# Patient Record
Sex: FEMALE | Race: WHITE | NOT HISPANIC OR LATINO | Employment: OTHER | URBAN - METROPOLITAN AREA
[De-identification: names, ages, dates, MRNs, and addresses within clinical notes are randomized per-mention and may not be internally consistent; named-entity substitution may affect disease eponyms.]

---

## 2023-11-02 ENCOUNTER — HOSPITAL ENCOUNTER (OUTPATIENT)
Dept: RADIOLOGY | Facility: EXTERNAL LOCATION | Age: 78
Discharge: HOME | End: 2023-11-02
Payer: MEDICARE

## 2024-05-14 ENCOUNTER — APPOINTMENT (OUTPATIENT)
Dept: RADIOLOGY | Facility: HOSPITAL | Age: 79
DRG: 177 | End: 2024-05-14
Payer: MEDICARE

## 2024-05-14 ENCOUNTER — HOSPITAL ENCOUNTER (INPATIENT)
Facility: HOSPITAL | Age: 79
LOS: 7 days | Discharge: NURSING, REHAB OR LONG-TERM CARE | DRG: 177 | End: 2024-05-22
Attending: EMERGENCY MEDICINE | Admitting: INTERNAL MEDICINE
Payer: MEDICARE

## 2024-05-14 ENCOUNTER — APPOINTMENT (OUTPATIENT)
Dept: CARDIOLOGY | Facility: HOSPITAL | Age: 79
DRG: 177 | End: 2024-05-14
Payer: MEDICARE

## 2024-05-14 DIAGNOSIS — E43 SEVERE PROTEIN-CALORIE MALNUTRITION (MULTI): ICD-10-CM

## 2024-05-14 DIAGNOSIS — F17.200 TOBACCO DEPENDENCE: ICD-10-CM

## 2024-05-14 DIAGNOSIS — K26.9 DUODENAL ULCER: ICD-10-CM

## 2024-05-14 DIAGNOSIS — J18.9 PNEUMONIA OF BOTH LOWER LOBES DUE TO INFECTIOUS ORGANISM: ICD-10-CM

## 2024-05-14 DIAGNOSIS — I50.31 ACUTE DIASTOLIC (CONGESTIVE) HEART FAILURE (MULTI): ICD-10-CM

## 2024-05-14 DIAGNOSIS — D50.0 IRON DEFICIENCY ANEMIA DUE TO CHRONIC BLOOD LOSS: Primary | ICD-10-CM

## 2024-05-14 DIAGNOSIS — I50.20 UNSPECIFIED SYSTOLIC (CONGESTIVE) HEART FAILURE (MULTI): ICD-10-CM

## 2024-05-14 DIAGNOSIS — H04.129 DRY EYE: ICD-10-CM

## 2024-05-14 DIAGNOSIS — D50.9 IRON DEFICIENCY ANEMIA, UNSPECIFIED IRON DEFICIENCY ANEMIA TYPE: ICD-10-CM

## 2024-05-14 DIAGNOSIS — Z87.11 HISTORY OF PEPTIC ULCER: ICD-10-CM

## 2024-05-14 DIAGNOSIS — D50.8 OTHER IRON DEFICIENCY ANEMIA: ICD-10-CM

## 2024-05-14 DIAGNOSIS — I50.9 ACUTE CONGESTIVE HEART FAILURE, UNSPECIFIED HEART FAILURE TYPE (MULTI): ICD-10-CM

## 2024-05-14 DIAGNOSIS — E87.6 HYPOKALEMIA: ICD-10-CM

## 2024-05-14 DIAGNOSIS — I50.9 CONGESTIVE HEART FAILURE, UNSPECIFIED HF CHRONICITY, UNSPECIFIED HEART FAILURE TYPE (MULTI): ICD-10-CM

## 2024-05-14 LAB
ABO GROUP (TYPE) IN BLOOD: NORMAL
ANION GAP SERPL CALC-SCNC: 13 MMOL/L
ANTIBODY SCREEN: NORMAL
BASOPHILS # BLD MANUAL: 0 X10*3/UL (ref 0–0.1)
BASOPHILS NFR BLD MANUAL: 0 %
BUN SERPL-MCNC: 31 MG/DL (ref 8–25)
BURR CELLS BLD QL SMEAR: ABNORMAL
CALCIUM SERPL-MCNC: 8.9 MG/DL (ref 8.5–10.4)
CHLORIDE SERPL-SCNC: 98 MMOL/L (ref 97–107)
CO2 SERPL-SCNC: 28 MMOL/L (ref 24–31)
CREAT SERPL-MCNC: 0.5 MG/DL (ref 0.4–1.6)
EGFRCR SERPLBLD CKD-EPI 2021: >90 ML/MIN/1.73M*2
EOSINOPHIL # BLD MANUAL: 0 X10*3/UL (ref 0–0.4)
EOSINOPHIL NFR BLD MANUAL: 0 %
ERYTHROCYTE [DISTWIDTH] IN BLOOD BY AUTOMATED COUNT: 23.9 % (ref 11.5–14.5)
GLUCOSE SERPL-MCNC: 177 MG/DL (ref 65–99)
HCT VFR BLD AUTO: 18.5 % (ref 36–46)
HGB BLD-MCNC: 4.8 G/DL (ref 12–16)
HYPOCHROMIA BLD QL SMEAR: ABNORMAL
IMM GRANULOCYTES # BLD AUTO: 0.08 X10*3/UL (ref 0–0.5)
IMM GRANULOCYTES NFR BLD AUTO: 0.8 % (ref 0–0.9)
LYMPHOCYTES # BLD MANUAL: 0.25 X10*3/UL (ref 0.8–3)
LYMPHOCYTES NFR BLD MANUAL: 3 %
MCH RBC QN AUTO: 15.5 PG (ref 26–34)
MCHC RBC AUTO-ENTMCNC: 25.9 G/DL (ref 32–36)
MCV RBC AUTO: 60 FL (ref 80–100)
MONOCYTES # BLD MANUAL: 0.67 X10*3/UL (ref 0.05–0.8)
MONOCYTES NFR BLD MANUAL: 8 %
NEUTROPHILS # BLD MANUAL: 7.48 X10*3/UL (ref 1.6–5.5)
NEUTS BAND # BLD MANUAL: 0.17 X10*3/UL (ref 0–0.5)
NEUTS BAND NFR BLD MANUAL: 2 %
NEUTS SEG # BLD MANUAL: 7.31 X10*3/UL (ref 1.6–5)
NEUTS SEG NFR BLD MANUAL: 87 %
NRBC BLD MANUAL-RTO: 24 % (ref 0–0)
NRBC BLD-RTO: 8.9 /100 WBCS (ref 0–0)
NT-PROBNP SERPL-MCNC: 7134 PG/ML (ref 0–624)
OVALOCYTES BLD QL SMEAR: ABNORMAL
PLATELET # BLD AUTO: 397 X10*3/UL (ref 150–450)
POLYCHROMASIA BLD QL SMEAR: ABNORMAL
POTASSIUM SERPL-SCNC: 3.6 MMOL/L (ref 3.4–5.1)
RBC # BLD AUTO: 3.1 X10*6/UL (ref 4–5.2)
RBC MORPH BLD: ABNORMAL
RH FACTOR (ANTIGEN D): NORMAL
SCHISTOCYTES BLD QL SMEAR: ABNORMAL
SODIUM SERPL-SCNC: 139 MMOL/L (ref 133–145)
TARGETS BLD QL SMEAR: ABNORMAL
TOTAL CELLS COUNTED BLD: 100
TROPONIN T SERPL-MCNC: 35 NG/L
WBC # BLD AUTO: 8.4 X10*3/UL (ref 4.4–11.3)

## 2024-05-14 PROCEDURE — 93005 ELECTROCARDIOGRAM TRACING: CPT

## 2024-05-14 PROCEDURE — 85027 COMPLETE CBC AUTOMATED: CPT | Performed by: EMERGENCY MEDICINE

## 2024-05-14 PROCEDURE — 82374 ASSAY BLOOD CARBON DIOXIDE: CPT | Performed by: EMERGENCY MEDICINE

## 2024-05-14 PROCEDURE — 86920 COMPATIBILITY TEST SPIN: CPT

## 2024-05-14 PROCEDURE — 84484 ASSAY OF TROPONIN QUANT: CPT | Performed by: EMERGENCY MEDICINE

## 2024-05-14 PROCEDURE — 74176 CT ABD & PELVIS W/O CONTRAST: CPT

## 2024-05-14 PROCEDURE — 2500000002 HC RX 250 W HCPCS SELF ADMINISTERED DRUGS (ALT 637 FOR MEDICARE OP, ALT 636 FOR OP/ED): Performed by: EMERGENCY MEDICINE

## 2024-05-14 PROCEDURE — 71045 X-RAY EXAM CHEST 1 VIEW: CPT

## 2024-05-14 PROCEDURE — 36415 COLL VENOUS BLD VENIPUNCTURE: CPT | Performed by: EMERGENCY MEDICINE

## 2024-05-14 PROCEDURE — 71045 X-RAY EXAM CHEST 1 VIEW: CPT | Performed by: RADIOLOGY

## 2024-05-14 PROCEDURE — 83880 ASSAY OF NATRIURETIC PEPTIDE: CPT | Performed by: EMERGENCY MEDICINE

## 2024-05-14 PROCEDURE — 94640 AIRWAY INHALATION TREATMENT: CPT

## 2024-05-14 PROCEDURE — 99291 CRITICAL CARE FIRST HOUR: CPT

## 2024-05-14 PROCEDURE — 86900 BLOOD TYPING SEROLOGIC ABO: CPT | Mod: 91 | Performed by: EMERGENCY MEDICINE

## 2024-05-14 PROCEDURE — 74176 CT ABD & PELVIS W/O CONTRAST: CPT | Performed by: RADIOLOGY

## 2024-05-14 PROCEDURE — 96374 THER/PROPH/DIAG INJ IV PUSH: CPT

## 2024-05-14 PROCEDURE — 2500000004 HC RX 250 GENERAL PHARMACY W/ HCPCS (ALT 636 FOR OP/ED): Performed by: EMERGENCY MEDICINE

## 2024-05-14 PROCEDURE — 85007 BL SMEAR W/DIFF WBC COUNT: CPT | Performed by: EMERGENCY MEDICINE

## 2024-05-14 RX ORDER — FUROSEMIDE 10 MG/ML
40 INJECTION INTRAMUSCULAR; INTRAVENOUS ONCE
Status: COMPLETED | OUTPATIENT
Start: 2024-05-14 | End: 2024-05-14

## 2024-05-14 RX ORDER — IPRATROPIUM BROMIDE AND ALBUTEROL SULFATE 2.5; .5 MG/3ML; MG/3ML
3 SOLUTION RESPIRATORY (INHALATION) ONCE
Status: COMPLETED | OUTPATIENT
Start: 2024-05-14 | End: 2024-05-14

## 2024-05-14 RX ADMIN — FUROSEMIDE 40 MG: 10 INJECTION, SOLUTION INTRAMUSCULAR; INTRAVENOUS at 22:47

## 2024-05-14 RX ADMIN — IPRATROPIUM BROMIDE AND ALBUTEROL SULFATE 3 ML: 2.5; .5 SOLUTION RESPIRATORY (INHALATION) at 22:49

## 2024-05-14 ASSESSMENT — LIFESTYLE VARIABLES
EVER FELT BAD OR GUILTY ABOUT YOUR DRINKING: NO
EVER HAD A DRINK FIRST THING IN THE MORNING TO STEADY YOUR NERVES TO GET RID OF A HANGOVER: NO
TOTAL SCORE: 0
HAVE PEOPLE ANNOYED YOU BY CRITICIZING YOUR DRINKING: NO
HAVE YOU EVER FELT YOU SHOULD CUT DOWN ON YOUR DRINKING: NO

## 2024-05-14 ASSESSMENT — PAIN - FUNCTIONAL ASSESSMENT: PAIN_FUNCTIONAL_ASSESSMENT: 0-10

## 2024-05-14 ASSESSMENT — PAIN SCALES - GENERAL: PAINLEVEL_OUTOF10: 0 - NO PAIN

## 2024-05-15 PROBLEM — D50.9 IRON DEFICIENCY ANEMIA: Status: ACTIVE | Noted: 2024-05-15

## 2024-05-15 PROBLEM — I50.9 CONGESTIVE HEART FAILURE (MULTI): Status: ACTIVE | Noted: 2024-05-15

## 2024-05-15 PROBLEM — J18.9 PNEUMONIA OF BOTH LOWER LOBES DUE TO INFECTIOUS ORGANISM: Status: ACTIVE | Noted: 2024-05-15

## 2024-05-15 LAB
ABO GROUP (TYPE) IN BLOOD: NORMAL
ALBUMIN SERPL-MCNC: 3.1 G/DL (ref 3.5–5)
ANION GAP SERPL CALC-SCNC: 9 MMOL/L
BLOOD EXPIRATION DATE: NORMAL
BLOOD EXPIRATION DATE: NORMAL
BUN SERPL-MCNC: 19 MG/DL (ref 8–25)
CALCIUM SERPL-MCNC: 8.4 MG/DL (ref 8.5–10.4)
CHLORIDE SERPL-SCNC: 95 MMOL/L (ref 97–107)
CO2 SERPL-SCNC: 31 MMOL/L (ref 24–31)
CREAT SERPL-MCNC: 0.4 MG/DL (ref 0.4–1.6)
DISPENSE STATUS: NORMAL
DISPENSE STATUS: NORMAL
EGFRCR SERPLBLD CKD-EPI 2021: >90 ML/MIN/1.73M*2
ERYTHROCYTE [DISTWIDTH] IN BLOOD BY AUTOMATED COUNT: 27.7 % (ref 11.5–14.5)
ERYTHROCYTE [DISTWIDTH] IN BLOOD BY AUTOMATED COUNT: 30.1 % (ref 11.5–14.5)
FERRITIN SERPL-MCNC: 14 NG/ML
FERRITIN SERPL-MCNC: 9 NG/ML (ref 13–150)
FOLATE SERPL-MCNC: >20 NG/ML (ref 4.2–19.9)
GLUCOSE SERPL-MCNC: 102 MG/DL (ref 65–99)
HCT VFR BLD AUTO: 23.4 % (ref 36–46)
HCT VFR BLD AUTO: 27.3 % (ref 36–46)
HCT VFR BLD AUTO: 27.3 % (ref 36–46)
HGB BLD-MCNC: 6.7 G/DL (ref 12–16)
HGB BLD-MCNC: 7.9 G/DL (ref 12–16)
HGB BLD-MCNC: 8 G/DL (ref 12–16)
HOLD SPECIMEN: NORMAL
IRON SATN MFR SERPL: 6 % (ref 12–50)
IRON SATN MFR SERPL: NORMAL %
IRON SERPL-MCNC: 25 UG/DL (ref 30–160)
IRON SERPL-MCNC: 28 UG/DL
MAGNESIUM SERPL-MCNC: 2 MG/DL (ref 1.6–3.1)
MCH RBC QN AUTO: 18.5 PG (ref 26–34)
MCH RBC QN AUTO: 19.3 PG (ref 26–34)
MCHC RBC AUTO-ENTMCNC: 28.6 G/DL (ref 32–36)
MCHC RBC AUTO-ENTMCNC: 28.9 G/DL (ref 32–36)
MCV RBC AUTO: 65 FL (ref 80–100)
MCV RBC AUTO: 67 FL (ref 80–100)
NRBC BLD-RTO: 5 /100 WBCS (ref 0–0)
NRBC BLD-RTO: 5.2 /100 WBCS (ref 0–0)
PLATELET # BLD AUTO: 240 X10*3/UL (ref 150–450)
PLATELET # BLD AUTO: 342 X10*3/UL (ref 150–450)
POTASSIUM SERPL-SCNC: 3.3 MMOL/L (ref 3.4–5.1)
PRODUCT BLOOD TYPE: 6200
PRODUCT BLOOD TYPE: 6200
PRODUCT CODE: NORMAL
PRODUCT CODE: NORMAL
RBC # BLD AUTO: 3.63 X10*6/UL (ref 4–5.2)
RBC # BLD AUTO: 4.1 X10*6/UL (ref 4–5.2)
REFLEX ADDED, ANEMIA PANEL: NORMAL
RH FACTOR (ANTIGEN D): NORMAL
SODIUM SERPL-SCNC: 135 MMOL/L (ref 133–145)
TIBC SERPL-MCNC: 435 UG/DL (ref 228–428)
TIBC SERPL-MCNC: NORMAL UG/DL
TROPONIN T SERPL-MCNC: 35 NG/L
TROPONIN T SERPL-MCNC: 39 NG/L
UIBC SERPL-MCNC: 410 UG/DL (ref 110–370)
UIBC SERPL-MCNC: >450 UG/DL
UNIT ABO: NORMAL
UNIT ABO: NORMAL
UNIT NUMBER: NORMAL
UNIT NUMBER: NORMAL
UNIT RH: NORMAL
UNIT RH: NORMAL
UNIT VOLUME: 350
UNIT VOLUME: 350
VIT B12 SERPL-MCNC: 1784 PG/ML (ref 211–946)
WBC # BLD AUTO: 12 X10*3/UL (ref 4.4–11.3)
WBC # BLD AUTO: 12.6 X10*3/UL (ref 4.4–11.3)
XM INTEP: NORMAL
XM INTEP: NORMAL

## 2024-05-15 PROCEDURE — P9016 RBC LEUKOCYTES REDUCED: HCPCS

## 2024-05-15 PROCEDURE — 36415 COLL VENOUS BLD VENIPUNCTURE: CPT | Performed by: EMERGENCY MEDICINE

## 2024-05-15 PROCEDURE — 36430 TRANSFUSION BLD/BLD COMPNT: CPT

## 2024-05-15 PROCEDURE — 82607 VITAMIN B-12: CPT | Performed by: NURSE PRACTITIONER

## 2024-05-15 PROCEDURE — 85014 HEMATOCRIT: CPT | Performed by: INTERNAL MEDICINE

## 2024-05-15 PROCEDURE — 82728 ASSAY OF FERRITIN: CPT | Mod: MUE | Performed by: NURSE PRACTITIONER

## 2024-05-15 PROCEDURE — 82746 ASSAY OF FOLIC ACID SERUM: CPT | Performed by: NURSE PRACTITIONER

## 2024-05-15 PROCEDURE — 83550 IRON BINDING TEST: CPT | Mod: 91,MUE | Performed by: NURSE PRACTITIONER

## 2024-05-15 PROCEDURE — 85027 COMPLETE CBC AUTOMATED: CPT | Mod: 91 | Performed by: NURSE PRACTITIONER

## 2024-05-15 PROCEDURE — 83550 IRON BINDING TEST: CPT | Mod: WESLAB | Performed by: EMERGENCY MEDICINE

## 2024-05-15 PROCEDURE — 85027 COMPLETE CBC AUTOMATED: CPT | Performed by: EMERGENCY MEDICINE

## 2024-05-15 PROCEDURE — 0DB78ZX EXCISION OF STOMACH, PYLORUS, VIA NATURAL OR ARTIFICIAL OPENING ENDOSCOPIC, DIAGNOSTIC: ICD-10-PCS | Performed by: INTERNAL MEDICINE

## 2024-05-15 PROCEDURE — 82728 ASSAY OF FERRITIN: CPT | Mod: WESLAB | Performed by: EMERGENCY MEDICINE

## 2024-05-15 PROCEDURE — 36415 COLL VENOUS BLD VENIPUNCTURE: CPT | Performed by: INTERNAL MEDICINE

## 2024-05-15 PROCEDURE — 0DJD8ZZ INSPECTION OF LOWER INTESTINAL TRACT, VIA NATURAL OR ARTIFICIAL OPENING ENDOSCOPIC: ICD-10-PCS | Performed by: INTERNAL MEDICINE

## 2024-05-15 PROCEDURE — 82374 ASSAY BLOOD CARBON DIOXIDE: CPT | Performed by: NURSE PRACTITIONER

## 2024-05-15 PROCEDURE — 2500000004 HC RX 250 GENERAL PHARMACY W/ HCPCS (ALT 636 FOR OP/ED): Performed by: EMERGENCY MEDICINE

## 2024-05-15 PROCEDURE — 83735 ASSAY OF MAGNESIUM: CPT | Performed by: NURSE PRACTITIONER

## 2024-05-15 PROCEDURE — 84484 ASSAY OF TROPONIN QUANT: CPT | Performed by: EMERGENCY MEDICINE

## 2024-05-15 PROCEDURE — 2500000001 HC RX 250 WO HCPCS SELF ADMINISTERED DRUGS (ALT 637 FOR MEDICARE OP): Performed by: NURSE PRACTITIONER

## 2024-05-15 PROCEDURE — 82040 ASSAY OF SERUM ALBUMIN: CPT | Performed by: NURSE PRACTITIONER

## 2024-05-15 PROCEDURE — 2060000001 HC INTERMEDIATE ICU ROOM DAILY

## 2024-05-15 RX ORDER — LIDOCAINE HYDROCHLORIDE 20 MG/ML
1 JELLY TOPICAL DAILY
COMMUNITY
Start: 2013-09-18

## 2024-05-15 RX ORDER — LEVOFLOXACIN 5 MG/ML
500 INJECTION, SOLUTION INTRAVENOUS EVERY 24 HOURS
Status: DISCONTINUED | OUTPATIENT
Start: 2024-05-16 | End: 2024-05-20

## 2024-05-15 RX ORDER — MUPIROCIN 20 MG/G
1 OINTMENT TOPICAL 2 TIMES DAILY
COMMUNITY
Start: 2024-05-11 | End: 2025-05-11

## 2024-05-15 RX ORDER — BUTALBITAL, ACETAMINOPHEN AND CAFFEINE 50; 325; 40 MG/1; MG/1; MG/1
1 TABLET ORAL EVERY 6 HOURS PRN
COMMUNITY
Start: 2023-11-10

## 2024-05-15 RX ORDER — BACLOFEN 10 MG/1
10 TABLET ORAL 3 TIMES DAILY
COMMUNITY

## 2024-05-15 RX ORDER — ACETAMINOPHEN AND CODEINE PHOSPHATE 300; 60 MG/1; MG/1
1 TABLET ORAL DAILY PRN
COMMUNITY

## 2024-05-15 RX ORDER — LEVOFLOXACIN 5 MG/ML
500 INJECTION, SOLUTION INTRAVENOUS ONCE
Status: COMPLETED | OUTPATIENT
Start: 2024-05-15 | End: 2024-05-15

## 2024-05-15 RX ORDER — LACTULOSE 10 G/15ML
20 SOLUTION ORAL; RECTAL DAILY
COMMUNITY
Start: 2023-09-15

## 2024-05-15 RX ORDER — MEPROBAMATE 400 MG/1
400 TABLET ORAL 4 TIMES DAILY PRN
COMMUNITY
Start: 2024-05-14 | End: 2024-11-10

## 2024-05-15 RX ORDER — POTASSIUM CHLORIDE 1.5 G/1.58G
40 POWDER, FOR SOLUTION ORAL ONCE
Status: COMPLETED | OUTPATIENT
Start: 2024-05-15 | End: 2024-05-15

## 2024-05-15 RX ADMIN — LEVOFLOXACIN 500 MG: 500 INJECTION, SOLUTION INTRAVENOUS at 06:33

## 2024-05-15 RX ADMIN — POTASSIUM CHLORIDE 40 MEQ: 1.5 FOR SOLUTION ORAL at 22:45

## 2024-05-15 SDOH — ECONOMIC STABILITY: TRANSPORTATION INSECURITY
IN THE PAST 12 MONTHS, HAS LACK OF TRANSPORTATION KEPT YOU FROM MEETINGS, WORK, OR FROM GETTING THINGS NEEDED FOR DAILY LIVING?: NO

## 2024-05-15 SDOH — ECONOMIC STABILITY: FOOD INSECURITY: WITHIN THE PAST 12 MONTHS, YOU WORRIED THAT YOUR FOOD WOULD RUN OUT BEFORE YOU GOT MONEY TO BUY MORE.: NEVER TRUE

## 2024-05-15 SDOH — SOCIAL STABILITY: SOCIAL INSECURITY: HAVE YOU HAD ANY THOUGHTS OF HARMING ANYONE ELSE?: NO

## 2024-05-15 SDOH — SOCIAL STABILITY: SOCIAL NETWORK
DO YOU BELONG TO ANY CLUBS OR ORGANIZATIONS SUCH AS CHURCH GROUPS UNIONS, FRATERNAL OR ATHLETIC GROUPS, OR SCHOOL GROUPS?: NO

## 2024-05-15 SDOH — SOCIAL STABILITY: SOCIAL INSECURITY: WITHIN THE LAST YEAR, HAVE YOU BEEN AFRAID OF YOUR PARTNER OR EX-PARTNER?: NO

## 2024-05-15 SDOH — HEALTH STABILITY: MENTAL HEALTH
STRESS IS WHEN SOMEONE FEELS TENSE, NERVOUS, ANXIOUS, OR CAN'T SLEEP AT NIGHT BECAUSE THEIR MIND IS TROUBLED. HOW STRESSED ARE YOU?: NOT AT ALL

## 2024-05-15 SDOH — SOCIAL STABILITY: SOCIAL INSECURITY: ABUSE: ADULT

## 2024-05-15 SDOH — HEALTH STABILITY: PHYSICAL HEALTH: ON AVERAGE, HOW MANY MINUTES DO YOU ENGAGE IN EXERCISE AT THIS LEVEL?: 0 MIN

## 2024-05-15 SDOH — ECONOMIC STABILITY: INCOME INSECURITY: HOW HARD IS IT FOR YOU TO PAY FOR THE VERY BASICS LIKE FOOD, HOUSING, MEDICAL CARE, AND HEATING?: NOT HARD AT ALL

## 2024-05-15 SDOH — ECONOMIC STABILITY: INCOME INSECURITY: IN THE LAST 12 MONTHS, WAS THERE A TIME WHEN YOU WERE NOT ABLE TO PAY THE MORTGAGE OR RENT ON TIME?: NO

## 2024-05-15 SDOH — ECONOMIC STABILITY: TRANSPORTATION INSECURITY
IN THE PAST 12 MONTHS, HAS THE LACK OF TRANSPORTATION KEPT YOU FROM MEDICAL APPOINTMENTS OR FROM GETTING MEDICATIONS?: NO

## 2024-05-15 SDOH — SOCIAL STABILITY: SOCIAL INSECURITY
WITHIN THE LAST YEAR, HAVE YOU BEEN KICKED, HIT, SLAPPED, OR OTHERWISE PHYSICALLY HURT BY YOUR PARTNER OR EX-PARTNER?: NO

## 2024-05-15 SDOH — SOCIAL STABILITY: SOCIAL INSECURITY
WITHIN THE LAST YEAR, HAVE TO BEEN RAPED OR FORCED TO HAVE ANY KIND OF SEXUAL ACTIVITY BY YOUR PARTNER OR EX-PARTNER?: NO

## 2024-05-15 SDOH — SOCIAL STABILITY: SOCIAL INSECURITY: DO YOU FEEL UNSAFE GOING BACK TO THE PLACE WHERE YOU ARE LIVING?: NO

## 2024-05-15 SDOH — HEALTH STABILITY: MENTAL HEALTH: HOW OFTEN DO YOU HAVE 6 OR MORE DRINKS ON ONE OCCASION?: NEVER

## 2024-05-15 SDOH — ECONOMIC STABILITY: INCOME INSECURITY: IN THE PAST 12 MONTHS, HAS THE ELECTRIC, GAS, OIL, OR WATER COMPANY THREATENED TO SHUT OFF SERVICE IN YOUR HOME?: NO

## 2024-05-15 SDOH — HEALTH STABILITY: MENTAL HEALTH: HOW OFTEN DO YOU HAVE A DRINK CONTAINING ALCOHOL?: NEVER

## 2024-05-15 SDOH — SOCIAL STABILITY: SOCIAL INSECURITY: HAS ANYONE EVER THREATENED TO HURT YOUR FAMILY OR YOUR PETS?: NO

## 2024-05-15 SDOH — SOCIAL STABILITY: SOCIAL NETWORK: HOW OFTEN DO YOU ATTEND CHURCH OR RELIGIOUS SERVICES?: NEVER

## 2024-05-15 SDOH — ECONOMIC STABILITY: HOUSING INSECURITY
IN THE LAST 12 MONTHS, WAS THERE A TIME WHEN YOU DID NOT HAVE A STEADY PLACE TO SLEEP OR SLEPT IN A SHELTER (INCLUDING NOW)?: NO

## 2024-05-15 SDOH — SOCIAL STABILITY: SOCIAL NETWORK: IN A TYPICAL WEEK, HOW MANY TIMES DO YOU TALK ON THE PHONE WITH FAMILY, FRIENDS, OR NEIGHBORS?: NEVER

## 2024-05-15 SDOH — SOCIAL STABILITY: SOCIAL INSECURITY: WITHIN THE LAST YEAR, HAVE YOU BEEN HUMILIATED OR EMOTIONALLY ABUSED IN OTHER WAYS BY YOUR PARTNER OR EX-PARTNER?: NO

## 2024-05-15 SDOH — HEALTH STABILITY: MENTAL HEALTH
HOW OFTEN DO YOU NEED TO HAVE SOMEONE HELP YOU WHEN YOU READ INSTRUCTIONS, PAMPHLETS, OR OTHER WRITTEN MATERIAL FROM YOUR DOCTOR OR PHARMACY?: NEVER

## 2024-05-15 SDOH — SOCIAL STABILITY: SOCIAL NETWORK: ARE YOU MARRIED, WIDOWED, DIVORCED, SEPARATED, NEVER MARRIED, OR LIVING WITH A PARTNER?: WIDOWED

## 2024-05-15 SDOH — SOCIAL STABILITY: SOCIAL INSECURITY: DO YOU FEEL ANYONE HAS EXPLOITED OR TAKEN ADVANTAGE OF YOU FINANCIALLY OR OF YOUR PERSONAL PROPERTY?: NO

## 2024-05-15 SDOH — SOCIAL STABILITY: SOCIAL INSECURITY: ARE YOU OR HAVE YOU BEEN THREATENED OR ABUSED PHYSICALLY, EMOTIONALLY, OR SEXUALLY BY ANYONE?: NO

## 2024-05-15 SDOH — ECONOMIC STABILITY: HOUSING INSECURITY: IN THE LAST 12 MONTHS, HOW MANY PLACES HAVE YOU LIVED?: 1

## 2024-05-15 SDOH — HEALTH STABILITY: MENTAL HEALTH: HOW MANY STANDARD DRINKS CONTAINING ALCOHOL DO YOU HAVE ON A TYPICAL DAY?: PATIENT DOES NOT DRINK

## 2024-05-15 SDOH — SOCIAL STABILITY: SOCIAL NETWORK: HOW OFTEN DO YOU GET TOGETHER WITH FRIENDS OR RELATIVES?: NEVER

## 2024-05-15 SDOH — SOCIAL STABILITY: SOCIAL INSECURITY: ARE THERE ANY APPARENT SIGNS OF INJURIES/BEHAVIORS THAT COULD BE RELATED TO ABUSE/NEGLECT?: NO

## 2024-05-15 SDOH — ECONOMIC STABILITY: FOOD INSECURITY: WITHIN THE PAST 12 MONTHS, THE FOOD YOU BOUGHT JUST DIDN'T LAST AND YOU DIDN'T HAVE MONEY TO GET MORE.: NEVER TRUE

## 2024-05-15 SDOH — SOCIAL STABILITY: SOCIAL NETWORK: HOW OFTEN DO YOU ATTENT MEETINGS OF THE CLUB OR ORGANIZATION YOU BELONG TO?: NEVER

## 2024-05-15 SDOH — HEALTH STABILITY: PHYSICAL HEALTH: ON AVERAGE, HOW MANY DAYS PER WEEK DO YOU ENGAGE IN MODERATE TO STRENUOUS EXERCISE (LIKE A BRISK WALK)?: 0 DAYS

## 2024-05-15 SDOH — SOCIAL STABILITY: SOCIAL INSECURITY: HAVE YOU HAD THOUGHTS OF HARMING ANYONE ELSE?: NO

## 2024-05-15 SDOH — SOCIAL STABILITY: SOCIAL INSECURITY: DOES ANYONE TRY TO KEEP YOU FROM HAVING/CONTACTING OTHER FRIENDS OR DOING THINGS OUTSIDE YOUR HOME?: NO

## 2024-05-15 SDOH — SOCIAL STABILITY: SOCIAL INSECURITY: WERE YOU ABLE TO COMPLETE ALL THE BEHAVIORAL HEALTH SCREENINGS?: YES

## 2024-05-15 ASSESSMENT — COGNITIVE AND FUNCTIONAL STATUS - GENERAL
TOILETING: TOTAL
HELP NEEDED FOR BATHING: TOTAL
EATING MEALS: TOTAL
STANDING UP FROM CHAIR USING ARMS: TOTAL
MOVING TO AND FROM BED TO CHAIR: TOTAL
WALKING IN HOSPITAL ROOM: TOTAL
PATIENT BASELINE BEDBOUND: NO
TURNING FROM BACK TO SIDE WHILE IN FLAT BAD: A LOT
DRESSING REGULAR UPPER BODY CLOTHING: TOTAL
CLIMB 3 TO 5 STEPS WITH RAILING: TOTAL
PERSONAL GROOMING: TOTAL
MOBILITY SCORE: 8
MOVING FROM LYING ON BACK TO SITTING ON SIDE OF FLAT BED WITH BEDRAILS: A LOT
DAILY ACTIVITIY SCORE: 6
DRESSING REGULAR LOWER BODY CLOTHING: TOTAL

## 2024-05-15 ASSESSMENT — PATIENT HEALTH QUESTIONNAIRE - PHQ9
SUM OF ALL RESPONSES TO PHQ9 QUESTIONS 1 & 2: 0
1. LITTLE INTEREST OR PLEASURE IN DOING THINGS: NOT AT ALL
2. FEELING DOWN, DEPRESSED OR HOPELESS: NOT AT ALL

## 2024-05-15 ASSESSMENT — LIFESTYLE VARIABLES
HOW OFTEN DO YOU HAVE A DRINK CONTAINING ALCOHOL: NEVER
SKIP TO QUESTIONS 9-10: 1
PRESCIPTION_ABUSE_PAST_12_MONTHS: NO
AUDIT-C TOTAL SCORE: 0
SKIP TO QUESTIONS 9-10: 1
AUDIT-C TOTAL SCORE: 0
HOW OFTEN DO YOU HAVE 6 OR MORE DRINKS ON ONE OCCASION: NEVER
AUDIT-C TOTAL SCORE: 0
HOW MANY STANDARD DRINKS CONTAINING ALCOHOL DO YOU HAVE ON A TYPICAL DAY: PATIENT DOES NOT DRINK
SUBSTANCE_ABUSE_PAST_12_MONTHS: NO

## 2024-05-15 ASSESSMENT — ACTIVITIES OF DAILY LIVING (ADL)
ADEQUATE_TO_COMPLETE_ADL: YES
DRESSING YOURSELF: NEEDS ASSISTANCE
JUDGMENT_ADEQUATE_SAFELY_COMPLETE_DAILY_ACTIVITIES: YES
BATHING: NEEDS ASSISTANCE
LACK_OF_TRANSPORTATION: NO
TOILETING: NEEDS ASSISTANCE
FEEDING YOURSELF: NEEDS ASSISTANCE
HEARING - LEFT EAR: FUNCTIONAL
WALKS IN HOME: INDEPENDENT
GROOMING: NEEDS ASSISTANCE
PATIENT'S MEMORY ADEQUATE TO SAFELY COMPLETE DAILY ACTIVITIES?: YES
HEARING - RIGHT EAR: FUNCTIONAL

## 2024-05-15 ASSESSMENT — PAIN SCALES - WONG BAKER: WONGBAKER_NUMERICALRESPONSE: NO HURT

## 2024-05-15 ASSESSMENT — PAIN SCALES - GENERAL: PAINLEVEL_OUTOF10: 0 - NO PAIN

## 2024-05-15 NOTE — PROGRESS NOTES
05/15/24 1530   Physical Activity   On average, how many days per week do you engage in moderate to strenuous exercise (like a brisk walk)? 0 days   On average, how many minutes do you engage in exercise at this level? 0 min   Financial Resource Strain   How hard is it for you to pay for the very basics like food, housing, medical care, and heating? Not hard   Housing Stability   In the last 12 months, was there a time when you were not able to pay the mortgage or rent on time? N   In the last 12 months, how many places have you lived? 1   In the last 12 months, was there a time when you did not have a steady place to sleep or slept in a shelter (including now)? N   Transportation Needs   In the past 12 months, has lack of transportation kept you from medical appointments or from getting medications? no   In the past 12 months, has lack of transportation kept you from meetings, work, or from getting things needed for daily living? No   Food Insecurity   Within the past 12 months, you worried that your food would run out before you got the money to buy more. Never true   Within the past 12 months, the food you bought just didn't last and you didn't have money to get more. Never true   Stress   Do you feel stress - tense, restless, nervous, or anxious, or unable to sleep at night because your mind is troubled all the time - these days? Not at all   Social Connections   In a typical week, how many times do you talk on the phone with family, friends, or neighbors? Never  (Pts dtr lives with her)   How often do you get together with friends or relatives? Never  (pts dtr lives with her)   How often do you attend Congregation or Taoism services? Never   Do you belong to any clubs or organizations such as Congregation groups, unions, fraternal or athletic groups, or school groups? No   How often do you attend meetings of the clubs or organizations you belong to? Never   Are you , , , , never  , or living with a partner?    Intimate Partner Violence   Within the last year, have you been afraid of your partner or ex-partner? No   Within the last year, have you been humiliated or emotionally abused in other ways by your partner or ex-partner? No   Within the last year, have you been kicked, hit, slapped, or otherwise physically hurt by your partner or ex-partner? No   Within the last year, have you been raped or forced to have any kind of sexual activity by your partner or ex-partner? No   Alcohol Use   Q1: How often do you have a drink containing alcohol? Never   Q2: How many drinks containing alcohol do you have on a typical day when you are drinking? None   Q3: How often do you have six or more drinks on one occasion? Never   Utilities   In the past 12 months has the electric, gas, oil, or water company threatened to shut off services in your home? No   Health Literacy   How often do you need to have someone help you when you read instructions, pamphlets, or other written material from your doctor or pharmacy? Never

## 2024-05-15 NOTE — ED NOTES
Pt repeat hgb 8, Dr Mcneil made aware. Awaiting response if more units are to be ordered.      Adriana Fine RN  05/15/24 5747

## 2024-05-15 NOTE — PROGRESS NOTES
05/15/24 1541   [facility] Disability Status   Are you deaf or do you have serious difficulty hearing? N   Are you blind or do you have serious difficulty seeing, even when wearing glasses? N   Because of a physical, mental, or emotional condition, do you have serious difficulty concentrating, remembering, or making decisions? (5 years old or older) Y   Do you have serious difficulty walking or climbing stairs? Y   Do you have serious difficulty dressing or bathing? Y   Because of a physical, mental, or emotional condition, do you have serious difficulty doing errands alone such as visiting the doctor? Y

## 2024-05-15 NOTE — PROGRESS NOTES
05/15/24 1542   Current Planned Discharge Disposition   Current Planned Discharge Disposition SNF  (Referral placed to [facility])

## 2024-05-15 NOTE — PROGRESS NOTES
05/15/24 1539   Discharge Planning   Living Arrangements Family members   Support Systems Family members   Type of Residence Private residence   Number of Stairs to Enter Residence 2   Number of Stairs Within Residence 0   Do you have animals or pets at home? No   Who is requesting discharge planning? Provider   Home or Post Acute Services Post acute facilities (Rehab/SNF/etc)   Patient expects to be discharged to: Pt would like [facility]--[facility] if SNF is needed for rehab   Does the patient need discharge transport arranged? Yes   RoundTrip coordination needed? Yes   Has discharge transport been arranged? No   Financial Resource Strain   How hard is it for you to pay for the very basics like food, housing, medical care, and heating? Not hard   Housing Stability   In the last 12 months, was there a time when you were not able to pay the mortgage or rent on time? N   In the last 12 months, how many places have you lived? 1   In the last 12 months, was there a time when you did not have a steady place to sleep or slept in a shelter (including now)? N   Transportation Needs   In the past 12 months, has lack of transportation kept you from medical appointments or from getting medications? no   In the past 12 months, has lack of transportation kept you from meetings, work, or from getting things needed for daily living? No   Patient Choice   Provider Choice list and CMS website (https://medicare.gov/care-compare#search) for post-acute Quality and Resource Measure Data were provided and reviewed with: Family;Patient   Patient / Family choosing to utilize agency / facility established prior to hospitalization Yes

## 2024-05-15 NOTE — ED PROVIDER NOTES
HPI   Chief Complaint   Patient presents with   • Shortness of Breath     Pt has been short of breath and increased swelling for the past few days       HPI  71-year-old female presents complaint of shortness of breath.  The patient states has been going on for a while couple months although she is not a great historian.  She states she has not seen her doctor for this.  She does not think she is taking medicine for breathing.  She is a smoker.  She  Of any chest pain, nausea or vomiting.  No other complaints.                  Keyla Coma Scale Score: 15                     Patient History   Past Medical History:   Diagnosis Date   • Other cervical disc degeneration, unspecified cervical region     Degeneration of cervical intervertebral disc   • Other conditions influencing health status     Bulging Disc (L3 - L4)   • Other conditions influencing health status     A Fall   • Other conditions influencing health status     Bulging Disc (C4 - C5)   • Other conditions influencing health status     Bulging Disc (C6 - C7)   • Other conditions influencing health status     Bulging Disc (C5 - C6)   • Other conditions influencing health status     Lumbar Spondylolisthesis   • Other conditions influencing health status     Rotator Cuff Tendon Tear   • Other intervertebral disc degeneration, lumbar region     L4-L5 disc bulge   • Other intervertebral disc displacement, lumbar region     Disc displacement, lumbar   • Other intervertebral disc displacement, lumbosacral region     Displacement of lumbosacral intervertebral disc   • Personal history of other diseases of the musculoskeletal system and connective tissue     History of bursitis   • Personal history of other diseases of the musculoskeletal system and connective tissue     History of fibromyositis   • Personal history of other specified conditions     History of headache   • Spinal stenosis, lumbar region without neurogenic claudication     Lumbar canal stenosis   •  Sprain of ligaments of lumbar spine, initial encounter     Low back sprain     No past surgical history on file.  No family history on file.  Social History     Tobacco Use   • Smoking status: Not on file   • Smokeless tobacco: Not on file   Substance Use Topics   • Alcohol use: Not on file   • Drug use: Not on file       Physical Exam   ED Triage Vitals [05/14/24 2126]   Temperature Heart Rate Respirations BP   37 °C (98.6 °F) (!) 109 18 131/54      Pulse Ox Temp Source Heart Rate Source Patient Position   94 % Temporal Monitor Lying      BP Location FiO2 (%)     Left arm --       Physical Exam  General:  Awake, alert, no acute distress.  Head: Normocephalic, Atraumatic  Neck: Supple, trachea midline, no stridor  Skin: Warm and dry, no rashes   Lungs: Clear to auscultation bilaterally no acute respiratory distress, speaking in full sentences without difficulty  CV: Tachycardic rate, regular rhythm with no obvious murmurs gallops rubs noted, no jugular venous distention, bilateral +2 pitting edema  Abdomen: Soft, nontender, slightly distended, positive bowel sounds, no peritoneal signs  Rectal: Good rectal tone, no external hemorrhoids or fissures, no stool and Hemoccult negative  Neuro:  No gross focal neurologic deficits, NIH is 0  Musculoskeletal:  Full range of motion in all 4 extremities  Psychiatric:  Alert oriented x 3, Good insight into condition.  ED Course & MDM   ED Course as of 05/22/24 2216   Tue May 14, 2024   2124 My EKG interpretation:  Sinus tachycardia 111 bpm normal axis NM interval 118 QTc 429 no ectopy or acute ischemic changes noted [KW]      ED Course User Index  [KW] Eliseo Best DO         Diagnoses as of 05/22/24 2216   Pneumonia of both lower lobes due to infectious organism   Other iron deficiency anemia   Congestive heart failure, unspecified HF chronicity, unspecified heart failure type (Multi)       Medical Decision Making  Patient is treated Mica Das.  Her proBNP T is 7000.   Her hemoglobin is 4.8.  She typed and crossed for 2 units packed red blood cells.  She was treated with 20 mg of Lasix, CT on pelvis did not show any acute intra-abdominal pathology however she does have questionable infiltrate consistent with pneumonia she was started on Levaquin.  She will require admission to the hospital.  Contacted Dr. Mcneil for admission.    35 minutes of total critical care time includes review of laboratory data, radiology results, discussion with consultants, and monitoring for potential decompensation.  Interventions performed as documented above.  Total care time is exclusive of any separately billable procedures    Procedure  Procedures     Eliseo Best, DO  05/14/24 2257       Eliseo Best, DO  05/15/24 0020       Eliseo Best, DO  05/22/24 2216

## 2024-05-15 NOTE — PROGRESS NOTES
Pharmacy Medication History Review    Karly Horton is a 79 y.o. female admitted for Pneumonia of both lower lobes due to infectious organism. Pharmacy reviewed the patient's [id number] medications and allergies for accuracy.    Medications ADDED:  none  Medications CHANGED:  none  Medications REMOVED:   none     The list below reflects the updated PTA list. Comments regarding how patient may be taking medications differently can be found in the Admit Orders Activity  Prior to Admission Medications   Prescriptions Last Dose Informant   acetaminophen-codeine (Tylenol w/ Codeine #4) 300-60 mg tablet Past Week self   Sig: Take 1 tablet by mouth once daily as needed for severe pain (7 - 10).   baclofen (Lioresal) 10 mg tablet Past Week self   Sig: Take 1 tablet (10 mg) by mouth 3 times a day.   butalbital-acetaminophen-caff -40 mg tablet Past Week self   Sig: Take 1 tablet by mouth every 6 hours if needed for headaches.   lactulose 10 gram/15 mL solution  self   Sig: Take 30 mL (20 g) by mouth once daily.   lidocaine (Xylocaine) 2 % jelly Past Week self   Sig: Apply 1 Application topically once daily. migraines   meprobamate (Equanil) 400 mg tablet Past Week self   Sig: Take 0.5 tablets (200 mg) by mouth every 6 hours if needed (pain).   mupirocin (Bactroban) 2 % ointment Past Week self   Sig: Apply 1 Application topically 2 times a day.      Facility-Administered Medications: None        The list below reflects the updated allergy list. Please review each documented allergy for additional clarification and justification.  Allergies  Reviewed by Vickie Mendiola on 5/15/2024        Severity Reactions Comments    Lactose Low GI Upset             Pharmacy has been updated to [facility].    Sources used to complete the med history include dispense history, historical AVS, PTA medication list, patient interview. Patient is a poor historian.    Below are additional concerns with the patient's PTA  list.  Unable to confirm last doses. Patient does not seem compliant aside from with the meprobamate 400mg - half tablet by mouth every 6 hours for pain.    Vickie Mendiola  Please reach out via [email or web address] Secure Chat for questions

## 2024-05-15 NOTE — CARE PLAN
Pt has a POA and Living Will not on file--dtr Karly to bring in documents  ADOD: 2-3 days    Pt lives at home in an efficiency apt with her dtr Karly Morteza [phone]; phoned Karly for information after speaking with pt in her room. Per dtr Karly; they live in a very small apartment, no room for a wheelchair or walker. Pt has a hx of migraines and wears dark colored glasses; per dtr Karly, her mother keeps the curtains closed and she does not allow lamps to be turned on in the apt; pt throws tissues on the floor, apt needs to be cleaned.   Karly has physical issues and is unable to clean the apt. Herself and she is unable to lift her mother to assist.  Pt c/o feeling very weak.  Pt does not wear home 02, no cpap or bipap. She is able to sit on the couch; unable to get out of the apt at this time; pt possibly saw her PCP last fall.  A family friend does the shopping; pt was able to independently shower and dress herself up until 1-1/2- 2 weeks ago.   Pts dtr Karly is able to drive and she cooks.  Pt wears glasses, no hearing aids.  Discussed discharge planning; pt said that the only SNF that she would go to if needed would be [facility] on [address]  PT OT has been ordered.  Will start the referral today for [facility].    DISCHARGE PLAN: ANTICIPATING PT NEEDING A SNF FOR REHAB--DO NOT DISCHARGE PATIENT BEFORE SPEAKING WITH CARE COORDINATION--NEED TO MAKE SURE THAT THE PT HAS A SAFE DISCHARGE PLAN IN PLACE

## 2024-05-15 NOTE — H&P
"Chief Complaint Generalized weakness, inability to ambulate, shortness of breath, leg swelling    History Of Present Illness  Karly Horton is a very pleasant 79 y.o.  female with a past medical history significant for COPD and tobacco dependence presenting with the emergency department with generalized weakness, inability to ambulate, shortness of breath, and lower extremity swelling.  She is awake alert oriented x 3, a marginal historian.  She reports a chronically distended abdomen with intermittent spasms her entire life.  She reports \"abdominal issues\" x 1 year with symptoms of abdominal pain, bloating, decreased oral intake over the past month.  She takes Lactulose and Omeprazole twice daily.  She takes NSAIDs daily.  She denies any black tarry or red bloody stools.  She reports a weight loss of 20 pounds since summer 2023.  She reports generalized weakness which she attributes to her poor intake.  Over the past several weeks, she developed symptoms of increased generalized weakness, shortness of breath, lower extremity swelling.  She was unable to stand herself up.  She presented to the emergency department for evaluation.  In the emergency department, initial work-up was done.  1 view chest x-ray per radiology showed mild vascular congestion, small left pleural effusion, airspace opacity at the left lung base, may be secondary to atelectasis or pneumonia.  CT abdomen, pelvis without IV contrast per radiology showed large right and transverse predominant colonic stool volume which appears more pronounced when compared to 7/22/2023 - correlate for constipation, small left greater than right pleural effusions, patchy ground glass/consolidative airspace opacities within the left lower lobe, concerning for pneumonia - limited evaluation.  BMP showed a glucose of 177.  Sodium 139.  Potassium 3.6.  BUN 31.  Creatinine 0.5.  Calcium 8.9.  CBC showed a WBC 8.4.  Hemoglobin 4.8.  Hematocrit 18.5.  MCV 60. "  MCH 15.5.  Pro BNP elevated 7,134.  Troponin 35.  She was treated in the emergency department with IV Levaquin for pneumonia, IV Lasix, and packed red blood cells and was admitted.  At the time of my evaluation, she is resting on the cart in the emergency department room 4 in no acute distress.  Her daughter is present.  She reports improvement in shortness of breath.  She reports leg swelling.     Past Medical History  Past Medical History:   Diagnosis Date    Other cervical disc degeneration, unspecified cervical region     Degeneration of cervical intervertebral disc    Other conditions influencing health status     Bulging Disc (L3 - L4)    Other conditions influencing health status     A Fall    Other conditions influencing health status     Bulging Disc (C4 - C5)    Other conditions influencing health status     Bulging Disc (C6 - C7)    Other conditions influencing health status     Bulging Disc (C5 - C6)    Other conditions influencing health status     Lumbar Spondylolisthesis    Other conditions influencing health status     Rotator Cuff Tendon Tear    Other intervertebral disc degeneration, lumbar region     L4-L5 disc bulge    Other intervertebral disc displacement, lumbar region     Disc displacement, lumbar    Other intervertebral disc displacement, lumbosacral region     Displacement of lumbosacral intervertebral disc    Personal history of other diseases of the musculoskeletal system and connective tissue     History of bursitis    Personal history of other diseases of the musculoskeletal system and connective tissue     History of fibromyositis    Personal history of other specified conditions     History of headache    Spinal stenosis, lumbar region without neurogenic claudication     Lumbar canal stenosis    Sprain of ligaments of lumbar spine, initial encounter     Low back sprain       Surgical History  Significant for appendectomy, cataract extraction, closure gastrocolic fistula,  tonsillectomy, total abdominal hysterectomy      Social History  Lives with daughter. Independent. Ambulatory. + Tobacco use. No alcohol use. + Marijuana use. PCP = Dr. Chaney (2023). GI = Dr. Ram.     Family History  Mother cancer  Father heart, CVA, diabetes     Allergies  Lactose    Review of Systems   Constitutional:  Positive for unexpected weight change.   HENT: Negative.     Eyes: Negative.    Respiratory:  Positive for shortness of breath.    Cardiovascular:  Positive for leg swelling.   Gastrointestinal:  Positive for abdominal distention and diarrhea.   Endocrine: Negative.    Genitourinary: Negative.    Musculoskeletal: Negative.    Skin: Negative.    Allergic/Immunologic: Negative.    Neurological: Negative.    Hematological: Negative.    Psychiatric/Behavioral: Negative.     All other systems reviewed and are negative.       Physical Exam  Vitals and nursing note reviewed.   Constitutional:       General: She is not in acute distress.     Appearance: Normal appearance. She is underweight. She is not ill-appearing, toxic-appearing or diaphoretic.   HENT:      Head: Normocephalic and atraumatic.      Right Ear: Tympanic membrane normal.      Left Ear: Tympanic membrane normal.      Nose: Nose normal.      Mouth/Throat:      Mouth: Mucous membranes are moist.      Pharynx: Oropharynx is clear.   Eyes:      Extraocular Movements: Extraocular movements intact.      Conjunctiva/sclera: Conjunctivae normal.      Pupils: Pupils are equal, round, and reactive to light.   Cardiovascular:      Rate and Rhythm: Normal rate and regular rhythm.      Pulses: Normal pulses.      Heart sounds: Normal heart sounds. No murmur heard.  Pulmonary:      Effort: Pulmonary effort is normal. No respiratory distress.      Breath sounds: Decreased breath sounds present. No wheezing, rhonchi or rales.      Comments: 2 liters nasal cannula - pulse oximetry personally checked on room air - 95%  Abdominal:      General: Bowel  "sounds are normal. There is distension.      Palpations: Abdomen is soft.      Tenderness: There is no abdominal tenderness.   Genitourinary:     Comments: Deferred.  Musculoskeletal:         General: Swelling present. No tenderness. Normal range of motion.      Cervical back: Normal range of motion and neck supple.      Right lower leg: Edema present.      Left lower leg: Edema present.   Skin:     General: Skin is warm and dry.      Capillary Refill: Capillary refill takes less than 2 seconds.   Neurological:      General: No focal deficit present.      Mental Status: She is alert and oriented to person, place, and time.   Psychiatric:         Mood and Affect: Mood normal.         Behavior: Behavior normal.       Last Recorded Vitals  Blood pressure 147/76, pulse 99, temperature 37.3 °C (99.1 °F), resp. rate 17, height 1.473 m (4' 10\"), weight (!) 38.6 kg (85 lb 1.6 oz), SpO2 99%.    Relevant Results  Results for orders placed or performed during the hospital encounter of 05/14/24 (from the past 24 hour(s))   CBC and Auto Differential   Result Value Ref Range    WBC 8.4 4.4 - 11.3 x10*3/uL    nRBC 8.9 (H) 0.0 - 0.0 /100 WBCs    RBC 3.10 (L) 4.00 - 5.20 x10*6/uL    Hemoglobin 4.8 (LL) 12.0 - 16.0 g/dL    Hematocrit 18.5 (L) 36.0 - 46.0 %    MCV 60 (L) 80 - 100 fL    MCH 15.5 (L) 26.0 - 34.0 pg    MCHC 25.9 (L) 32.0 - 36.0 g/dL    RDW 23.9 (H) 11.5 - 14.5 %    Platelets 397 150 - 450 x10*3/uL    Immature Granulocytes %, Automated 0.8 0.0 - 0.9 %    Immature Granulocytes Absolute, Automated 0.08 0.00 - 0.50 x10*3/uL   Basic metabolic panel   Result Value Ref Range    Glucose 177 (H) 65 - 99 mg/dL    Sodium 139 133 - 145 mmol/L    Potassium 3.6 3.4 - 5.1 mmol/L    Chloride 98 97 - 107 mmol/L    Bicarbonate 28 24 - 31 mmol/L    Urea Nitrogen 31 (H) 8 - 25 mg/dL    Creatinine 0.50 0.40 - 1.60 mg/dL    eGFR >90 >60 mL/min/1.73m*2    Calcium 8.9 8.5 - 10.4 mg/dL    Anion Gap 13 <=19 mmol/L   NT-PROBNP   Result Value Ref " Range    PROBNP 7,134 (H) 0 - 624 pg/mL   Serial Troponin, Initial ([facility])   Result Value Ref Range    Troponin T, High Sensitivity 35 (HH) <=14 ng/L   Manual Differential   Result Value Ref Range    Neutrophils %, Manual 87.0 40.0 - 80.0 %    Bands %, Manual 2.0 0.0 - 5.0 %    Lymphocytes %, Manual 3.0 13.0 - 44.0 %    Monocytes %, Manual 8.0 2.0 - 10.0 %    Eosinophils %, Manual 0.0 0.0 - 6.0 %    Basophils %, Manual 0.0 0.0 - 2.0 %    Seg Neutrophils Absolute, Manual 7.31 (H) 1.60 - 5.00 x10*3/uL    Bands Absolute, Manual 0.17 0.00 - 0.50 x10*3/uL    Lymphocytes Absolute, Manual 0.25 (L) 0.80 - 3.00 x10*3/uL    Monocytes Absolute, Manual 0.67 0.05 - 0.80 x10*3/uL    Eosinophils Absolute, Manual 0.00 0.00 - 0.40 x10*3/uL    Basophils Absolute, Manual 0.00 0.00 - 0.10 x10*3/uL    Total Cells Counted 100     Neutrophils Absolute, Manual 7.48 (H) 1.60 - 5.50 x10*3/uL    Manual nRBC per 100 Cells 24.0 (H) 0.0 - 0.0 %    RBC Morphology See Below     Polychromasia Mild     Hypochromia Marked     RBC Fragments Few     Target Cells Many     Ovalocytes Few     Soto Cells Few    ECG 12 lead   Result Value Ref Range    Ventricular Rate 111 BPM    Atrial Rate 111 BPM    ME Interval 118 ms    QRS Duration 80 ms    QT Interval 316 ms    QTC Calculation(Bazett) 429 ms    P Axis 85 degrees    R Axis 74 degrees    T Axis -9 degrees    QRS Count 18 beats    Q Onset 214 ms    P Onset 155 ms    P Offset 197 ms    T Offset 372 ms    QTC Fredericia 388 ms   Prepare RBC: 1 Units   Result Value Ref Range    PRODUCT CODE U9823Q21     Unit Number [id number]     Unit ABO A     Unit RH POS     XM INTEP COMP     Dispense Status TR     Blood Expiration Date May 29, 2024 23:59 EDT     PRODUCT BLOOD TYPE 6200     UNIT VOLUME 350    Type and Screen   Result Value Ref Range    ABO TYPE A     Rh TYPE POS     ANTIBODY SCREEN NEG    Lavender Top   Result Value Ref Range    Extra Tube Hold for add-ons.    Serial Troponin, 2 Hour ([facility])   Result  Value Ref Range    Troponin T, High Sensitivity 35 (HH) <=14 ng/L   VERIFY ABO/Rh Group Test   Result Value Ref Range    ABO TYPE A     Rh TYPE POS    CBC   Result Value Ref Range    WBC 12.0 (H) 4.4 - 11.3 x10*3/uL    nRBC 5.0 (H) 0.0 - 0.0 /100 WBCs    RBC 3.63 (L) 4.00 - 5.20 x10*6/uL    Hemoglobin 6.7 (L) 12.0 - 16.0 g/dL    Hematocrit 23.4 (L) 36.0 - 46.0 %    MCV 65 (L) 80 - 100 fL    MCH 18.5 (L) 26.0 - 34.0 pg    MCHC 28.6 (L) 32.0 - 36.0 g/dL    RDW 30.1 (H) 11.5 - 14.5 %    Platelets 342 150 - 450 x10*3/uL   CBC Anemia Panel with Reflex, Pregnancy   Result Value Ref Range    Reflex added, Anemia panel Ferritin and TIBC    Ferritin, Pregnancy   Result Value Ref Range    Ferritin, Pregnancy 14 (L) >15 ng/mL   Iron + TIBC Pregnancy   Result Value Ref Range    Iron, Pregnancy 28 See below ug/dL    UIBC, Pregnancy >450 ug/dL    TIBC, Pregnancy      % Saturation, Pregnancy     Serial Troponin, 6 Hour ([facility])   Result Value Ref Range    Troponin T, High Sensitivity 39 (HH) <=14 ng/L   Prepare RBC: 1 Units   Result Value Ref Range    PRODUCT CODE D3722X42     Unit Number [id number]     Unit ABO A     Unit RH POS     XM INTEP COMP     Dispense Status TR     Blood Expiration Date May 29, 2024 23:59 EDT     PRODUCT BLOOD TYPE 6200     UNIT VOLUME 350    Hemoglobin and hematocrit, blood   Result Value Ref Range    Hemoglobin 8.0 (L) 12.0 - 16.0 g/dL    Hematocrit 27.3 (L) 36.0 - 46.0 %     No results found for the last 90 days.    ECG 12 lead    Result Date: 5/15/2024  Sinus tachycardia Nonspecific ST and T wave abnormality Abnormal ECG No previous ECGs available    CT abdomen pelvis wo IV contrast    Result Date: 5/15/2024  Interpreted By:  Ronak Foote, STUDY: CT ABDOMEN PELVIS WO IV CONTRAST;  5/14/2024 11:16 pm   INDICATION: Signs/Symptoms:ABD pain.   COMPARISON: 07/20/2023   ACCESSION NUMBER(S): [id number]   ORDERING CLINICIAN: XAVIER HOLBROOK   TECHNIQUE: Axial CT images of the abdomen and pelvis with  coronal and sagittal reconstructed images obtained without intravenous contrast.   FINDINGS:   Limited evaluation due to paucity of intra-abdominal adipose tissue and lack of intravenous contrast.   LOWER CHEST: Small left-greater-than-right pleural effusions. Patchy ground-glass/consolidative airspace opacities within the left lower lobe, likely infectious/inflammatory.   LIVER: Scattered left hepatic granulomas. Prominent left hepatic lobe.   BILE DUCTS: Normal caliber.   GALLBLADDER: Cholecystectomy.   PANCREAS: Within normal limits.   SPLEEN: Calcified splenic granulomas.   ADRENALS: Mild thickening of the left adrenal gland.   KIDNEYS, URETERS, and BLADDER: No hydronephrosis or renal calculi. Ureters are non-dilated.  Urinary bladder within normal limits.   REPRODUCTIVE: Status post hysterectomy. No adnexal mass.   VESSELS: Moderate atherosclerosis. No abdominal aortic aneurysm.   RETROPERITONEUM and LYMPH NODES: No lymphadenopathy.   BOWEL: Postsurgical changes of the stomach, possibly gastric bypass. Small bowel is non-dilated. Postsurgical change related to partial colonic resection. Large colonic stool volume is again noted within the right and transverse colon, worsened from 07/22/2023. There may be stool within the distal ileum near the ileocolic anastomosis; precise evaluation of the bowel morphology is limited.   PERITONEUM: No ascites or free air, no fluid collection.   BODY WALL: Mild body wall edema.   MUSCULOSKELETAL: There is thoracolumbar scoliosis. Moderate multilevel spinal degenerative change. There is similar grade 1 anterolisthesis of L3 on L4 and L4 on L5. There is mild hip osteoarthritis. There is diffuse osteopenia. No acute osseous abnormality detected.       1. Large right and transverse predominant colonic stool volume which appears more pronounced when compared to 07/22/2023. Please correlate for constipation. 2. Small left-greater-than-right pleural effusions. Patchy  ground-glass/consolidative airspace opacities within the left lower lobe, concerning for pneumonia. 3. Limited evaluation due to paucity of intra-abdominal adipose tissue and lack of intravenous contrast.   Signed by: Ronak Foote 5/15/2024 12:05 AM Dictation workstation:   [id number]    XR chest 1 view    Result Date: 5/14/2024  Interpreted By:  Reanna Briceño, STUDY: XR CHEST 1 VIEW;  5/14/2024 9:56 pm   INDICATION: Signs/Symptoms:Shortness of breath   COMPARISON: Chest x-ray 07/16/2017   ACCESSION NUMBER(S): [id number]   ORDERING CLINICIAN: XAVIER HOLBROOK   TECHNIQUE: Upright frontal view of the chest was obtained .   FINDINGS: Monitoring wires are overlying the patient.   The patient is rotated.   The cardiomediastinal silhouette is within normal limits. There is mild vascular congestion.   There is a small left pleural effusion. There is airspace opacity at left lung base. No pneumothorax.   There is thoracolumbar scoliosis. There is a remote right rib fracture. There is superior location of the right humeral head, suggestive of chronic rotator cuff tear.       1.  Mild vascular congestion. Small left pleural effusion. Airspace opacity at the left lung base, may be secondary to atelectasis or pneumonia.       MACRO: None.   Signed by: Reanna Briceño 5/14/2024 10:40 PM Dictation workstation:   [id number]     Scheduled medications     Continuous medications     PRN medications  PRN medications: lubricating eye drops      ASSESSMENT:  Dyspnea  Leg swelling  CHF  Pneumonia  COPD  Tobacco dependence  Leukocytosis  Constipation  Microcytic anemia  Iron deficiency anemia  Weight loss  Generalized weakness  Inability to ambulate    PLAN:  Respiratory status, pulse oximetry stable 95% on room air.  Continue IV Levaquin.  DuoNeb treatments.  Sputum culture.  Blood cultures.  Urinalysis, culture.  Monitor temperature and white blood cell count.  Tobacco cessation education.  Monitor respiratory status and  pulse oximetry.  Pulmonology consultation.  Status post IV Lasix. Repeat BMP.  Follow-up.  Echocardiogram.  Cardiology evaluation.  Check albumin level.  CT without contrast radiology report reviewed.  Concern for constipation, + bowel movements.  Continue Lactulose.  H&H improved status post packed red blood cell transfusion.  No evidence of GI blood loss black tarry or red bloody stools.  Guaiac stools.  Monitor H&H and transfuse as needed.  Last EGD, Colonoscopy 2017 with Dr. Ram?.  Findings reviewed.  Gastroenterology consultation for constipation, anemia and weight loss.  Generalized weakness.  No focal weakness.  Gait deferred.  PT/OT evaluation.  Fall precautions.  Up with assistance only.  Bed and chair alarm at all times.  Continue home medications as prescribed as appropriate.  We will take DVT, fall, aspiration and decubitus precautions during her stay in the hospital.  The plan has been discussed with the patient along with her daughter present at bedside.  Discussed CODE STATUS.  She wishes to be a full code.  Case management following for discharge planning.  Discussed with Bianka Zapata in the emergency department.  Patient is in agreement to skilled nursing rehabilitation upon discharge.  Orders written per Dr. Mcneil.  Any additions or modifications at his discretion.  Discussed with Dr. Mcneil.       Shea Root, APRN-CNP

## 2024-05-16 ENCOUNTER — APPOINTMENT (OUTPATIENT)
Dept: CARDIOLOGY | Facility: HOSPITAL | Age: 79
DRG: 177 | End: 2024-05-16
Payer: MEDICARE

## 2024-05-16 LAB
ANION GAP SERPL CALC-SCNC: 10 MMOL/L
AORTIC VALVE MEAN GRADIENT: 3 MMHG
AORTIC VALVE PEAK VELOCITY: 1.42 M/S
APPEARANCE UR: ABNORMAL
AV PEAK GRADIENT: 8.1 MMHG
AVA (PEAK VEL): 2.06 CM2
AVA (VTI): 2.35 CM2
BILIRUB UR STRIP.AUTO-MCNC: NEGATIVE MG/DL
BUN SERPL-MCNC: 15 MG/DL (ref 8–25)
CALCIUM SERPL-MCNC: 8.4 MG/DL (ref 8.5–10.4)
CHLORIDE SERPL-SCNC: 98 MMOL/L (ref 97–107)
CO2 SERPL-SCNC: 28 MMOL/L (ref 24–31)
COLOR UR: ABNORMAL
CREAT SERPL-MCNC: 0.4 MG/DL (ref 0.4–1.6)
EGFRCR SERPLBLD CKD-EPI 2021: >90 ML/MIN/1.73M*2
EJECTION FRACTION APICAL 4 CHAMBER: 57.4
ERYTHROCYTE [DISTWIDTH] IN BLOOD BY AUTOMATED COUNT: 27.9 % (ref 11.5–14.5)
GLUCOSE SERPL-MCNC: 109 MG/DL (ref 65–99)
GLUCOSE UR STRIP.AUTO-MCNC: NORMAL MG/DL
HCT VFR BLD AUTO: 29.7 % (ref 36–46)
HCT VFR BLD AUTO: 29.7 % (ref 36–46)
HGB BLD-MCNC: 8.4 G/DL (ref 12–16)
HGB BLD-MCNC: 8.4 G/DL (ref 12–16)
HOLD SPECIMEN: NORMAL
KETONES UR STRIP.AUTO-MCNC: NEGATIVE MG/DL
LEFT ATRIUM VOLUME AREA LENGTH INDEX BSA: 36.5 ML/M2
LEFT VENTRICLE INTERNAL DIMENSION DIASTOLE: 3.59 CM (ref 3.5–6)
LEFT VENTRICULAR OUTFLOW TRACT DIAMETER: 1.8 CM
LEUKOCYTE ESTERASE UR QL STRIP.AUTO: NEGATIVE
LV EJECTION FRACTION BIPLANE: 55 %
MCH RBC QN AUTO: 19.3 PG (ref 26–34)
MCHC RBC AUTO-ENTMCNC: 28.3 G/DL (ref 32–36)
MCV RBC AUTO: 68 FL (ref 80–100)
MITRAL VALVE E/A RATIO: 0.7
MITRAL VALVE E/E' RATIO: 15.82
NITRITE UR QL STRIP.AUTO: NEGATIVE
NRBC BLD-RTO: 3.1 /100 WBCS (ref 0–0)
PH UR STRIP.AUTO: 8 [PH]
PLATELET # BLD AUTO: 275 X10*3/UL (ref 150–450)
POTASSIUM SERPL-SCNC: 3.9 MMOL/L (ref 3.4–5.1)
PROT UR STRIP.AUTO-MCNC: NEGATIVE MG/DL
RBC # BLD AUTO: 4.36 X10*6/UL (ref 4–5.2)
RBC # UR STRIP.AUTO: NEGATIVE /UL
RIGHT VENTRICLE FREE WALL PEAK S': 11 CM/S
RIGHT VENTRICLE PEAK SYSTOLIC PRESSURE: 64.3 MMHG
SODIUM SERPL-SCNC: 136 MMOL/L (ref 133–145)
SP GR UR STRIP.AUTO: 1.01
TRICUSPID ANNULAR PLANE SYSTOLIC EXCURSION: 1.8 CM
UROBILINOGEN UR STRIP.AUTO-MCNC: NORMAL MG/DL
WBC # BLD AUTO: 13.1 X10*3/UL (ref 4.4–11.3)

## 2024-05-16 PROCEDURE — 97530 THERAPEUTIC ACTIVITIES: CPT | Mod: GP

## 2024-05-16 PROCEDURE — 82374 ASSAY BLOOD CARBON DIOXIDE: CPT | Performed by: NURSE PRACTITIONER

## 2024-05-16 PROCEDURE — 2500000001 HC RX 250 WO HCPCS SELF ADMINISTERED DRUGS (ALT 637 FOR MEDICARE OP): Performed by: NURSE PRACTITIONER

## 2024-05-16 PROCEDURE — 36415 COLL VENOUS BLD VENIPUNCTURE: CPT | Performed by: NURSE PRACTITIONER

## 2024-05-16 PROCEDURE — 2060000001 HC INTERMEDIATE ICU ROOM DAILY

## 2024-05-16 PROCEDURE — 97162 PT EVAL MOD COMPLEX 30 MIN: CPT | Mod: GP

## 2024-05-16 PROCEDURE — 85027 COMPLETE CBC AUTOMATED: CPT | Performed by: NURSE PRACTITIONER

## 2024-05-16 PROCEDURE — 2500000004 HC RX 250 GENERAL PHARMACY W/ HCPCS (ALT 636 FOR OP/ED): Performed by: NURSE PRACTITIONER

## 2024-05-16 PROCEDURE — 81003 URINALYSIS AUTO W/O SCOPE: CPT | Performed by: NURSE PRACTITIONER

## 2024-05-16 PROCEDURE — 93306 TTE W/DOPPLER COMPLETE: CPT

## 2024-05-16 PROCEDURE — 87040 BLOOD CULTURE FOR BACTERIA: CPT | Mod: 91,WESLAB | Performed by: NURSE PRACTITIONER

## 2024-05-16 RX ORDER — LACTULOSE 10 G/15ML
20 SOLUTION ORAL DAILY
Status: DISCONTINUED | OUTPATIENT
Start: 2024-05-16 | End: 2024-05-22 | Stop reason: HOSPADM

## 2024-05-16 RX ORDER — ACETAMINOPHEN 325 MG/1
650 TABLET ORAL EVERY 6 HOURS PRN
Status: DISCONTINUED | OUTPATIENT
Start: 2024-05-16 | End: 2024-05-22 | Stop reason: HOSPADM

## 2024-05-16 RX ORDER — PANTOPRAZOLE SODIUM 40 MG/1
40 TABLET, DELAYED RELEASE ORAL DAILY
Status: DISCONTINUED | OUTPATIENT
Start: 2024-05-16 | End: 2024-05-17

## 2024-05-16 RX ORDER — POLYETHYLENE GLYCOL 3350, SODIUM CHLORIDE, SODIUM BICARBONATE, POTASSIUM CHLORIDE 420; 11.2; 5.72; 1.48 G/4L; G/4L; G/4L; G/4L
4000 POWDER, FOR SOLUTION ORAL ONCE
Status: COMPLETED | OUTPATIENT
Start: 2024-05-16 | End: 2024-05-16

## 2024-05-16 RX ORDER — BISACODYL 5 MG
20 TABLET, DELAYED RELEASE (ENTERIC COATED) ORAL ONCE
Status: COMPLETED | OUTPATIENT
Start: 2024-05-16 | End: 2024-05-16

## 2024-05-16 RX ORDER — ONDANSETRON HYDROCHLORIDE 2 MG/ML
4 INJECTION, SOLUTION INTRAVENOUS EVERY 6 HOURS PRN
Status: DISCONTINUED | OUTPATIENT
Start: 2024-05-16 | End: 2024-05-22 | Stop reason: HOSPADM

## 2024-05-16 RX ADMIN — PANTOPRAZOLE SODIUM 40 MG: 40 TABLET, DELAYED RELEASE ORAL at 09:42

## 2024-05-16 RX ADMIN — BISACODYL 20 MG: 5 TABLET, COATED ORAL at 09:42

## 2024-05-16 RX ADMIN — POLYETHYLENE GLYCOL 3350, SODIUM SULFATE ANHYDROUS, SODIUM BICARBONATE, SODIUM CHLORIDE, POTASSIUM CHLORIDE 4000 ML: 236; 22.74; 6.74; 5.86; 2.97 POWDER, FOR SOLUTION ORAL at 09:41

## 2024-05-16 RX ADMIN — LEVOFLOXACIN 500 MG: 500 INJECTION, SOLUTION INTRAVENOUS at 09:42

## 2024-05-16 RX ADMIN — LACTULOSE 20 G: 10 SOLUTION ORAL at 09:41

## 2024-05-16 ASSESSMENT — COGNITIVE AND FUNCTIONAL STATUS - GENERAL
STANDING UP FROM CHAIR USING ARMS: TOTAL
DAILY ACTIVITIY SCORE: 6
EATING MEALS: TOTAL
TOILETING: TOTAL
STANDING UP FROM CHAIR USING ARMS: TOTAL
MOVING FROM LYING ON BACK TO SITTING ON SIDE OF FLAT BED WITH BEDRAILS: A LITTLE
MOBILITY SCORE: 8
PERSONAL GROOMING: TOTAL
CLIMB 3 TO 5 STEPS WITH RAILING: TOTAL
TOILETING: TOTAL
MOVING TO AND FROM BED TO CHAIR: TOTAL
WALKING IN HOSPITAL ROOM: TOTAL
DRESSING REGULAR UPPER BODY CLOTHING: TOTAL
DAILY ACTIVITIY SCORE: 6
HELP NEEDED FOR BATHING: TOTAL
MOVING TO AND FROM BED TO CHAIR: TOTAL
CLIMB 3 TO 5 STEPS WITH RAILING: TOTAL
WALKING IN HOSPITAL ROOM: TOTAL
MOVING FROM LYING ON BACK TO SITTING ON SIDE OF FLAT BED WITH BEDRAILS: A LOT
HELP NEEDED FOR BATHING: TOTAL
EATING MEALS: TOTAL
WALKING IN HOSPITAL ROOM: TOTAL
DRESSING REGULAR LOWER BODY CLOTHING: TOTAL
PERSONAL GROOMING: TOTAL
STANDING UP FROM CHAIR USING ARMS: TOTAL
CLIMB 3 TO 5 STEPS WITH RAILING: TOTAL
DRESSING REGULAR UPPER BODY CLOTHING: TOTAL
MOBILITY SCORE: 10
MOVING TO AND FROM BED TO CHAIR: TOTAL
TURNING FROM BACK TO SIDE WHILE IN FLAT BAD: A LOT
DRESSING REGULAR LOWER BODY CLOTHING: TOTAL
TURNING FROM BACK TO SIDE WHILE IN FLAT BAD: A LITTLE

## 2024-05-16 ASSESSMENT — ENCOUNTER SYMPTOMS
WEAKNESS: 1
NAUSEA: 0
HEMATOLOGIC/LYMPHATIC NEGATIVE: 1
PSYCHIATRIC NEGATIVE: 1
NEUROLOGICAL NEGATIVE: 1
ENDOCRINE NEGATIVE: 1
FATIGUE: 1
VOMITING: 0
DIARRHEA: 1
ABDOMINAL PAIN: 0
SHORTNESS OF BREATH: 1
DIARRHEA: 0
ALLERGIC/IMMUNOLOGIC NEGATIVE: 1
ABDOMINAL DISTENTION: 1
EYES NEGATIVE: 1
CONSTIPATION: 1
MUSCULOSKELETAL NEGATIVE: 1
UNEXPECTED WEIGHT CHANGE: 1

## 2024-05-16 ASSESSMENT — PAIN SCALES - GENERAL
PAINLEVEL_OUTOF10: 0 - NO PAIN

## 2024-05-16 ASSESSMENT — PAIN - FUNCTIONAL ASSESSMENT
PAIN_FUNCTIONAL_ASSESSMENT: 0-10
PAIN_FUNCTIONAL_ASSESSMENT: 0-10

## 2024-05-16 ASSESSMENT — ACTIVITIES OF DAILY LIVING (ADL): ADL_ASSISTANCE: INDEPENDENT

## 2024-05-16 ASSESSMENT — PAIN SCALES - WONG BAKER: WONGBAKER_NUMERICALRESPONSE: NO HURT

## 2024-05-16 NOTE — NURSING NOTE
Assumed care of patient.  Patient in bed resting.   Bedside shift report received.  Call light in reach.

## 2024-05-16 NOTE — PROGRESS NOTES
Karly Horton is a 79 y.o. female on day 1 of admission presenting with Pneumonia of both lower lobes due to infectious organism.    Subjective   Patient seen and examined.  Resting in bed in no acute distress.  Awake alert oriented x 3.  Forgetful.  No complaints.  No shortness of breath, cough, wheezing, chest pain.    Plan for EGD, colonoscopy.     Objective     Physical Exam  Vitals and nursing note reviewed.   Constitutional:       General: She is not in acute distress.     Appearance: Normal appearance. She is normal weight. She is not ill-appearing, toxic-appearing or diaphoretic.   HENT:      Head: Normocephalic and atraumatic.      Right Ear: Tympanic membrane normal.      Left Ear: Tympanic membrane normal.      Nose: Nose normal.      Mouth/Throat:      Mouth: Mucous membranes are moist.      Pharynx: Oropharynx is clear.   Eyes:      Extraocular Movements: Extraocular movements intact.      Conjunctiva/sclera: Conjunctivae normal.      Pupils: Pupils are equal, round, and reactive to light.   Cardiovascular:      Rate and Rhythm: Normal rate and regular rhythm.      Pulses: Normal pulses.      Heart sounds: Normal heart sounds. No murmur heard.  Pulmonary:      Effort: Pulmonary effort is normal. No respiratory distress.      Breath sounds: Normal breath sounds. No wheezing, rhonchi or rales.      Comments: Room air.  Abdominal:      General: Bowel sounds are normal. There is distension.      Palpations: Abdomen is soft.      Tenderness: There is no abdominal tenderness.   Genitourinary:     Comments: Deferred.  Musculoskeletal:         General: Swelling present. No tenderness. Normal range of motion.      Cervical back: Normal range of motion and neck supple.      Right lower leg: Edema present.      Left lower leg: Edema present.   Skin:     General: Skin is warm and dry.      Capillary Refill: Capillary refill takes less than 2 seconds.   Neurological:      General: No focal deficit present.       "Mental Status: She is alert and oriented to person, place, and time.   Psychiatric:         Mood and Affect: Mood normal.         Behavior: Behavior normal.       Last Recorded Vitals  Blood pressure 133/81, pulse 52, temperature 36.6 °C (97.9 °F), temperature source Temporal, resp. rate 18, height 1.473 m (4' 10\"), weight (!) 38.6 kg (85 lb 1.6 oz), SpO2 91%.    Intake/Output last 3 Shifts:  I/O last 3 completed shifts:  In: 960.5 (24.9 mL/kg) [Blood:960.5]  Out: - (0 mL/kg)   Weight: 38.6 kg     Relevant Results    Malnutrition Diagnosis Status: New  Malnutrition Diagnosis: Severe malnutrition related to chronic disease or condition  As Evidenced by: <75% po intake >1 month, 20# weight loss in past year (insignificant), severe subcutaeous fat and muscle wasting  I agree with the dietitian's malnutrition diagnosis.    Results for orders placed or performed during the hospital encounter of 05/14/24 (from the past 24 hour(s))   Basic Metabolic Panel   Result Value Ref Range    Glucose 102 (H) 65 - 99 mg/dL    Sodium 135 133 - 145 mmol/L    Potassium 3.3 (L) 3.4 - 5.1 mmol/L    Chloride 95 (L) 97 - 107 mmol/L    Bicarbonate 31 24 - 31 mmol/L    Urea Nitrogen 19 8 - 25 mg/dL    Creatinine 0.40 0.40 - 1.60 mg/dL    eGFR >90 >60 mL/min/1.73m*2    Calcium 8.4 (L) 8.5 - 10.4 mg/dL    Anion Gap 9 <=19 mmol/L   CBC   Result Value Ref Range    WBC 12.6 (H) 4.4 - 11.3 x10*3/uL    nRBC 5.2 (H) 0.0 - 0.0 /100 WBCs    RBC 4.10 4.00 - 5.20 x10*6/uL    Hemoglobin 7.9 (L) 12.0 - 16.0 g/dL    Hematocrit 27.3 (L) 36.0 - 46.0 %    MCV 67 (L) 80 - 100 fL    MCH 19.3 (L) 26.0 - 34.0 pg    MCHC 28.9 (L) 32.0 - 36.0 g/dL    RDW 27.7 (H) 11.5 - 14.5 %    Platelets 240 150 - 450 x10*3/uL   Magnesium   Result Value Ref Range    Magnesium 2.00 1.60 - 3.10 mg/dL   Albumin   Result Value Ref Range    Albumin 3.1 (L) 3.5 - 5.0 g/dL   Hemoglobin and hematocrit, blood   Result Value Ref Range    Hemoglobin 8.4 (L) 12.0 - 16.0 g/dL    Hematocrit " 29.7 (L) 36.0 - 46.0 %   Basic Metabolic Panel   Result Value Ref Range    Glucose 109 (H) 65 - 99 mg/dL    Sodium 136 133 - 145 mmol/L    Potassium 3.9 3.4 - 5.1 mmol/L    Chloride 98 97 - 107 mmol/L    Bicarbonate 28 24 - 31 mmol/L    Urea Nitrogen 15 8 - 25 mg/dL    Creatinine 0.40 0.40 - 1.60 mg/dL    eGFR >90 >60 mL/min/1.73m*2    Calcium 8.4 (L) 8.5 - 10.4 mg/dL    Anion Gap 10 <=19 mmol/L   CBC   Result Value Ref Range    WBC 13.1 (H) 4.4 - 11.3 x10*3/uL    nRBC 3.1 (H) 0.0 - 0.0 /100 WBCs    RBC 4.36 4.00 - 5.20 x10*6/uL    Hemoglobin 8.4 (L) 12.0 - 16.0 g/dL    Hematocrit 29.7 (L) 36.0 - 46.0 %    MCV 68 (L) 80 - 100 fL    MCH 19.3 (L) 26.0 - 34.0 pg    MCHC 28.3 (L) 32.0 - 36.0 g/dL    RDW 27.9 (H) 11.5 - 14.5 %    Platelets 275 150 - 450 x10*3/uL   Blood Culture    Specimen: Peripheral Venipuncture; Blood culture   Result Value Ref Range    Blood Culture Loaded on Instrument - Culture in progress    Blood Culture    Specimen: Peripheral Venipuncture; Blood culture   Result Value Ref Range    Blood Culture Loaded on Instrument - Culture in progress    Urinalysis with Reflex Culture and Microscopic   Result Value Ref Range    Color, Urine Light-Yellow Light-Yellow, Yellow, Dark-Yellow    Appearance, Urine Turbid (N) Clear    Specific Gravity, Urine 1.014 1.005 - 1.035    pH, Urine 8.0 5.0, 5.5, 6.0, 6.5, 7.0, 7.5, 8.0    Protein, Urine NEGATIVE NEGATIVE, 10 (TRACE), 20 (TRACE) mg/dL    Glucose, Urine Normal Normal mg/dL    Blood, Urine NEGATIVE NEGATIVE    Ketones, Urine NEGATIVE NEGATIVE mg/dL    Bilirubin, Urine NEGATIVE NEGATIVE    Urobilinogen, Urine Normal Normal mg/dL    Nitrite, Urine NEGATIVE NEGATIVE    Leukocyte Esterase, Urine NEGATIVE NEGATIVE   Transthoracic Echo (TTE) Complete   Result Value Ref Range    BSA 1.26 m2     No results found for the last 90 days.    ECG 12 lead    Result Date: 5/15/2024  Sinus tachycardia Nonspecific ST and T wave abnormality Abnormal ECG No previous ECGs  available    CT abdomen pelvis wo IV contrast    Result Date: 5/15/2024  Interpreted By:  Ronak Foote, STUDY: CT ABDOMEN PELVIS WO IV CONTRAST;  5/14/2024 11:16 pm   INDICATION: Signs/Symptoms:ABD pain.   COMPARISON: 07/20/2023   ACCESSION NUMBER(S): [id number]   ORDERING CLINICIAN: XAVIER HOLBROOK   TECHNIQUE: Axial CT images of the abdomen and pelvis with coronal and sagittal reconstructed images obtained without intravenous contrast.   FINDINGS:   Limited evaluation due to paucity of intra-abdominal adipose tissue and lack of intravenous contrast.   LOWER CHEST: Small left-greater-than-right pleural effusions. Patchy ground-glass/consolidative airspace opacities within the left lower lobe, likely infectious/inflammatory.   LIVER: Scattered left hepatic granulomas. Prominent left hepatic lobe.   BILE DUCTS: Normal caliber.   GALLBLADDER: Cholecystectomy.   PANCREAS: Within normal limits.   SPLEEN: Calcified splenic granulomas.   ADRENALS: Mild thickening of the left adrenal gland.   KIDNEYS, URETERS, and BLADDER: No hydronephrosis or renal calculi. Ureters are non-dilated.  Urinary bladder within normal limits.   REPRODUCTIVE: Status post hysterectomy. No adnexal mass.   VESSELS: Moderate atherosclerosis. No abdominal aortic aneurysm.   RETROPERITONEUM and LYMPH NODES: No lymphadenopathy.   BOWEL: Postsurgical changes of the stomach, possibly gastric bypass. Small bowel is non-dilated. Postsurgical change related to partial colonic resection. Large colonic stool volume is again noted within the right and transverse colon, worsened from 07/22/2023. There may be stool within the distal ileum near the ileocolic anastomosis; precise evaluation of the bowel morphology is limited.   PERITONEUM: No ascites or free air, no fluid collection.   BODY WALL: Mild body wall edema.   MUSCULOSKELETAL: There is thoracolumbar scoliosis. Moderate multilevel spinal degenerative change. There is similar grade 1 anterolisthesis  of L3 on L4 and L4 on L5. There is mild hip osteoarthritis. There is diffuse osteopenia. No acute osseous abnormality detected.       1. Large right and transverse predominant colonic stool volume which appears more pronounced when compared to 07/22/2023. Please correlate for constipation. 2. Small left-greater-than-right pleural effusions. Patchy ground-glass/consolidative airspace opacities within the left lower lobe, concerning for pneumonia. 3. Limited evaluation due to paucity of intra-abdominal adipose tissue and lack of intravenous contrast.   Signed by: Ronak Foote 5/15/2024 12:05 AM Dictation workstation:   [id number]    XR chest 1 view    Result Date: 5/14/2024  Interpreted By:  Reanna Briceño, STUDY: XR CHEST 1 VIEW;  5/14/2024 9:56 pm   INDICATION: Signs/Symptoms:Shortness of breath   COMPARISON: Chest x-ray 07/16/2017   ACCESSION NUMBER(S): [id number]   ORDERING CLINICIAN: XAVIER HOLBROOK   TECHNIQUE: Upright frontal view of the chest was obtained .   FINDINGS: Monitoring wires are overlying the patient.   The patient is rotated.   The cardiomediastinal silhouette is within normal limits. There is mild vascular congestion.   There is a small left pleural effusion. There is airspace opacity at left lung base. No pneumothorax.   There is thoracolumbar scoliosis. There is a remote right rib fracture. There is superior location of the right humeral head, suggestive of chronic rotator cuff tear.       1.  Mild vascular congestion. Small left pleural effusion. Airspace opacity at the left lung base, may be secondary to atelectasis or pneumonia.       MACRO: None.   Signed by: Reanna Briceño 5/14/2024 10:40 PM Dictation workstation:   [id number]     Scheduled medications  lactulose, 20 g, oral, Daily  levoFLOXacin, 500 mg, intravenous, q24h  pantoprazole, 40 mg, oral, Daily  perflutren lipid microspheres, 0.5-10 mL of dilution, intravenous, Once in imaging  perflutren protein A microsphere, 0.5  mL, intravenous, Once in imaging  sulfur hexafluoride microsphr, 2 mL, intravenous, Once in imaging      Continuous medications     PRN medications  PRN medications: lubricating eye drops      ASSESSMENT:  Dyspnea  Leg swelling  CHF  Pneumonia  COPD  Tobacco dependence  Leukocytosis  Constipation  Microcytic anemia  Iron deficiency anemia  Weight loss  Generalized weakness  Inability to ambulate  Hypokalemia resolved  Severe protein calorie malnutrition    PLAN:  Shortness of breath improved.  Respiratory status, pulse is stable on room air.  Lung sounds diminished, clear.  Continue Duo-Neb treatments.  IV Levaquin.  Sputum culture if able.  Pulmonology consultation.  Echocardiogram complete.  Follow-up.  Cardiology evaluation.  Severe protein calorie malnutrition.  Protein supplementation.  Constipation.  No bowel movements.  Gastroenterology input appreciated.  Iron deficiency anemia.  IV Venofer.  N.p.o. at midnight for EGD colonoscopy.  Clear liquid diet.  PPI BID.  Generalized weakness.  No focal weakness.  PT/OT.  Fall precautions.  Up with assistance only.  Bed and chair alarm at all times.  DVT prophylaxis SCDs.  Supportive care.  Patient reassured.  Case management following for discharge planning.  Discussed with patient and Dr. Mcneil.        Shea Root, APRN-CNP

## 2024-05-16 NOTE — CARE PLAN
Karly came from ED. No issues at this time. Able to call and make needs known. Karly did have some home meds. Did take them down to pharmacy.     The patient's goals for the shift include Sleep    The clinical goals for the shift include Get a good nights rest      Problem: Pain  Goal: My pain/discomfort is manageable  Outcome: Progressing     Problem: Safety  Goal: Patient will be injury free during hospitalization  Outcome: Progressing  Goal: I will remain free of falls  Outcome: Progressing     Problem: Psychosocial Needs  Goal: Demonstrates ability to cope with hospitalization/illness  Outcome: Progressing  Goal: Collaborate with me, my family, and caregiver to identify my specific goals  Outcome: Progressing  Flowsheets (Taken 5/15/2024 2110)  Cultural Requests During Hospitalization: none  Spiritual Requests During Hospitalization: none     Problem: Daily Care  Goal: Daily care needs are met  Outcome: Progressing

## 2024-05-16 NOTE — PROGRESS NOTES
Occupational Therapy                 Therapy Communication Note    Patient Name: Karly Horton  MRN: [id number]  Today's Date: 5/16/2024     Discipline: Occupational Therapy    Missed Visit Reason: Patient refused, Other (Comment) (Attempted skilled OT eval x2 today: first time pt was off the floor in AM at testing and second time she adamantly refused in PM despite education.)    Missed Time: Attempt

## 2024-05-16 NOTE — PROGRESS NOTES
Physical Therapy                 Therapy Communication Note    Patient Name: Karly Horton  MRN: [id number]  Today's Date: 5/16/2024     Discipline: Physical Therapy    Missed Visit Reason: Missed Visit Reason: Patient in a medical procedure (Pt off the floor for echo)    Missed Time: Attempt    Comment:

## 2024-05-16 NOTE — PROGRESS NOTES
05/16/24 1225   Discharge Planning   Patient expects to be discharged to: [facility] accepted no precert needed

## 2024-05-16 NOTE — PROGRESS NOTES
Physical Therapy    Physical Therapy Evaluation & Treatment    Patient Name: Karly Horton  MRN: [id number]  Today's Date: 5/16/2024   Time Calculation  Start Time: 1045  Stop Time: 1105  Time Calculation (min): 20 min    Assessment/Plan   PT Assessment  PT Assessment Results: Decreased strength, Decreased range of motion, Decreased endurance, Impaired balance, Decreased mobility, Decreased coordination, Impaired judgement, Decreased safety awareness, Impaired tone  Rehab Prognosis: Good  Barriers to Discharge: cooperation  Evaluation/Treatment Tolerance: Treatment limited secondary to agitation  Medical Staff Made Aware: Yes  Strengths: Ability to acquire knowledge, Support of extended family/friends  Barriers to Participation: Attitude of self, Comorbidities, Coping skills  End of Session Communication: Bedside nurse  Assessment Comment: Pt demonstrates impaired functional mobility from baseline level; pt with BLE weakness, impaired balance, decreased overall activity tolerance, and impaired safety awareness; pt is a high falls risk at this time and would benefit from mod intensity therapy needs upon d/c.  End of Session Patient Position: Bed, 3 rail up, Alarm on   IP OR SWING BED PT PLAN  Inpatient or Swing Bed: Inpatient  PT Plan  Treatment/Interventions: Bed mobility, Transfer training, Gait training, Balance training, Neuromuscular re-education, Strengthening, Endurance training, Range of motion, Therapeutic exercise, Therapeutic activity  PT Plan: Skilled PT  PT Frequency: 4 times per week  PT Discharge Recommendations: Moderate intensity level of continued care  Equipment Recommended upon Discharge: Wheeled walker  PT Recommended Transfer Status: Assist x1  PT - OK to Discharge: Yes    Subjective     General Visit Information:  General  Reason for Referral: impaired functional mobility (Pt is a 79 year-old F admitted from home with c/o generalized weakness, inability to ambulate, SOB, and LE  swelling.)  Referred By: Dr. Tarun MD  Past Medical History Relevant to Rehab:  Other cervical disc degeneration, unspecified cervical region      Degeneration of cervical intervertebral disc   Other conditions influencing health status      Bulging Disc (L3 - L4)   Other conditions influencing health status      A Fall   Other conditions influencing health status      Bulging Disc (C4 - C5)   Other conditions influencing health status      Bulging Disc (C6 - C7)   Other conditions influencing health status      Bulging Disc (C5 - C6)   Other conditions influencing health status      Lumbar Spondylolisthesis   Other conditions influencing health status      Rotator Cuff Tendon Tear   Other intervertebral disc degeneration, lumbar region      L4-L5 disc bulge   Other intervertebral disc displacement, lumbar region      Disc displacement, lumbar   Other intervertebral disc displacement, lumbosacral region      Displacement of lumbosacral intervertebral disc   Personal history of other diseases of the musculoskeletal system and connective tissue      History of bursitis   Personal history of other diseases of the musculoskeletal system and connective tissue      History of fibromyositis   Personal history of other specified conditions      History of headache   Spinal stenosis, lumbar region without neurogenic claudication      Lumbar canal stenosis   Sprain of ligaments of lumbar spine, initial encounter      Low back sprain  Missed Visit: No  Missed Visit Reason: Other (Comment)  Family/Caregiver Present: No  Prior to Session Communication: Bedside nurse  Patient Position Received: Bed, 3 rail up, Alarm off, not on at start of session  Preferred Learning Style: verbal  General Comment: Pt cleared for therapy via RN, received in supine, NAD, initially agreeable to participate in therapy. (+) telemetry, IV  Home Living:  Home Living  Type of Home: Apartment  Lives With: Adult children (daughter)  Home Adaptive  "Equipment: None  Home Layout: One level  Home Access: Level entry  Bathroom Shower/Tub: Tub/shower unit, Walk-in shower  Bathroom Toilet: Standard  Bathroom Equipment: Grab bars in shower, Shower chair with back  Prior Level of Function:  Prior Function Per Pt/Caregiver Report  Level of [place]: Independent with ADLs and functional transfers, Needs assistance with homemaking  Receives Help From: Family  ADL Assistance: Independent  Homemaking Assistance: Needs assistance  Driving/Transportation: Family/Friend  Homemaking Assistance Comments: assist from daughter for iADLs  Ambulatory Assistance: Independent  Hand Dominance: Right  Prior Function Comments: denies h/o recent falls  Precautions:  Precautions  Hearing/Visual Limitations: glasses, mild [place]  Medical Precautions: Fall precautions    Objective   Pain:  Pain Assessment  Pain Assessment: 0-10  Pain Score: 0 - No pain  Cognition:  Cognition  Overall Cognitive Status: Within Functional Limits  Orientation Level: Oriented X4  Cognition Comments: pt became suddenly irritable with therapist after answering intake questions, yelling at therapist to \"get out.\" After max encouragement from both nurse and therapist, pt able to calm down and participate minimally in evaluation.  Insight: Mild    General Assessments:  General Observation  General Observation: uncooperative requiring max encouragement from therapist/nurse; intermittently yelling at therapist, agitated; generalized muscle atrophy throughout with visibly distended abdomen.     Activity Tolerance  Endurance: Decreased tolerance for upright activites  Activity Tolerance Comments: adamantly refused further mobility    Sensation  Light Touch: No apparent deficits  Sensation Comment: pt denies paresthesias    Strength  Strength Comments: BLE > 2+/5; difficult to formally assess d/t uncooperative pt  Strength  Strength Comments: BLE > 2+/5; difficult to formally assess d/t uncooperative " pt    Coordination  Movements are Fluid and Coordinated: No  Coordination Comment: decreased rate of movements    Postural Control  Postural Control: Impaired  Posture Comment: forward head, increased thoracic kyhosis    Static Sitting Balance  Static Sitting-Balance Support: Bilateral upper extremity supported  Static Sitting-Level of Assistance: Close supervision     Functional Assessments:     Bed Mobility  Bed Mobility: Yes  Bed Mobility 1  Bed Mobility 1: Supine to sitting  Level of Assistance 1: Minimum assistance  Bed Mobility Comments 1: use of bedrails; assist for advancement of RLE off EOB  Bed Mobility 2  Bed Mobility  2: Sitting to supine  Level of Assistance 2: Minimum assistance  Bed Mobility Comments 2: assist to lift BLE onto bed; dep x 1 boost towards HOB    Transfers  Transfer: No (pt adamantly declined any further mobility; sit <> stand trial deferred)    Ambulation/Gait Training  Ambulation/Gait Training Performed: No    Stairs  Stairs: No    Extremity/Trunk Assessments:  RLE   RLE :  (difficult to formally assess d/t limited cooperation from therapist; > 2+/5)  LLE   LLE :  (difficult to formally assess d/t limited cooperation from therapist; > 2+/5)  Treatments:  Bed Mobility  Bed Mobility: Yes  Bed Mobility 1  Bed Mobility 1: Supine to sitting  Level of Assistance 1: Minimum assistance  Bed Mobility Comments 1: use of bedrails; assist for advancement of RLE off EOB  Bed Mobility 2  Bed Mobility  2: Sitting to supine  Level of Assistance 2: Minimum assistance  Bed Mobility Comments 2: assist to lift BLE onto bed; dep x 1 boost towards HOB  Outcome Measures:  [facility] Basic Mobility  Turning from your back to your side while in a flat bed without using bedrails: A little  Moving from lying on your back to sitting on the side of a flat bed without using bedrails: A little  Moving to and from bed to chair (including a wheelchair): Total  Standing up from a chair using your arms (e.g. wheelchair or  bedside chair): Total  To walk in hospital room: Total  Climbing 3-5 steps with railing: Total  Basic Mobility - Total Score: 10    Encounter Problems       Encounter Problems (Active)       Mobility       STG - Patient will ambulate 50' with LRAD and modified independence. (Not Progressing)       Start:  05/16/24    Expected End:  06/13/24               PT Transfers       STG - Patient to transfer to and from sit to supine with independence. (Progressing)       Start:  05/16/24    Expected End:  06/13/24            STG - Patient will transfer sit to and from stand with use of LRAD and modified independence. (Not Progressing)       Start:  05/16/24    Expected End:  06/13/24               Safety       STG - Patient uses call light consistently to request assistance with transfers (Progressing)       Start:  05/16/24    Expected End:  06/13/24                   Education Documentation  Mobility Training, taught by Armida Mendenhall PT at 5/16/2024 11:45 AM.  Learner: Patient  Readiness: Nonacceptance  Method: Explanation, Demonstration  Response: Needs Reinforcement, Refused Teaching    Education Comments  No comments found.

## 2024-05-16 NOTE — CONSULTS
Nutrition Assessement Note    Nutrition Assessment    Reason for Assessment: Admission nursing screening (MST 2)  Admitted w/SOB, weakness, inability to ambulate. Colonoscopy scheduled for 5/17, clear liquid diet at this time. Patient reports ongoing poor appetite. States the less she eats, the weaker she gets which decreases her intake further. Reports 20# weight loss in past year. Agreeable to Ensure Clear TID.    Reason for Hospital Admission:  Karly Horton is a 79 y.o. female who is admitted for PNA    Past Medical History:   Diagnosis Date    Other cervical disc degeneration, unspecified cervical region     Degeneration of cervical intervertebral disc    Other conditions influencing health status     Bulging Disc (L3 - L4)    Other conditions influencing health status     A Fall    Other conditions influencing health status     Bulging Disc (C4 - C5)    Other conditions influencing health status     Bulging Disc (C6 - C7)    Other conditions influencing health status     Bulging Disc (C5 - C6)    Other conditions influencing health status     Lumbar Spondylolisthesis    Other conditions influencing health status     Rotator Cuff Tendon Tear    Other intervertebral disc degeneration, lumbar region     L4-L5 disc bulge    Other intervertebral disc displacement, lumbar region     Disc displacement, lumbar    Other intervertebral disc displacement, lumbosacral region     Displacement of lumbosacral intervertebral disc    Personal history of other diseases of the musculoskeletal system and connective tissue     History of bursitis    Personal history of other diseases of the musculoskeletal system and connective tissue     History of fibromyositis    Personal history of other specified conditions     History of headache    Spinal stenosis, lumbar region without neurogenic claudication     Lumbar canal stenosis    Sprain of ligaments of lumbar spine, initial encounter     Low back sprain      No past surgical  "history on file.    Nutrition History:     Energy Intake: Poor < 50 %  Food Allergies/Intolerances:   lactose  GI Symptoms: None  Oral Problems: None    Anthropometrics:  Ht: 147.3 cm (4' 10\"), Wt: (!) 38.6 kg (85 lb 1.6 oz), BMI: 17.79  IBW/kg (Dietitian Calculated): 43.18 kg  Percent of IBW: 89 %     Weight Change:  Daily Weight  05/16/24 : (!) 38.6 kg (85 lb 1.6 oz)     Weight History / % Weight Change: Patient reports 20# (17.4%) weight loss in past year  Significant Weight Loss: No     Nutrition Focused Physical Exam Findings:   Subcutaneous Fat Loss  Orbital Fat Pads: Severe (dark circles, hollowing and loose skin)  Buccal Fat Pads: Severe (hollow, sunken and narrow face)  Triceps: Severe (negligible fat tissue)  Ribs: Defer    Muscle Wasting  Temporalis: Severe (hollowed scooping depression)  Pectoralis (Clavicular Region): Severe (protruding prominent clavicle)  Deltoid/Trapezius: Severe (squared shoulders, acromion process prominent)  Interosseous: Severe (depressed area between thumb and forefinger)  Trapezius/Infraspinatus/Supraspinatus (Scapular Region): Defer  Quadriceps: Defer  Gastrocnemius: Severe (minimal muscle definition)    Nutrition Significant Labs:  Lab Results   Component Value Date    WBC 13.1 (H) 05/16/2024    HGB 8.4 (L) 05/16/2024    HGB 8.4 (L) 05/16/2024    HCT 29.7 (L) 05/16/2024    HCT 29.7 (L) 05/16/2024     05/16/2024    CHOL 205 (H) 10/28/2019    TRIG 69 10/28/2019     10/28/2019    ALT 13 07/22/2023    AST 23 07/22/2023     05/16/2024    K 3.9 05/16/2024    CL 98 05/16/2024    CREATININE 0.40 05/16/2024    BUN 15 05/16/2024    CO2 28 05/16/2024    TSH 3.37 11/14/2022    HGBA1C 5.8 12/31/2018     Nutrition Specific Medications:  lactulose, 20 g, oral, Daily  levoFLOXacin, 500 mg, intravenous, q24h  pantoprazole, 40 mg, oral, Daily  perflutren lipid microspheres, 0.5-10 mL of dilution, intravenous, Once in imaging  perflutren protein A microsphere, 0.5 mL, " intravenous, Once in imaging  sulfur hexafluoride microsphr, 2 mL, intravenous, Once in imaging         Dietary Orders (From admission, onward)       Start     Ordered    05/17/24 0001  NPO Diet; Effective midnight  Diet effective midnight         05/16/24 0931    05/16/24 1336  Oral nutritional supplements  Until discontinued        Comments: Apple   Question Answer Comment   Deliver with All meals    Select supplement: Ensure Clear        05/16/24 1335    05/15/24 0039  Adult diet Clear Liquid  Diet effective now        Question:  Diet type  Answer:  Clear Liquid    05/15/24 0039                   Estimated Needs:   Estimated Energy Needs  Total Energy Estimated Needs (kCal):  (9634-5308)  Total Estimated Energy Need per Day (kCal/kg):  (35-40)  Method for Estimating Needs: Actual Wt    Estimated Protein Needs  Total Protein Estimated Needs (g):  (59-78)  Total Protein Estimated Needs (g/kg):  (1.5-2.0)  Method for Estimating Needs: Actual Wt    Estimated Fluid Needs  Total Fluid Estimated Needs (mL):  (4927-0405)  Method for Estimating Needs: 1 mL/kcal      Nutrition Diagnosis   Nutrition Diagnosis:  Malnutrition Diagnosis  Patient has Malnutrition Diagnosis: Yes  Diagnosis Status: New  Malnutrition Diagnosis: Severe malnutrition related to chronic disease or condition  As Evidenced by: <75% po intake >1 month, 20# weight loss in past year (insignificant), severe subcutaeous fat and muscle wasting    Nutrition Diagnosis  Patient has Nutrition Diagnosis: Yes  Diagnosis Status (1): New  Nutrition Diagnosis 1: Inadequate energy intake  Related to (1): decreased ability to consume sufficient energy  As Evidenced by (1): clear liquid diet       Nutrition Interventions/Recommendations   Nutrition Interventions and Recommendations:    RD Recommendations for Malnutrition: Recommend patient consumes a high calorie/high protein nutrition supplement 2-3 times daily to aid in weight gain/prevent weight loss after discharge.       Nutrition Prescription:  Individualized Nutrition Prescription Provided for : 7766-6075 calories, 59-78 gm protein to be provided via diet and supplements    Nutrition Interventions:   Food and/or Nutrient Delivery Interventions  Interventions: Meals and snacks, Medical food supplement  Meals and Snacks: General healthful diet  Goal: advance as able  Medical Food Supplement: Commercial beverage  Goal: ensure clear TID to provide 240 kcals and 8g protein each    Education Documentation  No documentation found.         Nutrition Monitoring and Evaluation   Monitoring/Evaluation:   Food/Nutrient Related History Monitoring  Monitoring and Evaluation Plan: Energy intake  Energy Intake: Estimated energy intake  Criteria: monitoring for diet advancement    Body Composition/Growth/Weight History  Monitoring and Evaluation Plan: Weight  Weight: Measured weight  Criteria: pt will maintain wt at this time       Time Spent/Follow-up:   Follow Up  Time Spent (min): 30 minutes  Last Date of Nutrition Visit: 05/16/24  Nutrition Follow-Up Needed?: 3-5 days  Follow up Comment: 5/20/24

## 2024-05-16 NOTE — CONSULTS
Consults    Reason For Consult  Anemia    History Of Present Illness  Karly Horton is a 79 y.o. female presenting with shortness of breath, PNA. Found evidence microcytic anemia with hgb 4.8. She denies obvious dark, tarry, bloody stools. This is true HARRIET. CT a/p showing large stool burden without obvious wall thickening or obstruction. 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 Colonoscopy with diffuse friability. No record of IBD. She denies current abdominal pain, nausea, vomiting      Past Medical History  She has a past medical history of Other cervical disc degeneration, unspecified cervical region, Other conditions influencing health status, Other conditions influencing health status, Other conditions influencing health status, Other conditions influencing health status, Other conditions influencing health status, Other conditions influencing health status, Other conditions influencing health status, Other intervertebral disc degeneration, lumbar region, Other intervertebral disc displacement, lumbar region, Other intervertebral disc displacement, lumbosacral region, Personal history of other diseases of the musculoskeletal system and connective tissue, Personal history of other diseases of the musculoskeletal system and connective tissue, Personal history of other specified conditions, Spinal stenosis, lumbar region without neurogenic claudication, and Sprain of ligaments of lumbar spine, initial encounter.    Surgical History  She has no past surgical history on file.     Social History  She reports that she has been smoking cigarettes. She started smoking about 64 years ago. She has a 64.4 pack-year smoking history. She does not have any smokeless tobacco history on file. No history on file for alcohol use and drug use.    Family History  No family history on file.     Allergies  Lactose    Review of Systems   Constitutional:  Positive for  "fatigue.   Gastrointestinal:  Positive for constipation. Negative for abdominal pain, diarrhea, nausea and vomiting.   Neurological:  Positive for weakness.        Physical Exam  Constitutional:       Appearance: Normal appearance.   HENT:      Head: Normocephalic and atraumatic.      Mouth/Throat:      Mouth: Mucous membranes are moist.   Pulmonary:      Effort: Pulmonary effort is normal.   Abdominal:      General: There is no distension.      Palpations: Abdomen is soft.      Tenderness: There is no abdominal tenderness. There is no guarding.   Musculoskeletal:         General: Normal range of motion.      Cervical back: Normal range of motion.   Skin:     General: Skin is warm and dry.      Coloration: Skin is pale.   Neurological:      General: No focal deficit present.      Mental Status: She is alert. Mental status is at baseline.   Psychiatric:         Mood and Affect: Mood normal.          Last Recorded Vitals  Blood pressure 168/80, pulse 100, temperature 36.8 °C (98.2 °F), temperature source Temporal, resp. rate 18, height 1.473 m (4' 10\"), weight (!) 38.6 kg (85 lb 1.6 oz), SpO2 (!) 88%.    Relevant Results  Results for orders placed or performed during the hospital encounter of 05/14/24 (from the past 24 hour(s))   Hemoglobin and hematocrit, blood   Result Value Ref Range    Hemoglobin 8.0 (L) 12.0 - 16.0 g/dL    Hematocrit 27.3 (L) 36.0 - 46.0 %   Basic Metabolic Panel   Result Value Ref Range    Glucose 102 (H) 65 - 99 mg/dL    Sodium 135 133 - 145 mmol/L    Potassium 3.3 (L) 3.4 - 5.1 mmol/L    Chloride 95 (L) 97 - 107 mmol/L    Bicarbonate 31 24 - 31 mmol/L    Urea Nitrogen 19 8 - 25 mg/dL    Creatinine 0.40 0.40 - 1.60 mg/dL    eGFR >90 >60 mL/min/1.73m*2    Calcium 8.4 (L) 8.5 - 10.4 mg/dL    Anion Gap 9 <=19 mmol/L   CBC   Result Value Ref Range    WBC 12.6 (H) 4.4 - 11.3 x10*3/uL    nRBC 5.2 (H) 0.0 - 0.0 /100 WBCs    RBC 4.10 4.00 - 5.20 x10*6/uL    Hemoglobin 7.9 (L) 12.0 - 16.0 g/dL    " Hematocrit 27.3 (L) 36.0 - 46.0 %    MCV 67 (L) 80 - 100 fL    MCH 19.3 (L) 26.0 - 34.0 pg    MCHC 28.9 (L) 32.0 - 36.0 g/dL    RDW 27.7 (H) 11.5 - 14.5 %    Platelets 240 150 - 450 x10*3/uL   Magnesium   Result Value Ref Range    Magnesium 2.00 1.60 - 3.10 mg/dL   Albumin   Result Value Ref Range    Albumin 3.1 (L) 3.5 - 5.0 g/dL   Hemoglobin and hematocrit, blood   Result Value Ref Range    Hemoglobin 8.4 (L) 12.0 - 16.0 g/dL    Hematocrit 29.7 (L) 36.0 - 46.0 %   Basic Metabolic Panel   Result Value Ref Range    Glucose 109 (H) 65 - 99 mg/dL    Sodium 136 133 - 145 mmol/L    Potassium 3.9 3.4 - 5.1 mmol/L    Chloride 98 97 - 107 mmol/L    Bicarbonate 28 24 - 31 mmol/L    Urea Nitrogen 15 8 - 25 mg/dL    Creatinine 0.40 0.40 - 1.60 mg/dL    eGFR >90 >60 mL/min/1.73m*2    Calcium 8.4 (L) 8.5 - 10.4 mg/dL    Anion Gap 10 <=19 mmol/L   CBC   Result Value Ref Range    WBC 13.1 (H) 4.4 - 11.3 x10*3/uL    nRBC 3.1 (H) 0.0 - 0.0 /100 WBCs    RBC 4.36 4.00 - 5.20 x10*6/uL    Hemoglobin 8.4 (L) 12.0 - 16.0 g/dL    Hematocrit 29.7 (L) 36.0 - 46.0 %    MCV 68 (L) 80 - 100 fL    MCH 19.3 (L) 26.0 - 34.0 pg    MCHC 28.3 (L) 32.0 - 36.0 g/dL    RDW 27.9 (H) 11.5 - 14.5 %    Platelets 275 150 - 450 x10*3/uL   Urinalysis with Reflex Culture and Microscopic   Result Value Ref Range    Color, Urine Light-Yellow Light-Yellow, Yellow, Dark-Yellow    Appearance, Urine Turbid (N) Clear    Specific Gravity, Urine 1.014 1.005 - 1.035    pH, Urine 8.0 5.0, 5.5, 6.0, 6.5, 7.0, 7.5, 8.0    Protein, Urine NEGATIVE NEGATIVE, 10 (TRACE), 20 (TRACE) mg/dL    Glucose, Urine Normal Normal mg/dL    Blood, Urine NEGATIVE NEGATIVE    Ketones, Urine NEGATIVE NEGATIVE mg/dL    Bilirubin, Urine NEGATIVE NEGATIVE    Urobilinogen, Urine Normal Normal mg/dL    Nitrite, Urine NEGATIVE NEGATIVE    Leukocyte Esterase, Urine NEGATIVE NEGATIVE   Transthoracic Echo (TTE) Complete   Result Value Ref Range    BSA 1.26 m2     ECG 12 lead    Result Date:  5/15/2024  Sinus tachycardia Nonspecific ST and T wave abnormality Abnormal ECG No previous ECGs available    CT abdomen pelvis wo IV contrast    Result Date: 5/15/2024  Interpreted By:  Ronak Foote, STUDY: CT ABDOMEN PELVIS WO IV CONTRAST;  5/14/2024 11:16 pm   INDICATION: Signs/Symptoms:ABD pain.   COMPARISON: 07/20/2023   ACCESSION NUMBER(S): [id number]   ORDERING CLINICIAN: XAIVER HOLBROOK   TECHNIQUE: Axial CT images of the abdomen and pelvis with coronal and sagittal reconstructed images obtained without intravenous contrast.   FINDINGS:   Limited evaluation due to paucity of intra-abdominal adipose tissue and lack of intravenous contrast.   LOWER CHEST: Small left-greater-than-right pleural effusions. Patchy ground-glass/consolidative airspace opacities within the left lower lobe, likely infectious/inflammatory.   LIVER: Scattered left hepatic granulomas. Prominent left hepatic lobe.   BILE DUCTS: Normal caliber.   GALLBLADDER: Cholecystectomy.   PANCREAS: Within normal limits.   SPLEEN: Calcified splenic granulomas.   ADRENALS: Mild thickening of the left adrenal gland.   KIDNEYS, URETERS, and BLADDER: No hydronephrosis or renal calculi. Ureters are non-dilated.  Urinary bladder within normal limits.   REPRODUCTIVE: Status post hysterectomy. No adnexal mass.   VESSELS: Moderate atherosclerosis. No abdominal aortic aneurysm.   RETROPERITONEUM and LYMPH NODES: No lymphadenopathy.   BOWEL: Postsurgical changes of the stomach, possibly gastric bypass. Small bowel is non-dilated. Postsurgical change related to partial colonic resection. Large colonic stool volume is again noted within the right and transverse colon, worsened from 07/22/2023. There may be stool within the distal ileum near the ileocolic anastomosis; precise evaluation of the bowel morphology is limited.   PERITONEUM: No ascites or free air, no fluid collection.   BODY WALL: Mild body wall edema.   MUSCULOSKELETAL: There is thoracolumbar  scoliosis. Moderate multilevel spinal degenerative change. There is similar grade 1 anterolisthesis of L3 on L4 and L4 on L5. There is mild hip osteoarthritis. There is diffuse osteopenia. No acute osseous abnormality detected.       1. Large right and transverse predominant colonic stool volume which appears more pronounced when compared to 07/22/2023. Please correlate for constipation. 2. Small left-greater-than-right pleural effusions. Patchy ground-glass/consolidative airspace opacities within the left lower lobe, concerning for pneumonia. 3. Limited evaluation due to paucity of intra-abdominal adipose tissue and lack of intravenous contrast.   Signed by: Ronak Foote 5/15/2024 12:05 AM Dictation workstation:   [id number]    XR chest 1 view    Result Date: 5/14/2024  Interpreted By:  Reanna Briceño, STUDY: XR CHEST 1 VIEW;  5/14/2024 9:56 pm   INDICATION: Signs/Symptoms:Shortness of breath   COMPARISON: Chest x-ray 07/16/2017   ACCESSION NUMBER(S): [id number]   ORDERING CLINICIAN: XAVIER HOLBROOK   TECHNIQUE: Upright frontal view of the chest was obtained .   FINDINGS: Monitoring wires are overlying the patient.   The patient is rotated.   The cardiomediastinal silhouette is within normal limits. There is mild vascular congestion.   There is a small left pleural effusion. There is airspace opacity at left lung base. No pneumothorax.   There is thoracolumbar scoliosis. There is a remote right rib fracture. There is superior location of the right humeral head, suggestive of chronic rotator cuff tear.       1.  Mild vascular congestion. Small left pleural effusion. Airspace opacity at the left lung base, may be secondary to atelectasis or pneumonia.       MACRO: None.   Signed by: Reanna Briceño 5/14/2024 10:40 PM Dictation workstation:   [id number]        Assessment/Plan     Anemia, Iron Def, Constipation (Mirco 4.9 +HARRIET)    -Given true HARRIET with hx abnormal colonic mucosa and gastric ulcer in 2017,  recommend repeat EGD and colonoscopy tomorrow    -PPI BID    -Clear liquid plus prep    -NPO after midnight    -Further recs to follow EGD/colon on Friday     I spent 30 minutes in the professional and overall care of this patient.

## 2024-05-17 ENCOUNTER — ANESTHESIA EVENT (OUTPATIENT)
Dept: GASTROENTEROLOGY | Facility: HOSPITAL | Age: 79
DRG: 177 | End: 2024-05-17
Payer: MEDICARE

## 2024-05-17 ENCOUNTER — APPOINTMENT (OUTPATIENT)
Dept: GASTROENTEROLOGY | Facility: HOSPITAL | Age: 79
DRG: 177 | End: 2024-05-17
Payer: MEDICARE

## 2024-05-17 ENCOUNTER — ANESTHESIA (OUTPATIENT)
Dept: GASTROENTEROLOGY | Facility: HOSPITAL | Age: 79
DRG: 177 | End: 2024-05-17
Payer: MEDICARE

## 2024-05-17 LAB
ANION GAP SERPL CALC-SCNC: 11 MMOL/L
ANION GAP SERPL CALC-SCNC: 11 MMOL/L
ARTERIAL PATENCY WRIST A: POSITIVE
BASE EXCESS BLDA CALC-SCNC: 10.5 MMOL/L (ref -2–3)
BODY TEMPERATURE: 37 DEGREES CELSIUS
BUN SERPL-MCNC: 15 MG/DL (ref 8–25)
BUN SERPL-MCNC: 9 MG/DL (ref 8–25)
CALCIUM SERPL-MCNC: 8.3 MG/DL (ref 8.5–10.4)
CALCIUM SERPL-MCNC: 8.4 MG/DL (ref 8.5–10.4)
CHLORIDE SERPL-SCNC: 92 MMOL/L (ref 97–107)
CHLORIDE SERPL-SCNC: 95 MMOL/L (ref 97–107)
CO2 SERPL-SCNC: 30 MMOL/L (ref 24–31)
CO2 SERPL-SCNC: 34 MMOL/L (ref 24–31)
CREAT SERPL-MCNC: 0.3 MG/DL (ref 0.4–1.6)
CREAT SERPL-MCNC: 0.4 MG/DL (ref 0.4–1.6)
EGFRCR SERPLBLD CKD-EPI 2021: >90 ML/MIN/1.73M*2
EGFRCR SERPLBLD CKD-EPI 2021: >90 ML/MIN/1.73M*2
ERYTHROCYTE [DISTWIDTH] IN BLOOD BY AUTOMATED COUNT: 29.6 % (ref 11.5–14.5)
GLUCOSE BLD MANUAL STRIP-MCNC: 220 MG/DL (ref 74–99)
GLUCOSE BLD MANUAL STRIP-MCNC: 99 MG/DL (ref 74–99)
GLUCOSE SERPL-MCNC: 112 MG/DL (ref 65–99)
GLUCOSE SERPL-MCNC: 93 MG/DL (ref 65–99)
HCO3 BLDA-SCNC: 34.3 MMOL/L (ref 22–26)
HCT VFR BLD AUTO: 30.7 % (ref 36–46)
HGB BLD-MCNC: 8.8 G/DL (ref 12–16)
INHALED O2 CONCENTRATION: 21 %
MAGNESIUM SERPL-MCNC: 1.8 MG/DL (ref 1.6–3.1)
MCH RBC QN AUTO: 19.5 PG (ref 26–34)
MCHC RBC AUTO-ENTMCNC: 28.7 G/DL (ref 32–36)
MCV RBC AUTO: 68 FL (ref 80–100)
NRBC BLD-RTO: 1.6 /100 WBCS (ref 0–0)
OXYHGB MFR BLDA: 86.1 % (ref 94–98)
PCO2 BLDA: 41 MM HG (ref 38–42)
PH BLDA: 7.53 PH (ref 7.38–7.42)
PLATELET # BLD AUTO: 319 X10*3/UL (ref 150–450)
PO2 BLDA: 51 MM HG (ref 85–95)
POTASSIUM SERPL-SCNC: 2.6 MMOL/L (ref 3.4–5.1)
POTASSIUM SERPL-SCNC: 2.8 MMOL/L (ref 3.4–5.1)
RBC # BLD AUTO: 4.51 X10*6/UL (ref 4–5.2)
SAO2 % BLDA: 89 % (ref 94–100)
SODIUM SERPL-SCNC: 136 MMOL/L (ref 133–145)
SODIUM SERPL-SCNC: 137 MMOL/L (ref 133–145)
SPECIMEN DRAWN FROM PATIENT: ABNORMAL
WBC # BLD AUTO: 12.7 X10*3/UL (ref 4.4–11.3)

## 2024-05-17 PROCEDURE — 2500000005 HC RX 250 GENERAL PHARMACY W/O HCPCS: Performed by: NURSE ANESTHETIST, CERTIFIED REGISTERED

## 2024-05-17 PROCEDURE — S4991 NICOTINE PATCH NONLEGEND: HCPCS | Performed by: NURSE PRACTITIONER

## 2024-05-17 PROCEDURE — 36415 COLL VENOUS BLD VENIPUNCTURE: CPT | Performed by: NURSE PRACTITIONER

## 2024-05-17 PROCEDURE — 2500000004 HC RX 250 GENERAL PHARMACY W/ HCPCS (ALT 636 FOR OP/ED): Performed by: NURSE PRACTITIONER

## 2024-05-17 PROCEDURE — 83735 ASSAY OF MAGNESIUM: CPT | Performed by: NURSE PRACTITIONER

## 2024-05-17 PROCEDURE — 36600 WITHDRAWAL OF ARTERIAL BLOOD: CPT

## 2024-05-17 PROCEDURE — 2500000005 HC RX 250 GENERAL PHARMACY W/O HCPCS: Performed by: NURSE PRACTITIONER

## 2024-05-17 PROCEDURE — A43239 PR EDG TRANSORAL BIOPSY SINGLE/MULTIPLE: Performed by: ANESTHESIOLOGY

## 2024-05-17 PROCEDURE — 80048 BASIC METABOLIC PNL TOTAL CA: CPT | Mod: 91 | Performed by: NURSE PRACTITIONER

## 2024-05-17 PROCEDURE — 82810 BLOOD GASES O2 SAT ONLY: CPT | Mod: 91 | Performed by: INTERNAL MEDICINE

## 2024-05-17 PROCEDURE — C9113 INJ PANTOPRAZOLE SODIUM, VIA: HCPCS | Performed by: INTERNAL MEDICINE

## 2024-05-17 PROCEDURE — 2500000004 HC RX 250 GENERAL PHARMACY W/ HCPCS (ALT 636 FOR OP/ED): Performed by: NURSE ANESTHETIST, CERTIFIED REGISTERED

## 2024-05-17 PROCEDURE — 2500000005 HC RX 250 GENERAL PHARMACY W/O HCPCS

## 2024-05-17 PROCEDURE — 2500000001 HC RX 250 WO HCPCS SELF ADMINISTERED DRUGS (ALT 637 FOR MEDICARE OP): Performed by: NURSE PRACTITIONER

## 2024-05-17 PROCEDURE — 80048 BASIC METABOLIC PNL TOTAL CA: CPT | Performed by: NURSE PRACTITIONER

## 2024-05-17 PROCEDURE — 2500000004 HC RX 250 GENERAL PHARMACY W/ HCPCS (ALT 636 FOR OP/ED): Performed by: INTERNAL MEDICINE

## 2024-05-17 PROCEDURE — 99100 ANES PT EXTEME AGE<1 YR&>70: CPT | Performed by: ANESTHESIOLOGY

## 2024-05-17 PROCEDURE — 2500000002 HC RX 250 W HCPCS SELF ADMINISTERED DRUGS (ALT 637 FOR MEDICARE OP, ALT 636 FOR OP/ED): Performed by: NURSE PRACTITIONER

## 2024-05-17 PROCEDURE — 3700000002 HC GENERAL ANESTHESIA TIME - EACH INCREMENTAL 1 MINUTE

## 2024-05-17 PROCEDURE — 3700000001 HC GENERAL ANESTHESIA TIME - INITIAL BASE CHARGE

## 2024-05-17 PROCEDURE — 88305 TISSUE EXAM BY PATHOLOGIST: CPT | Performed by: PATHOLOGY

## 2024-05-17 PROCEDURE — 2500000006 HC RX 250 W HCPCS SELF ADMINISTERED DRUGS (ALT 637 FOR ALL PAYERS): Mod: MUE | Performed by: INTERNAL MEDICINE

## 2024-05-17 PROCEDURE — A43239 PR EDG TRANSORAL BIOPSY SINGLE/MULTIPLE: Performed by: NURSE ANESTHETIST, CERTIFIED REGISTERED

## 2024-05-17 PROCEDURE — 2720000007 HC OR 272 NO HCPCS

## 2024-05-17 PROCEDURE — 45378 DIAGNOSTIC COLONOSCOPY: CPT | Mod: 52 | Performed by: INTERNAL MEDICINE

## 2024-05-17 PROCEDURE — 7100000001 HC RECOVERY ROOM TIME - INITIAL BASE CHARGE

## 2024-05-17 PROCEDURE — 82947 ASSAY GLUCOSE BLOOD QUANT: CPT | Mod: 91

## 2024-05-17 PROCEDURE — 2060000001 HC INTERMEDIATE ICU ROOM DAILY

## 2024-05-17 PROCEDURE — 85027 COMPLETE CBC AUTOMATED: CPT | Performed by: NURSE PRACTITIONER

## 2024-05-17 PROCEDURE — 43239 EGD BIOPSY SINGLE/MULTIPLE: CPT | Performed by: INTERNAL MEDICINE

## 2024-05-17 PROCEDURE — 88305 TISSUE EXAM BY PATHOLOGIST: CPT | Mod: TC | Performed by: INTERNAL MEDICINE

## 2024-05-17 PROCEDURE — 7100000002 HC RECOVERY ROOM TIME - EACH INCREMENTAL 1 MINUTE

## 2024-05-17 RX ORDER — PANTOPRAZOLE SODIUM 40 MG/10ML
40 INJECTION, POWDER, LYOPHILIZED, FOR SOLUTION INTRAVENOUS 2 TIMES DAILY
Status: DISCONTINUED | OUTPATIENT
Start: 2024-05-17 | End: 2024-05-18

## 2024-05-17 RX ORDER — LIDOCAINE HYDROCHLORIDE 10 MG/ML
INJECTION INFILTRATION; PERINEURAL AS NEEDED
Status: DISCONTINUED | OUTPATIENT
Start: 2024-05-17 | End: 2024-05-17

## 2024-05-17 RX ORDER — SUCRALFATE 1 G/10ML
1 SUSPENSION ORAL EVERY 6 HOURS SCHEDULED
Status: DISCONTINUED | OUTPATIENT
Start: 2024-05-17 | End: 2024-05-22 | Stop reason: HOSPADM

## 2024-05-17 RX ORDER — SODIUM CHLORIDE, SODIUM LACTATE, POTASSIUM CHLORIDE, CALCIUM CHLORIDE 600; 310; 30; 20 MG/100ML; MG/100ML; MG/100ML; MG/100ML
100 INJECTION, SOLUTION INTRAVENOUS CONTINUOUS
Status: DISCONTINUED | OUTPATIENT
Start: 2024-05-17 | End: 2024-05-20

## 2024-05-17 RX ORDER — POTASSIUM CHLORIDE 14.9 MG/ML
20 INJECTION INTRAVENOUS
Status: ACTIVE | OUTPATIENT
Start: 2024-05-17 | End: 2024-05-17

## 2024-05-17 RX ORDER — LIDOCAINE HYDROCHLORIDE 10 MG/ML
0.1 INJECTION INFILTRATION; PERINEURAL ONCE
Status: DISCONTINUED | OUTPATIENT
Start: 2024-05-17 | End: 2024-05-22 | Stop reason: HOSPADM

## 2024-05-17 RX ORDER — POTASSIUM CHLORIDE 1.5 G/1.58G
40 POWDER, FOR SOLUTION ORAL ONCE
Status: COMPLETED | OUTPATIENT
Start: 2024-05-17 | End: 2024-05-17

## 2024-05-17 RX ORDER — ONDANSETRON HYDROCHLORIDE 2 MG/ML
4 INJECTION, SOLUTION INTRAVENOUS ONCE AS NEEDED
Status: DISCONTINUED | OUTPATIENT
Start: 2024-05-17 | End: 2024-05-22 | Stop reason: HOSPADM

## 2024-05-17 RX ORDER — PHENYLEPHRINE HYDROCHLORIDE 10 MG/ML
INJECTION INTRAVENOUS AS NEEDED
Status: DISCONTINUED | OUTPATIENT
Start: 2024-05-17 | End: 2024-05-17

## 2024-05-17 RX ORDER — HYDRALAZINE HYDROCHLORIDE 20 MG/ML
5 INJECTION INTRAMUSCULAR; INTRAVENOUS EVERY 30 MIN PRN
Status: DISCONTINUED | OUTPATIENT
Start: 2024-05-17 | End: 2024-05-22 | Stop reason: HOSPADM

## 2024-05-17 RX ORDER — IBUPROFEN 200 MG
1 TABLET ORAL DAILY
Status: DISCONTINUED | OUTPATIENT
Start: 2024-05-17 | End: 2024-05-22 | Stop reason: HOSPADM

## 2024-05-17 RX ORDER — PROPOFOL 10 MG/ML
INJECTION, EMULSION INTRAVENOUS AS NEEDED
Status: DISCONTINUED | OUTPATIENT
Start: 2024-05-17 | End: 2024-05-17

## 2024-05-17 RX ORDER — FUROSEMIDE 10 MG/ML
40 INJECTION INTRAMUSCULAR; INTRAVENOUS ONCE
Status: COMPLETED | OUTPATIENT
Start: 2024-05-17 | End: 2024-05-17

## 2024-05-17 RX ORDER — POTASSIUM CHLORIDE 14.9 MG/ML
20 INJECTION INTRAVENOUS
Status: DISPENSED | OUTPATIENT
Start: 2024-05-17 | End: 2024-05-18

## 2024-05-17 RX ORDER — POTASSIUM CHLORIDE 14.9 MG/ML
20 INJECTION INTRAVENOUS
Qty: 200 ML | Refills: 0 | Status: COMPLETED | OUTPATIENT
Start: 2024-05-17 | End: 2024-05-17

## 2024-05-17 RX ORDER — SODIUM CHLORIDE, SODIUM LACTATE, POTASSIUM CHLORIDE, CALCIUM CHLORIDE 600; 310; 30; 20 MG/100ML; MG/100ML; MG/100ML; MG/100ML
INJECTION, SOLUTION INTRAVENOUS CONTINUOUS PRN
Status: DISCONTINUED | OUTPATIENT
Start: 2024-05-17 | End: 2024-05-17

## 2024-05-17 RX ORDER — ALBUTEROL SULFATE 0.83 MG/ML
2.5 SOLUTION RESPIRATORY (INHALATION) ONCE AS NEEDED
Status: DISCONTINUED | OUTPATIENT
Start: 2024-05-17 | End: 2024-05-22 | Stop reason: HOSPADM

## 2024-05-17 RX ADMIN — SODIUM PHOSPHATE 1 ENEMA: 7; 19 ENEMA RECTAL at 12:22

## 2024-05-17 RX ADMIN — POTASSIUM CHLORIDE 20 MEQ: 200 INJECTION, SOLUTION INTRAVENOUS at 09:53

## 2024-05-17 RX ADMIN — PHENYLEPHRINE HYDROCHLORIDE 150 MCG: 10 INJECTION INTRAVENOUS at 15:51

## 2024-05-17 RX ADMIN — LEVOFLOXACIN 500 MG: 500 INJECTION, SOLUTION INTRAVENOUS at 14:05

## 2024-05-17 RX ADMIN — POTASSIUM CHLORIDE 20 MEQ: 14.9 INJECTION, SOLUTION INTRAVENOUS at 23:01

## 2024-05-17 RX ADMIN — PHENYLEPHRINE HYDROCHLORIDE 150 MCG: 10 INJECTION INTRAVENOUS at 15:53

## 2024-05-17 RX ADMIN — POTASSIUM CHLORIDE 20 MEQ: 200 INJECTION, SOLUTION INTRAVENOUS at 12:16

## 2024-05-17 RX ADMIN — PANTOPRAZOLE SODIUM 40 MG: 40 INJECTION, POWDER, FOR SOLUTION INTRAVENOUS at 23:01

## 2024-05-17 RX ADMIN — FUROSEMIDE 40 MG: 10 INJECTION, SOLUTION INTRAMUSCULAR; INTRAVENOUS at 12:54

## 2024-05-17 RX ADMIN — Medication 21 PERCENT: at 08:00

## 2024-05-17 RX ADMIN — POTASSIUM CHLORIDE 40 MEQ: 1.5 FOR SOLUTION ORAL at 23:10

## 2024-05-17 RX ADMIN — SUCRALFATE 1 G: 1 SUSPENSION ORAL at 18:43

## 2024-05-17 RX ADMIN — PHENYLEPHRINE HYDROCHLORIDE 150 MCG: 10 INJECTION INTRAVENOUS at 15:49

## 2024-05-17 RX ADMIN — SODIUM CHLORIDE, POTASSIUM CHLORIDE, SODIUM LACTATE AND CALCIUM CHLORIDE: 600; 310; 30; 20 INJECTION, SOLUTION INTRAVENOUS at 15:18

## 2024-05-17 RX ADMIN — LIDOCAINE HYDROCHLORIDE 35 ML: 10 INJECTION, SOLUTION INFILTRATION; PERINEURAL at 15:18

## 2024-05-17 RX ADMIN — PROPOFOL 50 MCG/KG/MIN: 10 INJECTION, EMULSION INTRAVENOUS at 15:28

## 2024-05-17 RX ADMIN — PROPOFOL 30 MG: 10 INJECTION, EMULSION INTRAVENOUS at 15:26

## 2024-05-17 RX ADMIN — NICOTINE 1 PATCH: 21 PATCH, EXTENDED RELEASE TRANSDERMAL at 12:52

## 2024-05-17 RX ADMIN — PROPOFOL 30 MG: 10 INJECTION, EMULSION INTRAVENOUS at 15:27

## 2024-05-17 RX ADMIN — SUCRALFATE 1 G: 1 SUSPENSION ORAL at 23:01

## 2024-05-17 RX ADMIN — Medication 21 PERCENT: at 07:30

## 2024-05-17 RX ADMIN — Medication 2 L/MIN: at 20:00

## 2024-05-17 SDOH — HEALTH STABILITY: MENTAL HEALTH: CURRENT SMOKER: 1

## 2024-05-17 ASSESSMENT — PAIN - FUNCTIONAL ASSESSMENT
PAIN_FUNCTIONAL_ASSESSMENT: CPOT (CRITICAL CARE PAIN OBSERVATION TOOL)

## 2024-05-17 NOTE — PROGRESS NOTES
Karly Horton is a 79 y.o. female on day 2 of admission presenting with Pneumonia of both lower lobes due to infectious organism.    Subjective   Patient seen and examined.  Resting in bed in no acute distress.  Awake alert oriented x 3.  No complaints.  No abdominal pain, nausea, vomiting, + Bowel movements with prep.  No shortness of breath, cough.    Spoke with night shift nursing, bowel preparation continued overnight, not completed, last stool dark brown liquid.    Objective     Physical Exam  Vitals and nursing note reviewed.   Constitutional:       General: She is not in acute distress.     Appearance: Normal appearance. She is normal weight. She is not ill-appearing, toxic-appearing or diaphoretic.   HENT:      Head: Normocephalic and atraumatic.      Right Ear: Tympanic membrane normal.      Left Ear: Tympanic membrane normal.      Nose: Nose normal.      Mouth/Throat:      Mouth: Mucous membranes are moist.      Pharynx: Oropharynx is clear.   Eyes:      Extraocular Movements: Extraocular movements intact.      Conjunctiva/sclera: Conjunctivae normal.      Pupils: Pupils are equal, round, and reactive to light.   Cardiovascular:      Rate and Rhythm: Normal rate and regular rhythm.      Pulses: Normal pulses.      Heart sounds: Normal heart sounds. No murmur heard.  Pulmonary:      Effort: Pulmonary effort is normal. No respiratory distress.      Breath sounds: Normal breath sounds. No wheezing, rhonchi or rales.      Comments: Pulse oximetry 86% room air.  Abdominal:      General: Bowel sounds are normal. There is distension.      Palpations: Abdomen is soft.      Tenderness: There is no abdominal tenderness.   Genitourinary:     Comments: Deferred.  Musculoskeletal:         General: No swelling or tenderness. Normal range of motion.      Cervical back: Normal range of motion and neck supple.      Right lower leg: No edema.      Left lower leg: No edema.   Skin:     General: Skin is warm and dry.       "Capillary Refill: Capillary refill takes less than 2 seconds.   Neurological:      General: No focal deficit present.      Mental Status: She is alert and oriented to person, place, and time.   Psychiatric:         Mood and Affect: Mood normal.         Behavior: Behavior normal.       Last Recorded Vitals  Blood pressure 142/76, pulse 101, temperature 37.1 °C (98.8 °F), temperature source Temporal, resp. rate 17, height 1.473 m (4' 10\"), weight (!) 32.1 kg (70 lb 12.3 oz), SpO2 90%.    Intake/Output last 3 Shifts:  I/O last 3 completed shifts:  In: 240 (7.5 mL/kg) [P.O.:240]  Out: 250 (7.8 mL/kg) [Urine:250 (0.2 mL/kg/hr)]  Weight: 32.1 kg     Telemetry    Relevant Results        Malnutrition Diagnosis Status: New  Malnutrition Diagnosis: Severe malnutrition related to chronic disease or condition  As Evidenced by: <75% po intake >1 month, 20# weight loss in past year (insignificant), severe subcutaeous fat and muscle wasting  I agree with the dietitian's malnutrition diagnosis.    Results for orders placed or performed during the hospital encounter of 05/14/24 (from the past 24 hour(s))   Urinalysis with Reflex Culture and Microscopic   Result Value Ref Range    Color, Urine Light-Yellow Light-Yellow, Yellow, Dark-Yellow    Appearance, Urine Turbid (N) Clear    Specific Gravity, Urine 1.014 1.005 - 1.035    pH, Urine 8.0 5.0, 5.5, 6.0, 6.5, 7.0, 7.5, 8.0    Protein, Urine NEGATIVE NEGATIVE, 10 (TRACE), 20 (TRACE) mg/dL    Glucose, Urine Normal Normal mg/dL    Blood, Urine NEGATIVE NEGATIVE    Ketones, Urine NEGATIVE NEGATIVE mg/dL    Bilirubin, Urine NEGATIVE NEGATIVE    Urobilinogen, Urine Normal Normal mg/dL    Nitrite, Urine NEGATIVE NEGATIVE    Leukocyte Esterase, Urine NEGATIVE NEGATIVE   Extra Urine Gray Tube   Result Value Ref Range    Extra Tube Hold for add-ons.    Transthoracic Echo (TTE) Complete   Result Value Ref Range    AV pk benjamin 1.42 m/s    LVOT diam 1.80 cm    AV mn grad 3.0 mmHg    MV E/A ratio " 0.70     Tricuspid annular plane systolic excursion 1.8 cm    LV Biplane EF 55 %    LA vol index A/L 36.5 ml/m2    MV avg E/e' ratio 15.82     RV free wall pk S' 11.00 cm/s    RVSP 64.3 mmHg    LVIDd 3.59 cm    AV pk grad 8.1 mmHg    Aortic Valve Area by Continuity of VTI 2.35 cm2    Aortic Valve Area by Continuity of Peak Velocity 2.06 cm2    LV A4C EF 57.4    Basic Metabolic Panel   Result Value Ref Range    Glucose 112 (H) 65 - 99 mg/dL    Sodium 136 133 - 145 mmol/L    Potassium 2.6 (LL) 3.4 - 5.1 mmol/L    Chloride 95 (L) 97 - 107 mmol/L    Bicarbonate 30 24 - 31 mmol/L    Urea Nitrogen 9 8 - 25 mg/dL    Creatinine 0.30 (L) 0.40 - 1.60 mg/dL    eGFR >90 >60 mL/min/1.73m*2    Calcium 8.3 (L) 8.5 - 10.4 mg/dL    Anion Gap 11 <=19 mmol/L   CBC   Result Value Ref Range    WBC 12.7 (H) 4.4 - 11.3 x10*3/uL    nRBC 1.6 (H) 0.0 - 0.0 /100 WBCs    RBC 4.51 4.00 - 5.20 x10*6/uL    Hemoglobin 8.8 (L) 12.0 - 16.0 g/dL    Hematocrit 30.7 (L) 36.0 - 46.0 %    MCV 68 (L) 80 - 100 fL    MCH 19.5 (L) 26.0 - 34.0 pg    MCHC 28.7 (L) 32.0 - 36.0 g/dL    RDW 29.6 (H) 11.5 - 14.5 %    Platelets 319 150 - 450 x10*3/uL     No results found for the last 90 days.    Transthoracic Echo (TTE) Complete    Result Date: 5/16/2024           [place] [id number]            Phone [phone] TRANSTHORACIC ECHOCARDIOGRAM REPORT  Patient Name:      JAMISON CORONADO     Reading Physician:    [id number] Carlos Eduardo Hair MD Study Date:        5/16/2024             Ordering Provider:    [id number] WARD JACKSON MRN/PID:           [id number]              Fellow: Accession#:        [id number]          Nurse: Date of Birth/Age: 1945 / 79 years  Sonographer:          Toni Davidson RDCS Gender:            F                      Additional Staff: Height:            146.00 cm             Admit Date: Weight:            39.00 kg              Admission Status:     Inpatient -                                                                Routine BSA / BMI:         1.26 m2 / 18.30 kg/m2 Department Location:  [facility] Blood Pressure: 168 /80 mmHg Study Type:    TRANSTHORACIC ECHO (TTE) COMPLETE Diagnosis/ICD: Unspecified systolic (congestive) heart failure (CHF)-I50.20 Indication:    Congestive Heart Failure CPT Codes:     Echo Complete w Full [id number] Patient History: Smoker:            Current. Pertinent History: CHF, COPD, HTN, Hyperlipidemia, Dyspnea, Syncope, LE Edema                    and Murmur. Study Detail: The following Echo studies were performed: 2D, M-Mode, Doppler and               color flow. Unable to obtain suprasternal notch view.  PHYSICIAN INTERPRETATION: Left Ventricle: Left ventricular systolic function is normal, with an estimated ejection fraction of 55-60%. There are no regional wall motion abnormalities. The left ventricular cavity size is normal. There is mild to moderate concentric left ventricular hypertrophy. Spectral Doppler shows an impaired relaxation pattern of left ventricular diastolic filling. Left Atrium: The left atrium is normal in size. Right Ventricle: The right ventricle is moderately enlarged. There is moderately reduced right ventricular systolic function. Right Atrium: The right atrium is mildly dilated. Aortic Valve: The aortic valve is trileaflet. There is no evidence of aortic valve regurgitation. The peak instantaneous gradient of the aortic valve is 8.1 mmHg. The mean gradient of the aortic valve is 3.0 mmHg. Mitral Valve: The mitral valve is normal in structure. There is mild mitral valve regurgitation. Tricuspid Valve: The tricuspid valve is structurally normal. There is moderate tricuspid regurgitation. The Doppler estimated RVSP is moderate to severely elevated at 64.3 mmHg.  Pulmonic Valve: The pulmonic valve is abnormal. The pulmonic valve has annular dilitation. There is mild pulmonic valve regurgitation. Pericardium: There is no pericardial effusion noted. Aorta: The aortic root is normal.  CONCLUSIONS:  1. Left ventricular systolic function is normal with a 55-60% estimated ejection fraction.  2. Spectral Doppler shows an impaired relaxation pattern of left ventricular diastolic filling.  3. Moderately enlarged right ventricle.  4. There is moderately reduced right ventricular systolic function.  5. Mild mitral valve regurgitation.  6. Moderate to severely elevated right ventricular systolic pressure.  7. Moderate tricuspid regurgitation. QUANTITATIVE DATA SUMMARY: 2D MEASUREMENTS:                          Normal Ranges: LAs:           3.50 cm   (2.7-4.0cm) IVSd:          0.84 cm   (0.6-1.1cm) LVPWd:         0.96 cm   (0.6-1.1cm) LVIDd:         3.59 cm   (3.9-5.9cm) LVIDs:         2.41 cm LV Mass Index: 73.2 g/m2 LV % FS        32.9 % LA VOLUME:                               Normal Ranges: LA Vol A4C:        41.1 ml    (22+/-6mL/m2) LA Vol A2C:        49.0 ml LA Vol BP:         46.1 ml LA Vol Index A4C:  32.5ml/m2 LA Vol Index A2C:  38.8 ml/m2 LA Vol Index BP:   36.5 ml/m2 LA Area A4C:       16.0 cm2 LA Area A2C:       17.0 cm2 LA Major Axis A4C: 5.3 cm LA Major Axis A2C: 5.0 cm LA Volume Index:   33.0 ml/m2 LA Vol A4C:        37.0 ml LA Vol A2C:        44.0 ml RA VOLUME BY A/L METHOD:                       Normal Ranges: RA Area A4C: 15.0 cm2 LV SYSTOLIC FUNCTION BY 2D PLANIMETRY (MOD):                     Normal Ranges: EF-A4C View: 57.4 % (>=55%) EF-A2C View: 51.4 % EF-Biplane:  54.9 % LV DIASTOLIC FUNCTION:                        Normal Ranges: MV Peak E:    0.87 m/s (0.7-1.2 m/s) MV Peak A:    1.24 m/s (0.42-0.7 m/s) E/A Ratio:    0.70     (1.0-2.2) MV e'         0.06 m/s (>8.0) MV lateral e' 0.07 m/s MV medial e'  0.04 m/s E/e' Ratio:   15.82    (<8.0) MITRAL VALVE:                  Normal Ranges: MV DT: 251 msec (150-240msec) AORTIC VALVE:                                   Normal Ranges: AoV Vmax:                1.42 m/s (<=1.7m/s) AoV Peak P.1 mmHg (<20mmHg) AoV Mean PG:             3.0 mmHg (1.7-11.5mmHg) LVOT Max Grayson:            1.15 m/s (<=1.1m/s) AoV VTI:                 20.50 cm (18-25cm) LVOT VTI:                18.90 cm LVOT Diameter:           1.80 cm  (1.8-2.4cm) AoV Area, VTI:           2.35 cm2 (2.5-5.5cm2) AoV Area,Vmax:           2.06 cm2 (2.5-4.5cm2) AoV Dimensionless Index: 0.92  RIGHT VENTRICLE: RV Basal 3.28 cm RV Mid   2.77 cm RV Major 5.0 cm TAPSE:   18.3 mm RV s'    0.11 m/s TRICUSPID VALVE/RVSP:                             Normal Ranges: Peak TR Velocity: 3.51 m/s RV Syst Pressure: 64.3 mmHg (< 30mmHg) IVC Diam:         2.60 cm PULMONIC VALVE:                         Normal Ranges: PV Accel Time: 98 msec  (>120ms) PV Max Grayson:    0.9 m/s  (0.6-0.9m/s) PV Max PG:     3.0 mmHg  [id number] Carlos Eduardo Hair MD Electronically signed on 2024 at 8:21:44 PM  ** Final **      Scheduled medications  furosemide, 40 mg, intravenous, Once  lactulose, 20 g, oral, Daily  levoFLOXacin, 500 mg, intravenous, q24h  nicotine, 1 patch, transdermal, Daily  oxygen, , inhalation, Continuous - Inhalation  pantoprazole, 40 mg, oral, Daily  perflutren lipid microspheres, 0.5-10 mL of dilution, intravenous, Once in imaging  perflutren protein A microsphere, 0.5 mL, intravenous, Once in imaging  potassium chloride, 20 mEq, intravenous, q2h  potassium chloride, 20 mEq, intravenous, q2h  sulfur hexafluoride microsphr, 2 mL, intravenous, Once in imaging      Continuous medications     PRN medications  PRN medications: acetaminophen, lubricating eye drops, ondansetron      ASSESSMENT:  Dyspnea  Leg swelling resolved  CHF  Cor Pulmonale   Pulmonary hypertension  Pneumonia  COPD  Acute hypoxic respiratory failure  Tobacco dependence  Leukocytosis improved  Constipation  resolved  Microcytic anemia  Iron deficiency anemia  Weight loss  Generalized weakness  Inability to ambulate  Hypokalemia   Severe protein calorie malnutrition    PLAN:  No shortness of breath or cough, pulse oximetry 86% on room air, placed on oxygen by nursing.  Pulmonology following, input appreciated.  IV Levaquin.  Duo-Neb treatments.  Oxygen.  Follow-up.  Tobacco cessation.  Echocardiogram showed a preserved EF 55-60%, moderately enlarged right ventricle, moderately reduced right ventricular systolic function, mild mitral valve regurgitation, moderate to severely elevated right ventricular systolic pressure and moderate tricuspid regurgitation.  Cardiology following, input appreciated.  Cor Pulmonale, secondary to underlying COPD and pulmonary hypertension - proBNP elevated secondary to right ventricular dilation and pulmonary hypertension.  Management per Cardiology.  H&H noted.  Stable.  NPO for EGD, colonoscopy today.  Continue bowel preparation as per Gastroenterology.  Monitor H&H.  Labs reviewed.  Potassium 2.6.  Patient is NPO.  Replace potassium with 40 meq potassium chloride x 2 doses.  Check magnesium.  Monitor electrolytes and renal function.  Labs in am.  Severe protein calorie malnutrition.  Protein supplementation when no longer NPO.  Generalized weakness. No focal weakness.  PT/OT.  Fall precautions.  Up with assistance only.  Bed and chair alarm at all times.  DVT prophylaxis SCD's.  Supportive care.  Patient reassured.  PT/OT recommend moderate intensity level of continued care.  Case management following for discharge planning.  Discharge plan to [facility].  Discussed with patient, nursing and Dr. Mcneil.        Shea Root, APRN-CNP

## 2024-05-17 NOTE — DOCUMENTATION CLARIFICATION NOTE
"    PATIENT:               JAMISON CORONADO  ACCT #:                  [id number]  MRN:                       [id number]  :                       1945  ADMIT DATE:       2024 9:18 PM  DISCH DATE:  RESPONDING PROVIDER #:        [id number]          PROVIDER RESPONSE TEXT:    Gram Negative PNA    CDI QUERY TEXT:    Clarification        Instruction:    Based on your assessment of the patient and the clinical information, please provide the requested documentation by clicking on the appropriate radio button and enter any additional information if prompted.    Question: Please further specify the type of pneumonia being treated    When answering this query, please exercise your independent professional judgment. The fact that a question is being asked, does not imply that any particular answer is desired or expected.    The patient's clinical indicators include:  Clinical Information: 71-year-old female presents complaint of shortness of breath.  The patient states has been going on for a while couple months although she is not a great historian.    Clinical Indicators:    : WBC 13.1    : Chest x ray: \"Mild vascular congestion. Small left pleural effusion. Airspace opacity at the left lung base, may be secondary to atelectasis or pneumonia.\"    : ED documentation: \"Pneumonia of both lower lobes due to infectious organism\"    05/15: HP: \"She was treated in the emergency department with IV Levaquin for pneumonia, IV Lasix, and packed red blood cells and was admitted.\"    : GI consult: \"79 y.o. female presenting with shortness of breath, PNA.\"    Treatment: ED: Levaquin 500 mg, In pt. Levaquin 500 mg q 12 hrs    Risk Factors: COPD, CHF, Tobacco dependence  Options provided:  -- Aspiration PNA  -- Gram Negative PNA  -- Pseudomonas PNA  -- Viral PNA  -- MRSA PNA  -- Other - I will add my own diagnosis  -- Refer to Clinical Documentation Reviewer    Query created by: Nakul Segovia on 2024 10:06 " AM      Electronically signed by:  WARD SCHUSTER 5/17/2024 1:05 PM

## 2024-05-17 NOTE — CARE PLAN
The patient's goals for the shift include Sleep    The clinical goals for the shift include Finish bowel prep    Problem: Pain  Goal: My pain/discomfort is manageable  Outcome: Not Progressing     Problem: Safety  Goal: Patient will be injury free during hospitalization  Outcome: Not Progressing  Goal: I will remain free of falls  Outcome: Not Progressing     Problem: Daily Care  Goal: Daily care needs are met  Outcome: Not Progressing     Problem: Psychosocial Needs  Goal: Demonstrates ability to cope with hospitalization/illness  Outcome: Not Progressing  Goal: Collaborate with me, my family, and caregiver to identify my specific goals  Outcome: Not Progressing     Problem: Discharge Barriers  Goal: My discharge needs are met  Outcome: Not Progressing

## 2024-05-17 NOTE — ANESTHESIA PREPROCEDURE EVALUATION
Patient: Karly Horton    Procedure Information       Date/Time: 05/17/24 1500    Scheduled providers: Eren Ram MD; Chidi Valdes MD    Procedures:       COLONOSCOPY      EGD    Location: [facility]          Past Medical History:   Diagnosis Date    Other cervical disc degeneration, unspecified cervical region     Degeneration of cervical intervertebral disc    Other conditions influencing health status     Bulging Disc (L3 - L4)    Other conditions influencing health status     A Fall    Other conditions influencing health status     Bulging Disc (C4 - C5)    Other conditions influencing health status     Bulging Disc (C6 - C7)    Other conditions influencing health status     Bulging Disc (C5 - C6)    Other conditions influencing health status     Lumbar Spondylolisthesis    Other conditions influencing health status     Rotator Cuff Tendon Tear    Other intervertebral disc degeneration, lumbar region     L4-L5 disc bulge    Other intervertebral disc displacement, lumbar region     Disc displacement, lumbar    Other intervertebral disc displacement, lumbosacral region     Displacement of lumbosacral intervertebral disc    Personal history of other diseases of the musculoskeletal system and connective tissue     History of bursitis    Personal history of other diseases of the musculoskeletal system and connective tissue     History of fibromyositis    Personal history of other specified conditions     History of headache    Spinal stenosis, lumbar region without neurogenic claudication     Lumbar canal stenosis    Sprain of ligaments of lumbar spine, initial encounter     Low back sprain        Relevant Problems   Cardiac   (+) Congestive heart failure (Multi)      Pulmonary   (+) Pneumonia of both lower lobes due to infectious organism      Hematology   (+) Iron deficiency anemia      ID   (+) Pneumonia of both lower lobes due to infectious organism     History reviewed. No  pertinent surgical history.   Clinical information reviewed:   Tobacco  Allergies  Meds  Problems  Med Hx  Surg Hx   Fam Hx  Soc   Hx        NPO Detail:  No data recorded     Physical Exam    Airway  Mallampati: I  TM distance: <3 FB  Neck ROM: limited     Cardiovascular    Dental    Pulmonary    Abdominal            Anesthesia Plan    History of general anesthesia?: no  History of complications of general anesthesia?: no    ASA 3     MAC     The patient is a current smoker.  Patient was previously instructed to abstain from smoking on day of procedure.  Patient did not smoke on day of procedure.    intravenous induction   Postoperative administration of opioids is intended.  Anesthetic plan and risks discussed with patient.    Plan discussed with attending.

## 2024-05-17 NOTE — PROGRESS NOTES
05/17/24 0805   Discharge Planning   Patient expects to be discharged to: patient accepted by [facility]     No precert needed     UPDATE 1035: Patient updated and agreeable at this time

## 2024-05-17 NOTE — PROGRESS NOTES
"Karly Horton is a 79 y.o. female on day 2 of admission presenting with Pneumonia of both lower lobes due to infectious organism.    Subjective   Unable to finish all prep.        Objective     Physical Exam  HENT:      Head: Normocephalic.      Right Ear: Tympanic membrane normal.      Nose: Nose normal.      Mouth/Throat:      Mouth: Mucous membranes are moist.   Eyes:      Pupils: Pupils are equal, round, and reactive to light.   Cardiovascular:      Rate and Rhythm: Normal rate.   Pulmonary:      Effort: Pulmonary effort is normal.   Abdominal:      Palpations: Abdomen is soft.   Musculoskeletal:         General: Normal range of motion.      Cervical back: Normal range of motion.   Skin:     General: Skin is warm.   Neurological:      General: No focal deficit present.      Mental Status: She is alert.   Psychiatric:         Mood and Affect: Mood normal.         Last Recorded Vitals  Blood pressure 124/54, pulse 102, temperature 37.2 °C (99 °F), temperature source Temporal, resp. rate 15, height 1.473 m (4' 10\"), weight (!) 32.1 kg (70 lb 12.3 oz), SpO2 97%.  Intake/Output last 3 Shifts:  I/O last 3 completed shifts:  In: 240 (7.5 mL/kg) [P.O.:240]  Out: 250 (7.8 mL/kg) [Urine:250 (0.2 mL/kg/hr)]  Weight: 32.1 kg     Relevant Results  Transthoracic Echo (TTE) Complete    Result Date: 5/16/2024           [place] [address]            Phone [phone] TRANSTHORACIC ECHOCARDIOGRAM REPORT  Patient Name:      KARLY HORTON     Reading Physician:    [id number] Carlos Eduardo Hair MD Study Date:        5/16/2024             Ordering Provider:    [id number] WARD JACKSON MRN/PID:           [id number]              Fellow: Accession#:        [id number]          Nurse: Date of Birth/Age: 1945 / 79 years  Sonographer:          Toni Davidson         "                                                        [facility] Gender:            F                     Additional Staff: Height:            146.00 cm             Admit Date: Weight:            39.00 kg              Admission Status:     Inpatient -                                                                Routine BSA / BMI:         1.26 m2 / 18.30 kg/m2 Department Location:  [facility] Blood Pressure: 168 /80 mmHg Study Type:    TRANSTHORACIC ECHO (TTE) COMPLETE Diagnosis/ICD: Unspecified systolic (congestive) heart failure (CHF)-I50.20 Indication:    Congestive Heart Failure CPT Codes:     Echo Complete w Full [id number] Patient History: Smoker:            Current. Pertinent History: CHF, COPD, HTN, Hyperlipidemia, Dyspnea, Syncope, LE Edema                    and Murmur. Study Detail: The following Echo studies were performed: 2D, M-Mode, Doppler and               color flow. Unable to obtain suprasternal notch view.  PHYSICIAN INTERPRETATION: Left Ventricle: Left ventricular systolic function is normal, with an estimated ejection fraction of 55-60%. There are no regional wall motion abnormalities. The left ventricular cavity size is normal. There is mild to moderate concentric left ventricular hypertrophy. Spectral Doppler shows an impaired relaxation pattern of left ventricular diastolic filling. Left Atrium: The left atrium is normal in size. Right Ventricle: The right ventricle is moderately enlarged. There is moderately reduced right ventricular systolic function. Right Atrium: The right atrium is mildly dilated. Aortic Valve: The aortic valve is trileaflet. There is no evidence of aortic valve regurgitation. The peak instantaneous gradient of the aortic valve is 8.1 mmHg. The mean gradient of the aortic valve is 3.0 mmHg. Mitral Valve: The mitral valve is normal in structure. There is mild mitral valve regurgitation. Tricuspid Valve: The tricuspid valve is structurally normal. There is moderate  tricuspid regurgitation. The Doppler estimated RVSP is moderate to severely elevated at 64.3 mmHg. Pulmonic Valve: The pulmonic valve is abnormal. The pulmonic valve has annular dilitation. There is mild pulmonic valve regurgitation. Pericardium: There is no pericardial effusion noted. Aorta: The aortic root is normal.  CONCLUSIONS:  1. Left ventricular systolic function is normal with a 55-60% estimated ejection fraction.  2. Spectral Doppler shows an impaired relaxation pattern of left ventricular diastolic filling.  3. Moderately enlarged right ventricle.  4. There is moderately reduced right ventricular systolic function.  5. Mild mitral valve regurgitation.  6. Moderate to severely elevated right ventricular systolic pressure.  7. Moderate tricuspid regurgitation. QUANTITATIVE DATA SUMMARY: 2D MEASUREMENTS:                          Normal Ranges: LAs:           3.50 cm   (2.7-4.0cm) IVSd:          0.84 cm   (0.6-1.1cm) LVPWd:         0.96 cm   (0.6-1.1cm) LVIDd:         3.59 cm   (3.9-5.9cm) LVIDs:         2.41 cm LV Mass Index: 73.2 g/m2 LV % FS        32.9 % LA VOLUME:                               Normal Ranges: LA Vol A4C:        41.1 ml    (22+/-6mL/m2) LA Vol A2C:        49.0 ml LA Vol BP:         46.1 ml LA Vol Index A4C:  32.5ml/m2 LA Vol Index A2C:  38.8 ml/m2 LA Vol Index BP:   36.5 ml/m2 LA Area A4C:       16.0 cm2 LA Area A2C:       17.0 cm2 LA Major Axis A4C: 5.3 cm LA Major Axis A2C: 5.0 cm LA Volume Index:   33.0 ml/m2 LA Vol A4C:        37.0 ml LA Vol A2C:        44.0 ml RA VOLUME BY A/L METHOD:                       Normal Ranges: RA Area A4C: 15.0 cm2 LV SYSTOLIC FUNCTION BY 2D PLANIMETRY (MOD):                     Normal Ranges: EF-A4C View: 57.4 % (>=55%) EF-A2C View: 51.4 % EF-Biplane:  54.9 % LV DIASTOLIC FUNCTION:                        Normal Ranges: MV Peak E:    0.87 m/s (0.7-1.2 m/s) MV Peak A:    1.24 m/s (0.42-0.7 m/s) E/A Ratio:    0.70     (1.0-2.2) MV e'         0.06 m/s (>8.0)  MV lateral e' 0.07 m/s MV medial e'  0.04 m/s E/e' Ratio:   15.82    (<8.0) MITRAL VALVE:                 Normal Ranges: MV DT: 251 msec (150-240msec) AORTIC VALVE:                                   Normal Ranges: AoV Vmax:                1.42 m/s (<=1.7m/s) AoV Peak P.1 mmHg (<20mmHg) AoV Mean PG:             3.0 mmHg (1.7-11.5mmHg) LVOT Max Grayson:            1.15 m/s (<=1.1m/s) AoV VTI:                 20.50 cm (18-25cm) LVOT VTI:                18.90 cm LVOT Diameter:           1.80 cm  (1.8-2.4cm) AoV Area, VTI:           2.35 cm2 (2.5-5.5cm2) AoV Area,Vmax:           2.06 cm2 (2.5-4.5cm2) AoV Dimensionless Index: 0.92  RIGHT VENTRICLE: RV Basal 3.28 cm RV Mid   2.77 cm RV Major 5.0 cm TAPSE:   18.3 mm RV s'    0.11 m/s TRICUSPID VALVE/RVSP:                             Normal Ranges: Peak TR Velocity: 3.51 m/s RV Syst Pressure: 64.3 mmHg (< 30mmHg) IVC Diam:         2.60 cm PULMONIC VALVE:                         Normal Ranges: PV Accel Time: 98 msec  (>120ms) PV Max Grayson:    0.9 m/s  (0.6-0.9m/s) PV Max PG:     3.0 mmHg  [id number] Carlos Eduardo Hair MD Electronically signed on 2024 at 8:21:44 PM  ** Final **     Scheduled medications  lactulose, 20 g, oral, Daily  levoFLOXacin, 500 mg, intravenous, q24h  nicotine, 1 patch, transdermal, Daily  oxygen, , inhalation, Continuous - Inhalation  pantoprazole, 40 mg, oral, Daily  perflutren lipid microspheres, 0.5-10 mL of dilution, intravenous, Once in imaging  perflutren protein A microsphere, 0.5 mL, intravenous, Once in imaging  potassium chloride, 20 mEq, intravenous, q2h  sulfur hexafluoride microsphr, 2 mL, intravenous, Once in imaging      Continuous medications     PRN medications  PRN medications: acetaminophen, lubricating eye drops, ondansetron  Results for orders placed or performed during the hospital encounter of 24 (from the past 24 hour(s))   Basic Metabolic Panel   Result Value Ref Range    Glucose 112 (H) 65 - 99 mg/dL    Sodium 136  133 - 145 mmol/L    Potassium 2.6 (LL) 3.4 - 5.1 mmol/L    Chloride 95 (L) 97 - 107 mmol/L    Bicarbonate 30 24 - 31 mmol/L    Urea Nitrogen 9 8 - 25 mg/dL    Creatinine 0.30 (L) 0.40 - 1.60 mg/dL    eGFR >90 >60 mL/min/1.73m*2    Calcium 8.3 (L) 8.5 - 10.4 mg/dL    Anion Gap 11 <=19 mmol/L   CBC   Result Value Ref Range    WBC 12.7 (H) 4.4 - 11.3 x10*3/uL    nRBC 1.6 (H) 0.0 - 0.0 /100 WBCs    RBC 4.51 4.00 - 5.20 x10*6/uL    Hemoglobin 8.8 (L) 12.0 - 16.0 g/dL    Hematocrit 30.7 (L) 36.0 - 46.0 %    MCV 68 (L) 80 - 100 fL    MCH 19.5 (L) 26.0 - 34.0 pg    MCHC 28.7 (L) 32.0 - 36.0 g/dL    RDW 29.6 (H) 11.5 - 14.5 %    Platelets 319 150 - 450 x10*3/uL   Magnesium   Result Value Ref Range    Magnesium 1.80 1.60 - 3.10 mg/dL   Blood Gas Arterial   Result Value Ref Range    POCT pH, Arterial 7.53 (H) 7.38 - 7.42 pH    POCT pCO2, Arterial 41 38 - 42 mm Hg    POCT pO2, Arterial 51 (L) 85 - 95 mm Hg    POCT SO2, Arterial 89 (L) 94 - 100 %    POCT Oxy Hemoglobin, Arterial 86.1 (L) 94.0 - 98.0 %    POCT Base Excess, Arterial 10.5 (H) -2.0 - 3.0 mmol/L    POCT HCO3 Calculated, Arterial 34.3 (H) 22.0 - 26.0 mmol/L    Patient Temperature 37.0 degrees Celsius    FiO2 21 %    Site of Arterial Puncture Radial Right     Christopher's Test Positive    POCT GLUCOSE   Result Value Ref Range    POCT Glucose 99 74 - 99 mg/dL                     Assessment/Plan   Principal Problem:    Pneumonia of both lower lobes due to infectious organism  Active Problems:    Iron deficiency anemia    Congestive heart failure (Multi)    Anemia, Iron Def, Constipation (Mirco 4.9 +HARRIET)               -Given true HARRIET with hx abnormal colonic mucosa and gastric ulcer in 2017, recommend repeat EGD and colonoscopy tomorrow               -PPI BID               -Clear liquid plus prep               -NPO after midnight               -Further recs to follow EGD/colon on Friday 5/17/24   Did not finish prep, still has some brown stool. Ordered additional  enemas.      Nery Romero, APRN-CNP

## 2024-05-17 NOTE — PROGRESS NOTES
Physical Therapy                 Therapy Communication Note    Patient Name: Karly Horton  MRN: [id number]  Today's Date: 5/17/2024     Discipline: Physical Therapy    Missed Visit Reason: Other (Comment)   (Patient with low potassium this date of 2.6. Pt also off of the floor for EGD at time of attempt.)    Missed Time: Attempt    Comment:

## 2024-05-17 NOTE — PROGRESS NOTES
Pt pulls at her Ivs and oxygen tubing, and puts her feet over the bed rails.  She is getting more restless, confused, and less directable as the day goes on. She is going off the floor to EDG at this time. If she continues those behaviors upon return to the floor she may need a bed side sitter.

## 2024-05-17 NOTE — ANESTHESIA POSTPROCEDURE EVALUATION
Patient: Karly Horton    Procedure Summary       Date: 05/17/24 Room / Location: [facility]    Anesthesia Start: 1507 Anesthesia Stop: 1612    Procedures:       COLONOSCOPY      EGD Diagnosis: Iron deficiency anemia due to chronic blood loss    Scheduled Providers: Eren Ram MD; Chidi Valdes MD Responsible Provider: Chidi Valdes MD    Anesthesia Type: MAC ASA Status: 3            Anesthesia Type: MAC    Vitals Value Taken Time   /72 05/17/24 1616   Temp 36.7 °C (98.1 °F) 05/17/24 1611   Pulse 100 05/17/24 1617   Resp 18 05/17/24 1617   SpO2 100 % 05/17/24 1615   Vitals shown include unfiled device data.    Anesthesia Post Evaluation    Patient location during evaluation: PACU  Patient participation: complete - patient participated  Level of consciousness: awake and confused  Pain management: adequate  Airway patency: patent  Cardiovascular status: acceptable  Respiratory status: acceptable  Hydration status: acceptable  Postoperative Nausea and Vomiting: none        There were no known notable events for this encounter.

## 2024-05-17 NOTE — CONSULTS
"Consults  History Of Present Illness:    Karly Horton is a 79 y.o. female presenting with past medical history significant for COPD and tobacco dependence presenting with the emergency department with generalized weakness, inability to ambulate, shortness of breath, and lower extremity swelling.  She is awake alert oriented x 3, a marginal historian.  She reports a chronically distended abdomen with intermittent spasms her entire life.  She reports \"abdominal issues\" x 1 year with symptoms of abdominal pain, bloating, decreased oral intake over the past month.  She takes Lactulose and Omeprazole twice daily.  She takes NSAIDs daily.  She denies any black tarry or red bloody stools.  She reports a weight loss of 20 pounds since summer 2023.  She reports generalized weakness which she attributes to her poor intake.  Over the past several weeks, she developed symptoms of increased generalized weakness, shortness of breath, lower extremity swelling.  She was unable to stand herself up.  She presented to the emergency department for evaluation.  In the emergency department, initial work-up was done.  1 view chest x-ray per radiology showed mild vascular congestion, small left pleural effusion, airspace opacity at the left lung base, may be secondary to atelectasis or pneumonia.  CT abdomen, pelvis without IV contrast per radiology showed large right and transverse predominant colonic stool volume which appears more pronounced when compared to 7/22/2023 - correlate for constipation, small left greater than right pleural effusions, patchy ground glass/consolidative airspace opacities within the left lower lobe, concerning for pneumonia - limited evaluation.  BMP showed a glucose of 177.  Sodium 139.  Potassium 3.6.  BUN 31.  Creatinine 0.5.  Calcium 8.9.  CBC showed a WBC 8.4.  Hemoglobin 4.8.  Hematocrit 18.5.  MCV 60.  MCH 15.5.  Pro BNP elevated 7,134.  Troponin 35.  She was treated in the emergency department with " IV Levaquin for pneumonia, IV Lasix, and packed red blood cells and was admitted.  At the time of my evaluation, she is resting on the cart in the emergency department room 4 in no acute distress.  Her daughter is present.  She reports improvement in shortness of breath.  She reports leg swelling.  Will consult for evaluation of congestive heart failure and echocardiograms performed showed preserved systolic function with evidence of pulm hypertension with dilated right ventricle and dilated right atrium.  Last Recorded Vitals:  Vitals:    05/16/24 0700 05/16/24 1100 05/16/24 1500 05/16/24 1947   BP: 168/80 133/64 133/81 146/64   BP Location: Left arm Right arm Right arm Right arm   Patient Position: Lying Lying Lying Lying   Pulse: 100 52 52 103   Resp: 18 17 18 17   Temp: 36.8 °C (98.2 °F) 36.6 °C (97.9 °F) 36.6 °C (97.9 °F) 37 °C (98.6 °F)   TempSrc: Temporal Temporal Temporal Temporal   SpO2: (!) 88% 93% 91% 93%   Weight: (!) 38.6 kg (85 lb 1.6 oz)      Height:           Last Labs:  CBC - 5/16/2024:  4:43 AM;  4:43 AM  13.1 8.4; 8.4 275    29.7; 29.7      CMP - 5/16/2024:  4:43 AM  8.4 6.6 23 --- 0.2   _ 3.1 13 94      PTT - No results in last year.  _   _ _     Hemoglobin A1C   Date/Time Value Ref Range Status   12/31/2018 07:01 AM 5.8 4.0 - 6.0 % Final     Comment:     Hemoglobin A1C levels are related to mean blood glucose during the   preceding 2-3 months. The relationship table below may be used as a   general guide. Each 1% increase in HGB A1C is a reflection of an   increase in mean glucose of approximately 30 mg/dl.   Reference: Diabetes Care, volume 29, supplement 1 Jan. 2006                        HGB A1C ................. Approx. Mean Glucose   _______________________________________________   6%   ...............................  120 mg/dl   7%   ...............................  150 mg/dl   8%   ...............................  180 mg/dl   9%   ...............................  210 mg/dl   10%   "...............................  240 mg/dl  Performed at [address] [facility] [address]       LDL Calculated   Date/Time Value Ref Range Status   10/28/2019 10:40 AM 66 65 - 130 MG/DL Final      Last I/O:  I/O last 3 completed shifts:  In: 538 (13.9 mL/kg) [P.O.:240; Blood:298]  Out: - (0 mL/kg)   Weight: 38.6 kg     Past Cardiology Tests (Last 3 Years):  EKG:  ECG 12 lead 05/14/2024 (Preliminary)    Echo:  Transthoracic Echo (TTE) Complete 05/16/2024    Ejection Fractions:  No results found for: \"EF\"  Cath:  No results found for this or any previous visit from the past 1095 days.    Stress Test:  No results found for this or any previous visit from the past 1095 days.    Cardiac Imaging:  No results found for this or any previous visit from the past 1095 days.      Past Medical History:  She has a past medical history of Other cervical disc degeneration, unspecified cervical region, Other conditions influencing health status, Other conditions influencing health status, Other conditions influencing health status, Other conditions influencing health status, Other conditions influencing health status, Other conditions influencing health status, Other conditions influencing health status, Other intervertebral disc degeneration, lumbar region, Other intervertebral disc displacement, lumbar region, Other intervertebral disc displacement, lumbosacral region, Personal history of other diseases of the musculoskeletal system and connective tissue, Personal history of other diseases of the musculoskeletal system and connective tissue, Personal history of other specified conditions, Spinal stenosis, lumbar region without neurogenic claudication, and Sprain of ligaments of lumbar spine, initial encounter.    Past Surgical History:  She has no past surgical history on file.      Social History:  She reports that she has been smoking cigarettes. She started smoking about 64 years ago. She has a 64.4 pack-year " smoking history. She does not have any smokeless tobacco history on file. No history on file for alcohol use and drug use.    Family History:  No family history on file.     Allergies:  Lactose    Inpatient Medications:  Scheduled medications   Medication Dose Route Frequency    lactulose  20 g oral Daily    levoFLOXacin  500 mg intravenous q24h    pantoprazole  40 mg oral Daily    perflutren lipid microspheres  0.5-10 mL of dilution intravenous Once in imaging    perflutren protein A microsphere  0.5 mL intravenous Once in imaging    sulfur hexafluoride microsphr  2 mL intravenous Once in imaging     PRN medications   Medication    lubricating eye drops     Continuous Medications   Medication Dose Last Rate     Outpatient Medications:  Current Outpatient Medications   Medication Instructions    acetaminophen-codeine (Tylenol w/ Codeine #4) 300-60 mg tablet 1 tablet, oral, Daily PRN    baclofen (LIORESAL) 10 mg, oral, 3 times daily    butalbital-acetaminophen-caff -40 mg tablet 1 tablet, oral, Every 6 hours PRN    lactulose 20 g, oral, Daily    lidocaine (Xylocaine) 2 % jelly 1 Application, Topical, Daily, migraines    meprobamate (EQUANIL) 200 mg, oral, Every 6 hours PRN    mupirocin (Bactroban) 2 % ointment 1 Application, Topical, 2 times daily       Physical Exam:  HEENT PERRLA neck is supple lungs she does emphysematous chest with no active wheezing.  Heart S1-S2 present with no significant murmurs abdomen is minimally distended soft nontender extremities shows trace edema in both lower extremities     Assessment/Plan   #1 cor pulmonale secondary to underlying COPD and pulm hypertension  2..  proBNP secondary to right ventricular dilation and pulm hypertension  3.  Anemia for which patient was scheduled to have EGD and colonoscopy  4.  Severe constipation-been receiving lactulose and GoLytely and has not had a good bowel movements in the last 12 hours.  5.  COPD from chronic active smoking.  Peripheral IV  05/14/24 20 G Right Antecubital (Active)   Site Assessment Clean;Dry;Intact 05/16/24 2100   Dressing Status Clean;Dry;Occlusive 05/16/24 2100   Number of days: 2       Peripheral IV 05/15/24 22 G Right Hand (Active)   Site Assessment Clean;Dry;Intact 05/16/24 2100   Dressing Status Clean;Dry;Occlusive 05/16/24 2100   Number of days: 1       Code Status:  Full Code    I spent 25 minutes in the professional and overall care of this patient.        Carlos Eduardo Hair MD

## 2024-05-17 NOTE — PROGRESS NOTES
Pt is restless and difficult to re-direct. She was supposed to get bowel prep yesterday/last night but just under half was drank. I don't know why but may have been due to her lack of orientation, but her stools are still loose, liquid, and brown. Message sent to GI NP to let them know and to see if they want to pursue the EGD potion today or what they want done.          She is anxious, poor attention span, delirious, and hyper focused on wanting a cigarette. I have repeatedly explained that can't happen and offered to ask for an order for a nicotine patch.

## 2024-05-17 NOTE — PROGRESS NOTES
Occupational Therapy                 Therapy Communication Note    Patient Name: Karly Horton  MRN: [id number]  Today's Date: 5/17/2024     Discipline: Occupational Therapy    Missed Visit Reason: Missed Visit Reason: Other (Comment) (pt's potassium is currently 2.6 which is outside of safe parameters for therapy.  OT to hold off until labs come back normal.)    Missed Time: Attempt    Comment:

## 2024-05-18 LAB
ANION GAP SERPL CALC-SCNC: 11 MMOL/L
BUN SERPL-MCNC: 12 MG/DL (ref 8–25)
CALCIUM SERPL-MCNC: 8.6 MG/DL (ref 8.5–10.4)
CHLORIDE SERPL-SCNC: 97 MMOL/L (ref 97–107)
CO2 SERPL-SCNC: 31 MMOL/L (ref 24–31)
CREAT SERPL-MCNC: 0.4 MG/DL (ref 0.4–1.6)
EGFRCR SERPLBLD CKD-EPI 2021: >90 ML/MIN/1.73M*2
ERYTHROCYTE [DISTWIDTH] IN BLOOD BY AUTOMATED COUNT: 30 % (ref 11.5–14.5)
GLUCOSE BLD MANUAL STRIP-MCNC: 108 MG/DL (ref 74–99)
GLUCOSE BLD MANUAL STRIP-MCNC: 108 MG/DL (ref 74–99)
GLUCOSE BLD MANUAL STRIP-MCNC: 115 MG/DL (ref 74–99)
GLUCOSE BLD MANUAL STRIP-MCNC: 137 MG/DL (ref 74–99)
GLUCOSE SERPL-MCNC: 90 MG/DL (ref 65–99)
HCT VFR BLD AUTO: 30.5 % (ref 36–46)
HGB BLD-MCNC: 8.8 G/DL (ref 12–16)
MCH RBC QN AUTO: 19 PG (ref 26–34)
MCHC RBC AUTO-ENTMCNC: 28.9 G/DL (ref 32–36)
MCV RBC AUTO: 66 FL (ref 80–100)
NRBC BLD-RTO: 0.9 /100 WBCS (ref 0–0)
PLATELET # BLD AUTO: 278 X10*3/UL (ref 150–450)
POTASSIUM SERPL-SCNC: 3.4 MMOL/L (ref 3.4–5.1)
RBC # BLD AUTO: 4.62 X10*6/UL (ref 4–5.2)
SODIUM SERPL-SCNC: 139 MMOL/L (ref 133–145)
WBC # BLD AUTO: 11.5 X10*3/UL (ref 4.4–11.3)

## 2024-05-18 PROCEDURE — 82947 ASSAY GLUCOSE BLOOD QUANT: CPT

## 2024-05-18 PROCEDURE — 2500000004 HC RX 250 GENERAL PHARMACY W/ HCPCS (ALT 636 FOR OP/ED): Performed by: INTERNAL MEDICINE

## 2024-05-18 PROCEDURE — 2500000004 HC RX 250 GENERAL PHARMACY W/ HCPCS (ALT 636 FOR OP/ED)

## 2024-05-18 PROCEDURE — 2500000005 HC RX 250 GENERAL PHARMACY W/O HCPCS: Performed by: NURSE PRACTITIONER

## 2024-05-18 PROCEDURE — C9113 INJ PANTOPRAZOLE SODIUM, VIA: HCPCS | Performed by: INTERNAL MEDICINE

## 2024-05-18 PROCEDURE — 97166 OT EVAL MOD COMPLEX 45 MIN: CPT | Mod: GO

## 2024-05-18 PROCEDURE — 2500000004 HC RX 250 GENERAL PHARMACY W/ HCPCS (ALT 636 FOR OP/ED): Performed by: NURSE PRACTITIONER

## 2024-05-18 PROCEDURE — 2500000001 HC RX 250 WO HCPCS SELF ADMINISTERED DRUGS (ALT 637 FOR MEDICARE OP): Performed by: NURSE PRACTITIONER

## 2024-05-18 PROCEDURE — 36415 COLL VENOUS BLD VENIPUNCTURE: CPT | Performed by: NURSE PRACTITIONER

## 2024-05-18 PROCEDURE — 2060000001 HC INTERMEDIATE ICU ROOM DAILY

## 2024-05-18 PROCEDURE — 80048 BASIC METABOLIC PNL TOTAL CA: CPT | Performed by: NURSE PRACTITIONER

## 2024-05-18 PROCEDURE — 85027 COMPLETE CBC AUTOMATED: CPT | Performed by: NURSE PRACTITIONER

## 2024-05-18 PROCEDURE — 2500000006 HC RX 250 W HCPCS SELF ADMINISTERED DRUGS (ALT 637 FOR ALL PAYERS): Performed by: INTERNAL MEDICINE

## 2024-05-18 RX ORDER — PANTOPRAZOLE SODIUM 40 MG/10ML
40 INJECTION, POWDER, LYOPHILIZED, FOR SOLUTION INTRAVENOUS DAILY
Status: DISCONTINUED | OUTPATIENT
Start: 2024-05-19 | End: 2024-05-22 | Stop reason: HOSPADM

## 2024-05-18 RX ORDER — BACLOFEN 10 MG/1
10 TABLET ORAL 3 TIMES DAILY PRN
Status: DISCONTINUED | OUTPATIENT
Start: 2024-05-18 | End: 2024-05-22 | Stop reason: HOSPADM

## 2024-05-18 RX ADMIN — SUCRALFATE 1 G: 1 SUSPENSION ORAL at 12:21

## 2024-05-18 RX ADMIN — SUCRALFATE 1 G: 1 SUSPENSION ORAL at 17:47

## 2024-05-18 RX ADMIN — LACTULOSE 20 G: 10 SOLUTION ORAL at 08:57

## 2024-05-18 RX ADMIN — Medication 2 L/MIN: at 20:00

## 2024-05-18 RX ADMIN — Medication 2 L/MIN: at 08:00

## 2024-05-18 RX ADMIN — IRON SUCROSE 200 MG: 20 INJECTION, SOLUTION INTRAVENOUS at 17:19

## 2024-05-18 RX ADMIN — SUCRALFATE 1 G: 1 SUSPENSION ORAL at 06:10

## 2024-05-18 RX ADMIN — LEVOFLOXACIN 500 MG: 500 INJECTION, SOLUTION INTRAVENOUS at 08:57

## 2024-05-18 RX ADMIN — PANTOPRAZOLE SODIUM 40 MG: 40 INJECTION, POWDER, FOR SOLUTION INTRAVENOUS at 08:41

## 2024-05-18 ASSESSMENT — COGNITIVE AND FUNCTIONAL STATUS - GENERAL
DAILY ACTIVITIY SCORE: 11
PERSONAL GROOMING: A LOT
DRESSING REGULAR LOWER BODY CLOTHING: TOTAL
TOILETING: TOTAL
EATING MEALS: A LITTLE
HELP NEEDED FOR BATHING: A LOT
DRESSING REGULAR UPPER BODY CLOTHING: A LOT

## 2024-05-18 ASSESSMENT — ACTIVITIES OF DAILY LIVING (ADL)
ADL_ASSISTANCE: INDEPENDENT
BATHING_ASSISTANCE: MAXIMAL

## 2024-05-18 ASSESSMENT — PAIN - FUNCTIONAL ASSESSMENT: PAIN_FUNCTIONAL_ASSESSMENT: 0-10

## 2024-05-18 ASSESSMENT — PAIN SCALES - GENERAL: PAINLEVEL_OUTOF10: 0 - NO PAIN

## 2024-05-18 NOTE — PROGRESS NOTES
Occupational Therapy    Evaluation    Patient Name: Karly Horton  MRN: [id number]  Today's Date: 5/18/2024  Time Calculation  Start Time: 0825  Stop Time: 0840  Time Calculation (min): 15 min        Assessment:  OT Assessment: pt presents with generalized weakness, reduced trunk control and decreased balance/standing tolerance which impedes ADL performance. pt would benefit from skilled OT services to address these deficits and to facilitate highest level of independence.  Prognosis: Fair  Barriers to Discharge: Decreased caregiver support  Evaluation/Treatment Tolerance: Patient limited by fatigue  Medical Staff Made Aware: Yes  End of Session Communication: Bedside nurse  End of Session Patient Position: Up in chair, Alarm off, caregiver present  OT Assessment Results: Decreased ADL status, Decreased upper extremity range of motion, Decreased upper extremity strength, Decreased endurance, Decreased cognition, Decreased functional mobility, Decreased trunk control for functional activities  Prognosis: Fair  Barriers to Discharge: Decreased caregiver support  Evaluation/Treatment Tolerance: Patient limited by fatigue  Medical Staff Made Aware: Yes  Strengths: Ability to acquire knowledge, Attitude of self  Barriers to Participation: Comorbidities  Plan:  Treatment Interventions: ADL retraining, Functional transfer training, UE strengthening/ROM, Equipment evaluation/education, Compensatory technique education  OT Frequency: 3 times per week  OT Discharge Recommendations: Moderate intensity level of continued care  Equipment Recommended upon Discharge: Wheeled walker  OT Recommended Transfer Status: Assist of 1, Maximum assist  OT - OK to Discharge: Yes  Treatment Interventions: ADL retraining, Functional transfer training, UE strengthening/ROM, Equipment evaluation/education, Compensatory technique education      General:  General  Reason for Referral: Pt is a 79 year-old F admitted from home with PNA and iron  deficiency anemia.  Past Medical History Relevant to Rehab:  Other cervical disc degeneration, unspecified cervical region      Degeneration of cervical intervertebral disc   Other conditions influencing health status      Bulging Disc (L3 - L4)   Other conditions influencing health status      A Fall   Other conditions influencing health status      Bulging Disc (C4 - C5)   Other conditions influencing health status      Bulging Disc (C6 - C7)   Other conditions influencing health status      Bulging Disc (C5 - C6)   Other conditions influencing health status      Lumbar Spondylolisthesis   Other conditions influencing health status      Rotator Cuff Tendon Tear   Other intervertebral disc degeneration, lumbar region      L4-L5 disc bulge   Other intervertebral disc displacement, lumbar region      Disc displacement, lumbar   Other intervertebral disc displacement, lumbosacral region      Displacement of lumbosacral intervertebral disc   Personal history of other diseases of the musculoskeletal system and connective tissue      History of bursitis   Personal history of other diseases of the musculoskeletal system and connective tissue      History of fibromyositis   Personal history of other specified conditions      History of headache   Spinal stenosis, lumbar region without neurogenic claudication      Lumbar canal stenosis   Sprain of ligaments of lumbar spine, initial encounter      Low back sprain  Missed Visit: No  Missed Visit Reason: Other (Comment) (pt's potassium is currently 2.6 which is outside of safe parameters for therapy.  OT to hold off until labs come back normal.)  Family/Caregiver Present: No  Prior to Session Communication: Bedside nurse  Patient Position Received: Bed, 3 rail up, Alarm off, not on at start of session  Preferred Learning Style: auditory, kinesthetic  General Comment: pt agreeable with therapy however self-directing  Precautions:  Medical Precautions: Fall precautions, Oxygen  therapy device and L/min  Precautions Comment: 2L  Vital Signs:  Heart Rate: 103       Objective   Cognition:  Overall Cognitive Status: Impaired  Orientation Level: Disoriented to place  Processing Speed: Delayed           Home Living:  Type of Home: Apartment  Lives With: Adult children (Daughter)  Home Adaptive Equipment: Cane  Home Layout: One level  Home Access: Level entry  Bathroom Shower/Tub: Tub/shower unit, Walk-in shower  Bathroom Toilet: Standard  Bathroom Equipment: Grab bars in shower, Shower chair with back  Prior Function:  Level of [place]: Independent with ADLs and functional transfers, Needs assistance with homemaking  Receives Help From: Family  ADL Assistance: Independent  Homemaking Assistance: Needs assistance  Ambulatory Assistance: Independent  Vocational: Retired  Hand Dominance: Right     ADL:  Eating Deficit: Setup  Grooming Assistance:  (assist for opening containers and for placing items within reach due to decreased shoulder ROM)  Bathing Assistance: Maximal  Bathing Deficit:  (anticipated)  UE Dressing Assistance: Maximal  UE Dressing Deficit:  (MAX A for adjusting/donning hospital gown due to limited shoulder ROM)  LE Dressing Assistance: Total  LE Dressing Deficit:  (MAX A for donning buffy socks due to decreased sititng balance)  Toileting Assistance with Device: Total  Toileting Deficit:  (purewick)  Activity Tolerance:  Endurance: Decreased tolerance for upright activites  Bed Mobility/Transfers: Bed Mobility 1  Bed Mobility 1: Supine to sitting  Level of Assistance 1: Moderate verbal cues  Bed Mobility Comments 1: MOD A for managing RLE and for lifting trunk, cues for positioning and technique throughout    Transfers  Transfer: Yes  Transfer 1  Transfer From 1: Bed to  Transfer to 1: Chair with arms  Technique 1: Stand pivot  Transfer Device 1: Gait belt  Transfer Level of Assistance 1: Maximum assistance  Trials/Comments 1: MAX A for lifting/lowering and guiding trunk, cues  for positioning and sequencing throughout  Transfers 2  Technique 2: Sit to stand, Stand to sit  Transfer Device 2: Gait belt  Transfer Level of Assistance 2: Moderate assistance  Trials/Comments 2: from edge of bed, cues for positioning and hand placement; pt requiring MOD A to lift into standing position and for control once standing    Sitting Balance:  Static Sitting Balance  Static Sitting-Balance Support: Feet supported, Bilateral upper extremity supported  Static Sitting-Level of Assistance: Minimum assistance  Static Sitting-Comment/Number of Minutes: MIN A for sitting EOB, pt provided with cues for postural corrections and hand placement  Standing Balance:  Static Standing Balance  Static Standing-Balance Support: Bilateral upper extremity supported  Static Standing-Level of Assistance: Moderate assistance  Static Standing-Comment/Number of Minutes: <1 minute      Vision:Vision - Basic Assessment  Current Vision: Wears glasses all the time  Sensation:  Light Touch: No apparent deficits  Strength:  Strength Comments: BUE Grossly 3-/5  Perception:  Inattention/Neglect: Appears intact  Coordination:  Movements are Fluid and Coordinated: No  Upper Body Coordination: delayed initiation/sequencing   Hand Function:  Gross Grasp: Functional  Coordination: Impaired (due to arthritic deformities/weakness)  Extremities: RUE   RUE :  (Sh Flexion limited to <45 degress, WFL distally) and LUE   LUE:  (Sh Flexion limited to <45 degress, WFL distally)      Outcome Measures:[facility] Daily Activity  Putting on and taking off regular lower body clothing: Total  Bathing (including washing, rinsing, drying): A lot  Putting on and taking off regular upper body clothing: A lot  Toileting, which includes using toilet, bedpan or urinal: Total  Taking care of personal grooming such as brushing teeth: A lot  Eating Meals: A little  Daily Activity - Total Score: 11        Education Documentation  Body Mechanics, taught by Dread Gillette,  OT at 5/18/2024  9:09 AM.  Learner: Patient  Readiness: Acceptance  Method: Explanation, Demonstration  Response: Needs Reinforcement    ADL Training, taught by Dread Gillette OT at 5/18/2024  9:09 AM.  Learner: Patient  Readiness: Acceptance  Method: Explanation, Demonstration  Response: Needs Reinforcement    Education Comments  No comments found.        OP EDUCATION:       Goals:  Encounter Problems       Encounter Problems (Active)       ADLs       Patient with complete upper body dressing with minimal assist  level of assistance donning and doffing all UE clothes with PRN adaptive equipment while edge of bed  (Progressing)       Start:  05/18/24    Expected End:  06/01/24            Patient with complete lower body dressing with minimal assist  level of assistance donning and doffing all LE clothes  with PRN adaptive equipment while edge of bed  (Progressing)       Start:  05/18/24    Expected End:  06/01/24            Patient will complete daily grooming tasks with minimal assist  level of assistance and PRN adaptive equipment while edge of bed . (Progressing)       Start:  05/18/24    Expected End:  06/01/24               TRANSFERS       Patient will perform bed mobility stand by assist level of assistance and bed rails in order to improve safety and independence with mobility (Progressing)       Start:  05/18/24    Expected End:  06/01/24            Patient will complete functional transfer to toilet/commode with least restrictive device with minimal assist  level of assistance. (Progressing)       Start:  05/18/24    Expected End:  06/01/24

## 2024-05-18 NOTE — NURSING NOTE
Assumed care of patient.   Patient in bed awake.  Bedside shift report received.   Call light in reach.

## 2024-05-18 NOTE — PROGRESS NOTES
Pulmonary Progress Note    Karly Horton is a 79 y.o. female on day 2 of admission presenting with Pneumonia of both lower lobes due to infectious organism.    Subjective   Awake, s/p BM  Waiting for colonoscopy  Objective   Vital Signs      5/17/2024     4:11 PM 5/17/2024     4:15 PM 5/17/2024     4:20 PM 5/17/2024     4:25 PM 5/17/2024     4:30 PM 5/17/2024     4:35 PM 5/17/2024     7:26 PM   Vitals   Systolic 124 108 106 124 131 128 138   Diastolic 89 72 94 60 108 84 51   Heart Rate 105 110 104 97 104 99 91   Temp 36.7 °C (98.1 °F)     36.4 °C (97.5 °F) 36.8 °C (98.2 °F)   Resp 22 20 20 19 23 19 16       Oxygen Therapy  SpO2: 99 %  Medical Gas Therapy: Supplemental oxygen  O2 Delivery Method: Nasal cannula         Intake/Output previous 24 hours:    Intake/Output Summary (Last 24 hours) at 5/17/2024 2023  Last data filed at 5/17/2024 1605  Gross per 24 hour   Intake 250 ml   Output 250 ml   Net 0 ml       Physical Exam  ...  Lines and Tubes:  Peripheral IV 05/17/24 20 G Left;Ventral Forearm (Active)   Placement Date/Time: 05/17/24 1214   Size (Gauge): 20 G  Orientation: Left;Ventral  Location: Forearm  Placed by: DP   Number of days: 0       Peripheral IV 05/17/24 20 G Right;Posterior Hand (Active)   Placement Date/Time: 05/17/24 1516   Hand Hygiene Completed: Yes  Size (Gauge): 20 G  Orientation: Right;Posterior  Location: Hand  Site Prep: Alcohol  Local Anesthetic: None  Insertion attempts: 1  Patient Tolerance: Tolerated well   Number of days: 0         Scheduled medications  lactulose, 20 g, oral, Daily  levoFLOXacin, 500 mg, intravenous, q24h  lidocaine, 0.1 mL, subcutaneous, Once  nicotine, 1 patch, transdermal, Daily  oxygen, , inhalation, Continuous - Inhalation  pantoprazole, 40 mg, intravenous, BID  perflutren lipid microspheres, 0.5-10 mL of dilution, intravenous, Once in imaging  perflutren protein A microsphere, 0.5 mL, intravenous, Once in imaging  sucralfate, 1 g, oral, q6h [facility]  sulfur  hexafluoride microsphr, 2 mL, intravenous, Once in imaging      Continuous medications  lactated Ringer's, 100 mL/hr, Last Rate: 100 mL/hr (05/17/24 1630)      PRN medications  PRN medications: acetaminophen, albuterol, hydrALAZINE, lubricating eye drops, ondansetron, ondansetron, oxygen    Relevant Results  Results from last 7 days   Lab Units 05/17/24  0459 05/16/24  0443 05/15/24  1805   WBC AUTO x10*3/uL 12.7* 13.1* 12.6*   HEMOGLOBIN g/dL 8.8* 8.4*  8.4* 7.9*   HEMATOCRIT % 30.7* 29.7*  29.7* 27.3*   PLATELETS AUTO x10*3/uL 319 275 240      Results from last 7 days   Lab Units 05/17/24  1809 05/17/24 0459 05/16/24  0443   SODIUM mmol/L 137 136 136   POTASSIUM mmol/L 2.8* 2.6* 3.9   CHLORIDE mmol/L 92* 95* 98   CO2 mmol/L 34* 30 28   BUN mg/dL 15 9 15   CREATININE mg/dL 0.40 0.30* 0.40   GLUCOSE mg/dL 93 112* 109*   CALCIUM mg/dL 8.4* 8.3* 8.4*      Results from last 7 days   Lab Units 05/17/24  1121   POCT PH, ARTERIAL pH 7.53*   POCT PCO2, ARTERIAL mm Hg 41   POCT PO2, ARTERIAL mm Hg 51*   POCT HCO3 CALCULATED, ARTERIAL mmol/L 34.3*   POCT BASE EXCESS, ARTERIAL mmol/L 10.5*     XR chest 1 view 05/14/2024    Narrative  Interpreted By:  Reanna Briceño,  STUDY:  XR CHEST 1 VIEW;  5/14/2024 9:56 pm    INDICATION:  Signs/Symptoms:Shortness of breath    COMPARISON:  Chest x-ray 07/16/2017    ACCESSION NUMBER(S):  [id number]    ORDERING CLINICIAN:  XAVIER HOLBROOK    TECHNIQUE:  Upright frontal view of the chest was obtained .    FINDINGS:  Monitoring wires are overlying the patient.    The patient is rotated.    The cardiomediastinal silhouette is within normal limits. There is  mild vascular congestion.    There is a small left pleural effusion. There is airspace opacity at  left lung base. No pneumothorax.    There is thoracolumbar scoliosis. There is a remote right rib  fracture. There is superior location of the right humeral head,  suggestive of chronic rotator cuff tear.    Impression  1.  Mild vascular  congestion. Small left pleural effusion. Airspace  opacity at the left lung base, may be secondary to atelectasis or  pneumonia.        MACRO:  None.    Signed by: Reanna Briceño 5/14/2024 10:40 PM  Dictation workstation:   [id number]      Patient Active Problem List   Diagnosis    Pneumonia of both lower lobes due to infectious organism    Iron deficiency anemia    Congestive heart failure (Multi)     Assessment/Plan   Principal Problem:    Pneumonia of both lower lobes due to infectious organism  Active Problems:    Iron deficiency anemia    Congestive heart failure (Multi)  Pneumonia  COPD  Tobacco dependence  Leukocytosis  Constipation  Microcytic anemia  Iron deficiency anemia  Weight loss     Currently stable respiratory standpoint.  Continue to monitor hemoglobin closely.  Status post prbcs  Lasix as needed for volume overload.  Wean oxygen as tolerated  Steroids - hold for now in light of GI bleed  Antibiotics    Elier Pulido MD  [facility]

## 2024-05-18 NOTE — PROGRESS NOTES
"Karly Horton is a 79 y.o. female on day 3 of admission presenting with Pneumonia of both lower lobes due to infectious organism.    Subjective   No overnight events reported. Denies any abdominal pain, nausea, vomiting.        Objective     Physical Exam  HENT:      Head: Normocephalic.      Nose: Nose normal.      Mouth/Throat:      Mouth: Mucous membranes are moist.   Eyes:      Pupils: Pupils are equal, round, and reactive to light.   Cardiovascular:      Rate and Rhythm: Normal rate.   Pulmonary:      Effort: Pulmonary effort is normal.   Abdominal:      Palpations: Abdomen is soft.   Genitourinary:     General: Normal vulva.   Musculoskeletal:         General: Normal range of motion.      Cervical back: Normal range of motion.   Skin:     General: Skin is warm.   Neurological:      General: No focal deficit present.      Mental Status: She is alert.   Psychiatric:         Mood and Affect: Mood normal.         Last Recorded Vitals  Blood pressure (!) 137/97, pulse 103, temperature 37.1 °C (98.8 °F), temperature source Temporal, resp. rate 15, height 1.473 m (4' 10\"), weight (!) 35.2 kg (77 lb 9.6 oz), SpO2 92%.  Intake/Output last 3 Shifts:  I/O last 3 completed shifts:  In: 250 (7.1 mL/kg) [I.V.:250 (7.1 mL/kg)]  Out: 900 (25.6 mL/kg) [Urine:900 (0.7 mL/kg/hr)]  Weight: 35.2 kg     Relevant Results  Colonoscopy Diagnostic    Result Date: 5/17/2024  Table formatting from the original result was not included. Impression Scattered diverticulosis of mild severity containing blood clots and causing mild luminal narrowing in the descending colon, sigmoid colon and rectosigmoid Findings Multiple medium, scattered diverticula of mild severity with no inflammation containing blood clots and causing mild luminal narrowing (traversable) in the descending colon, sigmoid colon and rectosigmoid; no bleeding was identified. Moderate amount of stool noted in left side of colon and distal transverse.  Patient had " significant redundant colon .  Not able to advance scope beyond the distal transverse colon despite multiple attempts including lavage and abdominal pressure.  Significant rectal prolapse noted in rectal exam.  Recommendation  Monitor labs. No need to repeat colonoscopy at this time due to significant comorbidities and EGD finding which can explain her iron deficiency anemia.  Indication Iron deficiency anemia due to chronic blood loss Staff Staff Role No Staff Documented Medications See Anesthesia Record. Preprocedure A history and physical has been performed, and patient medication allergies have been reviewed. The patient's tolerance of previous anesthesia has been reviewed. The risks and benefits of the procedure and the sedation options and risks were discussed with  patient. All questions were answered and informed consent obtained. Details of the Procedure The patient underwent monitored anesthesia care, which was administered by an anesthesia professional. The patient's oxygen, level of consciousness, heart rate, ETCO2, ECG, respirations and blood pressure were monitored throughout the procedure. A digital rectal exam was performed. A perianal exam was performed. The scope was introduced through the anus and advanced to the transverse colon. Retroflexion was not performed due to narrow vault. The quality of bowel preparation was evaluated using the [place] Bowel Preparation Scale with scores of: right colon = 2, transverse colon = 2, left colon = 0. The total BBPS score was 4. Bowel prep was not adequate. The patient experienced no blood loss. The procedure was moderately difficult due to adhesions, angulation, loops in the digestive tract and poor preparation. In response to procedure difficulty, counter pressure was applied, the observation site was lavaged, the instrument was changed to a pediatric endoscope and loop reduction was employed. The patient tolerated the procedure well. There were no apparent  adverse events. Events Procedure Events Event Event Time ENDO SCOPE IN TIME 5/17/2024  3:28 PM ENDO SCOPE OUT TIME 5/17/2024  3:39 PM ENDO SCOPE IN TIME 5/17/2024  3:48 PM ENDO CECUM REACHED 5/17/2024  3:58 PM ENDO SCOPE OUT TIME 5/17/2024  3:59 PM Specimens ID Type Source Tests Collected by Time 1 : r/o H Pylori Tissue STOMACH ANTRUM BIOPSY SURGICAL PATHOLOGY EXAM Nathan Avila RN 5/17/2024 1534 Procedure Location [place] [address] [facility] [id number] [phone] Referring Provider No referring provider defined for this encounter. Procedure Provider No name on file     Esophagogastroduodenoscopy (EGD)    Result Date: 5/17/2024  Table formatting from the original result was not included. 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 Findings Small sliding hiatal hernia (type I hiatal hernia) without Bhupendra lesions present Non-obstructing Schatzki ring in the GE junction Healthy previous Partial gastric resection in the stomach; no bleeding was identified Mild erythematous mucosa in the antrum and prepyloric region; performed cold forceps biopsy; Single 15 mm large, cratered, irregular, benign-appearing ulcer in the duodenal bulb with oozing hemorrhage (Dean IB); induced coagulation with with argon plasma coagulation Benign-appearing, inflamed and peptic intrinsic stricture (traversable) in the duodenal bulb The 1st part of the duodenum and 2nd part of the duodenum appeared normal. Recommendation  Await pathology results  Avoid  NSAID and smoking. Clear liquid diet today.  Advance tomorrow if H&H stable Change Protonix to 40 g IV twice daily. Carafate suspension 1 g 4 times daily.  Indication Iron deficiency anemia due to chronic blood loss, History of peptic ulcer Staff Staff Role No Staff Documented Medications See Anesthesia Record. Preprocedure A history and physical has been performed, and patient medication allergies have been reviewed. The patient's tolerance of previous anesthesia has been reviewed. The risks and benefits of the procedure and the sedation options and risks were discussed with patient. All questions were answered and informed consent obtained. Details of the Procedure The patient underwent monitored anesthesia care, which was administered by an anesthesia professional. The patient's level of consciousness, heart rate, ETCO2, ECG, respirations, oxygen and blood pressure were monitored throughout the procedure. The scope was introduced through the mouth and advanced to the second part of the duodenum. Retroflexion was performed in the cardia, fundus and incisura. Prior to the procedure, the patient's H. Pylori status was negative. The patient's estimated blood loss was moderate (5+ mL). The procedure was not difficult. The patient tolerated the procedure well. There were no apparent adverse events. Events No data recorded Specimens Procedure Location [address] [address] [phone] Referring Provider No referring provider defined for this encounter. Procedure Provider No name on file    Scheduled medications  lactulose, 20 g, oral, Daily  levoFLOXacin, 500 mg, intravenous, q24h  lidocaine, 0.1 mL, subcutaneous, Once  nicotine, 1 patch, transdermal, Daily  oxygen, , inhalation, Continuous - Inhalation  pantoprazole, 40 mg, intravenous, BID  perflutren lipid microspheres, 0.5-10 mL of dilution, intravenous, Once in imaging  perflutren protein A  microsphere, 0.5 mL, intravenous, Once in imaging  sucralfate, 1 g, oral, q6h [place]  sulfur hexafluoride microsphr, 2 mL, intravenous, Once in imaging      Continuous medications  lactated Ringer's, 100 mL/hr, Last Rate: 100 mL/hr (05/17/24 1630)      PRN medications  PRN medications: acetaminophen, albuterol, baclofen, hydrALAZINE, lubricating eye drops, ondansetron, ondansetron, oxygen  Results for orders placed or performed during the hospital encounter of 05/14/24 (from the past 24 hour(s))   Basic Metabolic Panel   Result Value Ref Range    Glucose 93 65 - 99 mg/dL    Sodium 137 133 - 145 mmol/L    Potassium 2.8 (LL) 3.4 - 5.1 mmol/L    Chloride 92 (L) 97 - 107 mmol/L    Bicarbonate 34 (H) 24 - 31 mmol/L    Urea Nitrogen 15 8 - 25 mg/dL    Creatinine 0.40 0.40 - 1.60 mg/dL    eGFR >90 >60 mL/min/1.73m*2    Calcium 8.4 (L) 8.5 - 10.4 mg/dL    Anion Gap 11 <=19 mmol/L   POCT GLUCOSE   Result Value Ref Range    POCT Glucose 220 (H) 74 - 99 mg/dL   Basic Metabolic Panel   Result Value Ref Range    Glucose 90 65 - 99 mg/dL    Sodium 139 133 - 145 mmol/L    Potassium 3.4 3.4 - 5.1 mmol/L    Chloride 97 97 - 107 mmol/L    Bicarbonate 31 24 - 31 mmol/L    Urea Nitrogen 12 8 - 25 mg/dL    Creatinine 0.40 0.40 - 1.60 mg/dL    eGFR >90 >60 mL/min/1.73m*2    Calcium 8.6 8.5 - 10.4 mg/dL    Anion Gap 11 <=19 mmol/L   CBC   Result Value Ref Range    WBC 11.5 (H) 4.4 - 11.3 x10*3/uL    nRBC 0.9 (H) 0.0 - 0.0 /100 WBCs    RBC 4.62 4.00 - 5.20 x10*6/uL    Hemoglobin 8.8 (L) 12.0 - 16.0 g/dL    Hematocrit 30.5 (L) 36.0 - 46.0 %    MCV 66 (L) 80 - 100 fL    MCH 19.0 (L) 26.0 - 34.0 pg    MCHC 28.9 (L) 32.0 - 36.0 g/dL    RDW 30.0 (H) 11.5 - 14.5 %    Platelets 278 150 - 450 x10*3/uL   POCT GLUCOSE   Result Value Ref Range    POCT Glucose 108 (H) 74 - 99 mg/dL   POCT GLUCOSE   Result Value Ref Range    POCT Glucose 108 (H) 74 - 99 mg/dL             Assessment/Plan   Principal Problem:    Pneumonia of both lower lobes due to  infectious organism  Active Problems:    Iron deficiency anemia    Congestive heart failure (Multi)    Anemia, Iron Def, Constipation   S/P EGD yesterday showed non-obstructing Schatzki ring in the GE junction. Single 15 mm large, cratered, irregular, benign-appearing ulcer in the duodenal bulb with oozing hemorrhage, induced coagulation with with argon plasma coagulation.  S/P Colon showed Scattered diverticulosis of mild severity containing blood clots and causing mild luminal narrowing in the descending colon, sigmoid colon and rectosigmoid. No need to repeat colonoscopy at this time due to significant comorbidities and EGD finding which can explain her iron deficiency anemia.  Stable hgb 8.8   Avoid NSAID and smoking.  Advance diet   Change Protonix to 40 g IV twice daily.  Carafate suspension 1 g 4 times daily.  No barriers to DC from GI standpoint in tolerates diet and Hgb remains stable      Nery Romero, APRN-CNP

## 2024-05-19 PROCEDURE — 2500000002 HC RX 250 W HCPCS SELF ADMINISTERED DRUGS (ALT 637 FOR MEDICARE OP, ALT 636 FOR OP/ED): Performed by: NURSE PRACTITIONER

## 2024-05-19 PROCEDURE — 2500000006 HC RX 250 W HCPCS SELF ADMINISTERED DRUGS (ALT 637 FOR ALL PAYERS): Performed by: INTERNAL MEDICINE

## 2024-05-19 PROCEDURE — S4991 NICOTINE PATCH NONLEGEND: HCPCS | Performed by: NURSE PRACTITIONER

## 2024-05-19 PROCEDURE — 2500000005 HC RX 250 GENERAL PHARMACY W/O HCPCS: Performed by: NURSE PRACTITIONER

## 2024-05-19 PROCEDURE — 2060000001 HC INTERMEDIATE ICU ROOM DAILY

## 2024-05-19 PROCEDURE — 2500000001 HC RX 250 WO HCPCS SELF ADMINISTERED DRUGS (ALT 637 FOR MEDICARE OP): Performed by: NURSE PRACTITIONER

## 2024-05-19 RX ADMIN — SUCRALFATE 1 G: 1 SUSPENSION ORAL at 13:58

## 2024-05-19 RX ADMIN — ACETAMINOPHEN 650 MG: 325 TABLET ORAL at 20:23

## 2024-05-19 RX ADMIN — LACTULOSE 20 G: 10 SOLUTION ORAL at 14:18

## 2024-05-19 RX ADMIN — Medication 21 PERCENT: at 08:00

## 2024-05-19 RX ADMIN — BACLOFEN 10 MG: 10 TABLET ORAL at 20:23

## 2024-05-19 RX ADMIN — NICOTINE 1 PATCH: 21 PATCH, EXTENDED RELEASE TRANSDERMAL at 13:57

## 2024-05-19 RX ADMIN — Medication 21 PERCENT: at 20:00

## 2024-05-19 RX ADMIN — SUCRALFATE 1 G: 1 SUSPENSION ORAL at 06:23

## 2024-05-19 RX ADMIN — SUCRALFATE 1 G: 1 SUSPENSION ORAL at 00:09

## 2024-05-19 ASSESSMENT — PAIN DESCRIPTION - DESCRIPTORS
DESCRIPTORS: ACHING
DESCRIPTORS: ACHING

## 2024-05-19 ASSESSMENT — COGNITIVE AND FUNCTIONAL STATUS - GENERAL
CLIMB 3 TO 5 STEPS WITH RAILING: TOTAL
TOILETING: TOTAL
MOBILITY SCORE: 10
HELP NEEDED FOR BATHING: A LOT
WALKING IN HOSPITAL ROOM: TOTAL
MOVING TO AND FROM BED TO CHAIR: TOTAL
PERSONAL GROOMING: A LOT
MOVING FROM LYING ON BACK TO SITTING ON SIDE OF FLAT BED WITH BEDRAILS: A LITTLE
DRESSING REGULAR UPPER BODY CLOTHING: A LOT
DAILY ACTIVITIY SCORE: 11
STANDING UP FROM CHAIR USING ARMS: TOTAL
EATING MEALS: A LITTLE
DRESSING REGULAR LOWER BODY CLOTHING: TOTAL
TURNING FROM BACK TO SIDE WHILE IN FLAT BAD: A LITTLE

## 2024-05-19 ASSESSMENT — PAIN DESCRIPTION - LOCATION: LOCATION: HEAD

## 2024-05-19 ASSESSMENT — PAIN SCALES - PAIN ASSESSMENT IN ADVANCED DEMENTIA (PAINAD): TOTALSCORE: MEDICATION (SEE MAR)

## 2024-05-19 ASSESSMENT — PAIN SCALES - GENERAL
PAINLEVEL_OUTOF10: 1
PAINLEVEL_OUTOF10: 0 - NO PAIN
PAINLEVEL_OUTOF10: 3

## 2024-05-19 ASSESSMENT — PAIN - FUNCTIONAL ASSESSMENT: PAIN_FUNCTIONAL_ASSESSMENT: 0-10

## 2024-05-19 NOTE — PROGRESS NOTES
Pt has been accepted at rehab no precert needed. GI has signed off for discharge. Message sent to attending requesting dc order. Also if he is not ok w/ dc requested order for ativan or anything similar. She is still completely restless. She pulled out another IV this am, has tried to throw herself out of bed and won't listen to direction.  She's not had any rest lately.

## 2024-05-19 NOTE — PROGRESS NOTES
Karly Horton is a 79 y.o. female on day 3 of admission presenting with Pneumonia of both lower lobes due to infectious organism.    Subjective   Patient was seen and examined.  Lying in the bed.  Comfortable.  Did not look in acute distress.  Patient denied any headache, dizziness, nausea or vomiting.  Patient is confused.       Objective     Physical Exam  HEENT:  Head externally atraumatic,  extraocular movements intact, oral mucosa moist  Neck:  Supple, no JVP, no palpable adenopathy or thyromegaly.  No carotid bruit.  Chest:  Clear to auscultation and resonant.  Heart:  Regular rate and rhythm, no murmur or gallop could be appreciated.  Abdomen:  Soft, nontender, bowel sounds present, normoactive, no palpable hepatosplenomegaly.  Extremities:  No edema, pulses present, no cyanosis or clubbing.  CNS:  Patient alert, oriented to time, place and person.    No new deficit.  Cranial nerves 2-12 grossly intact  Skin:  No active rash.  Musculoskeletal:  No  apparent joint swelling or erythema, range of movement normal.  Last Recorded Vitals  Heart Rate:  [102-110]   Temp:  [36.3 °C (97.3 °F)-37.4 °C (99.3 °F)]   Resp:  [15-17]   BP: (134-162)/(56-97)   Weight:  [35.2 kg (77 lb 9.6 oz)]   SpO2:  [82 %-98 %]     Intake/Output last 3 Shifts:  I/O last 3 completed shifts:  In: 370 (10.5 mL/kg) [P.O.:120; I.V.:250 (7.1 mL/kg)]  Out: 650 (18.5 mL/kg) [Urine:650 (0.5 mL/kg/hr)]  Weight: 35.2 kg     Relevant Results  No results found for the last 90 days.    Results for orders placed or performed during the hospital encounter of 05/14/24 (from the past 24 hour(s))   Basic Metabolic Panel   Result Value Ref Range    Glucose 90 65 - 99 mg/dL    Sodium 139 133 - 145 mmol/L    Potassium 3.4 3.4 - 5.1 mmol/L    Chloride 97 97 - 107 mmol/L    Bicarbonate 31 24 - 31 mmol/L    Urea Nitrogen 12 8 - 25 mg/dL    Creatinine 0.40 0.40 - 1.60 mg/dL    eGFR >90 >60 mL/min/1.73m*2    Calcium 8.6 8.5 - 10.4 mg/dL    Anion Gap 11 <=19  mmol/L   CBC   Result Value Ref Range    WBC 11.5 (H) 4.4 - 11.3 x10*3/uL    nRBC 0.9 (H) 0.0 - 0.0 /100 WBCs    RBC 4.62 4.00 - 5.20 x10*6/uL    Hemoglobin 8.8 (L) 12.0 - 16.0 g/dL    Hematocrit 30.5 (L) 36.0 - 46.0 %    MCV 66 (L) 80 - 100 fL    MCH 19.0 (L) 26.0 - 34.0 pg    MCHC 28.9 (L) 32.0 - 36.0 g/dL    RDW 30.0 (H) 11.5 - 14.5 %    Platelets 278 150 - 450 x10*3/uL   POCT GLUCOSE   Result Value Ref Range    POCT Glucose 108 (H) 74 - 99 mg/dL   POCT GLUCOSE   Result Value Ref Range    POCT Glucose 108 (H) 74 - 99 mg/dL   POCT GLUCOSE   Result Value Ref Range    POCT Glucose 115 (H) 74 - 99 mg/dL   POCT GLUCOSE   Result Value Ref Range    POCT Glucose 137 (H) 74 - 99 mg/dL        Current Facility-Administered Medications:     acetaminophen (Tylenol) tablet 650 mg, 650 mg, oral, q6h PRN, LANDEN Jang    albuterol 2.5 mg /3 mL (0.083 %) nebulizer solution 2.5 mg, 2.5 mg, nebulization, Once PRN, Chidi Valdes MD    baclofen (Lioresal) tablet 10 mg, 10 mg, oral, TID PRN, LANDEN Jang    hydrALAZINE (Apresoline) injection 5 mg, 5 mg, intravenous, q30 min PRN, Chidi Valdes MD    lactated Ringer's infusion, 100 mL/hr, intravenous, Continuous, Chidi Valdes MD, Last Rate: 100 mL/hr at 05/17/24 1630, 100 mL/hr at 05/17/24 1630    lactulose 20 gram/30 mL oral solution 20 g, 20 g, oral, Daily, LANDEN Jang, 20 g at 05/18/24 0857    levoFLOXacin (Levaquin)  mg, 500 mg, intravenous, q24h, LANDEN Jang, Stopped at 05/18/24 0957    lidocaine (Xylocaine) 10 mg/mL (1 %) injection 1 mg, 0.1 mL, subcutaneous, Once, Chidi Valdes MD    lubricating eye drops ophthalmic solution 1 drop, 1 drop, Both Eyes, PRN, Eliseo Best DO    nicotine (Nicoderm CQ) 21 mg/24 hr patch 1 patch, 1 patch, transdermal, Daily, LANDEN Jang, 1 patch at 05/17/24 1252    ondansetron (Zofran) injection 4 mg, 4 mg, intravenous, q6h PRN, PO Jang-CNP     ondansetron (Zofran) injection 4 mg, 4 mg, intravenous, Once PRN, Chidi Valdes MD    oxygen (O2) therapy, , inhalation, Continuous - Inhalation, LANDEN Jang, 2 L/min at 05/18/24 2000    oxygen (O2) therapy, , inhalation, Continuous PRN - O2/gases, Chidi Valdes MD    [START ON 5/19/2024] pantoprazole (ProtoNix) injection 40 mg, 40 mg, intravenous, Daily, LANDEN Jones    perflutren lipid microspheres (Definity) injection 0.5-10 mL of dilution, 0.5-10 mL of dilution, intravenous, Once in imaging, LANDEN Jang    perflutren protein A microsphere (Optison) injection 0.5 mL, 0.5 mL, intravenous, Once in imaging, LANDEN Jang    sucralfate (Carafate) suspension 1 g, 1 g, oral, q6h [place], Eren Ram MD, 1 g at 05/18/24 1747    sulfur hexafluoride microsphr (Lumason) injection 24.28 mg, 2 mL, intravenous, Once in imaging, LANDEN Jang   Assessment/Plan   Principal Problem:    Pneumonia of both lower lobes due to infectious organism  Active Problems:    Iron deficiency anemia    Congestive heart failure (Multi)  Acute on chronic respiratory failure  Ingestive heart failure  Cor pulmonale  Pulmonary hypertension  Pneumonia  COPD  Tobacco dependence  Leukocytosis  Constipation resolved  Plan: Continue current medication get supportive care.  Physical therapy and Occupational Therapy.  Monitor CBC and BMP.  Increase activity.  Will treat DVT, fall, aspiration and acute respiratory distress troponin negative.  Monitor pulse ox.  Give supportive care.  Give potassium.  We will take DVT, fall, aspiration and decubitus and DVT precautions.         Lupillo Mcneil MD

## 2024-05-19 NOTE — NURSING NOTE
Daughter brought the patient in a VAP pen that was found in the patients bed, VAP pen was returned to daughter to take home.  After daughter left for the evening a cigarette lighter was found in the patients bed, lighter was removed from the room and is secure at the nurses station.   Patient is alert to name and year at this time, continues to ask staff for cigarettes, refused Nicoderm patch earlier in the day.

## 2024-05-19 NOTE — NURSING NOTE
Awake  for the second night in a row, confused, hallucinating, pulling off telemetry leads and climbing out of bed. Confusion and hallucinations have increased over the last 24 hour.

## 2024-05-19 NOTE — NURSING NOTE
Awake, confused, climbing out of bed, bed alarm activated.  Placed on the bed pan to void, 200 ml of clear urine

## 2024-05-19 NOTE — PROGRESS NOTES
Pt was agreeable to take her am PO medicines this afternoon but refused an new IV. Message sent to attending requesting the route of AB and protonix be changed to PO.

## 2024-05-19 NOTE — CARE PLAN
The patient's goals for the shift include Sleep    The clinical goals for the shift include keep safe in bed    Over the shift, the patient did not make progress toward the following goals. Barriers to progression include increased confusion and hallucination. Recommendations to address these barriers include bed alarm for safety.

## 2024-05-20 ENCOUNTER — APPOINTMENT (OUTPATIENT)
Dept: RADIOLOGY | Facility: HOSPITAL | Age: 79
DRG: 177 | End: 2024-05-20
Payer: MEDICARE

## 2024-05-20 LAB
ANION GAP SERPL CALC-SCNC: 4 MMOL/L
ANION GAP SERPL CALC-SCNC: 9 MMOL/L
ATRIAL RATE: 111 BPM
BACTERIA BLD CULT: NORMAL
BACTERIA BLD CULT: NORMAL
BASOPHILS # BLD AUTO: 0.02 X10*3/UL (ref 0–0.1)
BASOPHILS NFR BLD AUTO: 0.2 %
BUN SERPL-MCNC: 15 MG/DL (ref 8–25)
BUN SERPL-MCNC: 9 MG/DL (ref 8–25)
BURR CELLS BLD QL SMEAR: NORMAL
CALCIUM SERPL-MCNC: 8.4 MG/DL (ref 8.5–10.4)
CALCIUM SERPL-MCNC: 8.8 MG/DL (ref 8.5–10.4)
CHLORIDE SERPL-SCNC: 96 MMOL/L (ref 97–107)
CHLORIDE SERPL-SCNC: 97 MMOL/L (ref 97–107)
CO2 SERPL-SCNC: 33 MMOL/L (ref 24–31)
CO2 SERPL-SCNC: 36 MMOL/L (ref 24–31)
CREAT SERPL-MCNC: 0.5 MG/DL (ref 0.4–1.6)
CREAT SERPL-MCNC: 0.6 MG/DL (ref 0.4–1.6)
EGFRCR SERPLBLD CKD-EPI 2021: >90 ML/MIN/1.73M*2
EGFRCR SERPLBLD CKD-EPI 2021: >90 ML/MIN/1.73M*2
EOSINOPHIL # BLD AUTO: 0.04 X10*3/UL (ref 0–0.4)
EOSINOPHIL NFR BLD AUTO: 0.4 %
ERYTHROCYTE [DISTWIDTH] IN BLOOD BY AUTOMATED COUNT: 31.5 % (ref 11.5–14.5)
GLUCOSE BLD MANUAL STRIP-MCNC: 104 MG/DL (ref 74–99)
GLUCOSE BLD MANUAL STRIP-MCNC: 113 MG/DL (ref 74–99)
GLUCOSE BLD MANUAL STRIP-MCNC: 129 MG/DL (ref 74–99)
GLUCOSE SERPL-MCNC: 103 MG/DL (ref 65–99)
GLUCOSE SERPL-MCNC: 98 MG/DL (ref 65–99)
HCT VFR BLD AUTO: 36.3 % (ref 36–46)
HGB BLD-MCNC: 10.1 G/DL (ref 12–16)
HYPOCHROMIA BLD QL SMEAR: NORMAL
IMM GRANULOCYTES # BLD AUTO: 0.07 X10*3/UL (ref 0–0.5)
IMM GRANULOCYTES NFR BLD AUTO: 0.7 % (ref 0–0.9)
LYMPHOCYTES # BLD AUTO: 0.53 X10*3/UL (ref 0.8–3)
LYMPHOCYTES NFR BLD AUTO: 5 %
MAGNESIUM SERPL-MCNC: 2 MG/DL (ref 1.6–3.1)
MCH RBC QN AUTO: 19.6 PG (ref 26–34)
MCHC RBC AUTO-ENTMCNC: 27.8 G/DL (ref 32–36)
MCV RBC AUTO: 70 FL (ref 80–100)
MONOCYTES # BLD AUTO: 1.06 X10*3/UL (ref 0.05–0.8)
MONOCYTES NFR BLD AUTO: 10 %
NEUTROPHILS # BLD AUTO: 8.91 X10*3/UL (ref 1.6–5.5)
NEUTROPHILS NFR BLD AUTO: 83.7 %
NRBC BLD-RTO: 0.5 /100 WBCS (ref 0–0)
OVALOCYTES BLD QL SMEAR: NORMAL
P AXIS: 85 DEGREES
P OFFSET: 197 MS
P ONSET: 155 MS
PLATELET # BLD AUTO: 270 X10*3/UL (ref 150–450)
POLYCHROMASIA BLD QL SMEAR: NORMAL
POTASSIUM SERPL-SCNC: 2.5 MMOL/L (ref 3.4–5.1)
POTASSIUM SERPL-SCNC: 4.8 MMOL/L (ref 3.4–5.1)
PR INTERVAL: 118 MS
Q ONSET: 214 MS
QRS COUNT: 18 BEATS
QRS DURATION: 80 MS
QT INTERVAL: 316 MS
QTC CALCULATION(BAZETT): 429 MS
QTC FREDERICIA: 388 MS
R AXIS: 74 DEGREES
RBC # BLD AUTO: 5.16 X10*6/UL (ref 4–5.2)
RBC MORPH BLD: NORMAL
SCHISTOCYTES BLD QL SMEAR: NORMAL
SODIUM SERPL-SCNC: 134 MMOL/L (ref 133–145)
SODIUM SERPL-SCNC: 141 MMOL/L (ref 133–145)
SPHEROCYTES BLD QL SMEAR: NORMAL
T AXIS: -9 DEGREES
T OFFSET: 372 MS
TARGETS BLD QL SMEAR: NORMAL
VENTRICULAR RATE: 111 BPM
WBC # BLD AUTO: 10.6 X10*3/UL (ref 4.4–11.3)

## 2024-05-20 PROCEDURE — 2500000005 HC RX 250 GENERAL PHARMACY W/O HCPCS: Performed by: NURSE PRACTITIONER

## 2024-05-20 PROCEDURE — 80048 BASIC METABOLIC PNL TOTAL CA: CPT | Mod: 91 | Performed by: NURSE PRACTITIONER

## 2024-05-20 PROCEDURE — 2500000004 HC RX 250 GENERAL PHARMACY W/ HCPCS (ALT 636 FOR OP/ED)

## 2024-05-20 PROCEDURE — S4991 NICOTINE PATCH NONLEGEND: HCPCS | Performed by: NURSE PRACTITIONER

## 2024-05-20 PROCEDURE — 71046 X-RAY EXAM CHEST 2 VIEWS: CPT | Performed by: RADIOLOGY

## 2024-05-20 PROCEDURE — 2500000001 HC RX 250 WO HCPCS SELF ADMINISTERED DRUGS (ALT 637 FOR MEDICARE OP): Performed by: INTERNAL MEDICINE

## 2024-05-20 PROCEDURE — 85060 BLOOD SMEAR INTERPRETATION: CPT | Performed by: PATHOLOGY

## 2024-05-20 PROCEDURE — 2500000004 HC RX 250 GENERAL PHARMACY W/ HCPCS (ALT 636 FOR OP/ED): Performed by: NURSE PRACTITIONER

## 2024-05-20 PROCEDURE — 82947 ASSAY GLUCOSE BLOOD QUANT: CPT

## 2024-05-20 PROCEDURE — 2060000001 HC INTERMEDIATE ICU ROOM DAILY

## 2024-05-20 PROCEDURE — C9113 INJ PANTOPRAZOLE SODIUM, VIA: HCPCS

## 2024-05-20 PROCEDURE — 2500000005 HC RX 250 GENERAL PHARMACY W/O HCPCS: Performed by: ANESTHESIOLOGY

## 2024-05-20 PROCEDURE — 83735 ASSAY OF MAGNESIUM: CPT | Performed by: NURSE PRACTITIONER

## 2024-05-20 PROCEDURE — 36415 COLL VENOUS BLD VENIPUNCTURE: CPT | Performed by: INTERNAL MEDICINE

## 2024-05-20 PROCEDURE — 2500000004 HC RX 250 GENERAL PHARMACY W/ HCPCS (ALT 636 FOR OP/ED): Performed by: INTERNAL MEDICINE

## 2024-05-20 PROCEDURE — 85025 COMPLETE CBC W/AUTO DIFF WBC: CPT | Performed by: INTERNAL MEDICINE

## 2024-05-20 PROCEDURE — 2500000002 HC RX 250 W HCPCS SELF ADMINISTERED DRUGS (ALT 637 FOR MEDICARE OP, ALT 636 FOR OP/ED): Performed by: NURSE PRACTITIONER

## 2024-05-20 PROCEDURE — 36415 COLL VENOUS BLD VENIPUNCTURE: CPT | Performed by: NURSE PRACTITIONER

## 2024-05-20 PROCEDURE — 80048 BASIC METABOLIC PNL TOTAL CA: CPT | Performed by: INTERNAL MEDICINE

## 2024-05-20 PROCEDURE — 71046 X-RAY EXAM CHEST 2 VIEWS: CPT

## 2024-05-20 PROCEDURE — 2500000006 HC RX 250 W HCPCS SELF ADMINISTERED DRUGS (ALT 637 FOR ALL PAYERS): Performed by: INTERNAL MEDICINE

## 2024-05-20 PROCEDURE — 2500000001 HC RX 250 WO HCPCS SELF ADMINISTERED DRUGS (ALT 637 FOR MEDICARE OP): Performed by: NURSE PRACTITIONER

## 2024-05-20 RX ORDER — POTASSIUM CHLORIDE 1.5 G/1.58G
40 POWDER, FOR SOLUTION ORAL ONCE
Status: COMPLETED | OUTPATIENT
Start: 2024-05-20 | End: 2024-05-20

## 2024-05-20 RX ORDER — LEVOFLOXACIN 500 MG/1
500 TABLET, FILM COATED ORAL
Status: COMPLETED | OUTPATIENT
Start: 2024-05-20 | End: 2024-05-22

## 2024-05-20 RX ORDER — LEVOFLOXACIN 500 MG/1
500 TABLET, FILM COATED ORAL
Status: DISCONTINUED | OUTPATIENT
Start: 2024-05-21 | End: 2024-05-20

## 2024-05-20 RX ORDER — HALOPERIDOL 5 MG/ML
0.5 INJECTION INTRAMUSCULAR ONCE
Status: COMPLETED | OUTPATIENT
Start: 2024-05-20 | End: 2024-05-20

## 2024-05-20 RX ORDER — POTASSIUM CHLORIDE 14.9 MG/ML
20 INJECTION INTRAVENOUS
Status: COMPLETED | OUTPATIENT
Start: 2024-05-20 | End: 2024-05-20

## 2024-05-20 RX ORDER — POTASSIUM CHLORIDE 20 MEQ/1
40 TABLET, EXTENDED RELEASE ORAL ONCE
Status: DISCONTINUED | OUTPATIENT
Start: 2024-05-20 | End: 2024-05-20

## 2024-05-20 RX ADMIN — NICOTINE 1 PATCH: 21 PATCH, EXTENDED RELEASE TRANSDERMAL at 11:46

## 2024-05-20 RX ADMIN — HALOPERIDOL LACTATE 0.5 MG: 5 INJECTION, SOLUTION INTRAMUSCULAR at 02:22

## 2024-05-20 RX ADMIN — Medication 21 PERCENT: at 08:00

## 2024-05-20 RX ADMIN — POTASSIUM CHLORIDE 20 MEQ: 14.9 INJECTION, SOLUTION INTRAVENOUS at 18:57

## 2024-05-20 RX ADMIN — Medication 2 L/MIN: at 20:00

## 2024-05-20 RX ADMIN — LEVOFLOXACIN 500 MG: 500 TABLET, FILM COATED ORAL at 11:37

## 2024-05-20 RX ADMIN — POTASSIUM CHLORIDE 20 MEQ: 14.9 INJECTION, SOLUTION INTRAVENOUS at 16:42

## 2024-05-20 RX ADMIN — SUCRALFATE 1 G: 1 SUSPENSION ORAL at 18:57

## 2024-05-20 RX ADMIN — BACLOFEN 10 MG: 10 TABLET ORAL at 18:57

## 2024-05-20 RX ADMIN — Medication 2 L/MIN: at 11:57

## 2024-05-20 RX ADMIN — LACTULOSE 20 G: 10 SOLUTION ORAL at 11:37

## 2024-05-20 RX ADMIN — ACETAMINOPHEN 650 MG: 325 TABLET ORAL at 18:57

## 2024-05-20 RX ADMIN — SUCRALFATE 1 G: 1 SUSPENSION ORAL at 23:08

## 2024-05-20 RX ADMIN — POTASSIUM CHLORIDE 40 MEQ: 1.5 FOR SOLUTION ORAL at 16:42

## 2024-05-20 RX ADMIN — SUCRALFATE 1 G: 1 SUSPENSION ORAL at 11:37

## 2024-05-20 ASSESSMENT — COGNITIVE AND FUNCTIONAL STATUS - GENERAL
STANDING UP FROM CHAIR USING ARMS: TOTAL
MOVING TO AND FROM BED TO CHAIR: A LOT
EATING MEALS: A LOT
PERSONAL GROOMING: A LOT
EATING MEALS: A LOT
DRESSING REGULAR LOWER BODY CLOTHING: TOTAL
STANDING UP FROM CHAIR USING ARMS: TOTAL
DAILY ACTIVITIY SCORE: 8
MOVING FROM LYING ON BACK TO SITTING ON SIDE OF FLAT BED WITH BEDRAILS: A LOT
TOILETING: TOTAL
DRESSING REGULAR UPPER BODY CLOTHING: TOTAL
DAILY ACTIVITIY SCORE: 8
DRESSING REGULAR LOWER BODY CLOTHING: TOTAL
MOVING TO AND FROM BED TO CHAIR: A LOT
MOBILITY SCORE: 9
TURNING FROM BACK TO SIDE WHILE IN FLAT BAD: A LOT
PERSONAL GROOMING: A LOT
TURNING FROM BACK TO SIDE WHILE IN FLAT BAD: A LOT
MOVING FROM LYING ON BACK TO SITTING ON SIDE OF FLAT BED WITH BEDRAILS: A LOT
WALKING IN HOSPITAL ROOM: TOTAL
DRESSING REGULAR UPPER BODY CLOTHING: TOTAL
CLIMB 3 TO 5 STEPS WITH RAILING: TOTAL
HELP NEEDED FOR BATHING: TOTAL
WALKING IN HOSPITAL ROOM: TOTAL
CLIMB 3 TO 5 STEPS WITH RAILING: TOTAL
MOBILITY SCORE: 9
HELP NEEDED FOR BATHING: TOTAL
TOILETING: TOTAL

## 2024-05-20 ASSESSMENT — PAIN - FUNCTIONAL ASSESSMENT
PAIN_FUNCTIONAL_ASSESSMENT: 0-10

## 2024-05-20 ASSESSMENT — PAIN SCALES - GENERAL
PAINLEVEL_OUTOF10: 0 - NO PAIN
PAINLEVEL_OUTOF10: 3
PAINLEVEL_OUTOF10: 0 - NO PAIN

## 2024-05-20 NOTE — PROGRESS NOTES
Karly Horton is a 79 y.o. female on day 5 of admission presenting with Pneumonia of both lower lobes due to infectious organism.    Subjective     Patient was seen and examined.  Lying in the bed.  More alert today.  Patient denied any headache or dizziness nausea or vomiting.       Objective     Physical Exam  HEENT:  Head externally atraumatic,  extraocular movements intact, oral mucosa moist  Neck:  Supple, no JVP, no palpable adenopathy or thyromegaly.  No carotid bruit.  Chest:  Clear to auscultation and resonant.  Heart:  Regular rate and rhythm, no murmur or gallop could be appreciated.  Abdomen:  Soft, nontender, bowel sounds present, normoactive, no palpable hepatosplenomegaly.  Extremities:  No edema, pulses present, no cyanosis or clubbing.  CNS:  Patient alert, oriented to time, place and person.    No new deficit.  Cranial nerves 2-12 grossly intact  Skin:  No active rash.  Musculoskeletal:  No  apparent joint swelling or erythema, range of movement normal.  Last Recorded Vitals  Heart Rate:  [60]   Temp:  [35.8 °C (96.4 °F)-36.5 °C (97.7 °F)]   Resp:  [17-18]   BP: (147-172)/(69-99)   Weight:  [34.6 kg (76 lb 4.5 oz)]   SpO2:  [90 %-97 %]     Intake/Output last 3 Shifts:  I/O last 3 completed shifts:  In: 4148.9 (119.9 mL/kg) [P.O.:560; I.V.:3288.9 (95.1 mL/kg); IV Piggyback:300]  Out: 600 (17.3 mL/kg) [Urine:600 (0.5 mL/kg/hr)]  Weight: 34.6 kg     Relevant Results  No results found for the last 90 days.    No results found for this or any previous visit (from the past 24 hour(s)).     Current Facility-Administered Medications:     acetaminophen (Tylenol) tablet 650 mg, 650 mg, oral, q6h PRN, PO Jang-CNP, 650 mg at 05/19/24 2023    albuterol 2.5 mg /3 mL (0.083 %) nebulizer solution 2.5 mg, 2.5 mg, nebulization, Once PRN, Chidi Valdes MD    baclofen (Lioresal) tablet 10 mg, 10 mg, oral, TID PRN, PO Jang-CNP, 10 mg at 05/19/24 2023    hydrALAZINE (Apresoline)  injection 5 mg, 5 mg, intravenous, q30 min PRN, Chidi Valdes MD    lactated Ringer's infusion, 100 mL/hr, intravenous, Continuous, Chidi Valdes MD, Last Rate: 100 mL/hr at 05/19/24 0116, 100 mL/hr at 05/19/24 0116    lactulose 20 gram/30 mL oral solution 20 g, 20 g, oral, Daily, LANDEN Jang, 20 g at 05/19/24 1418    levoFLOXacin (Levaquin)  mg, 500 mg, intravenous, q24h, LANDEN Jang, Stopped at 05/18/24 0957    lidocaine (Xylocaine) 10 mg/mL (1 %) injection 1 mg, 0.1 mL, subcutaneous, Once, Chidi Valdes MD    lubricating eye drops ophthalmic solution 1 drop, 1 drop, Both Eyes, PRN, Eliseo Best DO    nicotine (Nicoderm CQ) 21 mg/24 hr patch 1 patch, 1 patch, transdermal, Daily, LANDEN Jang, 1 patch at 05/19/24 1357    ondansetron (Zofran) injection 4 mg, 4 mg, intravenous, q6h PRN, LANDEN Jang    ondansetron (Zofran) injection 4 mg, 4 mg, intravenous, Once PRN, Chidi Valdes MD    oxygen (O2) therapy, , inhalation, Continuous - Inhalation, LANDEN Jang, 21 percent at 05/19/24 2000    oxygen (O2) therapy, , inhalation, Continuous PRN - O2/gases, Chidi Valdes MD    pantoprazole (ProtoNix) injection 40 mg, 40 mg, intravenous, Daily, Nery LANDEN Mullen    perflutren lipid microspheres (Definity) injection 0.5-10 mL of dilution, 0.5-10 mL of dilution, intravenous, Once in imaging, LANDEN Jang    perflutren protein A microsphere (Optison) injection 0.5 mL, 0.5 mL, intravenous, Once in imaging, LANDEN Jang    sucralfate (Carafate) suspension 1 g, 1 g, oral, q6h [facility], Eren Ram MD, 1 g at 05/19/24 1358    sulfur hexafluoride microsphr (Lumason) injection 24.28 mg, 2 mL, intravenous, Once in imaging, Shea Root, APRN-CNP   Assessment/Plan   Principal Problem:    Pneumonia of both lower lobes due to infectious organism  Active Problems:    Iron deficiency anemia     Congestive heart failure (Multi)    Nail  Iron deficiency anemia   Congestive heart failure   Hypertension    Tobacco dependence   Leukocytosis    Plan Continue current medication.  Give supportive care.  Give physical  and occupational therapy monitor CBC and basic metabolic panel.  Blood pressure is controlled.  Give supportive care.    Patient was advised to quit smoking.  Patient got anemia but hemoglobin  And hematocrit stable.  Continue to  monitor.    Lupillo Mcneil MD

## 2024-05-20 NOTE — PROGRESS NOTES
"Nutrition Follow up Note    Nutrition Assessment      S/p EGD and colonoscopy. Sitter at bedside.     Nutrition History:  Food and Nutrient History: ate half of soup and a couple bites of fruit for lunch today. she is drinking juice with meals. when asked about ensure, pt states she likes to sip on them during the day. sitter reports pt drank ~50% AM ensure. reports liking chocolate ensure when would prefer to continue recieving ensure clear.  Energy Intake: Poor < 50 %    Anthropometrics:  Ht: 147.3 cm (4' 10\"), Wt: (!) 34.6 kg (76 lb 4.5 oz), BMI: 15.95  IBW/kg (Dietitian Calculated): 43.18 kg  Percent of IBW: 89 %    Weight Change:  Daily Weight  05/20/24 : (!) 34.6 kg (76 lb 4.5 oz)     Weight History / % Weight Change: Patient reports 20# (17.4%) weight loss in past year  Significant Weight Loss:  (not significant yet undesirable)    Nutrition Focused Physical Exam Findings:   Subcutaneous Fat Loss  Orbital Fat Pads: Severe (dark circles, hollowing and loose skin)  Buccal Fat Pads: Severe (hollow, sunken and narrow face)  Triceps: Severe (negligible fat tissue)  Ribs: Defer    Muscle Wasting  Temporalis: Severe (hollowed scooping depression)  Pectoralis (Clavicular Region): Severe (protruding prominent clavicle)  Deltoid/Trapezius: Severe (squared shoulders, acromion process prominent)  Interosseous: Severe (depressed area between thumb and forefinger)  Trapezius/Infraspinatus/Supraspinatus (Scapular Region): Defer  Quadriceps: Defer  Gastrocnemius: Severe (minimal muscle definition)    Nutrition Significant Labs:  Lab Results   Component Value Date    WBC 10.6 05/20/2024    HGB 10.1 (L) 05/20/2024    HCT 36.3 05/20/2024     05/20/2024    CHOL 205 (H) 10/28/2019    TRIG 69 10/28/2019     10/28/2019    ALT 13 07/22/2023    AST 23 07/22/2023     05/20/2024    K 2.5 (LL) 05/20/2024    CL 96 (L) 05/20/2024    CREATININE 0.50 05/20/2024    BUN 9 05/20/2024    CO2 36 (H) 05/20/2024    TSH 3.37 " 11/14/2022    HGBA1C 5.8 12/31/2018     Nutrition Specific Medications:  lactulose, 20 g, oral, Daily  levoFLOXacin, 500 mg, oral, q24h [place]  lidocaine, 0.1 mL, subcutaneous, Once  nicotine, 1 patch, transdermal, Daily  oxygen, , inhalation, Continuous - Inhalation  pantoprazole, 40 mg, intravenous, Daily  perflutren lipid microspheres, 0.5-10 mL of dilution, intravenous, Once in imaging  perflutren protein A microsphere, 0.5 mL, intravenous, Once in imaging  potassium chloride, 20 mEq, intravenous, q2h  potassium chloride CR, 40 mEq, oral, Once  sucralfate, 1 g, oral, q6h [place]  sulfur hexafluoride microsphr, 2 mL, intravenous, Once in imaging      Dietary Orders (From admission, onward)       Start     Ordered    05/18/24 1532  Adult diet Regular, Lactose restricted  Diet effective now        Question Answer Comment   Diet type Regular    Diet type Lactose restricted        05/18/24 1531    05/16/24 1336  Oral nutritional supplements  Until discontinued        Comments: Apple   Question Answer Comment   Deliver with All meals    Select supplement: Ensure Clear        05/16/24 1335                  Estimated Needs:   Estimated Energy Needs  Total Energy Estimated Needs (kCal):  (6466-0044)  Total Estimated Energy Need per Day (kCal/kg):  (35-40)  Method for Estimating Needs: Actual Wt    Estimated Protein Needs  Total Protein Estimated Needs (g):  (59-78)  Total Protein Estimated Needs (g/kg):  (1.5-2.0)  Method for Estimating Needs: Actual Wt    Estimated Fluid Needs  Total Fluid Estimated Needs (mL):  (5474-6659)  Method for Estimating Needs: 1 mL/kcal        Nutrition Diagnosis   Nutrition Diagnosis:  Malnutrition Diagnosis  Patient has Malnutrition Diagnosis: Yes  Diagnosis Status: Ongoing  Malnutrition Diagnosis: Severe malnutrition related to chronic disease or condition  As Evidenced by: <75% po intake >1 month, 20# weight loss in past year (insignificant yet undesirable), severe subcutaeous fat and muscle  wasting    Nutrition Diagnosis  Patient has Nutrition Diagnosis: Yes  Diagnosis Status (1):  (no longer appropriate)  Nutrition Diagnosis 1: Inadequate energy intake  Related to (1): decreased ability to consume sufficient energy  As Evidenced by (1): clear liquid diet       Nutrition Interventions/Recommendations   Nutrition Interventions and Recommendations:    Nutrition Prescription:  Individualized Nutrition Prescription Provided for : 9637-8114 calories, 59-78 gm protein to be provided via diet and supplements    Nutrition Interventions:   Food and/or Nutrient Delivery Interventions  Interventions: Meals and snacks  Meals and Snacks: General healthful diet  Goal: provide as ordered  Medical Food Supplement: Commercial beverage  Goal: ensure clear TID to provide 240 kcals and 8g protein each  Additional Interventions: monitor K+, PO4 and Mg BID for signs of refeeding syndrome. recommend 100 mg thiamin once daily for 7-10 days to prevent neurological complications during nutritional rehabilitation.    Education Documentation  No documentation found.           Nutrition Monitoring and Evaluation   Monitoring/Evaluation:   Food/Nutrient Related History Monitoring  Monitoring and Evaluation Plan: Energy intake  Energy Intake: Estimated energy intake  Criteria: pt to consume >/= 75% estimated needs    Body Composition/Growth/Weight History  Monitoring and Evaluation Plan: Weight  Weight: Measured weight  Criteria: pt will maintain/gain wt    Biochemical Data, Medical Tests and Procedures  Monitoring and Evaluation Plan: Electrolyte/renal panel  Electrolyte and Renal Panel: Magnesium, Phosphorus, Potassium  Criteria: labs will trend towards desirable range       Time Spent/Follow-up:   Follow Up  Time Spent (min): 25 minutes  Last Date of Nutrition Visit: 05/20/24  Nutrition Follow-Up Needed?: 3-5 days  Follow up Comment: 5/23/24

## 2024-05-20 NOTE — PROGRESS NOTES
FOLLOWUP FOR: Pneumonia, COPD    SUBJECTIVE  Was given Haldol and now is resting comfortably.  Is on room air    PHYSICAL EXAM        5/19/2024     7:59 AM 5/19/2024    12:18 PM 5/19/2024     7:40 PM 5/19/2024     8:23 PM 5/20/2024    12:42 AM 5/20/2024     3:19 AM 5/20/2024     7:00 AM   Vitals   Systolic 151 172 156  151 149    Diastolic 69 76 99  74 73    Heart Rate    60      Temp 36.5 °C (97.7 °F) 36.4 °C (97.5 °F) 35.8 °C (96.4 °F)  36.1 °C (97 °F) 36 °C (96.8 °F)    Resp 18 18 17  17 17    Weight (lb)       76.28   BMI       15.94 kg/m2   BSA (m2)       1.19 m2       Vital signs reviewed   NAD, very thin, frail female looking older than her stated age   Lungs Symmetric excursions.  Auscultation - diminished but clear, no wheezing/rales/rhonchi.     Cor RSR No murmur, rub, gallop   Abd soft and nontender, no rebound or distention, no HS megaly   Ext no CCE   Neuro arouses to name    ASSESSMENT:    .  Left lower lobe pneumonia  .  COPD  .  Acute hypoxia, resolved  .  History of congestive heart failure  .  Iron deficiency anemia  .  Frailty, malnourished    PLAN    .  Follow-up chest x-ray  .  Complete 7 days of antibiotics, to oral levofloxacin  .  Discharge plans    Teddy Beltran MD, [facility]

## 2024-05-20 NOTE — PROGRESS NOTES
Occupational Therapy                 Therapy Communication Note    Patient Name: Karly Horton  MRN: [id number]  Today's Date: 5/20/2024     Discipline: Occupational Therapy    Missed Visit Reason: Missed Visit Reason:  (Nsg requesting to let pt sleep. Will hold follow-up.)    Missed Time: Cancel    Comment:

## 2024-05-20 NOTE — CARE PLAN
The patient's goals for the shift include Sleep    The clinical goals for the shift include maintain safety    Over the shift, the patient did not make progress toward the following goals. Barriers to progression include increased confusion and agitation. Recommendations to address these barriers include bed alarm and sitter at the bedside.

## 2024-05-20 NOTE — PROGRESS NOTES
"Karly Horton is a 79 y.o. female on day 5 of admission presenting with Pneumonia of both lower lobes due to infectious organism.    Subjective   Patient seen and examined.  Resting in bed in no acute distress.  Sitter bedside.  Eyes closed.  Drowsy.  Arousable.  Awake alert.  States she is on \"EARTH\".  Oriented x 3.  No complaints.  Review of systems is limited.    Objective   Sitter bedside    Physical Exam  Vitals and nursing note reviewed.   Constitutional:       General: She is not in acute distress.     Appearance: Normal appearance. She is normal weight. She is not ill-appearing, toxic-appearing or diaphoretic.   HENT:      Head: Normocephalic and atraumatic.      Right Ear: Tympanic membrane normal.      Left Ear: Tympanic membrane normal.      Nose: Nose normal.      Mouth/Throat:      Mouth: Mucous membranes are moist.      Pharynx: Oropharynx is clear.   Eyes:      Extraocular Movements: Extraocular movements intact.      Conjunctiva/sclera: Conjunctivae normal.      Pupils: Pupils are equal, round, and reactive to light.   Cardiovascular:      Rate and Rhythm: Normal rate and regular rhythm.      Pulses: Normal pulses.      Heart sounds: Normal heart sounds. No murmur heard.  Pulmonary:      Effort: Pulmonary effort is normal. No respiratory distress.      Breath sounds: Normal breath sounds. No wheezing, rhonchi or rales.      Comments: Room air.  Abdominal:      General: Bowel sounds are normal. There is no distension.      Palpations: Abdomen is soft.      Tenderness: There is no abdominal tenderness.   Genitourinary:     Comments: Deferred.  Musculoskeletal:         General: No swelling or tenderness. Normal range of motion.      Cervical back: Normal range of motion and neck supple.      Right lower leg: No edema.      Left lower leg: No edema.   Skin:     General: Skin is warm and dry.      Capillary Refill: Capillary refill takes less than 2 seconds.   Neurological:      General: No focal " "deficit present.      Mental Status: She is alert and oriented to person, place, and time.      Comments: Drowsy. Arousable. Oriented x 3. States name, birth-date, month, year. Speech clear, coherent. Follows all commands. Generalized weakness. No focal weakness. Gait deferred.    Psychiatric:         Mood and Affect: Mood normal.         Behavior: Behavior normal.       Last Recorded Vitals  Blood pressure 149/73, pulse 60, temperature 36 °C (96.8 °F), temperature source Temporal, resp. rate 17, height 1.473 m (4' 10\"), weight (!) 34.6 kg (76 lb 4.5 oz), SpO2 90%.    Intake/Output last 3 Shifts:  I/O last 3 completed shifts:  In: 4028.9 (116.4 mL/kg) [P.O.:440; I.V.:3288.9 (95.1 mL/kg); IV Piggyback:300]  Out: 600 (17.3 mL/kg) [Urine:600 (0.5 mL/kg/hr)]  Weight: 34.6 kg     Relevant Results      Malnutrition Diagnosis Status: New  Malnutrition Diagnosis: Severe malnutrition related to chronic disease or condition  As Evidenced by: <75% po intake >1 month, 20# weight loss in past year (insignificant), severe subcutaeous fat and muscle wasting  I agree with the dietitian's malnutrition diagnosis.    Results for orders placed or performed during the hospital encounter of 05/14/24 (from the past 24 hour(s))   POCT GLUCOSE   Result Value Ref Range    POCT Glucose 113 (H) 74 - 99 mg/dL     No results found for the last 90 days.    No results found.    Scheduled medications  lactulose, 20 g, oral, Daily  levoFLOXacin, 500 mg, intravenous, q24h  lidocaine, 0.1 mL, subcutaneous, Once  nicotine, 1 patch, transdermal, Daily  oxygen, , inhalation, Continuous - Inhalation  pantoprazole, 40 mg, intravenous, Daily  perflutren lipid microspheres, 0.5-10 mL of dilution, intravenous, Once in imaging  perflutren protein A microsphere, 0.5 mL, intravenous, Once in imaging  sucralfate, 1 g, oral, q6h [place]  sulfur hexafluoride microsphr, 2 mL, intravenous, Once in imaging      Continuous medications  lactated Ringer's, 100 mL/hr, Last " Rate: 100 mL/hr (05/19/24 0116)      PRN medications  PRN medications: acetaminophen, albuterol, baclofen, hydrALAZINE, lubricating eye drops, ondansetron, ondansetron, oxygen      ASSESSMENT:  Dyspnea  Leg swelling resolved  CHF  Cor Pulmonale   Pulmonary hypertension  Pneumonia  COPD  Acute hypoxic respiratory failure  Tobacco dependence  Leukocytosis improved  Constipation resolved  Microcytic anemia  Iron deficiency anemia  Weight loss  Non-obstructing Schatzki ring  Duodenal ulcer with hemorrhage  Diverticulosis  Generalized weakness  Inability to ambulate  Hypokalemia   Severe protein calorie malnutrition  Insomnia  Confusion    PLAN:  Patient is doing okay this morning.  No new issues.  Chart reviewed, she received Haldol and per the nursing notes she fell asleep at 345 am.  On examination, she is oriented x 3, no focal deficits.  Generalized weakness.  No focal weakness.  May add Melatonin at HS for sleep / wake cycle.  Review and update home medications.  Respiratory status, pulse oximetry stable.  Check room air pulse oximetry.  Follow-up.  Pulmonology following, input appreciated.  IV Levaquin.  DuoNeb treatments for oxygen as needed.  Tobacco cessation education.  Cardiology following, input appreciated.  Cor pulmonale secondary to underlying COPD and pulmonary hypertension.  Pro BNP elevation secondary to right ventricular dilation and pulmonary hypertension.  Continue management per Cardiology.  H&H noted.  Stable.  Status post EGD, colonoscopy.  Procedure note reviewed.  EGD showed a small type 1 hiatal hernia, non-obstructing Schatzki ring in the GE junction, mild erythematous mucosa in the antrum and prepyloric region, single ulcer in duodenal bulb with oozing hemorrhage induced coagulation with APC.  Colonoscopy showed scattered diverticulosis of mild severity containing blood clots and causing mild luminal narrowing in the descending colon, sigmoid colon, and rectosigmoid.  Please refer to the  reports for details.  Gastroenterology input appreciated.  Avoid NSAID use and smoking.  Diet as tolerated.  Protonix.  Carafate.  Stable per Gastroenterology for discharge.  Repeat BMP, CBC today.  Follow-up.  Monitor electrolytes and renal function.  Monitor H&H.  Severe protein calorie malnutrition.  Protein supplementation.  Encourage intake.  PT/OT.  Fall precautions.  Up with assistance only.  Bed and chair alarm at all times.  DVT prophylaxis SCDs.  Supportive care.  Patient reassured.  PT/OT recommend moderate intensity level continued care.  Case management following for discharge planning.  Discharge plan to [facility] nursing rehabilitation facility.  Anticipate discharge when medically cleared.  Discussed with patient, Dr. Mcneil.      hSea Root, APRN-CNP

## 2024-05-20 NOTE — PROGRESS NOTES
Physical Therapy                 Therapy Communication Note    Patient Name: Karly Horton  MRN: [id number]  Today's Date: 5/20/2024     Discipline: Physical Therapy    Missed Visit Reason: Other (Comment)   (Nsg requesting to let pt sleep. Will hold follow-up.)    Missed Time: Attempt    Comment:

## 2024-05-21 LAB
ANION GAP SERPL CALC-SCNC: 9 MMOL/L
BUN SERPL-MCNC: 15 MG/DL (ref 8–25)
CALCIUM SERPL-MCNC: 8.6 MG/DL (ref 8.5–10.4)
CHLORIDE SERPL-SCNC: 97 MMOL/L (ref 97–107)
CO2 SERPL-SCNC: 30 MMOL/L (ref 24–31)
CREAT SERPL-MCNC: 0.5 MG/DL (ref 0.4–1.6)
EGFRCR SERPLBLD CKD-EPI 2021: >90 ML/MIN/1.73M*2
ERYTHROCYTE [DISTWIDTH] IN BLOOD BY AUTOMATED COUNT: 31.4 % (ref 11.5–14.5)
GLUCOSE BLD MANUAL STRIP-MCNC: 124 MG/DL (ref 74–99)
GLUCOSE BLD MANUAL STRIP-MCNC: 159 MG/DL (ref 74–99)
GLUCOSE BLD MANUAL STRIP-MCNC: 167 MG/DL (ref 74–99)
GLUCOSE SERPL-MCNC: 95 MG/DL (ref 65–99)
HCT VFR BLD AUTO: 35.7 % (ref 36–46)
HGB BLD-MCNC: 9.6 G/DL (ref 12–16)
LABORATORY COMMENT REPORT: NORMAL
MCH RBC QN AUTO: 19.7 PG (ref 26–34)
MCHC RBC AUTO-ENTMCNC: 26.9 G/DL (ref 32–36)
MCV RBC AUTO: 73 FL (ref 80–100)
NRBC BLD-RTO: 0.6 /100 WBCS (ref 0–0)
PATH REPORT.FINAL DX SPEC: NORMAL
PATH REPORT.GROSS SPEC: NORMAL
PATH REPORT.TOTAL CANCER: NORMAL
PATH REVIEW-CBC DIFFERENTIAL: NORMAL
PLATELET # BLD AUTO: 252 X10*3/UL (ref 150–450)
POTASSIUM SERPL-SCNC: 4.2 MMOL/L (ref 3.4–5.1)
RBC # BLD AUTO: 4.88 X10*6/UL (ref 4–5.2)
SODIUM SERPL-SCNC: 136 MMOL/L (ref 133–145)
WBC # BLD AUTO: 8.1 X10*3/UL (ref 4.4–11.3)

## 2024-05-21 PROCEDURE — 36415 COLL VENOUS BLD VENIPUNCTURE: CPT | Performed by: NURSE PRACTITIONER

## 2024-05-21 PROCEDURE — 97110 THERAPEUTIC EXERCISES: CPT | Mod: GP

## 2024-05-21 PROCEDURE — 2500000001 HC RX 250 WO HCPCS SELF ADMINISTERED DRUGS (ALT 637 FOR MEDICARE OP): Performed by: INTERNAL MEDICINE

## 2024-05-21 PROCEDURE — 2500000006 HC RX 250 W HCPCS SELF ADMINISTERED DRUGS (ALT 637 FOR ALL PAYERS): Performed by: INTERNAL MEDICINE

## 2024-05-21 PROCEDURE — 82947 ASSAY GLUCOSE BLOOD QUANT: CPT

## 2024-05-21 PROCEDURE — 2500000002 HC RX 250 W HCPCS SELF ADMINISTERED DRUGS (ALT 637 FOR MEDICARE OP, ALT 636 FOR OP/ED): Performed by: NURSE PRACTITIONER

## 2024-05-21 PROCEDURE — 97530 THERAPEUTIC ACTIVITIES: CPT | Mod: GP

## 2024-05-21 PROCEDURE — S4991 NICOTINE PATCH NONLEGEND: HCPCS | Performed by: NURSE PRACTITIONER

## 2024-05-21 PROCEDURE — 97535 SELF CARE MNGMENT TRAINING: CPT | Mod: GO,CO

## 2024-05-21 PROCEDURE — C9113 INJ PANTOPRAZOLE SODIUM, VIA: HCPCS

## 2024-05-21 PROCEDURE — 82374 ASSAY BLOOD CARBON DIOXIDE: CPT | Performed by: NURSE PRACTITIONER

## 2024-05-21 PROCEDURE — 2060000001 HC INTERMEDIATE ICU ROOM DAILY

## 2024-05-21 PROCEDURE — 2500000005 HC RX 250 GENERAL PHARMACY W/O HCPCS: Performed by: NURSE PRACTITIONER

## 2024-05-21 PROCEDURE — 2500000004 HC RX 250 GENERAL PHARMACY W/ HCPCS (ALT 636 FOR OP/ED)

## 2024-05-21 PROCEDURE — 97530 THERAPEUTIC ACTIVITIES: CPT | Mod: GO,CO

## 2024-05-21 PROCEDURE — 2500000001 HC RX 250 WO HCPCS SELF ADMINISTERED DRUGS (ALT 637 FOR MEDICARE OP): Performed by: NURSE PRACTITIONER

## 2024-05-21 PROCEDURE — 85027 COMPLETE CBC AUTOMATED: CPT | Performed by: NURSE PRACTITIONER

## 2024-05-21 RX ORDER — BUTALBITAL, ACETAMINOPHEN AND CAFFEINE 50; 325; 40 MG/1; MG/1; MG/1
1 TABLET ORAL EVERY 6 HOURS PRN
Status: DISCONTINUED | OUTPATIENT
Start: 2024-05-21 | End: 2024-05-22 | Stop reason: HOSPADM

## 2024-05-21 RX ORDER — ACETAMINOPHEN AND CODEINE PHOSPHATE 300; 30 MG/1; MG/1
1 TABLET ORAL EVERY 6 HOURS PRN
Status: DISCONTINUED | OUTPATIENT
Start: 2024-05-21 | End: 2024-05-22 | Stop reason: HOSPADM

## 2024-05-21 RX ORDER — LIDOCAINE 560 MG/1
1 PATCH PERCUTANEOUS; TOPICAL; TRANSDERMAL DAILY
Status: DISCONTINUED | OUTPATIENT
Start: 2024-05-21 | End: 2024-05-22 | Stop reason: HOSPADM

## 2024-05-21 RX ADMIN — SUCRALFATE 1 G: 1 SUSPENSION ORAL at 05:37

## 2024-05-21 RX ADMIN — LIDOCAINE 1 PATCH: 4 PATCH TOPICAL at 12:10

## 2024-05-21 RX ADMIN — Medication 2 L/MIN: at 08:00

## 2024-05-21 RX ADMIN — LEVOFLOXACIN 500 MG: 500 TABLET, FILM COATED ORAL at 10:06

## 2024-05-21 RX ADMIN — SUCRALFATE 1 G: 1 SUSPENSION ORAL at 12:11

## 2024-05-21 RX ADMIN — Medication 2 L/MIN: at 20:00

## 2024-05-21 RX ADMIN — PANTOPRAZOLE SODIUM 40 MG: 40 INJECTION, POWDER, FOR SOLUTION INTRAVENOUS at 10:06

## 2024-05-21 RX ADMIN — BACLOFEN 10 MG: 10 TABLET ORAL at 10:04

## 2024-05-21 RX ADMIN — NICOTINE 1 PATCH: 21 PATCH, EXTENDED RELEASE TRANSDERMAL at 10:06

## 2024-05-21 RX ADMIN — LACTULOSE 20 G: 10 SOLUTION ORAL at 10:06

## 2024-05-21 RX ADMIN — BUTALBITAL, ACETAMINOPHEN AND CAFFEINE 1 TABLET: 325; 50; 40 TABLET ORAL at 17:35

## 2024-05-21 RX ADMIN — BACLOFEN 10 MG: 10 TABLET ORAL at 17:49

## 2024-05-21 RX ADMIN — SUCRALFATE 1 G: 1 SUSPENSION ORAL at 17:35

## 2024-05-21 ASSESSMENT — PAIN - FUNCTIONAL ASSESSMENT
PAIN_FUNCTIONAL_ASSESSMENT: 0-10

## 2024-05-21 ASSESSMENT — COGNITIVE AND FUNCTIONAL STATUS - GENERAL
EATING MEALS: A LITTLE
HELP NEEDED FOR BATHING: A LITTLE
STANDING UP FROM CHAIR USING ARMS: A LOT
WALKING IN HOSPITAL ROOM: A LOT
MOVING FROM LYING ON BACK TO SITTING ON SIDE OF FLAT BED WITH BEDRAILS: A LITTLE
MOBILITY SCORE: 12
PERSONAL GROOMING: A LITTLE
STANDING UP FROM CHAIR USING ARMS: A LOT
DAILY ACTIVITIY SCORE: 18
TURNING FROM BACK TO SIDE WHILE IN FLAT BAD: A LOT
MOBILITY SCORE: 15
DRESSING REGULAR UPPER BODY CLOTHING: A LITTLE
CLIMB 3 TO 5 STEPS WITH RAILING: A LOT
TURNING FROM BACK TO SIDE WHILE IN FLAT BAD: A LITTLE
DRESSING REGULAR LOWER BODY CLOTHING: A LITTLE
MOVING TO AND FROM BED TO CHAIR: A LOT
PERSONAL GROOMING: A LITTLE
DAILY ACTIVITIY SCORE: 12
TOILETING: TOTAL
CLIMB 3 TO 5 STEPS WITH RAILING: TOTAL
TOILETING: A LITTLE
EATING MEALS: A LITTLE
MOVING TO AND FROM BED TO CHAIR: A LOT
DRESSING REGULAR UPPER BODY CLOTHING: A LOT
WALKING IN HOSPITAL ROOM: A LOT
HELP NEEDED FOR BATHING: A LOT
DRESSING REGULAR LOWER BODY CLOTHING: TOTAL

## 2024-05-21 ASSESSMENT — PAIN SCALES - GENERAL
PAINLEVEL_OUTOF10: 0 - NO PAIN
PAINLEVEL_OUTOF10: 0 - NO PAIN
PAINLEVEL_OUTOF10: 5 - MODERATE PAIN
PAINLEVEL_OUTOF10: 0 - NO PAIN
PAINLEVEL_OUTOF10: 1

## 2024-05-21 ASSESSMENT — ACTIVITIES OF DAILY LIVING (ADL): HOME_MANAGEMENT_TIME_ENTRY: 12

## 2024-05-21 NOTE — PROGRESS NOTES
Occupational Therapy    OT Treatment    Patient Name: Karly Horton  MRN: [id number]  Today's Date: 5/21/2024  Time Calculation  Start Time: 0826  Stop Time: 0850  Time Calculation (min): 24 min         Assessment:  OT Assessment: Tolerated session well, demonstrating improved functional tolerance and overall arousal throughout tx. Pt progressing towards POC and would benefit from continued skilled OT services to improve strength, balance, and functional tolerance to increase independence with ADL tasks  End of Session Communication: Bedside nurse  End of Session Patient Position: Up in chair, Alarm on (assist to order breakfast, sitter present)  OT Assessment Results: Decreased ADL status, Decreased upper extremity range of motion, Decreased upper extremity strength, Decreased endurance, Decreased cognition, Decreased functional mobility, Decreased trunk control for functional activities  Plan:  Treatment Interventions: ADL retraining, Functional transfer training, UE strengthening/ROM, Equipment evaluation/education, Compensatory technique education  OT Frequency: 3 times per week  OT Discharge Recommendations: Moderate intensity level of continued care  Equipment Recommended upon Discharge: Wheeled walker  OT Recommended Transfer Status: Assist of 1, Maximum assist  OT - OK to Discharge: Yes  Treatment Interventions: ADL retraining, Functional transfer training, UE strengthening/ROM, Equipment evaluation/education, Compensatory technique education    Subjective   Previous Visit Info:  OT Last Visit  OT Received On: 05/21/24  General:  General  Family/Caregiver Present: Yes  Caregiver Feedback: sitter present  Co-Treatment: PT  Co-Treatment Reason: Two-person skilled assist required for safe patient handling during transfer tasks  Prior to Session Communication: Bedside nurse  Patient Position Received: Bed, 3 rail up, Alarm off, caregiver present  General Comment: Cleared for therapy per RN. Pt supine in bed  upon arrival, sleeping but easily arousable and agreeable to tx  Precautions:  Hearing/Visual Limitations: glasses, mild [place]  Medical Precautions: Fall precautions, Oxygen therapy device and L/min (3L O2 nc)    Pain:  Pain Assessment  Pain Assessment: 0-10  Pain Score: 0 - No pain    Objective    Cognition:  Cognition  Overall Cognitive Status: Within Functional Limits  Insight: Mild  Processing Speed: Delayed    Activities of Daily Living:    Grooming  Grooming Level of Assistance: Setup, Close supervision  Grooming Where Assessed: Chair  Grooming Comments: face washing and oral hygiene tasks with cues for initiation    LE Dressing  LE Dressing: Yes  Sock Level of Assistance: Dependent  LE Dressing Where Assessed: Edge of bed  LE Dressing Comments: don socks    Bed Mobility/Transfers: Bed Mobility  Bed Mobility: Yes  Bed Mobility 1  Bed Mobility 1: Supine to sitting  Level of Assistance 1: Moderate assistance, Minimal verbal cues  Bed Mobility Comments 1: assist with trunk elevation with cues for BLE advancement off EOB with cues for proper hand placement with increased time and effort required for task completion. Pt demonstrating intermittent retropulsion once seated requiring min A for trunk support    Transfers  Transfer: Yes  Transfer 1  Technique 1: Sit to stand, Stand to sit  Transfer Level of Assistance 1: Moderate assistance, +2, Minimal verbal cues  Trials/Comments 1: assist for trunk elevation off EOB with cues for proper hand placement and eccentric lowering to chair  Transfers 2  Technique 2: Sit to stand, Stand to sit  Transfer Level of Assistance 2: Minimum assistance, +2, Minimal verbal cues  Trials/Comments 2: assist for balance with cues for proper hand placement. Pt demonstrating L lateral lean seated in chair with pillow propped under LUE for midline alignment    Functional Mobility:  Functional Mobility  Functional Mobility Performed: Yes  Functional Mobility 1  Assistance 1: Minimum  assistance, Minimal verbal cues (x2)  Comments 1: tolerated taking steps to chair with min A x2 for safety with cues for step sequencing and postural alignment  Sitting Balance:  Static Sitting Balance  Static Sitting-Level of Assistance: Minimum assistance, Close supervision  Static Sitting-Comment/Number of Minutes: demonstrating slight retropulsion with intermittent min A for postural alignment    Outcome Measures:[facility] Daily Activity  Putting on and taking off regular lower body clothing: Total  Bathing (including washing, rinsing, drying): A lot  Putting on and taking off regular upper body clothing: A lot  Toileting, which includes using toilet, bedpan or urinal: Total  Taking care of personal grooming such as brushing teeth: A little  Eating Meals: A little  Daily Activity - Total Score: 12      Education Documentation  Body Mechanics, taught by UGO Dave at 5/21/2024  9:43 AM.  Learner: Patient  Readiness: Acceptance  Method: Explanation  Response: Verbalizes Understanding    ADL Training, taught by UGO Dave at 5/21/2024  9:43 AM.  Learner: Patient  Readiness: Acceptance  Method: Explanation  Response: Verbalizes Understanding    Education Comments  No comments found.        Problem: ADLs  Goal: Patient with complete upper body dressing with minimal assist  level of assistance donning and doffing all UE clothes with PRN adaptive equipment while edge of bed   Outcome: Progressing  Goal: Patient will complete daily grooming tasks with minimal assist  level of assistance and PRN adaptive equipment while edge of bed .  Outcome: Progressing     Problem: TRANSFERS  Goal: Patient will perform bed mobility stand by assist level of assistance and bed rails in order to improve safety and independence with mobility  Outcome: Progressing  Goal: Patient will complete functional transfer to toilet/commode with least restrictive device with minimal assist  level of assistance.  Outcome:  Progressing     Problem: ADLs  Goal: Patient with complete lower body dressing with minimal assist  level of assistance donning and doffing all LE clothes  with PRN adaptive equipment while edge of bed   Outcome: Not Progressing

## 2024-05-21 NOTE — PROGRESS NOTES
Physical Therapy    Physical Therapy Treatment    Patient Name: Karly Horton  MRN: [id number]  Today's Date: 5/21/2024  Time Calculation  Start Time: 0827  Stop Time: 0855  Time Calculation (min): 28 min    Assessment/Plan   PT Assessment  PT Assessment Results: Decreased strength, Decreased range of motion, Decreased endurance, Impaired balance, Decreased mobility, Decreased coordination, Impaired judgement, Decreased safety awareness, Impaired tone  Rehab Prognosis: Good  Evaluation/Treatment Tolerance: Patient tolerated treatment well, Patient limited by fatigue  Medical Staff Made Aware: Yes  Strengths: Attitude of self  Barriers to Participation: Comorbidities  End of Session Communication: Bedside nurse  Assessment Comment: The patient requires modA for transfers and OOB activity with HHA. The patient would continue to benefit from skilled therapy services to address functional deficits.  End of Session Patient Position: Up in chair, Alarm on (sitter present)  PT Plan  Inpatient/Swing Bed or Outpatient: Inpatient  PT Plan  Treatment/Interventions: Bed mobility, Transfer training, Gait training, Balance training, Neuromuscular re-education, Strengthening, Endurance training, Range of motion, Therapeutic exercise, Therapeutic activity  PT Plan: Skilled PT  PT Frequency: 4 times per week  PT Discharge Recommendations: Moderate intensity level of continued care  Equipment Recommended upon Discharge: Wheeled walker  PT Recommended Transfer Status: Assist x1  PT - OK to Discharge: Yes      General Visit Information:   PT  Visit  PT Received On: 05/21/24  Response to Previous Treatment: Patient with no complaints from previous session.  General  Family/Caregiver Present: Yes  Caregiver Feedback: sitter present  Co-Treatment: OT  Co-Treatment Reason: Two-person skilled assist required for safe patient handling during transfer tasks  Prior to Session Communication: Bedside nurse  Patient Position Received: Bed, 3  rail up, Alarm off, caregiver present  Preferred Learning Style: auditory, kinesthetic  General Comment: Cleared for therapy per RN. Pt supine in bed upon arrival, sleeping but easily arousable and agreeable to tx    Subjective   Precautions:  Precautions  Hearing/Visual Limitations: glasses, mild [place]  Medical Precautions: Fall precautions, Oxygen therapy device and L/min (3L O2)    Objective   Pain:  Pain Assessment  Pain Assessment: 0-10  Pain Score: 0 - No pain  Cognition:  Cognition  Overall Cognitive Status: Impaired  Orientation Level: Disoriented to time, Disoriented to place  Following Commands: Follows one step commands with increased time  Safety Judgment: Decreased awareness of need for assistance  Cognition Comments: +sitter during treatment  Insight: Moderate  Impulsive: Mildly  Processing Speed: Delayed  Coordination:  Coordination Comment: decreased rate of movements  Postural Control:  Postural Control  Postural Control: Impaired  Posture Comment: forward head, increased thoracic kyhosis  Static Sitting Balance  Static Sitting-Balance Support: Bilateral upper extremity supported  Static Sitting-Level of Assistance: Close supervision  Static Sitting-Comment/Number of Minutes: EOB for >10 min  Static Standing Balance  Static Standing-Balance Support: Bilateral upper extremity supported (HHA)  Static Standing-Level of Assistance: Minimum assistance, Moderate assistance  Static Standing-Comment/Number of Minutes: Narrow [place]  Dynamic Standing Balance  Dynamic Standing-Balance Support: Bilateral upper extremity supported (HHA)  Dynamic Standing-Comments: HHA with modA for ambulation to bedside chair  Extremity/Trunk Assessments:  RLE   RLE :  (grossly 3-3+/5)  LLE   LLE :  (grossly 3-3+/5)  Activity Tolerance:  Activity Tolerance  Endurance: Decreased tolerance for upright activites  Rate of Perceived Exertion (RPE): 5/10  Treatments:  Therapeutic Exercise  Therapeutic Exercise Performed: Yes (completed in  sitting)  Therapeutic Exercise Activity 1: AP x 15 reps buffy  Therapeutic Exercise Activity 2: LAQ x 10 reps buffy  Therapeutic Exercise Activity 3: Hip flex x 10 reps buffy  Therapeutic Exercise Activity 4: Hip add/abd x 10 reps buffy    Bed Mobility  Bed Mobility: Yes  Bed Mobility 1  Bed Mobility 1: Supine to sitting  Level of Assistance 1: Moderate assistance, Minimal verbal cues  Bed Mobility Comments 1: Assist with trunk elevation; VCs for safe sequencing and hand placement    Ambulation/Gait Training  Ambulation/Gait Training Performed: Yes  Ambulation/Gait Training 1  Surface 1: Level tile  Assistance 1: Hand held assistance, Moderate assistance  Quality of Gait 1: Narrow base of support, Decreased step length, Shuffling gait  Comments/Distance (ft) 1: Ambulation of 5ftx1 with HHA; very forward flexed posture with shuffled-like gait pattern. VCs for safe sequencing  Transfers  Transfer: Yes  Transfer 1  Transfer From 1: Sit to, Stand to  Transfer to 1: Stand, Sit  Technique 1: Sit to stand, Stand to sit  Transfer Level of Assistance 1: Hand held assistance, Moderate assistance  Trials/Comments 1: Completed x 3 with modA and HHA; VCs for safe sequencing    Outcome Measures:  [facility] Basic Mobility  Turning from your back to your side while in a flat bed without using bedrails: A little  Moving from lying on your back to sitting on the side of a flat bed without using bedrails: A lot  Moving to and from bed to chair (including a wheelchair): A lot  Standing up from a chair using your arms (e.g. wheelchair or bedside chair): A lot  To walk in hospital room: A lot  Climbing 3-5 steps with railing: Total  Basic Mobility - Total Score: 12    Education Documentation  Mobility Training, taught by Hamida Hardy PT at 5/21/2024 11:40 AM.  Learner: Patient  Readiness: Acceptance  Method: Explanation, Demonstration  Response: Needs Reinforcement  Comment: Education provided on safe sequencing of mobility and for general safety  awareness.    Education Comments  No comments found.        Encounter Problems       Encounter Problems (Active)       Mobility       STG - Patient will ambulate 50' with LRAD and modified independence. (Progressing)       Start:  05/16/24    Expected End:  06/13/24               PT Transfers       STG - Patient to transfer to and from sit to supine with independence. (Progressing)       Start:  05/16/24    Expected End:  06/13/24            STG - Patient will transfer sit to and from stand with use of LRAD and modified independence. (Progressing)       Start:  05/16/24    Expected End:  06/13/24               Safety       STG - Patient uses call light consistently to request assistance with transfers (Progressing)       Start:  05/16/24    Expected End:  06/13/24

## 2024-05-21 NOTE — PROGRESS NOTES
Karly Horton is a 79 y.o. female on day 6 of admission presenting with Pneumonia of both lower lobes due to infectious organism.    Subjective   Patient seen and examined.  Resting sitting up in the chair in no acute distress.  Sitter bedside.  Eating breakfast.  Awake alert oriented x 3.  No complaints.  Medications reviewed.    Spoke with nursing, no new issues.    Objective   Sitter bedside    Physical Exam  Vitals and nursing note reviewed.   Constitutional:       General: She is not in acute distress.     Appearance: Normal appearance. She is normal weight. She is not ill-appearing, toxic-appearing or diaphoretic.   HENT:      Head: Normocephalic and atraumatic.      Right Ear: Tympanic membrane normal.      Left Ear: Tympanic membrane normal.      Nose: Nose normal.      Mouth/Throat:      Mouth: Mucous membranes are moist.      Pharynx: Oropharynx is clear.   Eyes:      Extraocular Movements: Extraocular movements intact.      Conjunctiva/sclera: Conjunctivae normal.      Pupils: Pupils are equal, round, and reactive to light.   Cardiovascular:      Rate and Rhythm: Normal rate and regular rhythm.      Pulses: Normal pulses.      Heart sounds: Normal heart sounds. No murmur heard.  Pulmonary:      Effort: Pulmonary effort is normal. No respiratory distress.      Breath sounds: Normal breath sounds. No wheezing, rhonchi or rales.      Comments: Room air.  Abdominal:      General: Bowel sounds are normal. There is no distension.      Palpations: Abdomen is soft.      Tenderness: There is no abdominal tenderness.      Comments: Chronic abdominal distention, rounded baseline - spasm to palpation.   Genitourinary:     Comments: Deferred.  Musculoskeletal:         General: No swelling or tenderness. Normal range of motion.      Cervical back: Normal range of motion and neck supple.      Right lower leg: No edema.      Left lower leg: No edema.   Skin:     General: Skin is warm and dry.      Capillary Refill:  "Capillary refill takes less than 2 seconds.   Neurological:      General: No focal deficit present.      Mental Status: She is alert and oriented to person, place, and time.      Comments: Awake alert oriented x 3. States name, birth-date, month, year. Speech clear, coherent. Follows all commands. Generalized weakness. No focal weakness. Gait deferred.    Psychiatric:         Mood and Affect: Mood normal.         Behavior: Behavior normal.       Last Recorded Vitals  Blood pressure 122/58, pulse 89, temperature 36.6 °C (97.9 °F), temperature source Temporal, resp. rate 17, height 1.473 m (4' 10\"), weight (!) 33.8 kg (74 lb 8.3 oz), SpO2 100%.    Intake/Output last 3 Shifts:  I/O last 3 completed shifts:  In: 850 (25.1 mL/kg) [P.O.:650; IV Piggyback:200]  Out: - (0 mL/kg)   Weight: 33.8 kg     Telemetry sinus tachycardia rate 100's    Relevant Results  Results for orders placed or performed during the hospital encounter of 05/14/24 (from the past 24 hour(s))   POCT GLUCOSE   Result Value Ref Range    POCT Glucose 104 (H) 74 - 99 mg/dL   Basic Metabolic Panel   Result Value Ref Range    Glucose 103 (H) 65 - 99 mg/dL    Sodium 141 133 - 145 mmol/L    Potassium 2.5 (LL) 3.4 - 5.1 mmol/L    Chloride 96 (L) 97 - 107 mmol/L    Bicarbonate 36 (H) 24 - 31 mmol/L    Urea Nitrogen 9 8 - 25 mg/dL    Creatinine 0.50 0.40 - 1.60 mg/dL    eGFR >90 >60 mL/min/1.73m*2    Calcium 8.8 8.5 - 10.4 mg/dL    Anion Gap 9 <=19 mmol/L   Magnesium   Result Value Ref Range    Magnesium 2.00 1.60 - 3.10 mg/dL   CBC and Auto Differential   Result Value Ref Range    WBC 10.6 4.4 - 11.3 x10*3/uL    nRBC 0.5 (H) 0.0 - 0.0 /100 WBCs    RBC 5.16 4.00 - 5.20 x10*6/uL    Hemoglobin 10.1 (L) 12.0 - 16.0 g/dL    Hematocrit 36.3 36.0 - 46.0 %    MCV 70 (L) 80 - 100 fL    MCH 19.6 (L) 26.0 - 34.0 pg    MCHC 27.8 (L) 32.0 - 36.0 g/dL    RDW 31.5 (H) 11.5 - 14.5 %    Platelets 270 150 - 450 x10*3/uL    Neutrophils % 83.7 40.0 - 80.0 %    Immature " Granulocytes %, Automated 0.7 0.0 - 0.9 %    Lymphocytes % 5.0 13.0 - 44.0 %    Monocytes % 10.0 2.0 - 10.0 %    Eosinophils % 0.4 0.0 - 6.0 %    Basophils % 0.2 0.0 - 2.0 %    Neutrophils Absolute 8.91 (H) 1.60 - 5.50 x10*3/uL    Immature Granulocytes Absolute, Automated 0.07 0.00 - 0.50 x10*3/uL    Lymphocytes Absolute 0.53 (L) 0.80 - 3.00 x10*3/uL    Monocytes Absolute 1.06 (H) 0.05 - 0.80 x10*3/uL    Eosinophils Absolute 0.04 0.00 - 0.40 x10*3/uL    Basophils Absolute 0.02 0.00 - 0.10 x10*3/uL   Pathologist Review-CBC Differential   Result Value Ref Range    Pathologist Review-CBC Differential       Review of peripheral blood smear reveals microcytic, hypochromic anemia with anisocytosis, ovalocytes, target cells, spherocytes, slightly increased polychromasia, and nucleated red blood cells present.  Absolute neutrophilia and absolute monocytosis noted.  Please correlate clinically.     Morphology   Result Value Ref Range    RBC Morphology See Below     Polychromasia Mild     Hypochromia Marked     RBC Fragments Few     Spherocytes Many     Target Cells Many     Ovalocytes Few     Casper Cells Few    POCT GLUCOSE   Result Value Ref Range    POCT Glucose 129 (H) 74 - 99 mg/dL   Basic Metabolic Panel   Result Value Ref Range    Glucose 98 65 - 99 mg/dL    Sodium 134 133 - 145 mmol/L    Potassium 4.8 3.4 - 5.1 mmol/L    Chloride 97 97 - 107 mmol/L    Bicarbonate 33 (H) 24 - 31 mmol/L    Urea Nitrogen 15 8 - 25 mg/dL    Creatinine 0.60 0.40 - 1.60 mg/dL    eGFR >90 >60 mL/min/1.73m*2    Calcium 8.4 (L) 8.5 - 10.4 mg/dL    Anion Gap 4 <=19 mmol/L   Basic Metabolic Panel   Result Value Ref Range    Glucose 95 65 - 99 mg/dL    Sodium 136 133 - 145 mmol/L    Potassium 4.2 3.4 - 5.1 mmol/L    Chloride 97 97 - 107 mmol/L    Bicarbonate 30 24 - 31 mmol/L    Urea Nitrogen 15 8 - 25 mg/dL    Creatinine 0.50 0.40 - 1.60 mg/dL    eGFR >90 >60 mL/min/1.73m*2    Calcium 8.6 8.5 - 10.4 mg/dL    Anion Gap 9 <=19 mmol/L   CBC   Result  Value Ref Range    WBC 8.1 4.4 - 11.3 x10*3/uL    nRBC 0.6 (H) 0.0 - 0.0 /100 WBCs    RBC 4.88 4.00 - 5.20 x10*6/uL    Hemoglobin 9.6 (L) 12.0 - 16.0 g/dL    Hematocrit 35.7 (L) 36.0 - 46.0 %    MCV 73 (L) 80 - 100 fL    MCH 19.7 (L) 26.0 - 34.0 pg    MCHC 26.9 (L) 32.0 - 36.0 g/dL    RDW 31.4 (H) 11.5 - 14.5 %    Platelets 252 150 - 450 x10*3/uL   POCT GLUCOSE   Result Value Ref Range    POCT Glucose 167 (H) 74 - 99 mg/dL     No results found for the last 90 days.    XR chest 2 views    Result Date: 5/20/2024  Interpreted By:  Leny Burgos, STUDY: XR CHEST 2 VIEWS;  5/20/2024 12:35 pm   INDICATION: Signs/Symptoms:pneumonia LLL.   COMPARISON: 05/14/2024   ACCESSION NUMBER(S): [id number]   ORDERING CLINICIAN: CORINE VIRK   FINDINGS:         CARDIOMEDIASTINAL SILHOUETTE: Cardiomediastinal silhouette is stable in size and configuration.   LUNGS: There are increased interstitial markings in a bibasilar distribution. Slightly worsened when compared with the prior exam.   ABDOMEN: No remarkable upper abdominal findings.   BONES: Chronic rotator cuff tear, right shoulder. S scoliosis of the thoracolumbar spine.       1.  Minimal pulmonary venous congestion. Airspace opacities in bilateral lower lobe distribution may be secondary to segmental atelectasis however an infectious or inflammatory process can not be excluded.       MACRO: None   Signed by: Leny Burgos 5/20/2024 2:11 PM Dictation workstation:   [id number]     Scheduled medications  lactulose, 20 g, oral, Daily  levoFLOXacin, 500 mg, oral, q24h [place]  lidocaine, 0.1 mL, subcutaneous, Once  nicotine, 1 patch, transdermal, Daily  oxygen, , inhalation, Continuous - Inhalation  pantoprazole, 40 mg, intravenous, Daily  perflutren lipid microspheres, 0.5-10 mL of dilution, intravenous, Once in imaging  perflutren protein A microsphere, 0.5 mL, intravenous, Once in imaging  sucralfate, 1 g, oral, q6h [place]  sulfur hexafluoride microsphr, 2 mL, intravenous, Once in  imaging      Continuous medications     PRN medications  PRN medications: acetaminophen, albuterol, baclofen, hydrALAZINE, lubricating eye drops, ondansetron, ondansetron, oxygen        ASSESSMENT:  Dyspnea  Leg swelling resolved  CHF  Cor Pulmonale   Pulmonary hypertension  Pneumonia  COPD  Acute hypoxic respiratory failure  Tobacco dependence  Leukocytosis improved  Constipation resolved  Microcytic anemia  Iron deficiency anemia  Weight loss  Non-obstructing Schatzki ring  Duodenal ulcer with hemorrhage  Diverticulosis  Generalized weakness  Inability to ambulate  Hypokalemia   Severe protein calorie malnutrition  Insomnia  Confusion altered mental status - resolved    PLAN:  Patient is doing well this morning.  No new issues.   Mentation and mood stable.  Discontinue sitter.  Continue current treatment.  Spoke with outpatient pharmacy.  Medications reviewed and updated.  Continue as prescribed as appropriate.  May add Melatonin at bedtime for sleep-wake cycle if needed.  Respiratory status, pulse oximetry stable on oxygen as needed.  Pulmonology following.  Input appreciated.  IV Levaquin.  DuoNeb treatments.  Oxygen as needed.  Tobacco cessation education.  Discussed with patient.  Stable cardiac.  Continue medical management.  Labs reviewed.  Electrolytes and renal function stable.  H&H stable.  Continue PPI and Carafate.  Diet as tolerated.  Avoid NSAID use and smoking.  Outpatient follow-up.  Encourage protein intake.  PT/OT.  Fall precautions.  Up with assistance only.  Bed and chair alarm at all times.  DVT prophylaxis SCDs.  Supportive care.  Patient reassured.  PT/OT recommend moderate intensity level continued care.  Discharge plan to [facility] nursing rehabilitation facility.  She must be sitter free for 24 hours.  Anticipate discharge tomorrow.  Discussed with Dr. Mcneil.       Shea Root, APRN-CNP

## 2024-05-21 NOTE — DISCHARGE INSTRUCTIONS
If you have any questions, please contact Dr. Mcneil's office at [phone].     Stop smoking. No NSAID use.

## 2024-05-21 NOTE — PROGRESS NOTES
FOLLOWUP FOR: Pneumonia, COPD    SUBJECTIVE  Awake, no distress, on 2 L nasal cannula.  Fluid balance not recorded    PHYSICAL EXAM        5/20/2024     7:00 AM 5/20/2024    11:00 AM 5/20/2024     3:25 PM 5/20/2024     7:13 PM 5/20/2024    11:00 PM 5/21/2024     3:24 AM 5/21/2024     7:00 AM   Vitals   Systolic  148 127 121 114 142 122   Diastolic  54 53 52 54 62 58   Heart Rate  87 88  88 95 89   Temp  36.8 °C (98.2 °F) 36.6 °C (97.9 °F) 36.9 °C (98.4 °F) 36.3 °C (97.3 °F) 36.5 °C (97.7 °F) 36.6 °C (97.9 °F)   Resp  15 16 18 17 16 17   Weight (lb) 76.28     74.52    BMI 15.94 kg/m2     15.57 kg/m2    BSA (m2) 1.19 m2     1.18 m2        Vital signs reviewed   NAD, very thin, frail female looking older than her stated age, oxygen   Lungs Symmetric excursions.  Auscultation - diminished but clear, no wheezing/rales/rhonchi.     Cor RSR No murmur, rub, gallop   Abd soft and nontender, no rebound or distention, no HS megaly   Ext no CCE   Neuro awake alert, no distress    LABS     Serum chemistries are normal.  CBC with modest anemia normal white count    Yesterday's chest x-ray was personally reviewed.  Some retrocardiac increased density suspicious for left lower lobe atelectasis versus infiltrate    ASSESSMENT:    .  Left lower lobe pneumonia  .  COPD  .  Acute hypoxia, resolved  .  History of congestive heart failure  .  Iron deficiency anemia  .  Frailty, malnourished    PLAN    .  Check room air oxygenation again  .  Follow-up chest x-ray  .  Complete 7 days of antibiotics, to oral levofloxacin  .  Discharge plans    Teddy Beltran MD, [facility]

## 2024-05-21 NOTE — CARE PLAN
The patient's goals for the shift include Sleep    The clinical goals for the shift include maintain a regular sleep pattern    Over the shift, the patient did not make progress toward the following goals. Barriers to progression include patients sleep schedule has been disrupted. Recommendations to address these barriers include induce a regular sleep schedule through pharmacuticals.

## 2024-05-22 VITALS
TEMPERATURE: 99 F | WEIGHT: 79.81 LBS | BODY MASS INDEX: 16.75 KG/M2 | HEART RATE: 100 BPM | DIASTOLIC BLOOD PRESSURE: 85 MMHG | OXYGEN SATURATION: 92 % | SYSTOLIC BLOOD PRESSURE: 129 MMHG | HEIGHT: 58 IN | RESPIRATION RATE: 17 BRPM

## 2024-05-22 LAB
ANION GAP SERPL CALC-SCNC: 7 MMOL/L
BUN SERPL-MCNC: 14 MG/DL (ref 8–25)
CALCIUM SERPL-MCNC: 8.4 MG/DL (ref 8.5–10.4)
CHLORIDE SERPL-SCNC: 93 MMOL/L (ref 97–107)
CO2 SERPL-SCNC: 33 MMOL/L (ref 24–31)
CREAT SERPL-MCNC: 0.4 MG/DL (ref 0.4–1.6)
EGFRCR SERPLBLD CKD-EPI 2021: >90 ML/MIN/1.73M*2
ERYTHROCYTE [DISTWIDTH] IN BLOOD BY AUTOMATED COUNT: 31.7 % (ref 11.5–14.5)
EST. AVERAGE GLUCOSE BLD GHB EST-MCNC: 100 MG/DL
GLUCOSE SERPL-MCNC: 107 MG/DL (ref 65–99)
HBA1C MFR BLD: 5.1 %
HCT VFR BLD AUTO: 31.5 % (ref 36–46)
HGB BLD-MCNC: 8.8 G/DL (ref 12–16)
MCH RBC QN AUTO: 20.7 PG (ref 26–34)
MCHC RBC AUTO-ENTMCNC: 27.9 G/DL (ref 32–36)
MCV RBC AUTO: 74 FL (ref 80–100)
NRBC BLD-RTO: 0 /100 WBCS (ref 0–0)
PLATELET # BLD AUTO: 220 X10*3/UL (ref 150–450)
POTASSIUM SERPL-SCNC: 4.4 MMOL/L (ref 3.4–5.1)
RBC # BLD AUTO: 4.26 X10*6/UL (ref 4–5.2)
SODIUM SERPL-SCNC: 133 MMOL/L (ref 133–145)
WBC # BLD AUTO: 10.5 X10*3/UL (ref 4.4–11.3)

## 2024-05-22 PROCEDURE — 97116 GAIT TRAINING THERAPY: CPT | Mod: GP

## 2024-05-22 PROCEDURE — 83036 HEMOGLOBIN GLYCOSYLATED A1C: CPT | Performed by: NURSE PRACTITIONER

## 2024-05-22 PROCEDURE — 97110 THERAPEUTIC EXERCISES: CPT | Mod: GP

## 2024-05-22 PROCEDURE — S4991 NICOTINE PATCH NONLEGEND: HCPCS | Performed by: NURSE PRACTITIONER

## 2024-05-22 PROCEDURE — 2500000001 HC RX 250 WO HCPCS SELF ADMINISTERED DRUGS (ALT 637 FOR MEDICARE OP): Performed by: INTERNAL MEDICINE

## 2024-05-22 PROCEDURE — 2500000002 HC RX 250 W HCPCS SELF ADMINISTERED DRUGS (ALT 637 FOR MEDICARE OP, ALT 636 FOR OP/ED): Performed by: NURSE PRACTITIONER

## 2024-05-22 PROCEDURE — 80048 BASIC METABOLIC PNL TOTAL CA: CPT | Performed by: NURSE PRACTITIONER

## 2024-05-22 PROCEDURE — 2500000004 HC RX 250 GENERAL PHARMACY W/ HCPCS (ALT 636 FOR OP/ED)

## 2024-05-22 PROCEDURE — 2500000005 HC RX 250 GENERAL PHARMACY W/O HCPCS: Performed by: NURSE PRACTITIONER

## 2024-05-22 PROCEDURE — C9113 INJ PANTOPRAZOLE SODIUM, VIA: HCPCS

## 2024-05-22 PROCEDURE — 2500000006 HC RX 250 W HCPCS SELF ADMINISTERED DRUGS (ALT 637 FOR ALL PAYERS): Performed by: INTERNAL MEDICINE

## 2024-05-22 PROCEDURE — 85027 COMPLETE CBC AUTOMATED: CPT | Performed by: NURSE PRACTITIONER

## 2024-05-22 PROCEDURE — 36415 COLL VENOUS BLD VENIPUNCTURE: CPT | Performed by: NURSE PRACTITIONER

## 2024-05-22 PROCEDURE — 2500000001 HC RX 250 WO HCPCS SELF ADMINISTERED DRUGS (ALT 637 FOR MEDICARE OP): Performed by: NURSE PRACTITIONER

## 2024-05-22 RX ORDER — SUCRALFATE 1 G/10ML
1 SUSPENSION ORAL EVERY 6 HOURS SCHEDULED
Start: 2024-05-22

## 2024-05-22 RX ORDER — IBUPROFEN 200 MG
1 TABLET ORAL DAILY
Start: 2024-05-23

## 2024-05-22 RX ORDER — FERROUS SULFATE 325(65) MG
325 TABLET ORAL
Start: 2024-05-22

## 2024-05-22 RX ORDER — PANTOPRAZOLE SODIUM 40 MG/1
40 TABLET, DELAYED RELEASE ORAL 2 TIMES DAILY
Start: 2024-05-22

## 2024-05-22 RX ORDER — ASCORBIC ACID 500 MG
500 TABLET ORAL DAILY
Start: 2024-05-22

## 2024-05-22 RX ADMIN — BACLOFEN 10 MG: 10 TABLET ORAL at 15:46

## 2024-05-22 RX ADMIN — ACETAMINOPHEN 650 MG: 325 TABLET ORAL at 15:46

## 2024-05-22 RX ADMIN — SUCRALFATE 1 G: 1 SUSPENSION ORAL at 06:12

## 2024-05-22 RX ADMIN — SUCRALFATE 1 G: 1 SUSPENSION ORAL at 00:07

## 2024-05-22 RX ADMIN — BACLOFEN 10 MG: 10 TABLET ORAL at 08:45

## 2024-05-22 RX ADMIN — SUCRALFATE 1 G: 1 SUSPENSION ORAL at 15:43

## 2024-05-22 RX ADMIN — NICOTINE 1 PATCH: 21 PATCH, EXTENDED RELEASE TRANSDERMAL at 08:45

## 2024-05-22 RX ADMIN — LIDOCAINE 1 PATCH: 4 PATCH TOPICAL at 08:45

## 2024-05-22 RX ADMIN — LEVOFLOXACIN 500 MG: 500 TABLET, FILM COATED ORAL at 08:45

## 2024-05-22 RX ADMIN — LACTULOSE 20 G: 10 SOLUTION ORAL at 08:45

## 2024-05-22 RX ADMIN — PANTOPRAZOLE SODIUM 40 MG: 40 INJECTION, POWDER, FOR SOLUTION INTRAVENOUS at 08:45

## 2024-05-22 RX ADMIN — BUTALBITAL, ACETAMINOPHEN AND CAFFEINE 1 TABLET: 325; 50; 40 TABLET ORAL at 08:45

## 2024-05-22 RX ADMIN — BUTALBITAL, ACETAMINOPHEN AND CAFFEINE 1 TABLET: 325; 50; 40 TABLET ORAL at 15:46

## 2024-05-22 ASSESSMENT — COGNITIVE AND FUNCTIONAL STATUS - GENERAL
WALKING IN HOSPITAL ROOM: A LOT
DAILY ACTIVITIY SCORE: 18
TURNING FROM BACK TO SIDE WHILE IN FLAT BAD: A LITTLE
WALKING IN HOSPITAL ROOM: A LOT
TOILETING: A LITTLE
PERSONAL GROOMING: A LITTLE
CLIMB 3 TO 5 STEPS WITH RAILING: A LOT
MOVING TO AND FROM BED TO CHAIR: A LITTLE
HELP NEEDED FOR BATHING: A LITTLE
MOBILITY SCORE: 16
DRESSING REGULAR LOWER BODY CLOTHING: A LITTLE
MOBILITY SCORE: 16
STANDING UP FROM CHAIR USING ARMS: A LOT
EATING MEALS: A LITTLE
DRESSING REGULAR UPPER BODY CLOTHING: A LITTLE
STANDING UP FROM CHAIR USING ARMS: A LOT
CLIMB 3 TO 5 STEPS WITH RAILING: A LOT
TURNING FROM BACK TO SIDE WHILE IN FLAT BAD: A LITTLE
MOVING TO AND FROM BED TO CHAIR: A LITTLE

## 2024-05-22 ASSESSMENT — PAIN SCALES - GENERAL
PAINLEVEL_OUTOF10: 0 - NO PAIN
PAINLEVEL_OUTOF10: 0 - NO PAIN
PAINLEVEL_OUTOF10: 1

## 2024-05-22 ASSESSMENT — PAIN - FUNCTIONAL ASSESSMENT
PAIN_FUNCTIONAL_ASSESSMENT: 0-10

## 2024-05-22 NOTE — PROGRESS NOTES
Karly Horton is a 79 y.o. female on day 7 of admission presenting with Pneumonia of both lower lobes due to infectious organism.    Subjective   Patient seen and examined.  Resting in bed in no acute distress.  Awake alert oriented x 3.  No complaints.    Objective     Physical Exam  Vitals and nursing note reviewed.   Constitutional:       General: She is not in acute distress.     Appearance: Normal appearance. She is normal weight. She is not ill-appearing, toxic-appearing or diaphoretic.   HENT:      Head: Normocephalic and atraumatic.      Right Ear: Tympanic membrane normal.      Left Ear: Tympanic membrane normal.      Nose: Nose normal.      Mouth/Throat:      Mouth: Mucous membranes are moist.      Pharynx: Oropharynx is clear.   Eyes:      Extraocular Movements: Extraocular movements intact.      Conjunctiva/sclera: Conjunctivae normal.      Pupils: Pupils are equal, round, and reactive to light.   Cardiovascular:      Rate and Rhythm: Normal rate and regular rhythm.      Pulses: Normal pulses.      Heart sounds: Normal heart sounds. No murmur heard.  Pulmonary:      Effort: Pulmonary effort is normal. No respiratory distress.      Breath sounds: Normal breath sounds. No wheezing, rhonchi or rales.      Comments: Room air.  Abdominal:      General: Bowel sounds are normal. There is no distension.      Palpations: Abdomen is soft.      Tenderness: There is no abdominal tenderness.      Comments: Chronic abdominal distention, rounded baseline - spasm to palpation.   Genitourinary:     Comments: Deferred.  Musculoskeletal:         General: No swelling or tenderness. Normal range of motion.      Cervical back: Normal range of motion and neck supple.      Right lower leg: No edema.      Left lower leg: No edema.   Skin:     General: Skin is warm and dry.      Capillary Refill: Capillary refill takes less than 2 seconds.   Neurological:      General: No focal deficit present.      Mental Status: She is  "alert and oriented to person, place, and time.      Comments: Awake alert oriented x 3. States name, birth-date, month, year. Speech clear, coherent. Follows all commands. Generalized weakness. No focal weakness. Gait deferred.    Psychiatric:         Mood and Affect: Mood normal.         Behavior: Behavior normal.       Last Recorded Vitals  Blood pressure 126/55, pulse 94, temperature 37.5 °C (99.5 °F), temperature source Temporal, resp. rate 16, height 1.473 m (4' 10\"), weight (!) 36.2 kg (79 lb 12.9 oz), SpO2 97%.    Intake/Output last 3 Shifts:  I/O last 3 completed shifts:  In: 800 (22.1 mL/kg) [P.O.:600; IV Piggyback:200]  Out: 0 (0 mL/kg)   Weight: 36.2 kg     Telemetry normal sinus rhythm rate 90's    Relevant Results  Results for orders placed or performed during the hospital encounter of 05/14/24 (from the past 24 hour(s))   POCT GLUCOSE   Result Value Ref Range    POCT Glucose 124 (H) 74 - 99 mg/dL   POCT GLUCOSE   Result Value Ref Range    POCT Glucose 159 (H) 74 - 99 mg/dL   Basic Metabolic Panel   Result Value Ref Range    Glucose 107 (H) 65 - 99 mg/dL    Sodium 133 133 - 145 mmol/L    Potassium 4.4 3.4 - 5.1 mmol/L    Chloride 93 (L) 97 - 107 mmol/L    Bicarbonate 33 (H) 24 - 31 mmol/L    Urea Nitrogen 14 8 - 25 mg/dL    Creatinine 0.40 0.40 - 1.60 mg/dL    eGFR >90 >60 mL/min/1.73m*2    Calcium 8.4 (L) 8.5 - 10.4 mg/dL    Anion Gap 7 <=19 mmol/L   CBC   Result Value Ref Range    WBC 10.5 4.4 - 11.3 x10*3/uL    nRBC 0.0 0.0 - 0.0 /100 WBCs    RBC 4.26 4.00 - 5.20 x10*6/uL    Hemoglobin 8.8 (L) 12.0 - 16.0 g/dL    Hematocrit 31.5 (L) 36.0 - 46.0 %    MCV 74 (L) 80 - 100 fL    MCH 20.7 (L) 26.0 - 34.0 pg    MCHC 27.9 (L) 32.0 - 36.0 g/dL    RDW 31.7 (H) 11.5 - 14.5 %    Platelets 220 150 - 450 x10*3/uL   Hemoglobin A1c   Result Value Ref Range    Hemoglobin A1C 5.1 See below %    Estimated Average Glucose 100 Not Established mg/dL     No results found for the last 90 days.    XR chest 2 views    Result " Date: 5/20/2024  Interpreted By:  Leny Burgos, STUDY: XR CHEST 2 VIEWS;  5/20/2024 12:35 pm   INDICATION: Signs/Symptoms:pneumonia LLL.   COMPARISON: 05/14/2024   ACCESSION NUMBER(S): [id number]   ORDERING CLINICIAN: CORINE VIRK   FINDINGS:         CARDIOMEDIASTINAL SILHOUETTE: Cardiomediastinal silhouette is stable in size and configuration.   LUNGS: There are increased interstitial markings in a bibasilar distribution. Slightly worsened when compared with the prior exam.   ABDOMEN: No remarkable upper abdominal findings.   BONES: Chronic rotator cuff tear, right shoulder. S scoliosis of the thoracolumbar spine.       1.  Minimal pulmonary venous congestion. Airspace opacities in bilateral lower lobe distribution may be secondary to segmental atelectasis however an infectious or inflammatory process can not be excluded.       MACRO: None   Signed by: Leny Burgos 5/20/2024 2:11 PM Dictation workstation:   [id number]     Scheduled medications  lactulose, 20 g, oral, Daily  lidocaine, 0.1 mL, subcutaneous, Once  lidocaine, 1 patch, transdermal, Daily  nicotine, 1 patch, transdermal, Daily  pantoprazole, 40 mg, intravenous, Daily  perflutren lipid microspheres, 0.5-10 mL of dilution, intravenous, Once in imaging  perflutren protein A microsphere, 0.5 mL, intravenous, Once in imaging  sucralfate, 1 g, oral, q6h [place]  sulfur hexafluoride microsphr, 2 mL, intravenous, Once in imaging      Continuous medications     PRN medications  PRN medications: acetaminophen, acetaminophen-codeine, albuterol, baclofen, butalbital-acetaminophen-caff, hydrALAZINE, lubricating eye drops, ondansetron, ondansetron, oxygen      ASSESSMENT:  Dyspnea resolved  Leg swelling resolved  CHF chronic diastolic stable  Cor Pulmonale   Pulmonary hypertension  Pneumonia  COPD  Acute hypoxic respiratory failure resolved  Tobacco dependence  Leukocytosis resolved  Constipation resolved  Microcytic anemia stable  Iron deficiency anemia   Weight  loss  Non-obstructing Schatzki ring  Duodenal ulcer with hemorrhage  Diverticulosis  Generalized weakness  Inability to ambulate  Hypokalemia   Severe protein calorie malnutrition  Insomnia  Confusion altered mental status - resolved    PLAN:  Patient is doing well this morning.  No new issues.  Mentation and mood stable.  Continue current treatment.  Continue current medications.  Respiratory status, pulse oximetry stable on room air.  IV Levaquin to p.o. - course complete.  Oxygen as needed.  Nicotine patch.  Tobacco cessation education.  Pulmonology following.  Input appreciated.  Stable for discharge.  Outpatient follow-up.  2 view chest x-ray in 4 weeks.  Stable cardiac.  Continue medical management.  Labs reviewed.  Electrolytes and renal function stable.  H&H stable.  Continue PPI twice daily x 8 weeks then daily per Gastroenterology.  Continue Carafate.  Diet as tolerated.  Avoid NSAID use and smoking.  Continue Lactulose.  Avoid constipation.  Outpatient follow-up with Gastroenterology.  Encourage protein intake.  PT/OT.  Fall precautions.  Up with assistance only.  Bed and chair alarm at all times.  DVT prophylaxis SCDs.  Supportive care.  Patient reassured.  PT/OT recommend moderate intensity level continued care.  Discharge plan to [facility] nursing rehabilitation facility.  Discussed with Dr. Mcneil.  Ziggy to discharge when arrangements are made by case management.      Shea Root, APRN-CNP

## 2024-05-22 NOTE — DISCHARGE SUMMARY
Discharge Diagnosis  Dyspnea resolved  Leg swelling resolved  CHF diastolic, chronic   Cor Pulmonale   Pulmonary hypertension  Pneumonia  COPD  Acute hypoxic respiratory failure resolved  Tobacco dependence  Leukocytosis improved  Constipation resolved  Microcytic anemia  Iron deficiency anemia  Weight loss  Non-obstructing Schatzki ring  Duodenal ulcer with hemorrhage  Diverticulosis  Generalized weakness  Inability to ambulate  Hypokalemia resolved  Severe protein calorie malnutrition  Insomnia  Confusion altered mental status - resolved    Issues Requiring Follow-Up  Above    Discharge Meds     Your medication list        START taking these medications        Instructions Last Dose Given Next Dose Due   ascorbic acid 500 mg tablet  Commonly known as: Vitamin C      Take 1 tablet (500 mg) by mouth once daily.       ferrous sulfate (325 mg ferrous sulfate) tablet      Take 1 tablet by mouth once daily with breakfast.       lubricating eye drops ophthalmic solution      Administer 1 drop into both eyes if needed for dry eyes.       nicotine 21 mg/24 hr patch  Commonly known as: Nicoderm CQ  Start taking on: May 23, 2024      Place 1 patch over 24 hours on the skin once daily.       pantoprazole 40 mg EC tablet  Commonly known as: ProtoNix      Take 1 tablet (40 mg) by mouth 2 times a day. Do not crush, chew, or split. Continue twice daily x 8 weeks then continue once daily.       sucralfate 100 mg/mL suspension  Commonly known as: Carafate      Take 10 mL (1 g) by mouth every 6 hours.              CONTINUE taking these medications        Instructions Last Dose Given Next Dose Due   acetaminophen-codeine 300-60 mg tablet  Commonly known as: Tylenol w/ Codeine #4           baclofen 10 mg tablet  Commonly known as: Lioresal           butalbital-acetaminophen-caff -40 mg tablet           lactulose 20 gram/30 mL oral solution           lidocaine 2 % jelly  Commonly known as: Xylocaine           meprobamate 400 mg  "tablet  Commonly known as: Equanil           mupirocin 2 % ointment  Commonly known as: Bactroban                     Where to Get Your Medications        Information about where to get these medications is not yet available    Ask your nurse or doctor about these medications  ascorbic acid 500 mg tablet  ferrous sulfate (325 mg ferrous sulfate) tablet  lubricating eye drops ophthalmic solution  nicotine 21 mg/24 hr patch  pantoprazole 40 mg EC tablet  sucralfate 100 mg/mL suspension         Test Results Pending At Discharge  Pending Labs       No current pending labs.            Hospital Course  This is a very pleasant 79-year-old  female presenting to the emergency department with generalized weakness, inability ambulate, shortness of breath and lower extremity swelling.  She reports a chronically distended abdomen with intermittent spasms.  She reports \"abdominal issues\" x 1 year with symptoms of abdominal pain, bloating, decreased oral intake over the past month.  She takes lactulose and omeprazole twice daily.  She takes NSAIDs daily.  She denies any black tarry or red bloody stools.  She reports weight loss of 20 pounds since summer 2023.  She reports generalized weakness which she attributes to her poor oral intake.  Over the past several weeks, she developed symptoms of increased generalized weakness, shortness of breath, lower extremity swelling.  She was unable to stand herself up.  She presented to the emergency department for evaluation.  In the emergency department, initial workup was done.  1 view chest x-ray per radiology showed mild vascular congestion, small left pleural effusion, airspace opacity of the left lung base may be secondary to atelectasis or pneumonia per radiology.  CT abdomen pelvis without IV contrast per radiology showed a large right and transverse predominant colonic stool volume which appears more pronounced when compared to study from 7/22/2023 correlate for " constipation, small left greater than right pleural effusions, patchy groundglass consolidative airspace opacities within the left lower lobe concerning for pneumonia, limited evaluation.  Laboratory data revealed a hemoglobin of 4.8 and hematocrit of 18.5.  MCV 60.  MCH 15.5.  proBNP elevated 7134.  Troponin 35.  She was treated in the emergency department IV Levaquin for pneumonia, IV Lasix and packed red blood cells and was admitted.  She was treated with IV Protonix.  She was evaluated and treated by Gastroenterology.  She underwent EGD which showed small type I hiatal hernia, nonobstructing Schatzki ring in the GE junction, mild erythematous mucosa in the antrum and prepyloric region, single ulcer in the duodenal bulb with oozing hemorrhage induced coagulation with APC, colonoscopy showed scattered diverticulosis of mild severity containing blood clots and causing mild luminal narrowing in the descending colon, sigmoid colon and rectosigmoid.  She was treated with PPI Protonix, Carafate, advised to abstain from NSAID use and tobacco.  Her blood count stabilized.  She was evaluated and treated by Pulmonology and Cardiology.  She was treated with IV Levaquin, oxygen, diuretics and electrolyte replacement.  Echocardiogram showed a preserved ejection.  Per Cardiology - cor pulmonale secondary to underlying COPD and pulmonary hypertension, elevated pro BNP secondary to right ventricular dilation and pulmonary hypertension.  Her overall condition improved.  She was evaluated by physical, occupational therapy.  Therapy recommended moderate intensity level of continued care.  Case management was consulted for discharge planning.  She is stable for discharge to skilled nursing rehabilitation.       Pertinent Physical Exam At Time of Discharge  See physical examination.     Outpatient Follow-Up  No future appointments.    Follow-up with primary care provider in 1 week.    Follow-up with Pulmonology, Dr. Beltran.  2 view  chest-xray in 4 weeks.     Follow-up with Gastroenterology, Dr. Ram in 2 weeks.         PO Jang-CNP

## 2024-05-22 NOTE — PROGRESS NOTES
FOLLOWUP FOR: Pneumonia, COPD    SUBJECTIVE  Awake, no distress, on 2 L nasal cannula, sat 97%.  Fluid balance not recorded    PHYSICAL EXAM        5/21/2024     3:00 PM 5/21/2024     8:16 PM 5/21/2024     8:36 PM 5/22/2024    12:27 AM 5/22/2024     4:15 AM 5/22/2024     6:09 AM 5/22/2024     7:20 AM   Vitals   Systolic 116  125 125 113  126   Diastolic 54  64 63 54  55   Heart Rate 91 99 99 93 94     Temp 36.8 °C (98.2 °F)  36.6 °C (97.9 °F) 37 °C (98.6 °F) 36.6 °C (97.9 °F)  37.5 °C (99.5 °F)   Resp 17  19 22 18  16   Weight (lb)      79.81    BMI      16.68 kg/m2    BSA (m2)      1.22 m2        Vital signs reviewed   NAD, very thin, frail female looking older than her stated age, oxygen   Lungs Symmetric excursions.  Auscultation - diminished but clear, no wheezing/rales/rhonchi.     Cor RSR No murmur, rub, gallop   Abd soft and nontender, no rebound or distention, no HS megaly   Ext no CCE   Neuro awake alert, no distress    LABS     Serum chemistries are normal.  CBC with modest anemia normal white count    Yesterday's chest x-ray was personally reviewed.  Some retrocardiac increased density suspicious for left lower lobe atelectasis versus infiltrate    ASSESSMENT:    .  Left lower lobe pneumonia  .  COPD  .  Acute hypoxia, resolved  .  History of congestive heart failure  .  Iron deficiency anemia  .  Frailty, malnourished    PLAN    .  Check room air oxygenation again  .  Complete 7 days of antibiotics, to oral levofloxacin  .  Discharge plans  .  Recommend outpatient follow-up chest x-ray in 4 weeks  .  Will sign off at this time.  Please call as needed.  Thank you    Teddy Beltran MD, [facility]

## 2024-05-22 NOTE — NURSING NOTE
Pt discharged to [facility] this shift. Discharged via EMS. Report called to Gibson ALFRED at facility. IV's removed. Discharge paperwork sent with EMS.

## 2024-05-22 NOTE — CARE PLAN
The patient's goals for the shift include Sleep    The clinical goals for the shift include improve mobility    Over the shift, the patient did not make progress toward the following goals. Barriers to progression include generalized weakness. Recommendations to address these barriers include increase activity.

## 2024-05-22 NOTE — PROGRESS NOTES
05/22/24 1554   Discharge Planning   Has discharge transport been arranged? Yes   What day is the transport expected? 05/22/24   What time is the transport expected? 1700     7000 Completed   AVS and goldenrod sent to facility   patient updated   RN made aware and given report number

## 2024-05-22 NOTE — PROGRESS NOTES
Physical Therapy    Physical Therapy Treatment    Patient Name: Karly Horton  MRN: [id number]  Today's Date: 5/22/2024  Time Calculation  Start Time: 1435  Stop Time: 1459  Time Calculation (min): 24 min    Assessment/Plan   PT Assessment  PT Assessment Results: Decreased strength, Decreased endurance, Impaired balance, Decreased mobility, Impaired judgement, Decreased safety awareness, Impaired tone  Rehab Prognosis: Good  Evaluation/Treatment Tolerance: Patient tolerated treatment well  Medical Staff Made Aware: Yes  Strengths: Ability to acquire knowledge  Barriers to Participation: Comorbidities  End of Session Communication: Bedside nurse  Assessment Comment: Improved gait quality/tolerance, cooperative throughout; remains a high falls risk.  End of Session Patient Position: Up in chair, Alarm on  PT Plan  Inpatient/Swing Bed or Outpatient: Inpatient  PT Plan  Treatment/Interventions: Bed mobility, Transfer training, Gait training, Balance training, Neuromuscular re-education, Strengthening, Endurance training, Therapeutic exercise, Therapeutic activity  PT Plan: Skilled PT  PT Frequency: 4 times per week  PT Discharge Recommendations: Moderate intensity level of continued care  Equipment Recommended upon Discharge: Wheeled walker  PT Recommended Transfer Status: Assist x1  PT - OK to Discharge: Yes    General Visit Information:   PT  Visit  PT Received On: 05/22/24  General  Reason for Referral: impaired functional mobility  Referred By: Dr. Tarun MD  Past Medical History Relevant to Rehab:  Other cervical disc degeneration, unspecified cervical region      Degeneration of cervical intervertebral disc   Other conditions influencing health status      Bulging Disc (L3 - L4)   Other conditions influencing health status      A Fall   Other conditions influencing health status      Bulging Disc (C4 - C5)   Other conditions influencing health status      Bulging Disc (C6 - C7)   Other conditions influencing  health status      Bulging Disc (C5 - C6)   Other conditions influencing health status      Lumbar Spondylolisthesis   Other conditions influencing health status      Rotator Cuff Tendon Tear   Other intervertebral disc degeneration, lumbar region      L4-L5 disc bulge   Other intervertebral disc displacement, lumbar region      Disc displacement, lumbar   Other intervertebral disc displacement, lumbosacral region      Displacement of lumbosacral intervertebral disc   Personal history of other diseases of the musculoskeletal system and connective tissue      History of bursitis   Personal history of other diseases of the musculoskeletal system and connective tissue      History of fibromyositis   Personal history of other specified conditions      History of headache   Spinal stenosis, lumbar region without neurogenic claudication      Lumbar canal stenosis   Sprain of ligaments of lumbar spine, initial encounter      Low back sprain  Missed Visit: No  Missed Visit Reason: Other (Comment)  Family/Caregiver Present: No  Co-Treatment:  (no)  Co-Treatment Reason: n/a  Prior to Session Communication: Bedside nurse  Patient Position Received: Bed, 3 rail up, Alarm off, not on at start of session  Preferred Learning Style: verbal  General Comment: Pt cleared for therapy via RN, received in supine on bedpan, NAD, agreeable to participate in therapy. (+) telemetry    Subjective   Precautions:  Precautions  Hearing/Visual Limitations: glasses  Medical Precautions: Fall precautions  Vital Signs:  Vital Signs  Heart Rate: 98  Heart Rate Source: Monitor    Objective   Pain:  Pain Assessment  Pain Assessment: 0-10  Pain Score: 0 - No pain  Cognition:  Cognition  Overall Cognitive Status: Within Functional Limits  Orientation Level: Oriented X4  Following Commands: Follows one step commands with increased time  Safety Judgment: Decreased awareness of need for assistance  Insight: Mild  Coordination:  Movements are Fluid and  Coordinated: Yes  Postural Control:  Postural Control  Postural Control: Impaired  Posture Comment: forward head, increased thoracic kyhosis  Static Sitting Balance  Static Sitting-Balance Support: Feet supported  Static Sitting-Level of Assistance: Close supervision  Static Standing Balance  Static Standing-Balance Support: Bilateral upper extremity supported  Static Standing-Level of Assistance: Moderate assistance  Extremity/Trunk Assessments:  RLE   RLE : Within Functional Limits  LLE   LLE : Within Functional Limits  Activity Tolerance:  Activity Tolerance  Endurance: Tolerates 10 - 20 min exercise with multiple rests  Activity Tolerance Comments: c/o mild SOB post-ambulation; SpO2 91% on room air  Treatments:  Therapeutic Exercise  Therapeutic Exercise Performed: Yes  Therapeutic Exercise Activity 1: B ankle pumps x 10  Therapeutic Exercise Activity 2: B seated knee ext x 10  Therapeutic Exercise Activity 3: B seated hip flex x 10  Therapeutic Exercise Activity 4: B seated isometric hip abduction x 10  Therapeutic Exercise Activity 5: B seated isometric hip adduction x 10    Therapeutic Activity  Therapeutic Activity Performed: Yes  Therapeutic Activity 1: pt able to roll to L with supervision and maintain L sidelying for bedpan removal and posterior mee-area cleanse via PT    Bed Mobility  Bed Mobility: Yes  Bed Mobility 1  Bed Mobility 1: Supine to sitting  Level of Assistance 1: Minimum assistance  Bed Mobility Comments 1: assist for trunk elevation and to scoot hips towards EOB    Ambulation/Gait Training  Ambulation/Gait Training Performed: Yes  Ambulation/Gait Training 1  Surface 1: Level tile  Device 1: Rolling walker  Assistance 1: Moderate assistance  Quality of Gait 1: Narrow base of support, Decreased step length, Shuffling gait, Forward flexed posture  Comments/Distance (ft) 1: 10' x 2  Transfers  Transfer: Yes  Transfer 1  Transfer From 1: Sit to  Transfer to 1: Stand  Technique 1: Sit to  stand  Transfer Device 1: Walker  Transfer Level of Assistance 1: Moderate assistance  Trials/Comments 1: assist for forward weight shift and balance; cueing for proper hand placement  Transfers 2  Transfer From 2: Stand to  Transfer to 2: Sit  Technique 2: Stand to sit  Transfer Device 2: Walker  Transfer Level of Assistance 2: Moderate assistance  Trials/Comments 2: assist for controlled eccentric lowering into chair; cueing for sequencing/safety    Stairs  Stairs: No    Outcome Measures:  [facility] Basic Mobility  Turning from your back to your side while in a flat bed without using bedrails: None  Moving from lying on your back to sitting on the side of a flat bed without using bedrails: A little  Moving to and from bed to chair (including a wheelchair): A little  Standing up from a chair using your arms (e.g. wheelchair or bedside chair): A lot  To walk in hospital room: A lot  Climbing 3-5 steps with railing: A lot  Basic Mobility - Total Score: 16    Education Documentation  Mobility Training, taught by Armida Mendenhall, PT at 5/22/2024  3:21 PM.  Learner: Patient  Readiness: Acceptance  Method: Explanation, Demonstration  Response: Needs Reinforcement    Education Comments  No comments found.      OP EDUCATION:  Outpatient Education  Individual(s) Educated: Patient  Education Provided: Fall Risk, POC  Risk and Benefits Discussed with Patient/Caregiver/Other: yes  Patient/Caregiver Demonstrated Understanding: yes  Plan of Care Discussed and Agreed Upon: yes  Patient Response to Education: Patient/Caregiver Verbalized Understanding of Information    Encounter Problems       Encounter Problems (Active)       Mobility       STG - Patient will ambulate 50' with LRAD and modified independence. (Progressing)       Start:  05/16/24    Expected End:  06/13/24               PT Transfers       STG - Patient to transfer to and from sit to supine with independence. (Progressing)       Start:  05/16/24    Expected End:   06/13/24            STG - Patient will transfer sit to and from stand with use of LRAD and modified independence. (Progressing)       Start:  05/16/24    Expected End:  06/13/24               Safety       STG - Patient uses call light consistently to request assistance with transfers (Progressing)       Start:  05/16/24    Expected End:  06/13/24

## 2024-09-01 PROCEDURE — 93010 ELECTROCARDIOGRAM REPORT: CPT | Performed by: INTERNAL MEDICINE

## 2024-09-02 ENCOUNTER — HOSPITAL ENCOUNTER (INPATIENT)
Facility: HOSPITAL | Age: 79
LOS: 4 days | Discharge: HOME | End: 2024-09-06
Attending: EMERGENCY MEDICINE | Admitting: INTERNAL MEDICINE
Payer: MEDICARE

## 2024-09-02 ENCOUNTER — APPOINTMENT (OUTPATIENT)
Dept: RADIOLOGY | Facility: HOSPITAL | Age: 79
End: 2024-09-02
Payer: MEDICARE

## 2024-09-02 DIAGNOSIS — R06.02 INCREASING SHORTNESS OF BREATH: ICD-10-CM

## 2024-09-02 DIAGNOSIS — I82.90 DEEP VEIN THROMBOSIS (DVT) OF NON-EXTREMITY VEIN, UNSPECIFIED CHRONICITY: ICD-10-CM

## 2024-09-02 DIAGNOSIS — R53.1 GENERALIZED WEAKNESS: ICD-10-CM

## 2024-09-02 DIAGNOSIS — R06.89 DYSPNEA AND RESPIRATORY ABNORMALITIES: ICD-10-CM

## 2024-09-02 DIAGNOSIS — N39.0 URINARY TRACT INFECTION WITHOUT HEMATURIA, SITE UNSPECIFIED: Primary | ICD-10-CM

## 2024-09-02 DIAGNOSIS — R06.00 DYSPNEA AND RESPIRATORY ABNORMALITIES: ICD-10-CM

## 2024-09-02 DIAGNOSIS — R52 GENERALIZED PAIN: ICD-10-CM

## 2024-09-02 DIAGNOSIS — Z74.09 MOBILITY IMPAIRED: ICD-10-CM

## 2024-09-02 DIAGNOSIS — G93.40 ACUTE ENCEPHALOPATHY: ICD-10-CM

## 2024-09-02 DIAGNOSIS — I10 HYPERTENSION, UNSPECIFIED TYPE: ICD-10-CM

## 2024-09-02 LAB
ALBUMIN SERPL-MCNC: 3.6 G/DL (ref 3.5–5)
ALP BLD-CCNC: 115 U/L (ref 35–125)
ALT SERPL-CCNC: 18 U/L (ref 5–40)
AMPHETAMINES UR QL SCN>1000 NG/ML: NEGATIVE
ANION GAP SERPL CALC-SCNC: 9 MMOL/L
APPEARANCE UR: ABNORMAL
AST SERPL-CCNC: 27 U/L (ref 5–40)
BACTERIA #/AREA URNS AUTO: ABNORMAL /HPF
BARBITURATES UR QL SCN>300 NG/ML: POSITIVE
BASOPHILS # BLD AUTO: 0.06 X10*3/UL (ref 0–0.1)
BASOPHILS NFR BLD AUTO: 0.7 %
BENZODIAZ UR QL SCN>300 NG/ML: NEGATIVE
BILIRUB SERPL-MCNC: <0.2 MG/DL (ref 0.1–1.2)
BILIRUB UR STRIP.AUTO-MCNC: NEGATIVE MG/DL
BUN SERPL-MCNC: 9 MG/DL (ref 8–25)
BZE UR QL SCN>300 NG/ML: NEGATIVE
CALCIUM SERPL-MCNC: 9.6 MG/DL (ref 8.5–10.4)
CANNABINOIDS UR QL SCN>50 NG/ML: NEGATIVE
CHLORIDE SERPL-SCNC: 100 MMOL/L (ref 97–107)
CO2 SERPL-SCNC: 32 MMOL/L (ref 24–31)
COLOR UR: COLORLESS
CREAT SERPL-MCNC: 0.4 MG/DL (ref 0.4–1.6)
EGFRCR SERPLBLD CKD-EPI 2021: >90 ML/MIN/1.73M*2
EOSINOPHIL # BLD AUTO: 0.1 X10*3/UL (ref 0–0.4)
EOSINOPHIL NFR BLD AUTO: 1.1 %
ERYTHROCYTE [DISTWIDTH] IN BLOOD BY AUTOMATED COUNT: 17.4 % (ref 11.5–14.5)
FENTANYL+NORFENTANYL UR QL SCN: NEGATIVE
GLUCOSE BLD MANUAL STRIP-MCNC: 115 MG/DL (ref 74–99)
GLUCOSE SERPL-MCNC: 96 MG/DL (ref 65–99)
GLUCOSE UR STRIP.AUTO-MCNC: NORMAL MG/DL
HCT VFR BLD AUTO: 40.4 % (ref 36–46)
HGB BLD-MCNC: 12.7 G/DL (ref 12–16)
IMM GRANULOCYTES # BLD AUTO: 0.08 X10*3/UL (ref 0–0.5)
IMM GRANULOCYTES NFR BLD AUTO: 0.9 % (ref 0–0.9)
KETONES UR STRIP.AUTO-MCNC: NEGATIVE MG/DL
LEUKOCYTE ESTERASE UR QL STRIP.AUTO: ABNORMAL
LYMPHOCYTES # BLD AUTO: 1.36 X10*3/UL (ref 0.8–3)
LYMPHOCYTES NFR BLD AUTO: 15.5 %
MAGNESIUM SERPL-MCNC: 2.2 MG/DL (ref 1.6–3.1)
MCH RBC QN AUTO: 27.6 PG (ref 26–34)
MCHC RBC AUTO-ENTMCNC: 31.4 G/DL (ref 32–36)
MCV RBC AUTO: 88 FL (ref 80–100)
METHADONE UR QL SCN>300 NG/ML: NEGATIVE
MONOCYTES # BLD AUTO: 1.12 X10*3/UL (ref 0.05–0.8)
MONOCYTES NFR BLD AUTO: 12.8 %
NEUTROPHILS # BLD AUTO: 6.05 X10*3/UL (ref 1.6–5.5)
NEUTROPHILS NFR BLD AUTO: 69 %
NITRITE UR QL STRIP.AUTO: NEGATIVE
NRBC BLD-RTO: 0 /100 WBCS (ref 0–0)
OPIATES UR QL SCN>300 NG/ML: NEGATIVE
OXYCODONE UR QL: NEGATIVE
PCP UR QL SCN>25 NG/ML: NEGATIVE
PH UR STRIP.AUTO: 7 [PH]
PLATELET # BLD AUTO: 326 X10*3/UL (ref 150–450)
POTASSIUM SERPL-SCNC: 4.1 MMOL/L (ref 3.4–5.1)
PROT SERPL-MCNC: 6.8 G/DL (ref 5.9–7.9)
PROT UR STRIP.AUTO-MCNC: NEGATIVE MG/DL
RBC # BLD AUTO: 4.6 X10*6/UL (ref 4–5.2)
RBC # UR STRIP.AUTO: NEGATIVE /UL
RBC #/AREA URNS AUTO: ABNORMAL /HPF
SODIUM SERPL-SCNC: 141 MMOL/L (ref 133–145)
SP GR UR STRIP.AUTO: 1
SQUAMOUS #/AREA URNS AUTO: ABNORMAL /HPF
TROPONIN T SERPL-MCNC: 14 NG/L
TROPONIN T SERPL-MCNC: 16 NG/L
UROBILINOGEN UR STRIP.AUTO-MCNC: NORMAL MG/DL
WBC # BLD AUTO: 8.8 X10*3/UL (ref 4.4–11.3)
WBC #/AREA URNS AUTO: ABNORMAL /HPF

## 2024-09-02 PROCEDURE — 81001 URINALYSIS AUTO W/SCOPE: CPT

## 2024-09-02 PROCEDURE — 80053 COMPREHEN METABOLIC PANEL: CPT

## 2024-09-02 PROCEDURE — 70450 CT HEAD/BRAIN W/O DYE: CPT

## 2024-09-02 PROCEDURE — 2500000004 HC RX 250 GENERAL PHARMACY W/ HCPCS (ALT 636 FOR OP/ED): Performed by: EMERGENCY MEDICINE

## 2024-09-02 PROCEDURE — 99285 EMERGENCY DEPT VISIT HI MDM: CPT

## 2024-09-02 PROCEDURE — 83735 ASSAY OF MAGNESIUM: CPT

## 2024-09-02 PROCEDURE — 83880 ASSAY OF NATRIURETIC PEPTIDE: CPT | Performed by: NURSE PRACTITIONER

## 2024-09-02 PROCEDURE — 71045 X-RAY EXAM CHEST 1 VIEW: CPT

## 2024-09-02 PROCEDURE — 85025 COMPLETE CBC W/AUTO DIFF WBC: CPT

## 2024-09-02 PROCEDURE — 82550 ASSAY OF CK (CPK): CPT | Performed by: NURSE PRACTITIONER

## 2024-09-02 PROCEDURE — 2500000004 HC RX 250 GENERAL PHARMACY W/ HCPCS (ALT 636 FOR OP/ED): Performed by: INTERNAL MEDICINE

## 2024-09-02 PROCEDURE — 93005 ELECTROCARDIOGRAM TRACING: CPT

## 2024-09-02 PROCEDURE — 84484 ASSAY OF TROPONIN QUANT: CPT

## 2024-09-02 PROCEDURE — 71045 X-RAY EXAM CHEST 1 VIEW: CPT | Performed by: STUDENT IN AN ORGANIZED HEALTH CARE EDUCATION/TRAINING PROGRAM

## 2024-09-02 PROCEDURE — 2500000004 HC RX 250 GENERAL PHARMACY W/ HCPCS (ALT 636 FOR OP/ED)

## 2024-09-02 PROCEDURE — 36415 COLL VENOUS BLD VENIPUNCTURE: CPT

## 2024-09-02 PROCEDURE — 87086 URINE CULTURE/COLONY COUNT: CPT | Mod: WESLAB

## 2024-09-02 PROCEDURE — 82947 ASSAY GLUCOSE BLOOD QUANT: CPT

## 2024-09-02 PROCEDURE — 80307 DRUG TEST PRSMV CHEM ANLYZR: CPT

## 2024-09-02 PROCEDURE — 70450 CT HEAD/BRAIN W/O DYE: CPT | Performed by: RADIOLOGY

## 2024-09-02 PROCEDURE — 96365 THER/PROPH/DIAG IV INF INIT: CPT

## 2024-09-02 PROCEDURE — 84443 ASSAY THYROID STIM HORMONE: CPT | Performed by: NURSE PRACTITIONER

## 2024-09-02 PROCEDURE — 1200000002 HC GENERAL ROOM WITH TELEMETRY DAILY

## 2024-09-02 PROCEDURE — 71045 X-RAY EXAM CHEST 1 VIEW: CPT | Performed by: RADIOLOGY

## 2024-09-02 RX ORDER — PANTOPRAZOLE SODIUM 40 MG/1
40 TABLET, DELAYED RELEASE ORAL 2 TIMES DAILY
Status: DISCONTINUED | OUTPATIENT
Start: 2024-09-02 | End: 2024-09-06 | Stop reason: HOSPADM

## 2024-09-02 RX ORDER — FERROUS SULFATE 325(65) MG
65 TABLET ORAL
Status: DISCONTINUED | OUTPATIENT
Start: 2024-09-03 | End: 2024-09-06 | Stop reason: HOSPADM

## 2024-09-02 RX ORDER — ACETAMINOPHEN 160 MG/5ML
650 SOLUTION ORAL EVERY 4 HOURS PRN
Status: DISCONTINUED | OUTPATIENT
Start: 2024-09-02 | End: 2024-09-04 | Stop reason: SDUPTHER

## 2024-09-02 RX ORDER — TALC
6 POWDER (GRAM) TOPICAL NIGHTLY PRN
Status: DISCONTINUED | OUTPATIENT
Start: 2024-09-02 | End: 2024-09-06 | Stop reason: HOSPADM

## 2024-09-02 RX ORDER — HEPARIN SODIUM 5000 [USP'U]/ML
5000 INJECTION, SOLUTION INTRAVENOUS; SUBCUTANEOUS EVERY 8 HOURS
Status: DISCONTINUED | OUTPATIENT
Start: 2024-09-02 | End: 2024-09-06 | Stop reason: HOSPADM

## 2024-09-02 RX ORDER — ONDANSETRON 4 MG/1
4 TABLET, FILM COATED ORAL EVERY 8 HOURS PRN
Status: DISCONTINUED | OUTPATIENT
Start: 2024-09-02 | End: 2024-09-06 | Stop reason: HOSPADM

## 2024-09-02 RX ORDER — AMLODIPINE BESYLATE 10 MG/1
10 TABLET ORAL NIGHTLY
Status: DISCONTINUED | OUTPATIENT
Start: 2024-09-02 | End: 2024-09-06 | Stop reason: HOSPADM

## 2024-09-02 RX ORDER — BACLOFEN 10 MG/1
10 TABLET ORAL 3 TIMES DAILY
Status: DISCONTINUED | OUTPATIENT
Start: 2024-09-02 | End: 2024-09-06 | Stop reason: HOSPADM

## 2024-09-02 RX ORDER — ONDANSETRON HYDROCHLORIDE 2 MG/ML
4 INJECTION, SOLUTION INTRAVENOUS EVERY 8 HOURS PRN
Status: DISCONTINUED | OUTPATIENT
Start: 2024-09-02 | End: 2024-09-06 | Stop reason: HOSPADM

## 2024-09-02 RX ORDER — ACETAMINOPHEN 650 MG/1
650 SUPPOSITORY RECTAL EVERY 4 HOURS PRN
Status: DISCONTINUED | OUTPATIENT
Start: 2024-09-02 | End: 2024-09-04 | Stop reason: SDUPTHER

## 2024-09-02 RX ORDER — CEFTRIAXONE 1 G/50ML
1 INJECTION, SOLUTION INTRAVENOUS ONCE
Status: COMPLETED | OUTPATIENT
Start: 2024-09-02 | End: 2024-09-02

## 2024-09-02 RX ORDER — IBUPROFEN 200 MG
1 TABLET ORAL DAILY
Status: DISCONTINUED | OUTPATIENT
Start: 2024-09-02 | End: 2024-09-06 | Stop reason: HOSPADM

## 2024-09-02 RX ORDER — POLYETHYLENE GLYCOL 3350 17 G/17G
17 POWDER, FOR SOLUTION ORAL DAILY PRN
Status: DISCONTINUED | OUTPATIENT
Start: 2024-09-02 | End: 2024-09-06 | Stop reason: HOSPADM

## 2024-09-02 RX ORDER — ALBUTEROL SULFATE 0.83 MG/ML
2.5 SOLUTION RESPIRATORY (INHALATION) EVERY 6 HOURS PRN
Status: DISCONTINUED | OUTPATIENT
Start: 2024-09-02 | End: 2024-09-06 | Stop reason: HOSPADM

## 2024-09-02 RX ORDER — SUCRALFATE 1 G/10ML
1 SUSPENSION ORAL EVERY 6 HOURS SCHEDULED
Status: DISCONTINUED | OUTPATIENT
Start: 2024-09-02 | End: 2024-09-06 | Stop reason: HOSPADM

## 2024-09-02 RX ORDER — BUTALBITAL, ACETAMINOPHEN AND CAFFEINE 50; 325; 40 MG/1; MG/1; MG/1
1 TABLET ORAL EVERY 6 HOURS PRN
Status: DISCONTINUED | OUTPATIENT
Start: 2024-09-02 | End: 2024-09-06 | Stop reason: HOSPADM

## 2024-09-02 RX ORDER — GUAIFENESIN 600 MG/1
600 TABLET, EXTENDED RELEASE ORAL EVERY 12 HOURS PRN
Status: DISCONTINUED | OUTPATIENT
Start: 2024-09-02 | End: 2024-09-06 | Stop reason: HOSPADM

## 2024-09-02 RX ORDER — LOSARTAN POTASSIUM 50 MG/1
50 TABLET ORAL DAILY
Status: DISCONTINUED | OUTPATIENT
Start: 2024-09-02 | End: 2024-09-06 | Stop reason: HOSPADM

## 2024-09-02 RX ORDER — ACETAMINOPHEN 325 MG/1
650 TABLET ORAL EVERY 4 HOURS PRN
Status: DISCONTINUED | OUTPATIENT
Start: 2024-09-02 | End: 2024-09-04

## 2024-09-02 RX ORDER — ACETAMINOPHEN AND CODEINE PHOSPHATE 300; 60 MG/1; MG/1
1 TABLET ORAL DAILY PRN
Status: DISCONTINUED | OUTPATIENT
Start: 2024-09-02 | End: 2024-09-06

## 2024-09-02 RX ORDER — ASCORBIC ACID 500 MG
500 TABLET ORAL DAILY
Status: DISCONTINUED | OUTPATIENT
Start: 2024-09-03 | End: 2024-09-06 | Stop reason: HOSPADM

## 2024-09-02 RX ORDER — GUAIFENESIN/DEXTROMETHORPHAN 100-10MG/5
5 SYRUP ORAL EVERY 4 HOURS PRN
Status: DISCONTINUED | OUTPATIENT
Start: 2024-09-02 | End: 2024-09-06 | Stop reason: HOSPADM

## 2024-09-02 RX ORDER — LACTULOSE 10 G/15ML
20 SOLUTION ORAL DAILY
Status: DISCONTINUED | OUTPATIENT
Start: 2024-09-02 | End: 2024-09-06 | Stop reason: HOSPADM

## 2024-09-02 ASSESSMENT — PAIN - FUNCTIONAL ASSESSMENT
PAIN_FUNCTIONAL_ASSESSMENT: 0-10
PAIN_FUNCTIONAL_ASSESSMENT: 0-10

## 2024-09-02 ASSESSMENT — PAIN SCALES - GENERAL
PAINLEVEL_OUTOF10: 0 - NO PAIN
PAINLEVEL_OUTOF10: 0 - NO PAIN

## 2024-09-02 NOTE — NURSING NOTE
Patient arrived from ED and has purwic and had a emesis and call light and possessions within reach. Bed alarm on and patient has border dressing on buttock and heels. Heels are spongy and buttock is red.

## 2024-09-02 NOTE — ED PROVIDER NOTES
"HPI   Chief Complaint   Patient presents with    Altered Mental Status     Found on ground by daughter. Pt difficult to arouse \"more out of it than normal\" LKW 0900        Patient is a 79-year-old female presenting to the emergency department for evaluation of altered mental status.  Patient's daughter reportedly found the patient laying next to the couch unresponsive.  Patient reportedly recently signed out AMA from a rehab facility.  Patient unable to contribute much to the HPI at this time.              Patient History   Past Medical History:   Diagnosis Date    Other cervical disc degeneration, unspecified cervical region     Degeneration of cervical intervertebral disc    Other conditions influencing health status     Bulging Disc (L3 - L4)    Other conditions influencing health status     A Fall    Other conditions influencing health status     Bulging Disc (C4 - C5)    Other conditions influencing health status     Bulging Disc (C6 - C7)    Other conditions influencing health status     Bulging Disc (C5 - C6)    Other conditions influencing health status     Lumbar Spondylolisthesis    Other conditions influencing health status     Rotator Cuff Tendon Tear    Other intervertebral disc degeneration, lumbar region     L4-L5 disc bulge    Other intervertebral disc displacement, lumbar region     Disc displacement, lumbar    Other intervertebral disc displacement, lumbosacral region     Displacement of lumbosacral intervertebral disc    Personal history of other diseases of the musculoskeletal system and connective tissue     History of bursitis    Personal history of other diseases of the musculoskeletal system and connective tissue     History of fibromyositis    Personal history of other specified conditions     History of headache    Spinal stenosis, lumbar region without neurogenic claudication     Lumbar canal stenosis    Sprain of ligaments of lumbar spine, initial encounter     Low back sprain     No past " surgical history on file.  No family history on file.  Social History     Tobacco Use    Smoking status: Every Day     Current packs/day: 1.00     Average packs/day: 1 pack/day for 64.7 years (64.7 ttl pk-yrs)     Types: Cigarettes     Start date: 1/1/1960    Smokeless tobacco: Not on file   Substance Use Topics    Alcohol use: Not on file    Drug use: Not on file       Physical Exam   ED Triage Vitals [09/02/24 1456]   Temperature Heart Rate Respirations BP   36.4 °C (97.5 °F) 64 14 175/76      Pulse Ox Temp src Heart Rate Source Patient Position   96 % -- -- --      BP Location FiO2 (%)     -- --       Physical Exam  Vitals and nursing note reviewed.   Constitutional:       General: She is not in acute distress.     Appearance: She is well-developed. She is not ill-appearing or toxic-appearing.   HENT:      Head: Normocephalic and atraumatic.   Cardiovascular:      Rate and Rhythm: Normal rate and regular rhythm.      Heart sounds: Normal heart sounds.   Pulmonary:      Effort: Pulmonary effort is normal.      Breath sounds: Normal breath sounds.   Abdominal:      Palpations: Abdomen is soft.      Tenderness: There is no abdominal tenderness.   Musculoskeletal:         General: Normal range of motion.   Skin:     General: Skin is warm and dry.   Neurological:      Mental Status: She is alert. She is disoriented.   Psychiatric:         Mood and Affect: Mood normal.         Behavior: Behavior normal.           ED Course & MDM   ED Course as of 09/02/24 1723   Mon Sep 02, 2024   1530 EKG personally interpreted by me performed at 1513  Normal sinus rhythm with occasional PVC ventricular rate 68 left axis deviation no acute ischemic changes [EF]      ED Course User Index  [EF] Helene Rivera,          Diagnoses as of 09/02/24 1723   Urinary tract infection without hematuria, site unspecified   Acute encephalopathy                 No data recorded     [place] Coma Scale Score: 8 (09/02/24 1620 : Selam Huitron RN)                            Medical Decision Making  **Disclaimer parts of this chart have been completed using voice recognition software. Please excuse any errors of transcription.     Patient seen in conjunction with attending physician     HPI: Detailed above.    Exam: A medically appropriate exam performed, outlined above, given the known history and presentation.    History obtained from: Patient    EKG: Reviewed and interpreted by my attending physician    Labs/Diagnostics:  XR chest 1 view   Final Result   1. Mild nonspecific diffuse interstitial prominence which could   reflect chronic parenchymal changes, mild edema, or possibly atypical   infectious process in the appropriate clinical setting.             Signed by: Odin Butt 9/2/2024 4:44 PM   Dictation workstation:   [id number]      CT head wo IV contrast   Final Result   1. No acute intracranial abnormality identified.   2. Nonspecific white matter changes likely reflecting sequela of   small-vessel ischemic disease.                  MACRO:   None        Signed by: Odin Butt 9/2/2024 4:43 PM   Dictation workstation:   [id number]        Labs Reviewed   CBC WITH AUTO DIFFERENTIAL - Abnormal       Result Value    WBC 8.8      nRBC 0.0      RBC 4.60      Hemoglobin 12.7      Hematocrit 40.4      MCV 88      MCH 27.6      MCHC 31.4 (*)     RDW 17.4 (*)     Platelets 326      Neutrophils % 69.0      Immature Granulocytes %, Automated 0.9      Lymphocytes % 15.5      Monocytes % 12.8      Eosinophils % 1.1      Basophils % 0.7      Neutrophils Absolute 6.05 (*)     Immature Granulocytes Absolute, Automated 0.08      Lymphocytes Absolute 1.36      Monocytes Absolute 1.12 (*)     Eosinophils Absolute 0.10      Basophils Absolute 0.06     COMPREHENSIVE METABOLIC PANEL - Abnormal    Glucose 96      Sodium 141      Potassium 4.1      Chloride 100      Bicarbonate 32 (*)     Urea Nitrogen 9      Creatinine 0.40      eGFR >90      Calcium 9.6       Albumin 3.6      Alkaline Phosphatase 115      Total Protein 6.8      AST 27      Bilirubin, Total <0.2      ALT 18      Anion Gap 9     DRUG SCREEN,URINE - Abnormal    Amphetamine Screen, Urine Negative      Barbiturate Screen, Urine Positive (*)     Benzodiazepines Screen, Urine Negative      Cannabinoid Screen, Urine Negative      Cocaine Metabolite Screen, Urine Negative      Fentanyl Screen, Urine Negative      Methadone Screen, Urine Negative      Opiate Screen, Urine Negative      Oxycodone Screen, Urine Negative      PCP Screen, Urine Negative      Narrative:     These toxicological screening tests provide unconfirmed qualitative measurements to aid in treatment and diagnosis in cases of drug use or overdose. This test is used only for medical purposes. A positive result does not indicate or measure intoxication. For specific test performance or pathologist consultation, please contact the Laboratory.    The following threshold concentrations are used for these analyses.Values at or above the threshold concentration are reported as positive. Values below the threshold are reported as negative.    Drug /Screening Threshold                                                                                                 THC/CANNABINOIDS................50 ng/ml  METHADONE......................300 ng/ml  COCAINE METABOLITES............300 ng/ml  BENZODIAZEPINE.................300 ng/ml  PCP.............................25 ng/ml  OPIATE.........................300 ng/ml  AMPHETAMINE/ECSTASY...........1000 ng/ml  BARBITURATE....................200 ng/ml  OXYCODONE......................100 ng/ml  FENTANYL.........................5 ng/ml       URINALYSIS WITH REFLEX CULTURE AND MICROSCOPIC - Abnormal    Color, Urine Colorless (*)     Appearance, Urine Turbid (*)     Specific Gravity, Urine 1.005      pH, Urine 7.0      Protein, Urine NEGATIVE      Glucose, Urine Normal      Blood, Urine NEGATIVE      Ketones, Urine  NEGATIVE      Bilirubin, Urine NEGATIVE      Urobilinogen, Urine Normal      Nitrite, Urine NEGATIVE      Leukocyte Esterase, Urine 250 Ekyonna/µL (*)    SERIAL TROPONIN, INITIAL ([facility]) - Abnormal    Troponin T, High Sensitivity 16 (*)    MICROSCOPIC ONLY, URINE - Abnormal    WBC, Urine 6-10 (*)     RBC, Urine 1-2      Squamous Epithelial Cells, Urine 10-25 ([facility])      Bacteria, Urine 1+ (*)    MAGNESIUM - Normal    Magnesium 2.20     URINE CULTURE   URINALYSIS WITH REFLEX CULTURE AND MICROSCOPIC    Narrative:     The following orders were created for panel order Urinalysis with Reflex Culture and Microscopic.  Procedure                               Abnormality         Status                     ---------                               -----------         ------                     Urinalysis with Reflex C...[[id number]]  Abnormal            Final result               Extra Urine Gray Tube[[id number]]                            In process                   Please view results for these tests on the individual orders.   TROPONIN T SERIES, HIGH SENSITIVITY (0, 2 HR, 6 HR)    Narrative:     The following orders were created for panel order Troponin T Series, High Sensitivity (0, 2HR, 6HR).  Procedure                               Abnormality         Status                     ---------                               -----------         ------                     Serial Troponin, Initial...[[id number]]  Abnormal            Final result               Serial Troponin, 2 Hour ...[[id number]]                                                   Please view results for these tests on the individual orders.   EXTRA URINE GRAY TUBE   SERIAL TROPONIN,  2 HOUR ([facility])     EMERGENCY DEPARTMENT COURSE and DIFFERENTIAL DIAGNOSIS/MDM:  Patient is a 79-year-old female presenting to the emergency department for evaluation of altered mental status.  On physical exam vital signs remarkable for systolic hypertension but otherwise stable and patient is  "in no acute distress.  Patient smelling of urine but otherwise no sign of injury.  Patient is arousable but otherwise falls back into sleep after she answers questions.  Diagnostic labs and imaging ordered.  Urine positive for barbiturate likely due to Fioricet.  Urine showed evidence of infection with 250 leukocyte esterase and 1+ bacteria therefore ceftriaxone ordered.  CMP showed no electrolyte abnormalities.  Initial troponin 16.  Magnesium normal.  CBC showed no leukocytosis or anemia.  Chest x-ray showed mild nonspecific diffuse interstitial prominence which could reflect chronic parenchymal changes, mild edema, or possibly atypical infectious process.  CT of the head showed no acute intracranial abnormality with nonspecific white matter changes.  Due to patient's altered mental status we feel patient warrants admission for further management of altered mental status.  Spoke with attending physician Dr. Mcneil who agreed to admission for further management.     Vitals:    Vitals:    09/02/24 1456   BP: 175/76   Pulse: 64   Resp: 14   Temp: 36.4 °C (97.5 °F)   SpO2: 96%   Weight: (!) 43.1 kg (95 lb)   Height: 1.524 m (5')     History Limited by:    Altered Mental Status/Confusion    Independent history obtained from:    EMS    External records reviewed:    Inpatient Notes/Discharge Summary from previous discharge summary from hospitalization 7/10/2024    SUMMARY OF WHAT HAPPENED WHILE I WAS IN THE HOSPITAL:      \"This is a 79 year old female who presents from a nursing facility with altered level of consciousness, reported emesis, cough and congestion with hypoxia.  Patient is agitated, pulling on telemetry and clothes, history is limited at this point.  She was treated as a pneumonia, has shown a right lower lobe infiltrate, is now stable with a respiratory status on 1 LNC but she has epistaxis as she has been pulling on the nasal tubing.  She wants lactulose and she is insistent that she wants it now.     She " "reports that she is still continuing to smoke.  She denies any cough, does not have any chest pain.  Does report yesterday with symptoms of abdominal pain.  She was given IV Zosyn in the ED and a bolus of 500 cc normal saline.\"    Diagnostics interpreted by me:    CT Scan(s) see MDM and Xrays - see my independent interpretation in MDM    Discussions with other clinicians:    Hospitalist/Admitting Team Dr. Mcneil    Chronic conditions impacting care:    None    Social determinants of health affecting care:    None    Diagnostic tests considered but not performed: None    ED Medications managed:    Medications   cefTRIAXone (Rocephin) 1 g in dextrose (iso) IV 50 mL (1 g intravenous New Bag 9/2/24 1708)   sodium chloride 0.9 % bolus 500 mL (500 mL intravenous New Bag 9/2/24 1611)       Prescription drugs considered:    None    Screenings:              Procedure  Procedures     Reshma Thomas PA-C  09/02/24 1728    "

## 2024-09-03 ENCOUNTER — DOCUMENTATION (OUTPATIENT)
Dept: RESEARCH | Age: 79
End: 2024-09-03
Payer: MEDICARE

## 2024-09-03 ENCOUNTER — APPOINTMENT (OUTPATIENT)
Dept: CARDIOLOGY | Facility: HOSPITAL | Age: 79
End: 2024-09-03
Payer: MEDICARE

## 2024-09-03 ENCOUNTER — APPOINTMENT (OUTPATIENT)
Dept: RADIOLOGY | Facility: HOSPITAL | Age: 79
End: 2024-09-03
Payer: MEDICARE

## 2024-09-03 LAB
ALBUMIN SERPL-MCNC: 3.7 G/DL (ref 3.5–5)
ALP BLD-CCNC: 131 U/L (ref 35–125)
ALT SERPL-CCNC: 16 U/L (ref 5–40)
AMMONIA PLAS-SCNC: 31 UMOL/L (ref 12–45)
ANION GAP SERPL CALC-SCNC: 12 MMOL/L
AST SERPL-CCNC: 21 U/L (ref 5–40)
ATRIAL RATE: 76 BPM
BILIRUB SERPL-MCNC: 0.3 MG/DL (ref 0.1–1.2)
BUN SERPL-MCNC: 6 MG/DL (ref 8–25)
CALCIUM SERPL-MCNC: 9.4 MG/DL (ref 8.5–10.4)
CHLORIDE SERPL-SCNC: 103 MMOL/L (ref 97–107)
CK SERPL-CCNC: 69 U/L (ref 24–195)
CO2 SERPL-SCNC: 30 MMOL/L (ref 24–31)
CREAT SERPL-MCNC: 0.4 MG/DL (ref 0.4–1.6)
EGFRCR SERPLBLD CKD-EPI 2021: >90 ML/MIN/1.73M*2
ERYTHROCYTE [DISTWIDTH] IN BLOOD BY AUTOMATED COUNT: 17.7 % (ref 11.5–14.5)
GLUCOSE SERPL-MCNC: 110 MG/DL (ref 65–99)
HCT VFR BLD AUTO: 39.6 % (ref 36–46)
HGB BLD-MCNC: 12.8 G/DL (ref 12–16)
LACTATE SERPL-SCNC: 1.2 MMOL/L (ref 0.4–2)
MCH RBC QN AUTO: 28.1 PG (ref 26–34)
MCHC RBC AUTO-ENTMCNC: 32.3 G/DL (ref 32–36)
MCV RBC AUTO: 87 FL (ref 80–100)
NRBC BLD-RTO: 0 /100 WBCS (ref 0–0)
NT-PROBNP SERPL-MCNC: 302 PG/ML (ref 0–624)
P AXIS: 57 DEGREES
P OFFSET: 191 MS
P ONSET: 147 MS
PLATELET # BLD AUTO: 329 X10*3/UL (ref 150–450)
POTASSIUM SERPL-SCNC: 4 MMOL/L (ref 3.4–5.1)
PR INTERVAL: 136 MS
PROT SERPL-MCNC: 7.1 G/DL (ref 5.9–7.9)
Q ONSET: 215 MS
QRS COUNT: 13 BEATS
QRS DURATION: 80 MS
QT INTERVAL: 402 MS
QTC CALCULATION(BAZETT): 452 MS
QTC FREDERICIA: 434 MS
R AXIS: -69 DEGREES
RBC # BLD AUTO: 4.55 X10*6/UL (ref 4–5.2)
SODIUM SERPL-SCNC: 145 MMOL/L (ref 133–145)
T AXIS: 69 DEGREES
T OFFSET: 416 MS
TSH SERPL DL<=0.05 MIU/L-ACNC: 0.9 MIU/L (ref 0.27–4.2)
VENTRICULAR RATE: 76 BPM
WBC # BLD AUTO: 8.5 X10*3/UL (ref 4.4–11.3)

## 2024-09-03 PROCEDURE — 93970 EXTREMITY STUDY: CPT

## 2024-09-03 PROCEDURE — 97162 PT EVAL MOD COMPLEX 30 MIN: CPT | Mod: GP

## 2024-09-03 PROCEDURE — 94640 AIRWAY INHALATION TREATMENT: CPT

## 2024-09-03 PROCEDURE — 84075 ASSAY ALKALINE PHOSPHATASE: CPT | Performed by: INTERNAL MEDICINE

## 2024-09-03 PROCEDURE — 87075 CULTR BACTERIA EXCEPT BLOOD: CPT | Mod: WESLAB | Performed by: INTERNAL MEDICINE

## 2024-09-03 PROCEDURE — 36415 COLL VENOUS BLD VENIPUNCTURE: CPT | Performed by: INTERNAL MEDICINE

## 2024-09-03 PROCEDURE — 2500000002 HC RX 250 W HCPCS SELF ADMINISTERED DRUGS (ALT 637 FOR MEDICARE OP, ALT 636 FOR OP/ED): Performed by: NURSE PRACTITIONER

## 2024-09-03 PROCEDURE — 87077 CULTURE AEROBIC IDENTIFY: CPT | Mod: WESLAB | Performed by: INTERNAL MEDICINE

## 2024-09-03 PROCEDURE — 93005 ELECTROCARDIOGRAM TRACING: CPT

## 2024-09-03 PROCEDURE — 2500000001 HC RX 250 WO HCPCS SELF ADMINISTERED DRUGS (ALT 637 FOR MEDICARE OP): Performed by: INTERNAL MEDICINE

## 2024-09-03 PROCEDURE — 97530 THERAPEUTIC ACTIVITIES: CPT | Mod: GP

## 2024-09-03 PROCEDURE — 97166 OT EVAL MOD COMPLEX 45 MIN: CPT | Mod: GO

## 2024-09-03 PROCEDURE — 1200000002 HC GENERAL ROOM WITH TELEMETRY DAILY

## 2024-09-03 PROCEDURE — 85027 COMPLETE CBC AUTOMATED: CPT | Performed by: INTERNAL MEDICINE

## 2024-09-03 PROCEDURE — S4991 NICOTINE PATCH NONLEGEND: HCPCS | Performed by: INTERNAL MEDICINE

## 2024-09-03 PROCEDURE — 83605 ASSAY OF LACTIC ACID: CPT | Mod: WESLAB | Performed by: NURSE PRACTITIONER

## 2024-09-03 PROCEDURE — 9420000001 HC RT PATIENT EDUCATION 5 MIN

## 2024-09-03 PROCEDURE — 82140 ASSAY OF AMMONIA: CPT | Performed by: NURSE PRACTITIONER

## 2024-09-03 PROCEDURE — 2500000004 HC RX 250 GENERAL PHARMACY W/ HCPCS (ALT 636 FOR OP/ED): Performed by: INTERNAL MEDICINE

## 2024-09-03 PROCEDURE — 2500000002 HC RX 250 W HCPCS SELF ADMINISTERED DRUGS (ALT 637 FOR MEDICARE OP, ALT 636 FOR OP/ED): Performed by: INTERNAL MEDICINE

## 2024-09-03 PROCEDURE — 93970 EXTREMITY STUDY: CPT | Performed by: RADIOLOGY

## 2024-09-03 PROCEDURE — 97535 SELF CARE MNGMENT TRAINING: CPT | Mod: GO

## 2024-09-03 SDOH — HEALTH STABILITY: PHYSICAL HEALTH: ON AVERAGE, HOW MANY DAYS PER WEEK DO YOU ENGAGE IN MODERATE TO STRENUOUS EXERCISE (LIKE A BRISK WALK)?: 0 DAYS

## 2024-09-03 SDOH — ECONOMIC STABILITY: HOUSING INSECURITY: AT ANY TIME IN THE PAST 12 MONTHS, WERE YOU HOMELESS OR LIVING IN A SHELTER (INCLUDING NOW)?: NO

## 2024-09-03 SDOH — ECONOMIC STABILITY: INCOME INSECURITY: IN THE PAST 12 MONTHS, HAS THE ELECTRIC, GAS, OIL, OR WATER COMPANY THREATENED TO SHUT OFF SERVICE IN YOUR HOME?: NO

## 2024-09-03 SDOH — HEALTH STABILITY: MENTAL HEALTH: HOW OFTEN DO YOU HAVE A DRINK CONTAINING ALCOHOL?: NEVER

## 2024-09-03 SDOH — SOCIAL STABILITY: SOCIAL NETWORK: ARE YOU MARRIED, WIDOWED, DIVORCED, SEPARATED, NEVER MARRIED, OR LIVING WITH A PARTNER?: WIDOWED

## 2024-09-03 SDOH — SOCIAL STABILITY: SOCIAL INSECURITY: WITHIN THE LAST YEAR, HAVE YOU BEEN AFRAID OF YOUR PARTNER OR EX-PARTNER?: NO

## 2024-09-03 SDOH — ECONOMIC STABILITY: FOOD INSECURITY: WITHIN THE PAST 12 MONTHS, YOU WORRIED THAT YOUR FOOD WOULD RUN OUT BEFORE YOU GOT MONEY TO BUY MORE.: NEVER TRUE

## 2024-09-03 SDOH — HEALTH STABILITY: MENTAL HEALTH: HOW MANY STANDARD DRINKS CONTAINING ALCOHOL DO YOU HAVE ON A TYPICAL DAY?: PATIENT DOES NOT DRINK

## 2024-09-03 SDOH — SOCIAL STABILITY: SOCIAL INSECURITY: WITHIN THE LAST YEAR, HAVE YOU BEEN HUMILIATED OR EMOTIONALLY ABUSED IN OTHER WAYS BY YOUR PARTNER OR EX-PARTNER?: NO

## 2024-09-03 SDOH — SOCIAL STABILITY: SOCIAL NETWORK: IN A TYPICAL WEEK, HOW MANY TIMES DO YOU TALK ON THE PHONE WITH FAMILY, FRIENDS, OR NEIGHBORS?: ONCE A WEEK

## 2024-09-03 SDOH — ECONOMIC STABILITY: HOUSING INSECURITY: IN THE PAST 12 MONTHS, HOW MANY TIMES HAVE YOU MOVED WHERE YOU WERE LIVING?: 1

## 2024-09-03 SDOH — SOCIAL STABILITY: SOCIAL NETWORK: HOW OFTEN DO YOU ATTEND CHURCH OR RELIGIOUS SERVICES?: NEVER

## 2024-09-03 SDOH — SOCIAL STABILITY: SOCIAL NETWORK: HOW OFTEN DO YOU GET TOGETHER WITH FRIENDS OR RELATIVES?: ONCE A WEEK

## 2024-09-03 SDOH — ECONOMIC STABILITY: INCOME INSECURITY: HOW HARD IS IT FOR YOU TO PAY FOR THE VERY BASICS LIKE FOOD, HOUSING, MEDICAL CARE, AND HEATING?: NOT HARD AT ALL

## 2024-09-03 SDOH — ECONOMIC STABILITY: INCOME INSECURITY: IN THE LAST 12 MONTHS, WAS THERE A TIME WHEN YOU WERE NOT ABLE TO PAY THE MORTGAGE OR RENT ON TIME?: NO

## 2024-09-03 SDOH — HEALTH STABILITY: MENTAL HEALTH
HOW OFTEN DO YOU NEED TO HAVE SOMEONE HELP YOU WHEN YOU READ INSTRUCTIONS, PAMPHLETS, OR OTHER WRITTEN MATERIAL FROM YOUR DOCTOR OR PHARMACY?: SOMETIMES

## 2024-09-03 SDOH — HEALTH STABILITY: MENTAL HEALTH: HOW OFTEN DO YOU HAVE 6 OR MORE DRINKS ON ONE OCCASION?: NEVER

## 2024-09-03 SDOH — ECONOMIC STABILITY: FOOD INSECURITY: WITHIN THE PAST 12 MONTHS, THE FOOD YOU BOUGHT JUST DIDN'T LAST AND YOU DIDN'T HAVE MONEY TO GET MORE.: NEVER TRUE

## 2024-09-03 SDOH — SOCIAL STABILITY: SOCIAL NETWORK: HOW OFTEN DO YOU ATTENT MEETINGS OF THE CLUB OR ORGANIZATION YOU BELONG TO?: NEVER

## 2024-09-03 SDOH — HEALTH STABILITY: PHYSICAL HEALTH: ON AVERAGE, HOW MANY MINUTES DO YOU ENGAGE IN EXERCISE AT THIS LEVEL?: 0 MIN

## 2024-09-03 ASSESSMENT — LIFESTYLE VARIABLES
SKIP TO QUESTIONS 9-10: 1
AUDIT-C TOTAL SCORE: 0

## 2024-09-03 ASSESSMENT — PAIN SCALES - GENERAL
PAINLEVEL_OUTOF10: 0 - NO PAIN
PAINLEVEL_OUTOF10: 3
PAINLEVEL_OUTOF10: 0 - NO PAIN
PAINLEVEL_OUTOF10: 3
PAINLEVEL_OUTOF10: 3
PAINLEVEL_OUTOF10: 0 - NO PAIN
PAINLEVEL_OUTOF10: 0 - NO PAIN

## 2024-09-03 ASSESSMENT — ACTIVITIES OF DAILY LIVING (ADL)
BATHING_ASSISTANCE: MODERATE
ADL_ASSISTANCE: NEEDS ASSISTANCE
HOME_MANAGEMENT_TIME_ENTRY: 101
ADL_ASSISTANCE: NEEDS ASSISTANCE

## 2024-09-03 ASSESSMENT — COGNITIVE AND FUNCTIONAL STATUS - GENERAL
MOBILITY SCORE: 18
HELP NEEDED FOR BATHING: A LOT
MOVING TO AND FROM BED TO CHAIR: A LITTLE
HELP NEEDED FOR BATHING: A LITTLE
CLIMB 3 TO 5 STEPS WITH RAILING: A LOT
TOILETING: A LOT
PERSONAL GROOMING: A LITTLE
DAILY ACTIVITIY SCORE: 20
DRESSING REGULAR UPPER BODY CLOTHING: A LITTLE
MOBILITY SCORE: 15
CLIMB 3 TO 5 STEPS WITH RAILING: A LOT
DRESSING REGULAR UPPER BODY CLOTHING: A LOT
WALKING IN HOSPITAL ROOM: A LITTLE
DAILY ACTIVITIY SCORE: 15
DRESSING REGULAR LOWER BODY CLOTHING: A LITTLE
TURNING FROM BACK TO SIDE WHILE IN FLAT BAD: A LOT
EATING MEALS: A LITTLE
WALKING IN HOSPITAL ROOM: A LOT
MOVING FROM LYING ON BACK TO SITTING ON SIDE OF FLAT BED WITH BEDRAILS: A LOT
STANDING UP FROM CHAIR USING ARMS: A LITTLE
STANDING UP FROM CHAIR USING ARMS: A LITTLE
MOVING TO AND FROM BED TO CHAIR: A LITTLE
DRESSING REGULAR LOWER BODY CLOTHING: A LOT

## 2024-09-03 ASSESSMENT — PAIN DESCRIPTION - DESCRIPTORS
DESCRIPTORS: ACHING

## 2024-09-03 ASSESSMENT — ENCOUNTER SYMPTOMS
ABDOMINAL PAIN: 1
PSYCHIATRIC NEGATIVE: 1
ENDOCRINE NEGATIVE: 1
EYES NEGATIVE: 1
ALLERGIC/IMMUNOLOGIC NEGATIVE: 1
NEUROLOGICAL NEGATIVE: 1
SHORTNESS OF BREATH: 1
MUSCULOSKELETAL NEGATIVE: 1
HEMATOLOGIC/LYMPHATIC NEGATIVE: 1
CONSTITUTIONAL NEGATIVE: 1

## 2024-09-03 ASSESSMENT — PAIN - FUNCTIONAL ASSESSMENT
PAIN_FUNCTIONAL_ASSESSMENT: 0-10
PAIN_FUNCTIONAL_ASSESSMENT: 0-10
PAIN_FUNCTIONAL_ASSESSMENT: FLACC (FACE, LEGS, ACTIVITY, CRY, CONSOLABILITY)
PAIN_FUNCTIONAL_ASSESSMENT: 0-10
PAIN_FUNCTIONAL_ASSESSMENT: WONG-BAKER FACES
PAIN_FUNCTIONAL_ASSESSMENT: 0-10
PAIN_FUNCTIONAL_ASSESSMENT: FLACC (FACE, LEGS, ACTIVITY, CRY, CONSOLABILITY)

## 2024-09-03 ASSESSMENT — PAIN SCALES - WONG BAKER: WONGBAKER_NUMERICALRESPONSE: NO HURT

## 2024-09-03 NOTE — SIGNIFICANT EVENT
A rapid response was called at about 9:5 pm. The patient was reported to have been belly breathing and hypertensive with a  BP of 201/85  and 192/84. On arrival,, BP had been rechecked and was 156 systolic. She was awake and alert and had just vomited.  Vomitus was nonbloody.  Oxygen saturation had been 86% on room air, she had been placed on 2 L/min and oxygen saturation was 99 to 100%  She is admitted to Dr. Mcneil's service and had been brought to the hospital after being found unresponsive at home.  On examination, she was awake, responding to questions, oriented to self and place.  She had good air entry on auscultation of the lungs.  She had a large abdominal hernia which was soft, nontender to palpation, reducible, with bowel sounds present.   She was moving all extremities normally.  She initially appeared to have increased respiratory effort, however this improved rapidly and she subsequently had no significant respiratory distress  A 12--lead EKG showed normal sinus rhythm.  The quality of the EKG was affected by patient's motion.  A stat multiple chest x-ray was done and showed opacities along the base of the right lung, not seen on a previous chest x-ray done earlier today    Assessment  Acute hypoxia  Altered mental status/encephalopathy  Pyuria versus urinary tract infection  Abdominal hernia    Plan  Recommend supportive coverage because of density on chest x-ray rule out pneumonia.  Start IV Zosyn  Continuous pulse oximetry  Telemetry monitoring

## 2024-09-03 NOTE — PROGRESS NOTES
09/03/24 1528   [facility] Disability Status   Are you deaf or do you have serious difficulty hearing? N   Are you blind or do you have serious difficulty seeing, even when wearing glasses? N   Because of a physical, mental, or emotional condition, do you have serious difficulty concentrating, remembering, or making decisions? (5 years old or older) N   Do you have serious difficulty walking or climbing stairs? Y   Do you have serious difficulty dressing or bathing? Y   Because of a physical, mental, or emotional condition, do you have serious difficulty doing errands alone such as visiting the doctor? Y

## 2024-09-03 NOTE — PROGRESS NOTES
TCC spoke to patient at the bedside. Pt is a resident at [facility]. Pt was at daughter's house for SHANITA when she fell. Pt is not on oxygen or dialysis. Pt uses a rollator. Pt smokes 1ppd cigarettes and does not drink alcohol. Pt PCP is Dr. Chaney. Pt is not a . Pt has a LW and POA is her daughter. Sent referral to [facility] and she can return when medically ready for discharge.     DISCHARGE PLAN TO RETURN TO [facility]. BED HOLD LT.        09/03/24 1522   Discharge Planning   Living Arrangements Family members;Other (Comment)   Support Systems Family members   Assistance Needed yes   Type of Residence Nursing home/residential care   Do you have animals or pets at home? No   Who is requesting discharge planning? Provider   Home or Post Acute Services Post acute facilities (Rehab/SNF/etc)   Type of Post Acute Facility Services Long term care   Expected Discharge Disposition Other   Does the patient need discharge transport arranged? Yes   RoundTrip coordination needed? Yes   Has discharge transport been arranged? No   Financial Resource Strain   How hard is it for you to pay for the very basics like food, housing, medical care, and heating? Not hard   Housing Stability   In the last 12 months, was there a time when you were not able to pay the mortgage or rent on time? N   In the past 12 months, how many times have you moved where you were living? 1   At any time in the past 12 months, were you homeless or living in a shelter (including now)? N   Transportation Needs   In the past 12 months, has lack of transportation kept you from medical appointments or from getting medications? no   In the past 12 months, has lack of transportation kept you from meetings, work, or from getting things needed for daily living? No

## 2024-09-03 NOTE — PROGRESS NOTES
09/03/24 1528   Current Planned Discharge Disposition   Current Planned Discharge Disposition Inter  ([facility] LTC bed hold)

## 2024-09-03 NOTE — PROGRESS NOTES
Occupational Therapy    Evaluation/Treatment    Patient Name: Karly Horton  MRN: [id number]  : 1945  Today's Date: 24  Time Calculation  Start Time: 845  Stop Time: 910  Time Calculation (min): 25 min       Assessment:  OT Assessment: Referral received, chart reviewed, and evaluation complete.  Presents with impaired aerobic capacitym decreased balance, coordination, and weakness.  Would benefit from acute OT services.  Recommend moderate intensity upon discharge  Prognosis: Good  Barriers to Discharge: None  Evaluation/Treatment Tolerance: Patient tolerated treatment well  Medical Staff Made Aware: Yes  End of Session Communication: Bedside nurse  End of Session Patient Position: Up in chair, Alarm on    Plan:  Treatment Interventions: ADL retraining, Functional transfer training, Endurance training, Patient/family training, Neuromuscular reeducation, Compensatory technique education  OT Frequency: 4 times per week  OT Discharge Recommendations: Moderate intensity level of continued care  Equipment Recommended upon Discharge: Wheeled walker  OT Recommended Transfer Status: Assist of 1, Minimal assist  OT - OK to Discharge: Yes  Treatment Interventions: ADL retraining, Functional transfer training, Endurance training, Patient/family training, Neuromuscular reeducation, Compensatory technique education    Subjective   Current Problem:  1. Urinary tract infection without hematuria, site unspecified        2. Acute encephalopathy        3. Deep vein thrombosis (DVT) of non-extremity vein, unspecified chronicity  Lower extremity venous duplex bilateral    Lower extremity venous duplex bilateral      4. Dyspnea and respiratory abnormalities  Lower extremity venous duplex bilateral    Lower extremity venous duplex bilateral      5. Increasing shortness of breath  Lower extremity venous duplex bilateral    Lower extremity venous duplex bilateral        General:   OT Received On: 24  General  Reason  for Referral: decreased functional status  Referred By: Lupillo Mcneil MD  Past Medical History Relevant to Rehab: CHF; anemia; pneumonia; lumbar stenosis; COPD; GI Bleed; failure to thrive; bowel resection; smoker; hernia; hypoxia;  Family/Caregiver Present: No  Prior to Session Communication: Bedside nurse  Patient Position Received: Up in chair, Alarm on  General Comment: 79 year old WF admitted from home. She signed herself out of rehab AMA and returned home.  At home she was found down on the floor unresponsive by her daughter  Precautions:  Hearing/Visual Limitations: Hearing WFL, wears glasses  Medical Precautions: Fall precautions, Oxygen therapy device and L/min (Has 3L 02 per nasal cannula)    Vital Sign (Past 2hrs)                 Pain:  Pain Assessment  Pain Assessment: 0-10  0-10 (Numeric) Pain Score: 0 - No pain    Objective   Cognition:  Overall Cognitive Status: Impaired  Orientation Level: Disoriented to situation  Problem Solving: Exceptions to WFL  Safety/Judgement: Exceptions to WFL  Insight: Moderate  Impulsive: Moderately      Home Living:  Type of Home: Apartment  Lives With: Adult children (lives with her daughter)  Home Adaptive Equipment: Walker rolling or standard  Home Layout: One level  Home Access: Level entry  Bathroom Shower/Tub: Tub/shower unit  Bathroom Toilet: Standard  Bathroom Equipment: Shower chair with back    Prior Function:  Level of [place]: Independent with ADLs and functional transfers  Receives Help From: Family  ADL Assistance: Needs assistance  Homemaking Assistance: Needs assistance  Ambulatory Assistance: Independent  Vocational: Retired  Hand Dominance: Right  Prior Function Comments: Patient reports she has help with sponge bathing, and dressing from her daughter.  She performs no IADLs    IADL History:  Homemaking Responsibilities: No    ADL:  Eating Assistance: Independent  Grooming Assistance: Minimal  Bathing Assistance: Moderate  UE Dressing  Assistance: Moderate  LE Dressing Assistance: Moderate  Toileting Assistance with Device: Moderate  Functional Assistance: Moderate    Activities of Daily Living: UE Dressing  UE Dressing Level of Assistance: Minimum assistance  UE Dressing Where Assessed: Chair  UE Dressing Comments: donned gown    LE Dressing  LE Dressing: Yes  Sock Level of Assistance: Moderate assistance  LE Dressing Where Assessed: Chair  Activity Tolerance:  Endurance: Tolerates less than 10 min exercise with changes in vital signs  Functional Standing Tolerance:     Bed Mobility/Transfers: Transfers  Transfer: Yes  Transfer 1  Transfer From 1: Chair with arms to  Transfer to 1: Stand  Technique 1: Sit to stand, Stand to sit  Transfer Level of Assistance 1: Minimum assistance      Functional Mobility:  Functional Mobility  Functional Mobility Performed: Yes  Functional Mobility 1  Surface 1: Level tile  Device 1: No device  Assistance 1: Minimum assistance  Comments 1: Performed functional mobility a short household distance.  Sitting Balance:  Static Sitting Balance  Static Sitting-Balance Support: Feet supported  Static Sitting-Level of Assistance: Independent  Standing Balance:  Static Standing Balance  Static Standing-Balance Support: No upper extremity supported  Static Standing-Level of Assistance: Minimum assistance     Sensory: No apparent deficits    Sensation:  Light Touch: No apparent deficits    Strength:  Strength Comments: BUE shoulders 2+/5, elbows 3/5, hands 3/5 (Audible crepitus noted in bilateral shoulders with PROM)     Coordination:  Movements are Fluid and Coordinated: No   Hand Function:  Hand Function  Gross Grasp: Functional  Coordination: Impaired    Outcome Measures: [facility] Daily Activity  Putting on and taking off regular lower body clothing: A lot  Bathing (including washing, rinsing, drying): A lot  Putting on and taking off regular upper body clothing: A lot  Toileting, which includes using toilet, bedpan or  urinal: A lot  Taking care of personal grooming such as brushing teeth: A little  Eating Meals: None  Daily Activity - Total Score: 15    Goals:     Signed            Problem: Bathing  Goal: LTG - Patient will utilize adaptive techniques to bathe upper body with close supervision and set up  Outcome: Progressing     Problem: Dressings Lower Extremities  Goal: LTG - Patient will dress lower body with AE and close supervision  Outcome: Progressing     Problem: Dressing Upper Extremities  Goal: LTG - Patient will complete upper body dressing with set up  Outcome: Progressing     Problem: Grooming  Goal: STG - Patient completes grooming with set up  Outcome: Progressing     Problem: Functional Mobility  Goal: Perform functional mobility to and from the bathroom with 2ww and CGA  Outcome: Progressing     Problem: Toileting  Goal: STG - Patient will complete toileting tasks with 2ww and CGA  Outcome: Progressing     Problem: OT Transfers  Goal: LTG - Patient will perform all functional transfers with close supervision and 2ww  Outcome: Progressing

## 2024-09-03 NOTE — PROGRESS NOTES
Physical Therapy    Physical Therapy Evaluation & Treatment    Patient Name: Karly Horton  MRN: [id number]  Today's Date: 9/3/2024   Time Calculation  Start Time: 0812  Stop Time: 0838  Time Calculation (min): 26 min    Assessment/Plan   PT Assessment  PT Assessment Results: Decreased strength, Decreased endurance, Decreased mobility, Decreased coordination, Decreased cognition, Impaired sensation, Pain  Rehab Prognosis: Good  Evaluation/Treatment Tolerance: Patient tolerated treatment well  End of Session Communication: Bedside nurse  Assessment Comment: pt would benefit from skilled therapy services and use of rolling walker  End of Session Patient Position: Up in chair, Alarm on (call button in reach)  IP OR SWING BED PT PLAN  Inpatient or Swing Bed: Inpatient  PT Plan  Treatment/Interventions: Bed mobility, Transfer training, Gait training, Stair training, Balance training, Strengthening, Endurance training, Therapeutic exercise  PT Plan: Ongoing PT  PT Frequency: 4 times per week  PT Discharge Recommendations: Moderate intensity level of continued care  Equipment Recommended upon Discharge: Wheeled walker  PT Recommended Transfer Status:  (MIN/MOD A)  PT - OK to Discharge: Yes      Subjective   General Visit Information:  General  Reason for Referral: pt is a 78 y/o female admitted with UTI and change in MS; pt was found unresponsive on the floor by daughter; impaired mobility  Referred By: Dr. Mcneil  Past Medical History Relevant to Rehab: CHF; anemia; pneumonia; lumbar stenosis; COPD; GI Bleed; failure to thrive; bowel resection; smoker; hernia; hypoxia;  Prior to Session Communication: Bedside nurse  Patient Position Received: Bed, 3 rail up, Alarm on  General Comment: pt alert and talkative; frequent VC to stay on task. pt appeared anxious    Home Living:  Home Living  Type of Home: Apartment  Lives With:  (daughter)  Home Adaptive Equipment:  (rollator and rolling walker)  Home Layout: One level  Home  Access:  (1 entry stair with no railing)  Bathroom Shower/Tub: Tub/shower unit  Bathroom Equipment: Shower chair with back  Home Living Comments: pt reported sleeping on couch    Prior Level of Function:  Prior Function Per Pt/Caregiver Report  Level of [place]: Independent with ADLs and functional transfers, Needs assistance with homemaking  Receives Help From:  (daughter can provide 24/7 assistance per patient)  ADL Assistance: Needs assistance (+ sponge bathes with help)  Homemaking Assistance: Needs assistance (pt handles laundry and cleaning)  Ambulatory Assistance: Independent (uses rollator)  Vocational: Retired  Prior Function Comments: pt recently left AMA from [facility]    Precautions:  Precautions  Hearing/Visual Limitations: wears glasses  Medical Precautions: Fall precautions  Precautions Comment: educated and instructed pt in energy conservation techniques and safety techniques    Vital Signs (Past 2hrs)        Date/Time Vitals Session Patient Position Pulse Resp SpO2 BP MAP (mmHg)    09/03/24 0812 --  --  88  --  98% wearing 3L of oxygen --  --                                    Objective   Pain:  Pain Assessment  Pain Assessment:  (2/10 abdominal pain;)    Cognition:  Cognition  Overall Cognitive Status: Impaired  Orientation Level: Oriented X4  Safety/Judgement:  (impaired safety awareness.)  Insight:  (poor insight to safety;)    General Assessments:         Activity Tolerance  Endurance:  (GOOD- activity tolerance)    Sensation  Sensation Comment: tingling B feet      Coordination  Coordination Comment: decreased rate of movements    Postural Control  Posture Comment: moderate kyphosis with + Dowagers hump    Static Sitting Balance  Static Sitting-Comment/Number of Minutes: GOOD  Dynamic Sitting Balance  Dynamic Sitting-Comments: GOOD-    Static Standing Balance  Static Standing-Comment/Number of Minutes: GOOD-  Dynamic Standing Balance  Dynamic Standing-Comments: FAIR+      Functional  Assessments:    Bed Mobility:  (supine to sit with MOD A for trunk up and B LE. MIN A for scooting.)      Transfer:  (sit <-> stand with MIN A. pt pulling up on braced RW)      Ambulation/Gait Training Performed:  pt took 5-6 short rapid steps using rolling walker with close MIN A: pt presented with narrow CARY, and mild head down posture. mild fatigue noted.      Extremity/Trunk Assessments:  RUE   RUE :  (limited shoulder elevation; 3+/5 strength)  LUE   LUE:  (limited shoulder elevation; 3+/5 strength)  RLE   RLE :  (WFL with 3+/5 strength)  LLE   LLE :  (WFL with 3+/5 strength)      Outcome Measures:  [facility] Basic Mobility  Turning from your back to your side while in a flat bed without using bedrails: A lot  Moving from lying on your back to sitting on the side of a flat bed without using bedrails: A lot  Moving to and from bed to chair (including a wheelchair): A little  Standing up from a chair using your arms (e.g. wheelchair or bedside chair): A little  To walk in hospital room: A little  Climbing 3-5 steps with railing: A lot  Basic Mobility - Total Score: 15    Encounter Problems       Encounter Problems (Active)       Mobility       STG - Patient will ambulate 75' x 1 using rolling walker with SBA (Progressing)       Start:  09/03/24    Expected End:  09/17/24            STG - Patient will negotiate 1 platform step using rolling walker with CGA (Progressing)       Start:  09/03/24    Expected End:  09/17/24               PT Transfers       STG - Patient to transfer to and from sit to supine MOD INDEPENDENT (Progressing)       Start:  09/03/24    Expected End:  09/17/24            STG - Patient will transfer sit to and from stand with SUPERVISION (Progressing)       Start:  09/03/24    Expected End:  09/17/24               Pain - Adult              Education Documentation  No documentation found.  Education Comments  No comments found.

## 2024-09-03 NOTE — NURSING NOTE
Dr. Yne came and seen patient, will wait for patient's lab result to come back. No further orders received.

## 2024-09-03 NOTE — NURSING NOTE
RN notified rapid nurse of pt. Complaining about shortness of breath, Respiration rate in 40s, 99% on 2L NC, and increased use of accessory muscles. RT called for PRN breathing treatment, Dr. Yen, RT, and Rapid nurse at bedside

## 2024-09-03 NOTE — NURSING NOTE
Spoke to Moira Barry, Manager at [facility] where Patient is a resident at, She was signed out SHANITA and daughter called facility and told them that she had a fall at home and was admitted to hospital.  Patient has room holding at [facility] in [facility], phone number is [phone].

## 2024-09-03 NOTE — NURSING NOTE
RN notified NP Michael about labored breathing with accessory muscles, elevated blood pressure 201/85, and new O2 requirements, contacted rapid nurse for evaluation, I team called MD Yen at bedside. Pt had emesis occurrence. See new orders

## 2024-09-03 NOTE — NURSING NOTE
Received call from GIANLUCA Wright for this patient, complaining for shortness of breath, labored breathing. Spo2 at % on 2L NC, HR-70's, tachypneic at 40's. Called RT for breathing treatment. Called Dr. Yen and informed him on pt status, he will see patient.

## 2024-09-03 NOTE — RESEARCH NOTES
Artificial Intelligence Monitoring in Nursing (AIMS Nursing) Study    Principle Investigator - Dr. Sharif Merchant  Research Coordinator - RUBEN Martines     Patient Name - Karly Horton  Date - 9/3/2024 3:18 PM  Location - [facility]    Karly Horton was approached by RUBEN Martines to talk about participating in the AIMS Nursing Study. The patient was not able to be approached, a research coordinator will come back at a later time. Study protocol was followed and patient was given study contact information.     RUBEN Martines

## 2024-09-03 NOTE — H&P
Chief Complaint Patient states she is in the hospital because her daughter found her unresponsive.    History Of Present Illness  Karly Horton is a very pleasant 79 y.o.  female presenting with altered mental status.  She is awake alert oriented x 3 though a marginal historian and she is unable to provide me with the details of the events leading to the hospitalization.  I called her daughter, Karly, though I was unable to reach her and I was unable to leave a voicemail.  Per the record, she was recently at skilled nursing rehabilitation, signed out AMA.  Her daughter found her laying next to the couch unresponsive.  The patient recalls that she was at skilled nursing rehabilitation though she does not recall any details.  She recalls taking her medications at home including Baclofen and Fiorcet stating she took the medications each time she woke though she is uncertain of the timing between the dosing.  On review of the record, she was admitted to [facility] 7/7/2024-7/10/2024 with altered mental status, emesis, cough, acute hypoxic respiratory failure.  She was treated for pneumonia.  She was discharged.  She presented to the emergency department.  Initial work-up was done.  Temperature 36.4 degrees celsius.  Pulse 64.  Respiratory rate 14.  Blood pressure 175/76.  Pulse oximetry 96% on room air.  12 lead EKG showed normal sinus rhythm with PVC's, no acute ischemia.  CT head without contrast per radiology showed no acute intracranial abnormality, nonspecific white matter likely reflecting sequela of small-vessel ischemic disease.  1 view chest x-ray per radiology showed mild nonspecific diffuse interstitial prominence which could reflect chronic parenchymal changes, mild edema or possible atypical infectious process.  Urinalysis showed 240 leukocytes.  Urine drug screen + barbiturates.  She was treated in the emergency department and was admitted.  A rapid response was called overnight due  to abdominal breathing, hypertension and emesis.  Pulse oximetry 86% on room air.  12 lead EKG showed normal sinus rhythm, no acute ischemia.  1 view chest x-ray showed opacities along the base of the right lung.  She was started on IV Zosyn.  At the time of my evaluation, she is sitting up in the chair in no acute distress.  She is awake alert oriented x 3.  She reports shortness of breath, improved.  She denies any cough.  She denies any abdominal pain, nausea, vomiting.  She is asking to eat.     Past Medical History  Past Medical History:   Diagnosis Date    Other cervical disc degeneration, unspecified cervical region     Degeneration of cervical intervertebral disc    Other conditions influencing health status     Bulging Disc (L3 - L4)    Other conditions influencing health status     A Fall    Other conditions influencing health status     Bulging Disc (C4 - C5)    Other conditions influencing health status     Bulging Disc (C6 - C7)    Other conditions influencing health status     Bulging Disc (C5 - C6)    Other conditions influencing health status     Lumbar Spondylolisthesis    Other conditions influencing health status     Rotator Cuff Tendon Tear    Other intervertebral disc degeneration, lumbar region     L4-L5 disc bulge    Other intervertebral disc displacement, lumbar region     Disc displacement, lumbar    Other intervertebral disc displacement, lumbosacral region     Displacement of lumbosacral intervertebral disc    Personal history of other diseases of the musculoskeletal system and connective tissue     History of bursitis    Personal history of other diseases of the musculoskeletal system and connective tissue     History of fibromyositis    Personal history of other specified conditions     History of headache    Spinal stenosis, lumbar region without neurogenic claudication     Lumbar canal stenosis    Sprain of ligaments of lumbar spine, initial encounter     Low back sprain       Surgical  History  History reviewed. No pertinent surgical history.     Social History  She reports that she has been smoking cigarettes. She started smoking about 64 years ago. She has a 64.7 pack-year smoking history. She does not have any smokeless tobacco history on file. No history on file for alcohol use and drug use.    Family History  No family history on file.     Allergies  Lactose    Review of Systems   Constitutional: Negative.    HENT: Negative.     Eyes: Negative.    Respiratory:  Positive for shortness of breath.    Gastrointestinal:  Positive for abdominal pain.        Chronic abdominal pain, baseline.   Endocrine: Negative.    Genitourinary: Negative.    Musculoskeletal: Negative.    Skin: Negative.    Allergic/Immunologic: Negative.    Neurological: Negative.    Hematological: Negative.    Psychiatric/Behavioral: Negative.     All other systems reviewed and are negative.         Physical Exam  Vitals and nursing note reviewed.   Constitutional:       General: She is not in acute distress.     Appearance: Normal appearance. She is normal weight. She is not ill-appearing, toxic-appearing or diaphoretic.   HENT:      Head: Normocephalic and atraumatic.      Right Ear: External ear normal.      Left Ear: External ear normal.      Nose: Nose normal.      Mouth/Throat:      Mouth: Mucous membranes are moist.      Pharynx: Oropharynx is clear.   Eyes:      Extraocular Movements: Extraocular movements intact.      Conjunctiva/sclera: Conjunctivae normal.      Pupils: Pupils are equal, round, and reactive to light.   Cardiovascular:      Rate and Rhythm: Normal rate and regular rhythm.      Pulses: Normal pulses.      Heart sounds: Normal heart sounds.   Pulmonary:      Effort: Pulmonary effort is normal. Tachypnea present. No respiratory distress.      Breath sounds: Normal breath sounds. No wheezing, rhonchi or rales.      Comments: Room air.  Abdominal:      General: Bowel sounds are normal. There is distension.       Palpations: Abdomen is soft.      Tenderness: There is no abdominal tenderness.      Hernia: A hernia is present.   Genitourinary:     Comments: Deferred.  Musculoskeletal:         General: No swelling or tenderness. Normal range of motion.      Cervical back: Normal range of motion and neck supple.      Thoracic back: Scoliosis present.   Skin:     General: Skin is warm and dry.      Capillary Refill: Capillary refill takes less than 2 seconds.   Neurological:      General: No focal deficit present.      Mental Status: She is alert and oriented to person, place, and time.      Comments: Awake alert oriented x 3.  Forgetful.  Speech clear, coherent.  Follows all commands.  Generalized weakness.  No focal weakness.  Gait deferred.  Cranial nerves II through XII grossly intact.   Psychiatric:         Mood and Affect: Mood normal.         Behavior: Behavior normal.        Last Recorded Vitals  Blood pressure 150/82, pulse 96, temperature 36.6 °C (97.9 °F), temperature source Oral, resp. rate 18, height 1.524 m (5'), weight (!) 43.1 kg (94 lb 15.9 oz), SpO2 100%.    Relevant Results  Results for orders placed or performed during the hospital encounter of 09/02/24 (from the past 24 hour(s))   ECG 12 lead   Result Value Ref Range    Ventricular Rate 76 BPM    Atrial Rate 76 BPM    NH Interval 136 ms    QRS Duration 80 ms    QT Interval 402 ms    QTC Calculation(Bazett) 452 ms    P Axis 57 degrees    R Axis -69 degrees    T Axis 69 degrees    QRS Count 13 beats    Q Onset 215 ms    P Onset 147 ms    P Offset 191 ms    T Offset 416 ms    QTC Fredericia 434 ms   CBC and Auto Differential   Result Value Ref Range    WBC 8.8 4.4 - 11.3 x10*3/uL    nRBC 0.0 0.0 - 0.0 /100 WBCs    RBC 4.60 4.00 - 5.20 x10*6/uL    Hemoglobin 12.7 12.0 - 16.0 g/dL    Hematocrit 40.4 36.0 - 46.0 %    MCV 88 80 - 100 fL    MCH 27.6 26.0 - 34.0 pg    MCHC 31.4 (L) 32.0 - 36.0 g/dL    RDW 17.4 (H) 11.5 - 14.5 %    Platelets 326 150 - 450 x10*3/uL     Neutrophils % 69.0 40.0 - 80.0 %    Immature Granulocytes %, Automated 0.9 0.0 - 0.9 %    Lymphocytes % 15.5 13.0 - 44.0 %    Monocytes % 12.8 2.0 - 10.0 %    Eosinophils % 1.1 0.0 - 6.0 %    Basophils % 0.7 0.0 - 2.0 %    Neutrophils Absolute 6.05 (H) 1.60 - 5.50 x10*3/uL    Immature Granulocytes Absolute, Automated 0.08 0.00 - 0.50 x10*3/uL    Lymphocytes Absolute 1.36 0.80 - 3.00 x10*3/uL    Monocytes Absolute 1.12 (H) 0.05 - 0.80 x10*3/uL    Eosinophils Absolute 0.10 0.00 - 0.40 x10*3/uL    Basophils Absolute 0.06 0.00 - 0.10 x10*3/uL   Comprehensive metabolic panel   Result Value Ref Range    Glucose 96 65 - 99 mg/dL    Sodium 141 133 - 145 mmol/L    Potassium 4.1 3.4 - 5.1 mmol/L    Chloride 100 97 - 107 mmol/L    Bicarbonate 32 (H) 24 - 31 mmol/L    Urea Nitrogen 9 8 - 25 mg/dL    Creatinine 0.40 0.40 - 1.60 mg/dL    eGFR >90 >60 mL/min/1.73m*2    Calcium 9.6 8.5 - 10.4 mg/dL    Albumin 3.6 3.5 - 5.0 g/dL    Alkaline Phosphatase 115 35 - 125 U/L    Total Protein 6.8 5.9 - 7.9 g/dL    AST 27 5 - 40 U/L    Bilirubin, Total <0.2 0.1 - 1.2 mg/dL    ALT 18 5 - 40 U/L    Anion Gap 9 <=19 mmol/L   Magnesium   Result Value Ref Range    Magnesium 2.20 1.60 - 3.10 mg/dL   Serial Troponin, Initial ([facility])   Result Value Ref Range    Troponin T, High Sensitivity 16 (HH) <=14 ng/L   Drug Screen, Urine   Result Value Ref Range    Amphetamine Screen, Urine Negative      Barbiturate Screen, Urine Positive (A)      Benzodiazepines Screen, Urine Negative      Cannabinoid Screen, Urine Negative      Cocaine Metabolite Screen, Urine Negative      Fentanyl Screen, Urine Negative       Methadone Screen, Urine Negative      Opiate Screen, Urine Negative      Oxycodone Screen, Urine Negative      PCP Screen, Urine Negative     Urinalysis with Reflex Culture and Microscopic   Result Value Ref Range    Color, Urine Colorless (N) Light-Yellow, Yellow, Dark-Yellow    Appearance, Urine Turbid (N) Clear    Specific Gravity, Urine 1.005  1.005 - 1.035    pH, Urine 7.0 5.0, 5.5, 6.0, 6.5, 7.0, 7.5, 8.0    Protein, Urine NEGATIVE NEGATIVE, 10 (TRACE), 20 (TRACE) mg/dL    Glucose, Urine Normal Normal mg/dL    Blood, Urine NEGATIVE NEGATIVE    Ketones, Urine NEGATIVE NEGATIVE mg/dL    Bilirubin, Urine NEGATIVE NEGATIVE    Urobilinogen, Urine Normal Normal mg/dL    Nitrite, Urine NEGATIVE NEGATIVE    Leukocyte Esterase, Urine 250 [place]/µL (A) NEGATIVE   Microscopic Only, Urine   Result Value Ref Range    WBC, Urine 6-10 (A) 1-5, NONE /HPF    RBC, Urine 1-2 NONE, 1-2, 3-5 /HPF    Squamous Epithelial Cells, Urine 10-25 (FEW) Reference range not established. /HPF    Bacteria, Urine 1+ (A) NONE SEEN /HPF   Serial Troponin, 2 Hour ([facility])   Result Value Ref Range    Troponin T, High Sensitivity 14 <=14 ng/L   TSH   Result Value Ref Range    Thyroid Stimulating Hormone 0.90 0.27 - 4.20 mIU/L   Creatine Kinase   Result Value Ref Range    Creatine Kinase 69 24 - 195 U/L   NT Pro-BNP   Result Value Ref Range    PROBNP 302 0 - 624 pg/mL   POCT GLUCOSE   Result Value Ref Range    POCT Glucose 115 (H) 74 - 99 mg/dL   Blood Culture    Specimen: Peripheral Venipuncture; Blood culture   Result Value Ref Range    Blood Culture Loaded on Instrument - Culture in progress    Lactate   Result Value Ref Range    Lactate 1.2 0.4 - 2.0 mmol/L   CBC   Result Value Ref Range    WBC 8.5 4.4 - 11.3 x10*3/uL    nRBC 0.0 0.0 - 0.0 /100 WBCs    RBC 4.55 4.00 - 5.20 x10*6/uL    Hemoglobin 12.8 12.0 - 16.0 g/dL    Hematocrit 39.6 36.0 - 46.0 %    MCV 87 80 - 100 fL    MCH 28.1 26.0 - 34.0 pg    MCHC 32.3 32.0 - 36.0 g/dL    RDW 17.7 (H) 11.5 - 14.5 %    Platelets 329 150 - 450 x10*3/uL   Comprehensive metabolic panel   Result Value Ref Range    Glucose 110 (H) 65 - 99 mg/dL    Sodium 145 133 - 145 mmol/L    Potassium 4.0 3.4 - 5.1 mmol/L    Chloride 103 97 - 107 mmol/L    Bicarbonate 30 24 - 31 mmol/L    Urea Nitrogen 6 (L) 8 - 25 mg/dL    Creatinine 0.40 0.40 - 1.60 mg/dL    eGFR >90 >60  mL/min/1.73m*2    Calcium 9.4 8.5 - 10.4 mg/dL    Albumin 3.7 3.5 - 5.0 g/dL    Alkaline Phosphatase 131 (H) 35 - 125 U/L    Total Protein 7.1 5.9 - 7.9 g/dL    AST 21 5 - 40 U/L    Bilirubin, Total 0.3 0.1 - 1.2 mg/dL    ALT 16 5 - 40 U/L    Anion Gap 12 <=19 mmol/L   Ammonia   Result Value Ref Range    Ammonia 31 12 - 45 umol/L   Blood Culture    Specimen: Peripheral Venipuncture; Blood culture   Result Value Ref Range    Blood Culture Loaded on Instrument - Culture in progress      No results found for the last 90 days.    Lower extremity venous duplex bilateral    Result Date: 9/3/2024  Interpreted By:  Margot Gil, STUDY: [facility] LOWER EXTREMITY VENOUS DUPLEX BILATERAL  9/3/2024 12:22 pm   INDICATION: 80 y/o   F with  Signs/Symptoms:Dyspnea tachypnea rule out DVT. LMP: Unknown.   COMPARISON: None.   ACCESSION NUMBER(S): [id number]   ORDERING CLINICIAN: WARD JACKSON   TECHNIQUE: Routine ultrasound of the  bilateral lower extremity was performed with duplex Doppler (color and spectral) evaluation.   Static images were obtained for remote interpretation.   FINDINGS: THIGH VEINS:  The common femoral, femoral, popliteal, proximal medial saphenous, and deep femoral veins are patent and free of thrombus. The veins are normally compressible.  They demonstrate normal phasic flow and augmentation response.   CALF VEINS: Limited assessment without obvious thrombosis.       No deep venous thrombosis of the bilateral lower extremity.   Signed by: Margot Gil 9/3/2024 12:32 PM Dictation workstation:   [id number]    ECG 12 lead    Result Date: 9/3/2024  Sinus rhythm with frequent Premature ventricular complexes Left axis deviation Inferior infarct (cited on or before 01-SEP-2024) Cannot rule out Anteroseptal infarct (cited on or before 01-SEP-2024) Abnormal ECG When compared with ECG of 01-SEP-2024 21:21, (unconfirmed) No significant change was found    XR chest 1 view    Result Date: 9/2/2024  Interpreted  By:  Aldair Buck, STUDY: XR CHEST 1 VIEW;  9/2/2024 9:44 pm   INDICATION: Signs/Symptoms:shortness of breath.   COMPARISON: 09/02/2024   ACCESSION NUMBER(S): [id number]   ORDERING CLINICIAN: KATHIE AGUIAR   FINDINGS:     The cardiomediastinal silhouette and pulmonary vasculature are within normal limits. Diffuse bronchial thickening is noted. There is mild right basilar atelectasis. There is bibasilar scarring.         Generalized bronchial thickening without evidence of pneumonia or pulmonary edema.     MACRO: None.   Signed by: Aldair Buck 9/2/2024 10:12 PM Dictation workstation:   [id number]    XR chest 1 view    Result Date: 9/2/2024  Interpreted By:  Odin Butt, STUDY: XR CHEST 1 VIEW;  9/2/2024 4:12 pm   INDICATION: Signs/Symptoms:AMS.   COMPARISON: Chest radiograph 05/20/2024   ACCESSION NUMBER(S): [id number]   ORDERING CLINICIAN: TOMI العراقي   FINDINGS: The heart is normal in size. There is mild diffuse interstitial prominence without focal consolidation, large pleural fluid, or discernible pneumothorax. No acute osseous abnormality identified. Severe degenerative changes of the right shoulder.       1. Mild nonspecific diffuse interstitial prominence which could reflect chronic parenchymal changes, mild edema, or possibly atypical infectious process in the appropriate clinical setting.     Signed by: Odin Butt 9/2/2024 4:44 PM Dictation workstation:   [id number]    CT head wo IV contrast    Result Date: 9/2/2024  Interpreted By:  Odin Butt, STUDY: CT HEAD WO IV CONTRAST;  9/2/2024 4:00 pm   INDICATION: Signs/Symptoms:AMS.   COMPARISON: None.   ACCESSION NUMBER(S): [id number]   ORDERING CLINICIAN: TOMI العراقي   TECHNIQUE: Noncontrast axial CT images of head were obtained with coronal and sagittal reconstructed images.   FINDINGS: BRAIN PARENCHYMA: Mild generalized cerebral volume loss. Gray-white differentiation is preserved. No mass-effect, midline shift or effacement  of cerebral sulci. Patchy periventricular and subcortical white matter hypodensities, nonspecific but often seen in the setting of chronic microangiopathic change.   HEMORRHAGE: No acute intracranial hemorrhage.   VENTRICLES and EXTRA-AXIAL SPACES: The ventricles and sulci are within normal limits for brain volume. No abnormal extra-axial fluid collection.   ORBITS: The visualized orbits and globes are within normal limits.   EXTRACRANIAL SOFT TISSUES: Within normal limits.   PARANASAL SINUSES/MASTOIDS: The visualized paranasal sinuses and mastoid air cells are well aerated.   CALVARIUM: No depressed skull fracture.         1. No acute intracranial abnormality identified. 2. Nonspecific white matter changes likely reflecting sequela of small-vessel ischemic disease.       MACRO: None   Signed by: Odin Butt 9/2/2024 4:43 PM Dictation workstation:   [id number]     Scheduled medications  amLODIPine, 10 mg, oral, Nightly  ascorbic acid, 500 mg, oral, Daily  baclofen, 10 mg, oral, TID  ferrous sulfate (325 mg ferrous sulfate), 65 mg of iron, oral, Daily with breakfast  heparin (porcine), 5,000 Units, subcutaneous, q8h  lactulose, 20 g, oral, Daily  losartan, 50 mg, oral, Daily  nicotine, 1 patch, transdermal, Daily  pantoprazole, 40 mg, oral, BID  piperacillin-tazobactam, 3.375 g, intravenous, q6h  sucralfate, 1 g, oral, q6h [place]      Continuous medications     PRN medications  PRN medications: acetaminophen **OR** acetaminophen **OR** acetaminophen, acetaminophen-codeine, albuterol, benzocaine-menthol, butalbital-acetaminophen-caff, dextromethorphan-guaifenesin, guaiFENesin, lubricating eye drops, melatonin, ondansetron **OR** ondansetron, polyethylene glycol      ASSESSMENT:  Unresponsiveness  Altered mental status  Urinary tract infection  Acute hypoxic respiratory failure  Pneumonia  Nausea, vomiting resolved  Chronic abdominal pain  Abdominal hernia  Toxic encephalopathy secondary to UTI,  pneumonia  Hypertensive urgency  Hypertension  Migraine headaches    PLAN:  CT head without contrast showed no acute findings.  Neurological examination baseline, stable.  No focal deficits.  Work-up reveals UTI + acute hypoxic respiratory failure + pneumonia.  Urine culture.  Blood cultures.  Follow-up.  Broad-spectrum antibiotics.  Monitor temperature and white blood cell count.  Bilateral lower extremity ultrasound.  Monitor respiratory status and pulse oximetry.  Abdominal examination baseline, benign.  Diet as tolerated.  Symptom control.  As needed analgesics, anti-emetics.  PT/OT.  Fall precautions.  Up with assistance only.  Bed and chair alarm at all times.  Pressure ulcer prevention measures.  Turn and reposition every 2 hours and as needed.  Heels off bed.  Supportive care.  Patient reassured.  Case management consultation for discharge planning.  We will take DVT, fall, aspiration and decubitus precautions during her stay in the hospital.  The plan has been discussed with the patient.  She is agreeable.  Orders written per Dr. Mcneil.  Any additions or modifications at his discretion.      Discussed with Dr. Mcneil.    ADDENDUM:  Call to daughterKarly again, unable to reach her and unable to leave voicemail.      Shea Root, PO-CNP

## 2024-09-03 NOTE — CARE PLAN
Problem: Bathing  Goal: LTG - Patient will utilize adaptive techniques to bathe upper body with close supervision and set up  Outcome: Progressing     Problem: Dressings Lower Extremities  Goal: LTG - Patient will dress lower body with AE and close supervision  Outcome: Progressing     Problem: Dressing Upper Extremities  Goal: LTG - Patient will complete upper body dressing with set up  Outcome: Progressing     Problem: Grooming  Goal: STG - Patient completes grooming with set up  Outcome: Progressing     Problem: Functional Mobility  Goal: Perform functional mobility to and from the bathroom with 2ww and CGA  Outcome: Progressing     Problem: Toileting  Goal: STG - Patient will complete toileting tasks with 2ww and CGA  Outcome: Progressing     Problem: OT Transfers  Goal: LTG - Patient will perform all functional transfers with close supervision and 2ww  Outcome: Progressing

## 2024-09-03 NOTE — PROGRESS NOTES
Plan of Care Update    Contacted by RN due to elevated blood pressure and new requirements for O2.    Ms. Horton is a 79 year old female PMHX COPD, CHF, GI bleed 5/24, chronic pain, adult failure to thrive, bowel resection and recurrent pneumonia. She is brought to hospital by her daughter who found her unresponsive on the floor. Treated for UTI in ED. CT brain no acute findings. Noted admission to [place] form 7/6-7/10 where she presented confused and was found to have pneumonia.     On exam patient is using accessory muscles and newly requiring 2L O2 NC. She answers a few questions appropriately and is then too somnolent to answer any further. No pain illicited on my exam. Lungs are clear but diminished. There is mucus on her clothes likely from cough and patient's clothing smells of smoke--presumed current cigarette smoker. Initially /84, on repeat in the 130s systolic. Bulging to lower right abdomen, hernia like appearance--noted no hernia on CT Abd from 5/24.    Rapid response team at bedside. CXR completed, broad spectrum abx started, and placed on continuous pulse ox.    Based on previous presentations to the hospital with confusion I suspect infectious component.    Plan:  -labs: TSH, CK, pro BNP, sepsis lactate  -prn breathing treatments  -noted CHF and pulmonary hypertension history; may need to consider pulmonary or cardiology consult pending response to antibiotics  -for increasing O2 demands or worsening accessory muscle use please re-engage rapid response team  -Dr. Mcneil to see the patient tomorrow      Ela Rodriguez, PO-CNP  [facility] Physician Coverage  Phone: [phone]/Epic Messenger

## 2024-09-04 ENCOUNTER — DOCUMENTATION (OUTPATIENT)
Dept: RESEARCH | Age: 79
End: 2024-09-04
Payer: MEDICARE

## 2024-09-04 LAB
ANION GAP SERPL CALC-SCNC: 12 MMOL/L
BUN SERPL-MCNC: 5 MG/DL (ref 8–25)
CALCIUM SERPL-MCNC: 9.4 MG/DL (ref 8.5–10.4)
CHLORIDE SERPL-SCNC: 97 MMOL/L (ref 97–107)
CO2 SERPL-SCNC: 30 MMOL/L (ref 24–31)
CREAT SERPL-MCNC: 0.3 MG/DL (ref 0.4–1.6)
EGFRCR SERPLBLD CKD-EPI 2021: >90 ML/MIN/1.73M*2
ERYTHROCYTE [DISTWIDTH] IN BLOOD BY AUTOMATED COUNT: 17.2 % (ref 11.5–14.5)
GLUCOSE SERPL-MCNC: 117 MG/DL (ref 65–99)
HCT VFR BLD AUTO: 38.6 % (ref 36–46)
HGB BLD-MCNC: 12.8 G/DL (ref 12–16)
MCH RBC QN AUTO: 28.3 PG (ref 26–34)
MCHC RBC AUTO-ENTMCNC: 33.2 G/DL (ref 32–36)
MCV RBC AUTO: 85 FL (ref 80–100)
NRBC BLD-RTO: 0 /100 WBCS (ref 0–0)
PLATELET # BLD AUTO: 329 X10*3/UL (ref 150–450)
POTASSIUM SERPL-SCNC: 3.6 MMOL/L (ref 3.4–5.1)
RBC # BLD AUTO: 4.53 X10*6/UL (ref 4–5.2)
SODIUM SERPL-SCNC: 139 MMOL/L (ref 133–145)
WBC # BLD AUTO: 6 X10*3/UL (ref 4.4–11.3)

## 2024-09-04 PROCEDURE — 36415 COLL VENOUS BLD VENIPUNCTURE: CPT | Performed by: NURSE PRACTITIONER

## 2024-09-04 PROCEDURE — 2500000004 HC RX 250 GENERAL PHARMACY W/ HCPCS (ALT 636 FOR OP/ED): Mod: JZ | Performed by: NURSE PRACTITIONER

## 2024-09-04 PROCEDURE — 85027 COMPLETE CBC AUTOMATED: CPT | Performed by: NURSE PRACTITIONER

## 2024-09-04 PROCEDURE — 1200000002 HC GENERAL ROOM WITH TELEMETRY DAILY

## 2024-09-04 PROCEDURE — 2500000004 HC RX 250 GENERAL PHARMACY W/ HCPCS (ALT 636 FOR OP/ED): Performed by: INTERNAL MEDICINE

## 2024-09-04 PROCEDURE — 80048 BASIC METABOLIC PNL TOTAL CA: CPT | Performed by: NURSE PRACTITIONER

## 2024-09-04 PROCEDURE — 2500000002 HC RX 250 W HCPCS SELF ADMINISTERED DRUGS (ALT 637 FOR MEDICARE OP, ALT 636 FOR OP/ED): Performed by: INTERNAL MEDICINE

## 2024-09-04 PROCEDURE — 87040 BLOOD CULTURE FOR BACTERIA: CPT | Mod: WESLAB | Performed by: NURSE PRACTITIONER

## 2024-09-04 PROCEDURE — 2500000005 HC RX 250 GENERAL PHARMACY W/O HCPCS: Performed by: NURSE PRACTITIONER

## 2024-09-04 PROCEDURE — 97535 SELF CARE MNGMENT TRAINING: CPT | Mod: GO

## 2024-09-04 PROCEDURE — S4991 NICOTINE PATCH NONLEGEND: HCPCS | Performed by: INTERNAL MEDICINE

## 2024-09-04 PROCEDURE — 2500000001 HC RX 250 WO HCPCS SELF ADMINISTERED DRUGS (ALT 637 FOR MEDICARE OP): Performed by: INTERNAL MEDICINE

## 2024-09-04 PROCEDURE — 97530 THERAPEUTIC ACTIVITIES: CPT | Mod: GO

## 2024-09-04 RX ORDER — LIDOCAINE 560 MG/1
1 PATCH PERCUTANEOUS; TOPICAL; TRANSDERMAL DAILY
Status: DISCONTINUED | OUTPATIENT
Start: 2024-09-04 | End: 2024-09-06 | Stop reason: HOSPADM

## 2024-09-04 RX ORDER — ACETAMINOPHEN 325 MG/1
325 TABLET ORAL EVERY 6 HOURS PRN
Status: DISCONTINUED | OUTPATIENT
Start: 2024-09-04 | End: 2024-09-06 | Stop reason: HOSPADM

## 2024-09-04 RX ORDER — VANCOMYCIN 1 G/200ML
1000 INJECTION, SOLUTION INTRAVENOUS ONCE
Status: COMPLETED | OUTPATIENT
Start: 2024-09-04 | End: 2024-09-04

## 2024-09-04 ASSESSMENT — PAIN - FUNCTIONAL ASSESSMENT
PAIN_FUNCTIONAL_ASSESSMENT: 0-10

## 2024-09-04 ASSESSMENT — COGNITIVE AND FUNCTIONAL STATUS - GENERAL
MOVING TO AND FROM BED TO CHAIR: A LITTLE
PERSONAL GROOMING: A LITTLE
EATING MEALS: A LITTLE
MOBILITY SCORE: 18
DRESSING REGULAR LOWER BODY CLOTHING: A LITTLE
TOILETING: TOTAL
DAILY ACTIVITIY SCORE: 15
HELP NEEDED FOR BATHING: A LOT
DRESSING REGULAR LOWER BODY CLOTHING: A LOT
TOILETING: A LITTLE
MOBILITY SCORE: 18
TOILETING: A LITTLE
STANDING UP FROM CHAIR USING ARMS: A LITTLE
MOVING TO AND FROM BED TO CHAIR: A LITTLE
TURNING FROM BACK TO SIDE WHILE IN FLAT BAD: A LITTLE
WALKING IN HOSPITAL ROOM: A LITTLE
DRESSING REGULAR UPPER BODY CLOTHING: A LITTLE
DAILY ACTIVITIY SCORE: 19
PERSONAL GROOMING: A LITTLE
DRESSING REGULAR LOWER BODY CLOTHING: A LITTLE
STANDING UP FROM CHAIR USING ARMS: A LITTLE
CLIMB 3 TO 5 STEPS WITH RAILING: A LOT
HELP NEEDED FOR BATHING: A LITTLE
WALKING IN HOSPITAL ROOM: A LOT
DRESSING REGULAR UPPER BODY CLOTHING: A LITTLE
DAILY ACTIVITIY SCORE: 18
HELP NEEDED FOR BATHING: A LITTLE
EATING MEALS: A LITTLE
CLIMB 3 TO 5 STEPS WITH RAILING: A LITTLE
MOVING FROM LYING ON BACK TO SITTING ON SIDE OF FLAT BED WITH BEDRAILS: A LITTLE
DRESSING REGULAR UPPER BODY CLOTHING: A LITTLE

## 2024-09-04 ASSESSMENT — ACTIVITIES OF DAILY LIVING (ADL)
BATHING_LEVEL_OF_ASSISTANCE: MODERATE ASSISTANCE
BATHING_WHERE_ASSESSED: EDGE OF BED
HOME_MANAGEMENT_TIME_ENTRY: 15

## 2024-09-04 ASSESSMENT — PAIN SCALES - GENERAL
PAINLEVEL_OUTOF10: 2
PAINLEVEL_OUTOF10: 0 - NO PAIN
PAINLEVEL_OUTOF10: 2
PAINLEVEL_OUTOF10: 3
PAINLEVEL_OUTOF10: 1
PAINLEVEL_OUTOF10: 7
PAINLEVEL_OUTOF10: 5 - MODERATE PAIN

## 2024-09-04 ASSESSMENT — PAIN DESCRIPTION - ORIENTATION: ORIENTATION: POSTERIOR

## 2024-09-04 ASSESSMENT — PAIN DESCRIPTION - DESCRIPTORS
DESCRIPTORS: DISCOMFORT;DULL
DESCRIPTORS: HEADACHE
DESCRIPTORS: DULL;DISCOMFORT

## 2024-09-04 ASSESSMENT — PAIN DESCRIPTION - LOCATION: LOCATION: NECK

## 2024-09-04 NOTE — PROGRESS NOTES
"   09/04/24 1505   Discharge Planning   Expected Discharge Disposition Other  ([facility])     Patient is a LTC resident of [facility] and can return when medically ready for discharge.  Daughter Karly updated  via phone.     UPDATE:  Met with patient at bedside.  Patient stated she is not returning to [facility]. Patient states that this TCC was \"threatening her daughter\" that patient has to return to the facility.  Explained to patient that this TCC and her daughter only discussed the discharge plan. Also, explained that the patient can discharge to her daughter's home if her daughter is agreeable.     "

## 2024-09-04 NOTE — CARE PLAN
The patient's goals for the shift include  rest    The clinical goals for the shift include control pain

## 2024-09-04 NOTE — CARE PLAN
The patient's goals for the shift include      The clinical goals for the shift include DECREASE ANIXETY    Problem: Pain - Adult  Goal: Verbalizes/displays adequate comfort level or baseline comfort level  Outcome: Progressing     Problem: Safety - Adult  Goal: Free from fall injury  Outcome: Progressing     Problem: Discharge Planning  Goal: Discharge to home or other facility with appropriate resources  Outcome: Progressing     Problem: Chronic Conditions and Co-morbidities  Goal: Patient's chronic conditions and co-morbidity symptoms are monitored and maintained or improved  Outcome: Progressing     Problem: Skin  Goal: Decreased wound size/increased tissue granulation at next dressing change  Outcome: Progressing  Flowsheets (Taken 9/4/2024 0845)  Decreased wound size/increased tissue granulation at next dressing change:   Protective dressings over bony prominences   Utilize specialty bed per algorithm  Goal: Participates in plan/prevention/treatment measures  Outcome: Progressing  Flowsheets (Taken 9/4/2024 0845)  Participates in plan/prevention/treatment measures:   Discuss with provider PT/OT consult   Increase activity/out of bed for meals  Goal: Prevent/manage excess moisture  Outcome: Progressing  Flowsheets (Taken 9/4/2024 0845)  Prevent/manage excess moisture:   Cleanse incontinence/protect with barrier cream   Monitor for/manage infection if present  Goal: Prevent/minimize sheer/friction injuries  Outcome: Progressing  Flowsheets (Taken 9/4/2024 0845)  Prevent/minimize sheer/friction injuries:   Use pull sheet   Increase activity/out of bed for meals   HOB 30 degrees or less  Goal: Promote/optimize nutrition  Outcome: Progressing  Flowsheets (Taken 9/4/2024 0845)  Promote/optimize nutrition:   Monitor/record intake including meals   Offer water/supplements/favorite foods  Goal: Promote skin healing  Outcome: Progressing  Flowsheets (Taken 9/4/2024 0845)  Promote skin healing:   Protective dressings  over bony prominences   Assess skin/pad under line(s)/device(s)     Problem: Bathing  Goal: LTG - Patient will utilize adaptive techniques to bathe upper body with close supervision and set up  Outcome: Progressing     Problem: Dressings Lower Extremities  Goal: LTG - Patient will dress lower body with AE and close supervision  Outcome: Progressing     Problem: Dressing Upper Extremities  Goal: LTG - Patient will complete upper body dressing with set up  Outcome: Progressing     Problem: Grooming  Goal: STG - Patient completes grooming with set up  Outcome: Progressing     Problem: Toileting  Goal: STG - Patient will complete toileting tasks with 2ww and CGA  Outcome: Progressing

## 2024-09-04 NOTE — PROGRESS NOTES
Occupational Therapy    OT Treatment    Patient Name: Karly Horton  MRN: [id number]  Today's Date: 9/4/2024  Time Calculation  Start Time: 0852  Stop Time: 0922  Time Calculation (min): 30 min        Assessment:  OT Assessment: Patient demonstrates increased standing blance and tolerance for ADL performance, and increased participation in ADLs.  She is making progress towards established plan of care.  Would benefit from continued acute OT services.  Will continue tx as indicated on plan of care.  Evaluation/Treatment Tolerance: Patient tolerated treatment well  Medical Staff Made Aware: Yes  End of Session Communication: Bedside nurse  End of Session Patient Position: Up in chair, Alarm on  Evaluation/Treatment Tolerance: Patient tolerated treatment well  Medical Staff Made Aware: Yes  Plan:  Treatment Interventions: ADL retraining, Functional transfer training, Endurance training, Patient/family training, Neuromuscular reeducation, Compensatory technique education  OT Frequency: 4 times per week  OT Discharge Recommendations: Moderate intensity level of continued care  Equipment Recommended upon Discharge: Wheeled walker  OT Recommended Transfer Status: Minimal assist, Assist of 1  OT - OK to Discharge: Yes  Treatment Interventions: ADL retraining, Functional transfer training, Endurance training, Patient/family training, Neuromuscular reeducation, Compensatory technique education    Subjective   Previous Visit Info:  OT Last Visit  OT Received On: 09/04/24  General:  General  Family/Caregiver Present: No  Prior to Session Communication: Bedside nurse  Patient Position Received: Bed, 3 rail up, Alarm on  Precautions:  Medical Precautions: Fall precautions, Oxygen therapy device and L/min    Vital Signs (Past 2hrs)                 Pain:  Pain Assessment  Pain Assessment: 0-10  0-10 (Numeric) Pain Score: 0 - No pain    Objective    Cognition:     Coordination:     Activities of Daily Living: Grooming  Grooming  Level of Assistance: Setup  Grooming Where Assessed: Edge of bed  Grooming Comments: wahed face    UE Bathing  UE Bathing Level of Assistance: Minimum assistance  UE Bathing Where Assessed: Edge of bed    LE Bathing  LE Bathing Level of Assistance: Moderate assistance  LE Bathing Where Assessed: Edge of bed  LE Bathing Comments: mee area bathing.  S/p incontinence of bowel and urine    UE Dressing  UE Dressing Level of Assistance: Minimum assistance  UE Dressing Where Assessed: Edge of bed  UE Dressing Comments: don/doff gown       Functional Standing Tolerance:     Bed Mobility/Transfers: Bed Mobility  Bed Mobility: Yes  Bed Mobility 1  Bed Mobility 1: Supine to sitting  Level of Assistance 1: Minimum assistance    Transfers  Transfer: Yes  Transfer 1  Transfer From 1: Bed to  Transfer to 1: Stand  Technique 1: Sit to stand  Transfer Device 1: Walker  Transfer Level of Assistance 1: Minimum assistance  Trials/Comments 1: performed sit to stand ~3x for hygiene and bathing  Transfers 2  Transfer From 2: Stand to  Transfer to 2: Chair with arms  Technique 2: Stand to sit  Transfer Device 2: Walker  Transfer Level of Assistance 2: Minimum assistance    Sitting Balance:  Static Sitting Balance  Static Sitting-Balance Support: Feet supported  Static Sitting-Level of Assistance: Close supervision  Standing Balance:  Static Standing Balance  Static Standing-Balance Support: Bilateral upper extremity supported  Static Standing-Level of Assistance: Minimum assistance     Outcome Measures:[facility] Daily Activity  Putting on and taking off regular lower body clothing: A lot  Bathing (including washing, rinsing, drying): A lot  Putting on and taking off regular upper body clothing: A little  Toileting, which includes using toilet, bedpan or urinal: Total  Taking care of personal grooming such as brushing teeth: A little  Eating Meals: None  Daily Activity - Total Score: 15    Goals:         Problem: Bathing  Goal: LTG - Patient  will utilize adaptive techniques to bathe upper body with close supervision and set up  Outcome: Progressing     Problem: Dressing Upper Extremities  Goal: LTG - Patient will complete upper body dressing with set up  Outcome: Progressing     Problem: Grooming  Goal: STG - Patient completes grooming with set up  Outcome: Progressing     Problem: OT Transfers  Goal: LTG - Patient will perform all functional transfers with close supervision and 2ww  Outcome: Progressing

## 2024-09-04 NOTE — NURSING NOTE
This RN rounding on pt..    Pt observed sitting up in chair at bedside...    Pt is alert and oriented x 4, on room air with unlabored respirations, symmetrical chest, exposes skin intact, appropriate for ethnic background...    Pt reporting need for Lidocaine patch and Tylenol...pt stating she sha pain brom back of neck all the way down to lower back...    Bedside nurse MIGNON ANDREA observed entering suite, making pt aware of recently ordered pain management/interventions to be brought to bedside shortly...

## 2024-09-04 NOTE — CARE PLAN
Problem: Bathing  Goal: LTG - Patient will utilize adaptive techniques to bathe upper body with close supervision and set up  Outcome: Progressing     Problem: Dressing Upper Extremities  Goal: LTG - Patient will complete upper body dressing with set up  Outcome: Progressing     Problem: Grooming  Goal: STG - Patient completes grooming with set up  Outcome: Progressing     Problem: OT Transfers  Goal: LTG - Patient will perform all functional transfers with close supervision and 2ww  Outcome: Progressing

## 2024-09-04 NOTE — PROGRESS NOTES
Karly Horton is a 79 y.o. female on day 2 of admission presenting with Urinary tract infection without hematuria, site unspecified.    Per nursing, blood culture + gram positive cocci clusters.  Repeat blood culture, IV Vancomycin 1 gram x 1.    Subjective   Patient seen and examined.  Resting sitting up in the chair in no acute distress.  Awake alert oriented x 3.  No specific complaints.  Asking to go back to bed.    Spoke with nursing, no other issues.    Discussed discharge plan of care.  Patient requests to discharge home.    Objective     Physical Exam  Vitals and nursing note reviewed.   Constitutional:       General: She is not in acute distress.     Appearance: Normal appearance. She is underweight. She is not ill-appearing, toxic-appearing or diaphoretic.   HENT:      Head: Normocephalic and atraumatic.      Right Ear: External ear normal.      Left Ear: External ear normal.      Nose: Nose normal.      Mouth/Throat:      Mouth: Mucous membranes are moist.      Pharynx: Oropharynx is clear.   Eyes:      Extraocular Movements: Extraocular movements intact.      Conjunctiva/sclera: Conjunctivae normal.      Pupils: Pupils are equal, round, and reactive to light.   Cardiovascular:      Rate and Rhythm: Normal rate and regular rhythm.      Pulses: Normal pulses.      Heart sounds: Normal heart sounds. No murmur heard.  Pulmonary:      Effort: Pulmonary effort is normal. No respiratory distress.      Breath sounds: Normal breath sounds. No wheezing, rhonchi or rales.      Comments: 2 liters nasal cannula.   Abdominal:      General: Bowel sounds are normal. There is distension.      Palpations: Abdomen is soft.      Tenderness: There is no abdominal tenderness.      Hernia: A hernia is present.      Comments: Intermittent abdominal spasms, chronic.   Genitourinary:     Comments: Deferred.  Musculoskeletal:         General: No swelling or tenderness. Normal range of motion.      Cervical back: Normal range  of motion and neck supple.      Thoracic back: Scoliosis present.      Right lower leg: No edema.      Left lower leg: No edema.   Skin:     General: Skin is warm and dry.      Capillary Refill: Capillary refill takes less than 2 seconds.   Neurological:      General: No focal deficit present.      Mental Status: She is alert and oriented to person, place, and time.      Comments: Awake alert oriented x 3.  Speech clear, coherent.  Follows all commands.  Generalized weakness.  No focal weakness.  Gait deferred.  Cranial nerves II through XII grossly intact.    Psychiatric:         Mood and Affect: Mood normal.         Behavior: Behavior normal.       Last Recorded Vitals  Blood pressure 135/74, pulse 89, temperature 36.8 °C (98.2 °F), temperature source Oral, resp. rate 16, height 1.524 m (5'), weight (!) 43.1 kg (94 lb 15.9 oz), SpO2 100%.    Intake/Output last 3 Shifts:  I/O last 3 completed shifts:  In: 1160 (26.9 mL/kg) [P.O.:960; IV Piggyback:200]  Out: 1000 (23.2 mL/kg) [Urine:1000 (0.6 mL/kg/hr)]  Weight: 43.1 kg     Relevant Results  Results for orders placed or performed during the hospital encounter of 09/02/24 (from the past 24 hour(s))   Basic Metabolic Panel   Result Value Ref Range    Glucose 117 (H) 65 - 99 mg/dL    Sodium 139 133 - 145 mmol/L    Potassium 3.6 3.4 - 5.1 mmol/L    Chloride 97 97 - 107 mmol/L    Bicarbonate 30 24 - 31 mmol/L    Urea Nitrogen 5 (L) 8 - 25 mg/dL    Creatinine 0.30 (L) 0.40 - 1.60 mg/dL    eGFR >90 >60 mL/min/1.73m*2    Calcium 9.4 8.5 - 10.4 mg/dL    Anion Gap 12 <=19 mmol/L   CBC   Result Value Ref Range    WBC 6.0 4.4 - 11.3 x10*3/uL    nRBC 0.0 0.0 - 0.0 /100 WBCs    RBC 4.53 4.00 - 5.20 x10*6/uL    Hemoglobin 12.8 12.0 - 16.0 g/dL    Hematocrit 38.6 36.0 - 46.0 %    MCV 85 80 - 100 fL    MCH 28.3 26.0 - 34.0 pg    MCHC 33.2 32.0 - 36.0 g/dL    RDW 17.2 (H) 11.5 - 14.5 %    Platelets 329 150 - 450 x10*3/uL   Blood Culture    Specimen: Peripheral Venipuncture; Blood  culture   Result Value Ref Range    Blood Culture Loaded on Instrument - Culture in progress    Blood Culture    Specimen: Peripheral Venipuncture; Blood culture   Result Value Ref Range    Blood Culture Loaded on Instrument - Culture in progress      Susceptibility data from last 90 days.  Collected Specimen Info Organism   09/02/24 Urine from Clean Catch/Voided Gram Negative Bacilli     Lower extremity venous duplex bilateral    Result Date: 9/3/2024  Interpreted By:  Margot Gil, STUDY: VAS US LOWER EXTREMITY VENOUS DUPLEX BILATERAL  9/3/2024 12:22 pm   INDICATION: 78 y/o   F with  Signs/Symptoms:Dyspnea tachypnea rule out DVT. LMP: Unknown.   COMPARISON: None.   ACCESSION NUMBER(S): [id number]   ORDERING CLINICIAN: WARD JACKSON   TECHNIQUE: Routine ultrasound of the  bilateral lower extremity was performed with duplex Doppler (color and spectral) evaluation.   Static images were obtained for remote interpretation.   FINDINGS: THIGH VEINS:  The common femoral, femoral, popliteal, proximal medial saphenous, and deep femoral veins are patent and free of thrombus. The veins are normally compressible.  They demonstrate normal phasic flow and augmentation response.   CALF VEINS: Limited assessment without obvious thrombosis.       No deep venous thrombosis of the bilateral lower extremity.   Signed by: Margot Gil 9/3/2024 12:32 PM Dictation workstation:   [id number]    ECG 12 lead    Result Date: 9/3/2024  Sinus rhythm with frequent Premature ventricular complexes Left axis deviation Inferior infarct (cited on or before 01-SEP-2024) Cannot rule out Anteroseptal infarct (cited on or before 01-SEP-2024) Abnormal ECG When compared with ECG of 01-SEP-2024 21:21, (unconfirmed) No significant change was found    XR chest 1 view    Result Date: 9/2/2024  Interpreted By:  Aldair Buck, STUDY: XR CHEST 1 VIEW;  9/2/2024 9:44 pm   INDICATION: Signs/Symptoms:shortness of breath.   COMPARISON: 09/02/2024    ACCESSION NUMBER(S): [id number]   ORDERING CLINICIAN: KATHIE AGUIAR   FINDINGS:     The cardiomediastinal silhouette and pulmonary vasculature are within normal limits. Diffuse bronchial thickening is noted. There is mild right basilar atelectasis. There is bibasilar scarring.         Generalized bronchial thickening without evidence of pneumonia or pulmonary edema.     MACRO: None.   Signed by: Aldair Buck 9/2/2024 10:12 PM Dictation workstation:   [id number]    XR chest 1 view    Result Date: 9/2/2024  Interpreted By:  Odin Butt, STUDY: XR CHEST 1 VIEW;  9/2/2024 4:12 pm   INDICATION: Signs/Symptoms:AMS.   COMPARISON: Chest radiograph 05/20/2024   ACCESSION NUMBER(S): [id number]   ORDERING CLINICIAN: TOMI العراقي   FINDINGS: The heart is normal in size. There is mild diffuse interstitial prominence without focal consolidation, large pleural fluid, or discernible pneumothorax. No acute osseous abnormality identified. Severe degenerative changes of the right shoulder.       1. Mild nonspecific diffuse interstitial prominence which could reflect chronic parenchymal changes, mild edema, or possibly atypical infectious process in the appropriate clinical setting.     Signed by: Odin Butt 9/2/2024 4:44 PM Dictation workstation:   [id number]    CT head wo IV contrast    Result Date: 9/2/2024  Interpreted By:  Odin Butt, STUDY: CT HEAD WO IV CONTRAST;  9/2/2024 4:00 pm   INDICATION: Signs/Symptoms:AMS.   COMPARISON: None.   ACCESSION NUMBER(S): [id number]   ORDERING CLINICIAN: TOMI العراقي   TECHNIQUE: Noncontrast axial CT images of head were obtained with coronal and sagittal reconstructed images.   FINDINGS: BRAIN PARENCHYMA: Mild generalized cerebral volume loss. Gray-white differentiation is preserved. No mass-effect, midline shift or effacement of cerebral sulci. Patchy periventricular and subcortical white matter hypodensities, nonspecific but often seen in the setting of chronic  microangiopathic change.   HEMORRHAGE: No acute intracranial hemorrhage.   VENTRICLES and EXTRA-AXIAL SPACES: The ventricles and sulci are within normal limits for brain volume. No abnormal extra-axial fluid collection.   ORBITS: The visualized orbits and globes are within normal limits.   EXTRACRANIAL SOFT TISSUES: Within normal limits.   PARANASAL SINUSES/MASTOIDS: The visualized paranasal sinuses and mastoid air cells are well aerated.   CALVARIUM: No depressed skull fracture.         1. No acute intracranial abnormality identified. 2. Nonspecific white matter changes likely reflecting sequela of small-vessel ischemic disease.       MACRO: None   Signed by: Odin Butt 9/2/2024 4:43 PM Dictation workstation:   [id number]     Scheduled medications  amLODIPine, 10 mg, oral, Nightly  ascorbic acid, 500 mg, oral, Daily  baclofen, 10 mg, oral, TID  ferrous sulfate (325 mg ferrous sulfate), 65 mg of iron, oral, Daily with breakfast  heparin (porcine), 5,000 Units, subcutaneous, q8h  lactulose, 20 g, oral, Daily  lidocaine, 1 patch, transdermal, Daily  losartan, 50 mg, oral, Daily  nicotine, 1 patch, transdermal, Daily  pantoprazole, 40 mg, oral, BID  piperacillin-tazobactam, 3.375 g, intravenous, q6h  sucralfate, 1 g, oral, q6h [place]      Continuous medications     PRN medications  PRN medications: acetaminophen, acetaminophen-codeine, albuterol, benzocaine-menthol, butalbital-acetaminophen-caff, dextromethorphan-guaifenesin, guaiFENesin, lubricating eye drops, melatonin, ondansetron **OR** ondansetron, polyethylene glycol      ASSESSMENT:  Unresponsiveness resolved  Altered mental status improved  Urinary tract infection gram negative bacilli  Acute hypoxic respiratory failure  Pneumonia  Tobacco use  Nausea, vomiting resolved  Chronic abdominal pain  Abdominal hernia  Toxic encephalopathy secondary to UTI, pneumonia improved  Hypertensive urgency  Hypertension  Migraine headaches  Positive blood culture, gram  positive cocci    PLAN:  Urine culture pending gram negative bacilli.  Continue IV Zosyn.  Follow-up urine culture results.  Chest x-ray infiltrate ? Pneumonia.  1/2 blood cultures gram positive cocci.  2/2 repeat blood cultures.  Status post IV Vancomycin 1 gram x 1.  Continue IV Zosyn for UTI.  Infectious disease consultation.  Bilateral lower extremity ultrasound report reviewed, no DVT.  Respiratory status, pulse oximetry stable 100% on 2 liters nasal cannula.  Check room air pulse oximetry.  Pulmonary hygiene.  Add and encourage incentive spirometer use 10 x / hour while awake.  Tobacco cessation education.  Abdominal examination baseline.  Diet as tolerated.  Symptom control.  As needed analgesics, anti-emetics.  Outpatient follow-up.  PT/OT.  Fall precautions.  Up with assistance only.  Bed and chair alarm at all times.  Pressure ulcer prevention measures.  Turn and reposition every 2 hours and as needed.  Heels off bed.  Supportive care.  Patient reassured.  PT/OT recommend moderate intensity level of continued care.  Case management following for discharge planning.  Referral to [facility].  Follow-up.  Anticipate discharge after cleared by Infectious disease when arrangements are made by case management.  Discussed with Dr. Mcneil.    1615: Spoke with daughter, Karly, over the phone, updated, questions answered.  She requests her mother discharge to facility, case management follow-up.      Seha Root, PO-CNP

## 2024-09-04 NOTE — PROGRESS NOTES
"Physical Therapy                 Therapy Communication Note    Patient Name: Karly Horton  MRN: [id number]  Today's Date: 9/4/2024     Discipline: Physical Therapy    Missed Visit Reason: Missed Visit Reason: Patient refused (pt stated, \" My neck is jamming so bad that I can't do anything. I need to wait for my pain meds. I'm sorry. I can't move right now.\")    Missed Time: Attempt    Comment:  "

## 2024-09-04 NOTE — NURSING NOTE
Assumed care of patient. Notified of critical lab findings of positive blood cultures, gram positive cocci in clusters. Tarun and Dk notified via secured chat.

## 2024-09-05 LAB
ATRIAL RATE: 80 BPM
BACTERIA UR CULT: ABNORMAL
HOLD SPECIMEN: NORMAL
P AXIS: 65 DEGREES
P OFFSET: 192 MS
P ONSET: 147 MS
PR INTERVAL: 138 MS
Q ONSET: 216 MS
QRS COUNT: 13 BEATS
QRS DURATION: 78 MS
QT INTERVAL: 382 MS
QTC CALCULATION(BAZETT): 440 MS
QTC FREDERICIA: 420 MS
R AXIS: -71 DEGREES
T AXIS: 51 DEGREES
T OFFSET: 407 MS
VANCOMYCIN SERPL-MCNC: <4 UG/ML (ref 10–20)
VENTRICULAR RATE: 80 BPM

## 2024-09-05 PROCEDURE — 97116 GAIT TRAINING THERAPY: CPT | Mod: GP

## 2024-09-05 PROCEDURE — S4991 NICOTINE PATCH NONLEGEND: HCPCS | Performed by: INTERNAL MEDICINE

## 2024-09-05 PROCEDURE — 2500000001 HC RX 250 WO HCPCS SELF ADMINISTERED DRUGS (ALT 637 FOR MEDICARE OP): Performed by: INTERNAL MEDICINE

## 2024-09-05 PROCEDURE — 1200000002 HC GENERAL ROOM WITH TELEMETRY DAILY

## 2024-09-05 PROCEDURE — 2500000004 HC RX 250 GENERAL PHARMACY W/ HCPCS (ALT 636 FOR OP/ED): Performed by: INTERNAL MEDICINE

## 2024-09-05 PROCEDURE — 97110 THERAPEUTIC EXERCISES: CPT | Mod: GP

## 2024-09-05 PROCEDURE — 97530 THERAPEUTIC ACTIVITIES: CPT | Mod: GP

## 2024-09-05 PROCEDURE — 2500000005 HC RX 250 GENERAL PHARMACY W/O HCPCS: Performed by: NURSE PRACTITIONER

## 2024-09-05 PROCEDURE — 80202 ASSAY OF VANCOMYCIN: CPT | Performed by: INTERNAL MEDICINE

## 2024-09-05 PROCEDURE — 9420000001 HC RT PATIENT EDUCATION 5 MIN

## 2024-09-05 PROCEDURE — 36415 COLL VENOUS BLD VENIPUNCTURE: CPT | Performed by: INTERNAL MEDICINE

## 2024-09-05 PROCEDURE — 97530 THERAPEUTIC ACTIVITIES: CPT | Mod: GO

## 2024-09-05 PROCEDURE — 97535 SELF CARE MNGMENT TRAINING: CPT | Mod: GO

## 2024-09-05 PROCEDURE — 2500000002 HC RX 250 W HCPCS SELF ADMINISTERED DRUGS (ALT 637 FOR MEDICARE OP, ALT 636 FOR OP/ED): Performed by: INTERNAL MEDICINE

## 2024-09-05 RX ORDER — VANCOMYCIN 1 G/200ML
1000 INJECTION, SOLUTION INTRAVENOUS EVERY 12 HOURS
Status: DISCONTINUED | OUTPATIENT
Start: 2024-09-05 | End: 2024-09-05

## 2024-09-05 RX ORDER — VANCOMYCIN HYDROCHLORIDE 1 G/20ML
INJECTION, POWDER, LYOPHILIZED, FOR SOLUTION INTRAVENOUS DAILY PRN
Status: DISCONTINUED | OUTPATIENT
Start: 2024-09-05 | End: 2024-09-05

## 2024-09-05 ASSESSMENT — COGNITIVE AND FUNCTIONAL STATUS - GENERAL
WALKING IN HOSPITAL ROOM: A LITTLE
STANDING UP FROM CHAIR USING ARMS: A LITTLE
MOBILITY SCORE: 15
MOBILITY SCORE: 17
TOILETING: A LOT
DRESSING REGULAR LOWER BODY CLOTHING: A LOT
EATING MEALS: A LITTLE
STANDING UP FROM CHAIR USING ARMS: A LITTLE
DRESSING REGULAR UPPER BODY CLOTHING: A LITTLE
CLIMB 3 TO 5 STEPS WITH RAILING: A LOT
TURNING FROM BACK TO SIDE WHILE IN FLAT BAD: A LITTLE
DRESSING REGULAR LOWER BODY CLOTHING: A LOT
HELP NEEDED FOR BATHING: A LITTLE
HELP NEEDED FOR BATHING: A LOT
TURNING FROM BACK TO SIDE WHILE IN FLAT BAD: A LITTLE
MOBILITY SCORE: 18
CLIMB 3 TO 5 STEPS WITH RAILING: A LOT
WALKING IN HOSPITAL ROOM: A LITTLE
DAILY ACTIVITIY SCORE: 15
PERSONAL GROOMING: A LITTLE
PERSONAL GROOMING: A LITTLE
DAILY ACTIVITIY SCORE: 18
DRESSING REGULAR UPPER BODY CLOTHING: A LITTLE
DAILY ACTIVITIY SCORE: 12
MOVING FROM LYING ON BACK TO SITTING ON SIDE OF FLAT BED WITH BEDRAILS: A LITTLE
HELP NEEDED FOR BATHING: A LOT
MOVING FROM LYING ON BACK TO SITTING ON SIDE OF FLAT BED WITH BEDRAILS: A LITTLE
MOVING TO AND FROM BED TO CHAIR: A LOT
EATING MEALS: A LITTLE
DRESSING REGULAR UPPER BODY CLOTHING: A LOT
TOILETING: A LITTLE
MOVING FROM LYING ON BACK TO SITTING ON SIDE OF FLAT BED WITH BEDRAILS: A LITTLE
STANDING UP FROM CHAIR USING ARMS: A LITTLE
MOVING TO AND FROM BED TO CHAIR: A LITTLE
DRESSING REGULAR LOWER BODY CLOTHING: A LITTLE
PERSONAL GROOMING: A LOT
EATING MEALS: A LOT
TOILETING: A LOT
WALKING IN HOSPITAL ROOM: A LOT
CLIMB 3 TO 5 STEPS WITH RAILING: A LITTLE
TURNING FROM BACK TO SIDE WHILE IN FLAT BAD: A LITTLE
MOVING TO AND FROM BED TO CHAIR: A LITTLE

## 2024-09-05 ASSESSMENT — PAIN DESCRIPTION - DESCRIPTORS
DESCRIPTORS: HEADACHE
DESCRIPTORS: HEADACHE

## 2024-09-05 ASSESSMENT — PAIN DESCRIPTION - ORIENTATION: ORIENTATION: MID

## 2024-09-05 ASSESSMENT — ACTIVITIES OF DAILY LIVING (ADL)
BATHING_LEVEL_OF_ASSISTANCE: MODERATE ASSISTANCE
BATHING_WHERE_ASSESSED: EDGE OF BED
HOME_MANAGEMENT_TIME_ENTRY: 25

## 2024-09-05 ASSESSMENT — PAIN SCALES - GENERAL
PAINLEVEL_OUTOF10: 3
PAINLEVEL_OUTOF10: 5 - MODERATE PAIN
PAINLEVEL_OUTOF10: 3
PAINLEVEL_OUTOF10: 4
PAINLEVEL_OUTOF10: 2
PAINLEVEL_OUTOF10: 4
PAINLEVEL_OUTOF10: 4
PAINLEVEL_OUTOF10: 3
PAINLEVEL_OUTOF10: 5 - MODERATE PAIN
PAINLEVEL_OUTOF10: 3
PAINLEVEL_OUTOF10: 5 - MODERATE PAIN
PAINLEVEL_OUTOF10: 3

## 2024-09-05 ASSESSMENT — PAIN - FUNCTIONAL ASSESSMENT
PAIN_FUNCTIONAL_ASSESSMENT: 0-10

## 2024-09-05 ASSESSMENT — PAIN DESCRIPTION - LOCATION
LOCATION: NECK
LOCATION: NECK

## 2024-09-05 NOTE — CARE PLAN
The patient's goals for the shift include  decreased pain    The clinical goals for the shift include decreased pain

## 2024-09-05 NOTE — PROGRESS NOTES
Occupational Therapy    OT Treatment    Patient Name: Karly Hroton  MRN: [id number]  Today's Date: 9/5/2024  Time Calculation  Start Time: 0921  Stop Time: 1001  Time Calculation (min): 40 min        Assessment:  OT Assessment: Pt is making progress towards established goals. Pt would benefit from acute OT services to address deficits in ADLs, functional mobility, and transfers  End of Session Communication: Bedside nurse  End of Session Patient Position: Bed, 3 rail up, Alarm off, not on at start of session (all needs in reach)     Plan:  Treatment Interventions: ADL retraining, Functional transfer training, Endurance training, Patient/family training, Neuromuscular reeducation, Compensatory technique education  OT Frequency: 4 times per week  OT Discharge Recommendations: Moderate intensity level of continued care  Equipment Recommended upon Discharge: Wheeled walker  OT Recommended Transfer Status: Minimal assist, Assist of 1  OT - OK to Discharge: Yes  Treatment Interventions: ADL retraining, Functional transfer training, Endurance training, Patient/family training, Neuromuscular reeducation, Compensatory technique education    Subjective   Previous Visit Info:  OT Last Visit  OT Received On: 09/05/24  General:  General  Past Medical History Relevant to Rehab: CHF; anemia; pneumonia; lumbar stenosis; COPD; GI Bleed; failure to thrive; bowel resection; smoker; hernia; hypoxia;  Missed Visit: No  Missed Visit Reason: Other (Comment)  Family/Caregiver Present: No  Prior to Session Communication: Bedside nurse  Patient Position Received: Bed, 3 rail up, Alarm off, not on at start of session  General Comment: Cleared by nursing. Pt pleasant and agreeable to therapy  Precautions:  Medical Precautions: Fall precautions    Vital Signs (Past 2hrs)        Date/Time Vitals Session Patient Position Pulse Resp SpO2 BP MAP (mmHg)    09/05/24 0921 --  --  92  --  94 %  --  --                         Pain:  Pain  Assessment  Pain Assessment: 0-10  0-10 (Numeric) Pain Score: 3  Pain Type: Chronic pain  Pain Location: Neck    Objective    Cognition:  Cognition  Overall Cognitive Status: Within Functional Limits     Activities of Daily Living:      Grooming  Grooming Level of Assistance: Setup, Minimum assistance  Grooming Where Assessed: Edge of bed  Grooming Comments: set up to wash face, brush teeth, and comb hair; mod A to wash/dry hair    UE Bathing  UE Bathing Level of Assistance: Minimum assistance, Setup  UE Bathing Where Assessed: Edge of bed  UE Bathing Comments: pt able to initate UB bathing while seated EOB, min A for thoroughness    LE Bathing  LE Bathing Level of Assistance: Moderate assistance  LE Bathing Where Assessed: Edge of bed (standing bedside)  LE Bathing Comments: mod A for anterior LB bathing performed in seated; mod A for posterior bathing performed in standing with use of RW    UE Dressing  UE Dressing Level of Assistance: Minimum assistance  UE Dressing Where Assessed: Edge of bed  UE Dressing Comments: donning/doffing hospital gown    LE Dressing  LE Dressing: Yes  Sock Level of Assistance: Moderate assistance  LE Dressing Where Assessed: Edge of bed  LE Dressing Comments: donning/doffing socks; doffing/donning brief       Functional Standing Tolerance:  Time: ~3-4 minutes  Activity: ADL tasks  Functional Standing Tolerance Comments: CGA for balance with use of RW  Bed Mobility/Transfers: Bed Mobility  Bed Mobility:  (min A for trunk upright supine>seated EOB; mod A for controlled descent of trunk and assist lifting BLEs onto bed seated EOB>supine)  Outcome Measures:[facility] Daily Activity  Putting on and taking off regular lower body clothing: A lot  Bathing (including washing, rinsing, drying): A lot  Putting on and taking off regular upper body clothing: A little  Toileting, which includes using toilet, bedpan or urinal: A lot  Taking care of personal grooming such as brushing teeth: A  little  Eating Meals: A little  Daily Activity - Total Score: 15    Education Documentation  ADL Training, taught by Enedina Diaz OT at 9/5/2024 10:40 AM.  Learner: Patient  Readiness: Acceptance  Method: Explanation  Response: Verbalizes Understanding, Needs Reinforcement    Education Comments  No comments found.      Goals:  Encounter Problems       Encounter Problems (Active)       Bathing       LTG - Patient will utilize adaptive techniques to bathe upper body with close supervision and set up (Progressing)       Start:  09/03/24    Expected End:  10/03/24               Dressing Upper Extremities       LTG - Patient will complete upper body dressing with set up (Progressing)       Start:  09/03/24    Expected End:  10/03/24               Dressings Lower Extremities       LTG - Patient will dress lower body with AE and close supervision (Progressing)       Start:  09/03/24    Expected End:  10/03/24               Functional Mobility       Perform functional mobility to and from the bathroom with 2ww and CGA (Progressing)       Start:  09/03/24    Expected End:  10/03/24               Grooming       STG - Patient completes grooming with set up (Progressing)       Start:  09/03/24    Expected End:  10/03/24               OT Transfers       LTG - Patient will perform all functional transfers with close supervision and 2ww (Progressing)       Start:  09/03/24    Expected End:  10/03/24               Toileting       STG - Patient will complete toileting tasks with 2ww and CGA (Not Progressing)       Start:  09/03/24    Expected End:  10/03/24

## 2024-09-05 NOTE — CONSULTS
Vancomycin Dosing by Pharmacy- INITIAL    Karly Horton is a 79 y.o. year old female who Pharmacy has been consulted for vancomycin dosing for other blood stream infection . Based on the patient's indication and renal status this patient will be dosed based on a goal AUC of 400-600.     Renal function is currently stable.    Visit Vitals  /64 (BP Location: Right arm, Patient Position: Lying)   Pulse 92   Temp 36.4 °C (97.5 °F) (Oral)   Resp 18        Lab Results   Component Value Date    CREATININE 0.30 (L) 2024    CREATININE 0.40 2024    CREATININE 0.40 2024    CREATININE 0.39 (L) 2024        Patient weight is as follows:   Vitals:    24 0525   Weight: (!) 43.1 kg (94 lb 15.9 oz)       Cultures:  Susceptibility data for the encounter in last 14 days.  Collected Specimen Info Organism Amoxicillin/Clavulanate Ampicillin Ampicillin/Sulbactam Cefazolin Cefazolin (uncomplicated UTIs only) Ciprofloxacin Gentamicin Nitrofurantoin Piperacillin/Tazobactam Trimethoprim/Sulfamethoxazole   24 Blood culture from Peripheral Venipuncture Staphylococcus epidermidis             24 Urine from Clean Catch/Voided Escherichia coli  I  R  R  S  S  R  S  S  S  S         I/O last 3 completed shifts:  In: 1150 (26.7 mL/kg) [P.O.:750; IV Piggyback:400]  Out: - (0 mL/kg)   Weight: 43.1 kg   I/O during current shift:  I/O this shift:  In: 450 [P.O.:450]  Out: -     Temp (24hrs), Av.5 °C (97.7 °F), Min:36.3 °C (97.3 °F), Max:36.8 °C (98.2 °F)         Assessment/Plan     Patient will not be given a loading dose. Received a 1000 mg dose on   Will initiate vancomycin maintenance,  1000 mg every 12 hours.    This dosing regimen is predicted by [employer] to result in the following pharmacokinetic parameters:    Regimen: 1000 mg IV every 12 hours.  Start time: 12:11 on 2024  Exposure target: AUC24 (range)400-600 mg/L.hr   AUC24,ss: 443 mg/L.hr  Probability of AUC24 > 400: 67  %  Ctrough,ss: 10.9 mg/L  Probability of Ctrough,ss > 20: 3 %  Probability of nephrotoxicity (Lodise CATRINA 2009): 6 %      Follow-up level will be ordered on 9/6 at 0500 unless clinically indicated sooner.  Will continue to monitor renal function daily while on vancomycin and order serum creatinine at least every 48 hours if not already ordered.  Follow for continued vancomycin needs, clinical response, and signs/symptoms of toxicity.       Jeremías Conte [facility]

## 2024-09-05 NOTE — CONSULTS
INFECTIOUS DISEASES CONSULT NOTE    Referring Physician: Lupillo Mcneil  Reason For Consult: GPC bloodstream infection/GNR UTI  Date of Consult: 9/5/24 at 0734    History Of Present Illness  Patient is a 79-year-old female who was found by her daughter unresponsive lying next to the couch on 9/2/2024.  EMS was called and she was brought to [facility] emergency room.  On arrival she was afebrile with a normal white blood cell count.  Urinalysis had minimal pyuria however urine culture is growing 20-80,000 of unidentified gram-negative [place].  Yesterday admission blood cultures are growing 1 of 2 sets of gram-positive cocci in clusters.  Repeat blood cultures were obtained and patient was given a single dose of vancomycin and ID has been consulted.  The patient is resting comfortably this morning without specific complaints.     Past Medical History  She has a past medical history of Other cervical disc degeneration, unspecified cervical region, Other conditions influencing health status, Other conditions influencing health status, Other conditions influencing health status, Other conditions influencing health status, Other conditions influencing health status, Other conditions influencing health status, Other conditions influencing health status, Other intervertebral disc degeneration, lumbar region, Other intervertebral disc displacement, lumbar region, Other intervertebral disc displacement, lumbosacral region, Personal history of other diseases of the musculoskeletal system and connective tissue, Personal history of other diseases of the musculoskeletal system and connective tissue, Personal history of other specified conditions, Spinal stenosis, lumbar region without neurogenic claudication, and Sprain of ligaments of lumbar spine, initial encounter.    Surgical History  She has no past surgical history on file.     Social History  Patient is an active smoker.  She denies alcohol or drug use.    Family  History  No family exposure to known communicable illnesses  No family history of tuberculosis    Allergies  Lactose     Medications  Zosyn D3  Vancomycin D1  See Epic for remaining medications.    Review of Systems  Detailed review of systems completed.  No significant additional positives beyond what is mentioned above    Physical Exam  Vital signs:  Visit Vitals  /69 (BP Location: Right arm, Patient Position: Lying)   Pulse 77   Temp 36.3 °C (97.3 °F) (Oral)   Resp 18      General: Elderly female resting comfortably in no acute distress  HEENT:  No scleral icterus or conjunctival suffusion, throat clear, neck supple, no cervical LAD  Lungs:  Clear to auscultation bilaterally  Heart:  RRR, S1, S2 normal, no extra sounds or murmurs.  Abdomen:  Normoactive BS, soft, NT/ND. No palpable organs or masses.  No peritoneal signs.  Extremities:  No erythema/rash    Relevant Lab Results  Results from last 72 hours   Lab Units 09/04/24  0621   WBC AUTO x10*3/uL 6.0   HEMOGLOBIN g/dL 12.8   HEMATOCRIT % 38.6   PLATELETS AUTO x10*3/uL 329     Results from last 72 hours   Lab Units 09/04/24  0621   CREATININE mg/dL 0.30*   EGFR mL/min/1.73m*2 >90     Results from last 72 hours   Lab Units 09/03/24  0556   AST U/L 21   ALT U/L 16   ALK PHOS U/L 131*   BILIRUBIN TOTAL mg/dL 0.3       Cultures  Urinalysis (9/2): Nitrite negative/leukoesterase small/WBC: 6-10  Urine culture (9/2): 20-80 gram-negative [place]ouston  Blood culture (9/3): 1 of 2 sets: GPC  Blood culture (9/4): X 2 sets-pending    Imaging  Chest x-ray (9/2): No infiltrates    Impression:  1.  1 of 2 admission blood cultures with gram-positive cocci  -- Highly suspicious for contaminated blood culture however cannot completely rule out bloodstream infection.  Await final ID and susceptibility of blood isolate.  2.  Gram-negative [place]ouston UTI    Plan:  1.  Continue Zosyn for now while awaiting final ID and susceptibility of the urine isolate.  Monitor for adverse antibiotic  events.  2.  Will continue vancomycin with pharmacy dosing pending final ID of initial blood culture isolate.  3.  Await repeat blood cultures drawn 9/4/2024.  4.  Will follow.      Nathan Bush MD  [facility]  [phone]

## 2024-09-05 NOTE — PROGRESS NOTES
Vancomycin Dosing by Pharmacy- Cessation of Therapy    Consult to pharmacy for vancomycin dosing has been discontinued by the prescriber, pharmacy will sign off at this time.    Please call pharmacy if there are further questions or re-enter a consult if vancomycin is resumed.     Yesenia Spencer, PharmD

## 2024-09-05 NOTE — CARE PLAN
The patient's goals for the shift include      The clinical goals for the shift include DECREASE ANIXETY    Over the shift, the patient did not make progress toward the following goals. Barriers to progression include . Recommendations to address these barriers include .

## 2024-09-05 NOTE — PROGRESS NOTES
09/05/24 1434   Discharge Planning   Expected Discharge Disposition Home     Met with patient at bedside. Patient adamantly refusing to return to [facility] where she is a LTC resident.  Patient states she is returning home with her daughter.  Spoke to daughter last evening on the phone.  Daughter stated she would like to take her mother home if she is able. Patient requesting to be discharged tomorrow as she has her grandson arranged to pick her up.

## 2024-09-05 NOTE — PROGRESS NOTES
Physical Therapy    Physical Therapy Treatment    Patient Name: Karly Horton  MRN: [id number]  Today's Date: 9/5/2024  Time Calculation  Start Time: 1311  Stop Time: 1400  Time Calculation (min): 49 min         Assessment/Plan   PT Assessment  Rehab Prognosis: Good  Evaluation/Treatment Tolerance: Patient limited by fatigue  End of Session Communication: Bedside nurse  Assessment Comment: Improving functional mobility;  tolerance to activity progressing slowly;  fall risk  End of Session Patient Position: Bed, 2 rail up, Alarm on  PT Plan  Inpatient/Swing Bed or Outpatient: Inpatient  PT Plan  Treatment/Interventions: Bed mobility, Transfer training, Gait training, Stair training, Balance training, Strengthening, Endurance training, Therapeutic exercise  PT Plan: Ongoing PT  PT Frequency: 4 times per week  PT Discharge Recommendations: Moderate intensity level of continued care (Patient wants to discharge home from hospital)  Equipment Recommended upon Discharge: Wheeled walker  PT Recommended Transfer Status: Assist x1  PT - OK to Discharge: Yes (with skilled physical therapy services at next level of care)      General Visit Information:   PT  Visit  PT Received On: 09/05/24  General  Prior to Session Communication: Bedside nurse  Patient Position Received: Bed, 2 rail up, Alarm off, not on at start of session  General Comment: RN cleared patient for treatment.  Patient agreeable to treatment.    Subjective   Precautions:  Precautions  Medical Precautions: Fall precautions              Objective   Pain:  Pain Assessment  Pain Assessment: 0-10  0-10 (Numeric) Pain Score: 3  Pain Type: Chronic pain  Pain Location: Neck  Cognition:  Cognition  Overall Cognitive Status: Within Functional Limits  Coordination:  Movements are Fluid and Coordinated: No  Lower Body Coordination: slower rate of movement buffy LE  Postural Control:  Static Sitting Balance  Static Sitting-Balance Support: No upper extremity  supported  Static Sitting-Level of Assistance: Close supervision  Static Sitting-Comment/Number of Minutes: Supervision with balance while sitting on side of bed  Static Standing Balance  Static Standing-Balance Support: Bilateral upper extremity supported  Static Standing-Level of Assistance: Close supervision  Static Standing-Comment/Number of Minutes: Supervision with balance during static standing with rolling walker          Activity Tolerance:  Activity Tolerance  Endurance: Decreased tolerance for upright activites  Activity Tolerance Comments: Fatigue  Treatments:  Therapeutic Exercise  Therapeutic Exercise Performed: Yes  Therapeutic Exercise Activity 1: ankle pumps, quad sets, glut sets, heel slides, hip abduction, TKE 10 reps x 1 set;  written HEP issued    Therapeutic Activity  Therapeutic Activity Performed: Yes  Therapeutic Activity 1: Static standing with rolling walker x 4 minutes with supervision with balance         Bed Mobility  Bed Mobility: Yes  Bed Mobility 1  Bed Mobility 1: Supine to sitting  Level of Assistance 1: Close supervision  Bed Mobility Comments 1: Increased time and effort required to achieve supine to sit.  Bed Mobility 2  Bed Mobility  2: Sitting to supine  Level of Assistance 2: Close supervision  Bed Mobility Comments 2: Increased time and effort required to achieve sit to supine    Ambulation/Gait Training  Ambulation/Gait Training Performed: Yes  Ambulation/Gait Training 1  Surface 1: Level tile  Device 1: Rolling walker  Assistance 1: Minimum assistance  Comments/Distance (ft) 1: 10 feet x 2, 5 feet x 1 with rolling walker and assist with balance;  patient ambulates with slow juan pablo, non-reciprocating gait pattern, decreased step length buffy LE, and forward flexed posture.  Transfers  Transfer: Yes  Transfer 1  Transfer From 1: Sit to  Transfer to 1: Stand  Technique 1: Sit to stand  Transfer Device 1: Walker  Transfer Level of Assistance 1: Minimum  assistance  Trials/Comments 1: x 2 reps;  assist balance  Transfers 2  Transfer From 2: Stand to  Transfer to 2: Sit  Technique 2: Stand to sit  Transfer Device 2: Walker  Transfer Level of Assistance 2: Minimum assistance  Trials/Comments 2: Assist with balance    Stairs  Stairs: No         Outcome Measures:  [facility] Basic Mobility  Turning from your back to your side while in a flat bed without using bedrails: A little  Moving from lying on your back to sitting on the side of a flat bed without using bedrails: A little  Moving to and from bed to chair (including a wheelchair): A little  Standing up from a chair using your arms (e.g. wheelchair or bedside chair): A little  To walk in hospital room: A little  Climbing 3-5 steps with railing: A lot  Basic Mobility - Total Score: 17    Education Documentation  No documentation found.  Education Comments  No comments found.        OP EDUCATION:       Encounter Problems       Encounter Problems (Active)       Mobility       STG - Patient will ambulate 75' x 1 using rolling walker with SBA (Progressing)       Start:  09/03/24    Expected End:  09/17/24            STG - Patient will negotiate 1 platform step using rolling walker with CGA (Progressing)       Start:  09/03/24    Expected End:  09/17/24               PT Transfers       STG - Patient to transfer to and from sit to supine MOD INDEPENDENT (Progressing)       Start:  09/03/24    Expected End:  09/17/24            STG - Patient will transfer sit to and from stand with SUPERVISION (Progressing)       Start:  09/03/24    Expected End:  09/17/24               Pain - Adult

## 2024-09-05 NOTE — PROGRESS NOTES
Karly Horton is a 79 y.o. female on day 3 of admission presenting with Urinary tract infection without hematuria, site unspecified.    Spoke with nursing, no new issues.    Subjective   Patient seen and examined.  Resting in bed in no acute distress.  Awake alert oriented x 3.  No new complaints.  Discussed discharge plan of care, she is adamant that she is going home states her *grandson will pick her up tomorrow.    Objective     Physical Exam  Vitals and nursing note reviewed.   Constitutional:       General: She is not in acute distress.     Appearance: Normal appearance. She is underweight. She is not ill-appearing, toxic-appearing or diaphoretic.   HENT:      Head: Normocephalic and atraumatic.      Right Ear: External ear normal.      Left Ear: External ear normal.      Nose: Nose normal.      Mouth/Throat:      Mouth: Mucous membranes are moist.      Pharynx: Oropharynx is clear.   Eyes:      Extraocular Movements: Extraocular movements intact.      Conjunctiva/sclera: Conjunctivae normal.      Pupils: Pupils are equal, round, and reactive to light.   Cardiovascular:      Rate and Rhythm: Normal rate and regular rhythm.      Pulses: Normal pulses.      Heart sounds: Normal heart sounds. No murmur heard.  Pulmonary:      Effort: Pulmonary effort is normal. No respiratory distress.      Breath sounds: Normal breath sounds. No wheezing, rhonchi or rales.      Comments: 2 liters nasal cannula.   Abdominal:      General: Bowel sounds are normal. There is distension.      Palpations: Abdomen is soft.      Tenderness: There is no abdominal tenderness.      Hernia: A hernia is present.      Comments: Intermittent abdominal spasms, chronic.   Genitourinary:     Comments: Deferred.  Musculoskeletal:         General: No swelling or tenderness. Normal range of motion.      Cervical back: Normal range of motion and neck supple.      Thoracic back: Scoliosis present.      Right lower leg: No edema.      Left lower  leg: No edema.   Skin:     General: Skin is warm and dry.      Capillary Refill: Capillary refill takes less than 2 seconds.   Neurological:      General: No focal deficit present.      Mental Status: She is alert and oriented to person, place, and time.      Comments: Awake alert oriented x 3.  Speech clear, coherent.  Follows all commands.  Generalized weakness.  No focal weakness.  Gait deferred.  Cranial nerves II through XII grossly intact.    Psychiatric:         Mood and Affect: Mood normal.         Behavior: Behavior normal.       Last Recorded Vitals  Blood pressure 129/69, pulse 77, temperature 36.3 °C (97.3 °F), temperature source Oral, resp. rate 18, height 1.524 m (5'), weight (!) 43.1 kg (94 lb 15.9 oz), SpO2 97%.    Intake/Output last 3 Shifts:  I/O last 3 completed shifts:  In: 1150 (26.7 mL/kg) [P.O.:750; IV Piggyback:400]  Out: - (0 mL/kg)   Weight: 43.1 kg     Relevant Results  Results for orders placed or performed during the hospital encounter of 09/02/24 (from the past 24 hour(s))   Blood Culture    Specimen: Peripheral Venipuncture; Blood culture   Result Value Ref Range    Blood Culture Loaded on Instrument - Culture in progress    Blood Culture    Specimen: Peripheral Venipuncture; Blood culture   Result Value Ref Range    Blood Culture Loaded on Instrument - Culture in progress      Susceptibility data from last 90 days.  Collected Specimen Info Organism   09/02/24 Urine from Clean Catch/Voided Gram Negative Bacilli     Lower extremity venous duplex bilateral    Result Date: 9/3/2024  Interpreted By:  Margot Gil, STUDY: [facility] LOWER EXTREMITY VENOUS DUPLEX BILATERAL  9/3/2024 12:22 pm   INDICATION: 78 y/o   F with  Signs/Symptoms:Dyspnea tachypnea rule out DVT. LMP: Unknown.   COMPARISON: None.   ACCESSION NUMBER(S): [id number]   ORDERING CLINICIAN: WARD JACKSON   TECHNIQUE: Routine ultrasound of the  bilateral lower extremity was performed with duplex Doppler (color and  spectral) evaluation.   Static images were obtained for remote interpretation.   FINDINGS: THIGH VEINS:  The common femoral, femoral, popliteal, proximal medial saphenous, and deep femoral veins are patent and free of thrombus. The veins are normally compressible.  They demonstrate normal phasic flow and augmentation response.   CALF VEINS: Limited assessment without obvious thrombosis.       No deep venous thrombosis of the bilateral lower extremity.   Signed by: Margot Gil 9/3/2024 12:32 PM Dictation workstation:   [id number]     Scheduled medications  amLODIPine, 10 mg, oral, Nightly  ascorbic acid, 500 mg, oral, Daily  baclofen, 10 mg, oral, TID  ferrous sulfate (325 mg ferrous sulfate), 65 mg of iron, oral, Daily with breakfast  heparin (porcine), 5,000 Units, subcutaneous, q8h  lactulose, 20 g, oral, Daily  lidocaine, 1 patch, transdermal, Daily  losartan, 50 mg, oral, Daily  nicotine, 1 patch, transdermal, Daily  pantoprazole, 40 mg, oral, BID  piperacillin-tazobactam, 3.375 g, intravenous, q6h  sucralfate, 1 g, oral, q6h [place]      Continuous medications     PRN medications  PRN medications: acetaminophen, acetaminophen-codeine, albuterol, benzocaine-menthol, butalbital-acetaminophen-caff, dextromethorphan-guaifenesin, guaiFENesin, lubricating eye drops, melatonin, ondansetron **OR** ondansetron, polyethylene glycol, vancomycin      ASSESSMENT:  Unresponsiveness resolved  Altered mental status improved  Urinary tract infection gram negative bacilli  Acute hypoxic respiratory failure  Pneumonia  Tobacco use  Nausea, vomiting resolved  Chronic abdominal pain  Abdominal hernia  Toxic encephalopathy secondary to UTI, pneumonia improved  Hypertensive urgency  Hypertension  Migraine headaches  Positive blood culture, gram positive cocci    PLAN:  Patient is doing well this morning.  No new issues.  9/2/2024 Urine culture pending gram-negative bacilli.  9/3/2024 1/2 blood cultures gram positive cocci  clusters.  9/4/2024 Repeat 2/2 blood cultures pending no growth.  Chest x-ray showed questionable infiltrate, pneumonia - possible aspiration after nausea, vomiting, hypoxia episode.  Follow-up urine culture result.  GERALD hygiene.  Frequent toileting.  Increase fluid intake.  Respiratory status, pulse oximetry stable at on liters nasal cannula.  Check room ar pulse oximetry.  No dyspnea, cough.  Sputum culture if able.  Pulmonary hygiene.  Encourage incentive spirometry use 10 times per hour while awake.  Tobacco cessation education.  Nicotine patch.  Infectious disease input appreciated.  IV Zosyn.  Follow-up urine and blood cultures.  Abdominal examination baseline.  Continue diet as tolerated.  Symptom control.  As needed analgesics, antiemetics.  Outpatient follow-up.  Discussed with patient in detail.  PT/OT.  Fall precaution.  Up with assistance only.  Bed and chair alarm at all times.  Pressure ulcer prevention measures.  Turn reposition every 2 hours and as needed.  Heels off bed.  Offloading.  DVT prophylaxis.  Heparin subcutaneous.  GI prophylaxis.  PPI Protonix.  Supportive care.  Patient reassured.  PT/OT recommend moderate intensity level of continued care.  Case management following for discharge planning.  Patient [facility] from [facility] and nursing long-term care.  Patient is adamant that she is going home.  Anticipate discharge after cleared by Infectious disease, when arrangements made by case management.  Discussed with patient and nursing.    ADDENDUM:  Urine culture E. Coli (final).  9/3/2024 blood culture staphylococcus epidermidis.  Possible contamination.  Follow-up Infectious disease recommendations.  Follow-up case management discharge arrangements.  Anticipate discharge on antibiotics per Infectious disease, when arrangements are made by case management.  Discussed with Dr. Simpson.        PO Jang-CNP    Refusing snf placement    I have seen the patient and  verified the exam. I have personally reviewed all available data.  I agree with the note as documented with additional comments if needed. The assessment and plan as outlined are reflection of our discussion.    Avila Simpson MD

## 2024-09-05 NOTE — RESEARCH NOTES
Artificial Intelligence Monitoring in Nursing (AIMS Nursing) Study    Principle Investigator - Dr. Sharif Merchant  Research Coordinator - Simran Aponte     Patient Name - Karly Horton  Date - 9/5/2024 4:23 PM  Location - [facility]    Karly Horton was approached by Simran Aponte to talk about participating in the AIMS Nursing Study. The patient declined to participate in the study. Study protocol was followed and patient was given study contact information.     Simran Aponte

## 2024-09-05 NOTE — CARE PLAN
Problem: Mobility  Goal: STG - Patient will ambulate 75' x 1 using rolling walker with SBA  Outcome: Progressing     Problem: PT Transfers  Goal: STG - Patient to transfer to and from sit to supine MOD INDEPENDENT  Outcome: Progressing  Goal: STG - Patient will transfer sit to and from stand with SUPERVISION  Outcome: Progressing

## 2024-09-06 VITALS
TEMPERATURE: 97.9 F | BODY MASS INDEX: 18.65 KG/M2 | HEART RATE: 87 BPM | RESPIRATION RATE: 19 BRPM | HEIGHT: 60 IN | WEIGHT: 95 LBS | DIASTOLIC BLOOD PRESSURE: 66 MMHG | SYSTOLIC BLOOD PRESSURE: 138 MMHG | OXYGEN SATURATION: 97 %

## 2024-09-06 LAB
ANION GAP SERPL CALC-SCNC: 11 MMOL/L
BUN SERPL-MCNC: 9 MG/DL (ref 8–25)
CALCIUM SERPL-MCNC: 9.1 MG/DL (ref 8.5–10.4)
CHLORIDE SERPL-SCNC: 99 MMOL/L (ref 97–107)
CO2 SERPL-SCNC: 28 MMOL/L (ref 24–31)
CREAT SERPL-MCNC: 0.4 MG/DL (ref 0.4–1.6)
EGFRCR SERPLBLD CKD-EPI 2021: >90 ML/MIN/1.73M*2
ERYTHROCYTE [DISTWIDTH] IN BLOOD BY AUTOMATED COUNT: 17.3 % (ref 11.5–14.5)
GLUCOSE SERPL-MCNC: 100 MG/DL (ref 65–99)
HCT VFR BLD AUTO: 35.9 % (ref 36–46)
HGB BLD-MCNC: 11.8 G/DL (ref 12–16)
MCH RBC QN AUTO: 27.6 PG (ref 26–34)
MCHC RBC AUTO-ENTMCNC: 32.9 G/DL (ref 32–36)
MCV RBC AUTO: 84 FL (ref 80–100)
NRBC BLD-RTO: 0 /100 WBCS (ref 0–0)
PLATELET # BLD AUTO: 306 X10*3/UL (ref 150–450)
POTASSIUM SERPL-SCNC: 3.6 MMOL/L (ref 3.4–5.1)
RBC # BLD AUTO: 4.28 X10*6/UL (ref 4–5.2)
SODIUM SERPL-SCNC: 138 MMOL/L (ref 133–145)
WBC # BLD AUTO: 5.5 X10*3/UL (ref 4.4–11.3)

## 2024-09-06 PROCEDURE — 97535 SELF CARE MNGMENT TRAINING: CPT | Mod: GO,CO

## 2024-09-06 PROCEDURE — 2500000005 HC RX 250 GENERAL PHARMACY W/O HCPCS: Performed by: NURSE PRACTITIONER

## 2024-09-06 PROCEDURE — 85027 COMPLETE CBC AUTOMATED: CPT | Performed by: NURSE PRACTITIONER

## 2024-09-06 PROCEDURE — 2500000002 HC RX 250 W HCPCS SELF ADMINISTERED DRUGS (ALT 637 FOR MEDICARE OP, ALT 636 FOR OP/ED): Performed by: INTERNAL MEDICINE

## 2024-09-06 PROCEDURE — 80048 BASIC METABOLIC PNL TOTAL CA: CPT | Performed by: NURSE PRACTITIONER

## 2024-09-06 PROCEDURE — 2500000001 HC RX 250 WO HCPCS SELF ADMINISTERED DRUGS (ALT 637 FOR MEDICARE OP): Performed by: INTERNAL MEDICINE

## 2024-09-06 PROCEDURE — S4991 NICOTINE PATCH NONLEGEND: HCPCS | Performed by: INTERNAL MEDICINE

## 2024-09-06 PROCEDURE — 97530 THERAPEUTIC ACTIVITIES: CPT | Mod: GO,CO

## 2024-09-06 PROCEDURE — 2500000004 HC RX 250 GENERAL PHARMACY W/ HCPCS (ALT 636 FOR OP/ED): Performed by: INTERNAL MEDICINE

## 2024-09-06 PROCEDURE — 36415 COLL VENOUS BLD VENIPUNCTURE: CPT | Performed by: NURSE PRACTITIONER

## 2024-09-06 RX ORDER — LIDOCAINE 560 MG/1
1 PATCH PERCUTANEOUS; TOPICAL; TRANSDERMAL DAILY
Qty: 30 PATCH | Refills: 0 | Status: SHIPPED | OUTPATIENT
Start: 2024-09-07

## 2024-09-06 RX ORDER — LOSARTAN POTASSIUM 50 MG/1
50 TABLET ORAL DAILY
Qty: 30 TABLET | Refills: 0 | Status: SHIPPED | OUTPATIENT
Start: 2024-09-07

## 2024-09-06 RX ORDER — AMLODIPINE BESYLATE 10 MG/1
10 TABLET ORAL NIGHTLY
Qty: 30 TABLET | Refills: 0 | Status: SHIPPED | OUTPATIENT
Start: 2024-09-06

## 2024-09-06 RX ORDER — CEPHALEXIN 500 MG/1
500 CAPSULE ORAL 3 TIMES DAILY
Qty: 12 CAPSULE | Refills: 0 | Status: SHIPPED | OUTPATIENT
Start: 2024-09-06 | End: 2024-09-10

## 2024-09-06 ASSESSMENT — COGNITIVE AND FUNCTIONAL STATUS - GENERAL
WALKING IN HOSPITAL ROOM: A LITTLE
MOVING FROM LYING ON BACK TO SITTING ON SIDE OF FLAT BED WITH BEDRAILS: A LITTLE
EATING MEALS: A LITTLE
MOBILITY SCORE: 16
EATING MEALS: A LITTLE
CLIMB 3 TO 5 STEPS WITH RAILING: A LOT
TURNING FROM BACK TO SIDE WHILE IN FLAT BAD: A LITTLE
DAILY ACTIVITIY SCORE: 14
TOILETING: A LOT
MOVING FROM LYING ON BACK TO SITTING ON SIDE OF FLAT BED WITH BEDRAILS: A LITTLE
DRESSING REGULAR UPPER BODY CLOTHING: A LITTLE
MOVING TO AND FROM BED TO CHAIR: A LOT
TURNING FROM BACK TO SIDE WHILE IN FLAT BAD: A LITTLE
PERSONAL GROOMING: A LOT
HELP NEEDED FOR BATHING: A LOT
TOILETING: A LOT
HELP NEEDED FOR BATHING: A LOT
DRESSING REGULAR LOWER BODY CLOTHING: A LOT
TOILETING: A LITTLE
WALKING IN HOSPITAL ROOM: A LOT
DRESSING REGULAR UPPER BODY CLOTHING: A LITTLE
STANDING UP FROM CHAIR USING ARMS: A LITTLE
DRESSING REGULAR LOWER BODY CLOTHING: A LOT
HELP NEEDED FOR BATHING: A LOT
DRESSING REGULAR LOWER BODY CLOTHING: A LOT
DRESSING REGULAR UPPER BODY CLOTHING: A LITTLE
PERSONAL GROOMING: A LITTLE
DAILY ACTIVITIY SCORE: 15
PERSONAL GROOMING: A LOT
MOVING TO AND FROM BED TO CHAIR: A LOT
STANDING UP FROM CHAIR USING ARMS: A LITTLE
DAILY ACTIVITIY SCORE: 14
MOBILITY SCORE: 15
CLIMB 3 TO 5 STEPS WITH RAILING: A LOT
EATING MEALS: A LOT

## 2024-09-06 ASSESSMENT — PAIN SCALES - GENERAL
PAINLEVEL_OUTOF10: 5 - MODERATE PAIN
PAINLEVEL_OUTOF10: 0 - NO PAIN
PAINLEVEL_OUTOF10: 3
PAINLEVEL_OUTOF10: 4

## 2024-09-06 ASSESSMENT — ACTIVITIES OF DAILY LIVING (ADL)
BATHING_LEVEL_OF_ASSISTANCE: MODERATE ASSISTANCE
HOME_MANAGEMENT_TIME_ENTRY: 25
BATHING_WHERE_ASSESSED: SITTING SINKSIDE

## 2024-09-06 ASSESSMENT — PAIN - FUNCTIONAL ASSESSMENT
PAIN_FUNCTIONAL_ASSESSMENT: 0-10

## 2024-09-06 ASSESSMENT — PAIN DESCRIPTION - LOCATION: LOCATION: BACK

## 2024-09-06 NOTE — PROGRESS NOTES
Occupational Therapy    OT Treatment    Patient Name: Karly Horton  MRN: [id number]  Today's Date: 9/6/2024  Time Calculation  Start Time: 1030  Stop Time: 1125  Time Calculation (min): 55 min        Assessment:  OT Assessment: Pt highly motivated progressing with established POC. Will continute to address remaining deficits with skilled OT intervention.  Prognosis: Good  Barriers to Discharge: None  Evaluation/Treatment Tolerance: Patient tolerated treatment well  Medical Staff Made Aware: Yes  End of Session Communication: Bedside nurse  End of Session Patient Position: Up in chair, Alarm on (call light in reach Pt demonstrate use all needs met.)  OT Assessment Results: Decreased ADL status, Decreased upper extremity range of motion, Decreased upper extremity strength, Decreased endurance, Decreased functional mobility  Prognosis: Good  Barriers to Discharge: None  Evaluation/Treatment Tolerance: Patient tolerated treatment well  Medical Staff Made Aware: Yes  Strengths: Ability to acquire knowledge, Attitude of self, Coping skills, Premorbid level of function, Support of Caregivers  Plan:  Treatment Interventions: ADL retraining, Functional transfer training, Endurance training, Patient/family training, Equipment evaluation/education, Compensatory technique education  OT Frequency: 4 times per week  OT Discharge Recommendations: Moderate intensity level of continued care  Equipment Recommended upon Discharge: Wheeled walker  OT Recommended Transfer Status: Assist of 1, Minimal assist  OT - OK to Discharge: Yes  Treatment Interventions: ADL retraining, Functional transfer training, Endurance training, Patient/family training, Equipment evaluation/education, Compensatory technique education    Subjective   Previous Visit Info:  OT Last Visit  OT Received On: 09/06/24  General:  General  Reason for Referral: decreased functional status  Referred By: Lupillo Mcneil MD  Past Medical History Relevant to Rehab:  CHF; anemia; pneumonia; lumbar stenosis; COPD; GI Bleed; failure to thrive; bowel resection; smoker; hernia; hypoxia;  Prior to Session Communication: Bedside nurse  Patient Position Received: Bed, 2 rail up, Alarm on  Preferred Learning Style: auditory, verbal  General Comment: Cleared by NSG,  Pt agreeable to skilled OT intervention.  Precautions:  Hearing/Visual Limitations: Hearing WFL, wears glasses  Medical Precautions: Fall precautions    Vital Signs (Past 2hrs)                 Pain:  Pain Assessment  Pain Assessment: 0-10  0-10 (Numeric) Pain Score: 4  Pain Type: Chronic pain  Pain Location: Rectum  Pain Interventions: Repositioned, Environmental changes, Relaxation technique, Therapeutic presence (Discussion with NSG.)  Response to Interventions: Pt able to actively participate in treatment session.    Objective    Cognition:  Cognition  Overall Cognitive Status: Within Functional Limits  Orientation Level: Oriented X4  Coordination:  Movements are Fluid and Coordinated: No  Lower Body Coordination: slower rate of movement buffy LE  Coordination Comment: decreased rate of movements  Activities of Daily Living:      Grooming  Grooming Level of Assistance: Moderate assistance  Grooming Where Assessed: Sitting sinkside  Grooming Comments: asssit wash hair Pt combing, wash face, brush teeth at set up    UE Bathing  UE Bathing Level of Assistance: Setup  UE Bathing Where Assessed: Sitting sinkside  UE Bathing Comments: Pt sponge bathing    LE Bathing  LE Bathing Level of Assistance: Moderate assistance  LE Bathing Where Assessed: Sitting sinkside  LE Bathing Comments: Pt sponge bathing seated asssit  B feet  and in  stance buttocks for thoroughness,    UE Dressing  UE Dressing Level of Assistance: Minimum assistance  UE Dressing Where Assessed: Other (Comment) (toilet)  UE Dressing Comments: Pt doff/don [facility] gown assist fasten    LE Dressing  LE Dressing: Yes  Sock Level of Assistance: Minimum assistance (Pt  doff/don LH reacher sock aid after set up to device partial assist adjust sock)  Adult Briefs Level of Assistance: Maximum assistance  LE Dressing Where Assessed: Toilet  LE Dressing Comments: instructed Pt with use AE    Toileting  Toileting Level of Assistance: Moderate assistance  Where Assessed: Toilet  Toileting Comments: Pt adjusting brief prior partial after and with hygiene for thoroughness  Functional Standing Tolerance:  Time: 3 minutes  Activity: ADL skills/transfer  Functional Standing Tolerance Comments: CGA balance s/t posterior lean  Bed Mobility/Transfers: Bed Mobility  Bed Mobility: Yes  Bed Mobility 1  Bed Mobility 1: Supine to sitting  Level of Assistance 1: Close supervision  Bed Mobility Comments 1: bed to neutral no use side transfer bar effortful trunk up  Bed Mobility 2  Bed Mobility  2: Scooting  Level of Assistance 2: Close supervision  Bed Mobility Comments 2: seated to edge of bed    Transfers  Transfer: Yes  Transfer 1  Transfer From 1: Bed to  Transfer to 1: Stand  Technique 1: Sit to stand  Transfer Device 1: Walker  Transfer Level of Assistance 1: Minimum assistance  Trials/Comments 1: vc hand placement  Transfers 2  Transfer From 2: Stand to  Transfer to 2: Chair with arms  Technique 2: Stand to sit  Transfer Device 2: Walker  Transfer Level of Assistance 2: Minimum assistance  Trials/Comments 2: vc hand placement    Toilet Transfers  Toilet Transfer From: Rolling walker  Toilet Transfer Type: To and from  Toilet Transfer to: Standard toilet  Toilet Transfer Technique: Ambulating  Toilet Transfers: Minimal assistance  Toilet Transfers Comments: grab bar use vc hand placement  Functional Mobility:  Functional Mobility  Functional Mobility Performed: Yes  Functional Mobility 1  Surface 1: Level tile  Device 1: Rolling walker  Assistance 1: Minimum assistance  Quality of Functional Mobility 1: Inconsistent stride length  Comments 1: 10' x 2 to include doorway, turns and backing  Outcome  Measures:[facility] Daily Activity  Putting on and taking off regular lower body clothing: A lot  Bathing (including washing, rinsing, drying): A lot  Putting on and taking off regular upper body clothing: A little  Toileting, which includes using toilet, bedpan or urinal: A lot  Taking care of personal grooming such as brushing teeth: A little  Eating Meals: A little  Daily Activity - Total Score: 15    Education Documentation  ADL Training, taught by UGO Mistry at 9/6/2024 11:36 AM.  Learner: Patient  Readiness: Acceptance  Method: Explanation  Response: Verbalizes Understanding  Comment: Instructed Pt with adaptive ADL skills, balance strategies, body mechanics, transfer safety    Education Comments  No comments found.        OP EDUCATION:       Goals:  Encounter Problems       Encounter Problems (Active)       Bathing       LTG - Patient will utilize adaptive techniques to bathe upper body with close supervision and set up (Progressing)       Start:  09/03/24    Expected End:  10/03/24               Dressing Upper Extremities       LTG - Patient will complete upper body dressing with set up (Progressing)       Start:  09/03/24    Expected End:  10/03/24               Dressings Lower Extremities       LTG - Patient will dress lower body with AE and close supervision (Progressing)       Start:  09/03/24    Expected End:  10/03/24               Functional Mobility       Perform functional mobility to and from the bathroom with 2ww and CGA (Progressing)       Start:  09/03/24    Expected End:  10/03/24               Grooming       STG - Patient completes grooming with set up (Progressing)       Start:  09/03/24    Expected End:  10/03/24               OT Transfers       LTG - Patient will perform all functional transfers with close supervision and 2ww (Progressing)       Start:  09/03/24    Expected End:  10/03/24               Toileting       STG - Patient will complete toileting tasks with 2ww and CGA  (Progressing)       Start:  09/03/24    Expected End:  10/03/24

## 2024-09-06 NOTE — PROGRESS NOTES
Seeing patient for contaminated blood culture and E. coli UTI.  She is resting comfortably today without complaints.    Zosyn D4    37.4    36.7  Lung: Clear to auscultation bilaterally  Cardio: RRR, S1-S2 normal  Abdomen: NABS, soft, nontender nondistended    WBC: 8.5  Creatinine: 0.4    Urine culture (9/2): 20-80 K E. Coli  Blood culture (9/3): 1/2 sets Staph epidermidis  Blood culture (9/4): X 2 sets-NGTD    Impression:  1.  Contaminated blood culture  2.  E. coli UTI    Plan:  1.  Would be okay for discharge home at any time from an ID standpoint on cephalexin 500 mg p.o. every 8 hours 3 doses on 9/9/2024.  Discussed with Elif Root.  ID will sign off at this time.    Nathan Bush MD  [facility]  [phone]

## 2024-09-06 NOTE — PROGRESS NOTES
Physical Therapy                 Therapy Communication Note    Patient Name: Karly Horton  MRN: [id number]  Today's Date: 9/6/2024     Discipline: Physical Therapy    Missed Visit Reason: Other (Comment)   (Patient currently up in the restroom working with OT.)    Missed Time: Attempt    Comment:

## 2024-09-06 NOTE — PROGRESS NOTES
Karly Horton is a 79 y.o. female on day 4 of admission presenting with Urinary tract infection without hematuria, site unspecified.    Subjective   Patient seen and examined.  Resting in bed in no acute distress.  Awake alert oriented x 3.  No complaints.  Asking to go home today.    Objective     Physical Exam  Vitals and nursing note reviewed.   Constitutional:       General: She is not in acute distress.     Appearance: Normal appearance. She is underweight. She is not ill-appearing, toxic-appearing or diaphoretic.   HENT:      Head: Normocephalic and atraumatic.      Right Ear: External ear normal.      Left Ear: External ear normal.      Nose: Nose normal.      Mouth/Throat:      Mouth: Mucous membranes are moist.      Pharynx: Oropharynx is clear.   Eyes:      Extraocular Movements: Extraocular movements intact.      Conjunctiva/sclera: Conjunctivae normal.      Pupils: Pupils are equal, round, and reactive to light.   Cardiovascular:      Rate and Rhythm: Normal rate and regular rhythm.      Pulses: Normal pulses.      Heart sounds: Normal heart sounds. No murmur heard.  Pulmonary:      Effort: Pulmonary effort is normal. No respiratory distress.      Breath sounds: Normal breath sounds. No wheezing, rhonchi or rales.      Comments: Room air.  Abdominal:      General: Bowel sounds are normal. There is distension.      Palpations: Abdomen is soft.      Tenderness: There is no abdominal tenderness.      Hernia: A hernia is present.      Comments: Intermittent abdominal spasms, chronic.   Genitourinary:     Comments: Deferred.  Musculoskeletal:         General: No swelling or tenderness. Normal range of motion.      Cervical back: Normal range of motion and neck supple.      Thoracic back: Scoliosis present.      Right lower leg: No edema.      Left lower leg: No edema.   Skin:     General: Skin is warm and dry.      Capillary Refill: Capillary refill takes less than 2 seconds.   Neurological:      General:  No focal deficit present.      Mental Status: She is alert and oriented to person, place, and time.      Comments: Awake alert oriented x 3.  Speech clear, coherent.  Follows all commands.  Generalized weakness.  No focal weakness.  Gait deferred.  Cranial nerves II through XII grossly intact.    Psychiatric:         Mood and Affect: Mood normal.         Behavior: Behavior normal.       Last Recorded Vitals  Blood pressure 138/66, pulse 87, temperature 36.6 °C (97.9 °F), temperature source Oral, resp. rate 19, height 1.524 m (5'), weight (!) 43.1 kg (94 lb 15.9 oz), SpO2 97%.    Intake/Output last 3 Shifts:  I/O last 3 completed shifts:  In: 1440 (33.4 mL/kg) [P.O.:1440]  Out: - (0 mL/kg)   Weight: 43.1 kg     Relevant Results  Results for orders placed or performed during the hospital encounter of 09/02/24 (from the past 24 hour(s))   Basic Metabolic Panel   Result Value Ref Range    Glucose 100 (H) 65 - 99 mg/dL    Sodium 138 133 - 145 mmol/L    Potassium 3.6 3.4 - 5.1 mmol/L    Chloride 99 97 - 107 mmol/L    Bicarbonate 28 24 - 31 mmol/L    Urea Nitrogen 9 8 - 25 mg/dL    Creatinine 0.40 0.40 - 1.60 mg/dL    eGFR >90 >60 mL/min/1.73m*2    Calcium 9.1 8.5 - 10.4 mg/dL    Anion Gap 11 <=19 mmol/L   CBC   Result Value Ref Range    WBC 5.5 4.4 - 11.3 x10*3/uL    nRBC 0.0 0.0 - 0.0 /100 WBCs    RBC 4.28 4.00 - 5.20 x10*6/uL    Hemoglobin 11.8 (L) 12.0 - 16.0 g/dL    Hematocrit 35.9 (L) 36.0 - 46.0 %    MCV 84 80 - 100 fL    MCH 27.6 26.0 - 34.0 pg    MCHC 32.9 32.0 - 36.0 g/dL    RDW 17.3 (H) 11.5 - 14.5 %    Platelets 306 150 - 450 x10*3/uL     Susceptibility data from last 90 days.  Collected Specimen Info Organism Amoxicillin/Clavulanate Ampicillin Ampicillin/Sulbactam Cefazolin Cefazolin (uncomplicated UTIs only) Ciprofloxacin Gentamicin Nitrofurantoin Piperacillin/Tazobactam Trimethoprim/Sulfamethoxazole   09/03/24 Blood culture from Peripheral Venipuncture Staphylococcus epidermidis             09/02/24 Urine  from Clean Catch/Voided Escherichia coli  I  R  R  S  S  R  S  S  S  S     No results found.    Scheduled medications  amLODIPine, 10 mg, oral, Nightly  ascorbic acid, 500 mg, oral, Daily  baclofen, 10 mg, oral, TID  ferrous sulfate (325 mg ferrous sulfate), 65 mg of iron, oral, Daily with breakfast  heparin (porcine), 5,000 Units, subcutaneous, q8h  lactulose, 20 g, oral, Daily  lidocaine, 1 patch, transdermal, Daily  losartan, 50 mg, oral, Daily  nicotine, 1 patch, transdermal, Daily  pantoprazole, 40 mg, oral, BID  piperacillin-tazobactam, 3.375 g, intravenous, q6h  sucralfate, 1 g, oral, q6h [place]      Continuous medications     PRN medications  PRN medications: acetaminophen, albuterol, benzocaine-menthol, butalbital-acetaminophen-caff, dextromethorphan-guaifenesin, guaiFENesin, lubricating eye drops, melatonin, ondansetron **OR** ondansetron, polyethylene glycol      ASSESSMENT:  Unresponsiveness resolved  Altered mental status improved  Urinary tract infection E. Coli  Acute hypoxic respiratory failure resolved  Pneumonia  Tobacco use  Nausea, vomiting resolved  Chronic abdominal pain  Abdominal hernia  Toxic encephalopathy secondary to UTI, pneumonia improved  Hypertensive urgency resolved  Hypertension  Migraine headaches  Positive blood culture, gram positive cocci clusters, staphylococcus epidermidis, suspect contaminant    PLAN:  Patient is doing well this morning.  No new issues.  9/2/2024 urine culture E. coli final.  9/3/2024 1/2 blood cultures gram-positive cocci clusters, Staphylococcus epidermidis, suspect contamination.  9/4/2024 repeat 2 out of 2 blood cultures pending no growth.  Chest x-ray showed questionable infiltrate, pneumonia possible aspiration after nausea, vomiting, hypoxia episode.  Continue GERALD hygiene.  Frequent toileting.  Increase fluid intake.  Respiratory status, pulse oximetry stable on room air.  Pulmonary hygiene.  Encourage incentive spirometry use 10 times per hour awake.   Tobacco cessation discussed.  Patient plans to continue smoking upon discharge.  Avoid nicotine patch if smoking.  Pressures reviewed.  Stable.  Continue antihypertensives.  Outpatient follow-up with PCP for blood pressure monitoring and management.  Infectious disease following, input appreciated.  Per Infectious disease Dr. Bush.  Ziggy to discharge on p.o. Cephalexin through 9/9/2024.  PT/OT.  Fall precautions.  Up with assistance only.  Color all times.  Pressure ulcer prevention measures.  Turn and reposition every 2 hours and as needed.  Heels off bed.  Offloading.  DVT prophylaxis.  Heparin subcutaneous.  GI prophylaxis.  PPI Protonix.  Supportive care.  Patient reassured.  PT/OT recommend moderate intensity level of continued care.  Case management following for discharge planning.  Patient presents from home, originally from [facility].  Patient is refusing skilled nursing rehabilitation facility placement.  She is agreeable to home health care.  Per case management - discharge plan home with home health care.  Discussed with Dr. Simpson.  Ziggy to discharge home with home health care per request.      Shea Root, PO-CNP      I have seen the patient and verified the exam. I have personally reviewed all available data.  I agree with the note as documented with additional comments if needed. The assessment and plan as outlined are reflection of our discussion.    Avila Simpson MD

## 2024-09-06 NOTE — CARE PLAN
The patient's goals for the shift include      The clinical goals for the shift include Patient will have decreasedpain for my shift.    Over the shift, the patient did not make progress toward the following goals. Barriers to progression include . Recommendations to address these barriers include .

## 2024-09-06 NOTE — CARE PLAN
Problem: Pain - Adult  Goal: Verbalizes/displays adequate comfort level or baseline comfort level  9/6/2024 1136 by Linda Duque, RN  Outcome: Progressing  9/6/2024 1133 by Linda Duque, RN  Outcome: Progressing   The patient's goals for the shift include      The clinical goals for the shift include safety, monitor vs and labs

## 2024-09-06 NOTE — PROGRESS NOTES
09/06/24 1236   Discharge Planning   Expected Discharge Disposition HH Services     Patient and daughter both declining patient return to [facility]. .  Daughter would like to bring patient home to live with her in her apartment.  [facility] notified. Both patient and daughter agreeable to [facility]. List provided.  [facility] given. Patient accepted by [facility]. Referral for [facility] also sent with approval from Elif Root NP. Patient is agreeable to [facility].  Patient's grandson will provide transportation home.  Patient states he will be arriving at 1600. Will need AVS and HHC order completed to obtain [facility] SOC.     UPDATE: AVS and HHC referral sent to [facility] via [facility]. Patient visited at bedside by [facility] liaison. SOC confirmed for tomorrow, 9/7.

## 2024-09-07 LAB
BACTERIA BLD AEROBE CULT: ABNORMAL
BACTERIA BLD CULT: ABNORMAL
BACTERIA BLD CULT: NORMAL
GRAM STN SPEC: ABNORMAL

## 2024-09-08 LAB
BACTERIA BLD CULT: NORMAL
BACTERIA BLD CULT: NORMAL

## 2024-09-08 NOTE — DOCUMENTATION CLARIFICATION NOTE
"    PATIENT:               JAMISON CORONADO  ACCT #:                  [id number]  MRN:                       [id number]  :                       1945  ADMIT DATE:       2024 2:47 PM  DISCH DATE:        2024 3:45 PM  RESPONDING PROVIDER #:        [id number]          PROVIDER RESPONSE TEXT:    Suspect aspiration pneumonitis, pneumonia ruled out    CDI QUERY TEXT:    Clarification      Instruction:    Based on your assessment of the patient and the clinical information, please provide the requested documentation by clicking on the appropriate radio button and enter any additional information if prompted.    Question: Please further specify the type of pneumonia being treated or indicate if pneumonia is ruled out    When answering this query, please exercise your independent professional judgment. The fact that a question is being asked, does not imply that any particular answer is desired or expected.    The patient's clinical indicators include:  Clinical Information:79 year old female PMHX COPD, CHF, GI bleed , chronic pain, adult failure to thrive, bowel resection and recurrent pneumonia admitted with UTI and pneumonia reso failure. Patient has recent stay at nursing home as well as recent hospitalization.    Clinical Indicators:     ED Provider notes: \"Patient is agitated, pulling on telemetry and clothes, history is limited at this point. She was treated as a pneumonia, has shown a right lower lobe infiltrate...\"     IM PN: \"Chest x-ray infiltrate ? Pneumonia\"     IM PN: Assessment: \"Pneumonia...Toxic encephalopathy secondary to UTI, pneumonia improved\"  \"Plan...Chest x-ray showed questionable infiltrate, pneumonia - possible aspiration after nausea, vomiting, hypoxia episode.\"    CXR : \"IMPRESSION:  1. Mild nonspecific diffuse interstitial prominence which could  reflect chronic parenchymal changes, mild edema, or possibly atypical  infectious process in the appropriate clinical " "setting.\"    CXR (repeat) 9/2: \"IMPRESSION:  Generalized bronchial thickening without evidence of pneumonia or  pulmonary edema.\"    Treatment:  -IV antibx: Rocephin, Zosyn and Vancomycin    Risk Factors: 79 year old, recent nursing home patient, recent hospitalization  Options provided:  -- Gram Negative PNA  -- Aspiration PNA  -- Pneumonia is ruled out  -- Other - I will add my own diagnosis  -- Refer to Clinical Documentation Reviewer    Query created by: Shannan Meek on 9/6/2024 11:14 AM      Electronically signed by:  WARD SCHUSTER 9/8/2024 5:40 PM          "

## 2024-09-08 NOTE — DISCHARGE SUMMARY
Discharge Diagnosis  Unresponsiveness resolved  Altered mental status improved  Urinary tract infection E. Coli  Acute hypoxic respiratory failure resolved  Suspect aspiration pneumonitis   Tobacco use  Nausea, vomiting resolved  Chronic abdominal pain  Abdominal hernia  Toxic encephalopathy secondary to UTI, pneumonia improved  Hypertensive urgency resolved  Hypertension  Migraine headaches  Positive blood culture, gram positive cocci clusters, staphylococcus epidermidis, suspect contaminant    Issues Requiring Follow-Up  Above    Discharge Meds     Medication List      START taking these medications     amLODIPine 10 mg tablet; Commonly known as: Norvasc; Take 1 tablet (10   mg) by mouth once daily at bedtime. Hold for systolic blood pressure < 110   mmhg.   cephalexin 500 mg capsule; Commonly known as: Keflex; Take 1 capsule   (500 mg) by mouth 3 times a day for 4 days.   lidocaine 4 % patch; Place 1 patch over 12 hours on the skin once daily.   Remove & discard patch within 12 hours or as directed by MD.   losartan 50 mg tablet; Commonly known as: Cozaar; Take 1 tablet (50 mg)   by mouth once daily. Hold for systolic blood pressure < 110 mmhg.     CONTINUE taking these medications     acetaminophen-codeine 300-60 mg tablet; Commonly known as: Tylenol w/   Codeine #4   ascorbic acid 500 mg tablet; Commonly known as: Vitamin C; Take 1 tablet   (500 mg) by mouth once daily.   baclofen 10 mg tablet; Commonly known as: Lioresal   butalbital-acetaminophen-caff -40 mg tablet   ferrous sulfate (325 mg ferrous sulfate) tablet; Take 1 tablet by mouth   once daily with breakfast.   lactulose 20 gram/30 mL oral solution   lidocaine 2 % jelly; Commonly known as: Xylocaine   lubricating eye drops ophthalmic solution; Administer 1 drop into both   eyes if needed for dry eyes.   meprobamate 400 mg tablet; Commonly known as: Equanil   mupirocin 2 % ointment; Commonly known as: Bactroban   nicotine 21 mg/24 hr patch;  "Commonly known as: Nicoderm CQ; Place 1   patch over 24 hours on the skin once daily.   pantoprazole 40 mg EC tablet; Commonly known as: ProtoNix; Take 1 tablet   (40 mg) by mouth 2 times a day. Do not crush, chew, or split. Continue   twice daily x 8 weeks then continue once daily.   sucralfate 100 mg/mL suspension; Commonly known as: Carafate; Take 10 mL   (1 g) by mouth every 6 hours.       Test Results Pending At Discharge  Pending Labs       Order Current Status    Troponin T Series, High Sensitivity (0, 2HR, 6HR) In process            Hospital Course  This is a very pleasant 79 year old  female presenting with unresponsiveness.  In the emergency department, initial work-up was done.  12 lead EKG showed normal sinus rhythm PVC's no acute ischemia.  CT head without contrast per radiology showed no acute intracranial abnormality, non specific white matter likely reflecting sequela of small-vessel ischemic disease.  1 view chest x-ray per radiology showed mild nonspecific diffuse interstitial prominence which could reflect chronic parenchymal changes, mild edema or possible atypical infectious process.  Urinalysis showed leukocytes.  Urine drug screen + barbiturates.  She was treated in the emergency department.  A \"rapid response\" was called due to abdominal breathing, hypertension and emesis.  Pulse oximetry was 86% on room air.  12 lead EKG showed no ischemia.  1 view chest x-ray showed opacities along the base of the right lung.  She was treated with IV Zosyn and oxygen. 1/2 blood cultures grew gram positive cocci clusters.  Repeat blood cultures were obtained.  She was treated with 1 gram IV Vancomycin.  The 1/2 blood culture grew staphylococcus epidermidis.  The repeat 2/2 blood cultures without growth.  She was evaluated and treated by Infectious disease, suspect contaminant.  The urine culture grew E. Coli.  Her respiratory status improved, pulse oximetry stable on room air.  Her mentation " improved to baseline.  Her overall condition improved.  She was cleared by Infectious disease for discharge on oral Cephalexin.  She was evaluated by physical and occupational therapy - recommended moderate intensity level of continued care.  Case management was consulted for discharge planning.  She requested discharge home with home health care.  She was discharged home with home health care per request in stable condition.     Pertinent Physical Exam At Time of Discharge  See physical examination.    Outpatient Follow-Up  No future appointments.    Follow-up with primary care provider in 1 week.      Shea Root, PO-CNP

## 2024-09-10 ENCOUNTER — PATIENT OUTREACH (OUTPATIENT)
Dept: HOME HEALTH SERVICES | Age: 79
End: 2024-09-10
Payer: MEDICARE

## 2024-09-11 LAB
ATRIAL RATE: 76 BPM
P AXIS: 57 DEGREES
P OFFSET: 191 MS
P ONSET: 147 MS
PR INTERVAL: 136 MS
Q ONSET: 215 MS
QRS COUNT: 13 BEATS
QRS DURATION: 80 MS
QT INTERVAL: 402 MS
QTC CALCULATION(BAZETT): 452 MS
QTC FREDERICIA: 434 MS
R AXIS: -69 DEGREES
T AXIS: 69 DEGREES
T OFFSET: 416 MS
VENTRICULAR RATE: 76 BPM

## 2024-10-04 ENCOUNTER — HOSPITAL ENCOUNTER (INPATIENT)
Facility: HOSPITAL | Age: 79
LOS: 5 days | Discharge: ANOTHER HOSPITAL | DRG: 091 | End: 2024-10-09
Attending: EMERGENCY MEDICINE | Admitting: INTERNAL MEDICINE
Payer: MEDICARE

## 2024-10-04 ENCOUNTER — APPOINTMENT (OUTPATIENT)
Dept: CARDIOLOGY | Facility: HOSPITAL | Age: 79
DRG: 091 | End: 2024-10-04
Payer: MEDICARE

## 2024-10-04 ENCOUNTER — APPOINTMENT (OUTPATIENT)
Dept: RADIOLOGY | Facility: HOSPITAL | Age: 79
DRG: 091 | End: 2024-10-04
Payer: MEDICARE

## 2024-10-04 DIAGNOSIS — R41.0 DISORIENTATION: Primary | ICD-10-CM

## 2024-10-04 LAB
ALBUMIN SERPL BCP-MCNC: 3.3 G/DL (ref 3.4–5)
ALP SERPL-CCNC: 89 U/L (ref 33–136)
ALT SERPL W P-5'-P-CCNC: 13 U/L (ref 7–45)
AMPHETAMINES UR QL SCN: ABNORMAL
ANION GAP SERPL CALCULATED.3IONS-SCNC: 13 MMOL/L (ref 10–20)
APPEARANCE UR: ABNORMAL
APTT PPP: 20.7 SECONDS (ref 22–32.5)
AST SERPL W P-5'-P-CCNC: 22 U/L (ref 9–39)
BACTERIA #/AREA URNS AUTO: ABNORMAL /HPF
BARBITURATES UR QL SCN: ABNORMAL
BASOPHILS # BLD AUTO: 0.03 X10*3/UL (ref 0–0.1)
BASOPHILS NFR BLD AUTO: 0.5 %
BENZODIAZ UR QL SCN: ABNORMAL
BILIRUB SERPL-MCNC: 0.3 MG/DL (ref 0–1.2)
BILIRUB UR STRIP.AUTO-MCNC: NEGATIVE MG/DL
BUN SERPL-MCNC: 10 MG/DL (ref 6–23)
BZE UR QL SCN: ABNORMAL
CALCIUM SERPL-MCNC: 8.3 MG/DL (ref 8.6–10.3)
CANNABINOIDS UR QL SCN: ABNORMAL
CARDIAC TROPONIN I PNL SERPL HS: 9 NG/L (ref 0–13)
CHLORIDE SERPL-SCNC: 99 MMOL/L (ref 98–107)
CO2 SERPL-SCNC: 27 MMOL/L (ref 21–32)
COLOR UR: YELLOW
CREAT SERPL-MCNC: 0.58 MG/DL (ref 0.5–1.05)
EGFRCR SERPLBLD CKD-EPI 2021: >90 ML/MIN/1.73M*2
EOSINOPHIL # BLD AUTO: 0.01 X10*3/UL (ref 0–0.4)
EOSINOPHIL NFR BLD AUTO: 0.2 %
ERYTHROCYTE [DISTWIDTH] IN BLOOD BY AUTOMATED COUNT: 15.9 % (ref 11.5–14.5)
FENTANYL+NORFENTANYL UR QL SCN: ABNORMAL
FLUAV RNA RESP QL NAA+PROBE: NOT DETECTED
FLUBV RNA RESP QL NAA+PROBE: NOT DETECTED
GLUCOSE BLD MANUAL STRIP-MCNC: 125 MG/DL (ref 74–99)
GLUCOSE BLD MANUAL STRIP-MCNC: 156 MG/DL (ref 74–99)
GLUCOSE BLD MANUAL STRIP-MCNC: 159 MG/DL (ref 74–99)
GLUCOSE SERPL-MCNC: 150 MG/DL (ref 74–99)
GLUCOSE UR STRIP.AUTO-MCNC: NORMAL MG/DL
HCT VFR BLD AUTO: 32.6 % (ref 36–46)
HGB BLD-MCNC: 10.3 G/DL (ref 12–16)
HYALINE CASTS #/AREA URNS AUTO: ABNORMAL /LPF
IMM GRANULOCYTES # BLD AUTO: 0.03 X10*3/UL (ref 0–0.5)
IMM GRANULOCYTES NFR BLD AUTO: 0.5 % (ref 0–0.9)
INR PPP: 1.1 (ref 0.9–1.2)
KETONES UR STRIP.AUTO-MCNC: NEGATIVE MG/DL
LEUKOCYTE ESTERASE UR QL STRIP.AUTO: NEGATIVE
LYMPHOCYTES # BLD AUTO: 0.48 X10*3/UL (ref 0.8–3)
LYMPHOCYTES NFR BLD AUTO: 7.3 %
MCH RBC QN AUTO: 29.3 PG (ref 26–34)
MCHC RBC AUTO-ENTMCNC: 31.6 G/DL (ref 32–36)
MCV RBC AUTO: 93 FL (ref 80–100)
METHADONE UR QL SCN: ABNORMAL
MONOCYTES # BLD AUTO: 0.39 X10*3/UL (ref 0.05–0.8)
MONOCYTES NFR BLD AUTO: 6 %
MUCOUS THREADS #/AREA URNS AUTO: ABNORMAL /LPF
NEUTROPHILS # BLD AUTO: 5.61 X10*3/UL (ref 1.6–5.5)
NEUTROPHILS NFR BLD AUTO: 85.5 %
NITRITE UR QL STRIP.AUTO: NEGATIVE
NRBC BLD-RTO: 0 /100 WBCS (ref 0–0)
OPIATES UR QL SCN: ABNORMAL
OXYCODONE+OXYMORPHONE UR QL SCN: ABNORMAL
PCP UR QL SCN: ABNORMAL
PH UR STRIP.AUTO: 6 [PH]
PLATELET # BLD AUTO: 215 X10*3/UL (ref 150–450)
POTASSIUM SERPL-SCNC: 3.5 MMOL/L (ref 3.5–5.3)
PROT SERPL-MCNC: 6.1 G/DL (ref 6.4–8.2)
PROT UR STRIP.AUTO-MCNC: ABNORMAL MG/DL
PROTHROMBIN TIME: 11.1 SECONDS (ref 9.3–12.7)
RBC # BLD AUTO: 3.51 X10*6/UL (ref 4–5.2)
RBC # UR STRIP.AUTO: NEGATIVE /UL
RBC #/AREA URNS AUTO: ABNORMAL /HPF
SARS-COV-2 RNA RESP QL NAA+PROBE: NOT DETECTED
SODIUM SERPL-SCNC: 135 MMOL/L (ref 136–145)
SP GR UR STRIP.AUTO: 1.03
UROBILINOGEN UR STRIP.AUTO-MCNC: NORMAL MG/DL
WBC # BLD AUTO: 6.6 X10*3/UL (ref 4.4–11.3)
WBC #/AREA URNS AUTO: ABNORMAL /HPF

## 2024-10-04 PROCEDURE — 70450 CT HEAD/BRAIN W/O DYE: CPT | Performed by: RADIOLOGY

## 2024-10-04 PROCEDURE — 36415 COLL VENOUS BLD VENIPUNCTURE: CPT

## 2024-10-04 PROCEDURE — 85025 COMPLETE CBC W/AUTO DIFF WBC: CPT

## 2024-10-04 PROCEDURE — 81001 URINALYSIS AUTO W/SCOPE: CPT

## 2024-10-04 PROCEDURE — 85730 THROMBOPLASTIN TIME PARTIAL: CPT

## 2024-10-04 PROCEDURE — 80307 DRUG TEST PRSMV CHEM ANLYZR: CPT

## 2024-10-04 PROCEDURE — 99291 CRITICAL CARE FIRST HOUR: CPT

## 2024-10-04 PROCEDURE — 70450 CT HEAD/BRAIN W/O DYE: CPT

## 2024-10-04 PROCEDURE — 70496 CT ANGIOGRAPHY HEAD: CPT | Performed by: RADIOLOGY

## 2024-10-04 PROCEDURE — 82947 ASSAY GLUCOSE BLOOD QUANT: CPT

## 2024-10-04 PROCEDURE — 1200000002 HC GENERAL ROOM WITH TELEMETRY DAILY

## 2024-10-04 PROCEDURE — 80053 COMPREHEN METABOLIC PANEL: CPT

## 2024-10-04 PROCEDURE — 2500000004 HC RX 250 GENERAL PHARMACY W/ HCPCS (ALT 636 FOR OP/ED): Performed by: INTERNAL MEDICINE

## 2024-10-04 PROCEDURE — 85610 PROTHROMBIN TIME: CPT

## 2024-10-04 PROCEDURE — 70498 CT ANGIOGRAPHY NECK: CPT

## 2024-10-04 PROCEDURE — 84484 ASSAY OF TROPONIN QUANT: CPT

## 2024-10-04 PROCEDURE — 70498 CT ANGIOGRAPHY NECK: CPT | Performed by: RADIOLOGY

## 2024-10-04 PROCEDURE — 2550000001 HC RX 255 CONTRASTS

## 2024-10-04 PROCEDURE — 93005 ELECTROCARDIOGRAM TRACING: CPT

## 2024-10-04 PROCEDURE — 87086 URINE CULTURE/COLONY COUNT: CPT | Mod: WESLAB | Performed by: INTERNAL MEDICINE

## 2024-10-04 PROCEDURE — S4991 NICOTINE PATCH NONLEGEND: HCPCS | Performed by: INTERNAL MEDICINE

## 2024-10-04 PROCEDURE — 2500000002 HC RX 250 W HCPCS SELF ADMINISTERED DRUGS (ALT 637 FOR MEDICARE OP, ALT 636 FOR OP/ED): Performed by: INTERNAL MEDICINE

## 2024-10-04 PROCEDURE — 93010 ELECTROCARDIOGRAM REPORT: CPT | Performed by: INTERNAL MEDICINE

## 2024-10-04 PROCEDURE — 87636 SARSCOV2 & INF A&B AMP PRB: CPT

## 2024-10-04 RX ORDER — MEPROBAMATE 400 MG/1
400 TABLET ORAL 4 TIMES DAILY PRN
Status: DISCONTINUED | OUTPATIENT
Start: 2024-10-04 | End: 2024-10-10 | Stop reason: HOSPADM

## 2024-10-04 RX ORDER — ACETAMINOPHEN 160 MG/5ML
650 SOLUTION ORAL EVERY 4 HOURS PRN
Status: DISCONTINUED | OUTPATIENT
Start: 2024-10-04 | End: 2024-10-10 | Stop reason: HOSPADM

## 2024-10-04 RX ORDER — POLYETHYLENE GLYCOL 3350 17 G/17G
17 POWDER, FOR SOLUTION ORAL DAILY PRN
Status: DISCONTINUED | OUTPATIENT
Start: 2024-10-04 | End: 2024-10-10 | Stop reason: HOSPADM

## 2024-10-04 RX ORDER — BUTALBITAL, ACETAMINOPHEN AND CAFFEINE 50; 325; 40 MG/1; MG/1; MG/1
1 TABLET ORAL EVERY 6 HOURS PRN
Status: DISCONTINUED | OUTPATIENT
Start: 2024-10-04 | End: 2024-10-10 | Stop reason: HOSPADM

## 2024-10-04 RX ORDER — ACETAMINOPHEN 650 MG/1
650 SUPPOSITORY RECTAL EVERY 4 HOURS PRN
Status: DISCONTINUED | OUTPATIENT
Start: 2024-10-04 | End: 2024-10-10 | Stop reason: HOSPADM

## 2024-10-04 RX ORDER — HYDROXYZINE HYDROCHLORIDE 10 MG/5ML
10 SYRUP ORAL EVERY 8 HOURS PRN
Status: DISCONTINUED | OUTPATIENT
Start: 2024-10-04 | End: 2024-10-10 | Stop reason: HOSPADM

## 2024-10-04 RX ORDER — ACETAMINOPHEN AND CODEINE PHOSPHATE 300; 60 MG/1; MG/1
1 TABLET ORAL DAILY PRN
Status: DISCONTINUED | OUTPATIENT
Start: 2024-10-04 | End: 2024-10-10 | Stop reason: HOSPADM

## 2024-10-04 RX ORDER — ACETAMINOPHEN 325 MG/1
650 TABLET ORAL EVERY 4 HOURS PRN
Status: DISCONTINUED | OUTPATIENT
Start: 2024-10-04 | End: 2024-10-10 | Stop reason: HOSPADM

## 2024-10-04 RX ORDER — FERROUS SULFATE 325(65) MG
65 TABLET ORAL DAILY
Status: DISCONTINUED | OUTPATIENT
Start: 2024-10-04 | End: 2024-10-10 | Stop reason: HOSPADM

## 2024-10-04 RX ORDER — LACTULOSE 10 G/15ML
20 SOLUTION ORAL DAILY
Status: DISCONTINUED | OUTPATIENT
Start: 2024-10-04 | End: 2024-10-09

## 2024-10-04 RX ORDER — GUAIFENESIN/DEXTROMETHORPHAN 100-10MG/5
5 SYRUP ORAL EVERY 4 HOURS PRN
Status: DISCONTINUED | OUTPATIENT
Start: 2024-10-04 | End: 2024-10-10 | Stop reason: HOSPADM

## 2024-10-04 RX ORDER — AMLODIPINE BESYLATE 10 MG/1
10 TABLET ORAL NIGHTLY
Status: DISCONTINUED | OUTPATIENT
Start: 2024-10-04 | End: 2024-10-10 | Stop reason: HOSPADM

## 2024-10-04 RX ORDER — HEPARIN SODIUM 5000 [USP'U]/ML
5000 INJECTION, SOLUTION INTRAVENOUS; SUBCUTANEOUS EVERY 8 HOURS SCHEDULED
Status: DISCONTINUED | OUTPATIENT
Start: 2024-10-04 | End: 2024-10-05

## 2024-10-04 RX ORDER — LIDOCAINE HYDROCHLORIDE 20 MG/ML
1 SOLUTION OROPHARYNGEAL 4 TIMES DAILY PRN
Status: DISCONTINUED | OUTPATIENT
Start: 2024-10-04 | End: 2024-10-10 | Stop reason: HOSPADM

## 2024-10-04 RX ORDER — BACLOFEN 10 MG/1
10 TABLET ORAL 3 TIMES DAILY
Status: DISCONTINUED | OUTPATIENT
Start: 2024-10-04 | End: 2024-10-10 | Stop reason: HOSPADM

## 2024-10-04 RX ORDER — GUAIFENESIN 600 MG/1
600 TABLET, EXTENDED RELEASE ORAL EVERY 12 HOURS PRN
Status: DISCONTINUED | OUTPATIENT
Start: 2024-10-04 | End: 2024-10-10 | Stop reason: HOSPADM

## 2024-10-04 RX ORDER — LOSARTAN POTASSIUM 50 MG/1
50 TABLET ORAL DAILY
Status: DISCONTINUED | OUTPATIENT
Start: 2024-10-04 | End: 2024-10-10 | Stop reason: HOSPADM

## 2024-10-04 RX ORDER — IBUPROFEN 200 MG
1 TABLET ORAL DAILY
Status: DISCONTINUED | OUTPATIENT
Start: 2024-10-04 | End: 2024-10-10 | Stop reason: HOSPADM

## 2024-10-04 RX ORDER — SUCRALFATE 1 G/10ML
1 SUSPENSION ORAL EVERY 6 HOURS SCHEDULED
Status: DISCONTINUED | OUTPATIENT
Start: 2024-10-04 | End: 2024-10-10 | Stop reason: HOSPADM

## 2024-10-04 RX ORDER — HYDROXYZINE HYDROCHLORIDE 10 MG/1
10 TABLET, FILM COATED ORAL EVERY 6 HOURS PRN
Status: DISCONTINUED | OUTPATIENT
Start: 2024-10-04 | End: 2024-10-10 | Stop reason: HOSPADM

## 2024-10-04 RX ORDER — ASCORBIC ACID 500 MG
500 TABLET ORAL DAILY
Status: DISCONTINUED | OUTPATIENT
Start: 2024-10-04 | End: 2024-10-10 | Stop reason: HOSPADM

## 2024-10-04 RX ORDER — PANTOPRAZOLE SODIUM 40 MG/1
40 TABLET, DELAYED RELEASE ORAL 2 TIMES DAILY
Status: DISCONTINUED | OUTPATIENT
Start: 2024-10-04 | End: 2024-10-06

## 2024-10-04 SDOH — HEALTH STABILITY: MENTAL HEALTH
HOW OFTEN DO YOU NEED TO HAVE SOMEONE HELP YOU WHEN YOU READ INSTRUCTIONS, PAMPHLETS, OR OTHER WRITTEN MATERIAL FROM YOUR DOCTOR OR PHARMACY?: PATIENT UNABLE TO RESPOND

## 2024-10-04 SDOH — ECONOMIC STABILITY: TRANSPORTATION INSECURITY
IN THE PAST 12 MONTHS, HAS THE LACK OF TRANSPORTATION KEPT YOU FROM MEDICAL APPOINTMENTS OR FROM GETTING MEDICATIONS?: PATIENT UNABLE TO ANSWER

## 2024-10-04 SDOH — HEALTH STABILITY: MENTAL HEALTH
DO YOU FEEL STRESS - TENSE, RESTLESS, NERVOUS, OR ANXIOUS, OR UNABLE TO SLEEP AT NIGHT BECAUSE YOUR MIND IS TROUBLED ALL THE TIME - THESE DAYS?: PATIENT UNABLE TO ANSWER

## 2024-10-04 SDOH — SOCIAL STABILITY: SOCIAL INSECURITY
WITHIN THE LAST YEAR, HAVE YOU BEEN HUMILIATED OR EMOTIONALLY ABUSED IN OTHER WAYS BY YOUR PARTNER OR EX-PARTNER?: PATIENT UNABLE TO ANSWER

## 2024-10-04 SDOH — SOCIAL STABILITY: SOCIAL NETWORK: ARE YOU MARRIED, WIDOWED, DIVORCED, SEPARATED, NEVER MARRIED, OR LIVING WITH A PARTNER?: PATIENT UNABLE TO ANSWER

## 2024-10-04 SDOH — ECONOMIC STABILITY: HOUSING INSECURITY: AT ANY TIME IN THE PAST 12 MONTHS, WERE YOU HOMELESS OR LIVING IN A SHELTER (INCLUDING NOW)?: PATIENT UNABLE TO ANSWER

## 2024-10-04 SDOH — HEALTH STABILITY: PHYSICAL HEALTH: ON AVERAGE, HOW MANY MINUTES DO YOU ENGAGE IN EXERCISE AT THIS LEVEL?: 0 MIN

## 2024-10-04 SDOH — SOCIAL STABILITY: SOCIAL NETWORK: HOW OFTEN DO YOU ATTEND CHURCH OR RELIGIOUS SERVICES?: PATIENT UNABLE TO ANSWER

## 2024-10-04 SDOH — ECONOMIC STABILITY: INCOME INSECURITY
IN THE PAST 12 MONTHS, HAS THE ELECTRIC, GAS, OIL, OR WATER COMPANY THREATENED TO SHUT OFF SERVICE IN YOUR HOME?: PATIENT UNABLE TO ANSWER

## 2024-10-04 SDOH — ECONOMIC STABILITY: FOOD INSECURITY
WITHIN THE PAST 12 MONTHS, YOU WORRIED THAT YOUR FOOD WOULD RUN OUT BEFORE YOU GOT MONEY TO BUY MORE.: PATIENT UNABLE TO ANSWER

## 2024-10-04 SDOH — SOCIAL STABILITY: SOCIAL NETWORK
DO YOU BELONG TO ANY CLUBS OR ORGANIZATIONS SUCH AS CHURCH GROUPS, UNIONS, FRATERNAL OR ATHLETIC GROUPS, OR SCHOOL GROUPS?: PATIENT UNABLE TO ANSWER

## 2024-10-04 SDOH — HEALTH STABILITY: PHYSICAL HEALTH: ON AVERAGE, HOW MANY DAYS PER WEEK DO YOU ENGAGE IN MODERATE TO STRENUOUS EXERCISE (LIKE A BRISK WALK)?: 0 DAYS

## 2024-10-04 SDOH — ECONOMIC STABILITY: FOOD INSECURITY
WITHIN THE PAST 12 MONTHS, THE FOOD YOU BOUGHT JUST DIDN'T LAST AND YOU DIDN'T HAVE MONEY TO GET MORE.: PATIENT UNABLE TO ANSWER

## 2024-10-04 SDOH — SOCIAL STABILITY: SOCIAL INSECURITY: WITHIN THE LAST YEAR, HAVE YOU BEEN AFRAID OF YOUR PARTNER OR EX-PARTNER?: PATIENT UNABLE TO ANSWER

## 2024-10-04 SDOH — HEALTH STABILITY: MENTAL HEALTH
STRESS IS WHEN SOMEONE FEELS TENSE, NERVOUS, ANXIOUS, OR CAN'T SLEEP AT NIGHT BECAUSE THEIR MIND IS TROUBLED. HOW STRESSED ARE YOU?: PATIENT UNABLE TO ANSWER

## 2024-10-04 SDOH — SOCIAL STABILITY: SOCIAL NETWORK: IN A TYPICAL WEEK, HOW MANY TIMES DO YOU TALK ON THE PHONE WITH FAMILY, FRIENDS, OR NEIGHBORS?: PATIENT UNABLE TO ANSWER

## 2024-10-04 SDOH — HEALTH STABILITY: MENTAL HEALTH: HOW OFTEN DO YOU HAVE 6 OR MORE DRINKS ON ONE OCCASION?: PATIENT UNABLE TO ANSWER

## 2024-10-04 SDOH — SOCIAL STABILITY: SOCIAL NETWORK: HOW OFTEN DO YOU ATTENT MEETINGS OF THE CLUB OR ORGANIZATION YOU BELONG TO?: PATIENT UNABLE TO ANSWER

## 2024-10-04 SDOH — ECONOMIC STABILITY: HOUSING INSECURITY
IN THE LAST 12 MONTHS, WAS THERE A TIME WHEN YOU WERE NOT ABLE TO PAY THE MORTGAGE OR RENT ON TIME?: PATIENT UNABLE TO ANSWER

## 2024-10-04 SDOH — HEALTH STABILITY: MENTAL HEALTH: HOW MANY STANDARD DRINKS CONTAINING ALCOHOL DO YOU HAVE ON A TYPICAL DAY?: PATIENT UNABLE TO ANSWER

## 2024-10-04 SDOH — SOCIAL STABILITY: SOCIAL NETWORK: HOW OFTEN DO YOU ATTEND MEETINGS OF THE CLUBS OR ORGANIZATIONS YOU BELONG TO?: PATIENT UNABLE TO ANSWER

## 2024-10-04 SDOH — ECONOMIC STABILITY: FOOD INSECURITY
HOW HARD IS IT FOR YOU TO PAY FOR THE VERY BASICS LIKE FOOD, HOUSING, MEDICAL CARE, AND HEATING?: PATIENT UNABLE TO ANSWER

## 2024-10-04 SDOH — SOCIAL STABILITY: SOCIAL NETWORK: HOW OFTEN DO YOU GET TOGETHER WITH FRIENDS OR RELATIVES?: PATIENT UNABLE TO ANSWER

## 2024-10-04 SDOH — SOCIAL STABILITY: SOCIAL INSECURITY
WITHIN THE LAST YEAR, HAVE YOU BEEN RAPED OR FORCED TO HAVE ANY KIND OF SEXUAL ACTIVITY BY YOUR PARTNER OR EX-PARTNER?: PATIENT UNABLE TO ANSWER

## 2024-10-04 SDOH — SOCIAL STABILITY: SOCIAL INSECURITY
WITHIN THE LAST YEAR, HAVE TO BEEN RAPED OR FORCED TO HAVE ANY KIND OF SEXUAL ACTIVITY BY YOUR PARTNER OR EX-PARTNER?: PATIENT UNABLE TO ANSWER

## 2024-10-04 SDOH — ECONOMIC STABILITY: INCOME INSECURITY
IN THE PAST 12 MONTHS HAS THE ELECTRIC, GAS, OIL, OR WATER COMPANY THREATENED TO SHUT OFF SERVICES IN YOUR HOME?: PATIENT UNABLE TO ANSWER

## 2024-10-04 SDOH — HEALTH STABILITY: MENTAL HEALTH: HOW MANY DRINKS CONTAINING ALCOHOL DO YOU HAVE ON A TYPICAL DAY WHEN YOU ARE DRINKING?: PATIENT UNABLE TO ANSWER

## 2024-10-04 SDOH — SOCIAL STABILITY: SOCIAL INSECURITY: ARE YOU MARRIED, WIDOWED, DIVORCED, SEPARATED, NEVER MARRIED, OR LIVING WITH A PARTNER?: PATIENT UNABLE TO ANSWER

## 2024-10-04 SDOH — HEALTH STABILITY: MENTAL HEALTH: HOW OFTEN DO YOU HAVE A DRINK CONTAINING ALCOHOL?: PATIENT UNABLE TO ANSWER

## 2024-10-04 SDOH — ECONOMIC STABILITY: FOOD INSECURITY
WITHIN THE PAST 12 MONTHS, YOU WORRIED THAT YOUR FOOD WOULD RUN OUT BEFORE YOU GOT THE MONEY TO BUY MORE.: PATIENT UNABLE TO ANSWER

## 2024-10-04 SDOH — SOCIAL STABILITY: SOCIAL NETWORK
DO YOU BELONG TO ANY CLUBS OR ORGANIZATIONS SUCH AS CHURCH GROUPS UNIONS, FRATERNAL OR ATHLETIC GROUPS, OR SCHOOL GROUPS?: PATIENT UNABLE TO ANSWER

## 2024-10-04 SDOH — HEALTH STABILITY: MENTAL HEALTH: HOW OFTEN DO YOU HAVE SIX OR MORE DRINKS ON ONE OCCASION?: PATIENT UNABLE TO ANSWER

## 2024-10-04 SDOH — ECONOMIC STABILITY: TRANSPORTATION INSECURITY
IN THE PAST 12 MONTHS, HAS LACK OF TRANSPORTATION KEPT YOU FROM MEDICAL APPOINTMENTS OR FROM GETTING MEDICATIONS?: PATIENT UNABLE TO ANSWER

## 2024-10-04 SDOH — SOCIAL STABILITY: SOCIAL INSECURITY
WITHIN THE LAST YEAR, HAVE YOU BEEN KICKED, HIT, SLAPPED, OR OTHERWISE PHYSICALLY HURT BY YOUR PARTNER OR EX-PARTNER?: PATIENT UNABLE TO ANSWER

## 2024-10-04 SDOH — ECONOMIC STABILITY: TRANSPORTATION INSECURITY
IN THE PAST 12 MONTHS, HAS LACK OF TRANSPORTATION KEPT YOU FROM MEETINGS, WORK, OR FROM GETTING THINGS NEEDED FOR DAILY LIVING?: PATIENT UNABLE TO ANSWER

## 2024-10-04 ASSESSMENT — ACTIVITIES OF DAILY LIVING (ADL)
ADEQUATE_TO_COMPLETE_ADL: NO
LACK_OF_TRANSPORTATION: PATIENT UNABLE TO ANSWER
PATIENT'S MEMORY ADEQUATE TO SAFELY COMPLETE DAILY ACTIVITIES?: NO
WALKS IN HOME: UNABLE TO ASSESS
JUDGMENT_ADEQUATE_SAFELY_COMPLETE_DAILY_ACTIVITIES: NO
TOILETING: UNABLE TO ASSESS
BATHING: UNABLE TO ASSESS
HEARING - LEFT EAR: UNABLE TO ASSESS
FEEDING YOURSELF: UNABLE TO ASSESS
GROOMING: UNABLE TO ASSESS
DRESSING YOURSELF: UNABLE TO ASSESS
HEARING - RIGHT EAR: UNABLE TO ASSESS

## 2024-10-04 ASSESSMENT — COGNITIVE AND FUNCTIONAL STATUS - GENERAL
PERSONAL GROOMING: TOTAL
DRESSING REGULAR LOWER BODY CLOTHING: TOTAL
DAILY ACTIVITIY SCORE: 7
DRESSING REGULAR UPPER BODY CLOTHING: TOTAL
MOBILITY SCORE: 8
MOVING FROM LYING ON BACK TO SITTING ON SIDE OF FLAT BED WITH BEDRAILS: A LITTLE
WALKING IN HOSPITAL ROOM: TOTAL
HELP NEEDED FOR BATHING: TOTAL
MOVING TO AND FROM BED TO CHAIR: TOTAL
STANDING UP FROM CHAIR USING ARMS: TOTAL
EATING MEALS: A LOT
TOILETING: TOTAL
TURNING FROM BACK TO SIDE WHILE IN FLAT BAD: TOTAL
CLIMB 3 TO 5 STEPS WITH RAILING: TOTAL

## 2024-10-04 ASSESSMENT — COLUMBIA-SUICIDE SEVERITY RATING SCALE - C-SSRS
2. HAVE YOU ACTUALLY HAD ANY THOUGHTS OF KILLING YOURSELF?: NO
1. IN THE PAST MONTH, HAVE YOU WISHED YOU WERE DEAD OR WISHED YOU COULD GO TO SLEEP AND NOT WAKE UP?: NO
6. HAVE YOU EVER DONE ANYTHING, STARTED TO DO ANYTHING, OR PREPARED TO DO ANYTHING TO END YOUR LIFE?: NO

## 2024-10-04 ASSESSMENT — PAIN - FUNCTIONAL ASSESSMENT
PAIN_FUNCTIONAL_ASSESSMENT: UNABLE TO SELF-REPORT
PAIN_FUNCTIONAL_ASSESSMENT: 0-10

## 2024-10-04 ASSESSMENT — LIFESTYLE VARIABLES
AUDIT-C TOTAL SCORE: -1
SKIP TO QUESTIONS 9-10: 0

## 2024-10-04 ASSESSMENT — PAIN SCALES - GENERAL: PAINLEVEL_OUTOF10: 0 - NO PAIN

## 2024-10-04 NOTE — ED PROVIDER NOTES
HPI   Chief Complaint   Patient presents with    Altered Mental Status     Pt to ED via EMS for change in mental status. Pt's LKW was yesterday as she lives with her daughter. This morning her daughter went to feed her baby food. Pt stated she wanted to go to floor bc couch was too hot. She started vomiting and daughter called EMS. . Normally A&Ox4 and walks. Presents in ED contracted, groaning, not answering questions. History of ulcers.       HPI  Patient is a 79-year-old female who presents to ED for altered mental status.  Patient's daughter called EMS.  Patient's baseline is ANO x 4.  Patient is minimally responsive to painful stimuli on arrival.  She is moving all 4 extremities.  She is not following commands or answering questions.  Vital signs are stable.  Code brain attack initiated.  Last known well is yesterday.  Patient lives with daughter.  Patient was discharged on 09/06 for similar symptoms of unresponsiveness and urinary tract infection.  Patient was found by daughter laying next to couch unresponsive.  Patient signed out AMA from a rehab prior to that.  Patient returned to her baseline and was discharged at that time.      Patient History   Past Medical History:   Diagnosis Date    Other cervical disc degeneration, unspecified cervical region     Degeneration of cervical intervertebral disc    Other conditions influencing health status     Bulging Disc (L3 - L4)    Other conditions influencing health status     A Fall    Other conditions influencing health status     Bulging Disc (C4 - C5)    Other conditions influencing health status     Bulging Disc (C6 - C7)    Other conditions influencing health status     Bulging Disc (C5 - C6)    Other conditions influencing health status     Lumbar Spondylolisthesis    Other conditions influencing health status     Rotator Cuff Tendon Tear    Other intervertebral disc degeneration, lumbar region     L4-L5 disc bulge    Other intervertebral disc  displacement, lumbar region     Disc displacement, lumbar    Other intervertebral disc displacement, lumbosacral region     Displacement of lumbosacral intervertebral disc    Personal history of other diseases of the musculoskeletal system and connective tissue     History of bursitis    Personal history of other diseases of the musculoskeletal system and connective tissue     History of fibromyositis    Personal history of other specified conditions     History of headache    Spinal stenosis, lumbar region without neurogenic claudication     Lumbar canal stenosis    Sprain of ligaments of lumbar spine, initial encounter     Low back sprain     History reviewed. No pertinent surgical history.  No family history on file.  Social History     Tobacco Use    Smoking status: Every Day     Current packs/day: 1.00     Average packs/day: 1 pack/day for 64.8 years (64.8 ttl pk-yrs)     Types: Cigarettes     Start date: 1/1/1960    Smokeless tobacco: Not on file   Substance Use Topics    Alcohol use: Not on file    Drug use: Not on file       Physical Exam   ED Triage Vitals   Temp Heart Rate Resp BP   10/04/24 1245 10/04/24 1245 10/04/24 1245 10/04/24 1245   35.9 °C (96.6 °F) 84 14 (!) 181/72      SpO2 Temp Source Heart Rate Source Patient Position   10/04/24 1245 10/04/24 1245 10/04/24 1325 10/04/24 1245   95 % Rectal Monitor Lying      BP Location FiO2 (%)     10/04/24 1245 --     Left arm        Physical Exam  Vitals reviewed.   Constitutional:       General: She is not in acute distress.     Appearance: She is underweight. She is ill-appearing.   HENT:      Head: Normocephalic and atraumatic.   Eyes:      Extraocular Movements: Extraocular movements intact.      Pupils:      Right eye: Pupil is sluggish. Pupil is round and reactive.      Left eye: Pupil is sluggish. Pupil is round and reactive.   Cardiovascular:      Rate and Rhythm: Normal rate and regular rhythm.      Heart sounds: Normal heart sounds. No murmur  heard.  Pulmonary:      Effort: Pulmonary effort is normal. No respiratory distress.      Breath sounds: Normal breath sounds.   Abdominal:      General: There is no distension.      Palpations: Abdomen is soft.   Musculoskeletal:         General: Normal range of motion.      Cervical back: Normal range of motion and neck supple.   Skin:     General: Skin is warm and dry.   Neurological:      Mental Status: She is lethargic.      Cranial Nerves: No facial asymmetry.      Motor: Weakness present.      Coordination: Coordination abnormal.   Psychiatric:         Speech: She is noncommunicative.           ED Course & MDM   ED Course as of 10/04/24 1802   Fri Oct 04, 2024   1623 EKG personally interpreted by me performed at 1312  Normal sinus rhythm with left axis deviation ventricular rate 74 normal axis and intervals no acute ischemic changes [EF]      ED Course User Index  [EF] Helene Rivera,          Diagnoses as of 10/04/24 1802   Disorientation                 No data recorded     Keyla Coma Scale Score: 4 (10/04/24 1316 : Charlene Lucio RN)       NIH Stroke Scale: 6 (10/04/24 1536 : Odin Villa PA-C)                   Medical Decision Making  Parts of this chart have been completed using voice recognition software. Please excuse any errors of transcription.  My thought process and reason for plan has been formulated from the time that I saw the patient until the time of disposition and is not specific to one specific moment during their visit and furthermore my MDM encompasses this entire chart and not only this text box.    HPI:   Detailed above.    Exam:   A medically appropriate exam performed, outlined above, given the known history and presentation.    History obtained from:   EMS    EKG/Cardiac monitor:   Interpreted by attending physician, see their note for ED course for more detail.    Social Determinants of Health considered during this visit:   Housing, Family or social support    Medications  given during visit:  Medications   acetaminophen-codeine (Tylenol w/ Codeine #4) 300-60 mg per tablet 1 tablet (has no administration in time range)   amLODIPine (Norvasc) tablet 10 mg (has no administration in time range)   ascorbic acid (Vitamin C) tablet 500 mg (has no administration in time range)   baclofen (Lioresal) tablet 10 mg (has no administration in time range)   butalbital-acetaminophen-caff -40 mg per tablet 1 tablet (has no administration in time range)   ferrous sulfate (325 mg ferrous sulfate) tablet 325 mg (has no administration in time range)   lactulose 20 gram/30 mL oral solution 20 g (has no administration in time range)   lidocaine (Xylocaine) 2 % mouth solution 1 mL (has no administration in time range)   losartan (Cozaar) tablet 50 mg (has no administration in time range)   lubricating eye drops ophthalmic solution 1 drop (has no administration in time range)   meprobamate (Equanil) tablet 400 mg (has no administration in time range)   nicotine (Nicoderm CQ) 21 mg/24 hr patch 1 patch (has no administration in time range)   pantoprazole (ProtoNix) EC tablet 40 mg (has no administration in time range)   sucralfate (Carafate) suspension 1 g (has no administration in time range)   acetaminophen (Tylenol) tablet 650 mg (has no administration in time range)     Or   acetaminophen (Tylenol) oral liquid 650 mg (has no administration in time range)     Or   acetaminophen (Tylenol) suppository 650 mg (has no administration in time range)   polyethylene glycol (Glycolax, Miralax) packet 17 g (has no administration in time range)   benzocaine-menthol (Cepastat Sore Throat) lozenge 1 lozenge (has no administration in time range)   dextromethorphan-guaifenesin (Robitussin DM)  mg/5 mL oral liquid 5 mL (has no administration in time range)   guaiFENesin (Mucinex) 12 hr tablet 600 mg (has no administration in time range)   heparin (porcine) injection 5,000 Units (has no administration in time  range)   iohexol (OMNIPaque) 350 mg iodine/mL solution 75 mL (75 mL intravenous Given 10/4/24 1304)        Diagnostic/tests:  Labs Reviewed   CBC WITH AUTO DIFFERENTIAL - Abnormal       Result Value    WBC 6.6      nRBC 0.0      RBC 3.51 (*)     Hemoglobin 10.3 (*)     Hematocrit 32.6 (*)     MCV 93      MCH 29.3      MCHC 31.6 (*)     RDW 15.9 (*)     Platelets 215      Neutrophils % 85.5      Immature Granulocytes %, Automated 0.5      Lymphocytes % 7.3      Monocytes % 6.0      Eosinophils % 0.2      Basophils % 0.5      Neutrophils Absolute 5.61 (*)     Immature Granulocytes Absolute, Automated 0.03      Lymphocytes Absolute 0.48 (*)     Monocytes Absolute 0.39      Eosinophils Absolute 0.01      Basophils Absolute 0.03     COMPREHENSIVE METABOLIC PANEL - Abnormal    Glucose 150 (*)     Sodium 135 (*)     Potassium 3.5      Chloride 99      Bicarbonate 27      Anion Gap 13      Urea Nitrogen 10      Creatinine 0.58      eGFR >90      Calcium 8.3 (*)     Albumin 3.3 (*)     Alkaline Phosphatase 89      Total Protein 6.1 (*)     AST 22      Bilirubin, Total 0.3      ALT 13     APTT - Abnormal    aPTT 20.7 (*)    DRUG SCREEN,URINE - Abnormal    Amphetamine Screen, Urine Presumptive Negative      Barbiturate Screen, Urine Presumptive Positive (*)     Benzodiazepines Screen, Urine Presumptive Negative      Cannabinoid Screen, Urine Presumptive Positive (*)     Cocaine Metabolite Screen, Urine Presumptive Negative      Fentanyl Screen, Urine Presumptive Negative      Opiate Screen, Urine Presumptive Negative      Oxycodone Screen, Urine Presumptive Negative      PCP Screen, Urine Presumptive Negative      Methadone Screen, Urine Presumptive Negative      Narrative:     Drug screen results are presumptive and should not be used to assess   compliance with prescribed medication. Contact the performing [facility] laboratory   to add-on definitive confirmatory testing if clinically indicated.    Toxicology screening results are  reported qualitatively. The concentration must   be greater than or equal to the cutoff to be reported as positive. The concentration   at which the screening test can detect an individual drug or metabolite varies.   The absence of expected drug(s) and/or drug metabolite(s) may indicate non-compliance,   inappropriate timing of specimen collection relative to drug administration, poor drug   absorption, diluted/adulterated urine, or limitations of testing. For medical purposes   only; not valid for forensic use.    Interpretive questions should be directed to the laboratory medical directors.   URINALYSIS WITH REFLEX MICROSCOPIC - Abnormal    Color, Urine Yellow      Appearance, Urine Turbid (*)     Specific Gravity, Urine 1.030      pH, Urine 6.0      Protein, Urine 70 (1+) (*)     Glucose, Urine Normal      Blood, Urine NEGATIVE      Ketones, Urine NEGATIVE      Bilirubin, Urine NEGATIVE      Urobilinogen, Urine Normal      Nitrite, Urine NEGATIVE      Leukocyte Esterase, Urine NEGATIVE     MICROSCOPIC ONLY, URINE - Abnormal    WBC, Urine 1-5      RBC, Urine 1-2      Bacteria, Urine 1+ (*)     Mucus, Urine 4+      Hyaline Casts, Urine 3+ (*)    POCT GLUCOSE - Abnormal    POCT Glucose 159 (*)    TROPONIN I, HIGH SENSITIVITY - Normal    Troponin I, High Sensitivity 9      Narrative:     Less than 99th percentile of normal range cutoff-  Female and children under 18 years old <14 ng/L; Male <21 ng/L: Negative  Repeat testing should be performed if clinically indicated.     Female and children under 18 years old 14-50 ng/L; Male 21-50 ng/L:  Consistent with possible cardiac damage and possible increased clinical   risk. Serial measurements may help to assess extent of myocardial damage.     >50 ng/L: Consistent with cardiac damage, increased clinical risk and  myocardial infarction. Serial measurements may help assess extent of   myocardial damage.      NOTE: Children less than 1 year old may have higher baseline  troponin   levels and results should be interpreted in conjunction with the overall   clinical context.     NOTE: Troponin I testing is performed using a different   testing methodology at [facility] than at [facility]. Direct result comparisons should only   be made within the same method.   PROTIME-INR - Normal    Protime 11.1      INR 1.1      Narrative:     INR Therapeutic Range: 2.0-3.5   SARS-COV-2 PCR - Normal    Coronavirus 2019, PCR Not Detected      Narrative:     This assay has received FDA Emergency Use Authorization (EUA) and is only authorized for the duration of time that circumstances exist to justify the authorization of the emergency use of in vitro diagnostic tests for the detection of SARS-CoV-2 virus and/or diagnosis of COVID-19 infection under section 564(b)(1) of the Act, 21 U.S.C. 360bbb-3(b)(1). This assay is an in vitro diagnostic nucleic acid amplification test for the qualitative detection of SARS-CoV-2 from nasopharyngeal specimens and has been validated for use at [facility]. Negative results do not preclude COVID-19 infections and should not be used as the sole basis for diagnosis, treatment, or other management decisions.     INFLUENZA A AND B PCR - Normal    Flu A Result Not Detected      Flu B Result Not Detected      Narrative:     This assay is an in vitro diagnostic multiplex nucleic acid amplification test for the detection and discrimination of Influenza A & B from nasopharyngeal specimens, and has been validated for use at [facility]. Negative results do not preclude Influenza A/B infections, and should not be used as the sole basis for diagnosis, treatment, or other management decisions. If Influenza A/B and RSV PCR results are negative, testing for Parainfluenza virus, Adenovirus and Metapneumovirus is routinely performed for [facility] pediatric oncology and intensive care inpatients, and is available on  other patients by placing an add-on request.   URINALYSIS WITH REFLEX MICROSCOPIC   COMPREHENSIVE METABOLIC PANEL   CBC   POCT GLUCOSE METER      CT angio head and neck w and wo IV contrast   Final Result   1. There is no measurable stenosis of the cervical vessels and no   measurable stenosis or large branch vessel cutoffs of the   intracranial vessels.   2. Tortuous course of the visualized arteries within the head and   neck without pseudoaneurysm or dissection   3. Dominant left vertebral artery. No evidence of narrowing,   dissection or thrombus in the vertebral or basilar circulation..   4. Spiculated nodule in the posterior right upper lobe is concerning   for malignancy. Follow-up PET-CT or direct tissue sampling is   recommended as clinically indicated.   5. There is 5 mm of C3 on C4 anterolisthesis.        I personally reviewed the images/study and I agree with the findings   as stated by Gisela Huerta MD (PGY-2). This study was interpreted at   [place], [place].        MACRO:   Critical Finding:  New/enlarging lung nodule suspicious for cancer.   Notification was initiated on 10/4/2024 at 1:41 pm by  Gisela Huerta.   (**-OCF-**) Instructions:        Signed by: Chad Jay 10/4/2024 1:49 PM   Dictation workstation:   [id number]      CT brain attack head wo IV contrast   Final Result   Stable age related changes without acute intracranial process.        The emergency department physician was notified about the findings by   phone 1310 hours.        Signed by: Drew Ames 10/4/2024 1:13 PM   Dictation workstation:   [id number]         As I have deemed necessary from their history, physical and studies, I have considered the following diagnoses:    STROKE  SUBARACHNOID HEMORRHAGE  SUBDURAL HEMATOMA  EPIDURAL HEMATOMA  INTRACRANIAL HEMORRHAGE  SEPSIS   DKA  ANEMIA  MENINGITIS  CNS TUMOR OR ABSCESS  HYPOGLYCEMIA    Critical Care:  Upon my evaluation,  this patient had a high probability of imminent or life-threatening deterioration due to altered mental status, which required my direct attention, intervention, and personal management.    I have personally provided 30 minutes of non-concurrent critical care time exclusive of time spent on separately billable procedures. Time includes review of laboratory data, radiology results, discussion with consultants, and monitoring for potential decompensation. Interventions were performed as documented above.    MDM Summary:  Patient is hemodynamically stable and protecting her own airway.  She is now sitting up in bed and has become more alert but still disoriented.  Her lab workup is mostly unremarkable today.  Her drug screen is positive for barbiturates and cannabis.  CT head is negative.  CTA head and neck is also negative.  I spoke with the admitting physician Dr. Jon. We thoroughly discussed the patient's medical history, physical exam, laboratory and imaging studies, as well as, emergency department course. This also includes the patient's comorbidities, surgical history, medications, and living situation. Based upon that discussion, we've decided to admit for further management and evaluation of their AMS. The patient will be admitted to stepdown unit as determined by myself and the admitting physician. The admitting physician has been fully informed regarding this case and agreed to place the admission order for the patient. Please see the admission H&P authored by Dr. Jon for further detail.      Procedure  Procedures     Odin Villa PA-C  10/04/24 1915

## 2024-10-05 LAB
ALBUMIN SERPL BCP-MCNC: 4.1 G/DL (ref 3.4–5)
ALP SERPL-CCNC: 96 U/L (ref 33–136)
ALT SERPL W P-5'-P-CCNC: 16 U/L (ref 7–45)
ANION GAP SERPL CALCULATED.3IONS-SCNC: 17 MMOL/L (ref 10–20)
AST SERPL W P-5'-P-CCNC: 24 U/L (ref 9–39)
BILIRUB SERPL-MCNC: 0.4 MG/DL (ref 0–1.2)
BUN SERPL-MCNC: 18 MG/DL (ref 6–23)
CALCIUM SERPL-MCNC: 9.5 MG/DL (ref 8.6–10.3)
CHLORIDE SERPL-SCNC: 97 MMOL/L (ref 98–107)
CO2 SERPL-SCNC: 29 MMOL/L (ref 21–32)
CREAT SERPL-MCNC: 0.55 MG/DL (ref 0.5–1.05)
EGFRCR SERPLBLD CKD-EPI 2021: >90 ML/MIN/1.73M*2
ERYTHROCYTE [DISTWIDTH] IN BLOOD BY AUTOMATED COUNT: 15.9 % (ref 11.5–14.5)
GLUCOSE BLD MANUAL STRIP-MCNC: 104 MG/DL (ref 74–99)
GLUCOSE BLD MANUAL STRIP-MCNC: 112 MG/DL (ref 74–99)
GLUCOSE BLD MANUAL STRIP-MCNC: 115 MG/DL (ref 74–99)
GLUCOSE BLD MANUAL STRIP-MCNC: 119 MG/DL (ref 74–99)
GLUCOSE SERPL-MCNC: 107 MG/DL (ref 74–99)
HCT VFR BLD AUTO: 38.5 % (ref 36–46)
HGB BLD-MCNC: 12.6 G/DL (ref 12–16)
MCH RBC QN AUTO: 29.2 PG (ref 26–34)
MCHC RBC AUTO-ENTMCNC: 32.7 G/DL (ref 32–36)
MCV RBC AUTO: 89 FL (ref 80–100)
NRBC BLD-RTO: 0 /100 WBCS (ref 0–0)
PLATELET # BLD AUTO: 348 X10*3/UL (ref 150–450)
POTASSIUM SERPL-SCNC: 3.7 MMOL/L (ref 3.5–5.3)
PROT SERPL-MCNC: 7.4 G/DL (ref 6.4–8.2)
RBC # BLD AUTO: 4.32 X10*6/UL (ref 4–5.2)
SODIUM SERPL-SCNC: 139 MMOL/L (ref 136–145)
WBC # BLD AUTO: 9.9 X10*3/UL (ref 4.4–11.3)

## 2024-10-05 PROCEDURE — 36415 COLL VENOUS BLD VENIPUNCTURE: CPT | Performed by: INTERNAL MEDICINE

## 2024-10-05 PROCEDURE — 97162 PT EVAL MOD COMPLEX 30 MIN: CPT | Mod: GP

## 2024-10-05 PROCEDURE — 97166 OT EVAL MOD COMPLEX 45 MIN: CPT | Mod: GO

## 2024-10-05 PROCEDURE — 2500000004 HC RX 250 GENERAL PHARMACY W/ HCPCS (ALT 636 FOR OP/ED): Performed by: INTERNAL MEDICINE

## 2024-10-05 PROCEDURE — 2500000002 HC RX 250 W HCPCS SELF ADMINISTERED DRUGS (ALT 637 FOR MEDICARE OP, ALT 636 FOR OP/ED): Performed by: INTERNAL MEDICINE

## 2024-10-05 PROCEDURE — 92610 EVALUATE SWALLOWING FUNCTION: CPT | Mod: GN | Performed by: SPEECH-LANGUAGE PATHOLOGIST

## 2024-10-05 PROCEDURE — S4991 NICOTINE PATCH NONLEGEND: HCPCS | Performed by: INTERNAL MEDICINE

## 2024-10-05 PROCEDURE — 1200000002 HC GENERAL ROOM WITH TELEMETRY DAILY

## 2024-10-05 PROCEDURE — 2500000001 HC RX 250 WO HCPCS SELF ADMINISTERED DRUGS (ALT 637 FOR MEDICARE OP): Performed by: INTERNAL MEDICINE

## 2024-10-05 PROCEDURE — 85027 COMPLETE CBC AUTOMATED: CPT | Performed by: INTERNAL MEDICINE

## 2024-10-05 PROCEDURE — 82947 ASSAY GLUCOSE BLOOD QUANT: CPT

## 2024-10-05 PROCEDURE — 80053 COMPREHEN METABOLIC PANEL: CPT | Performed by: INTERNAL MEDICINE

## 2024-10-05 RX ORDER — OLANZAPINE 10 MG/2ML
2.5 INJECTION, POWDER, FOR SOLUTION INTRAMUSCULAR EVERY 6 HOURS PRN
Status: DISCONTINUED | OUTPATIENT
Start: 2024-10-05 | End: 2024-10-10 | Stop reason: HOSPADM

## 2024-10-05 RX ORDER — PANTOPRAZOLE SODIUM 40 MG/10ML
40 INJECTION, POWDER, LYOPHILIZED, FOR SOLUTION INTRAVENOUS 2 TIMES DAILY
Status: DISCONTINUED | OUTPATIENT
Start: 2024-10-05 | End: 2024-10-06

## 2024-10-05 SDOH — SOCIAL STABILITY: SOCIAL INSECURITY: ABUSE: ADULT

## 2024-10-05 SDOH — SOCIAL STABILITY: SOCIAL INSECURITY: ARE THERE ANY APPARENT SIGNS OF INJURIES/BEHAVIORS THAT COULD BE RELATED TO ABUSE/NEGLECT?: UNABLE TO ASSESS

## 2024-10-05 SDOH — HEALTH STABILITY: PHYSICAL HEALTH
ON AVERAGE, HOW MANY DAYS PER WEEK DO YOU ENGAGE IN MODERATE TO STRENUOUS EXERCISE (LIKE A BRISK WALK)?: PATIENT UNABLE TO ANSWER

## 2024-10-05 SDOH — HEALTH STABILITY: PHYSICAL HEALTH: ON AVERAGE, HOW MANY MINUTES DO YOU ENGAGE IN EXERCISE AT THIS LEVEL?: PATIENT UNABLE TO ANSWER

## 2024-10-05 SDOH — SOCIAL STABILITY: SOCIAL INSECURITY: HAVE YOU HAD ANY THOUGHTS OF HARMING ANYONE ELSE?: UNABLE TO ASSESS

## 2024-10-05 SDOH — SOCIAL STABILITY: SOCIAL INSECURITY: HAVE YOU HAD THOUGHTS OF HARMING ANYONE ELSE?: UNABLE TO ASSESS

## 2024-10-05 SDOH — SOCIAL STABILITY: SOCIAL INSECURITY: DO YOU FEEL UNSAFE GOING BACK TO THE PLACE WHERE YOU ARE LIVING?: UNABLE TO ASSESS

## 2024-10-05 SDOH — SOCIAL STABILITY: SOCIAL INSECURITY: DO YOU FEEL ANYONE HAS EXPLOITED OR TAKEN ADVANTAGE OF YOU FINANCIALLY OR OF YOUR PERSONAL PROPERTY?: UNABLE TO ASSESS

## 2024-10-05 SDOH — SOCIAL STABILITY: SOCIAL INSECURITY: DOES ANYONE TRY TO KEEP YOU FROM HAVING/CONTACTING OTHER FRIENDS OR DOING THINGS OUTSIDE YOUR HOME?: UNABLE TO ASSESS

## 2024-10-05 SDOH — SOCIAL STABILITY: SOCIAL INSECURITY: ARE YOU OR HAVE YOU BEEN THREATENED OR ABUSED PHYSICALLY, EMOTIONALLY, OR SEXUALLY BY ANYONE?: UNABLE TO ASSESS

## 2024-10-05 SDOH — SOCIAL STABILITY: SOCIAL INSECURITY: HAS ANYONE EVER THREATENED TO HURT YOUR FAMILY OR YOUR PETS?: UNABLE TO ASSESS

## 2024-10-05 SDOH — SOCIAL STABILITY: SOCIAL INSECURITY: WERE YOU ABLE TO COMPLETE ALL THE BEHAVIORAL HEALTH SCREENINGS?: NO

## 2024-10-05 ASSESSMENT — COGNITIVE AND FUNCTIONAL STATUS - GENERAL
EATING MEALS: A LITTLE
STANDING UP FROM CHAIR USING ARMS: A LITTLE
CLIMB 3 TO 5 STEPS WITH RAILING: A LITTLE
WALKING IN HOSPITAL ROOM: TOTAL
DAILY ACTIVITIY SCORE: 18
EATING MEALS: A LITTLE
DRESSING REGULAR LOWER BODY CLOTHING: TOTAL
MOVING TO AND FROM BED TO CHAIR: A LITTLE
DRESSING REGULAR UPPER BODY CLOTHING: A LITTLE
MOVING TO AND FROM BED TO CHAIR: A LITTLE
MOVING TO AND FROM BED TO CHAIR: A LOT
STANDING UP FROM CHAIR USING ARMS: TOTAL
PATIENT BASELINE BEDBOUND: NO
MOBILITY SCORE: 17
MOVING FROM LYING ON BACK TO SITTING ON SIDE OF FLAT BED WITH BEDRAILS: A LITTLE
MOVING TO AND FROM BED TO CHAIR: TOTAL
DAILY ACTIVITIY SCORE: 18
HELP NEEDED FOR BATHING: A LOT
PERSONAL GROOMING: A LITTLE
STANDING UP FROM CHAIR USING ARMS: A LITTLE
PERSONAL GROOMING: A LOT
CLIMB 3 TO 5 STEPS WITH RAILING: TOTAL
TURNING FROM BACK TO SIDE WHILE IN FLAT BAD: A LOT
MOBILITY SCORE: 20
WALKING IN HOSPITAL ROOM: A LITTLE
DRESSING REGULAR UPPER BODY CLOTHING: A LOT
MOVING FROM LYING ON BACK TO SITTING ON SIDE OF FLAT BED WITH BEDRAILS: A LITTLE
DRESSING REGULAR LOWER BODY CLOTHING: A LITTLE
TOILETING: TOTAL
HELP NEEDED FOR BATHING: A LOT
DAILY ACTIVITIY SCORE: 11
STANDING UP FROM CHAIR USING ARMS: A LOT
PERSONAL GROOMING: A LITTLE
CLIMB 3 TO 5 STEPS WITH RAILING: A LOT
EATING MEALS: A LITTLE
DRESSING REGULAR LOWER BODY CLOTHING: A LITTLE
WALKING IN HOSPITAL ROOM: A LITTLE
HELP NEEDED FOR BATHING: A LITTLE
HELP NEEDED FOR BATHING: A LITTLE
DRESSING REGULAR LOWER BODY CLOTHING: A LOT
TOILETING: A LITTLE
PERSONAL GROOMING: A LOT
MOBILITY SCORE: 12
TOILETING: A LOT
TURNING FROM BACK TO SIDE WHILE IN FLAT BAD: A LITTLE
EATING MEALS: A LITTLE
DRESSING REGULAR UPPER BODY CLOTHING: A LOT
MOBILITY SCORE: 8
CLIMB 3 TO 5 STEPS WITH RAILING: TOTAL
TOILETING: A LITTLE
WALKING IN HOSPITAL ROOM: A LOT
TURNING FROM BACK TO SIDE WHILE IN FLAT BAD: A LOT
DAILY ACTIVITIY SCORE: 13
MOVING FROM LYING ON BACK TO SITTING ON SIDE OF FLAT BED WITH BEDRAILS: A LOT
DRESSING REGULAR UPPER BODY CLOTHING: A LITTLE

## 2024-10-05 ASSESSMENT — PAIN SCALES - GENERAL
PAINLEVEL_OUTOF10: 0 - NO PAIN

## 2024-10-05 ASSESSMENT — LIFESTYLE VARIABLES
HOW OFTEN DO YOU HAVE A DRINK CONTAINING ALCOHOL: PATIENT UNABLE TO ANSWER
SUBSTANCE_ABUSE_PAST_12_MONTHS: YES
SKIP TO QUESTIONS 9-10: 0
HOW OFTEN DO YOU HAVE 6 OR MORE DRINKS ON ONE OCCASION: PATIENT UNABLE TO ANSWER
AUDIT-C TOTAL SCORE: -1
AUDIT-C TOTAL SCORE: -1
HOW MANY STANDARD DRINKS CONTAINING ALCOHOL DO YOU HAVE ON A TYPICAL DAY: PATIENT UNABLE TO ANSWER

## 2024-10-05 ASSESSMENT — ACTIVITIES OF DAILY LIVING (ADL)
ADL_ASSISTANCE: NEEDS ASSISTANCE
BATHING_ASSISTANCE: MAXIMAL

## 2024-10-05 ASSESSMENT — PAIN - FUNCTIONAL ASSESSMENT
PAIN_FUNCTIONAL_ASSESSMENT: 0-10
PAIN_FUNCTIONAL_ASSESSMENT: 0-10

## 2024-10-05 NOTE — NURSING NOTE
Karly sitting up in bed meds given she told me her birthdate  accurate then is saying 2023 over and over  support given fresh ice water provided call light in reach bed alarm on for safety

## 2024-10-05 NOTE — PROGRESS NOTES
Occupational Therapy    Evaluation    Patient Name: Karly Horton  MRN: [id number]  Department: [facility] E  Room: 07/07-A  Today's Date: 10/5/2024  Time Calculation  Start Time: 0848  Stop Time: 0904  Time Calculation (min): 16 min        Assessment:  OT Assessment: Pt demonstrated deficits in BUE ROM, endurance, balance, and sequencing of tasks. Pt required max Ax2 assist for functional mobility and LB dressing for task initiation, sequencing and balance. Pt would benefit from skilled acute OT to address these deficits and increase functional engagement in BAD.  Prognosis: Fair  Barriers to Discharge: Other (Comment) (cognition)  Evaluation/Treatment Tolerance: Patient limited by fatigue  Medical Staff Made Aware: Yes  End of Session Communication: Bedside nurse  End of Session Patient Position: Up in chair, Alarm on  OT Assessment Results: Decreased ADL status, Decreased upper extremity range of motion, Decreased upper extremity strength, Decreased cognition, Decreased endurance, Decreased functional mobility, Decreased fine motor control, Decreased gross motor control, Decreased IADLs  Prognosis: Fair  Barriers to Discharge: Other (Comment) (cognition)  Evaluation/Treatment Tolerance: Patient limited by fatigue  Medical Staff Made Aware: Yes  Strengths: Housing layout, Support of Caregivers  Barriers to Participation: Comorbidities, Ability to acquire knowledge  Plan:  Treatment Interventions: ADL retraining, UE strengthening/ROM, Cognitive reorientation, Patient/family training, Functional transfer training, Equipment evaluation/education, Compensatory technique education  OT Frequency: 3 times per week  OT Discharge Recommendations: Moderate intensity level of continued care  Equipment Recommended upon Discharge: Bedside commode, Wheeled walker  OT Recommended Transfer Status: Maximum assist, Assist of 2  OT - OK to Discharge: Yes  Treatment Interventions: ADL retraining, UE strengthening/ROM, Cognitive  reorientation, Patient/family training, Functional transfer training, Equipment evaluation/education, Compensatory technique education    Subjective     General:  General  Reason for Referral: ADL impairment; 80 y/o female admit from home for AMS and found on ground by daughter  Referred By: Lupillo Mcneil MD Acknowledge New  Past Medical History Relevant to Rehab: pna, iron deficiency anemia, CHF, UTI  Family/Caregiver Present: No  Co-Treatment: PT  Co-Treatment Reason: maximize patient safety, participation, and optimize patient outcomes  Prior to Session Communication: Bedside nurse  Patient Position Received: Up in chair, Alarm on  Preferred Learning Style: verbal, visual  General Comment: chart reviewed, pt cleared for therapy by nurse, seated in chair upon arrival and agreeable for therapy. Pt confused and poor historian  Precautions:  Medical Precautions: Fall precautions       Objective   Cognition:  Overall Cognitive Status: Impaired at baseline  Orientation Level: Disoriented to time, Disoriented to situation  Following Commands: Follows one step commands with increased time  Cognition Comments: Poor historian; frequent verbal and tactile cues for following commands and sustaining attention; short answer responses.  Insight: Mild  Impulsive: Mildly  Processing Speed: Delayed           Home Living:  Type of Home: Apartment  Lives With: Adult children (Daughter)  Home Adaptive Equipment: Walker rolling or standard  Home Layout: One level  Home Access: Level entry  Bathroom Shower/Tub: Tub/shower unit  Bathroom Toilet: Standard  Bathroom Equipment: Shower chair with back  Home Living Comments:  (per pt report sleeps on couch)  Prior Function:  Level of [place]: Needs assistance with ADLs, Needs assistance with homemaking, Needs assistance with functional transfers  Receives Help From: Family (Daughter)  ADL Assistance: Needs assistance  Homemaking Assistance: Needs assistance  Ambulatory Assistance:  Needs assistance  Prior Function Comments:  (per previous notes pt needs help with bathing and dressing; based on clinical observations pt anticipating pt requires assist at baseline; pt questionable historian.)     ADL:  Eating Assistance: Minimal  Eating Deficit: Increased time to complete (pt tremulous with over and under shooting noted; modamount of spilling.)  Grooming Assistance: Moderate (anticipated)  Bathing Assistance: Maximal (Anticipated)  UE Dressing Assistance: Maximal (Anticipated)  LE Dressing Assistance: Total  LE Dressing Deficit: Verbal cueing, Steadying, Thread RLE into underwear, Thread LLE into underwear, Pull up over hips, Fasteners (required Ax2 to stand)  Toileting Assistance with Device: Total (incontinent)  Toileting Deficit: Incontinent  ADL Comments: verbal and tactile cues provided for sequencing and problem solving  Activity Tolerance:  Endurance: Decreased tolerance for upright activites  Bed Mobility/Transfers: Bed Mobility  Bed Mobility: No (pt in chair upon arrival)    Transfers  Transfer: Yes  Transfer 1  Technique 1: Sit to stand, Stand to sit  Transfer Device 1: Walker  Transfer Level of Assistance 1: Maximum assistance, +2  Trials/Comments 1: max ax2 to stand from standard chair with wheeled walker; verbale and tactile cues for BLE placement, hand placement on walker, and sequencing task. Tactile cues given at hips for weight shift. Retropulsion noted when completing stand to sit transfer; tactile and verbal cues provided for knee flexing; performed sit,.stand 3x. mod a for static standing.    Sitting Balance:  Static Sitting Balance  Static Sitting-Balance Support:  (pt in chair upon arrival)  Standing Balance:  Static Standing Balance  Static Standing-Balance Support: Bilateral upper extremity supported (wheeled walker)  Static Standing-Level of Assistance: Moderate assistance (Ax2 for balance)  Static Standing-Comment/Number of Minutes: less that 2 mins      Vision:Vision -  Basic Assessment  Current Vision: Wears glasses all the time  Sensation:  Sensation Comment: per pt report numbness and tingling everywhere  Strength:  Strength Comments: BUE grossly 3+/5 distally; BL hand 4/5  Perception:  Initiation: Cues to initiate tasks (verbal and tactile)  Coordination:  Movements are Fluid and Coordinated: No  Upper Body Coordination: Delayed rate and innaccuracy of movements   Hand Function:  Gross Grasp: Functional  Coordination:  (Decreased release needing additional time and cueing)  Extremities: RUE   RUE :  (shoulder ROM flex. aprox 90; WFL distally) and LUE   LUE:  (Shoulder ROM flex. approx 90; WFL distally)    Outcome Measures:[facility] Daily Activity  Putting on and taking off regular lower body clothing: Total  Bathing (including washing, rinsing, drying): A lot  Putting on and taking off regular upper body clothing: A lot  Toileting, which includes using toilet, bedpan or urinal: Total  Taking care of personal grooming such as brushing teeth: A lot  Eating Meals: A little  Daily Activity - Total Score: 11        Education Documentation  ADL Training, taught by Gisselle Lundy OT at 10/5/2024 10:13 AM.  Learner: Patient  Readiness: Acceptance  Method: Explanation  Response: Needs Reinforcement  Comment: Pt educated on OT POC    Education Comments  No comments found.        OP EDUCATION:       Goals:  Encounter Problems       Encounter Problems (Active)       ADLs       Patient with complete upper body dressing with minimal assist  level of assistance donning and doffing all UE clothes with PRN adaptive equipment while edge of bed  (Progressing)       Start:  10/05/24    Expected End:  11/05/24            Patient with complete lower body dressing with moderate assist level of assistance donning and doffing all LE clothes  with PRN adaptive equipment while edge of bed  (Progressing)       Start:  10/05/24    Expected End:  11/05/24            Patient will complete daily grooming tasks with  minimal assist  level of assistance and PRN adaptive equipment while edge of bed . (Progressing)       Start:  10/05/24    Expected End:  11/05/24               TRANSFERS       Patient will complete functional transfer to toilet/commode with least restrictive device with minimal assist  level of assistance. (Progressing)       Start:  10/05/24    Expected End:  11/05/24            Patient will complete sit to stand transfer with minimal assist  level of assistance and least restrictive device in order to improve safety and prepare for out of bed mobility. (Progressing)       Start:  10/05/24    Expected End:  11/05/24

## 2024-10-05 NOTE — NURSING NOTE
I did talk with dtr about brief and we do not use them so that we can prevent further breakdown  She insisted that she will bring some from home  per her moms request   Did educate them both about absorb pads vs brief

## 2024-10-05 NOTE — CARE PLAN
The patient's goals for the shift include      The clinical goals for the shift include  being able to pass swallow screening for taking PO medications safely, reduced anxiety and mentation returning to baseline.     Problem: Skin  Goal: Decreased wound size/increased tissue granulation at next dressing change  Outcome: Progressing  Flowsheets (Taken 10/4/2024 2050)  Decreased wound size/increased tissue granulation at next dressing change: Promote sleep for wound healing  Goal: Participates in plan/prevention/treatment measures  Outcome: Progressing  Flowsheets (Taken 10/4/2024 2050)  Participates in plan/prevention/treatment measures:   Elevate heels   Increase activity/out of bed for meals  Goal: Prevent/manage excess moisture  Outcome: Progressing  Flowsheets (Taken 10/4/2024 2050)  Prevent/manage excess moisture: Cleanse incontinence/protect with barrier cream  Goal: Prevent/minimize sheer/friction injuries  Outcome: Progressing  Flowsheets (Taken 10/4/2024 2050)  Prevent/minimize sheer/friction injuries:   HOB 30 degrees or less   Increase activity/out of bed for meals  Goal: Promote/optimize nutrition  Outcome: Progressing  Flowsheets (Taken 10/4/2024 2050)  Promote/optimize nutrition: Consume > 50% meals/supplements  Goal: Promote skin healing  Outcome: Progressing  Flowsheets (Taken 10/4/2024 2050)  Promote skin healing: Assess skin/pad under line(s)/device(s)     Problem: Pain  Goal: Takes deep breaths with improved pain control throughout the shift  Outcome: Progressing  Goal: Turns in bed with improved pain control throughout the shift  Outcome: Progressing  Goal: Walks with improved pain control throughout the shift  Outcome: Progressing  Goal: Performs ADL's with improved pain control throughout shift  Outcome: Progressing  Goal: Participates in PT with improved pain control throughout the shift  Outcome: Progressing  Goal: Free from opioid side effects throughout the shift  Outcome: Progressing  Goal:  Free from acute confusion related to pain meds throughout the shift  Outcome: Progressing

## 2024-10-05 NOTE — NURSING NOTE
"UPDATE 2000: Patient being cleaned up, patient incontinent. Patient able to state name and say she is at a \"hospitals\". Patient keeps repeating \"Karly\".    UPDATE 2109: Patient anxious, spinning around in bed, legs off side of bed, patient confused, shouting, reoriented patient and repositioned and reassured her safety. Dr. Mcneil updated concerning failed swallow examination and the inability to take PO scheduled evening medications.     UPDATE 2132: Spoke with Dr. Jackman via telephone, explained patient anxious, agitated, trying to climb out of bed. Orders to be placed for PRN medications.     UPDATE 2140: Admission history completed based on patient ability to answer, Patient unable to answer questions at this time. Will follow up at later time.     UPDATE 2205: Patient vomited bile, patient bedding changed, repositioned, IV dislodged, new #22 placed in right F/A.    UPDATE 2325 Patient vomited small amount of bile, gown changed    UPDATE 2340: Called Dr. Jackman. Patient failed swallow evaluation, spitting out, PO atarax unable to be administered. New orders for IV medications to be placed.     UPDATE 2357: Called Dr. Mcneil, PO Atarax Syrup ordered, patient unable to take any medication my mouth failing swallow evaluation.     UPDATE 0016: IM Zyprexa ordered by Dr. Mcneil. Patient continue to try and climb out of bed with legs hanging on side, repositioned.     UPDATE 0616 Patient administered Zyprexa at 0016 am, patient slept for 1 hour, awakened shortly after calling out for \"karly\", patient repositioned and a second attempt for swallow eval was completed, patient failed, coughing up yellow phlegm. Educated patient on oral swabs being allowed. Patient continued to call out for \"karly\" her daughter, and \"Water\". Called Lab for urine culture order to be added on from ED sample due to inability to obtain urine. Unable to obtain urine sample over night, patient moving around, pulling at medical equipment, " straight cath attempted with no urine drainage.

## 2024-10-05 NOTE — NURSING NOTE
Karly repostioned for comfort saying she needs to up in the bed   pillow to rt shoulder so she is much more upright   will monitor bed alarm on for safety call light in hand

## 2024-10-05 NOTE — NURSING NOTE
Karly assisted to chair  after fall risk yellow stocking applied set up for breakfast am meds given   Karly with repetitive speech   fresh ice water given  call light in reach chair alarm on for safety

## 2024-10-05 NOTE — NURSING NOTE
BSSR complete Karly Ox4 this am  voices no concerns at this time call light in reach bed alarm on for safety

## 2024-10-05 NOTE — SIGNIFICANT EVENT
Patient at baseline able to walk according to family. Weak, bruises throughout unable to walk at this time safely.

## 2024-10-05 NOTE — H&P
History Of Present Illness  Karly Horton is a 79 y.o. female presenting with with complaint of change in mental status.  Patient was found unresponsive next to the couch at home by her daughter.  The baseline patient is alert, oriented x 4, in the emergency room patient unresponsive to painful stimuli.  Patient is moving all extremities.  An initial workup was done in the emergency room and the patient's toxin was positive for marijuana.  Patient has been admitted to the floor for further management.  Recent patient to the hospital with a similar problem and positive for UTI.  Patient continued antibiotics and was sent to rehab.  But patient left AGAINST MEDICAL ADVICE.  At the time of examination is lying in the bed, comfortable, did not look in acute distress.  Patient was alert, oriented x 3-4.  She was moving all extremities.  There was no focal deficit.  Patient denied any nausea, vomiting, diarrhea, dysuria, hematuria, frequency, odynophagia, dysphagia or recent change in weight or appetite.      Past Medical History  Past Medical History:   Diagnosis Date    Other cervical disc degeneration, unspecified cervical region     Degeneration of cervical intervertebral disc    Other conditions influencing health status     Bulging Disc (L3 - L4)    Other conditions influencing health status     A Fall    Other conditions influencing health status     Bulging Disc (C4 - C5)    Other conditions influencing health status     Bulging Disc (C6 - C7)    Other conditions influencing health status     Bulging Disc (C5 - C6)    Other conditions influencing health status     Lumbar Spondylolisthesis    Other conditions influencing health status     Rotator Cuff Tendon Tear    Other intervertebral disc degeneration, lumbar region     L4-L5 disc bulge    Other intervertebral disc displacement, lumbar region     Disc displacement, lumbar    Other intervertebral disc displacement, lumbosacral region     Displacement of  lumbosacral intervertebral disc    Personal history of other diseases of the musculoskeletal system and connective tissue     History of bursitis    Personal history of other diseases of the musculoskeletal system and connective tissue     History of fibromyositis    Personal history of other specified conditions     History of headache    Spinal stenosis, lumbar region without neurogenic claudication     Lumbar canal stenosis    Sprain of ligaments of lumbar spine, initial encounter     Low back sprain       Surgical History  Appendectomy, cataract surgery, closure of gastro retrocolic fistula, tonsillectomy, total abdominal hysterectomy with/without removal of tube and ovary     Social History  She reports that she has been smoking cigarettes. She started smoking about 64 years ago. She has a 64.8 pack-year smoking history. She does not have any smokeless tobacco history on file. No history on file for alcohol use and drug use.    Family History  Mother had cancer, father had hypertension, stroke, coronary artery disease and diabetes.  Allergies  Lactose, [place], Compazine, Cymbalta, Demerol, Dilaudid, fentanyl, Flexeril, Imitrex, morphine, gabapentin, NSAID, Percocet, paroxetine, Savella, Skelaxin, Soma, Topamax, Vicodin and Vioxx    Review of Systems  I reviewed all systems reviewed as above otherwise is negative.  Physical Exam  HEENT:  Head externally atraumatic, no pallor, no icterus, extraocular movements intact, pupils reactive to light, oral mucosa moist and throat clear.  Neck:  Supple, no JVP, no palpable adenopathy or thyromegaly.  No carotid bruit.  Chest:  Clear to auscultation and resonant.  Heart:  Regular rate and rhythm, no murmur or gallop could be appreciated.  Abdomen:  Soft, nontender, bowel sounds present, normoactive, no palpable hepatosplenomegaly.  Extremities:  No edema, pulses present, no cyanosis or clubbing.  CNS:  Patient alert, oriented to time, place and person.  Power 5/5 all  over and deep tendon reflexes symmetrical, cranial nerves 2-12 grossly intact.  Skin:  No active rash.  Musculoskeletal:  No joint swelling or erythema, range of movement normal.  Last Recorded Vitals  Heart Rate:  []   Temp:  [35.9 °C (96.6 °F)-36.7 °C (98.1 °F)]   Resp:  [8-48]   BP: (123-188)/()   Height:  [152.4 cm (5')]   Weight:  [30.9 kg (68 lb 2 oz)-31.7 kg (69 lb 14.2 oz)]   SpO2:  [78 %-100 %]       Relevant Results        Results for orders placed or performed during the hospital encounter of 10/04/24 (from the past 24 hour(s))   Sars-CoV-2 PCR   Result Value Ref Range    Coronavirus 2019, PCR Not Detected Not Detected   Influenza A, and B PCR   Result Value Ref Range    Flu A Result Not Detected Not Detected    Flu B Result Not Detected Not Detected   POCT GLUCOSE   Result Value Ref Range    POCT Glucose 156 (H) 74 - 99 mg/dL   POCT GLUCOSE   Result Value Ref Range    POCT Glucose 125 (H) 74 - 99 mg/dL   POCT GLUCOSE   Result Value Ref Range    POCT Glucose 115 (H) 74 - 99 mg/dL   Comprehensive metabolic panel   Result Value Ref Range    Glucose 107 (H) 74 - 99 mg/dL    Sodium 139 136 - 145 mmol/L    Potassium 3.7 3.5 - 5.3 mmol/L    Chloride 97 (L) 98 - 107 mmol/L    Bicarbonate 29 21 - 32 mmol/L    Anion Gap 17 10 - 20 mmol/L    Urea Nitrogen 18 6 - 23 mg/dL    Creatinine 0.55 0.50 - 1.05 mg/dL    eGFR >90 >60 mL/min/1.73m*2    Calcium 9.5 8.6 - 10.3 mg/dL    Albumin 4.1 3.4 - 5.0 g/dL    Alkaline Phosphatase 96 33 - 136 U/L    Total Protein 7.4 6.4 - 8.2 g/dL    AST 24 9 - 39 U/L    Bilirubin, Total 0.4 0.0 - 1.2 mg/dL    ALT 16 7 - 45 U/L   CBC   Result Value Ref Range    WBC 9.9 4.4 - 11.3 x10*3/uL    nRBC 0.0 0.0 - 0.0 /100 WBCs    RBC 4.32 4.00 - 5.20 x10*6/uL    Hemoglobin 12.6 12.0 - 16.0 g/dL    Hematocrit 38.5 36.0 - 46.0 %    MCV 89 80 - 100 fL    MCH 29.2 26.0 - 34.0 pg    MCHC 32.7 32.0 - 36.0 g/dL    RDW 15.9 (H) 11.5 - 14.5 %    Platelets 348 150 - 450 x10*3/uL   POCT GLUCOSE    Result Value Ref Range    POCT Glucose 112 (H) 74 - 99 mg/dL   POCT GLUCOSE   Result Value Ref Range    POCT Glucose 104 (H) 74 - 99 mg/dL     Prior to Admission medications    Medication Sig Start Date End Date Taking? Authorizing Provider   acetaminophen-codeine (Tylenol w/ Codeine #4) 300-60 mg tablet Take 1 tablet by mouth once daily as needed for severe pain (7 - 10).    Historical Provider, MD   amLODIPine (Norvasc) 10 mg tablet Take 1 tablet (10 mg) by mouth once daily at bedtime. Hold for systolic blood pressure < 110 mmhg. 9/6/24   LANDEN Jang   ascorbic acid (Vitamin C) 500 mg tablet Take 1 tablet (500 mg) by mouth once daily. 5/22/24   LANDEN Jang   baclofen (Lioresal) 10 mg tablet Take 1 tablet (10 mg) by mouth 3 times a day.    Historical Provider, MD   butalbital-acetaminophen-caff -40 mg tablet Take 1 tablet by mouth every 6 hours if needed for headaches. 11/10/23   Historical Provider, MD   ferrous sulfate, 325 mg ferrous sulfate, tablet Take 1 tablet by mouth once daily with breakfast. 5/22/24   LANDEN Jang   lactulose 10 gram/15 mL solution Take 30 mL (20 g) by mouth once daily. Take once every 18 hours as directed. 9/15/23   Historical Provider, MD   lidocaine (Xylocaine) 2 % jelly Apply 1 Application topically once daily. migraines 9/18/13   Historical Provider, MD   lidocaine 4 % patch Place 1 patch over 12 hours on the skin once daily. Remove & discard patch within 12 hours or as directed by MD. 9/7/24   LANDEN Jang   losartan (Cozaar) 50 mg tablet Take 1 tablet (50 mg) by mouth once daily. Hold for systolic blood pressure < 110 mmhg. 9/7/24   LANDEN Jang   lubricating eye drops ophthalmic solution Administer 1 drop into both eyes if needed for dry eyes. 5/22/24   LANDEN Jang   meprobamate (Equanil) 400 mg tablet Take 1 tablet (400 mg) by mouth 4 times a day as needed (pain). 5/14/24 11/10/24   Historical Provider, MD   mupirocin (Bactroban) 2 % ointment Apply 1 Application topically 2 times a day. 5/11/24 5/11/25  Historical Provider, MD   nicotine (Nicoderm CQ) 21 mg/24 hr patch Place 1 patch over 24 hours on the skin once daily. 5/23/24   LANDEN Jang   pantoprazole (ProtoNix) 40 mg EC tablet Take 1 tablet (40 mg) by mouth 2 times a day. Do not crush, chew, or split. Continue twice daily x 8 weeks then continue once daily. 5/22/24   LANDEN Jang   sucralfate (Carafate) 100 mg/mL suspension Take 10 mL (1 g) by mouth every 6 hours. 5/22/24   LANDEN Jang       Current Facility-Administered Medications:     acetaminophen (Tylenol) tablet 650 mg, 650 mg, oral, q4h PRN **OR** acetaminophen (Tylenol) oral liquid 650 mg, 650 mg, oral, q4h PRN **OR** acetaminophen (Tylenol) suppository 650 mg, 650 mg, rectal, q4h PRN, Lupillo Mcneil MD    acetaminophen-codeine (Tylenol w/ Codeine #4) 300-60 mg per tablet 1 tablet, 1 tablet, oral, Daily PRN, Lupillo Mcneil MD    amLODIPine (Norvasc) tablet 10 mg, 10 mg, oral, Nightly, Lupillo Mcneil MD    ascorbic acid (Vitamin C) tablet 500 mg, 500 mg, oral, Daily, Lupillo Mcneil MD, 500 mg at 10/05/24 0818    baclofen (Lioresal) tablet 10 mg, 10 mg, oral, TID, Lupillo Mcneil MD, 10 mg at 10/05/24 1436    benzocaine-menthol (Cepastat Sore Throat) lozenge 1 lozenge, 1 lozenge, Mouth/Throat, q2h PRN, Lupillo Mcneil MD    butalbital-acetaminophen-caff -40 mg per tablet 1 tablet, 1 tablet, oral, q6h PRN, Lupillo Mcneil MD    dextromethorphan-guaifenesin (Robitussin DM)  mg/5 mL oral liquid 5 mL, 5 mL, oral, q4h PRN, Lupillo Mcneil MD    ferrous sulfate (325 mg ferrous sulfate) tablet 325 mg, 65 mg of iron, oral, Daily, Lupillo Mcneil MD, 325 mg at 10/05/24 0818    guaiFENesin (Mucinex) 12 hr tablet 600 mg, 600 mg, oral, q12h PRN, Lupillo Mcneil MD    heparin (porcine) injection  5,000 Units, 5,000 Units, subcutaneous, q8h [place], Lupillo Mcneil MD, 5,000 Units at 10/05/24 1436    hydrOXYzine (Atarax) syrup 10 mg, 10 mg, oral, q8h PRN, Lupillo Mcneil MD    hydrOXYzine HCL (Atarax) tablet 10 mg, 10 mg, oral, q6h PRN, Lupillo Mcneil MD    lactulose 20 gram/30 mL oral solution 20 g, 20 g, oral, Daily, Lupillo Mcneil MD, 20 g at 10/05/24 0818    lidocaine (Xylocaine) 2 % mouth solution 1 mL, 1 mL, Topical, 4x daily PRN, Lupillo Mcneil MD    losartan (Cozaar) tablet 50 mg, 50 mg, oral, Daily, Lupillo Mcneil MD, 50 mg at 10/05/24 0818    lubricating eye drops ophthalmic solution 1 drop, 1 drop, Both Eyes, PRN, Lupillo Mcneil MD    meprobamate (Equanil) tablet 400 mg, 400 mg, oral, 4x daily PRN, Lupillo Mcneil MD    nicotine (Nicoderm CQ) 21 mg/24 hr patch 1 patch, 1 patch, transdermal, Daily, Lupillo Mcneil MD, 1 patch at 10/05/24 0818    OLANZapine (ZyPREXA) injection 2.5 mg, 2.5 mg, intramuscular, q6h PRN, Lupillo Mcneil MD, 2.5 mg at 10/05/24 0024    pantoprazole (ProtoNix) EC tablet 40 mg, 40 mg, oral, BID, Lupillo Mcneil MD, 40 mg at 10/05/24 0818    polyethylene glycol (Glycolax, Miralax) packet 17 g, 17 g, oral, Daily PRN, Lupillo Mcneil MD    sucralfate (Carafate) suspension 1 g, 1 g, oral, q6h [place], Lupillo Mcneil MD, 1 g at 10/05/24 1254  CT angio head and neck w and wo IV contrast    Result Date: 10/4/2024  Interpreted By:  Chad Jay,  and Bradley Higgins STUDY: CT ANGIO HEAD AND NECK W AND WO IV CONTRAST;  10/4/2024 1:16 pm   INDICATION: Signs/Symptoms:AMS.     COMPARISON: None.   ACCESSION NUMBER(S): [id number]   ORDERING CLINICIAN: LATOYA ALFARO   TECHNIQUE: Unenhanced CT images of the head were obtained. Subsequently, 75 ML of Omnipaque 350 was administered intravenously and axial images of the head and neck were acquired.  Coronal, sagittal, and 3-D reconstructions were provided for review.   FINDINGS:   CTA HEAD  FINDINGS:   Anterior circulation: There is mild calcific atherosclerosis of the bilateral cavernous internal carotid artery with less than 50% stenosis. There is a tortuous course of the bilateral cavernous internal artery.   Posterior circulation: Bilateral intracranial vertebral arteries, vertebrobasilar junction, basilar artery and proximal posterior cerebral arteries are tortuous. There is no significant stenosis or large branch vessel cutoffs.   CTA NECK FINDINGS:   Right carotid vessels: The common carotid artery is normal. The carotid bifurcation is normal. There is a tortuous course of the right cervical internal carotid artery without pseudoaneurysm or dissection.   Left carotid vessels: The common carotid artery is normal. There is calcific atherosclerosis of the left carotid bulb. There is tortuosity of the left internal carotid artery without evidence of pseudoaneurysm or dissection..   Vertebral vessels: Vertebral artery origin is are normal. The left vertebral artery is dominant. The visualized segments of the cervical vertebral arteries are normal in caliber. There is bilateral tortuosity of the vertebral arteries. The basilar artery is on primarily from a left vertebral artery with a minor branch arising from the right vertebral artery.   Within the visualized portions of the lung, there is a 2.1 x 1.9 cm spiculated nodule within the posterior right upper lobe with a branch of the pulmonary artery visualized passing through it.   There is 5 mm of C3 on C4 anterolisthesis.       1. There is no measurable stenosis of the cervical vessels and no measurable stenosis or large branch vessel cutoffs of the intracranial vessels. 2. Tortuous course of the visualized arteries within the head and neck without pseudoaneurysm or dissection 3. Dominant left vertebral artery. No evidence of narrowing, dissection or thrombus in the vertebral or basilar circulation.. 4. Spiculated nodule in the posterior right upper  lobe is concerning for malignancy. Follow-up PET-CT or direct tissue sampling is recommended as clinically indicated. 5. There is 5 mm of C3 on C4 anterolisthesis.   I personally reviewed the images/study and I agree with the findings as stated by Gisela Huerta MD (PGY-2). This study was interpreted at [place], [place].   MACRO: Critical Finding:  New/enlarging lung nodule suspicious for cancer. Notification was initiated on 10/4/2024 at 1:41 pm by  Gisela Huerta. (**-[facility]-**) Instructions:   Signed by: Chad Jay 10/4/2024 1:49 PM Dictation workstation:   [id number]    CT brain attack head wo IV contrast    Result Date: 10/4/2024  Interpreted By:  Drew Ames, STUDY: CT BRAIN ATTACK HEAD WO IV CONTRAST; 10/4/2024 1:03 pm   INDICATION: Signs/Symptoms:Stroke Evaluation   COMPARISON: 09/02/2024   ACCESSION NUMBER(S): [id number]   ORDERING CLINICIAN: LATOYA ALFARO   TECHNIQUE: Axial images were obtained through the brain. No IV contrast was administered.   All CT examinations are performed with one or more of the following dose reduction techniques: Automated Exposure Control, adjustment of mA and/or kV according to patient size, or use of iterative reconstruction techniques.   FINDINGS: The ventricles are enlarged but midline in position, with proportional prominence of the sulci, due to atrophy. Patchy periventricular hypodensities are present suggestive of small vessel ischemic white matter disease. There is no intracranial hemorrhage. No mass or mass effect is seen. The osseus structures are unremarkable. Minute air-fluid level is noted in the right maxillary sinus.       Stable age related changes without acute intracranial process.   The emergency department physician was notified about the findings by phone 1310 hours.   Signed by: Drew Ames 10/4/2024 1:13 PM Dictation workstation:   [id number]    Susceptibility data from last 90 days.  Collected  Specimen Info Organism Amoxicillin/Clavulanate Ampicillin Ampicillin/Sulbactam Cefazolin Cefazolin (uncomplicated UTIs only) Ciprofloxacin Gentamicin Nitrofurantoin Piperacillin/Tazobactam Trimethoprim/Sulfamethoxazole   09/03/24 Blood culture from Peripheral Venipuncture Staphylococcus epidermidis             09/02/24 Urine from Clean Catch/Voided Escherichia coli  I  R  R  S  S  R  S  S  S  S        Assessment/Plan   Assessment & Plan  Disorientation  Altered mental status  Toxic Metabolic encephalopathy  Generalized weakness  Chronic constipation  Anxiety  Neuropathy  Peptic ulcer disease  Vitamin D deficiency marijuana abuse    Plan: Continue current medication.  Supportive care begin physical therapy and Occupational Therapy.  Metathesis and BMP.  Patient has most likely toxic and metabolic encephalopathy due to marijuana.  Anyhow neurology consulted and obtained.  Give physical therapy and Occupational Therapy.  We will treat DVT, fall, aspiration, decubitus, DVT precaution.  Possible discharge home per PT/OT recommendations after cleared by neurology.      Lupillo Mcneil MD

## 2024-10-05 NOTE — CONSULTS
Inpatient consult to Neurology  Consult performed by: Adriana Hung MD  Consult ordered by: Lupillo Mcneil MD          History Of Present Illness  Karly Horton is a 79 y.o. female presenting with shortness of breath.  Neurology is consulted for complaints of weakness.  The patient is unable to provide a detailed history.  She is a concerned about her tachypnea and shortness of breath.  She is stiff on her left side but states that this is her baseline.  According to admission notes the patient came in with altered mental status and was found unresponsive on the couch by her daughter.     Medical History  She has a past medical history of Other cervical disc degeneration, unspecified cervical region, Other conditions influencing health status, Other conditions influencing health status, Other conditions influencing health status, Other conditions influencing health status, Other conditions influencing health status, Other conditions influencing health status, Other conditions influencing health status, Other intervertebral disc degeneration, lumbar region, Other intervertebral disc displacement, lumbar region, Other intervertebral disc displacement, lumbosacral region, Personal history of other diseases of the musculoskeletal system and connective tissue, Personal history of other diseases of the musculoskeletal system and connective tissue, Personal history of other specified conditions, Spinal stenosis, lumbar region without neurogenic claudication, and Sprain of ligaments of lumbar spine, initial encounter.    Surgical History  She has a past surgical history that includes Appendectomy; Eye surgery; Tonsillectomy; and Hysterectomy.     Social History  She reports that she has been smoking cigarettes. She started smoking about 64 years ago. She has a 64.8 pack-year smoking history. She does not have any smokeless tobacco history on file. No history on file for alcohol use and drug use.      Allergies  Lactose    Medications  Medications Prior to Admission   Medication Sig Dispense Refill Last Dose    acetaminophen-codeine (Tylenol w/ Codeine #4) 300-60 mg tablet Take 1 tablet by mouth once daily as needed for severe pain (7 - 10).       amLODIPine (Norvasc) 10 mg tablet Take 1 tablet (10 mg) by mouth once daily at bedtime. Hold for systolic blood pressure < 110 mmhg. 30 tablet 0     ascorbic acid (Vitamin C) 500 mg tablet Take 1 tablet (500 mg) by mouth once daily.       baclofen (Lioresal) 10 mg tablet Take 1 tablet (10 mg) by mouth 3 times a day.       butalbital-acetaminophen-caff -40 mg tablet Take 1 tablet by mouth every 6 hours if needed for headaches.       ferrous sulfate, 325 mg ferrous sulfate, tablet Take 1 tablet by mouth once daily with breakfast.       lactulose 10 gram/15 mL solution Take 30 mL (20 g) by mouth once daily. Take once every 18 hours as directed.       lidocaine (Xylocaine) 2 % jelly Apply 1 Application topically once daily. migraines       lidocaine 4 % patch Place 1 patch over 12 hours on the skin once daily. Remove & discard patch within 12 hours or as directed by MD. 30 patch 0     losartan (Cozaar) 50 mg tablet Take 1 tablet (50 mg) by mouth once daily. Hold for systolic blood pressure < 110 mmhg. 30 tablet 0     lubricating eye drops ophthalmic solution Administer 1 drop into both eyes if needed for dry eyes.       meprobamate (Equanil) 400 mg tablet Take 1 tablet (400 mg) by mouth 4 times a day as needed (pain).       mupirocin (Bactroban) 2 % ointment Apply 1 Application topically 2 times a day.       nicotine (Nicoderm CQ) 21 mg/24 hr patch Place 1 patch over 24 hours on the skin once daily.       pantoprazole (ProtoNix) 40 mg EC tablet Take 1 tablet (40 mg) by mouth 2 times a day. Do not crush, chew, or split. Continue twice daily x 8 weeks then continue once daily.       sucralfate (Carafate) 100 mg/mL suspension Take 10 mL (1 g) by mouth every 6 hours.         Review of Systems    Scheduled medications  amLODIPine, 10 mg, oral, Nightly  ascorbic acid, 500 mg, oral, Daily  baclofen, 10 mg, oral, TID  ferrous sulfate (325 mg ferrous sulfate), 65 mg of iron, oral, Daily  heparin (porcine), 5,000 Units, subcutaneous, q8h [place]  lactulose, 20 g, oral, Daily  losartan, 50 mg, oral, Daily  nicotine, 1 patch, transdermal, Daily  pantoprazole, 40 mg, oral, BID  sucralfate, 1 g, oral, q6h [place]      Continuous medications     PRN medications  PRN medications: acetaminophen **OR** acetaminophen **OR** acetaminophen, acetaminophen-codeine, benzocaine-menthol, butalbital-acetaminophen-caff, dextromethorphan-guaifenesin, guaiFENesin, hydrOXYzine, hydrOXYzine HCL, lidocaine, lubricating eye drops, meprobamate, OLANZapine, polyethylene glycol    Last Recorded Vitals   Blood pressure 146/73, pulse (!) 113, temperature 36.7 °C (98.1 °F), temperature source Temporal, resp. rate 20, height 1.524 m (5'), weight (!) 30.9 kg (68 lb 2 oz), SpO2 97%.    Heart is RRR, Lungs CTAB  Neurologically, pt is awake and oriented x4.   However her neurologic exam is somewhat limited.  She is diffusely cachectic.  She appears frail.  She has increased tone on her left and reduced function but overall reasonable tone on the right upper and lower extremities.  She has intact sensation bilaterally throughout.  Her face appears grossly symmetric.  2 some degree of baseline confusion she was unable to perform more complex tasks evaluating for coordination and motor processing.    Relevant Results    Results for orders placed or performed during the hospital encounter of 10/04/24 (from the past 96 hour(s))   POCT GLUCOSE   Result Value Ref Range    POCT Glucose 159 (H) 74 - 99 mg/dL   CBC and Auto Differential   Result Value Ref Range    WBC 6.6 4.4 - 11.3 x10*3/uL    nRBC 0.0 0.0 - 0.0 /100 WBCs    RBC 3.51 (L) 4.00 - 5.20 x10*6/uL    Hemoglobin 10.3 (L) 12.0 - 16.0 g/dL    Hematocrit 32.6 (L) 36.0 - 46.0 %     MCV 93 80 - 100 fL    MCH 29.3 26.0 - 34.0 pg    MCHC 31.6 (L) 32.0 - 36.0 g/dL    RDW 15.9 (H) 11.5 - 14.5 %    Platelets 215 150 - 450 x10*3/uL    Neutrophils % 85.5 40.0 - 80.0 %    Immature Granulocytes %, Automated 0.5 0.0 - 0.9 %    Lymphocytes % 7.3 13.0 - 44.0 %    Monocytes % 6.0 2.0 - 10.0 %    Eosinophils % 0.2 0.0 - 6.0 %    Basophils % 0.5 0.0 - 2.0 %    Neutrophils Absolute 5.61 (H) 1.60 - 5.50 x10*3/uL    Immature Granulocytes Absolute, Automated 0.03 0.00 - 0.50 x10*3/uL    Lymphocytes Absolute 0.48 (L) 0.80 - 3.00 x10*3/uL    Monocytes Absolute 0.39 0.05 - 0.80 x10*3/uL    Eosinophils Absolute 0.01 0.00 - 0.40 x10*3/uL    Basophils Absolute 0.03 0.00 - 0.10 x10*3/uL   Comprehensive metabolic panel   Result Value Ref Range    Glucose 150 (H) 74 - 99 mg/dL    Sodium 135 (L) 136 - 145 mmol/L    Potassium 3.5 3.5 - 5.3 mmol/L    Chloride 99 98 - 107 mmol/L    Bicarbonate 27 21 - 32 mmol/L    Anion Gap 13 10 - 20 mmol/L    Urea Nitrogen 10 6 - 23 mg/dL    Creatinine 0.58 0.50 - 1.05 mg/dL    eGFR >90 >60 mL/min/1.73m*2    Calcium 8.3 (L) 8.6 - 10.3 mg/dL    Albumin 3.3 (L) 3.4 - 5.0 g/dL    Alkaline Phosphatase 89 33 - 136 U/L    Total Protein 6.1 (L) 6.4 - 8.2 g/dL    AST 22 9 - 39 U/L    Bilirubin, Total 0.3 0.0 - 1.2 mg/dL    ALT 13 7 - 45 U/L   Troponin I, High Sensitivity   Result Value Ref Range    Troponin I, High Sensitivity 9 0 - 13 ng/L   Protime-INR   Result Value Ref Range    Protime 11.1 9.3 - 12.7 seconds    INR 1.1 0.9 - 1.2   APTT   Result Value Ref Range    aPTT 20.7 (L) 22.0 - 32.5 seconds   Drug Screen, Urine   Result Value Ref Range    Amphetamine Screen, Urine Presumptive Negative Presumptive Negative    Barbiturate Screen, Urine Presumptive Positive (A) Presumptive Negative    Benzodiazepines Screen, Urine Presumptive Negative Presumptive Negative    Cannabinoid Screen, Urine Presumptive Positive (A) Presumptive Negative    Cocaine Metabolite Screen, Urine Presumptive Negative  Presumptive Negative    Fentanyl Screen, Urine Presumptive Negative Presumptive Negative    Opiate Screen, Urine Presumptive Negative Presumptive Negative    Oxycodone Screen, Urine Presumptive Negative Presumptive Negative    PCP Screen, Urine Presumptive Negative Presumptive Negative    Methadone Screen, Urine Presumptive Negative Presumptive Negative   Urinalysis with Reflex Microscopic   Result Value Ref Range    Color, Urine Yellow Light-Yellow, Yellow, Dark-Yellow    Appearance, Urine Turbid (N) Clear    Specific Gravity, Urine 1.030 1.005 - 1.035    pH, Urine 6.0 5.0, 5.5, 6.0, 6.5, 7.0, 7.5, 8.0    Protein, Urine 70 (1+) (A) NEGATIVE, 10 (TRACE), 20 (TRACE) mg/dL    Glucose, Urine Normal Normal mg/dL    Blood, Urine NEGATIVE NEGATIVE    Ketones, Urine NEGATIVE NEGATIVE mg/dL    Bilirubin, Urine NEGATIVE NEGATIVE    Urobilinogen, Urine Normal Normal mg/dL    Nitrite, Urine NEGATIVE NEGATIVE    Leukocyte Esterase, Urine NEGATIVE NEGATIVE   Microscopic Only, Urine   Result Value Ref Range    WBC, Urine 1-5 1-5, NONE /HPF    RBC, Urine 1-2 NONE, 1-2, 3-5 /HPF    Bacteria, Urine 1+ (A) NONE SEEN /HPF    Mucus, Urine 4+ Reference range not established. /LPF    Hyaline Casts, Urine 3+ (A) NONE /LPF   Sars-CoV-2 PCR   Result Value Ref Range    Coronavirus 2019, PCR Not Detected Not Detected   Influenza A, and B PCR   Result Value Ref Range    Flu A Result Not Detected Not Detected    Flu B Result Not Detected Not Detected   POCT GLUCOSE   Result Value Ref Range    POCT Glucose 156 (H) 74 - 99 mg/dL   POCT GLUCOSE   Result Value Ref Range    POCT Glucose 125 (H) 74 - 99 mg/dL   POCT GLUCOSE   Result Value Ref Range    POCT Glucose 115 (H) 74 - 99 mg/dL   Comprehensive metabolic panel   Result Value Ref Range    Glucose 107 (H) 74 - 99 mg/dL    Sodium 139 136 - 145 mmol/L    Potassium 3.7 3.5 - 5.3 mmol/L    Chloride 97 (L) 98 - 107 mmol/L    Bicarbonate 29 21 - 32 mmol/L    Anion Gap 17 10 - 20 mmol/L    Urea  Nitrogen 18 6 - 23 mg/dL    Creatinine 0.55 0.50 - 1.05 mg/dL    eGFR >90 >60 mL/min/1.73m*2    Calcium 9.5 8.6 - 10.3 mg/dL    Albumin 4.1 3.4 - 5.0 g/dL    Alkaline Phosphatase 96 33 - 136 U/L    Total Protein 7.4 6.4 - 8.2 g/dL    AST 24 9 - 39 U/L    Bilirubin, Total 0.4 0.0 - 1.2 mg/dL    ALT 16 7 - 45 U/L   CBC   Result Value Ref Range    WBC 9.9 4.4 - 11.3 x10*3/uL    nRBC 0.0 0.0 - 0.0 /100 WBCs    RBC 4.32 4.00 - 5.20 x10*6/uL    Hemoglobin 12.6 12.0 - 16.0 g/dL    Hematocrit 38.5 36.0 - 46.0 %    MCV 89 80 - 100 fL    MCH 29.2 26.0 - 34.0 pg    MCHC 32.7 32.0 - 36.0 g/dL    RDW 15.9 (H) 11.5 - 14.5 %    Platelets 348 150 - 450 x10*3/uL   POCT GLUCOSE   Result Value Ref Range    POCT Glucose 112 (H) 74 - 99 mg/dL   POCT GLUCOSE   Result Value Ref Range    POCT Glucose 104 (H) 74 - 99 mg/dL   POCT GLUCOSE   Result Value Ref Range    POCT Glucose 119 (H) 74 - 99 mg/dL        CT angio head and neck w and wo IV contrast    Result Date: 10/4/2024  Interpreted By:  Chad Jay and Omar Mahmoud STUDY: CT ANGIO HEAD AND NECK W AND WO IV CONTRAST;  10/4/2024 1:16 pm   INDICATION: Signs/Symptoms:AMS.     COMPARISON: None.   ACCESSION NUMBER(S): [id number]   ORDERING CLINICIAN: LATOYA ALFARO   TECHNIQUE: Unenhanced CT images of the head were obtained. Subsequently, 75 ML of Omnipaque 350 was administered intravenously and axial images of the head and neck were acquired.  Coronal, sagittal, and 3-D reconstructions were provided for review.   FINDINGS:   CTA HEAD FINDINGS:   Anterior circulation: There is mild calcific atherosclerosis of the bilateral cavernous internal carotid artery with less than 50% stenosis. There is a tortuous course of the bilateral cavernous internal artery.   Posterior circulation: Bilateral intracranial vertebral arteries, vertebrobasilar junction, basilar artery and proximal posterior cerebral arteries are tortuous. There is no significant stenosis or large branch vessel cutoffs.    CTA NECK FINDINGS:   Right carotid vessels: The common carotid artery is normal. The carotid bifurcation is normal. There is a tortuous course of the right cervical internal carotid artery without pseudoaneurysm or dissection.   Left carotid vessels: The common carotid artery is normal. There is calcific atherosclerosis of the left carotid bulb. There is tortuosity of the left internal carotid artery without evidence of pseudoaneurysm or dissection..   Vertebral vessels: Vertebral artery origin is are normal. The left vertebral artery is dominant. The visualized segments of the cervical vertebral arteries are normal in caliber. There is bilateral tortuosity of the vertebral arteries. The basilar artery is on primarily from a left vertebral artery with a minor branch arising from the right vertebral artery.   Within the visualized portions of the lung, there is a 2.1 x 1.9 cm spiculated nodule within the posterior right upper lobe with a branch of the pulmonary artery visualized passing through it.   There is 5 mm of C3 on C4 anterolisthesis.       1. There is no measurable stenosis of the cervical vessels and no measurable stenosis or large branch vessel cutoffs of the intracranial vessels. 2. Tortuous course of the visualized arteries within the head and neck without pseudoaneurysm or dissection 3. Dominant left vertebral artery. No evidence of narrowing, dissection or thrombus in the vertebral or basilar circulation.. 4. Spiculated nodule in the posterior right upper lobe is concerning for malignancy. Follow-up PET-CT or direct tissue sampling is recommended as clinically indicated. 5. There is 5 mm of C3 on C4 anterolisthesis.   I personally reviewed the images/study and I agree with the findings as stated by Gisela Huerta MD (PGY-[id number]). This study was interpreted at [facility], [place], [place].   MACRO: Critical Finding:  New/enlarging lung nodule suspicious for cancer.  Notification was initiated on 10/4/2024 at 1:41 pm by  Gisela Huerta. (**-[facility]-**) Instructions:   Signed by: Chad Jay 10/4/2024 1:49 PM Dictation workstation:   [id number]    CT brain attack head wo IV contrast    Result Date: 10/4/2024  Interpreted By:  Drew Ames, STUDY: CT BRAIN ATTACK HEAD WO IV CONTRAST; 10/4/2024 1:03 pm   INDICATION: Signs/Symptoms:Stroke Evaluation   COMPARISON: 09/02/2024   ACCESSION NUMBER(S): [id number]   ORDERING CLINICIAN: LATOYA ALFARO   TECHNIQUE: Axial images were obtained through the brain. No IV contrast was administered.   All CT examinations are performed with one or more of the following dose reduction techniques: Automated Exposure Control, adjustment of mA and/or kV according to patient size, or use of iterative reconstruction techniques.   FINDINGS: The ventricles are enlarged but midline in position, with proportional prominence of the sulci, due to atrophy. Patchy periventricular hypodensities are present suggestive of small vessel ischemic white matter disease. There is no intracranial hemorrhage. No mass or mass effect is seen. The osseus structures are unremarkable. Minute air-fluid level is noted in the right maxillary sinus.       Stable age related changes without acute intracranial process.   The emergency department physician was notified about the findings by phone 1310 hours.   Signed by: Drew Ames 10/4/2024 1:13 PM Dictation workstation:   [id number]        Assessment/Plan   Principal Problem:    Disorientation    79-year-old woman with a significantly impaired baseline presented with altered mental status.  The incidental finding on exam of the spiculated mass in the upper lung appears of greatest concern but I have no explanation for her altered mental status and therefore we will attempt to get an MRI MRA and EEG.    I personally spent 60 minutes today, exclusive of procedures, providing care for this patient, including preparation, face  to face time, documentation and other services such as review of medical records, diagnostic result, patient education, counseling, coordination of care as specified in the encounter.

## 2024-10-05 NOTE — ASSESSMENT & PLAN NOTE
Altered mental status  Toxic Metabolic encephalopathy  Generalized weakness  Chronic constipation  Anxiety  Neuropathy  Peptic ulcer disease  Vitamin D deficiency marijuana abuse

## 2024-10-05 NOTE — NURSING NOTE
Sebastian Brandt here would like update  She could only stay a bit  I will let Dr Mcneil know and ask him to call her  to update

## 2024-10-05 NOTE — NURSING NOTE
Karly assisted back to bed  Insists on depends states she needs to be changed  thought she had a bm   Depends CDI call light in reach bed alarm on for safety

## 2024-10-05 NOTE — NURSING NOTE
Karly is forgetful  requesting her pills   I have several times reminded her that she took them showed her the pill  and applesauce   She continues to ask for them  call light in reach chair alarm on for safety

## 2024-10-05 NOTE — PROGRESS NOTES
Physical Therapy    Physical Therapy Evaluation    Patient Name: Karly Horton  MRN: [id number]  Department: [address]  Room: 07/07-A  Today's Date: 10/5/2024   Time Calculation  Start Time: 0847  Stop Time: 0904  Time Calculation (min): 17 min    Assessment/Plan   PT Assessment  PT Assessment Results: Decreased strength, Decreased endurance, Impaired balance, Decreased mobility, Impaired judgement  Rehab Prognosis: Fair  Evaluation/Treatment Tolerance: Patient tolerated treatment well  Medical Staff Made Aware: Yes  End of Session Communication: Bedside nurse  Assessment Comment: patient presents with impaired functional mobility, including decreased BLE strength, impaired balance, decreased tolerance to activity, and poor safety awareness. pt is a very high fall risk with impaired cognition. max A x 1-2 for safe mobility.  End of Session Patient Position: Up in chair, Alarm on  IP OR SWING BED PT PLAN  Inpatient or Swing Bed: Inpatient  PT Plan  Treatment/Interventions: Bed mobility, Transfer training, Gait training, Balance training, Strengthening, Endurance training, Therapeutic exercise, Therapeutic activity  PT Plan: Ongoing PT  PT Frequency: 4 times per week  PT Discharge Recommendations: Moderate intensity level of continued care  Equipment Recommended upon Discharge: Wheeled walker  PT Recommended Transfer Status: Assist x1, Assist x2  PT - OK to Discharge: Yes      Subjective   General Visit Information:  General  Reason for Referral: PT eval and treat; 80 y/o female admit from home with AMS.  Referred By: Lupillo Mcneil MD Acknowledge New  Past Medical History Relevant to Rehab: pna, iron deficiency anemia, CHF, UTI  Family/Caregiver Present: No  Co-Treatment: OT  Co-Treatment Reason: maximize patient safety, participation, and optimize patient outcomes  Prior to Session Communication: Bedside nurse  Patient Position Received: Up in chair, Alarm on  General Comment: patient cleared for therapy by  nursing, seated in chair upon arrival and agreeable to therapy. pleasantly confused.  Home Living:  Home Living  Type of Home: Apartment  Lives With: Adult children (daughter)  Home Adaptive Equipment: Walker rolling or standard  Home Layout: One level  Bathroom Shower/Tub: Tub/shower unit  Bathroom Equipment: Built-in shower seat  Home Living Comments: sleeps on couch  Prior Level of Function:  Prior Function Per Pt/Caregiver Report  Level of [place]: Needs assistance with ADLs, Needs assistance with homemaking, Needs assistance with functional transfers  Receives Help From: Family (daughter)  ADL Assistance:  (assist for sponge bathing, dressing, toileting.)  Homemaking Assistance: Needs assistance  Ambulatory Assistance:  (pt reports she is nonambulatory; previous admission reports mod ind with RW)  Vocational: Retired  Prior Function Comments: pt is a questionable historian. intake taken from previous admission 9/2/24.  Precautions:  Precautions  Hearing/Visual Limitations: +glasses  Medical Precautions: Fall precautions    Objective   Pain:  Pain Assessment  Pain Assessment: 0-10  0-10 (Numeric) Pain Score: 0 - No pain  Cognition:  Cognition  Overall Cognitive Status: Impaired  Orientation Level: Disoriented to time  Safety/Judgement: Exceptions to WFL    General Assessments:  General Observation  General Observation: alert, NAD. visible skin intact     Activity Tolerance  Endurance: Decreased tolerance for upright activites    Sensation  Light Touch: No apparent deficits  Sensation Comment: reports paresthesias everywhere    Strength  Strength Comments: BLEs grossly >3/5  Strength  Strength Comments: BLEs grossly >3/5    Coordination  Coordination Comment: decreased rate of movements    Postural Control  Posture Comment: retropulsive throughout mobility, rounded shoulders, forward head posture.    Functional Assessments:  Bed Mobility  Bed Mobility: No    Transfers  Transfer: Yes  Transfer  1  Trials/Comments 1: max A sit to stand at walker with cues for BLE placement, safe hand placement, upright posture, forward weight shift. tactile cues at hips for weight shift. BLEs blocked. +retropulsive. max A x 2 to sit. difficulty flexing at knees, hips requiring max tactile and verbal cueing.  performed sit <> stand 3 times. mod A for static standing balance for donning brief.    Ambulation/Gait Training  Ambulation/Gait Training Performed: No  Extremity/Trunk Assessments:  RLE   RLE : Within Functional Limits  LLE   LLE : Within Functional Limits  Outcome Measures:  [facility] Basic Mobility  Turning from your back to your side while in a flat bed without using bedrails: A lot  Moving from lying on your back to sitting on the side of a flat bed without using bedrails: A lot  Moving to and from bed to chair (including a wheelchair): Total  Standing up from a chair using your arms (e.g. wheelchair or bedside chair): Total  To walk in hospital room: Total  Climbing 3-5 steps with railing: Total  Basic Mobility - Total Score: 8    Encounter Problems       Encounter Problems (Active)       PT Problem       Patient will transfer supine <> sit with minimal assist        Start:  10/05/24    Expected End:  10/26/24            Patient will transfer sit <> stand with minimal assist and use of rolling walker        Start:  10/05/24    Expected End:  10/26/24            Patient will ambulate 50 ft with minimal assist and use of rolling walker        Start:  10/05/24    Expected End:  10/26/24            Patient will demonstrate improvements in strength        Start:  10/05/24    Expected End:  10/26/24                   Education Documentation  Precautions, taught by Ashleigh Cleveland PT at 10/5/2024  9:26 AM.  Learner: Patient  Readiness: Acceptance  Method: Explanation  Response: No Evidence of Learning    Body Mechanics, taught by Ashleigh Cleveland, PT at 10/5/2024  9:26 AM.  Learner: Patient  Readiness: Acceptance  Method:  Explanation  Response: No Evidence of Learning    Mobility Training, taught by Ashleigh Cleveland PT at 10/5/2024  9:26 AM.  Learner: Patient  Readiness: Acceptance  Method: Explanation  Response: No Evidence of Learning    Education Comments  No comments found.

## 2024-10-05 NOTE — PROGRESS NOTES
Speech-Language Pathology    Speech-Language Pathology Clinical Swallow Evaluation    Patient Name: Karly Horton  MRN: [id number]  : 1945  Today's Date: 10/05/24  Start Time: 907  Stop Time: 923  Time Calculation (min): 16 min      ASSESSMENT  Impressions:  Moderately impaired oral phase, d/t impaired cognition and edentulous status, no suspected pharyngeal phase dysphagia based on clinical swallow evaluation. Pt would benefit from skilled ST to minimize aspiration risk and ensure ongoing safety with the least restrictive diet.  Prognosis: Good    PLAN  Recommendations:  Is MBSS recommended? No; no pharyngeal dysphagia suspected.  Solid consistency: Minced/moist (IDDSI level 5)  Liquid consistency: Thin (IDDSI 0)  Medication administration: Whole, Crushed, in puree  Compensatory swallow strategies:  - Upright positioning for all PO intake  - Small bites  - Small sips  - Supervision recommended during meals    Recommended frequency/duration:  Skilled SLP services recommended: Yes  Frequency: 2x/week  Duration: 2 weeks  Discharge recommendation: Recommend MODERATE intensity ST upon DC in order to ensure safety with least restrictive diet.  Treatment/Interventions: Assess diet tolerance, Patient/family education  Strengths: Family/caregiver support  Barriers to participation in tx: Cognition    Goals:  In 2 weeks...  Pt will consume prescribed diet (current diet is minced moist solids, thin liquids) without overt s/sx aspiration/penetration in 95% of observed trials.  Pt will demonstrate follow-through of trained compensatory strategies during a meal/snack with 90% acc independently.   GOAL START: 10/5/2024   GOAL END: 10/19/2024   Status: Goal initiated this date   Progress this date: n/a      SUBJECTIVE    PMHx relevant to rehab: Principal Problem:    Disorientation      Chief complaint: Pt was admitted on 10/4/24 due to AMS  Chief Complaint   Patient presents with    Altered Mental Status     Pt to ED  via EMS for change in mental status. Pt's LKW was yesterday as she lives with her daughter. This morning her daughter went to feed her baby food. Pt stated she wanted to go to floor bc couch was too hot. She started vomiting and daughter called EMS. . Normally A&Ox4 and walks. Presents in ED contracted, groaning, not answering questions. History of ulcers.   . She was found to have Disorientation.    Relevant imaging results:  CT scan 10/4/24  IMPRESSION:  Stable age related changes without acute intracranial process.    General Visit Information:     Patient Class: Inpatient  Living Environment: Nursing home (skilled/long-term)     Reason for Referral: assess swallow per CVA protocol  Prior to Session Communication: Bedside nurse    RN cleared pt to participate in session and reported eating breakfast    Pt poor historian, very confused    Date of Onset: 10/04/24  Date of Order: 10/04/24  Base[employer] Diet: regular and thin  Current Diet : regular and thin    Status at time of evaluation:  Pain Assessment  Pain Assessment: 0-10  0-10 (Numeric) Pain Score: 0 - No pain    Pt was alert, inattentive, distracted, and confused for session.  Orientation: Oriented to self, Oriented to hospital but not name of facility, and Unable to answer orientation questions refused to answer all of orientation quesitons  Ability to follow functional commands: Follows simple commands  Nutritional status: Signs of possible malnutrition    Respiratory status: Supplemental oxygen via NC  Baseline Vocal Quality: Normal  Volitional Cough: Strong  Volitional Swallow: Within Functional Limits  Patient positioning: Upright in chair      OBJECTIVE  Clinical swallow evaluation completed and consisted of ks_cse consisted of: oral motor assessment and PO trials (3oz thin liquids, 2 oz puree and 2 bites soft fruit).  ORAL PHASE: Pt edentulous, reports she has dentures in the bathroom, but does not want to put them in, SLP encouraged pt she stated  they dont fit.  Oral mucosa were pink, moist, and free of obvious lesions. Lingual strength and ROM were WFL. Labial strength/ROM were WFL. Labial seal was adequate. Mastication of regular solids was slow/prolonged, after chewing for period of time pt spitting out pieces of soft fruit, stating she couldn't chew it. Pt had consumed scrambled eggs on her breakfast tray.                                                                                                                                   PHARYNGEAL PHASE: Laryngeal elevation was visualized or palpated with all trials, however adequacy of hyolaryngeal elevation/excursion cannot be determined at bedside. No immediate or delayed s/sx aspiration/penetration were observed with any consistencies.    Was 3oz challenge administered: No, due to pt unable to follow instructions for task.    Treatment/Education:  Results and recommendations were relayed to: Patient and Bedside nurse  Education provided: Yes   Learner: Patient   Barriers to learning: Cognitive limitations barrier   Method of teaching: Verbal   Topic: role of ST, results of assessment, recommended diet modifications, and recommended safe swallow strategies   Outcome of teaching: Unable to demonstrate understanding at this time  Treatment provided: No  Next Treatment Priority: diet tolerance, solid trials with dentures, strat

## 2024-10-05 NOTE — NURSING NOTE
Karly drinking water  given carafate lunch tray is on its way   requesting that I stay in her room   I assured her that I would be in to help her eat when lunch arrives call light in reach bed alarm on for saffety  Declining anything for anxiety

## 2024-10-06 ENCOUNTER — APPOINTMENT (OUTPATIENT)
Dept: RADIOLOGY | Facility: HOSPITAL | Age: 79
DRG: 091 | End: 2024-10-06
Payer: MEDICARE

## 2024-10-06 LAB
HCT VFR BLD AUTO: 37.3 % (ref 36–46)
HCT VFR BLD AUTO: 38.8 % (ref 36–46)
HCT VFR BLD AUTO: 39 % (ref 36–46)
HCT VFR BLD AUTO: 40.3 % (ref 36–46)
HGB BLD-MCNC: 12.5 G/DL (ref 12–16)
HGB BLD-MCNC: 12.7 G/DL (ref 12–16)
HGB BLD-MCNC: 13.3 G/DL (ref 12–16)
HGB BLD-MCNC: 13.3 G/DL (ref 12–16)
HOLD SPECIMEN: NORMAL

## 2024-10-06 PROCEDURE — 2500000002 HC RX 250 W HCPCS SELF ADMINISTERED DRUGS (ALT 637 FOR MEDICARE OP, ALT 636 FOR OP/ED): Performed by: INTERNAL MEDICINE

## 2024-10-06 PROCEDURE — 85018 HEMOGLOBIN: CPT | Performed by: INTERNAL MEDICINE

## 2024-10-06 PROCEDURE — 1200000002 HC GENERAL ROOM WITH TELEMETRY DAILY

## 2024-10-06 PROCEDURE — 36415 COLL VENOUS BLD VENIPUNCTURE: CPT | Performed by: INTERNAL MEDICINE

## 2024-10-06 PROCEDURE — 2500000001 HC RX 250 WO HCPCS SELF ADMINISTERED DRUGS (ALT 637 FOR MEDICARE OP): Performed by: INTERNAL MEDICINE

## 2024-10-06 PROCEDURE — 74176 CT ABD & PELVIS W/O CONTRAST: CPT

## 2024-10-06 PROCEDURE — 85014 HEMATOCRIT: CPT | Performed by: INTERNAL MEDICINE

## 2024-10-06 PROCEDURE — S4991 NICOTINE PATCH NONLEGEND: HCPCS | Performed by: INTERNAL MEDICINE

## 2024-10-06 RX ORDER — ESOMEPRAZOLE MAGNESIUM 40 MG/1
40 GRANULE, DELAYED RELEASE ORAL 2 TIMES DAILY
Status: DISCONTINUED | OUTPATIENT
Start: 2024-10-06 | End: 2024-10-10 | Stop reason: HOSPADM

## 2024-10-06 ASSESSMENT — COGNITIVE AND FUNCTIONAL STATUS - GENERAL
MOVING TO AND FROM BED TO CHAIR: A LOT
MOVING FROM LYING ON BACK TO SITTING ON SIDE OF FLAT BED WITH BEDRAILS: A LITTLE
EATING MEALS: A LOT
MOVING TO AND FROM BED TO CHAIR: A LOT
HELP NEEDED FOR BATHING: A LOT
EATING MEALS: A LOT
PERSONAL GROOMING: A LOT
WALKING IN HOSPITAL ROOM: A LOT
DRESSING REGULAR UPPER BODY CLOTHING: A LOT
WALKING IN HOSPITAL ROOM: A LOT
TOILETING: A LOT
DRESSING REGULAR LOWER BODY CLOTHING: A LOT
DAILY ACTIVITIY SCORE: 12
DRESSING REGULAR LOWER BODY CLOTHING: A LOT
STANDING UP FROM CHAIR USING ARMS: A LOT
CLIMB 3 TO 5 STEPS WITH RAILING: TOTAL
MOBILITY SCORE: 12
HELP NEEDED FOR BATHING: A LOT
DRESSING REGULAR UPPER BODY CLOTHING: A LOT
PERSONAL GROOMING: A LOT
TURNING FROM BACK TO SIDE WHILE IN FLAT BAD: A LOT
MOVING FROM LYING ON BACK TO SITTING ON SIDE OF FLAT BED WITH BEDRAILS: A LITTLE
MOBILITY SCORE: 12
DAILY ACTIVITIY SCORE: 12
TURNING FROM BACK TO SIDE WHILE IN FLAT BAD: A LOT
CLIMB 3 TO 5 STEPS WITH RAILING: TOTAL
TOILETING: A LOT
STANDING UP FROM CHAIR USING ARMS: A LOT

## 2024-10-06 ASSESSMENT — PAIN SCALES - GENERAL
PAINLEVEL_OUTOF10: 0 - NO PAIN
PAINLEVEL_OUTOF10: 0 - NO PAIN

## 2024-10-06 ASSESSMENT — PAIN - FUNCTIONAL ASSESSMENT: PAIN_FUNCTIONAL_ASSESSMENT: WONG-BAKER FACES

## 2024-10-06 NOTE — NURSING NOTE
Assumed care of patient.   Patient in bed watching tv.  Bedside shift report received.  Call light in reach.

## 2024-10-06 NOTE — PROGRESS NOTES
Subjective   Patient was getting cleaned up when I first tried to see her.  Next visit she had no complaints and remains mildly confused.     Objective   Neurological Exam  Physical Exam    Last Recorded Vitals  Blood pressure 152/74, pulse 96, temperature 36.2 °C (97.2 °F), temperature source Temporal, resp. rate 18, height 1.524 m (5'), weight (!) 33 kg (72 lb 12 oz), SpO2 96%.    Neurologically, pt is awake and oriented x3.   However her neurologic exam is somewhat limited.  She is diffusely cachectic.  She appears frail and lies in bed asymmetrically.  She has increased tone on her left and reduced function but overall reasonable tone on the right upper and lower extremities.  She has intact sensation bilaterally throughout.  Her face appears grossly symmetric.  2 some degree of baseline confusion she was unable to perform more complex tasks evaluating for coordination and motor processing     Scheduled medications  amLODIPine, 10 mg, oral, Nightly  ascorbic acid, 500 mg, oral, Daily  baclofen, 10 mg, oral, TID  esomeprazole, 40 mg, oral, BID  ferrous sulfate (325 mg ferrous sulfate), 65 mg of iron, oral, Daily  lactulose, 20 g, oral, Daily  losartan, 50 mg, oral, Daily  nicotine, 1 patch, transdermal, Daily  sucralfate, 1 g, oral, q6h [place]      Continuous medications     PRN medications  PRN medications: acetaminophen **OR** acetaminophen **OR** acetaminophen, acetaminophen-codeine, benzocaine-menthol, butalbital-acetaminophen-caff, dextromethorphan-guaifenesin, guaiFENesin, hydrOXYzine, hydrOXYzine HCL, lidocaine, lubricating eye drops, meprobamate, OLANZapine, polyethylene glycol     Results for orders placed or performed during the hospital encounter of 10/04/24 (from the past 96 hour(s))   POCT GLUCOSE   Result Value Ref Range    POCT Glucose 159 (H) 74 - 99 mg/dL   CBC and Auto Differential   Result Value Ref Range    WBC 6.6 4.4 - 11.3 x10*3/uL    nRBC 0.0 0.0 - 0.0 /100 WBCs    RBC 3.51 (L) 4.00 - 5.20  x10*6/uL    Hemoglobin 10.3 (L) 12.0 - 16.0 g/dL    Hematocrit 32.6 (L) 36.0 - 46.0 %    MCV 93 80 - 100 fL    MCH 29.3 26.0 - 34.0 pg    MCHC 31.6 (L) 32.0 - 36.0 g/dL    RDW 15.9 (H) 11.5 - 14.5 %    Platelets 215 150 - 450 x10*3/uL    Neutrophils % 85.5 40.0 - 80.0 %    Immature Granulocytes %, Automated 0.5 0.0 - 0.9 %    Lymphocytes % 7.3 13.0 - 44.0 %    Monocytes % 6.0 2.0 - 10.0 %    Eosinophils % 0.2 0.0 - 6.0 %    Basophils % 0.5 0.0 - 2.0 %    Neutrophils Absolute 5.61 (H) 1.60 - 5.50 x10*3/uL    Immature Granulocytes Absolute, Automated 0.03 0.00 - 0.50 x10*3/uL    Lymphocytes Absolute 0.48 (L) 0.80 - 3.00 x10*3/uL    Monocytes Absolute 0.39 0.05 - 0.80 x10*3/uL    Eosinophils Absolute 0.01 0.00 - 0.40 x10*3/uL    Basophils Absolute 0.03 0.00 - 0.10 x10*3/uL   Comprehensive metabolic panel   Result Value Ref Range    Glucose 150 (H) 74 - 99 mg/dL    Sodium 135 (L) 136 - 145 mmol/L    Potassium 3.5 3.5 - 5.3 mmol/L    Chloride 99 98 - 107 mmol/L    Bicarbonate 27 21 - 32 mmol/L    Anion Gap 13 10 - 20 mmol/L    Urea Nitrogen 10 6 - 23 mg/dL    Creatinine 0.58 0.50 - 1.05 mg/dL    eGFR >90 >60 mL/min/1.73m*2    Calcium 8.3 (L) 8.6 - 10.3 mg/dL    Albumin 3.3 (L) 3.4 - 5.0 g/dL    Alkaline Phosphatase 89 33 - 136 U/L    Total Protein 6.1 (L) 6.4 - 8.2 g/dL    AST 22 9 - 39 U/L    Bilirubin, Total 0.3 0.0 - 1.2 mg/dL    ALT 13 7 - 45 U/L   Troponin I, High Sensitivity   Result Value Ref Range    Troponin I, High Sensitivity 9 0 - 13 ng/L   Protime-INR   Result Value Ref Range    Protime 11.1 9.3 - 12.7 seconds    INR 1.1 0.9 - 1.2   APTT   Result Value Ref Range    aPTT 20.7 (L) 22.0 - 32.5 seconds   Drug Screen, Urine   Result Value Ref Range    Amphetamine Screen, Urine Presumptive Negative Presumptive Negative    Barbiturate Screen, Urine Presumptive Positive (A) Presumptive Negative    Benzodiazepines Screen, Urine Presumptive Negative Presumptive Negative    Cannabinoid Screen, Urine Presumptive  Positive (A) Presumptive Negative    Cocaine Metabolite Screen, Urine Presumptive Negative Presumptive Negative    Fentanyl Screen, Urine Presumptive Negative Presumptive Negative    Opiate Screen, Urine Presumptive Negative Presumptive Negative    Oxycodone Screen, Urine Presumptive Negative Presumptive Negative    PCP Screen, Urine Presumptive Negative Presumptive Negative    Methadone Screen, Urine Presumptive Negative Presumptive Negative   Urinalysis with Reflex Microscopic   Result Value Ref Range    Color, Urine Yellow Light-Yellow, Yellow, Dark-Yellow    Appearance, Urine Turbid (N) Clear    Specific Gravity, Urine 1.030 1.005 - 1.035    pH, Urine 6.0 5.0, 5.5, 6.0, 6.5, 7.0, 7.5, 8.0    Protein, Urine 70 (1+) (A) NEGATIVE, 10 (TRACE), 20 (TRACE) mg/dL    Glucose, Urine Normal Normal mg/dL    Blood, Urine NEGATIVE NEGATIVE    Ketones, Urine NEGATIVE NEGATIVE mg/dL    Bilirubin, Urine NEGATIVE NEGATIVE    Urobilinogen, Urine Normal Normal mg/dL    Nitrite, Urine NEGATIVE NEGATIVE    Leukocyte Esterase, Urine NEGATIVE NEGATIVE   Microscopic Only, Urine   Result Value Ref Range    WBC, Urine 1-5 1-5, NONE /HPF    RBC, Urine 1-2 NONE, 1-2, 3-5 /HPF    Bacteria, Urine 1+ (A) NONE SEEN /HPF    Mucus, Urine 4+ Reference range not established. /LPF    Hyaline Casts, Urine 3+ (A) NONE /LPF   Sars-CoV-2 PCR   Result Value Ref Range    Coronavirus 2019, PCR Not Detected Not Detected   Influenza A, and B PCR   Result Value Ref Range    Flu A Result Not Detected Not Detected    Flu B Result Not Detected Not Detected   POCT GLUCOSE   Result Value Ref Range    POCT Glucose 156 (H) 74 - 99 mg/dL   POCT GLUCOSE   Result Value Ref Range    POCT Glucose 125 (H) 74 - 99 mg/dL   POCT GLUCOSE   Result Value Ref Range    POCT Glucose 115 (H) 74 - 99 mg/dL   Comprehensive metabolic panel   Result Value Ref Range    Glucose 107 (H) 74 - 99 mg/dL    Sodium 139 136 - 145 mmol/L    Potassium 3.7 3.5 - 5.3 mmol/L    Chloride 97 (L) 98 -  107 mmol/L    Bicarbonate 29 21 - 32 mmol/L    Anion Gap 17 10 - 20 mmol/L    Urea Nitrogen 18 6 - 23 mg/dL    Creatinine 0.55 0.50 - 1.05 mg/dL    eGFR >90 >60 mL/min/1.73m*2    Calcium 9.5 8.6 - 10.3 mg/dL    Albumin 4.1 3.4 - 5.0 g/dL    Alkaline Phosphatase 96 33 - 136 U/L    Total Protein 7.4 6.4 - 8.2 g/dL    AST 24 9 - 39 U/L    Bilirubin, Total 0.4 0.0 - 1.2 mg/dL    ALT 16 7 - 45 U/L   CBC   Result Value Ref Range    WBC 9.9 4.4 - 11.3 x10*3/uL    nRBC 0.0 0.0 - 0.0 /100 WBCs    RBC 4.32 4.00 - 5.20 x10*6/uL    Hemoglobin 12.6 12.0 - 16.0 g/dL    Hematocrit 38.5 36.0 - 46.0 %    MCV 89 80 - 100 fL    MCH 29.2 26.0 - 34.0 pg    MCHC 32.7 32.0 - 36.0 g/dL    RDW 15.9 (H) 11.5 - 14.5 %    Platelets 348 150 - 450 x10*3/uL   POCT GLUCOSE   Result Value Ref Range    POCT Glucose 112 (H) 74 - 99 mg/dL   POCT GLUCOSE   Result Value Ref Range    POCT Glucose 104 (H) 74 - 99 mg/dL   POCT GLUCOSE   Result Value Ref Range    POCT Glucose 119 (H) 74 - 99 mg/dL   Hemoglobin and hematocrit, blood   Result Value Ref Range    Hemoglobin 12.5 12.0 - 16.0 g/dL    Hematocrit 37.3 36.0 - 46.0 %   PST Top   Result Value Ref Range    Extra Tube Hold for add-ons.    Hemoglobin and hematocrit, blood   Result Value Ref Range    Hemoglobin 12.7 12.0 - 16.0 g/dL    Hematocrit 38.8 36.0 - 46.0 %          CT chest abdomen pelvis wo IV contrast    Result Date: 10/6/2024  Interpreted By:  Clemente Vergara, STUDY: CT CHEST ABDOMEN PELVIS WO CONTRAST; 10/6/2024 11:06 am   INDICATION: Signs/Symptoms:spiculated lung mass. Spiculated lung mass noted at the right apex on CT angiography of the head and neck.   COMPARISON: None   ACCESSION NUMBER(S): [id number]   ORDERING CLINICIAN: DUGLAS ESPARZA   TECHNIQUE: COMMENTS: This exam is performed without oral or intravenous contrast as was requested. Please note that the absence of oral and intravenous contrast material does limit the sensitivity and specificity of this examination. In particular  solid organ assessment for neoplasm is significantly limited. Assessment of the GI tract is also limited without the aid of oral contrast administration. Vascular abnormalities such as dissection, occlusions, infarcts and thrombus may also go undetected.     One or more of the following dose reduction techniques were used: Automated exposure control Adjustment of the mA and/or kV according to patient size, and/or use of iterative reconstruction technique.   FINDINGS: CHEST:   Within limits of this unenhanced study no hilar or mediastinal lymphadenopathy is appreciated. Emphysematous change of the pulmonary parenchyma is present bilaterally. No effusions are identified. No alveolar consolidation is noted. *There is a 7 mm apicoposterior segment right upper lobe nodule (Series 7, Image 85). *There is a 2.2 cm spiculated mass apicoposterior segment right upper lobe (Series 7, Image 114). *There is a 9 mm spiculated mass superior segment right lower lobe (Series 7, Image 118). *There is a 9 mm nodule right lower lobe laterally (Series 7, Image 220). *There is a 2 mm and 3 mm nodule left lower lobe (Series 7, Image 178). *There is a 6 mm left lower lobe nodule medially (Series 7, Image 122). *There is a 6 mm anterior segment right upper lobe nodule (Series 6, Image 85). *There is a 5 mm right lower lobe nodule (Series 7, Image 175). *There is a 3.1 cm spiculated mass lower pole right hilum (Series 7, Image 187). This appears to occlude several of the branches of the right lower lobe anteriorly. *Mucous plugging is present within the right lower lobe.     Chest Wall: No chest wall masses are identified.   Skeletal System: No fractures or destructive lesions are identified. There is a severe thoracic levoscoliosis with spondylosis.   _________________________________________________________   CT SCAN OF THE ABDOMEN AND PELVIS FINDINGS: ABDOMEN CT: SOLID ORGAN ASSESSMENT: Within limits of this unenhanced study no focal  masses are identified within the liver, spleen, pancreas, kidneys or adrenal glands. Hepatic and splenic calcifications are present consistent with remote granulomatous disease. There is vague increased density along the dependent portion of the gallbladder possibly indicating milk of calcium or sludge.   RETROPERITONEUM: AORTA:  There is no evidence for abdominal aortic aneurysm.   LYMPH NODES: There is no evidence for retroperitoneal lymphadenopathy.   BOWEL ASSESSMENT: No intraperitoneal free air is identified. There is a nonspecific appearance of the stomach.  The small bowel is unremarkable in appearance.  The colon is nonspecific in appearance.   ABDOMINAL AND PELVIC WALLS: There is no evidence for a hernia. No abdominal wall masses are identified.   OSSEOUS STRUCTURES: No lytic or blastic lesions are identified. There is a severe lumbar dextroscoliosis with spondylosis.   PELVIC CT: No pathologic masses or fluid collections are identified.       1. There are multiple masses identified within the right and left lobes of the liver more numerous and larger on the right with particular note made of a large 3.1 cm lower pole mass right hilum in a 2.2 cm spiculated mass apicoposterior segment right upper lobe along with additional masses as detailed above. Malignancy is likely with metastatic lesions consideration along with primary lung cancer metastatic to the lungs.. 2. Emphysematous change pulmonary parenchyma. 3. Nonspecific appearance of the abdomen and pelvis. Please note that sensitivity of the abdomen and pelvis assessment is limited due to the cachectic state of the patient and absence of oral and intravenous contrast.     MACRO: none   Signed by: Clemente Vergara 10/6/2024 1:10 PM Dictation workstation:   [id number]    CT angio head and neck w and wo IV contrast    Result Date: 10/4/2024  Interpreted By:  Chad Jay and Omar Mahmoud STUDY: CT ANGIO HEAD AND NECK W AND WO IV CONTRAST;  10/4/2024  1:16 pm   INDICATION: Signs/Symptoms:AMS.     COMPARISON: None.   ACCESSION NUMBER(S): [id number]   ORDERING CLINICIAN: LATOYA ALFARO   TECHNIQUE: Unenhanced CT images of the head were obtained. Subsequently, 75 ML of Omnipaque 350 was administered intravenously and axial images of the head and neck were acquired.  Coronal, sagittal, and 3-D reconstructions were provided for review.   FINDINGS:   CTA HEAD FINDINGS:   Anterior circulation: There is mild calcific atherosclerosis of the bilateral cavernous internal carotid artery with less than 50% stenosis. There is a tortuous course of the bilateral cavernous internal artery.   Posterior circulation: Bilateral intracranial vertebral arteries, vertebrobasilar junction, basilar artery and proximal posterior cerebral arteries are tortuous. There is no significant stenosis or large branch vessel cutoffs.   CTA NECK FINDINGS:   Right carotid vessels: The common carotid artery is normal. The carotid bifurcation is normal. There is a tortuous course of the right cervical internal carotid artery without pseudoaneurysm or dissection.   Left carotid vessels: The common carotid artery is normal. There is calcific atherosclerosis of the left carotid bulb. There is tortuosity of the left internal carotid artery without evidence of pseudoaneurysm or dissection..   Vertebral vessels: Vertebral artery origin is are normal. The left vertebral artery is dominant. The visualized segments of the cervical vertebral arteries are normal in caliber. There is bilateral tortuosity of the vertebral arteries. The basilar artery is on primarily from a left vertebral artery with a minor branch arising from the right vertebral artery.   Within the visualized portions of the lung, there is a 2.1 x 1.9 cm spiculated nodule within the posterior right upper lobe with a branch of the pulmonary artery visualized passing through it.   There is 5 mm of C3 on C4 anterolisthesis.       1. There is no  measurable stenosis of the cervical vessels and no measurable stenosis or large branch vessel cutoffs of the intracranial vessels. 2. Tortuous course of the visualized arteries within the head and neck without pseudoaneurysm or dissection 3. Dominant left vertebral artery. No evidence of narrowing, dissection or thrombus in the vertebral or basilar circulation.. 4. Spiculated nodule in the posterior right upper lobe is concerning for malignancy. Follow-up PET-CT or direct tissue sampling is recommended as clinically indicated. 5. There is 5 mm of C3 on C4 anterolisthesis.   I personally reviewed the images/study and I agree with the findings as stated by Gisela Huerta MD (PGY-2). This study was interpreted at [place], [place].   MACRO: Critical Finding:  New/enlarging lung nodule suspicious for cancer. Notification was initiated on 10/4/2024 at 1:41 pm by  Gisela Huerta. (**-OCF-**) Instructions:   Signed by: Chad Jay 10/4/2024 1:49 PM Dictation workstation:   [id number]    CT brain attack head wo IV contrast    Result Date: 10/4/2024  Interpreted By:  Drew Ames, STUDY: CT BRAIN ATTACK HEAD WO IV CONTRAST; 10/4/2024 1:03 pm   INDICATION: Signs/Symptoms:Stroke Evaluation   COMPARISON: 09/02/2024   ACCESSION NUMBER(S): [id number]   ORDERING CLINICIAN: LATOYA ALFARO   TECHNIQUE: Axial images were obtained through the brain. No IV contrast was administered.   All CT examinations are performed with one or more of the following dose reduction techniques: Automated Exposure Control, adjustment of mA and/or kV according to patient size, or use of iterative reconstruction techniques.   FINDINGS: The ventricles are enlarged but midline in position, with proportional prominence of the sulci, due to atrophy. Patchy periventricular hypodensities are present suggestive of small vessel ischemic white matter disease. There is no intracranial hemorrhage. No mass or mass  effect is seen. The osseus structures are unremarkable. Minute air-fluid level is noted in the right maxillary sinus.       Stable age related changes without acute intracranial process.   The emergency department physician was notified about the findings by phone 1310 hours.   Signed by: Drew Ames 10/4/2024 1:13 PM Dictation workstation:   [id number]       Assessment/Plan   Principal Problem:    Disorientation    79-year-old woman with a significantly impaired baseline presented with altered mental status. The incidental finding on exam of the spiculated mass in the upper lung appears of greatest concern but I have no explanation for her altered mental status and await MRI MRA and EEG.        I personally spent 22 minutes today, exclusive of procedures, providing care for this patient, including preparation, face to face time, documentation and other services such as review of medical records, diagnostic result, patient education, counseling, coordination of care as specified in the encounter.

## 2024-10-06 NOTE — PROGRESS NOTES
Karly Horton is a 79 y.o. female on day 2 of admission presenting with Disorientation.    Subjective   The patient was seen and examined.  Lying in the bed.  Comfortable.  Not in acute distress.  Patient denies any headache or dizziness.  No nausea or vomiting.  No fever chills or rigor.       Objective     Physical Exam  HEENT:  Head externally atraumatic,  extraocular movements intact, oral mucosa moist  Neck:  Supple, no JVP, no palpable adenopathy or thyromegaly.  No carotid bruit.  Chest:  Clear to auscultation and resonant.  Heart:  Regular rate and rhythm, no murmur or gallop could be appreciated.  Abdomen:  Soft, nontender, bowel sounds present, normoactive, no palpable hepatosplenomegaly.  Extremities:  No edema, pulses present, no cyanosis or clubbing.  CNS:  Patient alert, oriented to time, place and person.    No new deficit.  Cranial nerves 2-12 grossly intact  Skin:  No active rash.  Musculoskeletal:  No  apparent joint swelling or erythema, range of movement normal.  Last Recorded Vitals  Heart Rate:  []   Temp:  [36.2 °C (97.2 °F)-36.9 °C (98.4 °F)]   Resp:  [17-20]   BP: (142-152)/(69-92)   Weight:  [33 kg (72 lb 12 oz)]   SpO2:  [93 %-97 %]     Intake/Output last 3 Shifts:  I/O last 3 completed shifts:  In: 1770 (53.6 mL/kg) [P.O.:1770]  Out: 0 (0 mL/kg)   Weight: 33 kg     Relevant Results  Susceptibility data from last 90 days.  Collected Specimen Info Organism Amoxicillin/Clavulanate Ampicillin Ampicillin/Sulbactam Cefazolin Cefazolin (uncomplicated UTIs only) Ciprofloxacin Gentamicin Nitrofurantoin Piperacillin/Tazobactam Trimethoprim/Sulfamethoxazole   09/03/24 Blood culture from Peripheral Venipuncture Staphylococcus epidermidis             09/02/24 Urine from Clean Catch/Voided Escherichia coli  I  R  R  S  S  R  S  S  S  S     Results for orders placed or performed during the hospital encounter of 10/04/24 (from the past 24 hour(s))   POCT GLUCOSE   Result Value Ref Range    POCT  Glucose 119 (H) 74 - 99 mg/dL   Hemoglobin and hematocrit, blood   Result Value Ref Range    Hemoglobin 12.5 12.0 - 16.0 g/dL    Hematocrit 37.3 36.0 - 46.0 %   PST Top   Result Value Ref Range    Extra Tube Hold for add-ons.    Hemoglobin and hematocrit, blood   Result Value Ref Range    Hemoglobin 12.7 12.0 - 16.0 g/dL    Hematocrit 38.8 36.0 - 46.0 %        Current Facility-Administered Medications:     acetaminophen (Tylenol) tablet 650 mg, 650 mg, oral, q4h PRN **OR** acetaminophen (Tylenol) oral liquid 650 mg, 650 mg, oral, q4h PRN **OR** acetaminophen (Tylenol) suppository 650 mg, 650 mg, rectal, q4h PRN, Lupillo Mcneil MD    acetaminophen-codeine (Tylenol w/ Codeine #4) 300-60 mg per tablet 1 tablet, 1 tablet, oral, Daily PRN, Lupillo Mcneil MD    amLODIPine (Norvasc) tablet 10 mg, 10 mg, oral, Nightly, Lupillo Mcneil MD, 10 mg at 10/05/24 2044    ascorbic acid (Vitamin C) tablet 500 mg, 500 mg, oral, Daily, Lupillo Mcneil MD, 500 mg at 10/06/24 0822    baclofen (Lioresal) tablet 10 mg, 10 mg, oral, TID, Lupillo Mcneil MD, 10 mg at 10/06/24 0822    benzocaine-menthol (Cepastat Sore Throat) lozenge 1 lozenge, 1 lozenge, Mouth/Throat, q2h PRN, Lupillo Mcneil MD    butalbital-acetaminophen-caff -40 mg per tablet 1 tablet, 1 tablet, oral, q6h PRN, Lupillo Mcneil MD    dextromethorphan-guaifenesin (Robitussin DM)  mg/5 mL oral liquid 5 mL, 5 mL, oral, q4h PRN, Lupillo Mcneil MD    esomeprazole (NexIUM) suspension 40 mg, 40 mg, oral, BID, Lupillo Mcneil MD, 40 mg at 10/06/24 0822    ferrous sulfate (325 mg ferrous sulfate) tablet 325 mg, 65 mg of iron, oral, Daily, Lupillo Mcneil MD, 325 mg at 10/06/24 0822    guaiFENesin (Mucinex) 12 hr tablet 600 mg, 600 mg, oral, q12h PRN, Lupillo Mcneil MD    hydrOXYzine (Atarax) syrup 10 mg, 10 mg, oral, q8h PRN, Lupillo Mcneil MD    hydrOXYzine HCL (Atarax) tablet 10 mg, 10 mg, oral, q6h PRN, Lupillo OSULLIVAN  MD Tarun, 10 mg at 10/05/24 2044    lactulose 20 gram/30 mL oral solution 20 g, 20 g, oral, Daily, Lupillo Mcneil MD, 20 g at 10/06/24 0822    lidocaine (Xylocaine) 2 % mouth solution 1 mL, 1 mL, Topical, 4x daily PRN, Lupillo Mcneil MD    losartan (Cozaar) tablet 50 mg, 50 mg, oral, Daily, Lupillo Mcneil MD, 50 mg at 10/06/24 0822    lubricating eye drops ophthalmic solution 1 drop, 1 drop, Both Eyes, PRN, Lupillo Mcneil MD    meprobamate (Equanil) tablet 400 mg, 400 mg, oral, 4x daily PRN, Lupillo Mcneil MD    nicotine (Nicoderm CQ) 21 mg/24 hr patch 1 patch, 1 patch, transdermal, Daily, Lupillo Mcneil MD, 1 patch at 10/06/24 0822    OLANZapine (ZyPREXA) injection 2.5 mg, 2.5 mg, intramuscular, q6h PRN, Lupillo Mcneil MD, 2.5 mg at 10/05/24 0024    polyethylene glycol (Glycolax, Miralax) packet 17 g, 17 g, oral, Daily PRN, Lupillo Mcneil MD    sucralfate (Carafate) suspension 1 g, 1 g, oral, q6h [place], Lupillo Mcneil MD, 1 g at 10/06/24 1222   Assessment/Plan   Principal Problem:    Disorientation    Altered mental status  Toxic Metabolic encephalopathy  marijuana abuse  Generalized weakness  Chronic constipation  Anxiety  Neuropathy  Peptic ulcer disease  Vitamin D deficiency  Lung mass       Continue current medication.  Supportive care.  Neurology input appreciated.  MRI has been ordered.  Result is pending.  Patient is on the ground as compared to yesterday.  No focal deficit.  Patient has a lung mass.  But patient is not interested in having further workup for that.  Patient was found to be unresponsive at home.  That could be due to marijuana.  Underlying seizure activity cannot be ruled out.  Stroke is less likely given the fact that patient does not have any focal deficit.  Anyhow patient had a lung mass which is suspicious for cancer.  Check MRI to rule out any metastasis.  Check EEG to rule out seizure disorder.    Lupillo Mcneil MD

## 2024-10-07 ENCOUNTER — APPOINTMENT (OUTPATIENT)
Dept: NEUROLOGY | Facility: HOSPITAL | Age: 79
DRG: 091 | End: 2024-10-07
Payer: MEDICARE

## 2024-10-07 ENCOUNTER — APPOINTMENT (OUTPATIENT)
Dept: RADIOLOGY | Facility: HOSPITAL | Age: 79
DRG: 091 | End: 2024-10-07
Payer: MEDICARE

## 2024-10-07 LAB
ANION GAP SERPL CALCULATED.3IONS-SCNC: 13 MMOL/L (ref 10–20)
BACTERIA UR CULT: NO GROWTH
BASOPHILS # BLD AUTO: 0.04 X10*3/UL (ref 0–0.1)
BASOPHILS NFR BLD AUTO: 0.4 %
BUN SERPL-MCNC: 10 MG/DL (ref 6–23)
CALCIUM SERPL-MCNC: 9.2 MG/DL (ref 8.6–10.3)
CHLORIDE SERPL-SCNC: 98 MMOL/L (ref 98–107)
CO2 SERPL-SCNC: 29 MMOL/L (ref 21–32)
CREAT SERPL-MCNC: 0.4 MG/DL (ref 0.5–1.05)
EGFRCR SERPLBLD CKD-EPI 2021: >90 ML/MIN/1.73M*2
EOSINOPHIL # BLD AUTO: 0.04 X10*3/UL (ref 0–0.4)
EOSINOPHIL NFR BLD AUTO: 0.4 %
ERYTHROCYTE [DISTWIDTH] IN BLOOD BY AUTOMATED COUNT: 15.8 % (ref 11.5–14.5)
GLUCOSE SERPL-MCNC: 122 MG/DL (ref 74–99)
HCT VFR BLD AUTO: 39.4 % (ref 36–46)
HGB BLD-MCNC: 13.3 G/DL (ref 12–16)
IMM GRANULOCYTES # BLD AUTO: 0.05 X10*3/UL (ref 0–0.5)
IMM GRANULOCYTES NFR BLD AUTO: 0.5 % (ref 0–0.9)
LYMPHOCYTES # BLD AUTO: 1.21 X10*3/UL (ref 0.8–3)
LYMPHOCYTES NFR BLD AUTO: 11.1 %
MCH RBC QN AUTO: 29.2 PG (ref 26–34)
MCHC RBC AUTO-ENTMCNC: 33.8 G/DL (ref 32–36)
MCV RBC AUTO: 87 FL (ref 80–100)
MONOCYTES # BLD AUTO: 1.4 X10*3/UL (ref 0.05–0.8)
MONOCYTES NFR BLD AUTO: 12.8 %
NEUTROPHILS # BLD AUTO: 8.17 X10*3/UL (ref 1.6–5.5)
NEUTROPHILS NFR BLD AUTO: 74.8 %
NRBC BLD-RTO: 0 /100 WBCS (ref 0–0)
PLATELET # BLD AUTO: 338 X10*3/UL (ref 150–450)
POTASSIUM SERPL-SCNC: 3.5 MMOL/L (ref 3.5–5.3)
RBC # BLD AUTO: 4.55 X10*6/UL (ref 4–5.2)
SODIUM SERPL-SCNC: 136 MMOL/L (ref 136–145)
WBC # BLD AUTO: 10.9 X10*3/UL (ref 4.4–11.3)

## 2024-10-07 PROCEDURE — 80048 BASIC METABOLIC PNL TOTAL CA: CPT | Performed by: INTERNAL MEDICINE

## 2024-10-07 PROCEDURE — 2500000001 HC RX 250 WO HCPCS SELF ADMINISTERED DRUGS (ALT 637 FOR MEDICARE OP): Performed by: INTERNAL MEDICINE

## 2024-10-07 PROCEDURE — 97530 THERAPEUTIC ACTIVITIES: CPT | Mod: GP

## 2024-10-07 PROCEDURE — S4991 NICOTINE PATCH NONLEGEND: HCPCS | Performed by: INTERNAL MEDICINE

## 2024-10-07 PROCEDURE — 1200000002 HC GENERAL ROOM WITH TELEMETRY DAILY

## 2024-10-07 PROCEDURE — 85025 COMPLETE CBC W/AUTO DIFF WBC: CPT | Performed by: INTERNAL MEDICINE

## 2024-10-07 PROCEDURE — 2500000002 HC RX 250 W HCPCS SELF ADMINISTERED DRUGS (ALT 637 FOR MEDICARE OP, ALT 636 FOR OP/ED): Performed by: INTERNAL MEDICINE

## 2024-10-07 PROCEDURE — 36415 COLL VENOUS BLD VENIPUNCTURE: CPT | Performed by: INTERNAL MEDICINE

## 2024-10-07 PROCEDURE — 97530 THERAPEUTIC ACTIVITIES: CPT | Mod: GO,CO

## 2024-10-07 ASSESSMENT — COGNITIVE AND FUNCTIONAL STATUS - GENERAL
DAILY ACTIVITIY SCORE: 14
MOBILITY SCORE: 10
TOILETING: A LOT
STANDING UP FROM CHAIR USING ARMS: TOTAL
DRESSING REGULAR LOWER BODY CLOTHING: A LOT
MOVING FROM LYING ON BACK TO SITTING ON SIDE OF FLAT BED WITH BEDRAILS: A LOT
MOVING TO AND FROM BED TO CHAIR: A LOT
DAILY ACTIVITIY SCORE: 14
MOVING TO AND FROM BED TO CHAIR: A LOT
DRESSING REGULAR UPPER BODY CLOTHING: A LOT
WALKING IN HOSPITAL ROOM: TOTAL
CLIMB 3 TO 5 STEPS WITH RAILING: A LOT
TURNING FROM BACK TO SIDE WHILE IN FLAT BAD: A LOT
DRESSING REGULAR UPPER BODY CLOTHING: A LOT
HELP NEEDED FOR BATHING: A LOT
STANDING UP FROM CHAIR USING ARMS: A LOT
CLIMB 3 TO 5 STEPS WITH RAILING: TOTAL
DRESSING REGULAR LOWER BODY CLOTHING: A LOT
HELP NEEDED FOR BATHING: A LOT
MOVING FROM LYING ON BACK TO SITTING ON SIDE OF FLAT BED WITH BEDRAILS: A LITTLE
PERSONAL GROOMING: A LITTLE
DRESSING REGULAR LOWER BODY CLOTHING: TOTAL
CLIMB 3 TO 5 STEPS WITH RAILING: TOTAL
DAILY ACTIVITIY SCORE: 12
EATING MEALS: A LITTLE
EATING MEALS: A LITTLE
WALKING IN HOSPITAL ROOM: TOTAL
DRESSING REGULAR UPPER BODY CLOTHING: A LOT
TURNING FROM BACK TO SIDE WHILE IN FLAT BAD: A LOT
PERSONAL GROOMING: A LITTLE
TOILETING: TOTAL
WALKING IN HOSPITAL ROOM: A LOT
MOBILITY SCORE: 14
MOBILITY SCORE: 10
HELP NEEDED FOR BATHING: A LOT
MOVING TO AND FROM BED TO CHAIR: A LOT
TOILETING: A LOT
EATING MEALS: A LITTLE
PERSONAL GROOMING: A LITTLE
STANDING UP FROM CHAIR USING ARMS: A LOT
TURNING FROM BACK TO SIDE WHILE IN FLAT BAD: A LOT

## 2024-10-07 ASSESSMENT — PAIN - FUNCTIONAL ASSESSMENT
PAIN_FUNCTIONAL_ASSESSMENT: 0-10

## 2024-10-07 ASSESSMENT — PAIN SCALES - GENERAL
PAINLEVEL_OUTOF10: 0 - NO PAIN

## 2024-10-07 ASSESSMENT — VISUAL ACUITY: VA_NORMAL: 1

## 2024-10-07 NOTE — CONSULTS
"Nutrition Assessement Note    Nutrition Assessment    Reason for Assessment: Admission nursing screening (MST 3, wounds)    Spoke with pt at bedside. Pt reported that she drinks 1-2 Ensures/d but when she was at rehab, she stopped drinking Ensures. Pt reported that her \"stomach is small\" and that it is hard to eat. Pt ate < 50% of breakfast. Will provide Ensure plant based (due to lactose allergy) TID.     Reason for Hospital Admission:  Karly Horton is a 79 y.o. female who is admitted for disorientation.     Past Medical History:   Diagnosis Date    Other cervical disc degeneration, unspecified cervical region     Degeneration of cervical intervertebral disc    Other conditions influencing health status     Bulging Disc (L3 - L4)    Other conditions influencing health status     A Fall    Other conditions influencing health status     Bulging Disc (C4 - C5)    Other conditions influencing health status     Bulging Disc (C6 - C7)    Other conditions influencing health status     Bulging Disc (C5 - C6)    Other conditions influencing health status     Lumbar Spondylolisthesis    Other conditions influencing health status     Rotator Cuff Tendon Tear    Other intervertebral disc degeneration, lumbar region     L4-L5 disc bulge    Other intervertebral disc displacement, lumbar region     Disc displacement, lumbar    Other intervertebral disc displacement, lumbosacral region     Displacement of lumbosacral intervertebral disc    Personal history of other diseases of the musculoskeletal system and connective tissue     History of bursitis    Personal history of other diseases of the musculoskeletal system and connective tissue     History of fibromyositis    Personal history of other specified conditions     History of headache    Spinal stenosis, lumbar region without neurogenic claudication     Lumbar canal stenosis    Sprain of ligaments of lumbar spine, initial encounter     Low back sprain      Past Surgical " History:   Procedure Laterality Date    APPENDECTOMY      EYE SURGERY      HYSTERECTOMY      TONSILLECTOMY         Nutrition History:  Food and Nutrient History: Pt reported that she has good appetite. Pt ate < 50% of breakfast this morning. Pt stated that her daughter cooks her meals for her at home.  Energy Intake: Poor < 50 %     Food Allergies/Intolerances:  None  GI Symptoms: None  Oral Problems: None    Anthropometrics:  Ht: 152.4 cm (5'), Wt: (!) 33 kg (72 lb 12 oz), BMI: 14.21             Weight Change:  Daily Weight  10/06/24 : (!) 33 kg (72 lb 12 oz)  09/03/24 : (!) 43.1 kg (94 lb 15.9 oz)-- question accuracy  05/22/24 : (!) 36.2 kg (79 lb 12.9 oz)     Weight History / % Weight Change: Chart shows a 7# (8.8%) weight loss over 5 months             Nutrition Focused Physical Exam Findings:   Subcutaneous Fat Loss  Orbital Fat Pads: Severe (dark circles, hollowing and loose skin)  Buccal Fat Pads: Severe (hollow, sunken and narrow face)    Muscle Wasting  Temporalis: Severe (hollowed scooping depression)  Pectoralis (Clavicular Region): Severe (protruding prominent clavicle)  Deltoid/Trapezius: Severe (squared shoulders, acromion process prominent)  Interosseous: Severe (depressed area between thumb and forefinger)              Nutrition Significant Labs:  Lab Results   Component Value Date    WBC 10.9 10/07/2024    HGB 13.3 10/07/2024    HCT 39.4 10/07/2024     10/07/2024    CHOL 205 (H) 10/28/2019    TRIG 69 10/28/2019     10/28/2019    ALT 16 10/05/2024    AST 24 10/05/2024     10/07/2024    K 3.5 10/07/2024    CL 98 10/07/2024    CREATININE 0.40 (L) 10/07/2024    BUN 10 10/07/2024    CO2 29 10/07/2024    TSH 0.90 09/02/2024    INR 1.1 10/04/2024    HGBA1C 5.1 05/22/2024     Nutrition Specific Medications:  amLODIPine, 10 mg, oral, Nightly  ascorbic acid, 500 mg, oral, Daily  baclofen, 10 mg, oral, TID  esomeprazole, 40 mg, oral, BID  ferrous sulfate (325 mg ferrous sulfate), 65 mg of  iron, oral, Daily  lactulose, 20 g, oral, Daily  losartan, 50 mg, oral, Daily  nicotine, 1 patch, transdermal, Daily  sucralfate, 1 g, oral, q6h [place]        Dietary Orders (From admission, onward)       Start     Ordered    10/07/24 1359  Oral nutritional supplements  Until discontinued        Comments: chocolate   Question Answer Comment   Deliver with All meals    Select supplement: Ensure Compact        10/07/24 1358    10/05/24 0942  Adult diet Regular; Minced and moist 5  Diet effective now        Comments: Compensatory Swallowing Strategies:                                                                                                                                                                                                                                                           Upright 90 degrees as possible for all oral intake, single sips/bites, slow rate eating/feeding, alternate liquids and solids, upright after meals   Question Answer Comment   Diet type Regular    Texture Minced and moist 5        10/05/24 0941                  Estimated Needs:   Estimated Energy Needs  Total Energy Estimated Needs (kCal):  (0358-2431)  Total Estimated Energy Need per Day (kCal/kg):  (35-40)  Method for Estimating Needs: actual wt    Estimated Protein Needs  Total Protein Estimated Needs (g):  (40-50)  Total Protein Estimated Needs (g/kg):  (1.2-1.5)  Method for Estimating Needs: actual wt    Estimated Fluid Needs  Total Fluid Estimated Needs (mL):  (7179-2150)  Method for Estimating Needs: 1 mL/kcal        Nutrition Diagnosis   Nutrition Diagnosis:  Malnutrition Diagnosis  Patient has Malnutrition Diagnosis: Yes  Diagnosis Status: New  Malnutrition Diagnosis: Severe malnutrition related to chronic disease or condition  As Evidenced by: severe subcutaneous fat and muscle wasting            Nutrition Interventions/Recommendations   Nutrition Interventions and Recommendations:    Nutrition  Prescription:  Individualized Nutrition Prescription Provided for : 8622-9665 kcals, 40-50 g protein via diet    Nutrition Interventions:   Food and/or Nutrient Delivery Interventions  Interventions: Meals and snacks, Medical food supplement  Meals and Snacks: General healthful diet  Goal: provide diet as ordered  Medical Food Supplement: Commercial beverage  Goal: ensure plant based once daily to provide 180 kcals and 20g protein    Education Documentation  No documentation found.           Nutrition Monitoring and Evaluation   Monitoring/Evaluation:   Food/Nutrient Related History Monitoring  Monitoring and Evaluation Plan: Energy intake  Energy Intake: Estimated energy intake  Criteria: pt to consume >/= 75% estimated needs  Additional Plans: monitor K+, PO4 and Mg BID for signs of refeeding syndrome recommend 100 mg thiamin once daily for 7-10 days to prevent neurological complications during nutritional rehabilitation    Body Composition/Growth/Weight History  Monitoring and Evaluation Plan: Weight  Weight: Measured weight  Criteria: pt will maintain/gain wt         Time Spent/Follow-up:   Follow Up  Time Spent (min): 30 minutes  Last Date of Nutrition Visit: 10/07/24  Nutrition Follow-Up Needed?: 3-5 days  Follow up Comment: 10/10/24

## 2024-10-07 NOTE — CARE PLAN
Over the shift, the patient did not make progress toward the following goals. Barriers to progression include . Recommendations to address these barriers include .      Problem: Skin  Goal: Decreased wound size/increased tissue granulation at next dressing change  10/7/2024 0628 by Jasiel Sutton RN  Outcome: Progressing  10/7/2024 0129 by Jasiel Sutton RN  Flowsheets (Taken 10/7/2024 0129)  Decreased wound size/increased tissue granulation at next dressing change:   Promote sleep for wound healing   Protective dressings over bony prominences  10/7/2024 0128 by Jasiel Sutton RN  Flowsheets (Taken 10/4/2024 2050 by Idris Caceres RN)  Decreased wound size/increased tissue granulation at next dressing change: Promote sleep for wound healing  Goal: Participates in plan/prevention/treatment measures  10/7/2024 0628 by Jasiel Sutton RN  Outcome: Progressing  10/7/2024 0129 by Jasiel Sutton RN  Flowsheets (Taken 10/7/2024 0129)  Participates in plan/prevention/treatment measures:   Discuss with provider PT/OT consult   Increase activity/out of bed for meals  10/7/2024 0128 by Jasiel Sutton RN  Flowsheets (Taken 10/5/2024 2143 by Asuncion Morin RN)  Participates in plan/prevention/treatment measures: Increase activity/out of bed for meals  Goal: Prevent/manage excess moisture  10/7/2024 0628 by Jasiel Sutton RN  Outcome: Progressing  10/7/2024 0129 by Jasiel Sutton RN  Flowsheets (Taken 10/7/2024 0129)  Prevent/manage excess moisture:   Use wicking fabric (obtain order)   Monitor for/manage infection if present   Moisturize dry skin  10/7/2024 0128 by Jasiel Sutton RN  Flowsheets (Taken 10/5/2024 2143 by Asuncion Morin RN)  Prevent/manage excess moisture: Cleanse incontinence/protect with barrier cream  Goal: Prevent/minimize sheer/friction injuries  10/7/2024 0628 by Jasiel Sutton RN  Outcome: Progressing  10/7/2024 0129 by Jasiel Sutton RN  Flowsheets (Taken  10/7/2024 0129)  Prevent/minimize sheer/friction injuries: Utilize specialty bed per algorithm  10/7/2024 0128 by Jasiel Sutton RN  Flowsheets (Taken 10/5/2024 2143 by Asuncion Morin RN)  Prevent/minimize sheer/friction injuries:   Complete micro-shifts as needed if patient unable. Adjust patient position to relieve pressure points, not a full turn   HOB 30 degrees or less  Goal: Promote/optimize nutrition  10/7/2024 0628 by Jasiel Sutton RN  Outcome: Progressing  10/7/2024 0129 by Jasiel Sutton RN  Flowsheets (Taken 10/7/2024 0129)  Promote/optimize nutrition:   Offer water/supplements/favorite foods   Monitor/record intake including meals  10/7/2024 0128 by Jasiel Sutton RN  Flowsheets (Taken 10/5/2024 2143 by Asuncion Morin RN)  Promote/optimize nutrition:   Assist with feeding   Consume > 50% meals/supplements   Monitor/record intake including meals  Goal: Promote skin healing  10/7/2024 0628 by Jasiel Sutton RN  Outcome: Progressing  10/7/2024 0129 by Jasiel Sutton RN  Flowsheets (Taken 10/7/2024 0129)  Promote skin healing:   Protective dressings over bony prominences   Ensure correct size (line/device) and apply per  instructions  10/7/2024 0128 by Jasiel Sutton RN  Flowsheets (Taken 10/4/2024 2050 by Idris Caceres RN)  Promote skin healing: Assess skin/pad under line(s)/device(s)     Problem: Pain  Goal: Walks with improved pain control throughout the shift  10/7/2024 0628 by Jasiel Sutton RN  Outcome: Progressing  10/7/2024 0628 by Jasiel Sutton RN  Outcome: Progressing  10/7/2024 0627 by Jasiel Sutton RN  Outcome: Progressing  Goal: Performs ADL's with improved pain control throughout shift  10/7/2024 0628 by Jasiel Sutton RN  Outcome: Progressing  10/7/2024 0628 by Jasiel Sutton RN  Outcome: Progressing  10/7/2024 0627 by Jasiel Sutton, GIANLUCA  Outcome: Progressing  Goal: Participates in PT with improved pain control  throughout the shift  10/7/2024 0628 by Jasiel Sutton RN  Outcome: Progressing  10/7/2024 0628 by Jasiel Sutton RN  Outcome: Progressing  10/7/2024 0627 by Jasiel Sutton RN  Outcome: Progressing     Problem: Nutrition  Goal: Less than 5 days NPO/clear liquids  Outcome: Progressing  Goal: Oral intake greater than 50%  Outcome: Progressing  Goal: Oral intake greater 75%  Outcome: Progressing  Goal: Consume prescribed supplement  Outcome: Progressing  Goal: Adequate PO fluid intake  Outcome: Progressing  Goal: Nutrition support goals are met within 48 hrs  Outcome: Progressing  Goal: Nutrition support is meeting 75% of nutrient needs  Outcome: Progressing  Goal: Tube feed tolerance  Outcome: Progressing  Goal: BG  mg/dL  Outcome: Progressing  Goal: Lab values WNL  Outcome: Progressing  Goal: Electrolytes WNL  Outcome: Progressing  Goal: Promote healing  Outcome: Progressing  Goal: Maintain stable weight  Outcome: Progressing  Goal: Reduce weight from edema/fluid  Outcome: Progressing  Goal: Gradual weight gain  Outcome: Progressing  Goal: Improve ostomy output  Outcome: Progressing

## 2024-10-07 NOTE — NURSING NOTE
Received report from day shift Nurse.  Seen patient on bed brought in from SDU.  Patient comfortable on bed, no distress or discomfort noted.  Safety measures ensured and call light in reach.

## 2024-10-07 NOTE — PROGRESS NOTES
Karly Horton is a 79 y.o. female on day 3 of admission presenting with Disorientation.      Subjective   Pt resting in bed at this time appears to be back to her baseline orientation. Pt states that she is back to her baseline just generalized weakness Denies any complaints at this time time.        Objective     Last Recorded Vitals  Blood pressure 142/71, pulse 102, temperature 36.3 °C (97.3 °F), temperature source Oral, resp. rate 17, height 1.524 m (5'), weight (!) 33 kg (72 lb 12 oz), SpO2 96%.    Physical Exam  Eyes:      General: Lids are normal.      Extraocular Movements: Extraocular movements intact.      Pupils: Pupils are equal, round, and reactive to light.   Psychiatric:         Speech: Speech normal.       Neurological Exam  Mental Status  Awake, alert and oriented to person, place and time. Oriented to person, place and time. Recent and remote memory are intact. Speech is normal. Follows complex commands. Attention and concentration are normal. Fund of knowledge is appropriate for level of education.    Cranial Nerves  CN I: Sense of smell is normal.  CN II: Visual acuity is normal. Visual fields full to confrontation.  CN III, IV, VI: Extraocular movements intact bilaterally. Normal lids and orbits bilaterally. Pupils equal round and reactive to light bilaterally.  CN V: Facial sensation is normal.  CN VII: Full and symmetric facial movement.  CN VIII: Hearing is normal.  CN IX, X: Palate elevates symmetrically. Normal gag reflex.  CN XI: Shoulder shrug strength is normal.  CN XII: Tongue midline without atrophy or fasciculations.    Motor    Muscle atrophy upper and lower ext.   Generalized weakness symmetrical throughout.    Reflexes  Unable to assess due to pain.    Coordination    Unable to assess .    Gait    Unable to assess due to pt safety .      Scheduled medications  amLODIPine, 10 mg, oral, Nightly  ascorbic acid, 500 mg, oral, Daily  baclofen, 10 mg, oral, TID  esomeprazole, 40 mg,  oral, BID  ferrous sulfate (325 mg ferrous sulfate), 65 mg of iron, oral, Daily  lactulose, 20 g, oral, Daily  losartan, 50 mg, oral, Daily  nicotine, 1 patch, transdermal, Daily  sucralfate, 1 g, oral, q6h [place]      Continuous medications     PRN medications  PRN medications: acetaminophen **OR** acetaminophen **OR** acetaminophen, acetaminophen-codeine, benzocaine-menthol, butalbital-acetaminophen-caff, dextromethorphan-guaifenesin, guaiFENesin, hydrOXYzine, hydrOXYzine HCL, lidocaine, lubricating eye drops, meprobamate, OLANZapine, polyethylene glycol    Results for orders placed or performed during the hospital encounter of 10/04/24 (from the past 24 hour(s))   Hemoglobin and hematocrit, blood   Result Value Ref Range    Hemoglobin 13.3 12.0 - 16.0 g/dL    Hematocrit 39.0 36.0 - 46.0 %   Hemoglobin and hematocrit, blood   Result Value Ref Range    Hemoglobin 13.3 12.0 - 16.0 g/dL    Hematocrit 40.3 36.0 - 46.0 %   CBC and Auto Differential   Result Value Ref Range    WBC 10.9 4.4 - 11.3 x10*3/uL    nRBC 0.0 0.0 - 0.0 /100 WBCs    RBC 4.55 4.00 - 5.20 x10*6/uL    Hemoglobin 13.3 12.0 - 16.0 g/dL    Hematocrit 39.4 36.0 - 46.0 %    MCV 87 80 - 100 fL    MCH 29.2 26.0 - 34.0 pg    MCHC 33.8 32.0 - 36.0 g/dL    RDW 15.8 (H) 11.5 - 14.5 %    Platelets 338 150 - 450 x10*3/uL    Neutrophils % 74.8 40.0 - 80.0 %    Immature Granulocytes %, Automated 0.5 0.0 - 0.9 %    Lymphocytes % 11.1 13.0 - 44.0 %    Monocytes % 12.8 2.0 - 10.0 %    Eosinophils % 0.4 0.0 - 6.0 %    Basophils % 0.4 0.0 - 2.0 %    Neutrophils Absolute 8.17 (H) 1.60 - 5.50 x10*3/uL    Immature Granulocytes Absolute, Automated 0.05 0.00 - 0.50 x10*3/uL    Lymphocytes Absolute 1.21 0.80 - 3.00 x10*3/uL    Monocytes Absolute 1.40 (H) 0.05 - 0.80 x10*3/uL    Eosinophils Absolute 0.04 0.00 - 0.40 x10*3/uL    Basophils Absolute 0.04 0.00 - 0.10 x10*3/uL   Basic Metabolic Panel   Result Value Ref Range    Glucose 122 (H) 74 - 99 mg/dL    Sodium 136 136 - 145  mmol/L    Potassium 3.5 3.5 - 5.3 mmol/L    Chloride 98 98 - 107 mmol/L    Bicarbonate 29 21 - 32 mmol/L    Anion Gap 13 10 - 20 mmol/L    Urea Nitrogen 10 6 - 23 mg/dL    Creatinine 0.40 (L) 0.50 - 1.05 mg/dL    eGFR >90 >60 mL/min/1.73m*2    Calcium 9.2 8.6 - 10.3 mg/dL        NIH Stroke Scale  1A. Level of Consciousness: Requires Repeated Stimulation to Arouse or Responds to Pain  1B. Ask Month and Age: No Questions Right  1C. Blink Eyes & Squeeze Hands: Performs 0 Tasks  2. Best Gaze: Normal  3. Visual: No Visual Loss  4. Facial Palsy: Normal Symmetrical Movements  5A. Motor - Left Arm: No Drift  5B. Motor - Right Arm: No Drift  6A. Motor - Left Leg: No Drift  6B. Motor - Right Leg: No Drift  7. Limb Ataxia: Absent  8. Sensory Loss: Normal  9. Best Language: No Aphasia  10. Dysarthria: Normal  11. Extinction and Inattention: No Abnormality  NIH Stroke Scale: 6           Mar[place] Coma Scale  Best Eye Response: Spontaneous  Best Verbal Response: Oriented  Best Motor Response: None  Mar[place] Coma Scale Score: 10               CT chest abdomen pelvis wo IV contrast    Result Date: 10/6/2024  Interpreted By:  Clemente Vergara, STUDY: CT CHEST ABDOMEN PELVIS WO CONTRAST; 10/6/2024 11:06 am   INDICATION: Signs/Symptoms:spiculated lung mass. Spiculated lung mass noted at the right apex on CT angiography of the head and neck.   COMPARISON: None   ACCESSION NUMBER(S): [id number]   ORDERING CLINICIAN: DUGLAS ESPARZA   TECHNIQUE: COMMENTS: This exam is performed without oral or intravenous contrast as was requested. Please note that the absence of oral and intravenous contrast material does limit the sensitivity and specificity of this examination. In particular solid organ assessment for neoplasm is significantly limited. Assessment of the GI tract is also limited without the aid of oral contrast administration. Vascular abnormalities such as dissection, occlusions, infarcts and thrombus may also go undetected.     One or  more of the following dose reduction techniques were used: Automated exposure control Adjustment of the mA and/or kV according to patient size, and/or use of iterative reconstruction technique.   FINDINGS: CHEST:   Within limits of this unenhanced study no hilar or mediastinal lymphadenopathy is appreciated. Emphysematous change of the pulmonary parenchyma is present bilaterally. No effusions are identified. No alveolar consolidation is noted. *There is a 7 mm apicoposterior segment right upper lobe nodule (Series 7, Image 85). *There is a 2.2 cm spiculated mass apicoposterior segment right upper lobe (Series 7, Image 114). *There is a 9 mm spiculated mass superior segment right lower lobe (Series 7, Image 118). *There is a 9 mm nodule right lower lobe laterally (Series 7, Image 220). *There is a 2 mm and 3 mm nodule left lower lobe (Series 7, Image 178). *There is a 6 mm left lower lobe nodule medially (Series 7, Image 122). *There is a 6 mm anterior segment right upper lobe nodule (Series 6, Image 85). *There is a 5 mm right lower lobe nodule (Series 7, Image 175). *There is a 3.1 cm spiculated mass lower pole right hilum (Series 7, Image 187). This appears to occlude several of the branches of the right lower lobe anteriorly. *Mucous plugging is present within the right lower lobe.     Chest Wall: No chest wall masses are identified.   Skeletal System: No fractures or destructive lesions are identified. There is a severe thoracic levoscoliosis with spondylosis.   _________________________________________________________   CT SCAN OF THE ABDOMEN AND PELVIS FINDINGS: ABDOMEN CT: SOLID ORGAN ASSESSMENT: Within limits of this unenhanced study no focal masses are identified within the liver, spleen, pancreas, kidneys or adrenal glands. Hepatic and splenic calcifications are present consistent with remote granulomatous disease. There is vague increased density along the dependent portion of the gallbladder possibly  indicating milk of calcium or sludge.   RETROPERITONEUM: AORTA:  There is no evidence for abdominal aortic aneurysm.   LYMPH NODES: There is no evidence for retroperitoneal lymphadenopathy.   BOWEL ASSESSMENT: No intraperitoneal free air is identified. There is a nonspecific appearance of the stomach.  The small bowel is unremarkable in appearance.  The colon is nonspecific in appearance.   ABDOMINAL AND PELVIC WALLS: There is no evidence for a hernia. No abdominal wall masses are identified.   OSSEOUS STRUCTURES: No lytic or blastic lesions are identified. There is a severe lumbar dextroscoliosis with spondylosis.   PELVIC CT: No pathologic masses or fluid collections are identified.       1. There are multiple masses identified within the right and left lobes of the liver more numerous and larger on the right with particular note made of a large 3.1 cm lower pole mass right hilum in a 2.2 cm spiculated mass apicoposterior segment right upper lobe along with additional masses as detailed above. Malignancy is likely with metastatic lesions consideration along with primary lung cancer metastatic to the lungs.. 2. Emphysematous change pulmonary parenchyma. 3. Nonspecific appearance of the abdomen and pelvis. Please note that sensitivity of the abdomen and pelvis assessment is limited due to the cachectic state of the patient and absence of oral and intravenous contrast.     MACRO: none   Signed by: Clemente Vergara 10/6/2024 1:10 PM Dictation workstation:   [id number]           Assessment/Plan      Assessment & Plan  Disorientation    Pt is a 79 year old female presenting with alerted mental status. Pt was found by her daughter unresponsive on the couch at home.  Pt appears to be back at her baseline mentation. Stroke is unlikely due to no focal deficit. Pt found to have multiple lung masses. Unclearly what the etiology of confusion is caused by but will rule out stroke, seizure, metastatic disease.      Recommendations  MRI w and w/o  MRA   EEG            I spent 35 minutes in the professional and overall care of this patient.      Mis Gustafson, PO-CNP

## 2024-10-07 NOTE — CARE PLAN
The patient's goals for the shift include  GO HOME    The clinical goals for the shift include PARTICIPATES IN PLAN OF CARE    Over the shift, the patient did make progress toward the PLAN OF CARE goals.

## 2024-10-07 NOTE — PROGRESS NOTES
Karly Horton is a 79 y.o. female on day 3 of admission presenting with Disorientation.    Subjective   Patient seen and examined.  Resting in bed in no acute distress.  Awake alert oriented x 2-3.  Forgetful.  No complaints.  Review of systems limited.     Objective     Physical Exam  Vitals and nursing note reviewed.   Constitutional:       General: She is not in acute distress.     Appearance: Normal appearance. She is normal weight. She is not ill-appearing, toxic-appearing or diaphoretic.      Comments: Chronically ill appearing.   HENT:      Head: Normocephalic and atraumatic.      Right Ear: External ear normal.      Left Ear: External ear normal.      Nose: Nose normal.      Mouth/Throat:      Mouth: Mucous membranes are moist.      Pharynx: Oropharynx is clear.   Eyes:      Extraocular Movements: Extraocular movements intact.      Conjunctiva/sclera: Conjunctivae normal.      Pupils: Pupils are equal, round, and reactive to light.   Cardiovascular:      Rate and Rhythm: Normal rate and regular rhythm.      Pulses: Normal pulses.      Heart sounds: Normal heart sounds.   Pulmonary:      Effort: Pulmonary effort is normal. No respiratory distress.      Breath sounds: Decreased breath sounds present. No wheezing, rhonchi or rales.      Comments: Room air.  Abdominal:      General: Bowel sounds are normal. There is no distension.      Palpations: Abdomen is soft.      Tenderness: There is no abdominal tenderness.   Genitourinary:     Comments: Deferred.  Musculoskeletal:         General: No swelling or tenderness. Normal range of motion.      Cervical back: Normal range of motion and neck supple.   Skin:     General: Skin is warm and dry.      Capillary Refill: Capillary refill takes less than 2 seconds.   Neurological:      General: No focal deficit present.      Mental Status: She is alert. She is disoriented.   Psychiatric:         Mood and Affect: Mood normal.         Behavior: Behavior normal.        Last Recorded Vitals  Blood pressure 142/71, pulse 102, temperature 36.3 °C (97.3 °F), temperature source Oral, resp. rate 17, height 1.524 m (5'), weight (!) 33 kg (72 lb 12 oz), SpO2 96%.    Intake/Output last 3 Shifts:  I/O last 3 completed shifts:  In: 950 (28.8 mL/kg) [P.O.:950]  Out: - (0 mL/kg)   Weight: 33 kg     Relevant Results  Results for orders placed or performed during the hospital encounter of 10/04/24 (from the past 24 hour(s))   Hemoglobin and hematocrit, blood   Result Value Ref Range    Hemoglobin 13.3 12.0 - 16.0 g/dL    Hematocrit 39.0 36.0 - 46.0 %   Hemoglobin and hematocrit, blood   Result Value Ref Range    Hemoglobin 13.3 12.0 - 16.0 g/dL    Hematocrit 40.3 36.0 - 46.0 %   CBC and Auto Differential   Result Value Ref Range    WBC 10.9 4.4 - 11.3 x10*3/uL    nRBC 0.0 0.0 - 0.0 /100 WBCs    RBC 4.55 4.00 - 5.20 x10*6/uL    Hemoglobin 13.3 12.0 - 16.0 g/dL    Hematocrit 39.4 36.0 - 46.0 %    MCV 87 80 - 100 fL    MCH 29.2 26.0 - 34.0 pg    MCHC 33.8 32.0 - 36.0 g/dL    RDW 15.8 (H) 11.5 - 14.5 %    Platelets 338 150 - 450 x10*3/uL    Neutrophils % 74.8 40.0 - 80.0 %    Immature Granulocytes %, Automated 0.5 0.0 - 0.9 %    Lymphocytes % 11.1 13.0 - 44.0 %    Monocytes % 12.8 2.0 - 10.0 %    Eosinophils % 0.4 0.0 - 6.0 %    Basophils % 0.4 0.0 - 2.0 %    Neutrophils Absolute 8.17 (H) 1.60 - 5.50 x10*3/uL    Immature Granulocytes Absolute, Automated 0.05 0.00 - 0.50 x10*3/uL    Lymphocytes Absolute 1.21 0.80 - 3.00 x10*3/uL    Monocytes Absolute 1.40 (H) 0.05 - 0.80 x10*3/uL    Eosinophils Absolute 0.04 0.00 - 0.40 x10*3/uL    Basophils Absolute 0.04 0.00 - 0.10 x10*3/uL   Basic Metabolic Panel   Result Value Ref Range    Glucose 122 (H) 74 - 99 mg/dL    Sodium 136 136 - 145 mmol/L    Potassium 3.5 3.5 - 5.3 mmol/L    Chloride 98 98 - 107 mmol/L    Bicarbonate 29 21 - 32 mmol/L    Anion Gap 13 10 - 20 mmol/L    Urea Nitrogen 10 6 - 23 mg/dL    Creatinine 0.40 (L) 0.50 - 1.05 mg/dL    eGFR  >90 >60 mL/min/1.73m*2    Calcium 9.2 8.6 - 10.3 mg/dL     Susceptibility data from last 90 days.  Collected Specimen Info Organism Amoxicillin/Clavulanate Ampicillin Ampicillin/Sulbactam Cefazolin Cefazolin (uncomplicated UTIs only) Ciprofloxacin Gentamicin Nitrofurantoin Piperacillin/Tazobactam Trimethoprim/Sulfamethoxazole   09/03/24 Blood culture from Peripheral Venipuncture Staphylococcus epidermidis             09/02/24 Urine from Clean Catch/Voided Escherichia coli  I  R  R  S  S  R  S  S  S  S     CT chest abdomen pelvis wo IV contrast    Result Date: 10/6/2024  Interpreted By:  Clemente Vergara, STUDY: CT CHEST ABDOMEN PELVIS WO CONTRAST; 10/6/2024 11:06 am   INDICATION: Signs/Symptoms:spiculated lung mass. Spiculated lung mass noted at the right apex on CT angiography of the head and neck.   COMPARISON: None   ACCESSION NUMBER(S): [id number]   ORDERING CLINICIAN: DUGLAS ESPARZA   TECHNIQUE: COMMENTS: This exam is performed without oral or intravenous contrast as was requested. Please note that the absence of oral and intravenous contrast material does limit the sensitivity and specificity of this examination. In particular solid organ assessment for neoplasm is significantly limited. Assessment of the GI tract is also limited without the aid of oral contrast administration. Vascular abnormalities such as dissection, occlusions, infarcts and thrombus may also go undetected.     One or more of the following dose reduction techniques were used: Automated exposure control Adjustment of the mA and/or kV according to patient size, and/or use of iterative reconstruction technique.   FINDINGS: CHEST:   Within limits of this unenhanced study no hilar or mediastinal lymphadenopathy is appreciated. Emphysematous change of the pulmonary parenchyma is present bilaterally. No effusions are identified. No alveolar consolidation is noted. *There is a 7 mm apicoposterior segment right upper lobe nodule (Series 7, Image 85).  *There is a 2.2 cm spiculated mass apicoposterior segment right upper lobe (Series 7, Image 114). *There is a 9 mm spiculated mass superior segment right lower lobe (Series 7, Image 118). *There is a 9 mm nodule right lower lobe laterally (Series 7, Image 220). *There is a 2 mm and 3 mm nodule left lower lobe (Series 7, Image 178). *There is a 6 mm left lower lobe nodule medially (Series 7, Image 122). *There is a 6 mm anterior segment right upper lobe nodule (Series 6, Image 85). *There is a 5 mm right lower lobe nodule (Series 7, Image 175). *There is a 3.1 cm spiculated mass lower pole right hilum (Series 7, Image 187). This appears to occlude several of the branches of the right lower lobe anteriorly. *Mucous plugging is present within the right lower lobe.     Chest Wall: No chest wall masses are identified.   Skeletal System: No fractures or destructive lesions are identified. There is a severe thoracic levoscoliosis with spondylosis.   _________________________________________________________   CT SCAN OF THE ABDOMEN AND PELVIS FINDINGS: ABDOMEN CT: SOLID ORGAN ASSESSMENT: Within limits of this unenhanced study no focal masses are identified within the liver, spleen, pancreas, kidneys or adrenal glands. Hepatic and splenic calcifications are present consistent with remote granulomatous disease. There is vague increased density along the dependent portion of the gallbladder possibly indicating milk of calcium or sludge.   RETROPERITONEUM: AORTA:  There is no evidence for abdominal aortic aneurysm.   LYMPH NODES: There is no evidence for retroperitoneal lymphadenopathy.   BOWEL ASSESSMENT: No intraperitoneal free air is identified. There is a nonspecific appearance of the stomach.  The small bowel is unremarkable in appearance.  The colon is nonspecific in appearance.   ABDOMINAL AND PELVIC WALLS: There is no evidence for a hernia. No abdominal wall masses are identified.   OSSEOUS STRUCTURES: No lytic or  blastic lesions are identified. There is a severe lumbar dextroscoliosis with spondylosis.   PELVIC CT: No pathologic masses or fluid collections are identified.       1. There are multiple masses identified within the right and left lobes of the liver more numerous and larger on the right with particular note made of a large 3.1 cm lower pole mass right hilum in a 2.2 cm spiculated mass apicoposterior segment right upper lobe along with additional masses as detailed above. Malignancy is likely with metastatic lesions consideration along with primary lung cancer metastatic to the lungs.. 2. Emphysematous change pulmonary parenchyma. 3. Nonspecific appearance of the abdomen and pelvis. Please note that sensitivity of the abdomen and pelvis assessment is limited due to the cachectic state of the patient and absence of oral and intravenous contrast.     MACRO: none   Signed by: Clemente Vergara 10/6/2024 1:10 PM Dictation workstation:   [id number]     Scheduled medications  amLODIPine, 10 mg, oral, Nightly  ascorbic acid, 500 mg, oral, Daily  baclofen, 10 mg, oral, TID  esomeprazole, 40 mg, oral, BID  ferrous sulfate (325 mg ferrous sulfate), 65 mg of iron, oral, Daily  lactulose, 20 g, oral, Daily  losartan, 50 mg, oral, Daily  nicotine, 1 patch, transdermal, Daily  sucralfate, 1 g, oral, q6h [place]      Continuous medications     PRN medications  PRN medications: acetaminophen **OR** acetaminophen **OR** acetaminophen, acetaminophen-codeine, benzocaine-menthol, butalbital-acetaminophen-caff, dextromethorphan-guaifenesin, guaiFENesin, hydrOXYzine, hydrOXYzine HCL, lidocaine, lubricating eye drops, meprobamate, OLANZapine, polyethylene glycol      PLAN:  Disorientation  Altered mental status  Toxic, metabolic encephalopathy  Marijuana use  Generalized weakness  Lung masses  Liver masses  Tobacco use  Peptic ulcer disease  Chronic constipation  Anxiety  Migraine headaches    PLAN:  Neurology input appreciated.  MRI brain to  rule out metastasis.  EEG to rule out seizure disorder.  Monitor mentation.  Follow-up evaluation for lung and liver masses.  Abstain from tobacco and marijuana use.  PT/OT.  Fall precautions.  Up with assistance only.  Bed and chair alarm at all times.  Pressure ulcer prevention measures.  Turn and reposition every 2 hours as needed.  He is appropriate offloading.  DVT prophylaxis.  GI prophylaxis.  PPI Nexium.  Supportive care.  Patient reassured  Case management following for discharge planning.  Awaiting discharge arrangements.  Discussed with patient, Dr. Mcneil.      PO Jang-CNP

## 2024-10-07 NOTE — NURSING NOTE
Patient adamantly refusing morning lab draw, stating she's already been stuck enough. Attempted to instruct patient on necessity of monitoring labs and she began screaming at me and refusing to listen to anything I had to say. Notified Dr. Mcneil.

## 2024-10-07 NOTE — NURSING NOTE
Patient just informed me that she is cancelling her MRI, stating she does not understand why it was ordered because there is nothing wrong with her head. Reminded patient why she came into the hospital: found unresponsive by her daughter. Patient states she thought she was in here for bleeding stomach ulcers. Patient states she does not believe her head could tolerate an MRI because it is too noisy and causes her to have bad headaches. Notified ordering practitioner.

## 2024-10-07 NOTE — PROGRESS NOTES
Physical Therapy    Physical Therapy Treatment    Patient Name: Karly Horton  MRN: [id number]  Department: [address]  Room: [facility]  Today's Date: 10/7/2024  Time Calculation  Start Time: 1402  Stop Time: 1426  Time Calculation (min): 24 min         Assessment/Plan   PT Assessment  Rehab Prognosis: Good  Evaluation/Treatment Tolerance: Patient limited by fatigue  End of Session Communication: Bedside nurse  Assessment Comment: Progressing slowly with functional mobility;  tolerance to activity progressing slowly;  fall risk  End of Session Patient Position: Bed, 3 rail up, Alarm off, not on at start of session  PT Plan  Inpatient/Swing Bed or Outpatient: Inpatient  PT Plan  Treatment/Interventions: Bed mobility, Transfer training, Gait training, Balance training, Strengthening, Endurance training, Therapeutic exercise, Therapeutic activity  PT Plan: Ongoing PT  PT Frequency: 4 times per week  PT Discharge Recommendations: Moderate intensity level of continued care  Equipment Recommended upon Discharge: Wheeled walker  PT Recommended Transfer Status: Assist x1  PT - OK to Discharge: Yes (with skilled physical therapy services at next level of care)      General Visit Information:   PT  Visit  PT Received On: 10/07/24  General  Co-Treatment: OT  Co-Treatment Reason: Limited tolerance to activity  Prior to Session Communication: Bedside nurse  Patient Position Received: Bed, 3 rail up, Alarm off, not on at start of session  General Comment: RN cleared patient for treatment.  Patient reports agreeable to treatment.    Subjective   Precautions:  Precautions  Medical Precautions: Fall precautions              Objective   Pain:  Pain Assessment  Pain Assessment: 0-10  0-10 (Numeric) Pain Score: 0 - No pain  Cognition:  Cognition  Overall Cognitive Status: Within Functional Limits  Coordination:  Movements are Fluid and Coordinated: No  Lower Body Coordination: Slower rate of movement buffy LE  Postural Control:  Static  Sitting Balance  Static Sitting-Balance Support: Bilateral upper extremity supported  Static Sitting-Level of Assistance: Minimum assistance  Static Sitting-Comment/Number of Minutes: Intermittent assist required to maintain midline while sitting on side of bed due to decreased balance posterior  Static Standing Balance  Static Standing-Balance Support: Bilateral upper extremity supported  Static Standing-Level of Assistance: Moderate assistance  Static Standing-Comment/Number of Minutes: Assist with trunk support and balance during static standing with rolling walker          Activity Tolerance:  Activity Tolerance  Endurance: Decreased tolerance for upright activites  Activity Tolerance Comments: Fatigue  Treatments:  Therapeutic Exercise  Therapeutic Exercise Performed: Yes  Therapeutic Exercise Activity 1: ankle pumps, heel slides, hip abduction 5-10 reps x 1 set    Therapeutic Activity  Therapeutic Activity Performed: Yes  Therapeutic Activity 1: Static standing x 45 seconds with rolling walker and mod assist with trunk support and balance;  patient appeared to support body weight with buffy LE in standing;  patient stands with forward flexed posture         Bed Mobility  Bed Mobility: Yes  Bed Mobility 1  Bed Mobility 1: Supine to sitting  Level of Assistance 1: Moderate assistance  Bed Mobility Comments 1: Assist with trunk-up during supine to sit;  mod assist to scoot hips to edge of bed during supine to sit  Bed Mobility 2  Bed Mobility  2: Sitting to supine  Level of Assistance 2: Moderate assistance  Bed Mobility Comments 2: Assist with trunk and buffy LE    Ambulation/Gait Training  Ambulation/Gait Training Performed: No  Transfers  Transfer: Yes  Transfer 1  Transfer From 1: Sit to  Transfer to 1: Stand  Technique 1: Sit to stand  Transfer Device 1: Walker  Transfer Level of Assistance 1: Moderate assistance  Trials/Comments 1: Assist with trunk support and balance;  verbal cues for hand  placement  Transfers 2  Transfer From 2: Stand to  Transfer to 2: Sit  Technique 2: Stand to sit  Transfer Device 2: Walker  Transfer Level of Assistance 2: Moderate assistance  Trials/Comments 2: Assist with trunk support and balance;  verbal cues for hand placement    Stairs  Stairs: No         Outcome Measures:  [facility] Basic Mobility  Turning from your back to your side while in a flat bed without using bedrails: A lot  Moving from lying on your back to sitting on the side of a flat bed without using bedrails: A lot  Moving to and from bed to chair (including a wheelchair): A lot  Standing up from a chair using your arms (e.g. wheelchair or bedside chair): A lot  To walk in hospital room: Total  Climbing 3-5 steps with railing: Total  Basic Mobility - Total Score: 10    Education Documentation  No documentation found.  Education Comments  No comments found.        OP EDUCATION:       Encounter Problems       Encounter Problems (Active)       PT Problem       Patient will transfer supine <> sit with minimal assist  (Progressing)       Start:  10/05/24    Expected End:  10/26/24            Patient will transfer sit <> stand with minimal assist and use of rolling walker  (Progressing)       Start:  10/05/24    Expected End:  10/26/24            Patient will ambulate 50 ft with minimal assist and use of rolling walker        Start:  10/05/24    Expected End:  10/26/24            Patient will demonstrate improvements in strength        Start:  10/05/24    Expected End:  10/26/24               Pain - Adult

## 2024-10-07 NOTE — NURSING NOTE
Reviewed morning labs and orders. Noted patient had a CBC and CMP drawn and resulted at 0559 this morning. The phlebotomist informed me that an H&H due to be drawn at this time. Her CBC showed that her hemoglobin was 13.3 at 0559. Notified Dr. Mcneil of this.

## 2024-10-07 NOTE — PROGRESS NOTES
Occupational Therapy    OT Treatment    Patient Name: Karly Horton  MRN: [id number]  Department: [address]  Room: [address] [place] [address]  Today's Date: 10/7/2024  Time Calculation  Start Time: 1403  Stop Time: 1426  Time Calculation (min): 23 min        Assessment:  OT Assessment: Gradual progress made towards OT goals. Continue with current OT POC to increase strength, balance and functional tolerance to maximize safety and independence during ADLs.  Barriers to Discharge: Decreased caregiver support  Evaluation/Treatment Tolerance: Patient limited by fatigue  End of Session Communication: Bedside nurse  End of Session Patient Position: Bed, 3 rail up, Alarm off, not on at start of session (Pt does not meet the weight requirement for bed alarm activation, all needs in reach)  Barriers to Discharge: Decreased caregiver support  Evaluation/Treatment Tolerance: Patient limited by fatigue  Plan:  Treatment Interventions: ADL retraining, UE strengthening/ROM, Cognitive reorientation, Patient/family training, Functional transfer training, Equipment evaluation/education, Compensatory technique education  OT Frequency: 3 times per week  OT Discharge Recommendations: Moderate intensity level of continued care  Equipment Recommended upon Discharge: Bedside commode, Wheeled walker  OT Recommended Transfer Status: Maximum assist, Assist of 2  OT - OK to Discharge: Yes  Treatment Interventions: ADL retraining, UE strengthening/ROM, Cognitive reorientation, Patient/family training, Functional transfer training, Equipment evaluation/education, Compensatory technique education    Subjective   Previous Visit Info:  OT Last Visit  OT Received On: 10/07/24  General:  General  Reason for Referral: ADL impairment; 80 y/o female admit from home for AMS and found on ground by daughter  Past Medical History Relevant to Rehab: pna, iron deficiency anemia, CHF, UTI  Co-Treatment: PT  Co-Treatment Reason: maximize patient safety, participation, and  optimize patient outcomes  Prior to Session Communication: Bedside nurse  Patient Position Received: Bed, 3 rail up, Alarm off, not on at start of session  General Comment: Pt cleared for therapy session per nursing, cooperative this date. Increased time and rest breaks required for task completion secondary to fatigue.  Precautions:  Hearing/Visual Limitations: +glasses  Medical Precautions: Fall precautions  Precautions Comment: telemetry    Vital Signs (Past 2hrs)        Date/Time Vitals Session Patient Position Pulse Resp SpO2 BP MAP (mmHg)    10/07/24 1537 --  --  101  17  98 %  146/66  85                         Pain:  Pain Assessment  Pain Assessment: 0-10  0-10 (Numeric) Pain Score: 0 - No pain  Clinical Progression: Not changed    Objective    Cognition:  Cognition  Overall Cognitive Status: Within Functional Limits  Orientation Level: Oriented X4  Following Commands: Follows one step commands with repetition  Safety/Judgement: Exceptions to WFL  Insight: Mild  Impulsive: Mildly  Task Initiation: Initiates with cues  Processing Speed: Delayed  Coordination:  Movements are Fluid and Coordinated: No  Upper Body Coordination: slower rate and accuracy of movements  Lower Body Coordination: Slower rate of movement buffy LE  Activities of Daily Living: LE Dressing  LE Dressing: Yes  LE Dressing Comments: maxA required to don socks at bed level  Functional Standing Tolerance:  Time: ~1min  Activity: static standing  Functional Standing Tolerance Comments: Standing tasks completed this date with FWW and Lucille to increase functional tolerance, strength and challenge balance to maximize safety and independence during ADLs.  Bed Mobility/Transfers: Bed Mobility  Bed Mobility: Yes  Bed Mobility 1  Bed Mobility 1: Supine to sitting, Sitting to supine  Level of Assistance 1: Moderate assistance  Bed Mobility Comments 1: verbal/ tactile cues required for proper hand placement to maximize potential. HOB moderately elevated to  assist with task completion.    Transfers  Transfer: Yes  Transfer 1  Trials/Comments 1: sit<>stand completed from standard EOB height  with FWW and modA- verbal/ tactile cues required for proper hand placement to increase safety and decrease risk of falls. Decreased carry-over observed.    Sitting Balance:  Dynamic Sitting Balance  Dynamic Sitting-Balance Support: Left upper extremity supported  Dynamic Sitting-Level of Assistance: Minimum assistance  Dynamic Sitting-Balance: Lateral lean, Forward lean  Dynamic Sitting-Comments: with min displacement OBS    Therapy/Activity: Therapeutic Activity  Therapeutic Activity Performed: Yes  Therapeutic Activity 1: EOB sitting trials completed x 10min with Lucille to complete functional tasks. Fair- sitting balance observed this date with R lateral lean with fatigue. EOB sitting trials completed to increase functional tolerance, strength and challenge sitting balance to maximize potential during ADLs.      Outcome Measures:[facility] Daily Activity  Putting on and taking off regular lower body clothing: Total  Bathing (including washing, rinsing, drying): A lot  Putting on and taking off regular upper body clothing: A lot  Toileting, which includes using toilet, bedpan or urinal: Total  Taking care of personal grooming such as brushing teeth: A little  Eating Meals: A little  Daily Activity - Total Score: 12        Education Documentation  ADL Training, taught by UGO Guzmán at 10/7/2024  5:21 PM.  Learner: Patient  Readiness: Acceptance  Method: Explanation, Demonstration  Response: Needs Reinforcement    Education Comments  Education provided on role of OT/POC, safety awareness throughout functional tasks/transfers, importance of activity/ rest routine, EC/WS techniques, and use of call light for assistance. Questions, comments and concerns addressed regarding OT.      Goals:  Encounter Problems       Encounter Problems (Active)       ADLs       Patient with complete  upper body dressing with minimal assist  level of assistance donning and doffing all UE clothes with PRN adaptive equipment while edge of bed  (Progressing)       Start:  10/05/24    Expected End:  11/05/24            Patient with complete lower body dressing with moderate assist level of assistance donning and doffing all LE clothes  with PRN adaptive equipment while edge of bed  (Progressing)       Start:  10/05/24    Expected End:  11/05/24            Patient will complete daily grooming tasks with minimal assist  level of assistance and PRN adaptive equipment while edge of bed . (Progressing)       Start:  10/05/24    Expected End:  11/05/24               TRANSFERS       Patient will complete functional transfer to toilet/commode with least restrictive device with minimal assist  level of assistance. (Progressing)       Start:  10/05/24    Expected End:  11/05/24            Patient will complete sit to stand transfer with minimal assist  level of assistance and least restrictive device in order to improve safety and prepare for out of bed mobility. (Progressing)       Start:  10/05/24    Expected End:  11/05/24

## 2024-10-07 NOTE — CARE PLAN
Problem: PT Problem  Goal: Patient will transfer supine <> sit with minimal assist   Outcome: Progressing  Goal: Patient will transfer sit <> stand with minimal assist and use of rolling walker   Outcome: Progressing

## 2024-10-08 ENCOUNTER — APPOINTMENT (OUTPATIENT)
Dept: NEUROLOGY | Facility: HOSPITAL | Age: 79
DRG: 091 | End: 2024-10-08
Payer: MEDICARE

## 2024-10-08 ENCOUNTER — DOCUMENTATION (OUTPATIENT)
Dept: RESEARCH | Age: 79
End: 2024-10-08
Payer: MEDICARE

## 2024-10-08 LAB
ATRIAL RATE: 74 BPM
P AXIS: 81 DEGREES
P OFFSET: 206 MS
P ONSET: 149 MS
PR INTERVAL: 154 MS
Q ONSET: 226 MS
QRS COUNT: 12 BEATS
QRS DURATION: 78 MS
QT INTERVAL: 444 MS
QTC CALCULATION(BAZETT): 492 MS
QTC FREDERICIA: 476 MS
R AXIS: -76 DEGREES
T AXIS: 50 DEGREES
T OFFSET: 448 MS
VENTRICULAR RATE: 74 BPM

## 2024-10-08 PROCEDURE — 95816 EEG AWAKE AND DROWSY: CPT | Performed by: PSYCHIATRY & NEUROLOGY

## 2024-10-08 PROCEDURE — 2500000001 HC RX 250 WO HCPCS SELF ADMINISTERED DRUGS (ALT 637 FOR MEDICARE OP): Performed by: INTERNAL MEDICINE

## 2024-10-08 PROCEDURE — 1200000002 HC GENERAL ROOM WITH TELEMETRY DAILY

## 2024-10-08 PROCEDURE — 2500000005 HC RX 250 GENERAL PHARMACY W/O HCPCS: Performed by: NURSE PRACTITIONER

## 2024-10-08 PROCEDURE — 2500000002 HC RX 250 W HCPCS SELF ADMINISTERED DRUGS (ALT 637 FOR MEDICARE OP, ALT 636 FOR OP/ED): Performed by: INTERNAL MEDICINE

## 2024-10-08 PROCEDURE — 99222 1ST HOSP IP/OBS MODERATE 55: CPT | Performed by: STUDENT IN AN ORGANIZED HEALTH CARE EDUCATION/TRAINING PROGRAM

## 2024-10-08 PROCEDURE — 95816 EEG AWAKE AND DROWSY: CPT

## 2024-10-08 PROCEDURE — 97535 SELF CARE MNGMENT TRAINING: CPT | Mod: GO,CO

## 2024-10-08 PROCEDURE — S4991 NICOTINE PATCH NONLEGEND: HCPCS | Performed by: INTERNAL MEDICINE

## 2024-10-08 PROCEDURE — 92526 ORAL FUNCTION THERAPY: CPT | Mod: GN | Performed by: SPEECH-LANGUAGE PATHOLOGIST

## 2024-10-08 PROCEDURE — 2500000001 HC RX 250 WO HCPCS SELF ADMINISTERED DRUGS (ALT 637 FOR MEDICARE OP): Performed by: NURSE PRACTITIONER

## 2024-10-08 RX ORDER — LIDOCAINE 560 MG/1
1 PATCH PERCUTANEOUS; TOPICAL; TRANSDERMAL DAILY
Status: DISCONTINUED | OUTPATIENT
Start: 2024-10-08 | End: 2024-10-10 | Stop reason: HOSPADM

## 2024-10-08 RX ORDER — BACITRACIN 500 [USP'U]/G
OINTMENT TOPICAL 3 TIMES DAILY
Status: DISCONTINUED | OUTPATIENT
Start: 2024-10-08 | End: 2024-10-10 | Stop reason: HOSPADM

## 2024-10-08 SDOH — ECONOMIC STABILITY: HOUSING INSECURITY: IN THE PAST 12 MONTHS, HOW MANY TIMES HAVE YOU MOVED WHERE YOU WERE LIVING?: 1

## 2024-10-08 SDOH — ECONOMIC STABILITY: INCOME INSECURITY: IN THE PAST 12 MONTHS, HAS THE ELECTRIC, GAS, OIL, OR WATER COMPANY THREATENED TO SHUT OFF SERVICE IN YOUR HOME?: NO

## 2024-10-08 SDOH — ECONOMIC STABILITY: FOOD INSECURITY: WITHIN THE PAST 12 MONTHS, YOU WORRIED THAT YOUR FOOD WOULD RUN OUT BEFORE YOU GOT THE MONEY TO BUY MORE.: NEVER TRUE

## 2024-10-08 SDOH — ECONOMIC STABILITY: HOUSING INSECURITY: AT ANY TIME IN THE PAST 12 MONTHS, WERE YOU HOMELESS OR LIVING IN A SHELTER (INCLUDING NOW)?: NO

## 2024-10-08 SDOH — SOCIAL STABILITY: SOCIAL INSECURITY: ARE YOU MARRIED, WIDOWED, DIVORCED, SEPARATED, NEVER MARRIED, OR LIVING WITH A PARTNER?: WIDOWED

## 2024-10-08 SDOH — HEALTH STABILITY: MENTAL HEALTH
DO YOU FEEL STRESS - TENSE, RESTLESS, NERVOUS, OR ANXIOUS, OR UNABLE TO SLEEP AT NIGHT BECAUSE YOUR MIND IS TROUBLED ALL THE TIME - THESE DAYS?: NOT AT ALL

## 2024-10-08 SDOH — HEALTH STABILITY: MENTAL HEALTH
HOW OFTEN DO YOU NEED TO HAVE SOMEONE HELP YOU WHEN YOU READ INSTRUCTIONS, PAMPHLETS, OR OTHER WRITTEN MATERIAL FROM YOUR DOCTOR OR PHARMACY?: OFTEN

## 2024-10-08 SDOH — ECONOMIC STABILITY: INCOME INSECURITY: IN THE PAST 12 MONTHS HAS THE ELECTRIC, GAS, OIL, OR WATER COMPANY THREATENED TO SHUT OFF SERVICES IN YOUR HOME?: NO

## 2024-10-08 SDOH — HEALTH STABILITY: MENTAL HEALTH: HOW MANY DRINKS CONTAINING ALCOHOL DO YOU HAVE ON A TYPICAL DAY WHEN YOU ARE DRINKING?: PATIENT DOES NOT DRINK

## 2024-10-08 SDOH — SOCIAL STABILITY: SOCIAL NETWORK: HOW OFTEN DO YOU GET TOGETHER WITH FRIENDS OR RELATIVES?: MORE THAN THREE TIMES A WEEK

## 2024-10-08 SDOH — ECONOMIC STABILITY: INCOME INSECURITY: HOW HARD IS IT FOR YOU TO PAY FOR THE VERY BASICS LIKE FOOD, HOUSING, MEDICAL CARE, AND HEATING?: NOT HARD AT ALL

## 2024-10-08 SDOH — HEALTH STABILITY: MENTAL HEALTH: HOW OFTEN DO YOU HAVE A DRINK CONTAINING ALCOHOL?: NEVER

## 2024-10-08 SDOH — ECONOMIC STABILITY: FOOD INSECURITY: WITHIN THE PAST 12 MONTHS, YOU WORRIED THAT YOUR FOOD WOULD RUN OUT BEFORE YOU GOT MONEY TO BUY MORE.: NEVER TRUE

## 2024-10-08 SDOH — SOCIAL STABILITY: SOCIAL INSECURITY: WITHIN THE LAST YEAR, HAVE YOU BEEN HUMILIATED OR EMOTIONALLY ABUSED IN OTHER WAYS BY YOUR PARTNER OR EX-PARTNER?: NO

## 2024-10-08 SDOH — SOCIAL STABILITY: SOCIAL INSECURITY: WITHIN THE LAST YEAR, HAVE YOU BEEN AFRAID OF YOUR PARTNER OR EX-PARTNER?: NO

## 2024-10-08 SDOH — HEALTH STABILITY: MENTAL HEALTH: HOW OFTEN DO YOU HAVE SIX OR MORE DRINKS ON ONE OCCASION?: NEVER

## 2024-10-08 SDOH — ECONOMIC STABILITY: FOOD INSECURITY: WITHIN THE PAST 12 MONTHS, THE FOOD YOU BOUGHT JUST DIDN'T LAST AND YOU DIDN'T HAVE MONEY TO GET MORE.: NEVER TRUE

## 2024-10-08 SDOH — SOCIAL STABILITY: SOCIAL NETWORK: HOW OFTEN DO YOU ATTEND CHURCH OR RELIGIOUS SERVICES?: NEVER

## 2024-10-08 SDOH — SOCIAL STABILITY: SOCIAL NETWORK: HOW OFTEN DO YOU ATTEND MEETINGS OF THE CLUBS OR ORGANIZATIONS YOU BELONG TO?: NEVER

## 2024-10-08 SDOH — SOCIAL STABILITY: SOCIAL NETWORK: ARE YOU MARRIED, WIDOWED, DIVORCED, SEPARATED, NEVER MARRIED, OR LIVING WITH A PARTNER?: WIDOWED

## 2024-10-08 SDOH — SOCIAL STABILITY: SOCIAL NETWORK
IN A TYPICAL WEEK, HOW MANY TIMES DO YOU TALK ON THE PHONE WITH FAMILY, FRIENDS, OR NEIGHBORS?: MORE THAN THREE TIMES A WEEK

## 2024-10-08 SDOH — SOCIAL STABILITY: SOCIAL NETWORK
DO YOU BELONG TO ANY CLUBS OR ORGANIZATIONS SUCH AS CHURCH GROUPS, UNIONS, FRATERNAL OR ATHLETIC GROUPS, OR SCHOOL GROUPS?: NO

## 2024-10-08 SDOH — HEALTH STABILITY: PHYSICAL HEALTH: ON AVERAGE, HOW MANY DAYS PER WEEK DO YOU ENGAGE IN MODERATE TO STRENUOUS EXERCISE (LIKE A BRISK WALK)?: 0 DAYS

## 2024-10-08 SDOH — ECONOMIC STABILITY: FOOD INSECURITY: HOW HARD IS IT FOR YOU TO PAY FOR THE VERY BASICS LIKE FOOD, HOUSING, MEDICAL CARE, AND HEATING?: NOT HARD AT ALL

## 2024-10-08 SDOH — ECONOMIC STABILITY: HOUSING INSECURITY: IN THE LAST 12 MONTHS, WAS THERE A TIME WHEN YOU WERE NOT ABLE TO PAY THE MORTGAGE OR RENT ON TIME?: NO

## 2024-10-08 SDOH — ECONOMIC STABILITY: INCOME INSECURITY: IN THE LAST 12 MONTHS, WAS THERE A TIME WHEN YOU WERE NOT ABLE TO PAY THE MORTGAGE OR RENT ON TIME?: NO

## 2024-10-08 SDOH — SOCIAL STABILITY: SOCIAL INSECURITY
WITHIN THE LAST YEAR, HAVE YOU BEEN RAPED OR FORCED TO HAVE ANY KIND OF SEXUAL ACTIVITY BY YOUR PARTNER OR EX-PARTNER?: NO

## 2024-10-08 SDOH — HEALTH STABILITY: MENTAL HEALTH: HOW MANY STANDARD DRINKS CONTAINING ALCOHOL DO YOU HAVE ON A TYPICAL DAY?: PATIENT DOES NOT DRINK

## 2024-10-08 SDOH — HEALTH STABILITY: PHYSICAL HEALTH: ON AVERAGE, HOW MANY MINUTES DO YOU ENGAGE IN EXERCISE AT THIS LEVEL?: 0 MIN

## 2024-10-08 SDOH — HEALTH STABILITY: MENTAL HEALTH: HOW OFTEN DO YOU HAVE 6 OR MORE DRINKS ON ONE OCCASION?: NEVER

## 2024-10-08 SDOH — ECONOMIC STABILITY: TRANSPORTATION INSECURITY: IN THE PAST 12 MONTHS, HAS LACK OF TRANSPORTATION KEPT YOU FROM MEDICAL APPOINTMENTS OR FROM GETTING MEDICATIONS?: NO

## 2024-10-08 SDOH — SOCIAL STABILITY: SOCIAL NETWORK: HOW OFTEN DO YOU ATTENT MEETINGS OF THE CLUB OR ORGANIZATION YOU BELONG TO?: NEVER

## 2024-10-08 ASSESSMENT — COGNITIVE AND FUNCTIONAL STATUS - GENERAL
STANDING UP FROM CHAIR USING ARMS: TOTAL
DAILY ACTIVITIY SCORE: 12
DRESSING REGULAR UPPER BODY CLOTHING: A LOT
HELP NEEDED FOR BATHING: A LOT
DRESSING REGULAR UPPER BODY CLOTHING: A LOT
MOVING FROM LYING ON BACK TO SITTING ON SIDE OF FLAT BED WITH BEDRAILS: A LITTLE
TOILETING: A LOT
MOBILITY SCORE: 10
CLIMB 3 TO 5 STEPS WITH RAILING: TOTAL
WALKING IN HOSPITAL ROOM: TOTAL
DRESSING REGULAR LOWER BODY CLOTHING: TOTAL
DAILY ACTIVITIY SCORE: 14
PERSONAL GROOMING: A LITTLE
EATING MEALS: A LITTLE
PERSONAL GROOMING: A LITTLE
EATING MEALS: A LITTLE
TURNING FROM BACK TO SIDE WHILE IN FLAT BAD: A LOT
DRESSING REGULAR LOWER BODY CLOTHING: A LOT
HELP NEEDED FOR BATHING: A LOT
TOILETING: TOTAL
MOVING TO AND FROM BED TO CHAIR: A LOT

## 2024-10-08 ASSESSMENT — ACTIVITIES OF DAILY LIVING (ADL)
HOME_MANAGEMENT_TIME_ENTRY: 28
LACK_OF_TRANSPORTATION: NO
EFFECT OF PAIN ON DAILY ACTIVITIES: YES

## 2024-10-08 ASSESSMENT — PAIN DESCRIPTION - ORIENTATION: ORIENTATION: MID

## 2024-10-08 ASSESSMENT — PAIN SCALES - GENERAL
PAINLEVEL_OUTOF10: 0 - NO PAIN
PAINLEVEL_OUTOF10: 5 - MODERATE PAIN
PAINLEVEL_OUTOF10: 5 - MODERATE PAIN
PAINLEVEL_OUTOF10: 3
PAINLEVEL_OUTOF10: 5 - MODERATE PAIN
PAINLEVEL_OUTOF10: 0 - NO PAIN
PAINLEVEL_OUTOF10: 5 - MODERATE PAIN

## 2024-10-08 ASSESSMENT — PAIN - FUNCTIONAL ASSESSMENT
PAIN_FUNCTIONAL_ASSESSMENT: WONG-BAKER FACES
PAIN_FUNCTIONAL_ASSESSMENT: 0-10

## 2024-10-08 ASSESSMENT — PAIN SCALES - WONG BAKER
WONGBAKER_NUMERICALRESPONSE: HURTS LITTLE BIT
WONGBAKER_NUMERICALRESPONSE: NO HURT

## 2024-10-08 ASSESSMENT — PAIN DESCRIPTION - DESCRIPTORS
DESCRIPTORS: ACHING
DESCRIPTORS: OTHER (COMMENT)

## 2024-10-08 ASSESSMENT — PAIN DESCRIPTION - LOCATION: LOCATION: NECK

## 2024-10-08 ASSESSMENT — LIFESTYLE VARIABLES
SKIP TO QUESTIONS 9-10: 1
AUDIT-C TOTAL SCORE: 0

## 2024-10-08 NOTE — PROGRESS NOTES
TCC spoke to patient's daughter via telephone. Pt lives with daughter in an apartment. Pt is not on oxygen or dialysis. Pt does not ambulate. Pt was recently discharged and active with [facility]. Daughter wants her to continue services. [facility] will send [facility] referral via carport. PCP is Dr. Chaney. Pharmacy of choice is [facility] [address]. A family friend will transport patient home when discharged. Will need an EXTERNAL referral for SN/HHA/PT/OT/MSW.    DISCHARGE PLAN TO RETURN HOME WITH [facility]. NEED SOC.        10/08/24 1151   Discharge Planning   Type of Residence Private residence   Number of Stairs to Enter Residence 2   Number of Stairs Within Residence 0   Do you have animals or pets at home? No   Who is requesting discharge planning? Provider   Home or Post Acute Services In home services   Type of Home Care Services Home nursing visits;Home OT;Home PT;Home health aide   Expected Discharge Disposition Home H   Does the patient need discharge transport arranged? No   Financial Resource Strain   How hard is it for you to pay for the very basics like food, housing, medical care, and heating? Not hard   Housing Stability   In the last 12 months, was there a time when you were not able to pay the mortgage or rent on time? N   In the past 12 months, how many times have you moved where you were living? 1   At any time in the past 12 months, were you homeless or living in a shelter (including now)? N   Transportation Needs   In the past 12 months, has lack of transportation kept you from medical appointments or from getting medications? no   In the past 12 months, has lack of transportation kept you from meetings, work, or from getting things needed for daily living? No

## 2024-10-08 NOTE — PROGRESS NOTES
Karly Horton is a 79 y.o. female on day 4 of admission presenting with Disorientation.    Called daughter Karly and no answer X2. Unable to leave voice mail. Called emergency contact Jocelyn. She will get in touch with the daughter and give her this TCC's phone number.     DISCHARGE PLAN NOT SECURE.

## 2024-10-08 NOTE — RESEARCH NOTES
Artificial Intelligence Monitoring in Nursing (AIMS Nursing) Study    Principle Investigator - Dr. Sharif Merchant  Research Coordinator - RUBEN Martines     Patient Name - Karly Horton  Date - 10/8/2024 11:56 AM  [place]    Karly A Clifford was approached by RUBEN Martines to talk about participating in the AIMS Nursing Study. The patient was not able to be approached, a research coordinator will come back at a later time. Study protocol was followed and patient was given study contact information.     RUBEN Martines

## 2024-10-08 NOTE — PROGRESS NOTES
Karly Horton is a 79 y.o. female on day 4 of admission presenting with Disorientation.    Subjective   Patient seen and examined.  Resting in bed in no acute distress.  Awake alert oriented x 3.  Plan for EEG today.  Declines MRI states she will have a migraine headache.  Complains of neck pain asking for Lidocaine patch.  Requesting antibiotic ointment for facial abrasion.    Discussed Pulmonology consultation for lung masses, she is in agreement.    Discussed discharge plan of care, she anticipates discharge home, notes her daughter is having surgery.    Objective     Physical Exam  Vitals and nursing note reviewed.   Constitutional:       General: She is not in acute distress.     Appearance: Normal appearance. She is normal weight. She is not ill-appearing, toxic-appearing or diaphoretic.      Comments: Chronically ill appearing.   HENT:      Head: Normocephalic and atraumatic.      Right Ear: External ear normal.      Left Ear: External ear normal.      Nose: Nose normal.      Mouth/Throat:      Mouth: Mucous membranes are moist.      Pharynx: Oropharynx is clear.   Eyes:      Extraocular Movements: Extraocular movements intact.      Conjunctiva/sclera: Conjunctivae normal.      Pupils: Pupils are equal, round, and reactive to light.   Cardiovascular:      Rate and Rhythm: Normal rate and regular rhythm.      Pulses: Normal pulses.      Heart sounds: Normal heart sounds. No murmur heard.  Pulmonary:      Effort: Pulmonary effort is normal. No respiratory distress.      Breath sounds: Decreased breath sounds present. No wheezing, rhonchi or rales.      Comments: Room air.  Abdominal:      General: Bowel sounds are normal. There is no distension.      Palpations: Abdomen is soft.      Tenderness: There is no abdominal tenderness.   Genitourinary:     Comments: Deferred.  Musculoskeletal:         General: No swelling or tenderness. Normal range of motion.      Cervical back: Normal range of motion and neck  supple.   Skin:     General: Skin is warm and dry.      Capillary Refill: Capillary refill takes less than 2 seconds.      Comments: Right facial abrasion.   Neurological:      General: No focal deficit present.      Mental Status: She is alert and oriented to person, place, and time.      Comments: Awake alert oriented x 3. Forgetful.   Psychiatric:         Mood and Affect: Mood normal.         Behavior: Behavior normal.       Last Recorded Vitals  Blood pressure 107/62, pulse 89, temperature 36.5 °C (97.7 °F), temperature source Oral, resp. rate 18, height 1.524 m (5'), weight (!) 33 kg (72 lb 12 oz), SpO2 96%.    Intake/Output last 3 Shifts:  I/O last 3 completed shifts:  In: 1000 (30.3 mL/kg) [P.O.:1000]  Out: - (0 mL/kg)   Weight: 33 kg     Relevant Results    Malnutrition Diagnosis Status: New  Malnutrition Diagnosis: Severe malnutrition related to chronic disease or condition  As Evidenced by: severe subcutaneous fat and muscle wasting  I agree with the dietitian's malnutrition diagnosis.    No results found for this or any previous visit (from the past 24 hour(s)).    Susceptibility data from last 90 days.  Collected Specimen Info Organism Amoxicillin/Clavulanate Ampicillin Ampicillin/Sulbactam Cefazolin Cefazolin (uncomplicated UTIs only) Ciprofloxacin Gentamicin Nitrofurantoin Piperacillin/Tazobactam Trimethoprim/Sulfamethoxazole   09/03/24 Blood culture from Peripheral Venipuncture Staphylococcus epidermidis             09/02/24 Urine from Clean Catch/Voided Escherichia coli  I  R  R  S  S  R  S  S  S  S     No results found.    Scheduled medications  amLODIPine, 10 mg, oral, Nightly  ascorbic acid, 500 mg, oral, Daily  bacitracin, , Topical, TID  baclofen, 10 mg, oral, TID  esomeprazole, 40 mg, oral, BID  ferrous sulfate (325 mg ferrous sulfate), 65 mg of iron, oral, Daily  lactulose, 20 g, oral, Daily  lidocaine, 1 patch, transdermal, Daily  losartan, 50 mg, oral, Daily  nicotine, 1 patch, transdermal,  Daily  sucralfate, 1 g, oral, q6h [place]      Continuous medications     PRN medications  PRN medications: acetaminophen **OR** acetaminophen **OR** acetaminophen, acetaminophen-codeine, benzocaine-menthol, butalbital-acetaminophen-caff, dextromethorphan-guaifenesin, guaiFENesin, hydrOXYzine, hydrOXYzine HCL, lidocaine, lubricating eye drops, meprobamate, OLANZapine, polyethylene glycol      PLAN:  Disorientation improved  Altered mental status improved  Toxic, metabolic encephalopathy  Marijuana use  Generalized weakness  Lung masses  Liver masses  Tobacco use  Peptic ulcer disease  Chronic constipation  Anxiety  Migraine headaches  Neck pain, chronic    PLAN:  Mentation stable, baseline.  No focal deficits.  EEG per Neurology.  Declines MRI brain.  Follow-up.  Pulmonology consultation for lung masses.  Follow-up recommendations.  Add Lidocaine patch to neck.  As needed analgesics.  Monitor.  Add bacitracin ointment to right facial abrasion.  Monitor.  Continue current medications.  Discussed tobacco cessation.  She states that she does not plan to quit smoking.  Nicotine patch.  Abstain from tobacco and marijuana use.  Education.  Continue home medications.  PT/OT.  Fall precautions.  Up with assistance only.  Bed and chair alarm at all times.  Pressure ulcer prevention measures.  Turn and reposition every 2 hours and as needed.  Heels off of bed.  Offloading.  DVT prophylaxis.  GI prophylaxis.  PPI Nexium.  Supportive care.  Patient reassured.  PT/OT recommend moderate intensity level of continued care.  Case management following for discharge planning.  Discussed with patient, Dr. Mcneil.     ADDENDUM:  Discussed case with pulmonology.  Notes lung masses, no liver masses.  Follow-up with radiologist.  Recommends transfer to tertiary care center for advanced bronchoscopy.  States discussed with patient and she is agreeable.  Discussed with Dr. Parmer.  Transfer initiated.  Await call back to   Tarun.      Shea Root, PO-CNP

## 2024-10-08 NOTE — PROGRESS NOTES
"Speech-Language Pathology    Speech-Language Pathology Clinical Swallow Treatment    Patient Name: Karly Horton  MRN: [id number]  : 1945  Today's Date: 10/08/24  Start Time: 1435  Stop Time: 1455  Time Calculation (min): 20 min    ASSESSMENT  SLP TX Intervention Outcome: Making Progress Towards Goals  Treatment Tolerance: Patient limited by pain    Impressions: Pt tolerating minced moist and tin liquids safely without s/s aspiration. Pt perfers this diet consistency d/t edentulous status, she does not like dry or large bites of solids. No further tx indicated as pt does  not want diet consistency upgraded.  PLAN  Recommended solid consistency: Minced/moist (IDDSI level 5)  Recommended liquid consistency: Thin (IDDSI 0)  Recommended medication administration: Whole, Crushed, in puree  Safe swallow strategies:  - Upright positioning for all PO intake  - Small bites  - Small sips    Discharge recommendation: Home with no further SLP  Inpatient/Swing Bed or Outpatient: Inpatient  SLP TX Plan: Discharge from Speech Therapy     SLP Frequency: 2x per week     Next Treatment Priority: diet tolerance, solid trials with dentures, strat          SUBJECTIVE  Prior to Session Communication: Bedside nurse  RN cleared pt to participate in session and reported no difficulty with current diet consistency  Respiratory status: Supplemental oxygen via NC  Positioning: Upright as close as possible to 90 degrees, but partially reclined  Pt was alert, confused, required encouragement to participate, and cooperative with encouragement for session.    Pain Assessment  Pain Assessment: 0-10  0-10 (Numeric) Pain Score: 5 - Moderate pain  Encarnacion-Urena FACES Pain Rating: Hurts little bit  Pain Type: Chronic pain  Pain Location: Neck  Pain Orientation: Posterior  Pain Radiating Towards: N/A  Pain Descriptors: Other (Comment) (\"CRUNCHING\")  Pain Frequency: Intermittent  Pain Onset: Ongoing  Clinical Progression: Not changed  Effect of " Pain on Daily Activities: YES  Patient's Stated Pain Goal: No pain  Pain Interventions: Medication (See MAR)  Response to Interventions: RESPIRATIONS EVEN & UNLABORED       Orientation: Oriented to self, Oriented to hospital but not name of facility, and Unable to answer orientation questions refused to answer all of orientation quesitons   Ability to follow functional commands: Follows simple commands    OBJECTIVE  Therapeutic Swallow  Therapeutic Swallow Intervention : PO Trials, Compensatory Strategies  Goals:  In 2 weeks...  Pt will consume prescribed diet (current diet is minced moist solids, thin liquids) without overt s/sx aspiration/penetration in 95% of observed trials.   GOAL START: 10/5/2024    GOAL END:10/19/24   Status: goal met   Progress this date: Pt consumed the following trials: 4 oz thin liquids via straw, 2 oz pudding and 2 oz pudding with crushed up colt cracker. Pt tolerated safely without s/s aspiration in 100% observed trials, pt spitting out larger pieces of cracker , pt refused solid trials, stated she wants to continue with current diet consistency, does not want to be upgraded, pt very adamant about it.   Pt will demonstrate follow-through of trained compensatory strategies during a meal/snack with 90% acc independently.   GOAL START: 10/5/2024    GOAL END:10/19/24   Status: goal met with 2/3 strategies, other strategy refuses   Progress this date:   Pt refuses to sit upright d/t pain, tolerated approc 40 degrees in bed without s/s aspiration, pt educated  on risk of aspiration, pt states she is usually up jn chair (she was for initial evaluation), pt demonstrated small bites and sip independently with 100% acc during snack  Treatment/Education:  Results and recommendations were relayed to: Patient and Bedside nurse  Education provided: Yes   Learner: Patient   Barriers to learning: Cognitive limitations barrier   Method of teaching: Verbal   Topic: role of ST, recommended diet  modifications, and recommended safe swallow strategies   Outcome of teaching: Pt demonstrated partial understanding and Needs reinforcement

## 2024-10-08 NOTE — CONSULTS
Pulmonary Medicine Consult  --------------------------------------    Admitted on:     10/4/2024 Length of Stay:   4 day(s)     History of Present Illness     79 y.o. female active smoker > 60 pack years, h/o HTN, admitted to the hospital with hemoptysis, and confusion. Patient is unable to provide further history on the quantity or frequency of hemptysis.  She has preliminary work up done for AMS including a CT head and neck, which showed a RUL lung mass.    A dedicated CT chest was done which showed multiple lung nodules within the right and left lung, of  particular note  a large 3.1 cm lower lobe mass right hilum and a 2.2 cm spiculated mass apicoposterior segment right upper lobe.    Patient is currently awake and alert, laying comfortably in bed, no respiratory distress. Denies cough, fever or any pain      ROS:  - for fever, cough, chest pain, hemoptysis    Objective   Previous History     Medical History  As above.    Surgical History  Past Surgical History:   Procedure Laterality Date    APPENDECTOMY      EYE SURGERY      HYSTERECTOMY      TONSILLECTOMY       Allergies  No Known Allergies    Home Medications  Current Outpatient Medications   Medication Instructions    acetaminophen-codeine (Tylenol w/ Codeine #4) 300-60 mg tablet 1 tablet, oral, Daily PRN    amLODIPine (NORVASC) 10 mg, oral, Nightly, Hold for systolic blood pressure < 110 mmhg.    ascorbic acid (VITAMIN C) 500 mg, oral, Daily    baclofen (LIORESAL) 10 mg, oral, 3 times daily    butalbital-acetaminophen-caff -40 mg tablet 1 tablet, oral, Every 6 hours PRN    ferrous sulfate, 325 mg ferrous sulfate, tablet 1 tablet, oral, Daily with breakfast    lactulose 20 g, oral, Daily, Take once every 18 hours as directed.     lidocaine (Xylocaine) 2 % jelly 1 Application, Topical, Daily, migraines    lidocaine 4 % patch 1 patch, transdermal, Daily, Remove & discard patch within 12 hours or as directed by MD.    losartan (COZAAR) 50 mg, oral,  Daily, Hold for systolic blood pressure < 110 mmhg.    lubricating eye drops ophthalmic solution 1 drop, Both Eyes, As needed    meprobamate (EQUANIL) 400 mg, oral, 4 times daily PRN    mupirocin (Bactroban) 2 % ointment 1 Application, Topical, 2 times daily    nicotine (Nicoderm CQ) 21 mg/24 hr patch 1 patch, transdermal, Daily    pantoprazole (PROTONIX) 40 mg, oral, 2 times daily, Do not crush, chew, or split. Continue twice daily x 8 weeks then continue once daily.    sucralfate (CARAFATE) 1 g, oral, Every 6 hours scheduled     Social History  Social History     Tobacco Use   Smoking Status Every Day    Current packs/day: 1.00    Average packs/day: 1 pack/day for 64.8 years (64.8 ttl pk-yrs)    Types: Cigarettes    Start date: 1/1/1960   Smokeless Tobacco Not on file       has no history on file for alcohol use.    has no history on file for drug use.     Family History  family history includes Cancer in her mother; Father had hypertension, stroke, coronary artery disease and diabetes in her father.    Objective     Vitals:    10/06/24 0447   Weight: (!) 33 kg (72 lb 12 oz)   Body mass index is 14.21 kg/m².        10/7/2024     7:04 AM 10/7/2024    12:05 PM 10/7/2024     3:37 PM 10/7/2024     8:37 PM 10/8/2024    12:18 AM 10/8/2024     8:09 AM 10/8/2024     9:00 AM   Vitals   Systolic 142 134 146 151 117 119 107   Diastolic 71 72 66 80 62 69 62   Heart Rate 102 101 101 95 88 87 89   Temp 36.3 °C (97.3 °F) 36.9 °C (98.4 °F) 37.1 °C (98.8 °F) 37.1 °C (98.8 °F) 36.6 °C (97.9 °F) 36.8 °C (98.2 °F) 36.5 °C (97.7 °F)   Resp 17 17 17 18 18 18 18        Vent settings:       Intake/Output Summary (Last 24 hours) at 10/8/2024 1249  Last data filed at 10/8/2024 0950  Gross per 24 hour   Intake 1035 ml   Output --   Net 1035 ml       Awake and Alert x 4  Moist mucus membranes  Supple neck  AEB decreased, distant heart sounds  S1S2 +  Abdomen - soft nt  Extremities - no edema, no focal deficits      Medications     Scheduled  Medications:   amLODIPine, 10 mg, oral, Nightly  ascorbic acid, 500 mg, oral, Daily  bacitracin, , Topical, TID  baclofen, 10 mg, oral, TID  esomeprazole, 40 mg, oral, BID  ferrous sulfate (325 mg ferrous sulfate), 65 mg of iron, oral, Daily  lactulose, 20 g, oral, Daily  lidocaine, 1 patch, transdermal, Daily  losartan, 50 mg, oral, Daily  nicotine, 1 patch, transdermal, Daily  sucralfate, 1 g, oral, q6h [place]       Continuous Medications:       PRN Medications:     Labs     Results from last 72 hours   Lab Units 10/07/24  0559   GLUCOSE mg/dL 122*   SODIUM mmol/L 136   POTASSIUM mmol/L 3.5   CHLORIDE mmol/L 98   CO2 mmol/L 29   BUN mg/dL 10   CREATININE mg/dL 0.40*   EGFR mL/min/1.73m*2 >90   CALCIUM mg/dL 9.2                 Results from last 72 hours   Lab Units 10/07/24  0559 10/06/24  2342 10/06/24  1610   WBC AUTO x10*3/uL 10.9  --   --    NRBC AUTO /100 WBCs 0.0  --   --    RBC AUTO x10*6/uL 4.55  --   --    HEMOGLOBIN g/dL 13.3 13.3 13.3   HEMATOCRIT % 39.4 40.3 39.0   MCV fL 87  --   --    MCH pg 29.2  --   --    MCHC g/dL 33.8  --   --    RDW % 15.8*  --   --    PLATELETS AUTO x10*3/uL 338  --   --          Lab Results   Component Value Date    BLOODCULT No growth at 4 days -  FINAL REPORT 09/04/2024    BLOODCULT No growth at 4 days -  FINAL REPORT 09/04/2024    GRAMSTAIN Gram positive cocci, clusters (AA) 09/03/2024     Lab Results   Component Value Date    URINECULTURE No growth 10/04/2024       Imaging and Diagnostic Studies   CT chest abd pelvis  -----------------------    IMPRESSION:  1. There are multiple masses identified within the right and left  lobes of the lung more numerous and larger on the right with  particular note made of a large 3.1 cm lower pole mass right hilum in  a 2.2 cm spiculated mass apicoposterior segment right upper lobe  along with additional masses as detailed above. Malignancy is likely  with metastatic lesions consideration along with primary lung cancer  metastatic to the  lungs..  2. Emphysematous change pulmonary parenchyma.  3. Nonspecific appearance of the abdomen and pelvis. Please note that  sensitivity of the abdomen and pelvis assessment is limited due to  the cachectic state of the patient and absence of oral and  intravenous contrast.       Assessment / Plan     79 y.o. female active smoker > 60 pack years, h/o HTN,admiteed to the hospital and now found to have multiple lung masses concerning for primary lung cancer and regional metastasis.    1) 2.2 cm RUL spiculated lung mass and 3.1 cm rt hilar mass - concerns for primary lung cancer with mediastinal mets.    Plan:  - Patient needs tissue diagnosis to determine if it is malignancy, the type and stage.   - I discussed with patient 2 options. 1) following up with pulmonary and scheduling a biopsy as an outpatient and 2) transferring her to a high level of care with interventional pulmonology for bronchoscopy ([facility] bronch/EBUS) and mediastinal staging. Patient opted for the latter, since she had limited means and social support to get to her outpatient appointments.  - Discussed with Dr Mcneil and he agreed with the recommendation to transfer patient to [facility] for further management.        Landen Natarajan MD

## 2024-10-08 NOTE — CARE PLAN
The patient's goals for the shift include  TALK TO THE  DOCTOR    The clinical goals for the shift include NO ADVERSE EVENTS    Over the shift, the patient did make progress toward the PLAN OF CARE goals.

## 2024-10-08 NOTE — CARE PLAN
The patient's goals for the shift include      The clinical goals for the shift include Adequate sleep      Problem: Skin  Goal: Decreased wound size/increased tissue granulation at next dressing change  Outcome: Progressing  Goal: Participates in plan/prevention/treatment measures  Outcome: Progressing  Goal: Prevent/manage excess moisture  Outcome: Progressing  Goal: Prevent/minimize sheer/friction injuries  Outcome: Progressing  Goal: Promote/optimize nutrition  Outcome: Progressing  Goal: Promote skin healing  Outcome: Progressing     Problem: Pain  Goal: Walks with improved pain control throughout the shift  Outcome: Progressing  Goal: Performs ADL's with improved pain control throughout shift  Outcome: Progressing  Goal: Participates in PT with improved pain control throughout the shift  Outcome: Progressing     Problem: Nutrition  Goal: Oral intake greater than 50%  Outcome: Progressing  Goal: Oral intake greater 75%  Outcome: Progressing  Goal: Consume prescribed supplement  Outcome: Progressing  Goal: Adequate PO fluid intake  Outcome: Progressing  Goal: Nutrition support goals are met within 48 hrs  Outcome: Progressing  Goal: Nutrition support is meeting 75% of nutrient needs  Outcome: Progressing  Goal: Lab values WNL  Outcome: Progressing  Goal: Electrolytes WNL  Outcome: Progressing  Goal: Promote healing  Outcome: Progressing  Goal: Maintain stable weight  Outcome: Progressing  Goal: Gradual weight gain  Outcome: Progressing     Problem: Pain - Adult  Goal: Verbalizes/displays adequate comfort level or baseline comfort level  Outcome: Progressing     Problem: Safety - Adult  Goal: Free from fall injury  Outcome: Progressing     Problem: Discharge Planning  Goal: Discharge to home or other facility with appropriate resources  Outcome: Progressing     Problem: Chronic Conditions and Co-morbidities  Goal: Patient's chronic conditions and co-morbidity symptoms are monitored and maintained or  improved  Outcome: Progressing

## 2024-10-08 NOTE — NURSING NOTE
The patient has been telling me all morning the only thing she wants is to talk to the doctor. She has built up a mental list of treatments and diagnostic tests that she thinks she needs to have, to include an MRI and ultrasound of her abdomen. She states that her daughter sent her a text that she was vomiting blood. The patient has not been vomiting or coughing up any blood in the past two days. Her stool is yellowish brown. Her abdomen is soft and nondistended. Bowel sounds are positive x4. She did not flinch or guard when I assessed her abdomen. Notified the attending physician.

## 2024-10-08 NOTE — PROGRESS NOTES
Karly Horton is a 79 y.o. female on day 4 of admission presenting with Disorientation.      Subjective   Pt seen and examined. Resting in bed at this time. Awake, alert and oriented x 4. States she is in pain in her neck which was recently given Tylenol for. Additionally complains of generalized weakness. No acute distress. States that she can't do the MRI of her head because she can't lay flat and the noise will give her a migraine.        Objective     Last Recorded Vitals  Blood pressure 107/62, pulse 89, temperature 36.5 °C (97.7 °F), temperature source Oral, resp. rate 18, height 1.524 m (5'), weight (!) 33 kg (72 lb 12 oz), SpO2 96%.    Physical Exam  Neurological Exam  Eyes:      General: Lids are normal.      Extraocular Movements: Extraocular movements intact.      Pupils: Pupils are equal, round, and reactive to light.   Psychiatric:         Speech: Speech normal.         Neurological Exam  Mental Status  Awake, alert and oriented to person, place and time. Oriented to person, place and time. (Person, place, time and president)  Speech is normal. Follows complex commands. Attention and concentration are normal.      Cranial Nerves  CN II: Visual acuity is normal. Visual fields full to confrontation.  CN III, IV, VI: Extraocular movements intact bilaterally. Normal lids and orbits bilaterally. Pupils equal round and reactive to light bilaterally.  CN V: Facial sensation is normal.  CN VII: Full and symmetric facial movement.  CN VIII: Hearing is normal.  CN IX, X: Palate elevates symmetrically. Normal gag reflex.  CN XI: Shoulder shrug strength is normal.  CN XII: Tongue midline without atrophy or fasciculations.     Motor     Muscle atrophy upper and lower ext.   Moving all extremities well  Generalized weakness symmetrical throughout.     Reflexes  Unable to assess due to pain.     Coordination     Unable to assess .     Gait     Unable to assess due to pt safety   Relevant Results      NIH Stroke  Scale  1A. Level of Consciousness: Requires Repeated Stimulation to Arouse or Responds to Pain  1B. Ask Month and Age: No Questions Right  1C. Blink Eyes & Squeeze Hands: Performs 0 Tasks  2. Best Gaze: Normal  3. Visual: No Visual Loss  4. Facial Palsy: Normal Symmetrical Movements  5A. Motor - Left Arm: No Drift  5B. Motor - Right Arm: No Drift  6A. Motor - Left Leg: No Drift  6B. Motor - Right Leg: No Drift  7. Limb Ataxia: Absent  8. Sensory Loss: Normal  9. Best Language: No Aphasia  10. Dysarthria: Normal  11. Extinction and Inattention: No Abnormality  NIH Stroke Scale: 6           [place] Coma Scale  Best Eye Response: Spontaneous  Best Verbal Response: Oriented  Best Motor Response: None  [place] Coma Scale Score: 10      Scheduled medications  amLODIPine, 10 mg, oral, Nightly  ascorbic acid, 500 mg, oral, Daily  baclofen, 10 mg, oral, TID  esomeprazole, 40 mg, oral, BID  ferrous sulfate (325 mg ferrous sulfate), 65 mg of iron, oral, Daily  lactulose, 20 g, oral, Daily  losartan, 50 mg, oral, Daily  nicotine, 1 patch, transdermal, Daily  sucralfate, 1 g, oral, q6h [place]      Continuous medications     PRN medications  PRN medications: acetaminophen **OR** acetaminophen **OR** acetaminophen, acetaminophen-codeine, benzocaine-menthol, butalbital-acetaminophen-caff, dextromethorphan-guaifenesin, guaiFENesin, hydrOXYzine, hydrOXYzine HCL, lidocaine, lubricating eye drops, meprobamate, OLANZapine, polyethylene glycol        Results for orders placed or performed during the hospital encounter of 10/04/24 (from the past 96 hour(s))   POCT GLUCOSE   Result Value Ref Range    POCT Glucose 159 (H) 74 - 99 mg/dL   CBC and Auto Differential   Result Value Ref Range    WBC 6.6 4.4 - 11.3 x10*3/uL    nRBC 0.0 0.0 - 0.0 /100 WBCs    RBC 3.51 (L) 4.00 - 5.20 x10*6/uL    Hemoglobin 10.3 (L) 12.0 - 16.0 g/dL    Hematocrit 32.6 (L) 36.0 - 46.0 %    MCV 93 80 - 100 fL    MCH 29.3 26.0 - 34.0 pg    MCHC 31.6 (L) 32.0 - 36.0 g/dL     RDW 15.9 (H) 11.5 - 14.5 %    Platelets 215 150 - 450 x10*3/uL    Neutrophils % 85.5 40.0 - 80.0 %    Immature Granulocytes %, Automated 0.5 0.0 - 0.9 %    Lymphocytes % 7.3 13.0 - 44.0 %    Monocytes % 6.0 2.0 - 10.0 %    Eosinophils % 0.2 0.0 - 6.0 %    Basophils % 0.5 0.0 - 2.0 %    Neutrophils Absolute 5.61 (H) 1.60 - 5.50 x10*3/uL    Immature Granulocytes Absolute, Automated 0.03 0.00 - 0.50 x10*3/uL    Lymphocytes Absolute 0.48 (L) 0.80 - 3.00 x10*3/uL    Monocytes Absolute 0.39 0.05 - 0.80 x10*3/uL    Eosinophils Absolute 0.01 0.00 - 0.40 x10*3/uL    Basophils Absolute 0.03 0.00 - 0.10 x10*3/uL   Comprehensive metabolic panel   Result Value Ref Range    Glucose 150 (H) 74 - 99 mg/dL    Sodium 135 (L) 136 - 145 mmol/L    Potassium 3.5 3.5 - 5.3 mmol/L    Chloride 99 98 - 107 mmol/L    Bicarbonate 27 21 - 32 mmol/L    Anion Gap 13 10 - 20 mmol/L    Urea Nitrogen 10 6 - 23 mg/dL    Creatinine 0.58 0.50 - 1.05 mg/dL    eGFR >90 >60 mL/min/1.73m*2    Calcium 8.3 (L) 8.6 - 10.3 mg/dL    Albumin 3.3 (L) 3.4 - 5.0 g/dL    Alkaline Phosphatase 89 33 - 136 U/L    Total Protein 6.1 (L) 6.4 - 8.2 g/dL    AST 22 9 - 39 U/L    Bilirubin, Total 0.3 0.0 - 1.2 mg/dL    ALT 13 7 - 45 U/L   Troponin I, High Sensitivity   Result Value Ref Range    Troponin I, High Sensitivity 9 0 - 13 ng/L   Protime-INR   Result Value Ref Range    Protime 11.1 9.3 - 12.7 seconds    INR 1.1 0.9 - 1.2   APTT   Result Value Ref Range    aPTT 20.7 (L) 22.0 - 32.5 seconds   Drug Screen, Urine   Result Value Ref Range    Amphetamine Screen, Urine Presumptive Negative Presumptive Negative    Barbiturate Screen, Urine Presumptive Positive (A) Presumptive Negative    Benzodiazepines Screen, Urine Presumptive Negative Presumptive Negative    Cannabinoid Screen, Urine Presumptive Positive (A) Presumptive Negative    Cocaine Metabolite Screen, Urine Presumptive Negative Presumptive Negative    Fentanyl Screen, Urine Presumptive Negative Presumptive  Negative    Opiate Screen, Urine Presumptive Negative Presumptive Negative    Oxycodone Screen, Urine Presumptive Negative Presumptive Negative    PCP Screen, Urine Presumptive Negative Presumptive Negative    Methadone Screen, Urine Presumptive Negative Presumptive Negative   Urinalysis with Reflex Microscopic   Result Value Ref Range    Color, Urine Yellow Light-Yellow, Yellow, Dark-Yellow    Appearance, Urine Turbid (N) Clear    Specific Gravity, Urine 1.030 1.005 - 1.035    pH, Urine 6.0 5.0, 5.5, 6.0, 6.5, 7.0, 7.5, 8.0    Protein, Urine 70 (1+) (A) NEGATIVE, 10 (TRACE), 20 (TRACE) mg/dL    Glucose, Urine Normal Normal mg/dL    Blood, Urine NEGATIVE NEGATIVE    Ketones, Urine NEGATIVE NEGATIVE mg/dL    Bilirubin, Urine NEGATIVE NEGATIVE    Urobilinogen, Urine Normal Normal mg/dL    Nitrite, Urine NEGATIVE NEGATIVE    Leukocyte Esterase, Urine NEGATIVE NEGATIVE   Microscopic Only, Urine   Result Value Ref Range    WBC, Urine 1-5 1-5, NONE /HPF    RBC, Urine 1-2 NONE, 1-2, 3-5 /HPF    Bacteria, Urine 1+ (A) NONE SEEN /HPF    Mucus, Urine 4+ Reference range not established. /LPF    Hyaline Casts, Urine 3+ (A) NONE /LPF   Urine Culture    Specimen: Clean Catch/Voided; Urine   Result Value Ref Range    Urine Culture No growth    Sars-CoV-2 PCR   Result Value Ref Range    Coronavirus 2019, PCR Not Detected Not Detected   Influenza A, and B PCR   Result Value Ref Range    Flu A Result Not Detected Not Detected    Flu B Result Not Detected Not Detected   ECG 12 lead   Result Value Ref Range    Ventricular Rate 74 BPM    Atrial Rate 74 BPM    NH Interval 154 ms    QRS Duration 78 ms    QT Interval 444 ms    QTC Calculation(Bazett) 492 ms    P Axis 81 degrees    R Axis -76 degrees    T Axis 50 degrees    QRS Count 12 beats    Q Onset 226 ms    P Onset 149 ms    P Offset 206 ms    T Offset 448 ms    QTC Fredericia 476 ms   POCT GLUCOSE   Result Value Ref Range    POCT Glucose 156 (H) 74 - 99 mg/dL   POCT GLUCOSE   Result  Value Ref Range    POCT Glucose 125 (H) 74 - 99 mg/dL   POCT GLUCOSE   Result Value Ref Range    POCT Glucose 115 (H) 74 - 99 mg/dL   Comprehensive metabolic panel   Result Value Ref Range    Glucose 107 (H) 74 - 99 mg/dL    Sodium 139 136 - 145 mmol/L    Potassium 3.7 3.5 - 5.3 mmol/L    Chloride 97 (L) 98 - 107 mmol/L    Bicarbonate 29 21 - 32 mmol/L    Anion Gap 17 10 - 20 mmol/L    Urea Nitrogen 18 6 - 23 mg/dL    Creatinine 0.55 0.50 - 1.05 mg/dL    eGFR >90 >60 mL/min/1.73m*2    Calcium 9.5 8.6 - 10.3 mg/dL    Albumin 4.1 3.4 - 5.0 g/dL    Alkaline Phosphatase 96 33 - 136 U/L    Total Protein 7.4 6.4 - 8.2 g/dL    AST 24 9 - 39 U/L    Bilirubin, Total 0.4 0.0 - 1.2 mg/dL    ALT 16 7 - 45 U/L   CBC   Result Value Ref Range    WBC 9.9 4.4 - 11.3 x10*3/uL    nRBC 0.0 0.0 - 0.0 /100 WBCs    RBC 4.32 4.00 - 5.20 x10*6/uL    Hemoglobin 12.6 12.0 - 16.0 g/dL    Hematocrit 38.5 36.0 - 46.0 %    MCV 89 80 - 100 fL    MCH 29.2 26.0 - 34.0 pg    MCHC 32.7 32.0 - 36.0 g/dL    RDW 15.9 (H) 11.5 - 14.5 %    Platelets 348 150 - 450 x10*3/uL   POCT GLUCOSE   Result Value Ref Range    POCT Glucose 112 (H) 74 - 99 mg/dL   POCT GLUCOSE   Result Value Ref Range    POCT Glucose 104 (H) 74 - 99 mg/dL   POCT GLUCOSE   Result Value Ref Range    POCT Glucose 119 (H) 74 - 99 mg/dL   Hemoglobin and hematocrit, blood   Result Value Ref Range    Hemoglobin 12.5 12.0 - 16.0 g/dL    Hematocrit 37.3 36.0 - 46.0 %   PST Top   Result Value Ref Range    Extra Tube Hold for add-ons.    Hemoglobin and hematocrit, blood   Result Value Ref Range    Hemoglobin 12.7 12.0 - 16.0 g/dL    Hematocrit 38.8 36.0 - 46.0 %   Hemoglobin and hematocrit, blood   Result Value Ref Range    Hemoglobin 13.3 12.0 - 16.0 g/dL    Hematocrit 39.0 36.0 - 46.0 %   Hemoglobin and hematocrit, blood   Result Value Ref Range    Hemoglobin 13.3 12.0 - 16.0 g/dL    Hematocrit 40.3 36.0 - 46.0 %   CBC and Auto Differential   Result Value Ref Range    WBC 10.9 4.4 - 11.3  x10*3/uL    nRBC 0.0 0.0 - 0.0 /100 WBCs    RBC 4.55 4.00 - 5.20 x10*6/uL    Hemoglobin 13.3 12.0 - 16.0 g/dL    Hematocrit 39.4 36.0 - 46.0 %    MCV 87 80 - 100 fL    MCH 29.2 26.0 - 34.0 pg    MCHC 33.8 32.0 - 36.0 g/dL    RDW 15.8 (H) 11.5 - 14.5 %    Platelets 338 150 - 450 x10*3/uL    Neutrophils % 74.8 40.0 - 80.0 %    Immature Granulocytes %, Automated 0.5 0.0 - 0.9 %    Lymphocytes % 11.1 13.0 - 44.0 %    Monocytes % 12.8 2.0 - 10.0 %    Eosinophils % 0.4 0.0 - 6.0 %    Basophils % 0.4 0.0 - 2.0 %    Neutrophils Absolute 8.17 (H) 1.60 - 5.50 x10*3/uL    Immature Granulocytes Absolute, Automated 0.05 0.00 - 0.50 x10*3/uL    Lymphocytes Absolute 1.21 0.80 - 3.00 x10*3/uL    Monocytes Absolute 1.40 (H) 0.05 - 0.80 x10*3/uL    Eosinophils Absolute 0.04 0.00 - 0.40 x10*3/uL    Basophils Absolute 0.04 0.00 - 0.10 x10*3/uL   Basic Metabolic Panel   Result Value Ref Range    Glucose 122 (H) 74 - 99 mg/dL    Sodium 136 136 - 145 mmol/L    Potassium 3.5 3.5 - 5.3 mmol/L    Chloride 98 98 - 107 mmol/L    Bicarbonate 29 21 - 32 mmol/L    Anion Gap 13 10 - 20 mmol/L    Urea Nitrogen 10 6 - 23 mg/dL    Creatinine 0.40 (L) 0.50 - 1.05 mg/dL    eGFR >90 >60 mL/min/1.73m*2    Calcium 9.2 8.6 - 10.3 mg/dL      CT chest abdomen pelvis wo IV contrast     Result Date: 10/6/2024  Interpreted By:  Clemente Vergara, STUDY: CT CHEST ABDOMEN PELVIS WO CONTRAST; 10/6/2024 11:06 am   INDICATION: Signs/Symptoms:spiculated lung mass. Spiculated lung mass noted at the right apex on CT angiography of the head and neck.   COMPARISON: None   ACCESSION NUMBER(S): [id number]   ORDERING CLINICIAN: DUGLAS ESPARZA   TECHNIQUE: COMMENTS: This exam is performed without oral or intravenous contrast as was requested. Please note that the absence of oral and intravenous contrast material does limit the sensitivity and specificity of this examination. In particular solid organ assessment for neoplasm is significantly limited. Assessment of the GI tract  is also limited without the aid of oral contrast administration. Vascular abnormalities such as dissection, occlusions, infarcts and thrombus may also go undetected.     One or more of the following dose reduction techniques were used: Automated exposure control Adjustment of the mA and/or kV according to patient size, and/or use of iterative reconstruction technique.   FINDINGS: CHEST:   Within limits of this unenhanced study no hilar or mediastinal lymphadenopathy is appreciated. Emphysematous change of the pulmonary parenchyma is present bilaterally. No effusions are identified. No alveolar consolidation is noted. *There is a 7 mm apicoposterior segment right upper lobe nodule (Series 7, Image 85). *There is a 2.2 cm spiculated mass apicoposterior segment right upper lobe (Series 7, Image 114). *There is a 9 mm spiculated mass superior segment right lower lobe (Series 7, Image 118). *There is a 9 mm nodule right lower lobe laterally (Series 7, Image 220). *There is a 2 mm and 3 mm nodule left lower lobe (Series 7, Image 178). *There is a 6 mm left lower lobe nodule medially (Series 7, Image 122). *There is a 6 mm anterior segment right upper lobe nodule (Series 6, Image 85). *There is a 5 mm right lower lobe nodule (Series 7, Image 175). *There is a 3.1 cm spiculated mass lower pole right hilum (Series 7, Image 187). This appears to occlude several of the branches of the right lower lobe anteriorly. *Mucous plugging is present within the right lower lobe.     Chest Wall: No chest wall masses are identified.   Skeletal System: No fractures or destructive lesions are identified. There is a severe thoracic levoscoliosis with spondylosis.   _________________________________________________________   CT SCAN OF THE ABDOMEN AND PELVIS FINDINGS: ABDOMEN CT: SOLID ORGAN ASSESSMENT: Within limits of this unenhanced study no focal masses are identified within the liver, spleen, pancreas, kidneys or adrenal glands. Hepatic  and splenic calcifications are present consistent with remote granulomatous disease. There is vague increased density along the dependent portion of the gallbladder possibly indicating milk of calcium or sludge.   RETROPERITONEUM: AORTA:  There is no evidence for abdominal aortic aneurysm.   LYMPH NODES: There is no evidence for retroperitoneal lymphadenopathy.   BOWEL ASSESSMENT: No intraperitoneal free air is identified. There is a nonspecific appearance of the stomach.  The small bowel is unremarkable in appearance.  The colon is nonspecific in appearance.   ABDOMINAL AND PELVIC WALLS: There is no evidence for a hernia. No abdominal wall masses are identified.   OSSEOUS STRUCTURES: No lytic or blastic lesions are identified. There is a severe lumbar dextroscoliosis with spondylosis.   PELVIC CT: No pathologic masses or fluid collections are identified.        1. There are multiple masses identified within the right and left lobes of the liver more numerous and larger on the right with particular note made of a large 3.1 cm lower pole mass right hilum in a 2.2 cm spiculated mass apicoposterior segment right upper lobe along with additional masses as detailed above. Malignancy is likely with metastatic lesions consideration along with primary lung cancer metastatic to the lungs.. 2. Emphysematous change pulmonary parenchyma. 3. Nonspecific appearance of the abdomen and pelvis. Please note that sensitivity of the abdomen and pelvis assessment is limited due to the cachectic state of the patient and absence of oral and intravenous contrast.     MACRO: none   Signed by: Clemente Vergara 10/6/2024 1:10 PM Dictation workstation:   [id number]                  Assessment/Plan      Assessment & Plan  Disorientation    Pt is a 79 year old female presenting with alerted mental status. Pt was found by her daughter unresponsive on the couch at home.  Pt appears to be back at her baseline mentation, which is A&Ox4, identifies  the current president. Stroke is unlikely due to no focal deficit. Pt found to have multiple lung mass, were going to do a MRI brain w/wo to look for underlying CMS mass, but patient is refusing MRI.     Given that the event was only questionable for seizures, ok to hold off on MRI. Unclear what the etiology of confusion is caused by but could be related to the marijuana use. Will await EEG if pt is agreeable.      Recommendations  EEG   PT/OT per therapy recommendations.     If EEG is normal neurology will sign off.           I spent 35 minutes in the professional and overall care of this patient.      Mis Gustafson, APRN-CNP

## 2024-10-08 NOTE — PROGRESS NOTES
Occupational Therapy    OT Treatment    Patient Name: Karly Horton  MRN: [id number]  Department: [address]  Room: [facility]  Today's Date: 10/8/2024  Time Calculation  Start Time: 1654  Stop Time: 1722  Time Calculation (min): 28 min        Assessment:  OT Assessment: Gradual progress made towards OT goals. Continue with current OT POC to increase strength, balance and functional tolerance to maximize safety and independence during ADLs.  Barriers to Discharge: Decreased caregiver support  Evaluation/Treatment Tolerance: Patient limited by fatigue  End of Session Communication: Bedside nurse  End of Session Patient Position: Bed, 3 rail up, Alarm off, not on at start of session (all needs in reach. Pt does not meet weight requirement for bed alarm activation)  OT Assessment Results: Decreased ADL status, Decreased upper extremity range of motion, Decreased upper extremity strength, Decreased cognition, Decreased endurance, Decreased functional mobility, Decreased fine motor control, Decreased gross motor control, Decreased IADLs  Barriers to Discharge: Decreased caregiver support  Evaluation/Treatment Tolerance: Patient limited by fatigue  Plan:  Treatment Interventions: ADL retraining, UE strengthening/ROM, Cognitive reorientation, Patient/family training, Functional transfer training, Equipment evaluation/education, Compensatory technique education  OT Frequency: 3 times per week  OT Discharge Recommendations: Moderate intensity level of continued care  Equipment Recommended upon Discharge: Bedside commode, Wheeled walker  OT Recommended Transfer Status: Maximum assist, Assist of 2  OT - OK to Discharge: Yes  Treatment Interventions: ADL retraining, UE strengthening/ROM, Cognitive reorientation, Patient/family training, Functional transfer training, Equipment evaluation/education, Compensatory technique education    Subjective   Previous Visit Info:  OT Last Visit  OT Received On:  10/08/24  General:  General  Reason for Referral: ADL impairment; 78 y/o female admit from home for AMS and found on ground by daughter  Past Medical History Relevant to Rehab: pna, iron deficiency anemia, CHF, UTI  Prior to Session Communication: Bedside nurse  Patient Position Received: Bed, 3 rail up, Alarm off, not on at start of session  General Comment: Pt cleared for therapy session per nursing, cooperative this date. Increased time and rest breaks required for task completion secondary to fatigue.  Precautions:  Hearing/Visual Limitations: +glasses  Medical Precautions: Fall precautions  Precautions Comment: telemetry    Vital Signs (Past 2hrs)        Date/Time Vitals Session Patient Position Pulse Resp SpO2 BP MAP (mmHg)    10/08/24 1633 --  --  95  18  95 %  105/45  60                         Pain:  Pain Assessment  Pain Assessment: 0-10  0-10 (Numeric) Pain Score: 5 - Moderate pain  Pain Type: Chronic pain  Pain Location: Neck  Clinical Progression: Not changed  Pain Interventions: Repositioned, Ambulation/increased activity    Objective    Cognition:  Cognition  Overall Cognitive Status: Impaired  Orientation Level: Oriented X4  Following Commands: Follows one step commands with increased time  Cognition Comments: intermittent confusion  Safety/Judgement: Exceptions to WFL  Insight: Mild  Impulsive: Mildly  Task Initiation: Initiates with cues  Processing Speed: Delayed  Coordination:  Movements are Fluid and Coordinated: No  Upper Body Coordination: slower rate and accuracy of movements  Lower Body Coordination: Slower rate of movement buffy LE  Activities of Daily Living:      UE Bathing  UE Bathing Comments: bathing tasks completed prior to OT session, however pt requesting assistance for washing of hair. Task completed while seated EOB with maxA, towel drying and hair management tasks completed with Lucille and set-up. Increased time and rest breaks required for task completion.    Toileting  Toileting  Comments: pt with bowel incontinence upon arrival requiring maxA for posterior hygiene at bede level. Pt able to minimally assist by rolling R<>L in bed using bed rails. Verbal cues required for sequencing.  Functional Standing Tolerance:     Bed Mobility/Transfers: Bed Mobility  Bed Mobility: Yes  Bed Mobility 1  Bed Mobility 1: Supine to sitting, Sitting to supine  Level of Assistance 1: Moderate assistance  Bed Mobility Comments 1: Assist with trunk-up during supine to sit;  mod assist to scoot hips to edge of bed during supine to sit  Bed Mobility 2  Bed Mobility  2: Rolling right, Rolling left  Level of Assistance 2: Minimum assistance  Bed Mobility Comments 2: with use of bed rails, scooting tasks completed with maxA and use of draw sheet.    Transfers  Transfer: No (transfers deferred this date secondary to pt fatigue.)    Therapy/Activity: Therapeutic Activity  Therapeutic Activity Performed: Yes  Therapeutic Activity 1: EOB sitting trials completed >15min with Lucille to complete functional tasks. Fair balance observed this date, R lateral lean with fatigue. EOB sitting trials completed to increase functional tolerance, strength and challenge sitting balance to maximize potential during ADLs.      Outcome Measures:[facility] Daily Activity  Putting on and taking off regular lower body clothing: Total  Bathing (including washing, rinsing, drying): A lot  Putting on and taking off regular upper body clothing: A lot  Toileting, which includes using toilet, bedpan or urinal: Total  Taking care of personal grooming such as brushing teeth: A little  Eating Meals: A little  Daily Activity - Total Score: 12        Education Documentation  ADL Training, taught by UGO Guzmán at 10/8/2024  5:51 PM.  Learner: Patient  Readiness: Acceptance  Method: Explanation, Demonstration  Response: Needs Reinforcement    Education Comments  Education provided on role of OT/POC, safety awareness throughout functional  tasks/transfers, importance of activity/ rest routine, EC/WS techniques, and use of call light for assistance. Questions, comments and concerns addressed regarding OT.      Goals:  Encounter Problems       Encounter Problems (Active)       ADLs       Patient with complete upper body dressing with minimal assist  level of assistance donning and doffing all UE clothes with PRN adaptive equipment while edge of bed  (Progressing)       Start:  10/05/24    Expected End:  11/05/24            Patient with complete lower body dressing with moderate assist level of assistance donning and doffing all LE clothes  with PRN adaptive equipment while edge of bed  (Progressing)       Start:  10/05/24    Expected End:  11/05/24            Patient will complete daily grooming tasks with minimal assist  level of assistance and PRN adaptive equipment while edge of bed . (Progressing)       Start:  10/05/24    Expected End:  11/05/24               TRANSFERS       Patient will complete functional transfer to toilet/commode with least restrictive device with minimal assist  level of assistance. (Progressing)       Start:  10/05/24    Expected End:  11/05/24            Patient will complete sit to stand transfer with minimal assist  level of assistance and least restrictive device in order to improve safety and prepare for out of bed mobility. (Progressing)       Start:  10/05/24    Expected End:  11/05/24

## 2024-10-09 LAB
ANION GAP SERPL CALCULATED.3IONS-SCNC: 10 MMOL/L (ref 10–20)
APPEARANCE UR: ABNORMAL
BACTERIA #/AREA URNS AUTO: ABNORMAL /HPF
BILIRUB UR STRIP.AUTO-MCNC: NEGATIVE MG/DL
BUN SERPL-MCNC: 9 MG/DL (ref 6–23)
CALCIUM SERPL-MCNC: 9.1 MG/DL (ref 8.6–10.3)
CHLORIDE SERPL-SCNC: 99 MMOL/L (ref 98–107)
CO2 SERPL-SCNC: 28 MMOL/L (ref 21–32)
COLOR UR: YELLOW
CREAT SERPL-MCNC: 0.43 MG/DL (ref 0.5–1.05)
EGFRCR SERPLBLD CKD-EPI 2021: >90 ML/MIN/1.73M*2
ERYTHROCYTE [DISTWIDTH] IN BLOOD BY AUTOMATED COUNT: 15.2 % (ref 11.5–14.5)
GLUCOSE SERPL-MCNC: 97 MG/DL (ref 74–99)
GLUCOSE UR STRIP.AUTO-MCNC: NORMAL MG/DL
HCT VFR BLD AUTO: 38.9 % (ref 36–46)
HGB BLD-MCNC: 12.7 G/DL (ref 12–16)
KETONES UR STRIP.AUTO-MCNC: NEGATIVE MG/DL
LEUKOCYTE ESTERASE UR QL STRIP.AUTO: ABNORMAL
MAGNESIUM SERPL-MCNC: 1.88 MG/DL (ref 1.6–2.4)
MCH RBC QN AUTO: 29.2 PG (ref 26–34)
MCHC RBC AUTO-ENTMCNC: 32.6 G/DL (ref 32–36)
MCV RBC AUTO: 89 FL (ref 80–100)
MUCOUS THREADS #/AREA URNS AUTO: ABNORMAL /LPF
NITRITE UR QL STRIP.AUTO: ABNORMAL
NRBC BLD-RTO: 0 /100 WBCS (ref 0–0)
PH UR STRIP.AUTO: 7 [PH]
PLATELET # BLD AUTO: 307 X10*3/UL (ref 150–450)
POTASSIUM SERPL-SCNC: 3.4 MMOL/L (ref 3.5–5.3)
PROT UR STRIP.AUTO-MCNC: ABNORMAL MG/DL
RBC # BLD AUTO: 4.35 X10*6/UL (ref 4–5.2)
RBC # UR STRIP.AUTO: ABNORMAL /UL
RBC #/AREA URNS AUTO: >20 /HPF
SODIUM SERPL-SCNC: 134 MMOL/L (ref 136–145)
SP GR UR STRIP.AUTO: 1.02
SQUAMOUS #/AREA URNS AUTO: ABNORMAL /HPF
UROBILINOGEN UR STRIP.AUTO-MCNC: NORMAL MG/DL
WBC # BLD AUTO: 14 X10*3/UL (ref 4.4–11.3)
WBC #/AREA URNS AUTO: >50 /HPF

## 2024-10-09 PROCEDURE — 2500000005 HC RX 250 GENERAL PHARMACY W/O HCPCS: Performed by: NURSE PRACTITIONER

## 2024-10-09 PROCEDURE — 97530 THERAPEUTIC ACTIVITIES: CPT | Mod: GO

## 2024-10-09 PROCEDURE — 87186 SC STD MICRODIL/AGAR DIL: CPT | Mod: WESLAB | Performed by: NURSE PRACTITIONER

## 2024-10-09 PROCEDURE — 83735 ASSAY OF MAGNESIUM: CPT | Performed by: NURSE PRACTITIONER

## 2024-10-09 PROCEDURE — 2500000001 HC RX 250 WO HCPCS SELF ADMINISTERED DRUGS (ALT 637 FOR MEDICARE OP): Performed by: INTERNAL MEDICINE

## 2024-10-09 PROCEDURE — 1200000002 HC GENERAL ROOM WITH TELEMETRY DAILY

## 2024-10-09 PROCEDURE — 2500000004 HC RX 250 GENERAL PHARMACY W/ HCPCS (ALT 636 FOR OP/ED): Performed by: NURSE PRACTITIONER

## 2024-10-09 PROCEDURE — 97535 SELF CARE MNGMENT TRAINING: CPT | Mod: GO

## 2024-10-09 PROCEDURE — 81001 URINALYSIS AUTO W/SCOPE: CPT | Performed by: NURSE PRACTITIONER

## 2024-10-09 PROCEDURE — 36415 COLL VENOUS BLD VENIPUNCTURE: CPT | Performed by: NURSE PRACTITIONER

## 2024-10-09 PROCEDURE — 2500000002 HC RX 250 W HCPCS SELF ADMINISTERED DRUGS (ALT 637 FOR MEDICARE OP, ALT 636 FOR OP/ED): Performed by: INTERNAL MEDICINE

## 2024-10-09 PROCEDURE — 85027 COMPLETE CBC AUTOMATED: CPT | Performed by: NURSE PRACTITIONER

## 2024-10-09 PROCEDURE — 80048 BASIC METABOLIC PNL TOTAL CA: CPT | Performed by: NURSE PRACTITIONER

## 2024-10-09 PROCEDURE — 2500000001 HC RX 250 WO HCPCS SELF ADMINISTERED DRUGS (ALT 637 FOR MEDICARE OP): Performed by: NURSE PRACTITIONER

## 2024-10-09 PROCEDURE — S4991 NICOTINE PATCH NONLEGEND: HCPCS | Performed by: INTERNAL MEDICINE

## 2024-10-09 PROCEDURE — 87086 URINE CULTURE/COLONY COUNT: CPT | Mod: WESLAB | Performed by: NURSE PRACTITIONER

## 2024-10-09 RX ORDER — LACTULOSE 10 G/15ML
30 SOLUTION ORAL 2 TIMES DAILY
Status: DISCONTINUED | OUTPATIENT
Start: 2024-10-09 | End: 2024-10-10 | Stop reason: HOSPADM

## 2024-10-09 RX ORDER — LACTULOSE 10 G/15ML
30 SOLUTION ORAL 2 TIMES DAILY
Status: DISCONTINUED | OUTPATIENT
Start: 2024-10-09 | End: 2024-10-09

## 2024-10-09 RX ORDER — BISACODYL 10 MG/1
10 SUPPOSITORY RECTAL ONCE
Status: DISCONTINUED | OUTPATIENT
Start: 2024-10-09 | End: 2024-10-10 | Stop reason: HOSPADM

## 2024-10-09 RX ORDER — POTASSIUM CHLORIDE 1.5 G/1.58G
40 POWDER, FOR SOLUTION ORAL ONCE
Status: COMPLETED | OUTPATIENT
Start: 2024-10-09 | End: 2024-10-09

## 2024-10-09 RX ORDER — DOCUSATE SODIUM 100 MG/1
100 CAPSULE, LIQUID FILLED ORAL 2 TIMES DAILY
Status: DISCONTINUED | OUTPATIENT
Start: 2024-10-09 | End: 2024-10-10 | Stop reason: HOSPADM

## 2024-10-09 RX ORDER — HEPARIN SODIUM 5000 [USP'U]/ML
5000 INJECTION, SOLUTION INTRAVENOUS; SUBCUTANEOUS EVERY 8 HOURS
Status: DISCONTINUED | OUTPATIENT
Start: 2024-10-09 | End: 2024-10-10 | Stop reason: HOSPADM

## 2024-10-09 RX ORDER — LACTULOSE 10 G/15ML
10 SOLUTION ORAL ONCE
Status: COMPLETED | OUTPATIENT
Start: 2024-10-09 | End: 2024-10-09

## 2024-10-09 ASSESSMENT — PAIN SCALES - GENERAL
PAINLEVEL_OUTOF10: 1
PAINLEVEL_OUTOF10: 3
PAINLEVEL_OUTOF10: 5 - MODERATE PAIN
PAINLEVEL_OUTOF10: 0 - NO PAIN
PAINLEVEL_OUTOF10: 1
PAINLEVEL_OUTOF10: 1
PAINLEVEL_OUTOF10: 0 - NO PAIN
PAINLEVEL_OUTOF10: 5 - MODERATE PAIN
PAINLEVEL_OUTOF10: 1
PAINLEVEL_OUTOF10: 5 - MODERATE PAIN
PAINLEVEL_OUTOF10: 3
PAINLEVEL_OUTOF10: 3

## 2024-10-09 ASSESSMENT — PAIN - FUNCTIONAL ASSESSMENT
PAIN_FUNCTIONAL_ASSESSMENT: 0-10

## 2024-10-09 ASSESSMENT — COGNITIVE AND FUNCTIONAL STATUS - GENERAL
DRESSING REGULAR LOWER BODY CLOTHING: A LOT
HELP NEEDED FOR BATHING: A LOT
DAILY ACTIVITIY SCORE: 15
DRESSING REGULAR UPPER BODY CLOTHING: A LOT
DRESSING REGULAR LOWER BODY CLOTHING: TOTAL
STANDING UP FROM CHAIR USING ARMS: TOTAL
PERSONAL GROOMING: A LITTLE
WALKING IN HOSPITAL ROOM: TOTAL
TOILETING: A LOT
CLIMB 3 TO 5 STEPS WITH RAILING: TOTAL
HELP NEEDED FOR BATHING: A LOT
DAILY ACTIVITIY SCORE: 15
TOILETING: A LOT
DRESSING REGULAR UPPER BODY CLOTHING: A LOT
TURNING FROM BACK TO SIDE WHILE IN FLAT BAD: A LOT
MOVING TO AND FROM BED TO CHAIR: TOTAL
MOBILITY SCORE: 10

## 2024-10-09 ASSESSMENT — PAIN DESCRIPTION - DESCRIPTORS
DESCRIPTORS: ACHING

## 2024-10-09 ASSESSMENT — ACTIVITIES OF DAILY LIVING (ADL): HOME_MANAGEMENT_TIME_ENTRY: 18

## 2024-10-09 NOTE — PROGRESS NOTES
10/09/24 1519   Discharge Planning   Expected Discharge Disposition [place]  ([facility])     Patient accepted at [facility] for advanced bronchoscopy. Waiting for available bed.

## 2024-10-09 NOTE — PROGRESS NOTES
Occupational Therapy Treatment    Name: Karly Horton  MRN: [id number]  Department: [address]  Room: [facility]  Date: 10/09/24  Time Calculation  Start Time: 1356  Stop Time: 1425  Time Calculation (min): 29 min    Assessment:  OT Assessment: Pt requested to remain bed level this date d/t having the purewick and not wanting to dislodge. Despite education, pt adamant on location of OT participation. Continue OT to optimize and promote independence with functional tasks.  Prognosis: Fair  Evaluation/Treatment Tolerance: Patient limited by fatigue  Medical Staff Made Aware: No  End of Session Patient Position: Bed, 3 rail up, Alarm off, not on at start of session (Call bell in reach and reviewed use of call bell)  Plan:  Treatment Interventions: ADL retraining, UE strengthening/ROM, Cognitive reorientation, Patient/family training, Functional transfer training, Equipment evaluation/education, Compensatory technique education  OT Frequency: 3 times per week  OT Discharge Recommendations: Moderate intensity level of continued care  Equipment Recommended upon Discharge: Bedside commode, Wheeled walker  OT Recommended Transfer Status: Maximum assist, Assist of 2  OT - OK to Discharge: Yes    Subjective   Previous Visit Info:  OT Last Visit  OT Received On: 10/09/24    General:  General  Reason for Referral: ADL impairment; 78 y/o female admit from home for AMS and found on ground by daughter  Past Medical History Relevant to Rehab: pna, iron deficiency anemia, CHF, UTI  Prior to Session Communication: Bedside nurse  Patient Position Received: Bed, 3 rail up, Alarm off, not on at start of session  General Comment: Cleared and agreeable to bed level activity this date. (+)tele, external cath    Precautions:  Medical Precautions: Fall precautions    Vital Signs (Past 2hrs)                Pain Assessment:  Pain Assessment  Pain Assessment: 0-10  0-10 (Numeric) Pain Score: 0 - No pain     Objective     Activities of Daily  Living: Grooming  Grooming Level of Assistance: Setup  Grooming Where Assessed: Bed level  Grooming Comments: Completed bed level: oral hygiene, combing hair, washing face    UE Bathing  UE Bathing Level of Assistance: Minimum assistance  UE Bathing Where Assessed: Bed level  UE Bathing Comments: Completed bed level: washing and drying under arms. limitations d/t poor AROM of B shoulders    Therapy/Activity: Therapeutic Activity  Therapeutic Activity Performed: Yes  Therapeutic Activity 1: Pt endorsing that vision is poor and it is difficult to see the buttons to change the channels on TV. Therapist placed tape piece on channel/volume buttons and power button of contrasting color to assist with independence in leisure participation.      Outcome Measures:  [facility] Daily Activity  Putting on and taking off regular lower body clothing: Total  Bathing (including washing, rinsing, drying): A lot  Putting on and taking off regular upper body clothing: A lot  Toileting, which includes using toilet, bedpan or urinal: A lot  Taking care of personal grooming such as brushing teeth: None  Eating Meals: None  Daily Activity - Total Score: 15        Education Documentation  ADL Training, taught by Alejandra Gonzales OT at 10/9/2024  2:40 PM.  Learner: Patient  Readiness: Acceptance  Method: Explanation, Demonstration  Response: Verbalizes Understanding, Demonstrated Understanding, Needs Reinforcement    Education Comments  No comments found.      Goals:  Encounter Problems       Encounter Problems (Active)       ADLs       Patient with complete upper body dressing with minimal assist  level of assistance donning and doffing all UE clothes with PRN adaptive equipment while edge of bed  (Progressing)       Start:  10/05/24    Expected End:  11/05/24            Patient with complete lower body dressing with moderate assist level of assistance donning and doffing all LE clothes  with PRN adaptive equipment while edge of bed   (Progressing)       Start:  10/05/24    Expected End:  11/05/24            Patient will complete daily grooming tasks with minimal assist  level of assistance and PRN adaptive equipment while edge of bed . (Progressing)       Start:  10/05/24    Expected End:  11/05/24               TRANSFERS       Patient will complete functional transfer to toilet/commode with least restrictive device with minimal assist  level of assistance. (Progressing)       Start:  10/05/24    Expected End:  11/05/24            Patient will complete sit to stand transfer with minimal assist  level of assistance and least restrictive device in order to improve safety and prepare for out of bed mobility. (Progressing)       Start:  10/05/24    Expected End:  11/05/24

## 2024-10-09 NOTE — RESEARCH NOTES
Artificial Intelligence Monitoring in Nursing (AIMS Nursing) Study    Principle Investigator - Dr. Sharif Merchant  Research Coordinator - Maikol Nicholson RN     Patient Name - Karly Horton  Date - 10/9/2024 12:27 PM  Location - [address]    Karly Horton was approached by Maikol Nicholson RN to talk about participating in the AIMS Nursing Study. The patient declined to participate in the study. Study protocol was followed and patient was given study contact information.     Maikol Nicholson RN

## 2024-10-09 NOTE — NURSING NOTE
Bed intake sent a private message stating there isn't any available beds at [facility]. They said if the patient conditions become unstable or change to call [phone].

## 2024-10-09 NOTE — CARE PLAN
The patient's goals for the shift include      The clinical goals for the shift include mobility, rest      Problem: Skin  Goal: Decreased wound size/increased tissue granulation at next dressing change  10/9/2024 1744 by Pravin Grier RN  Outcome: Progressing  Flowsheets (Taken 10/8/2024 2247 by Joslyn Alicea RN)  Decreased wound size/increased tissue granulation at next dressing change: Promote sleep for wound healing  10/9/2024 1622 by Pravin Grier RN  Flowsheets (Taken 10/8/2024 2247 by Joslyn Alicea RN)  Decreased wound size/increased tissue granulation at next dressing change: Promote sleep for wound healing  Goal: Participates in plan/prevention/treatment measures  10/9/2024 1744 by Pravin Grier RN  Outcome: Progressing  10/9/2024 1622 by Pravin Grier RN  Flowsheets (Taken 10/9/2024 1622)  Participates in plan/prevention/treatment measures:   Discuss with provider PT/OT consult   Elevate heels   Increase activity/out of bed for meals  Goal: Prevent/manage excess moisture  10/9/2024 1744 by Pravin Grier RN  Outcome: Progressing  10/9/2024 1622 by Pravin Grier RN  Flowsheets (Taken 10/8/2024 2247 by Joslyn Alicea RN)  Prevent/manage excess moisture: Moisturize dry skin  Goal: Prevent/minimize sheer/friction injuries  10/9/2024 1744 by Pravin Grier RN  Outcome: Progressing  10/9/2024 1622 by Pravin Grier RN  Flowsheets (Taken 10/9/2024 1622)  Prevent/minimize sheer/friction injuries: Use pull sheet  Goal: Promote/optimize nutrition  10/9/2024 1744 by Pravin Grier RN  Outcome: Progressing  10/9/2024 1622 by Pravin Grier RN  Flowsheets (Taken 10/9/2024 1622)  Promote/optimize nutrition:   Consume > 50% meals/supplements   Discuss with provider if NPO > 2 days   Monitor/record intake including meals  Goal: Promote skin healing  10/9/2024 1744 by Pravin Grier RN  Outcome: Progressing  10/9/2024 1622 by Pravin Grier RN  Flowsheets (Taken 10/8/2024 2247 by Joslyn Alicea RN)  Promote skin  healing: Protective dressings over bony prominences     Problem: Pain  Goal: Walks with improved pain control throughout the shift  Outcome: Progressing  Goal: Performs ADL's with improved pain control throughout shift  Outcome: Progressing  Goal: Participates in PT with improved pain control throughout the shift  Outcome: Progressing     Problem: Nutrition  Goal: Oral intake greater than 50%  Outcome: Progressing  Goal: Oral intake greater 75%  Outcome: Progressing  Goal: Consume prescribed supplement  Outcome: Progressing  Goal: Adequate PO fluid intake  Outcome: Progressing  Goal: Nutrition support goals are met within 48 hrs  Outcome: Progressing  Goal: Nutrition support is meeting 75% of nutrient needs  Outcome: Progressing  Goal: Lab values WNL  Outcome: Progressing  Goal: Electrolytes WNL  Outcome: Progressing  Goal: Promote healing  Outcome: Progressing  Goal: Maintain stable weight  Outcome: Progressing  Goal: Gradual weight gain  Outcome: Progressing     Problem: Pain - Adult  Goal: Verbalizes/displays adequate comfort level or baseline comfort level  10/9/2024 1744 by Pravin Grier RN  Outcome: Progressing  10/9/2024 1622 by Pravin Grier RN  Flowsheets (Taken 10/9/2024 1622)  Verbalizes/displays adequate comfort level or baseline comfort level:   Assess pain using appropriate pain scale   Encourage patient to monitor pain and request assistance   Administer analgesics based on type and severity of pain and evaluate response   Implement non-pharmacological measures as appropriate and evaluate response   Consider cultural and social influences on pain and pain management   Notify Licensed Independent Practitioner if interventions unsuccessful or patient reports new pain     Problem: Safety - Adult  Goal: Free from fall injury  10/9/2024 1744 by Pravin Grier RN  Outcome: Progressing  10/9/2024 1622 by Pravin Grier RN  Flowsheets (Taken 10/9/2024 1622)  Free from fall injury:   Instruct  family/caregiver on patient safety   Based on caregiver fall risk screen, instruct family/caregiver to ask for assistance with transferring infant if caregiver noted to have fall risk factors     Problem: Discharge Planning  Goal: Discharge to home or other facility with appropriate resources  10/9/2024 1744 by Pravin Grier RN  Outcome: Progressing  10/9/2024 1622 by Pravin Grier RN  Flowsheets (Taken 10/9/2024 1622)  Discharge to home or other facility with appropriate resources:   Identify barriers to discharge with patient and caregiver   Arrange for needed discharge resources and transportation as appropriate   Identify discharge learning needs (meds, wound care, etc)   Refer to discharge planning if patient needs post-hospital services based on physician order or complex needs related to functional status, cognitive ability or social support system     Problem: Chronic Conditions and Co-morbidities  Goal: Patient's chronic conditions and co-morbidity symptoms are monitored and maintained or improved  10/9/2024 1744 by Pravin Grier RN  Outcome: Progressing  10/9/2024 1622 by Pravin Grier RN  Flowsheets (Taken 10/9/2024 1622)  Care Plan - Patient's Chronic Conditions and Co-Morbidity Symptoms are Monitored and Maintained or Improved:   Collaborate with multidisciplinary team to address chronic and comorbid conditions and prevent exacerbation or deterioration   Update acute care plan with appropriate goals if chronic or comorbid symptoms are exacerbated and prevent overall improvement and discharge   Monitor and assess patient's chronic conditions and comorbid symptoms for stability, deterioration, or improvement

## 2024-10-09 NOTE — CARE PLAN
The patient's goals for the shift include  rest    The clinical goals for the shift include rest

## 2024-10-09 NOTE — PROGRESS NOTES
Karly Horton is a 79 y.o. female on day 5 of admission presenting with Disorientation.    [facility]    Per nursing, patient constipated, requests lactulose 30 g.    Subjective   Patient seen and examined.  Resting in bed in no acute distress.  Awake alert oriented x 3.  Forgetful.  Complains of constipation.  No abdominal pain, nausea or vomiting.  Denies bowel movement.  Chronic pain, controlled.  [facility]  She is in agreement.  States she will discuss with her daughter later as she is in surgery.    Per nursing assistant bedside, patient had small bowel movement.    Objective     Physical Exam  Vitals and nursing note reviewed.   Constitutional:       General: She is not in acute distress.     Appearance: Normal appearance. She is normal weight. She is not ill-appearing, toxic-appearing or diaphoretic.      Comments: Chronically ill appearing.   HENT:      Head: Normocephalic and atraumatic.      Right Ear: External ear normal.      Left Ear: External ear normal.      Nose: Nose normal.      Mouth/Throat:      Mouth: Mucous membranes are moist.      Pharynx: Oropharynx is clear.   Eyes:      Extraocular Movements: Extraocular movements intact.      Conjunctiva/sclera: Conjunctivae normal.      Pupils: Pupils are equal, round, and reactive to light.   Cardiovascular:      Rate and Rhythm: Normal rate and regular rhythm.      Pulses: Normal pulses.      Heart sounds: Normal heart sounds. No murmur heard.  Pulmonary:      Effort: Pulmonary effort is normal. No respiratory distress.      Breath sounds: Decreased breath sounds present. No wheezing, rhonchi or rales.      Comments: Room air.  Abdominal:      General: Bowel sounds are normal. There is no distension.      Palpations: Abdomen is soft.      Tenderness: There is no abdominal tenderness.   Genitourinary:     Comments: Deferred.  Musculoskeletal:         General: No  swelling or tenderness. Normal range of motion.      Cervical back: Normal range of motion and neck supple.   Skin:     General: Skin is warm and dry.      Capillary Refill: Capillary refill takes less than 2 seconds.      Comments: Right facial abrasion.   Neurological:      General: No focal deficit present.      Mental Status: She is alert and oriented to person, place, and time.      Comments: Awake alert oriented x 3. Forgetful.   Psychiatric:         Mood and Affect: Mood normal.         Behavior: Behavior normal.       Last Recorded Vitals  Blood pressure 116/64, pulse 98, temperature 36.3 °C (97.3 °F), temperature source Oral, resp. rate 18, height 1.524 m (5'), weight (!) 33 kg (72 lb 12 oz), SpO2 96%.    Intake/Output last 3 Shifts:  I/O last 3 completed shifts:  In: 650 (19.7 mL/kg) [P.O.:650]  Out: - (0 mL/kg)   Weight: 33 kg     Relevant Results  Results for orders placed or performed during the hospital encounter of 10/04/24 (from the past 24 hour(s))   Basic Metabolic Panel   Result Value Ref Range    Glucose 97 74 - 99 mg/dL    Sodium 134 (L) 136 - 145 mmol/L    Potassium 3.4 (L) 3.5 - 5.3 mmol/L    Chloride 99 98 - 107 mmol/L    Bicarbonate 28 21 - 32 mmol/L    Anion Gap 10 10 - 20 mmol/L    Urea Nitrogen 9 6 - 23 mg/dL    Creatinine 0.43 (L) 0.50 - 1.05 mg/dL    eGFR >90 >60 mL/min/1.73m*2    Calcium 9.1 8.6 - 10.3 mg/dL   CBC   Result Value Ref Range    WBC 14.0 (H) 4.4 - 11.3 x10*3/uL    nRBC 0.0 0.0 - 0.0 /100 WBCs    RBC 4.35 4.00 - 5.20 x10*6/uL    Hemoglobin 12.7 12.0 - 16.0 g/dL    Hematocrit 38.9 36.0 - 46.0 %    MCV 89 80 - 100 fL    MCH 29.2 26.0 - 34.0 pg    MCHC 32.6 32.0 - 36.0 g/dL    RDW 15.2 (H) 11.5 - 14.5 %    Platelets 307 150 - 450 x10*3/uL   Magnesium   Result Value Ref Range    Magnesium 1.88 1.60 - 2.40 mg/dL     Susceptibility data from last 90 days.  Collected Specimen Info Organism Amoxicillin/Clavulanate Ampicillin Ampicillin/Sulbactam Cefazolin Cefazolin (uncomplicated  UTIs only) Ciprofloxacin Gentamicin Nitrofurantoin Piperacillin/Tazobactam Trimethoprim/Sulfamethoxazole   09/03/24 Blood culture from Peripheral Venipuncture Staphylococcus epidermidis             09/02/24 Urine from Clean Catch/Voided Escherichia coli  I  R  R  S  S  R  S  S  S  S     EEG    Result Date: 10/8/2024  IMPRESSION Impression This routine EEG in awake and asleep state is normal. no epileptiform discharges or lateralizing signs are recorded. A full report will be scanned into the patient's chart at a later time. This report has been interpreted and electronically signed by     Scheduled medications  amLODIPine, 10 mg, oral, Nightly  ascorbic acid, 500 mg, oral, Daily  bacitracin, , Topical, TID  baclofen, 10 mg, oral, TID  bisacodyl, 10 mg, rectal, Once  docusate sodium, 100 mg, oral, BID  esomeprazole, 40 mg, oral, BID  ferrous sulfate (325 mg ferrous sulfate), 65 mg of iron, oral, Daily  heparin, 5,000 Units, subcutaneous, q8h  lactulose, 30 g, oral, BID  lidocaine, 1 patch, transdermal, Daily  losartan, 50 mg, oral, Daily  nicotine, 1 patch, transdermal, Daily  sucralfate, 1 g, oral, q6h [place]      Continuous medications     PRN medications  PRN medications: acetaminophen **OR** acetaminophen **OR** acetaminophen, acetaminophen-codeine, benzocaine-menthol, butalbital-acetaminophen-caff, dextromethorphan-guaifenesin, guaiFENesin, hydrOXYzine, hydrOXYzine HCL, lidocaine, lubricating eye drops, meprobamate, OLANZapine, polyethylene glycol      PLAN:  Disorientation improved  Altered mental status improved  Toxic, metabolic encephalopathy improved  Marijuana use  Generalized weakness  Lung masses -  Tobacco use  Peptic ulcer disease  Chronic constipation  Anxiety  Migraine headaches  Neck pain, chronic  Constipation -     PLAN:  + Constipation - Abdominal examination benign.  Positive bowel movement.  Continue Lactulose at increased dosing, frequency.  Continue Colace, MiraLAX.  Avoid constipation.   Continue to adjust bowel regimen to avoid constipation.  Labs reviewed.  + Leukocytosis WBC 10.0 --> 14.0..  Afebrile.  Non-toxic appearing.  Room air.  Check urinalysis, culture.  Follow-up.  Sodium 134.  Monitor.  Potassium 3.4.  Replace.  Monitor electrolytes and renal function.  Mentation stable, baseline.  Neurology input appreciated.  Status post EEG.  Follow-up.  Respiratory status pulse oximetry stable on room air.  Pulmonology input appreciated.  Plan to transfer to [facility] for advanced bronchoscopy.  Nicotine patch.  Tobacco cessation education.  She plans to continue smoking cigarettes.  Pain controlled.  Continue current medications.  PT/OT.  Fall precautions.  Up with assistance only.  Bed and chair alarm at all times.  Pressure ulcer prevention measures.  Turn and reposition every 2 hours and as needed.  Heels off bed.  Offloading.  DVT prophylaxis.  Heparin subcutaneous.  GI prophylaxis.  PPI Nexium.  Supportive care.  Patient reassured.  PT/OT recommend moderate intensity level of continued care.  Case management following for discharge planning.  Discussed with patient, Pulmonology, Dr. Mcneil.      Shea Root, PO-CNP

## 2024-10-09 NOTE — SIGNIFICANT EVENT
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  Yesterday when I spoke to Ms Horton, she was worried about her condition and was more anxious about her transfer to [facility] and how her family would know and pick her up from the hospital. I reassured her that she would be transported safely by ambulance and her family would be made aware about her move to the [facility] hospital. Patient informed me that her daughter was undergoing a medical procedure and she did not want me to tell her, so that her daughter wouldn't be worried. Upon pressing her on who her family contact would be in the interim, she directed me to her grandson Dejon. I reached out to Dejon this morning but was unable to get though on the listed phone number and unable to leave a VM. I will try again later today.

## 2024-10-09 NOTE — TELECONSULT
I was asked to review this patients' imaging for appropriateness of a bronchoscopy. She is a 79-year-old woman with a significant smoking history who has been admitted for the work of an encephalopathy and also had mild hemoptysis. Based on my review of the latest physical exam documentation, she's now alert and orientated x 3. She appears comfortable and not in any distress. Flowsheet reveals her SpO2 is in the high 90s, there's no documentation to the state that she has hypoxic respiratory failure. CT chest shows multiple lung nodules, and a 3 cm RML/RUL mass. The major airways are normal. 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     Based on this review, I fully agree that a bronchoscopy for diagnosis and staging is warranted, as there is a high pretest probability that there is malignancy. With the concerns for endobronchial disease in the RLL and limitations with scheduling at [facility] (bronchoscopy regularly performed only on Mondays and Wednesday), this procedure is best served at [facility]. However, if her encephalopathy and hemoptysis have resolved and she has not in hypoxic resp failure, I fully agree with Dr. Natarajan that this procedure can be performed as an outpatient. However, the patient stated there are some social support limitations to scheduling a procedure as an outpatient. With that in mind, it is reasonable to pursue an inpatient bronchoscopy at [facility]. I discussed this with Dr. Su on the phone, he will initiate transfer. I underscored the notion that unless her clinical status changes, she will be an add-on case and triaged as such. I encouraged him to discuss this with her. This is an effort to set realistic expectations regarding when the procedure can be performed as an inpatient. In summary, we'll work expeditiously to get it completed as quickly as possible as inpatient, but this depends on several factors, such as bed available at [facility]. A Pulmonary Consult at [facility] will also be  needed.     Ethan Alexis MD  Staff Physician - Interventional Pulmonology  6:00 PM  10/08/24

## 2024-10-10 ENCOUNTER — APPOINTMENT (OUTPATIENT)
Dept: CARDIOLOGY | Facility: HOSPITAL | Age: 79
DRG: 180 | End: 2024-10-10
Payer: MEDICARE

## 2024-10-10 ENCOUNTER — ANESTHESIA (OUTPATIENT)
Dept: GASTROENTEROLOGY | Facility: HOSPITAL | Age: 79
End: 2024-10-10
Payer: MEDICARE

## 2024-10-10 ENCOUNTER — HOSPITAL ENCOUNTER (INPATIENT)
Facility: HOSPITAL | Age: 79
DRG: 180 | End: 2024-10-10
Attending: INTERNAL MEDICINE | Admitting: STUDENT IN AN ORGANIZED HEALTH CARE EDUCATION/TRAINING PROGRAM
Payer: MEDICARE

## 2024-10-10 ENCOUNTER — ANESTHESIA EVENT (OUTPATIENT)
Dept: GASTROENTEROLOGY | Facility: HOSPITAL | Age: 79
End: 2024-10-10
Payer: MEDICARE

## 2024-10-10 VITALS
WEIGHT: 72.75 LBS | DIASTOLIC BLOOD PRESSURE: 61 MMHG | HEART RATE: 85 BPM | OXYGEN SATURATION: 98 % | TEMPERATURE: 97.5 F | RESPIRATION RATE: 16 BRPM | SYSTOLIC BLOOD PRESSURE: 120 MMHG | HEIGHT: 60 IN | BODY MASS INDEX: 14.28 KG/M2

## 2024-10-10 DIAGNOSIS — C34.90 NON-SMALL CELL LUNG CANCER, UNSPECIFIED LATERALITY (MULTI): ICD-10-CM

## 2024-10-10 DIAGNOSIS — R91.8 LUNG MASS: ICD-10-CM

## 2024-10-10 DIAGNOSIS — C44.92 SQUAMOUS CELL CARCINOMA, SMALL CELL, NONKERATINIZING: Primary | ICD-10-CM

## 2024-10-10 DIAGNOSIS — I50.9 CONGESTIVE HEART FAILURE, UNSPECIFIED HF CHRONICITY, UNSPECIFIED HEART FAILURE TYPE: ICD-10-CM

## 2024-10-10 DIAGNOSIS — I50.32 HEART FAILURE, DIASTOLIC, CHRONIC: ICD-10-CM

## 2024-10-10 DIAGNOSIS — D50.9 IRON DEFICIENCY ANEMIA, UNSPECIFIED IRON DEFICIENCY ANEMIA TYPE: ICD-10-CM

## 2024-10-10 DIAGNOSIS — I50.22 HEART FAILURE, CHRONIC SYSTOLIC: ICD-10-CM

## 2024-10-10 DIAGNOSIS — F17.200 TOBACCO USE DISORDER: ICD-10-CM

## 2024-10-10 DIAGNOSIS — I50.9 CHRONIC CONGESTIVE HEART FAILURE, UNSPECIFIED HEART FAILURE TYPE: ICD-10-CM

## 2024-10-10 DIAGNOSIS — E46 MALNUTRITION, UNSPECIFIED TYPE (MULTI): ICD-10-CM

## 2024-10-10 LAB
25(OH)D3 SERPL-MCNC: 27 NG/ML (ref 30–100)
ALBUMIN SERPL BCP-MCNC: 3.4 G/DL (ref 3.4–5)
ALP SERPL-CCNC: 74 U/L (ref 33–136)
ALT SERPL W P-5'-P-CCNC: 13 U/L (ref 7–45)
ANION GAP SERPL CALC-SCNC: 11 MMOL/L (ref 10–20)
AORTIC VALVE PEAK VELOCITY: 1.2 M/S
AST SERPL W P-5'-P-CCNC: 20 U/L (ref 9–39)
AV PEAK GRADIENT: 5.8 MMHG
AVA (PEAK VEL): 2.83 CM2
BILIRUB SERPL-MCNC: 0.4 MG/DL (ref 0–1.2)
BUN SERPL-MCNC: 10 MG/DL (ref 6–23)
CALCIUM SERPL-MCNC: 9 MG/DL (ref 8.6–10.6)
CHLORIDE SERPL-SCNC: 99 MMOL/L (ref 98–107)
CO2 SERPL-SCNC: 27 MMOL/L (ref 21–32)
CREAT SERPL-MCNC: 0.4 MG/DL (ref 0.5–1.05)
EGFRCR SERPLBLD CKD-EPI 2021: >90 ML/MIN/1.73M*2
EJECTION FRACTION APICAL 4 CHAMBER: 57.4
EJECTION FRACTION: 63 %
ERYTHROCYTE [DISTWIDTH] IN BLOOD BY AUTOMATED COUNT: 15 % (ref 11.5–14.5)
GLUCOSE SERPL-MCNC: 92 MG/DL (ref 74–99)
HCT VFR BLD AUTO: 37.3 % (ref 36–46)
HGB BLD-MCNC: 12.1 G/DL (ref 12–16)
HOLD SPECIMEN: NORMAL
LEFT VENTRICLE INTERNAL DIMENSION DIASTOLE: 2.5 CM (ref 3.5–6)
LEFT VENTRICULAR OUTFLOW TRACT DIAMETER: 2 CM
MCH RBC QN AUTO: 29.6 PG (ref 26–34)
MCHC RBC AUTO-ENTMCNC: 32.4 G/DL (ref 32–36)
MCV RBC AUTO: 91 FL (ref 80–100)
MITRAL VALVE E/A RATIO: 0.5
NRBC BLD-RTO: 0 /100 WBCS (ref 0–0)
PLATELET # BLD AUTO: 301 X10*3/UL (ref 150–450)
POTASSIUM SERPL-SCNC: 4.3 MMOL/L (ref 3.5–5.3)
PROT SERPL-MCNC: 6.3 G/DL (ref 6.4–8.2)
RBC # BLD AUTO: 4.09 X10*6/UL (ref 4–5.2)
RIGHT VENTRICLE FREE WALL PEAK S': 9.29 CM/S
RIGHT VENTRICLE PEAK SYSTOLIC PRESSURE: 31.1 MMHG
SODIUM SERPL-SCNC: 133 MMOL/L (ref 136–145)
TRICUSPID ANNULAR PLANE SYSTOLIC EXCURSION: 1.7 CM
WBC # BLD AUTO: 11.7 X10*3/UL (ref 4.4–11.3)

## 2024-10-10 PROCEDURE — 2500000005 HC RX 250 GENERAL PHARMACY W/O HCPCS

## 2024-10-10 PROCEDURE — 93325 DOPPLER ECHO COLOR FLOW MAPG: CPT | Performed by: INTERNAL MEDICINE

## 2024-10-10 PROCEDURE — 2500000001 HC RX 250 WO HCPCS SELF ADMINISTERED DRUGS (ALT 637 FOR MEDICARE OP)

## 2024-10-10 PROCEDURE — 82306 VITAMIN D 25 HYDROXY: CPT

## 2024-10-10 PROCEDURE — 2500000002 HC RX 250 W HCPCS SELF ADMINISTERED DRUGS (ALT 637 FOR MEDICARE OP, ALT 636 FOR OP/ED)

## 2024-10-10 PROCEDURE — 99223 1ST HOSP IP/OBS HIGH 75: CPT

## 2024-10-10 PROCEDURE — S4991 NICOTINE PATCH NONLEGEND: HCPCS

## 2024-10-10 PROCEDURE — 1210000001 HC SEMI-PRIVATE ROOM DAILY

## 2024-10-10 PROCEDURE — 84075 ASSAY ALKALINE PHOSPHATASE: CPT

## 2024-10-10 PROCEDURE — 36415 COLL VENOUS BLD VENIPUNCTURE: CPT

## 2024-10-10 PROCEDURE — 2500000004 HC RX 250 GENERAL PHARMACY W/ HCPCS (ALT 636 FOR OP/ED)

## 2024-10-10 PROCEDURE — 93325 DOPPLER ECHO COLOR FLOW MAPG: CPT

## 2024-10-10 PROCEDURE — 2500000002 HC RX 250 W HCPCS SELF ADMINISTERED DRUGS (ALT 637 FOR MEDICARE OP, ALT 636 FOR OP/ED): Performed by: INTERNAL MEDICINE

## 2024-10-10 PROCEDURE — 93308 TTE F-UP OR LMTD: CPT | Performed by: INTERNAL MEDICINE

## 2024-10-10 PROCEDURE — 2500000001 HC RX 250 WO HCPCS SELF ADMINISTERED DRUGS (ALT 637 FOR MEDICARE OP): Performed by: INTERNAL MEDICINE

## 2024-10-10 PROCEDURE — 85027 COMPLETE CBC AUTOMATED: CPT

## 2024-10-10 PROCEDURE — 93321 DOPPLER ECHO F-UP/LMTD STD: CPT | Performed by: INTERNAL MEDICINE

## 2024-10-10 RX ORDER — BUTALBITAL, ACETAMINOPHEN AND CAFFEINE 50; 325; 40 MG/1; MG/1; MG/1
1 TABLET ORAL EVERY 6 HOURS PRN
Status: DISCONTINUED | OUTPATIENT
Start: 2024-10-10 | End: 2024-10-15

## 2024-10-10 RX ORDER — SUCRALFATE 1 G/10ML
1 SUSPENSION ORAL EVERY 6 HOURS SCHEDULED
Status: DISCONTINUED | OUTPATIENT
Start: 2024-10-10 | End: 2024-10-20 | Stop reason: HOSPADM

## 2024-10-10 RX ORDER — ACETAMINOPHEN 325 MG/1
650 TABLET ORAL EVERY 4 HOURS PRN
Status: DISCONTINUED | OUTPATIENT
Start: 2024-10-10 | End: 2024-10-10

## 2024-10-10 RX ORDER — MULTIVIT-MIN/IRON FUM/FOLIC AC 7.5 MG-4
1 TABLET ORAL DAILY
Status: DISCONTINUED | OUTPATIENT
Start: 2024-10-10 | End: 2024-10-20 | Stop reason: HOSPADM

## 2024-10-10 RX ORDER — HEPARIN SODIUM 5000 [USP'U]/ML
5000 INJECTION, SOLUTION INTRAVENOUS; SUBCUTANEOUS EVERY 8 HOURS
Status: DISCONTINUED | OUTPATIENT
Start: 2024-10-10 | End: 2024-10-20 | Stop reason: HOSPADM

## 2024-10-10 RX ORDER — ACETAMINOPHEN 650 MG/1
650 SUPPOSITORY RECTAL EVERY 4 HOURS PRN
Status: DISCONTINUED | OUTPATIENT
Start: 2024-10-10 | End: 2024-10-10

## 2024-10-10 RX ORDER — POLYETHYLENE GLYCOL 3350 17 G/17G
17 POWDER, FOR SOLUTION ORAL DAILY
Status: DISCONTINUED | OUTPATIENT
Start: 2024-10-10 | End: 2024-10-20 | Stop reason: HOSPADM

## 2024-10-10 RX ORDER — MEPROBAMATE 400 MG/1
400 TABLET ORAL 4 TIMES DAILY
COMMUNITY

## 2024-10-10 RX ORDER — LOSARTAN POTASSIUM 50 MG/1
50 TABLET ORAL DAILY
Status: DISCONTINUED | OUTPATIENT
Start: 2024-10-10 | End: 2024-10-20 | Stop reason: HOSPADM

## 2024-10-10 RX ORDER — ASCORBIC ACID 500 MG
500 TABLET ORAL DAILY
Status: DISCONTINUED | OUTPATIENT
Start: 2024-10-10 | End: 2024-10-20 | Stop reason: HOSPADM

## 2024-10-10 RX ORDER — IBUPROFEN 200 MG
1 TABLET ORAL DAILY
Status: DISCONTINUED | OUTPATIENT
Start: 2024-10-10 | End: 2024-10-20 | Stop reason: HOSPADM

## 2024-10-10 RX ORDER — BISMUTH SUBSALICYLATE 262 MG
1 TABLET,CHEWABLE ORAL DAILY
Status: DISCONTINUED | OUTPATIENT
Start: 2024-10-10 | End: 2024-10-10

## 2024-10-10 RX ORDER — FERROUS SULFATE 325(65) MG
65 TABLET ORAL
Status: DISCONTINUED | OUTPATIENT
Start: 2024-10-10 | End: 2024-10-20 | Stop reason: HOSPADM

## 2024-10-10 RX ORDER — AMLODIPINE BESYLATE 10 MG/1
10 TABLET ORAL NIGHTLY
Status: DISCONTINUED | OUTPATIENT
Start: 2024-10-10 | End: 2024-10-20 | Stop reason: HOSPADM

## 2024-10-10 RX ORDER — LIDOCAINE 560 MG/1
1 PATCH PERCUTANEOUS; TOPICAL; TRANSDERMAL DAILY
Status: DISCONTINUED | OUTPATIENT
Start: 2024-10-10 | End: 2024-10-13

## 2024-10-10 RX ORDER — ACETAMINOPHEN 325 MG/1
1-2 TABLET ORAL EVERY 6 HOURS PRN
COMMUNITY

## 2024-10-10 RX ORDER — PANTOPRAZOLE SODIUM 40 MG/1
40 TABLET, DELAYED RELEASE ORAL 2 TIMES DAILY
Status: DISCONTINUED | OUTPATIENT
Start: 2024-10-10 | End: 2024-10-20 | Stop reason: HOSPADM

## 2024-10-10 RX ORDER — BACLOFEN 10 MG/1
10 TABLET ORAL 3 TIMES DAILY
Status: DISCONTINUED | OUTPATIENT
Start: 2024-10-10 | End: 2024-10-13

## 2024-10-10 RX ORDER — ACETAMINOPHEN 325 MG/1
650 TABLET ORAL EVERY 6 HOURS PRN
Status: DISCONTINUED | OUTPATIENT
Start: 2024-10-10 | End: 2024-10-15

## 2024-10-10 RX ORDER — LACTULOSE 10 G/15ML
20 SOLUTION ORAL DAILY
Status: DISCONTINUED | OUTPATIENT
Start: 2024-10-10 | End: 2024-10-12

## 2024-10-10 RX ORDER — ACETAMINOPHEN 160 MG/5ML
650 SOLUTION ORAL EVERY 4 HOURS PRN
Status: DISCONTINUED | OUTPATIENT
Start: 2024-10-10 | End: 2024-10-10

## 2024-10-10 RX ORDER — ELASTIC BANDAGE 1"X2.2YD
1 BANDAGE TOPICAL DAILY PRN
COMMUNITY

## 2024-10-10 SDOH — SOCIAL STABILITY: SOCIAL INSECURITY: ARE THERE ANY APPARENT SIGNS OF INJURIES/BEHAVIORS THAT COULD BE RELATED TO ABUSE/NEGLECT?: NO

## 2024-10-10 SDOH — SOCIAL STABILITY: SOCIAL INSECURITY: HAS ANYONE EVER THREATENED TO HURT YOUR FAMILY OR YOUR PETS?: NO

## 2024-10-10 SDOH — SOCIAL STABILITY: SOCIAL INSECURITY: DO YOU FEEL UNSAFE GOING BACK TO THE PLACE WHERE YOU ARE LIVING?: NO

## 2024-10-10 SDOH — SOCIAL STABILITY: SOCIAL INSECURITY: HAVE YOU HAD THOUGHTS OF HARMING ANYONE ELSE?: NO

## 2024-10-10 SDOH — SOCIAL STABILITY: SOCIAL INSECURITY: ARE YOU OR HAVE YOU BEEN THREATENED OR ABUSED PHYSICALLY, EMOTIONALLY, OR SEXUALLY BY ANYONE?: NO

## 2024-10-10 SDOH — SOCIAL STABILITY: SOCIAL INSECURITY: DOES ANYONE TRY TO KEEP YOU FROM HAVING/CONTACTING OTHER FRIENDS OR DOING THINGS OUTSIDE YOUR HOME?: NO

## 2024-10-10 SDOH — SOCIAL STABILITY: SOCIAL INSECURITY: WERE YOU ABLE TO COMPLETE ALL THE BEHAVIORAL HEALTH SCREENINGS?: YES

## 2024-10-10 SDOH — SOCIAL STABILITY: SOCIAL INSECURITY: DO YOU FEEL ANYONE HAS EXPLOITED OR TAKEN ADVANTAGE OF YOU FINANCIALLY OR OF YOUR PERSONAL PROPERTY?: NO

## 2024-10-10 SDOH — SOCIAL STABILITY: SOCIAL INSECURITY: ABUSE: ADULT

## 2024-10-10 SDOH — SOCIAL STABILITY: SOCIAL INSECURITY: HAVE YOU HAD ANY THOUGHTS OF HARMING ANYONE ELSE?: NO

## 2024-10-10 ASSESSMENT — LIFESTYLE VARIABLES
SKIP TO QUESTIONS 9-10: 1
SUBSTANCE_ABUSE_PAST_12_MONTHS: YES
HOW OFTEN DO YOU HAVE 6 OR MORE DRINKS ON ONE OCCASION: NEVER
AUDIT-C TOTAL SCORE: 0
PRESCIPTION_ABUSE_PAST_12_MONTHS: NO
AUDIT-C TOTAL SCORE: 0
HOW MANY STANDARD DRINKS CONTAINING ALCOHOL DO YOU HAVE ON A TYPICAL DAY: PATIENT DOES NOT DRINK
HOW OFTEN DO YOU HAVE A DRINK CONTAINING ALCOHOL: NEVER

## 2024-10-10 ASSESSMENT — COGNITIVE AND FUNCTIONAL STATUS - GENERAL
DAILY ACTIVITIY SCORE: 13
WALKING IN HOSPITAL ROOM: TOTAL
TOILETING: A LOT
CLIMB 3 TO 5 STEPS WITH RAILING: TOTAL
CLIMB 3 TO 5 STEPS WITH RAILING: A LOT
DRESSING REGULAR UPPER BODY CLOTHING: A LOT
DAILY ACTIVITIY SCORE: 13
TOILETING: A LOT
MOVING TO AND FROM BED TO CHAIR: TOTAL
MOVING FROM LYING ON BACK TO SITTING ON SIDE OF FLAT BED WITH BEDRAILS: A LITTLE
MOBILITY SCORE: 10
HELP NEEDED FOR BATHING: A LOT
PERSONAL GROOMING: A LOT
PERSONAL GROOMING: A LOT
STANDING UP FROM CHAIR USING ARMS: A LOT
STANDING UP FROM CHAIR USING ARMS: TOTAL
DRESSING REGULAR LOWER BODY CLOTHING: A LOT
DRESSING REGULAR UPPER BODY CLOTHING: A LOT
MOVING FROM LYING ON BACK TO SITTING ON SIDE OF FLAT BED WITH BEDRAILS: A LITTLE
WALKING IN HOSPITAL ROOM: A LOT
EATING MEALS: A LITTLE
HELP NEEDED FOR BATHING: A LOT
TURNING FROM BACK TO SIDE WHILE IN FLAT BAD: A LITTLE
PATIENT BASELINE BEDBOUND: YES
DRESSING REGULAR LOWER BODY CLOTHING: A LOT
MOBILITY SCORE: 14
EATING MEALS: A LITTLE
MOVING TO AND FROM BED TO CHAIR: A LOT
TURNING FROM BACK TO SIDE WHILE IN FLAT BAD: A LITTLE

## 2024-10-10 ASSESSMENT — ACTIVITIES OF DAILY LIVING (ADL)
ADEQUATE_TO_COMPLETE_ADL: YES
HEARING - LEFT EAR: FUNCTIONAL
BATHING: DEPENDENT
LACK_OF_TRANSPORTATION: NO
JUDGMENT_ADEQUATE_SAFELY_COMPLETE_DAILY_ACTIVITIES: YES
PATIENT'S MEMORY ADEQUATE TO SAFELY COMPLETE DAILY ACTIVITIES?: YES
GROOMING: NEEDS ASSISTANCE
HEARING - RIGHT EAR: FUNCTIONAL
WALKS IN HOME: DEPENDENT
TOILETING: DEPENDENT
LACK_OF_TRANSPORTATION: NO
FEEDING YOURSELF: INDEPENDENT
EFFECT OF PAIN ON DAILY ACTIVITIES: YES
DRESSING YOURSELF: DEPENDENT

## 2024-10-10 ASSESSMENT — PAIN - FUNCTIONAL ASSESSMENT
PAIN_FUNCTIONAL_ASSESSMENT: 0-10

## 2024-10-10 ASSESSMENT — PAIN SCALES - GENERAL
PAINLEVEL_OUTOF10: 3
PAINLEVEL_OUTOF10: 3
PAINLEVEL_OUTOF10: 5 - MODERATE PAIN
PAINLEVEL_OUTOF10: 5 - MODERATE PAIN
PAINLEVEL_OUTOF10: 6
PAINLEVEL_OUTOF10: 5 - MODERATE PAIN

## 2024-10-10 ASSESSMENT — PAIN DESCRIPTION - DESCRIPTORS
DESCRIPTORS: DULL
DESCRIPTORS: CRAMPING;DULL;PRESSURE
DESCRIPTORS: DULL
DESCRIPTORS: DULL

## 2024-10-10 ASSESSMENT — PAIN DESCRIPTION - LOCATION
LOCATION: NECK
LOCATION: NECK

## 2024-10-10 ASSESSMENT — COLUMBIA-SUICIDE SEVERITY RATING SCALE - C-SSRS
2. HAVE YOU ACTUALLY HAD ANY THOUGHTS OF KILLING YOURSELF?: NO
6. HAVE YOU EVER DONE ANYTHING, STARTED TO DO ANYTHING, OR PREPARED TO DO ANYTHING TO END YOUR LIFE?: NO
1. IN THE PAST MONTH, HAVE YOU WISHED YOU WERE DEAD OR WISHED YOU COULD GO TO SLEEP AND NOT WAKE UP?: NO

## 2024-10-10 ASSESSMENT — PAIN DESCRIPTION - ORIENTATION: ORIENTATION: POSTERIOR

## 2024-10-10 ASSESSMENT — PATIENT HEALTH QUESTIONNAIRE - PHQ9
1. LITTLE INTEREST OR PLEASURE IN DOING THINGS: NOT AT ALL
SUM OF ALL RESPONSES TO PHQ9 QUESTIONS 1 & 2: 1
2. FEELING DOWN, DEPRESSED OR HOPELESS: SEVERAL DAYS

## 2024-10-10 NOTE — H&P
HPI:  Karly Horton is a 79 y.o. female presenting as a transfer from [facility] with new findings of R lung mass. Pt has a PMHx of COPD, >60 pack year smoking history, Peptic ulcer disease, anemia, and chronic headaches 2/2 TMJ.  Pt was initially admitted ot [facility] on 10/5 with AMS after her daughter found her unresponsive on the floor. At baseline patient is alert and oriented x 4. Per her daughter pt was coughing up blood and mucus and was disoriented. Pt denies any episodes of hemoptysis. Does endorse a cough productive of clear sputum throughout the day for years.    An initial workup was done in the [facility] emergency room and the patient's toxin was positive for marijuana.  There was concern for possible stroke and CT Head and CT angio Head were obtained. Both were negative for any acute intracranial process however Ct angio revealed a Spiculated nodule in the posterior right upper lobe is concerning for malignancy prompting CTAP which revealed multiple masses identified within the right and left lobes of the liver more numerous and larger on the right with particular note made of a large 3.1 cm lower pole mass right hilum in a 2.2 cm spiculated mass apicoposterior segment right upper lobe.     Pt was transferred to [facility] for interventional pulmonology bronchoscopy and mediastinal staging of lung mass.    On exam pt is A&Ox4, denies any discomfort, and is able to provide an accurate medical history. She reports she lives with her daughter and requires some assistance bathing but can ambulate independently with her Rolator.    Pt states she was in [facility] for rehab following a hospitalization in July for PNA. While she was there she states she lost a lot of weight due to the type of food they made and the fact that they would not help her cut it into small pieces. She endorses a hearty appetite and eats a lot now that she is back home with her daughter. Pt denies any fever, chills, abdominal  pain, SOB, or palpitations. Endorses constipation but states she has this at baseline and requires lactulose daily. She reports smoking 1 PPD since she was 15 and does not consider quitting as she 'is not a quitter'.     Medications prior to admission:  Prior to Admission medications    Medication Sig Start Date End Date Taking? Authorizing Provider   butalbital-acetaminophen-caff -40 mg tablet Take 1 tablet by mouth every 6 hours if needed for headaches. 11/10/23  Yes Historical Provider, MD   lubricating eye drops ophthalmic solution Administer 1 drop into both eyes if needed for dry eyes. 5/22/24  Yes LANDEN Jang   pantoprazole (ProtoNix) 40 mg EC tablet Take 1 tablet (40 mg) by mouth 2 times a day. Do not crush, chew, or split. Continue twice daily x 8 weeks then continue once daily. 5/22/24  Yes LANDEN Jang   sucralfate (Carafate) 100 mg/mL suspension Take 10 mL (1 g) by mouth every 6 hours. 5/22/24  Yes LANDEN Jang   acetaminophen-codeine (Tylenol w/ Codeine #4) 300-60 mg tablet Take 1 tablet by mouth once daily as needed for severe pain (7 - 10).    Historical Provider, MD   amLODIPine (Norvasc) 10 mg tablet Take 1 tablet (10 mg) by mouth once daily at bedtime. Hold for systolic blood pressure < 110 mmhg. 9/6/24   LANDEN Jang   ascorbic acid (Vitamin C) 500 mg tablet Take 1 tablet (500 mg) by mouth once daily. 5/22/24   LANDEN Jang   baclofen (Lioresal) 10 mg tablet Take 1 tablet (10 mg) by mouth 3 times a day.    Historical Provider, MD   ferrous sulfate, 325 mg ferrous sulfate, tablet Take 1 tablet by mouth once daily with breakfast. 5/22/24   LANDEN Jang   lactulose 10 gram/15 mL solution Take 30 mL (20 g) by mouth once daily. Take once every 18 hours as directed. 9/15/23   Historical Provider, MD   lidocaine (Xylocaine) 2 % jelly Apply 1 Application topically once daily. migraines 9/18/13   Historical Provider,  MD   lidocaine 4 % patch Place 1 patch over 12 hours on the skin once daily. Remove & discard patch within 12 hours or as directed by MD. 9/7/24   LANDEN Jang   losartan (Cozaar) 50 mg tablet Take 1 tablet (50 mg) by mouth once daily. Hold for systolic blood pressure < 110 mmhg. 9/7/24   LANDEN Jang   meprobamate (Equanil) 400 mg tablet Take 1 tablet (400 mg) by mouth 4 times a day as needed (pain). 5/14/24 11/10/24  Historical Provider, MD   mupirocin (Bactroban) 2 % ointment Apply 1 Application topically 2 times a day. 5/11/24 5/11/25  Historical Provider, MD   nicotine (Nicoderm CQ) 21 mg/24 hr patch Place 1 patch over 24 hours on the skin once daily. 5/23/24   LANDEN Jang     Medication Documentation Review Audit       Reviewed by Idris Caceres RN (Registered Nurse) on 10/05/24 at 0057      Medication Order Taking? Sig Documenting Provider Last Dose Status   acetaminophen-codeine (Tylenol w/ Codeine #4) 300-60 mg tablet [id number]  Take 1 tablet by mouth once daily as needed for severe pain (7 - 10). Historical Provider, MD  Active   amLODIPine (Norvasc) 10 mg tablet [id number]  Take 1 tablet (10 mg) by mouth once daily at bedtime. Hold for systolic blood pressure < 110 mmhg. LANDEN Jang  Active   ascorbic acid (Vitamin C) 500 mg tablet [id number]  Take 1 tablet (500 mg) by mouth once daily. LANDEN Jang  Active   baclofen (Lioresal) 10 mg tablet [id number]  Take 1 tablet (10 mg) by mouth 3 times a day. Historical Provider, MD  Active   butalbital-acetaminophen-caff -40 mg tablet [id number]  Take 1 tablet by mouth every 6 hours if needed for headaches. Historical Provider, MD  Active   ferrous sulfate, 325 mg ferrous sulfate, tablet [id number]  Take 1 tablet by mouth once daily with breakfast. LANDEN Jang  Active   lactulose 10 gram/15 mL solution [id number]  Take 30 mL (20 g) by mouth once daily. Take once every 18 hours as  directed. Historical Provider, MD  Active   lidocaine (Xylocaine) 2 % jelly [id number]  Apply 1 Application topically once daily. migraines Historical Provider, MD  Active   lidocaine 4 % patch [id number]  Place 1 patch over 12 hours on the skin once daily. Remove & discard patch within 12 hours or as directed by MD. LANDEN Jang  Active   losartan (Cozaar) 50 mg tablet [id number]  Take 1 tablet (50 mg) by mouth once daily. Hold for systolic blood pressure < 110 mmhg. LANDEN Jang  Active   lubricating eye drops ophthalmic solution [id number]  Administer 1 drop into both eyes if needed for dry eyes. LANDEN Jang  Active   meprobamate (Equanil) 400 mg tablet [id number]  Take 1 tablet (400 mg) by mouth 4 times a day as needed (pain). Historical Provider, MD  Active   mupirocin (Bactroban) 2 % ointment [id number]  Apply 1 Application topically 2 times a day. Historical Provider, MD  Active   nicotine (Nicoderm CQ) 21 mg/24 hr patch [id number]  Place 1 patch over 24 hours on the skin once daily. LANDEN Jang  Active   pantoprazole (ProtoNix) 40 mg EC tablet [id number]  Take 1 tablet (40 mg) by mouth 2 times a day. Do not crush, chew, or split. Continue twice daily x 8 weeks then continue once daily. LANDEN Jang  Active   sucralfate (Carafate) 100 mg/mL suspension [id number]  Take 10 mL (1 g) by mouth every 6 hours. LANDEN Jang  Active                    Allergies:  No Known Allergies    Past medical history:  Past Medical History:   Diagnosis Date    Other cervical disc degeneration, unspecified cervical region     Degeneration of cervical intervertebral disc    Other conditions influencing health status     Bulging Disc (L3 - L4)    Other conditions influencing health status     A Fall    Other conditions influencing health status     Bulging Disc (C4 - C5)    Other conditions influencing health status     Bulging Disc (C6 - C7)    Other  conditions influencing health status     Bulging Disc (C5 - C6)    Other conditions influencing health status     Lumbar Spondylolisthesis    Other conditions influencing health status     Rotator Cuff Tendon Tear    Other intervertebral disc degeneration, lumbar region     L4-L5 disc bulge    Other intervertebral disc displacement, lumbar region     Disc displacement, lumbar    Other intervertebral disc displacement, lumbosacral region     Displacement of lumbosacral intervertebral disc    Personal history of other diseases of the musculoskeletal system and connective tissue     History of bursitis    Personal history of other diseases of the musculoskeletal system and connective tissue     History of fibromyositis    Personal history of other specified conditions     History of headache    Spinal stenosis, lumbar region without neurogenic claudication     Lumbar canal stenosis    Sprain of ligaments of lumbar spine, initial encounter     Low back sprain       Surgical history:  Past Surgical History:   Procedure Laterality Date    APPENDECTOMY      EYE SURGERY      HYSTERECTOMY      TONSILLECTOMY         Family history:  Family History   Problem Relation Name Age of Onset    Cancer Mother      Other (Father had hypertension, stroke, coronary artery disease and diabetes) Father         Social history:   reports that she has been smoking cigarettes. She started smoking about 64 years ago. She has a 64.8 pack-year smoking history. She does not have any smokeless tobacco history on file.      Vitals:  Vitals:    10/10/24 0200   BP: 95/60   Pulse: 89   Temp: 36.6 °C (97.9 °F)   SpO2: 96%       Physical exam:  Constitutional: Cachectic female in no acute distress, A&Ox3  HEENT: Normocephalic, atraumatic. PERRL. EOMI. No cervical lymphadenopathy.  Respiratory: CTA bilaterally. No wheezes, rales, or rhonchi. Normal respiratory effort.  Cardiovascular: RRR. No murmurs, gallops, or rubs. No JVD. Radial pulses  2+.  Abdominal: Soft, non distended, Bowel sounds present.  Neuro: CN II-XII intact. UE and LE strength 5/5 bilaterally and sensation intact.   MSK: No LE edema bilaterally.  Skin: Warm, dry. No rashes or wounds.  Psych: Appropriate mood and affect.    Labs:  Results for orders placed or performed during the hospital encounter of 10/04/24 (from the past 24 hour(s))   Basic Metabolic Panel   Result Value Ref Range    Glucose 97 74 - 99 mg/dL    Sodium 134 (L) 136 - 145 mmol/L    Potassium 3.4 (L) 3.5 - 5.3 mmol/L    Chloride 99 98 - 107 mmol/L    Bicarbonate 28 21 - 32 mmol/L    Anion Gap 10 10 - 20 mmol/L    Urea Nitrogen 9 6 - 23 mg/dL    Creatinine 0.43 (L) 0.50 - 1.05 mg/dL    eGFR >90 >60 mL/min/1.73m*2    Calcium 9.1 8.6 - 10.3 mg/dL   CBC   Result Value Ref Range    WBC 14.0 (H) 4.4 - 11.3 x10*3/uL    nRBC 0.0 0.0 - 0.0 /100 WBCs    RBC 4.35 4.00 - 5.20 x10*6/uL    Hemoglobin 12.7 12.0 - 16.0 g/dL    Hematocrit 38.9 36.0 - 46.0 %    MCV 89 80 - 100 fL    MCH 29.2 26.0 - 34.0 pg    MCHC 32.6 32.0 - 36.0 g/dL    RDW 15.2 (H) 11.5 - 14.5 %    Platelets 307 150 - 450 x10*3/uL   Magnesium   Result Value Ref Range    Magnesium 1.88 1.60 - 2.40 mg/dL   Urinalysis with Reflex Culture and Microscopic   Result Value Ref Range    Color, Urine Yellow Light-Yellow, Yellow, Dark-Yellow    Appearance, Urine Turbid (N) Clear    Specific Gravity, Urine 1.023 1.005 - 1.035    pH, Urine 7.0 5.0, 5.5, 6.0, 6.5, 7.0, 7.5, 8.0    Protein, Urine 20 (TRACE) NEGATIVE, 10 (TRACE), 20 (TRACE) mg/dL    Glucose, Urine Normal Normal mg/dL    Blood, Urine 1.0 (3+) (A) NEGATIVE    Ketones, Urine NEGATIVE NEGATIVE mg/dL    Bilirubin, Urine NEGATIVE NEGATIVE    Urobilinogen, Urine Normal Normal mg/dL    Nitrite, Urine 2+ (A) NEGATIVE    Leukocyte Esterase, Urine 500 [place]onna/µL (A) NEGATIVE   Extra Urine Gray Tube   Result Value Ref Range    Extra Tube Hold for add-ons.    Microscopic Only, Urine   Result Value Ref Range    WBC, Urine >50 (A) 1-5,  NONE /HPF    RBC, Urine >20 (A) NONE, 1-2, 3-5 /HPF    Squamous Epithelial Cells, Urine 1-9 (SPARSE) Reference range not established. /HPF    Bacteria, Urine 1+ (A) NONE SEEN /HPF    Mucus, Urine FEW Reference range not established. /LPF       Imaging:  ECG 12 lead    Result Date: 10/8/2024  Normal sinus rhythm Left axis deviation Inferior infarct (cited on or before 01-SEP-2024) Anterolateral infarct , age undetermined Abnormal ECG When compared with ECG of 02-SEP-2024 15:13, Significant changes have occurred Confirmed by Everardo Webster ([id number]) on 10/8/2024 3:38:55 PM    EEG    IMPRESSION Impression This routine EEG in awake and asleep state is normal. no epileptiform discharges or lateralizing signs are recorded. A full report will be scanned into the patient's chart at a later time. This report has been interpreted and electronically signed by    CT chest abdomen pelvis wo IV contrast    Addendum Date: 10/8/2024    Interpreted By:  Clemente Vergara, ADDENDUM: There are typographical mistakes in the original report, associated with voice recognition software.     In the 1st impression the 1st sentence should have indicated that the multiple masses are within the right and left lungs rather than right and left lobes of the liver. There are no liver lesions.   Signed by: Clemente Vergara 10/8/2024 1:07 PM   -------- ORIGINAL REPORT -------- Dictation workstation:   [id number]    Result Date: 10/8/2024  Interpreted By:  Clemente Vergara, STUDY: CT CHEST ABDOMEN PELVIS WO CONTRAST; 10/6/2024 11:06 am   INDICATION: Signs/Symptoms:spiculated lung mass. Spiculated lung mass noted at the right apex on CT angiography of the head and neck.   COMPARISON: None   ACCESSION NUMBER(S): [id number]   ORDERING CLINICIAN: DUGLAS ESPARZA   TECHNIQUE: COMMENTS: This exam is performed without oral or intravenous contrast as was requested. Please note that the absence of oral and intravenous contrast material does limit the sensitivity and  specificity of this examination. In particular solid organ assessment for neoplasm is significantly limited. Assessment of the GI tract is also limited without the aid of oral contrast administration. Vascular abnormalities such as dissection, occlusions, infarcts and thrombus may also go undetected.     One or more of the following dose reduction techniques were used: Automated exposure control Adjustment of the mA and/or kV according to patient size, and/or use of iterative reconstruction technique.   FINDINGS: CHEST:   Within limits of this unenhanced study no hilar or mediastinal lymphadenopathy is appreciated. Emphysematous change of the pulmonary parenchyma is present bilaterally. No effusions are identified. No alveolar consolidation is noted. *There is a 7 mm apicoposterior segment right upper lobe nodule (Series 7, Image 85). *There is a 2.2 cm spiculated mass apicoposterior segment right upper lobe (Series 7, Image 114). *There is a 9 mm spiculated mass superior segment right lower lobe (Series 7, Image 118). *There is a 9 mm nodule right lower lobe laterally (Series 7, Image 220). *There is a 2 mm and 3 mm nodule left lower lobe (Series 7, Image 178). *There is a 6 mm left lower lobe nodule medially (Series 7, Image 122). *There is a 6 mm anterior segment right upper lobe nodule (Series 6, Image 85). *There is a 5 mm right lower lobe nodule (Series 7, Image 175). *There is a 3.1 cm spiculated mass lower pole right hilum (Series 7, Image 187). This appears to occlude several of the branches of the right lower lobe anteriorly. *Mucous plugging is present within the right lower lobe.     Chest Wall: No chest wall masses are identified.   Skeletal System: No fractures or destructive lesions are identified. There is a severe thoracic levoscoliosis with spondylosis.   _________________________________________________________   CT SCAN OF THE ABDOMEN AND PELVIS FINDINGS: ABDOMEN CT: SOLID ORGAN ASSESSMENT:  Within limits of this unenhanced study no focal masses are identified within the liver, spleen, pancreas, kidneys or adrenal glands. Hepatic and splenic calcifications are present consistent with remote granulomatous disease. There is vague increased density along the dependent portion of the gallbladder possibly indicating milk of calcium or sludge.   RETROPERITONEUM: AORTA:  There is no evidence for abdominal aortic aneurysm.   LYMPH NODES: There is no evidence for retroperitoneal lymphadenopathy.   BOWEL ASSESSMENT: No intraperitoneal free air is identified. There is a nonspecific appearance of the stomach.  The small bowel is unremarkable in appearance.  The colon is nonspecific in appearance.   ABDOMINAL AND PELVIC WALLS: There is no evidence for a hernia. No abdominal wall masses are identified.   OSSEOUS STRUCTURES: No lytic or blastic lesions are identified. There is a severe lumbar dextroscoliosis with spondylosis.   PELVIC CT: No pathologic masses or fluid collections are identified.       1. There are multiple masses identified within the right and left lobes of the liver more numerous and larger on the right with particular note made of a large 3.1 cm lower pole mass right hilum in a 2.2 cm spiculated mass apicoposterior segment right upper lobe along with additional masses as detailed above. Malignancy is likely with metastatic lesions consideration along with primary lung cancer metastatic to the lungs.. 2. Emphysematous change pulmonary parenchyma. 3. Nonspecific appearance of the abdomen and pelvis. Please note that sensitivity of the abdomen and pelvis assessment is limited due to the cachectic state of the patient and absence of oral and intravenous contrast.     MACRO: none   Signed by: Clemente Vergara 10/6/2024 1:10 PM Dictation workstation:   [id number]    CT angio head and neck w and wo IV contrast    Result Date: 10/4/2024  Interpreted By:  Chad Jay and Omar Mahmoud STUDY: CT ANGIO  HEAD AND NECK W AND WO IV CONTRAST;  10/4/2024 1:16 pm   INDICATION: Signs/Symptoms:AMS.     COMPARISON: None.   ACCESSION NUMBER(S): [id number]   ORDERING CLINICIAN: LATOYA ALFARO   TECHNIQUE: Unenhanced CT images of the head were obtained. Subsequently, 75 ML of Omnipaque 350 was administered intravenously and axial images of the head and neck were acquired.  Coronal, sagittal, and 3-D reconstructions were provided for review.   FINDINGS:   CTA HEAD FINDINGS:   Anterior circulation: There is mild calcific atherosclerosis of the bilateral cavernous internal carotid artery with less than 50% stenosis. There is a tortuous course of the bilateral cavernous internal artery.   Posterior circulation: Bilateral intracranial vertebral arteries, vertebrobasilar junction, basilar artery and proximal posterior cerebral arteries are tortuous. There is no significant stenosis or large branch vessel cutoffs.   CTA NECK FINDINGS:   Right carotid vessels: The common carotid artery is normal. The carotid bifurcation is normal. There is a tortuous course of the right cervical internal carotid artery without pseudoaneurysm or dissection.   Left carotid vessels: The common carotid artery is normal. There is calcific atherosclerosis of the left carotid bulb. There is tortuosity of the left internal carotid artery without evidence of pseudoaneurysm or dissection..   Vertebral vessels: Vertebral artery origin is are normal. The left vertebral artery is dominant. The visualized segments of the cervical vertebral arteries are normal in caliber. There is bilateral tortuosity of the vertebral arteries. The basilar artery is on primarily from a left vertebral artery with a minor branch arising from the right vertebral artery.   Within the visualized portions of the lung, there is a 2.1 x 1.9 cm spiculated nodule within the posterior right upper lobe with a branch of the pulmonary artery visualized passing through it.   There is 5 mm of C3  on C4 anterolisthesis.       1. There is no measurable stenosis of the cervical vessels and no measurable stenosis or large branch vessel cutoffs of the intracranial vessels. 2. Tortuous course of the visualized arteries within the head and neck without pseudoaneurysm or dissection 3. Dominant left vertebral artery. No evidence of narrowing, dissection or thrombus in the vertebral or basilar circulation.. 4. Spiculated nodule in the posterior right upper lobe is concerning for malignancy. Follow-up PET-CT or direct tissue sampling is recommended as clinically indicated. 5. There is 5 mm of C3 on C4 anterolisthesis.   I personally reviewed the images/study and I agree with the findings as stated by Gisela Huerta MD (PGY-2). This study was interpreted at [place], [place].   MACRO: Critical Finding:  New/enlarging lung nodule suspicious for cancer. Notification was initiated on 10/4/2024 at 1:41 pm by  Gisela Huerta. (**-OCF-**) Instructions:   Signed by: Chad Jay 10/4/2024 1:49 PM Dictation workstation:   [id number]    CT brain attack head wo IV contrast    Result Date: 10/4/2024  Interpreted By:  Drew Ames, STUDY: CT BRAIN ATTACK HEAD WO IV CONTRAST; 10/4/2024 1:03 pm   INDICATION: Signs/Symptoms:Stroke Evaluation   COMPARISON: 09/02/2024   ACCESSION NUMBER(S): [id number]   ORDERING CLINICIAN: LATOYA ALFARO   TECHNIQUE: Axial images were obtained through the brain. No IV contrast was administered.   All CT examinations are performed with one or more of the following dose reduction techniques: Automated Exposure Control, adjustment of mA and/or kV according to patient size, or use of iterative reconstruction techniques.   FINDINGS: The ventricles are enlarged but midline in position, with proportional prominence of the sulci, due to atrophy. Patchy periventricular hypodensities are present suggestive of small vessel ischemic white matter disease. There is  no intracranial hemorrhage. No mass or mass effect is seen. The osseus structures are unremarkable. Minute air-fluid level is noted in the right maxillary sinus.       Stable age related changes without acute intracranial process.   The emergency department physician was notified about the findings by phone 1310 hours.   Signed by: Drew Ames 10/4/2024 1:13 PM Dictation workstation:   [id number]      Assessment and plan:  Karly Horton is a 79 y.o. female presenting as a transfer from [facility] with new findings of R lung mass. Pt has a PMHx of COPD, >60 pack year smoking history, Peptic ulcer disease, anemia, and chornic headaches.  Pt was initially admitted to [facility] on 10/5 with AMS after her daughter found her unresponsive on the floor.  Pt is now A&Ox4. Imaging at [facility] revealed RUL mass and pt transferred to  for for interventional pulmonology bronchoscopy and mediastinal staging of lung mass. Pt understands that diagnosis is likely cancer and she would like to treat it if it is.      #RUL mass concerning for malignancy  -  CTAP revealed a large 3.1 cm lower pole mass right hilum in a 2.2 cm spiculated mass apicoposterior segment right upper lobe  -Consult pulmonology for bronchoscopy in AM for tissue diagnosis and staging    #Hepatic lesions  -Likely metastatic i/s/o RUL mass  -CTAP revealed:  multiple masses identified within the right and left  lobes of the liver more numerous and larger on the right  -AST-24, ALT-16, AlkPhos-96 WNL  -Await results of tissue diagnosis and proceed with staging if malignancy is present      #HTN  -Hold home Losartan 50mg and Amodipine 10mg  -Hold inpatient due to low pressures 90's/60's)  -CTM and restart as tolerated    #Constipation  -Pt endorses baseline constipation but endorses having BM within the last few days  -Continue home Lactulose and Miralax     #Tobacco Abuse  -1PPD since age 15  -Nicotine replacement patch while inpatient  -Continue to   patient on smoking cessation    #PUD  -EGD 5/24: Mild erythematous mucosa in the antrum and prepyloric region and Single ulcer in the duodenal bulb with oozing hemorrhage   -Asymptomatic at this time  -Continue home Pantoprazole 40mg BID    #Muscle Aches  #TMJ  -Pt reports this is chronic for her and she takes Baclofen, Tylenol, and Butalbital to keep the symptoms at bay  -When medications don't help pt self-medicates with MJ  -Continue home medications inpatient     #Anemia  -Hgb 12.7 on admission - at baseline  -Continue ferrous sulfate 325mg daily  -CTM    #Malnutrition  -Pt reports while in rehab this summer she lost weight due to 'bad' food   -Denies any changes to her appetite and reports it is 'very good'  -BMI on admission 13.53  -Nutrition consult    Dispo: PT/OT    Diet: NPO pending possible bronch  DVT prophylaxis:Heparin subQ  Code status: Full- confirmed on admission  NOK: Karly (daughter)- [phone]      Carmen Henry DO  PGY-2 Internal Medicine

## 2024-10-10 NOTE — CONSULTS
Department of Medicine  Division of Pulmonary, Critical Care, and Sleep Medicine  Consultation  [address] [facility]  Consults     I was asked by Jackie Pedraza MD to evaluate Karly Horton for lung mass(es). I have independently interviewed and examined the patient and reviewed available records.    Physician HPI (10/10/2024):  Karly Horton is a 79 y.o. 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  She initially presented to [facility] on 10/5 with AMS after her daughter found her unresponsive on the floor. At baseline patient is alert and oriented x 4. Per her daughter pt was coughing up blood and mucus and was disoriented. Pt denies any episodes of hemoptysis. Does endorse a cough productive of clear sputum throughout the day for years. An initial workup was done in the [facility] [place] emergency room and the patient's toxin was positive for marijuana. There was concern for possible stroke and CT Head and CT angio Head were obtained. Both were negative for any acute intracranial process however Ct angio revealed a Spiculated nodule in the posterior right upper lobe is concerning for malignancy prompting CTAP which revealed multiple masses identified within the right and left lobes of the liver more numerous and larger on the right with particular note made of a large 3.1 cm lower pole mass right hilum in a 2.2 cm spiculated mass apicoposterior segment right upper lobe. Pulmonology was consulted for RUL mass that requires bronch with biopsy    All other review of systems are negative and/or non-contributory.     Immunization History:  Immunization History   Administered Date(s) Administered    SARS-CoV-2, Unspecified 10/14/2021       Family History:  Family History   Problem Relation Name Age of Onset    Cancer Mother      Other (Father had hypertension, stroke, coronary artery disease and diabetes) Father         Social  History:  Social History     Socioeconomic History    Marital status:    Tobacco Use    Smoking status: Every Day     Current packs/day: 1.00     Average packs/day: 1 pack/day for 64.8 years (64.8 ttl pk-yrs)     Types: Cigarettes     Start date: 1/1/1960     Social Determinants of Health     Financial Resource Strain: Low Risk  (10/10/2024)    Overall Financial Resource Strain (CARDIA)     Difficulty of Paying Living Expenses: Not hard at all   Food Insecurity: No Food Insecurity (10/8/2024)    Hunger Vital Sign     Worried About Running Out of Food in the Last Year: Never true     Ran Out of Food in the Last Year: Never true   Transportation Needs: No Transportation Needs (10/10/2024)    PRAPARE - Transportation     Lack of Transportation (Medical): No     Lack of Transportation (Non-Medical): No   Physical Activity: Inactive (10/8/2024)    Exercise Vital Sign     Days of Exercise per Week: 0 days     Minutes of Exercise per Session: 0 min   Stress: No Stress Concern Present (10/8/2024)    [employer] - Occupational Stress Questionnaire     Feeling of Stress : Not at all   Social Connections: Socially Isolated (10/8/2024)    Social Connection and Isolation Panel [NHANES]     Frequency of Communication with Friends and Family: More than three times a week     Frequency of Social Gatherings with Friends and Family: More than three times a week     Attends Zoroastrian Services: Never     Active Member of Clubs or Organizations: No     Attends Club or Organization Meetings: Never     Marital Status:    Intimate Partner Violence: Not At Risk (10/8/2024)    Humiliation, Afraid, Rape, and Kick questionnaire     Fear of Current or Ex-Partner: No     Emotionally Abused: No     Physically Abused: No     Sexually Abused: No   Housing Stability: Low Risk  (10/10/2024)    Housing Stability Vital Sign     Unable to Pay for Housing in the Last Year: No     Number of Times Moved in the  Last Year: 1     Homeless in the Last Year: No       Current Medications:  Current Outpatient Medications   Medication Instructions    acetaminophen (Tylenol) 325 mg tablet 1-2 tablets, oral, Every 6 hours PRN    amLODIPine (NORVASC) 10 mg, oral, Nightly, Hold for systolic blood pressure < 110 mmhg.    ascorbic acid (VITAMIN C) 500 mg, oral, Daily    baclofen (LIORESAL) 10 mg, oral, 3 times daily    butalbital-acetaminophen-caff -40 mg tablet 1 tablet, oral, Every 6 hours PRN    ferrous sulfate, 325 mg ferrous sulfate, tablet 1 tablet, oral, Daily with breakfast    lactulose 30 g, oral, Daily    lidocaine HCL 4 % liquid roll-on 1 Application, topical (top), Daily PRN    losartan (COZAAR) 50 mg, oral, Daily, Hold for systolic blood pressure < 110 mmhg.    lubricating eye drops ophthalmic solution 1 drop, Both Eyes, As needed    meprobamate (EQUANIL) 400 mg, oral, 4 times daily    nicotine (Nicoderm CQ) 21 mg/24 hr patch 1 patch, transdermal, Daily    pantoprazole (PROTONIX) 40 mg, oral, 2 times daily, Do not crush, chew, or split. Continue twice daily x 8 weeks then continue once daily.    sucralfate (CARAFATE) 1 g, oral, Every 6 hours scheduled        Drug Allergies/Intolerances:  No Known Allergies     Physical Examination:  /67   Pulse 82   Temp 36.3 °C (97.3 °F)   Resp 17   Ht 1.524 m (5')   Wt (!) 31.4 kg (69 lb 4.8 oz)   SpO2 96%   BMI 13.53 kg/m²      General: ambulated independently; no acute distress; well-nourished; work of breathing was not increased; normal vocal character  HEENT: normocephalic; anicteric sclerae; conjunctivae not injected; nasal mucosa was unremarkable; oropharynx was clear without evidence of thrush; dentition was good.  Neck: supple; no lymphadenopathy or thyromegaly.  Chest: clear to auscultation bilaterally; no chest wall deformity.  Cardiac: regular rhythm; no gallop or murmur.  Abdomen: soft; non-tender; non-distended; no hepatosplenomegaly.  Extremities: no leg  edema; no digital clubbing; 2+ pulses  Psychiatric: did not appear depressed or anxious.    Pulmonary Function Test Results     Failed to redirect to the Timeline version of the [employer] SmartLink.      Chest Radiograph     XR chest 1 view 09/02/2024    Narrative  Interpreted By:  Aldair Buck,  STUDY:  XR CHEST 1 VIEW;  9/2/2024 9:44 pm    INDICATION:  Signs/Symptoms:shortness of breath.    COMPARISON:  09/02/2024    ACCESSION NUMBER(S):  [id number]    ORDERING CLINICIAN:  KATHIE AGUIAR    FINDINGS:      The cardiomediastinal silhouette and pulmonary vasculature are within  normal limits. Diffuse bronchial thickening is noted. There is mild  right basilar atelectasis. There is bibasilar scarring.    Impression  Generalized bronchial thickening without evidence of pneumonia or  pulmonary edema.      MACRO:  None.    Signed by: Aldair Buck 9/2/2024 10:12 PM  Dictation workstation:   [id number]      Echocardiogram     No results found for this or any previous visit from the past 365 days.       Chest CT Scan     No results found for this or any previous visit from the past 365 days.       Assessment and Plan / Recommendations:  Problem List Items Addressed This Visit       * (Principal) Lung mass - Primary    Relevant Orders    Bronchoscopy Diagnostic      79 year old F with PMH of COPD, >60 pack year smoking history, presenting with newly found hilar and RUL lung masses concerning for malignancy.     # Right upper lung Mass  In a symptomatic patient, coughing up blood and mucus, imaging was done that showed a 2.2 cm spiculated mass apicoposterior segment right upper lobe. Given the history of extensive smoking, this is highly suspicious for lung carcinoma. Plan will be for patient to get diagnostic bronchoscopy.  - Add- on bronchoscopy today 10/10/2024  - Patient is already NPO, heparin is held, not on any anti-platelet medications    Patient staffed with attending Dr. Parra. Thank you for the  interesting consult. Please contact via Epic chat/Haiku with any questions/concerns.     Juaquin Frazier MD  Internal Medicine, PGY- 2  10/10/24 at 9:50 AM

## 2024-10-10 NOTE — PROGRESS NOTES
Pharmacy Medication History Review    Karly Horton is a 79 y.o. female admitted for Lung mass. Pharmacy reviewed the patient's [id number] medications and allergies for accuracy.    The list below reflects the updated PTA list.   Prior to Admission Medications   Prescriptions Last Dose Informant   amLODIPine (Norvasc) 10 mg tablet 10/8/2024 Self   Sig: Take 1 tablet (10 mg) by mouth once daily at bedtime. Hold for systolic blood pressure < 110 mmhg.   ascorbic acid (Vitamin C) 500 mg tablet Not Taking Self   Sig: Take 1 tablet (500 mg) by mouth once daily.   Patient not taking: Reported on 10/10/2024   baclofen (Lioresal) 10 mg tablet 10/9/2024 Self   Sig: Take 1 tablet (10 mg) by mouth 3 times a day.   butalbital-acetaminophen-caff -40 mg tablet 10/10/2024 at 0030 Self   Sig: Take 1 tablet by mouth every 6 hours if needed for headaches.   ferrous sulfate, 325 mg ferrous sulfate, tablet Not Taking Self   Sig: Take 1 tablet by mouth once daily with breakfast.   Patient not taking: Reported on 10/10/2024   lactulose 10 gram/15 mL solution 10/9/2024 at morning Self   Sig: Take 45 mL (30 g) by mouth once daily.   lidocaine HCL 4 % liquid roll-on Unknown Self   Sig: Apply 1 Application topically once daily as needed.   losartan (Cozaar) 50 mg tablet Unknown Self   Sig: Take 1 tablet (50 mg) by mouth once daily. Hold for systolic blood pressure < 110 mmhg.   lubricating eye drops ophthalmic solution Past Week Self   Sig: Administer 1 drop into both eyes if needed for dry eyes.   meprobamate (Equanil) 400 mg tablet Past Month at prior to hospitalization Self   Sig: Take 1 tablet (400 mg) by mouth 4 times a day.   nicotine (Nicoderm CQ) 21 mg/24 hr patch 10/9/2024 at 9am Self   Sig: Place 1 patch over 24 hours on the skin once daily.   pantoprazole (ProtoNix) 40 mg EC tablet Not Taking Self   Sig: Take 1 tablet (40 mg) by mouth 2 times a day. Do not crush, chew, or split. Continue twice daily x 8 weeks  "then continue once daily.   Patient not taking: Reported on 10/10/2024   sucralfate (Carafate) 100 mg/mL suspension Not Taking Self   Sig: Take 10 mL (1 g) by mouth every 6 hours.   Patient not taking: Reported on 10/10/2024      Facility-Administered Medications: None        The list below reflects the updated allergy list. Please review each documented allergy for additional clarification and justification.  Allergies  Reviewed by Lissette Live RN on 10/10/2024   No Known Allergies         Patient declines M2B at discharge.     Sources:   Patient Interview - good historian, displays good knowledge of medications and medication history  Admission MedRec Grid  OARRS - butalbital-acetaminophen-caffeine LF: 10/1/24, #90, 30DS; meprobamate 400mg LF: 10/1/24, #120,30DS   EPIC medication dispense report    Medications ADDED:  Meprobamate 400mg  Acetaminophen 325mg  Medications CHANGED:  None  Medications REMOVED/MARKED NOT TAKING:   Vitamin C 500mg  Ferrous Sulfate 325mg  Pantoprazole 4mg  Sucralfate 100mg/ml     Additional Comments:  The patient states meprobamate is highly effective and would like for this to be restarted inpatient  The patient requests for Butalbital-acetaminophen-caff to be administered at 6:30AM, then 2h later baclofen+meprobamate be administered, then repeat alternating medication administrations    The patient states that alternating Butalbital-acetaminophen-caff every 6 hours with baclofen 10mg + meprobamate 200mg Q4h will be an effective regimen to manage their condition  Uses Tylenol with baclofen if no meprobamate  Prefers unscented roll-on Lidocaine, but will tolerate lidocaine patches if that is all that is available inpatient  Patient states never has high BP   Uses Proctozone and mupirocin topicals as needed      Ivette Steve, PharmD  Transitions of Care Pharmacist  10/10/24     Secure Chat preferred   If no response call [phone] or Vocera \"Med Rec\"    "

## 2024-10-10 NOTE — HOSPITAL COURSE
Karly Horton is a 79 y.o. 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 Patient initially presented to [facility] after her daughter found her unresponsive on the floor. Daughter also noted that the patient has confused and also coughing up blood and mucus, patient denies any episodes of hemoptysis but does endorse productive cough of clear sputum for several years.    An initial workup was done in the [facility] emergency room and the patient's toxin was positive for marijuana. There was concern for possible stroke and CT Head and CT angio Head were obtained. Both were negative for any acute intracranial process however CT  angio revealed a spiculated nodule in the posterior right upper lobe is concerning for malignancy prompting CTAP which revealed multiple masses identified within the right and left lobes of the liver more numerous and larger on the right with particular note made of a large 3.1 cm lower pole mass right hilum in a 2.2 cm spiculated mass apicoposterior segment right upper lobe. Neurology was concerned for a possible seizure as to the explanation of the unresponsiveness and an EEG was done. EEG was unremarkable for any epileptiform discharges.     [place] [facility] [place] At [facility], pulmonology was consulted for EBUS. However this was deferred due to anesthesia concern for pulmonary hypertension from a prior echo showing elevated RVSP. Echocardiogram from 10/10 RVSP 31.1 mmHg, with improvement in pulmonary hypertension on repeat Echo patient was cleared for bronch. Underwent EBUS on 10/11 with biopsy.     Leukocytosis and significant pulm secretions after bronch, started on Ceftriaxone (10/11-10/12). Ucx 10/11 pansensitive E. Coli, Ucx 10/9 pansensitive E. Coli. Patient asymptomatic, leukocytosis resolved.  Transitioned to PO nitrofurantoin and completed 5 day course of antibiotics.     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 Oncology team consulted, underwent MRI brain w and wo contrast, PET/CT scan, CT chest with contrast. 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 However, recommend short-term follow-up MRI in 3 months to assess for interval stability to exclude a more aggressive lesion. No additional abnormal intracranial enhancement or mass. CT chest 10/18 no significant interval change in findings of a pulmonary parenchymal mass measuring 3.1 cm centered in the right lower lobe with extension into the right middle lobe across the major fissure as well as additional multifocal bilateral pulmonary nodules as  described which are concerning for metastatic disease. No new nodules have developed since the scan from 12 days prior. PET/CT 10/18 showed peripherally hypermetabolic and centrally photopenic right hilar mass/lymphadenopathy is consistent with biopsy-proven non-small cell lung carcinoma. Additional right-sided hypermetabolic nodules are also consistent with neoplasm. Additional subcentimeter predominantly right-sided lung nodules without metabolic activity are likely benign. However, recommend continued attention on follow-up. No other evidence of hypermetabolic lymphadenopathy or metastatic disease throughout the body.      Radiation Oncology evaluated patient no current indications for emergent inpatient radiotherapy, will continue to follow and assist with care coordination.     To Dos:   [ ] Follow up MRI in 3 months to assess lesion  [ ] Rad Onc - no plans for inpatient RT. Will follow up outpatient  [ ] Follow up planned for  [facility]  [ ] Transportation resources given for travel to and from outpt  appointments, extensively discussed with [facility], prefers stretcher transportation but not financially an option, rx for wheelchair provided, given resources to connect with outpatient [facility].

## 2024-10-10 NOTE — PROGRESS NOTES
Internal Medicine Progress Note    Karly Horton is a 79 y.o. 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 Patient initially presented to [facility] after her daughter found her unresponsive on the floor. Daughter also noted that the patient has confused and also coughing up blood and mucus, patient denies any episodes of hemoptysis but does endorse productive cough of clear sputum for several years.    An initial workup was done in the [facility] emergency room and the patient's toxin was positive for marijuana. There was concern for possible stroke and CT Head and CT angio Head were obtained. Both were negative for any acute intracranial process however Ct angio revealed a Spiculated nodule in the posterior right upper lobe is concerning for malignancy prompting CTAP which revealed multiple masses identified within the right and left lobes of the liver more numerous and larger on the right with particular note made of a large 3.1 cm lower pole mass right hilum in a 2.2 cm spiculated mass apicoposterior segment right upper lobe. Neurology was concerned for a possible seizure as to the explanation of the unresponsiveness and an EEG was done. EEG was unremarkable for any epileptiform discharges. Patient was transferred to [facility] to undergo a bronchoscopy with biopsy.    Pt states she was in [facility] for rehab following a hospitalization in July for PNA. While she was there she states she lost a lot of weight due to the type of food they made and the fact that they would not help her cut it into small pieces. She endorses a hearty appetite and eats a lot now that she is back home with her daughter. Pt denies any fever, chills, abdominal pain, SOB, or palpitations. Endorses constipation but states she has this at baseline and requires lactulose daily. She  reports smoking 1 PPD since she was 15 and does not consider quitting as she 'is not a quitter'.     Subjective    Overnight:  ANANTH Duff   Pt was seen at the bedside. She is currently denying any symptoms and states that she is doing well. Currently holding HTN meds due to low BP at presentation. Denied any chills, headache/dizziness, NV, chest pain/tightness, dyspnea, abdominal pain, dysuria.     Medications   Medications:  Scheduled Medications:  PRN Medications:  Continuous Medications:   [Held by provider] amLODIPine, 10 mg, oral, Nightly  ascorbic acid, 500 mg, oral, Daily  baclofen, 10 mg, oral, TID  ferrous sulfate (325 mg ferrous sulfate), 65 mg of iron, oral, Daily with breakfast  [Held by provider] heparin (porcine), 5,000 Units, subcutaneous, q8h  lactulose, 20 g, oral, Daily  lidocaine, 1 patch, transdermal, Daily  [Held by provider] losartan, 50 mg, oral, Daily  nicotine, 1 patch, transdermal, Daily  pantoprazole, 40 mg, oral, BID  polyethylene glycol, 17 g, oral, Daily  sucralfate, 1 g, oral, q6h [place]     PRN medications: acetaminophen **OR** acetaminophen **OR** acetaminophen, butalbital-acetaminophen-caff, lubricating eye drops       Objective      Vitals: I/O:   Vitals:    10/10/24 0527   BP: 117/67   Pulse: 82   Resp: 17   Temp: 36.3 °C (97.3 °F)   SpO2: 96%      24hr Min/Max:  Temp  Min: 36.3 °C (97.3 °F)  Max: 36.6 °C (97.9 °F)  Pulse  Min: 82  Max: 98  BP  Min: 95/60  Max: 123/65  Resp  Min: 16  Max: 18  SpO2  Min: 96 %  Max: 98 % No intake or output data in the 24 hours ending 10/10/24 1058      Physical Exam:  General: Awake, alert, conversant, appears stated age. Cachectic  HEENT: EOMI grossly intact, no scleral icterus or conjunctivitis  Skin: No suspect lesions or rashes noted on visible skin  Chest: Ctab, normal respiratory effort, not on supplemental oxygen  Cardiac: Regular rate and rhythm, normal s1, s2, no M/R/G  Abdomen: Soft, non-distended, nontender, no involuntary  guarding  : No flank pain or indwelling urinary catheter  EXT: No peripheral edema, no asymmetry noted  MSK: No focal joint swelling noted  Neuro: AOx4, moving all limbs spontaneously, follows commands, 5/5 strength to BUE and BLE  Psych: Coherent thought process, appropriate mood and affect    General Chemistry Labs  Results from last 72 hours   Lab Units 10/10/24  0545 10/09/24  0503   GLUCOSE mg/dL 92 97   SODIUM mmol/L 133* 134*   POTASSIUM mmol/L 4.3 3.4*   CHLORIDE mmol/L 99 99   CO2 mmol/L 27 28   BUN mg/dL 10 9   CREATININE mg/dL 0.40* 0.43*   ANION GAP mmol/L 11 10   EGFR mL/min/1.73m*2 >90 >90   CALCIUM mg/dL 9.0 9.1   MAGNESIUM mg/dL  --  1.88   ALBUMIN g/dL 3.4  --    ALK PHOS U/L 74  --    ALT U/L 13  --    AST U/L 20  --    BILIRUBIN TOTAL mg/dL 0.4  --    PROTEIN TOTAL g/dL 6.3*  --       CBC  Results from last 72 hours   Lab Units 10/10/24  0545 10/09/24  0503   WBC AUTO x10*3/uL 11.7* 14.0*   HEMOGLOBIN g/dL 12.1 12.7   HEMATOCRIT % 37.3 38.9   MCV fL 91 89   MCH pg 29.6 29.2   MCHC g/dL 32.4 32.6   RDW % 15.0* 15.2*   PLATELETS AUTO x10*3/uL 301 307       Cardiac Labs      MELD 3.0: 8 at 10/5/2024  5:23 AM  MELD-Na: 7 at 10/5/2024  5:23 AM  Calculated from:  Serum Creatinine: 0.55 mg/dL (Using min of 1 mg/dL) at 10/5/2024  5:23 AM  Serum Sodium: 139 mmol/L (Using max of 137 mmol/L) at 10/5/2024  5:23 AM  Total Bilirubin: 0.4 mg/dL (Using min of 1 mg/dL) at 10/5/2024  5:23 AM  Serum Albumin: 4.1 g/dL (Using max of 3.5 g/dL) at 10/5/2024  5:23 AM  INR(ratio): 1.1 at 10/4/2024  1:17 PM  Age at listing (hypothetical): 79 years  Sex: Female at 10/5/2024  5:23 AM    Micro/ID:   Lab Results   Component Value Date    URINECULTURE No growth 10/04/2024    BLOODCULT No growth at 4 days -  FINAL REPORT 09/04/2024    BLOODCULT No growth at 4 days -  FINAL REPORT 09/04/2024     Results from last 72 hours   Lab Units 10/09/24  1540   COLOR U  Yellow   APPEARANCE U  Turbid*   SPEC GRAV UR  1.023   PH U  7.0    PROTEIN U mg/dL 20 (TRACE)   GLUCOSE U mg/dL Normal   BLOOD UR  1.0 (3+)*   KETONES UR mg/dL NEGATIVE   BILIRUBIN U  NEGATIVE   UROBILINOGEN UR mg/dL Normal   NITRITE U  2+*   LEUKOCYTES U  500 Keyonna/µL*   WBC UR /HPF >50*   RBC UR HPF /HPF >20*     Summary of key imaging results:  CT brain attack head wo IV contrast  Stable age related changes without acute intracranial process     CT angio head and neck w and wo IV contrast  1. There is no measurable stenosis of the cervical vessels and no   measurable stenosis or large branch vessel cutoffs of the   intracranial vessels.   2. Tortuous course of the visualized arteries within the head and   neck without pseudoaneurysm or dissection   3. Dominant left vertebral artery. No evidence of narrowing,   dissection or thrombus in the vertebral or basilar circulation..   4. Spiculated nodule in the posterior right upper lobe is concerning   for malignancy. Follow-up PET-CT or direct tissue sampling is   recommended as clinically indicated.     CT chest abdomen pelvis w/o IV contrast  1. There are multiple masses identified within the right and left   lobes of the liver more numerous and larger on the right with   particular note made of a large 3.1 cm lower pole mass right hilum in   a 2.2 cm spiculated mass apicoposterior segment right upper lobe   along with additional masses as detailed above. Malignancy is likely   with metastatic lesions consideration along with primary lung cancer   metastatic to the lungs..   2. Emphysematous change pulmonary parenchyma.   3. Nonspecific appearance of the abdomen and pelvis. Please note that   sensitivity of the abdomen and pelvis assessment is limited due to   the cachectic state of the patient and absence of oral and   intravenous contrast     Assessment and Plan    Karly Horton is a 79 y.o. female presenting as a transfer from [facility] with new findings of R lung mass. Pt has a PMHx of COPD, >60 pack year smoking history,  Peptic ulcer disease, anemia, and chornic headaches.  Pt was initially admitted to [facility] on 10/5 with AMS after her daughter found her unresponsive on the floor.  Pt is now A&Ox4. Imaging at [facility] revealed RUL mass and pt transferred to  for for interventional pulmonology bronchoscopy and mediastinal staging of lung mass. Pt understands that diagnosis is likely cancer and she would like to treat it if it is.      Updates 10/10/24  - Pulmonology consulted for bronch with biopsy  - Holding HTN meds due to soft BP  - UA 10/9 - 3+ blood, 2+ nitrite, 500 leuk est, 1+ bacteria, Ucx pending. Ucx 10/4 at [facility] no growth. Patient currently asymptomatic so abx not indicated at this time but will continue to monitor.   - Nutrition consulted    #RUL mass concerning for malignancy  - CTAP revealed a large 3.1 cm lower pole mass right hilum in a 2.2 cm spiculated mass apicoposterior segment right upper lobe  - Consulted pulmonology for bronchoscopy for tissue diagnosis and staging     #Hepatic lesions  -Likely metastatic i/s/o RUL mass  -CTAP: multiple masses identified within the right and left  lobes of the liver more numerous and larger on the right  -AST-24, ALT-16, AlkPhos-96 WNL  -Await results of tissue diagnosis and proceed with staging if malignancy is present     #HTN  -Hold home Losartan 50mg and Amodipine 10mg  -Hold inpatient due to low pressures 90's/60's  -CTM and restart as tolerated     #Constipation  -Pt endorses baseline constipation but endorses having BM within the last few days  -Continue home Lactulose and Miralax      #Tobacco Abuse  -1PPD since age 15  -Nicotine replacement patch while inpatient  -Patient not interested in cessation     #PUD  -EGD 5/24: Mild erythematous mucosa in the antrum and prepyloric region and Single ulcer in the duodenal bulb with oozing hemorrhage   -Continue home Pantoprazole 40mg BID     #Muscle aches  #TMJ  -Pt reports this is chronic for her and she takes  Baclofen, Tylenol, and Butalbital to keep the symptoms at bay  -When medications don't help pt self-medicates with MJ  -Continue home medications inpatient      #Anemia  -Hgb 12.7 on admission - at baseline  -Continue ferrous sulfate 325mg daily  -CTM     #Malnutrition  -Pt reports while in rehab this summer she lost weight due to 'bad' food   -Denies any changes to her appetite and reports it is 'very good'  -BMI on admission 13.53  -Nutrition consult    Medical Check List   FEN  -Fluids: Will replete PRN   -Electrolytes: Will replete PRN, with goals of Mg >2, K>4  -Nutrition: NPO due to pending bronchoscopy  Prophylaxis:  -DVT ppx: Held Heparin pending bronchcoscopy  -GI ppx/Bowel care: Pantoprazole, Miralax, lactulose  -Abx: None  -Pain regimen: Lidocaine patch, PRN tylenol  Hardware:  -Drains: None  -Lines: PIV  Social:  -Code: Full Code - confirmed on admission  -NOK/Surrogate Decision Maker: Karly (daughter)- [phone]     Patient assessment and plan staffed with the attending physician on service, Dr. Lau.    GEORGIA BROWN MS3  10/10/24 at 10:58 AM     Jackie Pedraza MD  Internal Medicine PGY-1

## 2024-10-10 NOTE — CONSULTS
"Nutrition Assessment      Reason for Assessment: Provider consult order    Karly Horton is a 79 y.o. female presenting as a transfer from [facility] with new findings of R lung mass     NPO for bronchoscopy.      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 \"I don't want to be woken up until my procedure\". Interview deferred at this time per patient's request. Per chart review, patient reported losing a significant amount of weight while in a rehab facility this past July.   Vitamin/Herbal Supplement Use: None per home med list  Food Allergies/Intolerances:  Per nutrition note from [facility], patient has a lactose allergy.   GI Symptoms: Patient denies        Anthropometrics:  Height: 152.4 cm (5')   Weight: (!) 31.4 kg (69 lb 4.8 oz)   BMI (Calculated): 13.53  IBW/kg (Dietitian Calculated): 45.5 kg  Percent of IBW: 69 %       Weight History:   Wt Readings from Last 15 Encounters:   10/10/24 (!) 31.4 kg (69 lb 4.8 oz)   10/06/24 (!) 33 kg (72 lb 12 oz)   09/03/24 (!) 43.1 kg (94 lb 15.9 oz) - ? question accuracy.    05/22/24 (!) 36.2 kg (79 lb 12.9 oz)       Weight Change %:  Significant Weight Loss: Yes (13.3% wt loss x 4.5 months.)    Nutrition Focused Physical Exam Findings:    Subcutaneous Fat Loss:   Orbital Fat Pads: Severe (dark circles, hollowing and loose skin)  Buccal Fat Pads: Severe (hollow, sunken and narrow face)  Triceps: Severe (negligible fat tissue)  Ribs: Defer  Muscle Wasting:  Temporalis: Severe (hollowed scooping depression)  Pectoralis (Clavicular Region): Severe (protruding prominent clavicle)  Deltoid/Trapezius: Severe (squared shoulders, acromion process prominent)  Interosseous: Severe (depressed area between thumb and forefinger)  Trapezius/Infraspinatus/Supraspinatus (Scapular Region): Severe (prominent " visual scapula, depression between ribs, scapula or shoulder)  Quadriceps: Severe (depressions on inner and outer thigh)  Gastrocnemius: Severe (minimal muscle definition)  Edema:  Edema: none  Physical Findings:  Hair: Negative  Eyes: Negative  Mouth: Negative  Nails: Negative  Skin: Positive (Per flow sheets, Medial back wound and L buttock wound.)    Nutrition Significant Labs:  CBC Trend:   Results from last 7 days   Lab Units 10/10/24  0545 10/09/24  0503 10/07/24  0559 10/06/24  0245 10/05/24  0523   WBC AUTO x10*3/uL 11.7* 14.0* 10.9  --  9.9   RBC AUTO x10*6/uL 4.09 4.35 4.55  --  4.32   HEMOGLOBIN g/dL 12.1 12.7 13.3   < > 12.6   HEMATOCRIT % 37.3 38.9 39.4   < > 38.5   MCV fL 91 89 87  --  89   PLATELETS AUTO x10*3/uL 301 307 338  --  348    < > = values in this interval not displayed.    , BMP Trend:   Results from last 7 days   Lab Units 10/10/24  0545 10/09/24  0503 10/07/24  0559 10/05/24  0523   GLUCOSE mg/dL 92 97 122* 107*   CALCIUM mg/dL 9.0 9.1 9.2 9.5   SODIUM mmol/L 133* 134* 136 139   POTASSIUM mmol/L 4.3 3.4* 3.5 3.7   CO2 mmol/L 27 28 29 29   CHLORIDE mmol/L 99 99 98 97*   BUN mg/dL 10 9 10 18   CREATININE mg/dL 0.40* 0.43* 0.40* 0.55    , A1C:  Lab Results   Component Value Date    HGBA1C 5.1 05/22/2024   , BG POCT trend:   Results from last 7 days   Lab Units 10/05/24  1631 10/05/24  1119 10/05/24  0818 10/05/24  0438 10/04/24  2304   POCT GLUCOSE mg/dL 119* 104* 112* 115* 125*    , Liver Function Trend:   Results from last 7 days   Lab Units 10/10/24  0545 10/05/24  0523 10/04/24  1317   ALK PHOS U/L 74 96 89   AST U/L 20 24 22   ALT U/L 13 16 13   BILIRUBIN TOTAL mg/dL 0.4 0.4 0.3    , Renal Lab Trend:   Results from last 7 days   Lab Units 10/10/24  0545 10/09/24  0503 10/07/24  0559 10/05/24  0523   POTASSIUM mmol/L 4.3 3.4* 3.5 3.7   SODIUM mmol/L 133* 134* 136 139   MAGNESIUM mg/dL  --  1.88  --   --    EGFR mL/min/1.73m*2 >90 >90 >90 >90   BUN mg/dL 10 9 10 18   CREATININE mg/dL  "0.40* 0.43* 0.40* 0.55    , Vit D: No results found for: \"VITD25\" , Vit B12:   Lab Results   Component Value Date    [id number] 1,784 (H) 05/15/2024        Nutrition Specific Medications:    Scheduled medications  [Held by provider] amLODIPine, 10 mg, oral, Nightly  ascorbic acid, 500 mg, oral, Daily  baclofen, 10 mg, oral, TID  ferrous sulfate (325 mg ferrous sulfate), 65 mg of iron, oral, Daily with breakfast  heparin (porcine), 5,000 Units, subcutaneous, q8h  lactulose, 20 g, oral, Daily  lidocaine, 1 patch, transdermal, Daily  [Held by provider] losartan, 50 mg, oral, Daily  nicotine, 1 patch, transdermal, Daily  pantoprazole, 40 mg, oral, BID  polyethylene glycol, 17 g, oral, Daily  sucralfate, 1 g, oral, q6h [place]      Continuous medications     PRN medications  PRN medications: acetaminophen **OR** [DISCONTINUED] acetaminophen **OR** [DISCONTINUED] acetaminophen, butalbital-acetaminophen-caff, lubricating eye drops    I/O:   Last BM Date: 10/09/24;      Dietary Orders (From admission, onward)       Start     Ordered    10/11/24 0001  NPO Diet; Effective midnight  Diet effective midnight         10/10/24 1542    10/10/24 1536  Adult diet Regular; Pureed 4  Diet effective now        Question Answer Comment   Diet type Regular    Texture Pureed 4        10/10/24 1535                     Estimated Needs:   Total Energy Estimated Needs (kCal): 1250 kCal  Method for Estimating Needs: 31.4 kg / 40 kcal  Total Protein Estimated Needs (g): 55 g  Method for Estimating Needs: 31.4 kg / 1.8 g           Nutrition Diagnosis   Malnutrition Diagnosis  Patient has Malnutrition Diagnosis: Yes  Diagnosis Status: New  Malnutrition Diagnosis: Severe malnutrition related to chronic disease or condition  As Evidenced by: severe fat loss, severe muscle wasting, 13.3% wt loss x 4.5 months and BMI 13.5            Nutrition Interventions/Recommendations         Nutrition Prescription:  Individualized Nutrition Prescription Provided for : " Advance diet as able.  Order Ensure High Protein with meals. Obtain Vitamin D level. Order daily MVI.          Nutrition Education:   Not applicable. Patient asked to be left alone.        Nutrition Monitoring and Evaluation   Food/Nutrient Related History Monitoring  Monitoring and Evaluation Plan: Energy intake  Criteria: >/= 5o% of meals/supplements    Body Composition/Growth/Weight History  Monitoring and Evaluation Plan: Weight  Criteria: Stable weight    Biochemical Data, Medical Tests and Procedures  Monitoring and Evaluation Plan: Electrolyte/renal panel, Glucose/endocrine profile  Criteria: Labs wnl              Time Spent (min): 45 minutes

## 2024-10-10 NOTE — ANESTHESIA PREPROCEDURE EVALUATION
Patient: Karly ANDREA Clifford    Procedure Information       Date/Time: 10/10/24 0905    Procedure: BRONCHOSCOPY    Location: [facility]            Relevant Problems   Anesthesia (within normal limits)  No family hx MH      Cardiac   (+) Congestive heart failure      Pulmonary   (+) Pneumonia of both lower lobes due to infectious organism      Neuro (within normal limits)      GI (within normal limits)  cachexia      /Renal   (+) Urinary tract infection without hematuria, site unspecified      Liver (within normal limits)  Probable mets      Endocrine (within normal limits)      Hematology   (+) Iron deficiency anemia      Musculoskeletal (within normal limits)      HEENT (within normal limits)      ID   (+) Pneumonia of both lower lobes due to infectious organism   (+) Urinary tract infection without hematuria, site unspecified      Skin (within normal limits)      GYN (within normal limits)      Respiratory   (+) Lung mass      79 y.o. 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   Clinical information reviewed:                   NPO Detail:  No data recorded   5/2024 ECHO  ONCLUSIONS:   1. Left ventricular systolic function is normal with a 55-60% estimated ejection fraction.   2. Spectral Doppler shows an impaired relaxation pattern of left ventricular diastolic filling.   3. Moderately enlarged right ventricle.   4. There is moderately reduced right ventricular systolic function.   5. Mild mitral valve regurgitation.   6. Moderate to severely elevated right ventricular systolic pressure.   7. Moderate tricuspid regurgitation.    Repeat ECHO 10/10/24    In comparison to the previous echocardiogram(s): Compared with study dated 5/16/2024, there is a decrease in the severity of tricuspid regurgitation, now mild. Estimated RVSP 31 mmHg,  decreased from 64 mmHg. Additionally, there is improvement in RV function on this study compared to prior.        CONCLUSIONS:   1. The left ventricular systolic function is normal, with a visually estimated ejection fraction of 60-65%.   2. Spectral Doppler shows an impaired relaxation pattern of left ventricular diastolic filling.   3. There is normal right ventricular global systolic function.       Physical Exam    Airway  Mallampati: II  TM distance: >3 FB     Cardiovascular - normal exam     Dental   Comments: edentulous   Pulmonary - normal exam     Abdominal          Vitals:    10/11/24 0941   BP: 117/66   Pulse: 70   Resp: 18   Temp: 36.7 °C (98.1 °F)   SpO2: 95%       Past Surgical History:   Procedure Laterality Date    APPENDECTOMY      EYE SURGERY      HYSTERECTOMY      TONSILLECTOMY       Past Medical History:   Diagnosis Date    Other cervical disc degeneration, unspecified cervical region     Degeneration of cervical intervertebral disc    Other conditions influencing health status     Bulging Disc (L3 - L4)    Other conditions influencing health status     A Fall    Other conditions influencing health status     Bulging Disc (C4 - C5)    Other conditions influencing health status     Bulging Disc (C6 - C7)    Other conditions influencing health status     Bulging Disc (C5 - C6)    Other conditions influencing health status     Lumbar Spondylolisthesis    Other conditions influencing health status     Rotator Cuff Tendon Tear    Other intervertebral disc degeneration, lumbar region     L4-L5 disc bulge    Other intervertebral disc displacement, lumbar region     Disc displacement, lumbar    Other intervertebral disc displacement, lumbosacral region     Displacement of lumbosacral intervertebral disc    Personal history of other diseases of the musculoskeletal system and connective tissue     History of bursitis    Personal history of other diseases of the musculoskeletal system and connective tissue      History of fibromyositis    Personal history of other specified conditions     History of headache    Spinal stenosis, lumbar region without neurogenic claudication     Lumbar canal stenosis    Sprain of ligaments of lumbar spine, initial encounter     Low back sprain       Current Facility-Administered Medications:     acetaminophen (Tylenol) tablet 650 mg, 650 mg, oral, q6h PRN, 650 mg at 10/11/24 0559 **OR** [DISCONTINUED] acetaminophen (Tylenol) oral liquid 650 mg, 650 mg, nasogastric tube, q4h PRN **OR** [DISCONTINUED] acetaminophen (Tylenol) suppository 650 mg, 650 mg, rectal, q4h PRN, Carmen Henry DO    [Held by provider] amLODIPine (Norvasc) tablet 10 mg, 10 mg, oral, Nightly, Carmen Henry DO    ascorbic acid (Vitamin C) tablet 500 mg, 500 mg, oral, Daily, Carmen Henry DO, 500 mg at 10/11/24 0834    baclofen (Lioresal) tablet 10 mg, 10 mg, oral, TID, Carmen Henry DO, 10 mg at 10/11/24 0834    butalbital-acetaminophen-caff -40 mg per tablet 1 tablet, 1 tablet, oral, q6h PRN, Carmen Henry DO, 1 tablet at 10/11/24 0736    ferrous sulfate (325 mg ferrous sulfate) tablet 325 mg, 65 mg of iron, oral, Daily with breakfast, Carmen Henry DO, 325 mg at 10/10/24 1026    [Held by provider] heparin (porcine) injection 5,000 Units, 5,000 Units, subcutaneous, q8h, Jackie Pedraza MD, 5,000 Units at 10/11/24 0318    lactulose 20 gram/30 mL oral solution 20 g, 20 g, oral, Daily, Carmen Henry DO, 20 g at 10/11/24 0836    lidocaine 4 % patch 1 patch, 1 patch, transdermal, Daily, Jackie Pedraza MD, 1 patch at 10/11/24 0829    [Held by provider] losartan (Cozaar) tablet 50 mg, 50 mg, oral, Daily, Carmen Henry DO    lubricating eye drops ophthalmic solution 1 drop, 1 drop, Both Eyes, PRN, Carmen Henry, DO    multivitamin with minerals 1 tablet, 1 tablet, oral, Daily, Jackie Pedraza MD, 1 tablet at 10/11/24 0834    nicotine (Nicoderm CQ) 21 mg/24 hr patch 1 patch, 1  patch, transdermal, Daily, Carmen Henry DO, 1 patch at 10/11/24 0835    pantoprazole (ProtoNix) EC tablet 40 mg, 40 mg, oral, BID, Carmen Henry DO, 40 mg at 10/11/24 0834    polyethylene glycol (Glycolax, Miralax) packet 17 g, 17 g, oral, Daily, Carmen Henry DO    sucralfate (Carafate) suspension 1 g, 1 g, oral, q6h [place], Carmen Henry DO, 1 g at 10/11/24 0559  Prior to Admission medications    Medication Sig Start Date End Date Taking? Authorizing Provider   baclofen (Lioresal) 10 mg tablet Take 1 tablet (10 mg) by mouth 3 times a day.   Yes Historical Provider, MD   butalbital-acetaminophen-caff -40 mg tablet Take 1 tablet by mouth every 6 hours if needed for headaches. 11/10/23  Yes Historical Provider, MD   lactulose 10 gram/15 mL solution Take 45 mL (30 g) by mouth once daily. 9/15/23  Yes Historical Provider, MD   lubricating eye drops ophthalmic solution Administer 1 drop into both eyes if needed for dry eyes. 5/22/24  Yes LANDEN Jang   meprobamate (Equanil) 400 mg tablet Take 1 tablet (400 mg) by mouth 4 times a day.   Yes Historical Provider, MD   nicotine (Nicoderm CQ) 21 mg/24 hr patch Place 1 patch over 24 hours on the skin once daily. 5/23/24  Yes LANDEN Jang   acetaminophen (Tylenol) 325 mg tablet Take 1-2 tablets (325-650 mg) by mouth every 6 hours if needed for mild pain (1 - 3).    Historical Provider, MD   amLODIPine (Norvasc) 10 mg tablet Take 1 tablet (10 mg) by mouth once daily at bedtime. Hold for systolic blood pressure < 110 mmhg. 9/6/24   LANDEN aJng   ascorbic acid (Vitamin C) 500 mg tablet Take 1 tablet (500 mg) by mouth once daily.  Patient not taking: Reported on 10/10/2024 5/22/24   LANDEN Jang   ferrous sulfate, 325 mg ferrous sulfate, tablet Take 1 tablet by mouth once daily with breakfast.  Patient not taking: Reported on 10/10/2024 5/22/24   PO Jang-CNP   lidocaine HCL 4 % liquid  roll-on Apply 1 Application topically once daily as needed.    Historical Provider, MD   losartan (Cozaar) 50 mg tablet Take 1 tablet (50 mg) by mouth once daily. Hold for systolic blood pressure < 110 mmhg. 9/7/24   LANDEN Jang   pantoprazole (ProtoNix) 40 mg EC tablet Take 1 tablet (40 mg) by mouth 2 times a day. Do not crush, chew, or split. Continue twice daily x 8 weeks then continue once daily.  Patient not taking: Reported on 10/10/2024 5/22/24   LANDEN Jang   sucralfate (Carafate) 100 mg/mL suspension Take 10 mL (1 g) by mouth every 6 hours.  Patient not taking: Reported on 10/10/2024 5/22/24   LANDEN Jang   acetaminophen-codeine (Tylenol w/ Codeine #4) 300-60 mg tablet Take 1 tablet by mouth once daily as needed for severe pain (7 - 10).  10/10/24  Historical Provider, MD   lidocaine (Xylocaine) 2 % jelly Apply 1 Application topically once daily. migraines 9/18/13 10/10/24  Historical Provider, MD   lidocaine 4 % patch Place 1 patch over 12 hours on the skin once daily. Remove & discard patch within 12 hours or as directed by MD. 9/7/24 10/10/24  LANDEN Jang   meprobamate (Equanil) 400 mg tablet Take 1 tablet (400 mg) by mouth 4 times a day as needed (pain). 5/14/24 10/10/24  Historical Provider, MD   mupirocin (Bactroban) 2 % ointment Apply 1 Application topically 2 times a day. 5/11/24 10/10/24  Historical Provider, MD     Allergies   Allergen Reactions    Demerol [Meperidine] Headache and Nausea/vomiting     Per pt.     Morphine Headache and Nausea/vomiting     Per patient     Social History     Tobacco Use    Smoking status: Every Day     Current packs/day: 1.00     Average packs/day: 1 pack/day for 64.8 years (64.8 ttl pk-yrs)     Types: Cigarettes     Start date: 1/1/1960    Smokeless tobacco: Not on file   Substance Use Topics    Alcohol use: Not on file         Chemistry    Lab Results   Component Value Date/Time     (L) 10/11/2024  0551    K 3.8 10/11/2024 0551    CL 98 10/11/2024 0551    CO2 27 10/11/2024 0551    BUN 9 10/11/2024 0551    CREATININE 0.42 (L) 10/11/2024 0551    Lab Results   Component Value Date/Time    CALCIUM 9.3 10/11/2024 0551    ALKPHOS 80 10/11/2024 0551    AST 13 10/11/2024 0551    ALT 11 10/11/2024 0551    BILITOT 0.4 10/11/2024 0551          Lab Results   Component Value Date/Time    WBC 17.5 (H) 10/11/2024 0551    HGB 12.9 10/11/2024 0551    HCT 39.5 10/11/2024 0551     10/11/2024 0551     Lab Results   Component Value Date/Time    PROTIME 11.1 10/04/2024 1317    INR 1.1 10/04/2024 1317     Encounter Date: 10/04/24   ECG 12 lead   Result Value    Ventricular Rate 74    Atrial Rate 74    AL Interval 154    QRS Duration 78    QT Interval 444    QTC Calculation(Bazett) 492    P Axis 81    R Axis -76    T Axis 50    QRS Count 12    Q Onset 226    P Onset 149    P Offset 206    T Offset 448    QTC Fredericia 476    Narrative    Normal sinus rhythm  Left axis deviation  Inferior infarct (cited on or before 01-SEP-2024)  Anterolateral infarct , age undetermined  Abnormal ECG  When compared with ECG of 02-SEP-2024 15:13,  Significant changes have occurred  Confirmed by Everardo Webster ([id number]) on 10/8/2024 3:38:55 PM     No results found for this or any previous visit from the past 1095 days.     Anesthesia Plan    History of general anesthesia?: yes  History of complications of general anesthesia?: no    ASA 3     general     Anesthetic plan and risks discussed with patient.  Use of blood products discussed with patient who consented to blood products.    Plan discussed with [facility]AA.

## 2024-10-10 NOTE — PROGRESS NOTES
10/10/24 1510   Discharge Planning   Living Arrangements Children   Support Systems Children   Assistance Needed assist for adls   Type of Residence Private residence   Number of Stairs to Enter Residence 1   Number of Stairs Within Residence 0   Home or Post Acute Services In home services   Type of Home Care Services Home OT;Home PT;Home nursing visits   Expected Discharge Disposition Home Health   Does the patient need discharge transport arranged? Yes   RoundTrip coordination needed? Yes   Has discharge transport been arranged? No   Financial Resource Strain   How hard is it for you to pay for the very basics like food, housing, medical care, and heating? Not hard   Housing Stability   In the last 12 months, was there a time when you were not able to pay the mortgage or rent on time? N   At any time in the past 12 months, were you homeless or living in a shelter (including now)? N   Transportation Needs   In the past 12 months, has lack of transportation kept you from medical appointments or from getting medications? no   In the past 12 months, has lack of transportation kept you from meetings, work, or from getting things needed for daily living? No   Patient Choice   Provider Choice list and CMS website (https://medicare.gov/care-compare#search) for post-acute Quality and Resource Measure Data were provided and reviewed with: Patient   Patient / Family choosing to utilize agency / facility established prior to hospitalization Yes     Transitional Care Coordination Progress Note:  Patient discussed during interdisciplinary rounds.   Team members present: RANDAL OLMEDO  Plan per Medical/Surgical team: Lung Mass  Payor: Medicare  Discharge disposition: Home with resumption of [facility]  Potential Barriers: none  ADOD: 3-4 days    Previous Home Care: [facility] for SN/PT/OT  DME: [facility]  Pharmacy: [facility]  Falls: Denies  PCP: Julian Chaney; last visit within the last month  Met with  patient at bedside, provided introduction of self and role. Patient states she lives at home with daughter. Patient states she requires assist for all adls, daughter assists. Patient states she feels safe at home. Patient states she has been doing telehealth visits recently with providers as she is no longer able to ambulate. Patient states no concerns obtaining/affording medications; states no social/financial concerns. Patient states she does not want to go to a facility at discharge and wants to return home with [facility]. Referral sent via [facility]. Patient states she will need stretcher transport home at time of discharge. Will continue to monitor for discharge planning needs.     Tabatha BUTLER, RN  Transitional Care Coordinator (TCC)  [phone]

## 2024-10-10 NOTE — CARE PLAN
The clinical goals for the shift include Patient will remain safe and free from falls    Problem: Pain - Adult  Goal: Verbalizes/displays adequate comfort level or baseline comfort level  Outcome: Progressing     Problem: Safety - Adult  Goal: Free from fall injury  Outcome: Progressing     Problem: Discharge Planning  Goal: Discharge to home or other facility with appropriate resources  Outcome: Progressing     Problem: Chronic Conditions and Co-morbidities  Goal: Patient's chronic conditions and co-morbidity symptoms are monitored and maintained or improved  Outcome: Progressing     Problem: Skin  Goal: Decreased wound size/increased tissue granulation at next dressing change  Outcome: Progressing  Flowsheets (Taken 10/10/2024 0716)  Decreased wound size/increased tissue granulation at next dressing change: Protective dressings over bony prominences  Goal: Participates in plan/prevention/treatment measures  Outcome: Progressing  Flowsheets (Taken 10/10/2024 0716)  Participates in plan/prevention/treatment measures:   Elevate heels   Discuss with provider PT/OT consult  Goal: Prevent/manage excess moisture  Outcome: Progressing  Flowsheets (Taken 10/10/2024 0716)  Prevent/manage excess moisture:   Cleanse incontinence/protect with barrier cream   Moisturize dry skin  Goal: Prevent/minimize sheer/friction injuries  Outcome: Progressing  Flowsheets (Taken 10/10/2024 0716)  Prevent/minimize sheer/friction injuries: HOB 30 degrees or less  Goal: Promote/optimize nutrition  Outcome: Progressing  Flowsheets (Taken 10/10/2024 0716)  Promote/optimize nutrition:   Monitor/record intake including meals   Consume > 50% meals/supplements  Goal: Promote skin healing  Outcome: Progressing  Flowsheets (Taken 10/10/2024 0716)  Promote skin healing: Turn/reposition every 2 hours/use positioning/transfer devices     Problem: Fall/Injury  Goal: Not fall by end of shift  Outcome: Progressing  Goal: Be free from injury by end of the  shift  Outcome: Progressing  Goal: Verbalize understanding of personal risk factors for fall in the hospital  Outcome: Progressing  Goal: Verbalize understanding of risk factor reduction measures to prevent injury from fall in the home  Outcome: Progressing  Goal: Use assistive devices by end of the shift  Outcome: Progressing  Goal: Pace activities to prevent fatigue by end of the shift  Outcome: Progressing

## 2024-10-10 NOTE — CARE PLAN
The patient's goals for the shift include pt. wilol have decreased pain to posterior neck    The clinical goals for the shift include pt. will be able to have Bronch procedure done today    Over the shift, the patient did not make progress toward the following goals. Barriers to progression include pt. Continues to have pain to neck. Recommendations to address these barriers include bronch test postponed until Oct. 11, 2024.    Problem: Pain - Adult  Goal: Verbalizes/displays adequate comfort level or baseline comfort level  Outcome: Not Progressing     Problem: Safety - Adult  Goal: Free from fall injury  Outcome: Not Progressing     Problem: Discharge Planning  Goal: Discharge to home or other facility with appropriate resources  Outcome: Not Progressing     Problem: Chronic Conditions and Co-morbidities  Goal: Patient's chronic conditions and co-morbidity symptoms are monitored and maintained or improved  Outcome: Not Progressing     Problem: Skin  Goal: Decreased wound size/increased tissue granulation at next dressing change  Outcome: Not Progressing  Goal: Participates in plan/prevention/treatment measures  Outcome: Not Progressing  Goal: Prevent/manage excess moisture  Outcome: Not Progressing  Goal: Prevent/minimize sheer/friction injuries  Outcome: Not Progressing  Goal: Promote/optimize nutrition  Outcome: Not Progressing  Goal: Promote skin healing  Outcome: Not Progressing     Problem: Fall/Injury  Goal: Not fall by end of shift  Outcome: Not Progressing  Goal: Be free from injury by end of the shift  Outcome: Not Progressing  Goal: Verbalize understanding of personal risk factors for fall in the hospital  Outcome: Not Progressing  Goal: Verbalize understanding of risk factor reduction measures to prevent injury from fall in the home  Outcome: Not Progressing  Goal: Use assistive devices by end of the shift  Outcome: Not Progressing  Goal: Pace activities to prevent fatigue by end of the shift  Outcome:  Not Progressing

## 2024-10-10 NOTE — CARE PLAN
The patient's goals for the shift include  rest    The clinical goals for the shift include rest

## 2024-10-10 NOTE — PROGRESS NOTES
"Occupational Therapy                 Therapy Communication Note    Patient Name: Karly Horton  MRN: [id number]  Department: [facility]  Room: [address] [place], [place] [address]  Today's Date: 10/10/2024     Discipline: Occupational Therapy    Missed Visit Reason: Missed Visit Reason: Patient refused (OT att pt x 2 for evaluation.1045-Pt stated \"I want the procedure.\"Nrsg- pt waiting for med team to make decision. 2nd attempt 1201-pt with [facility] IV team and was then informed she was on call list for transport to take her to procedure. Eval not complete.)    Missed Time: Attempt    Comment: x 2 this date  "

## 2024-10-10 NOTE — PROGRESS NOTES
Karly Horton is a 79 y.o. y.o. female on day 0 of admission presenting with Lung mass [R91.8].     Subjective   Received update from [facility] that patient is interested in completing HCPOA. Attempted to meet with patient at the bedside but patient was being transported off the unit for a procedure.    SW will follow up as able.    - Larissa JONAS, MA, LSW  Care Transitions   [facility] Secure Chat or [phone]

## 2024-10-11 ENCOUNTER — APPOINTMENT (OUTPATIENT)
Dept: GASTROENTEROLOGY | Facility: HOSPITAL | Age: 79
DRG: 180 | End: 2024-10-11
Payer: MEDICARE

## 2024-10-11 ENCOUNTER — APPOINTMENT (OUTPATIENT)
Dept: RADIOLOGY | Facility: HOSPITAL | Age: 79
DRG: 180 | End: 2024-10-11
Payer: MEDICARE

## 2024-10-11 LAB
ALBUMIN SERPL BCP-MCNC: 3.5 G/DL (ref 3.4–5)
ALP SERPL-CCNC: 80 U/L (ref 33–136)
ALT SERPL W P-5'-P-CCNC: 11 U/L (ref 7–45)
ANION GAP SERPL CALC-SCNC: 13 MMOL/L (ref 10–20)
APPEARANCE UR: ABNORMAL
AST SERPL W P-5'-P-CCNC: 13 U/L (ref 9–39)
BACTERIA UR CULT: ABNORMAL
BILIRUB DIRECT SERPL-MCNC: 0.1 MG/DL (ref 0–0.3)
BILIRUB SERPL-MCNC: 0.4 MG/DL (ref 0–1.2)
BILIRUB UR STRIP.AUTO-MCNC: NEGATIVE MG/DL
BUN SERPL-MCNC: 9 MG/DL (ref 6–23)
CALCIUM SERPL-MCNC: 9.3 MG/DL (ref 8.6–10.6)
CHLORIDE SERPL-SCNC: 98 MMOL/L (ref 98–107)
CO2 SERPL-SCNC: 27 MMOL/L (ref 21–32)
COLOR UR: YELLOW
CREAT SERPL-MCNC: 0.42 MG/DL (ref 0.5–1.05)
EGFRCR SERPLBLD CKD-EPI 2021: >90 ML/MIN/1.73M*2
ERYTHROCYTE [DISTWIDTH] IN BLOOD BY AUTOMATED COUNT: 14.9 % (ref 11.5–14.5)
GLUCOSE SERPL-MCNC: 95 MG/DL (ref 74–99)
GLUCOSE UR STRIP.AUTO-MCNC: NORMAL MG/DL
HCT VFR BLD AUTO: 39.5 % (ref 36–46)
HGB BLD-MCNC: 12.9 G/DL (ref 12–16)
KETONES UR STRIP.AUTO-MCNC: NEGATIVE MG/DL
LEUKOCYTE ESTERASE UR QL STRIP.AUTO: ABNORMAL
MAGNESIUM SERPL-MCNC: 1.87 MG/DL (ref 1.6–2.4)
MCH RBC QN AUTO: 29.7 PG (ref 26–34)
MCHC RBC AUTO-ENTMCNC: 32.7 G/DL (ref 32–36)
MCV RBC AUTO: 91 FL (ref 80–100)
MUCOUS THREADS #/AREA URNS AUTO: ABNORMAL /LPF
NITRITE UR QL STRIP.AUTO: ABNORMAL
NRBC BLD-RTO: 0 /100 WBCS (ref 0–0)
PH UR STRIP.AUTO: 6.5 [PH]
PHOSPHATE SERPL-MCNC: 3.1 MG/DL (ref 2.5–4.9)
PLATELET # BLD AUTO: 346 X10*3/UL (ref 150–450)
POTASSIUM SERPL-SCNC: 3.8 MMOL/L (ref 3.5–5.3)
PROT SERPL-MCNC: 6.6 G/DL (ref 6.4–8.2)
PROT UR STRIP.AUTO-MCNC: ABNORMAL MG/DL
RBC # BLD AUTO: 4.34 X10*6/UL (ref 4–5.2)
RBC # UR STRIP.AUTO: ABNORMAL /UL
RBC #/AREA URNS AUTO: >20 /HPF
SODIUM SERPL-SCNC: 134 MMOL/L (ref 136–145)
SP GR UR STRIP.AUTO: 1.01
UROBILINOGEN UR STRIP.AUTO-MCNC: NORMAL MG/DL
WBC # BLD AUTO: 17.5 X10*3/UL (ref 4.4–11.3)
WBC #/AREA URNS AUTO: >50 /HPF
WBC CLUMPS #/AREA URNS AUTO: ABNORMAL /HPF

## 2024-10-11 PROCEDURE — 2720000007 HC OR 272 NO HCPCS

## 2024-10-11 PROCEDURE — 88172 CYTP DX EVAL FNA 1ST EA SITE: CPT | Performed by: PATHOLOGY

## 2024-10-11 PROCEDURE — 31645 BRNCHSC W/THER ASPIR 1ST: CPT | Performed by: INTERNAL MEDICINE

## 2024-10-11 PROCEDURE — 88363 XM ARCHIVE TISSUE MOLEC ANAL: CPT | Performed by: PATHOLOGY

## 2024-10-11 PROCEDURE — 80053 COMPREHEN METABOLIC PANEL: CPT

## 2024-10-11 PROCEDURE — 0BC68ZZ EXTIRPATION OF MATTER FROM RIGHT LOWER LOBE BRONCHUS, VIA NATURAL OR ARTIFICIAL OPENING ENDOSCOPIC: ICD-10-PCS | Performed by: STUDENT IN AN ORGANIZED HEALTH CARE EDUCATION/TRAINING PROGRAM

## 2024-10-11 PROCEDURE — 88305 TISSUE EXAM BY PATHOLOGIST: CPT | Mod: TC,SUR | Performed by: INTERNAL MEDICINE

## 2024-10-11 PROCEDURE — 3700000001 HC GENERAL ANESTHESIA TIME - INITIAL BASE CHARGE

## 2024-10-11 PROCEDURE — 31625 BRONCHOSCOPY W/BIOPSY(S): CPT | Performed by: INTERNAL MEDICINE

## 2024-10-11 PROCEDURE — 2500000002 HC RX 250 W HCPCS SELF ADMINISTERED DRUGS (ALT 637 FOR MEDICARE OP, ALT 636 FOR OP/ED)

## 2024-10-11 PROCEDURE — 88305 TISSUE EXAM BY PATHOLOGIST: CPT | Performed by: PATHOLOGY

## 2024-10-11 PROCEDURE — 2500000004 HC RX 250 GENERAL PHARMACY W/ HCPCS (ALT 636 FOR OP/ED)

## 2024-10-11 PROCEDURE — 81003 URINALYSIS AUTO W/O SCOPE: CPT

## 2024-10-11 PROCEDURE — 2500000001 HC RX 250 WO HCPCS SELF ADMINISTERED DRUGS (ALT 637 FOR MEDICARE OP)

## 2024-10-11 PROCEDURE — G0452 MOLECULAR PATHOLOGY INTERPR: HCPCS | Performed by: INTERNAL MEDICINE

## 2024-10-11 PROCEDURE — 83735 ASSAY OF MAGNESIUM: CPT

## 2024-10-11 PROCEDURE — 36415 COLL VENOUS BLD VENIPUNCTURE: CPT

## 2024-10-11 PROCEDURE — 1210000001 HC SEMI-PRIVATE ROOM DAILY

## 2024-10-11 PROCEDURE — 87040 BLOOD CULTURE FOR BACTERIA: CPT

## 2024-10-11 PROCEDURE — 99232 SBSQ HOSP IP/OBS MODERATE 35: CPT

## 2024-10-11 PROCEDURE — 88342 IMHCHEM/IMCYTCHM 1ST ANTB: CPT | Mod: TC,MCY | Performed by: INTERNAL MEDICINE

## 2024-10-11 PROCEDURE — 71045 X-RAY EXAM CHEST 1 VIEW: CPT

## 2024-10-11 PROCEDURE — 88342 IMHCHEM/IMCYTCHM 1ST ANTB: CPT | Performed by: PATHOLOGY

## 2024-10-11 PROCEDURE — 81458 SO GSAP DNA CPY NMBR&MCRSTL: CPT | Performed by: INTERNAL MEDICINE

## 2024-10-11 PROCEDURE — 85027 COMPLETE CBC AUTOMATED: CPT

## 2024-10-11 PROCEDURE — 87075 CULTR BACTERIA EXCEPT BLOOD: CPT

## 2024-10-11 PROCEDURE — 3700000002 HC GENERAL ANESTHESIA TIME - EACH INCREMENTAL 1 MINUTE

## 2024-10-11 PROCEDURE — 31653 BRONCH EBUS SAMPLNG 3/> NODE: CPT | Performed by: INTERNAL MEDICINE

## 2024-10-11 PROCEDURE — 84100 ASSAY OF PHOSPHORUS: CPT

## 2024-10-11 PROCEDURE — 0BC58ZZ EXTIRPATION OF MATTER FROM RIGHT MIDDLE LOBE BRONCHUS, VIA NATURAL OR ARTIFICIAL OPENING ENDOSCOPIC: ICD-10-PCS | Performed by: STUDENT IN AN ORGANIZED HEALTH CARE EDUCATION/TRAINING PROGRAM

## 2024-10-11 PROCEDURE — 7100000009 HC PHASE TWO TIME - INITIAL BASE CHARGE

## 2024-10-11 PROCEDURE — 87186 SC STD MICRODIL/AGAR DIL: CPT

## 2024-10-11 PROCEDURE — S4991 NICOTINE PATCH NONLEGEND: HCPCS

## 2024-10-11 PROCEDURE — 88341 IMHCHEM/IMCYTCHM EA ADD ANTB: CPT | Performed by: PATHOLOGY

## 2024-10-11 PROCEDURE — 7100000001 HC RECOVERY ROOM TIME - INITIAL BASE CHARGE

## 2024-10-11 PROCEDURE — 82248 BILIRUBIN DIRECT: CPT

## 2024-10-11 PROCEDURE — 7100000002 HC RECOVERY ROOM TIME - EACH INCREMENTAL 1 MINUTE

## 2024-10-11 PROCEDURE — 88360 TUMOR IMMUNOHISTOCHEM/MANUAL: CPT | Performed by: PATHOLOGY

## 2024-10-11 PROCEDURE — 88173 CYTOPATH EVAL FNA REPORT: CPT | Performed by: PATHOLOGY

## 2024-10-11 PROCEDURE — 0BC38ZZ EXTIRPATION OF MATTER FROM RIGHT MAIN BRONCHUS, VIA NATURAL OR ARTIFICIAL OPENING ENDOSCOPIC: ICD-10-PCS | Performed by: STUDENT IN AN ORGANIZED HEALTH CARE EDUCATION/TRAINING PROGRAM

## 2024-10-11 PROCEDURE — 80069 RENAL FUNCTION PANEL: CPT | Mod: CCI

## 2024-10-11 PROCEDURE — 71045 X-RAY EXAM CHEST 1 VIEW: CPT | Performed by: RADIOLOGY

## 2024-10-11 PROCEDURE — 2500000005 HC RX 250 GENERAL PHARMACY W/O HCPCS

## 2024-10-11 PROCEDURE — 0BB68ZX EXCISION OF RIGHT LOWER LOBE BRONCHUS, VIA NATURAL OR ARTIFICIAL OPENING ENDOSCOPIC, DIAGNOSTIC: ICD-10-PCS | Performed by: STUDENT IN AN ORGANIZED HEALTH CARE EDUCATION/TRAINING PROGRAM

## 2024-10-11 RX ORDER — LACTULOSE 10 G/15ML
10 SOLUTION ORAL ONCE
Status: DISCONTINUED | OUTPATIENT
Start: 2024-10-11 | End: 2024-10-11

## 2024-10-11 RX ORDER — LACTULOSE 10 G/15ML
20 SOLUTION ORAL DAILY
Status: DISCONTINUED | OUTPATIENT
Start: 2024-10-11 | End: 2024-10-11

## 2024-10-11 RX ORDER — SODIUM CHLORIDE, SODIUM LACTATE, POTASSIUM CHLORIDE, CALCIUM CHLORIDE 600; 310; 30; 20 MG/100ML; MG/100ML; MG/100ML; MG/100ML
INJECTION, SOLUTION INTRAVENOUS CONTINUOUS PRN
Status: DISCONTINUED | OUTPATIENT
Start: 2024-10-11 | End: 2024-10-11

## 2024-10-11 RX ORDER — CEFTRIAXONE 1 G/50ML
1 INJECTION, SOLUTION INTRAVENOUS EVERY 24 HOURS
Status: DISCONTINUED | OUTPATIENT
Start: 2024-10-12 | End: 2024-10-13

## 2024-10-11 RX ORDER — LACTULOSE 10 G/15ML
20 SOLUTION ORAL ONCE
Status: COMPLETED | OUTPATIENT
Start: 2024-10-11 | End: 2024-10-11

## 2024-10-11 RX ORDER — ONDANSETRON HYDROCHLORIDE 2 MG/ML
INJECTION, SOLUTION INTRAVENOUS AS NEEDED
Status: DISCONTINUED | OUTPATIENT
Start: 2024-10-11 | End: 2024-10-11

## 2024-10-11 RX ORDER — LIDOCAINE HYDROCHLORIDE 20 MG/ML
INJECTION, SOLUTION INFILTRATION; PERINEURAL AS NEEDED
Status: DISCONTINUED | OUTPATIENT
Start: 2024-10-11 | End: 2024-10-11

## 2024-10-11 RX ORDER — CEFTRIAXONE 1 G/50ML
1 INJECTION, SOLUTION INTRAVENOUS EVERY 24 HOURS
Status: DISCONTINUED | OUTPATIENT
Start: 2024-10-11 | End: 2024-10-11

## 2024-10-11 RX ORDER — ROCURONIUM BROMIDE 10 MG/ML
INJECTION, SOLUTION INTRAVENOUS AS NEEDED
Status: DISCONTINUED | OUTPATIENT
Start: 2024-10-11 | End: 2024-10-11

## 2024-10-11 RX ORDER — PROPOFOL 10 MG/ML
INJECTION, EMULSION INTRAVENOUS AS NEEDED
Status: DISCONTINUED | OUTPATIENT
Start: 2024-10-11 | End: 2024-10-11

## 2024-10-11 RX ORDER — FENTANYL CITRATE 50 UG/ML
INJECTION, SOLUTION INTRAMUSCULAR; INTRAVENOUS CONTINUOUS PRN
Status: DISCONTINUED | OUTPATIENT
Start: 2024-10-11 | End: 2024-10-11

## 2024-10-11 RX ORDER — PHENYLEPHRINE HCL IN 0.9% NACL 0.4MG/10ML
SYRINGE (ML) INTRAVENOUS AS NEEDED
Status: DISCONTINUED | OUTPATIENT
Start: 2024-10-11 | End: 2024-10-11

## 2024-10-11 RX ORDER — LACTULOSE 10 G/15ML
10 SOLUTION ORAL ONCE
Status: DISCONTINUED | OUTPATIENT
Start: 2024-10-11 | End: 2024-10-12

## 2024-10-11 ASSESSMENT — COGNITIVE AND FUNCTIONAL STATUS - GENERAL
MOVING FROM LYING ON BACK TO SITTING ON SIDE OF FLAT BED WITH BEDRAILS: A LITTLE
DRESSING REGULAR LOWER BODY CLOTHING: A LOT
TURNING FROM BACK TO SIDE WHILE IN FLAT BAD: A LITTLE
WALKING IN HOSPITAL ROOM: A LOT
MOVING TO AND FROM BED TO CHAIR: A LITTLE
TOILETING: A LITTLE
HELP NEEDED FOR BATHING: A LOT
DAILY ACTIVITIY SCORE: 16
EATING MEALS: A LITTLE
STANDING UP FROM CHAIR USING ARMS: A LOT
MOBILITY SCORE: 16
DRESSING REGULAR UPPER BODY CLOTHING: A LITTLE
CLIMB 3 TO 5 STEPS WITH RAILING: A LITTLE
PERSONAL GROOMING: A LITTLE

## 2024-10-11 ASSESSMENT — PAIN - FUNCTIONAL ASSESSMENT
PAIN_FUNCTIONAL_ASSESSMENT: 0-10

## 2024-10-11 ASSESSMENT — PAIN SCALES - GENERAL
PAINLEVEL_OUTOF10: 0 - NO PAIN
PAINLEVEL_OUTOF10: 0 - NO PAIN
PAINLEVEL_OUTOF10: 3
PAINLEVEL_OUTOF10: 5 - MODERATE PAIN
PAINLEVEL_OUTOF10: 0 - NO PAIN
PAINLEVEL_OUTOF10: 4
PAINLEVEL_OUTOF10: 0 - NO PAIN
PAINLEVEL_OUTOF10: 4
PAINLEVEL_OUTOF10: 3
PAINLEVEL_OUTOF10: 6

## 2024-10-11 ASSESSMENT — PAIN DESCRIPTION - LOCATION: LOCATION: NECK

## 2024-10-11 ASSESSMENT — PAIN DESCRIPTION - DESCRIPTORS
DESCRIPTORS: DULL

## 2024-10-11 ASSESSMENT — COLUMBIA-SUICIDE SEVERITY RATING SCALE - C-SSRS
1. IN THE PAST MONTH, HAVE YOU WISHED YOU WERE DEAD OR WISHED YOU COULD GO TO SLEEP AND NOT WAKE UP?: NO
2. HAVE YOU ACTUALLY HAD ANY THOUGHTS OF KILLING YOURSELF?: NO
6. HAVE YOU EVER DONE ANYTHING, STARTED TO DO ANYTHING, OR PREPARED TO DO ANYTHING TO END YOUR LIFE?: NO

## 2024-10-11 ASSESSMENT — PAIN SCALES - PAIN ASSESSMENT IN ADVANCED DEMENTIA (PAINAD)
TOTALSCORE: MEDICATION (SEE MAR)
TOTALSCORE: REPOSITIONED

## 2024-10-11 NOTE — INTERVAL H&P NOTE
H&P reviewed. The patient was examined and there are no changes to the H&P.  Discussed risks, benefits and alternatives to bronchoscopy- patient asked appropriate questions, consented, proceed with bronch with EBUS, +/- EBBx, other therapeutics.

## 2024-10-11 NOTE — PROGRESS NOTES
"MEDICINE INPATIENT PROGRESS NOTE  Karly Horton is a 79 y.o. female on day 1 of admission presenting with Lung mass    Subjective   No acute events overnight. Patient seen this morning resting comfortably in bed. She denies any active pain aside from \"normal aches and pains\". Saturating well on room air. Patient concerned about EEG from [facility], reassured that results were normal. Denies fever, chills, chest pain, dyspnea, abdominal pain, nausea, vomiting, leg swelling, and dysuria.          Objective   Current Vitals  /66   Pulse 70   Temp 36.7 °C (98.1 °F) (Temporal)   Resp 18   Ht 1.524 m (5')   Wt (!) 31.4 kg (69 lb 4.8 oz)   SpO2 95%   BMI 13.53 kg/m²      No intake/output data recorded.      Physical exam:  Constitutional: Patient does not appear to be in any acute distress, AOx4  HEENT: NCAT, normal external inspection of ears and nose. Oropharynx normal.  Cardio: RRR, S1/S2, no murmurs, rubs, or gallops, radial pulses +2, no edema of extremities  Pulm: CTAB, no respiratory distress.  GI: +BS, soft, non-tender, nondistended, no guarding or rebound, no masses noted  MSK: No joint swelling, normal movements of all extremities. Normal ROM, 5/5 strength  Skin: No lesions, contusions, or erythema.  Extremities: no BLE swelling   Neuro: No focal deficits  Psych: Appropriate mood and behavior    Relevant Results  Labs:  CBC: WBC 17.5 , HGB 12.9,   BMP: , K 3.8, Cl 98, HCO3 27, BUN 9, CR 0.42, Glu 95  LFTS: AST 13 , ALT 11, ALKPHOS 80 , TBILI 0.4 , DBILI 0.1  TROP: 9  BNP: No results found for requested labs within last 365 days.  COAGS: PT 11.1 , PTT 20.7  , INR 1.1  UA:   Results from last 7 days   Lab Units 10/09/24  1540   COLOR U  Yellow   PH U  7.0   SPEC GRAV UR  1.023   PROTEIN U mg/dL 20 (TRACE)   BLOOD UR  1.0 (3+)*   NITRITE U  2+*   WBC UR /HPF >50*   BACTERIA UR /HPF 1+*     ABG:    CALCIUM 9.3 MAG No results found for requested labs within last 365 days. ALB 3.5 LACTATE " 1.2 PHOS No results found for requested labs within last 365 days. COVIDNo results found for requested labs within last 365 days.    Micro/culture data:  Susceptibility data from last 120 days.  Collected Organism Amoxicillin/Clavulanate Ampicillin Ampicillin/Sulbactam Cefazolin Cefazolin (uncomplicated UTIs only) Ciprofloxacin Gentamicin Nitrofurantoin Piperacillin/Tazobactam Trimethoprim/Sulfamethoxazole   10/09/24 1540 Enteric bacilli             09/03/24 0557 Staphylococcus epidermidis             09/02/24 1607 Escherichia coli  I  R  R  S  S  R  S  S  S  S       Imaging:  Transthoracic Echo (TTE) Limited     [facility], [address] [place] [address]                 Tel [phone] and Fax [phone]    TRANSTHORACIC ECHOCARDIOGRAM REPORT       Patient Name:      JAMISON CORONADO    Reading Physician:    [id number] Ovidio Montoya MD  Study Date:        10/10/2024           Ordering Provider:    [id number] WADE  MRN/PID:           [id number]             Fellow:               Ham Jorge MD  Accession#:        [id number]         Nurse:  Date of Birth/Age: 1945 / 79 years Sonographer:          Lydia Valadez RDCS  Gender:            F                    Additional Staff:  Height:            152.40 cm            Admit Date:           10/8/2024  Weight:            31.30 kg             Admission Status:     Inpatient - STAT  BSA / BMI:         1.19 m2 / 13.48      Encounter#:           [id number]                     kg/m2  Blood Pressure:    122/71 mmHg          Department Location:  [facility] Non                                                                Invasive    Study Type:    TRANSTHORACIC ECHO (TTE) LIMITED  Diagnosis/ICD: Heart failure, unspecified-I50.9; Chronic diastolic  (congestive)                 heart failure (CHF)-I50.32  Indication:    Congestive Heart Failure  CPT Code:      Echo Limited-54761; Doppler Limited-20864; Color Doppler-31298    Patient History:  Smoker:            Current.  Pertinent History: CHF and COPD. Right Lung Mass, Anemia, h/o PHTN.    Study Detail: The following Echo studies were performed: color flow, Doppler, 2D                and M-Mode. Technically challenging study due to body habitus and                prominent lung artifact.       PHYSICIAN INTERPRETATION:  Left Ventricle: The left ventricular systolic function is normal, with a visually estimated ejection fraction of 60-65%. There are no regional left ventricular wall motion abnormalities. The left ventricular cavity size is normal. There is left ventricular concentric remodeling. Spectral Doppler shows an impaired relaxation pattern of left ventricular diastolic filling.  Left Atrium: The left atrium is normal in size.  Right Ventricle: The right ventricle is normal in size. There is normal right ventricular global systolic function.  Right Atrium: The right atrium is normal in size.  Aortic Valve: The aortic valve is trileaflet. There is no evidence of aortic valve regurgitation. The peak instantaneous gradient of the aortic valve is 5.8 mmHg.  Mitral Valve: The mitral valve is normal in structure. There is trace mitral valve regurgitation.  Tricuspid Valve: The tricuspid valve is structurally normal. There is mild tricuspid regurgitation.  Pulmonic Valve: The pulmonic valve is not well visualized. There is physiologic pulmonic valve regurgitation.  Pericardium: There is no pericardial effusion noted.  Aorta: The aortic root is normal.  Pulmonary Artery: The tricuspid regurgitant velocity is 2.65 m/s, and with an estimated right atrial pressure of 3 mmHg, the estimated pulmonary artery pressure is borderline elevated with the RVSP at 31.1 mmHg.  Systemic Veins: The inferior vena cava appears  small in size, with IVC inspiratory collapse greater than 50%.  In comparison to the previous echocardiogram(s): Compared with study dated 5/16/2024, there is a decrease in the severity of tricuspid regurgitation, now mild. Estimated RVSP 31 mmHg, decreased from 64 mmHg. Additionally, there is improvement in RV function on this study compared to prior.       CONCLUSIONS:   1. The left ventricular systolic function is normal, with a visually estimated ejection fraction of 60-65%.   2. Spectral Doppler shows an impaired relaxation pattern of left ventricular diastolic filling.   3. There is normal right ventricular global systolic function.    QUANTITATIVE DATA SUMMARY:     2D MEASUREMENTS:          Normal Ranges:  Ao Root d:       3.00 cm  (2.0-3.7cm)  LAs:             3.10 cm  (2.7-4.0cm)  IVSd:            1.20 cm  (0.6-1.1cm)  LVPWd:           1.00 cm  (0.6-1.1cm)  LVIDd:           2.50 cm  (3.9-5.9cm)  LVIDs:           1.50 cm  LV Mass Index:   62 g/m2  LVEDV Index:     37 ml/m2  LV % FS          40.0 %       RA VOLUME BY A/L METHOD:         Normal Ranges:  RA Area A4C:             9.0 cm2       AORTA MEASUREMENTS:         Normal Ranges:  Asc Ao, d:          2.50 cm (2.1-3.4cm)       LV SYSTOLIC FUNCTION BY 2D PLANIMETRY (MOD):                       Normal Ranges:  EF-A4C View:    57 % (>=55%)  EF-A2C View:    48 %  EF-Biplane:     53 %  EF-Visual:      63 %  LV EF Reported: 63 %       LV DIASTOLIC FUNCTION:             Normal Ranges:  MV Peak E:             0.55 m/s    (0.7-1.2 m/s)  MV Peak A:             1.10 m/s    (0.42-0.7 m/s)  E/A Ratio:             0.50        (1.0-2.2)  MV e'                  0.065 m/s   (>8.0)  MV lateral e'          0.08 m/s  MV medial e'           0.05 m/s  MV A Dur:              121.00 msec  E/e' Ratio:            8.43        (<8.0)       MITRAL VALVE:          Normal Ranges:  MV DT:        359 msec (150-240msec)       AORTIC VALVE:           Normal Ranges:  AoV Vmax:      1.20 m/s  (<=1.7m/s)  AoV Peak P.8 mmHg (<20mmHg)  LVOT Max Grayson:  1.08 m/s (<=1.1m/s)  LVOT VTI:      17.90 cm  LVOT Diameter: 2.00 cm  (1.8-2.4cm)  AoV Area,Vmax: 2.83 cm2 (2.5-4.5cm2)       RIGHT VENTRICLE:  RV Basal 3.10 cm  RV Mid   2.00 cm  RV Major 5.4 cm  TAPSE:   16.6 mm  RV s'    0.09 m/s       TRICUSPID VALVE/RVSP:          Normal Ranges:  Peak TR Velocity:     2.65 m/s  Est. RA Pressure:     3 mmHg  RV Syst Pressure:     31 mmHg  (< 30mmHg)  IVC Diam:             0.70 cm       PULMONIC VALVE:          Normal Ranges:  PV Accel Time:  76 msec  (>120ms)  PV Max Grayson:     0.9 m/s  (0.6-0.9m/s)  PV Max PG:      3.4 mmHg       [id number] Ovidio Montoya MD  Electronically signed on 10/10/2024 at 5:43:25 PM       ** Final **           MEDS:  Scheduled medications  [Held by provider] amLODIPine, 10 mg, oral, Nightly  ascorbic acid, 500 mg, oral, Daily  baclofen, 10 mg, oral, TID  ferrous sulfate (325 mg ferrous sulfate), 65 mg of iron, oral, Daily with breakfast  [Held by provider] heparin (porcine), 5,000 Units, subcutaneous, q8h  lactulose, 20 g, oral, Daily  lidocaine, 1 patch, transdermal, Daily  [Held by provider] losartan, 50 mg, oral, Daily  multivitamin with minerals, 1 tablet, oral, Daily  nicotine, 1 patch, transdermal, Daily  pantoprazole, 40 mg, oral, BID  polyethylene glycol, 17 g, oral, Daily  sucralfate, 1 g, oral, q6h [place]      Continuous medications     PRN medications  PRN medications: acetaminophen **OR** [DISCONTINUED] acetaminophen **OR** [DISCONTINUED] acetaminophen, butalbital-acetaminophen-caff, lubricating eye drops       Assessment/Plan   Assessment/Plan:   Karly Horton is a 79 y.o. female presenting as a transfer from [facility] with new findings of R lung mass. Pt has a PMHx of COPD, >60 pack year smoking history, Peptic ulcer disease, anemia, and chornic headaches.  Pt was initially admitted to [facility] on 10/5 with AMS after her daughter found her unresponsive on the floor.  Pt is now  A&Ox4. Imaging at [facility] revealed RUL mass and pt transferred to  for for interventional pulmonology bronchoscopy and mediastinal staging of lung mass.     Updates 10/11/24  -Pulmonology consultation for bronchoscopy, deferred 10/10 by anesthesia for concerns of pulmonary HTN on Echo 5/16/24  - RVSP 64.3 mmHg. Repeat Echo 10/10/24 - RVSP 31.1 mmHg   -Plan for bronch w biopsy today   - UA 10/9 - 3+ blood, 2+ nitrite, 500 leuk est, 1+ bacteria, Ucx prelim Enteric bacilli. Ucx 10/4 at [facility] no growth. Patient currently asymptomatic so abx not indicated at this time but will continue to monitor.   -WBC 17.5 > 11.7     #RUL and hilar mass concerning for malignancy  - CTAP revealed a large 3.1 cm lower pole mass right hilum in a 2.2 cm spiculated mass apicoposterior segment right upper lobe  - Pulmonology consultation for bronchoscopy, deferred 10/10 by anesthesia for concerns of pulmonary HTN on Echo 5/16/24  - RVSP 64.3 mmHg. Repeat Echo 10/10/24 - RVSP 31.1 mmHg   - Plan for bronch w biopsy today     #Asymptomatic bacteriuria   ::Ucx 10/4 at [facility] no growth  ::UA 10/9 - 3+ blood, 2+ nitrite, 500 leuk est, 1+ bacteria, Ucx prelim Enteric bacilli.  - Patient currently asymptomatic so abx not indicated at this time but will continue to monitor    #Hepatic lesions  -Likely metastatic i/s/o RUL mass  -CTAP: multiple masses identified within the right and left  lobes of the liver more numerous and larger on the right  -AST-24, ALT-16, AlkPhos-96 WNL  -Await results of tissue diagnosis and proceed with staging if malignancy is present     #HTN  -Hold home Losartan 50mg and Amodipine 10mg  -Hold inpatient due to low pressures 90's/60's  -CTM and restart as tolerated     #Constipation  -Pt endorses baseline constipation but endorses having BM within the last few days  -Continue home Lactulose and Miralax      #Tobacco Abuse  -1PPD since age 15  -Nicotine replacement patch while inpatient  -Patient not interested in  cessation     #PUD  -EGD 5/24: Mild erythematous mucosa in the antrum and prepyloric region and Single ulcer in the duodenal bulb with oozing hemorrhage   -Continue home Pantoprazole 40mg BID     #Muscle aches  #TMJ  -Pt reports this is chronic for her and she takes Baclofen, Tylenol, and Butalbital to keep the symptoms at bay  -When medications don't help pt self-medicates with MJ  -Continue home medications inpatient      #Anemia  -Hgb 12.7 on admission - at baseline  -Continue ferrous sulfate 325mg daily  -CTM     #Malnutrition  -Pt reports while in rehab this summer she lost weight due to 'bad' food   -Denies any changes to her appetite and reports it is 'very good'  -BMI on admission 13.53  -Vit D 27  -Nutrition consulted - started multivitamin, Ensures with protein      Fluids: Replete PRN  Electrolytes: Keep mg >2, phos >3  and K >4  Nutrition:  NPO Diet; Effective midnight   Antimicrobials: None  DVT PPX: Heparin, held for bronch  GI ppx: Pantoprazole   Lines:PIV  Oxygen:Room Air    Pain regimen: Lidocaine patch, PRN tylenol   Bowel care:Miralax and lactulose    Code Status: Full Code (confirmed on admission)   NOK:  Primary Emergency Contact: Karly Pro, Home Phone: [phone]       Jackie Pedraza MD  Internal Medicine PGY-1

## 2024-10-11 NOTE — SIGNIFICANT EVENT
Dr. Henderson said patient okay for discharge to [facility]. Will continue to monitor.

## 2024-10-11 NOTE — DISCHARGE INSTRUCTIONS
Dear Karly Horton,    You were admitted to [facility] from 10/10/24 to 10/16/24 as a transfer from [facility]. At [facility] you went to the hospital because you were confused and your daughter found you down at home. You were found to mass in your right lung.  [place] [facility] [place] biopsy of this mass on 10/11. 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 The Oncology team evaluated you after the pathology results. You were also found to have bacteria growing in your urine, you completed a course of antibiotics to treat this. You were discharged with follow-up with medical oncology and radiation oncology. We also placed a referral to podiatry per your request. A wheelchair was also ordered for you.     A request was placed for you to follow up with a thoracic oncologist and radiation oncology at  [place], please call [phone] if you need help with scheduling.     You were also given resources to help arrange transport to and from your appointments.       It was a pleasure taking care of you,  Your  Care Team

## 2024-10-11 NOTE — SIGNIFICANT EVENT
Patient had bronchoscopy with EBUS- note in EPIC under procedures- please refer to results.  Patient will be observed in post procedure suite until meets criteria to return to her inpatient floor for further care, follow up.    Please page with questions- Meera Henderson MD  Pager [phone]

## 2024-10-11 NOTE — ANESTHESIA PROCEDURE NOTES
Airway  Date/Time: 10/11/2024 10:47 AM  Urgency: elective    Airway not difficult    Staffing  Performed: LUCILLE   Authorized by: Michelle Davis MD    Performed by: LUCILLE Blackwell  Patient location during procedure: OR    Indications and Patient Condition  Indications for airway management: anesthesia  Spontaneous Ventilation: absent  Sedation level: deep  Preoxygenated: yes  Patient position: sniffing  MILS maintained throughout  Mask difficulty assessment: 1 - vent by mask    Final Airway Details  Final airway type: endotracheal airway      Successful airway: ETT  Cuffed: yes   Successful intubation technique: direct laryngoscopy  Facilitating devices/methods: intubating stylet  Endotracheal tube insertion site: oral  Blade: Torres  Blade size: #2  ETT size (mm): 8.5  Cormack-Lehane Classification: grade I - full view of glottis  Placement verified by: chest auscultation and capnometry   Measured from: lips  ETT to lips (cm): 20  Number of attempts at approach: 1

## 2024-10-11 NOTE — CARE PLAN
The patient's goals for the shift include pt. will have decreased pain to posterior neck.    The clinical goals for the shift include Pt.  will be able to have Bronch procedure today.    Over the shift, the patient did not make progress toward the following goals. Barriers to progression include pt. Pain to posterior region of neck remains constant pain per pt.. Recommendations to address these barriers include pt. Able to have Bronch done.

## 2024-10-11 NOTE — ANESTHESIA POSTPROCEDURE EVALUATION
Patient: Karly Horton    Procedure Summary       Date: 10/11/24 Room / Location: [facility]    Anesthesia Start: 1038 Anesthesia Stop: 1215    Procedure: BRONCHOSCOPY Diagnosis: Lung mass    Scheduled Providers: Meera CAZARES MD Responsible Provider: Michelle Davis MD    Anesthesia Type: general ASA Status: 3            Anesthesia Type: general    Vitals Value Taken Time   /54 10/11/24 1248   Temp 36.3 °C (97.3 °F) 10/11/24 1208   Pulse 80 10/11/24 1248   Resp 23 10/11/24 1248   SpO2 97 % 10/11/24 1248       Anesthesia Post Evaluation    Patient location during evaluation: PACU  Patient participation: complete - patient participated  Level of consciousness: awake  Pain management: adequate  Airway patency: patent  Cardiovascular status: acceptable  Respiratory status: acceptable  Hydration status: acceptable  Postoperative Nausea and Vomiting: none        There were no known notable events for this encounter.

## 2024-10-11 NOTE — PROGRESS NOTES
Physical Therapy                 Therapy Communication Note    Patient Name: Karly Horton  MRN: [id number]  Department: [place] [address]  Room: [address] [place] [address]  Today's Date: 10/11/2024     Discipline: Physical Therapy    Missed Visit Reason: Missed Visit Reason:  (pt off floor in procedure.)    Missed Time: Attempt    Meagan Perez, PT

## 2024-10-11 NOTE — PROGRESS NOTES
Occupational Therapy                 Therapy Communication Note    Patient Name: Karly Horton  MRN: [id number]  Department:   Room: [address] [place] [address]  Today's Date: 10/11/2024     Discipline: Occupational Therapy    Missed Visit Reason: Missed Visit Reason: Patient in a medical procedure (OT EVAL  ATTEMPTED/NO OT SERVICES PROVIDED 2/2 CONFLICT OF SERVICES; PATIENT OFF UNIT AT GI LAB)    Missed Time: Attempt    Comment:

## 2024-10-12 LAB
ALBUMIN SERPL BCP-MCNC: 3.3 G/DL (ref 3.4–5)
ALBUMIN SERPL BCP-MCNC: 3.4 G/DL (ref 3.4–5)
ALP SERPL-CCNC: 75 U/L (ref 33–136)
ALT SERPL W P-5'-P-CCNC: 9 U/L (ref 7–45)
ANION GAP SERPL CALC-SCNC: 13 MMOL/L (ref 10–20)
ANION GAP SERPL CALC-SCNC: 13 MMOL/L (ref 10–20)
AST SERPL W P-5'-P-CCNC: 13 U/L (ref 9–39)
BILIRUB DIRECT SERPL-MCNC: 0.1 MG/DL (ref 0–0.3)
BILIRUB SERPL-MCNC: 0.3 MG/DL (ref 0–1.2)
BUN SERPL-MCNC: 9 MG/DL (ref 6–23)
BUN SERPL-MCNC: 9 MG/DL (ref 6–23)
CALCIUM SERPL-MCNC: 8.9 MG/DL (ref 8.6–10.6)
CALCIUM SERPL-MCNC: 9 MG/DL (ref 8.6–10.6)
CHLORIDE SERPL-SCNC: 97 MMOL/L (ref 98–107)
CHLORIDE SERPL-SCNC: 98 MMOL/L (ref 98–107)
CO2 SERPL-SCNC: 27 MMOL/L (ref 21–32)
CO2 SERPL-SCNC: 29 MMOL/L (ref 21–32)
CREAT SERPL-MCNC: 0.47 MG/DL (ref 0.5–1.05)
CREAT SERPL-MCNC: 0.5 MG/DL (ref 0.5–1.05)
EGFRCR SERPLBLD CKD-EPI 2021: >90 ML/MIN/1.73M*2
EGFRCR SERPLBLD CKD-EPI 2021: >90 ML/MIN/1.73M*2
ERYTHROCYTE [DISTWIDTH] IN BLOOD BY AUTOMATED COUNT: 15.2 % (ref 11.5–14.5)
GLUCOSE SERPL-MCNC: 85 MG/DL (ref 74–99)
GLUCOSE SERPL-MCNC: 94 MG/DL (ref 74–99)
HCT VFR BLD AUTO: 38.2 % (ref 36–46)
HGB BLD-MCNC: 12.1 G/DL (ref 12–16)
HOLD SPECIMEN: NORMAL
MAGNESIUM SERPL-MCNC: 1.8 MG/DL (ref 1.6–2.4)
MAGNESIUM SERPL-MCNC: 1.96 MG/DL (ref 1.6–2.4)
MCH RBC QN AUTO: 29.6 PG (ref 26–34)
MCHC RBC AUTO-ENTMCNC: 31.7 G/DL (ref 32–36)
MCV RBC AUTO: 93 FL (ref 80–100)
NRBC BLD-RTO: 0 /100 WBCS (ref 0–0)
PHOSPHATE SERPL-MCNC: 3.1 MG/DL (ref 2.5–4.9)
PHOSPHATE SERPL-MCNC: 3.1 MG/DL (ref 2.5–4.9)
PLATELET # BLD AUTO: 304 X10*3/UL (ref 150–450)
POTASSIUM SERPL-SCNC: 4.3 MMOL/L (ref 3.5–5.3)
POTASSIUM SERPL-SCNC: 4.3 MMOL/L (ref 3.5–5.3)
PROT SERPL-MCNC: 6 G/DL (ref 6.4–8.2)
RBC # BLD AUTO: 4.09 X10*6/UL (ref 4–5.2)
SODIUM SERPL-SCNC: 134 MMOL/L (ref 136–145)
SODIUM SERPL-SCNC: 135 MMOL/L (ref 136–145)
WBC # BLD AUTO: 8.2 X10*3/UL (ref 4.4–11.3)

## 2024-10-12 PROCEDURE — 2500000004 HC RX 250 GENERAL PHARMACY W/ HCPCS (ALT 636 FOR OP/ED)

## 2024-10-12 PROCEDURE — 82248 BILIRUBIN DIRECT: CPT

## 2024-10-12 PROCEDURE — 85027 COMPLETE CBC AUTOMATED: CPT

## 2024-10-12 PROCEDURE — 99232 SBSQ HOSP IP/OBS MODERATE 35: CPT

## 2024-10-12 PROCEDURE — 2500000001 HC RX 250 WO HCPCS SELF ADMINISTERED DRUGS (ALT 637 FOR MEDICARE OP)

## 2024-10-12 PROCEDURE — S4991 NICOTINE PATCH NONLEGEND: HCPCS

## 2024-10-12 PROCEDURE — 36415 COLL VENOUS BLD VENIPUNCTURE: CPT

## 2024-10-12 PROCEDURE — 2500000002 HC RX 250 W HCPCS SELF ADMINISTERED DRUGS (ALT 637 FOR MEDICARE OP, ALT 636 FOR OP/ED)

## 2024-10-12 PROCEDURE — 2500000005 HC RX 250 GENERAL PHARMACY W/O HCPCS

## 2024-10-12 PROCEDURE — 1210000001 HC SEMI-PRIVATE ROOM DAILY

## 2024-10-12 PROCEDURE — 83735 ASSAY OF MAGNESIUM: CPT

## 2024-10-12 PROCEDURE — 84100 ASSAY OF PHOSPHORUS: CPT

## 2024-10-12 RX ORDER — LACTULOSE 10 G/15ML
30 SOLUTION ORAL DAILY
Status: DISCONTINUED | OUTPATIENT
Start: 2024-10-13 | End: 2024-10-20 | Stop reason: HOSPADM

## 2024-10-12 RX ORDER — LANOLIN ALCOHOL/MO/W.PET/CERES
400 CREAM (GRAM) TOPICAL ONCE
Status: COMPLETED | OUTPATIENT
Start: 2024-10-12 | End: 2024-10-12

## 2024-10-12 RX ORDER — LACTULOSE 10 G/15ML
10 SOLUTION ORAL ONCE
Status: COMPLETED | OUTPATIENT
Start: 2024-10-12 | End: 2024-10-12

## 2024-10-12 ASSESSMENT — COGNITIVE AND FUNCTIONAL STATUS - GENERAL
MOVING FROM LYING ON BACK TO SITTING ON SIDE OF FLAT BED WITH BEDRAILS: A LITTLE
WALKING IN HOSPITAL ROOM: A LOT
TURNING FROM BACK TO SIDE WHILE IN FLAT BAD: A LOT
WALKING IN HOSPITAL ROOM: A LITTLE
MOBILITY SCORE: 14
MOBILITY SCORE: 18
MOVING TO AND FROM BED TO CHAIR: A LITTLE
STANDING UP FROM CHAIR USING ARMS: A LITTLE
MOVING TO AND FROM BED TO CHAIR: A LITTLE
MOVING FROM LYING ON BACK TO SITTING ON SIDE OF FLAT BED WITH BEDRAILS: A LITTLE
MOVING TO AND FROM BED TO CHAIR: A LITTLE
CLIMB 3 TO 5 STEPS WITH RAILING: A LITTLE
CLIMB 3 TO 5 STEPS WITH RAILING: A LITTLE
WALKING IN HOSPITAL ROOM: A LITTLE
MOBILITY SCORE: 18
WALKING IN HOSPITAL ROOM: A LITTLE
TURNING FROM BACK TO SIDE WHILE IN FLAT BAD: A LITTLE
CLIMB 3 TO 5 STEPS WITH RAILING: TOTAL
HELP NEEDED FOR BATHING: A LITTLE
WALKING IN HOSPITAL ROOM: A LITTLE
TURNING FROM BACK TO SIDE WHILE IN FLAT BAD: A LITTLE
CLIMB 3 TO 5 STEPS WITH RAILING: A LITTLE
STANDING UP FROM CHAIR USING ARMS: A LITTLE
DRESSING REGULAR UPPER BODY CLOTHING: A LITTLE
STANDING UP FROM CHAIR USING ARMS: A LITTLE
DRESSING REGULAR LOWER BODY CLOTHING: A LITTLE
CLIMB 3 TO 5 STEPS WITH RAILING: A LOT
MOBILITY SCORE: 18
MOVING TO AND FROM BED TO CHAIR: A LITTLE
CLIMB 3 TO 5 STEPS WITH RAILING: A LITTLE
STANDING UP FROM CHAIR USING ARMS: A LITTLE
TURNING FROM BACK TO SIDE WHILE IN FLAT BAD: A LITTLE
MOVING FROM LYING ON BACK TO SITTING ON SIDE OF FLAT BED WITH BEDRAILS: A LITTLE
MOVING TO AND FROM BED TO CHAIR: A LITTLE
STANDING UP FROM CHAIR USING ARMS: A LITTLE
MOBILITY SCORE: 16
TURNING FROM BACK TO SIDE WHILE IN FLAT BAD: A LITTLE
TOILETING: A LITTLE
WALKING IN HOSPITAL ROOM: A LITTLE
MOVING TO AND FROM BED TO CHAIR: A LITTLE
MOVING FROM LYING ON BACK TO SITTING ON SIDE OF FLAT BED WITH BEDRAILS: A LITTLE
TURNING FROM BACK TO SIDE WHILE IN FLAT BAD: A LITTLE
STANDING UP FROM CHAIR USING ARMS: A LOT
MOVING FROM LYING ON BACK TO SITTING ON SIDE OF FLAT BED WITH BEDRAILS: A LITTLE
EATING MEALS: A LITTLE
MOBILITY SCORE: 18
PERSONAL GROOMING: A LITTLE
DAILY ACTIVITIY SCORE: 18
MOVING FROM LYING ON BACK TO SITTING ON SIDE OF FLAT BED WITH BEDRAILS: A LITTLE

## 2024-10-12 ASSESSMENT — PAIN SCALES - WONG BAKER
WONGBAKER_NUMERICALRESPONSE: HURTS LITTLE MORE
WONGBAKER_NUMERICALRESPONSE: HURTS LITTLE BIT

## 2024-10-12 ASSESSMENT — PAIN SCALES - GENERAL
PAINLEVEL_OUTOF10: 5 - MODERATE PAIN
PAINLEVEL_OUTOF10: 6
PAINLEVEL_OUTOF10: 0 - NO PAIN
PAINLEVEL_OUTOF10: 5 - MODERATE PAIN
PAINLEVEL_OUTOF10: 6
PAINLEVEL_OUTOF10: 3
PAINLEVEL_OUTOF10: 5 - MODERATE PAIN
PAINLEVEL_OUTOF10: 3
PAINLEVEL_OUTOF10: 6
PAINLEVEL_OUTOF10: 3
PAINLEVEL_OUTOF10: 5 - MODERATE PAIN

## 2024-10-12 ASSESSMENT — PAIN - FUNCTIONAL ASSESSMENT
PAIN_FUNCTIONAL_ASSESSMENT: 0-10

## 2024-10-12 ASSESSMENT — PAIN DESCRIPTION - LOCATION
LOCATION: NECK

## 2024-10-12 ASSESSMENT — ACTIVITIES OF DAILY LIVING (ADL): LACK_OF_TRANSPORTATION: NO

## 2024-10-12 NOTE — CARE PLAN
The patient's goals for the shift include pt. will have decreased pain to posterior neck.    The clinical goals for the shift include Patient will be pain free and maintained safety    Over the shift, the patient did not make progress toward the following goals. Barriers to progression include  Problem: Skin  Goal: Promote skin healing  10/12/2024 1313 by Pinky Boone RN  Outcome: Progressing  10/12/2024 1229 by Pinky Boone RN  Outcome: Progressing  10/12/2024 1149 by Pinky Boone RN  Outcome: Progressing     Problem: Fall/Injury  Goal: Not fall by end of shift  10/12/2024 1229 by Pinky Boone RN  Outcome: Progressing  10/12/2024 1149 by Pinky Boone RN  Outcome: Progressing     Problem: Fall/Injury  Goal: Be free from injury by end of the shift  10/12/2024 1229 by Pinky Boone RN  Outcome: Progressing  10/12/2024 1149 by Pinky Boone RN  Outcome: Progressing     Problem: Fall/Injury  Goal: Verbalize understanding of personal risk factors for fall in the hospital  10/12/2024 1229 by Pinky Boone RN  Outcome: Progressing  10/12/2024 1149 by Pinky Boone RN  Outcome: Progressing     Problem: Fall/Injury  Goal: Verbalize understanding of personal risk factors for fall in the hospital  10/12/2024 1229 by Pinky Boone RN  Outcome: Progressing  10/12/2024 1149 by Pinky Bonoe RN  Outcome: Progressing

## 2024-10-12 NOTE — CARE PLAN
Patient seen this morning for post-bronch visit. She reports feeling well. Denies any current respiratory concerns. Says her questions about the procedure were answered yesterday by Dr. Henderson and has no further questions. Plan for final pathology to be followed by primary team, or by outpatient pulmonary if patient is discharged. Please place outpatient pulmonary order on discharge for follow up.    Pulmonary will sign off at this time. Please page [phone] during day hours for further questions or concerns, or call the MICU fellow at [phone] for any emergent issues overnight.

## 2024-10-12 NOTE — CARE PLAN
The patient's goals for the shift include pt. will have decreased pain to posterior neck.    The clinical goals for the shift include Patient will be pain free and maintained safety    Over the shift, the patient did not make progress toward the following goals. Barriers to progression include   Problem: Pain - Adult  Goal: Verbalizes/displays adequate comfort level or baseline comfort level  10/12/2024 1319 by Pinky Boone RN  Outcome: Progressing  10/12/2024 1229 by Pinky Boone RN  Outcome: Progressing  10/12/2024 1149 by Pinky Boone RN  Outcome: Progressing     Problem: Safety - Adult  Goal: Free from fall injury  10/12/2024 1319 by Pinky Boone RN  Outcome: Progressing  10/12/2024 1229 by Pinky Boone RN  Outcome: Progressing  10/12/2024 1149 by Pinky Boone RN  Outcome: Progressing     Problem: Discharge Planning  Goal: Discharge to home or other facility with appropriate resources  10/12/2024 1319 by Pinky Boone RN  Outcome: Progressing  10/12/2024 1229 by Pinky Boone RN  Outcome: Progressing  10/12/2024 1149 by Pinky Boone RN  Outcome: Progressing     Problem: Chronic Conditions and Co-morbidities  Goal: Patient's chronic conditions and co-morbidity symptoms are monitored and maintained or improved  10/12/2024 1319 by Pinky Boone RN  Outcome: Progressing  10/12/2024 1229 by Pinky Boone RN  Outcome: Progressing  10/12/2024 1149 by Pinky Boone RN  Outcome: Progressing     Problem: Skin  Goal: Decreased wound size/increased tissue granulation at next dressing change  10/12/2024 1319 by Pinky Boone RN  Outcome: Progressing  10/12/2024 1313 by Pinky Boone RN  Outcome: Progressing  10/12/2024 1229 by Pinky Boone RN  Outcome: Progressing  10/12/2024 1149 by Pinky Boone RN  Outcome: Progressing  Goal: Participates in plan/prevention/treatment measures  10/12/2024 1319 by Pinky Boone RN  Outcome: Progressing  10/12/2024 1313 by Pinky Boone  RN  Outcome: Progressing  10/12/2024 1229 by Pinky Boone RN  Outcome: Progressing  10/12/2024 1149 by Pinky Boone RN  Outcome: Progressing  Goal: Prevent/manage excess moisture  10/12/2024 1319 by Pinky Boone RN  Outcome: Progressing  10/12/2024 1313 by Pinky Boone RN  Outcome: Progressing  10/12/2024 1229 by Pinky Boone, RN  Outcome: Progressing  10/12/2024 1149 by Pinky Boone RN  Outcome: Progressing  Goal: Prevent/minimize sheer/friction injuries  10/12/2024 1319 by Pinky Boone RN  Outcome: Progressing  10/12/2024 1313 by Pinky Boone RN  Outcome: Progressing  10/12/2024 1229 by Pinky Boone RN  Outcome: Progressing  10/12/2024 1149 by Pinky Boone RN  Outcome: Progressing  Goal: Promote/optimize nutrition  10/12/2024 1319 by Pinky Boone, RN  Outcome: Progressing  10/12/2024 1313 by Pinky Boone, RN  Outcome: Progressing  10/12/2024 1229 by Pinky Boone, RN  Outcome: Progressing  10/12/2024 1149 by Pinky Boone RN  Outcome: Progressing  Goal: Promote skin healing  10/12/2024 1319 by Pinky Boone, RN  Outcome: Progressing  10/12/2024 1315 by Pinky Boone RN  Outcome: Progressing  Flowsheets (Taken 10/10/2024 0716 by Lissette Live RN)  Promote skin healing: Turn/reposition every 2 hours/use positioning/transfer devices  10/12/2024 1313 by Pinky Boone RN  Outcome: Progressing  10/12/2024 1229 by Pinky Boone, RN  Outcome: Progressing  10/12/2024 1149 by Pinky Boone RN  Outcome: Progressing     Problem: Fall/Injury  Goal: Not fall by end of shift  10/12/2024 1319 by Pinky Boone RN  Outcome: Progressing  10/12/2024 1229 by Pinky Boone, RN  Outcome: Progressing  10/12/2024 1149 by Pinky Boone RN  Outcome: Progressing  Goal: Be free from injury by end of the shift  10/12/2024 1319 by Pinky Boone RN  Outcome: Progressing  10/12/2024 1229 by Pinky Boone RN  Outcome: Progressing  10/12/2024 1149 by Pinky Boone RN  Outcome:  Progressing  Goal: Verbalize understanding of personal risk factors for fall in the hospital  10/12/2024 1319 by Pinky Boone RN  Outcome: Progressing  10/12/2024 1229 by Pinky Boone RN  Outcome: Progressing  10/12/2024 1149 by Pinky Boone RN  Outcome: Progressing  Goal: Verbalize understanding of risk factor reduction measures to prevent injury from fall in the home  10/12/2024 1319 by Pinky Boone RN  Outcome: Progressing  10/12/2024 1229 by Pinky Boone RN  Outcome: Progressing  10/12/2024 1149 by Pinky Boone RN  Outcome: Progressing  Goal: Use assistive devices by end of the shift  10/12/2024 1319 by Pinky Boone RN  Outcome: Progressing  10/12/2024 1229 by Pinky Boone RN  Outcome: Progressing  10/12/2024 1149 by Pinky Boone RN  Outcome: Progressing  Goal: Pace activities to prevent fatigue by end of the shift  10/12/2024 1319 by Pinky Boone RN  Outcome: Progressing  10/12/2024 1229 by Pinky Boone RN  Outcome: Progressing  10/12/2024 1149 by Pinky Boone RN  Outcome: Progressing

## 2024-10-12 NOTE — PROGRESS NOTES
"Karly ANDREA Clifford is a 79 y.o. female on day 2 of admission presenting with Lung mass.      Subjective   NAEO. This AM, denies fevers, cough, dyspnea, dysuria, urinary frequency. Requesting increase in dose of lactulose and says miralax doesn't work for her. Also requesting \"no spices or citrate\" in her diet.        Objective     Last Recorded Vitals  /70   Pulse 80   Temp 36.3 °C (97.3 °F)   Resp 18   Wt (!) 31.4 kg (69 lb 4.8 oz)   SpO2 99%   Intake/Output last 3 Shifts:    Intake/Output Summary (Last 24 hours) at 10/12/2024 0650  Last data filed at 10/12/2024 0604  Gross per 24 hour   Intake 1065.92 ml   Output 900 ml   Net 165.92 ml       Admission Weight  Weight: (!) 31.4 kg (69 lb 4.8 oz) (10/10/24 0200)    Daily Weight  10/10/24 : (!) 31.4 kg (69 lb 4.8 oz)    Image Results  Bronchoscopy Diagnostic  Table formatting from the original result was not included.  Impression  The trachea, main angelina and left lung appeared normal.  Secretions present in the right main stem, bronchus intermedius, RML and   RLL; performed therapeutic aspiration  Friable mucosa in the right medial basal segment (RB7)  Staging EBUS performed for RLL mass nodule.  Nodes observed under convex   ultrasound guidance, sampling with Viz[place]ot 22L measured 6.5 mm, 3 samples obtained  4L measured 1.6 mm, not sampled due to < 5 mm  7 measured 6.8 mm, 3 samples obtained  11Rs measured 4.6 mm, not sampled due to < 5 mm  RLL Mass measured 18.6 mm, 5 samples obtained for cytology, 2 samples   obtained for core biopsy    Other stations observed and < 5 mm or poorly formed. Not sampled- 10L, 2L,   4R, 2R, 10R.    DAVID for 11L and 7- lymphoid, await final cytology.  DAVID for RLL mass was   atypical cells, await final results.  Performed endobronchial biopsies in the right lower lobar bronchus and   right medial basal segment (RB7)    Findings  The trachea, main angelina and left lung appeared normal.  Secretions present in the right " main stem, bronchus intermedius, RML and   RLL; performed therapeutic aspiration. After suctioning, the airways were   patent with exception of the RLL basal segment.  Localized friable mucosa in the right medial basal segment (RB7). Mass   obstructs the medial-basal segment of the RLL.  Staging EBUS performed for RLL mass nodule.  Nodes observed under convex   ultrasound guidance, sampling with Viz[place]ot 22L measured 6.5 mm, 3 samples obtained  4L measured 1.6 mm, not sampled due to < 5 mm  7 measured 6.8 mm, 3 samples obtained  11Rs measured 4.6 mm, not sampled due to < 5 mm  RLL Mass measured 18.6 mm, 5 samples obtained for cytology, 2 samples   obtained for core biopsy    Other stations observed and < 5 mm or poorly formed. Not sampled- 10L, 2L,   4R, 2R, 10R.    DAVID for 11L and 7- lymphoid, await final cytology.  DAVID for RLL mass was   atypical cells, await final results.  Performed 4 endobronchial biopsies in the right lower lobar bronchus and   right medial basal segment (RB7).     Hemostasis maintained at end of case, and care turned over to anesthesia.    Recommendation  Follow up bronchoscopy   Follow up with referring physician   Pulmonary consult team to follow.  Please call Oncology, Radiation   Oncology when results finalized.         Indication  Lung mass    Staff  Staff Role   Meera CAZARES MD Proceduralist     Medications  heparin (porcine) injection 5,000 Units Cannot be calculated*    *Administration dose not documented   (Totals for administrations occurring from 1021 to 1154 on 10/11/24)     Preprocedure  A history and physical has been performed, and patient medication   allergies have been reviewed. The patient's tolerance of previous   anesthesia has been reviewed. The risks and benefits of the procedure and   the sedation options and risks were discussed with the patient. All   questions were answered and informed consent obtained.    Details of the Procedure  The patient  underwent moderate sedation, which was administered by the   procedural nurse. The patient's blood pressure, heart rate, level of   consciousness, oxygen, respirations, ECG and ETCO2 were monitored   throughout the procedure. The patient's estimated blood loss was minimal   (<5 mL). The scope was introduced through the endotracheal tube. There   were unsuccessful entry attempts at the endotracheal tube. The procedure   was not difficult. The patient tolerated the procedure well. There were no   apparent adverse events.     Events  Procedure Events   Event Event Time   ENDO SCOPE IN TIME 10/11/2024 10:52 AM   ENDO SCOPE OUT TIME 10/11/2024 11:54 AM     Specimens  ID Type Source Tests Collected by Time   1 : RLL Mass/ Core BX Tissue LUNG BIOPSY RIGHT (SPECIFY SITE) SURGICAL   PATHOLOGY EXAM Meera CAZARES MD 10/11/2024 1144   2 : EBBX/Right Lower Lobe Tissue TRANSBRONCHIAL BIOPSY SURGICAL PATHOLOGY   EXAM Meera CAZARES MD 10/11/2024 1148   A :  Non-Gynecologic Cytology LYMPH NODE 11 L PULMONARY FINE NEEDLE   ASPIRATION CYTOLOGY CONSULTATION (NON-GYNECOLOGIC) Meera CAZARES MD   10/11/2024 1109   B :  Non-Gynecologic Cytology LYMPH NODE 7 PULMONARY FINE NEEDLE   ASPIRATION CYTOLOGY CONSULTATION (NON-GYNECOLOGIC) Meera CAZARES MD   10/11/2024 1120   C :  Non-Gynecologic Cytology LUNG FINE NEEDLE ASPIRATION RIGHT LOWER LOBE   CYTOLOGY CONSULTATION (NON-GYNECOLOGIC) Meera CAZARES MD 10/11/2024   1129     Procedure Location  [facility]  [id number] [place] [id number]  [phone]    Referring Provider  Nara Colvin MD    Procedure Provider  Meera CAZARES MD      PE:   Constitutional: cachectic, NAD  HEENT: NCAT, EOMI  Cardio: RRR  Pulm: CTAB, on RA, no coughing  GI: soft, non-tender, nondistended, no guarding or rebound  Skin: No lesions, contusions, or erythema.  Extremities: no BLE swelling   Neuro: No focal deficits  Psych: Appropriate mood and  behavior    Relevant Results  Scheduled medications  [Held by provider] amLODIPine, 10 mg, oral, Nightly  ascorbic acid, 500 mg, oral, Daily  baclofen, 10 mg, oral, TID  cefTRIAXone, 1 g, intravenous, q24h  ferrous sulfate (325 mg ferrous sulfate), 65 mg of iron, oral, Daily with breakfast  heparin (porcine), 5,000 Units, subcutaneous, q8h  lactulose, 10 g, oral, Once  [START ON 10/13/2024] lactulose, 30 g, oral, Daily  lidocaine, 1 patch, transdermal, Daily  [Held by provider] losartan, 50 mg, oral, Daily  multivitamin with minerals, 1 tablet, oral, Daily  nicotine, 1 patch, transdermal, Daily  pantoprazole, 40 mg, oral, BID  polyethylene glycol, 17 g, oral, Daily  sucralfate, 1 g, oral, q6h [place]      Continuous medications     PRN medications  PRN medications: acetaminophen **OR** [DISCONTINUED] acetaminophen **OR** [DISCONTINUED] acetaminophen, butalbital-acetaminophen-caff, lubricating eye drops     Results for orders placed or performed during the hospital encounter of 10/10/24 (from the past 24 hour(s))   Urinalysis with Reflex Culture and Microscopic   Result Value Ref Range    Color, Urine Yellow Light-Yellow, Yellow, Dark-Yellow    Appearance, Urine Turbid (N) Clear    Specific Gravity, Urine 1.014 1.005 - 1.035    pH, Urine 6.5 5.0, 5.5, 6.0, 6.5, 7.0, 7.5, 8.0    Protein, Urine 50 (1+) (A) NEGATIVE, 10 (TRACE), 20 (TRACE) mg/dL    Glucose, Urine Normal Normal mg/dL    Blood, Urine 0.5 (2+) (A) NEGATIVE    Ketones, Urine NEGATIVE NEGATIVE mg/dL    Bilirubin, Urine NEGATIVE NEGATIVE    Urobilinogen, Urine Normal Normal mg/dL    Nitrite, Urine 2+ (A) NEGATIVE    Leukocyte Esterase, Urine 500 [place]/µL (A) NEGATIVE   Extra Urine Gray Tube   Result Value Ref Range    Extra Tube Hold for add-ons.    Microscopic Only, Urine   Result Value Ref Range    WBC, Urine >50 (A) 1-5, NONE /HPF    WBC Clumps, Urine RARE Reference range not established. /HPF    RBC, Urine >20 (A) NONE, 1-2, 3-5 /HPF    Mucus, Urine FEW  Reference range not established. /LPF   Urine Culture    Specimen: Clean Catch/Voided; Urine   Result Value Ref Range    Urine Culture >100,000 Enteric bacilli (A)    Renal function panel   Result Value Ref Range    Glucose 85 74 - 99 mg/dL    Sodium 135 (L) 136 - 145 mmol/L    Potassium 4.3 3.5 - 5.3 mmol/L    Chloride 97 (L) 98 - 107 mmol/L    Bicarbonate 29 21 - 32 mmol/L    Anion Gap 13 10 - 20 mmol/L    Urea Nitrogen 9 6 - 23 mg/dL    Creatinine 0.50 0.50 - 1.05 mg/dL    eGFR >90 >60 mL/min/1.73m*2    Calcium 8.9 8.6 - 10.6 mg/dL    Phosphorus 3.1 2.5 - 4.9 mg/dL    Albumin 3.4 3.4 - 5.0 g/dL   Magnesium   Result Value Ref Range    Magnesium 1.80 1.60 - 2.40 mg/dL   CBC   Result Value Ref Range    WBC 8.2 4.4 - 11.3 x10*3/uL    nRBC 0.0 0.0 - 0.0 /100 WBCs    RBC 4.09 4.00 - 5.20 x10*6/uL    Hemoglobin 12.1 12.0 - 16.0 g/dL    Hematocrit 38.2 36.0 - 46.0 %    MCV 93 80 - 100 fL    MCH 29.6 26.0 - 34.0 pg    MCHC 31.7 (L) 32.0 - 36.0 g/dL    RDW 15.2 (H) 11.5 - 14.5 %    Platelets 304 150 - 450 x10*3/uL   Magnesium   Result Value Ref Range    Magnesium 1.96 1.60 - 2.40 mg/dL   Hepatic function panel   Result Value Ref Range    Albumin 3.3 (L) 3.4 - 5.0 g/dL    Bilirubin, Total 0.3 0.0 - 1.2 mg/dL    Bilirubin, Direct 0.1 0.0 - 0.3 mg/dL    Alkaline Phosphatase 75 33 - 136 U/L    ALT 9 7 - 45 U/L    AST 13 9 - 39 U/L    Total Protein 6.0 (L) 6.4 - 8.2 g/dL   Phosphorus   Result Value Ref Range    Phosphorus 3.1 2.5 - 4.9 mg/dL   Basic Metabolic Panel   Result Value Ref Range    Glucose 94 74 - 99 mg/dL    Sodium 134 (L) 136 - 145 mmol/L    Potassium 4.3 3.5 - 5.3 mmol/L    Chloride 98 98 - 107 mmol/L    Bicarbonate 27 21 - 32 mmol/L    Anion Gap 13 10 - 20 mmol/L    Urea Nitrogen 9 6 - 23 mg/dL    Creatinine 0.47 (L) 0.50 - 1.05 mg/dL    eGFR >90 >60 mL/min/1.73m*2    Calcium 9.0 8.6 - 10.6 mg/dL      Assessment/Plan        Assessment & Plan  Lung mass        Malnutrition Diagnosis Status: New  Malnutrition  Diagnosis: Severe malnutrition related to chronic disease or condition  As Evidenced by: severe fat loss, severe muscle wasting, 13.3% wt loss x 4.5 months and BMI 13.5  I agree with the dietitian's malnutrition diagnosis.    Karly Horton is a 79 y.o. female presenting as a transfer from [facility] with new findings of R lung mass. Pt has a PMHx of COPD, >60 pack year smoking history, Peptic ulcer disease, anemia, and chornic headaches.  Pt was initially admitted to [facility] [place], operated by [facility] on 10/5 with AMS after her daughter found her unresponsive on the floor.  Pt is now A&Ox4. Imaging at [facility] [place], operated by [facility] revealed RUL mass and pt transferred to  for for interventional pulmonology bronchoscopy and mediastinal staging of lung mass.      Updates 10/12/24  -S/p bronch with EBUS on 10/11, path/cytology pending, also noted to have secretions  -On Ceftriaxone given pulm secretions, urine culture growing E. Coli (no symptoms), leukocytosis 17     #RUL and hilar mass concerning for malignancy  - CTAP revealed a large 3.1 cm lower pole mass right hilum in a 2.2 cm spiculated mass apicoposterior segment right upper lobe  - Pulmonology consultation for bronchoscopy, deferred 10/10 by anesthesia for concerns of pulmonary HTN on Echo 5/16/24, RVSP 64.3 mmHg. Repeat Echo 10/10/24 - RVSP 31.1 mmHg   - S/p bronch with EBUS on 10/11  - F/up pathology and cytology     #Asymptomatic bacteriuria   #Leukocytosis  ::Ucx 10/4 at [facility] no growth  ::UA 10/9 - 3+ blood, 2+ nitrite, 500 leuk est, 1+ bacteria, Ucx E coli pan susceptible   ::UA 10/11 (sent per pt request), culture pending  -Initially deferred treatment given asymptomatic but given significant leukocytosis, started Ceftriaxone (10/11-)      #Hepatic lesions  -Likely metastatic i/s/o RUL mass  -CTAP: multiple masses identified within the right and left  lobes of the liver more numerous and larger on the right  -AST-24, ALT-16, AlkPhos-96 WNL  -Await results of tissue diagnosis and proceed  with staging if malignancy is present     #HTN  -Hold home Losartan 50mg and Amodipine 10mg  -Hold inpatient due to low pressures 90's/60's  -CTM and restart as tolerated     #Constipation  -Pt endorses baseline constipation but endorses having BM within the last few days  -Continue home Lactulose and Miralax      #Tobacco Abuse  -1PPD since age 15  -Nicotine replacement patch while inpatient  -Patient not interested in cessation     #PUD  -EGD 5/24: Mild erythematous mucosa in the antrum and prepyloric region and Single ulcer in the duodenal bulb with oozing hemorrhage   -Continue home Pantoprazole 40mg BID     #Muscle aches  #TMJ  -Pt reports this is chronic for her and she takes Baclofen, Tylenol, and Butalbital to keep the symptoms at bay  -When medications don't help pt self-medicates with MJ  -Continue home medications inpatient      #Anemia  -Hgb 12.7 on admission - at baseline  -Continue ferrous sulfate 325mg daily  -CTM     #Malnutrition  -Pt reports while in rehab this summer she lost weight due to 'bad' food   -Denies any changes to her appetite and reports it is 'very good'  -BMI on admission 13.53  -Vit D 27  -Nutrition consulted - started multivitamin, Ensures with protein        Fluids: Replete PRN  Electrolytes: Keep mg >2, phos >3  and K >4  Nutrition:  minced (edentulous, per pt request)   Antimicrobials: CTX (10/11-)   DVT PPX: Heparin subq  GI ppx: Pantoprazole   Lines:PIV  Oxygen:Room Air     Pain regimen: Lidocaine patch, PRN tylenol   Bowel care: Miralax and lactulose     Code Status: Full Code (confirmed on admission)   NOK:  Primary Emergency Contact: Karly Pro, Home Phone: [phone]       Zohra Ibarra MD

## 2024-10-12 NOTE — CARE PLAN
The patient's goals for the shift include pt. will have decreased pain to posterior neck.    The clinical goals for the shift include Patient will be pain free and maintained safety    Over the shift, the patient did not make progress toward the following goals. Barriers to progression include:  Problem: Pain - Adult  Goal: Verbalizes/displays adequate comfort level or baseline comfort level  Outcome: Progressing     Problem: Chronic Conditions and Co-morbidities  Goal: Patient's chronic conditions and co-morbidity symptoms are monitored and maintained or improved  Outcome: Progressing     Problem: Skin  Goal: Participates in plan/prevention/treatment measures  Outcome: Progressing     Problem: Skin  Goal: Prevent/manage excess moisture  Outcome: Progressing     Problem: Skin  Goal: Prevent/minimize sheer/friction injuries  Outcome: Progressing

## 2024-10-12 NOTE — CARE PLAN
The patient's goals for the shift include pt. will have decreased pain to posterior neck.    The clinical goals for the shift include Patient will be pain free and maintained safety    Over the shift, the patient did not make progress toward the following goals. Barriers to progression include  Problem: Pain - Adult  Goal: Verbalizes/displays adequate comfort level or baseline comfort level  10/12/2024 1229 by Pinky Boone RN  Outcome: Progressing  10/12/2024 1149 by Pinky Boone RN  Outcome: Progressing     Problem: Safety - Adult  Goal: Free from fall injury  10/12/2024 1229 by Pinky Boone RN  Outcome: Progressing  10/12/2024 1149 by Pinky Boone RN  Outcome: Progressing     Problem: Chronic Conditions and Co-morbidities  Goal: Patient's chronic conditions and co-morbidity symptoms are monitored and maintained or improved  10/12/2024 1229 by Pinky Boone RN  Outcome: Progressing  10/12/2024 1149 by Pinky Boone RN  Outcome: Progressing     Problem: Skin  Goal: Decreased wound size/increased tissue granulation at next dressing change  10/12/2024 1229 by Pinky Boone RN  Outcome: Progressing  10/12/2024 1149 by Pinky Boone RN  Outcome: Progressing     Problem: Skin  Goal: Participates in plan/prevention/treatment measures  10/12/2024 1229 by Pinky Boone RN  Outcome: Progressing  10/12/2024 1149 by Pinky Boone RN  Outcome: Progressing     Problem: Skin  Goal: Prevent/manage excess moisture  10/12/2024 1229 by Pinky Boone RN  Outcome: Progressing  10/12/2024 1149 by Pinky Boone RN  Outcome: Progressing     Problem: Skin  Goal: Prevent/minimize sheer/friction injuries  10/12/2024 1229 by Pinky Boone RN  Outcome: Progressing  10/12/2024 1149 by Pinky Boone RN  Outcome: Progressing     Problem: Skin  Goal: Promote/optimize nutrition  10/12/2024 1229 by Pinky Boone RN  Outcome: Progressing  10/12/2024 1149 by Pinky Boone RN  Outcome: Progressing     Problem:  Skin  Goal: Promote skin healing  10/12/2024 1229 by Pinky Boone RN  Outcome: Progressing  10/12/2024 1149 by Pinky Boone RN  Outcome: Progressing     Problem: Fall/Injury  Goal: Not fall by end of shift  10/12/2024 1229 by Pinky Boone RN  Outcome: Progressing  10/12/2024 1149 by Pinky Boone RN  Outcome: Progressing     Problem: Fall/Injury  Goal: Be free from injury by end of the shift  10/12/2024 1229 by Pinky Boone RN  Outcome: Progressing  10/12/2024 1149 by Pinky Boone RN  Outcome: Progressing     Problem: Fall/Injury  Goal: Verbalize understanding of personal risk factors for fall in the hospital  10/12/2024 1229 by Pinky Boone RN  Outcome: Progressing  10/12/2024 1149 by Pinky Boone RN  Outcome: Progressing     Problem: Fall/Injury  Goal: Verbalize understanding of personal risk factors for fall in the hospital  10/12/2024 1149 by Pinky Boone RN  Outcome: Progressing

## 2024-10-13 VITALS
BODY MASS INDEX: 13.6 KG/M2 | HEIGHT: 60 IN | DIASTOLIC BLOOD PRESSURE: 59 MMHG | WEIGHT: 69.3 LBS | OXYGEN SATURATION: 96 % | RESPIRATION RATE: 16 BRPM | HEART RATE: 93 BPM | TEMPERATURE: 99.1 F | SYSTOLIC BLOOD PRESSURE: 93 MMHG

## 2024-10-13 LAB
ALBUMIN SERPL BCP-MCNC: 3.3 G/DL (ref 3.4–5)
ALP SERPL-CCNC: 77 U/L (ref 33–136)
ALT SERPL W P-5'-P-CCNC: 8 U/L (ref 7–45)
ANION GAP SERPL CALC-SCNC: 13 MMOL/L (ref 10–20)
AST SERPL W P-5'-P-CCNC: 15 U/L (ref 9–39)
BACTERIA BLD CULT: NORMAL
BACTERIA BLD CULT: NORMAL
BACTERIA UR CULT: ABNORMAL
BASOPHILS # BLD AUTO: 0.06 X10*3/UL (ref 0–0.1)
BASOPHILS NFR BLD AUTO: 1 %
BILIRUB DIRECT SERPL-MCNC: 0 MG/DL (ref 0–0.3)
BILIRUB SERPL-MCNC: 0.2 MG/DL (ref 0–1.2)
BUN SERPL-MCNC: 7 MG/DL (ref 6–23)
CALCIUM SERPL-MCNC: 8.9 MG/DL (ref 8.6–10.6)
CHLORIDE SERPL-SCNC: 96 MMOL/L (ref 98–107)
CO2 SERPL-SCNC: 30 MMOL/L (ref 21–32)
CREAT SERPL-MCNC: 0.42 MG/DL (ref 0.5–1.05)
EGFRCR SERPLBLD CKD-EPI 2021: >90 ML/MIN/1.73M*2
EOSINOPHIL # BLD AUTO: 0.15 X10*3/UL (ref 0–0.4)
EOSINOPHIL NFR BLD AUTO: 2.6 %
ERYTHROCYTE [DISTWIDTH] IN BLOOD BY AUTOMATED COUNT: 15 % (ref 11.5–14.5)
GLUCOSE SERPL-MCNC: 106 MG/DL (ref 74–99)
HCT VFR BLD AUTO: 38 % (ref 36–46)
HGB BLD-MCNC: 11.9 G/DL (ref 12–16)
IMM GRANULOCYTES # BLD AUTO: 0.13 X10*3/UL (ref 0–0.5)
IMM GRANULOCYTES NFR BLD AUTO: 2.2 % (ref 0–0.9)
LYMPHOCYTES # BLD AUTO: 1.52 X10*3/UL (ref 0.8–3)
LYMPHOCYTES NFR BLD AUTO: 26.2 %
MAGNESIUM SERPL-MCNC: 1.89 MG/DL (ref 1.6–2.4)
MCH RBC QN AUTO: 28.9 PG (ref 26–34)
MCHC RBC AUTO-ENTMCNC: 31.3 G/DL (ref 32–36)
MCV RBC AUTO: 92 FL (ref 80–100)
MONOCYTES # BLD AUTO: 0.82 X10*3/UL (ref 0.05–0.8)
MONOCYTES NFR BLD AUTO: 14.1 %
NEUTROPHILS # BLD AUTO: 3.12 X10*3/UL (ref 1.6–5.5)
NEUTROPHILS NFR BLD AUTO: 53.9 %
NRBC BLD-RTO: 0 /100 WBCS (ref 0–0)
PHOSPHATE SERPL-MCNC: 2.7 MG/DL (ref 2.5–4.9)
PLATELET # BLD AUTO: 309 X10*3/UL (ref 150–450)
POTASSIUM SERPL-SCNC: 4.4 MMOL/L (ref 3.5–5.3)
PROT SERPL-MCNC: 6.1 G/DL (ref 6.4–8.2)
RBC # BLD AUTO: 4.12 X10*6/UL (ref 4–5.2)
SODIUM SERPL-SCNC: 135 MMOL/L (ref 136–145)
WBC # BLD AUTO: 5.8 X10*3/UL (ref 4.4–11.3)

## 2024-10-13 PROCEDURE — 99232 SBSQ HOSP IP/OBS MODERATE 35: CPT

## 2024-10-13 PROCEDURE — 2500000002 HC RX 250 W HCPCS SELF ADMINISTERED DRUGS (ALT 637 FOR MEDICARE OP, ALT 636 FOR OP/ED)

## 2024-10-13 PROCEDURE — 84100 ASSAY OF PHOSPHORUS: CPT

## 2024-10-13 PROCEDURE — 80053 COMPREHEN METABOLIC PANEL: CPT

## 2024-10-13 PROCEDURE — 83735 ASSAY OF MAGNESIUM: CPT

## 2024-10-13 PROCEDURE — 2500000001 HC RX 250 WO HCPCS SELF ADMINISTERED DRUGS (ALT 637 FOR MEDICARE OP)

## 2024-10-13 PROCEDURE — S4991 NICOTINE PATCH NONLEGEND: HCPCS

## 2024-10-13 PROCEDURE — 36415 COLL VENOUS BLD VENIPUNCTURE: CPT

## 2024-10-13 PROCEDURE — 1210000001 HC SEMI-PRIVATE ROOM DAILY

## 2024-10-13 PROCEDURE — 2500000004 HC RX 250 GENERAL PHARMACY W/ HCPCS (ALT 636 FOR OP/ED)

## 2024-10-13 PROCEDURE — 85025 COMPLETE CBC W/AUTO DIFF WBC: CPT

## 2024-10-13 RX ORDER — LIDOCAINE 40 MG/G
CREAM TOPICAL DAILY PRN
Status: DISCONTINUED | OUTPATIENT
Start: 2024-10-13 | End: 2024-10-20 | Stop reason: HOSPADM

## 2024-10-13 RX ORDER — BACLOFEN 10 MG/1
10 TABLET ORAL 4 TIMES DAILY
Status: DISCONTINUED | OUTPATIENT
Start: 2024-10-13 | End: 2024-10-14

## 2024-10-13 RX ORDER — BACLOFEN 10 MG/1
10 TABLET ORAL 4 TIMES DAILY
Status: DISCONTINUED | OUTPATIENT
Start: 2024-10-13 | End: 2024-10-13

## 2024-10-13 RX ORDER — NITROFURANTOIN 25; 75 MG/1; MG/1
100 CAPSULE ORAL 2 TIMES DAILY
Status: COMPLETED | OUTPATIENT
Start: 2024-10-13 | End: 2024-10-16

## 2024-10-13 RX ORDER — DICLOFENAC SODIUM 10 MG/G
4 GEL TOPICAL 4 TIMES DAILY PRN
Status: DISCONTINUED | OUTPATIENT
Start: 2024-10-13 | End: 2024-10-13

## 2024-10-13 ASSESSMENT — COGNITIVE AND FUNCTIONAL STATUS - GENERAL
MOBILITY SCORE: 18
PERSONAL GROOMING: A LITTLE
EATING MEALS: A LITTLE
PERSONAL GROOMING: A LITTLE
DAILY ACTIVITIY SCORE: 18
EATING MEALS: A LITTLE
DRESSING REGULAR UPPER BODY CLOTHING: A LITTLE
DRESSING REGULAR LOWER BODY CLOTHING: A LITTLE
WALKING IN HOSPITAL ROOM: A LITTLE
DRESSING REGULAR LOWER BODY CLOTHING: A LITTLE
HELP NEEDED FOR BATHING: A LITTLE
DAILY ACTIVITIY SCORE: 18
STANDING UP FROM CHAIR USING ARMS: A LITTLE
STANDING UP FROM CHAIR USING ARMS: A LITTLE
TURNING FROM BACK TO SIDE WHILE IN FLAT BAD: A LITTLE
TOILETING: A LITTLE
MOVING TO AND FROM BED TO CHAIR: A LITTLE
CLIMB 3 TO 5 STEPS WITH RAILING: A LITTLE
MOVING FROM LYING ON BACK TO SITTING ON SIDE OF FLAT BED WITH BEDRAILS: A LITTLE
CLIMB 3 TO 5 STEPS WITH RAILING: A LITTLE
TOILETING: A LITTLE
TURNING FROM BACK TO SIDE WHILE IN FLAT BAD: A LITTLE
MOBILITY SCORE: 18
HELP NEEDED FOR BATHING: A LITTLE
MOVING TO AND FROM BED TO CHAIR: A LITTLE
MOVING FROM LYING ON BACK TO SITTING ON SIDE OF FLAT BED WITH BEDRAILS: A LITTLE
WALKING IN HOSPITAL ROOM: A LITTLE
DRESSING REGULAR UPPER BODY CLOTHING: A LITTLE

## 2024-10-13 ASSESSMENT — PAIN DESCRIPTION - DESCRIPTORS
DESCRIPTORS: ACHING
DESCRIPTORS: ACHING

## 2024-10-13 ASSESSMENT — PAIN SCALES - GENERAL
PAINLEVEL_OUTOF10: 6
PAINLEVEL_OUTOF10: 0 - NO PAIN
PAINLEVEL_OUTOF10: 1
PAINLEVEL_OUTOF10: 1
PAINLEVEL_OUTOF10: 0 - NO PAIN
PAINLEVEL_OUTOF10: 5 - MODERATE PAIN
PAINLEVEL_OUTOF10: 6
PAINLEVEL_OUTOF10: 6

## 2024-10-13 ASSESSMENT — PAIN - FUNCTIONAL ASSESSMENT
PAIN_FUNCTIONAL_ASSESSMENT: 0-10

## 2024-10-13 NOTE — PROGRESS NOTES
Karly Horton is a 79 y.o. female on day 3 of admission presenting with Lung mass.      Subjective   No acute events overnight. Patient endorsing neck pain, gave lidocaine cream. Denies fevers, cough, dyspnea, dysuria, urinary frequency.        Objective     Last Recorded Vitals  /73   Pulse 78   Temp 36.6 °C (97.9 °F)   Resp 18   Wt (!) 31.4 kg (69 lb 4.8 oz)   SpO2 95%   Intake/Output last 3 Shifts:    Intake/Output Summary (Last 24 hours) at 10/13/2024 1204  Last data filed at 10/13/2024 0537  Gross per 24 hour   Intake 270 ml   Output 550 ml   Net -280 ml       Admission Weight  Weight: (!) 31.4 kg (69 lb 4.8 oz) (10/10/24 0200)    Daily Weight  10/10/24 : (!) 31.4 kg (69 lb 4.8 oz)    Results for orders placed or performed during the hospital encounter of 10/10/24 (from the past 24 hour(s))   Magnesium   Result Value Ref Range    Magnesium 1.89 1.60 - 2.40 mg/dL   Hepatic function panel   Result Value Ref Range    Albumin 3.3 (L) 3.4 - 5.0 g/dL    Bilirubin, Total 0.2 0.0 - 1.2 mg/dL    Bilirubin, Direct 0.0 0.0 - 0.3 mg/dL    Alkaline Phosphatase 77 33 - 136 U/L    ALT 8 7 - 45 U/L    AST 15 9 - 39 U/L    Total Protein 6.1 (L) 6.4 - 8.2 g/dL   CBC and Auto Differential   Result Value Ref Range    WBC 5.8 4.4 - 11.3 x10*3/uL    nRBC 0.0 0.0 - 0.0 /100 WBCs    RBC 4.12 4.00 - 5.20 x10*6/uL    Hemoglobin 11.9 (L) 12.0 - 16.0 g/dL    Hematocrit 38.0 36.0 - 46.0 %    MCV 92 80 - 100 fL    MCH 28.9 26.0 - 34.0 pg    MCHC 31.3 (L) 32.0 - 36.0 g/dL    RDW 15.0 (H) 11.5 - 14.5 %    Platelets 309 150 - 450 x10*3/uL    Neutrophils % 53.9 40.0 - 80.0 %    Immature Granulocytes %, Automated 2.2 (H) 0.0 - 0.9 %    Lymphocytes % 26.2 13.0 - 44.0 %    Monocytes % 14.1 2.0 - 10.0 %    Eosinophils % 2.6 0.0 - 6.0 %    Basophils % 1.0 0.0 - 2.0 %    Neutrophils Absolute 3.12 1.60 - 5.50 x10*3/uL    Immature Granulocytes Absolute, Automated 0.13 0.00 - 0.50 x10*3/uL    Lymphocytes Absolute 1.52 0.80 - 3.00 x10*3/uL     Monocytes Absolute 0.82 (H) 0.05 - 0.80 x10*3/uL    Eosinophils Absolute 0.15 0.00 - 0.40 x10*3/uL    Basophils Absolute 0.06 0.00 - 0.10 x10*3/uL   Phosphorus   Result Value Ref Range    Phosphorus 2.7 2.5 - 4.9 mg/dL   Basic Metabolic Panel   Result Value Ref Range    Glucose 106 (H) 74 - 99 mg/dL    Sodium 135 (L) 136 - 145 mmol/L    Potassium 4.4 3.5 - 5.3 mmol/L    Chloride 96 (L) 98 - 107 mmol/L    Bicarbonate 30 21 - 32 mmol/L    Anion Gap 13 10 - 20 mmol/L    Urea Nitrogen 7 6 - 23 mg/dL    Creatinine 0.42 (L) 0.50 - 1.05 mg/dL    eGFR >90 >60 mL/min/1.73m*2    Calcium 8.9 8.6 - 10.6 mg/dL       Susceptibility data from last 90 days.  Collected Specimen Info Organism Amoxicillin/Clavulanate Ampicillin Ampicillin/Sulbactam Cefazolin Cefazolin (uncomplicated UTIs only) Ciprofloxacin Gentamicin Nitrofurantoin Piperacillin/Tazobactam Trimethoprim/Sulfamethoxazole   10/11/24 Urine from Clean Catch/Voided Escherichia coli   S   S  S  S  S  S  S  S   10/09/24 Urine from Clean Catch/Voided Escherichia coli   S   S  S  S  S  S  S  S   09/03/24 Blood culture from Peripheral Venipuncture Staphylococcus epidermidis             09/02/24 Urine from Clean Catch/Voided Escherichia coli  I  R  R  S  S  R  S  S  S  S       Image Results  XR chest 1 view  Narrative: Interpreted By:  Jaime Farah,   STUDY:  XR CHEST 1 VIEW      INDICATION:  Signs/Symptoms:c/f infxn.      COMPARISON:  September 2, 2024      ACCESSION NUMBER(S):  [id number]      ORDERING CLINICIAN:  LYNNE WADE      FINDINGS:  No consolidation, effusion, edema, or pneumothorax. Markedly  atherosclerotic aorta unchanged.      Impression: No evidence of acute intrathoracic abnormality.      Signed by: Jaime Farah 10/12/2024 8:23 AM  Dictation workstation:   [id number]      Physical exam:  Constitutional: cachectic, NAD  HEENT: NCAT, EOMI  Cardio: RRR  Pulm: CTAB, on RA, no coughing  GI: soft, non-tender, nondistended, no guarding or rebound  Skin: No  lesions, contusions, or erythema.  Extremities: no BLE swelling   Neuro: No focal deficits  Psych: Appropriate mood and behavior    Relevant Results  Scheduled medications  [Held by provider] amLODIPine, 10 mg, oral, Nightly  ascorbic acid, 500 mg, oral, Daily  baclofen, 10 mg, oral, 4x daily  cefTRIAXone, 1 g, intravenous, q24h  ferrous sulfate (325 mg ferrous sulfate), 65 mg of iron, oral, Daily with breakfast  heparin (porcine), 5,000 Units, subcutaneous, q8h  lactulose, 30 g, oral, Daily  [Held by provider] losartan, 50 mg, oral, Daily  multivitamin with minerals, 1 tablet, oral, Daily  nicotine, 1 patch, transdermal, Daily  nitrofurantoin (macrocrystal-monohydrate), 100 mg, oral, BID  pantoprazole, 40 mg, oral, BID  polyethylene glycol, 17 g, oral, Daily  sucralfate, 1 g, oral, q6h [place]      Continuous medications     PRN medications  PRN medications: acetaminophen **OR** [DISCONTINUED] acetaminophen **OR** [DISCONTINUED] acetaminophen, butalbital-acetaminophen-caff, lidocaine, lubricating eye drops      Assessment/Plan      Karly Horton is a 79 y.o. female presenting as a transfer from [facility] with new findings of R lung mass. Pt has a PMHx of COPD, >60 pack year smoking history, Peptic ulcer disease, anemia, and chornic headaches.  Pt was initially admitted to [facility] on 10/5 with AMS after her daughter found her unresponsive on the floor.  Pt is now A&Ox4. Imaging at [facility] revealed RUL mass and pt transferred to  for for interventional pulmonology bronchoscopy and mediastinal staging of lung mass. EBUS w biopsy completed 10/11.     Updates 10/13/24  -S/p bronch with EBUS on 10/11, path/cytology pending  -Pulmonology signed off, plan for outpatient follow up   -Ucx 10/11 - pansensitive E. Coli, Ucx 10/9 pansensitive E. Coli. Patient still asymptomatic, leukocytosis resolved.   -On CTX (10/11 - 10/12) switch to PO nitrofurantoin (10/13 - )   -PT consulted     #RUL and hilar mass concerning for  malignancy  - CTAP revealed a large 3.1 cm lower pole mass right hilum in a 2.2 cm spiculated mass apicoposterior segment right upper lobe  - Pulmonology consultation for bronchoscopy, deferred 10/10 by anesthesia for concerns of pulmonary HTN on Echo 5/16/24, RVSP 64.3 mmHg. Repeat Echo 10/10/24 - RVSP 31.1 mmHg   - S/p bronch with EBUS on 10/11, follow up pathology and cytology     #Asymptomatic bacteriuria   #Leukocytosis, resolved  ::Ucx 10/4 at [facility] no growth  ::UA 10/9 - 3+ blood, 2+ nitrite, 500 leuk est, 1+ bacteria, Ucx E coli pan susceptible   -Initially deferred treatment given asymptomatic but given significant leukocytosis, started Ceftriaxone (10/11-)   -Ucx 10/11 - preliminary Enteric bacilli     #Hepatic lesions  -Likely metastatic i/s/o RUL mass  -CTAP: multiple masses identified within the right and left  lobes of the liver more numerous and larger on the right  -AST-24, ALT-16, AlkPhos-96 WNL  -Await results of tissue diagnosis and proceed with staging if malignancy is present     #HTN  -Hold home Losartan 50mg and Amodipine 10mg  -Hold inpatient due to low pressures 90's/60's  -CTM and restart as tolerated     #Constipation  -Pt endorses baseline constipation but endorses having BM within the last few days  -Continue home Lactulose and Miralax      #Tobacco Abuse  -1PPD since age 15  -Nicotine replacement patch while inpatient  -Patient not interested in cessation     #PUD  -EGD 5/24: Mild erythematous mucosa in the antrum and prepyloric region and Single ulcer in the duodenal bulb with oozing hemorrhage   -Continue home Pantoprazole 40mg BID     #Muscle aches  #TMJ  -Pt reports this is chronic for her and she takes Baclofen, Tylenol, and Butalbital to keep the symptoms at bay  -When medications don't help pt self-medicates with MJ  -Continue home medications inpatient      #Anemia  -Hgb 12.7 on admission - at baseline  -Continue ferrous sulfate 325mg daily  -CTM     #Malnutrition  -Pt  reports while in rehab this summer she lost weight due to 'bad' food   -Denies any changes to her appetite and reports it is 'very good'  -BMI on admission 13.53  -Vit D 27  -Nutrition consulted - started multivitamin, Ensures with protein        Fluids: Replete PRN  Electrolytes: Keep mg >2, phos >3  and K >4  Nutrition:  minced (edentulous, per pt request)   Antimicrobials: CTX (10/11-)   DVT PPX: Heparin subq  GI ppx: Pantoprazole   Lines: PIV  Oxygen:Room Air     Pain regimen: Lidocaine patch and gel, PRN tylenol   Bowel care: Miralax and lactulose     Code Status: Full Code (confirmed on admission)   NOK:  Karly Pro, [phone]       Jackie Pedraza MD  Internal Medicine PGY-1

## 2024-10-13 NOTE — CARE PLAN
The patient's goals for the shift include pt. will have decreased pain to posterior neck.    The clinical goals for the shift include Pt pain to be decreased this shift      Problem: Pain - Adult  Goal: Verbalizes/displays adequate comfort level or baseline comfort level  Outcome: Progressing     Problem: Safety - Adult  Goal: Free from fall injury  Outcome: Progressing     Problem: Skin  Goal: Decreased wound size/increased tissue granulation at next dressing change  Outcome: Progressing  Flowsheets (Taken 10/10/2024 0716 by Lissette Live RN)  Decreased wound size/increased tissue granulation at next dressing change: Protective dressings over bony prominences  Goal: Participates in plan/prevention/treatment measures  Outcome: Progressing  Flowsheets (Taken 10/12/2024 1324 by Pinky Boone RN)  Participates in plan/prevention/treatment measures: Elevate heels  Goal: Prevent/manage excess moisture  Outcome: Progressing  Flowsheets (Taken 10/13/2024 1448)  Prevent/manage excess moisture: Cleanse incontinence/protect with barrier cream  Goal: Prevent/minimize sheer/friction injuries  Outcome: Progressing  Flowsheets (Taken 10/13/2024 1448)  Prevent/minimize sheer/friction injuries: HOB 30 degrees or less  Goal: Promote/optimize nutrition  Outcome: Progressing  Flowsheets (Taken 10/13/2024 1448)  Promote/optimize nutrition: Monitor/record intake including meals  Goal: Promote skin healing  Outcome: Progressing  Flowsheets (Taken 10/13/2024 1448)  Promote skin healing: Assess skin/pad under line(s)/device(s)

## 2024-10-13 NOTE — PROGRESS NOTES
Received update from MD that patient stating she is having difficulty with transport to appointments. CHW/SW notified via email to provide resources.   Tabatha BUTLER, RN  Transitional Care Coordinator (TCC)  [phone]

## 2024-10-14 ENCOUNTER — DOCUMENTATION (OUTPATIENT)
Dept: CASE MANAGEMENT | Facility: HOSPITAL | Age: 79
End: 2024-10-14

## 2024-10-14 ENCOUNTER — DOCUMENTATION (OUTPATIENT)
Dept: HOME HEALTH SERVICES | Facility: HOME HEALTH | Age: 79
End: 2024-10-14

## 2024-10-14 LAB — GLUCOSE BLD MANUAL STRIP-MCNC: 131 MG/DL (ref 74–99)

## 2024-10-14 PROCEDURE — 97161 PT EVAL LOW COMPLEX 20 MIN: CPT | Mod: GP

## 2024-10-14 PROCEDURE — S4991 NICOTINE PATCH NONLEGEND: HCPCS

## 2024-10-14 PROCEDURE — 1210000001 HC SEMI-PRIVATE ROOM DAILY

## 2024-10-14 PROCEDURE — 2500000001 HC RX 250 WO HCPCS SELF ADMINISTERED DRUGS (ALT 637 FOR MEDICARE OP)

## 2024-10-14 PROCEDURE — 2500000004 HC RX 250 GENERAL PHARMACY W/ HCPCS (ALT 636 FOR OP/ED)

## 2024-10-14 PROCEDURE — 82947 ASSAY GLUCOSE BLOOD QUANT: CPT

## 2024-10-14 PROCEDURE — 97165 OT EVAL LOW COMPLEX 30 MIN: CPT | Mod: GO

## 2024-10-14 PROCEDURE — 99232 SBSQ HOSP IP/OBS MODERATE 35: CPT

## 2024-10-14 PROCEDURE — 2500000002 HC RX 250 W HCPCS SELF ADMINISTERED DRUGS (ALT 637 FOR MEDICARE OP, ALT 636 FOR OP/ED)

## 2024-10-14 RX ORDER — BACLOFEN 10 MG/1
10 TABLET ORAL EVERY 6 HOURS
Status: DISCONTINUED | OUTPATIENT
Start: 2024-10-14 | End: 2024-10-20 | Stop reason: HOSPADM

## 2024-10-14 ASSESSMENT — PAIN SCALES - GENERAL
PAINLEVEL_OUTOF10: 0 - NO PAIN
PAINLEVEL_OUTOF10: 2
PAINLEVEL_OUTOF10: 4
PAINLEVEL_OUTOF10: 5 - MODERATE PAIN
PAINLEVEL_OUTOF10: 6
PAINLEVEL_OUTOF10: 6
PAINLEVEL_OUTOF10: 2
PAINLEVEL_OUTOF10: 6
PAINLEVEL_OUTOF10: 6
PAINLEVEL_OUTOF10: 5 - MODERATE PAIN
PAINLEVEL_OUTOF10: 0 - NO PAIN
PAINLEVEL_OUTOF10: 3
PAINLEVEL_OUTOF10: 6

## 2024-10-14 ASSESSMENT — COGNITIVE AND FUNCTIONAL STATUS - GENERAL
MOVING TO AND FROM BED TO CHAIR: A LITTLE
DRESSING REGULAR LOWER BODY CLOTHING: A LOT
DRESSING REGULAR UPPER BODY CLOTHING: A LITTLE
MOBILITY SCORE: 18
MOVING FROM LYING ON BACK TO SITTING ON SIDE OF FLAT BED WITH BEDRAILS: A LITTLE
TOILETING: A LOT
HELP NEEDED FOR BATHING: A LOT
WALKING IN HOSPITAL ROOM: A LITTLE
STANDING UP FROM CHAIR USING ARMS: A LITTLE
PERSONAL GROOMING: A LITTLE
DAILY ACTIVITIY SCORE: 16
CLIMB 3 TO 5 STEPS WITH RAILING: A LITTLE
TURNING FROM BACK TO SIDE WHILE IN FLAT BAD: A LITTLE

## 2024-10-14 ASSESSMENT — ACTIVITIES OF DAILY LIVING (ADL)
ADL_ASSISTANCE: NEEDS ASSISTANCE
ADL_ASSISTANCE: NEEDS ASSISTANCE
BATHING_ASSISTANCE: MODERATE

## 2024-10-14 ASSESSMENT — PAIN DESCRIPTION - DESCRIPTORS
DESCRIPTORS: ACHING
DESCRIPTORS: PRESSURE

## 2024-10-14 ASSESSMENT — PAIN DESCRIPTION - LOCATION: LOCATION: NECK

## 2024-10-14 ASSESSMENT — PAIN DESCRIPTION - ORIENTATION: ORIENTATION: POSTERIOR

## 2024-10-14 NOTE — HH CARE COORDINATION
Home Care received a Referral for Nursing, Physical Therapy, Occupational Therapy, and Home Health Aide. We have processed the referral for a Start of Care on 10/16-10/17.     If you have any questions or concerns, please feel free to contact us at [phone]. Follow the prompts, enter your five digit zip code, and you will be directed to your care team on [facility].

## 2024-10-14 NOTE — CARE PLAN
The patient's goals for the shift include pt. will have decreased pain to posterior neck.    The clinical goals for the shift include Pt pain to be ontrolld this shift    Problem: Pain - Adult  Goal: Verbalizes/displays adequate comfort level or baseline comfort level  Outcome: Progressing     Problem: Safety - Adult  Goal: Free from fall injury  Outcome: Progressing     Problem: Chronic Conditions and Co-morbidities  Goal: Patient's chronic conditions and co-morbidity symptoms are monitored and maintained or improved  Outcome: Progressing     Problem: Skin  Goal: Decreased wound size/increased tissue granulation at next dressing change  Outcome: Progressing  Goal: Participates in plan/prevention/treatment measures  Outcome: Progressing  Goal: Prevent/manage excess moisture  Outcome: Progressing  Goal: Prevent/minimize sheer/friction injuries  Outcome: Progressing  Goal: Promote/optimize nutrition  Outcome: Progressing  Goal: Promote skin healing  Outcome: Progressing

## 2024-10-14 NOTE — PROGRESS NOTES
Karly Horton is a 79 y.o. female on day 4 of admission presenting with Lung mass.      Subjective   No acute events overnight. Patient is not having any pain. Denies fevers, cough, dyspnea, dysuria, urinary frequency.        Objective     Last Recorded Vitals  /78   Pulse 74   Temp 36.9 °C (98.4 °F)   Resp 16   Wt (!) 31.4 kg (69 lb 4.8 oz)   SpO2 96%   Intake/Output last 3 Shifts:  No intake or output data in the 24 hours ending 10/14/24 1120      Admission Weight  Weight: (!) 31.4 kg (69 lb 4.8 oz) (10/10/24 0200)    Daily Weight  10/10/24 : (!) 31.4 kg (69 lb 4.8 oz)    No results found for this or any previous visit (from the past 24 hour(s)).      Susceptibility data from last 90 days.  Collected Specimen Info Organism Amoxicillin/Clavulanate Ampicillin Ampicillin/Sulbactam Cefazolin Cefazolin (uncomplicated UTIs only) Ciprofloxacin Gentamicin Nitrofurantoin Piperacillin/Tazobactam Trimethoprim/Sulfamethoxazole   10/11/24 Urine from Clean Catch/Voided Escherichia coli   S   S  S  S  S  S  S  S   10/09/24 Urine from Clean Catch/Voided Escherichia coli   S   S  S  S  S  S  S  S   09/03/24 Blood culture from Peripheral Venipuncture Staphylococcus epidermidis             09/02/24 Urine from Clean Catch/Voided Escherichia coli  I  R  R  S  S  R  S  S  S  S       Image Results  XR chest 1 view  Narrative: Interpreted By:  Jaime Farah,   STUDY:  XR CHEST 1 VIEW      INDICATION:  Signs/Symptoms:c/f infxn.      COMPARISON:  September 2, 2024      ACCESSION NUMBER(S):  [id number]      ORDERING CLINICIAN:  LYNNE WADE      FINDINGS:  No consolidation, effusion, edema, or pneumothorax. Markedly  atherosclerotic aorta unchanged.      Impression: No evidence of acute intrathoracic abnormality.      Signed by: Jaime Farah 10/12/2024 8:23 AM  Dictation workstation:   [id number]      Physical exam:  Constitutional: cachectic, NAD  HEENT: NCAT, EOMI  Cardio: RRR  Pulm: CTAB, on RA, no coughing  GI:  soft, non-tender, nondistended, no guarding or rebound  Skin: No lesions, contusions, or erythema.  Extremities: no BLE swelling   Neuro: No focal deficits  Psych: Appropriate mood and behavior    Relevant Results  Scheduled medications  [Held by provider] amLODIPine, 10 mg, oral, Nightly  ascorbic acid, 500 mg, oral, Daily  baclofen, 10 mg, oral, q6h  ferrous sulfate (325 mg ferrous sulfate), 65 mg of iron, oral, Daily with breakfast  heparin (porcine), 5,000 Units, subcutaneous, q8h  lactulose, 30 g, oral, Daily  [Held by provider] losartan, 50 mg, oral, Daily  multivitamin with minerals, 1 tablet, oral, Daily  nicotine, 1 patch, transdermal, Daily  nitrofurantoin (macrocrystal-monohydrate), 100 mg, oral, BID  pantoprazole, 40 mg, oral, BID  polyethylene glycol, 17 g, oral, Daily  sucralfate, 1 g, oral, q6h [place]      Continuous medications     PRN medications  PRN medications: acetaminophen **OR** [DISCONTINUED] acetaminophen **OR** [DISCONTINUED] acetaminophen, butalbital-acetaminophen-caff, lidocaine, lubricating eye drops      Assessment/Plan      Karly Horton is a 79 y.o. female presenting as a transfer from [facility] with new findings of R lung mass. Pt has a PMHx of COPD, >60 pack year smoking history, Peptic ulcer disease, anemia, and chornic headaches.  Pt was initially admitted to [facility] on 10/5 with AMS after her daughter found her unresponsive on the floor.  Pt is now A&Ox4. Imaging at [facility] revealed RUL mass and pt transferred to  for for interventional pulmonology bronchoscopy and mediastinal staging of lung mass. EBUS w biopsy completed 10/11.     Updates 10/14/24  -Called surgical pathology and they state the biopsy results will likely result by this afternoon  -Social work met with the patient and was provided with information regarding senior transportation connections with the goal of being able to assist with transportation to outpatient appointments  -PT consulted awaiting  evaluation for home PT recommendations     #RUL and hilar mass concerning for malignancy  - CTAP revealed a large 3.1 cm lower pole mass right hilum in a 2.2 cm spiculated mass apicoposterior segment right upper lobe  - Pulmonology consultation for bronchoscopy, deferred 10/10 by anesthesia for concerns of pulmonary HTN on Echo 5/16/24, RVSP 64.3 mmHg. Repeat Echo 10/10/24 - RVSP 31.1 mmHg   - S/p bronch with EBUS on 10/11, follow up pathology and cytology     #Asymptomatic bacteriuria   #Leukocytosis, resolved  ::Ucx 10/4 at [facility] no growth  ::UA 10/9 - 3+ blood, 2+ nitrite, 500 leuk est, 1+ bacteria, Ucx E coli pan susceptible   -Initially deferred treatment given asymptomatic but given significant leukocytosis, started Ceftriaxone (10/11 - 10/12) and Nitrofurantoin (10/13 - ) to be completed on 10/15.   -Ucx 10/11 - pansensitive E coli     #Hepatic lesions  -Likely metastatic i/s/o RUL mass  -CTAP: multiple masses identified within the right and left  lobes of the liver more numerous and larger on the right  -AST-24, ALT-16, AlkPhos-96 WNL  -Await results of tissue diagnosis and proceed with staging if malignancy is present     #HTN  -Hold home Losartan 50mg and Amodipine 10mg  -Hold inpatient due to low pressures 90's/60's  -CTM and restart as tolerated     #Constipation  -Pt endorses baseline constipation but endorses having BM within the last few days  -Continue home Lactulose and Miralax      #Tobacco Abuse  -1PPD since age 15  -Nicotine replacement patch while inpatient  -Patient not interested in cessation     #PUD  -EGD 5/24: Mild erythematous mucosa in the antrum and prepyloric region and Single ulcer in the duodenal bulb with oozing hemorrhage   -Continue home Pantoprazole 40mg BID     #Muscle aches  #TMJ  -Pt reports this is chronic for her and she takes Baclofen, Tylenol, and Butalbital to keep the symptoms at bay  -When medications don't help pt self-medicates with MJ  -Continue home medications  inpatient      #Anemia  -Hgb 12.7 on admission - at baseline  -Continue ferrous sulfate 325mg daily  -CTM     #Malnutrition  -Pt reports while in rehab this summer she lost weight due to 'bad' food   -Denies any changes to her appetite and reports it is 'very good'  -BMI on admission 13.53  -Vit D 27  -Nutrition consulted - started multivitamin, Ensures with protein        Fluids: Replete PRN  Electrolytes: Keep mg >2, phos >3  and K >4  Nutrition:  minced (edentulous, per pt request)   Antimicrobials: CTX (10/11-10/12) nitrofurantoin (10/13-10/14)   DVT PPX: Heparin subq  GI ppx: Pantoprazole   Lines: PIV  Oxygen:Room Air     Pain regimen: Lidocaine patch and gel, PRN tylenol   Bowel care: Miralax and lactulose     Code Status: Full Code (confirmed on admission)   NOK:  Karly Pro, [phone]       GEOR[place]IA BROWN MS3    Jackie Pedraza MD  Internal Medicine PGY-1

## 2024-10-14 NOTE — PROGRESS NOTES
Physical Therapy    Physical Therapy Evaluation    Patient Name: Karly Horton  MRN: [id number]  Today's Date: 10/14/2024   Room: [address] [place], [place] [address]  Time Calculation  Start Time: 1111  Stop Time: 1128  Time Calculation (min): 17 min    Assessment/Plan   PT Assessment  PT Assessment Results: Decreased strength, Decreased endurance, Impaired balance, Decreased mobility, Decreased safety awareness, Pain  Rehab Prognosis: Good  Barriers to Discharge:  (medical dx)  Evaluation/Treatment Tolerance: Patient tolerated treatment well  Medical Staff Made Aware: Yes  Strengths: Attitude of self  Barriers to Participation: Comorbidities  Assessment Comment: Pt admitted as transfer from [facility] for new lung mass. Pt demonstrating deficits in strength endurance and balance that is impacting functional mobility. Pt will benefit from continued PT to address deficits and optimize current level of function.  End of Session Patient Position: Bed, 3 rail up, Alarm on  IP OR SWING BED PT PLAN  Inpatient or Swing Bed: Inpatient  PT Plan  Treatment/Interventions: Bed mobility, Transfer training, Gait training, Stair training, Balance training, Neuromuscular re-education, Strengthening, Endurance training, Therapeutic exercise, Therapeutic activity, Home exercise program, Positioning, Postural re-education  PT Plan: Ongoing PT  PT Frequency: 3 times per week  PT Discharge Recommendations: Low intensity level of continued care, 24 hr supervision due to cognition  PT Recommended Transfer Status: Assist x1, Assistive device  PT - OK to Discharge: Yes (PT eval complete and DC rec made)    Subjective   General Visit Information:  Reason for Referral: 79 year old female admitted with transfer from [facility] with new findings of R lung mass. Initially presented to [facility] on 10/5 with AMS after her daughter found her unresponsive on the floor  Past Medical History Relevant to Rehab: COPD, >60 pack year smoking history, Peptic ulcer disease, anemia, and  "chornic headaches  Co-Treatment: OT  Co-Treatment Reason: to safely progress functional observation and expedite DC planning  Prior to Session Communication: Bedside nurse  Patient Position Received: Bed, 3 rail up, Alarm off, not on at start of session  General Comment: Pt supine in bed upon entry to room. Pt willing to work with PT.   Home Living:  Home Living  Type of Home: Apartment  Lives With: Adult children (daughter)  Home Adaptive Equipment: Walker rolling or standard (shower chair)  Home Layout: One level  Home Access:  (1 JAIME)  Entrance Stairs-Number of Steps: 1  Bathroom Shower/Tub: Tub/shower unit  Bathroom Equipment: Shower chair with back  Prior Level of Function:  Prior Function Per Pt/Caregiver Report  Level of [place]: Needs assistance with ADLs, Needs assistance with homemaking  Receives Help From: Home health, Family (HHA 2x/week)  ADL Assistance: Needs assistance  Homemaking Assistance: Needs assistance  Ambulatory Assistance: Needs assistance (Izzy with rollator for mobility)  Prior Function Comments: pt denies any falls over the last 6 months  Precautions:  Precautions  Medical Precautions: Fall precautions    Objective   Lines/Tubes/Drains:  External Urinary Catheter Female (Active)   Number of days: 3     Pain:  Pain Assessment  Pain Assessment: 0-10  0-10 (Numeric) Pain Score:  (pt stating that \"she hurts all over\")  Cognition:  Cognition  Overall Cognitive Status: Impaired  Orientation Level: Disoriented to time  Cognition Comments: pt with moments of confusion    Extremity/Trunk Assessments:  Strength:    RLE   RLE :  (grossly at least 4+/5 based on functional observation)  LLE   LLE :  (grossly at least 4+/5 based on functional observation)    General Assessments:  Strength  Strength Comments: BUE WFL      Activity Tolerance  Endurance: Decreased tolerance for upright activites  Sensation  Sensation Comment: pt stating she has numbness and tingling in bilateral feet     Static " Sitting Balance  Static Sitting-Level of Assistance: Close supervision  Dynamic Sitting Balance  Dynamic Sitting-Level of Assistance: Close supervision  Static Standing Balance  Static Standing-Level of Assistance: Contact guard  Dynamic Standing Balance  Dynamic Standing-Level of Assistance: Minimum assistance    Functional Assessments:  Bed Mobility  Bed Mobility: Yes  Bed Mobility 1  Bed Mobility 1: Supine to sitting, Sitting to supine  Level of Assistance 1: Close supervision  Bed Mobility Comments 1: HOB elevated, verbal cuing for sequencing  Transfers  Transfer: Yes  Transfer 1  Technique 1: Sit to stand, Stand to sit  Transfer Device 1: Walker  Transfer Level of Assistance 1: Contact guard  Trials/Comments 1: verbal cuing for hand placement  Ambulation/Gait Training  Ambulation/Gait Training Performed: Yes  Ambulation/Gait Training 1  Surface 1: Level tile  Device 1: Rolling walker  Assistance 1: Contact guard  Quality of Gait 1: Decreased step length  Comments/Distance (ft) 1: 2 feet lateral side steps, 2 feet forwards and backwards    Outcome Measures:  [facility] Basic Mobility  Turning from your back to your side while in a flat bed without using bedrails: A little  Moving from lying on your back to sitting on the side of a flat bed without using bedrails: A little  Moving to and from bed to chair (including a wheelchair): A little  Standing up from a chair using your arms (e.g. wheelchair or bedside chair): A little  To walk in hospital room: A little  Climbing 3-5 steps with railing: A little  Basic Mobility - Total Score: 18    Encounter Problems       Encounter Problems (Active)       Mobility       STG - Patient will ambulate up and down >1 step with LRAD and sba.        Start:  10/14/24    Expected End:  10/28/24            Pt will ambulate >50ft with LRAD and sba Assist with no LOB and VSS.         Start:  10/14/24    Expected End:  10/28/24            Pt will demonstrate WFL BLE strength to complete  therapeutic exercise and participate in functional mobility.         Start:  10/14/24    Expected End:  10/28/24               PT Transfers       Pt will perform all functional transfers with LRAD and sba assist.         Start:  10/14/24    Expected End:  10/28/24               Pain - Adult            Education Documentation  Mobility Training, taught by Meagan Perez, PT at 10/14/2024  2:39 PM.  Learner: Patient  Readiness: Acceptance  Method: Explanation  Response: Verbalizes Understanding, Needs Reinforcement  Comment: importance of functional mobility    Education Comments  No comments found.      10/14/24 at 2:39 PM   Meagan Perez PT   Rehab Office: [phone]

## 2024-10-14 NOTE — PROGRESS NOTES
Transitional Care Coordination Progress Note:  Patient discussed during interdisciplinary rounds.   Team members present: RANDAL OLMEDO  Plan per Medical/Surgical team: pending cultures  Payor: Medicare  Discharge disposition: Home with [facility]  Potential Barriers: none  ADOD: 1-2 days  Updated notes sent to [facility]. Will continue to monitor for discharge planning needs.     Tabatha BUTLER, RN  Transitional Care Coordinator (TCC)  [phone]

## 2024-10-14 NOTE — PROGRESS NOTES
Occupational Therapy    Evaluation    Patient Name: Karly Horton  MRN: [id number]  Department: [facility]  Room: [address] [place] [address]  Today's Date: 10/14/2024  Time Calculation  Start Time: 1110  Stop Time: 1127  Time Calculation (min): 17 min        Assessment:  OT Assessment: pt was motivated to engage in therapy. Pt demo good tolerance to engage in ADLs w Min A for UB dressing and Max A for LB dressing. Pt required SBA-CGA for functional transfers/mobility  w FWW. Pt limited by decreased endurance, strength, activity tolerance, and balance. Pt would benefit from continued OT to address these deficits.  Prognosis: Good  Barriers to Discharge: None  Evaluation/Treatment Tolerance: Patient tolerated treatment well  Medical Staff Made Aware: Yes  End of Session Communication: Bedside nurse  End of Session Patient Position: Bed, 3 rail up, Alarm on  OT Assessment Results: Decreased ADL status, Decreased endurance, Decreased functional mobility, Decreased cognition  Prognosis: Good  Barriers to Discharge: None  Evaluation/Treatment Tolerance: Patient tolerated treatment well  Medical Staff Made Aware: Yes  Strengths: Attitude of self  Barriers to Participation: Comorbidities  Plan:  Treatment Interventions: ADL retraining, Functional transfer training, UE strengthening/ROM, Endurance training, Cognitive reorientation, Equipment evaluation/education, Compensatory technique education  OT Frequency: 2 times per week  OT Discharge Recommendations: Low intensity level of continued care, 24 hr supervision due to cognition  Equipment Recommended upon Discharge: Bedside commode  OT Recommended Transfer Status: Assist of 1  OT - OK to Discharge: Yes  Treatment Interventions: ADL retraining, Functional transfer training, UE strengthening/ROM, Endurance training, Cognitive reorientation, Equipment evaluation/education, Compensatory technique education    Subjective   Current Problem:  1. Lung mass  Bronchoscopy Diagnostic    Bronchoscopy  Diagnostic    Cytology Consultation (Non-Gynecologic)    Cytology Consultation (Non-Gynecologic)    Surgical Pathology Exam    Surgical Pathology Exam    Referral to Pulmonology      2. Chronic congestive heart failure, unspecified heart failure type  Cytology Consultation (Non-Gynecologic)    Cytology Consultation (Non-Gynecologic)    Surgical Pathology Exam    Surgical Pathology Exam      3. Congestive heart failure, unspecified HF chronicity, unspecified heart failure type  Transthoracic Echo (TTE) Limited    Transthoracic Echo (TTE) Limited    Cytology Consultation (Non-Gynecologic)    Cytology Consultation (Non-Gynecologic)    Surgical Pathology Exam    Surgical Pathology Exam      4. Heart failure, chronic systolic  Cytology Consultation (Non-Gynecologic)    Cytology Consultation (Non-Gynecologic)    Surgical Pathology Exam    Surgical Pathology Exam      5. Heart failure, diastolic, chronic  Transthoracic Echo (TTE) Limited    Transthoracic Echo (TTE) Limited    Cytology Consultation (Non-Gynecologic)    Cytology Consultation (Non-Gynecologic)    Surgical Pathology Exam    Surgical Pathology Exam        General:  General  Reason for Referral: transfer from [facility] [place] with new findings of R lung mass; initially presented to [facility] [place], operated by [facility] on 10/5 with AMS after her daughter found her unresponsive on the floor  Past Medical History Relevant to Rehab: COPD, >60 pack year smoking history, Peptic ulcer disease, anemia, and chornic headaches  Missed Visit: No  Family/Caregiver Present: No  Co-Treatment: PT  Co-Treatment Reason: secondary to maximize pt safety  Prior to Session Communication: Bedside nurse  Patient Position Received: Bed, 3 rail up, Alarm off, not on at start of session  Preferred Learning Style: auditory, verbal  General Comment: Pt was alert and awake, supine in bed upon OT arrival. Pt was agreeable to participate in therapy session.  Precautions:  Medical Precautions: Fall precautions    Vital Sign  (Past 2hrs)                 Pain:  Pain Assessment  Pain Assessment: 0-10 (pt did not give numeric pain score)  Response to Interventions: best at rest    Objective   Cognition:  Overall Cognitive Status: Impaired  Orientation Level: Disoriented to time (needed cues for correct date)  Cognition Comments: pt presented w moments of confusion  Impulsive: Mildly           Home Living:  Type of Home: Apartment  Lives With: Adult children (daughter)  Home Adaptive Equipment: Walker rolling or standard  Home Layout: One level  Home Access:  (1 JAIME)  Bathroom Shower/Tub: Tub/shower unit  Bathroom Toilet: Standard  Bathroom Equipment: Shower chair with back  Prior Function:  Level of [place]: Needs assistance with ADLs, Needs assistance with homemaking  Receives Help From: Home health, Family (pt reported having a HHA 1-2x a week to help w ADLs/IADLs. Pt also reported he daughter is home all the time but may be unable to help her w transfers if needed)  ADL Assistance: Needs assistance (HHA 1-2x a week; daughter available to help w ADLs if needed)  Homemaking Assistance: Needs assistance (.)  Ambulatory Assistance:  (uses rollator for mobility)  Vocational: Retired  Leisure: watching tv  Prior Function Comments: pt denies any falls over the past 6 months  IADL History:  Homemaking Responsibilities: No (receives help from daughter and HHA)  Occupation: Retired  Leisure and Hobbies: watching tv  ADL:  Eating Assistance: Independent (anticipated)  Grooming Assistance: Independent (anticipated)  Bathing Assistance: Moderate (anticipated)  Bathing Deficit: Supervision/safety, Increased time to complete , Steadying  UE Dressing Assistance: Minimal  UE Dressing Deficit: Verbal cueing, Supervision/safety  LE Dressing Assistance: Maximal  LE Dressing Deficit: Don/doff R sock, Don/doff L sock, Supervision/safety  Toileting Assistance with Device: Moderate (anticipated)  Toileting Deficit: Steadying, Supervison/safety  Activity  Tolerance:  Endurance: Decreased tolerance for upright activites  Bed Mobility/Transfers: Bed Mobility  Bed Mobility: Yes  Bed Mobility 1  Bed Mobility 1: Supine to sitting, Sitting to supine  Level of Assistance 1: Close supervision  Bed Mobility Comments 1: pt completed supine<>EOB w SBA w HOB elevated    Transfers  Transfer: Yes  Transfer 1  Transfer From 1: Sit to, Stand to  Transfer to 1: Stand, Sit  Technique 1: Sit to stand, Stand to sit  Transfer Device 1: Walker  Transfer Level of Assistance 1: Contact guard  Trials/Comments 1: pt completed sit<>stand w FWW w CGA for safety and technique      Functional Mobility:  Functional Mobility  Functional Mobility Performed: Yes  Functional Mobility 1  Device 1: Rolling walker  Assistance 1: Contact guard  Comments 1: pt completed 3-4 forward/back steps and side stepping at bed side w CGA for safety and technique w FWW  Sitting Balance:  Static Sitting Balance  Static Sitting-Balance Support: No upper extremity supported  Static Sitting-Level of Assistance: Close supervision  Static Sitting-Comment/Number of Minutes: pt tolerated sitting at EOB to engage in ADLs for approx 2-3 mins.    Therapeutic Activity:   pt completed supine to EOB w SBA. Pt tolerated sitting at EOB to engage in ADLs for approx 2-3 mins. Pt completed sit<>stand w FWW w CGA for safety and technique. Pt completed 3-4 forward/back steps and side stepping at bed side w CGA for safety and technique w FWW.      Vision:Vision - Basic Assessment  Current Vision: Other (Comment) (per past notes in EMR, pt wears glasses all the time.Pt did not have glasses on at time of OT eval. Pt reported being sensitive to light)  Sensation:  Light Touch: No apparent deficits  Sensation Comment: pt stating she has numbness and tingling in bilateral feet  Strength:  Strength Comments: BUE WFL  Perception:  Inattention/Neglect: Appears intact  Coordination:  Movements are Fluid and Coordinated: Yes   Hand Function:  Gross  Grasp: Functional  Coordination: Functional  Extremities: RUE   RUE : Within Functional Limits and LUE   LUE: Within Functional Limits        Outcome Measures:[facility] Daily Activity  Putting on and taking off regular lower body clothing: A lot  Bathing (including washing, rinsing, drying): A lot  Putting on and taking off regular upper body clothing: A little  Toileting, which includes using toilet, bedpan or urinal: A lot  Taking care of personal grooming such as brushing teeth: A little  Eating Meals: None  Daily Activity - Total Score: 16        Education Documentation  Precautions, taught by Aida Apple OT at 10/14/2024  1:54 PM.  Learner: Patient  Readiness: Acceptance  Method: Explanation  Response: Verbalizes Understanding    Body Mechanics, taught by Aida Apple OT at 10/14/2024  1:54 PM.  Learner: Patient  Readiness: Acceptance  Method: Explanation  Response: Verbalizes Understanding    ADL Training, taught by Aida Apple OT at 10/14/2024  1:54 PM.  Learner: Patient  Readiness: Acceptance  Method: Explanation  Response: Verbalizes Understanding    Education Comments  No comments found.        OP EDUCATION:       Goals:  Encounter Problems       Encounter Problems (Active)       ADLs       Patient with complete upper body dressing with independent level of assistance donning and doffing all UE clothing with no adaptive equipment while edge of bed. (Progressing)       Start:  10/14/24    Expected End:  11/04/24            Patient with complete lower body dressing with stand by assist level of assistance donning and doffing all LE clothes  with PRN adaptive equipment while edge of bed. (Progressing)       Start:  10/14/24    Expected End:  11/04/24            Patient will complete daily grooming tasks brushing teeth and washing face/hair with independent level of assistance and PRN adaptive equipment while edge of bed. (Progressing)       Start:  10/14/24    Expected End:  11/04/24               MOBILITY        Patient will perform Functional mobility max Household distances/Community Distances with modified independent level of assistance and least restrictive device in order to improve safety and functional mobility. (Progressing)       Start:  10/14/24    Expected End:  11/04/24               TRANSFERS       Patient will complete sit to stand transfer with modified independent level of assistance and least restrictive device in order to improve safety and prepare for out of bed mobility. (Progressing)       Start:  10/14/24    Expected End:  11/04/24                      10/14/24 AT 1:56 PM SHAMA BARDALES OT REHAB OFFICE: [phone]

## 2024-10-14 NOTE — PROGRESS NOTES
Patient referred by SW- patient request resources for transportation to medical appointments.  Patient provided Transportation w/ [employer].          Discussed the following topics on behalf of the patient:  []  Behavioral Health Assistance     []  Case Management  []   Assistance  []  Digital Equity Assistance  []  Dental Health Assistance  []  Education Assistance  []  Employment Assistance  [x]  Financial Strain Relief Assistance  []  Food Insecurity Assistance  []  Healthcare Coverage Assistance  []  Housing Stability Assistance  []  IP Violence Relief Assistance  []  Legal Assistance  []  Physical Activity Assistance  [x]  Social Connection Assistance  [x]  Stress Relief Assistance   []  Substance Abuse Assistance  [x]  Transportation Assistance  []  Utility Assistance  []  Other: [insert comment here]    Next Steps:         Jose Guadalupe Morataya MA

## 2024-10-15 LAB
BACTERIA BLD CULT: NORMAL
BACTERIA BLD CULT: NORMAL
LAB AP ASR DISCLAIMER: NORMAL
LAB AP ASR DISCLAIMER: NORMAL
LABORATORY COMMENT REPORT: NORMAL
PATH REPORT.ADDENDUM SPEC: NORMAL
PATH REPORT.FINAL DX SPEC: NORMAL
PATH REPORT.FINAL DX SPEC: NORMAL
PATH REPORT.GROSS SPEC: NORMAL
PATH REPORT.GROSS SPEC: NORMAL
PATH REPORT.INTRAOP OBS SPEC DOC: NORMAL
PATH REPORT.RELEVANT HX SPEC: NORMAL
PATH REPORT.TOTAL CANCER: NORMAL
PATH REPORT.TOTAL CANCER: NORMAL

## 2024-10-15 PROCEDURE — 2500000001 HC RX 250 WO HCPCS SELF ADMINISTERED DRUGS (ALT 637 FOR MEDICARE OP)

## 2024-10-15 PROCEDURE — 2500000002 HC RX 250 W HCPCS SELF ADMINISTERED DRUGS (ALT 637 FOR MEDICARE OP, ALT 636 FOR OP/ED)

## 2024-10-15 PROCEDURE — S4991 NICOTINE PATCH NONLEGEND: HCPCS

## 2024-10-15 PROCEDURE — 2500000004 HC RX 250 GENERAL PHARMACY W/ HCPCS (ALT 636 FOR OP/ED)

## 2024-10-15 PROCEDURE — 1210000001 HC SEMI-PRIVATE ROOM DAILY

## 2024-10-15 PROCEDURE — 99232 SBSQ HOSP IP/OBS MODERATE 35: CPT

## 2024-10-15 RX ORDER — ACETAMINOPHEN 325 MG/1
650 TABLET ORAL EVERY 6 HOURS
Status: DISCONTINUED | OUTPATIENT
Start: 2024-10-15 | End: 2024-10-15

## 2024-10-15 RX ORDER — BUTALBITAL, ACETAMINOPHEN AND CAFFEINE 50; 325; 40 MG/1; MG/1; MG/1
1 TABLET ORAL EVERY 6 HOURS
Status: DISCONTINUED | OUTPATIENT
Start: 2024-10-15 | End: 2024-10-20 | Stop reason: HOSPADM

## 2024-10-15 RX ORDER — ACETAMINOPHEN 325 MG/1
650 TABLET ORAL EVERY 6 HOURS
Status: DISCONTINUED | OUTPATIENT
Start: 2024-10-15 | End: 2024-10-20 | Stop reason: HOSPADM

## 2024-10-15 RX ORDER — NITROFURANTOIN 25; 75 MG/1; MG/1
100 CAPSULE ORAL 2 TIMES DAILY
Qty: 4 CAPSULE | Refills: 0 | Status: CANCELLED | OUTPATIENT
Start: 2024-10-15 | End: 2024-10-17

## 2024-10-15 ASSESSMENT — COGNITIVE AND FUNCTIONAL STATUS - GENERAL
MOVING FROM LYING ON BACK TO SITTING ON SIDE OF FLAT BED WITH BEDRAILS: A LITTLE
MOBILITY SCORE: 16
STANDING UP FROM CHAIR USING ARMS: A LITTLE
DAILY ACTIVITIY SCORE: 19
PERSONAL GROOMING: A LITTLE
TOILETING: A LITTLE
HELP NEEDED FOR BATHING: A LITTLE
WALKING IN HOSPITAL ROOM: A LOT
DRESSING REGULAR LOWER BODY CLOTHING: A LITTLE
DRESSING REGULAR UPPER BODY CLOTHING: A LITTLE
MOVING TO AND FROM BED TO CHAIR: A LITTLE
TURNING FROM BACK TO SIDE WHILE IN FLAT BAD: A LITTLE
CLIMB 3 TO 5 STEPS WITH RAILING: A LOT

## 2024-10-15 ASSESSMENT — PAIN DESCRIPTION - ORIENTATION: ORIENTATION: POSTERIOR

## 2024-10-15 ASSESSMENT — PAIN SCALES - GENERAL
PAINLEVEL_OUTOF10: 7
PAINLEVEL_OUTOF10: 7

## 2024-10-15 ASSESSMENT — PAIN - FUNCTIONAL ASSESSMENT: PAIN_FUNCTIONAL_ASSESSMENT: 0-10

## 2024-10-15 ASSESSMENT — PAIN DESCRIPTION - LOCATION: LOCATION: NECK

## 2024-10-15 ASSESSMENT — PAIN SCALES - WONG BAKER: WONGBAKER_NUMERICALRESPONSE: HURTS EVEN MORE

## 2024-10-15 NOTE — PROGRESS NOTES
Karly Horton is a 79 y.o. female on day 5 of admission presenting with Lung mass.      Subjective   No acute event overnight. Patient denies pain. Expressed distress over receiving baclofen dose late. Denies fevers, cough, dyspnea, dysuria, urinary frequency.        Objective     Last Recorded Vitals  /74   Pulse 74   Temp 36.6 °C (97.9 °F)   Resp 16   Wt (!) 31.4 kg (69 lb 4.8 oz)   SpO2 94%   Intake/Output last 3 Shifts:  No intake or output data in the 24 hours ending 10/15/24 1056      Admission Weight  Weight: (!) 31.4 kg (69 lb 4.8 oz) (10/10/24 0200)    Daily Weight  10/10/24 : (!) 31.4 kg (69 lb 4.8 oz)    Results for orders placed or performed during the hospital encounter of 10/10/24 (from the past 24 hour(s))   POCT GLUCOSE   Result Value Ref Range    POCT Glucose 131 (H) 74 - 99 mg/dL         Susceptibility data from last 90 days.  Collected Specimen Info Organism Amoxicillin/Clavulanate Ampicillin Ampicillin/Sulbactam Cefazolin Cefazolin (uncomplicated UTIs only) Ciprofloxacin Gentamicin Nitrofurantoin Piperacillin/Tazobactam Trimethoprim/Sulfamethoxazole   10/11/24 Urine from Clean Catch/Voided Escherichia coli   S   S  S  S  S  S  S  S   10/09/24 Urine from Clean Catch/Voided Escherichia coli   S   S  S  S  S  S  S  S   09/03/24 Blood culture from Peripheral Venipuncture Staphylococcus epidermidis             09/02/24 Urine from Clean Catch/Voided Escherichia coli  I  R  R  S  S  R  S  S  S  S       Image Results  XR chest 1 view  Narrative: Interpreted By:  Jaime Farah,   STUDY:  XR CHEST 1 VIEW      INDICATION:  Signs/Symptoms:c/f infxn.      COMPARISON:  September 2, 2024      ACCESSION NUMBER(S):  [id number]      ORDERING CLINICIAN:  LYNNE WADE      FINDINGS:  No consolidation, effusion, edema, or pneumothorax. Markedly  atherosclerotic aorta unchanged.      Impression: No evidence of acute intrathoracic abnormality.      Signed by: Jaime Farah 10/12/2024 8:23  AM  Dictation workstation:   [id number]      Physical exam:  Constitutional: cachectic, NAD  HEENT: NCAT, EOMI  Cardio: RRR  Pulm: CTAB, on RA, no coughing  GI: soft, non-tender, nondistended, no guarding or rebound  Skin: No lesions, contusions, or erythema.  Extremities: no BLE swelling   Neuro: No focal deficits  Psych: Appropriate mood and behavior    Relevant Results  Scheduled medications  [Held by provider] amLODIPine, 10 mg, oral, Nightly  ascorbic acid, 500 mg, oral, Daily  baclofen, 10 mg, oral, q6h  ferrous sulfate (325 mg ferrous sulfate), 65 mg of iron, oral, Daily with breakfast  heparin (porcine), 5,000 Units, subcutaneous, q8h  lactulose, 30 g, oral, Daily  [Held by provider] losartan, 50 mg, oral, Daily  multivitamin with minerals, 1 tablet, oral, Daily  nicotine, 1 patch, transdermal, Daily  nitrofurantoin (macrocrystal-monohydrate), 100 mg, oral, BID  pantoprazole, 40 mg, oral, BID  polyethylene glycol, 17 g, oral, Daily  sucralfate, 1 g, oral, q6h [place]      Continuous medications     PRN medications  PRN medications: acetaminophen **OR** [DISCONTINUED] acetaminophen **OR** [DISCONTINUED] acetaminophen, butalbital-acetaminophen-caff, lidocaine, lubricating eye drops      Assessment/Plan      Karly Horton is a 79 y.o. female presenting as a transfer from [facility] with new findings of R lung mass. Pt has a PMHx of COPD, >60 pack year smoking history, Peptic ulcer disease, anemia, and chornic headaches.  Pt was initially admitted to [facility] on 10/5 with AMS after her daughter found her unresponsive on the floor.  Pt is now A&Ox4. Imaging at [facility] revealed RUL mass and pt transferred to  for for interventional pulmonology bronchoscopy and mediastinal staging of lung mass. EBUS w biopsy completed 10/11.     Updates 10/15/24  -EBUS 10/11 pathology pending   -PT recommends home with home care and PT   -Team made aware that senior transportation connections does not cross [facility] lines so  will likely need other option for transportation to appointments.      #RUL and hilar mass concerning for malignancy  - CTAP revealed a large 3.1 cm lower pole mass right hilum in a 2.2 cm spiculated mass apicoposterior segment right upper lobe  - Pulmonology consultation for bronchoscopy, deferred 10/10 by anesthesia for concerns of pulmonary HTN on Echo 5/16/24, RVSP 64.3 mmHg. Repeat Echo 10/10/24 - RVSP 31.1 mmHg   -EBUS 10/11 pathology pending      #Asymptomatic bacteriuria   #Leukocytosis, resolved  ::Ucx 10/4 at [facility] no growth  ::UA 10/9 - 3+ blood, 2+ nitrite, 500 leuk est, 1+ bacteria, Ucx E coli pan susceptible   -Initially deferred treatment given asymptomatic but given significant leukocytosis, started Ceftriaxone (10/11 - 10/12) and Nitrofurantoin (10/13 - ) to be completed on 10/15.   -Ucx 10/11 - pansensitive E coli     #Hepatic lesions  -Likely metastatic i/s/o RUL mass  -CTAP: multiple masses identified within the right and left  lobes of the liver more numerous and larger on the right  -AST-24, ALT-16, AlkPhos-96 WNL  -Await results of tissue diagnosis and proceed with staging if malignancy is present     #HTN  -Hold home Losartan 50mg and Amodipine 10mg  -Hold inpatient due to low pressures 90's/60's  -CTM and restart as tolerated     #Constipation  -Pt endorses baseline constipation but endorses having BM within the last few days  -Continue home Lactulose and Miralax      #Tobacco Abuse  -1PPD since age 15  -Nicotine replacement patch while inpatient  -Patient not interested in cessation     #PUD  -EGD 5/24: Mild erythematous mucosa in the antrum and prepyloric region and Single ulcer in the duodenal bulb with oozing hemorrhage   -Continue home Pantoprazole 40mg BID     #Muscle aches  #TMJ  -Pt reports this is chronic for her and she takes Baclofen, Tylenol, and Butalbital to keep the symptoms at bay  -When medications don't help pt self-medicates with MJ  -Continue home medications  inpatient      #Anemia  -Hgb 12.7 on admission - at baseline  -Continue ferrous sulfate 325mg daily  -CTM     #Malnutrition  -Pt reports while in rehab this summer she lost weight due to 'bad' food   -Denies any changes to her appetite and reports it is 'very good'  -BMI on admission 13.53  -Vit D 27  -Nutrition consulted - started multivitamin, Ensures with protein        Fluids: Replete PRN  Electrolytes: Keep mg >2, phos >3  and K >4  Nutrition:  minced (edentulous, per pt request)   Antimicrobials: CTX (10/11-10/12) nitrofurantoin (10/13-10/14)   DVT PPX: Heparin subq  GI ppx: Pantoprazole   Lines: PIV  Oxygen:Room Air     Pain regimen: Lidocaine patch and gel, PRN tylenol   Bowel care: Miralax and lactulose     Code Status: Full Code (confirmed on admission)   NOK:  Karly Pro, [phone]         Jackie Pedraza MD  Internal Medicine PGY-1

## 2024-10-15 NOTE — DISCHARGE SUMMARY
Discharge Diagnosis  Disorientation improved  Altered mental status improved  Toxic, metabolic encephalopathy improved  Marijuana use  Generalized weakness  Lung masses -  Tobacco use  Peptic ulcer disease  Chronic constipation  Anxiety  Migraine headaches  Neck pain, chronic    Issues Requiring Follow-Up  Above    Discharge Meds     Medication List      ASK your doctor about these medications     amLODIPine 10 mg tablet; Commonly known as: Norvasc; Take 1 tablet (10   mg) by mouth once daily at bedtime. Hold for systolic blood pressure < 110   mmhg.   ascorbic acid 500 mg tablet; Commonly known as: Vitamin C; Take 1 tablet   (500 mg) by mouth once daily.   baclofen 10 mg tablet; Commonly known as: Lioresal   butalbital-acetaminophen-caff -40 mg tablet   ferrous sulfate (325 mg ferrous sulfate) tablet; Take 1 tablet by mouth   once daily with breakfast.   lactulose 20 gram/30 mL oral solution   losartan 50 mg tablet; Commonly known as: Cozaar; Take 1 tablet (50 mg)   by mouth once daily. Hold for systolic blood pressure < 110 mmhg.   lubricating eye drops ophthalmic solution; Administer 1 drop into both   eyes if needed for dry eyes.   nicotine 21 mg/24 hr patch; Commonly known as: Nicoderm CQ; Place 1   patch over 24 hours on the skin once daily.   pantoprazole 40 mg EC tablet; Commonly known as: ProtoNix; Take 1 tablet   (40 mg) by mouth 2 times a day. Do not crush, chew, or split. Continue   twice daily x 8 weeks then continue once daily.   sucralfate 100 mg/mL suspension; Commonly known as: Carafate; Take 10 mL   (1 g) by mouth every 6 hours.       Test Results Pending At Discharge  Pending Labs       No current pending labs.            Hospital Course  This is a very pleasant 79-year-old  female presenting to the emergency department with altered mental status.  Per the record, she was found unresponsive next to the couch at home by her daughter.  In the emergency department, initial workup was  done.  CT brain attack per radiology showed stable age related changes without acute process.  CT angio head and neck with and without IV contrast per radiology showed no measurable stenosis of the cervical vessels, no measurable stenosis or large branch vessel cutoffs of the intracranial vessels, tortuous course of the visualized arteries within the head and neck without pseudoaneurysm or dissection, dominant left vertebral artery, no evidence of narrowing, dissection or thrombus in the vertebral or basilar circulation, spiculated nodule in the posterior right upper lobe concerning for malignancy, follow-up PET/CT or direct tissue sampling recommended, 5 mm of C3 on C4 anterolisthesis.  CT chest abdomen and pelvis per radiology showed multiple masses identified within the right and left lobes of the *lung numerous and larger on the right with note made of a large 3.1 cm lower pole mass right hilum, 2.2 cm spiculated mass apical posterior segment right upper lobe along with additional masses, malignancy likely with metastatic lesions considered along with primary lung cancer metastatic to the lungs, emphysematous changes pulmonary parenchyma, nonspecific appearance of the abdomen and pelvis.  (Note per radiology addendum there are no liver lesions).  Urinalysis was unremarkable.  She was admitted.  She was evaluated and treated by Neurology.  MRI brain was ordered, she declined.  EEG was completed, which was normal.  Her mentation improved to baseline.  She was evaluated and treated by Pulmonology - recommend transfer to tertiary care center [facility] for advanced bronchoscopy.  She agreed.  She was advised to quit smoking.  She was transferred to [facility] in stable condition.    Pertinent Physical Exam At Time of Discharge  See physical examination.     Outpatient Follow-Up  No future appointments.    Follow-up with [facility] facility providers as advised.    Follow-up with primary care provider.    Shea Root,  APRN-CNP

## 2024-10-15 NOTE — PROGRESS NOTES
"Nutrition Follow Up Assessment:      Karly Horton is a 79 y.o. female on day 5 of admission presenting with Lung mass with concern for metastatic spread.    Pathology results pending.      Nutrition History:  Food and Nutrient History: Met with patient who just consumed 90% of lunch meal and was working on a Greek yogurt. States she doesn't have any teeth, so the minced/moist texture is good. Per flow sheets, patient is consuming % of meals. States she is too full from her meals to drink Ensure. Suggested Ensure Clear as patient likes apple juice. Patient amenable to trying. Reports at home she eats every 3 hours. \"My stomach is small.\" Agreeable to Greek Yogurt for a snack.  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 I take Lactulose.\"   Oral Problems: edentulous.        Anthropometrics:  Height: 152.4 cm (5')   Weight: (!) 31.4 kg (69 lb 4.8 oz)   BMI (Calculated): 13.53  IBW/kg (Dietitian Calculated): 45.5 kg  Percent of IBW: 69 %     10/10/24  31.4 kg (69 lb 4.8 oz) Abnormal      Patient reports \"months ago I had pneumonia and I lost weight\". Patient unable to provide prior weight.     Nutrition Focused Physical Exam Findings:    Subcutaneous Fat Loss:   Orbital Fat Pads: Severe (dark circles, hollowing and loose skin)  Buccal Fat Pads: Severe (hollow, sunken and narrow face)  Triceps: Severe (negligible fat tissue)  Ribs: Defer  Muscle Wasting:  Temporalis: Severe (hollowed scooping depression)  Pectoralis (Clavicular Region): Severe (protruding prominent clavicle)  Deltoid/Trapezius: Severe (squared shoulders, acromion process prominent)  Interosseous: Severe (depressed area between thumb and forefinger)  Trapezius/Infraspinatus/Supraspinatus (Scapular Region): Severe (prominent visual scapula, depression between ribs, scapula or shoulder)  Quadriceps: Severe (depressions on inner and " outer thigh)  Gastrocnemius: Severe (minimal muscle definition)  Edema:  Edema: none  Physical Findings:  Hair: Negative  Eyes: Negative  Mouth: Negative  Nails: Negative  Skin: Positive (Per flow sheets, Medial back wound and L buttock wound.)    Nutrition Significant Labs:  BMP Trend:   Results from last 7 days   Lab Units 10/13/24  0616 10/12/24  0506 10/11/24  2351 10/11/24  0551   GLUCOSE mg/dL 106* 94 85 95   CALCIUM mg/dL 8.9 9.0 8.9 9.3   SODIUM mmol/L 135* 134* 135* 134*   POTASSIUM mmol/L 4.4 4.3 4.3 3.8   CO2 mmol/L 30 27 29 27   CHLORIDE mmol/L 96* 98 97* 98   BUN mg/dL 7 9 9 9   CREATININE mg/dL 0.42* 0.47* 0.50 0.42*    , BG POCT trend:   Results from last 7 days   Lab Units 10/14/24  1200   POCT GLUCOSE mg/dL 131*    , Renal Lab Trend:   Results from last 7 days   Lab Units 10/13/24  0616 10/12/24  0506 10/11/24  2351 10/11/24  0551   POTASSIUM mmol/L 4.4 4.3 4.3 3.8   PHOSPHORUS mg/dL 2.7 3.1 3.1 3.1   SODIUM mmol/L 135* 134* 135* 134*   MAGNESIUM mg/dL 1.89 1.96 1.80 1.87   EGFR mL/min/1.73m*2 >90 >90 >90 >90   BUN mg/dL 7 9 9 9   CREATININE mg/dL 0.42* 0.47* 0.50 0.42*    , Vit D:   Lab Results   Component Value Date    VITD25 27 (L) 10/10/2024    , Vit B12:   Lab Results   Component Value Date    [id number] 1,784 (H) 05/15/2024        Nutrition Specific Medications:  Scheduled medications  acetaminophen, 650 mg, oral, q6h  [Held by provider] amLODIPine, 10 mg, oral, Nightly  ascorbic acid, 500 mg, oral, Daily  baclofen, 10 mg, oral, q6h  butalbital-acetaminophen-caff, 1 tablet, oral, q6h  ferrous sulfate (325 mg ferrous sulfate), 65 mg of iron, oral, Daily with breakfast  heparin (porcine), 5,000 Units, subcutaneous, q8h  lactulose, 30 g, oral, Daily  [Held by provider] losartan, 50 mg, oral, Daily  multivitamin with minerals, 1 tablet, oral, Daily  nicotine, 1 patch, transdermal, Daily  nitrofurantoin (macrocrystal-monohydrate), 100 mg, oral, BID  pantoprazole, 40 mg, oral, BID  polyethylene  glycol, 17 g, oral, Daily  sucralfate, 1 g, oral, q6h [place]      Continuous medications     PRN medications  PRN medications: lidocaine, lubricating eye drops      I/O:   Last BM Date: 10/14/24; Stool Appearance: Soft (10/15/24 1445)        Dietary Orders (From admission, onward)       Start     Ordered    10/14/24 1030  Adult diet Regular; Minced and moist 5  Diet effective now        Comments: No spices   Question Answer Comment   Diet type Regular    Texture Minced and moist 5        10/14/24 1029    10/10/24 1549  Oral nutritional supplements  Until discontinued        Question Answer Comment   Deliver with All meals    Select supplement: Ensure High Protein        10/10/24 1548                     Estimated Needs:   Total Energy Estimated Needs (kCal): 1250 kCal  Method for Estimating Needs: 31.4 kg / 40 kcal  Total Protein Estimated Needs (g): 55 g  Method for Estimating Needs: 31.4 kg / 1.8 g              Nutrition Diagnosis   Malnutrition Diagnosis  Patient has Malnutrition Diagnosis: Yes  Diagnosis Status: Ongoing  Malnutrition Diagnosis: Severe malnutrition related to chronic disease or condition  As Evidenced by: severe fat loss, severe muscle wasting, 13.3% wt loss x 4.5 months and BMI 13.5         Nutrition Interventions/Recommendations         Nutrition Prescription:  Individualized Nutrition Prescription Provided for :   Regular diet (consistency per SLP)  Order 1000 to 2000 I.U. Vitamin D3 daily         Nutrition Interventions:   Food and/or Nutrient Delivery Interventions  Interventions: Medical food supplement   Ensure Plus discontinued per patient's preference.    Ensure Clear ordered TID.        Nutrition Education:   Encourage Ensure Clear       Nutrition Monitoring and Evaluation   Food/Nutrient Related History Monitoring  Monitoring and Evaluation Plan: Energy intake  Criteria: >/= 75% of meals/supplements    Body Composition/Growth/Weight History  Monitoring and Evaluation Plan:  Weight  Criteria: Stable weight    Biochemical Data, Medical Tests and Procedures  Monitoring and Evaluation Plan: Electrolyte/renal panel, Glucose/endocrine profile  Criteria: Labs wnl            Time Spent/Follow-up Reminder:   Time Spent (min): 45 minutes  Last Date of Nutrition Visit: 10/15/24  Nutrition Follow-Up Needed?: Dietitian to reassess per policy  Follow up Comment: Severe PCM

## 2024-10-15 NOTE — CONSULTS
Name: Karly Horton  MRN: [id number]  Admit Date: 10/10/2024  Encounter Date: 10/16/2024  PCP: Julian Chaney MD    Reason for consult: new diagnosis of NSCLC Squamous cell carcinoma  Attending provider: Cathy Lau MD  Consult attending provider: Dr. Hu    Oncology Consult Note      History of Present Illness   Karly Horton is a 79 y.o. female with a notable history of COPD and >60 pack year smoking history presented on 10/5 to [facility] for altered mental status after daughter found her unresponsive on the floor at home, and CT angiography incidentally picked up a spiculated lung mass. She was transferred to [facility] on 10/6 for expedited workup. Further CT CAP without contrast showed multiple lymph nodes and EBUS performed on 10/11/24 sampled lymph nodes 11L suggesting malignancy and 7 confirming malignancy and RLL biopsy was positive for squamous cell carcinoma of the lung (PD-L1 TPS 10%). Oncology consulted for further workup and management.    For the altered mental status, it has entirely resolved. The patient declined a brain MRI for the workup. Routine EEG was normal without epileptiform discharges. She was treated for a presumed UTI. Currently she has no complaints besides being frustrated with her admission waiting for answers.    The patient has declined significantly having been ill with pneumonia, weight loss, and was at rehab facility.    Onc History   10/4/24 presentation for altered mental status; urine tox positive for marijuana, CT angio of head and neck due to a concern for stroke showed a spiculated nodule in the right lung  10/6/24 CT CAP without contrast showed bilateral lung masses    Oncology History    No history exists.       Past Medical History     Past Medical History:   Diagnosis Date    Other cervical disc degeneration, unspecified cervical region     Degeneration of cervical intervertebral disc    Other conditions influencing health status     Bulging Disc (L3  - L4)    Other conditions influencing health status     A Fall    Other conditions influencing health status     Bulging Disc (C4 - C5)    Other conditions influencing health status     Bulging Disc (C6 - C7)    Other conditions influencing health status     Bulging Disc (C5 - C6)    Other conditions influencing health status     Lumbar Spondylolisthesis    Other conditions influencing health status     Rotator Cuff Tendon Tear    Other intervertebral disc degeneration, lumbar region     L4-L5 disc bulge    Other intervertebral disc displacement, lumbar region     Disc displacement, lumbar    Other intervertebral disc displacement, lumbosacral region     Displacement of lumbosacral intervertebral disc    Personal history of other diseases of the musculoskeletal system and connective tissue     History of bursitis    Personal history of other diseases of the musculoskeletal system and connective tissue     History of fibromyositis    Personal history of other specified conditions     History of headache    Spinal stenosis, lumbar region without neurogenic claudication     Lumbar canal stenosis    Sprain of ligaments of lumbar spine, initial encounter     Low back sprain         Past Surgical History     Past Surgical History:   Procedure Laterality Date    APPENDECTOMY      EYE SURGERY      HYSTERECTOMY      TONSILLECTOMY           Family History    Mother  of ovarian cancer  Multiple family members on mothers' side with other malignant solid tumors  Family History   Problem Relation Name Age of Onset    Cancer Mother      Other (Father had hypertension, stroke, coronary artery disease and diabetes) Father           Social History   Was a  for 30 years, retired about 15 years ago    Now lives with daughter in [place]  Social History     Socioeconomic History    Marital status:    Tobacco Use    Smoking status: Every Day     Current packs/day: 1.00     Average packs/day: 1 pack/day for  64.8 years (64.8 ttl pk-yrs)     Types: Cigarettes     Start date: 1/1/1960     Social Drivers of Health     Financial Resource Strain: Low Risk  (10/12/2024)    Overall Financial Resource Strain (CARDIA)     Difficulty of Paying Living Expenses: Not very hard   Food Insecurity: No Food Insecurity (10/8/2024)    Hunger Vital Sign     Worried About Running Out of Food in the Last Year: Never true     Ran Out of Food in the Last Year: Never true   Transportation Needs: No Transportation Needs (10/12/2024)    PRAPARE - Transportation     Lack of Transportation (Medical): No     Lack of Transportation (Non-Medical): No   Physical Activity: Inactive (10/8/2024)    Exercise Vital Sign     Days of Exercise per Week: 0 days     Minutes of Exercise per Session: 0 min   Stress: No Stress Concern Present (10/8/2024)    [employer] [place] [employer] - Occupational Stress Questionnaire     Feeling of Stress : Not at all   Social Connections: Socially Isolated (10/8/2024)    Social Connection and Isolation Panel [NHANES]     Frequency of Communication with Friends and Family: More than three times a week     Frequency of Social Gatherings with Friends and Family: More than three times a week     Attends Religion Services: Never     Active Member of Clubs or Organizations: No     Attends Club or Organization Meetings: Never     Marital Status:    Intimate Partner Violence: Not At Risk (10/8/2024)    Humiliation, Afraid, Rape, and Kick questionnaire     Fear of Current or Ex-Partner: No     Emotionally Abused: No     Physically Abused: No     Sexually Abused: No   Housing Stability: Low Risk  (10/12/2024)    Housing Stability Vital Sign     Unable to Pay for Housing in the Last Year: No     Number of Times Moved in the Last Year: 1     Homeless in the Last Year: No         Allergies     Allergies   Allergen Reactions    Demerol [Meperidine] Headache and Nausea/vomiting     Per pt.     Morphine Headache and  Nausea/vomiting     Per patient    Spice Flavor Other       Medications   acetaminophen, 650 mg, q6h  [Held by provider] amLODIPine, 10 mg, Nightly  ascorbic acid, 500 mg, Daily  baclofen, 10 mg, q6h  butalbital-acetaminophen-caff, 1 tablet, q6h  cholecalciferol, 2,000 Units, Daily  ferrous sulfate (325 mg ferrous sulfate), 65 mg of iron, Daily with breakfast  heparin (porcine), 5,000 Units, q8h  lactulose, 30 g, Daily  [Held by provider] losartan, 50 mg, Daily  multivitamin with minerals, 1 tablet, Daily  nicotine, 1 patch, Daily  pantoprazole, 40 mg, BID  polyethylene glycol, 17 g, Daily  sucralfate, 1 g, q6h [place]         lidocaine, , Daily PRN  lubricating eye drops, 1 drop, PRN        Review of Systems   12-point ROS negative, except as specified in the HPI.    Physical Exam   /70   Pulse 78   Temp 36.9 °C (98.4 °F)   Resp 18   Ht 1.524 m (5')   Wt (!) 31.4 kg (69 lb 4.8 oz)   SpO2 96%   BMI 13.53 kg/m²     ECOG PS 4    General: cachectic, awake, alert, no acute distress  HEENT: normocephalic, atraumatic  Neck: no palpable lymphadenopathy  CV: normal rate, regular rhythm, no MRG  Resp: coarse breath sounds  Abdominal: mildly distended abdomen  Extremities: full ROM, no lower extremity swelling  Neuro: no focal neuro deficits  Skin: no rashes, erythema, or ecchymoses  Psych: normal affect and mood, appropriate judgment    Labs   Reviewed    Imaging   Reviewed    10/6/24 CT CAP without contrast  IMPRESSION:  1. There are multiple masses identified within the right and left Lungs, more numerous and larger on the right with particular note made of a large 3.1 cm lower pole mass right hilum in a 2.2 cm spiculated mass apicoposterior segment right upper lobe along with additional masses as detailed above. Malignancy is likely with metastatic lesions consideration along with primary lung cancer metastatic to the lungs.  2. Emphysematous change pulmonary parenchyma.  3. Nonspecific appearance of the abdomen  and pelvis. Please note that sensitivity of the abdomen and pelvis assessment is limited due to the cachectic state of the patient and absence of oral and  intravenous contrast.      Assessment/Plan     Karly Horton is a 79 y.o. female with a notable history of COPD and >60 pack year smoking history presented on 10/5 to [facility] for altered mental status after daughter found her unresponsive on the floor at home, and CT angiography incidentally picked up a spiculated lung mass. She was transferred to [facility] on 10/6 for expedited workup. Further CT CAP without contrast showed multiple lymph nodes and EBUS performed on 10/11/24 sampled lymph nodes 11L suggesting malignancy and 7 confirming malignancy and RLL biopsy was positive for squamous cell carcinoma of the lung (PD-L1 TPS 10%). She has at least T2aN2 disease. Oncology consulted for further workup and management.    The CT scans were performed without contrast which limits the ability to fully stage the patient.    Recommendations:  Please obtain MRI brain with and without contrast, PET/CT scan (please try to get this done in house given transportation issues), and CT chest with contrast.  Radiation can happen outpatient at  [facility] (per patient preference) -  appreciated to arrange for transportation if needed.  Patient will follow up at  [facility] thoracic oncologist.    Thank you for this consult. Patient was seen, examined, and discussed with Dr. Hu.    Hussein Kline MD  Medical Oncology Fellow  10/16/2024

## 2024-10-15 NOTE — CARE PLAN
The patient's goals for the shift include pt. will have decreased pain to posterior neck.    The clinical goals for the shift include pt will remain safe entire shift    Over the shift, the patient did make progress toward the following goals.   Problem: Pain - Adult  Goal: Verbalizes/displays adequate comfort level or baseline comfort level  Outcome: Not Progressing

## 2024-10-16 ENCOUNTER — DOCUMENTATION (OUTPATIENT)
Dept: HOME HEALTH SERVICES | Facility: HOME HEALTH | Age: 79
End: 2024-10-16

## 2024-10-16 ENCOUNTER — PHARMACY VISIT (OUTPATIENT)
Dept: PHARMACY | Facility: CLINIC | Age: 79
End: 2024-10-16
Payer: COMMERCIAL

## 2024-10-16 PROCEDURE — 2500000001 HC RX 250 WO HCPCS SELF ADMINISTERED DRUGS (ALT 637 FOR MEDICARE OP)

## 2024-10-16 PROCEDURE — RXMED WILLOW AMBULATORY MEDICATION CHARGE

## 2024-10-16 PROCEDURE — 99223 1ST HOSP IP/OBS HIGH 75: CPT | Performed by: STUDENT IN AN ORGANIZED HEALTH CARE EDUCATION/TRAINING PROGRAM

## 2024-10-16 PROCEDURE — 1210000001 HC SEMI-PRIVATE ROOM DAILY

## 2024-10-16 PROCEDURE — 2500000002 HC RX 250 W HCPCS SELF ADMINISTERED DRUGS (ALT 637 FOR MEDICARE OP, ALT 636 FOR OP/ED)

## 2024-10-16 PROCEDURE — 99232 SBSQ HOSP IP/OBS MODERATE 35: CPT

## 2024-10-16 PROCEDURE — 2500000004 HC RX 250 GENERAL PHARMACY W/ HCPCS (ALT 636 FOR OP/ED)

## 2024-10-16 PROCEDURE — S4991 NICOTINE PATCH NONLEGEND: HCPCS

## 2024-10-16 RX ORDER — CHOLECALCIFEROL (VITAMIN D3) 25 MCG
2000 TABLET ORAL DAILY
Status: DISCONTINUED | OUTPATIENT
Start: 2024-10-16 | End: 2024-10-20 | Stop reason: HOSPADM

## 2024-10-16 RX ORDER — MULTIVIT-MIN/IRON FUM/FOLIC AC 7.5 MG-4
1 TABLET ORAL DAILY
Qty: 30 TABLET | Refills: 2 | Status: SHIPPED | OUTPATIENT
Start: 2024-10-16 | End: 2025-01-14

## 2024-10-16 RX ORDER — CHOLECALCIFEROL (VITAMIN D3) 50 MCG
2000 TABLET ORAL DAILY
Qty: 30 TABLET | Refills: 2 | Status: SHIPPED | OUTPATIENT
Start: 2024-10-17 | End: 2025-01-15

## 2024-10-16 ASSESSMENT — COGNITIVE AND FUNCTIONAL STATUS - GENERAL
WALKING IN HOSPITAL ROOM: A LOT
STANDING UP FROM CHAIR USING ARMS: A LITTLE
PERSONAL GROOMING: A LITTLE
HELP NEEDED FOR BATHING: A LITTLE
MOBILITY SCORE: 16
DRESSING REGULAR UPPER BODY CLOTHING: A LITTLE
TURNING FROM BACK TO SIDE WHILE IN FLAT BAD: A LITTLE
MOVING TO AND FROM BED TO CHAIR: A LITTLE
DAILY ACTIVITIY SCORE: 19
MOVING FROM LYING ON BACK TO SITTING ON SIDE OF FLAT BED WITH BEDRAILS: A LITTLE
DRESSING REGULAR LOWER BODY CLOTHING: A LITTLE
TOILETING: A LITTLE
CLIMB 3 TO 5 STEPS WITH RAILING: A LOT

## 2024-10-16 ASSESSMENT — PAIN SCALES - GENERAL
PAINLEVEL_OUTOF10: 0 - NO PAIN

## 2024-10-16 ASSESSMENT — PAIN - FUNCTIONAL ASSESSMENT: PAIN_FUNCTIONAL_ASSESSMENT: 0-10

## 2024-10-16 NOTE — PROGRESS NOTES
Karly Horton is a 79 y.o. female on day 6 of admission presenting with Lung mass.      Subjective   No acute events overnight. Denies any active pain. She reports her baclofen/Tylenol were administered on time. Denies fevers, cough, dyspnea, dysuria, urinary frequency.        Objective     Last Recorded Vitals  /70   Pulse 78   Temp 36.9 °C (98.4 °F)   Resp 18   Wt (!) 31.4 kg (69 lb 4.8 oz)   SpO2 96%   Intake/Output last 3 Shifts:  No intake or output data in the 24 hours ending 10/16/24 0606      Admission Weight  Weight: (!) 31.4 kg (69 lb 4.8 oz) (10/10/24 0200)    Daily Weight  10/10/24 : (!) 31.4 kg (69 lb 4.8 oz)    No results found for this or any previous visit (from the past 24 hours).        Susceptibility data from last 90 days.  Collected Specimen Info Organism Amoxicillin/Clavulanate Ampicillin Ampicillin/Sulbactam Cefazolin Cefazolin (uncomplicated UTIs only) Ciprofloxacin Gentamicin Nitrofurantoin Piperacillin/Tazobactam Trimethoprim/Sulfamethoxazole   10/11/24 Urine from Clean Catch/Voided Escherichia coli   S   S  S  S  S  S  S  S   10/09/24 Urine from Clean Catch/Voided Escherichia coli   S   S  S  S  S  S  S  S   09/03/24 Blood culture from Peripheral Venipuncture Staphylococcus epidermidis             09/02/24 Urine from Clean Catch/Voided Escherichia coli  I  R  R  S  S  R  S  S  S  S       Image Results  XR chest 1 view  Narrative: Interpreted By:  Jaime Farah,   STUDY:  XR CHEST 1 VIEW      INDICATION:  Signs/Symptoms:c/f infxn.      COMPARISON:  September 2, 2024      ACCESSION NUMBER(S):  [id number]      ORDERING CLINICIAN:  LYNNE WADE      FINDINGS:  No consolidation, effusion, edema, or pneumothorax. Markedly  atherosclerotic aorta unchanged.      Impression: No evidence of acute intrathoracic abnormality.      Signed by: Jaime Farah 10/12/2024 8:23 AM  Dictation workstation:   [id number]      Physical exam:  Constitutional: cachectic, NAD  HEENT: NCAT,  EOMI  Cardio: RRR, no murmurs/rubs/gallops  Pulm: CTAB, on RA, normal respiratory effort  GI: soft, non-tender, nondistended, no guarding or rebound  Skin: No lesions, contusions, or erythema.  Extremities: no BLE swelling   Neuro: No focal deficits  Psych: Appropriate mood and behavior    Relevant Results  Scheduled medications  acetaminophen, 650 mg, oral, q6h  [Held by provider] amLODIPine, 10 mg, oral, Nightly  ascorbic acid, 500 mg, oral, Daily  baclofen, 10 mg, oral, q6h  butalbital-acetaminophen-caff, 1 tablet, oral, q6h  ferrous sulfate (325 mg ferrous sulfate), 65 mg of iron, oral, Daily with breakfast  heparin (porcine), 5,000 Units, subcutaneous, q8h  lactulose, 30 g, oral, Daily  [Held by provider] losartan, 50 mg, oral, Daily  multivitamin with minerals, 1 tablet, oral, Daily  nicotine, 1 patch, transdermal, Daily  nitrofurantoin (macrocrystal-monohydrate), 100 mg, oral, BID  pantoprazole, 40 mg, oral, BID  polyethylene glycol, 17 g, oral, Daily  sucralfate, 1 g, oral, q6h [place]      Continuous medications     PRN medications  PRN medications: lidocaine, lubricating eye drops      Assessment/Plan      Karly Horton is a 79 y.o. female presenting as a transfer from [facility] with new findings of R lung mass. Pt has a PMHx of COPD, >60 pack year smoking history, Peptic ulcer disease, anemia, and chornic headaches.  Pt was initially admitted to [facility] on 10/5 with AMS after her daughter found her unresponsive on the floor.  Pt is now A&Ox4. Imaging at [facility] revealed RUL mass and pt transferred to  for for interventional pulmonology bronchoscopy and mediastinal staging of lung mass. EBUS w biopsy completed 10/11 results consistent with squamous cell carcinoma.      Updates 10/16/24:  -EBUS 10/11 pathology consistent with squamous cell carcinoma  -Oncology consulted, appreciate recommendations  -Vitamin D3 2000U started per nutrition   -Addended CT 10/6 from [facility] - In the 1st impression the  1st sentence should have indicated that the multiple masses are within the right and left lungs rather than right and left lobes of the liver. There are no liver lesions.       #RUL and hilar mass concerning for malignancy  - CTAP revealed a large 3.1 cm lower pole mass right hilum in a 2.2 cm spiculated mass apicoposterior segment right upper lobe  - Pulmonology consultation for bronchoscopy, deferred 10/10 by anesthesia for concerns of pulmonary HTN on Echo 5/16/24, RVSP 64.3 mmHg. Repeat Echo 10/10/24 - RVSP 31.1 mmHg   -EBUS 10/11 pathology consistent with squamous cell carcinoma  -Oncology consulted, appreciate recommendations  -Addended CT 10/6 from [facility] - In the 1st impression the 1st sentence should have indicated that the multiple masses are within the right and left lungs rather than right and left lobes of the liver. There are no liver lesions.     #Asymptomatic bacteriuria   #Leukocytosis, resolved  ::Ucx 10/4 at [facility] no growth  ::UA 10/9 - 3+ blood, 2+ nitrite, 500 leuk est, 1+ bacteria, Ucx E coli pan susceptible   -Initially deferred treatment given asymptomatic but given significant leukocytosis, started Ceftriaxone (10/11 - 10/12) and Nitrofurantoin (10/13 - ) to be completed on 10/15.   -Ucx 10/11 - pansensitive E coli     #HTN  -Hold home Losartan 50mg and Amodipine 10mg  -Hold inpatient due to low pressures 90's/60's  -CTM and restart as tolerated     #Constipation  -Pt endorses baseline constipation but endorses having BM within the last few days  -Continue home Lactulose and Miralax      #Tobacco Abuse  -1PPD since age 15  -Nicotine replacement patch while inpatient  -Patient not interested in cessation     #PUD  -EGD 5/24: Mild erythematous mucosa in the antrum and prepyloric region and Single ulcer in the duodenal bulb with oozing hemorrhage   -Continue home Pantoprazole 40mg BID     #Muscle aches  #TMJ  -Pt reports this is chronic for her and she takes Baclofen, Tylenol, and  Butalbital to keep the symptoms at bay  -When medications don't help pt self-medicates with MJ  -Continue home medications inpatient      #Anemia  -Hgb 12.7 on admission - at baseline  -Continue ferrous sulfate 325mg daily  -CTM     #Malnutrition  #Vitamin D deficiency  -Pt reports while in rehab this summer she lost weight due to 'bad' food   -Denies any changes to her appetite and reports it is 'very good'  -BMI on admission 13.53  -Vit D 27  -Nutrition consulted - started multivitamin, Ensures with protein  -Vitamin D3 2000U started per nutrition         Fluids: Replete PRN  Electrolytes: Keep mg >2, phos >3  and K >4  Nutrition:  minced (edentulous, per pt request)   Antimicrobials: CTX (10/11-10/12) nitrofurantoin (10/13-10/14)   DVT PPX: Heparin subq  GI ppx: Pantoprazole   Lines: PIV  Oxygen: Room Air     Pain regimen: Lidocaine patch and gel, 650mg Q6H Tylenol scheduled  Bowel care: Miralax and lactulose     Code Status: Full Code (confirmed on admission)   NOK:  Karly Pro, [phone]         Jackie Pedraza MD  Internal Medicine PGY-1

## 2024-10-16 NOTE — CARE PLAN
The patient's goals for the shift include pt. will have decreased pain to posterior neck.    The clinical goals for the shift include pt will remain safe

## 2024-10-17 LAB
ALBUMIN SERPL BCP-MCNC: 3.3 G/DL (ref 3.4–5)
ANION GAP SERPL CALC-SCNC: 14 MMOL/L (ref 10–20)
BUN SERPL-MCNC: 11 MG/DL (ref 6–23)
CALCIUM SERPL-MCNC: 8.9 MG/DL (ref 8.6–10.6)
CHLORIDE SERPL-SCNC: 96 MMOL/L (ref 98–107)
CO2 SERPL-SCNC: 27 MMOL/L (ref 21–32)
CREAT SERPL-MCNC: 0.48 MG/DL (ref 0.5–1.05)
EGFRCR SERPLBLD CKD-EPI 2021: >90 ML/MIN/1.73M*2
GLUCOSE SERPL-MCNC: 125 MG/DL (ref 74–99)
PHOSPHATE SERPL-MCNC: 4 MG/DL (ref 2.5–4.9)
POTASSIUM SERPL-SCNC: 3.7 MMOL/L (ref 3.5–5.3)
SODIUM SERPL-SCNC: 133 MMOL/L (ref 136–145)

## 2024-10-17 PROCEDURE — 97110 THERAPEUTIC EXERCISES: CPT | Mod: GP,CQ

## 2024-10-17 PROCEDURE — 2500000001 HC RX 250 WO HCPCS SELF ADMINISTERED DRUGS (ALT 637 FOR MEDICARE OP)

## 2024-10-17 PROCEDURE — 36415 COLL VENOUS BLD VENIPUNCTURE: CPT

## 2024-10-17 PROCEDURE — 2500000004 HC RX 250 GENERAL PHARMACY W/ HCPCS (ALT 636 FOR OP/ED)

## 2024-10-17 PROCEDURE — 99232 SBSQ HOSP IP/OBS MODERATE 35: CPT

## 2024-10-17 PROCEDURE — 1210000001 HC SEMI-PRIVATE ROOM DAILY

## 2024-10-17 PROCEDURE — 99221 1ST HOSP IP/OBS SF/LOW 40: CPT | Performed by: PHYSICIAN ASSISTANT

## 2024-10-17 PROCEDURE — 80069 RENAL FUNCTION PANEL: CPT

## 2024-10-17 PROCEDURE — 2500000002 HC RX 250 W HCPCS SELF ADMINISTERED DRUGS (ALT 637 FOR MEDICARE OP, ALT 636 FOR OP/ED)

## 2024-10-17 PROCEDURE — S4991 NICOTINE PATCH NONLEGEND: HCPCS

## 2024-10-17 PROCEDURE — 97116 GAIT TRAINING THERAPY: CPT | Mod: GP,CQ

## 2024-10-17 ASSESSMENT — COGNITIVE AND FUNCTIONAL STATUS - GENERAL
CLIMB 3 TO 5 STEPS WITH RAILING: A LOT
STANDING UP FROM CHAIR USING ARMS: A LITTLE
CLIMB 3 TO 5 STEPS WITH RAILING: A LITTLE
WALKING IN HOSPITAL ROOM: A LITTLE
HELP NEEDED FOR BATHING: A LITTLE
TURNING FROM BACK TO SIDE WHILE IN FLAT BAD: A LITTLE
HELP NEEDED FOR BATHING: A LITTLE
DAILY ACTIVITIY SCORE: 19
MOBILITY SCORE: 18
DRESSING REGULAR UPPER BODY CLOTHING: A LITTLE
TURNING FROM BACK TO SIDE WHILE IN FLAT BAD: A LITTLE
MOVING TO AND FROM BED TO CHAIR: A LITTLE
MOVING FROM LYING ON BACK TO SITTING ON SIDE OF FLAT BED WITH BEDRAILS: A LITTLE
DRESSING REGULAR LOWER BODY CLOTHING: A LITTLE
MOVING FROM LYING ON BACK TO SITTING ON SIDE OF FLAT BED WITH BEDRAILS: A LITTLE
WALKING IN HOSPITAL ROOM: A LOT
TOILETING: A LITTLE
MOVING TO AND FROM BED TO CHAIR: A LITTLE
STANDING UP FROM CHAIR USING ARMS: A LITTLE
DRESSING REGULAR LOWER BODY CLOTHING: A LITTLE
CLIMB 3 TO 5 STEPS WITH RAILING: A LOT
WALKING IN HOSPITAL ROOM: A LOT
PERSONAL GROOMING: A LITTLE
MOBILITY SCORE: 16
MOVING TO AND FROM BED TO CHAIR: A LITTLE
TURNING FROM BACK TO SIDE WHILE IN FLAT BAD: A LITTLE
TOILETING: A LITTLE
MOBILITY SCORE: 16
DRESSING REGULAR UPPER BODY CLOTHING: A LITTLE
MOVING FROM LYING ON BACK TO SITTING ON SIDE OF FLAT BED WITH BEDRAILS: A LITTLE
STANDING UP FROM CHAIR USING ARMS: A LITTLE
PERSONAL GROOMING: A LITTLE
DAILY ACTIVITIY SCORE: 19

## 2024-10-17 ASSESSMENT — PAIN SCALES - GENERAL
PAINLEVEL_OUTOF10: 5 - MODERATE PAIN
PAINLEVEL_OUTOF10: 0 - NO PAIN
PAINLEVEL_OUTOF10: 5 - MODERATE PAIN

## 2024-10-17 ASSESSMENT — PAIN DESCRIPTION - ORIENTATION: ORIENTATION: POSTERIOR

## 2024-10-17 ASSESSMENT — PAIN - FUNCTIONAL ASSESSMENT: PAIN_FUNCTIONAL_ASSESSMENT: 0-10

## 2024-10-17 ASSESSMENT — PAIN DESCRIPTION - LOCATION: LOCATION: NECK

## 2024-10-17 NOTE — PROGRESS NOTES
Karly Horton is a 79 y.o. female on day 7 of admission presenting with Lung mass.      Subjective   No acute events overnight. Patient denies any active pain. She denies fevers, cough, dyspnea, dysuria, urinary frequency.        Objective     Last Recorded Vitals  /63   Pulse 75   Temp 36.9 °C (98.4 °F)   Resp 17   Wt (!) 31.4 kg (69 lb 4.8 oz)   SpO2 97%   Intake/Output last 3 Shifts:  No intake or output data in the 24 hours ending 10/17/24 0612      Admission Weight  Weight: (!) 31.4 kg (69 lb 4.8 oz) (10/10/24 0200)    Daily Weight  10/10/24 : (!) 31.4 kg (69 lb 4.8 oz)    No results found for this or any previous visit (from the past 24 hours).        Susceptibility data from last 90 days.  Collected Specimen Info Organism Amoxicillin/Clavulanate Ampicillin Ampicillin/Sulbactam Cefazolin Cefazolin (uncomplicated UTIs only) Ciprofloxacin Gentamicin Nitrofurantoin Piperacillin/Tazobactam Trimethoprim/Sulfamethoxazole   10/11/24 Urine from Clean Catch/Voided Escherichia coli   S   S  S  S  S  S  S  S   10/09/24 Urine from Clean Catch/Voided Escherichia coli   S   S  S  S  S  S  S  S   09/03/24 Blood culture from Peripheral Venipuncture Staphylococcus epidermidis             09/02/24 Urine from Clean Catch/Voided Escherichia coli  I  R  R  S  S  R  S  S  S  S       Image Results  XR chest 1 view  Narrative: Interpreted By:  Jaime Farah,   STUDY:  XR CHEST 1 VIEW      INDICATION:  Signs/Symptoms:c/f infxn.      COMPARISON:  September 2, 2024      ACCESSION NUMBER(S):  [id number]      ORDERING CLINICIAN:  LYNNE WADE      FINDINGS:  No consolidation, effusion, edema, or pneumothorax. Markedly  atherosclerotic aorta unchanged.      Impression: No evidence of acute intrathoracic abnormality.      Signed by: Jaime Farah 10/12/2024 8:23 AM  Dictation workstation:   [id number]      Physical exam:  Constitutional: cachectic, NAD  HEENT: NCAT, EOMI  Cardio: RRR, no murmurs/rubs/gallops  Pulm:  CTAB, on RA, normal respiratory effort  GI: soft, non-tender, nondistended, no guarding or rebound  Skin: No lesions, contusions, or erythema.  Extremities: no BLE swelling   Neuro: No focal deficits  Psych: Appropriate mood and behavior    Relevant Results  Scheduled medications  acetaminophen, 650 mg, oral, q6h  [Held by provider] amLODIPine, 10 mg, oral, Nightly  ascorbic acid, 500 mg, oral, Daily  baclofen, 10 mg, oral, q6h  butalbital-acetaminophen-caff, 1 tablet, oral, q6h  cholecalciferol, 2,000 Units, oral, Daily  ferrous sulfate (325 mg ferrous sulfate), 65 mg of iron, oral, Daily with breakfast  heparin (porcine), 5,000 Units, subcutaneous, q8h  lactulose, 30 g, oral, Daily  [Held by provider] losartan, 50 mg, oral, Daily  multivitamin with minerals, 1 tablet, oral, Daily  nicotine, 1 patch, transdermal, Daily  pantoprazole, 40 mg, oral, BID  polyethylene glycol, 17 g, oral, Daily  sucralfate, 1 g, oral, q6h [place]      Continuous medications     PRN medications  PRN medications: lidocaine, lubricating eye drops      Assessment/Plan      Karly Horton is a 79 y.o. female presenting as a transfer from [facility] with new findings of R lung mass. Pt has a PMHx of COPD, >60 pack year smoking history, Peptic ulcer disease, anemia, and chornic headaches.  Pt was initially admitted to [facility] on 10/5 with AMS after her daughter found her unresponsive on the floor.  Pt is now A&Ox4. Imaging at [facility] revealed RUL mass and pt transferred to  for for interventional pulmonology bronchoscopy and mediastinal staging of lung mass. EBUS w biopsy completed 10/11 results consistent with squamous cell carcinoma.      Updates 10/17/24:  -EBUS 10/11 pathology consistent with squamous cell carcinoma  -Oncology consulted - recommend MRI brain with and without contrast, PET/CT scan, CT chest with contrast. Radiation as outpatient at  [facility].  -Barrier to discharge - arranging transportation for  outpatient appointments      #RUL and hilar mass concerning for malignancy  ::CTAP revealed a large 3.1 cm lower pole mass right hilum in a 2.2 cm spiculated mass apicoposterior segment right upper lobe  ::Addended CT 10/6 from [facility] - In the 1st impression the 1st sentence should have indicated that the multiple masses are within the right and left lungs rather than right and left lobes of the liver. There are no liver lesions.  ::Pulmonology consultation for bronchoscopy, deferred 10/10 by anesthesia for concerns of pulmonary HTN on Echo 5/16/24, RVSP 64.3 mmHg. Repeat Echo 10/10/24 - RVSP 31.1 mmHg   -EBUS 10/11 pathology consistent with squamous cell carcinoma  -Oncology consulted - recommend MRI brain with and without contrast, PET/CT scan, CT chest with contrast. Radiation as outpatient at  [facility] thoracic oncology.    #Asymptomatic bacteriuria   #Leukocytosis, resolved  ::Ucx 10/4 at [facility] no growth  ::UA 10/9 - 3+ blood, 2+ nitrite, 500 leuk est, 1+ bacteria, Ucx E coli pan susceptible   -Initially deferred treatment given asymptomatic but given significant leukocytosis, started Ceftriaxone (10/11 - 10/12) and Nitrofurantoin (10/13 - 10/15)   -Ucx 10/11 - pansensitive E coli     #HTN  -Hold home Losartan 50mg and Amodipine 10mg  -Hold inpatient due to low pressures 90's/60's  -CTM and restart as tolerated     #Constipation  -Pt endorses baseline constipation but endorses having BM within the last few days  -Continue home Lactulose and Miralax      #Tobacco Abuse  -1PPD since age 15  -Nicotine replacement patch while inpatient  -Patient not interested in cessation     #PUD  -EGD 5/24: Mild erythematous mucosa in the antrum and prepyloric region and Single ulcer in the duodenal bulb with oozing hemorrhage   -Continue home Pantoprazole 40mg BID     #Muscle aches  #TMJ  #Scoliosis  -Pt reports this is chronic for her and she takes Baclofen, Tylenol, and Butalbital to keep the symptoms at bay  -When  medications don't help pt self-medicates with MJ  -Continue home medications inpatient      #Anemia  -Hgb 12.7 on admission - at baseline  -Continue ferrous sulfate 325mg daily  -CTM     #Malnutrition  #Vitamin D deficiency  -Pt reports while in rehab this summer she lost weight due to 'bad' food   -Denies any changes to her appetite and reports it is 'very good'  -BMI on admission 13.53  -Vit D 27  -Nutrition consulted - started multivitamin, Ensures with protein  -Vitamin D3 2000U started per nutrition         Fluids: Replete PRN  Electrolytes: Replete PRN  Nutrition:  minced (edentulous, per pt request)   Antimicrobials: CTX (10/11-10/12) nitrofurantoin (10/13-10/15)   DVT PPX: Heparin subq  GI ppx: Pantoprazole   Lines: PIV  Oxygen: Room Air     Pain regimen: Lidocaine cream, 650mg Q6H Tylenol scheduled  Bowel care: Miralax and lactulose     Code Status: Full Code (confirmed on admission)   NOK:  Karly Pro, [phone]         Jackie Pedraza MD  Internal Medicine PGY-1

## 2024-10-17 NOTE — CARE PLAN
The patient's goals for the shift include pt. will have decreased pain to posterior neck.    The clinical goals for the shift include Patient will remain safe and free from falls during shift    Over the shift, the patient did not make progress toward the following goals. Barriers to progression include acute disease process. Recommendations to address these barriers include adherence to treatment plan.

## 2024-10-17 NOTE — CONSULTS
Radiation Oncology Inpatient Consult    Patient Name:  Karly Horton  MRN:  [id number]  :  1945    Referring Provider: Shea Root APRN*  Primary Care Provider: Julian Chaney MD  Care Team: Patient Care Team:  Julian Chaney MD as PCP - General (Internal Medicine)  Jose Guadalupe Morataya MA ()    Date of Service: 10/17/2024     SUBJECTIVE  History of Present Illness:  This is a 79 year old female with a history of tobacco dependence, COPD, HTN, PUD that presents to [facility]   ED on 10/5 after being found unresponsive in her home by her daughter.  She was noted to be A&Ox4 at time of presentation.  She has required previous admissions over recent months for AMS, SOB, pneumonia.  CT Chest wo contrast showed  a 3.1 cm lower pole mass right hilum in  a 2.2 cm spiculated mass apicoposterior segment right upper lobe, concerning for metastatic bronchogenic carcinoma.  Bronchoscopy with biopsy on 10/11 returned findings of SCC.  The patient has been transferred to [facility] for further management.  Radiation Oncology has been asked to assist with care coordination.     The patient is currently found to breathing comfortably on room air.  She endorses a declining PS with a 20 pound weight loss since this past spring, with multiple hospitalizations for UTIs and pneumonia.  She currently lives with her daughter, and requires assistance with ADLs.  She denies hemoptysis, no focal deficits.        Prior Radiotherapy:  no    Current Systemic Treatment:  no     Presence of Pacemaker or ICD:  no    Past Medical History:    Past Medical History:   Diagnosis Date    Other cervical disc degeneration, unspecified cervical region     Degeneration of cervical intervertebral disc    Other conditions influencing health status     Bulging Disc (L3 - L4)    Other conditions influencing health status     A Fall    Other conditions influencing health status     Bulging Disc (C4 - C5)    Other conditions influencing health  status     Bulging Disc (C6 - C7)    Other conditions influencing health status     Bulging Disc (C5 - C6)    Other conditions influencing health status     Lumbar Spondylolisthesis    Other conditions influencing health status     Rotator Cuff Tendon Tear    Other intervertebral disc degeneration, lumbar region     L4-L5 disc bulge    Other intervertebral disc displacement, lumbar region     Disc displacement, lumbar    Other intervertebral disc displacement, lumbosacral region     Displacement of lumbosacral intervertebral disc    Personal history of other diseases of the musculoskeletal system and connective tissue     History of bursitis    Personal history of other diseases of the musculoskeletal system and connective tissue     History of fibromyositis    Personal history of other specified conditions     History of headache    Spinal stenosis, lumbar region without neurogenic claudication     Lumbar canal stenosis    Sprain of ligaments of lumbar spine, initial encounter     Low back sprain      COPD, HTN, tobacco abuse, aspiration pneumonitis, UTIs, PUD, CHF diastolic,     Past Surgical History:    Past Surgical History:   Procedure Laterality Date    APPENDECTOMY      EYE SURGERY      HYSTERECTOMY      TONSILLECTOMY        Repair of duodenal ulcer with bowel resection    Family History:  Cancer-related family history includes Cancer in her mother.    Social History:    Social History     Tobacco Use    Smoking status: Every Day     Current packs/day: 1.00     Average packs/day: 1 pack/day for 64.8 years (64.8 ttl pk-yrs)     Types: Cigarettes     Start date: 1/1/1960     Lives with daughter    Allergies:    Allergies   Allergen Reactions    Demerol [Meperidine] Headache and Nausea/vomiting     Per pt.     Morphine Headache and Nausea/vomiting     Per patient    Spice Flavor Other        Medications:    Current Facility-Administered Medications:     acetaminophen (Tylenol) tablet 650 mg, 650 mg, oral, q6h,  650 mg at 10/17/24 1453 **OR** [DISCONTINUED] acetaminophen (Tylenol) oral liquid 650 mg, 650 mg, nasogastric tube, q4h PRN **OR** [DISCONTINUED] acetaminophen (Tylenol) suppository 650 mg, 650 mg, rectal, q4h PRN, Carmen Henry DO    [Held by provider] amLODIPine (Norvasc) tablet 10 mg, 10 mg, oral, Nightly, Carmen Henry DO    ascorbic acid (Vitamin C) tablet 500 mg, 500 mg, oral, Daily, Carmen Henry DO, 500 mg at 10/17/24 1017    baclofen (Lioresal) tablet 10 mg, 10 mg, oral, q6h, Zohra Ibarra MD, 10 mg at 10/17/24 1453    butalbital-acetaminophen-caff -40 mg per tablet 1 tablet, 1 tablet, oral, q6h, Jackie Pedraza MD, 1 tablet at 10/17/24 1732    cholecalciferol (Vitamin D-3) tablet 2,000 Units, 2,000 Units, oral, Daily, Jackie Pedraza MD, 2,000 Units at 10/17/24 1017    ferrous sulfate (325 mg ferrous sulfate) tablet 325 mg, 65 mg of iron, oral, Daily with breakfast, Carmen Henry DO, 325 mg at 10/17/24 1017    heparin (porcine) injection 5,000 Units, 5,000 Units, subcutaneous, q8h, Zohra Ibarra MD, 5,000 Units at 10/17/24 1732    lactulose 20 gram/30 mL oral solution 30 g, 30 g, oral, Daily, Zohra Ibarra MD, 30 g at 10/17/24 1017    lidocaine (LMX) 4 % cream, , Topical, Daily PRN, Jackie Pedraza MD, Given at 10/13/24 1154    [Held by provider] losartan (Cozaar) tablet 50 mg, 50 mg, oral, Daily, Carmen Henry DO    lubricating eye drops ophthalmic solution 1 drop, 1 drop, Both Eyes, PRN, Carmen Henry DO    multivitamin with minerals 1 tablet, 1 tablet, oral, Daily, Jackie Pedraza MD, 1 tablet at 10/17/24 1017    nicotine (Nicoderm CQ) 21 mg/24 hr patch 1 patch, 1 patch, transdermal, Daily, Carmen Henry DO, 1 patch at 10/17/24 1016    pantoprazole (ProtoNix) EC tablet 40 mg, 40 mg, oral, BID, Carmen Henry DO, 40 mg at 10/16/24 2003    polyethylene glycol (Glycolax, Miralax) packet 17 g, 17 g, oral, Daily, Carmen Henry DO, 17 g at 10/16/24  0937    sucralfate (Carafate) suspension 1 g, 1 g, oral, q6h [facility], Carmen Sepulvedabenson, DO, 1 g at 10/17/24 4672      Review of Systems:    Positive for declining performance status and weight loss over the past 6-7 months, currently denies headaches, neurologic deficits, chest pain, abdominal pain or abnormal bleeding    Performance Status:  The Karnofsky performance scale today is 50, Requires considerable assistance and frequent medical care (ECOG equivalent 2).        OBJECTIVE  Physical Exam:  /55   Pulse 87   Temp 37.1 °C (98.8 °F)   Resp 20   Ht 1.524 m (5')   Wt (!) 31.4 kg (69 lb 4.8 oz)   SpO2 95%   BMI 13.53 kg/m²    General: awake, alert, resting comfortably, petite, frail, nontoxic  HEENT: pupils equal and round, no scleral icterus  Pulmonary: Breathing comfortably at rest   Cardiac: regular rate  Abdomen: soft, nondistended, non tender to palpation   EXT: no peripheral edema, no asymmetry noted  MSK: no focal joint swelling noted  Neuro: A&O x 3, cranial nerves II through XII grossly intact, sensation and strength intact  Psych: Normal affect       Pathology Review:    Surgical Pathology Exam: [id number]  Order: [id number]   Collected 10/11/2024 11:44       Status: Edited Result - FINAL       Visible to patient: No (inaccessible in [facility])       Dx: Lung mass; Heart failure, diastolic, ...    0 Result Notes      Component    FINAL DIAGNOSIS   A.  B.  Lung, right lower lobe mass, biopsies:  - Detached fragments of non-small cell carcinoma, consistent with squamous cell carcinoma.  See note     Note:  Detached fragments of non-small cell carcinoma in a background of abundant necrosis are seen.  Neoplastic cells are immunoreactive with p40.  PD-L1 immunohistochemistry and NGS analysis have been ordered.  Addenda will follow   Electronically signed by Rosalia Conley MD PhD on 10/15/2024 at 1328        By the signature on this report, the individual or group listed as making the Final  Interpretation/Diagnosis certifies that they have reviewed this case.    Addendum   PD-L1 22C3  by Immunohistochemistry with Interpretation, pembrolizumab  (KEYTRUDA)     Block used:  A1     Interpretation: Low expression     Tumor Proportion Score (TPS): 10%             Imaging:     CT CAP w/o contrast  IMPRESSION:  1. There are multiple masses identified within the right and left  lobes of the liver more numerous and larger on the right with  particular note made of a large 3.1 cm lower pole mass right hilum in  a 2.2 cm spiculated mass apicoposterior segment right upper lobe  along with additional masses as detailed above. Malignancy is likely  with metastatic lesions consideration along with primary lung cancer  metastatic to the lungs..  2. Emphysematous change pulmonary parenchyma.  3. Nonspecific appearance of the abdomen and pelvis. Please note that  sensitivity of the abdomen and pelvis assessment is limited due to  the cachectic state of the patient and absence of oral and  intravenous contrast.    Bronchoscopy 10/17  Findings  The trachea, main angelina and left lung appeared normal.  Secretions present in the right main stem, bronchus intermedius, RML and RLL; performed therapeutic aspiration. After suctioning, the airways were patent with exception of the RLL basal segment.  Localized friable mucosa in the right medial basal segment (RB7). Mass obstructs the medial-basal segment of the RLL.       ASSESSMENT:  This is a 79 year old female with a history of tobacco dependence, COPD, HTN, PUD that presents to OSH   ED on 10/5 after being found unresponsive in her home by her daughter.  She was noted to be A&Ox4 at time of presentation.  She has required previous admissions over recent months for AMS, SOB, pneumonia.  CT Chest wo contrast showed  a 3.1 cm lower pole mass right hilum in  a 2.2 cm spiculated mass apicoposterior segment right upper lobe, concerning for metastatic bronchogenic carcinoma.   Bronchoscopy with biopsy on 10/11 returned findings of SCC.  The patient has been transferred to [facility] for further management.  Radiation Oncology has been asked to assist with care coordination.     PLAN:    The patient has been assessed by medical oncology and reviewed with my attending physician, Dr. Dueñas.    Imaging results, and the indications for palliative versus definitive radiotherapy were discussed with the patient.  CT imaging without contrast appears to show metastatic changes.  PET/CT is pending.  MRI brain with and without contrast is pending.  Currently, there are no indications for emergent inpatient radiotherapy.  We will continue to follow and assist with care coordination.    I spent 45  minutes in the professional and overall care of this patient.

## 2024-10-17 NOTE — PROGRESS NOTES
Transitional Care Coordination Progress Note:  Patient discussed during interdisciplinary rounds.   Team members present: MD, RANDAL  Plan per Medical/Surgical team: Lung Mass; pending MRI and PET scan  Payor: Medicare  Discharge disposition: Home with Bia Duque   Potential Barriers: medical  ADOD: 1-2 days  Will continue to monitor for discharge planning needs.     Tabatha WEISSN, RN  Transitional Care Coordinator (TCC)  [phone]

## 2024-10-18 ENCOUNTER — APPOINTMENT (OUTPATIENT)
Dept: RADIOLOGY | Facility: HOSPITAL | Age: 79
DRG: 180 | End: 2024-10-18
Payer: MEDICARE

## 2024-10-18 LAB — GLUCOSE BLD MANUAL STRIP-MCNC: 105 MG/DL (ref 74–99)

## 2024-10-18 PROCEDURE — 78815 PET IMAGE W/CT SKULL-THIGH: CPT | Mod: PET TUMOR INIT TX STRAT | Performed by: NUCLEAR MEDICINE

## 2024-10-18 PROCEDURE — 70553 MRI BRAIN STEM W/O & W/DYE: CPT | Performed by: RADIOLOGY

## 2024-10-18 PROCEDURE — 2500000004 HC RX 250 GENERAL PHARMACY W/ HCPCS (ALT 636 FOR OP/ED)

## 2024-10-18 PROCEDURE — S4991 NICOTINE PATCH NONLEGEND: HCPCS

## 2024-10-18 PROCEDURE — 3430000001 HC RX 343 DIAGNOSTIC RADIOPHARMACEUTICALS: Performed by: STUDENT IN AN ORGANIZED HEALTH CARE EDUCATION/TRAINING PROGRAM

## 2024-10-18 PROCEDURE — A9552 F18 FDG: HCPCS | Performed by: STUDENT IN AN ORGANIZED HEALTH CARE EDUCATION/TRAINING PROGRAM

## 2024-10-18 PROCEDURE — 78815 PET IMAGE W/CT SKULL-THIGH: CPT | Mod: PI

## 2024-10-18 PROCEDURE — 71260 CT THORAX DX C+: CPT | Performed by: RADIOLOGY

## 2024-10-18 PROCEDURE — 2500000001 HC RX 250 WO HCPCS SELF ADMINISTERED DRUGS (ALT 637 FOR MEDICARE OP)

## 2024-10-18 PROCEDURE — 99232 SBSQ HOSP IP/OBS MODERATE 35: CPT

## 2024-10-18 PROCEDURE — 2550000001 HC RX 255 CONTRASTS: Performed by: STUDENT IN AN ORGANIZED HEALTH CARE EDUCATION/TRAINING PROGRAM

## 2024-10-18 PROCEDURE — 70553 MRI BRAIN STEM W/O & W/DYE: CPT

## 2024-10-18 PROCEDURE — 2500000002 HC RX 250 W HCPCS SELF ADMINISTERED DRUGS (ALT 637 FOR MEDICARE OP, ALT 636 FOR OP/ED)

## 2024-10-18 PROCEDURE — 82947 ASSAY GLUCOSE BLOOD QUANT: CPT

## 2024-10-18 PROCEDURE — 1210000001 HC SEMI-PRIVATE ROOM DAILY

## 2024-10-18 PROCEDURE — 71260 CT THORAX DX C+: CPT

## 2024-10-18 PROCEDURE — A9575 INJ GADOTERATE MEGLUMI 0.1ML: HCPCS | Performed by: STUDENT IN AN ORGANIZED HEALTH CARE EDUCATION/TRAINING PROGRAM

## 2024-10-18 RX ORDER — IOPAMIDOL 612 MG/ML
100 INJECTION, SOLUTION INTRAVASCULAR
Status: CANCELLED | OUTPATIENT
Start: 2024-10-18

## 2024-10-18 RX ORDER — FLUDEOXYGLUCOSE F 18 200 MCI/ML
12.7 INJECTION, SOLUTION INTRAVENOUS
Status: COMPLETED | OUTPATIENT
Start: 2024-10-18 | End: 2024-10-18

## 2024-10-18 RX ORDER — GADOTERATE MEGLUMINE 376.9 MG/ML
6 INJECTION INTRAVENOUS
Status: COMPLETED | OUTPATIENT
Start: 2024-10-18 | End: 2024-10-18

## 2024-10-18 ASSESSMENT — COGNITIVE AND FUNCTIONAL STATUS - GENERAL
HELP NEEDED FOR BATHING: A LITTLE
MOBILITY SCORE: 16
MOVING FROM LYING ON BACK TO SITTING ON SIDE OF FLAT BED WITH BEDRAILS: A LITTLE
WALKING IN HOSPITAL ROOM: A LOT
DRESSING REGULAR UPPER BODY CLOTHING: A LITTLE
PERSONAL GROOMING: A LITTLE
DAILY ACTIVITIY SCORE: 19
CLIMB 3 TO 5 STEPS WITH RAILING: A LOT
TURNING FROM BACK TO SIDE WHILE IN FLAT BAD: A LITTLE
MOVING TO AND FROM BED TO CHAIR: A LITTLE
STANDING UP FROM CHAIR USING ARMS: A LITTLE
DRESSING REGULAR LOWER BODY CLOTHING: A LITTLE
TOILETING: A LITTLE

## 2024-10-18 ASSESSMENT — PAIN SCALES - GENERAL
PAINLEVEL_OUTOF10: 0 - NO PAIN
PAINLEVEL_OUTOF10: 0 - NO PAIN

## 2024-10-18 ASSESSMENT — PAIN SCALES - WONG BAKER: WONGBAKER_NUMERICALRESPONSE: NO HURT

## 2024-10-18 NOTE — CARE PLAN
The patient's goals for the shift include pt. will have decreased pain to posterior neck.    The clinical goals for the shift include Patient will remain safe during shift    Over the shift, the patient did not make progress toward the following goals. Barriers to progression include acute disease process. Recommendations to address these barriers include adherence to treatment plan.

## 2024-10-18 NOTE — PROGRESS NOTES
Karly Horton is a 79 y.o. female on day 8 of admission presenting with Lung mass.      Subjective   No acute events overnight. Patient denies any active pain. She denies fevers, chills, SOB, cough, dyspnea, dysuria, urinary frequency.          Objective     Last Recorded Vitals  /61 (BP Location: Left arm, Patient Position: Lying)   Pulse 75   Temp 36.3 °C (97.3 °F) (Temporal)   Resp 16   Wt (!) 31.4 kg (69 lb 4.8 oz)   SpO2 99%   Intake/Output last 3 Shifts:  No intake or output data in the 24 hours ending 10/18/24 1105      Admission Weight  Weight: (!) 31.4 kg (69 lb 4.8 oz) (10/10/24 0200)    Daily Weight  10/10/24 : (!) 31.4 kg (69 lb 4.8 oz)    Results for orders placed or performed during the hospital encounter of 10/10/24 (from the past 24 hours)   Renal function panel   Result Value Ref Range    Glucose 125 (H) 74 - 99 mg/dL    Sodium 133 (L) 136 - 145 mmol/L    Potassium 3.7 3.5 - 5.3 mmol/L    Chloride 96 (L) 98 - 107 mmol/L    Bicarbonate 27 21 - 32 mmol/L    Anion Gap 14 10 - 20 mmol/L    Urea Nitrogen 11 6 - 23 mg/dL    Creatinine 0.48 (L) 0.50 - 1.05 mg/dL    eGFR >90 >60 mL/min/1.73m*2    Calcium 8.9 8.6 - 10.6 mg/dL    Phosphorus 4.0 2.5 - 4.9 mg/dL    Albumin 3.3 (L) 3.4 - 5.0 g/dL   POCT GLUCOSE   Result Value Ref Range    POCT Glucose 105 (H) 74 - 99 mg/dL           Susceptibility data from last 90 days.  Collected Specimen Info Organism Amoxicillin/Clavulanate Ampicillin Ampicillin/Sulbactam Cefazolin Cefazolin (uncomplicated UTIs only) Ciprofloxacin Gentamicin Nitrofurantoin Piperacillin/Tazobactam Trimethoprim/Sulfamethoxazole   10/11/24 Urine from Clean Catch/Voided Escherichia coli   S   S  S  S  S  S  S  S   10/09/24 Urine from Clean Catch/Voided Escherichia coli   S   S  S  S  S  S  S  S   09/03/24 Blood culture from Peripheral Venipuncture Staphylococcus epidermidis             09/02/24 Urine from Clean Catch/Voided Escherichia coli  I  R  R  S  S  R  S  S  S  S        Image Results  CT chest w IV contrast  Narrative: Interpreted By:  Dejon Thomas,   STUDY:  CT CHEST W IV CONTRAST;  10/18/2024 1:41 am      INDICATION:  Signs/Symptoms:New cancer diagnosis evalauting for mets.          COMPARISON:  CT dated 10/06/2024      ACCESSION NUMBER(S):  [id number]      ORDERING CLINICIAN:  LYNNE WADE      TECHNIQUE:  Helical data acquisition of the chest was obtained following  intravenous administration of 40 ML Omnipaque 350.  Images were  reformatted in axial, coronal, and sagittal planes.      FINDINGS:  Please note limited evaluation secondary to artifact patient arm  positioning.      LUNGS AND AIRWAYS:  The trachea and central airways are patent. No endobronchial lesion  is seen.Scattered secretions within the trachea and right mainstem  bronchus. There is abrupt cutoff of multiple right lower and right  middle lobar bronchi secondary to below described parenchymal mass.      Centered within the right lower lobe, there is a mass with spiculated  margins measuring up to 3.1 cm. The mass traverses and crosses the  major fissure with extension into the right middle lobe. In addition,  there is a spiculated right upper lobe nodule measuring up to 2.3 cm  with extension to the surface of the superior right major fissure and  associated fissural retraction. Multiple additional bilateral  pulmonary nodules are not significantly changed to include a left  lower lobe nodule measuring 0.7 cm and a right lower lobe nodule  measuring 0.5 cm. There are no new nodules that have developed in the  past 12 days. Have developed since the scan from 12 days prior.      MEDIASTINUM AND CAROL, LOWER NECK AND AXILLA:  The visualized thyroid gland is within normal limits.  No evidence of thoracic lymphadenopathy by CT criteria.  Esophagus appears within normal limits as seen.      HEART AND VESSELS:  The thoracic aorta normal in course and caliber.There is moderate to  severe atherosclerosis  present, including calcified and noncalcified  plaques. Main pulmonary artery and its branches are normal in caliber.  Severe coronary artery calcifications are seen. Please note,the study  is not optimized for evaluation of coronary arteries. The cardiac  chambers are not enlarged. There is no pericardial effusion seen.      UPPER ABDOMEN:  Stable nonspecific calcification along the anterior left hepatic lobe.              CHEST WALL AND OSSEOUS STRUCTURES:  Chest wall is within normal limits.  No acute osseous pathology.There are no suspicious osseous lesions.      Impression: 1.  No significant interval change in findings of a pulmonary  parenchymal mass measuring 3.1 cm centered in the right lower lobe  with extension into the right middle lobe across the major fissure as  well as additional multifocal bilateral pulmonary nodules as  described which are concerning for metastatic disease. No new nodules  have developed since the scan from 12 days prior.  2. Severe coronary artery calcifications.      Signed by: Dejon Thomas 10/18/2024 10:16 AM  Dictation workstation:   [id number]      Physical exam:  Constitutional: cachectic, NAD  HEENT: NCAT, EOMI  Cardio: RRR, no murmurs/rubs/gallops  Pulm: CTAB, on RA, normal respiratory effort  GI: soft, non-tender, nondistended, no guarding or rebound  Skin: No lesions, contusions, or erythema.  Extremities: no BLE swelling   Neuro: No focal deficits  Psych: Appropriate mood and behavior    Relevant Results  Scheduled medications  acetaminophen, 650 mg, oral, q6h  [Held by provider] amLODIPine, 10 mg, oral, Nightly  ascorbic acid, 500 mg, oral, Daily  baclofen, 10 mg, oral, q6h  butalbital-acetaminophen-caff, 1 tablet, oral, q6h  cholecalciferol, 2,000 Units, oral, Daily  ferrous sulfate (325 mg ferrous sulfate), 65 mg of iron, oral, Daily with breakfast  heparin (porcine), 5,000 Units, subcutaneous, q8h  lactulose, 30 g, oral, Daily  [Held by provider] losartan, 50 mg,  oral, Daily  multivitamin with minerals, 1 tablet, oral, Daily  nicotine, 1 patch, transdermal, Daily  pantoprazole, 40 mg, oral, BID  polyethylene glycol, 17 g, oral, Daily  sucralfate, 1 g, oral, q6h [place]      Continuous medications     PRN medications  PRN medications: lidocaine, lubricating eye drops      Assessment/Plan      Karly Horton is a 79 y.o. female presenting as a transfer from [facility] with new findings of R lung mass. Pt has a PMHx of COPD, >60 pack year smoking history, Peptic ulcer disease, anemia, and chornic headaches.  Pt was initially admitted to [facility] on 10/5 with AMS after her daughter found her unresponsive on the floor.  Pt is now A&Ox4. Imaging at [facility] revealed RUL mass and pt transferred to  for for interventional pulmonology bronchoscopy and mediastinal staging of lung mass. EBUS w biopsy completed 10/11 results consistent with squamous cell carcinoma.      Updates 10/18/24:  -CT chest w IV contrast 10/18 - No significant interval change in findings of a pulmonary parenchymal mass measuring 3.1 cm centered in the right lower lobe with extension into the right middle lobe across the major fissure as  well as additional multifocal bilateral pulmonary nodules as described which are concerning for metastatic disease. No new nodules have developed since the scan from 12 days prior.  -PET scan completed 10/18, read pending  -MRI brain ordered   -Radiation Oncology consulted - no current indications for emergent inpatient radiotherapy, will continue to follow and assist with care coordination      #RUL and hilar mass  #Squamous cell carcinoma of lung  ::CTAP revealed a large 3.1 cm lower pole mass right hilum in a 2.2 cm spiculated mass apicoposterior segment right upper lobe  ::Addended CT 10/6 from [facility] - In the 1st impression the 1st sentence should have indicated that the multiple masses are within the right and left lungs rather than right and left lobes of the liver.  There are no liver lesions.  ::Pulmonology consultation for bronchoscopy, deferred 10/10 by anesthesia for concerns of pulmonary HTN on Echo 5/16/24, RVSP 64.3 mmHg. Repeat Echo 10/10/24 - RVSP 31.1 mmHg   -EBUS 10/11 pathology consistent with squamous cell carcinoma  -Oncology consulted - recommend MRI brain with and without contrast, PET/CT scan, CT chest with contrast. Radiation as outpatient at [facility] thoracic oncology.  -CT chest w IV contrast 10/18 - No significant interval change in findings of a pulmonary parenchymal mass measuring 3.1 cm centered in the right lower lobe with extension into the right middle lobe across the major fissure as  well as additional multifocal bilateral pulmonary nodules as described which are concerning for metastatic disease. No new nodules have developed since the scan from 12 days prior.  -PET scan completed 10/18, read pending  -MRI brain ordered   -Radiation Oncology consulted - no current indications for emergent inpatient radiotherapy, will continue to follow and assist with care coordination    #Asymptomatic bacteriuria   #Leukocytosis, resolved  ::Ucx 10/4 at [facility] no growth  ::UA 10/9 - 3+ blood, 2+ nitrite, 500 leuk est, 1+ bacteria, Ucx E coli pan susceptible   -Initially deferred treatment given asymptomatic but given significant leukocytosis, started Ceftriaxone (10/11 - 10/12) and Nitrofurantoin (10/13 - 10/15)   -Ucx 10/11 - pansensitive E coli     #HTN  ::On Losartan 50mg and Amodipine 10mg at home  -Hold inpatient due to low pressures 90's/60's  -CTM and restart as tolerated     #Constipation  ::Pt endorses baseline constipation but endorses having BM within the last few days  -Continue home Lactulose and Miralax      #Tobacco Abuse  ::1PPD since age 15  -Nicotine replacement patch while inpatient  -Patient not interested in cessation     #PUD  ::EGD 5/24: Mild erythematous mucosa in the antrum and prepyloric region and Single ulcer in the duodenal bulb  with oozing hemorrhage   -Continue home Pantoprazole 40mg BID     #Muscle aches  #TMJ  #Scoliosis  ::Pt reports this is chronic for her and she takes Baclofen, Tylenol, and Butalbital to keep the symptoms at bay  ::When medications don't help pt self-medicates with marijuana  -Continue home medications inpatient      #Anemia  ::Hgb 12.7 on admission - at baseline  -Continue ferrous sulfate 325mg daily  -CTM     #Malnutrition  #Vitamin D deficiency  ::Pt reports while in rehab this summer she lost weight due to 'bad' food   ::Denies any changes to her appetite and reports it is 'very good'  ::BMI on admission 13.53  ::Vit D 27  -Nutrition consulted - started multivitamin, Ensures with protein  -Vitamin D3 2000U started per nutrition           Fluids: Replete PRN  Electrolytes: Replete PRN  Nutrition:  minced (edentulous, per pt request)   Antimicrobials: CTX (10/11-10/12) nitrofurantoin (10/13-10/15) completed  DVT PPX: Heparin subq  GI ppx: Pantoprazole   Lines: PIV  Oxygen: Room Air     Pain regimen: Lidocaine cream, 650mg Q6H Tylenol scheduled  Bowel care: Miralax and lactulose     Code Status: Full Code (confirmed on admission)   NOK:  Karly Pro, [phone]     Jackie Pedraza MD  Internal Medicine PGY-1

## 2024-10-18 NOTE — CARE PLAN
The patient's goals for the shift include pt. will have decreased pain to posterior neck.    The clinical goals for the shift include increase activity

## 2024-10-19 PROCEDURE — 2500000001 HC RX 250 WO HCPCS SELF ADMINISTERED DRUGS (ALT 637 FOR MEDICARE OP)

## 2024-10-19 PROCEDURE — 2500000002 HC RX 250 W HCPCS SELF ADMINISTERED DRUGS (ALT 637 FOR MEDICARE OP, ALT 636 FOR OP/ED)

## 2024-10-19 PROCEDURE — 2500000004 HC RX 250 GENERAL PHARMACY W/ HCPCS (ALT 636 FOR OP/ED)

## 2024-10-19 PROCEDURE — 1210000001 HC SEMI-PRIVATE ROOM DAILY

## 2024-10-19 PROCEDURE — 99232 SBSQ HOSP IP/OBS MODERATE 35: CPT

## 2024-10-19 PROCEDURE — S4991 NICOTINE PATCH NONLEGEND: HCPCS

## 2024-10-19 ASSESSMENT — COGNITIVE AND FUNCTIONAL STATUS - GENERAL
TURNING FROM BACK TO SIDE WHILE IN FLAT BAD: A LITTLE
TOILETING: A LITTLE
DRESSING REGULAR LOWER BODY CLOTHING: A LITTLE
CLIMB 3 TO 5 STEPS WITH RAILING: A LOT
MOVING FROM LYING ON BACK TO SITTING ON SIDE OF FLAT BED WITH BEDRAILS: A LITTLE
HELP NEEDED FOR BATHING: A LITTLE
DAILY ACTIVITIY SCORE: 19
WALKING IN HOSPITAL ROOM: A LOT
STANDING UP FROM CHAIR USING ARMS: A LITTLE
DRESSING REGULAR UPPER BODY CLOTHING: A LITTLE
PERSONAL GROOMING: A LITTLE
MOVING TO AND FROM BED TO CHAIR: A LITTLE
MOBILITY SCORE: 16

## 2024-10-19 ASSESSMENT — PAIN - FUNCTIONAL ASSESSMENT
PAIN_FUNCTIONAL_ASSESSMENT: 0-10

## 2024-10-19 ASSESSMENT — PAIN DESCRIPTION - DESCRIPTORS
DESCRIPTORS: ACHING

## 2024-10-19 ASSESSMENT — PAIN SCALES - GENERAL
PAINLEVEL_OUTOF10: 4
PAINLEVEL_OUTOF10: 3
PAINLEVEL_OUTOF10: 6
PAINLEVEL_OUTOF10: 2
PAINLEVEL_OUTOF10: 5 - MODERATE PAIN
PAINLEVEL_OUTOF10: 5 - MODERATE PAIN
PAINLEVEL_OUTOF10: 0 - NO PAIN

## 2024-10-19 ASSESSMENT — ACTIVITIES OF DAILY LIVING (ADL)
EFFECT OF PAIN ON DAILY ACTIVITIES: YES

## 2024-10-19 ASSESSMENT — PAIN SCALES - WONG BAKER: WONGBAKER_NUMERICALRESPONSE: NO HURT

## 2024-10-19 NOTE — SIGNIFICANT EVENT
Wheelchair is necessary for patient as she has poor mobility/weakness/cachexia, only able to ambulate a few feet. Will need wheelchair at discharge in order to ambulate to appointments outpatient

## 2024-10-19 NOTE — PROGRESS NOTES
Karly Horton is a 79 y.o. y.o. female on day 9 of admission presenting with Lung mass [R91.8].     Subjective   Met with patient at bedside along with Dr. Lau to further discuss transportation resources for outpatient appointments. Patient has limited mobility so is unable to safely transport to appointments via Ly[employer]. Patient reports that she does not have any family that is able to assist with transportation to appointments. Discussed utilizing [facility] to transport via wheelchair. Patient reports concerns about how this process would work and the sustainability of it. Discussed that SW would place information on her discharge paperwork with the phone numbers needed to call. Further, discussed that this SW would connect her to the outpatient SW at [facility] for further assistance. Patient was amenable to this plan.     Email sent to Outpatient [facility] at [facility] along with Outpatient [facility] Supervisor.     - Larissa JONAS MA, W  Care Transitions   Epic Secure Chat or [phone]

## 2024-10-19 NOTE — DISCHARGE SUMMARY
Discharge Diagnosis  Lung mass    Issues Requiring Follow-Up  [ ] thoracic oncology  [ ] radiation oncology    Discharge Meds     Medication List      START taking these medications     Certavite-Antioxidant  mg-mcg tablet; Generic drug: multivitamin   with minerals; Take 1 tablet by mouth once daily.   Vitamin D3 50 mcg (2,000 unit) tablet; Generic drug: cholecalciferol;   Take 1 tablet (2,000 Units) by mouth once daily.     CONTINUE taking these medications     acetaminophen 325 mg tablet; Commonly known as: Tylenol   baclofen 10 mg tablet; Commonly known as: Lioresal   butalbital-acetaminophen-caff -40 mg tablet   lactulose 20 gram/30 mL oral solution   lidocaine HCL 4 % liquid roll-on   lubricating eye drops ophthalmic solution; Administer 1 drop into both   eyes if needed for dry eyes.   meprobamate 400 mg tablet; Commonly known as: Equanil   nicotine 21 mg/24 hr patch; Commonly known as: Nicoderm CQ; Place 1   patch over 24 hours on the skin once daily.     STOP taking these medications     amLODIPine 10 mg tablet; Commonly known as: Norvasc   losartan 50 mg tablet; Commonly known as: Cozaar     ASK your doctor about these medications     ascorbic acid 500 mg tablet; Commonly known as: Vitamin C; Take 1 tablet   (500 mg) by mouth once daily.   ferrous sulfate (325 mg ferrous sulfate) tablet; Take 1 tablet by mouth   once daily with breakfast.   pantoprazole 40 mg EC tablet; Commonly known as: ProtoNix; Take 1 tablet   (40 mg) by mouth 2 times a day. Do not crush, chew, or split. Continue   twice daily x 8 weeks then continue once daily.   sucralfate 100 mg/mL suspension; Commonly known as: Carafate; Take 10 mL   (1 g) by mouth every 6 hours.       Test Results Pending At Discharge  Pending Labs       Order Current Status    Focused Solid Tumor Assay In process            Hospital Course  Karly Horton is a 79 y.o. 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 on 10/10/2024 for new findings of R lung mass. Patient initially presented to [facility] after her daughter found her unresponsive on the floor. Daughter also noted that the patient has confused and also coughing up blood and mucus, patient denies any episodes of hemoptysis but does endorse productive cough of clear sputum for several years.    An initial workup was done in the [facility] emergency room and the patient's toxin was positive for marijuana. There was concern for possible stroke and CT Head and CT angio Head were obtained. Both were negative for any acute intracranial process however CT  angio revealed a spiculated nodule in the posterior right upper lobe is concerning for malignancy prompting CTAP which revealed multiple masses identified within the right and left lobes of the liver more numerous and larger on the right with particular note made of a large 3.1 cm lower pole mass right hilum in a 2.2 cm spiculated mass apicoposterior segment right upper lobe. Neurology was concerned for a possible seizure as to the explanation of the unresponsiveness and an EEG was done. EEG was unremarkable for any epileptiform discharges.     [place] [facility] [place]. At [facility], pulmonology was consulted for EBUS. However this was deferred due to anesthesia concern for pulmonary hypertension from a prior echo showing elevated RVSP. Echocardiogram from 10/10 RVSP 31.1 mmHg, with improvement in pulmonary hypertension on repeat Echo patient was cleared for bronch. Underwent EBUS on 10/11 with biopsy.     Leukocytosis and significant pulm secretions after bronch, started on Ceftriaxone (10/11-10/12). Ucx 10/11 pansensitive E. Coli, Ucx 10/9 pansensitive E. Coli. Patient asymptomatic, leukocytosis resolved. Transitioned to PO nitrofurantoin and completed 5 day course of antibiotics.     Surgical pathology from EBUS  10/11 consistent with squamous cell carcinoma. Oncology team consulted, underwent MRI brain w and wo contrast, PET/CT scan, CT chest with contrast. 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 However, recommend short-term follow-up MRI in 3 months to assess for interval stability to exclude a more aggressive lesion. No additional abnormal intracranial enhancement or mass. CT chest 10/18 no significant interval change in findings of a pulmonary parenchymal mass measuring 3.1 cm centered in the right lower lobe with extension into the right middle lobe across the major fissure as well as additional multifocal bilateral pulmonary nodules as  described which are concerning for metastatic disease. No new nodules have developed since the scan from 12 days prior. PET/CT 10/18 showed peripherally hypermetabolic and centrally photopenic right hilar mass/lymphadenopathy is consistent with biopsy-proven non-small cell lung carcinoma. Additional right-sided hypermetabolic nodules are also consistent with neoplasm. Additional subcentimeter predominantly right-sided lung nodules without metabolic activity are likely benign. However, recommend continued attention on follow-up. No other evidence of hypermetabolic lymphadenopathy or metastatic disease throughout the body.      Radiation Oncology evaluated patient no current indications for emergent inpatient radiotherapy, will continue to follow and assist with care coordination.     To Dos:   [ ] Follow up MRI in 3 months to assess lesion  [ ] Rad Onc - no plans for inpatient RT. Will follow up outpatient  [ ] Follow up planned for  [place] [facility] oncology  [ ] Transportation resources given for travel to and from outpt appointments, extensively discussed with SW, prefers stretcher transportation but not financially an option,  rx for wheelchair provided, given resources to connect with outpatient [facility].     Pertinent Physical Exam At Time of Discharge  Constitutional: cachectic, NAD  HEENT: NCAT, EOMI  Cardio: RRR, no murmurs/rubs/gallops  Pulm: CTAB, on RA, normal respiratory effort  GI: soft, non-tender, nondistended, no guarding or rebound  Skin: No lesions, contusions, or erythema.  Extremities: no BLE swelling   Neuro: No focal deficits  Psych: Appropriate mood and behavior    Outpatient Follow-Up  No future appointments.      Zohra Ibarra MD

## 2024-10-19 NOTE — CARE PLAN
The patient's goals for the shift include pt. will have decreased pain to posterior neck.    The clinical goals for the shift include increase activity    Over the shift, the patient did not make progress toward the following goals. Barriers to progression include pt is weak. Recommendations to address these barriers include increase nutrition.

## 2024-10-19 NOTE — PROGRESS NOTES
Karly Horton is a 79 y.o. female on day 9 of admission presenting with Lung mass.      Subjective   NAEO. This AM, concerned about transport going to and from appointments. She wants to go home.        Objective     Last Recorded Vitals  /71   Pulse 72   Temp 36.1 °C (97 °F)   Resp 15   Wt (!) 31.4 kg (69 lb 4.8 oz)   SpO2 98%   Intake/Output last 3 Shifts:  No intake or output data in the 24 hours ending 10/19/24 1115    Admission Weight  Weight: (!) 31.4 kg (69 lb 4.8 oz) (10/10/24 0200)    Daily Weight  10/10/24 : (!) 31.4 kg (69 lb 4.8 oz)    Image Results  MR brain w and wo IV contrast  Narrative: Interpreted By:  Odin Sanchez,  Jeanette Hfufman   STUDY:  MR BRAIN W AND WO IV CONTRAST;  10/18/2024 2:50 pm      INDICATION:  Signs/Symptoms:non small cell squamous cell carcinoma, staging.              Per medical record: 79-year-old female with history of squamous cell  carcinoma of the right lung. Evaluate for intracranial metastatic  disease.      COMPARISON:  CT head without contrast 10/04/2024      ACCESSION NUMBER(S):  [id number]      ORDERING CLINICIAN:  LYNNE WADE      TECHNIQUE:  Axial T2, FLAIR, DWI, gradient echo T2 and sagittal and coronal T1  weighted images of brain were acquired. Post contrast T1 weighted  images were acquired after administration of 6 ML Dotarem gadolinium  based intravenous contrast.      FINDINGS:  There is no diffusion restriction abnormality to suggest acute  infarct.      There is a punctate focus of contrast enhancement within the left  amygdala with questionable curvilinear enhancing tail (series 10,  image 136 and 137). No additional foci of abnormal enhancement. No  abnormal leptomeningeal or pachymeningeal enhancement.      There is no mass effect or midline shift.      Mild generalized parenchymal volume loss. Mild scattered T2 and FLAIR  periventricular and subcortical white matter hyperintensities,  findings which likely represent a  sequelae of chronic small-vessel  ischemic change.      Mucosal thickening noted within the right maxillary sinus and ethmoid  air cells. Otherwise, remaining paranasal sinuses and mastoid air  cells are clear.      Impression: 1. 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 However,  recommend short-term follow-up MRI in 3 months to assess for interval  stability to exclude a more aggressive lesion.  2. No additional abnormal intracranial enhancement or mass.  3. Mild generalized parenchymal volume loss and sequelae of chronic  small-vessel ischemic change.      I personally reviewed the images/study, and I agree with the findings  as stated above by resident physician Dr. Armida Quiñones MD. This  study was interpreted at [place], [place].      MACRO:  None      Signed by: Odin Sanchez 10/18/2024 3:39 PM  Dictation workstation:   [id number]  NM PET CT lung CA staging  Narrative: Interpreted By:  Tito Ba and Bera Kaustav   STUDY:  NM PET CT LUNG CA STAGING;  10/18/2024 9:11 am      INDICATION:  Signs/Symptoms:New SCC lung diagnosis.      COMPARISON:  CT chest abdomen pelvis without contrast on 10/06/2024  Same day CT chest on 10/18/2024      ACCESSION NUMBER(S):  [id number]      ORDERING CLINICIAN:  LYNNE WADE      TECHNIQUE:  DIVISION OF NUCLEAR MEDICINE  POSITRON EMISSION TOMOGRAPHY (PET-CT)      The patient received an intravenous dose of 12.7 mCi of Fluorine-18  fluorodeoxyglucose (FDG).  Positron emission tomographic (PET) images  from mid thigh to skull base were then acquired after a one hour  delay. Also acquired was a contemporaneous low dose non-contrast CT  scan performed for attenuation correction of PET images and anatomic  localization.  The PET and CT images were digitally fused for  display.   All images were acquired on a combined PET-CT scanner unit.  Some areas of FDG accumulation may be described in standardized  uptake value (SUV) units.      CODING:  Initial Treatment Strategy (PI)      CALIBRATION:  Dose Injection-to-Scan Interval (mins): 59 min  Mediastinal bloodpool SUV (normal 1.5-2.5): 1.2  Blood glucose: 105 mg/dL      FINDINGS:  HEAD AND NECK:  No focal FDG avid  soft tissue lesion is seen in the neck.  No FDG avid  cervical lymphadenopathy is present.      CHEST:  Right apicoposterior upper lobe lung nodule abutting the major  fissure demonstrates intense hypermetabolic activity with SUV max of  8.2. Additional right superior lower lobe lung nodule also  demonstrates hypermetabolic activity with SUV max of 4.7. Multiple  additional pulmonary nodules predominantly within the right lung do  not demonstrate metabolic activity, which was seen on CT on  10/06/2024, as well as same day CT. Previously seen right infrahilar  mass demonstrates peripheral hypermetabolic activity, with central  photopenia likely representing partial necrosis with SUV max of 5.7.  No other evidence of FDG avid mediastinal, hilar or axillary  lymphadenopathy. Small bilateral pleural effusions without  significant metabolic activity.      ABDOMEN AND PELVIS:  No FDG avid  soft tissue lesion is present in the abdomen and pelvis.  No evidence of FDG avid  lymphadenopathy.  Physiologic radiotracer uptake is present in the liver and spleen  with excretion into the bowel loops and the genitourinary tract.      MUSCULOSKELETAL:  No focal FDG avid  lesion is seen in the axial or appendicular to  suggest osseous metastasis.      Impression: 1. Peripherally hypermetabolic and centrally photopenic right hilar  mass/lymphadenopathy is consistent with biopsy-proven non-small cell  lung carcinoma. Additional right-sided hypermetabolic nodules are  also consistent with neoplasm.  2. Additional subcentimeter predominantly right-sided  lung nodules  without metabolic activity are likely benign. However, recommend  continued attention on follow-up.  3. No other evidence of hypermetabolic lymphadenopathy or metastatic  disease throughout the body.          I personally reviewed the image(s) / study and agree with the  findings and interpretation as stated. This study was interpreted at  [facility].      Signed by: Tito Ba 10/18/2024 12:20 PM  Dictation workstation:   [id number]  CT chest w IV contrast  Narrative: Interpreted By:  Dejon Thomas,   STUDY:  CT CHEST W IV CONTRAST;  10/18/2024 1:41 am      INDICATION:  Signs/Symptoms:New cancer diagnosis evalauting for mets.          COMPARISON:  CT dated 10/06/2024      ACCESSION NUMBER(S):  [id number]      ORDERING CLINICIAN:  LYNNE WADE      TECHNIQUE:  Helical data acquisition of the chest was obtained following  intravenous administration of 40 ML Omnipaque 350.  Images were  reformatted in axial, coronal, and sagittal planes.      FINDINGS:  Please note limited evaluation secondary to artifact patient arm  positioning.      LUNGS AND AIRWAYS:  The trachea and central airways are patent. No endobronchial lesion  is seen.Scattered secretions within the trachea and right mainstem  bronchus. There is abrupt cutoff of multiple right lower and right  middle lobar bronchi secondary to below described parenchymal mass.      Centered within the right lower lobe, there is a mass with spiculated  margins measuring up to 3.1 cm. The mass traverses and crosses the  major fissure with extension into the right middle lobe. In addition,  there is a spiculated right upper lobe nodule measuring up to 2.3 cm  with extension to the surface of the superior right major fissure and  associated fissural retraction. Multiple additional bilateral  pulmonary nodules are not significantly changed to include a left  lower lobe nodule measuring 0.7 cm and a right lower lobe  nodule  measuring 0.5 cm. There are no new nodules that have developed in the  past 12 days. Have developed since the scan from 12 days prior.      MEDIASTINUM AND CAROL, LOWER NECK AND AXILLA:  The visualized thyroid gland is within normal limits.  No evidence of thoracic lymphadenopathy by CT criteria.  Esophagus appears within normal limits as seen.      HEART AND VESSELS:  The thoracic aorta normal in course and caliber.There is moderate to  severe atherosclerosis present, including calcified and noncalcified  plaques. Main pulmonary artery and its branches are normal in caliber.  Severe coronary artery calcifications are seen. Please note,the study  is not optimized for evaluation of coronary arteries. The cardiac  chambers are not enlarged. There is no pericardial effusion seen.      UPPER ABDOMEN:  Stable nonspecific calcification along the anterior left hepatic lobe.              CHEST WALL AND OSSEOUS STRUCTURES:  Chest wall is within normal limits.  No acute osseous pathology.There are no suspicious osseous lesions.      Impression: 1.  No significant interval change in findings of a pulmonary  parenchymal mass measuring 3.1 cm centered in the right lower lobe  with extension into the right middle lobe across the major fissure as  well as additional multifocal bilateral pulmonary nodules as  described which are concerning for metastatic disease. No new nodules  have developed since the scan from 12 days prior.  2. Severe coronary artery calcifications.      Signed by: Dejon Thomas 10/18/2024 10:16 AM  Dictation workstation:   [id number]    Physical exam:  Constitutional: cachectic, NAD  HEENT: NCAT, EOMI  Cardio: RRR, no murmurs/rubs/gallops  Pulm: CTAB, on RA, normal respiratory effort  GI: soft, non-tender, nondistended, no guarding or rebound  Skin: No lesions, contusions, or erythema.  Extremities: no BLE swelling   Neuro: No focal deficits  Psych: Appropriate mood and behavior    Relevant  Results  Scheduled medications  acetaminophen, 650 mg, oral, q6h  [Held by provider] amLODIPine, 10 mg, oral, Nightly  ascorbic acid, 500 mg, oral, Daily  baclofen, 10 mg, oral, q6h  butalbital-acetaminophen-caff, 1 tablet, oral, q6h  cholecalciferol, 2,000 Units, oral, Daily  ferrous sulfate (325 mg ferrous sulfate), 65 mg of iron, oral, Daily with breakfast  heparin (porcine), 5,000 Units, subcutaneous, q8h  lactulose, 30 g, oral, Daily  [Held by provider] losartan, 50 mg, oral, Daily  multivitamin with minerals, 1 tablet, oral, Daily  nicotine, 1 patch, transdermal, Daily  pantoprazole, 40 mg, oral, BID  polyethylene glycol, 17 g, oral, Daily  sucralfate, 1 g, oral, q6h [place]      Continuous medications     PRN medications  PRN medications: lidocaine, lubricating eye drops      No results found for this or any previous visit (from the past 24 hours).     Assessment/Plan      Assessment & Plan  Lung mass    Karly Horton is a 79 y.o. female presenting as a transfer from [facility] with new findings of R lung mass. Pt has a PMHx of COPD, >60 pack year smoking history, Peptic ulcer disease, anemia, and chornic headaches.  Pt was initially admitted to [facility] [place], operated by [facility] on 10/5 with AMS after her daughter found her unresponsive on the floor.  Pt is now A&Ox4. Imaging at [facility], [place], operated by [facility] revealed RUL mass and pt transferred to  for for interventional pulmonology bronchoscopy and mediastinal staging of lung mass. EBUS w biopsy completed 10/11 results consistent with squamous cell carcinoma.      Updates 10/19/24:  -PET, MRI brain, and CT chest all completed for staging, reads below  -Per rad onc, no need for inpatient radiation, plan for outpatient follow up  -Stable for discharge, plan for 10/20 given need for transportation  -Given  resources to assist with transportation to and from outpatient appointments, wheelchair ordered     #RUL and hilar mass  #Squamous cell carcinoma of lung  ::CTAP revealed a large 3.1 cm lower pole  mass right hilum in a 2.2 cm spiculated mass apicoposterior segment right upper lobe  ::Addended CT 10/6 from [facility] - In the 1st impression the 1st sentence should have indicated that the multiple masses are within the right and left lungs rather than right and left lobes of the liver. There are no liver lesions.  ::Pulmonology consultation for bronchoscopy, deferred 10/10 by anesthesia for concerns of pulmonary HTN on Echo 5/16/24, RVSP 64.3 mmHg. Repeat Echo 10/10/24 - RVSP 31.1 mmHg   -EBUS 10/11 pathology consistent with squamous cell carcinoma  -Oncology consulted - recommend MRI brain with and without contrast, PET/CT scan, CT chest with contrast. Radiation as outpatient at [facility] thoracic oncology.  -CT chest w IV contrast 10/18 - No significant interval change in findings of a pulmonary parenchymal mass measuring 3.1 cm centered in the right lower lobe with extension into the right middle lobe across the major fissure as  well as additional multifocal bilateral pulmonary nodules as described which are concerning for metastatic disease. No new nodules have developed since the scan from 12 days prior.  -PET scan completed 10/18, peripherally hypermetabolic and centrally photopenic right hilar mass/lymphadenopathy, additional right sided hypermetabolic nodules, additional subcentimeter predominantly right-sided lung nodules  -MRI brain with punctate focus in left amygdala, likely developmental venous anomaly, rec MRI in 3 months to assess, no additional mass  -Radiation Oncology consulted - no current indications for emergent inpatient radiotherapy, will continue to follow and assist with care coordination        #Asymptomatic bacteriuria   #Leukocytosis, resolved  ::Ucx 10/4 at [facility] no growth  ::UA 10/9 - 3+ blood, 2+ nitrite, 500 leuk est, 1+ bacteria, Ucx E coli pan susceptible   -Initially deferred treatment given asymptomatic but given significant leukocytosis, started Ceftriaxone (10/11 -  10/12) and Nitrofurantoin (10/13 - 10/15)   -Ucx 10/11 - pansensitive E coli     #HTN  ::On Losartan 50mg and Amodipine 10mg at home  -Hold inpatient due to low pressures 90's/60's  -CTM and restart as tolerated     #Constipation  ::Pt endorses baseline constipation but endorses having BM within the last few days  -Continue home Lactulose and Miralax      #Tobacco Abuse  ::1PPD since age 15  -Nicotine replacement patch while inpatient  -Patient not interested in cessation     #PUD  ::EGD 5/24: Mild erythematous mucosa in the antrum and prepyloric region and Single ulcer in the duodenal bulb with oozing hemorrhage   -Continue home Pantoprazole 40mg BID     #Muscle aches  #TMJ  #Scoliosis  ::Pt reports this is chronic for her and she takes Baclofen, Tylenol, and Butalbital to keep the symptoms at bay  ::When medications don't help pt self-medicates with marijuana  -Continue home medications inpatient      #Anemia  ::Hgb 12.7 on admission - at baseline  -Continue ferrous sulfate 325mg daily  -CTM     #Malnutrition  #Vitamin D deficiency  ::Pt reports while in rehab this summer she lost weight due to 'bad' food   ::Denies any changes to her appetite and reports it is 'very good'  ::BMI on admission 13.53  ::Vit D 27  -Nutrition consulted - started multivitamin, Ensures with protein  -Vitamin D3 2000U started per nutrition            Fluids: Replete PRN  Electrolytes: Replete PRN  Nutrition:  minced (edentulous, per pt request)   Antimicrobials: CTX (10/11-10/12) nitrofurantoin (10/13-10/15) completed  DVT PPX: Heparin subq  GI ppx: Pantoprazole   Lines: PIV  Oxygen: Room Air     Pain regimen: Lidocaine cream, 650mg Q6H Tylenol scheduled  Bowel care: Miralax and lactulose     Code Status: Full Code (confirmed on admission)   NOK:  Karly Pro, [phone]              Zohra Ibarra MD

## 2024-10-19 NOTE — DISCHARGE INSTR - APPOINTMENTS
Transportation Assistance:    Dial-a-Ride trips are scheduled by advance reservations. Reservations are required 1-12 business days in advance of your trip. Trips are booked on a first-come first-served basis.  Reservations are made on-line with [id number] or by calling [facility] at [phone] or toll-free [phone].  Please have the complete address of your destinations when calling to schedule your rides.     For medical appointments, please have your doctor's name, name of medical facility, office address, room/building number, doctor's phone number and appointment time to provide when booking your Dial-a-Ride trip.  This information helps us locate you when you're getting picked-up from larger medical facilities.     Prescription for wheelchair sent to [facility] [phone]. They will be verifying insurance and if approved with deliver wheelchair to your home. Please call them if you have not heard from them by mid week.

## 2024-10-19 NOTE — PROGRESS NOTES
10/19/24 1300   Discharge Planning   Home or Post Acute Services In home services   Type of Home Care Services Home nursing visits;Home OT;Home PT   Expected Discharge Disposition Home Health   Does the patient need discharge transport arranged? Yes   RoundTrip coordination needed? Yes   Has discharge transport been arranged? Yes   What day is the transport expected? 10/20/24   What time is the transport expected? 1100     Patient requesting wheelchair for home going. Educated patient regarding freedom of choice and she was agreeable to referral being sent to [facility]. [facility] to run insurance verification and deliver wheelchair to home if approved. AVS updated with [facility] contact information and patient instructed that if she has not heard from them to give them a call. Patient verbalized understanding. LACE score 78, this TCC offered to make post hospitalization follow up pcp appointment for patient. Patient refused and states she will make appointment when she gets home as she will need to get transportation arranged. Final home care orders and AVS will need to be sent to [facility] tomorrow. Transport home arranged via [facility] stretcher for 11am. Patient, MD, Joelle RN updated. Blue slip delivered to unit secretary.  Tabatha BUTLER, RN  Transitional Care Coordinator (TCC)  [phone]

## 2024-10-19 NOTE — CARE PLAN
The patient's goals for the shift include Pt. will have decreased pain to neck by 10.21.2024    The clinical goals for the shift include pt. will have BM by 10.20.2024    Over the shift, the patient did not make progress toward the following goals. Barriers to progression include pt. Has been consistent with saying pain to neck is 5/10 on pain scale.. Recommendations to address these barriers include pt. Had two BM today.

## 2024-10-20 ENCOUNTER — PHARMACY VISIT (OUTPATIENT)
Dept: PHARMACY | Facility: CLINIC | Age: 79
End: 2024-10-20
Payer: COMMERCIAL

## 2024-10-20 VITALS
HEIGHT: 60 IN | HEART RATE: 73 BPM | OXYGEN SATURATION: 95 % | SYSTOLIC BLOOD PRESSURE: 132 MMHG | RESPIRATION RATE: 18 BRPM | BODY MASS INDEX: 13.6 KG/M2 | DIASTOLIC BLOOD PRESSURE: 69 MMHG | WEIGHT: 69.3 LBS | TEMPERATURE: 97 F

## 2024-10-20 PROCEDURE — 99239 HOSP IP/OBS DSCHRG MGMT >30: CPT

## 2024-10-20 PROCEDURE — 2500000001 HC RX 250 WO HCPCS SELF ADMINISTERED DRUGS (ALT 637 FOR MEDICARE OP)

## 2024-10-20 PROCEDURE — 2500000002 HC RX 250 W HCPCS SELF ADMINISTERED DRUGS (ALT 637 FOR MEDICARE OP, ALT 636 FOR OP/ED)

## 2024-10-20 PROCEDURE — 2500000004 HC RX 250 GENERAL PHARMACY W/ HCPCS (ALT 636 FOR OP/ED)

## 2024-10-20 PROCEDURE — S4991 NICOTINE PATCH NONLEGEND: HCPCS

## 2024-10-20 PROCEDURE — RXMED WILLOW AMBULATORY MEDICATION CHARGE

## 2024-10-20 RX ORDER — IBUPROFEN 200 MG
1 TABLET ORAL EVERY 24 HOURS
Qty: 28 PATCH | Refills: 0 | Status: SHIPPED | OUTPATIENT
Start: 2024-10-20 | End: 2024-11-17

## 2024-10-20 ASSESSMENT — COGNITIVE AND FUNCTIONAL STATUS - GENERAL
DRESSING REGULAR LOWER BODY CLOTHING: A LITTLE
WALKING IN HOSPITAL ROOM: A LOT
MOVING TO AND FROM BED TO CHAIR: A LITTLE
CLIMB 3 TO 5 STEPS WITH RAILING: A LOT
TURNING FROM BACK TO SIDE WHILE IN FLAT BAD: A LITTLE
MOVING FROM LYING ON BACK TO SITTING ON SIDE OF FLAT BED WITH BEDRAILS: A LITTLE
TOILETING: A LITTLE
DAILY ACTIVITIY SCORE: 19
HELP NEEDED FOR BATHING: A LITTLE
MOBILITY SCORE: 16
DRESSING REGULAR UPPER BODY CLOTHING: A LITTLE
PERSONAL GROOMING: A LITTLE
STANDING UP FROM CHAIR USING ARMS: A LITTLE

## 2024-10-20 ASSESSMENT — PAIN SCALES - GENERAL
PAINLEVEL_OUTOF10: 5 - MODERATE PAIN
PAINLEVEL_OUTOF10: 5 - MODERATE PAIN

## 2024-10-20 ASSESSMENT — PAIN DESCRIPTION - DESCRIPTORS: DESCRIPTORS: ACHING

## 2024-10-20 ASSESSMENT — PAIN - FUNCTIONAL ASSESSMENT: PAIN_FUNCTIONAL_ASSESSMENT: 0-10

## 2024-10-20 NOTE — NURSING NOTE
Pt. Discharge to home via stretcher. Discharge paperwork reviewed with pt. @ bedside. All meds that pt. Needed was provided by [facility]. Pt. Personal belongs packed up. Pt. Left unit via stretcher accompanied by transport.

## 2024-10-20 NOTE — CARE PLAN
The patient's goals for the shift include pt. will be able to go home today.    The clinical goals for the shift include pt. will be able to have decreased pain to neck region.    Over the shift, the patient did not make progress toward the following goals. Barriers to progression include pt. [place] to go home today. Given medication from [facility]. .Recommendations to address these barriers include pt. Will have decrease pain to neck region.

## 2024-10-20 NOTE — DISCHARGE SUMMARY
Discharge Diagnosis  Lung mass    Issues Requiring Follow-Up  [ ] thoracic oncology  [ ] radiation oncology    Discharge Meds     Medication List      START taking these medications     Certavite-Antioxidant  mg-mcg tablet; Generic drug: multivitamin   with minerals; Take 1 tablet by mouth once daily.   Vitamin D3 50 mcg (2,000 unit) tablet; Generic drug: cholecalciferol;   Take 1 tablet (2,000 Units) by mouth once daily.     CHANGE how you take these medications     * nicotine 21 mg/24 hr patch; Commonly known as: Nicoderm CQ; Place 1   patch over 24 hours on the skin once daily.; What changed: Another   medication with the same name was added. Make sure you understand how and   when to take each.   * nicotine 14 mg/24 hr patch; Commonly known as: Nicoderm CQ; Place 1   patch over 24 hours on the skin once every 24 hours for 28 days.; What   changed: You were already taking a medication with the same name, and this   prescription was added. Make sure you understand how and when to take   each.  * This list has 2 medication(s) that are the same as other medications   prescribed for you. Read the directions carefully, and ask your doctor or   other care provider to review them with you.     CONTINUE taking these medications     acetaminophen 325 mg tablet; Commonly known as: Tylenol   baclofen 10 mg tablet; Commonly known as: Lioresal   butalbital-acetaminophen-caff -40 mg tablet   lactulose 20 gram/30 mL oral solution   lidocaine HCL 4 % liquid roll-on   lubricating eye drops ophthalmic solution; Administer 1 drop into both   eyes if needed for dry eyes.   meprobamate 400 mg tablet; Commonly known as: Equanil     STOP taking these medications     amLODIPine 10 mg tablet; Commonly known as: Norvasc   losartan 50 mg tablet; Commonly known as: Cozaar     ASK your doctor about these medications     ascorbic acid 500 mg tablet; Commonly known as: Vitamin C; Take 1 tablet   (500 mg) by mouth once daily.    ferrous sulfate (325 mg ferrous sulfate) tablet; Take 1 tablet by mouth   once daily with breakfast.   pantoprazole 40 mg EC tablet; Commonly known as: ProtoNix; Take 1 tablet   (40 mg) by mouth 2 times a day. Do not crush, chew, or split. Continue   twice daily x 8 weeks then continue once daily.   sucralfate 100 mg/mL suspension; Commonly known as: Carafate; Take 10 mL   (1 g) by mouth every 6 hours.       Test Results Pending At Discharge  Pending Labs       Order Current Status    Focused Solid Tumor Assay In process            Hospital Course  Karly Horton is a 79 y.o. 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 on 10/10/2024 for new findings of R lung mass. Patient initially presented to [facility] after her daughter found her unresponsive on the floor. Daughter also noted that the patient has confused and also coughing up blood and mucus, patient denies any episodes of hemoptysis but does endorse productive cough of clear sputum for several years.    An initial workup was done in the [facility] emergency room and the patient's toxin was positive for marijuana. There was concern for possible stroke and CT Head and CT angio Head were obtained. Both were negative for any acute intracranial process however CT  angio revealed a spiculated nodule in the posterior right upper lobe is concerning for malignancy prompting CTAP which revealed multiple masses identified within the right and left lobes of the liver more numerous and larger on the right with particular note made of a large 3.1 cm lower pole mass right hilum in a 2.2 cm spiculated mass apicoposterior segment right upper lobe. Neurology was concerned for a possible seizure as to the explanation of the unresponsiveness and an EEG was done. EEG was unremarkable for any epileptiform discharges.     Patient was transferred to [facility] for further  management. At [facility], pulmonology was consulted for EBUS. However this was deferred due to anesthesia concern for pulmonary hypertension from a prior echo showing elevated RVSP. Echocardiogram from 10/10 RVSP 31.1 mmHg, with improvement in pulmonary hypertension on repeat Echo patient was cleared for bronch. Underwent EBUS on 10/11 with biopsy.     Leukocytosis and significant pulm secretions after bronch, started on Ceftriaxone (10/11-10/12). Ucx 10/11 pansensitive E. Coli, Ucx 10/9 pansensitive E. Coli. Patient asymptomatic, leukocytosis resolved. Transitioned to PO nitrofurantoin and completed 5 day course of antibiotics.     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 Oncology team consulted, underwent MRI brain w and wo contrast, PET/CT scan, CT chest with contrast. 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 However, recommend short-term follow-up MRI in 3 months to assess for interval stability to exclude a more aggressive lesion. No additional abnormal intracranial enhancement or mass. CT chest 10/18 no significant interval change in findings of a pulmonary parenchymal mass measuring 3.1 cm centered in the right lower lobe with extension into the right middle lobe across the major fissure as well as additional multifocal bilateral pulmonary nodules as  described which are concerning for metastatic disease. No new nodules have developed since the scan from 12 days prior. PET/CT 10/18 showed peripherally hypermetabolic and centrally photopenic right hilar mass/lymphadenopathy is consistent with biopsy-proven non-small cell lung carcinoma. Additional right-sided hypermetabolic nodules are also consistent with neoplasm. Additional subcentimeter predominantly right-sided lung nodules without metabolic activity are likely  benign. However, recommend continued attention on follow-up. No other evidence of hypermetabolic lymphadenopathy or metastatic disease throughout the body.      Radiation Oncology evaluated patient no current indications for emergent inpatient radiotherapy, will continue to follow and assist with care coordination.     To Dos:   [ ] Follow up MRI in 3 months to assess lesion  [ ] Rad Onc - no plans for inpatient RT. Will follow up outpatient  [ ] Follow up planned for  [place] thoracic oncology  [ ] Transportation resources given for travel to and from outpt appointments, extensively discussed with COLT, prefers stretcher transportation but not financially an option, rx for wheelchair provided, given resources to connect with outpatient [place] [facility].     Pertinent Physical Exam At Time of Discharge  Constitutional: cachectic, NAD  HEENT: NCAT, EOMI  Cardio: RRR, no murmurs/rubs/gallops  Pulm: CTAB, on RA, normal respiratory effort  GI: soft, non-tender, nondistended, no guarding or rebound  Skin: No lesions, contusions, or erythema.  Extremities: no BLE swelling   Neuro: No focal deficits  Psych: Appropriate mood and behavior    Outpatient Follow-Up  No future appointments.      Zohra Ibarra MD

## 2024-10-20 NOTE — CARE PLAN
The patient's goals for the shift include Pt. will have decreased pain to neck by 10.21.2024    The clinical goals for the shift include Pt will remain safe and free from falls    Problem: Pain - Adult  Goal: Verbalizes/displays adequate comfort level or baseline comfort level  Outcome: Progressing     Problem: Safety - Adult  Goal: Free from fall injury  Outcome: Progressing     Problem: Discharge Planning  Goal: Discharge to home or other facility with appropriate resources  Outcome: Progressing     Problem: Chronic Conditions and Co-morbidities  Goal: Patient's chronic conditions and co-morbidity symptoms are monitored and maintained or improved  Outcome: Progressing     Problem: Skin  Goal: Decreased wound size/increased tissue granulation at next dressing change  Outcome: Progressing  Goal: Participates in plan/prevention/treatment measures  Outcome: Progressing  Goal: Prevent/manage excess moisture  Outcome: Progressing  Goal: Prevent/minimize sheer/friction injuries  Outcome: Progressing  Goal: Promote/optimize nutrition  Outcome: Progressing  Goal: Promote skin healing  Outcome: Progressing     Problem: Fall/Injury  Goal: Not fall by end of shift  Outcome: Progressing  Goal: Be free from injury by end of the shift  Outcome: Progressing  Goal: Verbalize understanding of personal risk factors for fall in the hospital  Outcome: Progressing  Goal: Verbalize understanding of risk factor reduction measures to prevent injury from fall in the home  Outcome: Progressing  Goal: Use assistive devices by end of the shift  Outcome: Progressing  Goal: Pace activities to prevent fatigue by end of the shift  Outcome: Progressing

## 2024-10-20 NOTE — PROGRESS NOTES
Karly Horton is a 79 y.o. female on day 10 of admission presenting with Lung mass. Patient is discharging today with home care. Discharge summary sent, attempted to attach AVS but was not able to print attach it. Transportation was set up via [facility] on 10/19/2024. No other TCC needs at this time.      Shea Powell RN, TCC.

## 2024-10-23 ENCOUNTER — SOCIAL WORK (OUTPATIENT)
Dept: CASE MANAGEMENT | Facility: HOSPITAL | Age: 79
End: 2024-10-23
Payer: MEDICARE

## 2024-10-23 LAB
ELECTRONICALLY SIGNED BY: NORMAL
FOCUSED SOLID TUMOR DNA/RNA RESULTS: NORMAL

## 2024-10-23 NOTE — PROGRESS NOTES
Social Work Note  10/23/2024 COLT, who is covering [place], received a referral from MD to assist patient with her transportation for new consults on 10/31/2024.  COLT Talked with patient and explained [employer] to her.  Patient does not have a benefit through her insurance that she can use.  COLT gave her the information and this writer's number to call with any questions or difficulties.  COLT will let regular  at [place] know for follow up.  Social work services will remain available to assist patient.  Shea Singh, MSW, LSW

## 2024-10-25 DIAGNOSIS — R91.8 LUNG MASS: ICD-10-CM

## 2024-10-25 NOTE — PROGRESS NOTES
At the request of Dr. Kelly Guerra, an urgent referral to cardiothoracic surgery has been placed for patient's lung cancer.

## 2024-10-28 ENCOUNTER — APPOINTMENT (OUTPATIENT)
Dept: RADIATION ONCOLOGY | Facility: CLINIC | Age: 79
End: 2024-10-28
Payer: MEDICARE

## 2024-10-30 NOTE — DOCUMENTATION CLARIFICATION NOTE
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    Question: Based on your assessment of the patient and the pathology findings, can the pathology findings be confirmed by providing the requested documentation to include if specimen is benign or malignant, primary or secondary and any metastatic sites.  Please include diagnosis of disease    When answering this query, please exercise your independent professional judgment. The fact that a question is being asked, does not imply that any particular answer is desired or expected.    The patient's clinical indicators include:  Clinical Information: 79 YOF transferred from [facility] on 10/10/2024 for new findings of R lung mass.    Surgical Pathology from 10/11/2024 resulted as - \"FINAL DIAGNOSIS  A.  B.  Lung, right lower lobe mass, biopsies:  - Detached fragments of non-small cell carcinoma, consistent with squamous cell carcinoma.  See note\"    Final Cytological Interpretation from 10/11/2024 resulted as-    \"A. LYMPH NODE 11 L PULMONARY FINE NEEDLE ASPIRATION  Highly atypical cells are present; carcinoma/malignancy cannot be excluded    B. LYMPH NODE 7 PULMONARY FINE NEEDLE ASPIRATION  Malignant cells are present, see note  Lymphoid sample    Note: A minute fragment of malignant cells is noted in the cell block    C. 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

## 2024-10-31 ENCOUNTER — LAB (OUTPATIENT)
Dept: LAB | Facility: CLINIC | Age: 79
End: 2024-10-31
Payer: MEDICARE

## 2024-10-31 ENCOUNTER — HOSPITAL ENCOUNTER (OUTPATIENT)
Dept: RADIATION ONCOLOGY | Facility: CLINIC | Age: 79
Setting detail: RADIATION/ONCOLOGY SERIES
Discharge: HOME | End: 2024-10-31
Payer: MEDICARE

## 2024-10-31 ENCOUNTER — OFFICE VISIT (OUTPATIENT)
Dept: HEMATOLOGY/ONCOLOGY | Facility: CLINIC | Age: 79
End: 2024-10-31
Payer: MEDICARE

## 2024-10-31 ENCOUNTER — PATIENT OUTREACH (OUTPATIENT)
Dept: HEMATOLOGY/ONCOLOGY | Facility: CLINIC | Age: 79
End: 2024-10-31

## 2024-10-31 VITALS
OXYGEN SATURATION: 93 % | RESPIRATION RATE: 18 BRPM | DIASTOLIC BLOOD PRESSURE: 74 MMHG | SYSTOLIC BLOOD PRESSURE: 133 MMHG | TEMPERATURE: 98.8 F | BODY MASS INDEX: 14.23 KG/M2 | WEIGHT: 72.86 LBS | HEART RATE: 83 BPM

## 2024-10-31 VITALS
RESPIRATION RATE: 18 BRPM | WEIGHT: 72.86 LBS | BODY MASS INDEX: 14.23 KG/M2 | OXYGEN SATURATION: 93 % | HEART RATE: 83 BPM | SYSTOLIC BLOOD PRESSURE: 133 MMHG | DIASTOLIC BLOOD PRESSURE: 74 MMHG | TEMPERATURE: 98.8 F

## 2024-10-31 DIAGNOSIS — C34.90 NON-SMALL CELL LUNG CANCER, UNSPECIFIED LATERALITY (MULTI): Primary | ICD-10-CM

## 2024-10-31 DIAGNOSIS — R91.8 LUNG MASS: ICD-10-CM

## 2024-10-31 DIAGNOSIS — C44.92 SQUAMOUS CELL CARCINOMA, SMALL CELL, NONKERATINIZING: ICD-10-CM

## 2024-10-31 DIAGNOSIS — R91.8 LUNG MASS: Primary | ICD-10-CM

## 2024-10-31 PROBLEM — C34.81 MALIGNANT NEOPLASM OF OVERLAPPING SITES OF RIGHT LUNG (MULTI): Status: ACTIVE | Noted: 2024-10-31

## 2024-10-31 LAB
ALBUMIN SERPL BCP-MCNC: 3.9 G/DL (ref 3.4–5)
ALP SERPL-CCNC: 99 U/L (ref 33–136)
ALT SERPL W P-5'-P-CCNC: 13 U/L (ref 7–45)
ANION GAP SERPL CALC-SCNC: 13 MMOL/L (ref 10–20)
AST SERPL W P-5'-P-CCNC: 18 U/L (ref 9–39)
BASOPHILS # BLD AUTO: 0.06 X10*3/UL (ref 0–0.1)
BASOPHILS NFR BLD AUTO: 0.8 %
BILIRUB SERPL-MCNC: 0.2 MG/DL (ref 0–1.2)
BUN SERPL-MCNC: 11 MG/DL (ref 6–23)
CALCIUM SERPL-MCNC: 9.4 MG/DL (ref 8.6–10.3)
CHLORIDE SERPL-SCNC: 97 MMOL/L (ref 98–107)
CO2 SERPL-SCNC: 29 MMOL/L (ref 21–32)
CREAT SERPL-MCNC: 0.36 MG/DL (ref 0.5–1.05)
EGFRCR SERPLBLD CKD-EPI 2021: >90 ML/MIN/1.73M*2
EOSINOPHIL # BLD AUTO: 0.12 X10*3/UL (ref 0–0.4)
EOSINOPHIL NFR BLD AUTO: 1.7 %
ERYTHROCYTE [DISTWIDTH] IN BLOOD BY AUTOMATED COUNT: 14.6 % (ref 11.5–14.5)
FERRITIN SERPL-MCNC: 51 NG/ML (ref 8–150)
FOLATE SERPL-MCNC: 19.1 NG/ML
GLUCOSE SERPL-MCNC: 106 MG/DL (ref 74–99)
HBV CORE AB SER QL: NONREACTIVE
HBV SURFACE AB SER-ACNC: <3.1 MIU/ML
HBV SURFACE AG SERPL QL IA: NONREACTIVE
HCT VFR BLD AUTO: 39.4 % (ref 36–46)
HCV AB SER QL: NONREACTIVE
HGB BLD-MCNC: 12.7 G/DL (ref 12–16)
HIV 1+2 AB+HIV1 P24 AG SERPL QL IA: NONREACTIVE
IMM GRANULOCYTES # BLD AUTO: 0.05 X10*3/UL (ref 0–0.5)
IMM GRANULOCYTES NFR BLD AUTO: 0.7 % (ref 0–0.9)
IRON SATN MFR SERPL: 15 % (ref 25–45)
IRON SERPL-MCNC: 55 UG/DL (ref 35–150)
LYMPHOCYTES # BLD AUTO: 1.21 X10*3/UL (ref 0.8–3)
LYMPHOCYTES NFR BLD AUTO: 17 %
MCH RBC QN AUTO: 30 PG (ref 26–34)
MCHC RBC AUTO-ENTMCNC: 32.2 G/DL (ref 32–36)
MCV RBC AUTO: 93 FL (ref 80–100)
MONOCYTES # BLD AUTO: 0.86 X10*3/UL (ref 0.05–0.8)
MONOCYTES NFR BLD AUTO: 12.1 %
NEUTROPHILS # BLD AUTO: 4.81 X10*3/UL (ref 1.6–5.5)
NEUTROPHILS NFR BLD AUTO: 67.7 %
NRBC BLD-RTO: 0 /100 WBCS (ref 0–0)
PLATELET # BLD AUTO: 345 X10*3/UL (ref 150–450)
POTASSIUM SERPL-SCNC: 4.1 MMOL/L (ref 3.5–5.3)
PROT SERPL-MCNC: 6.9 G/DL (ref 6.4–8.2)
RBC # BLD AUTO: 4.24 X10*6/UL (ref 4–5.2)
SODIUM SERPL-SCNC: 135 MMOL/L (ref 136–145)
TIBC SERPL-MCNC: 365 UG/DL (ref 240–445)
UIBC SERPL-MCNC: 310 UG/DL (ref 110–370)
VIT B12 SERPL-MCNC: 785 PG/ML (ref 211–911)
WBC # BLD AUTO: 7.1 X10*3/UL (ref 4.4–11.3)

## 2024-10-31 PROCEDURE — 87340 HEPATITIS B SURFACE AG IA: CPT | Mod: WESLAB

## 2024-10-31 PROCEDURE — 1159F MED LIST DOCD IN RCRD: CPT | Performed by: INTERNAL MEDICINE

## 2024-10-31 PROCEDURE — 99205 OFFICE O/P NEW HI 60 MIN: CPT | Performed by: STUDENT IN AN ORGANIZED HEALTH CARE EDUCATION/TRAINING PROGRAM

## 2024-10-31 PROCEDURE — 1160F RVW MEDS BY RX/DR IN RCRD: CPT | Performed by: INTERNAL MEDICINE

## 2024-10-31 PROCEDURE — 82607 VITAMIN B-12: CPT | Mod: WESLAB

## 2024-10-31 PROCEDURE — 99215 OFFICE O/P EST HI 40 MIN: CPT | Performed by: STUDENT IN AN ORGANIZED HEALTH CARE EDUCATION/TRAINING PROGRAM

## 2024-10-31 PROCEDURE — 82728 ASSAY OF FERRITIN: CPT | Mod: WESLAB

## 2024-10-31 PROCEDURE — 1126F AMNT PAIN NOTED NONE PRSNT: CPT | Performed by: INTERNAL MEDICINE

## 2024-10-31 PROCEDURE — 86706 HEP B SURFACE ANTIBODY: CPT | Mod: WESLAB

## 2024-10-31 PROCEDURE — 1157F ADVNC CARE PLAN IN RCRD: CPT | Performed by: INTERNAL MEDICINE

## 2024-10-31 PROCEDURE — 82746 ASSAY OF FOLIC ACID SERUM: CPT | Mod: WESLAB

## 2024-10-31 PROCEDURE — 1111F DSCHRG MED/CURRENT MED MERGE: CPT | Performed by: INTERNAL MEDICINE

## 2024-10-31 PROCEDURE — 86704 HEP B CORE ANTIBODY TOTAL: CPT | Mod: WESLAB

## 2024-10-31 PROCEDURE — 99215 OFFICE O/P EST HI 40 MIN: CPT | Performed by: INTERNAL MEDICINE

## 2024-10-31 PROCEDURE — 36415 COLL VENOUS BLD VENIPUNCTURE: CPT

## 2024-10-31 PROCEDURE — 83540 ASSAY OF IRON: CPT | Mod: WESLAB

## 2024-10-31 PROCEDURE — 85025 COMPLETE CBC W/AUTO DIFF WBC: CPT

## 2024-10-31 PROCEDURE — 80053 COMPREHEN METABOLIC PANEL: CPT

## 2024-10-31 PROCEDURE — 87389 HIV-1 AG W/HIV-1&-2 AB AG IA: CPT | Mod: WESLAB

## 2024-10-31 PROCEDURE — 86803 HEPATITIS C AB TEST: CPT | Mod: WESLAB

## 2024-10-31 RX ORDER — ONDANSETRON HYDROCHLORIDE 8 MG/1
8 TABLET, FILM COATED ORAL EVERY 8 HOURS PRN
Qty: 30 TABLET | Refills: 5 | Status: SHIPPED | OUTPATIENT
Start: 2024-11-10

## 2024-10-31 RX ORDER — ALBUTEROL SULFATE 0.83 MG/ML
3 SOLUTION RESPIRATORY (INHALATION) AS NEEDED
OUTPATIENT
Start: 2024-12-02

## 2024-10-31 RX ORDER — FAMOTIDINE 10 MG/ML
20 INJECTION INTRAVENOUS ONCE
OUTPATIENT
Start: 2024-12-02

## 2024-10-31 RX ORDER — DIPHENHYDRAMINE HYDROCHLORIDE 50 MG/ML
50 INJECTION INTRAMUSCULAR; INTRAVENOUS AS NEEDED
OUTPATIENT
Start: 2024-12-02

## 2024-10-31 RX ORDER — ALBUTEROL SULFATE 0.83 MG/ML
3 SOLUTION RESPIRATORY (INHALATION) AS NEEDED
OUTPATIENT
Start: 2024-11-11

## 2024-10-31 RX ORDER — PALONOSETRON 0.05 MG/ML
0.25 INJECTION, SOLUTION INTRAVENOUS ONCE
OUTPATIENT
Start: 2024-12-02

## 2024-10-31 RX ORDER — DEXAMETHASONE IN 0.9 % SOD CHL 20 MG/50ML
20 INTRAVENOUS SOLUTION, PIGGYBACK (ML) INTRAVENOUS ONCE
OUTPATIENT
Start: 2024-11-11

## 2024-10-31 RX ORDER — FAMOTIDINE 10 MG/ML
20 INJECTION INTRAVENOUS ONCE
OUTPATIENT
Start: 2024-11-11

## 2024-10-31 RX ORDER — PALONOSETRON 0.05 MG/ML
0.25 INJECTION, SOLUTION INTRAVENOUS ONCE
OUTPATIENT
Start: 2024-11-11

## 2024-10-31 RX ORDER — OLANZAPINE 5 MG/1
5 TABLET ORAL NIGHTLY
Qty: 12 TABLET | Refills: 3 | Status: SHIPPED | OUTPATIENT
Start: 2024-11-10

## 2024-10-31 RX ORDER — FAMOTIDINE 10 MG/ML
20 INJECTION INTRAVENOUS ONCE AS NEEDED
OUTPATIENT
Start: 2024-12-02

## 2024-10-31 RX ORDER — EPINEPHRINE 0.3 MG/.3ML
0.3 INJECTION SUBCUTANEOUS EVERY 5 MIN PRN
OUTPATIENT
Start: 2024-12-02

## 2024-10-31 RX ORDER — DEXAMETHASONE 4 MG/1
8 TABLET ORAL DAILY
Qty: 12 TABLET | Refills: 3 | Status: SHIPPED | OUTPATIENT
Start: 2024-11-10

## 2024-10-31 RX ORDER — PROCHLORPERAZINE MALEATE 10 MG
10 TABLET ORAL EVERY 6 HOURS PRN
OUTPATIENT
Start: 2024-12-02

## 2024-10-31 RX ORDER — PROCHLORPERAZINE MALEATE 10 MG
10 TABLET ORAL EVERY 6 HOURS PRN
OUTPATIENT
Start: 2024-11-11

## 2024-10-31 RX ORDER — DIPHENHYDRAMINE HYDROCHLORIDE 50 MG/ML
50 INJECTION INTRAMUSCULAR; INTRAVENOUS AS NEEDED
OUTPATIENT
Start: 2024-11-11

## 2024-10-31 RX ORDER — FAMOTIDINE 10 MG/ML
20 INJECTION INTRAVENOUS ONCE AS NEEDED
OUTPATIENT
Start: 2024-11-11

## 2024-10-31 RX ORDER — EPINEPHRINE 0.3 MG/.3ML
0.3 INJECTION SUBCUTANEOUS EVERY 5 MIN PRN
OUTPATIENT
Start: 2024-11-11

## 2024-10-31 RX ORDER — DEXAMETHASONE IN 0.9 % SOD CHL 20 MG/50ML
20 INTRAVENOUS SOLUTION, PIGGYBACK (ML) INTRAVENOUS ONCE
OUTPATIENT
Start: 2024-12-02

## 2024-10-31 RX ORDER — PROCHLORPERAZINE MALEATE 10 MG
10 TABLET ORAL EVERY 6 HOURS PRN
Qty: 30 TABLET | Refills: 5 | Status: SHIPPED | OUTPATIENT
Start: 2024-11-10

## 2024-10-31 RX ORDER — DIPHENHYDRAMINE HCL 25 MG
50 CAPSULE ORAL ONCE
OUTPATIENT
Start: 2024-12-02

## 2024-10-31 RX ORDER — PROCHLORPERAZINE EDISYLATE 5 MG/ML
10 INJECTION INTRAMUSCULAR; INTRAVENOUS EVERY 6 HOURS PRN
OUTPATIENT
Start: 2024-12-02

## 2024-10-31 RX ORDER — DIPHENHYDRAMINE HCL 25 MG
50 CAPSULE ORAL ONCE
OUTPATIENT
Start: 2024-11-11

## 2024-10-31 RX ORDER — PROCHLORPERAZINE EDISYLATE 5 MG/ML
10 INJECTION INTRAMUSCULAR; INTRAVENOUS EVERY 6 HOURS PRN
OUTPATIENT
Start: 2024-11-11

## 2024-10-31 SDOH — ECONOMIC STABILITY: FOOD INSECURITY: WITHIN THE PAST 12 MONTHS, THE FOOD YOU BOUGHT JUST DIDN'T LAST AND YOU DIDN'T HAVE MONEY TO GET MORE.: NEVER TRUE

## 2024-10-31 SDOH — ECONOMIC STABILITY: INCOME INSECURITY: IN THE LAST 12 MONTHS, WAS THERE A TIME WHEN YOU WERE NOT ABLE TO PAY THE MORTGAGE OR RENT ON TIME?: NO

## 2024-10-31 SDOH — ECONOMIC STABILITY: FOOD INSECURITY: WITHIN THE PAST 12 MONTHS, YOU WORRIED THAT YOUR FOOD WOULD RUN OUT BEFORE YOU GOT MONEY TO BUY MORE.: NEVER TRUE

## 2024-10-31 SDOH — HEALTH STABILITY: PHYSICAL HEALTH: ON AVERAGE, HOW MANY DAYS PER WEEK DO YOU ENGAGE IN MODERATE TO STRENUOUS EXERCISE (LIKE A BRISK WALK)?: 0 DAYS

## 2024-10-31 SDOH — HEALTH STABILITY: PHYSICAL HEALTH: ON AVERAGE, HOW MANY MINUTES DO YOU ENGAGE IN EXERCISE AT THIS LEVEL?: 0 MIN

## 2024-10-31 SDOH — ECONOMIC STABILITY: GENERAL
WHICH OF THE FOLLOWING WOULD YOU LIKE TO GET CONNECTED TO IN ORDER TO RECEIVE A DISCOUNT OR FOR FREE? (CHOOSE ALL THAT APPLY): PATIENT DECLINED

## 2024-10-31 SDOH — ECONOMIC STABILITY: GENERAL: WHICH OF THE FOLLOWING DO YOU KNOW HOW TO USE AND HAVE ACCESS TO EVERY DAY? (CHOOSE ALL THAT APPLY): NONE OF THESE

## 2024-10-31 ASSESSMENT — PAIN SCALES - GENERAL
PAINLEVEL_OUTOF10: 0-NO PAIN
PAINLEVEL_OUTOF10: 5

## 2024-10-31 ASSESSMENT — ENCOUNTER SYMPTOMS
BACK PAIN: 1
PSYCHIATRIC NEGATIVE: 1
DEPRESSION: 0
SHORTNESS OF BREATH: 1
EXTREMITY WEAKNESS: 1
HEMATOLOGIC/LYMPHATIC NEGATIVE: 1
COUGH: 1
FATIGUE: 1
NECK PAIN: 1
CONSTIPATION: 1
OCCASIONAL FEELINGS OF UNSTEADINESS: 1
LOSS OF SENSATION IN FEET: 0
EYES NEGATIVE: 1
CARDIOVASCULAR NEGATIVE: 1

## 2024-10-31 ASSESSMENT — SOCIAL DETERMINANTS OF HEALTH (SDOH)
DO YOU BELONG TO ANY CLUBS OR ORGANIZATIONS SUCH AS CHURCH GROUPS UNIONS, FRATERNAL OR ATHLETIC GROUPS, OR SCHOOL GROUPS?: NO
HOW OFTEN DO YOU ATTEND CHURCH OR RELIGIOUS SERVICES?: NEVER
WITHIN THE LAST YEAR, HAVE TO BEEN RAPED OR FORCED TO HAVE ANY KIND OF SEXUAL ACTIVITY BY YOUR PARTNER OR EX-PARTNER?: NO
IN THE PAST 12 MONTHS, HAS THE ELECTRIC, GAS, OIL, OR WATER COMPANY THREATENED TO SHUT OFF SERVICE IN YOUR HOME?: NO
HOW OFTEN DO YOU GET TOGETHER WITH FRIENDS OR RELATIVES?: MORE THAN THREE TIMES A WEEK
HOW HARD IS IT FOR YOU TO PAY FOR THE VERY BASICS LIKE FOOD, HOUSING, MEDICAL CARE, AND HEATING?: SOMEWHAT HARD
WITHIN THE LAST YEAR, HAVE YOU BEEN KICKED, HIT, SLAPPED, OR OTHERWISE PHYSICALLY HURT BY YOUR PARTNER OR EX-PARTNER?: NO
WITHIN THE LAST YEAR, HAVE YOU BEEN HUMILIATED OR EMOTIONALLY ABUSED IN OTHER WAYS BY YOUR PARTNER OR EX-PARTNER?: NO
HOW OFTEN DO YOU ATTENT MEETINGS OF THE CLUB OR ORGANIZATION YOU BELONG TO?: NEVER
WITHIN THE LAST YEAR, HAVE YOU BEEN AFRAID OF YOUR PARTNER OR EX-PARTNER?: NO
IN A TYPICAL WEEK, HOW MANY TIMES DO YOU TALK ON THE PHONE WITH FAMILY, FRIENDS, OR NEIGHBORS?: MORE THAN THREE TIMES A WEEK

## 2024-10-31 ASSESSMENT — PATIENT HEALTH QUESTIONNAIRE - PHQ9
1. LITTLE INTEREST OR PLEASURE IN DOING THINGS: NOT AT ALL
2. FEELING DOWN, DEPRESSED OR HOPELESS: NOT AT ALL
SUM OF ALL RESPONSES TO PHQ9 QUESTIONS 1 AND 2: 0

## 2024-10-31 ASSESSMENT — NCCN CANCER DISTRESS MANAGEMENT
NCCN EMOTIONAL CONCERNS: 1
NCCN PRACTICAL CONCERNS: 8
NCCN PRACTICAL CONCERNS: 1
NCCN PHYSICAL CONCERNS: 9

## 2024-10-31 ASSESSMENT — LIFESTYLE VARIABLES
HOW OFTEN DO YOU HAVE SIX OR MORE DRINKS ON ONE OCCASION: NEVER
SKIP TO QUESTIONS 9-10: 1
HOW MANY STANDARD DRINKS CONTAINING ALCOHOL DO YOU HAVE ON A TYPICAL DAY: PATIENT DOES NOT DRINK
HOW OFTEN DO YOU HAVE A DRINK CONTAINING ALCOHOL: NEVER
AUDIT-C TOTAL SCORE: 0

## 2024-11-07 ENCOUNTER — HOSPITAL ENCOUNTER (OUTPATIENT)
Dept: CARDIOLOGY | Facility: HOSPITAL | Age: 79
Discharge: HOME | End: 2024-11-07
Payer: MEDICARE

## 2024-11-07 VITALS
DIASTOLIC BLOOD PRESSURE: 66 MMHG | SYSTOLIC BLOOD PRESSURE: 132 MMHG | HEART RATE: 72 BPM | TEMPERATURE: 97.5 F | OXYGEN SATURATION: 98 % | RESPIRATION RATE: 12 BRPM

## 2024-11-07 DIAGNOSIS — C44.92 SQUAMOUS CELL CARCINOMA, SMALL CELL, NONKERATINIZING: ICD-10-CM

## 2024-11-07 DIAGNOSIS — R91.8 LUNG MASS: ICD-10-CM

## 2024-11-07 PROCEDURE — 2720000007 HC OR 272 NO HCPCS

## 2024-11-07 PROCEDURE — 7100000010 HC PHASE TWO TIME - EACH INCREMENTAL 1 MINUTE

## 2024-11-07 PROCEDURE — 77001 FLUOROGUIDE FOR VEIN DEVICE: CPT | Performed by: RADIOLOGY

## 2024-11-07 PROCEDURE — 2780000003 HC OR 278 NO HCPCS

## 2024-11-07 PROCEDURE — 36561 INSERT TUNNELED CV CATH: CPT | Mod: RT | Performed by: RADIOLOGY

## 2024-11-07 PROCEDURE — 7100000009 HC PHASE TWO TIME - INITIAL BASE CHARGE

## 2024-11-07 PROCEDURE — 99152 MOD SED SAME PHYS/QHP 5/>YRS: CPT

## 2024-11-07 PROCEDURE — 76937 US GUIDE VASCULAR ACCESS: CPT | Performed by: RADIOLOGY

## 2024-11-07 PROCEDURE — C1788 PORT, INDWELLING, IMP: HCPCS

## 2024-11-07 PROCEDURE — C1894 INTRO/SHEATH, NON-LASER: HCPCS

## 2024-11-07 PROCEDURE — 2500000005 HC RX 250 GENERAL PHARMACY W/O HCPCS: Performed by: RADIOLOGY

## 2024-11-07 PROCEDURE — 2500000004 HC RX 250 GENERAL PHARMACY W/ HCPCS (ALT 636 FOR OP/ED): Performed by: RADIOLOGY

## 2024-11-07 RX ORDER — MIDAZOLAM HYDROCHLORIDE 1 MG/ML
INJECTION, SOLUTION INTRAMUSCULAR; INTRAVENOUS AS NEEDED
Status: DISCONTINUED | OUTPATIENT
Start: 2024-11-07 | End: 2024-11-07 | Stop reason: HOSPADM

## 2024-11-07 RX ORDER — FENTANYL CITRATE 50 UG/ML
INJECTION, SOLUTION INTRAMUSCULAR; INTRAVENOUS AS NEEDED
Status: DISCONTINUED | OUTPATIENT
Start: 2024-11-07 | End: 2024-11-07 | Stop reason: HOSPADM

## 2024-11-07 RX ORDER — LIDOCAINE HYDROCHLORIDE 10 MG/ML
INJECTION, SOLUTION EPIDURAL; INFILTRATION; INTRACAUDAL; PERINEURAL AS NEEDED
Status: DISCONTINUED | OUTPATIENT
Start: 2024-11-07 | End: 2024-11-07 | Stop reason: HOSPADM

## 2024-11-07 RX ORDER — ONDANSETRON HYDROCHLORIDE 2 MG/ML
4 INJECTION, SOLUTION INTRAVENOUS ONCE
Status: COMPLETED | OUTPATIENT
Start: 2024-11-07 | End: 2024-11-07

## 2024-11-07 RX ORDER — CEFAZOLIN SODIUM 2 G/100ML
2 INJECTION, SOLUTION INTRAVENOUS ONCE
Status: DISCONTINUED | OUTPATIENT
Start: 2024-11-07 | End: 2024-11-08 | Stop reason: HOSPADM

## 2024-11-07 RX ORDER — HEPARIN 100 UNIT/ML
SYRINGE INTRAVENOUS AS NEEDED
Status: DISCONTINUED | OUTPATIENT
Start: 2024-11-07 | End: 2024-11-07 | Stop reason: HOSPADM

## 2024-11-07 ASSESSMENT — PAIN SCALES - GENERAL: PAINLEVEL_OUTOF10: 5 - MODERATE PAIN

## 2024-11-07 ASSESSMENT — COLUMBIA-SUICIDE SEVERITY RATING SCALE - C-SSRS
6. HAVE YOU EVER DONE ANYTHING, STARTED TO DO ANYTHING, OR PREPARED TO DO ANYTHING TO END YOUR LIFE?: NO
2. HAVE YOU ACTUALLY HAD ANY THOUGHTS OF KILLING YOURSELF?: NO
1. IN THE PAST MONTH, HAVE YOU WISHED YOU WERE DEAD OR WISHED YOU COULD GO TO SLEEP AND NOT WAKE UP?: NO

## 2024-11-07 ASSESSMENT — PAIN - FUNCTIONAL ASSESSMENT: PAIN_FUNCTIONAL_ASSESSMENT: 0-10

## 2024-11-08 RX ORDER — HEPARIN SODIUM,PORCINE/PF 10 UNIT/ML
50 SYRINGE (ML) INTRAVENOUS AS NEEDED
OUTPATIENT
Start: 2024-11-08

## 2024-11-08 RX ORDER — HEPARIN 100 UNIT/ML
500 SYRINGE INTRAVENOUS AS NEEDED
OUTPATIENT
Start: 2024-11-08

## 2024-11-12 ENCOUNTER — INFUSION (OUTPATIENT)
Dept: HEMATOLOGY/ONCOLOGY | Facility: CLINIC | Age: 79
End: 2024-11-12
Payer: MEDICARE

## 2024-11-12 VITALS
HEART RATE: 116 BPM | BODY MASS INDEX: 16.26 KG/M2 | TEMPERATURE: 97.2 F | WEIGHT: 70.23 LBS | DIASTOLIC BLOOD PRESSURE: 95 MMHG | HEIGHT: 55 IN | RESPIRATION RATE: 19 BRPM | SYSTOLIC BLOOD PRESSURE: 150 MMHG | OXYGEN SATURATION: 93 %

## 2024-11-12 DIAGNOSIS — R91.8 LUNG MASS: ICD-10-CM

## 2024-11-12 DIAGNOSIS — C34.81 MALIGNANT NEOPLASM OF OVERLAPPING SITES OF RIGHT LUNG (MULTI): ICD-10-CM

## 2024-11-12 DIAGNOSIS — C44.92 SQUAMOUS CELL CARCINOMA, SMALL CELL, NONKERATINIZING: Primary | ICD-10-CM

## 2024-11-12 LAB
CEA SERPL-MCNC: 3.1 UG/L
CORTIS AM PEAK SERPL-MSCNC: 21 UG/DL (ref 5–20)
TSH SERPL-ACNC: 0.68 MIU/L (ref 0.44–3.98)

## 2024-11-12 PROCEDURE — 96377 APPLICATON ON-BODY INJECTOR: CPT | Mod: 59

## 2024-11-12 PROCEDURE — 82024 ASSAY OF ACTH: CPT

## 2024-11-12 PROCEDURE — 2500000004 HC RX 250 GENERAL PHARMACY W/ HCPCS (ALT 636 FOR OP/ED): Performed by: INTERNAL MEDICINE

## 2024-11-12 PROCEDURE — 84443 ASSAY THYROID STIM HORMONE: CPT

## 2024-11-12 PROCEDURE — 82378 CARCINOEMBRYONIC ANTIGEN: CPT

## 2024-11-12 PROCEDURE — 96367 TX/PROPH/DG ADDL SEQ IV INF: CPT

## 2024-11-12 PROCEDURE — 96417 CHEMO IV INFUS EACH ADDL SEQ: CPT

## 2024-11-12 PROCEDURE — 96375 TX/PRO/DX INJ NEW DRUG ADDON: CPT | Mod: INF

## 2024-11-12 PROCEDURE — 96415 CHEMO IV INFUSION ADDL HR: CPT

## 2024-11-12 PROCEDURE — 2500000001 HC RX 250 WO HCPCS SELF ADMINISTERED DRUGS (ALT 637 FOR MEDICARE OP): Performed by: INTERNAL MEDICINE

## 2024-11-12 PROCEDURE — 96413 CHEMO IV INFUSION 1 HR: CPT

## 2024-11-12 PROCEDURE — 82533 TOTAL CORTISOL: CPT

## 2024-11-12 RX ORDER — HEPARIN 100 UNIT/ML
500 SYRINGE INTRAVENOUS AS NEEDED
Status: DISCONTINUED | OUTPATIENT
Start: 2024-11-12 | End: 2024-11-12 | Stop reason: HOSPADM

## 2024-11-12 RX ORDER — PALONOSETRON 0.05 MG/ML
0.25 INJECTION, SOLUTION INTRAVENOUS ONCE
Status: COMPLETED | OUTPATIENT
Start: 2024-11-12 | End: 2024-11-12

## 2024-11-12 RX ORDER — PROCHLORPERAZINE MALEATE 10 MG
10 TABLET ORAL EVERY 6 HOURS PRN
Status: DISCONTINUED | OUTPATIENT
Start: 2024-11-12 | End: 2024-11-12 | Stop reason: HOSPADM

## 2024-11-12 RX ORDER — FAMOTIDINE 10 MG/ML
20 INJECTION INTRAVENOUS ONCE AS NEEDED
Status: DISCONTINUED | OUTPATIENT
Start: 2024-11-12 | End: 2024-11-12 | Stop reason: HOSPADM

## 2024-11-12 RX ORDER — HEPARIN SODIUM,PORCINE/PF 10 UNIT/ML
50 SYRINGE (ML) INTRAVENOUS AS NEEDED
Status: DISCONTINUED | OUTPATIENT
Start: 2024-11-12 | End: 2024-11-12 | Stop reason: HOSPADM

## 2024-11-12 RX ORDER — DIPHENHYDRAMINE HYDROCHLORIDE 50 MG/ML
50 INJECTION INTRAMUSCULAR; INTRAVENOUS AS NEEDED
Status: DISCONTINUED | OUTPATIENT
Start: 2024-11-12 | End: 2024-11-12 | Stop reason: HOSPADM

## 2024-11-12 RX ORDER — DIPHENHYDRAMINE HCL 25 MG
50 CAPSULE ORAL ONCE
Status: COMPLETED | OUTPATIENT
Start: 2024-11-12 | End: 2024-11-12

## 2024-11-12 RX ORDER — EPINEPHRINE 0.3 MG/.3ML
0.3 INJECTION SUBCUTANEOUS EVERY 5 MIN PRN
Status: DISCONTINUED | OUTPATIENT
Start: 2024-11-12 | End: 2024-11-12 | Stop reason: HOSPADM

## 2024-11-12 RX ORDER — PROCHLORPERAZINE EDISYLATE 5 MG/ML
10 INJECTION INTRAMUSCULAR; INTRAVENOUS EVERY 6 HOURS PRN
Status: DISCONTINUED | OUTPATIENT
Start: 2024-11-12 | End: 2024-11-12 | Stop reason: HOSPADM

## 2024-11-12 RX ORDER — ALBUTEROL SULFATE 0.83 MG/ML
3 SOLUTION RESPIRATORY (INHALATION) AS NEEDED
Status: DISCONTINUED | OUTPATIENT
Start: 2024-11-12 | End: 2024-11-12 | Stop reason: HOSPADM

## 2024-11-12 RX ORDER — HEPARIN SODIUM,PORCINE/PF 10 UNIT/ML
50 SYRINGE (ML) INTRAVENOUS AS NEEDED
OUTPATIENT
Start: 2024-11-12

## 2024-11-12 RX ORDER — FAMOTIDINE 10 MG/ML
20 INJECTION INTRAVENOUS ONCE
Status: COMPLETED | OUTPATIENT
Start: 2024-11-12 | End: 2024-11-12

## 2024-11-12 RX ORDER — HEPARIN 100 UNIT/ML
500 SYRINGE INTRAVENOUS AS NEEDED
OUTPATIENT
Start: 2024-11-12

## 2024-11-12 ASSESSMENT — PAIN SCALES - GENERAL: PAINLEVEL_OUTOF10: 5

## 2024-11-12 NOTE — PROGRESS NOTES
Pt arrived via wheelchair for pembrolizumab, paclitaxel and carboplatin infusions today. Pt not in any distress. Pt assisted to a bed for treatment today for comfort. Baseline assessment completed. Pt educated on chemotherapy and precautions to be taking at home. Really touched on how to handle fluids and sharing a bathroom with patient since her daughter is not present today to be educated as well. Instructed patient that she will need disinfectant wipes to cleanse surfaces and not to use hygiene wipes for clean contaminated surfaces. Pt given a magnet and the yellow card for her wallet. Instructed patient on what numbers to call and when. Discussed calling 911 as well. Pt verbalized understanding via teachback. Port accessed with brisk blood return. Pt requested emla cream. Dr. Mendoza notified.   Pt tolerated treatment well. Pt educated on onpro prior to leaving. Booklet sent with patient. Pt request it go on her right arm. Denied it going on her abdomen. Brisk blood return from port at completion. Port flushed per order. Deaccessed and covered with gauze and tape per patient request. Pt given AVS. Pt left infusion via wheelchair. Waiting for [facility] in [facility].

## 2024-11-12 NOTE — PATIENT INSTRUCTIONS
Today you were given a nausea medication called Aloxi. This is a long acting medication. Please do not take zofran until 11/15/24. If you need to treat nausea and/or vomiting you make take the prescribed compazine. After Friday 11/15/24 you may alternate zofran and compazine as needed for nausea and/or vomiting.      You had an Onpro placed on your left arm today. This medication will release in 27 hours. The time is marked on the dressing. Please do not remove the onpro from your arm until the time on the bandaged. A booklet is being sent home with you with the instructions on how to managed it as we discussed together today.

## 2024-11-14 LAB — ACTH PLAS-MCNC: 9.3 PG/ML (ref 7.2–63.3)

## 2024-11-25 ENCOUNTER — APPOINTMENT (OUTPATIENT)
Dept: RADIOLOGY | Facility: HOSPITAL | Age: 79
End: 2024-11-25
Payer: MEDICARE

## 2024-12-02 ENCOUNTER — APPOINTMENT (OUTPATIENT)
Dept: HEMATOLOGY/ONCOLOGY | Facility: CLINIC | Age: 79
End: 2024-12-02
Payer: MEDICARE

## 2024-12-02 ENCOUNTER — INFUSION (OUTPATIENT)
Dept: HEMATOLOGY/ONCOLOGY | Facility: CLINIC | Age: 79
End: 2024-12-02
Payer: MEDICARE

## 2024-12-02 ENCOUNTER — OFFICE VISIT (OUTPATIENT)
Dept: HEMATOLOGY/ONCOLOGY | Facility: CLINIC | Age: 79
End: 2024-12-02
Payer: MEDICARE

## 2024-12-02 VITALS
WEIGHT: 69.67 LBS | HEART RATE: 116 BPM | TEMPERATURE: 96.6 F | OXYGEN SATURATION: 94 % | DIASTOLIC BLOOD PRESSURE: 87 MMHG | BODY MASS INDEX: 16.47 KG/M2 | RESPIRATION RATE: 18 BRPM | SYSTOLIC BLOOD PRESSURE: 136 MMHG

## 2024-12-02 DIAGNOSIS — C44.92 SQUAMOUS CELL CARCINOMA, SMALL CELL, NONKERATINIZING: Primary | ICD-10-CM

## 2024-12-02 DIAGNOSIS — D50.8 IRON DEFICIENCY ANEMIA SECONDARY TO INADEQUATE DIETARY IRON INTAKE: ICD-10-CM

## 2024-12-02 DIAGNOSIS — C44.92 SQUAMOUS CELL CARCINOMA, SMALL CELL, NONKERATINIZING: ICD-10-CM

## 2024-12-02 DIAGNOSIS — C34.81 MALIGNANT NEOPLASM OF OVERLAPPING SITES OF RIGHT LUNG (MULTI): ICD-10-CM

## 2024-12-02 LAB
ALBUMIN SERPL BCP-MCNC: 3.9 G/DL (ref 3.4–5)
ALP SERPL-CCNC: 195 U/L (ref 33–136)
ALT SERPL W P-5'-P-CCNC: 15 U/L (ref 7–45)
ANION GAP SERPL CALC-SCNC: 13 MMOL/L (ref 10–20)
AST SERPL W P-5'-P-CCNC: 18 U/L (ref 9–39)
BASOPHILS # BLD AUTO: 0.12 X10*3/UL (ref 0–0.1)
BASOPHILS NFR BLD AUTO: 0.6 %
BILIRUB SERPL-MCNC: 0.3 MG/DL (ref 0–1.2)
BUN SERPL-MCNC: 12 MG/DL (ref 6–23)
CALCIUM SERPL-MCNC: 9.6 MG/DL (ref 8.6–10.3)
CHLORIDE SERPL-SCNC: 100 MMOL/L (ref 98–107)
CO2 SERPL-SCNC: 30 MMOL/L (ref 21–32)
CREAT SERPL-MCNC: 0.44 MG/DL (ref 0.5–1.05)
EGFRCR SERPLBLD CKD-EPI 2021: >90 ML/MIN/1.73M*2
EOSINOPHIL # BLD AUTO: 0.01 X10*3/UL (ref 0–0.4)
EOSINOPHIL NFR BLD AUTO: 0.1 %
ERYTHROCYTE [DISTWIDTH] IN BLOOD BY AUTOMATED COUNT: 13.5 % (ref 11.5–14.5)
GLUCOSE SERPL-MCNC: 134 MG/DL (ref 74–99)
HCT VFR BLD AUTO: 44.3 % (ref 36–46)
HGB BLD-MCNC: 14.5 G/DL (ref 12–16)
IMM GRANULOCYTES # BLD AUTO: 0.28 X10*3/UL (ref 0–0.5)
IMM GRANULOCYTES NFR BLD AUTO: 1.5 % (ref 0–0.9)
LYMPHOCYTES # BLD AUTO: 1.26 X10*3/UL (ref 0.8–3)
LYMPHOCYTES NFR BLD AUTO: 6.5 %
MCH RBC QN AUTO: 29.8 PG (ref 26–34)
MCHC RBC AUTO-ENTMCNC: 32.7 G/DL (ref 32–36)
MCV RBC AUTO: 91 FL (ref 80–100)
MONOCYTES # BLD AUTO: 1.54 X10*3/UL (ref 0.05–0.8)
MONOCYTES NFR BLD AUTO: 8 %
NEUTROPHILS # BLD AUTO: 16.07 X10*3/UL (ref 1.6–5.5)
NEUTROPHILS NFR BLD AUTO: 83.3 %
NRBC BLD-RTO: 0 /100 WBCS (ref 0–0)
PLATELET # BLD AUTO: 339 X10*3/UL (ref 150–450)
POTASSIUM SERPL-SCNC: 4.3 MMOL/L (ref 3.5–5.3)
PROT SERPL-MCNC: 7.4 G/DL (ref 6.4–8.2)
RBC # BLD AUTO: 4.87 X10*6/UL (ref 4–5.2)
SODIUM SERPL-SCNC: 139 MMOL/L (ref 136–145)
WBC # BLD AUTO: 19.3 X10*3/UL (ref 4.4–11.3)

## 2024-12-02 PROCEDURE — 2500000004 HC RX 250 GENERAL PHARMACY W/ HCPCS (ALT 636 FOR OP/ED): Performed by: INTERNAL MEDICINE

## 2024-12-02 PROCEDURE — 2500000001 HC RX 250 WO HCPCS SELF ADMINISTERED DRUGS (ALT 637 FOR MEDICARE OP): Performed by: INTERNAL MEDICINE

## 2024-12-02 PROCEDURE — 96415 CHEMO IV INFUSION ADDL HR: CPT

## 2024-12-02 PROCEDURE — 99215 OFFICE O/P EST HI 40 MIN: CPT | Mod: 25 | Performed by: INTERNAL MEDICINE

## 2024-12-02 PROCEDURE — 85025 COMPLETE CBC W/AUTO DIFF WBC: CPT

## 2024-12-02 PROCEDURE — 96375 TX/PRO/DX INJ NEW DRUG ADDON: CPT | Mod: INF

## 2024-12-02 PROCEDURE — 1160F RVW MEDS BY RX/DR IN RCRD: CPT | Performed by: INTERNAL MEDICINE

## 2024-12-02 PROCEDURE — 96377 APPLICATON ON-BODY INJECTOR: CPT | Mod: 59

## 2024-12-02 PROCEDURE — 96413 CHEMO IV INFUSION 1 HR: CPT

## 2024-12-02 PROCEDURE — 1159F MED LIST DOCD IN RCRD: CPT | Performed by: INTERNAL MEDICINE

## 2024-12-02 PROCEDURE — 99215 OFFICE O/P EST HI 40 MIN: CPT | Performed by: INTERNAL MEDICINE

## 2024-12-02 PROCEDURE — 84075 ASSAY ALKALINE PHOSPHATASE: CPT

## 2024-12-02 PROCEDURE — 1157F ADVNC CARE PLAN IN RCRD: CPT | Performed by: INTERNAL MEDICINE

## 2024-12-02 PROCEDURE — 96367 TX/PROPH/DG ADDL SEQ IV INF: CPT

## 2024-12-02 PROCEDURE — 96417 CHEMO IV INFUS EACH ADDL SEQ: CPT

## 2024-12-02 RX ORDER — ONDANSETRON HYDROCHLORIDE 8 MG/1
8 TABLET, FILM COATED ORAL EVERY 8 HOURS PRN
Qty: 30 TABLET | Refills: 5 | Status: SHIPPED | OUTPATIENT
Start: 2024-12-02

## 2024-12-02 RX ORDER — FAMOTIDINE 10 MG/ML
20 INJECTION INTRAVENOUS ONCE AS NEEDED
Status: DISCONTINUED | OUTPATIENT
Start: 2024-12-02 | End: 2024-12-02 | Stop reason: HOSPADM

## 2024-12-02 RX ORDER — DIPHENHYDRAMINE HCL 25 MG
50 CAPSULE ORAL ONCE
OUTPATIENT
Start: 2024-12-23

## 2024-12-02 RX ORDER — PALONOSETRON 0.05 MG/ML
0.25 INJECTION, SOLUTION INTRAVENOUS ONCE
OUTPATIENT
Start: 2024-12-23

## 2024-12-02 RX ORDER — PROCHLORPERAZINE MALEATE 10 MG
10 TABLET ORAL EVERY 6 HOURS PRN
OUTPATIENT
Start: 2025-01-13

## 2024-12-02 RX ORDER — FAMOTIDINE 10 MG/ML
20 INJECTION INTRAVENOUS ONCE
Status: COMPLETED | OUTPATIENT
Start: 2024-12-02 | End: 2024-12-02

## 2024-12-02 RX ORDER — HEPARIN 100 UNIT/ML
500 SYRINGE INTRAVENOUS AS NEEDED
OUTPATIENT
Start: 2024-12-02

## 2024-12-02 RX ORDER — PROCHLORPERAZINE MALEATE 10 MG
10 TABLET ORAL EVERY 6 HOURS PRN
OUTPATIENT
Start: 2024-12-23

## 2024-12-02 RX ORDER — FAMOTIDINE 10 MG/ML
20 INJECTION INTRAVENOUS ONCE AS NEEDED
OUTPATIENT
Start: 2024-12-23

## 2024-12-02 RX ORDER — ALBUTEROL SULFATE 0.83 MG/ML
3 SOLUTION RESPIRATORY (INHALATION) AS NEEDED
OUTPATIENT
Start: 2024-12-23

## 2024-12-02 RX ORDER — ALBUTEROL SULFATE 0.83 MG/ML
3 SOLUTION RESPIRATORY (INHALATION) AS NEEDED
Status: DISCONTINUED | OUTPATIENT
Start: 2024-12-02 | End: 2024-12-02 | Stop reason: HOSPADM

## 2024-12-02 RX ORDER — DIPHENHYDRAMINE HYDROCHLORIDE 50 MG/ML
50 INJECTION INTRAMUSCULAR; INTRAVENOUS AS NEEDED
OUTPATIENT
Start: 2024-12-23

## 2024-12-02 RX ORDER — DIPHENHYDRAMINE HCL 25 MG
50 CAPSULE ORAL ONCE
OUTPATIENT
Start: 2025-01-13

## 2024-12-02 RX ORDER — ALBUTEROL SULFATE 0.83 MG/ML
3 SOLUTION RESPIRATORY (INHALATION) AS NEEDED
OUTPATIENT
Start: 2025-01-13

## 2024-12-02 RX ORDER — PROCHLORPERAZINE MALEATE 10 MG
10 TABLET ORAL EVERY 6 HOURS PRN
Qty: 30 TABLET | Refills: 5 | Status: SHIPPED | OUTPATIENT
Start: 2024-12-02

## 2024-12-02 RX ORDER — PROCHLORPERAZINE EDISYLATE 5 MG/ML
10 INJECTION INTRAMUSCULAR; INTRAVENOUS EVERY 6 HOURS PRN
OUTPATIENT
Start: 2025-01-13

## 2024-12-02 RX ORDER — EPINEPHRINE 0.3 MG/.3ML
0.3 INJECTION SUBCUTANEOUS EVERY 5 MIN PRN
OUTPATIENT
Start: 2025-01-13

## 2024-12-02 RX ORDER — FAMOTIDINE 10 MG/ML
20 INJECTION INTRAVENOUS ONCE
OUTPATIENT
Start: 2025-01-13

## 2024-12-02 RX ORDER — EPINEPHRINE 0.3 MG/.3ML
0.3 INJECTION SUBCUTANEOUS EVERY 5 MIN PRN
OUTPATIENT
Start: 2024-12-23

## 2024-12-02 RX ORDER — PROCHLORPERAZINE MALEATE 10 MG
10 TABLET ORAL EVERY 6 HOURS PRN
Status: DISCONTINUED | OUTPATIENT
Start: 2024-12-02 | End: 2024-12-02 | Stop reason: HOSPADM

## 2024-12-02 RX ORDER — FAMOTIDINE 10 MG/ML
20 INJECTION INTRAVENOUS ONCE
OUTPATIENT
Start: 2024-12-23

## 2024-12-02 RX ORDER — HEPARIN SODIUM,PORCINE/PF 10 UNIT/ML
50 SYRINGE (ML) INTRAVENOUS AS NEEDED
Status: DISCONTINUED | OUTPATIENT
Start: 2024-12-02 | End: 2024-12-02 | Stop reason: HOSPADM

## 2024-12-02 RX ORDER — PALONOSETRON 0.05 MG/ML
0.25 INJECTION, SOLUTION INTRAVENOUS ONCE
Status: COMPLETED | OUTPATIENT
Start: 2024-12-02 | End: 2024-12-02

## 2024-12-02 RX ORDER — PROCHLORPERAZINE EDISYLATE 5 MG/ML
10 INJECTION INTRAMUSCULAR; INTRAVENOUS EVERY 6 HOURS PRN
OUTPATIENT
Start: 2024-12-23

## 2024-12-02 RX ORDER — PALONOSETRON 0.05 MG/ML
0.25 INJECTION, SOLUTION INTRAVENOUS ONCE
OUTPATIENT
Start: 2025-01-13

## 2024-12-02 RX ORDER — HEPARIN SODIUM,PORCINE/PF 10 UNIT/ML
50 SYRINGE (ML) INTRAVENOUS AS NEEDED
OUTPATIENT
Start: 2024-12-02

## 2024-12-02 RX ORDER — PROCHLORPERAZINE EDISYLATE 5 MG/ML
10 INJECTION INTRAMUSCULAR; INTRAVENOUS EVERY 6 HOURS PRN
Status: DISCONTINUED | OUTPATIENT
Start: 2024-12-02 | End: 2024-12-02 | Stop reason: HOSPADM

## 2024-12-02 RX ORDER — FAMOTIDINE 10 MG/ML
20 INJECTION INTRAVENOUS ONCE AS NEEDED
OUTPATIENT
Start: 2025-01-13

## 2024-12-02 RX ORDER — DIPHENHYDRAMINE HYDROCHLORIDE 50 MG/ML
50 INJECTION INTRAMUSCULAR; INTRAVENOUS AS NEEDED
OUTPATIENT
Start: 2025-01-13

## 2024-12-02 RX ORDER — DIPHENHYDRAMINE HCL 25 MG
50 CAPSULE ORAL ONCE
Status: COMPLETED | OUTPATIENT
Start: 2024-12-02 | End: 2024-12-02

## 2024-12-02 RX ORDER — HEPARIN 100 UNIT/ML
500 SYRINGE INTRAVENOUS AS NEEDED
Status: DISCONTINUED | OUTPATIENT
Start: 2024-12-02 | End: 2024-12-02 | Stop reason: HOSPADM

## 2024-12-02 RX ORDER — EPINEPHRINE 0.3 MG/.3ML
0.3 INJECTION SUBCUTANEOUS EVERY 5 MIN PRN
Status: DISCONTINUED | OUTPATIENT
Start: 2024-12-02 | End: 2024-12-02 | Stop reason: HOSPADM

## 2024-12-02 RX ORDER — DIPHENHYDRAMINE HYDROCHLORIDE 50 MG/ML
50 INJECTION INTRAMUSCULAR; INTRAVENOUS AS NEEDED
Status: DISCONTINUED | OUTPATIENT
Start: 2024-12-02 | End: 2024-12-02 | Stop reason: HOSPADM

## 2024-12-02 ASSESSMENT — PAIN SCALES - GENERAL: PAINLEVEL_OUTOF10: 5

## 2024-12-02 NOTE — PROGRESS NOTES
Patient ID: Karly Horton is a 79 y.o. female.  Referring Physician: No referring provider defined for this encounter.  Primary Care Provider: Julian Chaney MD  Referral Reason: Lung cancer    Subjective:  Mild loose stools which had stopped after eating macaroni. Mild nausea which is relieved by anti-nausea meds    Heme/Onc History:  Karly Horton is a 79 y.o. female who was referred by Cathy Lau MD, for a consultation to the [facility] Department of Medical Oncology.  She is presenting for evaluation and management of lung cancer.      #) At least locally advanced lung cancer, cT4 cN1 cM0 (?punctate left brain venous anomaly)    Pathology Review:  The pertinent pathology results were reviewed and discussed with the patient.  Notably,      10/11/2024-A. LYMPH NODE 11 L PULMONARY FINE NEEDLE ASPIRATION: Highly atypical cells are present; carcinoma/malignancy cannot be excluded               B. LYMPH NODE 7 PULMONARY FINE NEEDLE ASPIRATION: Malignant cells are present, see note. Lymphoid sample. Note: A minute fragment of malignant cells is noted in the cell block              C. LUNG FINE NEEDLE ASPIRATION RIGHT LOWER LOBE: Malignant cells derived from squamous cell carcinoma; Note: The malignant cells are positive for p40 and negative for TTF1 and CK7.     10/11/2024- A.  B.  Lung, right lower lobe mass, biopsies: - Detached fragments of non-small cell carcinoma, consistent with squamous cell carcinoma. Note: Detached fragments of non-small cell carcinoma in a background of abundant necrosis are seen.  Neoplastic cells are immunoreactive with p40.      Disease Associated Genomics:  PD-L1 TPS 10% (low)  MICROSATELLITE STATUS: Microsatellite Instability-High (MSI-H) is NOT DETECTED.     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  Negative for BRAF  V600E.  Negative for EGFR sensitizing mutation.  Negative for ERBB2 activating mutation  Negative for KRAS G12C.  Negative for MET exon 14 skipping mutation.  Negative for NTRK fusion.  Negative for RET fusion.  Negative for ROS1 fusion.    Imaging:  The pertinent imaging results were reviewed and discussed with the patient.  Notably,      10/18/2024-MRI brain with and without contrast: Punctate focus of enhancement within the left amygdala with questionable curvilinear enhancing tail suggestive of developmental venous anomaly.  No other evidence of abnormal leptomeningeal or pachymeningeal enhancement.     10/18/2024-PET/CT: No evidence of hypermetabolic cervical lymphadenopathy.  There is a right apical posterior upper lobe lung nodule with hypermetabolic activity max SUV 8.2, right superior lower lobe nodule with hypermetabolic activity max SUV 4.7, multiple pulmonary nodules within the right lung that do not demonstrate hypermetabolic activity and a right infrahilar mass with hypermetabolic activity with central photopenia representing partial necrosis with max SUV 5.7.  No other evidence of mediastinal, hilar or axillary lymphadenopathy.  In the left lower lobe there is a 7 mm nodule with mild hypermetabolic activity.     10/18/2024-CT chest with contrast: In the right lower lobe there is a mass with spiculated margins measuring 3.1 cm the mass transverses and crosses the major fissure with extension into the middle lobe, spiculated right upper nodule measuring 2.3 cm with extension to the superior right major fissure and fissural retraction.  Multiple additional bilateral pulmonary nodules not significantly changed including left lower lobe nodule measuring 7 mm and right lower lobe nodule measuring 5 mm.  Stable calcified nonspecific finding along the anterior left hepatic lobe.     Past Medical History:   Past Medical History:   Diagnosis Date    Other cervical disc degeneration, unspecified cervical region      Degeneration of cervical intervertebral disc    Other conditions influencing health status     Bulging Disc (L3 - L4)    Other conditions influencing health status     A Fall    Other conditions influencing health status     Bulging Disc (C4 - C5)    Other conditions influencing health status     Bulging Disc (C6 - C7)    Other conditions influencing health status     Bulging Disc (C5 - C6)    Other conditions influencing health status     Lumbar Spondylolisthesis    Other conditions influencing health status     Rotator Cuff Tendon Tear    Other intervertebral disc degeneration, lumbar region     L4-L5 disc bulge    Other intervertebral disc displacement, lumbar region     Disc displacement, lumbar    Other intervertebral disc displacement, lumbosacral region     Displacement of lumbosacral intervertebral disc    Personal history of other diseases of the musculoskeletal system and connective tissue     History of bursitis    Personal history of other diseases of the musculoskeletal system and connective tissue     History of fibromyositis    Personal history of other specified conditions     History of headache    Spinal stenosis, lumbar region without neurogenic claudication     Lumbar canal stenosis    Sprain of ligaments of lumbar spine, initial encounter     Low back sprain     Social History:   Social History     Socioeconomic History    Marital status:      Spouse name: Not on file    Number of children: Not on file    Years of education: Not on file    Highest education level: Not on file   Occupational History    Not on file   Tobacco Use    Smoking status: Every Day     Current packs/day: 1.00     Average packs/day: 1 pack/day for 64.9 years (64.9 ttl pk-yrs)     Types: Cigarettes     Start date: 1/1/1960    Smokeless tobacco: Not on file   Vaping Use    Vaping status: Some Days   Substance and Sexual Activity    Alcohol use: Not on file    Drug use: Not on file    Sexual activity: Defer   Other  Topics Concern    Not on file   Social History Narrative    Not on file     Social Drivers of Health     Financial Resource Strain: Medium Risk (10/31/2024)    Overall Financial Resource Strain (CARDIA)     Difficulty of Paying Living Expenses: Somewhat hard   Food Insecurity: No Food Insecurity (10/31/2024)    Hunger Vital Sign     Worried About Running Out of Food in the Last Year: Never true     Ran Out of Food in the Last Year: Never true   Transportation Needs: No Transportation Needs (10/31/2024)    PRAPARE - Transportation     Lack of Transportation (Medical): No     Lack of Transportation (Non-Medical): No   Physical Activity: Inactive (10/31/2024)    Exercise Vital Sign     Days of Exercise per Week: 0 days     Minutes of Exercise per Session: 0 min   Stress: Stress Concern Present (10/31/2024)    Citizen of [place] [employer] - Occupational Stress Questionnaire     Feeling of Stress : To some extent   Social Connections: Socially Isolated (10/31/2024)    Social Connection and Isolation Panel [NHANES]     Frequency of Communication with Friends and Family: More than three times a week     Frequency of Social Gatherings with Friends and Family: More than three times a week     Attends Jewish Services: Never     Active Member of Clubs or Organizations: No     Attends Club or Organization Meetings: Never     Marital Status:    Intimate Partner Violence: Not At Risk (10/31/2024)    Humiliation, Afraid, Rape, and Kick questionnaire     Fear of Current or Ex-Partner: No     Emotionally Abused: No     Physically Abused: No     Sexually Abused: No   Housing Stability: Low Risk  (10/31/2024)    Housing Stability Vital Sign     Unable to Pay for Housing in the Last Year: No     Number of Times Moved in the Last Year: 0     Homeless in the Last Year: No     Surgical History:   Past Surgical History:   Procedure Laterality Date    APPENDECTOMY      EYE SURGERY      HYSTERECTOMY      TONSILLECTOMY        Family History:   Family History   Problem Relation Name Age of Onset    Cancer Mother      Other (Father had hypertension, stroke, coronary artery disease and diabetes) Father        reports that she has been smoking cigarettes. She started smoking about 64 years ago. She has a 64.9 pack-year smoking history. She does not have any smokeless tobacco history on file.  Oncology Family history: Cancer-related family history includes Cancer in her mother.    Review Of Systems:  As stated per in HPI; otherwise all other 12 point ROS are negative    Physical Exam:  There were no vitals taken for this visit.  BSA: There is no height or weight on file to calculate BSA.  General: awake/alert/oriented x3, no distress, alert and cooperative  Head: Short hair fully covering scalp. Symmetric facial expressions  Eyes: PERRL, EOMI, clear sclera, eyebrows present.  Ears/Nose/Mouth/Throat:  Oral mucous membranes moist. No oral ulcers. No palpable pre/post-auricular lymph nodes  Neck: No palpable cervical chain lymph nodes  Respiratory: unlabored breathing on room air, good chest expansion, thorax symmetric  Cardio: Regular rate and rhythm, normal S1 and S2, radial pulses symmetric  GI: Nondistended, soft, non-tender abdomen  Musculoskeletal: Normal muscle bulk and tone, ROM intact, no joint swelling.  Rises from chair and walks unassisted.  Extremities: No ankle swelling, no arm or leg wounds  Neuro: Alert, cognition intact, speech normal. Facial expressions symmetric.  No motor deficits noted. Sensation intact to touch and hot/cold.   Able to stand from seated position unassisted and walks around the room unassisted.  Psychological: Appropriate mood and behavior.  Skin: Warm and dry, no lesions, no rashes    Results:  Diagnostic Results   Lab Results   Component Value Date    WBC 7.1 10/31/2024    HGB 12.7 10/31/2024    HCT 39.4 10/31/2024    MCV 93 10/31/2024     10/31/2024     Lab Results   Component Value Date     CALCIUM 9.4 10/31/2024     (L) 10/31/2024    K 4.1 10/31/2024    CO2 29 10/31/2024    CL 97 (L) 10/31/2024    BUN 11 10/31/2024    CREATININE 0.36 (L) 10/31/2024    ALT 13 10/31/2024    AST 18 10/31/2024       Current Outpatient Medications:     acetaminophen (Tylenol) 325 mg tablet, Take 1-2 tablets (325-650 mg) by mouth every 6 hours if needed for mild pain (1 - 3)., Disp: , Rfl:     ascorbic acid (Vitamin C) 500 mg tablet, Take 1 tablet (500 mg) by mouth once daily. (Patient not taking: Reported on 10/10/2024), Disp: , Rfl:     baclofen (Lioresal) 10 mg tablet, Take 1 tablet (10 mg) by mouth 3 times a day., Disp: , Rfl:     butalbital-acetaminophen-caff -40 mg tablet, Take 1 tablet by mouth every 6 hours if needed for headaches., Disp: , Rfl:     cholecalciferol (Vitamin D-3) 50 MCG (2000 UT) tablet, Take 1 tablet (2,000 Units) by mouth once daily., Disp: 30 tablet, Rfl: 2    dexAMETHasone (Decadron) 4 mg tablet, Take 2 tablets (8 mg) by mouth once daily. For 3 days starting the day after treatment Do not fill before November 10, 2024., Disp: 12 tablet, Rfl: 3    ferrous sulfate, 325 mg ferrous sulfate, tablet, Take 1 tablet by mouth once daily with breakfast. (Patient not taking: Reported on 10/10/2024), Disp: , Rfl:     lactulose 10 gram/15 mL solution, Take 45 mL (30 g) by mouth once daily., Disp: , Rfl:     lidocaine HCL 4 % liquid roll-on, Apply 1 Application topically once daily as needed., Disp: , Rfl:     lubricating eye drops ophthalmic solution, Administer 1 drop into both eyes if needed for dry eyes., Disp: , Rfl:     meprobamate (Equanil) 400 mg tablet, Take 1 tablet (400 mg) by mouth 4 times a day., Disp: , Rfl:     multivitamin with minerals tablet, Take 1 tablet by mouth once daily., Disp: 30 tablet, Rfl: 2    nicotine (Nicoderm CQ) 14 mg/24 hr patch, Place 1 patch over 24 hours on the skin once every 24 hours for 28 days., Disp: 28 patch, Rfl: 0    nicotine (Nicoderm CQ) 21 mg/24 hr  patch, Place 1 patch over 24 hours on the skin once daily., Disp: , Rfl:     OLANZapine (ZyPREXA) 5 mg tablet, Take 1 tablet (5 mg) by mouth once daily at bedtime. For 4 days starting the evening of treatment Do not fill before November 10, 2024., Disp: 12 tablet, Rfl: 3    ondansetron (Zofran) 8 mg tablet, Take 1 tablet (8 mg) by mouth every 8 hours if needed for nausea or vomiting. Do not fill before November 10, 2024., Disp: 30 tablet, Rfl: 5    pantoprazole (ProtoNix) 40 mg EC tablet, Take 1 tablet (40 mg) by mouth 2 times a day. Do not crush, chew, or split. Continue twice daily x 8 weeks then continue once daily. (Patient not taking: Reported on 10/10/2024), Disp: , Rfl:     prochlorperazine (Compazine) 10 mg tablet, Take 1 tablet (10 mg) by mouth every 6 hours if needed for nausea or vomiting. Do not fill before November 10, 2024., Disp: 30 tablet, Rfl: 5    sucralfate (Carafate) 100 mg/mL suspension, Take 10 mL (1 g) by mouth every 6 hours. (Patient not taking: Reported on 10/10/2024), Disp: , Rfl:   No current facility-administered medications for this visit.    Facility-Administered Medications Ordered in Other Visits:     alteplase (Cathflo Activase) injection 2 mg, 2 mg, intra-catheter, PRN, Kelly Guerra MD    heparin flush 10 unit/mL syringe 50 Units, 50 Units, intra-catheter, PRN, Kelly Guerra MD    heparin flush 100 unit/mL syringe 500 Units, 500 Units, intra-catheter, PRN, Kelly Guerra MD     Radiology:    Pathology:    Assessment/Plan:  1- Lung cancer, squamous cell carcinoma:    78 yo F Dx with lung squamous cell carcinoma from subcarinal and right lower lobe mass, clinically stage 3B, cT4 (due to multiple nodules in ipsilateral lung in different lobes) and N2 (due to subcarinal LN).  FNA from inferior mediastinal LN (LN station 11) was atypical but suspicious for carcinoma metastases.    There is a solitary noncalcified nodule in LLL, biopsy is recommended to rule out second primary or  contralateral lung metastases. Lung biopsy is scheduled fro 12/16/24.    She does not seem to be a good candidate for surgery, or concurrent chemoRT.     Systemic tx plan: systemic chemoIO carbo/taxol/Keytruda q 3 weeks x 4 with Neulasta OnPro followed by hypofractionated RT followed by IO maintenance is recommended after discussion with Dr. Odom. Eileenzar could not be offered as she has difficulties with transportation. Keytruda will likely be given every 6 weeks after RT is completed.    C#1 chemo started on 11/12/24. Tolerated first dose very well. Mild N, no Diarrhea or constipation or vomiting  C#2 chemo planned today.    RTC on 12/20 for toxicity check    2- Brain Venous anomaly vs metastatic foci:    MRI brain 10/18/24: IMPRESSION: 1. 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 However, recommend short-term follow-up MRI in 3 months to assess for interval stability to exclude a more aggressive lesion.    Repeat MRI brain in late 01/25.    3- Iron def: iron def without anemia. Venofer 200 mg x 3 to be given on days of chemotx are ordered      There are no diagnoses linked to this encounter.       Performance Status: Symptomatic; fully ambulatory    I spent more than 60 minutes for the patient today, including face-to-face conversation, pre-visit preparation, post-visit orders, and others.   Kelly Guerra MD

## 2024-12-02 NOTE — PROGRESS NOTES
Patient here for blood draw from [email or web address]. Accessed, good blood return, sent blood to lab, flushed per orders. Denies questions or needs at this time.  Dr. Mendoza over to see patient.Reviewed weight and labs, ready for treatment. Tolerated treatment without complication. Reviewed schedulle and when to remove ONPRO. Verbalized understanding via teachback.

## 2024-12-04 ENCOUNTER — APPOINTMENT (OUTPATIENT)
Dept: RESPIRATORY THERAPY | Facility: HOSPITAL | Age: 79
End: 2024-12-04
Payer: MEDICARE

## 2024-12-06 ENCOUNTER — APPOINTMENT (OUTPATIENT)
Dept: RADIATION ONCOLOGY | Facility: CLINIC | Age: 79
End: 2024-12-06
Payer: MEDICARE

## 2024-12-09 ENCOUNTER — HOSPITAL ENCOUNTER (OUTPATIENT)
Dept: RESPIRATORY THERAPY | Facility: HOSPITAL | Age: 79
Setting detail: THERAPIES SERIES
Discharge: HOME | End: 2024-12-09
Payer: MEDICARE

## 2024-12-09 DIAGNOSIS — C34.90 NON-SMALL CELL LUNG CANCER, UNSPECIFIED LATERALITY (MULTI): ICD-10-CM

## 2024-12-09 PROCEDURE — 94726 PLETHYSMOGRAPHY LUNG VOLUMES: CPT

## 2024-12-09 RX ORDER — ALBUTEROL SULFATE 0.83 MG/ML
SOLUTION RESPIRATORY (INHALATION)
Status: DISPENSED
Start: 2024-12-09 | End: 2024-12-09

## 2024-12-16 ENCOUNTER — HOSPITAL ENCOUNTER (OUTPATIENT)
Dept: RADIOLOGY | Facility: HOSPITAL | Age: 79
Discharge: HOME | End: 2024-12-16
Payer: MEDICARE

## 2024-12-16 VITALS
WEIGHT: 70 LBS | TEMPERATURE: 98.5 F | HEIGHT: 56 IN | OXYGEN SATURATION: 95 % | RESPIRATION RATE: 20 BRPM | BODY MASS INDEX: 15.75 KG/M2 | HEART RATE: 79 BPM | DIASTOLIC BLOOD PRESSURE: 58 MMHG | SYSTOLIC BLOOD PRESSURE: 109 MMHG

## 2024-12-16 DIAGNOSIS — C34.90 NON-SMALL CELL LUNG CANCER, UNSPECIFIED LATERALITY (MULTI): ICD-10-CM

## 2024-12-16 LAB
ERYTHROCYTE [DISTWIDTH] IN BLOOD BY AUTOMATED COUNT: 13.9 % (ref 11.5–14.5)
HCT VFR BLD AUTO: 37.1 % (ref 36–46)
HGB BLD-MCNC: 12.2 G/DL (ref 12–16)
MCH RBC QN AUTO: 30.3 PG (ref 26–34)
MCHC RBC AUTO-ENTMCNC: 32.9 G/DL (ref 32–36)
MCV RBC AUTO: 92 FL (ref 80–100)
NRBC BLD-RTO: 0 /100 WBCS (ref 0–0)
PLATELET # BLD AUTO: 174 X10*3/UL (ref 150–450)
POCT INTERNATIONAL NORMALIZATION RATIO: 0.9
POCT PROTHROMBIN TIME: 11.1 SECONDS
RBC # BLD AUTO: 4.02 X10*6/UL (ref 4–5.2)
WBC # BLD AUTO: 4.5 X10*3/UL (ref 4.4–11.3)

## 2024-12-16 PROCEDURE — 85027 COMPLETE CBC AUTOMATED: CPT | Performed by: NURSE PRACTITIONER

## 2024-12-16 PROCEDURE — 71250 CT THORAX DX C-: CPT | Mod: RCN | Performed by: RADIOLOGY

## 2024-12-16 PROCEDURE — 32408 CORE NDL BX LNG/MED PERQ: CPT | Mod: RCN

## 2024-12-16 ASSESSMENT — COLUMBIA-SUICIDE SEVERITY RATING SCALE - C-SSRS
6. HAVE YOU EVER DONE ANYTHING, STARTED TO DO ANYTHING, OR PREPARED TO DO ANYTHING TO END YOUR LIFE?: NO
1. IN THE PAST MONTH, HAVE YOU WISHED YOU WERE DEAD OR WISHED YOU COULD GO TO SLEEP AND NOT WAKE UP?: NO
6. HAVE YOU EVER DONE ANYTHING, STARTED TO DO ANYTHING, OR PREPARED TO DO ANYTHING TO END YOUR LIFE?: NO
1. IN THE PAST MONTH, HAVE YOU WISHED YOU WERE DEAD OR WISHED YOU COULD GO TO SLEEP AND NOT WAKE UP?: NO
2. HAVE YOU ACTUALLY HAD ANY THOUGHTS OF KILLING YOURSELF?: NO
2. HAVE YOU ACTUALLY HAD ANY THOUGHTS OF KILLING YOURSELF?: NO

## 2024-12-16 ASSESSMENT — PAIN SCALES - GENERAL
PAINLEVEL_OUTOF10: 2
PAINLEVEL_OUTOF10: 0 - NO PAIN

## 2024-12-16 ASSESSMENT — ENCOUNTER SYMPTOMS
CONSTIPATION: 1
COUGH: 1
ALLERGIC/IMMUNOLOGIC NEGATIVE: 1
EYES NEGATIVE: 1
PSYCHIATRIC NEGATIVE: 1
WEAKNESS: 1
NECK PAIN: 1
ENDOCRINE NEGATIVE: 1
ARTHRALGIAS: 1
FATIGUE: 1
CARDIOVASCULAR NEGATIVE: 1
HEMATOLOGIC/LYMPHATIC NEGATIVE: 1
SHORTNESS OF BREATH: 1

## 2024-12-16 ASSESSMENT — PAIN - FUNCTIONAL ASSESSMENT: PAIN_FUNCTIONAL_ASSESSMENT: 0-10

## 2024-12-16 NOTE — H&P
History Of Present Illness  Karly Horton is a 79 y.o. female presenting with left lower lobe 7mm Nodule. PMH includes advanced non-small cell lung cancer, with atypical cells in the contralateral 11L lymph node and left subcentimeter pulmonary nodule which may be suspicious for contralateral pulmonary metastatic disease. Currently on chemo treatments. PMH includes multiple lung nodules, COPD, 60 PPY smoking history      Past Medical History:  Past Medical History:   Diagnosis Date    Other cervical disc degeneration, unspecified cervical region     Degeneration of cervical intervertebral disc    Other conditions influencing health status     Bulging Disc (L3 - L4)    Other conditions influencing health status     A Fall    Other conditions influencing health status     Bulging Disc (C4 - C5)    Other conditions influencing health status     Bulging Disc (C6 - C7)    Other conditions influencing health status     Bulging Disc (C5 - C6)    Other conditions influencing health status     Lumbar Spondylolisthesis    Other conditions influencing health status     Rotator Cuff Tendon Tear    Other intervertebral disc degeneration, lumbar region     L4-L5 disc bulge    Other intervertebral disc displacement, lumbar region     Disc displacement, lumbar    Other intervertebral disc displacement, lumbosacral region     Displacement of lumbosacral intervertebral disc    Personal history of other diseases of the musculoskeletal system and connective tissue     History of bursitis    Personal history of other diseases of the musculoskeletal system and connective tissue     History of fibromyositis    Personal history of other specified conditions     History of headache    Spinal stenosis, lumbar region without neurogenic claudication     Lumbar canal stenosis    Sprain of ligaments of lumbar spine, initial encounter     Low back sprain        Past Surgical History:  Past Surgical History:   Procedure Laterality Date     APPENDECTOMY      EYE SURGERY      HYSTERECTOMY      TONSILLECTOMY            Social History:   reports that she has been smoking cigarettes. She started smoking about 65 years ago. She has a 65 pack-year smoking history. She does not have any smokeless tobacco history on file.     Family History:  Family History   Problem Relation Name Age of Onset    Cancer Mother      Other (Father had hypertension, stroke, coronary artery disease and diabetes) Father          Allergies:  Allergies   Allergen Reactions    Demerol [Meperidine] Headache and Nausea/vomiting     Per pt.     Morphine Headache and Nausea/vomiting     Per patient    Spice Flavor Other        Home Medications:  Current Outpatient Medications   Medication Instructions    acetaminophen (Tylenol) 325 mg tablet 1-2 tablets, Every 6 hours PRN    ascorbic acid (VITAMIN C) 500 mg, oral, Daily    baclofen (LIORESAL) 10 mg, 3 times daily    butalbital-acetaminophen-caff -40 mg tablet 1 tablet, Every 6 hours PRN    dexAMETHasone (DECADRON) 8 mg, oral, Daily, For 3 days starting the day after treatment    ferrous sulfate, 325 mg ferrous sulfate, tablet 1 tablet, oral, Daily with breakfast    lactulose 30 g, Daily    lidocaine HCL 4 % liquid roll-on 1 Application, Daily PRN    lubricating eye drops ophthalmic solution 1 drop, Both Eyes, As needed    meprobamate (EQUANIL) 400 mg, 4 times daily    multivitamin with minerals tablet 1 tablet, oral, Daily    nicotine (Nicoderm CQ) 14 mg/24 hr patch 1 patch, transdermal, Every 24 hours    nicotine (Nicoderm CQ) 21 mg/24 hr patch 1 patch, transdermal, Daily    OLANZapine (ZYPREXA) 5 mg, oral, Nightly, For 4 days starting the evening of treatment    ondansetron (ZOFRAN) 8 mg, oral, Every 8 hours PRN    pantoprazole (PROTONIX) 40 mg, oral, 2 times daily, Do not crush, chew, or split. Continue twice daily x 8 weeks then continue once daily.    prochlorperazine (COMPAZINE) 10 mg, oral, Every 6 hours PRN    sucralfate  (CARAFATE) 1 g, oral, Every 6 hours scheduled    Vitamin D3 2,000 Units, oral, Daily       Inpatient Medications:            Review of Systems   Constitutional:  Positive for fatigue.   HENT: Negative.     Eyes: Negative.    Respiratory:  Positive for cough and shortness of breath.    Cardiovascular: Negative.    Gastrointestinal:  Positive for constipation (takes daily lactulose).   Endocrine: Negative.    Genitourinary: Negative.    Musculoskeletal:  Positive for arthralgias and neck pain.   Skin: Negative.    Allergic/Immunologic: Negative.    Neurological:  Positive for weakness.   Hematological: Negative.    Psychiatric/Behavioral: Negative.            Physical Exam  Constitutional:       General: She is awake. She is not in acute distress.     Appearance: She is ill-appearing.   HENT:      Head: Normocephalic and atraumatic.      Mouth/Throat:      Mouth: Mucous membranes are moist.      Pharynx: Oropharynx is clear.   Cardiovascular:      Rate and Rhythm: Normal rate and regular rhythm.      Pulses:           Radial pulses are 2+ on the right side and 2+ on the left side.        Dorsalis pedis pulses are 2+ on the right side and 2+ on the left side.      Heart sounds: Normal heart sounds. No murmur heard.  Pulmonary:      Breath sounds: Normal breath sounds.      Comments: Mild dyspnea with minimal activity   Abdominal:      General: Bowel sounds are normal.      Palpations: Abdomen is soft.   Musculoskeletal:         General: Normal range of motion.      Cervical back: Normal range of motion and neck supple.      Right lower leg: No edema.      Left lower leg: No edema.   Skin:     General: Skin is warm and dry.      Capillary Refill: Capillary refill takes less than 2 seconds.   Neurological:      General: No focal deficit present.      Mental Status: She is alert and oriented to person, place, and time. Mental status is at baseline.      Cranial Nerves: Cranial nerves 2-12 are intact.   Psychiatric:         " Mood and Affect: Mood and affect normal.         Behavior: Behavior normal.        Sedation Plan    ASA 3     Mallampati class: III.           NPO since 0200 12/16/2024    Last Recorded Vitals  Blood pressure 134/68, pulse 86, temperature 36.9 °C (98.5 °F), temperature source Temporal, resp. rate 18, height 1.422 m (4' 8\"), weight (!) 31.8 kg (70 lb), SpO2 90%.         Vitals from the Past 24 Hours  Heart Rate:  [86]   Temp:  [36.9 °C (98.5 °F)]   Resp:  [18]   BP: (134)/(68)   Height:  [142.2 cm (4' 8\")]   Weight:  [31.8 kg (70 lb)]   SpO2:  [90 %]          Relevant Results    Labs    CBC:   Recent Labs     12/02/24  0857 10/31/24  1320 10/13/24  0616 10/12/24  0506 10/11/24  0551 10/10/24  0545   WBC 19.3* 7.1 5.8 8.2 17.5* 11.7*   HGB 14.5 12.7 11.9* 12.1 12.9 12.1   HCT 44.3 39.4 38.0 38.2 39.5 37.3    345 309 304 346 301   MCV 91 93 92 93 91 91     BMP/CMP:   Recent Labs     12/02/24  0857 10/31/24  1320 10/17/24  2242 10/13/24  0616 10/12/24  0506 10/11/24  2351 10/11/24  0551 10/10/24  0545    135* 133* 135* 134* 135* 134* 133*   K 4.3 4.1 3.7 4.4 4.3 4.3 3.8 4.3    97* 96* 96* 98 97* 98 99   BUN 12 11 11 7 9 9 9 10   CREATININE 0.44* 0.36* 0.48* 0.42* 0.47* 0.50 0.42* 0.40*   CO2 30 29 27 30 27 29 27 27   CALCIUM 9.6 9.4 8.9 8.9 9.0 8.9 9.3 9.0   PROT 7.4 6.9  --  6.1* 6.0*  --  6.6 6.3*   BILITOT 0.3 0.2  --  0.2 0.3  --  0.4 0.4   ALKPHOS 195* 99  --  77 75  --  80 74   ALT 15 13  --  8 9  --  11 13   AST 18 18  --  15 13  --  13 20   GLUCOSE 134* 106* 125* 106* 94 85 95 92      Magnesium:   Recent Labs     10/13/24  0616 10/12/24  0506 10/11/24  2351 10/11/24  0551 10/09/24  0503 09/02/24  1516   MG 1.89 1.96 1.80 1.87 1.88 2.20     Lipid Panel:   Recent Labs     10/28/19  1040   CHOL 205*      CHHDL 1.6   TRIG 69     Cardiac       No lab exists for component: \"CK\", \"CKMBP\"   Hemoglobin A1C:   Recent Labs     05/22/24  0804 12/31/18  0701   HGBA1C 5.1 5.8     TSH/ Free T4: " "  Recent Labs     11/12/24  1120 09/02/24  1734 11/14/22  1148 11/14/22  1147 11/03/21  1128 10/28/19  1040 12/27/18  0544 12/26/18  1357 10/15/18  1022   TSH 0.68 0.90 3.37 3.37 4.45* 2.68 0.85  --  2.61   FREET4  --   --   --   --  1.0  --   --  1.3  --      Iron:   Recent Labs     10/31/24  1320 05/15/24  0603   FERRITIN 51 9*   TIBC 365 435*   IRONSAT 15* 6*     Coag:     ABO: No results found for: \"ABO\"    Past Cardiology Tests (Last 3 Years):    EKG:  Recent Labs     10/04/24  1538 09/03/24  1202 09/02/24  1514   ATRRATE 74 80 76   VENTRATE 74 80 76   PRINT 154 138 136   QRSDUR 78 78 80   QTCFRED 476 420 434   QTCCALCB 492 440 452     Encounter Date: 10/04/24   ECG 12 lead   Result Value    Ventricular Rate 74    Atrial Rate 74    AK Interval 154    QRS Duration 78    QT Interval 444    QTC Calculation(Bazett) 492    P Axis 81    R Axis -76    T Axis 50    QRS Count 12    Q Onset 226    P Onset 149    P Offset 206    T Offset 448    QTC Fredericia 476    Narrative    Normal sinus rhythm  Left axis deviation  Inferior infarct (cited on or before 01-SEP-2024)  Anterolateral infarct , age undetermined  Abnormal ECG  When compared with ECG of 02-SEP-2024 15:13,  Significant changes have occurred  Confirmed by Everardo Webster ([id number]) on 10/8/2024 3:38:55 PM     Echo:  Echocardiogram:   Transthoracic Echo (TTE) Complete 05/16/2024    [facility]  [id number] [place], [place] [id number]  Phone [phone]    TRANSTHORACIC ECHOCARDIOGRAM REPORT      Patient Name:      JAMISON Gay Physician:    [id number] Carlos Eduardo Hair MD  Study Date:        5/16/2024             Ordering Provider:    [id number] WARD JACKSON  MRN/PID:           [id number]              Fellow:  Accession#:        [id number]          Nurse:  Date of Birth/Age: 1945 / 79 years  Sonographer:          Toni Davidson RDCS  Gender:            F                     Additional Staff:  Height:            146.00 cm    "          Admit Date:  Weight:            39.00 kg              Admission Status:     Inpatient -  Routine  BSA / BMI:         1.26 m2 / 18.30 kg/m2 Department Location:  [facility]  Blood Pressure: 168 /80 mmHg    Study Type:    TRANSTHORACIC ECHO (TTE) COMPLETE  Diagnosis/ICD: Unspecified systolic (congestive) heart failure (CHF)-I50.20  Indication:    Congestive Heart Failure  CPT Codes:     Echo Complete w Full Doppler-[id number]    Patient History:  Smoker:            Current.  Pertinent History: CHF, COPD, HTN, Hyperlipidemia, Dyspnea, Syncope, LE Edema  and Murmur.    Study Detail: The following Echo studies were performed: 2D, M-Mode, Doppler and  color flow. Unable to obtain suprasternal notch view.      PHYSICIAN INTERPRETATION:  Left Ventricle: Left ventricular systolic function is normal, with an estimated ejection fraction of 55-60%. There are no regional wall motion abnormalities. The left ventricular cavity size is normal. There is mild to moderate concentric left ventricular hypertrophy. Spectral Doppler shows an impaired relaxation pattern of left ventricular diastolic filling.  Left Atrium: The left atrium is normal in size.  Right Ventricle: The right ventricle is moderately enlarged. There is moderately reduced right ventricular systolic function.  Right Atrium: The right atrium is mildly dilated.  Aortic Valve: The aortic valve is trileaflet. There is no evidence of aortic valve regurgitation. The peak instantaneous gradient of the aortic valve is 8.1 mmHg. The mean gradient of the aortic valve is 3.0 mmHg.  Mitral Valve: The mitral valve is normal in structure. There is mild mitral valve regurgitation.  Tricuspid Valve: The tricuspid valve is structurally normal. There is moderate tricuspid regurgitation. The Doppler estimated RVSP is moderate to severely elevated at 64.3 mmHg.  Pulmonic Valve: The pulmonic valve is abnormal. The pulmonic valve has annular dilitation. There is mild pulmonic valve  regurgitation.  Pericardium: There is no pericardial effusion noted.  Aorta: The aortic root is normal.      CONCLUSIONS:  1. Left ventricular systolic function is normal with a 55-60% estimated ejection fraction.  2. Spectral Doppler shows an impaired relaxation pattern of left ventricular diastolic filling.  3. Moderately enlarged right ventricle.  4. There is moderately reduced right ventricular systolic function.  5. Mild mitral valve regurgitation.  6. Moderate to severely elevated right ventricular systolic pressure.  7. Moderate tricuspid regurgitation.    QUANTITATIVE DATA SUMMARY:  2D MEASUREMENTS:  Normal Ranges:  LAs:           3.50 cm   (2.7-4.0cm)  IVSd:          0.84 cm   (0.6-1.1cm)  LVPWd:         0.96 cm   (0.6-1.1cm)  LVIDd:         3.59 cm   (3.9-5.9cm)  LVIDs:         2.41 cm  LV Mass Index: 73.2 g/m2  LV % FS        32.9 %    LA VOLUME:  Normal Ranges:  LA Vol A4C:        41.1 ml    (22+/-6mL/m2)  LA Vol A2C:        49.0 ml  LA Vol BP:         46.1 ml  LA Vol Index A4C:  32.5ml/m2  LA Vol Index A2C:  38.8 ml/m2  LA Vol Index BP:   36.5 ml/m2  LA Area A4C:       16.0 cm2  LA Area A2C:       17.0 cm2  LA Major Axis A4C: 5.3 cm  LA Major Axis A2C: 5.0 cm  LA Volume Index:   33.0 ml/m2  LA Vol A4C:        37.0 ml  LA Vol A2C:        44.0 ml    RA VOLUME BY A/L METHOD:  Normal Ranges:  RA Area A4C: 15.0 cm2    LV SYSTOLIC FUNCTION BY 2D PLANIMETRY (MOD):  Normal Ranges:  EF-A4C View: 57.4 % (>=55%)  EF-A2C View: 51.4 %  EF-Biplane:  54.9 %    LV DIASTOLIC FUNCTION:  Normal Ranges:  MV Peak E:    0.87 m/s (0.7-1.2 m/s)  MV Peak A:    1.24 m/s (0.42-0.7 m/s)  E/A Ratio:    0.70     (1.0-2.2)  MV e'         0.06 m/s (>8.0)  MV lateral e' 0.07 m/s  MV medial e'  0.04 m/s  E/e' Ratio:   15.82    (<8.0)    MITRAL VALVE:  Normal Ranges:  MV DT: 251 msec (150-240msec)    AORTIC VALVE:  Normal Ranges:  AoV Vmax:                1.42 m/s (<=1.7m/s)  AoV Peak P.1 mmHg (<20mmHg)  AoV Mean PG:              3.0 mmHg (1.7-11.5mmHg)  LVOT Max Grayson:            1.15 m/s (<=1.1m/s)  AoV VTI:                 20.50 cm (18-25cm)  LVOT VTI:                18.90 cm  LVOT Diameter:           1.80 cm  (1.8-2.4cm)  AoV Area, VTI:           2.35 cm2 (2.5-5.5cm2)  AoV Area,Vmax:           2.06 cm2 (2.5-4.5cm2)  AoV Dimensionless Index: 0.92      RIGHT VENTRICLE:  RV Basal 3.28 cm  RV Mid   2.77 cm  RV Major 5.0 cm  TAPSE:   18.3 mm  RV s'    0.11 m/s    TRICUSPID VALVE/RVSP:  Normal Ranges:  Peak TR Velocity: 3.51 m/s  RV Syst Pressure: 64.3 mmHg (< 30mmHg)  IVC Diam:         2.60 cm    PULMONIC VALVE:  Normal Ranges:  PV Accel Time: 98 msec  (>120ms)  PV Max Grayson:    0.9 m/s  (0.6-0.9m/s)  PV Max PG:     3.0 mmHg      [id number] Carlos Eduardo Hair MD  Electronically signed on 5/16/2024 at 8:21:44 PM        ** Final **    Ejection Fractions:  LV EF   Date/Time Value Ref Range Status   10/10/2024 05:10 PM 63 %      Cath:  Coronary Angiography: No results found for this or any previous visit from the past 1800 days.    Right Heart Cath: No results found for this or any previous visit from the past 1800 days.    Stress Test:  Nuclear:No results found for this or any previous visit from the past 1800 days.    Metabolic Stress: No results found for this or any previous visit from the past 1800 days.    Cardiac Imaging:  Cardiac Scoring: No results found for this or any previous visit from the past 1800 days.    Cardiac MRI: No results found for this or any previous visit from the past 1800 days.         Assessment/Plan  Assessment/Plan   Assessment & Plan  Non-small cell lung cancer, unspecified laterality (Multi)        Non-small cell lung cancer  LLL nodule  Chemo treatment    Here for CT guided lung biopsy with Dr. Mares on 12/16/2024.    Prior lab 12/2/2024 WBC 19.3, Dr. Mares aware,  Will repeat CBC today.   12/16/2024-WBC 4.5.              I spent 35 minutes in the professional and overall care of this patient.      Bronwyn ELIZABETH  PO Berry-CNP

## 2024-12-16 NOTE — DISCHARGE INSTRUCTIONS
Patient and Family Education  What is a Needle Biopsy of the Lung?  A needle biopsy of the lung is one method used to help doctors diagnose lung disease. It helps  them decide how serious the lung disease is. A small piece of tissue is taken from your lung  using a special kind of needle. The tissue is sent to the lab to be looked at under a microscope.  The procedure can take up to 1 hour.  Before the Test:  ? If you take blood thinning medications, you Must ask your doctor when you should stop  taking the medicine. You may need to stop taking the medicine up to 7 days prior to the  test.  ? You will have a blood sample drawn.  ? Do Not Drink or Eat Anything after midnight the day before the test (unless your  doctor tells you otherwise).  ? You may take medications with a small amount of clear liquid.  ? If you have diabetes, ask your Radiologist or Radiology Nurse if you should take your  medicine or adjust the dosage before the test.  ? If you have an allergy to iodine or iodinated contrast, your primary doctor may prescribe  special pills to take before the test.  During the Test:  ? You will be lying on an X-Ray table.  ? You may be given medicine that will make you drowsy.  ? You will be given a local anesthetic to numb the biopsy site.  ? You will feel pressure during the biopsy.  After the Test:  ? You will remain on bed rest 2 to 4 hours.  ? You blood pressure, pulse and biopsy site will be checked often.  ? You will have 2 Chest X-Rays after the procedure.  ? If a large sample of tissue needs to be taken, you may be admitted to the hospital for  observation.     Page 2 of 2  Follow these Guidelines for a Safer Recovery:  ? Activity:  o Limit activity for 24 hours after the test.  o Avoid intense exercise and contact sports for 24 hours.  o No driving for 24 hours. You will need someone to drive you home if you are an  outpatient.  o Do not do any heavy lifting, over 10 pounds, for 1 week.  ? Diet:  o You  may resume your normal diet.  ? Medicines:  o If you take blood thinning medications, ask your doctor if you should stop the  medicine for 3 days after the test.  o You may take your other medicines as ordered by your doctor.  Call your Doctor Right Away if you have:  ? Bleeding from the biopsy site.  ? Shortness of breath or trouble breathing.  ? Increased pain at the biopsy site.  ? Dizziness or fainting.  ? Heart beating faster than normal.  ? If you are not able to contact your primary doctor, go to the nearest Emergency Room.  Also, Call your Doctor if you have:  ? Redness, swelling, drainage, or fluid at the biopsy site.  ? Temperature of 100.4 F degrees or higher.  ? Pain or tenderness at the procedure site.  ? Uncontrolled cough or persistent cough.  ? Any questions.     How to Reach your Doctor:  Call Dr. Odom at [phone]  with problems or questions.

## 2024-12-20 ENCOUNTER — TELEMEDICINE (OUTPATIENT)
Dept: HEMATOLOGY/ONCOLOGY | Facility: CLINIC | Age: 79
End: 2024-12-20
Payer: MEDICARE

## 2024-12-20 DIAGNOSIS — K26.9 DUODENAL ULCER: ICD-10-CM

## 2024-12-20 DIAGNOSIS — C44.92 SQUAMOUS CELL CARCINOMA, SMALL CELL, NONKERATINIZING: ICD-10-CM

## 2024-12-20 PROCEDURE — 1159F MED LIST DOCD IN RCRD: CPT | Performed by: INTERNAL MEDICINE

## 2024-12-20 PROCEDURE — 1160F RVW MEDS BY RX/DR IN RCRD: CPT | Performed by: INTERNAL MEDICINE

## 2024-12-20 PROCEDURE — 99215 OFFICE O/P EST HI 40 MIN: CPT | Performed by: INTERNAL MEDICINE

## 2024-12-20 PROCEDURE — 1157F ADVNC CARE PLAN IN RCRD: CPT | Performed by: INTERNAL MEDICINE

## 2024-12-20 RX ORDER — PANTOPRAZOLE SODIUM 40 MG/1
40 TABLET, DELAYED RELEASE ORAL 2 TIMES DAILY
Qty: 60 TABLET | Refills: 3 | Status: SHIPPED | OUTPATIENT
Start: 2024-12-20 | End: 2025-04-19

## 2024-12-20 NOTE — PROGRESS NOTES
Patient ID: Karly Horton is a 79 y.o. female.  Referring Physician: Kelly Guerra MD  [address]  [address] [place] [address]  Primary Care Provider: Julian Chaney MD  Referral Reason: Lung cancer    Subjective:  Mild loose stools which had stopped after eating macaroni. Mild nausea which is relieved by anti-nausea meds    Heme/Onc History:  Karly Horton is a 79 y.o. female who was referred by Cathy Lau MD, for a consultation to the [facility] Department of Medical Oncology.  She is presenting for evaluation and management of lung cancer.      #) At least locally advanced lung cancer, cT4 cN1 cM0 (?punctate left brain venous anomaly)    Pathology Review:  The pertinent pathology results were reviewed and discussed with the patient.  Notably,      10/11/2024-A. LYMPH NODE 11 L PULMONARY FINE NEEDLE ASPIRATION: Highly atypical cells are present; carcinoma/malignancy cannot be excluded               B. LYMPH NODE 7 PULMONARY FINE NEEDLE ASPIRATION: Malignant cells are present, see note. Lymphoid sample. Note: A minute fragment of malignant cells is noted in the cell block              C. LUNG FINE NEEDLE ASPIRATION RIGHT LOWER LOBE: Malignant cells derived from squamous cell carcinoma; Note: The malignant cells are positive for p40 and negative for TTF1 and CK7.     10/11/2024- A.  B.  Lung, right lower lobe mass, biopsies: - Detached fragments of non-small cell carcinoma, consistent with squamous cell carcinoma. Note: Detached fragments of non-small cell carcinoma in a background of abundant necrosis are seen.  Neoplastic cells are immunoreactive with p40.      Disease Associated Genomics:  PD-L1 TPS 10% (low)  MICROSATELLITE STATUS: Microsatellite Instability-High (MSI-H) is NOT DETECTED.     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  Negative for BRAF  V600E.  Negative for EGFR sensitizing mutation.  Negative for ERBB2 activating mutation  Negative for KRAS G12C.  Negative for MET exon 14 skipping mutation.  Negative for NTRK fusion.  Negative for RET fusion.  Negative for ROS1 fusion.    Imaging:  The pertinent imaging results were reviewed and discussed with the patient.  Notably,      10/18/2024-MRI brain with and without contrast: Punctate focus of enhancement within the left amygdala with questionable curvilinear enhancing tail suggestive of developmental venous anomaly.  No other evidence of abnormal leptomeningeal or pachymeningeal enhancement.     10/18/2024-PET/CT: No evidence of hypermetabolic cervical lymphadenopathy.  There is a right apical posterior upper lobe lung nodule with hypermetabolic activity max SUV 8.2, right superior lower lobe nodule with hypermetabolic activity max SUV 4.7, multiple pulmonary nodules within the right lung that do not demonstrate hypermetabolic activity and a right infrahilar mass with hypermetabolic activity with central photopenia representing partial necrosis with max SUV 5.7.  No other evidence of mediastinal, hilar or axillary lymphadenopathy.  In the left lower lobe there is a 7 mm nodule with mild hypermetabolic activity.     10/18/2024-CT chest with contrast: In the right lower lobe there is a mass with spiculated margins measuring 3.1 cm the mass transverses and crosses the major fissure with extension into the middle lobe, spiculated right upper nodule measuring 2.3 cm with extension to the superior right major fissure and fissural retraction.  Multiple additional bilateral pulmonary nodules not significantly changed including left lower lobe nodule measuring 7 mm and right lower lobe nodule measuring 5 mm.  Stable calcified nonspecific finding along the anterior left hepatic lobe.     Past Medical History:   Past Medical History:   Diagnosis Date    Other cervical disc degeneration, unspecified cervical region      Degeneration of cervical intervertebral disc    Other conditions influencing health status     Bulging Disc (L3 - L4)    Other conditions influencing health status     A Fall    Other conditions influencing health status     Bulging Disc (C4 - C5)    Other conditions influencing health status     Bulging Disc (C6 - C7)    Other conditions influencing health status     Bulging Disc (C5 - C6)    Other conditions influencing health status     Lumbar Spondylolisthesis    Other conditions influencing health status     Rotator Cuff Tendon Tear    Other intervertebral disc degeneration, lumbar region     L4-L5 disc bulge    Other intervertebral disc displacement, lumbar region     Disc displacement, lumbar    Other intervertebral disc displacement, lumbosacral region     Displacement of lumbosacral intervertebral disc    Personal history of other diseases of the musculoskeletal system and connective tissue     History of bursitis    Personal history of other diseases of the musculoskeletal system and connective tissue     History of fibromyositis    Personal history of other specified conditions     History of headache    Spinal stenosis, lumbar region without neurogenic claudication     Lumbar canal stenosis    Sprain of ligaments of lumbar spine, initial encounter     Low back sprain     Social History:   Social History     Socioeconomic History    Marital status:      Spouse name: Not on file    Number of children: Not on file    Years of education: Not on file    Highest education level: Not on file   Occupational History    Not on file   Tobacco Use    Smoking status: Every Day     Current packs/day: 1.00     Average packs/day: 1 pack/day for 65.0 years (65.0 ttl pk-yrs)     Types: Cigarettes     Start date: 1/1/1960    Smokeless tobacco: Not on file   Vaping Use    Vaping status: Some Days   Substance and Sexual Activity    Alcohol use: Not on file    Drug use: Not on file    Sexual activity: Defer   Other  Topics Concern    Not on file   Social History Narrative    Not on file     Social Drivers of Health     Financial Resource Strain: Medium Risk (10/31/2024)    Overall Financial Resource Strain (CARDIA)     Difficulty of Paying Living Expenses: Somewhat hard   Food Insecurity: No Food Insecurity (10/31/2024)    Hunger Vital Sign     Worried About Running Out of Food in the Last Year: Never true     Ran Out of Food in the Last Year: Never true   Transportation Needs: No Transportation Needs (10/31/2024)    PRAPARE - Transportation     Lack of Transportation (Medical): No     Lack of Transportation (Non-Medical): No   Physical Activity: Inactive (10/31/2024)    Exercise Vital Sign     Days of Exercise per Week: 0 days     Minutes of Exercise per Session: 0 min   Stress: Stress Concern Present (10/31/2024)    [employer] - Occupational Stress Questionnaire     Feeling of Stress : To some extent   Social Connections: Socially Isolated (10/31/2024)    Social Connection and Isolation Panel [NHANES]     Frequency of Communication with Friends and Family: More than three times a week     Frequency of Social Gatherings with Friends and Family: More than three times a week     Attends Denominational Services: Never     Active Member of Clubs or Organizations: No     Attends Club or Organization Meetings: Never     Marital Status:    Intimate Partner Violence: Not At Risk (10/31/2024)    Humiliation, Afraid, Rape, and Kick questionnaire     Fear of Current or Ex-Partner: No     Emotionally Abused: No     Physically Abused: No     Sexually Abused: No   Housing Stability: Low Risk  (10/31/2024)    Housing Stability Vital Sign     Unable to Pay for Housing in the Last Year: No     Number of Times Moved in the Last Year: 0     Homeless in the Last Year: No     Surgical History:   Past Surgical History:   Procedure Laterality Date    APPENDECTOMY      EYE SURGERY      HYSTERECTOMY      TONSILLECTOMY        Family History:   Family History   Problem Relation Name Age of Onset    Cancer Mother      Other (Father had hypertension, stroke, coronary artery disease and diabetes) Father        reports that she has been smoking cigarettes. She started smoking about 65 years ago. She has a 65 pack-year smoking history. She does not have any smokeless tobacco history on file.  Oncology Family history: Cancer-related family history includes Cancer in her mother.    Review Of Systems:  As stated per in HPI; otherwise all other 12 point ROS are negative    Physical Exam:  There were no vitals taken for this visit.  BSA: There is no height or weight on file to calculate BSA.  General: awake/alert/oriented x3, no distress, alert and cooperative  Head: Short hair fully covering scalp. Symmetric facial expressions  Eyes: PERRL, EOMI, clear sclera, eyebrows present.  Ears/Nose/Mouth/Throat:  Oral mucous membranes moist. No oral ulcers. No palpable pre/post-auricular lymph nodes  Neck: No palpable cervical chain lymph nodes  Respiratory: unlabored breathing on room air, good chest expansion, thorax symmetric  Cardio: Regular rate and rhythm, normal S1 and S2, radial pulses symmetric  GI: Nondistended, soft, non-tender abdomen  Musculoskeletal: Normal muscle bulk and tone, ROM intact, no joint swelling.  Rises from chair and walks unassisted.  Extremities: No ankle swelling, no arm or leg wounds  Neuro: Alert, cognition intact, speech normal. Facial expressions symmetric.  No motor deficits noted. Sensation intact to touch and hot/cold.   Able to stand from seated position unassisted and walks around the room unassisted.  Psychological: Appropriate mood and behavior.  Skin: Warm and dry, no lesions, no rashes    Results:  Diagnostic Results   Lab Results   Component Value Date    WBC 4.5 12/16/2024    HGB 12.2 12/16/2024    HCT 37.1 12/16/2024    MCV 92 12/16/2024     12/16/2024     Lab Results   Component Value Date     CALCIUM 9.6 12/02/2024     12/02/2024    K 4.3 12/02/2024    CO2 30 12/02/2024     12/02/2024    BUN 12 12/02/2024    CREATININE 0.44 (L) 12/02/2024    ALT 15 12/02/2024    AST 18 12/02/2024       Current Outpatient Medications:     acetaminophen (Tylenol) 325 mg tablet, Take 1-2 tablets (325-650 mg) by mouth every 6 hours if needed for mild pain (1 - 3)., Disp: , Rfl:     ascorbic acid (Vitamin C) 500 mg tablet, Take 1 tablet (500 mg) by mouth once daily. (Patient not taking: Reported on 10/10/2024), Disp: , Rfl:     baclofen (Lioresal) 10 mg tablet, Take 1 tablet (10 mg) by mouth 3 times a day., Disp: , Rfl:     butalbital-acetaminophen-caff -40 mg tablet, Take 1 tablet by mouth every 6 hours if needed for headaches., Disp: , Rfl:     cholecalciferol (Vitamin D-3) 50 MCG (2000 UT) tablet, Take 1 tablet (2,000 Units) by mouth once daily., Disp: 30 tablet, Rfl: 2    dexAMETHasone (Decadron) 4 mg tablet, Take 2 tablets (8 mg) by mouth once daily. For 3 days starting the day after treatment Do not fill before November 10, 2024. (Patient not taking: Reported on 12/16/2024), Disp: 12 tablet, Rfl: 3    ferrous sulfate, 325 mg ferrous sulfate, tablet, Take 1 tablet by mouth once daily with breakfast. (Patient not taking: Reported on 10/10/2024), Disp: , Rfl:     lactulose 10 gram/15 mL solution, Take 45 mL (30 g) by mouth once daily., Disp: , Rfl:     lidocaine HCL 4 % liquid roll-on, Apply 1 Application topically once daily as needed., Disp: , Rfl:     lubricating eye drops ophthalmic solution, Administer 1 drop into both eyes if needed for dry eyes., Disp: , Rfl:     meprobamate (Equanil) 400 mg tablet, Take 1 tablet (400 mg) by mouth 4 times a day., Disp: , Rfl:     multivitamin with minerals tablet, Take 1 tablet by mouth once daily., Disp: 30 tablet, Rfl: 2    nicotine (Nicoderm CQ) 14 mg/24 hr patch, Place 1 patch over 24 hours on the skin once every 24 hours for 28 days., Disp: 28 patch, Rfl: 0     nicotine (Nicoderm CQ) 21 mg/24 hr patch, Place 1 patch over 24 hours on the skin once daily., Disp: , Rfl:     OLANZapine (ZyPREXA) 5 mg tablet, Take 1 tablet (5 mg) by mouth once daily at bedtime. For 4 days starting the evening of treatment Do not fill before November 10, 2024., Disp: 12 tablet, Rfl: 3    ondansetron (Zofran) 8 mg tablet, Take 1 tablet (8 mg) by mouth every 8 hours if needed for nausea or vomiting., Disp: 30 tablet, Rfl: 5    pantoprazole (ProtoNix) 40 mg EC tablet, Take 1 tablet (40 mg) by mouth 2 times a day. Do not crush, chew, or split. Continue twice daily x 8 weeks then continue once daily., Disp: , Rfl:     prochlorperazine (Compazine) 10 mg tablet, Take 1 tablet (10 mg) by mouth every 6 hours if needed for nausea or vomiting., Disp: 30 tablet, Rfl: 5    sucralfate (Carafate) 100 mg/mL suspension, Take 10 mL (1 g) by mouth every 6 hours. (Patient not taking: Reported on 10/10/2024), Disp: , Rfl:      Radiology:    Pathology:    Assessment/Plan:  1- Lung cancer, squamous cell carcinoma:    80 yo F Dx with lung squamous cell carcinoma from subcarinal and right lower lobe mass, clinically stage 3B, cT4 (due to multiple nodules in ipsilateral lung in different lobes) and N2 (due to subcarinal LN).  FNA from inferior mediastinal LN (LN station 11) was atypical but suspicious for carcinoma metastases.    She does not seem to be a good candidate for surgery, or concurrent chemoRT.     Systemic tx plan: systemic chemoIO carbo/taxol/Keytruda q 3 weeks x 4 with Neulasta OnPro followed by hypofractionated RT followed by IO maintenance is recommended after discussion with Dr. Odom. Gemzar could not be offered as she has difficulties with transportation. Keytruda will likely be given every 6 weeks after RT is completed.    C#1 chemo started on 11/12/24. Tolerated first dose very well. Mild N, no Diarrhea or constipation or vomiting  C#2 chemo given on 12/2/24. Diarrhea mild which has resolved with  Imodium. Hair loss. Otherwise no N/V. She has much better appetite  12/16/24: There was a solitary noncalcified nodule in LLL, biopsy was recommended to rule out second primary or contralateral lung metastases. Lung biopsy was scheduled for 12/16/24, but it could not be done because the nodule could not be seen, so procedure is cancelled    RTC on 1/13/25. Will ask Dr. Odom to see her in late January to plan for RT.    2- Brain Venous anomaly vs metastatic foci:    MRI brain 10/18/24: IMPRESSION: 1. 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 However, recommend short-term follow-up MRI in 3 months to assess for interval stability to exclude a more aggressive lesion.    Repeat MRI brain and pet ct in early 02/25.    3- Iron def: iron def without anemia. Venofer 200 mg x 3 to be given on days of chemotx are ordered      Diagnoses and all orders for this visit:  Squamous cell carcinoma, small cell, nonkeratinizing  -     Clinic Appointment Request  -     Clinic Appointment Request         Performance Status: Symptomatic; fully ambulatory    I spent more than 60 minutes for the patient today, including face-to-face conversation, pre-visit preparation, post-visit orders, and others.   Kelly Guerra MD

## 2024-12-23 ENCOUNTER — INFUSION (OUTPATIENT)
Dept: HEMATOLOGY/ONCOLOGY | Facility: CLINIC | Age: 79
End: 2024-12-23
Payer: MEDICARE

## 2024-12-23 VITALS
OXYGEN SATURATION: 93 % | TEMPERATURE: 99 F | RESPIRATION RATE: 21 BRPM | HEART RATE: 105 BPM | DIASTOLIC BLOOD PRESSURE: 84 MMHG | WEIGHT: 68.67 LBS | SYSTOLIC BLOOD PRESSURE: 140 MMHG | BODY MASS INDEX: 15.4 KG/M2

## 2024-12-23 DIAGNOSIS — C34.81 MALIGNANT NEOPLASM OF OVERLAPPING SITES OF RIGHT LUNG (MULTI): ICD-10-CM

## 2024-12-23 DIAGNOSIS — D50.8 IRON DEFICIENCY ANEMIA SECONDARY TO INADEQUATE DIETARY IRON INTAKE: ICD-10-CM

## 2024-12-23 DIAGNOSIS — C44.92 SQUAMOUS CELL CARCINOMA, SMALL CELL, NONKERATINIZING: ICD-10-CM

## 2024-12-23 LAB
ALBUMIN SERPL BCP-MCNC: 3.9 G/DL (ref 3.4–5)
ALP SERPL-CCNC: 122 U/L (ref 33–136)
ALT SERPL W P-5'-P-CCNC: 13 U/L (ref 7–45)
ANION GAP SERPL CALC-SCNC: 12 MMOL/L (ref 10–20)
AST SERPL W P-5'-P-CCNC: 17 U/L (ref 9–39)
BASOPHILS # BLD AUTO: 0.02 X10*3/UL (ref 0–0.1)
BASOPHILS NFR BLD AUTO: 0.4 %
BILIRUB SERPL-MCNC: 0.3 MG/DL (ref 0–1.2)
BUN SERPL-MCNC: 10 MG/DL (ref 6–23)
CALCIUM SERPL-MCNC: 9.2 MG/DL (ref 8.6–10.3)
CHLORIDE SERPL-SCNC: 96 MMOL/L (ref 98–107)
CO2 SERPL-SCNC: 31 MMOL/L (ref 21–32)
CORTIS AM PEAK SERPL-MSCNC: 18 UG/DL (ref 5–20)
CREAT SERPL-MCNC: 0.58 MG/DL (ref 0.5–1.05)
EGFRCR SERPLBLD CKD-EPI 2021: >90 ML/MIN/1.73M*2
EOSINOPHIL # BLD AUTO: 0.01 X10*3/UL (ref 0–0.4)
EOSINOPHIL NFR BLD AUTO: 0.2 %
ERYTHROCYTE [DISTWIDTH] IN BLOOD BY AUTOMATED COUNT: 13.8 % (ref 11.5–14.5)
GLUCOSE SERPL-MCNC: 109 MG/DL (ref 74–99)
HCT VFR BLD AUTO: 41.4 % (ref 36–46)
HGB BLD-MCNC: 13.6 G/DL (ref 12–16)
IMM GRANULOCYTES # BLD AUTO: 0.03 X10*3/UL (ref 0–0.5)
IMM GRANULOCYTES NFR BLD AUTO: 0.6 % (ref 0–0.9)
LYMPHOCYTES # BLD AUTO: 0.79 X10*3/UL (ref 0.8–3)
LYMPHOCYTES NFR BLD AUTO: 14.9 %
MCH RBC QN AUTO: 29.8 PG (ref 26–34)
MCHC RBC AUTO-ENTMCNC: 32.9 G/DL (ref 32–36)
MCV RBC AUTO: 91 FL (ref 80–100)
MONOCYTES # BLD AUTO: 0.56 X10*3/UL (ref 0.05–0.8)
MONOCYTES NFR BLD AUTO: 10.5 %
NEUTROPHILS # BLD AUTO: 3.9 X10*3/UL (ref 1.6–5.5)
NEUTROPHILS NFR BLD AUTO: 73.4 %
NRBC BLD-RTO: 0 /100 WBCS (ref 0–0)
PLATELET # BLD AUTO: 169 X10*3/UL (ref 150–450)
POTASSIUM SERPL-SCNC: 4.1 MMOL/L (ref 3.5–5.3)
PROT SERPL-MCNC: 7.1 G/DL (ref 6.4–8.2)
RBC # BLD AUTO: 4.57 X10*6/UL (ref 4–5.2)
SODIUM SERPL-SCNC: 135 MMOL/L (ref 136–145)
TSH SERPL-ACNC: 1.27 MIU/L (ref 0.44–3.98)
WBC # BLD AUTO: 5.3 X10*3/UL (ref 4.4–11.3)

## 2024-12-23 PROCEDURE — 96377 APPLICATON ON-BODY INJECTOR: CPT

## 2024-12-23 PROCEDURE — 85025 COMPLETE CBC W/AUTO DIFF WBC: CPT

## 2024-12-23 PROCEDURE — 2500000001 HC RX 250 WO HCPCS SELF ADMINISTERED DRUGS (ALT 637 FOR MEDICARE OP): Performed by: INTERNAL MEDICINE

## 2024-12-23 PROCEDURE — 80053 COMPREHEN METABOLIC PANEL: CPT

## 2024-12-23 PROCEDURE — 82533 TOTAL CORTISOL: CPT

## 2024-12-23 PROCEDURE — 84443 ASSAY THYROID STIM HORMONE: CPT

## 2024-12-23 PROCEDURE — 2500000004 HC RX 250 GENERAL PHARMACY W/ HCPCS (ALT 636 FOR OP/ED): Mod: JZ,JG | Performed by: INTERNAL MEDICINE

## 2024-12-23 PROCEDURE — 82024 ASSAY OF ACTH: CPT

## 2024-12-23 PROCEDURE — 96417 CHEMO IV INFUS EACH ADDL SEQ: CPT

## 2024-12-23 PROCEDURE — 96415 CHEMO IV INFUSION ADDL HR: CPT

## 2024-12-23 PROCEDURE — 96375 TX/PRO/DX INJ NEW DRUG ADDON: CPT | Mod: INF

## 2024-12-23 PROCEDURE — 96367 TX/PROPH/DG ADDL SEQ IV INF: CPT

## 2024-12-23 PROCEDURE — 96413 CHEMO IV INFUSION 1 HR: CPT

## 2024-12-23 RX ORDER — HEPARIN SODIUM,PORCINE/PF 10 UNIT/ML
50 SYRINGE (ML) INTRAVENOUS AS NEEDED
OUTPATIENT
Start: 2024-12-23

## 2024-12-23 RX ORDER — DIPHENHYDRAMINE HYDROCHLORIDE 50 MG/ML
50 INJECTION INTRAMUSCULAR; INTRAVENOUS AS NEEDED
OUTPATIENT
Start: 2025-01-13

## 2024-12-23 RX ORDER — ALBUTEROL SULFATE 0.83 MG/ML
3 SOLUTION RESPIRATORY (INHALATION) AS NEEDED
OUTPATIENT
Start: 2025-01-13

## 2024-12-23 RX ORDER — ALBUTEROL SULFATE 0.83 MG/ML
3 SOLUTION RESPIRATORY (INHALATION) AS NEEDED
Status: DISCONTINUED | OUTPATIENT
Start: 2024-12-23 | End: 2024-12-23 | Stop reason: HOSPADM

## 2024-12-23 RX ORDER — FAMOTIDINE 10 MG/ML
20 INJECTION INTRAVENOUS ONCE AS NEEDED
Status: DISCONTINUED | OUTPATIENT
Start: 2024-12-23 | End: 2024-12-23 | Stop reason: HOSPADM

## 2024-12-23 RX ORDER — DIPHENHYDRAMINE HYDROCHLORIDE 50 MG/ML
50 INJECTION INTRAMUSCULAR; INTRAVENOUS AS NEEDED
Status: DISCONTINUED | OUTPATIENT
Start: 2024-12-23 | End: 2024-12-23 | Stop reason: HOSPADM

## 2024-12-23 RX ORDER — DIPHENHYDRAMINE HCL 25 MG
50 CAPSULE ORAL ONCE
Status: COMPLETED | OUTPATIENT
Start: 2024-12-23 | End: 2024-12-23

## 2024-12-23 RX ORDER — PALONOSETRON 0.05 MG/ML
0.25 INJECTION, SOLUTION INTRAVENOUS ONCE
Status: COMPLETED | OUTPATIENT
Start: 2024-12-23 | End: 2024-12-23

## 2024-12-23 RX ORDER — HEPARIN 100 UNIT/ML
500 SYRINGE INTRAVENOUS AS NEEDED
OUTPATIENT
Start: 2024-12-23

## 2024-12-23 RX ORDER — HEPARIN 100 UNIT/ML
500 SYRINGE INTRAVENOUS AS NEEDED
Status: DISCONTINUED | OUTPATIENT
Start: 2024-12-23 | End: 2024-12-23 | Stop reason: HOSPADM

## 2024-12-23 RX ORDER — FAMOTIDINE 10 MG/ML
20 INJECTION INTRAVENOUS ONCE
Status: COMPLETED | OUTPATIENT
Start: 2024-12-23 | End: 2024-12-23

## 2024-12-23 RX ORDER — EPINEPHRINE 0.3 MG/.3ML
0.3 INJECTION SUBCUTANEOUS EVERY 5 MIN PRN
OUTPATIENT
Start: 2025-01-13

## 2024-12-23 RX ORDER — PROCHLORPERAZINE EDISYLATE 5 MG/ML
10 INJECTION INTRAMUSCULAR; INTRAVENOUS EVERY 6 HOURS PRN
Status: DISCONTINUED | OUTPATIENT
Start: 2024-12-23 | End: 2024-12-23 | Stop reason: HOSPADM

## 2024-12-23 RX ORDER — EPINEPHRINE 0.3 MG/.3ML
0.3 INJECTION SUBCUTANEOUS EVERY 5 MIN PRN
Status: DISCONTINUED | OUTPATIENT
Start: 2024-12-23 | End: 2024-12-23 | Stop reason: HOSPADM

## 2024-12-23 RX ORDER — PROCHLORPERAZINE MALEATE 10 MG
10 TABLET ORAL EVERY 6 HOURS PRN
Status: DISCONTINUED | OUTPATIENT
Start: 2024-12-23 | End: 2024-12-23 | Stop reason: HOSPADM

## 2024-12-23 RX ORDER — FAMOTIDINE 10 MG/ML
20 INJECTION INTRAVENOUS ONCE AS NEEDED
OUTPATIENT
Start: 2025-01-13

## 2024-12-23 ASSESSMENT — PAIN SCALES - GENERAL: PAINLEVEL_OUTOF10: 4

## 2024-12-25 LAB — ACTH PLAS-MCNC: 21.2 PG/ML (ref 7.2–63.3)

## 2025-01-13 ENCOUNTER — INFUSION (OUTPATIENT)
Dept: HEMATOLOGY/ONCOLOGY | Facility: CLINIC | Age: 80
End: 2025-01-13
Payer: MEDICARE

## 2025-01-13 ENCOUNTER — OFFICE VISIT (OUTPATIENT)
Dept: HEMATOLOGY/ONCOLOGY | Facility: CLINIC | Age: 80
End: 2025-01-13
Payer: MEDICARE

## 2025-01-13 ENCOUNTER — APPOINTMENT (OUTPATIENT)
Dept: HEMATOLOGY/ONCOLOGY | Facility: CLINIC | Age: 80
End: 2025-01-13
Payer: MEDICARE

## 2025-01-13 VITALS
SYSTOLIC BLOOD PRESSURE: 118 MMHG | DIASTOLIC BLOOD PRESSURE: 71 MMHG | HEART RATE: 108 BPM | RESPIRATION RATE: 16 BRPM | OXYGEN SATURATION: 92 % | TEMPERATURE: 97.7 F

## 2025-01-13 VITALS — HEIGHT: 55 IN | WEIGHT: 67.24 LBS | BODY MASS INDEX: 15.56 KG/M2

## 2025-01-13 DIAGNOSIS — C44.92 SQUAMOUS CELL CARCINOMA, SMALL CELL, NONKERATINIZING: ICD-10-CM

## 2025-01-13 DIAGNOSIS — D50.8 IRON DEFICIENCY ANEMIA SECONDARY TO INADEQUATE DIETARY IRON INTAKE: ICD-10-CM

## 2025-01-13 DIAGNOSIS — C34.81 MALIGNANT NEOPLASM OF OVERLAPPING SITES OF RIGHT LUNG (MULTI): ICD-10-CM

## 2025-01-13 LAB
ALBUMIN SERPL BCP-MCNC: 3.8 G/DL (ref 3.4–5)
ALP SERPL-CCNC: 147 U/L (ref 33–136)
ALT SERPL W P-5'-P-CCNC: 10 U/L (ref 7–45)
ANION GAP SERPL CALC-SCNC: 12 MMOL/L (ref 10–20)
AST SERPL W P-5'-P-CCNC: 16 U/L (ref 9–39)
BASOPHILS # BLD AUTO: 0.08 X10*3/UL (ref 0–0.1)
BASOPHILS NFR BLD AUTO: 0.3 %
BILIRUB SERPL-MCNC: 0.2 MG/DL (ref 0–1.2)
BUN SERPL-MCNC: 7 MG/DL (ref 6–23)
CALCIUM SERPL-MCNC: 9.6 MG/DL (ref 8.6–10.3)
CHLORIDE SERPL-SCNC: 95 MMOL/L (ref 98–107)
CO2 SERPL-SCNC: 32 MMOL/L (ref 21–32)
CREAT SERPL-MCNC: 0.52 MG/DL (ref 0.5–1.05)
EGFRCR SERPLBLD CKD-EPI 2021: >90 ML/MIN/1.73M*2
EOSINOPHIL # BLD AUTO: 0 X10*3/UL (ref 0–0.4)
EOSINOPHIL NFR BLD AUTO: 0 %
ERYTHROCYTE [DISTWIDTH] IN BLOOD BY AUTOMATED COUNT: 15 % (ref 11.5–14.5)
GLUCOSE SERPL-MCNC: 134 MG/DL (ref 74–99)
HCT VFR BLD AUTO: 39.9 % (ref 36–46)
HGB BLD-MCNC: 13.2 G/DL (ref 12–16)
IMM GRANULOCYTES # BLD AUTO: 0.53 X10*3/UL (ref 0–0.5)
IMM GRANULOCYTES NFR BLD AUTO: 2.3 % (ref 0–0.9)
LYMPHOCYTES # BLD AUTO: 1.01 X10*3/UL (ref 0.8–3)
LYMPHOCYTES NFR BLD AUTO: 4.3 %
MCH RBC QN AUTO: 30.6 PG (ref 26–34)
MCHC RBC AUTO-ENTMCNC: 33.1 G/DL (ref 32–36)
MCV RBC AUTO: 93 FL (ref 80–100)
MONOCYTES # BLD AUTO: 1.34 X10*3/UL (ref 0.05–0.8)
MONOCYTES NFR BLD AUTO: 5.7 %
NEUTROPHILS # BLD AUTO: 20.44 X10*3/UL (ref 1.6–5.5)
NEUTROPHILS NFR BLD AUTO: 87.4 %
NRBC BLD-RTO: 0 /100 WBCS (ref 0–0)
PLATELET # BLD AUTO: 446 X10*3/UL (ref 150–450)
POTASSIUM SERPL-SCNC: 4.2 MMOL/L (ref 3.5–5.3)
PROT SERPL-MCNC: 7.3 G/DL (ref 6.4–8.2)
RBC # BLD AUTO: 4.31 X10*6/UL (ref 4–5.2)
SODIUM SERPL-SCNC: 135 MMOL/L (ref 136–145)
WBC # BLD AUTO: 23.4 X10*3/UL (ref 4.4–11.3)

## 2025-01-13 PROCEDURE — 96417 CHEMO IV INFUS EACH ADDL SEQ: CPT

## 2025-01-13 PROCEDURE — 1160F RVW MEDS BY RX/DR IN RCRD: CPT | Performed by: INTERNAL MEDICINE

## 2025-01-13 PROCEDURE — 85025 COMPLETE CBC W/AUTO DIFF WBC: CPT

## 2025-01-13 PROCEDURE — 80053 COMPREHEN METABOLIC PANEL: CPT

## 2025-01-13 PROCEDURE — 2500000004 HC RX 250 GENERAL PHARMACY W/ HCPCS (ALT 636 FOR OP/ED): Performed by: INTERNAL MEDICINE

## 2025-01-13 PROCEDURE — 99215 OFFICE O/P EST HI 40 MIN: CPT | Performed by: INTERNAL MEDICINE

## 2025-01-13 PROCEDURE — 96413 CHEMO IV INFUSION 1 HR: CPT

## 2025-01-13 PROCEDURE — 96367 TX/PROPH/DG ADDL SEQ IV INF: CPT

## 2025-01-13 PROCEDURE — 96415 CHEMO IV INFUSION ADDL HR: CPT

## 2025-01-13 PROCEDURE — 2500000001 HC RX 250 WO HCPCS SELF ADMINISTERED DRUGS (ALT 637 FOR MEDICARE OP): Performed by: INTERNAL MEDICINE

## 2025-01-13 PROCEDURE — 99215 OFFICE O/P EST HI 40 MIN: CPT | Mod: 25 | Performed by: INTERNAL MEDICINE

## 2025-01-13 PROCEDURE — 1125F AMNT PAIN NOTED PAIN PRSNT: CPT | Performed by: INTERNAL MEDICINE

## 2025-01-13 PROCEDURE — 1157F ADVNC CARE PLAN IN RCRD: CPT | Performed by: INTERNAL MEDICINE

## 2025-01-13 PROCEDURE — 1159F MED LIST DOCD IN RCRD: CPT | Performed by: INTERNAL MEDICINE

## 2025-01-13 PROCEDURE — 96375 TX/PRO/DX INJ NEW DRUG ADDON: CPT | Mod: INF

## 2025-01-13 RX ORDER — HEPARIN SODIUM,PORCINE/PF 10 UNIT/ML
50 SYRINGE (ML) INTRAVENOUS AS NEEDED
Status: DISCONTINUED | OUTPATIENT
Start: 2025-01-13 | End: 2025-01-13 | Stop reason: HOSPADM

## 2025-01-13 RX ORDER — ALBUTEROL SULFATE 0.83 MG/ML
3 SOLUTION RESPIRATORY (INHALATION) AS NEEDED
OUTPATIENT
Start: 2025-02-03

## 2025-01-13 RX ORDER — ALBUTEROL SULFATE 0.83 MG/ML
3 SOLUTION RESPIRATORY (INHALATION) AS NEEDED
Status: DISCONTINUED | OUTPATIENT
Start: 2025-01-13 | End: 2025-01-13 | Stop reason: HOSPADM

## 2025-01-13 RX ORDER — EPINEPHRINE 0.3 MG/.3ML
0.3 INJECTION SUBCUTANEOUS EVERY 5 MIN PRN
OUTPATIENT
Start: 2025-02-03

## 2025-01-13 RX ORDER — DIPHENHYDRAMINE HYDROCHLORIDE 50 MG/ML
50 INJECTION INTRAMUSCULAR; INTRAVENOUS AS NEEDED
Status: DISCONTINUED | OUTPATIENT
Start: 2025-01-13 | End: 2025-01-13 | Stop reason: HOSPADM

## 2025-01-13 RX ORDER — DIPHENHYDRAMINE HCL 25 MG
50 CAPSULE ORAL ONCE
Status: COMPLETED | OUTPATIENT
Start: 2025-01-13 | End: 2025-01-13

## 2025-01-13 RX ORDER — PROCHLORPERAZINE MALEATE 10 MG
10 TABLET ORAL EVERY 6 HOURS PRN
Status: DISCONTINUED | OUTPATIENT
Start: 2025-01-13 | End: 2025-01-13 | Stop reason: HOSPADM

## 2025-01-13 RX ORDER — HEPARIN SODIUM,PORCINE/PF 10 UNIT/ML
50 SYRINGE (ML) INTRAVENOUS AS NEEDED
OUTPATIENT
Start: 2025-01-13

## 2025-01-13 RX ORDER — PROCHLORPERAZINE EDISYLATE 5 MG/ML
10 INJECTION INTRAMUSCULAR; INTRAVENOUS EVERY 6 HOURS PRN
Status: DISCONTINUED | OUTPATIENT
Start: 2025-01-13 | End: 2025-01-13 | Stop reason: HOSPADM

## 2025-01-13 RX ORDER — DIPHENHYDRAMINE HYDROCHLORIDE 50 MG/ML
50 INJECTION INTRAMUSCULAR; INTRAVENOUS AS NEEDED
OUTPATIENT
Start: 2025-02-03

## 2025-01-13 RX ORDER — HEPARIN 100 UNIT/ML
500 SYRINGE INTRAVENOUS AS NEEDED
Status: DISCONTINUED | OUTPATIENT
Start: 2025-01-13 | End: 2025-01-13 | Stop reason: HOSPADM

## 2025-01-13 RX ORDER — HEPARIN 100 UNIT/ML
500 SYRINGE INTRAVENOUS AS NEEDED
OUTPATIENT
Start: 2025-01-13

## 2025-01-13 RX ORDER — PALONOSETRON 0.05 MG/ML
0.25 INJECTION, SOLUTION INTRAVENOUS ONCE
Status: DISCONTINUED | OUTPATIENT
Start: 2025-01-13 | End: 2025-01-13 | Stop reason: HOSPADM

## 2025-01-13 RX ORDER — EPINEPHRINE 0.3 MG/.3ML
0.3 INJECTION SUBCUTANEOUS EVERY 5 MIN PRN
Status: DISCONTINUED | OUTPATIENT
Start: 2025-01-13 | End: 2025-01-13 | Stop reason: HOSPADM

## 2025-01-13 RX ORDER — FAMOTIDINE 10 MG/ML
20 INJECTION INTRAVENOUS ONCE AS NEEDED
Status: DISCONTINUED | OUTPATIENT
Start: 2025-01-13 | End: 2025-01-13 | Stop reason: HOSPADM

## 2025-01-13 RX ORDER — FAMOTIDINE 10 MG/ML
20 INJECTION INTRAVENOUS ONCE AS NEEDED
OUTPATIENT
Start: 2025-02-03

## 2025-01-13 RX ORDER — FAMOTIDINE 10 MG/ML
20 INJECTION INTRAVENOUS ONCE
Status: COMPLETED | OUTPATIENT
Start: 2025-01-13 | End: 2025-01-13

## 2025-01-13 RX ADMIN — DIPHENHYDRAMINE HYDROCHLORIDE 50 MG: 25 CAPSULE ORAL at 10:02

## 2025-01-13 RX ADMIN — FOSAPREPITANT 150 MG: 150 INJECTION, POWDER, LYOPHILIZED, FOR SOLUTION INTRAVENOUS at 10:22

## 2025-01-13 RX ADMIN — DEXAMETHASONE 20 MG: 6 TABLET ORAL at 10:02

## 2025-01-13 RX ADMIN — CARBOPLATIN 300 MG: 10 INJECTION, SOLUTION INTRAVENOUS at 14:32

## 2025-01-13 RX ADMIN — SODIUM CHLORIDE 200 MG: 9 INJECTION, SOLUTION INTRAVENOUS at 10:53

## 2025-01-13 RX ADMIN — HEPARIN 500 UNITS: 100 SYRINGE at 15:05

## 2025-01-13 RX ADMIN — FAMOTIDINE 20 MG: 10 INJECTION INTRAVENOUS at 10:02

## 2025-01-13 RX ADMIN — IRON SUCROSE 200 MG: 20 INJECTION, SOLUTION INTRAVENOUS at 10:02

## 2025-01-13 RX ADMIN — PACLITAXEL 222 MG: 6 INJECTION, SOLUTION INTRAVENOUS at 11:25

## 2025-01-13 ASSESSMENT — PAIN SCALES - GENERAL: PAINLEVEL_OUTOF10: 5

## 2025-01-13 NOTE — PROGRESS NOTES
Patient ID: Karly Horton is a 79 y.o. female.  Referring Physician: Kelly Guerra MD  [address]  [address] [place] [address]  Primary Care Provider: Julian Chaney MD  Referral Reason: Lung cancer    Subjective:  Mild loose stools which had stopped after eating macaroni. Mild nausea which is relieved by anti-nausea meds    Heme/Onc History:  Karly Horton is a 79 y.o. female who was referred by Cathy Lau MD, for a consultation to the [facility] Department of Medical Oncology.  She is presenting for evaluation and management of lung cancer.      #) At least locally advanced lung cancer, cT4 cN1 cM0 (?punctate left brain venous anomaly)    Pathology Review:  The pertinent pathology results were reviewed and discussed with the patient.  Notably,      10/11/2024-A. LYMPH NODE 11 L PULMONARY FINE NEEDLE ASPIRATION: Highly atypical cells are present; carcinoma/malignancy cannot be excluded               B. LYMPH NODE 7 PULMONARY FINE NEEDLE ASPIRATION: Malignant cells are present, see note. Lymphoid sample. Note: A minute fragment of malignant cells is noted in the cell block              C. LUNG FINE NEEDLE ASPIRATION RIGHT LOWER LOBE: Malignant cells derived from squamous cell carcinoma; Note: The malignant cells are positive for p40 and negative for TTF1 and CK7.     10/11/2024- A.  B.  Lung, right lower lobe mass, biopsies: - Detached fragments of non-small cell carcinoma, consistent with squamous cell carcinoma. Note: Detached fragments of non-small cell carcinoma in a background of abundant necrosis are seen.  Neoplastic cells are immunoreactive with p40.      Disease Associated Genomics:  PD-L1 TPS 10% (low)  MICROSATELLITE STATUS: Microsatellite Instability-High (MSI-H) is NOT DETECTED.     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  Negative for BRAF  V600E.  Negative for EGFR sensitizing mutation.  Negative for ERBB2 activating mutation  Negative for KRAS G12C.  Negative for MET exon 14 skipping mutation.  Negative for NTRK fusion.  Negative for RET fusion.  Negative for ROS1 fusion.    Imaging:  The pertinent imaging results were reviewed and discussed with the patient.  Notably,      10/18/2024-MRI brain with and without contrast: Punctate focus of enhancement within the left amygdala with questionable curvilinear enhancing tail suggestive of developmental venous anomaly.  No other evidence of abnormal leptomeningeal or pachymeningeal enhancement.     10/18/2024-PET/CT: No evidence of hypermetabolic cervical lymphadenopathy.  There is a right apical posterior upper lobe lung nodule with hypermetabolic activity max SUV 8.2, right superior lower lobe nodule with hypermetabolic activity max SUV 4.7, multiple pulmonary nodules within the right lung that do not demonstrate hypermetabolic activity and a right infrahilar mass with hypermetabolic activity with central photopenia representing partial necrosis with max SUV 5.7.  No other evidence of mediastinal, hilar or axillary lymphadenopathy.  In the left lower lobe there is a 7 mm nodule with mild hypermetabolic activity.     10/18/2024-CT chest with contrast: In the right lower lobe there is a mass with spiculated margins measuring 3.1 cm the mass transverses and crosses the major fissure with extension into the middle lobe, spiculated right upper nodule measuring 2.3 cm with extension to the superior right major fissure and fissural retraction.  Multiple additional bilateral pulmonary nodules not significantly changed including left lower lobe nodule measuring 7 mm and right lower lobe nodule measuring 5 mm.  Stable calcified nonspecific finding along the anterior left hepatic lobe.     Past Medical History:   Past Medical History:   Diagnosis Date    Other cervical disc degeneration, unspecified cervical region      Degeneration of cervical intervertebral disc    Other conditions influencing health status     Bulging Disc (L3 - L4)    Other conditions influencing health status     A Fall    Other conditions influencing health status     Bulging Disc (C4 - C5)    Other conditions influencing health status     Bulging Disc (C6 - C7)    Other conditions influencing health status     Bulging Disc (C5 - C6)    Other conditions influencing health status     Lumbar Spondylolisthesis    Other conditions influencing health status     Rotator Cuff Tendon Tear    Other intervertebral disc degeneration, lumbar region     L4-L5 disc bulge    Other intervertebral disc displacement, lumbar region     Disc displacement, lumbar    Other intervertebral disc displacement, lumbosacral region     Displacement of lumbosacral intervertebral disc    Personal history of other diseases of the musculoskeletal system and connective tissue     History of bursitis    Personal history of other diseases of the musculoskeletal system and connective tissue     History of fibromyositis    Personal history of other specified conditions     History of headache    Spinal stenosis, lumbar region without neurogenic claudication     Lumbar canal stenosis    Sprain of ligaments of lumbar spine, initial encounter     Low back sprain     Social History:   Social History     Socioeconomic History    Marital status:      Spouse name: Not on file    Number of children: Not on file    Years of education: Not on file    Highest education level: Not on file   Occupational History    Not on file   Tobacco Use    Smoking status: Every Day     Current packs/day: 1.00     Average packs/day: 1 pack/day for 65.0 years (65.0 ttl pk-yrs)     Types: Cigarettes     Start date: 1/1/1960    Smokeless tobacco: Not on file   Vaping Use    Vaping status: Some Days   Substance and Sexual Activity    Alcohol use: Not on file    Drug use: Not on file    Sexual activity: Defer   Other  Topics Concern    Not on file   Social History Narrative    Not on file     Social Drivers of Health     Financial Resource Strain: Medium Risk (10/31/2024)    Overall Financial Resource Strain (CARDIA)     Difficulty of Paying Living Expenses: Somewhat hard   Food Insecurity: No Food Insecurity (10/31/2024)    Hunger Vital Sign     Worried About Running Out of Food in the Last Year: Never true     Ran Out of Food in the Last Year: Never true   Transportation Needs: No Transportation Needs (10/31/2024)    PRAPARE - Transportation     Lack of Transportation (Medical): No     Lack of Transportation (Non-Medical): No   Physical Activity: Inactive (10/31/2024)    Exercise Vital Sign     Days of Exercise per Week: 0 days     Minutes of Exercise per Session: 0 min   Stress: Stress Concern Present (10/31/2024)    [employer] - Occupational Stress Questionnaire     Feeling of Stress : To some extent   Social Connections: Socially Isolated (10/31/2024)    Social Connection and Isolation Panel [NHANES]     Frequency of Communication with Friends and Family: More than three times a week     Frequency of Social Gatherings with Friends and Family: More than three times a week     Attends Sabianism Services: Never     Active Member of Clubs or Organizations: No     Attends Club or Organization Meetings: Never     Marital Status:    Intimate Partner Violence: Not At Risk (10/31/2024)    Humiliation, Afraid, Rape, and Kick questionnaire     Fear of Current or Ex-Partner: No     Emotionally Abused: No     Physically Abused: No     Sexually Abused: No   Housing Stability: Low Risk  (10/31/2024)    Housing Stability Vital Sign     Unable to Pay for Housing in the Last Year: No     Number of Times Moved in the Last Year: 0     Homeless in the Last Year: No     Surgical History:   Past Surgical History:   Procedure Laterality Date    APPENDECTOMY      EYE SURGERY      HYSTERECTOMY      TONSILLECTOMY        Family History:   Family History   Problem Relation Name Age of Onset    Cancer Mother      Other (Father had hypertension, stroke, coronary artery disease and diabetes) Father        reports that she has been smoking cigarettes. She started smoking about 65 years ago. She has a 65 pack-year smoking history. She does not have any smokeless tobacco history on file.  Oncology Family history: Cancer-related family history includes Cancer in her mother.    Review Of Systems:  As stated per in HPI; otherwise all other 12 point ROS are negative    Physical Exam:  /71 (BP Location: Left arm, Patient Position: Sitting, BP Cuff Size: Adult long)   Pulse 108   Temp 36.5 °C (97.7 °F) (Temporal)   Resp 16   SpO2 92%   BSA: There is no height or weight on file to calculate BSA.  General: awake/alert/oriented x3, no distress, alert and cooperative  Head: Short hair fully covering scalp. Symmetric facial expressions  Eyes: PERRL, EOMI, clear sclera, eyebrows present.  Ears/Nose/Mouth/Throat:  Oral mucous membranes moist. No oral ulcers. No palpable pre/post-auricular lymph nodes  Neck: No palpable cervical chain lymph nodes  Respiratory: unlabored breathing on room air, good chest expansion, thorax symmetric  Cardio: Regular rate and rhythm, normal S1 and S2, radial pulses symmetric  GI: Nondistended, soft, non-tender abdomen  Musculoskeletal: Normal muscle bulk and tone, ROM intact, no joint swelling.  Rises from chair and walks unassisted.  Extremities: No ankle swelling, no arm or leg wounds  Neuro: Alert, cognition intact, speech normal. Facial expressions symmetric.  No motor deficits noted. Sensation intact to touch and hot/cold.   Able to stand from seated position unassisted and walks around the room unassisted.  Psychological: Appropriate mood and behavior.  Skin: Warm and dry, no lesions, no rashes    Results:  Diagnostic Results   Lab Results   Component Value Date    WBC 23.4 (H) 01/13/2025    HGB 13.2  01/13/2025    HCT 39.9 01/13/2025    MCV 93 01/13/2025     01/13/2025     Lab Results   Component Value Date    CALCIUM 9.2 12/23/2024     (L) 12/23/2024    K 4.1 12/23/2024    CO2 31 12/23/2024    CL 96 (L) 12/23/2024    BUN 10 12/23/2024    CREATININE 0.58 12/23/2024    ALT 13 12/23/2024    AST 17 12/23/2024       Current Outpatient Medications:     acetaminophen (Tylenol) 325 mg tablet, Take 1-2 tablets (325-650 mg) by mouth every 6 hours if needed for mild pain (1 - 3)., Disp: , Rfl:     ascorbic acid (Vitamin C) 500 mg tablet, Take 1 tablet (500 mg) by mouth once daily. (Patient not taking: Reported on 10/10/2024), Disp: , Rfl:     baclofen (Lioresal) 10 mg tablet, Take 1 tablet (10 mg) by mouth 3 times a day., Disp: , Rfl:     butalbital-acetaminophen-caff -40 mg tablet, Take 1 tablet by mouth every 6 hours if needed for headaches., Disp: , Rfl:     cholecalciferol (Vitamin D-3) 50 MCG (2000 UT) tablet, Take 1 tablet (2,000 Units) by mouth once daily., Disp: 30 tablet, Rfl: 2    dexAMETHasone (Decadron) 4 mg tablet, Take 2 tablets (8 mg) by mouth once daily. For 3 days starting the day after treatment Do not fill before November 10, 2024. (Patient not taking: Reported on 12/16/2024), Disp: 12 tablet, Rfl: 3    ferrous sulfate, 325 mg ferrous sulfate, tablet, Take 1 tablet by mouth once daily with breakfast. (Patient not taking: Reported on 10/10/2024), Disp: , Rfl:     lactulose 10 gram/15 mL solution, Take 45 mL (30 g) by mouth once daily., Disp: , Rfl:     lidocaine HCL 4 % liquid roll-on, Apply 1 Application topically once daily as needed., Disp: , Rfl:     lubricating eye drops ophthalmic solution, Administer 1 drop into both eyes if needed for dry eyes., Disp: , Rfl:     meprobamate (Equanil) 400 mg tablet, Take 1 tablet (400 mg) by mouth 4 times a day., Disp: , Rfl:     multivitamin with minerals tablet, Take 1 tablet by mouth once daily., Disp: 30 tablet, Rfl: 2    nicotine (Nicoderm  CQ) 14 mg/24 hr patch, Place 1 patch over 24 hours on the skin once every 24 hours for 28 days., Disp: 28 patch, Rfl: 0    nicotine (Nicoderm CQ) 21 mg/24 hr patch, Place 1 patch over 24 hours on the skin once daily., Disp: , Rfl:     OLANZapine (ZyPREXA) 5 mg tablet, Take 1 tablet (5 mg) by mouth once daily at bedtime. For 4 days starting the evening of treatment Do not fill before November 10, 2024., Disp: 12 tablet, Rfl: 3    ondansetron (Zofran) 8 mg tablet, Take 1 tablet (8 mg) by mouth every 8 hours if needed for nausea or vomiting., Disp: 30 tablet, Rfl: 5    pantoprazole (ProtoNix) 40 mg EC tablet, Take 1 tablet (40 mg) by mouth 2 times a day. Do not crush, chew, or split. Continue twice daily x 8 weeks then continue once daily., Disp: 60 tablet, Rfl: 3    prochlorperazine (Compazine) 10 mg tablet, Take 1 tablet (10 mg) by mouth every 6 hours if needed for nausea or vomiting., Disp: 30 tablet, Rfl: 5    sucralfate (Carafate) 100 mg/mL suspension, Take 10 mL (1 g) by mouth every 6 hours. (Patient not taking: Reported on 10/10/2024), Disp: , Rfl:      Radiology:    Pathology:    Assessment/Plan:  1- Lung cancer, squamous cell carcinoma:    78 yo F Dx with lung squamous cell carcinoma from subcarinal and right lower lobe mass, clinically stage 3B, cT4 (due to multiple nodules in ipsilateral lung in different lobes) and N2 (due to subcarinal LN).  FNA from inferior mediastinal LN (LN station 11) was atypical but suspicious for carcinoma metastases.    She does not seem to be a good candidate for surgery, or concurrent chemoRT.     Systemic tx plan: systemic chemoIO carbo/taxol/Keytruda q 3 weeks x 4 with Neulasta OnPro followed by hypofractionated RT followed by IO maintenance is recommended after discussion with Dr. Odom. Gemzar could not be offered as she has difficulties with transportation. Keytruda will likely be given every 6 weeks after RT is completed.    C#1 chemo started on 11/12/24. Tolerated first  dose very well. Mild N, no Diarrhea or constipation or vomiting  C#2 chemo given on 12/2/24. Diarrhea mild which has resolved with Imodium. Hair loss. Otherwise no N/V. She has much better appetite  12/16/24: There was a solitary noncalcified nodule in LLL, biopsy was recommended to rule out second primary or contralateral lung metastases. Lung biopsy was scheduled for 12/16/24, but it could not be done because the nodule could not be seen, so procedure is cancelled  01/13/25: C#4 chemoIO planned today. Tolerating very well. No N/V/D/C. Will start RT soon.     RTC in 2 weeks with PET CT and MRI brain.   Will ask Dr. Odom to see her in late January to plan for RT.    2- Brain Venous anomaly vs metastatic foci:    MRI brain 10/18/24: IMPRESSION: 1. 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 However, recommend short-term follow-up MRI in 3 months to assess for interval stability to exclude a more aggressive lesion.    Repeat MRI brain and pet ct in 2 weeks.    3- Iron def: iron def without anemia. Venofer 200 mg x 3 to be given on days of chemotx are ordered      Diagnoses and all orders for this visit:  Squamous cell carcinoma, small cell, nonkeratinizing  -     Clinic Appointment Request         Performance Status: Symptomatic; fully ambulatory    I spent more than 60 minutes for the patient today, including face-to-face conversation, pre-visit preparation, post-visit orders, and others.   Kelly Guerra MD

## 2025-01-13 NOTE — PROGRESS NOTES
Pt seen in office today for a treatment  follow up visit with Dr. Kelly Guerra for management of her SCC of the lung. She is  without complaints today and denies pain. She is scheduled to receive hre treatment of Carbo/ Taxol/ Pembro today following her visit.    Medications, pharmacy preference and allergies were reviewed with patient and updated in the medical record by MD.     Per orders, she is to have PET scan and an MRI of the brain on 1/24/25. She will then have a FUV with Dr. Kelly Guerra on 1/27/25 and then will see Dr. Aldair Odom in Radiation oncology.    Our contact information was given to patient and they were encouraged to contact us with any questions or concerns.     Patient verbalized understanding and agreement regarding discussed information via verbal feedback. Pt escorted to Day Treatment, chair 11.

## 2025-01-27 ENCOUNTER — HOSPITAL ENCOUNTER (OUTPATIENT)
Dept: RADIOLOGY | Facility: CLINIC | Age: 80
Discharge: HOME | End: 2025-01-27
Payer: MEDICARE

## 2025-01-27 ENCOUNTER — HOSPITAL ENCOUNTER (OUTPATIENT)
Dept: RADIATION ONCOLOGY | Facility: CLINIC | Age: 80
Setting detail: RADIATION/ONCOLOGY SERIES
Discharge: HOME | End: 2025-01-27
Payer: MEDICARE

## 2025-01-27 ENCOUNTER — OFFICE VISIT (OUTPATIENT)
Dept: HEMATOLOGY/ONCOLOGY | Facility: CLINIC | Age: 80
End: 2025-01-27
Payer: MEDICARE

## 2025-01-27 ENCOUNTER — TELEPHONE (OUTPATIENT)
Dept: HEMATOLOGY/ONCOLOGY | Facility: CLINIC | Age: 80
End: 2025-01-27

## 2025-01-27 VITALS
TEMPERATURE: 97.9 F | SYSTOLIC BLOOD PRESSURE: 134 MMHG | OXYGEN SATURATION: 95 % | HEART RATE: 104 BPM | DIASTOLIC BLOOD PRESSURE: 82 MMHG | RESPIRATION RATE: 18 BRPM

## 2025-01-27 DIAGNOSIS — C34.81 MALIGNANT NEOPLASM OF OVERLAPPING SITES OF RIGHT LUNG (MULTI): Primary | ICD-10-CM

## 2025-01-27 DIAGNOSIS — C34.90 NON-SMALL CELL LUNG CANCER, UNSPECIFIED LATERALITY (MULTI): ICD-10-CM

## 2025-01-27 DIAGNOSIS — C44.92 SQUAMOUS CELL CARCINOMA, SMALL CELL, NONKERATINIZING: ICD-10-CM

## 2025-01-27 PROCEDURE — G2211 COMPLEX E/M VISIT ADD ON: HCPCS | Performed by: STUDENT IN AN ORGANIZED HEALTH CARE EDUCATION/TRAINING PROGRAM

## 2025-01-27 PROCEDURE — 99215 OFFICE O/P EST HI 40 MIN: CPT | Performed by: INTERNAL MEDICINE

## 2025-01-27 PROCEDURE — 1159F MED LIST DOCD IN RCRD: CPT | Performed by: INTERNAL MEDICINE

## 2025-01-27 PROCEDURE — 1160F RVW MEDS BY RX/DR IN RCRD: CPT | Performed by: INTERNAL MEDICINE

## 2025-01-27 PROCEDURE — 70553 MRI BRAIN STEM W/O & W/DYE: CPT

## 2025-01-27 PROCEDURE — 2550000001 HC RX 255 CONTRASTS: Performed by: INTERNAL MEDICINE

## 2025-01-27 PROCEDURE — 78815 PET IMAGE W/CT SKULL-THIGH: CPT | Performed by: NUCLEAR MEDICINE

## 2025-01-27 PROCEDURE — A9552 F18 FDG: HCPCS | Performed by: INTERNAL MEDICINE

## 2025-01-27 PROCEDURE — A9575 INJ GADOTERATE MEGLUMI 0.1ML: HCPCS | Performed by: INTERNAL MEDICINE

## 2025-01-27 PROCEDURE — 99215 OFFICE O/P EST HI 40 MIN: CPT | Performed by: STUDENT IN AN ORGANIZED HEALTH CARE EDUCATION/TRAINING PROGRAM

## 2025-01-27 PROCEDURE — 3430000001 HC RX 343 DIAGNOSTIC RADIOPHARMACEUTICALS: Performed by: INTERNAL MEDICINE

## 2025-01-27 PROCEDURE — 78815 PET IMAGE W/CT SKULL-THIGH: CPT | Mod: PS

## 2025-01-27 PROCEDURE — 1157F ADVNC CARE PLAN IN RCRD: CPT | Performed by: INTERNAL MEDICINE

## 2025-01-27 PROCEDURE — 70553 MRI BRAIN STEM W/O & W/DYE: CPT | Performed by: RADIOLOGY

## 2025-01-27 RX ORDER — FLUDEOXYGLUCOSE F 18 200 MCI/ML
10.2 INJECTION, SOLUTION INTRAVENOUS
Status: COMPLETED | OUTPATIENT
Start: 2025-01-27 | End: 2025-01-27

## 2025-01-27 RX ORDER — PROCHLORPERAZINE MALEATE 10 MG
10 TABLET ORAL EVERY 6 HOURS PRN
Qty: 30 TABLET | Refills: 5 | Status: SHIPPED | OUTPATIENT
Start: 2025-01-27

## 2025-01-27 RX ORDER — ONDANSETRON HYDROCHLORIDE 8 MG/1
8 TABLET, FILM COATED ORAL EVERY 8 HOURS PRN
Qty: 30 TABLET | Refills: 5 | Status: SHIPPED | OUTPATIENT
Start: 2025-01-27

## 2025-01-27 RX ORDER — GADOTERATE MEGLUMINE 376.9 MG/ML
6 INJECTION INTRAVENOUS
Status: COMPLETED | OUTPATIENT
Start: 2025-01-27 | End: 2025-01-27

## 2025-01-27 RX ADMIN — GADOTERATE MEGLUMINE 6 ML: 376.9 INJECTION INTRAVENOUS at 09:25

## 2025-01-27 RX ADMIN — FLUDEOXYGLUCOSE F 18 10.2 MILLICURIE: 200 INJECTION, SOLUTION INTRAVENOUS at 09:41

## 2025-01-27 ASSESSMENT — ENCOUNTER SYMPTOMS
COUGH: 1
CONSTIPATION: 1
DEPRESSION: 0
BACK PAIN: 1
OCCASIONAL FEELINGS OF UNSTEADINESS: 1
FATIGUE: 1
HEMATOLOGIC/LYMPHATIC NEGATIVE: 1
EYES NEGATIVE: 1
CARDIOVASCULAR NEGATIVE: 1
PSYCHIATRIC NEGATIVE: 1
LOSS OF SENSATION IN FEET: 1
SHORTNESS OF BREATH: 1
NECK PAIN: 1
EXTREMITY WEAKNESS: 1

## 2025-01-27 ASSESSMENT — PAIN SCALES - GENERAL: PAINLEVEL_OUTOF10: 5

## 2025-01-27 NOTE — PROGRESS NOTES
Radiation Oncology Follow-Up    Patient Name:  Karly Horton  MRN:  [id number]  :  1945    Referring Provider: Aldair Odom MD  Primary Care Provider: Julian Chaney MD  Care Team: Patient Care Team:  Julian Chaney MD as PCP - General (Internal Medicine)  Carisa Yuan RN as Nurse Navigator (Hematology and Oncology)  Kelly Guerra MD as Medical Oncologist (Hematology and Oncology)    Date of Service: 2025    SUBJECTIVE  History of Present Illness:  Karly Horton is a 79 y.o. female who was previously seen at the [facility] Department of Radiation Oncology for lung cancer.    #) Locally advanced lung cancer, at least cT4 cN2 (station 7+, ?atypical 11L) cMx (?punctate left brain venous anomaly/resolved LLL Nodule)       Ms. Horton is a female that presented in 2024 with unresponsiveness the patient was treated for sepsis and was stabilized.  X-ray on 2024 showed diffuse interstitial prominence without focal consolidation.  The patient represented again with altered mental status on 10/4/2024 which the patient was found to have right upper lobe spiculated mass with additional bilateral additional pulmonary nodules.  The patient was transferred to [facility] on 10/10/2024.  The patient underwent EBUS/bronchoscopy on 10/11/2024 which showed secretions present in the right mainstem bronchus, bronchus intermedius, right middle lobe and right lower lobe with friable mucosa in the right medial basal segment (RB 7).  Trachea, main angelina and left lung appeared normal.  EBUS was conducted and sampling occurred from 11L, 7 and right lower lobe mass.  Rapid onsite evaluation showed atypical cells in right lower lobe.     The patient underwent staging with CT chest with contrast on 10/18/2024 which showed in the right lower lobe there is a mass with spiculated margins measuring 3.1 cm the mass transverses and  crosses the major fissure with extension into the middle lobe, spiculated right upper nodule measuring 2.3 cm with extension to the superior right major fissure and fissural retraction.  Multiple additional bilateral pulmonary nodules not significantly changed including left lower lobe nodule measuring 7 mm and right lower lobe nodule measuring 5 mm.  Stable calcified nonspecific finding along the anterior left hepatic lobe. PET/CT showed no evidence of hypermetabolic cervical lymphadenopathy.  There is a right apical posterior upper lobe lung nodule with hypermetabolic activity max SUV 8.2, right superior lower lobe nodule with hypermetabolic activity max SUV 4.7, multiple pulmonary nodules within the right lung that do not demonstrate hypermetabolic activity and a right infrahilar mass with hypermetabolic activity with central photopenia representing partial necrosis with max SUV 5.7.  No other evidence of mediastinal, hilar or axillary lymphadenopathy.  In the left lower lobe there is a 7 mm nodule with mild hypermetabolic activity.  MRI brain with and without contrast showed punctate focus of enhancement within the left amygdala with questionable curvilinear enhancing tail suggestive of developmental venous anomaly.  No other evidence of abnormal leptomeningeal or pachymeningeal enhancement.    The patient completed pembrolizumab/paclitaxel/carboplatin for 4 cycles from 11/2024-2/2025.  On 1/27/2025, the patient had repeat brain MRI with contrast which showed no evidence of intracranial metastatic disease    1/27/2025: The patient has repeat PET/CT pending.  The patient had no evidence of intracranial metastatic disease on brain MRI.    Labs  1/13/2025-CBC with differential: WBC 23.4 (H)  1/13/2025-CMP: Glucose 134 (H), sodium 135 (L), chloride 95 (L), creatinine 0.52, alk phos 147 (H)     Pathology:  10/11/2024-A. LYMPH NODE 11 L PULMONARY FINE NEEDLE ASPIRATION: Highly atypical cells are present;  carcinoma/malignancy cannot be excluded               B. LYMPH NODE 7 PULMONARY FINE NEEDLE ASPIRATION: Malignant cells are present, see note. Lymphoid sample. Note: A minute fragment of malignant cells is noted in the cell block              C. LUNG FINE NEEDLE ASPIRATION RIGHT LOWER LOBE: Malignant cells derived from squamous cell carcinoma; Note: The malignant cells are positive for p40 and negative for TTF1 and CK7.     10/11/2024- A.  B.  Lung, right lower lobe mass, biopsies: - Detached fragments of non-small cell carcinoma, consistent with squamous cell carcinoma. Note: Detached fragments of non-small cell carcinoma in a background of abundant necrosis are seen.  Neoplastic cells are immunoreactive with p40.      Disease Associated Genomics:  PD-L1 TPS 10% (low)  MICROSATELLITE STATUS: Microsatellite Instability-High (MSI-H) is NOT DETECTED.     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  Negative for BRAF V600E.  Negative for EGFR sensitizing mutation.  Negative for ERBB2 activating mutation  Negative for KRAS G12C.  Negative for MET exon 14 skipping mutation.  Negative for NTRK fusion.  Negative for RET fusion.  Negative for ROS1 fusion.     Disease Tumor Markers:  None     Imagin2025-MRI brain with and without contrast: Abnormality signal within the periventricular and subcortical white matter bilaterally compatible with small vessel ischemic changes or nonspecific findings representing inflammatory process.    2025-PET/CT: Pending    10/18/2024-MRI brain with and without contrast: Punctate focus of enhancement within the left amygdala with questionable curvilinear enhancing tail suggestive of developmental venous anomaly.  No other evidence of abnormal leptomeningeal or pachymeningeal enhancement.     10/18/2024-PET/CT: No evidence of hypermetabolic cervical lymphadenopathy.  There is a  right apical posterior upper lobe lung nodule with hypermetabolic activity max SUV 8.2, right superior lower lobe nodule with hypermetabolic activity max SUV 4.7, multiple pulmonary nodules within the right lung that do not demonstrate hypermetabolic activity and a right infrahilar mass with hypermetabolic activity with central photopenia representing partial necrosis with max SUV 5.7.  No other evidence of mediastinal, hilar or axillary lymphadenopathy.  In the left lower lobe there is a 7 mm nodule with mild hypermetabolic activity.     10/18/2024-CT chest with contrast: In the right lower lobe there is a mass with spiculated margins measuring 3.1 cm the mass transverses and crosses the major fissure with extension into the middle lobe, spiculated right upper nodule measuring 2.3 cm with extension to the superior right major fissure and fissural retraction.  Multiple additional bilateral pulmonary nodules not significantly changed including left lower lobe nodule measuring 7 mm and right lower lobe nodule measuring 5 mm.  Stable calcified nonspecific finding along the anterior left hepatic lobe.     Prior Systemic Therapies:   None     Prior Surgeries:  10/11/2024-bronchoscopy/EBUS: Secretions present in the right mainstem bronchus, bronchus intermedius, right middle lobe and right lower lobe with friable mucosa in the right medial basal segment (RB 7).  Trachea, main angelina and left lung appeared normal.  EBUS was conducted and sampling occurred from 11L, 7 and right lower lobe mass.  Rapid onsite evaluation showed atypical cells in right lower lobe.     Prior Radiation Therapy:  None    Treatment Rendered:   No radiation treatments to show. (Treatments may have been administered in another system.)       Review of Systems:   Review of Systems   Constitutional:  Positive for fatigue.   HENT:  Negative.     Eyes: Negative.    Respiratory:  Positive for cough and shortness of breath.    Cardiovascular: Negative.     Gastrointestinal:  Positive for constipation.   Genitourinary: Negative.     Musculoskeletal:  Positive for back pain and neck pain.   Skin: Negative.    Neurological:  Positive for extremity weakness.   Hematological: Negative.    Psychiatric/Behavioral: Negative.         Performance Status:   The Karnofsky performance scale today is 50, Requires considerable assistance and frequent medical care (ECOG equivalent 2).       OBJECTIVE  Vital Signs:  /82   Pulse 104   Temp 36.6 °C (97.9 °F) (Temporal)   Resp 18   SpO2 95%   Physical Exam  Vitals and nursing note reviewed.   HENT:      Head: Normocephalic.   Eyes:      Extraocular Movements: Extraocular movements intact.   Pulmonary:      Effort: Pulmonary effort is normal.   Neurological:      Mental Status: She is alert.            ASSESSMENT:   Karly Horton is a 79 y.o. female with Malignant neoplasm of overlapping sites of right lung (Multi), Clinical: cT4, cN2.      Ms. Horton is a female with diagnosis of at least locally advanced non-small cell lung cancer, with complex pathology and imaging findings of atypical cells in the contralateral 11L lymph node and resolution of left subcentimeter pulmonary nodule.    I discussed with the patient regarding her clinical presentation, prior treatments and current imaging which shows no evidence of intracranial metastatic disease.  If the patient is found to have continued locally advanced disease without any overt PET positive metastatic disease outside of the right thorax, the patient may be a candidate for sequential consolidation with hypofractionated radiation therapy.  Given the patient's limited performance status and quality of life impacts from transportation needs, we discussed definitive hypofractionated radiation therapy in the form of 20 fractions versus split course radiation therapy in the form of 10 fractions followed by 3-4-week gap with a additional 10 fractions for a total number of 20  fractions.    I discussed with the patient regarding the acute and long-term side effects of radiation therapy to the right thorax including but not limited to fatigue, skin changes, esophagitis, and risk of damage to surrounding cardiopulmonary structures.    All questions and concerns were addressed during the visit.  Consent was obtained.  If the patient is found to have metastatic disease outside the chest, the plan will be to consider second line systemic therapy and reserve radiation therapy for palliation.    PLAN:    #) Locally advanced lung cancer, at least cT4 cN2 (station 7+, ?atypical 11L) cMx (?punctate left brain venous anomaly/resolved LLL Nodule)   -Await MRI brain and PET/CT findings  -If no evidence of distant metastatic disease would recommend definitive chemoradiation therapy  -Discussed hypofractionated radiation therapy for consolidation in approximately 20 fractions, given the involvement of mediastinum lymph node stations, and either continuous or split course fashion based on performance status and ability for patient to transport to and from radiation therapy    #) Acute toxicities    #) Long term side effects    A total of 30 minutes were spent face-to-face with the patient, with an additional 15 minutes spent reviewing records including imaging, pathology and physician notes.    Aldair Odom MD  [facility]  [facility]  - Department of Radiation Oncology  Phone: [phone]  Fax: [phone]  [place] secure chat preferred / Pager [phone]    Note: This was transcribed using Dragon voice recognition software. Attempts were made to correct any errors; however, errors or omissions may be present.     NCCN Guidelines were applicable to guide this patients treatment plan.

## 2025-01-27 NOTE — PROGRESS NOTES
Radiation Oncology Nursing Note    Pain: The patient's current pain level was assessed.  They report currently having a pain of 5 out of 10.  They feel their pain is under control with the use of pain medications.    Review of Systems:  Review of Systems - Oncology    Education Documentation  Radiation Therapy (External Beam) : What Is Radiation Therapy, taught by Cathy Hale RN at 1/27/2025 11:55 AM.  Learner: Patient  Readiness: Acceptance  Method: Explanation  Response: Verbalizes Understanding    Radiation Therapy (External Beam) : Side Effects, taught by Cathy Hale RN at 1/27/2025 11:55 AM.  Learner: Patient  Readiness: Acceptance  Method: Explanation  Response: Verbalizes Understanding    Radiation Therapy (External Beam) : Review, taught by Cathy Hale RN at 1/27/2025 11:55 AM.  Learner: Patient  Readiness: Acceptance  Method: Explanation  Response: Verbalizes Understanding    Radiation Therapy (External Beam) : Life During Treatment, taught by Cathy Hale RN at 1/27/2025 11:55 AM.  Learner: Patient  Readiness: Acceptance  Method: Explanation  Response: Verbalizes Understanding    Radiation Therapy (External Beam) : Getting Radiation, taught by Cathy Hale RN at 1/27/2025 11:55 AM.  Learner: Patient  Readiness: Acceptance  Method: Explanation  Response: Verbalizes Understanding    When and How to Contact Clinic, taught by Cathy Hale RN at 1/27/2025 11:55 AM.  Learner: Patient  Readiness: Acceptance  Method: Explanation  Response: Verbalizes Understanding    Education Comments  No comments found.

## 2025-01-27 NOTE — PROGRESS NOTES
Patient ID: Karly Horton is a 79 y.o. female.  Referring Physician: Kelly Guerra MD  [id number]  [address] [place] [address]  Primary Care Provider: Julian Chaney MD  Referral Reason: Lung cancer    Subjective:  Mild loose stools which had stopped after eating macaroni. Mild nausea which is relieved by anti-nausea meds    Heme/Onc History:  Karly Horton is a 79 y.o. female who was referred by Cathy Lau MD, for a consultation to the [facility] Department of Medical Oncology.  She is presenting for evaluation and management of lung cancer.      #) At least locally advanced lung cancer, cT4 cN1 cM0 (?punctate left brain venous anomaly)    Pathology Review:  The pertinent pathology results were reviewed and discussed with the patient.  Notably,      10/11/2024-A. LYMPH NODE 11 L PULMONARY FINE NEEDLE ASPIRATION: Highly atypical cells are present; carcinoma/malignancy cannot be excluded               B. LYMPH NODE 7 PULMONARY FINE NEEDLE ASPIRATION: Malignant cells are present, see note. Lymphoid sample. Note: A minute fragment of malignant cells is noted in the cell block              C. LUNG FINE NEEDLE ASPIRATION RIGHT LOWER LOBE: Malignant cells derived from squamous cell carcinoma; Note: The malignant cells are positive for p40 and negative for TTF1 and CK7.     10/11/2024- A.  B.  Lung, right lower lobe mass, biopsies: - Detached fragments of non-small cell carcinoma, consistent with squamous cell carcinoma. Note: Detached fragments of non-small cell carcinoma in a background of abundant necrosis are seen.  Neoplastic cells are immunoreactive with p40.      Disease Associated Genomics:  PD-L1 TPS 10% (low)  MICROSATELLITE STATUS: Microsatellite Instability-High (MSI-H) is NOT DETECTED.     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  Negative for BRAF  V600E.  Negative for EGFR sensitizing mutation.  Negative for ERBB2 activating mutation  Negative for KRAS G12C.  Negative for MET exon 14 skipping mutation.  Negative for NTRK fusion.  Negative for RET fusion.  Negative for ROS1 fusion.    Imaging:  The pertinent imaging results were reviewed and discussed with the patient.  Notably,      10/18/2024-MRI brain with and without contrast: Punctate focus of enhancement within the left amygdala with questionable curvilinear enhancing tail suggestive of developmental venous anomaly.  No other evidence of abnormal leptomeningeal or pachymeningeal enhancement.     10/18/2024-PET/CT: No evidence of hypermetabolic cervical lymphadenopathy.  There is a right apical posterior upper lobe lung nodule with hypermetabolic activity max SUV 8.2, right superior lower lobe nodule with hypermetabolic activity max SUV 4.7, multiple pulmonary nodules within the right lung that do not demonstrate hypermetabolic activity and a right infrahilar mass with hypermetabolic activity with central photopenia representing partial necrosis with max SUV 5.7.  No other evidence of mediastinal, hilar or axillary lymphadenopathy.  In the left lower lobe there is a 7 mm nodule with mild hypermetabolic activity.     10/18/2024-CT chest with contrast: In the right lower lobe there is a mass with spiculated margins measuring 3.1 cm the mass transverses and crosses the major fissure with extension into the middle lobe, spiculated right upper nodule measuring 2.3 cm with extension to the superior right major fissure and fissural retraction.  Multiple additional bilateral pulmonary nodules not significantly changed including left lower lobe nodule measuring 7 mm and right lower lobe nodule measuring 5 mm.  Stable calcified nonspecific finding along the anterior left hepatic lobe.     Past Medical History:   Past Medical History:   Diagnosis Date    Other cervical disc degeneration, unspecified cervical region      Degeneration of cervical intervertebral disc    Other conditions influencing health status     Bulging Disc (L3 - L4)    Other conditions influencing health status     A Fall    Other conditions influencing health status     Bulging Disc (C4 - C5)    Other conditions influencing health status     Bulging Disc (C6 - C7)    Other conditions influencing health status     Bulging Disc (C5 - C6)    Other conditions influencing health status     Lumbar Spondylolisthesis    Other conditions influencing health status     Rotator Cuff Tendon Tear    Other intervertebral disc degeneration, lumbar region     L4-L5 disc bulge    Other intervertebral disc displacement, lumbar region     Disc displacement, lumbar    Other intervertebral disc displacement, lumbosacral region     Displacement of lumbosacral intervertebral disc    Personal history of other diseases of the musculoskeletal system and connective tissue     History of bursitis    Personal history of other diseases of the musculoskeletal system and connective tissue     History of fibromyositis    Personal history of other specified conditions     History of headache    Spinal stenosis, lumbar region without neurogenic claudication     Lumbar canal stenosis    Sprain of ligaments of lumbar spine, initial encounter     Low back sprain     Social History:   Social History     Socioeconomic History    Marital status:      Spouse name: Not on file    Number of children: Not on file    Years of education: Not on file    Highest education level: Not on file   Occupational History    Not on file   Tobacco Use    Smoking status: Every Day     Current packs/day: 1.00     Average packs/day: 1 pack/day for 65.1 years (65.1 ttl pk-yrs)     Types: Cigarettes     Start date: 1/1/1960     Passive exposure: Current    Smokeless tobacco: Not on file   Vaping Use    Vaping status: Some Days   Substance and Sexual Activity    Alcohol use: Not Currently    Drug use: Yes     Types:  Marijuana     Comment: vape    Sexual activity: Defer   Other Topics Concern    Not on file   Social History Narrative    Not on file     Social Drivers of Health     Financial Resource Strain: Medium Risk (10/31/2024)    Overall Financial Resource Strain (CARDIA)     Difficulty of Paying Living Expenses: Somewhat hard   Food Insecurity: No Food Insecurity (10/31/2024)    Hunger Vital Sign     Worried About Running Out of Food in the Last Year: Never true     Ran Out of Food in the Last Year: Never true   Transportation Needs: No Transportation Needs (10/31/2024)    PRAPARE - Transportation     Lack of Transportation (Medical): No     Lack of Transportation (Non-Medical): No   Physical Activity: Inactive (10/31/2024)    Exercise Vital Sign     Days of Exercise per Week: 0 days     Minutes of Exercise per Session: 0 min   Stress: Stress Concern Present (10/31/2024)    [employer] - Occupational Stress Questionnaire     Feeling of Stress : To some extent   Social Connections: Socially Isolated (10/31/2024)    Social Connection and Isolation Panel [NHANES]     Frequency of Communication with Friends and Family: More than three times a week     Frequency of Social Gatherings with Friends and Family: More than three times a week     Attends [employer] Services: Never     Active Member of Clubs or Organizations: No     Attends Club or Organization Meetings: Never     Marital Status:    Intimate Partner Violence: Not At Risk (10/31/2024)    Humiliation, Afraid, Rape, and Kick questionnaire     Fear of Current or Ex-Partner: No     Emotionally Abused: No     Physically Abused: No     Sexually Abused: No   Housing Stability: Low Risk  (10/31/2024)    Housing Stability Vital Sign     Unable to Pay for Housing in the Last Year: No     Number of Times Moved in the Last Year: 0     Homeless in the Last Year: No     Surgical History:   Past Surgical History:   Procedure Laterality Date     APPENDECTOMY      EYE SURGERY      HYSTERECTOMY      TONSILLECTOMY       Family History:   Family History   Problem Relation Name Age of Onset    Cancer Mother      Other (Father had hypertension, stroke, coronary artery disease and diabetes) Father        reports that she has been smoking cigarettes. She started smoking about 65 years ago. She has a 65.1 pack-year smoking history. She has been exposed to tobacco smoke. She does not have any smokeless tobacco history on file.  Oncology Family history: Cancer-related family history includes Cancer in her mother.    Review Of Systems:  As stated per in HPI; otherwise all other 12 point ROS are negative    Physical Exam:  There were no vitals taken for this visit.  BSA: There is no height or weight on file to calculate BSA.  General: awake/alert/oriented x3, no distress, alert and cooperative  Head: Short hair fully covering scalp. Symmetric facial expressions  Eyes: PERRL, EOMI, clear sclera, eyebrows present.  Ears/Nose/Mouth/Throat:  Oral mucous membranes moist. No oral ulcers. No palpable pre/post-auricular lymph nodes  Neck: No palpable cervical chain lymph nodes  Respiratory: unlabored breathing on room air, good chest expansion, thorax symmetric  Cardio: Regular rate and rhythm, normal S1 and S2, radial pulses symmetric  GI: Nondistended, soft, non-tender abdomen  Musculoskeletal: Normal muscle bulk and tone, ROM intact, no joint swelling.  Rises from chair and walks unassisted.  Extremities: No ankle swelling, no arm or leg wounds  Neuro: Alert, cognition intact, speech normal. Facial expressions symmetric.  No motor deficits noted. Sensation intact to touch and hot/cold.   Able to stand from seated position unassisted and walks around the room unassisted.  Psychological: Appropriate mood and behavior.  Skin: Warm and dry, no lesions, no rashes    Results:  Diagnostic Results   Lab Results   Component Value Date    WBC 23.4 (H) 01/13/2025    HGB 13.2 01/13/2025     HCT 39.9 01/13/2025    MCV 93 01/13/2025     01/13/2025     Lab Results   Component Value Date    CALCIUM 9.6 01/13/2025     (L) 01/13/2025    K 4.2 01/13/2025    CO2 32 01/13/2025    CL 95 (L) 01/13/2025    BUN 7 01/13/2025    CREATININE 0.52 01/13/2025    ALT 10 01/13/2025    AST 16 01/13/2025       Current Outpatient Medications:     acetaminophen (Tylenol) 325 mg tablet, Take 1-2 tablets (325-650 mg) by mouth every 6 hours if needed for mild pain (1 - 3)., Disp: , Rfl:     ascorbic acid (Vitamin C) 500 mg tablet, Take 1 tablet (500 mg) by mouth once daily. (Patient not taking: Reported on 10/10/2024), Disp: , Rfl:     baclofen (Lioresal) 10 mg tablet, Take 1 tablet (10 mg) by mouth 3 times a day., Disp: , Rfl:     butalbital-acetaminophen-caff -40 mg tablet, Take 1 tablet by mouth every 6 hours if needed for headaches., Disp: , Rfl:     dexAMETHasone (Decadron) 4 mg tablet, Take 2 tablets (8 mg) by mouth once daily. For 3 days starting the day after treatment Do not fill before November 10, 2024. (Patient not taking: Reported on 12/16/2024), Disp: 12 tablet, Rfl: 3    ferrous sulfate, 325 mg ferrous sulfate, tablet, Take 1 tablet by mouth once daily with breakfast. (Patient not taking: Reported on 10/10/2024), Disp: , Rfl:     lactulose 10 gram/15 mL solution, Take 45 mL (30 g) by mouth once daily., Disp: , Rfl:     lidocaine HCL 4 % liquid roll-on, Apply 1 Application topically once daily as needed., Disp: , Rfl:     lubricating eye drops ophthalmic solution, Administer 1 drop into both eyes if needed for dry eyes., Disp: , Rfl:     meprobamate (Equanil) 400 mg tablet, Take 1 tablet (400 mg) by mouth 4 times a day., Disp: , Rfl:     nicotine (Nicoderm CQ) 14 mg/24 hr patch, Place 1 patch over 24 hours on the skin once every 24 hours for 28 days., Disp: 28 patch, Rfl: 0    nicotine (Nicoderm CQ) 21 mg/24 hr patch, Place 1 patch over 24 hours on the skin once daily., Disp: , Rfl:      OLANZapine (ZyPREXA) 5 mg tablet, Take 1 tablet (5 mg) by mouth once daily at bedtime. For 4 days starting the evening of treatment Do not fill before November 10, 2024., Disp: 12 tablet, Rfl: 3    ondansetron (Zofran) 8 mg tablet, Take 1 tablet (8 mg) by mouth every 8 hours if needed for nausea or vomiting., Disp: 30 tablet, Rfl: 5    pantoprazole (ProtoNix) 40 mg EC tablet, Take 1 tablet (40 mg) by mouth 2 times a day. Do not crush, chew, or split. Continue twice daily x 8 weeks then continue once daily., Disp: 60 tablet, Rfl: 3    prochlorperazine (Compazine) 10 mg tablet, Take 1 tablet (10 mg) by mouth every 6 hours if needed for nausea or vomiting., Disp: 30 tablet, Rfl: 5    sucralfate (Carafate) 100 mg/mL suspension, Take 10 mL (1 g) by mouth every 6 hours. (Patient not taking: Reported on 10/10/2024), Disp: , Rfl:   No current facility-administered medications for this visit.     Radiology:    Pathology:    Assessment/Plan:  1- Lung cancer, squamous cell carcinoma:    78 yo F Dx with lung squamous cell carcinoma from subcarinal and right lower lobe mass, clinically stage 3B, cT4 (due to multiple nodules in ipsilateral lung in different lobes) and N2 (due to subcarinal LN).  FNA from inferior mediastinal LN (LN station 11) was atypical but suspicious for carcinoma metastases.    She does not seem to be a good candidate for surgery, or concurrent chemoRT.     Systemic tx plan: systemic chemoIO carbo/taxol/Keytruda q 3 weeks x 4 with Neulasta OnPro followed by hypofractionated RT followed by IO maintenance is recommended after discussion with Dr. Odom. Gemzar could not be offered as she has difficulties with transportation. Keytruda will likely be given every 6 weeks after RT is completed.    C#1 chemo started on 11/12/24. Tolerated first dose very well. Mild N, no Diarrhea or constipation or vomiting  C#2 chemo given on 12/2/24. Diarrhea mild which has resolved with Imodium. Hair loss. Otherwise no N/V. She  has much better appetite  12/16/24: There was a solitary noncalcified nodule in LLL, biopsy was recommended to rule out second primary or contralateral lung metastases. Lung biopsy was scheduled for 12/16/24, but it could not be done because the nodule could not be seen, so procedure is cancelled  01/13/25: C#4 chemoIO planned today. Tolerating very well. No N/V/D/C. Will start RT soon.   01/27/25: MRI brain: TASHA. PET CT is pending. I will call her with results today or tomorrow    2- Brain Venous anomaly vs metastatic foci:    MRI brain 10/18/24: IMPRESSION: 1. 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 However, recommend short-term follow-up MRI in 3 months to assess for interval stability to exclude a more aggressive lesion.    3- Iron def: iron def without anemia. Venofer 200 mg x 3 completed      Diagnoses and all orders for this visit:  Squamous cell carcinoma, small cell, nonkeratinizing  -     Clinic Appointment Request         Performance Status: Symptomatic; fully ambulatory    I spent more than 60 minutes for the patient today, including face-to-face conversation, pre-visit preparation, post-visit orders, and others.   Kelly Guerra MD

## 2025-01-27 NOTE — PROGRESS NOTES
Patient here alone for follow up with Dr. Guerra; seen for small cell lung cancer.    1/27/25 PET to be reviewed with patient.  Results not in, MD will call patient when resulted.    Patient seen in radiation department after her visit with Dr. Odom.     Per MD, patient to return in follow up in 6 weeks.

## 2025-02-03 ENCOUNTER — APPOINTMENT (OUTPATIENT)
Dept: HEMATOLOGY/ONCOLOGY | Facility: CLINIC | Age: 80
End: 2025-02-03
Payer: MEDICARE

## 2025-02-04 ENCOUNTER — TELEPHONE (OUTPATIENT)
Dept: RADIATION ONCOLOGY | Facility: CLINIC | Age: 80
End: 2025-02-04
Payer: MEDICARE

## 2025-02-04 NOTE — TELEPHONE ENCOUNTER
Telephone call completed by this RN for reminder call prior to CT/Sim. Provided education on arriving 30 minutes early. Left CARLO.

## 2025-02-05 ENCOUNTER — INFUSION (OUTPATIENT)
Dept: HEMATOLOGY/ONCOLOGY | Facility: CLINIC | Age: 80
End: 2025-02-05
Payer: MEDICARE

## 2025-02-05 ENCOUNTER — HOSPITAL ENCOUNTER (OUTPATIENT)
Dept: RADIOLOGY | Facility: EXTERNAL LOCATION | Age: 80
Discharge: HOME | End: 2025-02-05

## 2025-02-05 ENCOUNTER — HOSPITAL ENCOUNTER (OUTPATIENT)
Dept: RADIATION ONCOLOGY | Facility: CLINIC | Age: 80
Setting detail: RADIATION/ONCOLOGY SERIES
Discharge: HOME | End: 2025-02-05
Payer: MEDICARE

## 2025-02-05 VITALS
TEMPERATURE: 96.8 F | HEART RATE: 96 BPM | BODY MASS INDEX: 15.46 KG/M2 | RESPIRATION RATE: 16 BRPM | OXYGEN SATURATION: 92 % | SYSTOLIC BLOOD PRESSURE: 176 MMHG | DIASTOLIC BLOOD PRESSURE: 90 MMHG | WEIGHT: 65.37 LBS

## 2025-02-05 DIAGNOSIS — D50.8 IRON DEFICIENCY ANEMIA SECONDARY TO INADEQUATE DIETARY IRON INTAKE: ICD-10-CM

## 2025-02-05 DIAGNOSIS — C34.81 MALIGNANT NEOPLASM OF OVERLAPPING SITES OF RIGHT LUNG (MULTI): ICD-10-CM

## 2025-02-05 PROCEDURE — 96374 THER/PROPH/DIAG INJ IV PUSH: CPT | Mod: INF

## 2025-02-05 PROCEDURE — 2500000004 HC RX 250 GENERAL PHARMACY W/ HCPCS (ALT 636 FOR OP/ED): Performed by: INTERNAL MEDICINE

## 2025-02-05 RX ORDER — ALBUTEROL SULFATE 0.83 MG/ML
3 SOLUTION RESPIRATORY (INHALATION) AS NEEDED
Status: CANCELLED | OUTPATIENT
Start: 2025-02-05

## 2025-02-05 RX ORDER — HEPARIN 100 UNIT/ML
500 SYRINGE INTRAVENOUS AS NEEDED
Status: DISCONTINUED | OUTPATIENT
Start: 2025-02-05 | End: 2025-02-05 | Stop reason: HOSPADM

## 2025-02-05 RX ORDER — ALBUTEROL SULFATE 0.83 MG/ML
3 SOLUTION RESPIRATORY (INHALATION) AS NEEDED
Status: DISCONTINUED | OUTPATIENT
Start: 2025-02-05 | End: 2025-02-05 | Stop reason: HOSPADM

## 2025-02-05 RX ORDER — HEPARIN SODIUM,PORCINE/PF 10 UNIT/ML
50 SYRINGE (ML) INTRAVENOUS AS NEEDED
OUTPATIENT
Start: 2025-02-05

## 2025-02-05 RX ORDER — DIPHENHYDRAMINE HYDROCHLORIDE 50 MG/ML
50 INJECTION INTRAMUSCULAR; INTRAVENOUS AS NEEDED
Status: CANCELLED | OUTPATIENT
Start: 2025-02-05

## 2025-02-05 RX ORDER — DIPHENHYDRAMINE HYDROCHLORIDE 50 MG/ML
50 INJECTION INTRAMUSCULAR; INTRAVENOUS AS NEEDED
Status: DISCONTINUED | OUTPATIENT
Start: 2025-02-05 | End: 2025-02-05 | Stop reason: HOSPADM

## 2025-02-05 RX ORDER — EPINEPHRINE 0.3 MG/.3ML
0.3 INJECTION SUBCUTANEOUS EVERY 5 MIN PRN
Status: DISCONTINUED | OUTPATIENT
Start: 2025-02-05 | End: 2025-02-05 | Stop reason: HOSPADM

## 2025-02-05 RX ORDER — HEPARIN 100 UNIT/ML
500 SYRINGE INTRAVENOUS AS NEEDED
OUTPATIENT
Start: 2025-02-05

## 2025-02-05 RX ORDER — FAMOTIDINE 10 MG/ML
20 INJECTION INTRAVENOUS ONCE AS NEEDED
Status: DISCONTINUED | OUTPATIENT
Start: 2025-02-05 | End: 2025-02-05 | Stop reason: HOSPADM

## 2025-02-05 RX ORDER — HEPARIN SODIUM,PORCINE/PF 10 UNIT/ML
50 SYRINGE (ML) INTRAVENOUS AS NEEDED
Status: DISCONTINUED | OUTPATIENT
Start: 2025-02-05 | End: 2025-02-05 | Stop reason: HOSPADM

## 2025-02-05 RX ORDER — EPINEPHRINE 0.3 MG/.3ML
0.3 INJECTION SUBCUTANEOUS EVERY 5 MIN PRN
Status: CANCELLED | OUTPATIENT
Start: 2025-02-05

## 2025-02-05 RX ORDER — FAMOTIDINE 10 MG/ML
20 INJECTION INTRAVENOUS ONCE AS NEEDED
Status: CANCELLED | OUTPATIENT
Start: 2025-02-05

## 2025-02-05 RX ADMIN — HEPARIN 500 UNITS: 100 SYRINGE at 10:57

## 2025-02-05 RX ADMIN — IRON SUCROSE 200 MG: 20 INJECTION, SOLUTION INTRAVENOUS at 10:25

## 2025-02-05 ASSESSMENT — PAIN SCALES - GENERAL: PAINLEVEL_OUTOF10: 5

## 2025-02-05 NOTE — PROGRESS NOTES
Discussed infusion nurses concerns with patients presentation with care team including Dr. ARACELI Guerra. Per previous treating nurses and provider patient presentation is at her baseline. Per the patient and her previous care team she requires help with ADLs while she is here and her daughter helps her at home.     This RN met with patient to discuss her not smoking while she is here for her appointments and she verbalized understanding. Patient answered all orientation questions appropriately for this RN including information about today's appointment. Per radiation oncology team patient completed her CT SIM appointment and was assisted to front lobby doors to meet transportation.

## 2025-02-05 NOTE — PROGRESS NOTES
PT presents alone to infusion for iron injection. PT is completely disabled and needs complete assistance. During assessment PT displayed signs of confusion and forgetfulness; she called out several times with assistance in coughing d/t her inability to sit up in the hospital bed. PT had no sense of time and repeatedly asked when she was getting her Iron, although this RN communicated to her often.  When asked if she was alone she stated yes, and that Omar Grant brought her. PT also stated that she lives alone. Upon pressing, PT stated her daughter lives with her 24/7 and takes care of her. RN asked why her daughter didn't accompany her today and PT stated she didn't need to, however, it is this RN's opinion that the PT's needs extend beyond a typical infusion PT. Also, PT was found to be vaping inside her room and when told this was not allowed within [facility] site, PT apologized and stated she understood. However, within a half hour, PT was again found to be attempting to vape; this RN thus removed PT's purse from reach with the vape inside. Management was alerted and asked to discuss with team and PT's daughter this incident and the PT's increased needs while being treated.    PT received her iron without further incident and was taken by personal staff to the radiology dept for her next appt. Per management, radiology was informed of incident while in infusion.

## 2025-02-10 ENCOUNTER — TELEPHONE (OUTPATIENT)
Dept: HEMATOLOGY/ONCOLOGY | Facility: CLINIC | Age: 80
End: 2025-02-10
Payer: MEDICARE

## 2025-02-10 DIAGNOSIS — C44.92 SQUAMOUS CELL CARCINOMA, SMALL CELL, NONKERATINIZING: ICD-10-CM

## 2025-02-10 RX ORDER — PROCHLORPERAZINE MALEATE 10 MG
10 TABLET ORAL EVERY 6 HOURS PRN
Qty: 60 TABLET | Refills: 5 | Status: SHIPPED | OUTPATIENT
Start: 2025-02-10

## 2025-02-13 ENCOUNTER — HOSPITAL ENCOUNTER (OUTPATIENT)
Dept: RADIATION ONCOLOGY | Facility: CLINIC | Age: 80
Setting detail: RADIATION/ONCOLOGY SERIES
Discharge: HOME | End: 2025-02-13
Payer: MEDICARE

## 2025-02-13 PROCEDURE — 77338 DESIGN MLC DEVICE FOR IMRT: CPT | Performed by: STUDENT IN AN ORGANIZED HEALTH CARE EDUCATION/TRAINING PROGRAM

## 2025-02-13 PROCEDURE — 77301 RADIOTHERAPY DOSE PLAN IMRT: CPT | Performed by: STUDENT IN AN ORGANIZED HEALTH CARE EDUCATION/TRAINING PROGRAM

## 2025-02-13 PROCEDURE — 77300 RADIATION THERAPY DOSE PLAN: CPT | Performed by: STUDENT IN AN ORGANIZED HEALTH CARE EDUCATION/TRAINING PROGRAM

## 2025-02-13 PROCEDURE — 77293 RESPIRATOR MOTION MGMT SIMUL: CPT | Performed by: STUDENT IN AN ORGANIZED HEALTH CARE EDUCATION/TRAINING PROGRAM

## 2025-02-14 ENCOUNTER — PHARMACY VISIT (OUTPATIENT)
Dept: PHARMACY | Facility: CLINIC | Age: 80
End: 2025-02-14
Payer: COMMERCIAL

## 2025-02-14 ENCOUNTER — APPOINTMENT (OUTPATIENT)
Dept: RADIOLOGY | Facility: HOSPITAL | Age: 80
End: 2025-02-14
Payer: MEDICARE

## 2025-02-14 ENCOUNTER — HOSPITAL ENCOUNTER (EMERGENCY)
Facility: HOSPITAL | Age: 80
Discharge: HOME | End: 2025-02-14
Attending: STUDENT IN AN ORGANIZED HEALTH CARE EDUCATION/TRAINING PROGRAM
Payer: MEDICARE

## 2025-02-14 ENCOUNTER — HOSPITAL ENCOUNTER (EMERGENCY)
Facility: HOSPITAL | Age: 80
Discharge: OTHER INSTITUTION | End: 2025-02-14
Payer: MEDICARE

## 2025-02-14 VITALS
BODY MASS INDEX: 13.39 KG/M2 | DIASTOLIC BLOOD PRESSURE: 98 MMHG | HEART RATE: 101 BPM | RESPIRATION RATE: 20 BRPM | SYSTOLIC BLOOD PRESSURE: 170 MMHG | WEIGHT: 59.52 LBS | TEMPERATURE: 96.4 F | HEIGHT: 56 IN | OXYGEN SATURATION: 94 %

## 2025-02-14 DIAGNOSIS — I70.0 CALCIFICATION OF ABDOMINAL AORTA (CMS-HCC): ICD-10-CM

## 2025-02-14 DIAGNOSIS — K62.89 PROCTITIS: ICD-10-CM

## 2025-02-14 DIAGNOSIS — R11.2 NAUSEA AND VOMITING, UNSPECIFIED VOMITING TYPE: Primary | ICD-10-CM

## 2025-02-14 LAB
ALBUMIN SERPL BCP-MCNC: 4 G/DL (ref 3.4–5)
ALP SERPL-CCNC: 82 U/L (ref 33–136)
ALT SERPL W P-5'-P-CCNC: 11 U/L (ref 7–45)
ANION GAP SERPL CALCULATED.3IONS-SCNC: 15 MMOL/L (ref 10–20)
AST SERPL W P-5'-P-CCNC: 24 U/L (ref 9–39)
BASOPHILS # BLD AUTO: 0.04 X10*3/UL (ref 0–0.1)
BASOPHILS NFR BLD AUTO: 0.5 %
BILIRUB SERPL-MCNC: 0.4 MG/DL (ref 0–1.2)
BUN SERPL-MCNC: 10 MG/DL (ref 6–23)
CALCIUM SERPL-MCNC: 9.6 MG/DL (ref 8.6–10.3)
CHLORIDE SERPL-SCNC: 95 MMOL/L (ref 98–107)
CO2 SERPL-SCNC: 30 MMOL/L (ref 21–32)
CREAT SERPL-MCNC: 0.47 MG/DL (ref 0.5–1.05)
EGFRCR SERPLBLD CKD-EPI 2021: >90 ML/MIN/1.73M*2
EOSINOPHIL # BLD AUTO: 0.04 X10*3/UL (ref 0–0.4)
EOSINOPHIL NFR BLD AUTO: 0.5 %
ERYTHROCYTE [DISTWIDTH] IN BLOOD BY AUTOMATED COUNT: 16.6 % (ref 11.5–14.5)
GLUCOSE SERPL-MCNC: 130 MG/DL (ref 74–99)
HCT VFR BLD AUTO: 44.8 % (ref 36–46)
HGB BLD-MCNC: 14.6 G/DL (ref 12–16)
IMM GRANULOCYTES # BLD AUTO: 0.03 X10*3/UL (ref 0–0.5)
IMM GRANULOCYTES NFR BLD AUTO: 0.4 % (ref 0–0.9)
LIPASE SERPL-CCNC: 13 U/L (ref 9–82)
LYMPHOCYTES # BLD AUTO: 0.58 X10*3/UL (ref 0.8–3)
LYMPHOCYTES NFR BLD AUTO: 7.2 %
MCH RBC QN AUTO: 31.5 PG (ref 26–34)
MCHC RBC AUTO-ENTMCNC: 32.6 G/DL (ref 32–36)
MCV RBC AUTO: 97 FL (ref 80–100)
MONOCYTES # BLD AUTO: 0.56 X10*3/UL (ref 0.05–0.8)
MONOCYTES NFR BLD AUTO: 6.9 %
NEUTROPHILS # BLD AUTO: 6.81 X10*3/UL (ref 1.6–5.5)
NEUTROPHILS NFR BLD AUTO: 84.5 %
NRBC BLD-RTO: 0 /100 WBCS (ref 0–0)
PLATELET # BLD AUTO: 201 X10*3/UL (ref 150–450)
POTASSIUM SERPL-SCNC: 4.6 MMOL/L (ref 3.5–5.3)
PROT SERPL-MCNC: 7.4 G/DL (ref 6.4–8.2)
RBC # BLD AUTO: 4.63 X10*6/UL (ref 4–5.2)
SODIUM SERPL-SCNC: 135 MMOL/L (ref 136–145)
WBC # BLD AUTO: 8.1 X10*3/UL (ref 4.4–11.3)

## 2025-02-14 PROCEDURE — 2500000004 HC RX 250 GENERAL PHARMACY W/ HCPCS (ALT 636 FOR OP/ED): Performed by: STUDENT IN AN ORGANIZED HEALTH CARE EDUCATION/TRAINING PROGRAM

## 2025-02-14 PROCEDURE — 4500999001 HC ED NO CHARGE

## 2025-02-14 PROCEDURE — 83690 ASSAY OF LIPASE: CPT | Performed by: EMERGENCY MEDICINE

## 2025-02-14 PROCEDURE — 85025 COMPLETE CBC W/AUTO DIFF WBC: CPT | Performed by: EMERGENCY MEDICINE

## 2025-02-14 PROCEDURE — 99285 EMERGENCY DEPT VISIT HI MDM: CPT | Mod: 25 | Performed by: STUDENT IN AN ORGANIZED HEALTH CARE EDUCATION/TRAINING PROGRAM

## 2025-02-14 PROCEDURE — 96361 HYDRATE IV INFUSION ADD-ON: CPT

## 2025-02-14 PROCEDURE — 74177 CT ABD & PELVIS W/CONTRAST: CPT | Performed by: RADIOLOGY

## 2025-02-14 PROCEDURE — 96374 THER/PROPH/DIAG INJ IV PUSH: CPT | Mod: 59,MUE

## 2025-02-14 PROCEDURE — 2500000001 HC RX 250 WO HCPCS SELF ADMINISTERED DRUGS (ALT 637 FOR MEDICARE OP): Performed by: STUDENT IN AN ORGANIZED HEALTH CARE EDUCATION/TRAINING PROGRAM

## 2025-02-14 PROCEDURE — 99283 EMERGENCY DEPT VISIT LOW MDM: CPT

## 2025-02-14 PROCEDURE — RXMED WILLOW AMBULATORY MEDICATION CHARGE

## 2025-02-14 PROCEDURE — 74177 CT ABD & PELVIS W/CONTRAST: CPT

## 2025-02-14 PROCEDURE — 2550000001 HC RX 255 CONTRASTS: Performed by: STUDENT IN AN ORGANIZED HEALTH CARE EDUCATION/TRAINING PROGRAM

## 2025-02-14 PROCEDURE — 80053 COMPREHEN METABOLIC PANEL: CPT | Performed by: EMERGENCY MEDICINE

## 2025-02-14 RX ORDER — ONDANSETRON HYDROCHLORIDE 8 MG/1
8 TABLET, FILM COATED ORAL EVERY 8 HOURS PRN
Qty: 20 TABLET | Refills: 0 | Status: SHIPPED | OUTPATIENT
Start: 2025-02-14 | End: 2025-02-21

## 2025-02-14 RX ORDER — ONDANSETRON HYDROCHLORIDE 2 MG/ML
4 INJECTION, SOLUTION INTRAVENOUS ONCE
Status: COMPLETED | OUTPATIENT
Start: 2025-02-14 | End: 2025-02-14

## 2025-02-14 RX ORDER — AMOXICILLIN AND CLAVULANATE POTASSIUM 875; 125 MG/1; MG/1
1 TABLET, FILM COATED ORAL EVERY 12 HOURS
Qty: 14 TABLET | Refills: 0 | Status: SHIPPED | OUTPATIENT
Start: 2025-02-14 | End: 2025-02-21

## 2025-02-14 RX ORDER — DIAZEPAM 5 MG/ML
2 INJECTION, SOLUTION INTRAMUSCULAR; INTRAVENOUS ONCE
Status: COMPLETED | OUTPATIENT
Start: 2025-02-14 | End: 2025-02-14

## 2025-02-14 RX ORDER — AMOXICILLIN AND CLAVULANATE POTASSIUM 875; 125 MG/1; MG/1
1 TABLET, FILM COATED ORAL ONCE
Status: COMPLETED | OUTPATIENT
Start: 2025-02-14 | End: 2025-02-14

## 2025-02-14 RX ADMIN — IOHEXOL 75 ML: 350 INJECTION, SOLUTION INTRAVENOUS at 07:21

## 2025-02-14 RX ADMIN — SODIUM CHLORIDE 1000 ML: 9 INJECTION, SOLUTION INTRAVENOUS at 09:14

## 2025-02-14 RX ADMIN — AMOXICILLIN AND CLAVULANATE POTASSIUM 1 TABLET: 875; 125 TABLET, FILM COATED ORAL at 10:12

## 2025-02-14 RX ADMIN — DIAZEPAM 2 MG: 10 INJECTION, SOLUTION INTRAMUSCULAR; INTRAVENOUS at 06:52

## 2025-02-14 RX ADMIN — ONDANSETRON 4 MG: 2 INJECTION INTRAMUSCULAR; INTRAVENOUS at 09:14

## 2025-02-14 ASSESSMENT — LIFESTYLE VARIABLES
EVER FELT BAD OR GUILTY ABOUT YOUR DRINKING: NO
HAVE PEOPLE ANNOYED YOU BY CRITICIZING YOUR DRINKING: NO
HAVE YOU EVER FELT YOU SHOULD CUT DOWN ON YOUR DRINKING: NO
EVER HAD A DRINK FIRST THING IN THE MORNING TO STEADY YOUR NERVES TO GET RID OF A HANGOVER: NO
TOTAL SCORE: 0

## 2025-02-14 ASSESSMENT — COLUMBIA-SUICIDE SEVERITY RATING SCALE - C-SSRS
6. HAVE YOU EVER DONE ANYTHING, STARTED TO DO ANYTHING, OR PREPARED TO DO ANYTHING TO END YOUR LIFE?: NO
1. IN THE PAST MONTH, HAVE YOU WISHED YOU WERE DEAD OR WISHED YOU COULD GO TO SLEEP AND NOT WAKE UP?: NO
2. HAVE YOU ACTUALLY HAD ANY THOUGHTS OF KILLING YOURSELF?: NO

## 2025-02-14 ASSESSMENT — PAIN - FUNCTIONAL ASSESSMENT: PAIN_FUNCTIONAL_ASSESSMENT: 0-10

## 2025-02-14 ASSESSMENT — PAIN SCALES - GENERAL: PAINLEVEL_OUTOF10: 0 - NO PAIN

## 2025-02-14 NOTE — ED TRIAGE NOTES
"To ED via [facility] from home, pt c/o nausea sensation and a \"bumping\" sensation in her abdomen. Pt has a lung cancer history and is undergoing chemo and radiation. She denies vomiting and denies diarrhea.     Pt is A&Ox4. She is bed bound. Does not walk. Lives at home with her daughter. Pt reports she does not have any anti-nausea medication at home.   "

## 2025-02-14 NOTE — DISCHARGE INSTRUCTIONS
Schedule an appointment with your primary care physician in the next 24-48 hours for follow up and continued management of your symptoms.  Take Augmentin twice a day as prescribed for treatment of proctitis which is inflammation of the end of the colon.  This may be causing some of your symptoms.  You can use Zofran as needed for nausea and vomiting.  Return to the ED for any new or concerning symptoms including but not limited to severe worsening of your pain, inability to tolrate oral solids or fluids due to worsening pain or severe nausea and vomiting.     You were incidentally found to have calcifications of the abdominal aorta and iliac arteries on your CT scan today.  He will need to follow-up with your primary care physician to arrange for further evaluation of this outside of the hospital.  This does not require any further evaluation at the hospital at this time unless you develop new or worsening symptoms including severe leg pain, numbness of the legs, color change of the legs where they are turning blue or pale and are cool to the touch all of which could be signs of loss of blood flow to the area and require immediate reassessment by physician in the ED.

## 2025-02-14 NOTE — ED PROVIDER NOTES
"HPI   Chief Complaint   Patient presents with    Nausea       This is a 79-year-old female with a past medical history of lung cancer, UTI, CHF, iron deficiency presenting to the ED for evaluation of abdominal discomfort and nausea.  She states that her stomach is \"bumping\" and will not stop.  She states that start about an hour ago and she is never had this issue in the past.  She reports nausea but no significant abdominal pain with it.  She has not tried any medication for this, she states she is unable to sleep because of this sensation so she came to the ED for further assessment.      History provided by:  Patient   used: No            Patient History   Past Medical History:   Diagnosis Date    Other cervical disc degeneration, unspecified cervical region     Degeneration of cervical intervertebral disc    Other conditions influencing health status     Bulging Disc (L3 - L4)    Other conditions influencing health status     A Fall    Other conditions influencing health status     Bulging Disc (C4 - C5)    Other conditions influencing health status     Bulging Disc (C6 - C7)    Other conditions influencing health status     Bulging Disc (C5 - C6)    Other conditions influencing health status     Lumbar Spondylolisthesis    Other conditions influencing health status     Rotator Cuff Tendon Tear    Other intervertebral disc degeneration, lumbar region     L4-L5 disc bulge    Other intervertebral disc displacement, lumbar region     Disc displacement, lumbar    Other intervertebral disc displacement, lumbosacral region     Displacement of lumbosacral intervertebral disc    Personal history of other diseases of the musculoskeletal system and connective tissue     History of bursitis    Personal history of other diseases of the musculoskeletal system and connective tissue     History of fibromyositis    Personal history of other specified conditions     History of headache    Spinal stenosis, " lumbar region without neurogenic claudication     Lumbar canal stenosis    Sprain of ligaments of lumbar spine, initial encounter     Low back sprain     Past Surgical History:   Procedure Laterality Date    APPENDECTOMY      EYE SURGERY      HYSTERECTOMY      TONSILLECTOMY       Family History   Problem Relation Name Age of Onset    Cancer Mother      Other (Father had hypertension, stroke, coronary artery disease and diabetes) Father       Social History     Tobacco Use    Smoking status: Every Day     Current packs/day: 1.00     Average packs/day: 1 pack/day for 65.1 years (65.1 ttl pk-yrs)     Types: Cigarettes     Start date: 1/1/1960     Passive exposure: Current    Smokeless tobacco: Not on file   Vaping Use    Vaping status: Some Days   Substance Use Topics    Alcohol use: Not Currently    Drug use: Yes     Types: Marijuana     Comment: vape       Physical Exam   ED Triage Vitals [02/14/25 0540]   Temperature Heart Rate Respirations BP   35.8 °C (96.4 °F) (!) 105 20 (!) 170/98      Pulse Ox Temp Source Heart Rate Source Patient Position   94 % Temporal -- --      BP Location FiO2 (%)     -- --       Physical Exam  General: well developed, cachectic elderly female who is chronically ill-appearing, anxious.  Patient with recurrent muscle spasm of the abdominal muscles causing difficulty sitting still.  HENT: normocephalic, atraumatic.   CV: Tachycardic, regular rhythm, no murmur, no gallops, or rubs.   Resp: clear to ascultation bilaterally, no wheezes, rales, or rhonchi  GI: abdomen soft, nontender without rigidity or guarding, no peritoneal signs, abdomen is nondistended, no masses palpated.  Recurrent abdominal muscle spasm.  MSK: strength +5/5 to upper and lower extremities bilaterally, no swelling of the extremities.  Psych: Anxious regarding her symptoms, otherwise cooperative with exam  Skin: warm, dry, without evidence of rash or abrasions    ED Course & MDM   Diagnoses as of 02/14/25 0904   Nausea and  vomiting, unspecified vomiting type   Proctitis   Calcification of abdominal aorta (CMS-HCC)                 No data recorded     Keyla Coma Scale Score: 15 (02/14/25 0545 : Chad Hercules RN)                           Medical Decision Making  On exam the patient is cachectic, chronically ill-appearing but in no acute distress other than anxiety over her current symptoms.  She has what appear to be recurrent abdominal muscle spasms causing difficulty sitting still.  No significant abdominal tenderness on palpation.  Cardiopulmonary exam is unremarkable although she is mildly tachycardic.  I did order diazepam IV for initial treatment of her symptoms, lab work was obtained to check her electrolytes and abdominal CT ordered because of her nausea and discomfort, history of cancer to assess for any acute intra-abdominal pathology causing this.    CT imaging shows thickening of the distal rectum/perianal region suggestive of proctitis, not really related to her current presentation with abdominal muscle spasms.  Will prescribe Augmentin and zofran.  Severe atherosclerotic disease of the aorto-iiliac tree, and severe stenosis of the bilateral common iliac arteries.  Patient is to follow-up with her primary care physician regarding this finding as well as follow-up to ensure that her proctitis is resolving with treatment.  Return precautions discussed including intractable nausea and vomiting, signs of limb ischemia which would require immediate reassessment by physician.    CT abdomen pelvis w IV contrast   Final Result   1. Circumferential wall thickening of the distal rectum/perianal   region suggestive of proctitis.   2. Severe atherosclerotic disease of the aorto bi-iliac tree. Severe   stenosis of the bilateral common iliac arteries.   3. Bronchiectasis with bronchial debris about the right lower lobe.        Signed by: Jesus Garrido 2/14/2025 8:54 AM   Dictation workstation:   [id number]        Labs Reviewed    CBC WITH AUTO DIFFERENTIAL - Abnormal       Result Value    WBC 8.1      nRBC 0.0      RBC 4.63      Hemoglobin 14.6      Hematocrit 44.8      MCV 97      MCH 31.5      MCHC 32.6      RDW 16.6 (*)     Platelets 201      Neutrophils % 84.5      Immature Granulocytes %, Automated 0.4      Lymphocytes % 7.2      Monocytes % 6.9      Eosinophils % 0.5      Basophils % 0.5      Neutrophils Absolute 6.81 (*)     Immature Granulocytes Absolute, Automated 0.03      Lymphocytes Absolute 0.58 (*)     Monocytes Absolute 0.56      Eosinophils Absolute 0.04      Basophils Absolute 0.04     COMPREHENSIVE METABOLIC PANEL - Abnormal    Glucose 130 (*)     Sodium 135 (*)     Potassium 4.6      Chloride 95 (*)     Bicarbonate 30      Anion Gap 15      Urea Nitrogen 10      Creatinine 0.47 (*)     eGFR >90      Calcium 9.6      Albumin 4.0      Alkaline Phosphatase 82      Total Protein 7.4      AST 24      Bilirubin, Total 0.4      ALT 11     LIPASE - Normal    Lipase 13      Narrative:     Venipuncture immediately after or during the administration of Metamizole may lead to falsely low results. Testing should be performed immediately prior to Metamizole dosing.   URINALYSIS WITH REFLEX CULTURE AND MICROSCOPIC    Narrative:     The following orders were created for panel order Urinalysis with Reflex Culture and Microscopic.  Procedure                               Abnormality         Status                     ---------                               -----------         ------                     Urinalysis with Reflex C...[[id number]]                                                 Extra Urine Gray Tube[[id number]]                                                         Please view results for these tests on the individual orders.   URINALYSIS WITH REFLEX CULTURE AND MICROSCOPIC   EXTRA URINE GRAY TUBE         Procedure  Procedures     Kingsley Crews DO  02/14/25 0905

## 2025-02-24 ENCOUNTER — HOSPITAL ENCOUNTER (OUTPATIENT)
Dept: RADIATION ONCOLOGY | Facility: CLINIC | Age: 80
Setting detail: RADIATION/ONCOLOGY SERIES
Discharge: HOME | End: 2025-02-24
Payer: MEDICARE

## 2025-02-24 ENCOUNTER — OFFICE VISIT (OUTPATIENT)
Dept: HEMATOLOGY/ONCOLOGY | Facility: CLINIC | Age: 80
End: 2025-02-24
Payer: MEDICARE

## 2025-02-24 VITALS
OXYGEN SATURATION: 96 % | RESPIRATION RATE: 17 BRPM | DIASTOLIC BLOOD PRESSURE: 76 MMHG | HEART RATE: 86 BPM | SYSTOLIC BLOOD PRESSURE: 119 MMHG | TEMPERATURE: 98.1 F

## 2025-02-24 DIAGNOSIS — Z51.0 ENCOUNTER FOR ANTINEOPLASTIC RADIATION THERAPY: ICD-10-CM

## 2025-02-24 DIAGNOSIS — C44.92 SQUAMOUS CELL CARCINOMA, SMALL CELL, NONKERATINIZING: Primary | ICD-10-CM

## 2025-02-24 DIAGNOSIS — C34.31 MALIGNANT NEOPLASM OF LOWER LOBE, RIGHT BRONCHUS OR LUNG: ICD-10-CM

## 2025-02-24 LAB
RAD ONC MSQ ACTUAL FRACTIONS DELIVERED: 1
RAD ONC MSQ ACTUAL SESSION DELIVERED DOSE: 350 CGRAY
RAD ONC MSQ ACTUAL TOTAL DOSE: 350 CGRAY
RAD ONC MSQ ELAPSED DAYS: 0
RAD ONC MSQ LAST DATE: NORMAL
RAD ONC MSQ PRESCRIBED FRACTIONAL DOSE: 350 CGRAY
RAD ONC MSQ PRESCRIBED NUMBER OF FRACTIONS: 15
RAD ONC MSQ PRESCRIBED TECHNIQUE: NORMAL
RAD ONC MSQ PRESCRIBED TOTAL DOSE: 5250 CGRAY
RAD ONC MSQ PRESCRIPTION PATTERN COMMENT: NORMAL
RAD ONC MSQ START DATE: NORMAL
RAD ONC MSQ TREATMENT COURSE NUMBER: 1
RAD ONC MSQ TREATMENT SITE: NORMAL

## 2025-02-24 PROCEDURE — 99215 OFFICE O/P EST HI 40 MIN: CPT | Performed by: INTERNAL MEDICINE

## 2025-02-24 PROCEDURE — 1160F RVW MEDS BY RX/DR IN RCRD: CPT | Performed by: INTERNAL MEDICINE

## 2025-02-24 PROCEDURE — 1159F MED LIST DOCD IN RCRD: CPT | Performed by: INTERNAL MEDICINE

## 2025-02-24 PROCEDURE — 1157F ADVNC CARE PLAN IN RCRD: CPT | Performed by: INTERNAL MEDICINE

## 2025-02-24 PROCEDURE — 1125F AMNT PAIN NOTED PAIN PRSNT: CPT | Performed by: INTERNAL MEDICINE

## 2025-02-24 PROCEDURE — 77386 HC INTENSITY-MODULATED RADIATION THERAPY (IMRT), COMPLEX: CPT | Performed by: STUDENT IN AN ORGANIZED HEALTH CARE EDUCATION/TRAINING PROGRAM

## 2025-02-24 RX ORDER — PROCHLORPERAZINE MALEATE 10 MG
10 TABLET ORAL EVERY 6 HOURS PRN
OUTPATIENT
Start: 2025-04-17

## 2025-02-24 RX ORDER — ALBUTEROL SULFATE 0.83 MG/ML
3 SOLUTION RESPIRATORY (INHALATION) AS NEEDED
OUTPATIENT
Start: 2025-03-06

## 2025-02-24 RX ORDER — ALBUTEROL SULFATE 0.83 MG/ML
3 SOLUTION RESPIRATORY (INHALATION) AS NEEDED
Status: CANCELLED | OUTPATIENT
Start: 2025-03-03

## 2025-02-24 RX ORDER — PROCHLORPERAZINE EDISYLATE 5 MG/ML
10 INJECTION INTRAMUSCULAR; INTRAVENOUS EVERY 6 HOURS PRN
Status: CANCELLED | OUTPATIENT
Start: 2025-03-24

## 2025-02-24 RX ORDER — FAMOTIDINE 10 MG/ML
20 INJECTION, SOLUTION INTRAVENOUS ONCE AS NEEDED
OUTPATIENT
Start: 2025-07-10

## 2025-02-24 RX ORDER — ALBUTEROL SULFATE 0.83 MG/ML
3 SOLUTION RESPIRATORY (INHALATION) AS NEEDED
OUTPATIENT
Start: 2025-04-17

## 2025-02-24 RX ORDER — PROCHLORPERAZINE MALEATE 10 MG
10 TABLET ORAL EVERY 6 HOURS PRN
OUTPATIENT
Start: 2025-05-29

## 2025-02-24 RX ORDER — DIPHENHYDRAMINE HYDROCHLORIDE 50 MG/ML
50 INJECTION INTRAMUSCULAR; INTRAVENOUS AS NEEDED
OUTPATIENT
Start: 2025-07-10

## 2025-02-24 RX ORDER — DIPHENHYDRAMINE HYDROCHLORIDE 50 MG/ML
50 INJECTION INTRAMUSCULAR; INTRAVENOUS AS NEEDED
OUTPATIENT
Start: 2025-03-06

## 2025-02-24 RX ORDER — PROCHLORPERAZINE EDISYLATE 5 MG/ML
10 INJECTION INTRAMUSCULAR; INTRAVENOUS EVERY 6 HOURS PRN
OUTPATIENT
Start: 2025-07-10

## 2025-02-24 RX ORDER — FAMOTIDINE 10 MG/ML
20 INJECTION, SOLUTION INTRAVENOUS ONCE AS NEEDED
Status: CANCELLED | OUTPATIENT
Start: 2025-03-03

## 2025-02-24 RX ORDER — PROCHLORPERAZINE EDISYLATE 5 MG/ML
10 INJECTION INTRAMUSCULAR; INTRAVENOUS EVERY 6 HOURS PRN
OUTPATIENT
Start: 2025-04-17

## 2025-02-24 RX ORDER — EPINEPHRINE 0.3 MG/.3ML
0.3 INJECTION SUBCUTANEOUS EVERY 5 MIN PRN
OUTPATIENT
Start: 2025-05-29

## 2025-02-24 RX ORDER — PROCHLORPERAZINE MALEATE 10 MG
10 TABLET ORAL EVERY 6 HOURS PRN
Status: CANCELLED | OUTPATIENT
Start: 2025-03-03

## 2025-02-24 RX ORDER — FAMOTIDINE 10 MG/ML
20 INJECTION, SOLUTION INTRAVENOUS ONCE AS NEEDED
OUTPATIENT
Start: 2025-03-06

## 2025-02-24 RX ORDER — DIPHENHYDRAMINE HYDROCHLORIDE 50 MG/ML
50 INJECTION INTRAMUSCULAR; INTRAVENOUS AS NEEDED
Status: CANCELLED | OUTPATIENT
Start: 2025-03-03

## 2025-02-24 RX ORDER — ALBUTEROL SULFATE 0.83 MG/ML
3 SOLUTION RESPIRATORY (INHALATION) AS NEEDED
OUTPATIENT
Start: 2025-07-10

## 2025-02-24 RX ORDER — DIPHENHYDRAMINE HYDROCHLORIDE 50 MG/ML
50 INJECTION INTRAMUSCULAR; INTRAVENOUS AS NEEDED
OUTPATIENT
Start: 2025-04-17

## 2025-02-24 RX ORDER — ALBUTEROL SULFATE 0.83 MG/ML
3 SOLUTION RESPIRATORY (INHALATION) AS NEEDED
Status: CANCELLED | OUTPATIENT
Start: 2025-03-24

## 2025-02-24 RX ORDER — PROCHLORPERAZINE EDISYLATE 5 MG/ML
10 INJECTION INTRAMUSCULAR; INTRAVENOUS EVERY 6 HOURS PRN
Status: CANCELLED | OUTPATIENT
Start: 2025-03-03

## 2025-02-24 RX ORDER — FAMOTIDINE 10 MG/ML
20 INJECTION, SOLUTION INTRAVENOUS ONCE AS NEEDED
OUTPATIENT
Start: 2025-04-17

## 2025-02-24 RX ORDER — FAMOTIDINE 10 MG/ML
20 INJECTION, SOLUTION INTRAVENOUS ONCE AS NEEDED
OUTPATIENT
Start: 2025-05-29

## 2025-02-24 RX ORDER — EPINEPHRINE 0.3 MG/.3ML
0.3 INJECTION SUBCUTANEOUS EVERY 5 MIN PRN
Status: CANCELLED | OUTPATIENT
Start: 2025-03-24

## 2025-02-24 RX ORDER — EPINEPHRINE 0.3 MG/.3ML
0.3 INJECTION SUBCUTANEOUS EVERY 5 MIN PRN
OUTPATIENT
Start: 2025-07-10

## 2025-02-24 RX ORDER — EPINEPHRINE 0.3 MG/.3ML
0.3 INJECTION SUBCUTANEOUS EVERY 5 MIN PRN
OUTPATIENT
Start: 2025-03-06

## 2025-02-24 RX ORDER — PROCHLORPERAZINE EDISYLATE 5 MG/ML
10 INJECTION INTRAMUSCULAR; INTRAVENOUS EVERY 6 HOURS PRN
OUTPATIENT
Start: 2025-05-29

## 2025-02-24 RX ORDER — PROCHLORPERAZINE MALEATE 10 MG
10 TABLET ORAL EVERY 6 HOURS PRN
Qty: 30 TABLET | Refills: 5 | Status: SHIPPED | OUTPATIENT
Start: 2025-02-24 | End: 2025-02-24

## 2025-02-24 RX ORDER — PROCHLORPERAZINE MALEATE 10 MG
10 TABLET ORAL EVERY 6 HOURS PRN
OUTPATIENT
Start: 2025-03-06

## 2025-02-24 RX ORDER — EPINEPHRINE 0.3 MG/.3ML
0.3 INJECTION SUBCUTANEOUS EVERY 5 MIN PRN
OUTPATIENT
Start: 2025-04-17

## 2025-02-24 RX ORDER — PROCHLORPERAZINE MALEATE 10 MG
10 TABLET ORAL EVERY 6 HOURS PRN
Status: CANCELLED | OUTPATIENT
Start: 2025-03-24

## 2025-02-24 RX ORDER — DIPHENHYDRAMINE HYDROCHLORIDE 50 MG/ML
50 INJECTION INTRAMUSCULAR; INTRAVENOUS AS NEEDED
OUTPATIENT
Start: 2025-05-29

## 2025-02-24 RX ORDER — DIPHENHYDRAMINE HYDROCHLORIDE 50 MG/ML
50 INJECTION INTRAMUSCULAR; INTRAVENOUS AS NEEDED
Status: CANCELLED | OUTPATIENT
Start: 2025-03-24

## 2025-02-24 RX ORDER — ALBUTEROL SULFATE 0.83 MG/ML
3 SOLUTION RESPIRATORY (INHALATION) AS NEEDED
OUTPATIENT
Start: 2025-05-29

## 2025-02-24 RX ORDER — EPINEPHRINE 0.3 MG/.3ML
0.3 INJECTION SUBCUTANEOUS EVERY 5 MIN PRN
Status: CANCELLED | OUTPATIENT
Start: 2025-03-03

## 2025-02-24 RX ORDER — PROCHLORPERAZINE MALEATE 10 MG
10 TABLET ORAL EVERY 6 HOURS PRN
Qty: 30 TABLET | Refills: 5 | Status: SHIPPED | OUTPATIENT
Start: 2025-02-24

## 2025-02-24 RX ORDER — PROCHLORPERAZINE MALEATE 10 MG
10 TABLET ORAL EVERY 6 HOURS PRN
OUTPATIENT
Start: 2025-07-10

## 2025-02-24 RX ORDER — PROCHLORPERAZINE EDISYLATE 5 MG/ML
10 INJECTION INTRAMUSCULAR; INTRAVENOUS EVERY 6 HOURS PRN
OUTPATIENT
Start: 2025-03-06

## 2025-02-24 RX ORDER — FAMOTIDINE 10 MG/ML
20 INJECTION, SOLUTION INTRAVENOUS ONCE AS NEEDED
Status: CANCELLED | OUTPATIENT
Start: 2025-03-24

## 2025-02-24 ASSESSMENT — PAIN SCALES - GENERAL: PAINLEVEL_OUTOF10: 5

## 2025-02-24 NOTE — PROGRESS NOTES
Pt seen in office today for a follow up visit with Dr. Kelly Guerra for management of her lung cancer.  She is without complaints today and denies pain. She had her first radiation treatment today previous to her appointment.    Medications, pharmacy preference and allergies were reviewed with patient and updated in the medical record by MD.     Per orders, she is to start on Keytruda every 6 weeks, first treatment to be on 3/6/25 11:30 chair 4. She will have labs obtained on 3/5/25. She will RTC on 3/12/25 for follow up.    Our contact information was given to patient and they were encouraged to contact us with any questions or concerns.    Patient verbalized understanding and agreement regarding discussed information via verbal feedback. Pt escorted to scheduling.

## 2025-02-24 NOTE — PROGRESS NOTES
Patient ID: Karly Horton is a 79 y.o. female.  Referring Physician: No referring provider defined for this encounter.  Primary Care Provider: Julian Chaney MD  Referral Reason: Lung cancer    Subjective:  Mild loose stools which had stopped after eating macaroni. Mild nausea which is relieved by anti-nausea meds    Heme/Onc History:  Karly Horton is a 79 y.o. female who was referred by Cathy Lau MD, for a consultation to the [facility] Department of Medical Oncology.  She is presenting for evaluation and management of lung cancer.      #) At least locally advanced lung cancer, cT4 cN1 cM0 (?punctate left brain venous anomaly)    Pathology Review:  The pertinent pathology results were reviewed and discussed with the patient.  Notably,      10/11/2024-A. LYMPH NODE 11 L PULMONARY FINE NEEDLE ASPIRATION: Highly atypical cells are present; carcinoma/malignancy cannot be excluded               B. LYMPH NODE 7 PULMONARY FINE NEEDLE ASPIRATION: Malignant cells are present, see note. Lymphoid sample. Note: A minute fragment of malignant cells is noted in the cell block              C. LUNG FINE NEEDLE ASPIRATION RIGHT LOWER LOBE: Malignant cells derived from squamous cell carcinoma; Note: The malignant cells are positive for p40 and negative for TTF1 and CK7.     10/11/2024- A.  B.  Lung, right lower lobe mass, biopsies: - Detached fragments of non-small cell carcinoma, consistent with squamous cell carcinoma. Note: Detached fragments of non-small cell carcinoma in a background of abundant necrosis are seen.  Neoplastic cells are immunoreactive with p40.      Disease Associated Genomics:  PD-L1 TPS 10% (low)  MICROSATELLITE STATUS: Microsatellite Instability-High (MSI-H) is NOT DETECTED.     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  Negative for BRAF  V600E.  Negative for EGFR sensitizing mutation.  Negative for ERBB2 activating mutation  Negative for KRAS G12C.  Negative for MET exon 14 skipping mutation.  Negative for NTRK fusion.  Negative for RET fusion.  Negative for ROS1 fusion.    Imaging:  The pertinent imaging results were reviewed and discussed with the patient.  Notably,      10/18/2024-MRI brain with and without contrast: Punctate focus of enhancement within the left amygdala with questionable curvilinear enhancing tail suggestive of developmental venous anomaly.  No other evidence of abnormal leptomeningeal or pachymeningeal enhancement.     10/18/2024-PET/CT: No evidence of hypermetabolic cervical lymphadenopathy.  There is a right apical posterior upper lobe lung nodule with hypermetabolic activity max SUV 8.2, right superior lower lobe nodule with hypermetabolic activity max SUV 4.7, multiple pulmonary nodules within the right lung that do not demonstrate hypermetabolic activity and a right infrahilar mass with hypermetabolic activity with central photopenia representing partial necrosis with max SUV 5.7.  No other evidence of mediastinal, hilar or axillary lymphadenopathy.  In the left lower lobe there is a 7 mm nodule with mild hypermetabolic activity.     10/18/2024-CT chest with contrast: In the right lower lobe there is a mass with spiculated margins measuring 3.1 cm the mass transverses and crosses the major fissure with extension into the middle lobe, spiculated right upper nodule measuring 2.3 cm with extension to the superior right major fissure and fissural retraction.  Multiple additional bilateral pulmonary nodules not significantly changed including left lower lobe nodule measuring 7 mm and right lower lobe nodule measuring 5 mm.  Stable calcified nonspecific finding along the anterior left hepatic lobe.     Past Medical History:   Past Medical History:   Diagnosis Date    Other cervical disc degeneration, unspecified cervical region      Degeneration of cervical intervertebral disc    Other conditions influencing health status     Bulging Disc (L3 - L4)    Other conditions influencing health status     A Fall    Other conditions influencing health status     Bulging Disc (C4 - C5)    Other conditions influencing health status     Bulging Disc (C6 - C7)    Other conditions influencing health status     Bulging Disc (C5 - C6)    Other conditions influencing health status     Lumbar Spondylolisthesis    Other conditions influencing health status     Rotator Cuff Tendon Tear    Other intervertebral disc degeneration, lumbar region     L4-L5 disc bulge    Other intervertebral disc displacement, lumbar region     Disc displacement, lumbar    Other intervertebral disc displacement, lumbosacral region     Displacement of lumbosacral intervertebral disc    Personal history of other diseases of the musculoskeletal system and connective tissue     History of bursitis    Personal history of other diseases of the musculoskeletal system and connective tissue     History of fibromyositis    Personal history of other specified conditions     History of headache    Spinal stenosis, lumbar region without neurogenic claudication     Lumbar canal stenosis    Sprain of ligaments of lumbar spine, initial encounter     Low back sprain     Social History:   Social History     Socioeconomic History    Marital status:      Spouse name: Not on file    Number of children: Not on file    Years of education: Not on file    Highest education level: Not on file   Occupational History    Not on file   Tobacco Use    Smoking status: Every Day     Current packs/day: 1.00     Average packs/day: 1 pack/day for 65.1 years (65.1 ttl pk-yrs)     Types: Cigarettes     Start date: 1/1/1960     Passive exposure: Current    Smokeless tobacco: Not on file   Vaping Use    Vaping status: Some Days   Substance and Sexual Activity    Alcohol use: Not Currently    Drug use: Yes     Types:  Marijuana     Comment: vape    Sexual activity: Defer   Other Topics Concern    Not on file   Social History Narrative    Not on file     Social Drivers of Health     Financial Resource Strain: Medium Risk (10/31/2024)    Overall Financial Resource Strain (CARDIA)     Difficulty of Paying Living Expenses: Somewhat hard   Food Insecurity: No Food Insecurity (10/31/2024)    Hunger Vital Sign     Worried About Running Out of Food in the Last Year: Never true     Ran Out of Food in the Last Year: Never true   Transportation Needs: No Transportation Needs (10/31/2024)    PRAPARE - Transportation     Lack of Transportation (Medical): No     Lack of Transportation (Non-Medical): No   Physical Activity: Inactive (10/31/2024)    Exercise Vital Sign     Days of Exercise per Week: 0 days     Minutes of Exercise per Session: 0 min   Stress: Stress Concern Present (10/31/2024)    [employer] - Occupational Stress Questionnaire     Feeling of Stress : To some extent   Social Connections: Socially Isolated (10/31/2024)    Social Connection and Isolation Panel [NHANES]     Frequency of Communication with Friends and Family: More than three times a week     Frequency of Social Gatherings with Friends and Family: More than three times a week     Attends Congregational Services: Never     Active Member of Clubs or Organizations: No     Attends Club or Organization Meetings: Never     Marital Status:    Intimate Partner Violence: Not At Risk (10/31/2024)    Humiliation, Afraid, Rape, and Kick questionnaire     Fear of Current or Ex-Partner: No     Emotionally Abused: No     Physically Abused: No     Sexually Abused: No   Housing Stability: Low Risk  (10/31/2024)    Housing Stability Vital Sign     Unable to Pay for Housing in the Last Year: No     Number of Times Moved in the Last Year: 0     Homeless in the Last Year: No     Surgical History:   Past Surgical History:   Procedure Laterality Date     APPENDECTOMY      EYE SURGERY      HYSTERECTOMY      TONSILLECTOMY       Family History:   Family History   Problem Relation Name Age of Onset    Cancer Mother      Other (Father had hypertension, stroke, coronary artery disease and diabetes) Father        reports that she has been smoking cigarettes. She started smoking about 65 years ago. She has a 65.1 pack-year smoking history. She has been exposed to tobacco smoke. She does not have any smokeless tobacco history on file.  Oncology Family history: Cancer-related family history includes Cancer in her mother.    Review Of Systems:  As stated per in HPI; otherwise all other 12 point ROS are negative    Physical Exam:  /76   Pulse 86   Temp 36.7 °C (98.1 °F) (Temporal)   Resp 17   SpO2 96%   BSA: There is no height or weight on file to calculate BSA.  General: awake/alert/oriented x3, no distress, alert and cooperative  Head: Short hair fully covering scalp. Symmetric facial expressions  Eyes: PERRL, EOMI, clear sclera, eyebrows present.  Ears/Nose/Mouth/Throat:  Oral mucous membranes moist. No oral ulcers. No palpable pre/post-auricular lymph nodes  Neck: No palpable cervical chain lymph nodes  Respiratory: unlabored breathing on room air, good chest expansion, thorax symmetric  Cardio: Regular rate and rhythm, normal S1 and S2, radial pulses symmetric  GI: Nondistended, soft, non-tender abdomen  Musculoskeletal: Normal muscle bulk and tone, ROM intact, no joint swelling.  Rises from chair and walks unassisted.  Extremities: No ankle swelling, no arm or leg wounds  Neuro: Alert, cognition intact, speech normal. Facial expressions symmetric.  No motor deficits noted. Sensation intact to touch and hot/cold.   Able to stand from seated position unassisted and walks around the room unassisted.  Psychological: Appropriate mood and behavior.  Skin: Warm and dry, no lesions, no rashes    Results:  Diagnostic Results   Lab Results   Component Value Date    WBC  8.1 02/14/2025    HGB 14.6 02/14/2025    HCT 44.8 02/14/2025    MCV 97 02/14/2025     02/14/2025     Lab Results   Component Value Date    CALCIUM 9.6 02/14/2025     (L) 02/14/2025    K 4.6 02/14/2025    CO2 30 02/14/2025    CL 95 (L) 02/14/2025    BUN 10 02/14/2025    CREATININE 0.47 (L) 02/14/2025    ALT 11 02/14/2025    AST 24 02/14/2025       Current Outpatient Medications:     acetaminophen (Tylenol) 325 mg tablet, Take 1-2 tablets (325-650 mg) by mouth every 6 hours if needed for mild pain (1 - 3)., Disp: , Rfl:     ascorbic acid (Vitamin C) 500 mg tablet, Take 1 tablet (500 mg) by mouth once daily. (Patient not taking: Reported on 10/10/2024), Disp: , Rfl:     baclofen (Lioresal) 10 mg tablet, Take 1 tablet (10 mg) by mouth 3 times a day. (Patient taking differently: Take 1 tablet (10 mg) by mouth 3 times a day. Patient taking every 6 hours alternating with butalbital-APAP-caffeine -40mg tab), Disp: , Rfl:     butalbital-acetaminophen-caff -40 mg tablet, Take 1 tablet by mouth every 6 hours if needed for headaches., Disp: , Rfl:     dexAMETHasone (Decadron) 4 mg tablet, Take 2 tablets (8 mg) by mouth once daily. For 3 days starting the day after treatment Do not fill before November 10, 2024. (Patient not taking: Reported on 1/27/2025), Disp: 12 tablet, Rfl: 3    ferrous sulfate, 325 mg ferrous sulfate, tablet, Take 1 tablet by mouth once daily with breakfast. (Patient not taking: Reported on 10/10/2024), Disp: , Rfl:     lactulose 10 gram/15 mL solution, Take 45 mL (30 g) by mouth once daily., Disp: , Rfl:     lidocaine HCL 4 % liquid roll-on, Apply 1 Application topically once daily as needed., Disp: , Rfl:     lubricating eye drops ophthalmic solution, Administer 1 drop into both eyes if needed for dry eyes., Disp: , Rfl:     meprobamate (Equanil) 400 mg tablet, Take 1 tablet (400 mg) by mouth 4 times a day., Disp: , Rfl:     nicotine (Nicoderm CQ) 14 mg/24 hr patch, Place 1 patch  over 24 hours on the skin once every 24 hours for 28 days., Disp: 28 patch, Rfl: 0    nicotine (Nicoderm CQ) 21 mg/24 hr patch, Place 1 patch over 24 hours on the skin once daily., Disp: , Rfl:     OLANZapine (ZyPREXA) 5 mg tablet, Take 1 tablet (5 mg) by mouth once daily at bedtime. For 4 days starting the evening of treatment Do not fill before November 10, 2024., Disp: 12 tablet, Rfl: 3    ondansetron (Zofran) 8 mg tablet, Take 1 tablet (8 mg) by mouth every 8 hours if needed for nausea or vomiting., Disp: 30 tablet, Rfl: 5    pantoprazole (ProtoNix) 40 mg EC tablet, Take 1 tablet (40 mg) by mouth 2 times a day. Do not crush, chew, or split. Continue twice daily x 8 weeks then continue once daily., Disp: 60 tablet, Rfl: 3    prochlorperazine (Compazine) 10 mg tablet, Take 1 tablet (10 mg) by mouth every 6 hours if needed for nausea or vomiting., Disp: 60 tablet, Rfl: 5    sucralfate (Carafate) 100 mg/mL suspension, Take 10 mL (1 g) by mouth every 6 hours. (Patient not taking: Reported on 10/10/2024), Disp: , Rfl:      Radiology:    Pathology:    Assessment/Plan:  1- Lung cancer, squamous cell carcinoma:    78 yo F Dx with lung squamous cell carcinoma from subcarinal and right lower lobe mass, clinically stage 3B, cT4 (due to multiple nodules in ipsilateral lung in different lobes) and N2 (due to subcarinal LN).  FNA from inferior mediastinal LN (LN station 11) was atypical but suspicious for carcinoma metastases.    She does not seem to be a good candidate for surgery, or concurrent chemoRT.     Systemic tx plan: systemic chemoIO carbo/taxol/Keytruda q 3 weeks x 4 with Neulasta OnPro followed by hypofractionated RT followed by consolidation IO is recommended after discussion with Dr. Odom. Eileenayana could not be offered as she has difficulties with transportation.     C#1 chemo started on 11/12/24. Tolerated first dose very well. Mild N, no Diarrhea or constipation or vomiting  C#2 chemo given on 12/2/24. Diarrhea  mild which has resolved with Imodium. Hair loss. Otherwise no N/V. She has much better appetite  12/16/24: There was a solitary noncalcified nodule in LLL, biopsy was recommended to rule out second primary or contralateral lung metastases. Lung biopsy was scheduled for 12/16/24, but it could not be done because the nodule could not be seen, so procedure is cancelled  01/13/25: C#4 chemoIO planned today. Tolerating very well. No N/V/D/C. Will start RT soon.   01/27/25: MRI brain: TASHA. PET CT is TASHA.   02/224/25: will start consolidation Keytruda q 6 weeks soon. She is also getting RT.    She will get restaging 3 months after completion of RT. RTC on C2D1 of Keytruda for toxicity check    2- Brain Venous anomaly vs metastatic foci:    MRI brain 10/18/24: IMPRESSION: 1. 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 However, recommend short-term follow-up MRI in 3 months to assess for interval stability to exclude a more aggressive lesion.    Repeat MRI brain in 01/25: TASHA    3- Iron def: iron def without anemia. Venofer 200 mg x 3 completed      There are no diagnoses linked to this encounter.         Performance Status: Symptomatic; fully ambulatory    I spent more than 60 minutes for the patient today, including face-to-face conversation, pre-visit preparation, post-visit orders, and others.   Kelly Guerra MD

## 2025-02-25 ENCOUNTER — HOSPITAL ENCOUNTER (OUTPATIENT)
Dept: RADIATION ONCOLOGY | Facility: CLINIC | Age: 80
Setting detail: RADIATION/ONCOLOGY SERIES
Discharge: HOME | End: 2025-02-25
Payer: MEDICARE

## 2025-02-25 DIAGNOSIS — C34.31 MALIGNANT NEOPLASM OF LOWER LOBE, RIGHT BRONCHUS OR LUNG: ICD-10-CM

## 2025-02-25 DIAGNOSIS — Z51.0 ENCOUNTER FOR ANTINEOPLASTIC RADIATION THERAPY: ICD-10-CM

## 2025-02-25 LAB
RAD ONC MSQ ACTUAL FRACTIONS DELIVERED: 2
RAD ONC MSQ ACTUAL SESSION DELIVERED DOSE: 350 CGRAY
RAD ONC MSQ ACTUAL TOTAL DOSE: 700 CGRAY
RAD ONC MSQ ELAPSED DAYS: 1
RAD ONC MSQ LAST DATE: NORMAL
RAD ONC MSQ PRESCRIBED FRACTIONAL DOSE: 350 CGRAY
RAD ONC MSQ PRESCRIBED NUMBER OF FRACTIONS: 15
RAD ONC MSQ PRESCRIBED TECHNIQUE: NORMAL
RAD ONC MSQ PRESCRIBED TOTAL DOSE: 5250 CGRAY
RAD ONC MSQ PRESCRIPTION PATTERN COMMENT: NORMAL
RAD ONC MSQ START DATE: NORMAL
RAD ONC MSQ TREATMENT COURSE NUMBER: 1
RAD ONC MSQ TREATMENT SITE: NORMAL

## 2025-02-25 PROCEDURE — 77386 HC INTENSITY-MODULATED RADIATION THERAPY (IMRT), COMPLEX: CPT | Performed by: STUDENT IN AN ORGANIZED HEALTH CARE EDUCATION/TRAINING PROGRAM

## 2025-02-25 PROCEDURE — 77336 RADIATION PHYSICS CONSULT: CPT | Performed by: STUDENT IN AN ORGANIZED HEALTH CARE EDUCATION/TRAINING PROGRAM

## 2025-02-26 ENCOUNTER — HOSPITAL ENCOUNTER (OUTPATIENT)
Dept: RADIATION ONCOLOGY | Facility: CLINIC | Age: 80
Setting detail: RADIATION/ONCOLOGY SERIES
Discharge: HOME | End: 2025-02-26
Payer: MEDICARE

## 2025-02-26 VITALS
SYSTOLIC BLOOD PRESSURE: 94 MMHG | OXYGEN SATURATION: 97 % | HEART RATE: 93 BPM | BODY MASS INDEX: 13.84 KG/M2 | TEMPERATURE: 97.2 F | WEIGHT: 61.73 LBS | DIASTOLIC BLOOD PRESSURE: 57 MMHG | RESPIRATION RATE: 18 BRPM

## 2025-02-26 DIAGNOSIS — C34.31 MALIGNANT NEOPLASM OF LOWER LOBE, RIGHT BRONCHUS OR LUNG: ICD-10-CM

## 2025-02-26 DIAGNOSIS — Z51.0 ENCOUNTER FOR ANTINEOPLASTIC RADIATION THERAPY: ICD-10-CM

## 2025-02-26 LAB
RAD ONC MSQ ACTUAL FRACTIONS DELIVERED: 3
RAD ONC MSQ ACTUAL SESSION DELIVERED DOSE: 350 CGRAY
RAD ONC MSQ ACTUAL TOTAL DOSE: 1050 CGRAY
RAD ONC MSQ ELAPSED DAYS: 2
RAD ONC MSQ LAST DATE: NORMAL
RAD ONC MSQ PRESCRIBED FRACTIONAL DOSE: 350 CGRAY
RAD ONC MSQ PRESCRIBED NUMBER OF FRACTIONS: 15
RAD ONC MSQ PRESCRIBED TECHNIQUE: NORMAL
RAD ONC MSQ PRESCRIBED TOTAL DOSE: 5250 CGRAY
RAD ONC MSQ PRESCRIPTION PATTERN COMMENT: NORMAL
RAD ONC MSQ START DATE: NORMAL
RAD ONC MSQ TREATMENT COURSE NUMBER: 1
RAD ONC MSQ TREATMENT SITE: NORMAL

## 2025-02-26 PROCEDURE — 77386 HC INTENSITY-MODULATED RADIATION THERAPY (IMRT), COMPLEX: CPT | Performed by: STUDENT IN AN ORGANIZED HEALTH CARE EDUCATION/TRAINING PROGRAM

## 2025-02-26 ASSESSMENT — ENCOUNTER SYMPTOMS
LOSS OF SENSATION IN FEET: 0
DEPRESSION: 0
OCCASIONAL FEELINGS OF UNSTEADINESS: 1

## 2025-02-26 ASSESSMENT — PAIN SCALES - GENERAL: PAINLEVEL_OUTOF10: 5

## 2025-02-26 NOTE — PROGRESS NOTES
Radiation Oncology On Treatment Visit    Patient Name:  Karly Horton  MRN:  [id number]  :  1945    Referring Provider: Aldair Odom MD  Primary Care Provider: Julian Chaney MD  Care Team: Patient Care Team:  Julian Chaney MD as PCP - General (Internal Medicine)  Carisa Yuan RN as Nurse Navigator (Hematology and Oncology)  Kelly Guerra MD as Medical Oncologist (Hematology and Oncology)    Date of Service: 2025     Diagnosis:   Specialty Problems          Radiation Oncology Problems    Malignant neoplasm of overlapping sites of right lung (Multi)        Squamous cell carcinoma, small cell, nonkeratinizing         Treatment Summary:  IMRT: Right Lung    Treatment Period Technique Fraction Dose Fractions Total Dose   Course 1 2025-2025  (days elapsed: 2)         R_Thrx_Med 2025-2025 VMAT 350 / 350 cGy 3 / 15 1050 / 5,250 cGy     SUBJECTIVE: Anxiety and baseline moderate pain in her neck from her scoliosis.  Coughs with clear productive sputum.      OBJECTIVE:   Vital Signs:  BP 94/57   Pulse 93   Temp 36.2 °C (97.2 °F) (Temporal)   Resp 18   Wt (!) 28 kg (61 lb 11.7 oz)   SpO2 97%   BMI 13.84 kg/m²     Other Pertinent Findings:     Toxicity Assessment          2025    15:14   Toxicity Assessment   Treatment Site Thoracic   Anorexia Grade 0   Anxiety Grade 1   Dehydration Grade 0   Depression Grade 0   Dermatitis Radiation Grade 0   Diarrhea Grade 0   Fatigue Grade 0   Fibrosis Deep Connective Tissue Grade 0   Fracture Grade 0   Nausea Grade 0   Pain Grade 1       5/10 neck   Treatment Related Secondary Malignancy Grade 0   Tumor Pain Grade 0   Vomiting Grade 0   Constipation Grade 0   Dyspepsia Grade 0   Dysphagia Grade 0   Esophagitis Grade 0   Mucositis Oral Grade 0   Upper Gastrointestinal Hemorrhage Grade 0   Peripheral Sensory Neuropathy Grade 0   Brachial Plexopathy Grade 0   Pneumonitis Grade 0   Aspiration Grade 0   Hoarseness Grade 0    Laryngeal Edema Grade 0   Myocardial Infarction Grade 0   Pericardial Effusion Grade 0   Pericarditis Grade 0   Esophageal Fistula Grade 0   Esophageal Obstruction Grade 0   Esophageal Pain Grade 0   Esophageal Stenosis Grade 0   Esophageal Ulcer Grade 0   Bronchial Obstruction Grade 0   Cough Grade 1   Dyspnea Grade 0   Epistaxis Grade 0   Hiccups Grade 0   Hypoxia Grade 0   Pulmonary Fibrosis Grade 0   Lymphedema Grade 0   Thromboembolic Event Grade 0   Hot Flashes Grade 0          Concurrent systemic therapy: fosaprepitant (Emend) 150 mg in sodium chloride 0.9% 150 mL IV, 150 mg, intravenous, Once, 4 of 4 cycles    Administration: 150 mg (11/12/2024), 150 mg (12/2/2024), 150 mg (12/23/2024), 150 mg (1/13/2025)        pegfilgrastim (Neulasta Onpro) injection 6 mg, 6 mg, subcutaneous, Once, 3 of 3 cycles    Administration: 6 mg (11/12/2024), 6 mg (12/2/2024), 6 mg (12/23/2024)        CARBOplatin (Paraplatin) 298 mg in sodium chloride 0.9% 139.8 mL IV, 298 mg, intravenous, Once, 4 of 4 cycles    Dose modification: 298 mg (original dose 298 mg, Cycle 2)    Administration: 298 mg (11/12/2024), 298 mg (12/2/2024), 300 mg (12/23/2024), 300 mg (1/13/2025)        PACLitaxeL (Taxol) 222 mg in dextrose 5% 304 mL IV, 200 mg/m2 = 222 mg, intravenous, Once, 4 of 4 cycles    Administration: 222 mg (11/12/2024), 234 mg (12/2/2024), 222 mg (12/23/2024), 222 mg (1/13/2025)        methylPREDNISolone sod succinate (SOLU-Medrol) 40 mg/mL injection 40 mg, 40 mg, intravenous, As needed, 4 of 4 cycles        palonosetron (Aloxi) injection 250 mcg, 250 mcg, intravenous, Once, 4 of 4 cycles    Administration: 250 mcg (11/12/2024), 250 mcg (12/2/2024), 250 mcg (12/23/2024)        pembrolizumab (Keytruda) 200 mg in sodium chloride 0.9% 118 mL IV, 200 mg, intravenous, Once, 4 of 4 cycles    Administration: 200 mg (11/12/2024), 200 mg (12/2/2024), 200 mg (12/23/2024), 200 mg (1/13/2025)    methylPREDNISolone sod succinate (SOLU-Medrol) 40  mg/mL injection 40 mg, 40 mg, intravenous, As needed, 0 of 8 cycles        pembrolizumab (Keytruda) 400 mg in sodium chloride 0.9% 116 mL IV, 400 mg, intravenous, Once, 0 of 8 cycles      Labs:   WBC   Date Value Ref Range Status   02/14/2025 8.1 4.4 - 11.3 x10*3/uL Final   01/13/2025 23.4 (H) 4.4 - 11.3 x10*3/uL Final     Hemoglobin   Date Value Ref Range Status   02/14/2025 14.6 12.0 - 16.0 g/dL Final   01/13/2025 13.2 12.0 - 16.0 g/dL Final     Hematocrit   Date Value Ref Range Status   02/14/2025 44.8 36.0 - 46.0 % Final   01/13/2025 39.9 36.0 - 46.0 % Final     Neutrophils Absolute   Date Value Ref Range Status   02/14/2025 6.81 (H) 1.60 - 5.50 x10*3/uL Final     Comment:     Percent differential counts (%) should be interpreted in the context of the absolute cell counts (cells/uL).   01/13/2025 20.44 (H) 1.60 - 5.50 x10*3/uL Final     Comment:     Percent differential counts (%) should be interpreted in the context of the absolute cell counts (cells/uL).     Platelets   Date Value Ref Range Status   02/14/2025 201 150 - 450 x10*3/uL Final   01/13/2025 446 150 - 450 x10*3/uL Final     Alkaline Phosphatase   Date Value Ref Range Status   02/14/2025 82 33 - 136 U/L Final   01/13/2025 147 (H) 33 - 136 U/L Final     AST   Date Value Ref Range Status   02/14/2025 24 9 - 39 U/L Final     Comment:     MILD HEMOLYSIS DETECTED. The result may be falsely elevated due to hemolysis or other interferents. Clinical correlation is recommended. Repeat testing may be considered.   01/13/2025 16 9 - 39 U/L Final     ALT   Date Value Ref Range Status   02/14/2025 11 7 - 45 U/L Final     Comment:     Patients treated with Sulfasalazine may generate falsely decreased results for ALT.   01/13/2025 10 7 - 45 U/L Final     Comment:     Patients treated with Sulfasalazine may generate falsely decreased results for ALT.     Bilirubin, Total   Date Value Ref Range Status   02/14/2025 0.4 0.0 - 1.2 mg/dL Final   01/13/2025 0.2 0.0 - 1.2  mg/dL Final     Glucose   Date Value Ref Range Status   02/14/2025 130 (H) 74 - 99 mg/dL Final   01/13/2025 134 (H) 74 - 99 mg/dL Final     Calcium   Date Value Ref Range Status   02/14/2025 9.6 8.6 - 10.3 mg/dL Final   01/13/2025 9.6 8.6 - 10.3 mg/dL Final     Sodium   Date Value Ref Range Status   02/14/2025 135 (L) 136 - 145 mmol/L Final   01/13/2025 135 (L) 136 - 145 mmol/L Final     Potassium   Date Value Ref Range Status   02/14/2025 4.6 3.5 - 5.3 mmol/L Final     Comment:     MILD HEMOLYSIS DETECTED. The result may be falsely elevated due to hemolysis or other interferents. Clinical correlation is recommended. Repeat testing may be considered.   01/13/2025 4.2 3.5 - 5.3 mmol/L Final     Bicarbonate   Date Value Ref Range Status   02/14/2025 30 21 - 32 mmol/L Final   01/13/2025 32 21 - 32 mmol/L Final     Chloride   Date Value Ref Range Status   02/14/2025 95 (L) 98 - 107 mmol/L Final   01/13/2025 95 (L) 98 - 107 mmol/L Final     Urea Nitrogen   Date Value Ref Range Status   02/14/2025 10 6 - 23 mg/dL Final   01/13/2025 7 6 - 23 mg/dL Final     Creatinine   Date Value Ref Range Status   02/14/2025 0.47 (L) 0.50 - 1.05 mg/dL Final   01/13/2025 0.52 0.50 - 1.05 mg/dL Final         Exam: Resting in wheelchair.       Assessment / Plan:  The patient is tolerating radiation therapy as anticipated.  Continue per current treatment plan.       Therapies: Plan for consolidation immunotherapy.  Continue radiation therapy, monitor for expected side effects including fatigue, esophagitis and radiation pneumonitis.      Side effects reviewed with patient. Images, chart and labs reviewed.    Aldair Odom MD

## 2025-02-27 ENCOUNTER — HOSPITAL ENCOUNTER (OUTPATIENT)
Dept: RADIATION ONCOLOGY | Facility: CLINIC | Age: 80
Setting detail: RADIATION/ONCOLOGY SERIES
Discharge: HOME | End: 2025-02-27
Payer: MEDICARE

## 2025-02-27 DIAGNOSIS — Z51.0 ENCOUNTER FOR ANTINEOPLASTIC RADIATION THERAPY: ICD-10-CM

## 2025-02-27 DIAGNOSIS — C34.31 MALIGNANT NEOPLASM OF LOWER LOBE, RIGHT BRONCHUS OR LUNG: ICD-10-CM

## 2025-02-27 LAB
RAD ONC MSQ ACTUAL FRACTIONS DELIVERED: 4
RAD ONC MSQ ACTUAL SESSION DELIVERED DOSE: 350 CGRAY
RAD ONC MSQ ACTUAL TOTAL DOSE: 1400 CGRAY
RAD ONC MSQ ELAPSED DAYS: 3
RAD ONC MSQ LAST DATE: NORMAL
RAD ONC MSQ PRESCRIBED FRACTIONAL DOSE: 350 CGRAY
RAD ONC MSQ PRESCRIBED NUMBER OF FRACTIONS: 15
RAD ONC MSQ PRESCRIBED TECHNIQUE: NORMAL
RAD ONC MSQ PRESCRIBED TOTAL DOSE: 5250 CGRAY
RAD ONC MSQ PRESCRIPTION PATTERN COMMENT: NORMAL
RAD ONC MSQ START DATE: NORMAL
RAD ONC MSQ TREATMENT COURSE NUMBER: 1
RAD ONC MSQ TREATMENT SITE: NORMAL

## 2025-02-27 PROCEDURE — 77386 HC INTENSITY-MODULATED RADIATION THERAPY (IMRT), COMPLEX: CPT | Performed by: STUDENT IN AN ORGANIZED HEALTH CARE EDUCATION/TRAINING PROGRAM

## 2025-02-28 ENCOUNTER — HOSPITAL ENCOUNTER (OUTPATIENT)
Dept: RADIATION ONCOLOGY | Facility: CLINIC | Age: 80
Setting detail: RADIATION/ONCOLOGY SERIES
Discharge: HOME | End: 2025-02-28
Payer: MEDICARE

## 2025-02-28 DIAGNOSIS — C34.31 MALIGNANT NEOPLASM OF LOWER LOBE, RIGHT BRONCHUS OR LUNG: ICD-10-CM

## 2025-02-28 DIAGNOSIS — Z51.0 ENCOUNTER FOR ANTINEOPLASTIC RADIATION THERAPY: ICD-10-CM

## 2025-02-28 LAB
RAD ONC MSQ ACTUAL FRACTIONS DELIVERED: 5
RAD ONC MSQ ACTUAL SESSION DELIVERED DOSE: 350 CGRAY
RAD ONC MSQ ACTUAL TOTAL DOSE: 1750 CGRAY
RAD ONC MSQ ELAPSED DAYS: 4
RAD ONC MSQ LAST DATE: NORMAL
RAD ONC MSQ PRESCRIBED FRACTIONAL DOSE: 350 CGRAY
RAD ONC MSQ PRESCRIBED NUMBER OF FRACTIONS: 15
RAD ONC MSQ PRESCRIBED TECHNIQUE: NORMAL
RAD ONC MSQ PRESCRIBED TOTAL DOSE: 5250 CGRAY
RAD ONC MSQ PRESCRIPTION PATTERN COMMENT: NORMAL
RAD ONC MSQ START DATE: NORMAL
RAD ONC MSQ TREATMENT COURSE NUMBER: 1
RAD ONC MSQ TREATMENT SITE: NORMAL

## 2025-02-28 PROCEDURE — 77386 HC INTENSITY-MODULATED RADIATION THERAPY (IMRT), COMPLEX: CPT | Performed by: STUDENT IN AN ORGANIZED HEALTH CARE EDUCATION/TRAINING PROGRAM

## 2025-03-03 ENCOUNTER — HOSPITAL ENCOUNTER (OUTPATIENT)
Dept: RADIATION ONCOLOGY | Facility: CLINIC | Age: 80
Setting detail: RADIATION/ONCOLOGY SERIES
Discharge: HOME | End: 2025-03-03
Payer: MEDICARE

## 2025-03-03 DIAGNOSIS — C34.31 MALIGNANT NEOPLASM OF LOWER LOBE, RIGHT BRONCHUS OR LUNG: ICD-10-CM

## 2025-03-03 DIAGNOSIS — Z51.0 ENCOUNTER FOR ANTINEOPLASTIC RADIATION THERAPY: ICD-10-CM

## 2025-03-03 LAB
RAD ONC MSQ ACTUAL FRACTIONS DELIVERED: 6
RAD ONC MSQ ACTUAL SESSION DELIVERED DOSE: 350 CGRAY
RAD ONC MSQ ACTUAL TOTAL DOSE: 2100 CGRAY
RAD ONC MSQ ELAPSED DAYS: 7
RAD ONC MSQ LAST DATE: NORMAL
RAD ONC MSQ PRESCRIBED FRACTIONAL DOSE: 350 CGRAY
RAD ONC MSQ PRESCRIBED NUMBER OF FRACTIONS: 15
RAD ONC MSQ PRESCRIBED TECHNIQUE: NORMAL
RAD ONC MSQ PRESCRIBED TOTAL DOSE: 5250 CGRAY
RAD ONC MSQ PRESCRIPTION PATTERN COMMENT: NORMAL
RAD ONC MSQ START DATE: NORMAL
RAD ONC MSQ TREATMENT COURSE NUMBER: 1
RAD ONC MSQ TREATMENT SITE: NORMAL

## 2025-03-03 PROCEDURE — 77386 HC INTENSITY-MODULATED RADIATION THERAPY (IMRT), COMPLEX: CPT | Performed by: STUDENT IN AN ORGANIZED HEALTH CARE EDUCATION/TRAINING PROGRAM

## 2025-03-04 ENCOUNTER — HOSPITAL ENCOUNTER (OUTPATIENT)
Dept: RADIATION ONCOLOGY | Facility: CLINIC | Age: 80
Setting detail: RADIATION/ONCOLOGY SERIES
Discharge: HOME | End: 2025-03-04
Payer: MEDICARE

## 2025-03-04 DIAGNOSIS — Z51.0 ENCOUNTER FOR ANTINEOPLASTIC RADIATION THERAPY: ICD-10-CM

## 2025-03-04 DIAGNOSIS — C34.31 MALIGNANT NEOPLASM OF LOWER LOBE, RIGHT BRONCHUS OR LUNG: ICD-10-CM

## 2025-03-04 LAB
RAD ONC MSQ ACTUAL FRACTIONS DELIVERED: 7
RAD ONC MSQ ACTUAL SESSION DELIVERED DOSE: 350 CGRAY
RAD ONC MSQ ACTUAL TOTAL DOSE: 2450 CGRAY
RAD ONC MSQ ELAPSED DAYS: 8
RAD ONC MSQ LAST DATE: NORMAL
RAD ONC MSQ PRESCRIBED FRACTIONAL DOSE: 350 CGRAY
RAD ONC MSQ PRESCRIBED NUMBER OF FRACTIONS: 15
RAD ONC MSQ PRESCRIBED TECHNIQUE: NORMAL
RAD ONC MSQ PRESCRIBED TOTAL DOSE: 5250 CGRAY
RAD ONC MSQ PRESCRIPTION PATTERN COMMENT: NORMAL
RAD ONC MSQ START DATE: NORMAL
RAD ONC MSQ TREATMENT COURSE NUMBER: 1
RAD ONC MSQ TREATMENT SITE: NORMAL

## 2025-03-04 PROCEDURE — 77336 RADIATION PHYSICS CONSULT: CPT | Performed by: STUDENT IN AN ORGANIZED HEALTH CARE EDUCATION/TRAINING PROGRAM

## 2025-03-04 PROCEDURE — 77386 HC INTENSITY-MODULATED RADIATION THERAPY (IMRT), COMPLEX: CPT | Performed by: STUDENT IN AN ORGANIZED HEALTH CARE EDUCATION/TRAINING PROGRAM

## 2025-03-05 ENCOUNTER — APPOINTMENT (OUTPATIENT)
Dept: RADIATION ONCOLOGY | Facility: CLINIC | Age: 80
End: 2025-03-05
Payer: MEDICARE

## 2025-03-05 ENCOUNTER — APPOINTMENT (OUTPATIENT)
Dept: RADIOLOGY | Facility: HOSPITAL | Age: 80
End: 2025-03-05
Payer: MEDICARE

## 2025-03-05 ENCOUNTER — APPOINTMENT (OUTPATIENT)
Dept: CARDIOLOGY | Facility: HOSPITAL | Age: 80
End: 2025-03-05
Payer: MEDICARE

## 2025-03-05 ENCOUNTER — HOSPITAL ENCOUNTER (INPATIENT)
Facility: HOSPITAL | Age: 80
LOS: 2 days | Discharge: HOME | End: 2025-03-07
Attending: EMERGENCY MEDICINE | Admitting: INTERNAL MEDICINE
Payer: MEDICARE

## 2025-03-05 DIAGNOSIS — J18.9 PNEUMONIA OF RIGHT LOWER LOBE DUE TO INFECTIOUS ORGANISM: Primary | ICD-10-CM

## 2025-03-05 DIAGNOSIS — R53.1 GENERALIZED WEAKNESS: ICD-10-CM

## 2025-03-05 DIAGNOSIS — J96.01 ACUTE HYPOXIC RESPIRATORY FAILURE (MULTI): ICD-10-CM

## 2025-03-05 DIAGNOSIS — E87.1 HYPONATREMIA: ICD-10-CM

## 2025-03-05 LAB
ALBUMIN SERPL BCP-MCNC: 3.6 G/DL (ref 3.4–5)
ALP SERPL-CCNC: 81 U/L (ref 33–136)
ALT SERPL W P-5'-P-CCNC: 10 U/L (ref 7–45)
ANION GAP BLDV CALCULATED.4IONS-SCNC: 9 MMOL/L (ref 10–25)
ANION GAP SERPL CALCULATED.3IONS-SCNC: 12 MMOL/L (ref 10–20)
AST SERPL W P-5'-P-CCNC: 16 U/L (ref 9–39)
BASE EXCESS BLDV CALC-SCNC: 6.6 MMOL/L (ref -2–3)
BASOPHILS # BLD AUTO: 0.04 X10*3/UL (ref 0–0.1)
BASOPHILS NFR BLD AUTO: 0.5 %
BILIRUB SERPL-MCNC: 0.3 MG/DL (ref 0–1.2)
BNP SERPL-MCNC: 243 PG/ML (ref 0–99)
BODY TEMPERATURE: 37 DEGREES CELSIUS
BUN SERPL-MCNC: 10 MG/DL (ref 6–23)
CA-I BLDV-SCNC: 1.23 MMOL/L (ref 1.1–1.33)
CALCIUM SERPL-MCNC: 8.9 MG/DL (ref 8.6–10.3)
CARDIAC TROPONIN I PNL SERPL HS: 8 NG/L (ref 0–13)
CARDIAC TROPONIN I PNL SERPL HS: 8 NG/L (ref 0–13)
CHLORIDE BLDV-SCNC: 98 MMOL/L (ref 98–107)
CHLORIDE SERPL-SCNC: 101 MMOL/L (ref 98–107)
CO2 SERPL-SCNC: 29 MMOL/L (ref 21–32)
CREAT SERPL-MCNC: 0.34 MG/DL (ref 0.5–1.05)
EGFRCR SERPLBLD CKD-EPI 2021: >90 ML/MIN/1.73M*2
EOSINOPHIL # BLD AUTO: 0 X10*3/UL (ref 0–0.4)
EOSINOPHIL NFR BLD AUTO: 0 %
ERYTHROCYTE [DISTWIDTH] IN BLOOD BY AUTOMATED COUNT: 16.3 % (ref 11.5–14.5)
FLUAV RNA RESP QL NAA+PROBE: NOT DETECTED
FLUBV RNA RESP QL NAA+PROBE: NOT DETECTED
GLUCOSE BLDV-MCNC: 124 MG/DL (ref 74–99)
GLUCOSE SERPL-MCNC: 121 MG/DL (ref 74–99)
HCO3 BLDV-SCNC: 33.3 MMOL/L (ref 22–26)
HCT VFR BLD AUTO: 43.1 % (ref 36–46)
HCT VFR BLD EST: 43 % (ref 36–46)
HGB BLD-MCNC: 14.2 G/DL (ref 12–16)
HGB BLDV-MCNC: 14.3 G/DL (ref 12–16)
IMM GRANULOCYTES # BLD AUTO: 0.04 X10*3/UL (ref 0–0.5)
IMM GRANULOCYTES NFR BLD AUTO: 0.5 % (ref 0–0.9)
INHALED O2 CONCENTRATION: 57 %
LACTATE BLDV-SCNC: 1.1 MMOL/L (ref 0.4–2)
LACTATE SERPL-SCNC: NORMAL MMOL/L
LYMPHOCYTES # BLD AUTO: 0.29 X10*3/UL (ref 0.8–3)
LYMPHOCYTES NFR BLD AUTO: 3.4 %
MCH RBC QN AUTO: 32.1 PG (ref 26–34)
MCHC RBC AUTO-ENTMCNC: 32.9 G/DL (ref 32–36)
MCV RBC AUTO: 97 FL (ref 80–100)
MONOCYTES # BLD AUTO: 0.5 X10*3/UL (ref 0.05–0.8)
MONOCYTES NFR BLD AUTO: 5.8 %
NEUTROPHILS # BLD AUTO: 7.68 X10*3/UL (ref 1.6–5.5)
NEUTROPHILS NFR BLD AUTO: 89.8 %
NRBC BLD-RTO: 0 /100 WBCS (ref 0–0)
OXYHGB MFR BLDV: 64.6 % (ref 45–75)
PCO2 BLDV: 55 MM HG (ref 41–51)
PH BLDV: 7.39 PH (ref 7.33–7.43)
PLATELET # BLD AUTO: 340 X10*3/UL (ref 150–450)
PLATELET CLUMP BLD QL SMEAR: PRESENT
PO2 BLDV: 36 MM HG (ref 35–45)
POTASSIUM BLDV-SCNC: 4 MMOL/L (ref 3.5–5.3)
POTASSIUM SERPL-SCNC: 4 MMOL/L (ref 3.5–5.3)
PROT SERPL-MCNC: 6.6 G/DL (ref 6.4–8.2)
RBC # BLD AUTO: 4.43 X10*6/UL (ref 4–5.2)
RBC MORPH BLD: NORMAL
SAO2 % BLDV: 66 % (ref 45–75)
SARS-COV-2 RNA RESP QL NAA+PROBE: NOT DETECTED
SODIUM BLDV-SCNC: 136 MMOL/L (ref 136–145)
SODIUM SERPL-SCNC: 138 MMOL/L (ref 136–145)
WBC # BLD AUTO: 8.6 X10*3/UL (ref 4.4–11.3)

## 2025-03-05 PROCEDURE — 84132 ASSAY OF SERUM POTASSIUM: CPT | Performed by: PHYSICIAN ASSISTANT

## 2025-03-05 PROCEDURE — 2500000005 HC RX 250 GENERAL PHARMACY W/O HCPCS: Performed by: INTERNAL MEDICINE

## 2025-03-05 PROCEDURE — 71275 CT ANGIOGRAPHY CHEST: CPT

## 2025-03-05 PROCEDURE — 99285 EMERGENCY DEPT VISIT HI MDM: CPT | Mod: 25 | Performed by: EMERGENCY MEDICINE

## 2025-03-05 PROCEDURE — 2500000004 HC RX 250 GENERAL PHARMACY W/ HCPCS (ALT 636 FOR OP/ED): Mod: JZ | Performed by: PHYSICIAN ASSISTANT

## 2025-03-05 PROCEDURE — 83605 ASSAY OF LACTIC ACID: CPT | Performed by: PHYSICIAN ASSISTANT

## 2025-03-05 PROCEDURE — 2550000001 HC RX 255 CONTRASTS: Performed by: EMERGENCY MEDICINE

## 2025-03-05 PROCEDURE — 99291 CRITICAL CARE FIRST HOUR: CPT | Performed by: PHYSICIAN ASSISTANT

## 2025-03-05 PROCEDURE — 84484 ASSAY OF TROPONIN QUANT: CPT | Performed by: PHYSICIAN ASSISTANT

## 2025-03-05 PROCEDURE — 2500000001 HC RX 250 WO HCPCS SELF ADMINISTERED DRUGS (ALT 637 FOR MEDICARE OP): Performed by: INTERNAL MEDICINE

## 2025-03-05 PROCEDURE — 93005 ELECTROCARDIOGRAM TRACING: CPT

## 2025-03-05 PROCEDURE — 85025 COMPLETE CBC W/AUTO DIFF WBC: CPT | Performed by: PHYSICIAN ASSISTANT

## 2025-03-05 PROCEDURE — 87636 SARSCOV2 & INF A&B AMP PRB: CPT | Performed by: PHYSICIAN ASSISTANT

## 2025-03-05 PROCEDURE — 2500000004 HC RX 250 GENERAL PHARMACY W/ HCPCS (ALT 636 FOR OP/ED): Performed by: INTERNAL MEDICINE

## 2025-03-05 PROCEDURE — 83880 ASSAY OF NATRIURETIC PEPTIDE: CPT | Performed by: PHYSICIAN ASSISTANT

## 2025-03-05 PROCEDURE — 71275 CT ANGIOGRAPHY CHEST: CPT | Mod: FOREIGN READ | Performed by: RADIOLOGY

## 2025-03-05 PROCEDURE — 2060000001 HC INTERMEDIATE ICU ROOM DAILY

## 2025-03-05 PROCEDURE — 36415 COLL VENOUS BLD VENIPUNCTURE: CPT | Performed by: PHYSICIAN ASSISTANT

## 2025-03-05 PROCEDURE — 96365 THER/PROPH/DIAG IV INF INIT: CPT

## 2025-03-05 PROCEDURE — 96374 THER/PROPH/DIAG INJ IV PUSH: CPT | Mod: 59

## 2025-03-05 PROCEDURE — S4991 NICOTINE PATCH NONLEGEND: HCPCS | Performed by: INTERNAL MEDICINE

## 2025-03-05 PROCEDURE — 2500000002 HC RX 250 W HCPCS SELF ADMINISTERED DRUGS (ALT 637 FOR MEDICARE OP, ALT 636 FOR OP/ED): Performed by: INTERNAL MEDICINE

## 2025-03-05 PROCEDURE — 2500000002 HC RX 250 W HCPCS SELF ADMINISTERED DRUGS (ALT 637 FOR MEDICARE OP, ALT 636 FOR OP/ED): Performed by: PHYSICIAN ASSISTANT

## 2025-03-05 PROCEDURE — 87075 CULTR BACTERIA EXCEPT BLOOD: CPT | Mod: WESLAB | Performed by: PHYSICIAN ASSISTANT

## 2025-03-05 PROCEDURE — 94640 AIRWAY INHALATION TREATMENT: CPT

## 2025-03-05 PROCEDURE — 87040 BLOOD CULTURE FOR BACTERIA: CPT | Mod: WESLAB | Performed by: PHYSICIAN ASSISTANT

## 2025-03-05 RX ORDER — MEPROBAMATE 400 MG/1
400 TABLET ORAL 4 TIMES DAILY
Status: DISCONTINUED | OUTPATIENT
Start: 2025-03-05 | End: 2025-03-07 | Stop reason: HOSPADM

## 2025-03-05 RX ORDER — BACLOFEN 10 MG/1
10 TABLET ORAL 3 TIMES DAILY
Status: DISCONTINUED | OUTPATIENT
Start: 2025-03-05 | End: 2025-03-07 | Stop reason: HOSPADM

## 2025-03-05 RX ORDER — IBUPROFEN 200 MG
1 TABLET ORAL EVERY 24 HOURS
Status: DISCONTINUED | OUTPATIENT
Start: 2025-03-05 | End: 2025-03-07 | Stop reason: HOSPADM

## 2025-03-05 RX ORDER — MUPIROCIN 20 MG/G
1 OINTMENT TOPICAL AS NEEDED
COMMUNITY

## 2025-03-05 RX ORDER — LIDOCAINE 40 MG/G
CREAM TOPICAL 4 TIMES DAILY PRN
Status: DISCONTINUED | OUTPATIENT
Start: 2025-03-05 | End: 2025-03-05

## 2025-03-05 RX ORDER — IPRATROPIUM BROMIDE AND ALBUTEROL SULFATE 2.5; .5 MG/3ML; MG/3ML
3 SOLUTION RESPIRATORY (INHALATION) ONCE
Status: COMPLETED | OUTPATIENT
Start: 2025-03-05 | End: 2025-03-05

## 2025-03-05 RX ORDER — LACTULOSE 10 G/15ML
30 SOLUTION ORAL
Status: DISCONTINUED | OUTPATIENT
Start: 2025-03-05 | End: 2025-03-07 | Stop reason: HOSPADM

## 2025-03-05 RX ORDER — ONDANSETRON 4 MG/1
4 TABLET, FILM COATED ORAL EVERY 8 HOURS PRN
Status: DISCONTINUED | OUTPATIENT
Start: 2025-03-05 | End: 2025-03-05 | Stop reason: SDUPTHER

## 2025-03-05 RX ORDER — IPRATROPIUM BROMIDE AND ALBUTEROL SULFATE 2.5; .5 MG/3ML; MG/3ML
3 SOLUTION RESPIRATORY (INHALATION)
Status: DISCONTINUED | OUTPATIENT
Start: 2025-03-06 | End: 2025-03-07 | Stop reason: HOSPADM

## 2025-03-05 RX ORDER — ACETAMINOPHEN 325 MG/1
650 TABLET ORAL EVERY 4 HOURS PRN
Status: DISCONTINUED | OUTPATIENT
Start: 2025-03-05 | End: 2025-03-07 | Stop reason: HOSPADM

## 2025-03-05 RX ORDER — FERROUS SULFATE 325(65) MG
65 TABLET ORAL
Status: DISCONTINUED | OUTPATIENT
Start: 2025-03-06 | End: 2025-03-07 | Stop reason: HOSPADM

## 2025-03-05 RX ORDER — ACETAMINOPHEN 160 MG/5ML
650 SOLUTION ORAL EVERY 4 HOURS PRN
Status: DISCONTINUED | OUTPATIENT
Start: 2025-03-05 | End: 2025-03-07 | Stop reason: HOSPADM

## 2025-03-05 RX ORDER — PROCHLORPERAZINE MALEATE 10 MG
10 TABLET ORAL EVERY 6 HOURS PRN
Status: DISCONTINUED | OUTPATIENT
Start: 2025-03-05 | End: 2025-03-06

## 2025-03-05 RX ORDER — PANTOPRAZOLE SODIUM 40 MG/1
40 TABLET, DELAYED RELEASE ORAL 2 TIMES DAILY
Status: DISCONTINUED | OUTPATIENT
Start: 2025-03-05 | End: 2025-03-07 | Stop reason: HOSPADM

## 2025-03-05 RX ORDER — IPRATROPIUM BROMIDE AND ALBUTEROL SULFATE 2.5; .5 MG/3ML; MG/3ML
3 SOLUTION RESPIRATORY (INHALATION)
Status: DISCONTINUED | OUTPATIENT
Start: 2025-03-05 | End: 2025-03-05

## 2025-03-05 RX ORDER — ASCORBIC ACID 500 MG
500 TABLET ORAL DAILY
Status: DISCONTINUED | OUTPATIENT
Start: 2025-03-05 | End: 2025-03-07 | Stop reason: HOSPADM

## 2025-03-05 RX ORDER — IPRATROPIUM BROMIDE AND ALBUTEROL SULFATE 2.5; .5 MG/3ML; MG/3ML
3 SOLUTION RESPIRATORY (INHALATION) ONCE
Status: DISCONTINUED | OUTPATIENT
Start: 2025-03-05 | End: 2025-03-07 | Stop reason: HOSPADM

## 2025-03-05 RX ORDER — CEFTRIAXONE 1 G/50ML
1 INJECTION, SOLUTION INTRAVENOUS ONCE
Status: COMPLETED | OUTPATIENT
Start: 2025-03-05 | End: 2025-03-05

## 2025-03-05 RX ORDER — GUAIFENESIN 600 MG/1
600 TABLET, EXTENDED RELEASE ORAL EVERY 12 HOURS PRN
Status: DISCONTINUED | OUTPATIENT
Start: 2025-03-05 | End: 2025-03-07 | Stop reason: HOSPADM

## 2025-03-05 RX ORDER — ACETAMINOPHEN 325 MG/1
325 TABLET ORAL EVERY 6 HOURS PRN
Status: DISCONTINUED | OUTPATIENT
Start: 2025-03-05 | End: 2025-03-05 | Stop reason: SDUPTHER

## 2025-03-05 RX ORDER — ONDANSETRON 4 MG/1
8 TABLET, FILM COATED ORAL EVERY 8 HOURS PRN
Status: DISCONTINUED | OUTPATIENT
Start: 2025-03-05 | End: 2025-03-07 | Stop reason: HOSPADM

## 2025-03-05 RX ORDER — ACETAMINOPHEN 650 MG/1
650 SUPPOSITORY RECTAL EVERY 4 HOURS PRN
Status: DISCONTINUED | OUTPATIENT
Start: 2025-03-05 | End: 2025-03-07 | Stop reason: HOSPADM

## 2025-03-05 RX ORDER — SUCRALFATE 1 G/10ML
1 SUSPENSION ORAL EVERY 6 HOURS SCHEDULED
Status: DISCONTINUED | OUTPATIENT
Start: 2025-03-05 | End: 2025-03-07 | Stop reason: HOSPADM

## 2025-03-05 RX ORDER — HEPARIN SODIUM 5000 [USP'U]/ML
5000 INJECTION, SOLUTION INTRAVENOUS; SUBCUTANEOUS EVERY 8 HOURS SCHEDULED
Status: DISCONTINUED | OUTPATIENT
Start: 2025-03-05 | End: 2025-03-07 | Stop reason: HOSPADM

## 2025-03-05 RX ORDER — CHOLECALCIFEROL (VITAMIN D3) 50 MCG
2000 TABLET ORAL DAILY
Status: DISCONTINUED | OUTPATIENT
Start: 2025-03-05 | End: 2025-03-07 | Stop reason: HOSPADM

## 2025-03-05 RX ORDER — GUAIFENESIN/DEXTROMETHORPHAN 100-10MG/5
5 SYRUP ORAL EVERY 4 HOURS PRN
Status: DISCONTINUED | OUTPATIENT
Start: 2025-03-05 | End: 2025-03-07 | Stop reason: HOSPADM

## 2025-03-05 RX ORDER — POLYETHYLENE GLYCOL 3350 17 G/17G
17 POWDER, FOR SOLUTION ORAL DAILY PRN
Status: DISCONTINUED | OUTPATIENT
Start: 2025-03-05 | End: 2025-03-07 | Stop reason: HOSPADM

## 2025-03-05 RX ORDER — ONDANSETRON HYDROCHLORIDE 8 MG/1
8 TABLET, FILM COATED ORAL EVERY 8 HOURS PRN
COMMUNITY

## 2025-03-05 RX ORDER — MUPIROCIN 20 MG/G
1 OINTMENT TOPICAL AS NEEDED
Status: DISCONTINUED | OUTPATIENT
Start: 2025-03-05 | End: 2025-03-05

## 2025-03-05 RX ORDER — BUTALBITAL, ACETAMINOPHEN AND CAFFEINE 50; 325; 40 MG/1; MG/1; MG/1
1 TABLET ORAL EVERY 6 HOURS PRN
Status: DISCONTINUED | OUTPATIENT
Start: 2025-03-05 | End: 2025-03-07 | Stop reason: HOSPADM

## 2025-03-05 RX ORDER — TALC
6 POWDER (GRAM) TOPICAL NIGHTLY PRN
Status: DISCONTINUED | OUTPATIENT
Start: 2025-03-05 | End: 2025-03-07 | Stop reason: HOSPADM

## 2025-03-05 RX ORDER — ONDANSETRON HYDROCHLORIDE 2 MG/ML
4 INJECTION, SOLUTION INTRAVENOUS EVERY 8 HOURS PRN
Status: DISCONTINUED | OUTPATIENT
Start: 2025-03-05 | End: 2025-03-05 | Stop reason: SDUPTHER

## 2025-03-05 RX ADMIN — IPRATROPIUM BROMIDE AND ALBUTEROL SULFATE 3 ML: 2.5; .5 SOLUTION RESPIRATORY (INHALATION) at 12:51

## 2025-03-05 RX ADMIN — IPRATROPIUM BROMIDE AND ALBUTEROL SULFATE 3 ML: 2.5; .5 SOLUTION RESPIRATORY (INHALATION) at 12:53

## 2025-03-05 RX ADMIN — BUTALBITAL, ACETAMINOPHEN AND CAFFEINE 1 TABLET: 325; 50; 40 TABLET ORAL at 19:57

## 2025-03-05 RX ADMIN — ACETAMINOPHEN 650 MG: 160 SOLUTION ORAL at 20:31

## 2025-03-05 RX ADMIN — SUCRALFATE ORAL SUSPENSION 1 G: 1 SUSPENSION ORAL at 19:56

## 2025-03-05 RX ADMIN — METHYLPREDNISOLONE SODIUM SUCCINATE 125 MG: 125 INJECTION, POWDER, FOR SOLUTION INTRAMUSCULAR; INTRAVENOUS at 12:50

## 2025-03-05 RX ADMIN — PIPERACILLIN SODIUM AND TAZOBACTAM SODIUM 4.5 G: 4; .5 INJECTION, SOLUTION INTRAVENOUS at 20:33

## 2025-03-05 RX ADMIN — Medication 6 MG: at 19:57

## 2025-03-05 RX ADMIN — HEPARIN SODIUM 5000 UNITS: 5000 INJECTION, SOLUTION INTRAVENOUS; SUBCUTANEOUS at 22:08

## 2025-03-05 RX ADMIN — CEFTRIAXONE SODIUM 1 G: 1 INJECTION, SOLUTION INTRAVENOUS at 14:51

## 2025-03-05 RX ADMIN — BACLOFEN 10 MG: 10 TABLET ORAL at 20:00

## 2025-03-05 RX ADMIN — PANTOPRAZOLE SODIUM 40 MG: 40 TABLET, DELAYED RELEASE ORAL at 20:00

## 2025-03-05 RX ADMIN — NICOTINE 1 PATCH: 14 PATCH, EXTENDED RELEASE TRANSDERMAL at 19:54

## 2025-03-05 RX ADMIN — LACTULOSE 20 G: 20 SOLUTION ORAL at 19:54

## 2025-03-05 RX ADMIN — IOHEXOL 75 ML: 350 INJECTION, SOLUTION INTRAVENOUS at 12:23

## 2025-03-05 RX ADMIN — Medication 2 L/MIN: at 20:48

## 2025-03-05 SDOH — SOCIAL STABILITY: SOCIAL INSECURITY: WITHIN THE LAST YEAR, HAVE YOU BEEN AFRAID OF YOUR PARTNER OR EX-PARTNER?: NO

## 2025-03-05 SDOH — SOCIAL STABILITY: SOCIAL INSECURITY: ARE THERE ANY APPARENT SIGNS OF INJURIES/BEHAVIORS THAT COULD BE RELATED TO ABUSE/NEGLECT?: NO

## 2025-03-05 SDOH — ECONOMIC STABILITY: INCOME INSECURITY: IN THE PAST 12 MONTHS HAS THE ELECTRIC, GAS, OIL, OR WATER COMPANY THREATENED TO SHUT OFF SERVICES IN YOUR HOME?: NO

## 2025-03-05 SDOH — SOCIAL STABILITY: SOCIAL INSECURITY: DO YOU FEEL ANYONE HAS EXPLOITED OR TAKEN ADVANTAGE OF YOU FINANCIALLY OR OF YOUR PERSONAL PROPERTY?: NO

## 2025-03-05 SDOH — SOCIAL STABILITY: SOCIAL INSECURITY: WITHIN THE LAST YEAR, HAVE YOU BEEN HUMILIATED OR EMOTIONALLY ABUSED IN OTHER WAYS BY YOUR PARTNER OR EX-PARTNER?: NO

## 2025-03-05 SDOH — ECONOMIC STABILITY: FOOD INSECURITY: WITHIN THE PAST 12 MONTHS, YOU WORRIED THAT YOUR FOOD WOULD RUN OUT BEFORE YOU GOT THE MONEY TO BUY MORE.: NEVER TRUE

## 2025-03-05 SDOH — ECONOMIC STABILITY: FOOD INSECURITY: WITHIN THE PAST 12 MONTHS, THE FOOD YOU BOUGHT JUST DIDN'T LAST AND YOU DIDN'T HAVE MONEY TO GET MORE.: NEVER TRUE

## 2025-03-05 SDOH — SOCIAL STABILITY: SOCIAL INSECURITY: ARE YOU OR HAVE YOU BEEN THREATENED OR ABUSED PHYSICALLY, EMOTIONALLY, OR SEXUALLY BY ANYONE?: NO

## 2025-03-05 SDOH — SOCIAL STABILITY: SOCIAL INSECURITY: DOES ANYONE TRY TO KEEP YOU FROM HAVING/CONTACTING OTHER FRIENDS OR DOING THINGS OUTSIDE YOUR HOME?: NO

## 2025-03-05 SDOH — SOCIAL STABILITY: SOCIAL INSECURITY: HAS ANYONE EVER THREATENED TO HURT YOUR FAMILY OR YOUR PETS?: NO

## 2025-03-05 SDOH — SOCIAL STABILITY: SOCIAL INSECURITY: DO YOU FEEL UNSAFE GOING BACK TO THE PLACE WHERE YOU ARE LIVING?: NO

## 2025-03-05 SDOH — SOCIAL STABILITY: SOCIAL INSECURITY: HAVE YOU HAD ANY THOUGHTS OF HARMING ANYONE ELSE?: NO

## 2025-03-05 SDOH — SOCIAL STABILITY: SOCIAL INSECURITY: WERE YOU ABLE TO COMPLETE ALL THE BEHAVIORAL HEALTH SCREENINGS?: YES

## 2025-03-05 SDOH — SOCIAL STABILITY: SOCIAL INSECURITY: ABUSE: ADULT

## 2025-03-05 SDOH — SOCIAL STABILITY: SOCIAL INSECURITY: HAVE YOU HAD THOUGHTS OF HARMING ANYONE ELSE?: NO

## 2025-03-05 ASSESSMENT — COGNITIVE AND FUNCTIONAL STATUS - GENERAL
DRESSING REGULAR UPPER BODY CLOTHING: A LOT
DAILY ACTIVITIY SCORE: 14
PERSONAL GROOMING: A LITTLE
PATIENT BASELINE BEDBOUND: NO
TOILETING: A LOT
MOBILITY SCORE: 14
DRESSING REGULAR LOWER BODY CLOTHING: A LOT
WALKING IN HOSPITAL ROOM: A LOT
CLIMB 3 TO 5 STEPS WITH RAILING: TOTAL
STANDING UP FROM CHAIR USING ARMS: A LOT
DRESSING REGULAR LOWER BODY CLOTHING: A LOT
WALKING IN HOSPITAL ROOM: A LOT
DAILY ACTIVITIY SCORE: 15
TURNING FROM BACK TO SIDE WHILE IN FLAT BAD: A LITTLE
HELP NEEDED FOR BATHING: A LOT
HELP NEEDED FOR BATHING: A LOT
PERSONAL GROOMING: A LITTLE
MOVING FROM LYING ON BACK TO SITTING ON SIDE OF FLAT BED WITH BEDRAILS: A LITTLE
TURNING FROM BACK TO SIDE WHILE IN FLAT BAD: A LITTLE
MOBILITY SCORE: 14
MOVING TO AND FROM BED TO CHAIR: A LITTLE
EATING MEALS: A LITTLE
MOVING TO AND FROM BED TO CHAIR: A LITTLE
TOILETING: A LOT
MOVING FROM LYING ON BACK TO SITTING ON SIDE OF FLAT BED WITH BEDRAILS: A LITTLE
CLIMB 3 TO 5 STEPS WITH RAILING: TOTAL
DRESSING REGULAR UPPER BODY CLOTHING: A LOT
STANDING UP FROM CHAIR USING ARMS: A LOT

## 2025-03-05 ASSESSMENT — PAIN SCALES - GENERAL
PAINLEVEL_OUTOF10: 6
PAINLEVEL_OUTOF10: 6
PAINLEVEL_OUTOF10: 2
PAINLEVEL_OUTOF10: 6
PAINLEVEL_OUTOF10: 7
PAINLEVEL_OUTOF10: 6

## 2025-03-05 ASSESSMENT — PAIN DESCRIPTION - DESCRIPTORS
DESCRIPTORS: ACHING;DISCOMFORT
DESCRIPTORS: ACHING;DULL;DISCOMFORT

## 2025-03-05 ASSESSMENT — PATIENT HEALTH QUESTIONNAIRE - PHQ9
1. LITTLE INTEREST OR PLEASURE IN DOING THINGS: NOT AT ALL
SUM OF ALL RESPONSES TO PHQ9 QUESTIONS 1 & 2: 0
2. FEELING DOWN, DEPRESSED OR HOPELESS: NOT AT ALL

## 2025-03-05 ASSESSMENT — LIFESTYLE VARIABLES
HOW OFTEN DO YOU HAVE 6 OR MORE DRINKS ON ONE OCCASION: PATIENT DECLINED
AUDIT-C TOTAL SCORE: -1
HOW OFTEN DO YOU HAVE A DRINK CONTAINING ALCOHOL: PATIENT DECLINED
HOW MANY STANDARD DRINKS CONTAINING ALCOHOL DO YOU HAVE ON A TYPICAL DAY: PATIENT DECLINED
AUDIT-C TOTAL SCORE: -1
SKIP TO QUESTIONS 9-10: 0

## 2025-03-05 ASSESSMENT — ACTIVITIES OF DAILY LIVING (ADL)
GROOMING: NEEDS ASSISTANCE
JUDGMENT_ADEQUATE_SAFELY_COMPLETE_DAILY_ACTIVITIES: NO
WALKS IN HOME: NEEDS ASSISTANCE
PATIENT'S MEMORY ADEQUATE TO SAFELY COMPLETE DAILY ACTIVITIES?: NO
TOILETING: NEEDS ASSISTANCE
FEEDING YOURSELF: INDEPENDENT
HEARING - LEFT EAR: FUNCTIONAL
DRESSING YOURSELF: NEEDS ASSISTANCE
BATHING: NEEDS ASSISTANCE
HEARING - RIGHT EAR: FUNCTIONAL
ADEQUATE_TO_COMPLETE_ADL: YES
LACK_OF_TRANSPORTATION: NO

## 2025-03-05 ASSESSMENT — PAIN - FUNCTIONAL ASSESSMENT
PAIN_FUNCTIONAL_ASSESSMENT: 0-10
PAIN_FUNCTIONAL_ASSESSMENT: 0-10

## 2025-03-05 NOTE — ED PROVIDER NOTES
HPI   Chief Complaint   Patient presents with    Shortness of Breath       HPI  This is a 79-year-old female the past medical history of non-small cell lung cancer presenting to the emergency department for shortness of breath.  Patient does not wear oxygen at home.  Reportedly she was hypoxic in the 80s on room air when EMS arrived.  Patient states that she goes to radiation daily and today when she was waiting she felt short of breath.  Denies recent fevers, chills, or night sweats.  No chest pain.  Patient states that she has had a cough but it has been dry and she is not coughing up any mucus or blood.    Please see HPI for pertinent positive and negative ROS.       Patient History   Past Medical History:   Diagnosis Date    Other cervical disc degeneration, unspecified cervical region     Degeneration of cervical intervertebral disc    Other conditions influencing health status     Bulging Disc (L3 - L4)    Other conditions influencing health status     A Fall    Other conditions influencing health status     Bulging Disc (C4 - C5)    Other conditions influencing health status     Bulging Disc (C6 - C7)    Other conditions influencing health status     Bulging Disc (C5 - C6)    Other conditions influencing health status     Lumbar Spondylolisthesis    Other conditions influencing health status     Rotator Cuff Tendon Tear    Other intervertebral disc degeneration, lumbar region     L4-L5 disc bulge    Other intervertebral disc displacement, lumbar region     Disc displacement, lumbar    Other intervertebral disc displacement, lumbosacral region     Displacement of lumbosacral intervertebral disc    Personal history of other diseases of the musculoskeletal system and connective tissue     History of bursitis    Personal history of other diseases of the musculoskeletal system and connective tissue     History of fibromyositis    Personal history of other specified conditions     History of headache    Spinal  stenosis, lumbar region without neurogenic claudication     Lumbar canal stenosis    Sprain of ligaments of lumbar spine, initial encounter     Low back sprain     Past Surgical History:   Procedure Laterality Date    APPENDECTOMY      EYE SURGERY      HYSTERECTOMY      TONSILLECTOMY       Family History   Problem Relation Name Age of Onset    Cancer Mother      Other (Father had hypertension, stroke, coronary artery disease and diabetes) Father       Social History     Tobacco Use    Smoking status: Every Day     Current packs/day: 1.00     Average packs/day: 1 pack/day for 65.2 years (65.2 ttl pk-yrs)     Types: Cigarettes     Start date: 1/1/1960     Passive exposure: Current    Smokeless tobacco: Not on file   Vaping Use    Vaping status: Some Days   Substance Use Topics    Alcohol use: Not Currently    Drug use: Yes     Types: Marijuana     Comment: vape       Physical Exam   ED Triage Vitals [03/05/25 1134]   Temperature Heart Rate Respirations BP   36.6 °C (97.9 °F) 98 19 118/81      Pulse Ox Temp Source Heart Rate Source Patient Position   (!) 80 % Temporal Monitor Sitting      BP Location FiO2 (%)     -- --       Physical Exam  GENERAL APPEARANCE: Awake and alert.  Patient is mildly dyspneic with speaking.  Cachectic appearing  VITAL SIGNS: As per the nurses' triage record.  HEENT: Normocephalic, atraumatic.   NECK: Soft, nontender and supple, full gross ROM, no meningeal signs.  CHEST: Nontender to palpation.  Wheezing bilaterally.  Symmetric rise and fall of chest wall.   HEART: Clear S1 and S2. Regular rate and rhythm. Strong and equal pulses in the extremities.  ABDOMEN: Soft, nontender, nondistended  MUSCULOSKELETAL: Full gross active range of motion of the upper and lower extremities bilaterally.    NEUROLOGICAL: Awake, alert and oriented x 3. Motor power intact in the upper and lower extremities. Sensation is intact to light touch in the upper and lower extremities. Patient answering questions  appropriately.   IMMUNOLOGICAL: No lymphatic streaking noted  DERMATOLOGIC: Warm and dry   PYSCH: Cooperative with appropriate mood and affect.    ED Course & MDM   ED Course as of 03/05/25 1635   Wed Mar 05, 2025   1143 EKG on my independent interpretation: Sinus tachycardia 107 bpm, left axis deviation, normal intervals, poor quality baseline with significant artifact no clear acute ST or T wave abnormalities [DUNIA]   1326 Weaned to 6L NC. [DUNIA]      ED Course User Index  [DUNIA] Adenike Gore MD         Diagnoses as of 03/05/25 1635   Pneumonia of right lower lobe due to infectious organism   Acute hypoxic respiratory failure (Multi)                 No data recorded     [place] Coma Scale Score: 15 (03/05/25 1133 : Armida Eduardo RN)                           Medical Decision Making  Parts of this chart have been completed using voice recognition software. Please excuse any errors of transcription.  My thought process and reason for plan has been formulated from the time that I saw the patient until the time of disposition and is not specific to one specific moment during their visit and furthermore my MDM encompasses this entire chart and not only this text box.      HPI: Detailed above.    Exam: A medically appropriate exam performed, outlined above, given the known history and presentation.    History obtained from: Patient    EKG: See my supervising physician's EKG interpretation    Medications given during visit:  Medications   ipratropium-albuteroL (Duo-Neb) 0.5-2.5 mg/3 mL nebulizer solution 3 mL (3 mL nebulization Not Given 3/5/25 1451)   ipratropium-albuteroL (Duo-Neb) 0.5-2.5 mg/3 mL nebulizer solution 3 mL (3 mL nebulization Given 3/5/25 1253)   ipratropium-albuteroL (Duo-Neb) 0.5-2.5 mg/3 mL nebulizer solution 3 mL (3 mL nebulization Given 3/5/25 1251)   methylPREDNISolone sod succinate (SOLU-Medrol) injection 125 mg (125 mg intravenous Given 3/5/25 1250)   iohexol (OMNIPaque) 350 mg iodine/mL  solution 75 mL (75 mL intravenous Given 3/5/25 1223)   cefTRIAXone (Rocephin) 1 g in dextrose (iso) IV 50 mL (0 g intravenous Stopped 3/5/25 1555)        Diagnostic/tests  Labs Reviewed   CBC WITH AUTO DIFFERENTIAL - Abnormal       Result Value    WBC 8.6      nRBC 0.0      RBC 4.43      Hemoglobin 14.2      Hematocrit 43.1      MCV 97      MCH 32.1      MCHC 32.9      RDW 16.3 (*)     Platelets 340      Neutrophils % 89.8      Immature Granulocytes %, Automated 0.5      Lymphocytes % 3.4      Monocytes % 5.8      Eosinophils % 0.0      Basophils % 0.5      Neutrophils Absolute 7.68 (*)     Immature Granulocytes Absolute, Automated 0.04      Lymphocytes Absolute 0.29 (*)     Monocytes Absolute 0.50      Eosinophils Absolute 0.00      Basophils Absolute 0.04     COMPREHENSIVE METABOLIC PANEL - Abnormal    Glucose 121 (*)     Sodium 138      Potassium 4.0      Chloride 101      Bicarbonate 29      Anion Gap 12      Urea Nitrogen 10      Creatinine 0.34 (*)     eGFR >90      Calcium 8.9      Albumin 3.6      Alkaline Phosphatase 81      Total Protein 6.6      AST 16      Bilirubin, Total 0.3      ALT 10     B-TYPE NATRIURETIC PEPTIDE - Abnormal     (*)     Narrative:        <100 pg/mL - Heart failure unlikely  100-299 pg/mL - Intermediate probability of acute heart                  failure exacerbation. Correlate with clinical                  context and patient history.    >=300 pg/mL - Heart Failure likely. Correlate with clinical                  context and patient history.    BNP testing is performed using different testing methodology at [facility] than at other [facility]. Direct result comparisons should only be made within the same method.      BLOOD GAS VENOUS FULL PANEL - Abnormal    POCT pH, Venous 7.39      POCT pCO2, Venous 55 (*)     POCT pO2, Venous 36      POCT SO2, Venous 66      POCT Oxy Hemoglobin, Venous 64.6      POCT Hematocrit Calculated, Venous 43.0      POCT Sodium,  Venous 136      POCT Potassium, Venous 4.0      POCT Chloride, Venous 98      POCT Ionized Calicum, Venous 1.23      POCT Glucose, Venous 124 (*)     POCT Lactate, Venous 1.1      POCT Base Excess, Venous 6.6 (*)     POCT HCO3 Calculated, Venous 33.3 (*)     POCT Hemoglobin, Venous 14.3      POCT Anion Gap, Venous 9.0 (*)     Patient Temperature 37.0      FiO2 57     SARS-COV-2 AND INFLUENZA A/B PCR - Normal    Flu A Result Not Detected      Flu B Result Not Detected      Coronavirus 2019, PCR Not Detected      Narrative:     This assay is an FDA-cleared, in vitro diagnostic nucleic acid amplification test for the qualitative detection and differentiation of SARS CoV-2/ Influenza A/B from nasopharyngeal specimens collected from individuals with signs and symptoms of respiratory tract infections, and has been validated for use at [facility]. Negative results do not preclude COVID-19/ Influenza A/B infections and should not be used as the sole basis for diagnosis, treatment, or other management decisions. Testing for SARS CoV-2 is recommended only for patients who meet current clinical and/or epidemiological criteria defined by federal, state, or local public health directives.   SERIAL TROPONIN-INITIAL - Normal    Troponin I, High Sensitivity 8      Narrative:     Less than 99th percentile of normal range cutoff-  Female and children under 18 years old <14 ng/L; Male <21 ng/L: Negative  Repeat testing should be performed if clinically indicated.     Female and children under 18 years old 14-50 ng/L; Male 21-50 ng/L:  Consistent with possible cardiac damage and possible increased clinical   risk. Serial measurements may help to assess extent of myocardial damage.     >50 ng/L: Consistent with cardiac damage, increased clinical risk and  myocardial infarction. Serial measurements may help assess extent of   myocardial damage.      NOTE: Children less than 1 year old may have higher baseline  troponin   levels and results should be interpreted in conjunction with the overall   clinical context.     NOTE: Troponin I testing is performed using a different   testing methodology at [facility] than at other   [facility]. Direct result comparisons should only   be made within the same method.   SERIAL TROPONIN, 1 HOUR - Normal    Troponin I, High Sensitivity 8      Narrative:     Less than 99th percentile of normal range cutoff-  Female and children under 18 years old <14 ng/L; Male <21 ng/L: Negative  Repeat testing should be performed if clinically indicated.     Female and children under 18 years old 14-50 ng/L; Male 21-50 ng/L:  Consistent with possible cardiac damage and possible increased clinical   risk. Serial measurements may help to assess extent of myocardial damage.     >50 ng/L: Consistent with cardiac damage, increased clinical risk and  myocardial infarction. Serial measurements may help assess extent of   myocardial damage.      NOTE: Children less than 1 year old may have higher baseline troponin   levels and results should be interpreted in conjunction with the overall   clinical context.     NOTE: Troponin I testing is performed using a different   testing methodology at [facility] than at other   [facility]. Direct result comparisons should only   be made within the same method.   BLOOD CULTURE   BLOOD CULTURE   TROPONIN SERIES- (INITIAL, 1 HR)    Narrative:     The following orders were created for panel order Troponin I Series, High Sensitivity (0, 1 HR).  Procedure                               Abnormality         Status                     ---------                               -----------         ------                     Troponin I, High Sensiti...[[id number]]  Normal              Final result               Troponin, High Sensitivi...[[id number]]  Normal              Final result                 Please view results for these tests on the  individual orders.   LACTATE    Lactate        Narrative:     Venipuncture immediately after or during the administration of Metamizole may lead to falsely low results. Testing should be performed immediately prior to Metamizole dosing.   MORPHOLOGY    RBC Morphology No significant RBC morphology present      Clumped Platelets Present        CT angio chest for pulmonary embolism   Final Result   *No evidence for pulmonary embolus.   *Small bibasal pleural effusions with associated atelectasis.   This is more pronounced on the left.   *Right lower lobe airways are debris-filled which is concerning   for aspiration. Mild right lower lobe consolidation could be pneumonia   in the correct clinical setting.   *Lucent lesion in the proximal right humerus is partially imaged   measuring roughly 4.6 cm in craniocaudal dimension. Consider initial   workup with plain film of the right humerus.   Signed by Anand Villaseñor MD           Considerations/further MDM:  Patient was seen in conjucntion with my supervising physician,  Dr. Gore. Please refer to  her note.    Differential diagnosis includes was not limited to pneumonia versus PE versus pleural effusion versus pneumothorax versus CHF    CBC shows no leukocytosis.  Patient was not meeting SIRS criteria upon arrival to the emergency department.  VBG showed a very mild elevation in pCO2.  No acidosis.  Initial troponin 8.  Repeat troponin 8.  CMP unremarkable.  Negative viral swabs.  BNP is mildly elevated at 243.  CTA of the chest for PE was done due to hypoxia on presentation.  There is no evidence of PE.  There are small bibasilar pleural effusions with associated atelectasis.  Concern for debris's in airways of right lower lobe for possible aspiration mild right lower lobe pneumonia could be in the differential.  There is a lucent lesion in the proximal right humerus as well.    Initially patient was started on high flow however she was transitioned to 6 L nasal  cannula and tolerated this.  Due to plans for admission for possible pneumonia blood cultures x 2 and lactate were obtained.  Again patient was not meeting SIRS criteria so sepsis protocol was not initiated.  Patient was given IV Solu-Medrol, 2 breathing treatments and 1 g IV ceftriaxone.  Patient and her daughter verbalized understanding of reason for admission.  The patient was admitted in stable condition to accepting physician, Dr. Mcneil.         Procedure  Critical Care    Performed by: Liset Keller PA-C  Authorized by: Adenike Gore MD    Critical care provider statement:     Critical care time (minutes):  31    Critical care time was exclusive of:  Separately billable procedures and treating other patients    Critical care was necessary to treat or prevent imminent or life-threatening deterioration of the following conditions:  Respiratory failure    Critical care was time spent personally by me on the following activities:  Re-evaluation of patient's condition, pulse oximetry, ordering and review of radiographic studies, ordering and review of laboratory studies and review of old charts    Care discussed with: admitting provider         Liset Keller PA-C  03/05/25 3257

## 2025-03-05 NOTE — ED TRIAGE NOTES
Pt from home via EMS for c/o shortness of breath. States she was sitting waiting to go to radiation when she felt short of breath. Pt 80% on RA upon arrival.

## 2025-03-05 NOTE — PROGRESS NOTES
Karly Horton is a 79 y.o. female admitted for No Principal Problem currently listed. Pharmacy reviewed the patient's qtopn-ps-envenapze medications and allergies for accuracy.    Medications ADDED:  cholecalciferol, vitamin D3, (VITAMIN D3 ORAL)  mupirocin (Bactroban) 2 % ointment  ondansetron (Zofran) 8 mg tablet  Medications CHANGED:  acetaminophen (Tylenol) 325 mg tablet  baclofen (Lioresal) 10 mg tablet  lactulose 10 gram/15 mL solution  lubricating eye drops ophthalmic solution  Medications REMOVED:   sucralfate (Carafate) 100 mg/mL suspension   ascorbic acid (Vitamin C) 500 mg tablet  ferrous sulfate, 325 mg ferrous sulfate, tablet  nicotine (Nicoderm CQ) 14 mg/24 hr patch    The list below reflects the updated PTA list. Comments regarding how patient may be taking medications differently can be found in the Admit Orders Activity  Prior to Admission Medications   Prescriptions Last Dose Informant   acetaminophen (Tylenol) 325 mg tablet PRN Self, Child   Sig: Take 1 tablet (325 mg) by mouth every 6 hours if   needed (pain).      baclofen (Lioresal) 10 mg tablet Past Week Self, Child   Sig: Take 1 tablet (10 mg) by mouth 3 times a day.   butalbital-acetaminophen-caff -40 mg tablet Past Week Self, Child   Sig: Take 1 tablet by mouth every 6 hours if needed for   headaches.   cholecalciferol, vitamin D3, (VITAMIN D3 ORAL) Past Week Self, Child   Sig: Take 1 tablet by mouth once daily.      lactulose 10 gram/15 mL solution Past Week Self, Child   Sig: Take 30 mL (20 g) by mouth every 18 hours.   lidocaine HCL 4 % liquid roll-on Past Week Self, Child   Sig: Apply 1 Application topically once daily as needed.   lubricating eye drops ophthalmic solution Past Week Self, Child   Sig: Administer 1 drop into both eyes if needed for dry   eyes. Refresh Tears   meprobamate (Equanil) 400 mg tablet Past Week Self, Child   Sig: Take 1 tablet (400 mg) by mouth 4 times a day.   mupirocin (Bactroban) 2 % ointment PRN  Self, Child   Sig: Apply 1 Application topically if needed. Apply to   affected area as needed      nicotine (Nicoderm CQ) 21 mg/24 hr patch PRN Self, Child   Sig: Place 1 patch over 24 hours on the skin once daily.   ondansetron (Zofran) 8 mg tablet PRN Self, Child   Sig: Take 1 tablet (8 mg) by mouth every 8 hours if   needed for nausea or vomiting.   pantoprazole (ProtoNix) 40 mg EC tablet Past Week Self, Child   Sig: Take 1 tablet (40 mg) by mouth 2 times a day. Do not  crush, chew, or split.    prochlorperazine (Compazine) 10 mg tablet PRN Self, Child   Sig: Take 1 tablet (10 mg) by mouth every 6 hours if   needed for nausea or vomiting.      Facility-Administered Medications: None        The list below reflects the updated allergy list. Please review each documented allergy for additional clarification and justification.  Allergies  Reviewed by Epifanio Wood on 3/5/2025        Severity Reactions Comments    Demerol [meperidine] Medium Headache, Nausea/vomiting Per pt.     Morphine Medium Headache, Nausea/vomiting Per patient    Spice Flavor Low Other             Pharmacy has been updated to [facility].    Sources used to complete the med history include:   Patient interview and daughter called to confirm medications and doses, fair historians, dispense report, care everywhere, chart review history, [place]ARRS    Below are additional concerns with the patient's PTA list.  **Patient takes lactulose 10 gram/15 mL solution and does not use measuring cup and is noncompliant with dosages (patient and daughter states she drinks from the bottle and chugs about 9 sips a day**  **Patient states uses a nicotine patch but daughter states she does not and that she vapes daily**    Epifanio Wood, [facility]  Please reach out via [employer] Chat for questions

## 2025-03-06 ENCOUNTER — APPOINTMENT (OUTPATIENT)
Dept: HEMATOLOGY/ONCOLOGY | Facility: CLINIC | Age: 80
End: 2025-03-06
Payer: MEDICARE

## 2025-03-06 ENCOUNTER — APPOINTMENT (OUTPATIENT)
Dept: RADIATION ONCOLOGY | Facility: CLINIC | Age: 80
End: 2025-03-06
Payer: MEDICARE

## 2025-03-06 LAB
ALBUMIN SERPL BCP-MCNC: 3.3 G/DL (ref 3.4–5)
ALP SERPL-CCNC: 73 U/L (ref 33–136)
ALT SERPL W P-5'-P-CCNC: 10 U/L (ref 7–45)
ANION GAP SERPL CALCULATED.3IONS-SCNC: 12 MMOL/L (ref 10–20)
AST SERPL W P-5'-P-CCNC: 17 U/L (ref 9–39)
ATRIAL RATE: 107 BPM
BILIRUB SERPL-MCNC: 0.3 MG/DL (ref 0–1.2)
BUN SERPL-MCNC: 8 MG/DL (ref 6–23)
CALCIUM SERPL-MCNC: 8.5 MG/DL (ref 8.6–10.3)
CHLORIDE SERPL-SCNC: 99 MMOL/L (ref 98–107)
CO2 SERPL-SCNC: 27 MMOL/L (ref 21–32)
CREAT SERPL-MCNC: 0.35 MG/DL (ref 0.5–1.05)
EGFRCR SERPLBLD CKD-EPI 2021: >90 ML/MIN/1.73M*2
ERYTHROCYTE [DISTWIDTH] IN BLOOD BY AUTOMATED COUNT: 16.2 % (ref 11.5–14.5)
GLUCOSE SERPL-MCNC: 81 MG/DL (ref 74–99)
HCT VFR BLD AUTO: 36.6 % (ref 36–46)
HGB BLD-MCNC: 12.3 G/DL (ref 12–16)
MCH RBC QN AUTO: 32.1 PG (ref 26–34)
MCHC RBC AUTO-ENTMCNC: 33.6 G/DL (ref 32–36)
MCV RBC AUTO: 96 FL (ref 80–100)
NRBC BLD-RTO: 0 /100 WBCS (ref 0–0)
P AXIS: 76 DEGREES
P OFFSET: 193 MS
P ONSET: 151 MS
PLATELET # BLD AUTO: 255 X10*3/UL (ref 150–450)
POTASSIUM SERPL-SCNC: 3.7 MMOL/L (ref 3.5–5.3)
PR INTERVAL: 140 MS
PROT SERPL-MCNC: 6.1 G/DL (ref 6.4–8.2)
Q ONSET: 221 MS
QRS COUNT: 17 BEATS
QRS DURATION: 68 MS
QT INTERVAL: 344 MS
QTC CALCULATION(BAZETT): 459 MS
QTC FREDERICIA: 417 MS
R AXIS: -78 DEGREES
RBC # BLD AUTO: 3.83 X10*6/UL (ref 4–5.2)
SODIUM SERPL-SCNC: 134 MMOL/L (ref 136–145)
T AXIS: -9 DEGREES
T OFFSET: 393 MS
VENTRICULAR RATE: 107 BPM
WBC # BLD AUTO: 7.1 X10*3/UL (ref 4.4–11.3)

## 2025-03-06 PROCEDURE — 97166 OT EVAL MOD COMPLEX 45 MIN: CPT | Mod: GO

## 2025-03-06 PROCEDURE — 2060000001 HC INTERMEDIATE ICU ROOM DAILY

## 2025-03-06 PROCEDURE — 99222 1ST HOSP IP/OBS MODERATE 55: CPT | Performed by: INTERNAL MEDICINE

## 2025-03-06 PROCEDURE — 94640 AIRWAY INHALATION TREATMENT: CPT

## 2025-03-06 PROCEDURE — 2500000005 HC RX 250 GENERAL PHARMACY W/O HCPCS: Performed by: INTERNAL MEDICINE

## 2025-03-06 PROCEDURE — 9420000001 HC RT PATIENT EDUCATION 5 MIN

## 2025-03-06 PROCEDURE — 94664 DEMO&/EVAL PT USE INHALER: CPT

## 2025-03-06 PROCEDURE — 2500000001 HC RX 250 WO HCPCS SELF ADMINISTERED DRUGS (ALT 637 FOR MEDICARE OP): Performed by: INTERNAL MEDICINE

## 2025-03-06 PROCEDURE — 2500000002 HC RX 250 W HCPCS SELF ADMINISTERED DRUGS (ALT 637 FOR MEDICARE OP, ALT 636 FOR OP/ED): Performed by: INTERNAL MEDICINE

## 2025-03-06 PROCEDURE — 2500000004 HC RX 250 GENERAL PHARMACY W/ HCPCS (ALT 636 FOR OP/ED): Performed by: INTERNAL MEDICINE

## 2025-03-06 PROCEDURE — S4991 NICOTINE PATCH NONLEGEND: HCPCS | Performed by: INTERNAL MEDICINE

## 2025-03-06 PROCEDURE — 97162 PT EVAL MOD COMPLEX 30 MIN: CPT | Mod: GP

## 2025-03-06 PROCEDURE — 80053 COMPREHEN METABOLIC PANEL: CPT | Performed by: INTERNAL MEDICINE

## 2025-03-06 PROCEDURE — 85027 COMPLETE CBC AUTOMATED: CPT | Performed by: INTERNAL MEDICINE

## 2025-03-06 RX ORDER — PROCHLORPERAZINE MALEATE 10 MG
10 TABLET ORAL EVERY 6 HOURS PRN
Status: DISCONTINUED | OUTPATIENT
Start: 2025-03-06 | End: 2025-03-07 | Stop reason: HOSPADM

## 2025-03-06 RX ADMIN — BACLOFEN 10 MG: 10 TABLET ORAL at 20:06

## 2025-03-06 RX ADMIN — ACETAMINOPHEN 650 MG: 325 TABLET, FILM COATED ORAL at 23:25

## 2025-03-06 RX ADMIN — BUTALBITAL, ACETAMINOPHEN AND CAFFEINE 1 TABLET: 325; 50; 40 TABLET ORAL at 05:47

## 2025-03-06 RX ADMIN — PANTOPRAZOLE SODIUM 40 MG: 40 TABLET, DELAYED RELEASE ORAL at 08:17

## 2025-03-06 RX ADMIN — ACETAMINOPHEN 650 MG: 325 TABLET, FILM COATED ORAL at 03:49

## 2025-03-06 RX ADMIN — SUCRALFATE ORAL SUSPENSION 1 G: 1 SUSPENSION ORAL at 23:24

## 2025-03-06 RX ADMIN — BUTALBITAL, ACETAMINOPHEN AND CAFFEINE 1 TABLET: 325; 50; 40 TABLET ORAL at 12:00

## 2025-03-06 RX ADMIN — SUCRALFATE ORAL SUSPENSION 1 G: 1 SUSPENSION ORAL at 11:59

## 2025-03-06 RX ADMIN — IPRATROPIUM BROMIDE AND ALBUTEROL SULFATE 3 ML: 2.5; .5 SOLUTION RESPIRATORY (INHALATION) at 19:54

## 2025-03-06 RX ADMIN — IPRATROPIUM BROMIDE AND ALBUTEROL SULFATE 3 ML: 2.5; .5 SOLUTION RESPIRATORY (INHALATION) at 13:11

## 2025-03-06 RX ADMIN — HEPARIN SODIUM 5000 UNITS: 5000 INJECTION, SOLUTION INTRAVENOUS; SUBCUTANEOUS at 21:25

## 2025-03-06 RX ADMIN — Medication 21 PERCENT: at 20:06

## 2025-03-06 RX ADMIN — HEPARIN SODIUM 5000 UNITS: 5000 INJECTION, SOLUTION INTRAVENOUS; SUBCUTANEOUS at 05:47

## 2025-03-06 RX ADMIN — HEPARIN SODIUM 5000 UNITS: 5000 INJECTION, SOLUTION INTRAVENOUS; SUBCUTANEOUS at 14:17

## 2025-03-06 RX ADMIN — SUCRALFATE ORAL SUSPENSION 1 G: 1 SUSPENSION ORAL at 05:47

## 2025-03-06 RX ADMIN — PIPERACILLIN SODIUM AND TAZOBACTAM SODIUM 4.5 G: 4; .5 INJECTION, SOLUTION INTRAVENOUS at 01:57

## 2025-03-06 RX ADMIN — LACTULOSE 20 G: 20 SOLUTION ORAL at 14:17

## 2025-03-06 RX ADMIN — FERROUS SULFATE TAB 325 MG (65 MG ELEMENTAL FE) 325 MG: 325 (65 FE) TAB at 08:17

## 2025-03-06 RX ADMIN — BUTALBITAL, ACETAMINOPHEN AND CAFFEINE 1 TABLET: 325; 50; 40 TABLET ORAL at 21:29

## 2025-03-06 RX ADMIN — PIPERACILLIN SODIUM AND TAZOBACTAM SODIUM 4.5 G: 4; .5 INJECTION, SOLUTION INTRAVENOUS at 20:11

## 2025-03-06 RX ADMIN — OXYCODONE HYDROCHLORIDE AND ACETAMINOPHEN 500 MG: 500 TABLET ORAL at 08:17

## 2025-03-06 RX ADMIN — Medication 21 PERCENT: at 08:14

## 2025-03-06 RX ADMIN — PANTOPRAZOLE SODIUM 40 MG: 40 TABLET, DELAYED RELEASE ORAL at 20:06

## 2025-03-06 RX ADMIN — SUCRALFATE ORAL SUSPENSION 1 G: 1 SUSPENSION ORAL at 18:12

## 2025-03-06 RX ADMIN — NICOTINE 1 PATCH: 14 PATCH, EXTENDED RELEASE TRANSDERMAL at 20:06

## 2025-03-06 RX ADMIN — BACLOFEN 10 MG: 10 TABLET ORAL at 14:17

## 2025-03-06 RX ADMIN — PIPERACILLIN SODIUM AND TAZOBACTAM SODIUM 4.5 G: 4; .5 INJECTION, SOLUTION INTRAVENOUS at 08:17

## 2025-03-06 RX ADMIN — IPRATROPIUM BROMIDE AND ALBUTEROL SULFATE 3 ML: 2.5; .5 SOLUTION RESPIRATORY (INHALATION) at 08:12

## 2025-03-06 RX ADMIN — PIPERACILLIN SODIUM AND TAZOBACTAM SODIUM 4.5 G: 4; .5 INJECTION, SOLUTION INTRAVENOUS at 14:17

## 2025-03-06 RX ADMIN — BACLOFEN 10 MG: 10 TABLET ORAL at 08:17

## 2025-03-06 RX ADMIN — Medication 2000 UNITS: at 08:16

## 2025-03-06 ASSESSMENT — ENCOUNTER SYMPTOMS
NAUSEA: 0
SORE THROAT: 0
ACTIVITY CHANGE: 1
DIARRHEA: 0
ADENOPATHY: 0
FEVER: 0
SHORTNESS OF BREATH: 1
ABDOMINAL PAIN: 0
RHINORRHEA: 0
VOMITING: 0
CHEST TIGHTNESS: 0
ABDOMINAL DISTENTION: 0
MYALGIAS: 0
DYSURIA: 0
BLOOD IN STOOL: 0
CHILLS: 0
UNEXPECTED WEIGHT CHANGE: 0
LIGHT-HEADEDNESS: 0
DIZZINESS: 0

## 2025-03-06 ASSESSMENT — COGNITIVE AND FUNCTIONAL STATUS - GENERAL
HELP NEEDED FOR BATHING: A LOT
MOBILITY SCORE: 11
HELP NEEDED FOR BATHING: TOTAL
WALKING IN HOSPITAL ROOM: A LOT
DAILY ACTIVITIY SCORE: 12
EATING MEALS: A LITTLE
PERSONAL GROOMING: A LOT
CLIMB 3 TO 5 STEPS WITH RAILING: TOTAL
EATING MEALS: A LITTLE
TURNING FROM BACK TO SIDE WHILE IN FLAT BAD: A LITTLE
DRESSING REGULAR LOWER BODY CLOTHING: TOTAL
TOILETING: TOTAL
WALKING IN HOSPITAL ROOM: A LOT
DAILY ACTIVITIY SCORE: 14
TOILETING: A LOT
STANDING UP FROM CHAIR USING ARMS: A LITTLE
MOBILITY SCORE: 16
DRESSING REGULAR LOWER BODY CLOTHING: A LOT
DRESSING REGULAR UPPER BODY CLOTHING: A LOT
STANDING UP FROM CHAIR USING ARMS: A LITTLE
DAILY ACTIVITIY SCORE: 9
STANDING UP FROM CHAIR USING ARMS: A LOT
DRESSING REGULAR LOWER BODY CLOTHING: A LOT
MOVING TO AND FROM BED TO CHAIR: A LITTLE
MOVING TO AND FROM BED TO CHAIR: A LOT
CLIMB 3 TO 5 STEPS WITH RAILING: A LITTLE
TURNING FROM BACK TO SIDE WHILE IN FLAT BAD: A LOT
DRESSING REGULAR UPPER BODY CLOTHING: A LOT
MOVING FROM LYING ON BACK TO SITTING ON SIDE OF FLAT BED WITH BEDRAILS: A LOT
TOILETING: A LOT
HELP NEEDED FOR BATHING: A LOT
EATING MEALS: TOTAL
MOVING TO AND FROM BED TO CHAIR: A LITTLE
TURNING FROM BACK TO SIDE WHILE IN FLAT BAD: A LITTLE
MOVING FROM LYING ON BACK TO SITTING ON SIDE OF FLAT BED WITH BEDRAILS: A LITTLE
DRESSING REGULAR UPPER BODY CLOTHING: TOTAL
PERSONAL GROOMING: A LITTLE
MOVING FROM LYING ON BACK TO SITTING ON SIDE OF FLAT BED WITH BEDRAILS: A LITTLE
PERSONAL GROOMING: A LITTLE
WALKING IN HOSPITAL ROOM: A LITTLE
MOBILITY SCORE: 18
CLIMB 3 TO 5 STEPS WITH RAILING: A LOT

## 2025-03-06 ASSESSMENT — ACTIVITIES OF DAILY LIVING (ADL)
ADLS_ADDRESSED: NO
ADL_ASSISTANCE: NEEDS ASSISTANCE
LACK_OF_TRANSPORTATION: NO
EFFECT OF PAIN ON DAILY ACTIVITIES: SLEEP
BATHING_ASSISTANCE: TOTAL
EFFECT OF PAIN ON DAILY ACTIVITIES: SLEEP

## 2025-03-06 ASSESSMENT — PAIN SCALES - GENERAL
PAINLEVEL_OUTOF10: 7
PAINLEVEL_OUTOF10: 0 - NO PAIN
PAINLEVEL_OUTOF10: 5 - MODERATE PAIN
PAINLEVEL_OUTOF10: 5 - MODERATE PAIN
PAINLEVEL_OUTOF10: 0 - NO PAIN
PAINLEVEL_OUTOF10: 7
PAINLEVEL_OUTOF10: 5 - MODERATE PAIN

## 2025-03-06 ASSESSMENT — PAIN - FUNCTIONAL ASSESSMENT
PAIN_FUNCTIONAL_ASSESSMENT: 0-10

## 2025-03-06 ASSESSMENT — PAIN DESCRIPTION - DESCRIPTORS
DESCRIPTORS: ACHING;DISCOMFORT
DESCRIPTORS: ACHING;DISCOMFORT

## 2025-03-06 NOTE — CARE PLAN
"Patient and nurse reviewed home medications, hospital medications, rationales, lab values, care plan and physician's note. Patient has many questions and fixates on medication and endorses taking double even triple the dose at times. Patient calls out frequently and is unable to remember when her last dose was given. Patient has asked multiple times to be \"knocked out\" with medication. Nurse has continued to educate and decrease agitation.   Nurse found 4 vape sticks on the patient, the first three were placed in the patient medication bin and the 4th was thrown away in sharps at patient's request because it was empty. Nurse sitting at bedside to reassure patient.    Problem: Safety - Adult  Goal: Free from fall injury  Outcome: Progressing  Flowsheets (Taken 3/5/2025 2257)  Free from fall injury:   Instruct family/caregiver on patient safety   Based on caregiver fall risk screen, instruct family/caregiver to ask for assistance with transferring infant if caregiver noted to have fall risk factors     Problem: Discharge Planning  Goal: Discharge to home or other facility with appropriate resources  Outcome: Progressing  Flowsheets (Taken 3/5/2025 2257)  Discharge to home or other facility with appropriate resources:   Identify barriers to discharge with patient and caregiver   Identify discharge learning needs (meds, wound care, etc)  Note: Patient is having difficulty with concentration, has been fixated on medications and endorses taking double the dose of meprobamate to \"knock myself out\". Patient also found 4 vape sticks on her person and in her purse. Patient wants to use these in addition to her nicotine patch.     Problem: Chronic Conditions and Co-morbidities  Goal: Patient's chronic conditions and co-morbidity symptoms are monitored and maintained or improved  Outcome: Progressing  Flowsheets (Taken 3/5/2025 2257)  Care Plan - Patient's Chronic Conditions and Co-Morbidity Symptoms are Monitored and " Maintained or Improved: Monitor and assess patient's chronic conditions and comorbid symptoms for stability, deterioration, or improvement     Problem: Nutrition  Goal: Nutrient intake appropriate for maintaining nutritional needs  Outcome: Progressing  Note: Patient endorses a good appetite and has asked for macaroni and cheese multiple times, has finished 2 puddings and 1 jello.     Problem: Pain - Adult  Goal: Verbalizes/displays adequate comfort level or baseline comfort level  Flowsheets (Taken 3/5/2025 7769)  Verbalizes/displays adequate comfort level or baseline comfort level:   Encourage patient to monitor pain and request assistance   Assess pain using appropriate pain scale   Administer analgesics based on type and severity of pain and evaluate response   Implement non-pharmacological measures as appropriate and evaluate response   The patient's goals for the shift include      The clinical goals for the shift include improved SOB.    Over the shift, the patient did not make progress toward the following goals. Barriers to progression include severity of illness, lack of learning readiness, lack of understanding and intermittent agitation. Recommendations to address these barriers include continue to include the patient in care planning, moderate expectations, offer options and reeducate.

## 2025-03-06 NOTE — PROGRESS NOTES
Occupational Therapy    Evaluation    Patient Name: Karly Horton  MRN: [id number]  Department: [address]  Room: 11/11-A  Today's Date: 3/6/2025  Time Calculation  Start Time: 0906  Stop Time: 0918  Time Calculation (min): 12 min        Assessment:  OT Assessment: OT eval performed on this date.  pt is at baseline level of function and required assistance for ADLs and transfers with help from daughter.  No Need for further OT at this time, will discontinue orders.  Barriers to Discharge Home: No anticipated barriers  Evaluation/Treatment Tolerance: Patient limited by fatigue  Medical Staff Made Aware: Yes  End of Session Communication: Bedside nurse  End of Session Patient Position: Bed, 3 rail up, Alarm on  Evaluation/Treatment Tolerance: Patient limited by fatigue  Medical Staff Made Aware: Yes  Plan:  No Skilled OT: At baseline function  OT Frequency: OT eval only  OT Discharge Recommendations: No further acute OT, No OT needed after discharge  Equipment Recommended upon Discharge: Wheelchair  OT Recommended Transfer Status: Assist of 2, Moderate assist  OT - OK to Discharge: Yes       Subjective     General:  General  Reason for Referral: ADL impairment; 79-year-old female the presenting to the emergency department for shortness of breath and acute respiratory failure w/ PNA  Past Medical History Relevant to Rehab: history of non-small cell lung cancer, Lumbar Spondylolisthesis, C4-6 bulging disc, Rotator Cuff Tendon Tear,  Family/Caregiver Present: No  Co-Treatment: PT  Co-Treatment Reason: To maximize safety and participation  Prior to Session Communication: Bedside nurse  Patient Position Received: Bed, 3 rail up, Alarm on  Preferred Learning Style: verbal, visual  General Comment: pt significantly malnourished w/ learned helplessness and self-directing.  was shouting out into the larios upon arrival.  Precautions:  Medical Precautions: Fall precautions        Pain:  Pain Assessment  Pain Assessment: 0-10  0-10  (Numeric) Pain Score: 0 - No pain    Objective   Cognition:  Overall Cognitive Status: Impaired  Orientation Level: Oriented X4  Cognition Comments: flat affect, self-directing, repeating questions that were already answered           Home Living:  Type of Home: House  Lives With: Adult children (daughter)  Home Adaptive Equipment: Wheelchair-manual, Walker rolling or standard  Home Layout: One level  Home Access: Stairs to enter without rails  Entrance Stairs-Rails: None  Entrance Stairs-Number of Steps: stoop + 1 step; family bumps her up in w/c  Prior Function:  Level of [place]: Needs assistance with ADLs, Needs assistance with functional transfers  Receives Help From: Family (daughter and aide?)  ADL Assistance: Needs assistance  Homemaking Assistance: Needs assistance  Ambulatory Assistance: Needs assistance  Prior Function Comments: requires assistance for dressing, bathing and toileting at bed level.  pt able to feed self at baseline.  daughter assists with bed mobility and stand pivot transfers to wheelchair at baseline     ADL:  Eating Assistance: Minimal (set up; performed at bed level; required positioning of elevated table near chin height where patient was able to transport food from plate to mouth with use of spoon.  pt with limited shoulder ROM and required table to be at elevated height near mouth)  Grooming Assistance: Moderate (anticipated)  Bathing Assistance: Total (anticipated)  UE Dressing Assistance: Total (anticipated)  LE Dressing Assistance: Total (Total A for donning buffy socks)  Toileting Assistance with Device: Total (anticipated)  ADL Comments: performs ADLs at bed level at baseline  Activity Tolerance:  Endurance: Decreased tolerance for upright activites  Bed Mobility/Transfers: Bed Mobility  Bed Mobility: Yes  Bed Mobility 1  Bed Mobility 1: Supine to sitting  Level of Assistance 1: Maximum assistance  Bed Mobility Comments 1: assist for managing BLE to edge of bed and for  lifting trunk.  pt with poor efforts and w/ learned helplessness  Bed Mobility 2  Bed Mobility  2: Sitting to supine  Level of Assistance 2: Maximum assistance  Bed Mobility Comments 2: MAX A for lifting BLE back into bed and for lowering/guiding trunk    Transfers  Transfer: Yes  Transfer 1  Technique 1: Sit to stand, Stand to sit  Transfer Level of Assistance 1: Moderate assistance, +2  Trials/Comments 1: from edge of bed with arm and arm assist; pt requiring MOD A x2 to ascend into standing position.  pt with flexed posture and requiring cues for postural corrrections.  pt tolerated sitting for <1 minute while therapist changed bedding/padding    Sitting Balance:  Static Sitting Balance  Static Sitting-Balance Support: Feet supported, Bilateral upper extremity supported  Static Sitting-Level of Assistance: Minimum assistance  Static Sitting-Comment/Number of Minutes: MIN A for sitting EOB, pt retropulsive and requiring cues for postural corrections  Standing Balance:  Static Standing Balance  Static Standing-Balance Support: Bilateral upper extremity supported  Static Standing-Level of Assistance: Moderate assistance (x2)      Vision:Vision - Basic Assessment  Current Vision: No visual deficits  Sensation:  Light Touch: No apparent deficits  Strength:  Strength Comments: Distal BUE grossly 3/5  Perception:  Inattention/Neglect: Appears intact  Coordination:  Movements are Fluid and Coordinated: No  Upper Body Coordination: decreased rate/accuracy of movements   Hand Function:  Gross Grasp: Functional  Coordination: Functional  Extremities: RUE   RUE :  (Sh flexion limited to <25 degrees, WFL distally) and LUE   LUE:  (Sh flexion limited to <25 degrees, WFL distally)    Outcome Measures:[facility] Daily Activity  Putting on and taking off regular lower body clothing: Total  Bathing (including washing, rinsing, drying): Total  Putting on and taking off regular upper body clothing: Total  Toileting, which includes using  toilet, bedpan or urinal: Total  Taking care of personal grooming such as brushing teeth: A lot  Eating Meals: A little  Daily Activity - Total Score: 9        Education Documentation  Body Mechanics, taught by Dread Gillette OT at 3/6/2025 10:01 AM.  Learner: Patient  Readiness: Acceptance  Method: Explanation, Demonstration  Response: Needs Reinforcement  Comment: ADL/functional mobility techniques, benefits of OOB activity, OT POC    ADL Training, taught by Dread Gillette OT at 3/6/2025 10:01 AM.  Learner: Patient  Readiness: Acceptance  Method: Explanation, Demonstration  Response: Needs Reinforcement  Comment: ADL/functional mobility techniques, benefits of OOB activity, OT POC    Education Comments  No comments found.        OP EDUCATION:       Goals:

## 2025-03-06 NOTE — CONSULTS
"Inpatient consult to Pulmonology  Consult performed by: Dimple Langley MD  Consult ordered by: Lupillo Mcneil MD  Reason for consult: \"Respiratory Failure\"          Department of Medicine  Division of Pulmonary, Critical Care, and Sleep Medicine  Consultation Note      History Of Present Illness:    Karly Horton is a 79 y.o. 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  Pulmonary was consulted for acute hypoxic respiratory failure.    Per patient she was brought into the hospital by her daughter for shortness of breath.  Does not recall what instigated her shortness of breath.  On my interview and examination patient had pressured speech and was asking to be discharged in order for her to receive her Keytruda and radiation therapy.  I advised the patient that she would need to remain inpatient until her hospitalist feels that it is safe for her discharge.  Patient is on room air saturating in the mid 90s.  She is also mentating alert and oriented x 3.    Patient was seen and assessed in the ED and worked up with both laboratory studies and imaging studies by ED provider.  Patient's BMP was unremarkable other than mild hyperglycemia to 121.  BNP was mildly elevated at 243.  CBC was unremarkable as well.  Blood cultures x 2 were collected.  Influenza A and B PCR and COVID-19 PCR was negative.  VBG obtained in the ED showed a pH of 7.39 with a pCO2 of 55.    CT chest with angiography was performed.  No evidence of pulm embolism seen.  Patient does have a small left-sided pleural effusion with some compressive atelectasis associated with it.  Right lower lobe appears to have a small consolidation but this has been present now for the " last 2 scans.      Review of systems:   Review of Systems   Constitutional:  Positive for activity change. Negative for chills, fever and unexpected weight change.   HENT:  Negative for congestion, rhinorrhea and sore throat.    Eyes:  Negative for visual disturbance.   Respiratory:  Positive for shortness of breath. Negative for chest tightness.    Cardiovascular:  Negative for chest pain and leg swelling.   Gastrointestinal:  Negative for abdominal distention, abdominal pain, blood in stool, diarrhea, nausea and vomiting.   Endocrine: Negative for cold intolerance and heat intolerance.   Genitourinary:  Negative for dysuria.   Musculoskeletal:  Negative for myalgias.   Skin:  Negative for rash.   Neurological:  Negative for dizziness and light-headedness.   Hematological:  Negative for adenopathy.   Psychiatric/Behavioral:  Negative for behavioral problems.       A 10+ point ROS was completed and otherwise negative except as noted above and per HPI.    Past Medical History:  Past Medical History:   Diagnosis Date    Other cervical disc degeneration, unspecified cervical region     Degeneration of cervical intervertebral disc    Other conditions influencing health status     Bulging Disc (L3 - L4)    Other conditions influencing health status     A Fall    Other conditions influencing health status     Bulging Disc (C4 - C5)    Other conditions influencing health status     Bulging Disc (C6 - C7)    Other conditions influencing health status     Bulging Disc (C5 - C6)    Other conditions influencing health status     Lumbar Spondylolisthesis    Other conditions influencing health status     Rotator Cuff Tendon Tear    Other intervertebral disc degeneration, lumbar region     L4-L5 disc bulge    Other intervertebral disc displacement, lumbar region     Disc displacement, lumbar    Other intervertebral disc displacement, lumbosacral region     Displacement of lumbosacral intervertebral disc    Personal history of  other diseases of the musculoskeletal system and connective tissue     History of bursitis    Personal history of other diseases of the musculoskeletal system and connective tissue     History of fibromyositis    Personal history of other specified conditions     History of headache    Spinal stenosis, lumbar region without neurogenic claudication     Lumbar canal stenosis    Sprain of ligaments of lumbar spine, initial encounter     Low back sprain       Past Surgical History:  Past Surgical History:   Procedure Laterality Date    APPENDECTOMY      EYE SURGERY      HYSTERECTOMY      TONSILLECTOMY          Family History:  Family History   Problem Relation Name Age of Onset    Cancer Mother      Other (Father had hypertension, stroke, coronary artery disease and diabetes) Father          Social History:   reports that she has been smoking cigarettes. She started smoking about 65 years ago. She has a 65.2 pack-year smoking history. She has been exposed to tobacco smoke. She does not have any smokeless tobacco history on file. She reports that she does not currently use alcohol. She reports current drug use. Drug: Marijuana.    Objective     Last Recorded Vitals:  Vitals:    03/06/25 0812 03/06/25 0814 03/06/25 0817 03/06/25 0906   BP:   133/88    BP Location:   Left arm    Patient Position:   Lying    Pulse:   86 91   Resp:   (!) 38    Temp:   36.2 °C (97.2 °F)    TempSrc:   Temporal    SpO2: 97% 98% 100% 95%   Weight:       Height:           Oxygen Therapy  SpO2: 95 %  Medical Gas Therapy: None (Room air)  Medical Gas Delivery Method: Nasal cannula       Physical Exam:  Physical Exam  Constitutional:       General: She is not in acute distress.     Appearance: She is not toxic-appearing.      Comments: Frail elderly female   HENT:      Head: Normocephalic and atraumatic.      Comments: Temporal wasting present and she is losing her hair secondary to chemotherapy     Nose: Nose normal.      Mouth/Throat:       Pharynx: Oropharynx is clear.   Eyes:      Extraocular Movements: Extraocular movements intact.   Neck:      Comments: No visualized masses  Cardiovascular:      Rate and Rhythm: Normal rate and regular rhythm.      Heart sounds: No murmur heard.     No friction rub. No gallop.   Pulmonary:      Effort: No respiratory distress.      Breath sounds: Normal breath sounds. No wheezing, rhonchi or rales.   Abdominal:      General: There is no distension.      Palpations: Abdomen is soft.      Tenderness: There is no abdominal tenderness. There is no guarding.   Musculoskeletal:         General: No tenderness.      Right lower leg: No edema.      Left lower leg: No edema.   Skin:     General: Skin is warm and dry.   Neurological:      Mental Status: She is alert and oriented to person, place, and time.   Psychiatric:      Comments: Pressured speech and anxious         Allergies:  Allergies   Allergen Reactions    Demerol [Meperidine] Headache and Nausea/vomiting     Per pt.     Morphine Headache and Nausea/vomiting     Per patient    Spice Flavor Other       Inpatient Medications:  ascorbic acid, 500 mg, oral, Daily  baclofen, 10 mg, oral, TID  cholecalciferol, 2,000 Units, oral, Daily  ferrous sulfate, 65 mg of iron, oral, Daily with breakfast  heparin (porcine), 5,000 Units, subcutaneous, q8h [place]  ipratropium-albuteroL, 3 mL, nebulization, Once  ipratropium-albuteroL, 3 mL, nebulization, q6h while awake  lactulose, 30 mL, oral, q18h  meprobamate, 400 mg, oral, 4x daily  nicotine, 1 patch, transdermal, q24h  oxygen, , inhalation, Continuous - Inhalation  pantoprazole, 40 mg, oral, BID  piperacillin-tazobactam, 4.5 g, intravenous, q6h  sucralfate, 1 g, oral, q6h [place]         PRN medications: acetaminophen **OR** acetaminophen **OR** acetaminophen, benzocaine-menthol, butalbital-acetaminophen-caff, dextromethorphan-guaifenesin, guaiFENesin, lubricating eye drops, melatonin, ondansetron, polyethylene glycol,  prochlorperazine    Outpatient Medications:  Prior to Admission medications    Medication Sig Start Date End Date Taking? Authorizing Provider   baclofen (Lioresal) 10 mg tablet Take 1 tablet (10 mg) by mouth 3 times a day.   Yes Historical Provider, MD   butalbital-acetaminophen-caff -40 mg tablet Take 1 tablet by mouth every 6 hours if needed for headaches. 11/10/23  Yes Historical Provider, MD   cholecalciferol, vitamin D3, (VITAMIN D3 ORAL) Take 1 tablet by mouth once daily.   Yes Historical Provider, MD   lactulose 10 gram/15 mL solution Take 30 mL (20 g) by mouth every 18 hours.   Yes Historical Provider, MD   lidocaine HCL 4 % liquid roll-on Apply 1 Application topically once daily as needed.   Yes Historical Provider, MD   lubricating eye drops ophthalmic solution Administer 1 drop into both eyes if needed for dry eyes.  Patient taking differently: Administer 1 drop into both eyes if needed for dry eyes. Refresh tears 5/22/24  Yes LANDEN Jang   meprobamate (Equanil) 400 mg tablet Take 1 tablet (400 mg) by mouth 4 times a day.   Yes Historical Provider, MD   pantoprazole (ProtoNix) 40 mg EC tablet Take 1 tablet (40 mg) by mouth 2 times a day. Do not crush, chew, or split. Continue twice daily x 8 weeks then continue once daily.  Patient taking differently: Take 1 tablet (40 mg) by mouth 2 times a day. Do not crush, chew, or split. 12/20/24 4/19/25 Yes Kelly Guerra MD   acetaminophen (Tylenol) 325 mg tablet Take 1 tablet (325 mg) by mouth every 6 hours if needed (pain).    Historical Provider, MD   ascorbic acid (Vitamin C) 500 mg tablet Take 1 tablet (500 mg) by mouth once daily.  Patient not taking: Reported on 3/5/2025 5/22/24   LANDEN Jang   ferrous sulfate, 325 mg ferrous sulfate, tablet Take 1 tablet by mouth once daily with breakfast.  Patient not taking: Reported on 3/5/2025 5/22/24   LANDEN Jang   mupirocin (Bactroban) 2 % ointment Apply 1  Application topically if needed. Apply to affected area as needed    Historical Provider, MD   nicotine (Nicoderm CQ) 14 mg/24 hr patch Place 1 patch over 24 hours on the skin once every 24 hours for 28 days.  Patient not taking: Reported on 3/5/2025 10/20/24 11/17/24  Zohra Ibarra MD   nicotine (Nicoderm CQ) 21 mg/24 hr patch Place 1 patch over 24 hours on the skin once daily. 5/23/24   LANDEN Jang   ondansetron (Zofran) 8 mg tablet Take 1 tablet (8 mg) by mouth every 8 hours if needed for nausea or vomiting.    Historical Provider, MD   prochlorperazine (Compazine) 10 mg tablet Take 1 tablet (10 mg) by mouth every 6 hours if needed for nausea or vomiting. 2/24/25   Kelly Guerra MD   sucralfate (Carafate) 100 mg/mL suspension Take 10 mL (1 g) by mouth every 6 hours.  Patient not taking: Reported on 3/5/2025 5/22/24   LANDEN Jang       LABS/IMAGING/DIAGNOSTICS REVIEW:    Labs:  Lab Results   Component Value Date    WBC 7.1 03/06/2025    HGB 12.3 03/06/2025    HCT 36.6 03/06/2025    MCV 96 03/06/2025     03/06/2025      Lab Results   Component Value Date    GLUCOSE 81 03/06/2025    CALCIUM 8.5 (L) 03/06/2025     (L) 03/06/2025    K 3.7 03/06/2025    CO2 27 03/06/2025    CL 99 03/06/2025    BUN 8 03/06/2025    CREATININE 0.35 (L) 03/06/2025      Lab Results   Component Value Date    ALT 10 03/06/2025    AST 17 03/06/2025    ALKPHOS 73 03/06/2025    BILITOT 0.3 03/06/2025        CT angio chest for pulmonary embolism 03/05/2025    Narrative  STUDY:  CT Angiogram of the Chest; 3/5/2025 12:29 PM.  INDICATION:  Hypoxia and shortness of breath today; Evaluate for pulmonary  embolism.  History of lung malignancy.  COMPARISON:  Chest CT 12/16/2024.  ACCESSION NUMBER(S):  [id number]  ORDERING CLINICIAN:  MAURICE SHERIDAN  TECHNIQUE:  CTA of the chest was performed with intravenous contrast.  Images are reviewed and processed at a workstation according to the CT  angiogram  protocol with 3-D and/or MIP post processing imaging  generated.  Omnipaque 350, 75 mL was administered intravenously.  Automated mA/kV exposure control was utilized and patient examination  was performed in strict accordance with principles of ALARA.  FINDINGS:  Pulmonary arteries: No pulmonary embolus is identified.  Lungs/Pleura: Slightly lesion the previously seen in the posterior  right upper lobe is now present today. Other pulmonary nodules are  minimally changed. Small bibasal pleural effusions with associated  atelectasis. This is more pronounced on the left. Mild upper lung  predominant emphysema. Right lower lobe airways are debris-filled  which is concerning for aspiration. No pneumothorax. Mild right lower  lobe consolidation could be pneumonia in the correct clinical setting.  Mediastinum: No enlarged lymph nodes. Thoracic aorta normal in caliber  without evidence for dissection. Heart size normal. No pericardial  effusion. There is coronary artery calcification, a marker for  coronary atherosclerotic disease.  Other: Imaging through the upper abdomen reveals no acute finding.  Lucent lesion in the proximal right humerus is partially imaged  measuring roughly 4.6 cm in craniocaudal dimension.    Impression  *No evidence for pulmonary embolus.  *Small bibasal pleural effusions with associated atelectasis.  This is more pronounced on the left.  *Right lower lobe airways are debris-filled which is concerning  for aspiration. Mild right lower lobe consolidation could be pneumonia  in the correct clinical setting.  *Lucent lesion in the proximal right humerus is partially imaged  measuring roughly 4.6 cm in craniocaudal dimension. Consider initial  workup with plain film of the right humerus.  Signed by Anand Villaseñor MD    No results found for this or any previous visit from the past 10 days.      Pulmonary Functions Testing Results:  12/2024  Spirometry  FEV1 0.72 L 52% predicted   FVC 1.39 L 73%  predicted  FEV1 FVC ratio 56  Postbronchodilator testing demonstrates a 19% increase in FVC  Total lung capacity 4.34 L 120% predicted  Residual volume 2.9 cm 33% of predicted  RV/% of predicted  DLCO 6.40 mL/min/mmHg 43% predicted  Interpretation  Moderate obstruction with decreased DLCO    Relevant imaging results were personally reviewed.      Assessment/Plan     Karly Horton is madi Horton is a 79 y.o. 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  Pulmonary was consulted for acute hypoxic respiratory failure.    Patient was seen and assessed by myself and his chart was reviewed for previous pulmonary visits, Labs and imaging.  Patient has a known history of right-sided squamous cell carcinoma now status post chemotherapy and radiation therapy.  Patient is currently on consolidation Keytruda.  She does get concurrent radiotherapy.  Patient presented with acute hypoxic respiratory failure which is now resolved and patient is currently on room air saturating in the mid 90s.  I do not know the origin of the patient's shortness of breath and hypoxia.  She does have a small consolidation in the right lower lobe but this is now been present over the last 2 scans.  Infectious markers are negative.  Viral panels are negative.  Patient denies any recent sick contacts.  Patient does not show signs of radiation pneumonitis or checkpoint inhibitor pneumonitis.  This could likely be a acute exacerbation of her COPD with moderate obstruction and decreased DLCO which has quickly resolved.  Patient is not currently wheezing on her exam and is moving air appropriately.  Would recommend against  steroids currently.  Will continue bronchodilators as needed.  Walking pulse oximetry befor home-going.  Mildly elevated BNP with past echo showing LVEF of 60 to 65% with spectral Doppler showing impaired left ventricular diastolic filling.  Patient does not appear fluid overloaded on exam.    Assessment:  #History of severe COPD  #Biopsy-proven right squamous cell carcinoma of the lung status postchemotherapy and radiation now on consolidation Keytruda  #Acute shortness of breath  #Acute hypoxic respiratory failure; now resolved  #HFpEF; does not appear to be in acute exacerbation    Recommendations:  -Recommend against using steroids currently as patient is not wheezing and moving air appropriately  -As needed bronchodilators  -Can resume home inhalers  -Would recommend for hospital medicine to contact patient's heme oncologist for further outpatient follow-up and resumption of treatment when possible  -Recommend walking pulse oximetry before discharge  -Outpatient follow-up with outpatient pulmonologist for inhaler therapy initiation as it does not appear she has home inhalers.  Will need to discuss with daughter for more collateral information.      I spent 60 minutes in the professional and overall care of this patient.    Pulmonary team will continue to follow the patient while they are in house.     Dimple Langley MD, MPH  Pulmonary and Critical Care Medicine     Please excuse any typographical or unwanted errors as voice recognition software was used to complete this note.

## 2025-03-06 NOTE — ASSESSMENT & PLAN NOTE
Acute respiratory failure  Aspiration pneumonia  Cervical spinal stenosis  Spondylolisthesis  Hypertension  Migraine  Tobacco abuse  GERD  Vitamin D deficiency    Plan: Continue current medication.  Supportive care.  Physical therapy and Occupational Therapy.  Monitor CBC BMP.  Patient has been started on the broad-spectrum antibiotics.  Monitor pulse ox.  Consult pulmonology.  Monitor blood pressure.  Continue the nicotine patch.  The patient has been advised to quit smoking.We will take DVT, fall, aspiration, decubitus, and DVT precautions.  This has been discussed with the patient and she is agreeable.

## 2025-03-06 NOTE — NURSING NOTE
On rounding, R chest Mediport dressing is not dated. Dressing is clean, dry and intact. Line flushes easily with brisk blood return. Line is in use, infusing without difficulty.

## 2025-03-06 NOTE — H&P
History Of Present Illness  Karly Horton is a 79 y.o. female with a past medical history significant for non-small cell lung cancer presenting with the emergency room with a complaint of shortness of breath.  Patient does not have any home O2.  Patient was hypoxic and was saturating in 80s on room air per EMS.  Patient goes to radiation daily and today she started experiencing shortness of breath while waiting to go for it.  Patient denied any fever, chills or rigor.  Patient had mild cough but no expectoration.  Patient denied any chest pain, palpitation, odynophagia, dysphagia or recent change in weight or appetite.  Patient denied any diarrhea, dysuria, hematuria frequency.  In the emergency room initial workup was done.  Patient's glucose was 121, proBNP 243 and CBC was normal.  Patient was negative for influenza A, expensively and COVID.  Blood gases showed pCO2 of 55, 36, pH 7.39.CT angio was done.  It did not show any evidence of pulmonary embolism.  It showed small bibasilar pleural effusion with associated atelectasis.  This was more pronounced on the left.  Right lower lobe airways had decreased gas-filled which is concerning for aspiration.  Mild right lobe consolidation could be pneumonia incorrect clinical setting.  Lucent lesion in the proximal right humerus appears partially imaged waiting roughly 4.6 cm in craniocaudal dimension.  The patient was admitted to the floor for further management.     Past Medical History  Past Medical History:   Diagnosis Date    Other cervical disc degeneration, unspecified cervical region     Degeneration of cervical intervertebral disc    Other conditions influencing health status     Bulging Disc (L3 - L4)    Other conditions influencing health status     A Fall    Other conditions influencing health status     Bulging Disc (C4 - C5)    Other conditions influencing health status     Bulging Disc (C6 - C7)    Other conditions influencing health status     Bulging  Disc (C5 - C6)    Other conditions influencing health status     Lumbar Spondylolisthesis    Other conditions influencing health status     Rotator Cuff Tendon Tear    Other intervertebral disc degeneration, lumbar region     L4-L5 disc bulge    Other intervertebral disc displacement, lumbar region     Disc displacement, lumbar    Other intervertebral disc displacement, lumbosacral region     Displacement of lumbosacral intervertebral disc    Personal history of other diseases of the musculoskeletal system and connective tissue     History of bursitis    Personal history of other diseases of the musculoskeletal system and connective tissue     History of fibromyositis    Personal history of other specified conditions     History of headache    Spinal stenosis, lumbar region without neurogenic claudication     Lumbar canal stenosis    Sprain of ligaments of lumbar spine, initial encounter     Low back sprain       Surgical History  Past Surgical History:   Procedure Laterality Date    APPENDECTOMY      EYE SURGERY      HYSTERECTOMY      TONSILLECTOMY          Social History  She reports that she has been smoking cigarettes. She started smoking about 65 years ago. She has a 65.2 pack-year smoking history. She has been exposed to tobacco smoke. She does not have any smokeless tobacco history on file. She reports that she does not currently use alcohol. She reports current drug use. Drug: Marijuana.    Family History  Family History   Problem Relation Name Age of Onset    Cancer Mother      Other (Father had hypertension, stroke, coronary artery disease and diabetes) Father          Allergies  Demerol [meperidine], Morphine, and Spice flavor    Review of Systems  I reviewed all systems reviewed as above otherwise is negative.    General examination: Patient is a 79 years old cachectic female lying in the bed and very anxious.    Physical Exam  HEENT:  Head externally atraumatic, no pallor, no icterus, extraocular  "movements intact, pupils reactive to light, oral mucosa moist and throat clear.  Neck:  Supple, no JVP, no palpable adenopathy or thyromegaly.  No carotid bruit.  Chest: Bilateral fine crackles and decreased breath sounds.  Heart:  Regular rate and rhythm, no murmur or gallop could be appreciated.  Abdomen:  Soft, nontender, bowel sounds present, normoactive, no palpable hepatosplenomegaly.  Extremities:  No edema, pulses present, no cyanosis or clubbing.  CNS:  Patient alert, oriented to time, place and person.  Power 5/5 all over and deep tendon reflexes symmetrical, cranial nerves 2-12 grossly intact.  Skin:  No active rash.  Musculoskeletal:  No joint swelling or erythema, range of movement normal.  Last Recorded Vitals  Heart Rate:  []   Temp:  [36.2 °C (97.2 °F)-36.7 °C (98 °F)]   Resp:  [15-38]   BP: (118-156)/()   Height:  [142.2 cm (4' 8\")]   Weight:  [27.2 kg (60 lb)]   SpO2:  [80 %-100 %]       Relevant Results        Results for orders placed or performed during the hospital encounter of 03/05/25 (from the past 24 hours)   CBC and Auto Differential   Result Value Ref Range    WBC 8.6 4.4 - 11.3 x10*3/uL    nRBC 0.0 0.0 - 0.0 /100 WBCs    RBC 4.43 4.00 - 5.20 x10*6/uL    Hemoglobin 14.2 12.0 - 16.0 g/dL    Hematocrit 43.1 36.0 - 46.0 %    MCV 97 80 - 100 fL    MCH 32.1 26.0 - 34.0 pg    MCHC 32.9 32.0 - 36.0 g/dL    RDW 16.3 (H) 11.5 - 14.5 %    Platelets 340 150 - 450 x10*3/uL    Neutrophils % 89.8 40.0 - 80.0 %    Immature Granulocytes %, Automated 0.5 0.0 - 0.9 %    Lymphocytes % 3.4 13.0 - 44.0 %    Monocytes % 5.8 2.0 - 10.0 %    Eosinophils % 0.0 0.0 - 6.0 %    Basophils % 0.5 0.0 - 2.0 %    Neutrophils Absolute 7.68 (H) 1.60 - 5.50 x10*3/uL    Immature Granulocytes Absolute, Automated 0.04 0.00 - 0.50 x10*3/uL    Lymphocytes Absolute 0.29 (L) 0.80 - 3.00 x10*3/uL    Monocytes Absolute 0.50 0.05 - 0.80 x10*3/uL    Eosinophils Absolute 0.00 0.00 - 0.40 x10*3/uL    Basophils Absolute 0.04 " 0.00 - 0.10 x10*3/uL   Comprehensive metabolic panel   Result Value Ref Range    Glucose 121 (H) 74 - 99 mg/dL    Sodium 138 136 - 145 mmol/L    Potassium 4.0 3.5 - 5.3 mmol/L    Chloride 101 98 - 107 mmol/L    Bicarbonate 29 21 - 32 mmol/L    Anion Gap 12 10 - 20 mmol/L    Urea Nitrogen 10 6 - 23 mg/dL    Creatinine 0.34 (L) 0.50 - 1.05 mg/dL    eGFR >90 >60 mL/min/1.73m*2    Calcium 8.9 8.6 - 10.3 mg/dL    Albumin 3.6 3.4 - 5.0 g/dL    Alkaline Phosphatase 81 33 - 136 U/L    Total Protein 6.6 6.4 - 8.2 g/dL    AST 16 9 - 39 U/L    Bilirubin, Total 0.3 0.0 - 1.2 mg/dL    ALT 10 7 - 45 U/L   Sars-CoV-2 and Influenza A/B PCR   Result Value Ref Range    Flu A Result Not Detected Not Detected    Flu B Result Not Detected Not Detected    Coronavirus 2019, PCR Not Detected Not Detected   B-type natriuretic peptide   Result Value Ref Range     (H) 0 - 99 pg/mL   Troponin I, High Sensitivity, Initial   Result Value Ref Range    Troponin I, High Sensitivity 8 0 - 13 ng/L   BLOOD GAS VENOUS FULL PANEL   Result Value Ref Range    POCT pH, Venous 7.39 7.33 - 7.43 pH    POCT pCO2, Venous 55 (H) 41 - 51 mm Hg    POCT pO2, Venous 36 35 - 45 mm Hg    POCT SO2, Venous 66 45 - 75 %    POCT Oxy Hemoglobin, Venous 64.6 45.0 - 75.0 %    POCT Hematocrit Calculated, Venous 43.0 36.0 - 46.0 %    POCT Sodium, Venous 136 136 - 145 mmol/L    POCT Potassium, Venous 4.0 3.5 - 5.3 mmol/L    POCT Chloride, Venous 98 98 - 107 mmol/L    POCT Ionized Calicum, Venous 1.23 1.10 - 1.33 mmol/L    POCT Glucose, Venous 124 (H) 74 - 99 mg/dL    POCT Lactate, Venous 1.1 0.4 - 2.0 mmol/L    POCT Base Excess, Venous 6.6 (H) -2.0 - 3.0 mmol/L    POCT HCO3 Calculated, Venous 33.3 (H) 22.0 - 26.0 mmol/L    POCT Hemoglobin, Venous 14.3 12.0 - 16.0 g/dL    POCT Anion Gap, Venous 9.0 (L) 10.0 - 25.0 mmol/L    Patient Temperature 37.0 degrees Celsius    FiO2 57 %   Morphology   Result Value Ref Range    RBC Morphology No significant RBC morphology present      Clumped Platelets Present    ECG 12 lead   Result Value Ref Range    Ventricular Rate 107 BPM    Atrial Rate 107 BPM    VT Interval 140 ms    QRS Duration 68 ms    QT Interval 344 ms    QTC Calculation(Bazett) 459 ms    P Axis 76 degrees    R Axis -78 degrees    T Axis -9 degrees    QRS Count 17 beats    Q Onset 221 ms    P Onset 151 ms    P Offset 193 ms    T Offset 393 ms    QTC Fredericia 417 ms   Troponin, High Sensitivity, 1 Hour   Result Value Ref Range    Troponin I, High Sensitivity 8 0 - 13 ng/L   Blood Culture    Specimen: Peripheral Venipuncture; Blood culture   Result Value Ref Range    Blood Culture Loaded on Instrument - Culture in progress    Blood Culture    Specimen: Peripheral Venipuncture; Blood culture   Result Value Ref Range    Blood Culture Loaded on Instrument - Culture in progress    Lactate   Result Value Ref Range    Lactate     CBC   Result Value Ref Range    WBC 7.1 4.4 - 11.3 x10*3/uL    nRBC 0.0 0.0 - 0.0 /100 WBCs    RBC 3.83 (L) 4.00 - 5.20 x10*6/uL    Hemoglobin 12.3 12.0 - 16.0 g/dL    Hematocrit 36.6 36.0 - 46.0 %    MCV 96 80 - 100 fL    MCH 32.1 26.0 - 34.0 pg    MCHC 33.6 32.0 - 36.0 g/dL    RDW 16.2 (H) 11.5 - 14.5 %    Platelets 255 150 - 450 x10*3/uL   Comprehensive metabolic panel   Result Value Ref Range    Glucose 81 74 - 99 mg/dL    Sodium 134 (L) 136 - 145 mmol/L    Potassium 3.7 3.5 - 5.3 mmol/L    Chloride 99 98 - 107 mmol/L    Bicarbonate 27 21 - 32 mmol/L    Anion Gap 12 10 - 20 mmol/L    Urea Nitrogen 8 6 - 23 mg/dL    Creatinine 0.35 (L) 0.50 - 1.05 mg/dL    eGFR >90 >60 mL/min/1.73m*2    Calcium 8.5 (L) 8.6 - 10.3 mg/dL    Albumin 3.3 (L) 3.4 - 5.0 g/dL    Alkaline Phosphatase 73 33 - 136 U/L    Total Protein 6.1 (L) 6.4 - 8.2 g/dL    AST 17 9 - 39 U/L    Bilirubin, Total 0.3 0.0 - 1.2 mg/dL    ALT 10 7 - 45 U/L     Prior to Admission medications    Medication Sig Start Date End Date Taking? Authorizing Provider   baclofen (Lioresal) 10 mg tablet Take 1 tablet  (10 mg) by mouth 3 times a day.   Yes Historical Provider, MD   butalbital-acetaminophen-caff -40 mg tablet Take 1 tablet by mouth every 6 hours if needed for headaches. 11/10/23  Yes Historical Provider, MD   cholecalciferol, vitamin D3, (VITAMIN D3 ORAL) Take 1 tablet by mouth once daily.   Yes Historical Provider, MD   lactulose 10 gram/15 mL solution Take 30 mL (20 g) by mouth every 18 hours.   Yes Historical Provider, MD   lidocaine HCL 4 % liquid roll-on Apply 1 Application topically once daily as needed.   Yes Historical Provider, MD   lubricating eye drops ophthalmic solution Administer 1 drop into both eyes if needed for dry eyes.  Patient taking differently: Administer 1 drop into both eyes if needed for dry eyes. Refresh tears 5/22/24  Yes LANDEN Jang   meprobamate (Equanil) 400 mg tablet Take 1 tablet (400 mg) by mouth 4 times a day.   Yes Historical Provider, MD   pantoprazole (ProtoNix) 40 mg EC tablet Take 1 tablet (40 mg) by mouth 2 times a day. Do not crush, chew, or split. Continue twice daily x 8 weeks then continue once daily.  Patient taking differently: Take 1 tablet (40 mg) by mouth 2 times a day. Do not crush, chew, or split. 12/20/24 4/19/25 Yes Kelly Guerra MD   acetaminophen (Tylenol) 325 mg tablet Take 1 tablet (325 mg) by mouth every 6 hours if needed (pain).    Historical Provider, MD   ascorbic acid (Vitamin C) 500 mg tablet Take 1 tablet (500 mg) by mouth once daily.  Patient not taking: Reported on 3/5/2025 5/22/24   LANDEN Jang   ferrous sulfate, 325 mg ferrous sulfate, tablet Take 1 tablet by mouth once daily with breakfast.  Patient not taking: Reported on 3/5/2025 5/22/24   LANDEN Jang   mupirocin (Bactroban) 2 % ointment Apply 1 Application topically if needed. Apply to affected area as needed    Historical Provider, MD   nicotine (Nicoderm CQ) 14 mg/24 hr patch Place 1 patch over 24 hours on the skin once every 24 hours for  28 days.  Patient not taking: Reported on 3/5/2025 10/20/24 11/17/24  Zohra Ibarra MD   nicotine (Nicoderm CQ) 21 mg/24 hr patch Place 1 patch over 24 hours on the skin once daily. 5/23/24   LANDEN Jang   ondansetron (Zofran) 8 mg tablet Take 1 tablet (8 mg) by mouth every 8 hours if needed for nausea or vomiting.    Historical Provider, MD   prochlorperazine (Compazine) 10 mg tablet Take 1 tablet (10 mg) by mouth every 6 hours if needed for nausea or vomiting. 2/24/25   Kelly Guerra MD   sucralfate (Carafate) 100 mg/mL suspension Take 10 mL (1 g) by mouth every 6 hours.  Patient not taking: Reported on 3/5/2025 5/22/24   LANDEN Jang       Current Facility-Administered Medications:     acetaminophen (Tylenol) tablet 650 mg, 650 mg, oral, q4h PRN, 650 mg at 03/06/25 0349 **OR** acetaminophen (Tylenol) oral liquid 650 mg, 650 mg, oral, q4h PRN, 650 mg at 03/05/25 2031 **OR** acetaminophen (Tylenol) suppository 650 mg, 650 mg, rectal, q4h PRN, Lupillo Mcneil MD    ascorbic acid (Vitamin C) tablet 500 mg, 500 mg, oral, Daily, Lupillo Mcneil MD, 500 mg at 03/06/25 0817    baclofen (Lioresal) tablet 10 mg, 10 mg, oral, TID, Lupillo Mcneil MD, 10 mg at 03/06/25 0817    benzocaine-menthol (Cepastat Sore Throat) lozenge 1 lozenge, 1 lozenge, Mouth/Throat, q2h PRN, Lupillo Mcneil MD    butalbital-acetaminophen-caff -40 mg per tablet 1 tablet, 1 tablet, oral, q6h PRN, Lupillo Mcneil MD, 1 tablet at 03/06/25 0547    cholecalciferol (Vitamin D-3) tablet 2,000 Units, 2,000 Units, oral, Daily, Lupillo Mcneil MD, 2,000 Units at 03/06/25 0816    dextromethorphan-guaifenesin (Robitussin DM)  mg/5 mL oral liquid 5 mL, 5 mL, oral, q4h PRN, Lupillo Mcneil MD    ferrous sulfate tablet 325 mg, 65 mg of iron, oral, Daily with breakfast, Lupillo Mcneil MD, 325 mg at 03/06/25 0817    guaiFENesin (Mucinex) 12 hr tablet 600 mg, 600 mg, oral, q12h PRN,  Lupillo Mcneil MD    heparin (porcine) injection 5,000 Units, 5,000 Units, subcutaneous, q8h [place], Lupillo Mcneil MD, 5,000 Units at 03/06/25 0547    ipratropium-albuteroL (Duo-Neb) 0.5-2.5 mg/3 mL nebulizer solution 3 mL, 3 mL, nebulization, Once, Lupillo Mcneil MD    ipratropium-albuteroL (Duo-Neb) 0.5-2.5 mg/3 mL nebulizer solution 3 mL, 3 mL, nebulization, q6h while awake, Lupillo Mcneil MD, 3 mL at 03/06/25 0812    lactulose 20 gram/30 mL oral solution 20 g, 30 mL, oral, q18h, Lupillo Mcneil MD, 20 g at 03/05/25 1954    lubricating eye drops ophthalmic solution 1 drop, 1 drop, Both Eyes, PRN, Lupillo Mcneil MD    melatonin tablet 6 mg, 6 mg, oral, Nightly PRN, Lupillo Mcneil MD, 6 mg at 03/05/25 1957    meprobamate (Equanil) tablet 400 mg, 400 mg, oral, 4x daily, Lupillo Mcneil MD    nicotine (Nicoderm CQ) 14 mg/24 hr patch 1 patch, 1 patch, transdermal, q24h, Lupillo Mcneil MD, 1 patch at 03/05/25 1954    ondansetron (Zofran) tablet 8 mg, 8 mg, oral, q8h PRN, Lupillo Mcneil MD    oxygen (O2) therapy, , inhalation, Continuous - Inhalation, Lupillo Mcneil MD, Last Rate: 120,000 mL/hr at 03/05/25 2048, 21 percent at 03/06/25 0814    pantoprazole (ProtoNix) EC tablet 40 mg, 40 mg, oral, BID, Lupillo Mcneil MD, 40 mg at 03/06/25 0817    piperacillin-tazobactam (Zosyn) 4.5 g in dextrose (iso)  mL, 4.5 g, intravenous, q6h, Lupillo Mcneil MD, Last Rate: 200 mL/hr at 03/06/25 0817, 4.5 g at 03/06/25 0817    polyethylene glycol (Glycolax, Miralax) packet 17 g, 17 g, oral, Daily PRN, Lupillo Mcneil MD    prochlorperazine (Compazine) tablet 10 mg, 10 mg, oral, q6h PRN, Lupillo Mcneil MD    sucralfate (Carafate) suspension 1 g, 1 g, oral, q6h [place], Lupillo Mcneil MD, 1 g at 03/06/25 0547  CT angio chest for pulmonary embolism    Result Date: 3/5/2025  STUDY: CT Angiogram of the Chest; 3/5/2025 12:29 PM. INDICATION: Hypoxia and shortness of  breath today; Evaluate for pulmonary embolism.  History of lung malignancy. COMPARISON: Chest CT 12/16/2024. ACCESSION NUMBER(S): [id number] ORDERING CLINICIAN: MAURICE SHERIDAN TECHNIQUE:  CTA of the chest was performed with intravenous contrast. Images are reviewed and processed at a workstation according to the CT angiogram protocol with 3-D and/or MIP post processing imaging generated.  Omnipaque 350, 75 mL was administered intravenously. Automated mA/kV exposure control was utilized and patient examination was performed in strict accordance with principles of ALARA. FINDINGS: Pulmonary arteries: No pulmonary embolus is identified. Lungs/Pleura: Slightly lesion the previously seen in the posterior right upper lobe is now present today. Other pulmonary nodules are minimally changed. Small bibasal pleural effusions with associated atelectasis. This is more pronounced on the left. Mild upper lung predominant emphysema. Right lower lobe airways are debris-filled which is concerning for aspiration. No pneumothorax. Mild right lower lobe consolidation could be pneumonia in the correct clinical setting. Mediastinum: No enlarged lymph nodes. Thoracic aorta normal in caliber without evidence for dissection. Heart size normal. No pericardial effusion. There is coronary artery calcification, a marker for coronary atherosclerotic disease. Other: Imaging through the upper abdomen reveals no acute finding. Lucent lesion in the proximal right humerus is partially imaged measuring roughly 4.6 cm in craniocaudal dimension.    *No evidence for pulmonary embolus. *Small bibasal pleural effusions with associated atelectasis. This is more pronounced on the left. *Right lower lobe airways are debris-filled which is concerning for aspiration. Mild right lower lobe consolidation could be pneumonia in the correct clinical setting. *Lucent lesion in the proximal right humerus is partially imaged measuring roughly 4.6 cm in craniocaudal  dimension. Consider initial workup with plain film of the right humerus. Signed by Anand Villaseñor MD    ECG 12 lead    Result Date: 3/5/2025   Poor data quality, interpretation may be adversely affected Sinus tachycardia with frequent Premature ventricular complexes Left axis deviation Inferior infarct (cited on or before 01-SEP-2024) Anterolateral infarct (cited on or before 01-SEP-2024) Abnormal ECG When compared with ECG of 04-OCT-2024 13:12, Premature ventricular complexes are now Present ST no longer elevated in Inferior leads Inverted T waves have replaced nonspecific T wave abnormality in Inferior leads T wave inversion now evident in Anterior leads    CT abdomen pelvis w IV contrast    Result Date: 2/14/2025  Interpreted By:  Jesus Garrido, STUDY: CT ABDOMEN PELVIS W IV CONTRAST;  2/14/2025 7:19 am   INDICATION: Signs/Symptoms:cancer patinet, abdominal spasm and nausea.   COMPARISON: None.   ACCESSION NUMBER(S): [id number]   ORDERING CLINICIAN: SHAVONNE DONOVAN   TECHNIQUE: Contiguous axial images were obtained through the abdomen and pelvis after the administration of  75 mL Omnipaque 350 intravenous contrast. Coronal and sagittal reformations were made.   FINDINGS: LOWER CHEST: Bronchiectasis of the right lower lobe with bronchial debris present. Band like density of the right lower lobe may be combination of scarring/atelectasis.   ABDOMEN:   LIVER: Within normal limits.   BILE DUCTS: Nondilated.   GALLBLADDER: The gallbladder is not distended and without calcified stones.   PANCREAS: Within normal limits.   SPLEEN: Within normal limits.   ADRENAL GLANDS: Within normal limits.   KIDNEYS, URETERS, and BLADDER: The kidneys enhance symmetrically without focal lesion.  No hydroureteronephrosis bilaterally.Bladder unremarkable.   VESSELS: There is no aneurysmal dilatation of the abdominal aorta. Dense atherosclerotic calcification of the aorto bi-iliac tree. Severe stenosis of the common iliac arteries  bilaterally. The IVC is within normal limits.   BOWEL: No gross obstruction. Circumferential wall thickening of the distal rectum and perianal region present.   PERITONEUM/RETROPERITONEUM/LYMPH NODES: No ascites or free air, no fluid collection.   No retroperitoneal fluid collection or lymphadenopathy.   REPRODUCTIVE ORGANS: Likely status post hysterectomy. No mass lesions appreciated.   ABDOMINAL WALL: Unremarkable.   BONE AND SOFT TISSUE: Rotoscoliosis of the lumbar spine. Diffuse degenerative changes of the spine. Grade 2 anterolisthesis of L4 on L5 with left-sided pars defect of L4.       1. Circumferential wall thickening of the distal rectum/perianal region suggestive of proctitis. 2. Severe atherosclerotic disease of the aorto bi-iliac tree. Severe stenosis of the bilateral common iliac arteries. 3. Bronchiectasis with bronchial debris about the right lower lobe.   Signed by: Jesus Garrido 2/14/2025 8:54 AM Dictation workstation:   [id number]    Rad Onc CT Sim Images    Result Date: 2/5/2025  These images are not reportable by radiology and will not be interpreted by  Radiologists.    No results found for the last 90 days.       Assessment/Plan   Assessment & Plan  Pneumonia of right lower lobe due to infectious organism  Acute respiratory failure  Aspiration pneumonia  Cervical spinal stenosis  Spondylolisthesis  Hypertension  Migraine  Tobacco abuse  GERD  Vitamin D deficiency    Plan: Continue current medication.  Supportive care.  Physical therapy and Occupational Therapy.  Monitor CBC BMP.  Patient has been started on the broad-spectrum antibiotics.  Monitor pulse ox.  Consult pulmonology.  Monitor blood pressure.  Continue the nicotine patch.  The patient has been advised to quit smoking.We will take DVT, fall, aspiration, decubitus, and DVT precautions.  This has been discussed with the patient and she is agreeable.           Lupillo Mcneil MD

## 2025-03-06 NOTE — PROGRESS NOTES
Physical Therapy    Physical Therapy Evaluation    Patient Name: Karly Horton  MRN: [id number]  Department: [address]  Room: 11/11-A  Today's Date: 3/6/2025   Time Calculation  Start Time: 0905  Stop Time: 0920  Time Calculation (min): 15 min    Assessment/Plan   PT Assessment  Rehab Prognosis: Poor  Barriers to Discharge Home: No anticipated barriers  Evaluation/Treatment Tolerance: Patient limited by fatigue  Medical Staff Made Aware: Yes  Strengths: Support of Caregivers  Barriers to Participation: Comorbidities  End of Session Communication: Bedside nurse  Assessment Comment: pt is functioning at her baseline. pt requires assistance for her ADLs and her limited mobiltiy. No need for further acute skilled PT services. Will discontinue PT order.  End of Session Patient Position: Bed, 3 rail up, Alarm on (needs in reach)  IP OR SWING BED PT PLAN  Inpatient or Swing Bed: Inpatient  PT Plan  PT Plan: PT Eval only  PT Eval Only Reason: At baseline function  PT Frequency: PT eval only  PT Discharge Recommendations: 24 hr supervision due to cognition, No further acute PT  Equipment Recommended upon Discharge: Wheelchair  PT Recommended Transfer Status: Total assist  PT - OK to Discharge: Yes    Subjective   General Visit Information:  General  Reason for Referral: Impaired Mobility  Referred By: Lupillo Mcneil MD  Past Medical History Relevant to Rehab: pneumonia, NSCLC, CHF, anemia, fibromyositis  Family/Caregiver Present: No  Co-Treatment: OT  Co-Treatment Reason: optimization of patient outcomes and safety  Prior to Session Communication: Bedside nurse  Patient Position Received: Bed, 3 rail up, Alarm on  Preferred Learning Style: verbal, visual  General Comment: 79 y.o. female admitted due to SOB, pt was hypoxic while waiting to go for radiation. CT angio chest did not indicate a PE but did show possible aspiration v. pneumonia and Lucent lesion in the proximal right humerus  Home Living:  Home Living  Type of  Home: House  Lives With: Adult children (daughter)  Home Adaptive Equipment: Walker rolling or standard, Wheelchair-manual  Home Layout: One level  Home Access: Stairs to enter without rails  Entrance Stairs-Rails: None  Entrance Stairs-Number of Steps: stoop + 1 step; family bumps her up in w/c  Prior Level of Function:  Prior Function Per Pt/Caregiver Report  Level of [place]: Needs assistance with ADLs, Needs assistance with homemaking, Needs assistance with functional transfers  Receives Help From: Family (daughter)  ADL Assistance:  (uses briefs and wears hospital gowns at home)  Homemaking Assistance:  (daughter performs IADLs)  Ambulatory Assistance:  (assist with bed mobility, transfers at home, does not ambulate)  Prior Function Comments: requires assistance for dressing, bathing and toileting at bed level.  pt able to feed self at baseline.  daughter assists with bed mobility and stand pivot transfers to wheelchair at baseline  Precautions:  Precautions  Medical Precautions: Fall precautions (possible bone metastasis R humerus)     Vital Signs Comment: 92 bpm, 95% on RA, /88 (101) mmHg, RR 38 breaths/min     Objective   Pain:  Pain Assessment  Pain Assessment: 0-10  0-10 (Numeric) Pain Score: 0 - No pain  Cognition:  Cognition  Overall Cognitive Status: Impaired  Orientation Level: Oriented X4  Cognition Comments: flat affect, yelling out at times, difficult to redirect    General Assessments:  General Observation  General Observation: cachectic, bony prominences evident, mediport right chest    Activity Tolerance  Endurance: Decreased tolerance for upright activites  Activity Tolerance Comments: fair-  Rate of Perceived Exertion (RPE): 4    Sensation  Sensation Comment: denies paresthesias    Strength  Strength Comments: weakness B LEs equal, at least 3-/5  Coordination  Coordination Comment: decreased rate and accuracy of movements    Postural Control  Posture Comment: cachectic, rounded  shoudlers, forward head, increased cervical flexion, thoracic kyphosis    Static Sitting Balance  Static Sitting-Comment/Number of Minutes: min A for seated EOB    Static Standing Balance  Static Standing-Comment/Number of Minutes: mod A for standing balance  Functional Assessments:  ADL  ADL's Addressed: No    Bed Mobility  Bed Mobility:  (max A for trunk and B LE management, pt directing therapist, max A to square hips at EOB using the draw sheet. boosted to HOB with max A.)    Transfers  Transfer:  (mod A for sit<->stand transfer to/from bed, pt able to bear weight thorugh B LEs, declines further activity)    Ambulation/Gait Training  Ambulation/Gait Training Performed: No    Stairs  Stairs: No  Extremity/Trunk Assessments:  RLE   RLE :  (grossly WFL, weak)  LLE   LLE :  (grossly WFL, weak)  Outcome Measures:  [facility] Basic Mobility  Turning from your back to your side while in a flat bed without using bedrails: A lot  Moving from lying on your back to sitting on the side of a flat bed without using bedrails: A lot  Moving to and from bed to chair (including a wheelchair): A lot  Standing up from a chair using your arms (e.g. wheelchair or bedside chair): A lot  To walk in hospital room: A lot  Climbing 3-5 steps with railing: Total  Basic Mobility - Total Score: 11    Education Documentation  Mobility Training, taught by Meli Noble PT at 3/6/2025 11:22 AM.  Learner: Patient  Readiness: Acceptance  Method: Explanation  Response: Verbalizes Understanding    Education Comments  03/06/25 1122 Meli Noble, PT  Educated on use of call button

## 2025-03-06 NOTE — CARE PLAN
The patient's goals for the shift include      The clinical goals for the shift include safety    Problem: Pain - Adult  Goal: Verbalizes/displays adequate comfort level or baseline comfort level  Outcome: Progressing     Problem: Safety - Adult  Goal: Free from fall injury  Outcome: Progressing     Problem: Discharge Planning  Goal: Discharge to home or other facility with appropriate resources  Outcome: Progressing     Problem: Chronic Conditions and Co-morbidities  Goal: Patient's chronic conditions and co-morbidity symptoms are monitored and maintained or improved  Outcome: Progressing     Problem: Nutrition  Goal: Nutrient intake appropriate for maintaining nutritional needs  Outcome: Progressing     Problem: Pain  Goal: Takes deep breaths with improved pain control throughout the shift  Outcome: Progressing  Goal: Turns in bed with improved pain control throughout the shift  Outcome: Progressing  Goal: Walks with improved pain control throughout the shift  Outcome: Progressing  Goal: Performs ADL's with improved pain control throughout shift  Outcome: Progressing  Goal: Participates in PT with improved pain control throughout the shift  Outcome: Progressing  Goal: Free from opioid side effects throughout the shift  Outcome: Progressing  Goal: Free from acute confusion related to pain meds throughout the shift  Outcome: Progressing     Problem: Skin  Goal: Decreased wound size/increased tissue granulation at next dressing change  Outcome: Progressing  Goal: Participates in plan/prevention/treatment measures  Outcome: Progressing  Goal: Prevent/manage excess moisture  Outcome: Progressing  Goal: Prevent/minimize sheer/friction injuries  3/6/2025 1120 by Charmayne Green, RN  Flowsheets (Taken 3/6/2025 1120)  Prevent/minimize sheer/friction injuries:   Use pull sheet   Complete micro-shifts as needed if patient unable. Adjust patient position to relieve pressure points, not a full turn   HOB 30 degrees or less    Turn/reposition every 2 hours/use positioning/transfer devices  3/6/2025 1120 by Charmayne Green, RN  Outcome: Progressing  Flowsheets (Taken 3/6/2025 1120)  Prevent/minimize sheer/friction injuries:   Use pull sheet   Complete micro-shifts as needed if patient unable. Adjust patient position to relieve pressure points, not a full turn   HOB 30 degrees or less   Turn/reposition every 2 hours/use positioning/transfer devices  Goal: Promote/optimize nutrition  Outcome: Progressing  Goal: Promote skin healing  Outcome: Progressing

## 2025-03-06 NOTE — NURSING NOTE
Patient was assessed. VS stable at this time. Denies pain or discomfort at this time. Call light within reach. Ongoing care ontinues.

## 2025-03-06 NOTE — CARE PLAN
The patient's goals for the shift include      The clinical goals for the shift include safety      Problem: Pain - Adult  Goal: Verbalizes/displays adequate comfort level or baseline comfort level  Outcome: Progressing     Problem: Safety - Adult  Goal: Free from fall injury  Outcome: Progressing     Problem: Discharge Planning  Goal: Discharge to home or other facility with appropriate resources  Outcome: Progressing     Problem: Chronic Conditions and Co-morbidities  Goal: Patient's chronic conditions and co-morbidity symptoms are monitored and maintained or improved  Outcome: Progressing     Problem: Nutrition  Goal: Nutrient intake appropriate for maintaining nutritional needs  Outcome: Progressing     Problem: Pain  Goal: Takes deep breaths with improved pain control throughout the shift  Outcome: Progressing  Goal: Turns in bed with improved pain control throughout the shift  Outcome: Progressing  Goal: Walks with improved pain control throughout the shift  Outcome: Progressing  Goal: Performs ADL's with improved pain control throughout shift  Outcome: Progressing  Goal: Participates in PT with improved pain control throughout the shift  Outcome: Progressing  Goal: Free from opioid side effects throughout the shift  Outcome: Progressing  Goal: Free from acute confusion related to pain meds throughout the shift  Outcome: Progressing     Problem: Skin  Goal: Decreased wound size/increased tissue granulation at next dressing change  Outcome: Progressing  Goal: Participates in plan/prevention/treatment measures  Outcome: Progressing  Goal: Prevent/manage excess moisture  Outcome: Progressing  Goal: Prevent/minimize sheer/friction injuries  Outcome: Progressing  Goal: Promote/optimize nutrition  Outcome: Progressing  Goal: Promote skin healing  Outcome: Progressing

## 2025-03-06 NOTE — PROGRESS NOTES
Karly Horton is a 79 y.o. female on day 1 of admission presenting with Pneumonia of right lower lobe due to infectious organism.    Subjective   Patient seen and examined.  Resting in bed in no acute distress.  Awake alert oriented x 3.  No complaints.  No shortness of breath, cough.  Asking to discharge home today.     Objective     Physical Exam  Vitals and nursing note reviewed.   Constitutional:       General: She is not in acute distress.     Appearance: Normal appearance. She is normal weight. She is ill-appearing. She is not toxic-appearing or diaphoretic.   HENT:      Head: Normocephalic and atraumatic.      Right Ear: External ear normal.      Left Ear: External ear normal.      Nose: Nose normal.      Mouth/Throat:      Mouth: Mucous membranes are moist.      Pharynx: Oropharynx is clear.   Eyes:      Extraocular Movements: Extraocular movements intact.      Conjunctiva/sclera: Conjunctivae normal.      Pupils: Pupils are equal, round, and reactive to light.   Cardiovascular:      Rate and Rhythm: Normal rate and regular rhythm.      Pulses: Normal pulses.      Heart sounds: Normal heart sounds. No murmur heard.  Pulmonary:      Effort: Pulmonary effort is normal. No respiratory distress.      Breath sounds: Decreased breath sounds present. No wheezing, rhonchi or rales.      Comments: Room air.  Abdominal:      General: Bowel sounds are normal. There is distension.      Palpations: Abdomen is soft.      Tenderness: There is no abdominal tenderness.      Hernia: A hernia is present.   Genitourinary:     Comments: Deferred.  Musculoskeletal:         General: No swelling or tenderness. Normal range of motion.      Cervical back: Normal range of motion and neck supple.      Thoracic back: Scoliosis present.      Right lower leg: No edema.      Left lower leg: No edema.   Skin:     General: Skin is warm and dry.      Capillary Refill: Capillary refill takes less than 2 seconds.      Comments: Right chest  "medi-port in place with dressing clean dry intact.    Neurological:      General: No focal deficit present.      Mental Status: She is alert and oriented to person, place, and time.   Psychiatric:         Mood and Affect: Mood normal.         Behavior: Behavior normal.       Last Recorded Vitals  Blood pressure 129/89, pulse (!) 119, temperature 37.1 °C (98.8 °F), temperature source Temporal, resp. rate 21, height 1.422 m (4' 8\"), weight (!) 27.2 kg (60 lb), SpO2 91%.    Intake/Output last 3 Shifts:  I/O last 3 completed shifts:  In: 1350 (49.6 mL/kg) [P.O.:1150; IV Piggyback:200]  Out: 1150 (42.3 mL/kg) [Urine:1150 (1.2 mL/kg/hr)]  Weight: 27.2 kg     Relevant Results  This patient has a central line   Reason for the central line remaining today? Parenteral medication    Results for orders placed or performed during the hospital encounter of 03/05/25 (from the past 24 hours)   CBC   Result Value Ref Range    WBC 7.1 4.4 - 11.3 x10*3/uL    nRBC 0.0 0.0 - 0.0 /100 WBCs    RBC 3.83 (L) 4.00 - 5.20 x10*6/uL    Hemoglobin 12.3 12.0 - 16.0 g/dL    Hematocrit 36.6 36.0 - 46.0 %    MCV 96 80 - 100 fL    MCH 32.1 26.0 - 34.0 pg    MCHC 33.6 32.0 - 36.0 g/dL    RDW 16.2 (H) 11.5 - 14.5 %    Platelets 255 150 - 450 x10*3/uL   Comprehensive metabolic panel   Result Value Ref Range    Glucose 81 74 - 99 mg/dL    Sodium 134 (L) 136 - 145 mmol/L    Potassium 3.7 3.5 - 5.3 mmol/L    Chloride 99 98 - 107 mmol/L    Bicarbonate 27 21 - 32 mmol/L    Anion Gap 12 10 - 20 mmol/L    Urea Nitrogen 8 6 - 23 mg/dL    Creatinine 0.35 (L) 0.50 - 1.05 mg/dL    eGFR >90 >60 mL/min/1.73m*2    Calcium 8.5 (L) 8.6 - 10.3 mg/dL    Albumin 3.3 (L) 3.4 - 5.0 g/dL    Alkaline Phosphatase 73 33 - 136 U/L    Total Protein 6.1 (L) 6.4 - 8.2 g/dL    AST 17 9 - 39 U/L    Bilirubin, Total 0.3 0.0 - 1.2 mg/dL    ALT 10 7 - 45 U/L     No results found for the last 90 days.      Scheduled medications  ascorbic acid, 500 mg, oral, Daily  baclofen, 10 mg, oral, " TID  cholecalciferol, 2,000 Units, oral, Daily  ferrous sulfate, 65 mg of iron, oral, Daily with breakfast  heparin (porcine), 5,000 Units, subcutaneous, q8h [place]  ipratropium-albuteroL, 3 mL, nebulization, Once  ipratropium-albuteroL, 3 mL, nebulization, q6h while awake  lactulose, 30 mL, oral, q18h  meprobamate, 400 mg, oral, 4x daily  nicotine, 1 patch, transdermal, q24h  oxygen, , inhalation, Continuous - Inhalation  pantoprazole, 40 mg, oral, BID  piperacillin-tazobactam, 4.5 g, intravenous, q6h  sucralfate, 1 g, oral, q6h [place]      Continuous medications     PRN medications  PRN medications: acetaminophen **OR** acetaminophen **OR** acetaminophen, benzocaine-menthol, butalbital-acetaminophen-caff, dextromethorphan-guaifenesin, guaiFENesin, lubricating eye drops, melatonin, ondansetron, polyethylene glycol, prochlorperazine    ASSESSMENT:  Aspiration pneumonia  Small bibasilar pleural effusions with atelectasis  Acute hypoxic respiratory failure - resolved  Non small cell lung cancer  Tobacco dependence  Cervical spinal stenosis  Spondylolisthesis  Scoliosis  Migraine headaches  GERD  Vitamin d deficiency    PLAN:  IV antibiotics.  Zosyn.  Sputum culture.  Follow-up sputum and blood cultures.  Duo neb treatments.  Oxygen as needed.  Pulmonary hygiene.  Encourage incentive spirometer use 10 x / hour while awake.  Speech therapy evaluation.  Aspiration precautions.  Tobacco cessation.  Outpatient follow-up with Hematology-oncology.  Continue home medications as prescribed as appropriate.  PT/OT.  Fall precautions.  Up with assistance only.  Bed and chair alarm at all times.  Pressure ulcer prevention measures.  Turn and reposition every 2 hours and as needed.  Heels off bed.  Offloading.  DVT prophylaxis.  GI prophylaxis.  Supportive care.  Patient reassured.  Case management following for discharge planning.  Discharge plan home.  Anticipate discharge soon.  Discussed with Dr. Mcneil.    Shea Root,  APRN-CNP

## 2025-03-06 NOTE — PROGRESS NOTES
03/06/25 1738   Discharge Planning   Living Arrangements Children   Support Systems Children   Assistance Needed needs assistance with all ADL's   Type of Residence Private residence   Number of Stairs to Enter Residence 1   Number of Stairs Within Residence 0   Do you have animals or pets at home? Yes   Type of Animals or Pets dog   Expected Discharge Disposition Home   Financial Resource Strain   How hard is it for you to pay for the very basics like food, housing, medical care, and heating? Not very   Housing Stability   In the last 12 months, was there a time when you were not able to pay the mortgage or rent on time? N   In the past 12 months, how many times have you moved where you were living? 0   At any time in the past 12 months, were you homeless or living in a shelter (including now)? N   Transportation Needs   In the past 12 months, has lack of transportation kept you from medical appointments or from getting medications? no   In the past 12 months, has lack of transportation kept you from meetings, work, or from getting things needed for daily living? No   Stroke Family Assessment   Stroke Family Assessment Needed No     Met with patient at bedside.  An explanation of discharge planning was provided. Patient resides in an apartment with her daughter.  Patient is wheelchair bound. Patient relies on her daughter for all ADL's. Patient has an aide through [employer] [place] [employer] that helps with bathing and housekeeping. Patient is endentulous.  Daughter purees food for patient. Patient denies recent falls.  Patient smokes one cigarette daily and vapes when she is not smoking. Denies alcohol use. Patient is not sure if her daughter has filled out POA paperwork. Unable to reach daughter at this time.  Patient is declining [facility].

## 2025-03-06 NOTE — NURSING NOTE
Patient's daughter had the ED  message this  nurse to inform this nurse that she was I the ED and she had medications for the patient. This nurse went to the ED and patient's daughter gave this nurse  a bag that had a pill bottle that was labeled Fioracet and a pill bottle that was labeled Meprobamate with pills that was cut up into pieces, a cell phone and , and some diapers. Her daughter stated that the patient takes the Meprobamate out of one bottle and cut it up into fours and then put in another pill bottle. This nurse called pharmacy and informed them of the medications that her daughter had brought in and then took them down to the pharmacy. The pharmacist informed this nurse to give the medications back to the patient's daughter because we were not using the Fioracet and the bottle of Meprobamate had be decimated. This nurse went to the ED and returned the medications to the patient's daughter.

## 2025-03-06 NOTE — CARE PLAN
"0442 Patient found with a 5th vape device and oxygen out of nose. Nurse educated patient on this item being contraband and that she already had a nicotine patch in place. Nurse offered to place the device with the others in the patient bin but the patient refused to relinquish it at this time. Nurse will continue to round and remove it when she has fallen asleep.    Problem: Pain  Goal: Walks with improved pain control throughout the shift  Outcome: Not Progressing  Note: Patient unable to ambulate at this time.     Problem: Safety - Adult  Goal: Free from fall injury  Outcome: Progressing  Flowsheets (Taken 3/5/2025 2257)  Free from fall injury:   Instruct family/caregiver on patient safety   Based on caregiver fall risk screen, instruct family/caregiver to ask for assistance with transferring infant if caregiver noted to have fall risk factors     Problem: Discharge Planning  Goal: Discharge to home or other facility with appropriate resources  Outcome: Progressing  Flowsheets (Taken 3/5/2025 2257)  Discharge to home or other facility with appropriate resources:   Identify barriers to discharge with patient and caregiver   Identify discharge learning needs (meds, wound care, etc)  Note: Patient is having difficulty with concentration, has been fixated on medications and endorses taking double the dose of meprobamate to \"knock myself out\". Patient also found 4 vape sticks on her person and in her purse. Patient wants to use these in addition to her nicotine patch.     Problem: Chronic Conditions and Co-morbidities  Goal: Patient's chronic conditions and co-morbidity symptoms are monitored and maintained or improved  Outcome: Progressing  Flowsheets (Taken 3/5/2025 2257)  Care Plan - Patient's Chronic Conditions and Co-Morbidity Symptoms are Monitored and Maintained or Improved: Monitor and assess patient's chronic conditions and comorbid symptoms for stability, deterioration, or improvement     Problem: " Nutrition  Goal: Nutrient intake appropriate for maintaining nutritional needs  Outcome: Progressing  Note: Patient endorses a good appetite and has asked for macaroni and cheese multiple times, has finished 2 puddings and 1 jello.     Problem: Pain  Goal: Takes deep breaths with improved pain control throughout the shift  Outcome: Progressing  Goal: Turns in bed with improved pain control throughout the shift  Outcome: Progressing  Goal: Performs ADL's with improved pain control throughout shift  Outcome: Progressing  Goal: Free from opioid side effects throughout the shift  Outcome: Progressing  Goal: Free from acute confusion related to pain meds throughout the shift  Outcome: Progressing     Problem: Skin  Goal: Prevent/manage excess moisture  Outcome: Progressing  Flowsheets (Taken 3/6/2025 0036)  Prevent/manage excess moisture:   Cleanse incontinence/protect with barrier cream   Monitor for/manage infection if present   Moisturize dry skin  Goal: Prevent/minimize sheer/friction injuries  Outcome: Progressing  Flowsheets (Taken 3/6/2025 0036)  Prevent/minimize sheer/friction injuries:   Complete micro-shifts as needed if patient unable. Adjust patient position to relieve pressure points, not a full turn   Increase activity/out of bed for meals   Use pull sheet   HOB 30 degrees or less   Turn/reposition every 2 hours/use positioning/transfer devices  Goal: Promote/optimize nutrition  Outcome: Progressing  Flowsheets (Taken 3/6/2025 0036)  Promote/optimize nutrition:   Assist with feeding   Monitor/record intake including meals   Offer water/supplements/favorite foods  Goal: Promote skin healing  Outcome: Progressing  Flowsheets (Taken 3/6/2025 0036)  Promote skin healing:   Assess skin/pad under line(s)/device(s)   Turn/reposition every 2 hours/use positioning/transfer devices   The patient's goals for the shift include      The clinical goals for the shift include improved SOB.        Problem: Pain  Goal: Walks  with improved pain control throughout the shift  Outcome: Not Progressing  Note: Patient unable to ambulate at this time.

## 2025-03-07 ENCOUNTER — APPOINTMENT (OUTPATIENT)
Dept: RADIATION ONCOLOGY | Facility: CLINIC | Age: 80
End: 2025-03-07
Payer: MEDICARE

## 2025-03-07 ENCOUNTER — PHARMACY VISIT (OUTPATIENT)
Dept: PHARMACY | Facility: CLINIC | Age: 80
End: 2025-03-07
Payer: COMMERCIAL

## 2025-03-07 ENCOUNTER — TELEPHONE (OUTPATIENT)
Dept: HEMATOLOGY/ONCOLOGY | Facility: CLINIC | Age: 80
End: 2025-03-07
Payer: MEDICARE

## 2025-03-07 VITALS
WEIGHT: 63.05 LBS | HEART RATE: 98 BPM | SYSTOLIC BLOOD PRESSURE: 129 MMHG | DIASTOLIC BLOOD PRESSURE: 74 MMHG | OXYGEN SATURATION: 94 % | RESPIRATION RATE: 25 BRPM | HEIGHT: 56 IN | TEMPERATURE: 97.7 F | BODY MASS INDEX: 14.18 KG/M2

## 2025-03-07 LAB
ANION GAP SERPL CALCULATED.3IONS-SCNC: 11 MMOL/L (ref 10–20)
BUN SERPL-MCNC: 5 MG/DL (ref 6–23)
CALCIUM SERPL-MCNC: 8.2 MG/DL (ref 8.6–10.3)
CHLORIDE SERPL-SCNC: 90 MMOL/L (ref 98–107)
CO2 SERPL-SCNC: 31 MMOL/L (ref 21–32)
CREAT SERPL-MCNC: 0.32 MG/DL (ref 0.5–1.05)
EGFRCR SERPLBLD CKD-EPI 2021: >90 ML/MIN/1.73M*2
ERYTHROCYTE [DISTWIDTH] IN BLOOD BY AUTOMATED COUNT: 15.9 % (ref 11.5–14.5)
GLUCOSE SERPL-MCNC: 110 MG/DL (ref 74–99)
HCT VFR BLD AUTO: 34.3 % (ref 36–46)
HGB BLD-MCNC: 12.1 G/DL (ref 12–16)
MAGNESIUM SERPL-MCNC: 1.55 MG/DL (ref 1.6–2.4)
MCH RBC QN AUTO: 33 PG (ref 26–34)
MCHC RBC AUTO-ENTMCNC: 35.3 G/DL (ref 32–36)
MCV RBC AUTO: 94 FL (ref 80–100)
NRBC BLD-RTO: 0 /100 WBCS (ref 0–0)
PLATELET # BLD AUTO: 214 X10*3/UL (ref 150–450)
POTASSIUM SERPL-SCNC: 3.2 MMOL/L (ref 3.5–5.3)
RBC # BLD AUTO: 3.67 X10*6/UL (ref 4–5.2)
SODIUM SERPL-SCNC: 129 MMOL/L (ref 136–145)
WBC # BLD AUTO: 6.4 X10*3/UL (ref 4.4–11.3)

## 2025-03-07 PROCEDURE — 2500000004 HC RX 250 GENERAL PHARMACY W/ HCPCS (ALT 636 FOR OP/ED): Performed by: INTERNAL MEDICINE

## 2025-03-07 PROCEDURE — 99232 SBSQ HOSP IP/OBS MODERATE 35: CPT | Performed by: INTERNAL MEDICINE

## 2025-03-07 PROCEDURE — RXMED WILLOW AMBULATORY MEDICATION CHARGE

## 2025-03-07 PROCEDURE — 2500000002 HC RX 250 W HCPCS SELF ADMINISTERED DRUGS (ALT 637 FOR MEDICARE OP, ALT 636 FOR OP/ED): Performed by: INTERNAL MEDICINE

## 2025-03-07 PROCEDURE — 92610 EVALUATE SWALLOWING FUNCTION: CPT | Mod: GN

## 2025-03-07 PROCEDURE — 83735 ASSAY OF MAGNESIUM: CPT | Performed by: NURSE PRACTITIONER

## 2025-03-07 PROCEDURE — 84295 ASSAY OF SERUM SODIUM: CPT | Performed by: NURSE PRACTITIONER

## 2025-03-07 PROCEDURE — 2500000005 HC RX 250 GENERAL PHARMACY W/O HCPCS: Performed by: INTERNAL MEDICINE

## 2025-03-07 PROCEDURE — 2500000004 HC RX 250 GENERAL PHARMACY W/ HCPCS (ALT 636 FOR OP/ED): Performed by: NURSE PRACTITIONER

## 2025-03-07 PROCEDURE — 82374 ASSAY BLOOD CARBON DIOXIDE: CPT | Performed by: NURSE PRACTITIONER

## 2025-03-07 PROCEDURE — 85027 COMPLETE CBC AUTOMATED: CPT | Performed by: NURSE PRACTITIONER

## 2025-03-07 PROCEDURE — 2500000001 HC RX 250 WO HCPCS SELF ADMINISTERED DRUGS (ALT 637 FOR MEDICARE OP): Performed by: INTERNAL MEDICINE

## 2025-03-07 PROCEDURE — 2500000001 HC RX 250 WO HCPCS SELF ADMINISTERED DRUGS (ALT 637 FOR MEDICARE OP): Performed by: NURSE PRACTITIONER

## 2025-03-07 RX ORDER — POTASSIUM CHLORIDE 1.5 G/1.58G
40 POWDER, FOR SOLUTION ORAL ONCE
Status: COMPLETED | OUTPATIENT
Start: 2025-03-07 | End: 2025-03-07

## 2025-03-07 RX ORDER — MAGNESIUM SULFATE HEPTAHYDRATE 40 MG/ML
2 INJECTION, SOLUTION INTRAVENOUS ONCE
Status: COMPLETED | OUTPATIENT
Start: 2025-03-07 | End: 2025-03-07

## 2025-03-07 RX ORDER — AMOXICILLIN AND CLAVULANATE POTASSIUM 875; 125 MG/1; MG/1
1 TABLET, FILM COATED ORAL 2 TIMES DAILY
Qty: 10 TABLET | Refills: 0 | Status: SHIPPED | OUTPATIENT
Start: 2025-03-07 | End: 2025-03-12

## 2025-03-07 RX ADMIN — MAGNESIUM SULFATE IN WATER FOR 2 G: 40 INJECTION INTRAVENOUS at 16:28

## 2025-03-07 RX ADMIN — LACTULOSE 20 G: 20 SOLUTION ORAL at 08:36

## 2025-03-07 RX ADMIN — PIPERACILLIN SODIUM AND TAZOBACTAM SODIUM 4.5 G: 4; .5 INJECTION, SOLUTION INTRAVENOUS at 01:51

## 2025-03-07 RX ADMIN — PIPERACILLIN SODIUM AND TAZOBACTAM SODIUM 4.5 G: 4; .5 INJECTION, SOLUTION INTRAVENOUS at 15:01

## 2025-03-07 RX ADMIN — BACLOFEN 10 MG: 10 TABLET ORAL at 15:01

## 2025-03-07 RX ADMIN — PANTOPRAZOLE SODIUM 40 MG: 40 TABLET, DELAYED RELEASE ORAL at 08:37

## 2025-03-07 RX ADMIN — BACLOFEN 10 MG: 10 TABLET ORAL at 08:37

## 2025-03-07 RX ADMIN — PIPERACILLIN SODIUM AND TAZOBACTAM SODIUM 4.5 G: 4; .5 INJECTION, SOLUTION INTRAVENOUS at 08:36

## 2025-03-07 RX ADMIN — SUCRALFATE ORAL SUSPENSION 1 G: 1 SUSPENSION ORAL at 12:21

## 2025-03-07 RX ADMIN — SUCRALFATE ORAL SUSPENSION 1 G: 1 SUSPENSION ORAL at 05:03

## 2025-03-07 RX ADMIN — BUTALBITAL, ACETAMINOPHEN AND CAFFEINE 1 TABLET: 325; 50; 40 TABLET ORAL at 05:03

## 2025-03-07 RX ADMIN — POTASSIUM CHLORIDE 40 MEQ: 1.5 FOR SOLUTION ORAL at 08:37

## 2025-03-07 RX ADMIN — HEPARIN SODIUM 5000 UNITS: 5000 INJECTION, SOLUTION INTRAVENOUS; SUBCUTANEOUS at 05:03

## 2025-03-07 RX ADMIN — OXYCODONE HYDROCHLORIDE AND ACETAMINOPHEN 500 MG: 500 TABLET ORAL at 08:37

## 2025-03-07 RX ADMIN — HEPARIN SODIUM 5000 UNITS: 5000 INJECTION, SOLUTION INTRAVENOUS; SUBCUTANEOUS at 15:01

## 2025-03-07 RX ADMIN — Medication 21 PERCENT: at 07:24

## 2025-03-07 RX ADMIN — Medication 2000 UNITS: at 08:37

## 2025-03-07 RX ADMIN — FERROUS SULFATE TAB 325 MG (65 MG ELEMENTAL FE) 325 MG: 325 (65 FE) TAB at 08:37

## 2025-03-07 RX ADMIN — BUTALBITAL, ACETAMINOPHEN AND CAFFEINE 1 TABLET: 325; 50; 40 TABLET ORAL at 12:21

## 2025-03-07 ASSESSMENT — PAIN SCALES - GENERAL
PAINLEVEL_OUTOF10: 0 - NO PAIN
PAINLEVEL_OUTOF10: 0 - NO PAIN
PAINLEVEL_OUTOF10: 2
PAINLEVEL_OUTOF10: 5 - MODERATE PAIN

## 2025-03-07 ASSESSMENT — COGNITIVE AND FUNCTIONAL STATUS - GENERAL
DRESSING REGULAR UPPER BODY CLOTHING: A LOT
CLIMB 3 TO 5 STEPS WITH RAILING: A LOT
TURNING FROM BACK TO SIDE WHILE IN FLAT BAD: A LITTLE
TOILETING: A LOT
PERSONAL GROOMING: A LITTLE
MOBILITY SCORE: 15
MOVING TO AND FROM BED TO CHAIR: A LITTLE
HELP NEEDED FOR BATHING: A LOT
WALKING IN HOSPITAL ROOM: A LOT
STANDING UP FROM CHAIR USING ARMS: A LOT
MOVING FROM LYING ON BACK TO SITTING ON SIDE OF FLAT BED WITH BEDRAILS: A LITTLE
DRESSING REGULAR LOWER BODY CLOTHING: A LOT

## 2025-03-07 ASSESSMENT — PAIN - FUNCTIONAL ASSESSMENT
PAIN_FUNCTIONAL_ASSESSMENT: 0-10

## 2025-03-07 ASSESSMENT — PAIN DESCRIPTION - DESCRIPTORS: DESCRIPTORS: ACHING;DISCOMFORT

## 2025-03-07 ASSESSMENT — ACTIVITIES OF DAILY LIVING (ADL): EFFECT OF PAIN ON DAILY ACTIVITIES: SLEEP

## 2025-03-07 NOTE — PROGRESS NOTES
Department of Medicine  Division of Pulmonary, Critical Care, and Sleep Medicine  Progress Note    Subjective     Karly Horton is a 79 y.o. female on day 2 of admission presenting with Pneumonia of right lower lobe due to infectious organism.    Pt was seen today laying in bed on RA resting comfortably. States that she wants to leave. Denies SOB.     Objective     Vitals:      3/6/2025     3:30 PM 3/6/2025     3:40 PM 3/6/2025     3:50 PM 3/6/2025     7:37 PM 3/6/2025    11:20 PM 3/7/2025     3:39 AM 3/7/2025     8:01 AM   Vitals   Systolic  129  147 147 140 124   Diastolic  89  87 90 72 74   BP Location  Right arm  Right arm Right arm Right arm Left arm   Heart Rate 109 108 119    94   Temp  37.1 °C (98.8 °F)  37.1 °C (98.8 °F) 37.1 °C (98.8 °F) 36.8 °C (98.2 °F) 36.5 °C (97.7 °F)   Resp 30 13 21    20   Weight (lb)      63.05    BMI      14.14 kg/m2    BSA (m2)      1.06 m2         Oxygen Therapy:  SpO2: 95 %  Medical Gas Therapy: None (Room air)  Medical Gas Delivery Method: Nasal cannula       Intake/Output::  I/O last 3 completed shifts:  In: 2074 (72.5 mL/kg) [P.O.:1674; IV Piggyback:400]  Out: 3400 (118.9 mL/kg) [Urine:3400 (3.3 mL/kg/hr)]  Weight: 28.6 kg     Physical Exam:  Physical Exam  Constitutional:       General: She is not in acute distress.     Appearance: She is not toxic-appearing.      Comments: Frail elderly female    HENT:      Head: Normocephalic and atraumatic.      Comments: Temporal wasting.      Nose: Nose normal.      Mouth/Throat:      Pharynx: Oropharynx is clear.   Eyes:      Extraocular Movements: Extraocular movements intact.   Neck:      Comments: No visualized masses  Cardiovascular:      Rate and Rhythm: Normal rate and regular rhythm.      Heart sounds: No murmur heard.     No friction rub. No gallop.   Pulmonary:      Effort: No respiratory distress.      Breath sounds: Normal breath sounds. No wheezing, rhonchi or rales.   Abdominal:      General: There is no distension.       Palpations: Abdomen is soft.      Tenderness: There is no abdominal tenderness. There is no guarding.   Musculoskeletal:         General: No tenderness.      Right lower leg: No edema.      Left lower leg: No edema.   Skin:     General: Skin is warm and dry.   Neurological:      General: No focal deficit present.      Mental Status: She is alert and oriented to person, place, and time.   Psychiatric:         Mood and Affect: Mood normal.         Inpatient Medications:  ascorbic acid, 500 mg, oral, Daily  baclofen, 10 mg, oral, TID  cholecalciferol, 2,000 Units, oral, Daily  ferrous sulfate, 65 mg of iron, oral, Daily with breakfast  heparin (porcine), 5,000 Units, subcutaneous, q8h [place]  ipratropium-albuteroL, 3 mL, nebulization, Once  ipratropium-albuteroL, 3 mL, nebulization, q6h while awake  lactulose, 30 mL, oral, q18h  meprobamate, 400 mg, oral, 4x daily  nicotine, 1 patch, transdermal, q24h  oxygen, , inhalation, Continuous - Inhalation  pantoprazole, 40 mg, oral, BID  piperacillin-tazobactam, 4.5 g, intravenous, q6h  sucralfate, 1 g, oral, q6h [place]         PRN medications: acetaminophen **OR** acetaminophen **OR** acetaminophen, benzocaine-menthol, butalbital-acetaminophen-caff, dextromethorphan-guaifenesin, guaiFENesin, lubricating eye drops, melatonin, ondansetron, polyethylene glycol, prochlorperazine    Lab/Radiology/Diagnostic Review:  All labs and Imaging have been personally reviewed.     Assessment/Plan       Karly Horton is a Karly Horton is a 79 y.o. 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  Pulmonary was consulted for acute hypoxic respiratory failure.     Patient  was seen and assessed by myself and his chart was reviewed for previous pulmonary visits, Labs and imaging.  Patient has a known history of right-sided squamous cell carcinoma now status post chemotherapy and radiation therapy.  Patient is currently on consolidation Keytruda.  She does get concurrent radiotherapy.  Patient presented with acute hypoxic respiratory failure which is now resolved and patient is currently on room air saturating in the mid 90s.  I do not know the origin of the patient's shortness of breath and hypoxia.  She does have a small consolidation in the right lower lobe but this is now been present over the last 2 scans.  Infectious markers are negative.  Viral panels are negative.  Patient denies any recent sick contacts.  Patient does not show signs of radiation pneumonitis or checkpoint inhibitor pneumonitis.  This could likely be a acute exacerbation of her COPD with moderate obstruction and decreased DLCO which has quickly resolved.  Patient is not currently wheezing on her exam and is moving air appropriately.  Would recommend against steroids currently.  Will continue bronchodilators as needed.  Walking pulse oximetry befor home-going.  Mildly elevated BNP with past echo showing LVEF of 60 to 65% with spectral Doppler showing impaired left ventricular diastolic filling.  Patient does not appear fluid overloaded on exam.     Assessment:  #History of severe COPD  #Biopsy-proven right squamous cell carcinoma of the lung status postchemotherapy and radiation now on consolidation Keytruda  #Acute shortness of breath  #Acute hypoxic respiratory failure; now resolved  #HFpEF; does not appear to be in acute exacerbation     Recommendations:  -Currently on RA   -As needed bronchodilators  -ATBx per primary team. Will add Procal.  Low likelihood for pneumonia.  -Blood cx with NGTD x1 day.   -Recommend walking pulse oximetry before discharge  -Outpatient follow-up with outpatient pulmonologist for  inhaler therapy initiation as it does not appear she has home inhalers.      I spent 35 minutes in the professional and overall care of this patient.    Pulmonary team will sign off from here.     Dimple Langley MD, MPH  Pulmonary and Critical Care Medicine     Please excuse any typographical or unwanted errors as voice recognition software was used to complete this note.

## 2025-03-07 NOTE — NURSING NOTE
Had patient call daughter on her personal cell phone. Daughter answered. Patient told daughter that she would be getting transported home at 4pm. Daughter told her that it was going to 5:30pm. She also told patient that she was trying to get the house cleaned up. When SA Juarez asked to confirm if she would allow patient to come today, the daughter Karly stated yes. This was reported to Rhoda the care coordinator.

## 2025-03-07 NOTE — PROGRESS NOTES
Karly Horton is a 79 y.o. female on day 2 of admission presenting with Pneumonia of right lower lobe due to infectious organism.    Subjective   Patient seen and examined.  Resting in bed in no acute distress.  Awake alert oriented x 3.  No complaints.  No shortness of breath, cough.  Asking to discharge home today.  Vape bedside, patient states empty.  Patient states she does not ambulate.    Spoke with nursing, no new issues.    Objective     Physical Exam  Vitals and nursing note reviewed.   Constitutional:       General: She is not in acute distress.     Appearance: Normal appearance. She is normal weight. She is ill-appearing. She is not toxic-appearing or diaphoretic.   HENT:      Head: Normocephalic and atraumatic.      Right Ear: External ear normal.      Left Ear: External ear normal.      Nose: Nose normal.      Mouth/Throat:      Mouth: Mucous membranes are moist.      Pharynx: Oropharynx is clear.   Eyes:      Extraocular Movements: Extraocular movements intact.      Conjunctiva/sclera: Conjunctivae normal.      Pupils: Pupils are equal, round, and reactive to light.   Cardiovascular:      Rate and Rhythm: Normal rate and regular rhythm.      Pulses: Normal pulses.      Heart sounds: Normal heart sounds. No murmur heard.  Pulmonary:      Effort: Pulmonary effort is normal. No respiratory distress.      Breath sounds: Decreased breath sounds present. No wheezing, rhonchi or rales.      Comments: Room air.  Abdominal:      General: Bowel sounds are normal. There is distension.      Palpations: Abdomen is soft.      Tenderness: There is no abdominal tenderness.      Hernia: A hernia is present.   Genitourinary:     Comments: Deferred.  Musculoskeletal:         General: No swelling or tenderness. Normal range of motion.      Cervical back: Normal range of motion and neck supple.      Thoracic back: Scoliosis present.      Right lower leg: No edema.      Left lower leg: No edema.   Skin:     General:  "Skin is warm and dry.      Capillary Refill: Capillary refill takes less than 2 seconds.      Comments: Right chest medi-port in place with dressing clean dry intact.    Neurological:      General: No focal deficit present.      Mental Status: She is alert and oriented to person, place, and time.   Psychiatric:         Mood and Affect: Mood normal.         Behavior: Behavior normal.     Last Recorded Vitals  Blood pressure 124/74, pulse 94, temperature 36.5 °C (97.7 °F), temperature source Temporal, resp. rate 20, height 1.422 m (4' 8\"), weight (!) 28.6 kg (63 lb 0.8 oz), SpO2 95%.    Intake/Output last 3 Shifts:  I/O last 3 completed shifts:  In: 2074 (72.5 mL/kg) [P.O.:1674; IV Piggyback:400]  Out: 3400 (118.9 mL/kg) [Urine:3400 (3.3 mL/kg/hr)]  Weight: 28.6 kg     Telemetry normal sinus rhythm rate 90's    Relevant Results  Results for orders placed or performed during the hospital encounter of 03/05/25 (from the past 24 hours)   CBC   Result Value Ref Range    WBC 6.4 4.4 - 11.3 x10*3/uL    nRBC 0.0 0.0 - 0.0 /100 WBCs    RBC 3.67 (L) 4.00 - 5.20 x10*6/uL    Hemoglobin 12.1 12.0 - 16.0 g/dL    Hematocrit 34.3 (L) 36.0 - 46.0 %    MCV 94 80 - 100 fL    MCH 33.0 26.0 - 34.0 pg    MCHC 35.3 32.0 - 36.0 g/dL    RDW 15.9 (H) 11.5 - 14.5 %    Platelets 214 150 - 450 x10*3/uL   Basic Metabolic Panel   Result Value Ref Range    Glucose 110 (H) 74 - 99 mg/dL    Sodium 129 (L) 136 - 145 mmol/L    Potassium 3.2 (L) 3.5 - 5.3 mmol/L    Chloride 90 (L) 98 - 107 mmol/L    Bicarbonate 31 21 - 32 mmol/L    Anion Gap 11 10 - 20 mmol/L    Urea Nitrogen 5 (L) 6 - 23 mg/dL    Creatinine 0.32 (L) 0.50 - 1.05 mg/dL    eGFR >90 >60 mL/min/1.73m*2    Calcium 8.2 (L) 8.6 - 10.3 mg/dL   Magnesium   Result Value Ref Range    Magnesium 1.55 (L) 1.60 - 2.40 mg/dL     No results found for the last 90 days.    Scheduled medications  ascorbic acid, 500 mg, oral, Daily  baclofen, 10 mg, oral, TID  cholecalciferol, 2,000 Units, oral, " Daily  ferrous sulfate, 65 mg of iron, oral, Daily with breakfast  heparin (porcine), 5,000 Units, subcutaneous, q8h [place]  ipratropium-albuteroL, 3 mL, nebulization, Once  ipratropium-albuteroL, 3 mL, nebulization, q6h while awake  lactulose, 30 mL, oral, q18h  meprobamate, 400 mg, oral, 4x daily  nicotine, 1 patch, transdermal, q24h  oxygen, , inhalation, Continuous - Inhalation  pantoprazole, 40 mg, oral, BID  piperacillin-tazobactam, 4.5 g, intravenous, q6h  sucralfate, 1 g, oral, q6h [place]      Continuous medications     PRN medications  PRN medications: acetaminophen **OR** acetaminophen **OR** acetaminophen, benzocaine-menthol, butalbital-acetaminophen-caff, dextromethorphan-guaifenesin, guaiFENesin, lubricating eye drops, melatonin, ondansetron, polyethylene glycol, prochlorperazine    ASSESSMENT:  Aspiration pneumonia  Small bibasilar pleural effusions with atelectasis  Acute hypoxic respiratory failure - resolved  Non small cell lung cancer  Tobacco dependence  Cervical spinal stenosis  Spondylolisthesis  Scoliosis  Migraine headaches  GERD  Vitamin d deficiency  Hyponatremia  Hypokalemia  Hypomagnesemia    PLAN:  Patient is doing well this morning.  No new issues.  No shortness of breath, cough.  Respiratory status, pulse oximetry stable on room air.  Pulmonology following.  Input appreciated.  Management per recommendations.  IV Zosyn.  Transition to oral antibiotics on discharge.  Sputum culture if able.  Follow-up sputum and blood cultures.  DuoNeb treatments.  Room air pulse oximetry.  Patient states she does not ambulate.  Pulmonary hygiene.  Encourage incentive parameter use 10 times per hour while awake.  Tobacco vape cessation.  Speech therapy evaluation.  Follow-up.  Aspiration precautions.  Tobacco cessation.  Outpatient follow-up with Hematology-oncology.  Continue home medications as described as appropriate.  Labs reviewed.  Sodium noted, 129.  Regular diet.  Potassium 3.2.  Potassium chloride 40  meq x 1.  Magnesium 1.55.  Magnesium replacement.  Repeat BMP Monday.  Results to primary care provider.  Follow-up with primary care provider in 1 week.  PT/OT.  Fall precautions.  Up with assistance only.  Bed and chair alarm at all times.  Pressure ulcer prevention measures.  Turn and reposition every 2 hours and as needed.  Heels off bed.  Offloading.  DVT prophylaxis.  Heparin subcutaneous.  GI prophylaxis.  PPI Protonix.  Supportive care.  Patient reassured.  Case management following for discharge planning.  Discharge plan home.  Discussed with Dr. Mcneil.  Okay to discharge home after cleared by Pulmonology when arrangements are made by case management.    ADDENDUM:  Pulmonology input appreciated.  Signed off.  Status post speech therapy evaluation.  Diet per recommendations.  Aspiration precautions.  Per case management, patient is active with home health care.  Orders placed.  Okay to discharge home with home health care when arrangements are made by case management.  Discussed with Dr. Mcneil.      Shea Root, APRN-CNP

## 2025-03-07 NOTE — NURSING NOTE
Assumed care of patient.  Patient in bed watching tv.  Bedside shift report received.   Heart monitor showing NSR with HR of 94.  Call light in reach.

## 2025-03-07 NOTE — PROGRESS NOTES
Speech-Language Pathology    Inpatient Clinical Swallow Evaluation    Patient Name: Karly Horton  MRN: [id number]  Today's Date: 3/7/2025   Time Calculation  Start Time: 1305  Stop Time: 1320  Time Calculation (min): 15 min          Current Problem:   1. Pneumonia of right lower lobe due to infectious organism        2. Acute hypoxic respiratory failure (Multi)        3. Generalized weakness  Referral to Home Care          History Of Present Illness: Taken from ED Admission note from 3/5/25  Karly Horton is a 79 y.o. female with a past medical history significant for non-small cell lung cancer presenting with the emergency room with a complaint of shortness of breath.  Patient does not have any home O2.  Patient was hypoxic and was saturating in 80s on room air per EMS.  Patient goes to radiation daily and today she started experiencing shortness of breath while waiting to go for it.  Patient denied any fever, chills or rigor.  Patient had mild cough but no expectoration.  Patient denied any chest pain, palpitation, odynophagia, dysphagia or recent change in weight or appetite.  Patient denied any diarrhea, dysuria, hematuria frequency.  In the emergency room initial workup was done.  Patient's glucose was 121, proBNP 243 and CBC was normal.  Patient was negative for influenza A, expensively and COVID.  Blood gases showed pCO2 of 55, 36, pH 7.39.CT angio was done.  It did not show any evidence of pulmonary embolism.  It showed small bibasilar pleural effusion with associated atelectasis.  This was more pronounced on the left.  Right lower lobe airways had decreased gas-filled which is concerning for aspiration.  Mild right lobe consolidation could be pneumonia incorrect clinical setting.  Lucent lesion in the proximal right humerus appears partially imaged waiting roughly 4.6 cm in craniocaudal dimension.  The patient was admitted to the floor for further  management.    Recommendations:  Recommendations  Risk for Aspiration: No  Solid Diet Recommendations : Minced & moist/ground (IDDSI Level 5)  Liquid Diet Recommendations: Thin (IDDSI Level 0)  Compensatory Swallowing Strategies: Upright 90 degrees as possible for all oral intake, Small bites/sips, Add moisture to solids  Medication Administration Recommendations: Whole, With Liquid  Follow up treatments:  (none)  Dysphagia Goals:  (none)      Assessment:  Assessment  Assessment Results: Mild oral dysphagia in the setting of edentia. Grossly WFL pharyngeal swallow for puree and thin liquids. Pt reports a baseline diet of minced and moist solids. Recommend pt resume baseline diet in order to optimize PO intake.      Plan:  Plan  Inpatient/Swing Bed or Outpatient: Inpatient  SLP Plan: No skilled SLP  SLP Discharge Recommendations: Home with no further SLP  Patient/Caregiver Agreeable: Yes  SLP - OK to Discharge: Yes      Subjective   Pt awake and alert on approach. Pleasant and participatory for evaluation. Oriented to self, place, and situation.     General Visit Information:  General Information  Patient Class: Inpatient  Living Environment: Home, Live with __ (daughter)  Ordering Physician: Tarun  Reason for Referral: assess oropharyngeal swallow, deterine if MBS is needed, r/o aspiration  Referred By: Lupillo Mcneil MD  Past Medical History Relevant to Rehab: history of non-small cell lung cancer, Lumbar Spondylolisthesis, C4-6 bulging disc, Rotator Cuff Tendon Tear,  Prior Level of Function: Decreased function (pt reports a minced and moist diet at baseline)  Patient Seen During This Visit: Yes  Prior to Session Communication: Bedside nurse  Reviewed Procedures and Risks: Yes  Date of Onset: 03/05/25  Date of Order: 03/07/25  [employer] Diet: minced and moist/thins  Current Diet : regular/thins      Objective     Baseline Assessment:  Baseline Assessment  Respiratory Status: Room air  Patient Positioning:  Upright in Bed  Volitional Swallow: Within Functional Limits    Pain:  Pain Assessment  Pain Assessment: 0-10      Oral/Motor Assessment:  Oral/Motor Assessment  Dentition: Edentulous  Oral Motor: Within Functional Limits  Facial Symmetry: Within Functional Limits  Labial Agility: Within Functional Limits  Lingual ROM: Within Functional Limits  Lingual Symmetry: Within Functional Limits  Vocal Quality: Within Functional Limits  Intelligibility: Intelligible      Clinical Observations:  Clinical Observations  Patient Positioning: Upright in Bed  Management of Oral Secretions: Adequate  Clinical Observation Comment: Mild oral dysphagia in setting of edentia. Grossly intact pharyngeal swallow for thins and puree. Unable to assess advanced solids due to pt declined. No overt s/sx aspiration noted. No changes in vocal quality or respiratory status throughout exam.

## 2025-03-07 NOTE — PROGRESS NOTES
03/07/25 1241   Discharge Planning   Home or Post Acute Services In home services   Type of Home Care Services Home nursing visits;Home OT;Home PT   Expected Discharge Disposition Home Health  ([facility])   Does the patient need discharge transport arranged? Yes   RoundTrip coordination needed? Yes     Patient is currently active with [facility].  Will need an external referral for resumption of care. Will need to confirm [facility] prior to discharge.  Per daughter, will need ambulance stretcher transportation home .    UPDATE:  AVS and HHC referral sent to [facility] in [facility]. SOC confirmed to be in 24 - 48 hours.   [facility] stretcher transportation arranged for 1730 . Daughter notified via phone. When daughter was called, she was yelling at this caller that she is too sick for her mother to come home. Daughter began to scream and cry and was unable to be redirected.  This T[facility] also received a call from [facility] dispatch stating same as above.     [facility] was notified.

## 2025-03-07 NOTE — NURSING NOTE
Assumed care of patient. Patient is resting in bed. Denies any further needs. Tele monitor maintained. Call light is seen within reach. Patient's belongings are seen within reach. Bed is low and in locked position. Bed alarm is on.

## 2025-03-07 NOTE — TELEPHONE ENCOUNTER
Called and spoke with patient. Patient inquiring when her infusion appointment will be rescheduled. This RN advised patient that she is scheduled to see Dr. Mendoza on Wednesday 3/12 and Dr. Mendoza will see her first to determine when she would like for her to have treatment/ get her appointment rescheduled. Patient agreed to plan and verbalized understanding using the teach back method, no further questions at this time.

## 2025-03-09 LAB
BACTERIA BLD CULT: NORMAL
BACTERIA BLD CULT: NORMAL

## 2025-03-10 ENCOUNTER — APPOINTMENT (OUTPATIENT)
Dept: RADIATION ONCOLOGY | Facility: CLINIC | Age: 80
End: 2025-03-10
Payer: MEDICARE

## 2025-03-10 ENCOUNTER — TELEPHONE (OUTPATIENT)
Dept: RADIATION ONCOLOGY | Facility: CLINIC | Age: 80
End: 2025-03-10
Payer: MEDICARE

## 2025-03-10 DIAGNOSIS — E87.1 HYPONATREMIA: ICD-10-CM

## 2025-03-11 ENCOUNTER — APPOINTMENT (OUTPATIENT)
Dept: RADIATION ONCOLOGY | Facility: CLINIC | Age: 80
End: 2025-03-11
Payer: MEDICARE

## 2025-03-12 ENCOUNTER — APPOINTMENT (OUTPATIENT)
Dept: RADIATION ONCOLOGY | Facility: CLINIC | Age: 80
End: 2025-03-12
Payer: MEDICARE

## 2025-03-12 ENCOUNTER — APPOINTMENT (OUTPATIENT)
Dept: HEMATOLOGY/ONCOLOGY | Facility: CLINIC | Age: 80
End: 2025-03-12
Payer: MEDICARE

## 2025-03-13 ENCOUNTER — APPOINTMENT (OUTPATIENT)
Dept: RADIATION ONCOLOGY | Facility: CLINIC | Age: 80
End: 2025-03-13
Payer: MEDICARE

## 2025-03-13 ENCOUNTER — APPOINTMENT (OUTPATIENT)
Dept: HEMATOLOGY/ONCOLOGY | Facility: CLINIC | Age: 80
End: 2025-03-13
Payer: MEDICARE

## 2025-03-13 DIAGNOSIS — C44.92 SQUAMOUS CELL CARCINOMA, SMALL CELL, NONKERATINIZING: ICD-10-CM

## 2025-03-13 NOTE — DISCHARGE SUMMARY
Discharge Diagnosis  Pneumonia of right lower lobe due to infectious organism    Issues Requiring Follow-Up  Carcinoma of lung  COPD exacerbation  Respiratory failure  Pneumonia  Cervical spinal stenosis  Migraine    Discharge Meds     Medication List      START taking these medications     sucralfate 100 mg/mL suspension; Commonly known as: Carafate; Take 10 mL   (1 g) by mouth every 6 hours.     CHANGE how you take these medications     lubricating eye drops ophthalmic solution; Administer 1 drop into both   eyes if needed for dry eyes.; What changed: additional instructions   nicotine 14 mg/24 hr patch; Commonly known as: Nicoderm CQ; Place 1   patch over 24 hours on the skin once every 24 hours for 28 days.; What   changed: Another medication with the same name was removed. Continue   taking this medication, and follow the directions you see here.   pantoprazole 40 mg EC tablet; Commonly known as: ProtoNix; Take 1 tablet   (40 mg) by mouth 2 times a day. Do not crush, chew, or split. Continue   twice daily x 8 weeks then continue once daily.; What changed: additional   instructions     CONTINUE taking these medications     acetaminophen 325 mg tablet; Commonly known as: Tylenol   baclofen 10 mg tablet; Commonly known as: Lioresal   butalbital-acetaminophen-caff -40 mg tablet   lactulose 20 gram/30 mL oral solution   lidocaine HCL 4 % liquid roll-on   meprobamate 400 mg tablet; Commonly known as: Equanil   mupirocin 2 % ointment; Commonly known as: Bactroban   ondansetron 8 mg tablet; Commonly known as: Zofran   prochlorperazine 10 mg tablet; Commonly known as: Compazine; Take 1   tablet (10 mg) by mouth every 6 hours if needed for nausea or vomiting.   VITAMIN D3 ORAL     STOP taking these medications     ascorbic acid 500 mg tablet; Commonly known as: Vitamin C   ferrous sulfate tablet     ASK your doctor about these medications     amoxicillin-pot clavulanate 875-125 mg tablet; Commonly known as:    Augmentin; Take 1 tablet by mouth 2 times a day for 5 days.; Ask about:   Should I take this medication?       Test Results Pending At Discharge  Pending Labs       No current pending labs.            Hospital Course   The patient was admitted to complaint of shortness of breath.  Patient had acute respiratory failure.  The patient was treated with IV antibiotics.  The patient had a history of lung cancer.  Patient was given aerosol treatment.  The patient was evaluated by pulmonary.  Condition of the patient improved.  The patient was discharged home in a stable condition with advised to follow-up with a primary care physician    Pertinent Physical Exam At Time of Discharge  Physical Exam    Outpatient Follow-Up  Future Appointments   Date Time Provider Department Center   3/13/2025  1:00 PM MD NAGI [id number] [facility]   3/13/2025  2:30 PM LUVTB2 [id number] [facility]   3/14/2025  2:30 PM LUVTB2 [id number] [facility]   3/17/2025  2:30 PM LUVTB2 [id number] [facility]   3/18/2025  2:30 PM LUVTB2 [id number] [facility]   3/19/2025  2:30 PM LUVTB2 [id number] [facility]   3/19/2025  2:30 PM GROSS, OTV [id number] [facility]   3/20/2025  2:30 PM LUVTB2 [id number] [facility]   3/21/2025  2:30 PM LUVTB2 [id number] [facility]   3/24/2025  2:30 PM LUVTB2 [id number] [facility]   4/17/2025  9:30 AM MD TONEY ÁlvarezMOC1 [facility]   4/17/2025 10:00 AM INF 10 MENTOR [id number] [facility]   5/29/2025 11:30 AM MD TONEY ÁlvarezMOC1 [facility]   5/29/2025  1:30 PM INF 01 MENTOR [id number] [facility]   7/10/2025  8:00 AM MD TONEY ÁlvarezMOC1 [facility]   7/10/2025  8:30 AM INF 12 MENTOR [id number] Arnold Mcneil MD

## 2025-03-14 ENCOUNTER — APPOINTMENT (OUTPATIENT)
Dept: RADIATION ONCOLOGY | Facility: CLINIC | Age: 80
End: 2025-03-14
Payer: MEDICARE

## 2025-03-14 ENCOUNTER — SOCIAL WORK (OUTPATIENT)
Dept: CASE MANAGEMENT | Facility: HOSPITAL | Age: 80
End: 2025-03-14
Payer: MEDICARE

## 2025-03-14 NOTE — PROGRESS NOTES
DEJA called Pt to check in. DEJA informed Pt that MD put in an order for home PT. Pt states that she has assistance thru [employer]. SW is unsure in what form this assistance is. [employer] does not provide skilled care though so it can not overlap. Pt is unsure if she will be agreeable to this but SW advised they would call and she could decide then. She feels that by Monday, she will be able to get into her wheelchair with no problems. She has all [facility] rides set and plans on being at her appointments. Deja informed Pt that she would remain available for any needs.

## 2025-03-17 ENCOUNTER — HOSPITAL ENCOUNTER (OUTPATIENT)
Dept: RADIATION ONCOLOGY | Facility: CLINIC | Age: 80
Setting detail: RADIATION/ONCOLOGY SERIES
Discharge: HOME | End: 2025-03-17
Payer: MEDICARE

## 2025-03-17 ENCOUNTER — TELEPHONE (OUTPATIENT)
Dept: RADIATION ONCOLOGY | Facility: CLINIC | Age: 80
End: 2025-03-17
Payer: MEDICARE

## 2025-03-17 ENCOUNTER — SOCIAL WORK (OUTPATIENT)
Dept: CASE MANAGEMENT | Facility: HOSPITAL | Age: 80
End: 2025-03-17
Payer: MEDICARE

## 2025-03-17 ENCOUNTER — DOCUMENTATION (OUTPATIENT)
Dept: HEMATOLOGY/ONCOLOGY | Facility: CLINIC | Age: 80
End: 2025-03-17
Payer: MEDICARE

## 2025-03-17 VITALS
DIASTOLIC BLOOD PRESSURE: 57 MMHG | HEART RATE: 112 BPM | SYSTOLIC BLOOD PRESSURE: 82 MMHG | RESPIRATION RATE: 18 BRPM | TEMPERATURE: 96.1 F | OXYGEN SATURATION: 95 %

## 2025-03-17 VITALS
RESPIRATION RATE: 14 BRPM | SYSTOLIC BLOOD PRESSURE: 93 MMHG | HEART RATE: 112 BPM | DIASTOLIC BLOOD PRESSURE: 65 MMHG | OXYGEN SATURATION: 91 % | TEMPERATURE: 96.3 F

## 2025-03-17 DIAGNOSIS — C34.31 MALIGNANT NEOPLASM OF LOWER LOBE, RIGHT BRONCHUS OR LUNG: ICD-10-CM

## 2025-03-17 DIAGNOSIS — Z51.0 ENCOUNTER FOR ANTINEOPLASTIC RADIATION THERAPY: ICD-10-CM

## 2025-03-17 DIAGNOSIS — R00.0 TACHYCARDIA: Primary | ICD-10-CM

## 2025-03-17 LAB
RAD ONC MSQ ACTUAL FRACTIONS DELIVERED: 8
RAD ONC MSQ ACTUAL SESSION DELIVERED DOSE: 350 CGRAY
RAD ONC MSQ ACTUAL TOTAL DOSE: 2800 CGRAY
RAD ONC MSQ ELAPSED DAYS: 21
RAD ONC MSQ LAST DATE: NORMAL
RAD ONC MSQ PRESCRIBED FRACTIONAL DOSE: 350 CGRAY
RAD ONC MSQ PRESCRIBED NUMBER OF FRACTIONS: 15
RAD ONC MSQ PRESCRIBED TECHNIQUE: NORMAL
RAD ONC MSQ PRESCRIBED TOTAL DOSE: 5250 CGRAY
RAD ONC MSQ PRESCRIPTION PATTERN COMMENT: NORMAL
RAD ONC MSQ START DATE: NORMAL
RAD ONC MSQ TREATMENT COURSE NUMBER: 1
RAD ONC MSQ TREATMENT SITE: NORMAL

## 2025-03-17 PROCEDURE — 77386 HC INTENSITY-MODULATED RADIATION THERAPY (IMRT), COMPLEX: CPT | Performed by: STUDENT IN AN ORGANIZED HEALTH CARE EDUCATION/TRAINING PROGRAM

## 2025-03-17 RX ORDER — ALBUTEROL SULFATE 0.83 MG/ML
3 SOLUTION RESPIRATORY (INHALATION) AS NEEDED
Status: CANCELLED | OUTPATIENT
Start: 2025-03-18

## 2025-03-17 RX ORDER — EPINEPHRINE 0.3 MG/.3ML
0.3 INJECTION SUBCUTANEOUS EVERY 5 MIN PRN
Status: CANCELLED | OUTPATIENT
Start: 2025-03-18

## 2025-03-17 RX ORDER — FAMOTIDINE 10 MG/ML
20 INJECTION, SOLUTION INTRAVENOUS ONCE AS NEEDED
Status: CANCELLED | OUTPATIENT
Start: 2025-03-18

## 2025-03-17 RX ORDER — DIPHENHYDRAMINE HYDROCHLORIDE 50 MG/ML
50 INJECTION, SOLUTION INTRAMUSCULAR; INTRAVENOUS AS NEEDED
Status: CANCELLED | OUTPATIENT
Start: 2025-03-18

## 2025-03-17 RX ORDER — SODIUM CHLORIDE 9 MG/ML
1000 INJECTION, SOLUTION INTRAVENOUS ONCE
Status: CANCELLED | OUTPATIENT
Start: 2025-03-19 | End: 2025-03-19

## 2025-03-17 ASSESSMENT — PAIN - FUNCTIONAL ASSESSMENT: PAIN_FUNCTIONAL_ASSESSMENT: 0-10

## 2025-03-17 ASSESSMENT — PAIN SCALES - GENERAL
PAINLEVEL_OUTOF10: 5 - MODERATE PAIN
PAINLEVEL_OUTOF10: 5

## 2025-03-17 ASSESSMENT — PAIN DESCRIPTION - DESCRIPTORS: DESCRIPTORS: JABBING

## 2025-03-17 NOTE — PROGRESS NOTES
Radiation Oncology On Treatment Visit    Patient Name:  Karly Horton  MRN:  [id number]  :  1945    Referring Provider: Aldair Odom MD  Primary Care Provider: Julian Chaney MD  Care Team: Patient Care Team:  Julian Chaney MD as PCP - General (Internal Medicine)  Carisa Yuan RN as Nurse Navigator (Hematology and Oncology)  Kelly Guerra MD as Medical Oncologist (Hematology and Oncology)    Date of Service: 3/17/2025     Diagnosis:   Specialty Problems          Radiation Oncology Problems    Malignant neoplasm of overlapping sites of right lung (Multi)        Squamous cell carcinoma, small cell, nonkeratinizing         Treatment Summary:  IMRT: Right Lung    Treatment Period Technique Fraction Dose Fractions Total Dose   Course 1 2025-3/17/2025  (days elapsed: 21)         R_Thrx_Med 2025-3/17/2025 VMAT 350 / 350 cGy 8 / 15 2800 / 5,250 cGy     SUBJECTIVE: The patient had nursing evaluate in the lobby, the patient was pale and sweating.  The patient seemed confused per staff.     OBJECTIVE:   Vital Signs:  BP 82/57   Pulse (!) 112   Temp 35.6 °C (96.1 °F)   Resp 18   SpO2 95%     Other Pertinent Findings:     Toxicity Assessment          2025    15:14 3/17/2025    15:15   Toxicity Assessment   Treatment Site Thoracic Thoracic   Anorexia Grade 0 Grade 0   Anxiety Grade 1 Grade 0   Dehydration Grade 0 Grade 1   Depression Grade 0 Grade 0   Dermatitis Radiation Grade 0 Grade 0   Diarrhea Grade 0 Grade 0   Fatigue Grade 0 Grade 1   Fibrosis Deep Connective Tissue Grade 0    Fracture Grade 0    Nausea Grade 0 Grade 0   Pain Grade 2       5/10 neck Grade 2       pain level 5 to neck--scoliosis   Treatment Related Secondary Malignancy Grade 0    Tumor Pain Grade 0    Vomiting Grade 0 Grade 0   Constipation Grade 0 Grade 1   Dyspepsia Grade 0 Grade 0   Dysphagia Grade 0 Grade 0   Esophagitis Grade 0 Grade 0   Mucositis Oral Grade 0    Upper Gastrointestinal Hemorrhage Grade 0     Peripheral Sensory Neuropathy Grade 0    Brachial Plexopathy Grade 0    Pneumonitis Grade 0    Aspiration Grade 0 Grade 0   Hoarseness Grade 0 Grade 1   Laryngeal Edema Grade 0    Myocardial Infarction Grade 0    Pericardial Effusion Grade 0    Pericarditis Grade 0    Esophageal Fistula Grade 0    Esophageal Obstruction Grade 0    Esophageal Pain Grade 0    Esophageal Stenosis Grade 0    Esophageal Ulcer Grade 0    Bronchial Obstruction Grade 0    Cough Grade 1 Grade 1       with clear to yellow sputum   Dyspnea Grade 0 Grade 1   Epistaxis Grade 0 Grade 0   Hiccups Grade 0 Grade 0   Hypoxia Grade 0    Pulmonary Fibrosis Grade 0    Lymphedema Grade 0    Thromboembolic Event Grade 0    Hot Flashes Grade 0           Concurrent systemic therapy: fosaprepitant (Emend) 150 mg in sodium chloride 0.9% 150 mL IV, 150 mg, intravenous, Once, 4 of 4 cycles    Administration: 150 mg (11/12/2024), 150 mg (12/2/2024), 150 mg (12/23/2024), 150 mg (1/13/2025)        pegfilgrastim (Neulasta Onpro) injection 6 mg, 6 mg, subcutaneous, Once, 3 of 3 cycles    Administration: 6 mg (11/12/2024), 6 mg (12/2/2024), 6 mg (12/23/2024)        CARBOplatin (Paraplatin) 298 mg in sodium chloride 0.9% 139.8 mL IV, 298 mg, intravenous, Once, 4 of 4 cycles    Dose modification: 298 mg (original dose 298 mg, Cycle 2)    Administration: 298 mg (11/12/2024), 298 mg (12/2/2024), 300 mg (12/23/2024), 300 mg (1/13/2025)        PACLitaxeL (Taxol) 222 mg in dextrose 5% 304 mL IV, 200 mg/m2 = 222 mg, intravenous, Once, 4 of 4 cycles    Administration: 222 mg (11/12/2024), 234 mg (12/2/2024), 222 mg (12/23/2024), 222 mg (1/13/2025)        methylPREDNISolone sod succinate (SOLU-Medrol) 40 mg/mL injection 40 mg, 40 mg, intravenous, As needed, 4 of 4 cycles        palonosetron (Aloxi) injection 250 mcg, 250 mcg, intravenous, Once, 4 of 4 cycles    Administration: 250 mcg (11/12/2024), 250 mcg (12/2/2024), 250 mcg (12/23/2024)        pembrolizumab (Keytruda)  200 mg in sodium chloride 0.9% 118 mL IV, 200 mg, intravenous, Once, 4 of 4 cycles    Administration: 200 mg (11/12/2024), 200 mg (12/2/2024), 200 mg (12/23/2024), 200 mg (1/13/2025)    methylPREDNISolone sod succinate (SOLU-Medrol) 40 mg/mL injection 40 mg, 40 mg, intravenous, As needed, 0 of 8 cycles        pembrolizumab (Keytruda) 400 mg in sodium chloride 0.9% 116 mL IV, 400 mg, intravenous, Once, 0 of 8 cycles      Labs:   WBC   Date Value Ref Range Status   03/07/2025 6.4 4.4 - 11.3 x10*3/uL Final   03/06/2025 7.1 4.4 - 11.3 x10*3/uL Final     Hemoglobin   Date Value Ref Range Status   03/07/2025 12.1 12.0 - 16.0 g/dL Final   03/06/2025 12.3 12.0 - 16.0 g/dL Final     Hematocrit   Date Value Ref Range Status   03/07/2025 34.3 (L) 36.0 - 46.0 % Final   03/06/2025 36.6 36.0 - 46.0 % Final     Neutrophils Absolute   Date Value Ref Range Status   03/05/2025 7.68 (H) 1.60 - 5.50 x10*3/uL Final     Comment:     Percent differential counts (%) should be interpreted in the context of the absolute cell counts (cells/uL).   02/14/2025 6.81 (H) 1.60 - 5.50 x10*3/uL Final     Comment:     Percent differential counts (%) should be interpreted in the context of the absolute cell counts (cells/uL).     Platelets   Date Value Ref Range Status   03/07/2025 214 150 - 450 x10*3/uL Final   03/06/2025 255 150 - 450 x10*3/uL Final     Alkaline Phosphatase   Date Value Ref Range Status   03/06/2025 73 33 - 136 U/L Final   03/05/2025 81 33 - 136 U/L Final     AST   Date Value Ref Range Status   03/06/2025 17 9 - 39 U/L Final   03/05/2025 16 9 - 39 U/L Final     ALT   Date Value Ref Range Status   03/06/2025 10 7 - 45 U/L Final     Comment:     Patients treated with Sulfasalazine may generate falsely decreased results for ALT.   03/05/2025 10 7 - 45 U/L Final     Comment:     Patients treated with Sulfasalazine may generate falsely decreased results for ALT.     Bilirubin, Total   Date Value Ref Range Status   03/06/2025 0.3 0.0 - 1.2  mg/dL Final   03/05/2025 0.3 0.0 - 1.2 mg/dL Final     Glucose   Date Value Ref Range Status   03/07/2025 110 (H) 74 - 99 mg/dL Final   03/06/2025 81 74 - 99 mg/dL Final     Calcium   Date Value Ref Range Status   03/07/2025 8.2 (L) 8.6 - 10.3 mg/dL Final   03/06/2025 8.5 (L) 8.6 - 10.3 mg/dL Final     Sodium   Date Value Ref Range Status   03/07/2025 129 (L) 136 - 145 mmol/L Final   03/06/2025 134 (L) 136 - 145 mmol/L Final     Potassium   Date Value Ref Range Status   03/07/2025 3.2 (L) 3.5 - 5.3 mmol/L Final   03/06/2025 3.7 3.5 - 5.3 mmol/L Final     Bicarbonate   Date Value Ref Range Status   03/07/2025 31 21 - 32 mmol/L Final   03/06/2025 27 21 - 32 mmol/L Final     Chloride   Date Value Ref Range Status   03/07/2025 90 (L) 98 - 107 mmol/L Final   03/06/2025 99 98 - 107 mmol/L Final     Urea Nitrogen   Date Value Ref Range Status   03/07/2025 5 (L) 6 - 23 mg/dL Final   03/06/2025 8 6 - 23 mg/dL Final     Creatinine   Date Value Ref Range Status   03/07/2025 0.32 (L) 0.50 - 1.05 mg/dL Final   03/06/2025 0.35 (L) 0.50 - 1.05 mg/dL Final         Exam: Mild distress in wheelchair.     Assessment / Plan:  The patient is tolerating radiation therapy as anticipated.  Continue per current treatment plan.       Therapies: Encouraged the patient to seek medical attention at the emergency department; however, the patient declined transportation to the emergency department.  Given the patient's appearance, and vital signs including hypotension and tachycardia there was concern for dehydration as well as other etiologies which were discussed.  The patient desired transportation back home.  The patient will be scheduled for IV fluid hydration tomorrow.    Side effects reviewed with patient. Images, chart and labs reviewed.    Aldair Odom MD

## 2025-03-17 NOTE — PROGRESS NOTES
"This RN was called out to the lobby by registration as patient seemed to be confused. Patient was curled up in wheelchair with heavy black winter jacket around her. Alert and oriented to name, place, and time. Diaphoretic and pale. Denies pain. 93/65. 112, 91%, 14. OTV completed with provider. Daughter notified of today's finding. Daughter stating \"she woke up the same way yesterday but I opened all the windows and she stopped sweating\". Daughter requested mother be taken to ED to be evaluated as mother refused yesterday.   Patient refusing to go to ED to get evaluated.   Education provided but non-receptive.   Fluids ordered for 3/18 at 1pm at [facility].   GIANLUCA WEBB Will call daughter back to let her know if [facility] can accommodate earlier ride request.     "

## 2025-03-17 NOTE — TELEPHONE ENCOUNTER
DTR called and notified that [facility] will be at their place between 11:45-12:15 tomorrow so that patient can come in for fluids. DTR verbalizes understanding with verbal teach back. Michelle Tracey MSN, RN, OCN

## 2025-03-18 ENCOUNTER — HOSPITAL ENCOUNTER (OUTPATIENT)
Dept: RADIATION ONCOLOGY | Facility: CLINIC | Age: 80
Setting detail: RADIATION/ONCOLOGY SERIES
Discharge: HOME | End: 2025-03-18
Payer: MEDICARE

## 2025-03-18 ENCOUNTER — INFUSION (OUTPATIENT)
Dept: HEMATOLOGY/ONCOLOGY | Facility: CLINIC | Age: 80
End: 2025-03-18
Payer: MEDICARE

## 2025-03-18 VITALS
OXYGEN SATURATION: 93 % | HEART RATE: 126 BPM | SYSTOLIC BLOOD PRESSURE: 111 MMHG | TEMPERATURE: 98.2 F | DIASTOLIC BLOOD PRESSURE: 75 MMHG | RESPIRATION RATE: 18 BRPM

## 2025-03-18 DIAGNOSIS — R00.0 TACHYCARDIA: ICD-10-CM

## 2025-03-18 DIAGNOSIS — C34.81 MALIGNANT NEOPLASM OF OVERLAPPING SITES OF RIGHT LUNG (MULTI): ICD-10-CM

## 2025-03-18 DIAGNOSIS — C44.92 SQUAMOUS CELL CARCINOMA, SMALL CELL, NONKERATINIZING: ICD-10-CM

## 2025-03-18 DIAGNOSIS — C34.31 MALIGNANT NEOPLASM OF LOWER LOBE, RIGHT BRONCHUS OR LUNG: ICD-10-CM

## 2025-03-18 DIAGNOSIS — Z51.0 ENCOUNTER FOR ANTINEOPLASTIC RADIATION THERAPY: ICD-10-CM

## 2025-03-18 LAB
ALBUMIN SERPL BCP-MCNC: 3.4 G/DL (ref 3.4–5)
ALP SERPL-CCNC: 83 U/L (ref 33–136)
ALT SERPL W P-5'-P-CCNC: 14 U/L (ref 7–45)
ANION GAP SERPL CALC-SCNC: 12 MMOL/L (ref 10–20)
AST SERPL W P-5'-P-CCNC: 26 U/L (ref 9–39)
BASOPHILS # BLD AUTO: 0.02 X10*3/UL (ref 0–0.1)
BASOPHILS NFR BLD AUTO: 0.3 %
BILIRUB SERPL-MCNC: 0.3 MG/DL (ref 0–1.2)
BUN SERPL-MCNC: 15 MG/DL (ref 6–23)
CALCIUM SERPL-MCNC: 8.8 MG/DL (ref 8.6–10.3)
CHLORIDE SERPL-SCNC: 96 MMOL/L (ref 98–107)
CO2 SERPL-SCNC: 32 MMOL/L (ref 21–32)
CORTIS AM PEAK SERPL-MSCNC: 28.9 UG/DL (ref 5–20)
CREAT SERPL-MCNC: 0.35 MG/DL (ref 0.5–1.05)
EGFRCR SERPLBLD CKD-EPI 2021: >90 ML/MIN/1.73M*2
EOSINOPHIL # BLD AUTO: 0 X10*3/UL (ref 0–0.4)
EOSINOPHIL NFR BLD AUTO: 0 %
ERYTHROCYTE [DISTWIDTH] IN BLOOD BY AUTOMATED COUNT: 15.9 % (ref 11.5–14.5)
GLUCOSE SERPL-MCNC: 125 MG/DL (ref 74–99)
HCT VFR BLD AUTO: 35.7 % (ref 36–46)
HGB BLD-MCNC: 11.9 G/DL (ref 12–16)
IMM GRANULOCYTES # BLD AUTO: 0.05 X10*3/UL (ref 0–0.5)
IMM GRANULOCYTES NFR BLD AUTO: 0.6 % (ref 0–0.9)
LYMPHOCYTES # BLD AUTO: 0.4 X10*3/UL (ref 0.8–3)
LYMPHOCYTES NFR BLD AUTO: 5.1 %
MCH RBC QN AUTO: 32.5 PG (ref 26–34)
MCHC RBC AUTO-ENTMCNC: 33.3 G/DL (ref 32–36)
MCV RBC AUTO: 98 FL (ref 80–100)
MONOCYTES # BLD AUTO: 0.69 X10*3/UL (ref 0.05–0.8)
MONOCYTES NFR BLD AUTO: 8.8 %
NEUTROPHILS # BLD AUTO: 6.68 X10*3/UL (ref 1.6–5.5)
NEUTROPHILS NFR BLD AUTO: 85.2 %
NRBC BLD-RTO: 0 /100 WBCS (ref 0–0)
PLATELET # BLD AUTO: 288 X10*3/UL (ref 150–450)
POTASSIUM SERPL-SCNC: 4.1 MMOL/L (ref 3.5–5.3)
PROT SERPL-MCNC: 6.2 G/DL (ref 6.4–8.2)
RAD ONC MSQ ACTUAL FRACTIONS DELIVERED: 9
RAD ONC MSQ ACTUAL SESSION DELIVERED DOSE: 350 CGRAY
RAD ONC MSQ ACTUAL TOTAL DOSE: 3150 CGRAY
RAD ONC MSQ ELAPSED DAYS: 22
RAD ONC MSQ LAST DATE: NORMAL
RAD ONC MSQ PRESCRIBED FRACTIONAL DOSE: 350 CGRAY
RAD ONC MSQ PRESCRIBED NUMBER OF FRACTIONS: 15
RAD ONC MSQ PRESCRIBED TECHNIQUE: NORMAL
RAD ONC MSQ PRESCRIBED TOTAL DOSE: 5250 CGRAY
RAD ONC MSQ PRESCRIPTION PATTERN COMMENT: NORMAL
RAD ONC MSQ START DATE: NORMAL
RAD ONC MSQ TREATMENT COURSE NUMBER: 1
RAD ONC MSQ TREATMENT SITE: NORMAL
RBC # BLD AUTO: 3.66 X10*6/UL (ref 4–5.2)
SODIUM SERPL-SCNC: 136 MMOL/L (ref 136–145)
TSH SERPL-ACNC: 1.04 MIU/L (ref 0.44–3.98)
WBC # BLD AUTO: 7.8 X10*3/UL (ref 4.4–11.3)

## 2025-03-18 PROCEDURE — 82533 TOTAL CORTISOL: CPT

## 2025-03-18 PROCEDURE — 80053 COMPREHEN METABOLIC PANEL: CPT

## 2025-03-18 PROCEDURE — 96360 HYDRATION IV INFUSION INIT: CPT | Mod: INF

## 2025-03-18 PROCEDURE — 2500000004 HC RX 250 GENERAL PHARMACY W/ HCPCS (ALT 636 FOR OP/ED): Performed by: INTERNAL MEDICINE

## 2025-03-18 PROCEDURE — 82024 ASSAY OF ACTH: CPT

## 2025-03-18 PROCEDURE — 77386 HC INTENSITY-MODULATED RADIATION THERAPY (IMRT), COMPLEX: CPT | Performed by: STUDENT IN AN ORGANIZED HEALTH CARE EDUCATION/TRAINING PROGRAM

## 2025-03-18 PROCEDURE — 85025 COMPLETE CBC W/AUTO DIFF WBC: CPT

## 2025-03-18 PROCEDURE — 84443 ASSAY THYROID STIM HORMONE: CPT

## 2025-03-18 PROCEDURE — 2500000004 HC RX 250 GENERAL PHARMACY W/ HCPCS (ALT 636 FOR OP/ED): Performed by: STUDENT IN AN ORGANIZED HEALTH CARE EDUCATION/TRAINING PROGRAM

## 2025-03-18 RX ORDER — FAMOTIDINE 10 MG/ML
20 INJECTION, SOLUTION INTRAVENOUS ONCE AS NEEDED
OUTPATIENT
Start: 2025-03-18

## 2025-03-18 RX ORDER — HEPARIN 100 UNIT/ML
500 SYRINGE INTRAVENOUS AS NEEDED
OUTPATIENT
Start: 2025-03-18

## 2025-03-18 RX ORDER — EPINEPHRINE 0.3 MG/.3ML
0.3 INJECTION SUBCUTANEOUS EVERY 5 MIN PRN
OUTPATIENT
Start: 2025-03-18

## 2025-03-18 RX ORDER — SODIUM CHLORIDE 9 MG/ML
1000 INJECTION, SOLUTION INTRAVENOUS ONCE
Status: CANCELLED | OUTPATIENT
Start: 2025-03-19 | End: 2025-03-19

## 2025-03-18 RX ORDER — HEPARIN SODIUM,PORCINE/PF 10 UNIT/ML
50 SYRINGE (ML) INTRAVENOUS AS NEEDED
Status: DISCONTINUED | OUTPATIENT
Start: 2025-03-18 | End: 2025-03-18 | Stop reason: HOSPADM

## 2025-03-18 RX ORDER — HEPARIN SODIUM,PORCINE/PF 10 UNIT/ML
50 SYRINGE (ML) INTRAVENOUS AS NEEDED
OUTPATIENT
Start: 2025-03-18

## 2025-03-18 RX ORDER — ALBUTEROL SULFATE 0.83 MG/ML
3 SOLUTION RESPIRATORY (INHALATION) AS NEEDED
OUTPATIENT
Start: 2025-03-18

## 2025-03-18 RX ORDER — DIPHENHYDRAMINE HYDROCHLORIDE 50 MG/ML
50 INJECTION, SOLUTION INTRAMUSCULAR; INTRAVENOUS AS NEEDED
OUTPATIENT
Start: 2025-03-18

## 2025-03-18 RX ORDER — HEPARIN 100 UNIT/ML
500 SYRINGE INTRAVENOUS AS NEEDED
Status: DISCONTINUED | OUTPATIENT
Start: 2025-03-18 | End: 2025-03-18 | Stop reason: HOSPADM

## 2025-03-18 RX ORDER — SODIUM CHLORIDE 9 MG/ML
1000 INJECTION, SOLUTION INTRAVENOUS ONCE
Status: DISCONTINUED | OUTPATIENT
Start: 2025-03-19 | End: 2025-03-18

## 2025-03-18 RX ADMIN — HEPARIN 500 UNITS: 100 SYRINGE at 14:52

## 2025-03-18 RX ADMIN — SODIUM CHLORIDE 1000 ML: 9 INJECTION, SOLUTION INTRAVENOUS at 13:52

## 2025-03-18 ASSESSMENT — PAIN SCALES - GENERAL: PAINLEVEL_OUTOF10: 5

## 2025-03-18 NOTE — PROGRESS NOTES
Pt in today for appt. She is not doing well. He is sweating and seemly a bit confused. She brought [facility] in to her appt. Her daughter is at home and did not come with her. RN called dtr to check on Pt's condition when she was home. Staff expressed concerns to dtr. Staff suggested that Pt go to the ED to be checked. Dtr agreeable but Pt declined. Physician wants to hydrate Pt and asked that she come in early tomorrow for this. Sw called [facility] to make arrangements for earlier . Luckily, they were able to accommodate this change. RN informed Pt and dtr when she called to explain the change and need to them.

## 2025-03-19 ENCOUNTER — TELEPHONE (OUTPATIENT)
Dept: RADIATION ONCOLOGY | Facility: CLINIC | Age: 80
End: 2025-03-19
Payer: MEDICARE

## 2025-03-19 ENCOUNTER — APPOINTMENT (OUTPATIENT)
Dept: RADIATION ONCOLOGY | Facility: CLINIC | Age: 80
End: 2025-03-19
Payer: MEDICARE

## 2025-03-19 NOTE — TELEPHONE ENCOUNTER
The non-emergent [place] fire dept was called to send a well fair check to the patients residence, as her daughter has left her there to go to the ER herself. Address of patient residence and phone number of daughter, Karly Pro, was given to paramedic. He stated he would initiate the welfare check. Phone call was ended.

## 2025-03-19 NOTE — TELEPHONE ENCOUNTER
The pts daughter called in crying hysterically stating that she cannot bring her mom to treatment today and to cancel her radiation.  She states she herself is sick and cannot move out of bed to physically help her mom.  She cannot go take care of her mom such as cannot physically go feed her or go change her diaper.  She states they have no money for food and that they are starving that her digestive organs are empty and she could get a colonoscopy right now.  She declined to talk to a nurse or .  Daughter states her neighbor will take her to [facility] in a few hours.   States that the ambulance and [facility] laughs about them and rolls their eyes and won't do anything.

## 2025-03-20 ENCOUNTER — APPOINTMENT (OUTPATIENT)
Dept: RADIATION ONCOLOGY | Facility: CLINIC | Age: 80
End: 2025-03-20
Payer: MEDICARE

## 2025-03-20 LAB — ACTH PLAS-MCNC: 36.6 PG/ML (ref 7.2–63.3)

## 2025-03-21 ENCOUNTER — HOSPITAL ENCOUNTER (OUTPATIENT)
Dept: RADIATION ONCOLOGY | Facility: CLINIC | Age: 80
Setting detail: RADIATION/ONCOLOGY SERIES
Discharge: HOME | End: 2025-03-21
Payer: MEDICARE

## 2025-03-21 DIAGNOSIS — Z51.0 ENCOUNTER FOR ANTINEOPLASTIC RADIATION THERAPY: ICD-10-CM

## 2025-03-21 DIAGNOSIS — C34.31 MALIGNANT NEOPLASM OF LOWER LOBE, RIGHT BRONCHUS OR LUNG: ICD-10-CM

## 2025-03-21 LAB
RAD ONC MSQ ACTUAL FRACTIONS DELIVERED: 10
RAD ONC MSQ ACTUAL SESSION DELIVERED DOSE: 350 CGRAY
RAD ONC MSQ ACTUAL TOTAL DOSE: 3500 CGRAY
RAD ONC MSQ ELAPSED DAYS: 25
RAD ONC MSQ LAST DATE: NORMAL
RAD ONC MSQ PRESCRIBED FRACTIONAL DOSE: 350 CGRAY
RAD ONC MSQ PRESCRIBED NUMBER OF FRACTIONS: 15
RAD ONC MSQ PRESCRIBED TECHNIQUE: NORMAL
RAD ONC MSQ PRESCRIBED TOTAL DOSE: 5250 CGRAY
RAD ONC MSQ PRESCRIPTION PATTERN COMMENT: NORMAL
RAD ONC MSQ START DATE: NORMAL
RAD ONC MSQ TREATMENT COURSE NUMBER: 1
RAD ONC MSQ TREATMENT SITE: NORMAL

## 2025-03-21 PROCEDURE — 77386 HC INTENSITY-MODULATED RADIATION THERAPY (IMRT), COMPLEX: CPT | Performed by: STUDENT IN AN ORGANIZED HEALTH CARE EDUCATION/TRAINING PROGRAM

## 2025-03-24 ENCOUNTER — APPOINTMENT (OUTPATIENT)
Dept: RADIATION ONCOLOGY | Facility: CLINIC | Age: 80
End: 2025-03-24
Payer: MEDICARE

## 2025-03-25 ENCOUNTER — HOSPITAL ENCOUNTER (OUTPATIENT)
Dept: RADIATION ONCOLOGY | Facility: CLINIC | Age: 80
Setting detail: RADIATION/ONCOLOGY SERIES
Discharge: HOME | End: 2025-03-25
Payer: MEDICARE

## 2025-03-25 DIAGNOSIS — C34.31 MALIGNANT NEOPLASM OF LOWER LOBE, RIGHT BRONCHUS OR LUNG: ICD-10-CM

## 2025-03-25 DIAGNOSIS — Z51.0 ENCOUNTER FOR ANTINEOPLASTIC RADIATION THERAPY: ICD-10-CM

## 2025-03-25 LAB
RAD ONC MSQ ACTUAL FRACTIONS DELIVERED: 11
RAD ONC MSQ ACTUAL SESSION DELIVERED DOSE: 350 CGRAY
RAD ONC MSQ ACTUAL TOTAL DOSE: 3850 CGRAY
RAD ONC MSQ ELAPSED DAYS: 29
RAD ONC MSQ LAST DATE: NORMAL
RAD ONC MSQ PRESCRIBED FRACTIONAL DOSE: 350 CGRAY
RAD ONC MSQ PRESCRIBED NUMBER OF FRACTIONS: 15
RAD ONC MSQ PRESCRIBED TECHNIQUE: NORMAL
RAD ONC MSQ PRESCRIBED TOTAL DOSE: 5250 CGRAY
RAD ONC MSQ PRESCRIPTION PATTERN COMMENT: NORMAL
RAD ONC MSQ START DATE: NORMAL
RAD ONC MSQ TREATMENT COURSE NUMBER: 1
RAD ONC MSQ TREATMENT SITE: NORMAL

## 2025-03-25 PROCEDURE — 77386 HC INTENSITY-MODULATED RADIATION THERAPY (IMRT), COMPLEX: CPT | Performed by: STUDENT IN AN ORGANIZED HEALTH CARE EDUCATION/TRAINING PROGRAM

## 2025-03-25 PROCEDURE — 77336 RADIATION PHYSICS CONSULT: CPT | Performed by: STUDENT IN AN ORGANIZED HEALTH CARE EDUCATION/TRAINING PROGRAM

## 2025-03-26 ENCOUNTER — HOSPITAL ENCOUNTER (OUTPATIENT)
Dept: RADIATION ONCOLOGY | Facility: CLINIC | Age: 80
Setting detail: RADIATION/ONCOLOGY SERIES
Discharge: HOME | End: 2025-03-26
Payer: MEDICARE

## 2025-03-26 DIAGNOSIS — C34.81 MALIGNANT NEOPLASM OF OVERLAPPING SITES OF RIGHT LUNG (MULTI): Primary | ICD-10-CM

## 2025-03-26 DIAGNOSIS — C34.31 MALIGNANT NEOPLASM OF LOWER LOBE, RIGHT BRONCHUS OR LUNG: ICD-10-CM

## 2025-03-26 DIAGNOSIS — Z51.0 ENCOUNTER FOR ANTINEOPLASTIC RADIATION THERAPY: ICD-10-CM

## 2025-03-26 LAB
RAD ONC MSQ ACTUAL FRACTIONS DELIVERED: 12
RAD ONC MSQ ACTUAL SESSION DELIVERED DOSE: 350 CGRAY
RAD ONC MSQ ACTUAL TOTAL DOSE: 4200 CGRAY
RAD ONC MSQ ELAPSED DAYS: 30
RAD ONC MSQ LAST DATE: NORMAL
RAD ONC MSQ PRESCRIBED FRACTIONAL DOSE: 350 CGRAY
RAD ONC MSQ PRESCRIBED NUMBER OF FRACTIONS: 15
RAD ONC MSQ PRESCRIBED TECHNIQUE: NORMAL
RAD ONC MSQ PRESCRIBED TOTAL DOSE: 5250 CGRAY
RAD ONC MSQ PRESCRIPTION PATTERN COMMENT: NORMAL
RAD ONC MSQ START DATE: NORMAL
RAD ONC MSQ TREATMENT COURSE NUMBER: 1
RAD ONC MSQ TREATMENT SITE: NORMAL

## 2025-03-26 PROCEDURE — 77386 HC INTENSITY-MODULATED RADIATION THERAPY (IMRT), COMPLEX: CPT | Performed by: STUDENT IN AN ORGANIZED HEALTH CARE EDUCATION/TRAINING PROGRAM

## 2025-03-26 NOTE — PROGRESS NOTES
Radiation Oncology On Treatment Visit    Patient Name:  Karly Horton  MRN:  [id number]  :  1945    Referring Provider: Aldair Odom MD  Primary Care Provider: Julian Chaney MD  Care Team: Patient Care Team:  Julian Chaney MD as PCP - General (Internal Medicine)  Carisa Yuan RN as Nurse Navigator (Hematology and Oncology)  Kelly Guerra MD as Medical Oncologist (Hematology and Oncology)    Date of Service: 3/26/2025     Diagnosis:   Specialty Problems          Radiation Oncology Problems    Malignant neoplasm of overlapping sites of right lung (Multi)        Squamous cell carcinoma, small cell, nonkeratinizing         Treatment Summary:  IMRT: Right Lung    Treatment Period Technique Fraction Dose Fractions Total Dose   Course 1 2025-3/26/2025  (days elapsed: 30)         R_Thrx_Med 2025-3/26/2025 VMAT 350 / 350 cGy 12 / 15 4200 / 5,250 cGy     SUBJECTIVE: Baseline moderate scoliotic back pain, fatigue and productive cough.      OBJECTIVE:   Vital Signs:  There were no vitals taken for this visit.    Other Pertinent Findings:     Toxicity Assessment          2025    15:14 3/17/2025    15:15 3/26/2025    14:35   Toxicity Assessment   Treatment Site Thoracic Thoracic Thoracic   Anorexia Grade 0 Grade 0 Grade 0   Anxiety Grade 1 Grade 0 Grade 0   Dehydration Grade 0 Grade 1 Grade 0   Depression Grade 0 Grade 0 Grade 0   Dermatitis Radiation Grade 0 Grade 0 Grade 0   Diarrhea Grade 0 Grade 0 Grade 0   Fatigue Grade 0 Grade 1 Grade 1   Fibrosis Deep Connective Tissue Grade 0  Grade 0   Fracture Grade 0  Grade 0   Nausea Grade 0 Grade 0 Grade 0   Pain Grade 2       5/10 neck Grade 2       pain level 5 to neck--scoliosis Grade 2       5/10 neck   Treatment Related Secondary Malignancy Grade 0  Grade 0   Tumor Pain Grade 0  Grade 0   Vomiting Grade 0 Grade 0 Grade 0   Constipation Grade 0 Grade 1 Grade 1   Dyspepsia Grade 0 Grade 0 Grade 0   Dysphagia Grade 0 Grade 0 Grade 0    Esophagitis Grade 0 Grade 0 Grade 0   Mucositis Oral Grade 0  Grade 0   Upper Gastrointestinal Hemorrhage Grade 0  Grade 0   Peripheral Sensory Neuropathy Grade 0     Brachial Plexopathy Grade 0     Pneumonitis Grade 0  Grade 0   Aspiration Grade 0 Grade 0 Grade 0   Hoarseness Grade 0 Grade 1 Grade 0   Laryngeal Edema Grade 0  Grade 0   Myocardial Infarction Grade 0  Grade 0   Pericardial Effusion Grade 0  Grade 0   Pericarditis Grade 0  Grade 0   Esophageal Fistula Grade 0     Esophageal Obstruction Grade 0     Esophageal Pain Grade 0     Esophageal Stenosis Grade 0     Esophageal Ulcer Grade 0     Bronchial Obstruction Grade 0  Grade 0   Cough Grade 1 Grade 1       with clear to yellow sputum Grade 1   Dyspnea Grade 0 Grade 1 Grade 0   Epistaxis Grade 0 Grade 0 Grade 0   Hiccups Grade 0 Grade 0 Grade 0   Hypoxia Grade 0  Grade 0   Pulmonary Fibrosis Grade 0  Grade 0   Lymphedema Grade 0  Grade 0   Thromboembolic Event Grade 0  Grade 0   Hot Flashes Grade 0            Concurrent systemic therapy: fosaprepitant (Emend) 150 mg in sodium chloride 0.9% 150 mL IV, 150 mg, intravenous, Once, 4 of 4 cycles    Administration: 150 mg (11/12/2024), 150 mg (12/2/2024), 150 mg (12/23/2024), 150 mg (1/13/2025)        pegfilgrastim (Neulasta Onpro) injection 6 mg, 6 mg, subcutaneous, Once, 3 of 3 cycles    Administration: 6 mg (11/12/2024), 6 mg (12/2/2024), 6 mg (12/23/2024)        CARBOplatin (Paraplatin) 298 mg in sodium chloride 0.9% 139.8 mL IV, 298 mg, intravenous, Once, 4 of 4 cycles    Dose modification: 298 mg (original dose 298 mg, Cycle 2)    Administration: 298 mg (11/12/2024), 298 mg (12/2/2024), 300 mg (12/23/2024), 300 mg (1/13/2025)        PACLitaxeL (Taxol) 222 mg in dextrose 5% 304 mL IV, 200 mg/m2 = 222 mg, intravenous, Once, 4 of 4 cycles    Administration: 222 mg (11/12/2024), 234 mg (12/2/2024), 222 mg (12/23/2024), 222 mg (1/13/2025)        methylPREDNISolone sod succinate (SOLU-Medrol) 40 mg/mL  injection 40 mg, 40 mg, intravenous, As needed, 4 of 4 cycles        palonosetron (Aloxi) injection 250 mcg, 250 mcg, intravenous, Once, 4 of 4 cycles    Administration: 250 mcg (11/12/2024), 250 mcg (12/2/2024), 250 mcg (12/23/2024)        pembrolizumab (Keytruda) 200 mg in sodium chloride 0.9% 118 mL IV, 200 mg, intravenous, Once, 4 of 4 cycles    Administration: 200 mg (11/12/2024), 200 mg (12/2/2024), 200 mg (12/23/2024), 200 mg (1/13/2025)    methylPREDNISolone sod succinate (SOLU-Medrol) 40 mg/mL injection 40 mg, 40 mg, intravenous, As needed, 0 of 8 cycles        pembrolizumab (Keytruda) 400 mg in sodium chloride 0.9% 116 mL IV, 400 mg, intravenous, Once, 0 of 8 cycles      Labs:   WBC   Date Value Ref Range Status   03/18/2025 7.8 4.4 - 11.3 x10*3/uL Final   03/07/2025 6.4 4.4 - 11.3 x10*3/uL Final     Hemoglobin   Date Value Ref Range Status   03/18/2025 11.9 (L) 12.0 - 16.0 g/dL Final   03/07/2025 12.1 12.0 - 16.0 g/dL Final     Hematocrit   Date Value Ref Range Status   03/18/2025 35.7 (L) 36.0 - 46.0 % Final   03/07/2025 34.3 (L) 36.0 - 46.0 % Final     Neutrophils Absolute   Date Value Ref Range Status   03/18/2025 6.68 (H) 1.60 - 5.50 x10*3/uL Final     Comment:     Percent differential counts (%) should be interpreted in the context of the absolute cell counts (cells/uL).   03/05/2025 7.68 (H) 1.60 - 5.50 x10*3/uL Final     Comment:     Percent differential counts (%) should be interpreted in the context of the absolute cell counts (cells/uL).     Platelets   Date Value Ref Range Status   03/18/2025 288 150 - 450 x10*3/uL Final   03/07/2025 214 150 - 450 x10*3/uL Final     Alkaline Phosphatase   Date Value Ref Range Status   03/18/2025 83 33 - 136 U/L Final   03/06/2025 73 33 - 136 U/L Final     AST   Date Value Ref Range Status   03/18/2025 26 9 - 39 U/L Final   03/06/2025 17 9 - 39 U/L Final     ALT   Date Value Ref Range Status   03/18/2025 14 7 - 45 U/L Final     Comment:     Patients treated with  Sulfasalazine may generate falsely decreased results for ALT.   03/06/2025 10 7 - 45 U/L Final     Comment:     Patients treated with Sulfasalazine may generate falsely decreased results for ALT.     Bilirubin, Total   Date Value Ref Range Status   03/18/2025 0.3 0.0 - 1.2 mg/dL Final   03/06/2025 0.3 0.0 - 1.2 mg/dL Final     Glucose   Date Value Ref Range Status   03/18/2025 125 (H) 74 - 99 mg/dL Final   03/07/2025 110 (H) 74 - 99 mg/dL Final     Calcium   Date Value Ref Range Status   03/18/2025 8.8 8.6 - 10.3 mg/dL Final   03/07/2025 8.2 (L) 8.6 - 10.3 mg/dL Final     Sodium   Date Value Ref Range Status   03/18/2025 136 136 - 145 mmol/L Final   03/07/2025 129 (L) 136 - 145 mmol/L Final     Potassium   Date Value Ref Range Status   03/18/2025 4.1 3.5 - 5.3 mmol/L Final   03/07/2025 3.2 (L) 3.5 - 5.3 mmol/L Final     Bicarbonate   Date Value Ref Range Status   03/18/2025 32 21 - 32 mmol/L Final   03/07/2025 31 21 - 32 mmol/L Final     Chloride   Date Value Ref Range Status   03/18/2025 96 (L) 98 - 107 mmol/L Final   03/07/2025 90 (L) 98 - 107 mmol/L Final     Urea Nitrogen   Date Value Ref Range Status   03/18/2025 15 6 - 23 mg/dL Final   03/07/2025 5 (L) 6 - 23 mg/dL Final     Creatinine   Date Value Ref Range Status   03/18/2025 0.35 (L) 0.50 - 1.05 mg/dL Final   03/07/2025 0.32 (L) 0.50 - 1.05 mg/dL Final         Exam: Resting in wheelchair, not in acute distress.     Assessment / Plan:  The patient is tolerating radiation therapy as anticipated.  Continue per current treatment plan.     Therapies: Encouraged continued hydration.      Side effects reviewed with patient. Images, chart and labs reviewed.    Aldair Odom MD

## 2025-03-27 ENCOUNTER — HOSPITAL ENCOUNTER (OUTPATIENT)
Dept: RADIATION ONCOLOGY | Facility: CLINIC | Age: 80
Setting detail: RADIATION/ONCOLOGY SERIES
Discharge: HOME | End: 2025-03-27
Payer: MEDICARE

## 2025-03-27 DIAGNOSIS — C34.31 MALIGNANT NEOPLASM OF LOWER LOBE, RIGHT BRONCHUS OR LUNG: ICD-10-CM

## 2025-03-27 DIAGNOSIS — Z51.0 ENCOUNTER FOR ANTINEOPLASTIC RADIATION THERAPY: ICD-10-CM

## 2025-03-27 LAB
RAD ONC MSQ ACTUAL FRACTIONS DELIVERED: 13
RAD ONC MSQ ACTUAL SESSION DELIVERED DOSE: 350 CGRAY
RAD ONC MSQ ACTUAL TOTAL DOSE: 4550 CGRAY
RAD ONC MSQ ELAPSED DAYS: 31
RAD ONC MSQ LAST DATE: NORMAL
RAD ONC MSQ PRESCRIBED FRACTIONAL DOSE: 350 CGRAY
RAD ONC MSQ PRESCRIBED NUMBER OF FRACTIONS: 15
RAD ONC MSQ PRESCRIBED TECHNIQUE: NORMAL
RAD ONC MSQ PRESCRIBED TOTAL DOSE: 5250 CGRAY
RAD ONC MSQ PRESCRIPTION PATTERN COMMENT: NORMAL
RAD ONC MSQ START DATE: NORMAL
RAD ONC MSQ TREATMENT COURSE NUMBER: 1
RAD ONC MSQ TREATMENT SITE: NORMAL

## 2025-03-27 PROCEDURE — 77386 HC INTENSITY-MODULATED RADIATION THERAPY (IMRT), COMPLEX: CPT | Performed by: STUDENT IN AN ORGANIZED HEALTH CARE EDUCATION/TRAINING PROGRAM

## 2025-03-28 ENCOUNTER — HOSPITAL ENCOUNTER (OUTPATIENT)
Dept: RADIATION ONCOLOGY | Facility: CLINIC | Age: 80
Setting detail: RADIATION/ONCOLOGY SERIES
Discharge: HOME | End: 2025-03-28
Payer: MEDICARE

## 2025-03-28 DIAGNOSIS — Z51.0 ENCOUNTER FOR ANTINEOPLASTIC RADIATION THERAPY: ICD-10-CM

## 2025-03-28 DIAGNOSIS — C34.31 MALIGNANT NEOPLASM OF LOWER LOBE, RIGHT BRONCHUS OR LUNG: ICD-10-CM

## 2025-03-28 LAB
RAD ONC MSQ ACTUAL FRACTIONS DELIVERED: 14
RAD ONC MSQ ACTUAL SESSION DELIVERED DOSE: 350 CGRAY
RAD ONC MSQ ACTUAL TOTAL DOSE: 4900 CGRAY
RAD ONC MSQ ELAPSED DAYS: 32
RAD ONC MSQ LAST DATE: NORMAL
RAD ONC MSQ PRESCRIBED FRACTIONAL DOSE: 350 CGRAY
RAD ONC MSQ PRESCRIBED NUMBER OF FRACTIONS: 15
RAD ONC MSQ PRESCRIBED TECHNIQUE: NORMAL
RAD ONC MSQ PRESCRIBED TOTAL DOSE: 5250 CGRAY
RAD ONC MSQ PRESCRIPTION PATTERN COMMENT: NORMAL
RAD ONC MSQ START DATE: NORMAL
RAD ONC MSQ TREATMENT COURSE NUMBER: 1
RAD ONC MSQ TREATMENT SITE: NORMAL

## 2025-03-28 PROCEDURE — 77386 HC INTENSITY-MODULATED RADIATION THERAPY (IMRT), COMPLEX: CPT | Performed by: STUDENT IN AN ORGANIZED HEALTH CARE EDUCATION/TRAINING PROGRAM

## 2025-03-31 ENCOUNTER — HOSPITAL ENCOUNTER (OUTPATIENT)
Dept: RADIATION ONCOLOGY | Facility: CLINIC | Age: 80
Setting detail: RADIATION/ONCOLOGY SERIES
Discharge: HOME | End: 2025-03-31
Payer: MEDICARE

## 2025-03-31 ENCOUNTER — DOCUMENTATION (OUTPATIENT)
Dept: RADIATION ONCOLOGY | Facility: CLINIC | Age: 80
End: 2025-03-31
Payer: MEDICARE

## 2025-03-31 DIAGNOSIS — C34.31 MALIGNANT NEOPLASM OF LOWER LOBE, RIGHT BRONCHUS OR LUNG: ICD-10-CM

## 2025-03-31 DIAGNOSIS — Z51.0 ENCOUNTER FOR ANTINEOPLASTIC RADIATION THERAPY: ICD-10-CM

## 2025-03-31 LAB
RAD ONC MSQ ACTUAL FRACTIONS DELIVERED: 15
RAD ONC MSQ ACTUAL SESSION DELIVERED DOSE: 350 CGRAY
RAD ONC MSQ ACTUAL TOTAL DOSE: 5250 CGRAY
RAD ONC MSQ ELAPSED DAYS: 35
RAD ONC MSQ LAST DATE: NORMAL
RAD ONC MSQ PRESCRIBED FRACTIONAL DOSE: 350 CGRAY
RAD ONC MSQ PRESCRIBED NUMBER OF FRACTIONS: 15
RAD ONC MSQ PRESCRIBED TECHNIQUE: NORMAL
RAD ONC MSQ PRESCRIBED TOTAL DOSE: 5250 CGRAY
RAD ONC MSQ PRESCRIPTION PATTERN COMMENT: NORMAL
RAD ONC MSQ START DATE: NORMAL
RAD ONC MSQ TREATMENT COURSE NUMBER: 1
RAD ONC MSQ TREATMENT SITE: NORMAL

## 2025-03-31 PROCEDURE — 77386 HC INTENSITY-MODULATED RADIATION THERAPY (IMRT), COMPLEX: CPT | Performed by: STUDENT IN AN ORGANIZED HEALTH CARE EDUCATION/TRAINING PROGRAM

## 2025-03-31 NOTE — PROGRESS NOTES
Radiation Oncology Treatment Summary    Patient Name:  Karly Horton  MRN:  [id number]  :  1945    Referring Provider: Cathy Lau MD   Primary Care Provider: Julian Chaney MD    Brief History: Please see the radiation oncology consultation note.  Briefly, Karly Horton is a 79 y.o. female with Malignant neoplasm of overlapping sites of right lung (Multi), Clinical: cT4, cN2.  The patient completed radiotherapy as outlined below.    The radiation planning and treatment procedures were explained to the patient in advance, and all questions were answered. The benefits and goals of treatment, options and alternatives, limitations, side effects and risks of radiation were also explained. The patient provided informed consent.    Technical Summary:  Region(s) Treated: Right thorax and mediastinum  Radiation Dose Prescribed: 5250 cGy in 15 fractions  Radiation Technique/Machine/Energy Used: VMAT / Truebeam / 6 MV photons   Additional radiation technical details available in our radiation oncology EMR MOSAIQ and our treatment planning system.    Radiation Treatment Summary:    IMRT: Right Lung    Treatment Period Technique Fraction Dose Fractions Total Dose   Course 1 2025-3/31/2025  (days elapsed: 35)         R_Thrx_Med 2025-3/31/2025 VMAT 350 / 350 cGy 15 / 15 5250 / 5,250 cGy       Please see the patient's Mosaiq chart for further details regarding the radiation plan, including beam energy.    Concurrent Chemotherapy:  Treatment Plans       Name Type Plan Dates Plan Provider         Active    Pembrolizumab, 42 Day [facility] Oncology Treatment 2025 - Present Kelly Guerra MD                    Clinical Summary:  The patient tolerated this course of radiation therapy relatively well, with no unusual events or unanticipated toxicities. Symptoms during treatment included baseline moderate neck pain, low energy improved with rest, constipation, and productive cough.    CTCAE Toxicity  Overview:   Toxicity Assessment          2/26/2025    15:14 3/17/2025    15:15 3/26/2025    14:35   Toxicity Assessment   Treatment Site Thoracic Thoracic Thoracic   Anorexia Grade 0 Grade 0 Grade 0   Anxiety Grade 1 Grade 0 Grade 0   Dehydration Grade 0 Grade 1 Grade 0   Depression Grade 0 Grade 0 Grade 0   Dermatitis Radiation Grade 0 Grade 0 Grade 0   Diarrhea Grade 0 Grade 0 Grade 0   Fatigue Grade 0 Grade 1 Grade 1   Fibrosis Deep Connective Tissue Grade 0  Grade 0   Fracture Grade 0  Grade 0   Nausea Grade 0 Grade 0 Grade 0   Pain Grade 2       5/10 neck Grade 2       pain level 5 to neck--scoliosis Grade 2       5/10 neck   Treatment Related Secondary Malignancy Grade 0  Grade 0   Tumor Pain Grade 0  Grade 0   Vomiting Grade 0 Grade 0 Grade 0   Constipation Grade 0 Grade 1 Grade 1   Dyspepsia Grade 0 Grade 0 Grade 0   Dysphagia Grade 0 Grade 0 Grade 0   Esophagitis Grade 0 Grade 0 Grade 0   Mucositis Oral Grade 0  Grade 0   Upper Gastrointestinal Hemorrhage Grade 0  Grade 0   Peripheral Sensory Neuropathy Grade 0     Brachial Plexopathy Grade 0     Pneumonitis Grade 0  Grade 0   Aspiration Grade 0 Grade 0 Grade 0   Hoarseness Grade 0 Grade 1 Grade 0   Laryngeal Edema Grade 0  Grade 0   Myocardial Infarction Grade 0  Grade 0   Pericardial Effusion Grade 0  Grade 0   Pericarditis Grade 0  Grade 0   Esophageal Fistula Grade 0     Esophageal Obstruction Grade 0     Esophageal Pain Grade 0     Esophageal Stenosis Grade 0     Esophageal Ulcer Grade 0     Bronchial Obstruction Grade 0  Grade 0   Cough Grade 1 Grade 1       with clear to yellow sputum Grade 1   Dyspnea Grade 0 Grade 1 Grade 0   Epistaxis Grade 0 Grade 0 Grade 0   Hiccups Grade 0 Grade 0 Grade 0   Hypoxia Grade 0  Grade 0   Pulmonary Fibrosis Grade 0  Grade 0   Lymphedema Grade 0  Grade 0   Thromboembolic Event Grade 0  Grade 0   Hot Flashes Grade 0       Patient Disposition:   The patient will be scheduled for follow-up at our clinic based on next  staging scans. The patient was encouraged to contact us for any questions or concerns in the interim.    Aldair Odom MD  [facility] [place]  [facility] clinical  - Department of Radiation Oncology  Phone: [phone]  Fax: [phone]  Epic secure chat preferred

## 2025-04-09 ENCOUNTER — APPOINTMENT (OUTPATIENT)
Dept: HEMATOLOGY/ONCOLOGY | Facility: CLINIC | Age: 80
End: 2025-04-09
Payer: MEDICARE

## 2025-04-11 ENCOUNTER — OFFICE VISIT (OUTPATIENT)
Dept: HEMATOLOGY/ONCOLOGY | Facility: CLINIC | Age: 80
End: 2025-04-11
Payer: MEDICARE

## 2025-04-11 ENCOUNTER — INFUSION (OUTPATIENT)
Dept: HEMATOLOGY/ONCOLOGY | Facility: CLINIC | Age: 80
End: 2025-04-11
Payer: MEDICARE

## 2025-04-11 VITALS
SYSTOLIC BLOOD PRESSURE: 109 MMHG | OXYGEN SATURATION: 93 % | BODY MASS INDEX: 14.61 KG/M2 | RESPIRATION RATE: 18 BRPM | TEMPERATURE: 97 F | HEART RATE: 120 BPM | WEIGHT: 65.15 LBS | DIASTOLIC BLOOD PRESSURE: 75 MMHG

## 2025-04-11 DIAGNOSIS — C44.92 SQUAMOUS CELL CARCINOMA, SMALL CELL, NONKERATINIZING: ICD-10-CM

## 2025-04-11 DIAGNOSIS — C34.81 MALIGNANT NEOPLASM OF OVERLAPPING SITES OF RIGHT LUNG (MULTI): ICD-10-CM

## 2025-04-11 LAB
ALBUMIN SERPL BCP-MCNC: 3.4 G/DL (ref 3.4–5)
ALP SERPL-CCNC: 82 U/L (ref 33–136)
ALT SERPL W P-5'-P-CCNC: 10 U/L (ref 7–45)
ANION GAP SERPL CALC-SCNC: 12 MMOL/L (ref 10–20)
AST SERPL W P-5'-P-CCNC: 17 U/L (ref 9–39)
BASOPHILS # BLD AUTO: 0.03 X10*3/UL (ref 0–0.1)
BASOPHILS NFR BLD AUTO: 0.4 %
BILIRUB SERPL-MCNC: 0.3 MG/DL (ref 0–1.2)
BUN SERPL-MCNC: 9 MG/DL (ref 6–23)
CALCIUM SERPL-MCNC: 8.9 MG/DL (ref 8.6–10.3)
CHLORIDE SERPL-SCNC: 94 MMOL/L (ref 98–107)
CO2 SERPL-SCNC: 31 MMOL/L (ref 21–32)
CREAT SERPL-MCNC: 0.4 MG/DL (ref 0.5–1.05)
EGFRCR SERPLBLD CKD-EPI 2021: >90 ML/MIN/1.73M*2
EOSINOPHIL # BLD AUTO: 0.02 X10*3/UL (ref 0–0.4)
EOSINOPHIL NFR BLD AUTO: 0.3 %
ERYTHROCYTE [DISTWIDTH] IN BLOOD BY AUTOMATED COUNT: 14.2 % (ref 11.5–14.5)
GLUCOSE SERPL-MCNC: 127 MG/DL (ref 74–99)
HCT VFR BLD AUTO: 42.3 % (ref 36–46)
HGB BLD-MCNC: 14.2 G/DL (ref 12–16)
IMM GRANULOCYTES # BLD AUTO: 0.06 X10*3/UL (ref 0–0.5)
IMM GRANULOCYTES NFR BLD AUTO: 0.9 % (ref 0–0.9)
LYMPHOCYTES # BLD AUTO: 0.38 X10*3/UL (ref 0.8–3)
LYMPHOCYTES NFR BLD AUTO: 5.4 %
MCH RBC QN AUTO: 32.6 PG (ref 26–34)
MCHC RBC AUTO-ENTMCNC: 33.6 G/DL (ref 32–36)
MCV RBC AUTO: 97 FL (ref 80–100)
MONOCYTES # BLD AUTO: 0.83 X10*3/UL (ref 0.05–0.8)
MONOCYTES NFR BLD AUTO: 11.8 %
NEUTROPHILS # BLD AUTO: 5.73 X10*3/UL (ref 1.6–5.5)
NEUTROPHILS NFR BLD AUTO: 81.2 %
NRBC BLD-RTO: 0 /100 WBCS (ref 0–0)
PLATELET # BLD AUTO: 284 X10*3/UL (ref 150–450)
POTASSIUM SERPL-SCNC: 4.2 MMOL/L (ref 3.5–5.3)
PROT SERPL-MCNC: 6.4 G/DL (ref 6.4–8.2)
RBC # BLD AUTO: 4.36 X10*6/UL (ref 4–5.2)
SODIUM SERPL-SCNC: 133 MMOL/L (ref 136–145)
WBC # BLD AUTO: 7.1 X10*3/UL (ref 4.4–11.3)

## 2025-04-11 PROCEDURE — 80053 COMPREHEN METABOLIC PANEL: CPT

## 2025-04-11 PROCEDURE — 85025 COMPLETE CBC W/AUTO DIFF WBC: CPT

## 2025-04-11 PROCEDURE — 2500000004 HC RX 250 GENERAL PHARMACY W/ HCPCS (ALT 636 FOR OP/ED): Performed by: INTERNAL MEDICINE

## 2025-04-11 PROCEDURE — 99215 OFFICE O/P EST HI 40 MIN: CPT | Performed by: INTERNAL MEDICINE

## 2025-04-11 PROCEDURE — 1160F RVW MEDS BY RX/DR IN RCRD: CPT | Performed by: INTERNAL MEDICINE

## 2025-04-11 PROCEDURE — 99215 OFFICE O/P EST HI 40 MIN: CPT | Mod: 25 | Performed by: INTERNAL MEDICINE

## 2025-04-11 PROCEDURE — 1157F ADVNC CARE PLAN IN RCRD: CPT | Performed by: INTERNAL MEDICINE

## 2025-04-11 PROCEDURE — 1159F MED LIST DOCD IN RCRD: CPT | Performed by: INTERNAL MEDICINE

## 2025-04-11 PROCEDURE — 96413 CHEMO IV INFUSION 1 HR: CPT

## 2025-04-11 RX ORDER — HEPARIN SODIUM,PORCINE/PF 10 UNIT/ML
50 SYRINGE (ML) INTRAVENOUS AS NEEDED
Status: DISCONTINUED | OUTPATIENT
Start: 2025-04-11 | End: 2025-04-11 | Stop reason: HOSPADM

## 2025-04-11 RX ORDER — FAMOTIDINE 10 MG/ML
20 INJECTION, SOLUTION INTRAVENOUS ONCE AS NEEDED
Status: DISCONTINUED | OUTPATIENT
Start: 2025-04-11 | End: 2025-04-11 | Stop reason: HOSPADM

## 2025-04-11 RX ORDER — DIPHENHYDRAMINE HYDROCHLORIDE 50 MG/ML
50 INJECTION, SOLUTION INTRAMUSCULAR; INTRAVENOUS AS NEEDED
Status: DISCONTINUED | OUTPATIENT
Start: 2025-04-11 | End: 2025-04-11 | Stop reason: HOSPADM

## 2025-04-11 RX ORDER — PROCHLORPERAZINE EDISYLATE 5 MG/ML
10 INJECTION INTRAMUSCULAR; INTRAVENOUS EVERY 6 HOURS PRN
Status: DISCONTINUED | OUTPATIENT
Start: 2025-04-11 | End: 2025-04-11 | Stop reason: HOSPADM

## 2025-04-11 RX ORDER — ALBUTEROL SULFATE 0.83 MG/ML
3 SOLUTION RESPIRATORY (INHALATION) AS NEEDED
Status: DISCONTINUED | OUTPATIENT
Start: 2025-04-11 | End: 2025-04-11 | Stop reason: HOSPADM

## 2025-04-11 RX ORDER — HEPARIN SODIUM,PORCINE/PF 10 UNIT/ML
50 SYRINGE (ML) INTRAVENOUS AS NEEDED
OUTPATIENT
Start: 2025-04-11

## 2025-04-11 RX ORDER — HEPARIN 100 UNIT/ML
500 SYRINGE INTRAVENOUS AS NEEDED
OUTPATIENT
Start: 2025-04-11

## 2025-04-11 RX ORDER — EPINEPHRINE 0.3 MG/.3ML
0.3 INJECTION SUBCUTANEOUS EVERY 5 MIN PRN
Status: DISCONTINUED | OUTPATIENT
Start: 2025-04-11 | End: 2025-04-11 | Stop reason: HOSPADM

## 2025-04-11 RX ORDER — HEPARIN 100 UNIT/ML
500 SYRINGE INTRAVENOUS AS NEEDED
Status: DISCONTINUED | OUTPATIENT
Start: 2025-04-11 | End: 2025-04-11 | Stop reason: HOSPADM

## 2025-04-11 RX ORDER — PROCHLORPERAZINE MALEATE 10 MG
10 TABLET ORAL EVERY 6 HOURS PRN
Status: DISCONTINUED | OUTPATIENT
Start: 2025-04-11 | End: 2025-04-11 | Stop reason: HOSPADM

## 2025-04-11 RX ADMIN — HEPARIN 500 UNITS: 100 SYRINGE at 11:20

## 2025-04-11 RX ADMIN — SODIUM CHLORIDE 400 MG: 9 INJECTION, SOLUTION INTRAVENOUS at 10:45

## 2025-04-11 ASSESSMENT — PAIN SCALES - GENERAL: PAINLEVEL_OUTOF10: 5

## 2025-04-11 NOTE — PROGRESS NOTES
Patient ID: Karly Horton is a 79 y.o. female.  Referring Physician: Kelly Guerra MD  [address]  [address] [place] [address]  Primary Care Provider: Julian Chaney MD  Referral Reason: Lung cancer    Subjective:  No complaints    Heme/Onc History:  Karly Horton is a 79 y.o. female who was referred by Cathy Lau MD, for a consultation to the [facility] Department of Medical Oncology.  She is presenting for evaluation and management of lung cancer.      #) At least locally advanced lung cancer, cT4 cN1 cM0 (?punctate left brain venous anomaly)    Pathology Review:  The pertinent pathology results were reviewed and discussed with the patient.  Notably,      10/11/2024-A. LYMPH NODE 11 L PULMONARY FINE NEEDLE ASPIRATION: Highly atypical cells are present; carcinoma/malignancy cannot be excluded               B. LYMPH NODE 7 PULMONARY FINE NEEDLE ASPIRATION: Malignant cells are present, see note. Lymphoid sample. Note: A minute fragment of malignant cells is noted in the cell block              C. LUNG FINE NEEDLE ASPIRATION RIGHT LOWER LOBE: Malignant cells derived from squamous cell carcinoma; Note: The malignant cells are positive for p40 and negative for TTF1 and CK7.     10/11/2024- A.  B.  Lung, right lower lobe mass, biopsies: - Detached fragments of non-small cell carcinoma, consistent with squamous cell carcinoma. Note: Detached fragments of non-small cell carcinoma in a background of abundant necrosis are seen.  Neoplastic cells are immunoreactive with p40.      Disease Associated Genomics:  PD-L1 TPS 10% (low)  MICROSATELLITE STATUS: Microsatellite Instability-High (MSI-H) is NOT DETECTED.     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  Negative for BRAF V600E.  Negative for EGFR sensitizing mutation.  Negative for ERBB2 activating  mutation  Negative for KRAS G12C.  Negative for MET exon 14 skipping mutation.  Negative for NTRK fusion.  Negative for RET fusion.  Negative for ROS1 fusion.    Imaging:  The pertinent imaging results were reviewed and discussed with the patient.  Notably,      10/18/2024-MRI brain with and without contrast: Punctate focus of enhancement within the left amygdala with questionable curvilinear enhancing tail suggestive of developmental venous anomaly.  No other evidence of abnormal leptomeningeal or pachymeningeal enhancement.     10/18/2024-PET/CT: No evidence of hypermetabolic cervical lymphadenopathy.  There is a right apical posterior upper lobe lung nodule with hypermetabolic activity max SUV 8.2, right superior lower lobe nodule with hypermetabolic activity max SUV 4.7, multiple pulmonary nodules within the right lung that do not demonstrate hypermetabolic activity and a right infrahilar mass with hypermetabolic activity with central photopenia representing partial necrosis with max SUV 5.7.  No other evidence of mediastinal, hilar or axillary lymphadenopathy.  In the left lower lobe there is a 7 mm nodule with mild hypermetabolic activity.     10/18/2024-CT chest with contrast: In the right lower lobe there is a mass with spiculated margins measuring 3.1 cm the mass transverses and crosses the major fissure with extension into the middle lobe, spiculated right upper nodule measuring 2.3 cm with extension to the superior right major fissure and fissural retraction.  Multiple additional bilateral pulmonary nodules not significantly changed including left lower lobe nodule measuring 7 mm and right lower lobe nodule measuring 5 mm.  Stable calcified nonspecific finding along the anterior left hepatic lobe.     Past Medical History:   Past Medical History:   Diagnosis Date    Other cervical disc degeneration, unspecified cervical region     Degeneration of cervical intervertebral disc    Other conditions  influencing health status     Bulging Disc (L3 - L4)    Other conditions influencing health status     A Fall    Other conditions influencing health status     Bulging Disc (C4 - C5)    Other conditions influencing health status     Bulging Disc (C6 - C7)    Other conditions influencing health status     Bulging Disc (C5 - C6)    Other conditions influencing health status     Lumbar Spondylolisthesis    Other conditions influencing health status     Rotator Cuff Tendon Tear    Other intervertebral disc degeneration, lumbar region     L4-L5 disc bulge    Other intervertebral disc displacement, lumbar region     Disc displacement, lumbar    Other intervertebral disc displacement, lumbosacral region     Displacement of lumbosacral intervertebral disc    Personal history of other diseases of the musculoskeletal system and connective tissue     History of bursitis    Personal history of other diseases of the musculoskeletal system and connective tissue     History of fibromyositis    Personal history of other specified conditions     History of headache    Spinal stenosis, lumbar region without neurogenic claudication     Lumbar canal stenosis    Sprain of ligaments of lumbar spine, initial encounter     Low back sprain     Social History:   Social History     Socioeconomic History    Marital status:      Spouse name: Not on file    Number of children: Not on file    Years of education: Not on file    Highest education level: Not on file   Occupational History    Not on file   Tobacco Use    Smoking status: Every Day     Current packs/day: 1.00     Average packs/day: 1 pack/day for 65.3 years (65.3 ttl pk-yrs)     Types: Cigarettes     Start date: 1/1/1960     Passive exposure: Current    Smokeless tobacco: Not on file   Vaping Use    Vaping status: Some Days   Substance and Sexual Activity    Alcohol use: Not Currently    Drug use: Yes     Types: Marijuana     Comment: vape    Sexual activity: Defer   Other  Topics Concern    Not on file   Social History Narrative    Not on file     Social Drivers of Health     Financial Resource Strain: Low Risk  (3/6/2025)    Overall Financial Resource Strain (CARDIA)     Difficulty of Paying Living Expenses: Not very hard   Food Insecurity: No Food Insecurity (3/5/2025)    Hunger Vital Sign     Worried About Running Out of Food in the Last Year: Never true     Ran Out of Food in the Last Year: Never true   Transportation Needs: No Transportation Needs (3/6/2025)    PRAPARE - Transportation     Lack of Transportation (Medical): No     Lack of Transportation (Non-Medical): No   Physical Activity: Inactive (10/31/2024)    Exercise Vital Sign     Days of Exercise per Week: 0 days     Minutes of Exercise per Session: 0 min   Stress: Stress Concern Present (10/31/2024)    [employer] - Occupational Stress Questionnaire     Feeling of Stress : To some extent   Social Connections: Socially Isolated (10/31/2024)    Social Connection and Isolation Panel [NHANES]     Frequency of Communication with Friends and Family: More than three times a week     Frequency of Social Gatherings with Friends and Family: More than three times a week     Attends Gnosticism Services: Never     Active Member of Clubs or Organizations: No     Attends Club or Organization Meetings: Never     Marital Status:    Intimate Partner Violence: Not At Risk (3/5/2025)    Humiliation, Afraid, Rape, and Kick questionnaire     Fear of Current or Ex-Partner: No     Emotionally Abused: No     Physically Abused: No     Sexually Abused: No   Housing Stability: Low Risk  (3/6/2025)    Housing Stability Vital Sign     Unable to Pay for Housing in the Last Year: No     Number of Times Moved in the Last Year: 0     Homeless in the Last Year: No     Surgical History:   Past Surgical History:   Procedure Laterality Date    APPENDECTOMY      EYE SURGERY      HYSTERECTOMY      TONSILLECTOMY       Family  History:   Family History   Problem Relation Name Age of Onset    Cancer Mother      Other (Father had hypertension, stroke, coronary artery disease and diabetes) Father        reports that she has been smoking cigarettes. She started smoking about 65 years ago. She has a 65.3 pack-year smoking history. She has been exposed to tobacco smoke. She does not have any smokeless tobacco history on file.  Oncology Family history: Cancer-related family history includes Cancer in her mother.    Review Of Systems:  As stated per in HPI; otherwise all other 12 point ROS are negative    Physical Exam:  There were no vitals taken for this visit.  BSA: There is no height or weight on file to calculate BSA.  General: awake/alert/oriented x3, no distress, alert and cooperative  Head: Short hair fully covering scalp. Symmetric facial expressions  Eyes: PERRL, EOMI, clear sclera, eyebrows present.  Ears/Nose/Mouth/Throat:  Oral mucous membranes moist. No oral ulcers. No palpable pre/post-auricular lymph nodes  Neck: No palpable cervical chain lymph nodes  Respiratory: unlabored breathing on room air, good chest expansion, thorax symmetric  Cardio: Regular rate and rhythm, normal S1 and S2, radial pulses symmetric  GI: Nondistended, soft, non-tender abdomen  Musculoskeletal: Normal muscle bulk and tone, ROM intact, no joint swelling.  Rises from chair and walks unassisted.  Extremities: No ankle swelling, no arm or leg wounds  Neuro: Alert, cognition intact, speech normal. Facial expressions symmetric.  No motor deficits noted. Sensation intact to touch and hot/cold.   Able to stand from seated position unassisted and walks around the room unassisted.  Psychological: Appropriate mood and behavior.  Skin: Warm and dry, no lesions, no rashes    Results:  Diagnostic Results   Lab Results   Component Value Date    WBC 7.1 04/11/2025    HGB 14.2 04/11/2025    HCT 42.3 04/11/2025    MCV 97 04/11/2025     04/11/2025     Lab Results    Component Value Date    CALCIUM 8.9 04/11/2025     (L) 04/11/2025    K 4.2 04/11/2025    CO2 31 04/11/2025    CL 94 (L) 04/11/2025    BUN 9 04/11/2025    CREATININE 0.40 (L) 04/11/2025    ALT 10 04/11/2025    AST 17 04/11/2025       Current Outpatient Medications:     acetaminophen (Tylenol) 325 mg tablet, Take 1 tablet (325 mg) by mouth every 6 hours if needed (pain)., Disp: , Rfl:     baclofen (Lioresal) 10 mg tablet, Take 1 tablet (10 mg) by mouth 3 times a day., Disp: , Rfl:     butalbital-acetaminophen-caff -40 mg tablet, Take 1 tablet by mouth every 6 hours if needed for headaches., Disp: , Rfl:     cholecalciferol, vitamin D3, (VITAMIN D3 ORAL), Take 1 tablet by mouth once daily., Disp: , Rfl:     lactulose 10 gram/15 mL solution, Take 30 mL (20 g) by mouth every 18 hours., Disp: , Rfl:     lidocaine HCL 4 % liquid roll-on, Apply 1 Application topically once daily as needed., Disp: , Rfl:     lubricating eye drops ophthalmic solution, Administer 1 drop into both eyes if needed for dry eyes., Disp: , Rfl:     meprobamate (Equanil) 400 mg tablet, Take 1 tablet (400 mg) by mouth 4 times a day., Disp: , Rfl:     mupirocin (Bactroban) 2 % ointment, Apply 1 Application topically if needed. Apply to affected area as needed, Disp: , Rfl:     nicotine (Nicoderm CQ) 14 mg/24 hr patch, Place 1 patch over 24 hours on the skin once every 24 hours for 28 days. (Patient not taking: Reported on 3/5/2025), Disp: 28 patch, Rfl: 0    ondansetron (Zofran) 8 mg tablet, Take 1 tablet (8 mg) by mouth every 8 hours if needed for nausea or vomiting., Disp: , Rfl:     pantoprazole (ProtoNix) 40 mg EC tablet, Take 1 tablet (40 mg) by mouth 2 times a day. Do not crush, chew, or split. Continue twice daily x 8 weeks then continue once daily., Disp: 60 tablet, Rfl: 3    prochlorperazine (Compazine) 10 mg tablet, Take 1 tablet (10 mg) by mouth every 6 hours if needed for nausea or vomiting., Disp: 30 tablet, Rfl: 5     sucralfate (Carafate) 100 mg/mL suspension, Take 10 mL (1 g) by mouth every 6 hours., Disp: , Rfl:   No current facility-administered medications for this visit.    Facility-Administered Medications Ordered in Other Visits:     albuterol 2.5 mg /3 mL (0.083 %) nebulizer solution 3 mL, 3 mL, nebulization, PRN, Kelly Guerra MD    dextrose 5 % in water (D5W) bolus 500 mL, 500 mL, intravenous, PRN, Kelly Guerra MD    diphenhydrAMINE (BENADryl) injection 50 mg, 50 mg, intravenous, PRN, Kelly Guerra MD    EPINEPHrine (Epipen) injection syringe 0.3 mg, 0.3 mg, intramuscular, q5 min PRN, Kelly Guerra MD    famotidine PF (Pepcid) injection 20 mg, 20 mg, intravenous, Once PRN, Kelly Guerra MD    methylPREDNISolone sod succinate (SOLU-Medrol) 40 mg/mL injection 40 mg, 40 mg, intravenous, PRN, Kelly Guerra MD    pembrolizumab (Keytruda) 400 mg in sodium chloride 0.9% 126 mL IV, 400 mg, intravenous, Once, Kelly Guerra MD    prochlorperazine (Compazine) injection 10 mg, 10 mg, intravenous, q6h PRN, Kelly Guerra MD    prochlorperazine (Compazine) tablet 10 mg, 10 mg, oral, q6h PRN, Kelly Guerra MD    sodium chloride 0.9 % bolus 500 mL, 500 mL, intravenous, PRN, Kelly Guerra MD     Assessment/Plan:  1- Lung cancer, squamous cell carcinoma:    78 yo F Dx with lung squamous cell carcinoma from subcarinal and right lower lobe mass, clinically stage 3B, cT4 (due to multiple nodules in ipsilateral lung in different lobes) and N2 (due to subcarinal LN).  FNA from inferior mediastinal LN (LN station 11) was atypical but suspicious for carcinoma metastases.    She does not seem to be a good candidate for surgery, or concurrent chemoRT.     Systemic tx plan: systemic chemoIO carbo/taxol/Keytruda q 3 weeks x 4 with Neulasta OnPro followed by hypofractionated RT followed by consolidation IO is recommended after discussion with Dr. Odom. Gemzar could not be offered as she has difficulties with  transportation.     C#1 chemo started on 11/12/24. Tolerated first dose very well. Mild N, no Diarrhea or constipation or vomiting  C#2 chemo given on 12/2/24. Diarrhea mild which has resolved with Imodium. Hair loss. Otherwise no N/V. She has much better appetite  12/16/24: There was a solitary noncalcified nodule in LLL, biopsy was recommended to rule out second primary or contralateral lung metastases. Lung biopsy was scheduled for 12/16/24, but it could not be done because the nodule could not be seen, so procedure is cancelled  01/13/25: C#4 chemoIO planned today. Tolerating very well. No N/V/D/C. Will start RT soon.   01/27/25: MRI brain: TASHA. PET CT is TASHA.   03/31/25: completed consolidation RT  04/011/25: consolidation Keytruda q 6 weeks is started    RTC in 6 weeks with cT chest    Restaging PET CT is planned for 06/09/25.    2- Brain Venous anomaly vs metastatic foci:    MRI brain 10/18/24: IMPRESSION: 1. 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 However, recommend short-term follow-up MRI in 3 months to assess for interval stability to exclude a more aggressive lesion.    Repeat MRI brain in 01/25: TASHA    3- Iron def: iron def without anemia. Venofer 200 mg x 3 completed      Diagnoses and all orders for this visit:  Squamous cell carcinoma, small cell, nonkeratinizing  -     Clinic Appointment Request           Performance Status: Symptomatic; fully ambulatory    I spent more than 60 minutes for the patient today, including face-to-face conversation, pre-visit preparation, post-visit orders, and others.   Kelly Guerra MD

## 2025-04-17 ENCOUNTER — APPOINTMENT (OUTPATIENT)
Dept: HEMATOLOGY/ONCOLOGY | Facility: CLINIC | Age: 80
End: 2025-04-17
Payer: MEDICARE

## 2025-04-29 ENCOUNTER — TELEPHONE (OUTPATIENT)
Dept: HEMATOLOGY/ONCOLOGY | Facility: CLINIC | Age: 80
End: 2025-04-29

## 2025-04-29 ENCOUNTER — HOSPITAL ENCOUNTER (INPATIENT)
Facility: HOSPITAL | Age: 80
LOS: 2 days | Discharge: HOME | End: 2025-05-01
Attending: STUDENT IN AN ORGANIZED HEALTH CARE EDUCATION/TRAINING PROGRAM | Admitting: INTERNAL MEDICINE
Payer: MEDICARE

## 2025-04-29 ENCOUNTER — APPOINTMENT (OUTPATIENT)
Dept: CARDIOLOGY | Facility: HOSPITAL | Age: 80
End: 2025-04-29
Payer: MEDICARE

## 2025-04-29 ENCOUNTER — DOCUMENTATION (OUTPATIENT)
Dept: HOME HEALTH SERVICES | Facility: HOME HEALTH | Age: 80
End: 2025-04-29
Payer: MEDICARE

## 2025-04-29 ENCOUNTER — APPOINTMENT (OUTPATIENT)
Dept: RADIOLOGY | Facility: HOSPITAL | Age: 80
End: 2025-04-29
Payer: MEDICARE

## 2025-04-29 DIAGNOSIS — E83.42 HYPOMAGNESEMIA: ICD-10-CM

## 2025-04-29 DIAGNOSIS — K26.9 DUODENAL ULCER: ICD-10-CM

## 2025-04-29 DIAGNOSIS — J90 PLEURAL EFFUSION: ICD-10-CM

## 2025-04-29 DIAGNOSIS — E87.1 HYPONATREMIA: ICD-10-CM

## 2025-04-29 DIAGNOSIS — J96.01 ACUTE RESPIRATORY FAILURE WITH HYPOXIA: Primary | ICD-10-CM

## 2025-04-29 DIAGNOSIS — Z85.118 HISTORY OF LUNG CANCER: ICD-10-CM

## 2025-04-29 DIAGNOSIS — R27.0 ATAXIA: ICD-10-CM

## 2025-04-29 DIAGNOSIS — R06.02 SHORTNESS OF BREATH: ICD-10-CM

## 2025-04-29 DIAGNOSIS — C44.92 SQUAMOUS CELL CARCINOMA, SMALL CELL, NONKERATINIZING: ICD-10-CM

## 2025-04-29 LAB
ALBUMIN SERPL BCP-MCNC: 3 G/DL (ref 3.4–5)
ALP SERPL-CCNC: 77 U/L (ref 33–136)
ALT SERPL W P-5'-P-CCNC: 10 U/L (ref 7–45)
ANION GAP SERPL CALCULATED.3IONS-SCNC: 12 MMOL/L (ref 10–20)
AST SERPL W P-5'-P-CCNC: 17 U/L (ref 9–39)
BASOPHILS # BLD AUTO: 0.02 X10*3/UL (ref 0–0.1)
BASOPHILS NFR BLD AUTO: 0.2 %
BILIRUB SERPL-MCNC: 0.2 MG/DL (ref 0–1.2)
BNP SERPL-MCNC: 707 PG/ML (ref 0–99)
BUN SERPL-MCNC: 14 MG/DL (ref 6–23)
CALCIUM SERPL-MCNC: 8.7 MG/DL (ref 8.6–10.3)
CARDIAC TROPONIN I PNL SERPL HS: 34 NG/L (ref 0–13)
CARDIAC TROPONIN I PNL SERPL HS: 37 NG/L (ref 0–13)
CHLORIDE SERPL-SCNC: 88 MMOL/L (ref 98–107)
CHLORIDE UR-SCNC: 58 MMOL/L
CHLORIDE/CREATININE (MMOL/G) IN URINE: 174 MMOL/G CREAT (ref 38–318)
CO2 SERPL-SCNC: 32 MMOL/L (ref 21–32)
CREAT SERPL-MCNC: 0.41 MG/DL (ref 0.5–1.05)
CREAT UR-MCNC: 33.4 MG/DL (ref 20–320)
CREAT UR-MCNC: 33.4 MG/DL (ref 20–320)
EGFRCR SERPLBLD CKD-EPI 2021: >90 ML/MIN/1.73M*2
EOSINOPHIL # BLD AUTO: 0.01 X10*3/UL (ref 0–0.4)
EOSINOPHIL NFR BLD AUTO: 0.1 %
ERYTHROCYTE [DISTWIDTH] IN BLOOD BY AUTOMATED COUNT: 13.8 % (ref 11.5–14.5)
FLUAV RNA RESP QL NAA+PROBE: NOT DETECTED
FLUBV RNA RESP QL NAA+PROBE: NOT DETECTED
GLUCOSE SERPL-MCNC: 143 MG/DL (ref 74–99)
HCT VFR BLD AUTO: 39 % (ref 36–46)
HGB BLD-MCNC: 13.3 G/DL (ref 12–16)
IMM GRANULOCYTES # BLD AUTO: 0.08 X10*3/UL (ref 0–0.5)
IMM GRANULOCYTES NFR BLD AUTO: 1 % (ref 0–0.9)
LACTATE SERPL-SCNC: 1.2 MMOL/L (ref 0.4–2)
LIPASE SERPL-CCNC: 13 U/L (ref 9–82)
LYMPHOCYTES # BLD AUTO: 0.22 X10*3/UL (ref 0.8–3)
LYMPHOCYTES NFR BLD AUTO: 2.7 %
MAGNESIUM SERPL-MCNC: 1.52 MG/DL (ref 1.6–2.4)
MCH RBC QN AUTO: 32.4 PG (ref 26–34)
MCHC RBC AUTO-ENTMCNC: 34.1 G/DL (ref 32–36)
MCV RBC AUTO: 95 FL (ref 80–100)
MONOCYTES # BLD AUTO: 0.6 X10*3/UL (ref 0.05–0.8)
MONOCYTES NFR BLD AUTO: 7.4 %
NEUTROPHILS # BLD AUTO: 7.22 X10*3/UL (ref 1.6–5.5)
NEUTROPHILS NFR BLD AUTO: 88.6 %
NRBC BLD-RTO: 0 /100 WBCS (ref 0–0)
PLATELET # BLD AUTO: 242 X10*3/UL (ref 150–450)
POTASSIUM SERPL-SCNC: 3.5 MMOL/L (ref 3.5–5.3)
PROT SERPL-MCNC: 6.5 G/DL (ref 6.4–8.2)
RBC # BLD AUTO: 4.1 X10*6/UL (ref 4–5.2)
RSV RNA RESP QL NAA+PROBE: NOT DETECTED
SARS-COV-2 RNA RESP QL NAA+PROBE: NOT DETECTED
SODIUM SERPL-SCNC: 128 MMOL/L (ref 136–145)
SODIUM UR-SCNC: 45 MMOL/L
SODIUM/CREAT UR-RTO: 135 MMOL/G CREAT
WBC # BLD AUTO: 8.2 X10*3/UL (ref 4.4–11.3)

## 2025-04-29 PROCEDURE — 81001 URINALYSIS AUTO W/SCOPE: CPT | Performed by: INTERNAL MEDICINE

## 2025-04-29 PROCEDURE — 84133 ASSAY OF URINE POTASSIUM: CPT | Performed by: CLINICAL NURSE SPECIALIST

## 2025-04-29 PROCEDURE — 2500000004 HC RX 250 GENERAL PHARMACY W/ HCPCS (ALT 636 FOR OP/ED): Mod: JZ | Performed by: CLINICAL NURSE SPECIALIST

## 2025-04-29 PROCEDURE — S4991 NICOTINE PATCH NONLEGEND: HCPCS | Performed by: CLINICAL NURSE SPECIALIST

## 2025-04-29 PROCEDURE — 99285 EMERGENCY DEPT VISIT HI MDM: CPT | Mod: 25 | Performed by: STUDENT IN AN ORGANIZED HEALTH CARE EDUCATION/TRAINING PROGRAM

## 2025-04-29 PROCEDURE — 2500000002 HC RX 250 W HCPCS SELF ADMINISTERED DRUGS (ALT 637 FOR MEDICARE OP, ALT 636 FOR OP/ED): Performed by: INTERNAL MEDICINE

## 2025-04-29 PROCEDURE — 2060000001 HC INTERMEDIATE ICU ROOM DAILY

## 2025-04-29 PROCEDURE — 2500000002 HC RX 250 W HCPCS SELF ADMINISTERED DRUGS (ALT 637 FOR MEDICARE OP, ALT 636 FOR OP/ED): Performed by: CLINICAL NURSE SPECIALIST

## 2025-04-29 PROCEDURE — S4991 NICOTINE PATCH NONLEGEND: HCPCS | Performed by: INTERNAL MEDICINE

## 2025-04-29 PROCEDURE — 96374 THER/PROPH/DIAG INJ IV PUSH: CPT

## 2025-04-29 PROCEDURE — 71046 X-RAY EXAM CHEST 2 VIEWS: CPT

## 2025-04-29 PROCEDURE — 71046 X-RAY EXAM CHEST 2 VIEWS: CPT | Mod: FOREIGN READ | Performed by: RADIOLOGY

## 2025-04-29 PROCEDURE — 2500000001 HC RX 250 WO HCPCS SELF ADMINISTERED DRUGS (ALT 637 FOR MEDICARE OP): Performed by: CLINICAL NURSE SPECIALIST

## 2025-04-29 PROCEDURE — 84484 ASSAY OF TROPONIN QUANT: CPT | Performed by: CLINICAL NURSE SPECIALIST

## 2025-04-29 PROCEDURE — 83735 ASSAY OF MAGNESIUM: CPT | Performed by: CLINICAL NURSE SPECIALIST

## 2025-04-29 PROCEDURE — 83690 ASSAY OF LIPASE: CPT | Performed by: CLINICAL NURSE SPECIALIST

## 2025-04-29 PROCEDURE — 82436 ASSAY OF URINE CHLORIDE: CPT | Performed by: CLINICAL NURSE SPECIALIST

## 2025-04-29 PROCEDURE — 94640 AIRWAY INHALATION TREATMENT: CPT

## 2025-04-29 PROCEDURE — 87637 SARSCOV2&INF A&B&RSV AMP PRB: CPT | Performed by: CLINICAL NURSE SPECIALIST

## 2025-04-29 PROCEDURE — 2500000004 HC RX 250 GENERAL PHARMACY W/ HCPCS (ALT 636 FOR OP/ED): Mod: JZ | Performed by: INTERNAL MEDICINE

## 2025-04-29 PROCEDURE — 2500000001 HC RX 250 WO HCPCS SELF ADMINISTERED DRUGS (ALT 637 FOR MEDICARE OP): Performed by: INTERNAL MEDICINE

## 2025-04-29 PROCEDURE — 93010 ELECTROCARDIOGRAM REPORT: CPT | Performed by: INTERNAL MEDICINE

## 2025-04-29 PROCEDURE — 80053 COMPREHEN METABOLIC PANEL: CPT | Performed by: CLINICAL NURSE SPECIALIST

## 2025-04-29 PROCEDURE — 93005 ELECTROCARDIOGRAM TRACING: CPT

## 2025-04-29 PROCEDURE — 83880 ASSAY OF NATRIURETIC PEPTIDE: CPT | Performed by: CLINICAL NURSE SPECIALIST

## 2025-04-29 PROCEDURE — 36415 COLL VENOUS BLD VENIPUNCTURE: CPT | Performed by: CLINICAL NURSE SPECIALIST

## 2025-04-29 PROCEDURE — 85025 COMPLETE CBC W/AUTO DIFF WBC: CPT | Performed by: CLINICAL NURSE SPECIALIST

## 2025-04-29 PROCEDURE — 87040 BLOOD CULTURE FOR BACTERIA: CPT | Mod: WESLAB | Performed by: CLINICAL NURSE SPECIALIST

## 2025-04-29 PROCEDURE — 83930 ASSAY OF BLOOD OSMOLALITY: CPT | Mod: WESLAB | Performed by: CLINICAL NURSE SPECIALIST

## 2025-04-29 PROCEDURE — 96375 TX/PRO/DX INJ NEW DRUG ADDON: CPT

## 2025-04-29 PROCEDURE — 83935 ASSAY OF URINE OSMOLALITY: CPT | Mod: WESLAB | Performed by: CLINICAL NURSE SPECIALIST

## 2025-04-29 PROCEDURE — 83605 ASSAY OF LACTIC ACID: CPT | Performed by: CLINICAL NURSE SPECIALIST

## 2025-04-29 PROCEDURE — 84300 ASSAY OF URINE SODIUM: CPT | Performed by: CLINICAL NURSE SPECIALIST

## 2025-04-29 PROCEDURE — 99291 CRITICAL CARE FIRST HOUR: CPT | Mod: 25 | Performed by: CLINICAL NURSE SPECIALIST

## 2025-04-29 RX ORDER — CHOLECALCIFEROL (VITAMIN D3) 50 MCG
100 TABLET ORAL DAILY
Status: DISCONTINUED | OUTPATIENT
Start: 2025-04-30 | End: 2025-05-01 | Stop reason: HOSPADM

## 2025-04-29 RX ORDER — GUAIFENESIN 600 MG/1
600 TABLET, EXTENDED RELEASE ORAL EVERY 12 HOURS PRN
Status: DISCONTINUED | OUTPATIENT
Start: 2025-04-29 | End: 2025-05-01 | Stop reason: HOSPADM

## 2025-04-29 RX ORDER — IBUPROFEN 200 MG
1 TABLET ORAL ONCE
Status: DISCONTINUED | OUTPATIENT
Start: 2025-04-29 | End: 2025-04-29

## 2025-04-29 RX ORDER — ONDANSETRON HYDROCHLORIDE 2 MG/ML
4 INJECTION, SOLUTION INTRAVENOUS EVERY 8 HOURS PRN
Status: DISCONTINUED | OUTPATIENT
Start: 2025-04-29 | End: 2025-05-01 | Stop reason: HOSPADM

## 2025-04-29 RX ORDER — ACETAMINOPHEN 325 MG/1
650 TABLET ORAL ONCE
Status: COMPLETED | OUTPATIENT
Start: 2025-04-29 | End: 2025-04-29

## 2025-04-29 RX ORDER — IPRATROPIUM BROMIDE AND ALBUTEROL SULFATE 2.5; .5 MG/3ML; MG/3ML
3 SOLUTION RESPIRATORY (INHALATION)
Status: DISCONTINUED | OUTPATIENT
Start: 2025-04-30 | End: 2025-05-01 | Stop reason: HOSPADM

## 2025-04-29 RX ORDER — POLYETHYLENE GLYCOL 3350 17 G/17G
17 POWDER, FOR SOLUTION ORAL DAILY PRN
Status: DISCONTINUED | OUTPATIENT
Start: 2025-04-29 | End: 2025-05-01 | Stop reason: HOSPADM

## 2025-04-29 RX ORDER — ONDANSETRON 4 MG/1
4 TABLET, FILM COATED ORAL EVERY 8 HOURS PRN
Status: DISCONTINUED | OUTPATIENT
Start: 2025-04-29 | End: 2025-05-01 | Stop reason: HOSPADM

## 2025-04-29 RX ORDER — FUROSEMIDE 10 MG/ML
20 INJECTION INTRAMUSCULAR; INTRAVENOUS ONCE
Status: COMPLETED | OUTPATIENT
Start: 2025-04-29 | End: 2025-04-29

## 2025-04-29 RX ORDER — ALBUTEROL SULFATE 0.83 MG/ML
2.5 SOLUTION RESPIRATORY (INHALATION) EVERY 2 HOUR PRN
Status: DISCONTINUED | OUTPATIENT
Start: 2025-04-29 | End: 2025-05-01 | Stop reason: HOSPADM

## 2025-04-29 RX ORDER — PANTOPRAZOLE SODIUM 40 MG/1
40 TABLET, DELAYED RELEASE ORAL 2 TIMES DAILY
Status: DISCONTINUED | OUTPATIENT
Start: 2025-04-29 | End: 2025-05-01 | Stop reason: HOSPADM

## 2025-04-29 RX ORDER — PROCHLORPERAZINE MALEATE 10 MG
10 TABLET ORAL EVERY 6 HOURS PRN
Status: DISCONTINUED | OUTPATIENT
Start: 2025-04-29 | End: 2025-05-01 | Stop reason: HOSPADM

## 2025-04-29 RX ORDER — LACTULOSE 10 G/15ML
30 SOLUTION ORAL
Status: DISCONTINUED | OUTPATIENT
Start: 2025-04-29 | End: 2025-05-01 | Stop reason: HOSPADM

## 2025-04-29 RX ORDER — IPRATROPIUM BROMIDE AND ALBUTEROL SULFATE 2.5; .5 MG/3ML; MG/3ML
3 SOLUTION RESPIRATORY (INHALATION) ONCE
Status: DISCONTINUED | OUTPATIENT
Start: 2025-04-29 | End: 2025-05-01 | Stop reason: HOSPADM

## 2025-04-29 RX ORDER — IBUPROFEN 200 MG
1 TABLET ORAL NIGHTLY
Status: DISCONTINUED | OUTPATIENT
Start: 2025-04-29 | End: 2025-05-01 | Stop reason: HOSPADM

## 2025-04-29 RX ORDER — GUAIFENESIN 600 MG/1
600 TABLET, EXTENDED RELEASE ORAL 2 TIMES DAILY PRN
Status: DISCONTINUED | OUTPATIENT
Start: 2025-04-29 | End: 2025-04-30

## 2025-04-29 RX ORDER — BUTALBITAL, ACETAMINOPHEN AND CAFFEINE 50; 325; 40 MG/1; MG/1; MG/1
1 TABLET ORAL EVERY 6 HOURS PRN
Status: DISCONTINUED | OUTPATIENT
Start: 2025-04-29 | End: 2025-05-01 | Stop reason: HOSPADM

## 2025-04-29 RX ORDER — IPRATROPIUM BROMIDE AND ALBUTEROL SULFATE 2.5; .5 MG/3ML; MG/3ML
3 SOLUTION RESPIRATORY (INHALATION) ONCE
Status: COMPLETED | OUTPATIENT
Start: 2025-04-29 | End: 2025-04-29

## 2025-04-29 RX ORDER — HEPARIN SODIUM 5000 [USP'U]/ML
5000 INJECTION, SOLUTION INTRAVENOUS; SUBCUTANEOUS EVERY 8 HOURS SCHEDULED
Status: DISCONTINUED | OUTPATIENT
Start: 2025-04-29 | End: 2025-05-01 | Stop reason: HOSPADM

## 2025-04-29 RX ORDER — BACLOFEN 10 MG/1
10 TABLET ORAL 3 TIMES DAILY
Status: DISCONTINUED | OUTPATIENT
Start: 2025-04-30 | End: 2025-05-01 | Stop reason: HOSPADM

## 2025-04-29 RX ORDER — GUAIFENESIN/DEXTROMETHORPHAN 100-10MG/5
5 SYRUP ORAL EVERY 4 HOURS PRN
Status: DISCONTINUED | OUTPATIENT
Start: 2025-04-29 | End: 2025-05-01 | Stop reason: HOSPADM

## 2025-04-29 RX ORDER — LIDOCAINE 50 MG/G
OINTMENT TOPICAL DAILY
Status: DISCONTINUED | OUTPATIENT
Start: 2025-04-30 | End: 2025-05-01 | Stop reason: HOSPADM

## 2025-04-29 RX ORDER — BACLOFEN 10 MG/1
10 TABLET ORAL ONCE
Status: COMPLETED | OUTPATIENT
Start: 2025-04-29 | End: 2025-04-29

## 2025-04-29 RX ORDER — SUCRALFATE 1 G/10ML
1 SUSPENSION ORAL EVERY 6 HOURS SCHEDULED
Status: DISCONTINUED | OUTPATIENT
Start: 2025-04-30 | End: 2025-05-01 | Stop reason: HOSPADM

## 2025-04-29 RX ORDER — ONDANSETRON 4 MG/1
8 TABLET, FILM COATED ORAL EVERY 8 HOURS PRN
Status: DISCONTINUED | OUTPATIENT
Start: 2025-04-29 | End: 2025-04-29 | Stop reason: SDUPTHER

## 2025-04-29 RX ORDER — ACETAMINOPHEN 325 MG/1
650 TABLET ORAL EVERY 4 HOURS PRN
Status: DISCONTINUED | OUTPATIENT
Start: 2025-04-29 | End: 2025-05-01 | Stop reason: HOSPADM

## 2025-04-29 RX ORDER — ACETAMINOPHEN 650 MG/1
650 SUPPOSITORY RECTAL EVERY 4 HOURS PRN
Status: DISCONTINUED | OUTPATIENT
Start: 2025-04-29 | End: 2025-05-01 | Stop reason: HOSPADM

## 2025-04-29 RX ORDER — ACETAMINOPHEN 160 MG/5ML
650 SOLUTION ORAL EVERY 4 HOURS PRN
Status: DISCONTINUED | OUTPATIENT
Start: 2025-04-29 | End: 2025-05-01 | Stop reason: HOSPADM

## 2025-04-29 RX ORDER — IPRATROPIUM BROMIDE AND ALBUTEROL SULFATE 2.5; .5 MG/3ML; MG/3ML
3 SOLUTION RESPIRATORY (INHALATION)
Status: DISCONTINUED | OUTPATIENT
Start: 2025-04-29 | End: 2025-04-29

## 2025-04-29 RX ORDER — MAGNESIUM SULFATE HEPTAHYDRATE 40 MG/ML
2 INJECTION, SOLUTION INTRAVENOUS ONCE
Status: COMPLETED | OUTPATIENT
Start: 2025-04-29 | End: 2025-04-29

## 2025-04-29 RX ADMIN — SUCRALFATE 1 G: 1 SUSPENSION ORAL at 23:41

## 2025-04-29 RX ADMIN — HEPARIN SODIUM 5000 UNITS: 5000 INJECTION INTRAVENOUS; SUBCUTANEOUS at 23:41

## 2025-04-29 RX ADMIN — BACLOFEN 10 MG: 10 TABLET ORAL at 18:16

## 2025-04-29 RX ADMIN — IPRATROPIUM BROMIDE AND ALBUTEROL SULFATE 3 ML: 2.5; .5 SOLUTION RESPIRATORY (INHALATION) at 22:35

## 2025-04-29 RX ADMIN — NICOTINE 1 PATCH: 14 PATCH, EXTENDED RELEASE TRANSDERMAL at 23:41

## 2025-04-29 RX ADMIN — ACETAMINOPHEN 650 MG: 325 TABLET ORAL at 18:16

## 2025-04-29 RX ADMIN — PANTOPRAZOLE SODIUM 40 MG: 40 TABLET, DELAYED RELEASE ORAL at 23:41

## 2025-04-29 RX ADMIN — MAGNESIUM SULFATE IN WATER 2 G: 40 INJECTION, SOLUTION INTRAVENOUS at 18:15

## 2025-04-29 RX ADMIN — ACETAMINOPHEN 650 MG: 325 TABLET ORAL at 23:41

## 2025-04-29 RX ADMIN — METHYLPREDNISOLONE SODIUM SUCCINATE 40 MG: 40 INJECTION, POWDER, FOR SOLUTION INTRAMUSCULAR; INTRAVENOUS at 23:41

## 2025-04-29 RX ADMIN — IPRATROPIUM BROMIDE AND ALBUTEROL SULFATE 3 ML: 2.5; .5 SOLUTION RESPIRATORY (INHALATION) at 16:51

## 2025-04-29 RX ADMIN — NICOTINE 1 PATCH: 21 PATCH, EXTENDED RELEASE TRANSDERMAL at 18:15

## 2025-04-29 RX ADMIN — FUROSEMIDE 20 MG: 10 INJECTION, SOLUTION INTRAMUSCULAR; INTRAVENOUS at 18:16

## 2025-04-29 SDOH — ECONOMIC STABILITY: HOUSING INSECURITY: IN THE PAST 12 MONTHS, HOW MANY TIMES HAVE YOU MOVED WHERE YOU WERE LIVING?: 0

## 2025-04-29 SDOH — ECONOMIC STABILITY: FOOD INSECURITY: HOW HARD IS IT FOR YOU TO PAY FOR THE VERY BASICS LIKE FOOD, HOUSING, MEDICAL CARE, AND HEATING?: NOT HARD AT ALL

## 2025-04-29 SDOH — SOCIAL STABILITY: SOCIAL INSECURITY: WITHIN THE LAST YEAR, HAVE YOU BEEN AFRAID OF YOUR PARTNER OR EX-PARTNER?: NO

## 2025-04-29 SDOH — SOCIAL STABILITY: SOCIAL INSECURITY: DO YOU FEEL ANYONE HAS EXPLOITED OR TAKEN ADVANTAGE OF YOU FINANCIALLY OR OF YOUR PERSONAL PROPERTY?: NO

## 2025-04-29 SDOH — ECONOMIC STABILITY: HOUSING INSECURITY: IN THE LAST 12 MONTHS, WAS THERE A TIME WHEN YOU WERE NOT ABLE TO PAY THE MORTGAGE OR RENT ON TIME?: NO

## 2025-04-29 SDOH — SOCIAL STABILITY: SOCIAL INSECURITY: HAVE YOU HAD THOUGHTS OF HARMING ANYONE ELSE?: NO

## 2025-04-29 SDOH — ECONOMIC STABILITY: FOOD INSECURITY: WITHIN THE PAST 12 MONTHS, THE FOOD YOU BOUGHT JUST DIDN'T LAST AND YOU DIDN'T HAVE MONEY TO GET MORE.: NEVER TRUE

## 2025-04-29 SDOH — ECONOMIC STABILITY: HOUSING INSECURITY: AT ANY TIME IN THE PAST 12 MONTHS, WERE YOU HOMELESS OR LIVING IN A SHELTER (INCLUDING NOW)?: NO

## 2025-04-29 SDOH — ECONOMIC STABILITY: FOOD INSECURITY: WITHIN THE PAST 12 MONTHS, YOU WORRIED THAT YOUR FOOD WOULD RUN OUT BEFORE YOU GOT THE MONEY TO BUY MORE.: NEVER TRUE

## 2025-04-29 SDOH — SOCIAL STABILITY: SOCIAL INSECURITY: WITHIN THE LAST YEAR, HAVE YOU BEEN HUMILIATED OR EMOTIONALLY ABUSED IN OTHER WAYS BY YOUR PARTNER OR EX-PARTNER?: NO

## 2025-04-29 SDOH — SOCIAL STABILITY: SOCIAL INSECURITY: ARE YOU OR HAVE YOU BEEN THREATENED OR ABUSED PHYSICALLY, EMOTIONALLY, OR SEXUALLY BY ANYONE?: NO

## 2025-04-29 SDOH — SOCIAL STABILITY: SOCIAL INSECURITY: DOES ANYONE TRY TO KEEP YOU FROM HAVING/CONTACTING OTHER FRIENDS OR DOING THINGS OUTSIDE YOUR HOME?: NO

## 2025-04-29 SDOH — ECONOMIC STABILITY: INCOME INSECURITY: IN THE PAST 12 MONTHS HAS THE ELECTRIC, GAS, OIL, OR WATER COMPANY THREATENED TO SHUT OFF SERVICES IN YOUR HOME?: NO

## 2025-04-29 SDOH — SOCIAL STABILITY: SOCIAL INSECURITY: WERE YOU ABLE TO COMPLETE ALL THE BEHAVIORAL HEALTH SCREENINGS?: YES

## 2025-04-29 SDOH — SOCIAL STABILITY: SOCIAL INSECURITY: ARE THERE ANY APPARENT SIGNS OF INJURIES/BEHAVIORS THAT COULD BE RELATED TO ABUSE/NEGLECT?: NO

## 2025-04-29 SDOH — ECONOMIC STABILITY: TRANSPORTATION INSECURITY: IN THE PAST 12 MONTHS, HAS LACK OF TRANSPORTATION KEPT YOU FROM MEDICAL APPOINTMENTS OR FROM GETTING MEDICATIONS?: NO

## 2025-04-29 SDOH — SOCIAL STABILITY: SOCIAL INSECURITY: DO YOU FEEL UNSAFE GOING BACK TO THE PLACE WHERE YOU ARE LIVING?: NO

## 2025-04-29 SDOH — SOCIAL STABILITY: SOCIAL INSECURITY: HAS ANYONE EVER THREATENED TO HURT YOUR FAMILY OR YOUR PETS?: NO

## 2025-04-29 SDOH — SOCIAL STABILITY: SOCIAL INSECURITY: HAVE YOU HAD ANY THOUGHTS OF HARMING ANYONE ELSE?: NO

## 2025-04-29 SDOH — SOCIAL STABILITY: SOCIAL INSECURITY: ABUSE: ADULT

## 2025-04-29 ASSESSMENT — PATIENT HEALTH QUESTIONNAIRE - PHQ9
2. FEELING DOWN, DEPRESSED OR HOPELESS: NOT AT ALL
SUM OF ALL RESPONSES TO PHQ9 QUESTIONS 1 & 2: 0
1. LITTLE INTEREST OR PLEASURE IN DOING THINGS: NOT AT ALL

## 2025-04-29 ASSESSMENT — LIFESTYLE VARIABLES
HAVE PEOPLE ANNOYED YOU BY CRITICIZING YOUR DRINKING: NO
TOTAL SCORE: 0
EVER HAD A DRINK FIRST THING IN THE MORNING TO STEADY YOUR NERVES TO GET RID OF A HANGOVER: NO
AUDIT-C TOTAL SCORE: 0
HAVE YOU EVER FELT YOU SHOULD CUT DOWN ON YOUR DRINKING: NO
SKIP TO QUESTIONS 9-10: 1
HOW OFTEN DO YOU HAVE A DRINK CONTAINING ALCOHOL: NEVER
PRESCIPTION_ABUSE_PAST_12_MONTHS: NO
HOW OFTEN DO YOU HAVE 6 OR MORE DRINKS ON ONE OCCASION: NEVER
SUBSTANCE_ABUSE_PAST_12_MONTHS: YES
AUDIT-C TOTAL SCORE: 0
EVER FELT BAD OR GUILTY ABOUT YOUR DRINKING: NO
HOW MANY STANDARD DRINKS CONTAINING ALCOHOL DO YOU HAVE ON A TYPICAL DAY: PATIENT DOES NOT DRINK

## 2025-04-29 ASSESSMENT — COGNITIVE AND FUNCTIONAL STATUS - GENERAL
MOBILITY SCORE: 12
CLIMB 3 TO 5 STEPS WITH RAILING: A LOT
PATIENT BASELINE BEDBOUND: NO
TOILETING: A LOT
DRESSING REGULAR UPPER BODY CLOTHING: A LOT
DRESSING REGULAR LOWER BODY CLOTHING: A LOT
WALKING IN HOSPITAL ROOM: A LOT
MOVING FROM LYING ON BACK TO SITTING ON SIDE OF FLAT BED WITH BEDRAILS: A LOT
MOVING TO AND FROM BED TO CHAIR: A LOT
DAILY ACTIVITIY SCORE: 12
PERSONAL GROOMING: A LOT
STANDING UP FROM CHAIR USING ARMS: A LOT
EATING MEALS: A LOT
HELP NEEDED FOR BATHING: A LOT
TURNING FROM BACK TO SIDE WHILE IN FLAT BAD: A LOT

## 2025-04-29 ASSESSMENT — ACTIVITIES OF DAILY LIVING (ADL)
ADEQUATE_TO_COMPLETE_ADL: YES
BATHING: DEPENDENT
DRESSING YOURSELF: DEPENDENT
PATIENT'S MEMORY ADEQUATE TO SAFELY COMPLETE DAILY ACTIVITIES?: YES
FEEDING YOURSELF: DEPENDENT
JUDGMENT_ADEQUATE_SAFELY_COMPLETE_DAILY_ACTIVITIES: YES
TOILETING: NEEDS ASSISTANCE
LACK_OF_TRANSPORTATION: NO
ASSISTIVE_DEVICE: NONE;WHEELCHAIR
GROOMING: DEPENDENT
HEARING - RIGHT EAR: FUNCTIONAL
WALKS IN HOME: DEPENDENT
HEARING - LEFT EAR: FUNCTIONAL

## 2025-04-29 ASSESSMENT — PAIN DESCRIPTION - LOCATION: LOCATION: NECK

## 2025-04-29 ASSESSMENT — PAIN DESCRIPTION - ORIENTATION: ORIENTATION: MID

## 2025-04-29 ASSESSMENT — PAIN - FUNCTIONAL ASSESSMENT
PAIN_FUNCTIONAL_ASSESSMENT: 0-10
PAIN_FUNCTIONAL_ASSESSMENT: 0-10

## 2025-04-29 ASSESSMENT — PAIN DESCRIPTION - DESCRIPTORS: DESCRIPTORS: PRESSURE

## 2025-04-29 ASSESSMENT — PAIN SCALES - GENERAL
PAINLEVEL_OUTOF10: 0 - NO PAIN
PAINLEVEL_OUTOF10: 3

## 2025-04-29 NOTE — HH CARE COORDINATION
Home Care received a referral for Physical Therapy. Unfortunately, we are unable to accept and process the referral at this time.     Reason:  Patient  is admitted to     [facility] as of 4/29/25          Patients, please reach out to the referring provider or your PCP to assist in obtaining an alternative home care agency and/or guidance to meet your needs.     Providers, please reach out to [facility] at [phone] with any questions regarding the declined referral.

## 2025-04-29 NOTE — ED PROVIDER NOTES
Department of Emergency Medicine   ED  Provider Note  Admit Date/RoomTime: 4/29/2025  4:20 PM  ED [facility]: [facility]        History of Present Illness:  Chief Complaint   Patient presents with    Shortness of Breath     Pt has a history of lung cancer and has been increasingly short of breath. Per EMS pt was in the 80's on room air on arrival. Pt does not wear oxygen. Pt was around smoke and vapes at home         Karly Horton is a 79 y.o. female history of congestive heart failure, squamous of carcinoma small cell lung presents to the emergency department complaints of shortness of breath onset of symptoms over the past couple of days worse today.  Upon EMS arrival patient was in her room smoking oxygen found to be 85%.  She denies any fever or chills.  No cough complains of a dry mouth.  States she is eating and drinking at home.  She has a history of a hernia nontender.  No abdominal pain nausea vomiting or diarrhea.  Patient is on immunotherapy for her lung cancer follows with Dr. ABRAN Guerra     Review of Systems:   Pertinent positives and negatives are stated within HPI, all other systems reviewed and are negative.        --------------------------------------------- PAST HISTORY ---------------------------------------------  Past Medical History:  has a past medical history of Other cervical disc degeneration, unspecified cervical region, Other conditions influencing health status, Other conditions influencing health status, Other conditions influencing health status, Other conditions influencing health status, Other conditions influencing health status, Other conditions influencing health status, Other conditions influencing health status, Other intervertebral disc degeneration, lumbar region, Other intervertebral disc displacement, lumbar region, Other intervertebral disc displacement, lumbosacral region, Personal history of other diseases of the musculoskeletal system and connective tissue, Personal  history of other diseases of the musculoskeletal system and connective tissue, Personal history of other specified conditions, Spinal stenosis, lumbar region without neurogenic claudication, and Sprain of ligaments of lumbar spine, initial encounter.  Past Surgical History:  has a past surgical history that includes Appendectomy; Eye surgery; Tonsillectomy; and Hysterectomy.  Social History:  reports that she has been smoking cigarettes. She started smoking about 65 years ago. She has a 65.3 pack-year smoking history. She has been exposed to tobacco smoke. She does not have any smokeless tobacco history on file. She reports that she does not currently use alcohol. She reports current drug use. Drug: Marijuana.  Family History: family history includes Cancer in her mother; Father had hypertension, stroke, coronary artery disease and diabetes in her father.. Unless otherwise noted, family history is non contributory  The patient’s home medications have been reviewed.  Allergies: Morphine and Spice flavor        ---------------------------------------------------PHYSICAL EXAM--------------------------------------    GENERAL APPEARANCE: Awake and alert.   Cachectic fragile tachypneic  VITAL SIGNS: As per the nurses' triage record.  Tachypneic hypoxic 88% on room air placed on 2 L nasal cannula  HEENT: Normocephalic, atraumatic. Extraocular muscles are intact. Pupils equal round and reactive to light. Conjunctiva are pink. Negative scleral icterus. Mucous membranes are dry.  Tongue in the midline. Pharynx was without erythema or exudates, uvula midline  NECK: Soft Nontender and supple, full gross ROM, no meningeal signs.  CHEST: Nontender to palpation. Clear to auscultation bilaterally. No rales, rhonchi, or wheezing.  Diminished tachypnea 88% room air placed on 2 L nasal cannula 94%  HEART: S1, S2.  Tachycardic regular rate and rhythm. No murmurs, gallops or rubs.  Strong and equal pulses in the extremities.   ABDOMEN:  "Soft, distended palpable hernia reducible., nondistended, positive bowel sounds, no palpable masses.  MUSCULCSKELETAL: The calves are nontender to palpation. .  Peripheral pulses intact  NEUROLOGICAL: Awake, alert and oriented x 3. Power intact in the upper and lower extremities. Sensation is intact to light touch in the upper and lower extremities.   IMMUNOLOGICAL: No lymphatic streaking noted   DERM: No petechiae, rashes, or ecchymoses.          ------------------------- NURSING NOTES AND VITALS REVIEWED ---------------------------  The nursing notes within the ED encounter and vital signs as below have been reviewed by myself  /70   Pulse (!) 110   Temp 37.7 °C (99.9 °F) (Axillary)   Resp 18   Ht 1.422 m (4' 8\")   Wt (!) 27.2 kg (60 lb)   SpO2 98%   BMI 13.45 kg/m²     Oxygen Saturation Interpretation: 94% on 2 L nasal cannula.  88% on room air    The cardiac monitor revealed sinus tachycardia with a heart rate in the 110s as interpreted by me. The cardiac monitor was ordered secondary to the patient's heart rate and to monitor the patient for dysrhythmia.       The patient’s available past medical records and past encounters were reviewed.          -----------------------DIAGNOSTIC RESULTS------------------------  LABS:    Labs Reviewed   CBC WITH AUTO DIFFERENTIAL - Abnormal       Result Value    WBC 8.2      nRBC 0.0      RBC 4.10      Hemoglobin 13.3      Hematocrit 39.0      MCV 95      MCH 32.4      MCHC 34.1      RDW 13.8      Platelets 242      Neutrophils % 88.6      Immature Granulocytes %, Automated 1.0 (*)     Lymphocytes % 2.7      Monocytes % 7.4      Eosinophils % 0.1      Basophils % 0.2      Neutrophils Absolute 7.22 (*)     Immature Granulocytes Absolute, Automated 0.08      Lymphocytes Absolute 0.22 (*)     Monocytes Absolute 0.60      Eosinophils Absolute 0.01      Basophils Absolute 0.02     COMPREHENSIVE METABOLIC PANEL - Abnormal    Glucose 143 (*)     Sodium 128 (*)     " Potassium 3.5      Chloride 88 (*)     Bicarbonate 32      Anion Gap 12      Urea Nitrogen 14      Creatinine 0.41 (*)     eGFR >90      Calcium 8.7      Albumin 3.0 (*)     Alkaline Phosphatase 77      Total Protein 6.5      AST 17      Bilirubin, Total 0.2      ALT 10     MAGNESIUM - Abnormal    Magnesium 1.52 (*)    B-TYPE NATRIURETIC PEPTIDE - Abnormal     (*)     Narrative:        <100 pg/mL - Heart failure unlikely  100-299 pg/mL - Intermediate probability of acute heart                  failure exacerbation. Correlate with clinical                  context and patient history.    >=300 pg/mL - Heart Failure likely. Correlate with clinical                  context and patient history.    BNP testing is performed using different testing methodology at [facility] than at other [facility]. Direct result comparisons should only be made within the same method.      SERIAL TROPONIN-INITIAL - Abnormal    Troponin I, High Sensitivity 34 (*)     Narrative:     Less than 99th percentile of normal range cutoff-  Female and children under 18 years old <14 ng/L; Male <21 ng/L: Negative  Repeat testing should be performed if clinically indicated.     Female and children under 18 years old 14-50 ng/L; Male 21-50 ng/L:  Consistent with possible cardiac damage and possible increased clinical   risk. Serial measurements may help to assess extent of myocardial damage.     >50 ng/L: Consistent with cardiac damage, increased clinical risk and  myocardial infarction. Serial measurements may help assess extent of   myocardial damage.      NOTE: Children less than 1 year old may have higher baseline troponin   levels and results should be interpreted in conjunction with the overall   clinical context.     NOTE: Troponin I testing is performed using a different   testing methodology at [facility] than at other   [facility]. Direct result comparisons should only   be made within the same  method.   SERIAL TROPONIN, 1 HOUR - Abnormal    Troponin I, High Sensitivity 37 (*)     Narrative:     Less than 99th percentile of normal range cutoff-  Female and children under 18 years old <14 ng/L; Male <21 ng/L: Negative  Repeat testing should be performed if clinically indicated.     Female and children under 18 years old 14-50 ng/L; Male 21-50 ng/L:  Consistent with possible cardiac damage and possible increased clinical   risk. Serial measurements may help to assess extent of myocardial damage.     >50 ng/L: Consistent with cardiac damage, increased clinical risk and  myocardial infarction. Serial measurements may help assess extent of   myocardial damage.      NOTE: Children less than 1 year old may have higher baseline troponin   levels and results should be interpreted in conjunction with the overall   clinical context.     NOTE: Troponin I testing is performed using a different   testing methodology at [facility] than at [facility]. Direct result comparisons should only   be made within the same method.   SARS-COV-2, INFLUENZA A/B AND RSV PCR - Normal    Coronavirus 2019, PCR Not Detected      Flu A Result Not Detected      Flu B Result Not Detected      RSV PCR Not Detected      Narrative:     This assay is an FDA-cleared, in vitro diagnostic nucleic acid amplification test for the qualitative detection and differentiation of SARS CoV-2/ Influenza A/B/ RSV from nasopharyngeal specimens collected from individuals with signs and symptoms of respiratory tract infections, and has been validated for use at [facility]. Negative results do not preclude COVID-19/ Influenza A/B/ RSV infections and should not be used as the sole basis for diagnosis, treatment, or other management decisions. Testing for SARS CoV-2 is recommended only for patients who meet current clinical and/or epidemiological criteria defined by federal, state, or local public health  directives.   LIPASE - Normal    Lipase 13      Narrative:     Venipuncture immediately after or during the administration of Metamizole may lead to falsely low results. Testing should be performed immediately prior to Metamizole dosing.   LACTATE - Normal    Lactate 1.2      Narrative:     Venipuncture immediately after or during the administration of Metamizole may lead to falsely low results. Testing should be performed immediately prior to Metamizole dosing.   BLOOD CULTURE   BLOOD CULTURE   TROPONIN SERIES- (INITIAL, 1 HR)    Narrative:     The following orders were created for panel order Troponin I Series, High Sensitivity (0, 1 HR).  Procedure                               Abnormality         Status                     ---------                               -----------         ------                     Troponin I, High Sensiti...[[id number]]  Abnormal            Final result               Troponin, High Sensitivi...[[id number]]  Abnormal            Final result                 Please view results for these tests on the individual orders.   OSMOLALITY   OSMOLALITY, URINE   SODIUM, URINE RANDOM   CHLORIDE, URINE RANDOM   POTASSIUM, URINE RANDOM       As interpreted by me, the above displayed labs are abnormal. All other labs obtained during this visit were within normal range or not returned as of this dictation.      EKG Interpretation    1622 EKG on my interpretation shows sinus tachycardia with frequent PVCs in a pattern of trigeminy.  Rate is 121 bpm.  Normal axis.  QTc is 457 ms, OH interval 134.  No ST elevation or depression in the underlying rhythm, no acute ischemic pattern.  No STEMI. [NT]           XR chest 2 views   Final Result   Cardiomegaly and pulmonary vascular congestion with small bilateral   pleural effusions.   Signed by Ronal Isaac MD              XR chest 2 views   Final Result   Cardiomegaly and pulmonary vascular congestion with small bilateral   pleural effusions.   Signed by Ronal  MD Poncho              ------------------------------ ED COURSE/MEDICAL DECISION MAKING----------------------  Medical Decision Making:   Exam: A medically appropriate exam performed, outlined above, given the known history and presentation.    History obtained from: Review of medical record nursing notes patient      Social Determinants of Health considered during this visit: Lives at home      PAST MEDICAL HISTORY/Chronic Conditions Affecting Care     has a past medical history of Other cervical disc degeneration, unspecified cervical region, Other conditions influencing health status, Other conditions influencing health status, Other conditions influencing health status, Other conditions influencing health status, Other conditions influencing health status, Other conditions influencing health status, Other conditions influencing health status, Other intervertebral disc degeneration, lumbar region, Other intervertebral disc displacement, lumbar region, Other intervertebral disc displacement, lumbosacral region, Personal history of other diseases of the musculoskeletal system and connective tissue, Personal history of other diseases of the musculoskeletal system and connective tissue, Personal history of other specified conditions, Spinal stenosis, lumbar region without neurogenic claudication, and Sprain of ligaments of lumbar spine, initial encounter.       CC/HPI Summary, Social Determinants of health, Records Reviewed, DDx, testing done/not done, ED Course, Reassessment, disposition considerations/shared decision making with patient, consults, disposition:   Presents to the emergency department with complaints of shortness of breath found to be hypoxic placed on nasal cannula  Plan  Chest x-ray, CBC, CMP, magnesium, COVID flu RSV, troponin, DuoNeb, magnesium, EKG, BNP, blood culture, lactate, lipase urine  electrolytes    Medical Decision Making/Differential Diagnosis:  Differentials include not limited to  viral illness versus COVID versus flu versus pneumonia versus RSV versus CHF versus worsening lung mass versus electrolyte abnormality versus dehydration versus anemia  Review  Troponin 34 repeat troponin 37 no chest pain  Glucose 143  Sodium 128 chloride 88 otherwise electrolytes unremarkable  Creatinine 0.41 BUN within normal limits  LFTs unremarkable  Lipase 13  White blood cell count 8.2  Hemoglobin 13.3    Magnesium 1.52 replaced  Lactate 1.2  COVID flu RSV negative  Patient presented emergency department increased shortness of breath requiring oxygen found to be 88% on room air now on 4 L nasal cannula.  Chest x-ray showed Cardiomegaly and pulmonary vascular congestion with small bilateral pleural effusions.  No peripheral edema noted.  proBNP is elevated patient was given IV Lasix.  Concerning for fluid overload.  Previous echo in October of last year showed a EF of 60 to 65%.  Patient received breathing treatments with improvement of her shortness of breath lung sounds remain diminished.  Magnesium found to be low at 1.5 to replaced sodium and chloride also low sodium electrolytes ordered as well as osmolarity and creatinine.  BUN and creatinine unremarkable.  LFTs unremarkable.  Albumin level low.  Lipase within normal limits lactate within normal limits no elevation white blood cell count patient is not anemic.  EKG per attending note sinus tachycardia with PVCs.  No ST elevation noted.  Cardiac enzymes are elevated with no complaints of chest pain  Based on patient's clinical presentation history and symptoms consistent with  Acute respiratory failure with hypoxia requiring oxygen  Shortness of breath  Hypomagnesia replaced  Hyponatremia labs ordered  Pleural effusion IV Lasix  Discussed case with patient's admitting team has agreed to admit to stepdown for further evaluation and treatment.  Patient does not appear to be toxic or septic at this time.  Her respiratory rate is elevated but improved  with breathing treatments heart rate also elevated suspect this is secondary to her respiratory status.  Patient seen and evaluated with attending physician Dr. Crews   Patient amenable to plan amenable to admission  PROCEDURES  Unless otherwise noted below, none      CONSULTS:   None      ED Course as of 04/29/25 2007 Tue Apr 29, 2025   1622 EKG on my interpretation shows sinus tachycardia with frequent PVCs in a pattern of trigeminy.  Rate is 121 bpm.  Normal axis.  QTc is 457 ms, IN interval 134.  No ST elevation or depression in the underlying rhythm, no acute ischemic pattern.  No STEMI. [NT]   1743 MAGNESIUM(!): 1.52  Replaced [TB]   1744 Troponin I, High Sensitivity(!): 34  No complaints of chest pain EKG per attending note sinus tachycardia [TB]   1747 BNP(!): 707  No peripheral edema noted.  Chest x-ray   Cardiomegaly and pulmonary vascular congestion with small bilateral  pleural effusions.   Will monitor fluid resuscitation closely  Echo October 2024 showed  left ventricular systolic function is normal, with a visually estimated ejection fraction of 60-65% [TB]   1747 SODIUM(!): 128  Sodium electrolytes ordered serum osmolarity urine osmolarity urine creatinine ordered. [TB]   1809 Lung sounds reassessed after breathing treatment clear mild tachypnea noted calms with rest.  100% on 5 L nasal cannula [TB]   1809 Discussed admission to the hospital patient amenable plan amenable to admission [TB]   1850 Discussed case with hospitalist team Dr. Landon has agreed to accept the patient under the hospitalist service Dr. Lakisha meyer [TB]   1914 After review of the chart found that patient is covered by Dr. Parmer.  Canceled from the hospitalist term spoke with Dr. Parmer has agreed to admit the patient stepdown for further evaluation and treatment [TB]      ED Course User Index  [NT] Kingsley Crews DO  [TB] Moon Moralez, APRN-CNP         Diagnoses as of 04/29/25 2007   Acute respiratory failure  with hypoxia   Shortness of breath   Hypomagnesemia   Hyponatremia   Pleural effusion   History of lung cancer         This patient has remained hemodynamically stable during their ED course.      Critical Care: 31  Critical Care    Performed by: PO Deng-CNP  Authorized by: Kingsley Crews DO    Critical care provider statement:     Critical care time (minutes):  31    Critical care time was exclusive of:  Separately billable procedures and treating other patients and teaching time    Critical care was necessary to treat or prevent imminent or life-threatening deterioration of the following conditions:  Cardiac failure, respiratory failure and metabolic crisis    Critical care was time spent personally by me on the following activities:  Blood draw for specimens, development of treatment plan with patient or surrogate, evaluation of patient's response to treatment, discussions with primary provider, examination of patient, interpretation of cardiac output measurements, obtaining history from patient or surrogate, ordering and performing treatments and interventions, ordering and review of laboratory studies, ordering and review of radiographic studies, pulse oximetry, re-evaluation of patient's condition and review of old charts    Care discussed with: admitting provider    Critical care time secondary to electrolyte abnormalities requiring IV replacement and close monitoring advanced testing.  Acute respiratory failure with hypoxia requiring oxygen close monitoring fluid overload requiring IV Lasix close monitoring      Counseling:  The emergency provider has spoken with the patient and discussed today’s results, in addition to providing specific details for the plan of care and counseling regarding the diagnosis and prognosis.  Questions are answered at this time and they are agreeable with the plan.         --------------------------------- IMPRESSION AND DISPOSITION  ---------------------------------    IMPRESSION  1. Acute respiratory failure with hypoxia    2. Shortness of breath    3. Hypomagnesemia    4. Hyponatremia    5. Pleural effusion    6. History of lung cancer        DISPOSITION  Disposition: Admit stepdown further evaluation and treatment  Patient condition is guarded        NOTE: This report was transcribed using voice recognition software. Every effort was made to ensure accuracy; however, inadvertent computerized transcription errors may be present      PO Deng-MYNOR  04/29/25 2007

## 2025-04-29 NOTE — TELEPHONE ENCOUNTER
Detail Level: Detailed Reason for Conversation  Lab Orders    Background   Paola from [employer] calling. Trying to see if Dr Mendoza can order palliative care or hospice. She states patient thought she was going to get some kind of physical therapy but nobody has reached out to patient. Paola said we can call her back or reach out to the patient,.     Disposition   No disposition on file.     Add 76360 Cpt? (Important Note: In 2017 The Use Of 55382 Is Being Tracked By Cms To Determine Future Global Period Reimbursement For Global Periods): yes Quality 357: Surgical Site Infection (Ssi): No surgical site infection Wound Diameter In Cm(Optional): 0 Wound Crusting?: clean Sutures?: intact Wound Edema?: mild Wound Color?: pink Any New Or Residual Neoplasm?: No

## 2025-04-29 NOTE — ED TRIAGE NOTES
Pt brought in by ems for shortness of breath. Ems states Pt was 85%ORA. Ems initiated 2lpm O2- Pt spo2 raised to 95%. Pt has history of lung cancer and is a smoker. Pt does not use O2 at home.

## 2025-04-30 LAB
ALBUMIN SERPL BCP-MCNC: 2.9 G/DL (ref 3.4–5)
ALP SERPL-CCNC: 75 U/L (ref 33–136)
ALT SERPL W P-5'-P-CCNC: 9 U/L (ref 7–45)
ANION GAP SERPL CALCULATED.3IONS-SCNC: 11 MMOL/L (ref 10–20)
APPEARANCE UR: CLEAR
AST SERPL W P-5'-P-CCNC: 17 U/L (ref 9–39)
ATRIAL RATE: 121 BPM
BACTERIA #/AREA URNS AUTO: ABNORMAL /HPF
BACTERIA SPEC RESP CULT: ABNORMAL
BILIRUB SERPL-MCNC: 0.2 MG/DL (ref 0–1.2)
BILIRUB UR STRIP.AUTO-MCNC: NEGATIVE MG/DL
BNP SERPL-MCNC: 580 PG/ML (ref 0–99)
BUN SERPL-MCNC: 10 MG/DL (ref 6–23)
CALCIUM SERPL-MCNC: 8.3 MG/DL (ref 8.6–10.3)
CHLORIDE SERPL-SCNC: 90 MMOL/L (ref 98–107)
CO2 SERPL-SCNC: 32 MMOL/L (ref 21–32)
COLOR UR: ABNORMAL
CREAT SERPL-MCNC: 0.26 MG/DL (ref 0.5–1.05)
CREAT UR-MCNC: 33.4 MG/DL (ref 20–320)
EGFRCR SERPLBLD CKD-EPI 2021: >90 ML/MIN/1.73M*2
ERYTHROCYTE [DISTWIDTH] IN BLOOD BY AUTOMATED COUNT: 13.8 % (ref 11.5–14.5)
GLUCOSE SERPL-MCNC: 141 MG/DL (ref 74–99)
GLUCOSE UR STRIP.AUTO-MCNC: NORMAL MG/DL
GRAM STN SPEC: ABNORMAL
HCT VFR BLD AUTO: 37.3 % (ref 36–46)
HGB BLD-MCNC: 12.5 G/DL (ref 12–16)
HYALINE CASTS #/AREA URNS AUTO: ABNORMAL /LPF
KETONES UR STRIP.AUTO-MCNC: NEGATIVE MG/DL
LEUKOCYTE ESTERASE UR QL STRIP.AUTO: ABNORMAL
MAGNESIUM SERPL-MCNC: 1.67 MG/DL (ref 1.6–2.4)
MCH RBC QN AUTO: 31.9 PG (ref 26–34)
MCHC RBC AUTO-ENTMCNC: 33.5 G/DL (ref 32–36)
MCV RBC AUTO: 95 FL (ref 80–100)
NITRITE UR QL STRIP.AUTO: NEGATIVE
NRBC BLD-RTO: 0 /100 WBCS (ref 0–0)
OSMOLALITY SERPL: 275 MOSM/KG (ref 280–300)
OSMOLALITY UR: 362 MOSM/KG (ref 200–1200)
P AXIS: 58 DEGREES
P OFFSET: 189 MS
P ONSET: 147 MS
PH UR STRIP.AUTO: 6 [PH]
PLATELET # BLD AUTO: 224 X10*3/UL (ref 150–450)
POTASSIUM SERPL-SCNC: 3.7 MMOL/L (ref 3.5–5.3)
POTASSIUM UR-SCNC: 26 MMOL/L
POTASSIUM/CREAT UR-RTO: 78 MMOL/G CREAT
PR INTERVAL: 134 MS
PROT SERPL-MCNC: 6.2 G/DL (ref 6.4–8.2)
PROT UR STRIP.AUTO-MCNC: NEGATIVE MG/DL
Q ONSET: 214 MS
QRS COUNT: 20 BEATS
QRS DURATION: 76 MS
QT INTERVAL: 322 MS
QTC CALCULATION(BAZETT): 457 MS
QTC FREDERICIA: 406 MS
R AXIS: -49 DEGREES
RBC # BLD AUTO: 3.92 X10*6/UL (ref 4–5.2)
RBC # UR STRIP.AUTO: NEGATIVE MG/DL
RBC #/AREA URNS AUTO: ABNORMAL /HPF
SODIUM SERPL-SCNC: 129 MMOL/L (ref 136–145)
SP GR UR STRIP.AUTO: 1.02
T AXIS: 78 DEGREES
T OFFSET: 375 MS
UROBILINOGEN UR STRIP.AUTO-MCNC: NORMAL MG/DL
VENTRICULAR RATE: 121 BPM
WBC # BLD AUTO: 6.4 X10*3/UL (ref 4.4–11.3)
WBC #/AREA URNS AUTO: ABNORMAL /HPF

## 2025-04-30 PROCEDURE — 84075 ASSAY ALKALINE PHOSPHATASE: CPT | Performed by: INTERNAL MEDICINE

## 2025-04-30 PROCEDURE — 2500000001 HC RX 250 WO HCPCS SELF ADMINISTERED DRUGS (ALT 637 FOR MEDICARE OP): Performed by: NURSE PRACTITIONER

## 2025-04-30 PROCEDURE — 97165 OT EVAL LOW COMPLEX 30 MIN: CPT | Mod: GO

## 2025-04-30 PROCEDURE — 2500000005 HC RX 250 GENERAL PHARMACY W/O HCPCS: Performed by: INTERNAL MEDICINE

## 2025-04-30 PROCEDURE — 87205 SMEAR GRAM STAIN: CPT | Mod: WESLAB | Performed by: INTERNAL MEDICINE

## 2025-04-30 PROCEDURE — 2500000004 HC RX 250 GENERAL PHARMACY W/ HCPCS (ALT 636 FOR OP/ED): Mod: JZ

## 2025-04-30 PROCEDURE — 2060000001 HC INTERMEDIATE ICU ROOM DAILY

## 2025-04-30 PROCEDURE — 97162 PT EVAL MOD COMPLEX 30 MIN: CPT | Mod: GP

## 2025-04-30 PROCEDURE — 83880 ASSAY OF NATRIURETIC PEPTIDE: CPT

## 2025-04-30 PROCEDURE — 85027 COMPLETE CBC AUTOMATED: CPT | Performed by: INTERNAL MEDICINE

## 2025-04-30 PROCEDURE — 2500000001 HC RX 250 WO HCPCS SELF ADMINISTERED DRUGS (ALT 637 FOR MEDICARE OP)

## 2025-04-30 PROCEDURE — 83735 ASSAY OF MAGNESIUM: CPT

## 2025-04-30 PROCEDURE — 94640 AIRWAY INHALATION TREATMENT: CPT

## 2025-04-30 PROCEDURE — 2500000002 HC RX 250 W HCPCS SELF ADMINISTERED DRUGS (ALT 637 FOR MEDICARE OP, ALT 636 FOR OP/ED): Performed by: INTERNAL MEDICINE

## 2025-04-30 PROCEDURE — 94667 MNPJ CHEST WALL 1ST: CPT

## 2025-04-30 PROCEDURE — 94668 MNPJ CHEST WALL SBSQ: CPT

## 2025-04-30 PROCEDURE — 9420000001 HC RT PATIENT EDUCATION 5 MIN

## 2025-04-30 PROCEDURE — 99232 SBSQ HOSP IP/OBS MODERATE 35: CPT

## 2025-04-30 PROCEDURE — 2500000001 HC RX 250 WO HCPCS SELF ADMINISTERED DRUGS (ALT 637 FOR MEDICARE OP): Performed by: INTERNAL MEDICINE

## 2025-04-30 PROCEDURE — 2500000004 HC RX 250 GENERAL PHARMACY W/ HCPCS (ALT 636 FOR OP/ED): Performed by: INTERNAL MEDICINE

## 2025-04-30 PROCEDURE — S4991 NICOTINE PATCH NONLEGEND: HCPCS | Performed by: INTERNAL MEDICINE

## 2025-04-30 RX ORDER — GUAIFENESIN 600 MG/1
600 TABLET, EXTENDED RELEASE ORAL 2 TIMES DAILY PRN
Status: DISCONTINUED | OUTPATIENT
Start: 2025-04-30 | End: 2025-05-01 | Stop reason: HOSPADM

## 2025-04-30 RX ORDER — FUROSEMIDE 20 MG/1
20 TABLET ORAL ONCE
Status: COMPLETED | OUTPATIENT
Start: 2025-04-30 | End: 2025-04-30

## 2025-04-30 RX ORDER — ACETAMINOPHEN 500 MG
5 TABLET ORAL NIGHTLY PRN
Status: DISCONTINUED | OUTPATIENT
Start: 2025-04-30 | End: 2025-05-01 | Stop reason: HOSPADM

## 2025-04-30 RX ORDER — PREDNISONE 20 MG/1
40 TABLET ORAL DAILY
Status: DISCONTINUED | OUTPATIENT
Start: 2025-05-01 | End: 2025-05-01

## 2025-04-30 RX ORDER — MAGNESIUM SULFATE HEPTAHYDRATE 40 MG/ML
2 INJECTION, SOLUTION INTRAVENOUS ONCE
Status: COMPLETED | OUTPATIENT
Start: 2025-04-30 | End: 2025-04-30

## 2025-04-30 RX ORDER — LACTULOSE 10 G/15ML
20 SOLUTION ORAL ONCE
Status: COMPLETED | OUTPATIENT
Start: 2025-04-30 | End: 2025-04-30

## 2025-04-30 RX ADMIN — BACLOFEN 10 MG: 10 TABLET ORAL at 20:13

## 2025-04-30 RX ADMIN — CARBOXYMETHYLCELLULOSE SODIUM 1 DROP: 0.5 SOLUTION/ DROPS OPHTHALMIC at 19:48

## 2025-04-30 RX ADMIN — HEPARIN SODIUM 5000 UNITS: 5000 INJECTION INTRAVENOUS; SUBCUTANEOUS at 05:31

## 2025-04-30 RX ADMIN — BACLOFEN 10 MG: 10 TABLET ORAL at 14:46

## 2025-04-30 RX ADMIN — IPRATROPIUM BROMIDE AND ALBUTEROL SULFATE 3 ML: 2.5; .5 SOLUTION RESPIRATORY (INHALATION) at 11:14

## 2025-04-30 RX ADMIN — LACTULOSE 20 G: 20 SOLUTION ORAL at 12:01

## 2025-04-30 RX ADMIN — FUROSEMIDE 20 MG: 20 TABLET ORAL at 14:46

## 2025-04-30 RX ADMIN — MAGNESIUM SULFATE IN WATER 2 G: 40 INJECTION, SOLUTION INTRAVENOUS at 14:46

## 2025-04-30 RX ADMIN — LACTULOSE 20 G: 20 SOLUTION ORAL at 17:40

## 2025-04-30 RX ADMIN — PANTOPRAZOLE SODIUM 40 MG: 40 TABLET, DELAYED RELEASE ORAL at 20:13

## 2025-04-30 RX ADMIN — LIDOCAINE: 50 OINTMENT TOPICAL at 23:25

## 2025-04-30 RX ADMIN — SUCRALFATE 1 G: 1 SUSPENSION ORAL at 05:31

## 2025-04-30 RX ADMIN — IPRATROPIUM BROMIDE AND ALBUTEROL SULFATE 3 ML: 2.5; .5 SOLUTION RESPIRATORY (INHALATION) at 07:19

## 2025-04-30 RX ADMIN — Medication 100 MCG: at 09:52

## 2025-04-30 RX ADMIN — SUCRALFATE 1 G: 1 SUSPENSION ORAL at 12:01

## 2025-04-30 RX ADMIN — BACLOFEN 10 MG: 10 TABLET ORAL at 09:53

## 2025-04-30 RX ADMIN — CARBOXYMETHYLCELLULOSE SODIUM 1 DROP: 0.5 SOLUTION/ DROPS OPHTHALMIC at 23:09

## 2025-04-30 RX ADMIN — Medication 5 MG: at 23:52

## 2025-04-30 RX ADMIN — SUCRALFATE 1 G: 1 SUSPENSION ORAL at 17:40

## 2025-04-30 RX ADMIN — NICOTINE 1 PATCH: 14 PATCH, EXTENDED RELEASE TRANSDERMAL at 20:26

## 2025-04-30 RX ADMIN — ACETAMINOPHEN 650 MG: 325 TABLET ORAL at 20:15

## 2025-04-30 RX ADMIN — ACETAMINOPHEN 650 MG: 325 TABLET ORAL at 09:53

## 2025-04-30 RX ADMIN — IPRATROPIUM BROMIDE AND ALBUTEROL SULFATE 3 ML: 2.5; .5 SOLUTION RESPIRATORY (INHALATION) at 19:08

## 2025-04-30 RX ADMIN — METHYLPREDNISOLONE SODIUM SUCCINATE 40 MG: 40 INJECTION, POWDER, FOR SOLUTION INTRAMUSCULAR; INTRAVENOUS at 06:54

## 2025-04-30 RX ADMIN — PANTOPRAZOLE SODIUM 40 MG: 40 TABLET, DELAYED RELEASE ORAL at 09:53

## 2025-04-30 ASSESSMENT — COGNITIVE AND FUNCTIONAL STATUS - GENERAL
EATING MEALS: A LITTLE
TURNING FROM BACK TO SIDE WHILE IN FLAT BAD: A LITTLE
MOBILITY SCORE: 15
MOBILITY SCORE: 18
DRESSING REGULAR UPPER BODY CLOTHING: A LITTLE
DAILY ACTIVITIY SCORE: 9
TOILETING: A LITTLE
HELP NEEDED FOR BATHING: TOTAL
DRESSING REGULAR LOWER BODY CLOTHING: A LITTLE
STANDING UP FROM CHAIR USING ARMS: A LITTLE
MOVING FROM LYING ON BACK TO SITTING ON SIDE OF FLAT BED WITH BEDRAILS: A LITTLE
DRESSING REGULAR UPPER BODY CLOTHING: A LITTLE
STANDING UP FROM CHAIR USING ARMS: A LOT
PERSONAL GROOMING: A LITTLE
TURNING FROM BACK TO SIDE WHILE IN FLAT BAD: A LITTLE
HELP NEEDED FOR BATHING: A LITTLE
PERSONAL GROOMING: A LOT
EATING MEALS: A LITTLE
DRESSING REGULAR UPPER BODY CLOTHING: TOTAL
MOVING FROM LYING ON BACK TO SITTING ON SIDE OF FLAT BED WITH BEDRAILS: A LOT
DAILY ACTIVITIY SCORE: 18
MOBILITY SCORE: 9
TOILETING: A LITTLE
TURNING FROM BACK TO SIDE WHILE IN FLAT BAD: TOTAL
MOVING TO AND FROM BED TO CHAIR: A LITTLE
HELP NEEDED FOR BATHING: A LITTLE
DRESSING REGULAR LOWER BODY CLOTHING: A LITTLE
CLIMB 3 TO 5 STEPS WITH RAILING: A LOT
DRESSING REGULAR LOWER BODY CLOTHING: TOTAL
MOVING TO AND FROM BED TO CHAIR: A LOT
CLIMB 3 TO 5 STEPS WITH RAILING: A LITTLE
CLIMB 3 TO 5 STEPS WITH RAILING: TOTAL
STANDING UP FROM CHAIR USING ARMS: A LOT
EATING MEALS: A LITTLE
TOILETING: TOTAL
WALKING IN HOSPITAL ROOM: A LITTLE
WALKING IN HOSPITAL ROOM: TOTAL
PERSONAL GROOMING: A LITTLE
WALKING IN HOSPITAL ROOM: A LOT
MOVING FROM LYING ON BACK TO SITTING ON SIDE OF FLAT BED WITH BEDRAILS: A LITTLE
DAILY ACTIVITIY SCORE: 18
MOVING TO AND FROM BED TO CHAIR: A LITTLE

## 2025-04-30 ASSESSMENT — PAIN DESCRIPTION - ORIENTATION: ORIENTATION: POSTERIOR

## 2025-04-30 ASSESSMENT — PAIN - FUNCTIONAL ASSESSMENT
PAIN_FUNCTIONAL_ASSESSMENT: 0-10

## 2025-04-30 ASSESSMENT — ACTIVITIES OF DAILY LIVING (ADL)
ADL_ASSISTANCE: NEEDS ASSISTANCE
ADL_ASSISTANCE: NEEDS ASSISTANCE
LACK_OF_TRANSPORTATION: NO
BATHING_ASSISTANCE: MAXIMAL

## 2025-04-30 ASSESSMENT — PAIN SCALES - GENERAL
PAINLEVEL_OUTOF10: 5 - MODERATE PAIN
PAINLEVEL_OUTOF10: 3
PAINLEVEL_OUTOF10: 1
PAINLEVEL_OUTOF10: 1
PAINLEVEL_OUTOF10: 5 - MODERATE PAIN

## 2025-04-30 ASSESSMENT — PAIN DESCRIPTION - DESCRIPTORS: DESCRIPTORS: PRESSURE

## 2025-04-30 ASSESSMENT — PAIN DESCRIPTION - LOCATION: LOCATION: NECK

## 2025-04-30 NOTE — ASSESSMENT & PLAN NOTE
Osteoarthritis  Cervical stenosis  Lumbar stenosis  Protein calorie malnutrition  Hyponatremia    The patient admitted with a complaint of acute respiratory failure.  With the patient had a history of lung cancer.  Patient is appears to have COPD exacerbation.  The patient started on aerosol treatment with albuterol and Atrovent.  Start steroids.  Check sputum culture.  Consult pulmonology.  Patient has hyponatremia and hypomagnesemia.  Hyponatremia is most likely due to the alkalosis.  Monitor for now.  Continue supplementation monitor pulse ox.  Replete magnesium.  The patient has mild pleural effusion and appears to have pulmonary edema on chest x-ray but proBNP is elevated and patient does not clinically appears to be in fluid overload.  Monitor for now.  We will take DVT, fall, aspiration, decubitus, DVT precautions.  This has been discussed with the patient and is agreeable to it.

## 2025-04-30 NOTE — PROGRESS NOTES
Karly Horton is a 79 y.o. female on day 1 of admission presenting with Acute respiratory failure with hypoxia.    Subjective   Patient seen and examined.  Resting in bed in no acute distress.  Awake alert oriented x 3.  No shortness of breath, cough.  Constipated, no bowel movement for days.  Chronic abdominal pain, baseline.  No nausea, vomiting.  Asking for Lactulose.    Objective     Physical Exam  Vitals and nursing note reviewed.   Constitutional:       General: She is not in acute distress.     Appearance: She is underweight. She is not ill-appearing, toxic-appearing or diaphoretic.   HENT:      Head: Normocephalic and atraumatic.      Right Ear: External ear normal.      Left Ear: External ear normal.      Nose: Nose normal.      Mouth/Throat:      Mouth: Mucous membranes are moist.      Pharynx: Oropharynx is clear.   Eyes:      Extraocular Movements: Extraocular movements intact.      Conjunctiva/sclera: Conjunctivae normal.      Pupils: Pupils are equal, round, and reactive to light.   Cardiovascular:      Rate and Rhythm: Normal rate and regular rhythm.      Pulses: Normal pulses.      Heart sounds: Normal heart sounds. No murmur heard.  Pulmonary:      Effort: Pulmonary effort is normal. No respiratory distress.      Breath sounds: Decreased breath sounds present. No wheezing, rhonchi or rales.      Comments: 2.5 liters nasal cannula.  Abdominal:      General: Bowel sounds are normal.      Palpations: Abdomen is soft.      Tenderness: There is abdominal tenderness.      Comments: Chronic tenderness, baseline.   Genitourinary:     Comments: Deferred.  Musculoskeletal:         General: No swelling or tenderness. Normal range of motion.      Cervical back: Normal range of motion and neck supple.      Right lower leg: No edema.      Left lower leg: No edema.   Skin:     General: Skin is warm and dry.      Capillary Refill: Capillary refill takes less than 2 seconds.   Neurological:      General: No  "focal deficit present.      Mental Status: She is alert and oriented to person, place, and time.      Comments: Generalized weakness. No focal weakness. Gait deferred.    Psychiatric:         Mood and Affect: Mood normal.         Behavior: Behavior normal.       Last Recorded Vitals  Blood pressure 157/69, pulse 108, temperature 36.4 °C (97.5 °F), temperature source Temporal, resp. rate 18, height 1.422 m (4' 8\"), weight (!) 34.5 kg (76 lb 0.9 oz), SpO2 94%.    Intake/Output last 3 Shifts:  I/O last 3 completed shifts:  In: 0 (0 mL/kg)   Out: 350 (10.1 mL/kg) [Urine:350 (0.3 mL/kg/hr)]  Weight: 34.5 kg     Relevant Results  This patient has a central line   Reason for the central line remaining today? Parenteral medication    Results for orders placed or performed during the hospital encounter of 04/29/25 (from the past 24 hours)   ECG 12 Lead   Result Value Ref Range    Ventricular Rate 121 BPM    Atrial Rate 121 BPM    IL Interval 134 ms    QRS Duration 76 ms    QT Interval 322 ms    QTC Calculation(Bazett) 457 ms    P Axis 58 degrees    R Axis -49 degrees    T Axis 78 degrees    QRS Count 20 beats    Q Onset 214 ms    P Onset 147 ms    P Offset 189 ms    T Offset 375 ms    QTC Fredericia 406 ms   CBC and Auto Differential   Result Value Ref Range    WBC 8.2 4.4 - 11.3 x10*3/uL    nRBC 0.0 0.0 - 0.0 /100 WBCs    RBC 4.10 4.00 - 5.20 x10*6/uL    Hemoglobin 13.3 12.0 - 16.0 g/dL    Hematocrit 39.0 36.0 - 46.0 %    MCV 95 80 - 100 fL    MCH 32.4 26.0 - 34.0 pg    MCHC 34.1 32.0 - 36.0 g/dL    RDW 13.8 11.5 - 14.5 %    Platelets 242 150 - 450 x10*3/uL    Neutrophils % 88.6 40.0 - 80.0 %    Immature Granulocytes %, Automated 1.0 (H) 0.0 - 0.9 %    Lymphocytes % 2.7 13.0 - 44.0 %    Monocytes % 7.4 2.0 - 10.0 %    Eosinophils % 0.1 0.0 - 6.0 %    Basophils % 0.2 0.0 - 2.0 %    Neutrophils Absolute 7.22 (H) 1.60 - 5.50 x10*3/uL    Immature Granulocytes Absolute, Automated 0.08 0.00 - 0.50 x10*3/uL    Lymphocytes Absolute " 0.22 (L) 0.80 - 3.00 x10*3/uL    Monocytes Absolute 0.60 0.05 - 0.80 x10*3/uL    Eosinophils Absolute 0.01 0.00 - 0.40 x10*3/uL    Basophils Absolute 0.02 0.00 - 0.10 x10*3/uL   Comprehensive Metabolic Panel   Result Value Ref Range    Glucose 143 (H) 74 - 99 mg/dL    Sodium 128 (L) 136 - 145 mmol/L    Potassium 3.5 3.5 - 5.3 mmol/L    Chloride 88 (L) 98 - 107 mmol/L    Bicarbonate 32 21 - 32 mmol/L    Anion Gap 12 10 - 20 mmol/L    Urea Nitrogen 14 6 - 23 mg/dL    Creatinine 0.41 (L) 0.50 - 1.05 mg/dL    eGFR >90 >60 mL/min/1.73m*2    Calcium 8.7 8.6 - 10.3 mg/dL    Albumin 3.0 (L) 3.4 - 5.0 g/dL    Alkaline Phosphatase 77 33 - 136 U/L    Total Protein 6.5 6.4 - 8.2 g/dL    AST 17 9 - 39 U/L    Bilirubin, Total 0.2 0.0 - 1.2 mg/dL    ALT 10 7 - 45 U/L   Magnesium   Result Value Ref Range    Magnesium 1.52 (L) 1.60 - 2.40 mg/dL   Lipase   Result Value Ref Range    Lipase 13 9 - 82 U/L   B-type natriuretic peptide   Result Value Ref Range     (H) 0 - 99 pg/mL   Troponin I, High Sensitivity, Initial   Result Value Ref Range    Troponin I, High Sensitivity 34 (H) 0 - 13 ng/L   Blood Culture    Specimen: Peripheral Venipuncture; Blood culture   Result Value Ref Range    Blood Culture Loaded on Instrument - Culture in progress    Blood Culture    Specimen: Peripheral Venipuncture; Blood culture   Result Value Ref Range    Blood Culture Loaded on Instrument - Culture in progress    Lactate   Result Value Ref Range    Lactate 1.2 0.4 - 2.0 mmol/L   Osmolality   Result Value Ref Range    Osmolality, Serum 275 (L) 280 - 300 mOsm/kg   Sars-CoV-2, Influenza A/B and RSV PCR   Result Value Ref Range    Coronavirus 2019, PCR Not Detected Not Detected    Flu A Result Not Detected Not Detected    Flu B Result Not Detected Not Detected    RSV PCR Not Detected Not Detected   Troponin, High Sensitivity, 1 Hour   Result Value Ref Range    Troponin I, High Sensitivity 37 (H) 0 - 13 ng/L   Osmolality, urine   Result Value Ref  Range    Osmolality, Urine Random 362 200 - 1,200 mOsm/kg   Sodium, urine, random   Result Value Ref Range    Sodium, Urine Random 45 mmol/L    Creatinine, Urine Random 33.4 20.0 - 320.0 mg/dL    Sodium/Creatinine Ratio 135 Not established. mmol/g Creat   Chloride, urine, random   Result Value Ref Range    Chloride, Urine Random 58 mmol/L    Creatinine, Urine Random 33.4 20.0 - 320.0 mg/dL    Chloride/Creatinine Ratio 174 38 - 318 mmol/g creat   Potassium, urine, random   Result Value Ref Range    Potassium, Urine Random 26 mmol/L    Creatinine, Urine Random 33.4 20.0 - 320.0 mg/dL    Potassium/Creatinine Ratio 78 Not established mmol/g Creat   Urinalysis with Reflex Microscopic   Result Value Ref Range    Color, Urine Light-Yellow Light-Yellow, Yellow, Dark-Yellow    Appearance, Urine Clear Clear    Specific Gravity, Urine 1.016 1.005 - 1.035    pH, Urine 6.0 5.0, 5.5, 6.0, 6.5, 7.0, 7.5, 8.0    Protein, Urine NEGATIVE NEGATIVE, 10 (TRACE), 20 (TRACE) mg/dL    Glucose, Urine Normal Normal mg/dL    Blood, Urine NEGATIVE NEGATIVE mg/dL    Ketones, Urine NEGATIVE NEGATIVE mg/dL    Bilirubin, Urine NEGATIVE NEGATIVE mg/dL    Urobilinogen, Urine Normal Normal mg/dL    Nitrite, Urine NEGATIVE NEGATIVE    Leukocyte Esterase, Urine 75 [place]/uL (A) NEGATIVE   Microscopic Only, Urine   Result Value Ref Range    WBC, Urine 11-20 (A) 1-5, NONE /HPF    RBC, Urine 1-2 NONE, 1-2, 3-5 /HPF    Bacteria, Urine 1+ (A) NONE SEEN /HPF    Hyaline Casts, Urine 4+ (A) NONE /LPF   Comprehensive metabolic panel   Result Value Ref Range    Glucose 141 (H) 74 - 99 mg/dL    Sodium 129 (L) 136 - 145 mmol/L    Potassium 3.7 3.5 - 5.3 mmol/L    Chloride 90 (L) 98 - 107 mmol/L    Bicarbonate 32 21 - 32 mmol/L    Anion Gap 11 10 - 20 mmol/L    Urea Nitrogen 10 6 - 23 mg/dL    Creatinine 0.26 (L) 0.50 - 1.05 mg/dL    eGFR >90 >60 mL/min/1.73m*2    Calcium 8.3 (L) 8.6 - 10.3 mg/dL    Albumin 2.9 (L) 3.4 - 5.0 g/dL    Alkaline Phosphatase 75 33 - 136  U/L    Total Protein 6.2 (L) 6.4 - 8.2 g/dL    AST 17 9 - 39 U/L    Bilirubin, Total 0.2 0.0 - 1.2 mg/dL    ALT 9 7 - 45 U/L   CBC   Result Value Ref Range    WBC 6.4 4.4 - 11.3 x10*3/uL    nRBC 0.0 0.0 - 0.0 /100 WBCs    RBC 3.92 (L) 4.00 - 5.20 x10*6/uL    Hemoglobin 12.5 12.0 - 16.0 g/dL    Hematocrit 37.3 36.0 - 46.0 %    MCV 95 80 - 100 fL    MCH 31.9 26.0 - 34.0 pg    MCHC 33.5 32.0 - 36.0 g/dL    RDW 13.8 11.5 - 14.5 %    Platelets 224 150 - 450 x10*3/uL     No results found for the last 90 days.    ECG 12 Lead  Result Date: 4/30/2025  Sinus tachycardia with frequent Premature ventricular complexes Left anterior fascicular block ST & T wave abnormality, consider inferior ischemia Abnormal ECG When compared with ECG of 05-MAR-2025 11:42, Criteria for Anterior infarct are no longer Present Criteria for Anterolateral infarct are no longer Present Criteria for Inferior infarct are no longer Present T wave inversion no longer evident in Anterior leads T wave inversion now evident in Lateral leads    XR chest 2 views  Result Date: 4/29/2025  STUDY: Chest Radiographs;  4/29/2025 4:37PM INDICATION: Shortness of breath. COMPARISON: 3/5/2025 CT CTA Chest. ACCESSION NUMBER(S): [id number] ORDERING CLINICIAN: RONALD BLANTON TECHNIQUE:  Frontal and lateral chest. FINDINGS: CARDIOMEDIASTINAL SILHOUETTE: Heart is mildly enlarged with pulmonary vascular congestion and small bilateral pleural effusions.  LUNGS: Lungs are clear.  ABDOMEN: No remarkable upper abdominal findings.  BONES: No acute osseous changes.    Cardiomegaly and pulmonary vascular congestion with small bilateral pleural effusions. Signed by Ronal Isaac MD      Scheduled medications  Scheduled Medications[1]  Continuous medications  Continuous Medications[2]  PRN medications  PRN Medications[3]    ASSESSMENT:  Dyspnea  Acute hypoxic respiratory failure  Acute COPD exacerbation  Pleural effusion   Diastolic congestive heart failure  Non small cell lung  carcinoma  Vape use  Tobacco dependence  Hyponatremia improved  Constipation  Osteoarthritis   Cervical spinal stenosis  Lumbar spinal stenosis  Spondylolisthesis  Scoliosis  Migraine headaches  GERD  Vitamin d deficiency  Hypoalbuminemia  Severe protein calorie malnutrition  Pressure ulcer risk  Fall risk    PLAN:  Symptoms improved.  Respiratory status, pulse oximetry stable 92% on 2.5 liters nasal cannula.  No home oxygen.  Pulmonology consultation.  Management per recommendations.  Sputum culture.  IV Solu-medrol.  Duo Neb treatments.  Oxygen.  Titrate oxygen as saturations allow.  Pulmonary hygiene.  Add and encourage incentive spirometer use 10 x / hour while awake.  Increase activity as tolerated.  Nicotine patch.  Abstain from vape / tobacco use.  Education.  Outpatient follow-up with Hematology/Oncology.  Labs reviewed.  Hyponatremia improved.  Monitor electrolytes, renal function.  BMP in am.  + Constipation.  Chronic abdominal pain / tenderness - baseline.  No nausea, vomiting.  Tolerating diet.  Lactulose.  Increase fluid intake and activity.  Monitor output.  May administer enema if needed.  PT/OT.  Fall precautions.  Up with assistance only.  Bed and chair alarm at all times.  Pressure ulcer prevention measures.  Turn and reposition every 2 hours and as needed.  Heels off bed.  Offloading.  DVT prophylaxis.  Heparin subcutaneous.  GI prophylaxis.  PPI Protonix.  Supportive care.  Patient reassured.  PT/OT recommend low intensity level of continued care.  Case management following for discharge planning.  Anticipate discharge home after seen and cleared by Pulmonology, when arrangements are made by case management.      Shea Root, PO-CNP         [1] baclofen, 10 mg, oral, TID  cholecalciferol, 100 mcg, oral, Daily  heparin (porcine), 5,000 Units, subcutaneous, q8h [place]  ipratropium-albuteroL, 3 mL, nebulization, Once  ipratropium-albuteroL, 3 mL, nebulization, TID  lactulose, 30 mL, oral,  q18h  lidocaine, , Topical, Daily  methylPREDNISolone sodium succinate (PF), 40 mg, intravenous, q8h  nicotine, 1 patch, transdermal, Nightly  pantoprazole, 40 mg, oral, BID  sucralfate, 1 g, oral, q6h [place]A  [2]    [3] PRN medications: acetaminophen **OR** acetaminophen **OR** acetaminophen, albuterol, benzocaine-menthol, butalbital-acetaminophen-caff, dextromethorphan-guaifenesin, guaiFENesin, guaiFENesin, lubricating eye drops, ondansetron **OR** ondansetron, polyethylene glycol, prochlorperazine

## 2025-04-30 NOTE — PROGRESS NOTES
04/30/25 1553   [facility] Disability Status   Are you deaf or do you have serious difficulty hearing? N   Are you blind or do you have serious difficulty seeing, even when wearing glasses? N   Because of a physical, mental, or emotional condition, do you have serious difficulty concentrating, remembering, or making decisions? (5 years old or older) Y   Do you have serious difficulty walking or climbing stairs? Y   Do you have serious difficulty dressing or bathing? Y   Because of a physical, mental, or emotional condition, do you have serious difficulty doing errands alone such as visiting the doctor? Y

## 2025-04-30 NOTE — PROGRESS NOTES
04/30/25 1551   Discharge Planning   Living Arrangements Children   Support Systems Children   Assistance Needed needs assistance with all ADL's   Type of Residence Private residence   Number of Stairs to Enter Residence 2   Number of Stairs Within Residence 0   Do you have animals or pets at home? Yes   Type of Animals or Pets dog   Home or Post Acute Services In home services   Expected Discharge Disposition Home   Does the patient need discharge transport arranged? Yes   RoundTrip coordination needed? Yes   Has discharge transport been arranged? No   Financial Resource Strain   How hard is it for you to pay for the very basics like food, housing, medical care, and heating? Not very   Housing Stability   In the last 12 months, was there a time when you were not able to pay the mortgage or rent on time? N   In the past 12 months, how many times have you moved where you were living? 0   At any time in the past 12 months, were you homeless or living in a shelter (including now)? N   Transportation Needs   In the past 12 months, has lack of transportation kept you from medical appointments or from getting medications? no   In the past 12 months, has lack of transportation kept you from meetings, work, or from getting things needed for daily living? No   Stroke Family Assessment   Stroke Family Assessment Needed No     Met with patient at bedside. An explanation of discharge planning was provided. Patient resides in an apartment with her daughter. Patient is wheelchair bound. Patient relies on her daughter for all ADL's. Patient has an aide through [facility] that helps with bathing and housekeeping. Patient is endentulous. Daughter purees food for patient. Patient denies recent falls. Patient smokes one cigarette daily and vapes when she is not smoking. Denies alcohol use. Patient is not sure if her daughter has filled out POA paperwork. Unable to reach daughter at this time. Patient is active with [facility] and [facility] and would like to continue services with [facility] when discharged.  Will need an external referral.     UPDATE:  Just received a message from [facility] that they are unable to accept patient back for RODRI.  Patient has been non-compliant with [facility] visits and teaching.

## 2025-04-30 NOTE — DISCHARGE INSTR - DIET
Dietitian Recommendations for Malnutrition: Recommend patient consumes a high calorie/high protein nutrition supplement 3 times daily to aid in weight gain/prevent weight loss and Brennon twice daily for wound healing after discharge.

## 2025-04-30 NOTE — CONSULTS
Wound Care Consult     Visit Date: 4/30/2025      Patient Name: Karly Horton         MRN: [id number]             Reason for Consult: Wounds , PI of the sacrum x 2 , and left buttock / Ischial prominence          Wound History: Defer to the patient's chart     Pertinent Labs:       Assessment:  Wound 04/30/25 Pressure Injury Sacrum (Active)   Date First Assessed: 04/30/25   Present on Original Admission: Yes  Hand Hygiene Completed: Yes  Primary Wound Type: Pressure Injury  Location: Sacrum      Assessments 4/30/2025 11:00 AM   Site Assessment Eschar;Granulation;Non-blanchable erythema;Sloughing;Red   Annette-Wound Assessment Dry;Friable;Fragile;Non-blanchable erythema   Non-staged Wound Description Full thickness   Pressure Injury Stage Unstageable   Shape irregular   Wound Length (cm) 5.7 cm   Wound Width (cm) 2.6 cm   Wound Surface Area (cm^2) 11.64 cm^2   State of Healing Early/partial granulation;Slough;Eschar   Wound Bed Granulation (%) 60 %   Wound Bed Slough (%) 10 %   Wound Bed Eschar (%) 30 %   Margins Well-defined edges   Drainage Description Green;Serosanguineous   Drainage Amount Moderate   Dressing Hydrocolloid;Alginate;Foam   Dressing Changed New   Dressing Status Clean;Dry       No associated orders.       Wound 04/30/25 Other (Comments) Buttock Right (Active)   Date First Assessed: 04/30/25   Primary Wound Type: (c) Other (Comments)  Location: Buttock  Wound Location Orientation: Right      Assessments 4/30/2025  9:07 AM   Site Assessment Blanchable erythema;Red   Shape round       No associated orders.       Wound 04/30/25 Buttock Left (Active)   Date First Assessed/Time First Assessed: 04/30/25 0906   Location: Buttock  Wound Location Orientation: Left      Assessments 4/30/2025  9:07 AM   Wound Image     Shape round   Wound Length (cm) 0.5 cm   Wound Width (cm) 0.5 cm   Wound Surface Area (cm^2) 0.2 cm^2   Wound Depth (cm) 0.1 cm   Wound Volume (cm^3) 0.013 cm^3   State of Healing Early/partial  granulation;Epithelialized   Wound Bed Granulation (%) 80 %   Wound Bed Epithelium (%) 20 %   Margins Attached edges;Well-defined edges   Drainage Description None   Drainage Amount None   Dressing Hydrocolloid;Foam   Dressing Changed New   Dressing Status Dry;Clean       No associated orders.   Current and in place and additionally recommended preventative measures:Link at time of wound assessment of 14:  Foam padding and dressings to all vulnerable bony prominences including the sacrum, bilateral heels, and from in contact medical devices/wires/tubings .  Envision 700 pressure off loading  mattress for current full thickness pressure injuries  Link and skin reassessments at every shift  Mange and monitor for: friction , shear, and moisture related skin impairments/ injuries   Turn and reposition the patient every 1.5 hours including microshifts. Use positioning devices  Nutritional consult to address increased nutritional demands of wound healing  Promote max patient mobility                      Wound Plan: Wound Care Recommendations:  Wash wounds with warm soap and water and pat dry.    1) Sacral wounds x 2 Pressure injuries un stageable : Both wound beds presented with moderate amounts of wound bed eschar and slough. Apply Medihoney to a piece of Alginate and apply to the wound bed, Reapply Medihoney and alginate dressing. Cover with foam boarder dressing . Reapply Medihoney and alginate dressing every 2-3 days    2) Left Buttock/ischial prominence PI stage 2:  Apply Medihoney to wound and cove with foam border dressing . Reapply Medihoney every 3-4 days,      Follow up to wound care recommendations and wound progress with in 7 days          Ash Flores RN  4/30/2025  1:14 PM

## 2025-04-30 NOTE — CARE PLAN
The patient's goals for the shift include comfort    The clinical goals for the shift include Patient will not fall this shift    Over the shift, the patient did not make progress toward the following goals. Barriers to progression include the patient. Recommendations to address these barriers include educate patient on falls precautions.

## 2025-04-30 NOTE — CARE PLAN
The patient's goals for the shift include comfort    The clinical goals for the shift include safety    Problem: Respiratory  Goal: Minimize anxiety/maximize coping throughout shift  Outcome: Progressing  Goal: Minimal/no exertional discomfort or dyspnea this shift  Outcome: Progressing  Goal: No signs of respiratory distress (eg. Use of accessory muscles. Peds grunting)  Outcome: Progressing  Goal: Verbalize decreased shortness of breath this shift  Outcome: Progressing  Goal: Wean oxygen to maintain O2 saturation per order/standard this shift  Outcome: Progressing     Problem: Pain - Adult  Goal: Verbalizes/displays adequate comfort level or baseline comfort level  Outcome: Progressing     Problem: Safety - Adult  Goal: Free from fall injury  Outcome: Progressing     Problem: Discharge Planning  Goal: Discharge to home or other facility with appropriate resources  Outcome: Progressing     Problem: Chronic Conditions and Co-morbidities  Goal: Patient's chronic conditions and co-morbidity symptoms are monitored and maintained or improved  Outcome: Progressing     Problem: Nutrition  Goal: Nutrient intake appropriate for maintaining nutritional needs  Outcome: Progressing     Problem: Fall/Injury  Goal: Not fall by end of shift  Outcome: Progressing  Goal: Be free from injury by end of the shift  Outcome: Progressing  Goal: Verbalize understanding of personal risk factors for fall in the hospital  Outcome: Progressing  Goal: Verbalize understanding of risk factor reduction measures to prevent injury from fall in the home  Outcome: Progressing  Goal: Use assistive devices by end of the shift  Outcome: Progressing  Goal: Pace activities to prevent fatigue by end of the shift  Outcome: Progressing     Problem: Pain  Goal: Takes deep breaths with improved pain control throughout the shift  Outcome: Progressing  Goal: Turns in bed with improved pain control throughout the shift  Outcome: Progressing  Goal: Walks with  improved pain control throughout the shift  Outcome: Progressing  Goal: Performs ADL's with improved pain control throughout shift  Outcome: Progressing  Goal: Participates in PT with improved pain control throughout the shift  Outcome: Progressing  Goal: Free from opioid side effects throughout the shift  Outcome: Progressing  Goal: Free from acute confusion related to pain meds throughout the shift  Outcome: Progressing     Problem: Skin  Goal: Decreased wound size/increased tissue granulation at next dressing change  Outcome: Progressing  Goal: Participates in plan/prevention/treatment measures  Outcome: Progressing  Goal: Prevent/manage excess moisture  Outcome: Progressing  Goal: Prevent/minimize sheer/friction injuries  Outcome: Progressing  Goal: Promote/optimize nutrition  Outcome: Progressing  Goal: Promote skin healing  Outcome: Progressing

## 2025-04-30 NOTE — PROGRESS NOTES
Physical Therapy    Physical Therapy Evaluation    Patient Name: Karly Horton  MRN: [id number]  Department: [address]  Room: 01/01-B  Today's Date: 4/30/2025   Time Calculation  Start Time: 0822  Stop Time: 0835  Time Calculation (min): 13 min    Assessment/Plan   PT Assessment  PT Assessment Results: Decreased strength, Decreased range of motion, Decreased endurance, Impaired balance, Decreased mobility, Decreased coordination, Impaired judgement, Decreased safety awareness, Impaired tone, Pain  Rehab Prognosis: Fair  Barriers to Discharge Home: No anticipated barriers  Evaluation/Treatment Tolerance: Patient limited by fatigue  Medical Staff Made Aware: Yes  Strengths: Support of Caregivers  Barriers to Participation: Comorbidities  End of Session Communication: Bedside nurse  Assessment Comment: Pt demonstrates impaired functional mobility from baseline level; pt with pain, BLE weakness, impaired balance, and decreased overall activity tolerance; would benefit from continued skilled PT services during hospital stay to maximize functional mobility outcomes upon d/c.  End of Session Patient Position: Bed, 3 rail up, Alarm on  IP OR SWING BED PT PLAN  Inpatient or Swing Bed: Inpatient  PT Plan  Treatment/Interventions: Bed mobility, Transfer training, Balance training, Neuromuscular re-education, Strengthening, Endurance training, Range of motion, Therapeutic exercise, Therapeutic activity  PT Plan: Ongoing PT  PT Frequency: 3 times per week  PT Discharge Recommendations: Low intensity level of continued care  Equipment Recommended upon Discharge: Wheelchair, Wheeled walker  PT Recommended Transfer Status: Assist x1, Assistive device  PT - OK to Discharge: Yes    Subjective   General Visit Information:  General  Reason for Referral: impaired functional mobility d/t acute respiratory failure  Referred By: Dr. Tarun MD  Past Medical History Relevant to Rehab: lung CA, anemia, CHF, UTI, SCC  Family/Caregiver  Present: No  Co-Treatment: OT  Co-Treatment Reason: optimize pt outcomes/safety/participation  Prior to Session Communication: Bedside nurse  Patient Position Received: Bed, 3 rail up, Alarm on  Preferred Learning Style: verbal  General Comment: Pt is a 79 year-old F with h/o lung CA; admitted from home with c/o SOB.  Home Living:  Home Living  Type of Home: House  Lives With: Adult children (daughter)  Home Adaptive Equipment: Walker rolling or standard, Wheelchair-manual  Home Layout: One level  Home Access: Stairs to enter without rails  Entrance Stairs-Rails: None  Entrance Stairs-Number of Steps: 1 + 1 (family bumps pt up in w/c)  Prior Level of Function:  Prior Function Per Pt/Caregiver Report  Level of [place]: Needs assistance with ADLs, Needs assistance with homemaking  Receives Help From: Family  ADL Assistance: Needs assistance (assist from family for toileting, bathing; pt wears hospital gowns and uses briefs for toileting; able to self-feed)  Homemaking Assistance: Needs assistance  Homemaking Assistance Comments: assist from daughter for all iADLs  Precautions:  Precautions  Hearing/Visual Limitations: mild [place]  Medical Precautions: Fall precautions, Oxygen therapy device and L/min (3L O2 via NC)      Date/Time Vitals Session Patient Position Pulse Resp SpO2 BP MAP (mmHg)    04/30/25 0754 --  --  108  17  90 %  158/81  99     04/30/25 0822 --  --  108  --  92 %  --  --           Objective   Pain:  Pain Assessment  Pain Assessment: 0-10  0-10 (Numeric) Pain Score: 5 - Moderate pain  Pain Type: Chronic pain  Pain Location: Neck (and buttocks)  Pain Interventions: Repositioned (RN aware)  Response to Interventions: Resting quietly  Cognition:  Cognition  Overall Cognitive Status: Within Functional Limits  Orientation Level: Oriented X4  Insight: Moderate    General Assessments:  General Observation  General Observation: cooperative, mild agitation, generalized muscle atrophy; sacral wound not  visualized     Activity Tolerance  Endurance: Decreased tolerance for upright activites  Activity Tolerance Comments: poor    Sensation  Light Touch: No apparent deficits  Sensation Comment: pt denies paresthesias    Strength  Strength Comments: BLE > 3-/5  Strength  Strength Comments: BLE > 3-/5    Coordination  Movements are Fluid and Coordinated: No  Coordination Comment: decreased rate/accuracy of movements    Postural Control  Postural Control: Impaired  Posture Comment: forward head, rounded shoulders    Static Sitting Balance  Static Sitting-Balance Support: Feet supported, Bilateral upper extremity supported  Static Sitting-Level of Assistance: Close supervision    Static Standing Balance  Static Standing-Balance Support: Bilateral upper extremity supported  Static Standing-Level of Assistance: Moderate assistance  Functional Assessments:     Bed Mobility  Bed Mobility: Yes  Bed Mobility 1  Bed Mobility 1: Supine to sitting, Sitting to supine  Level of Assistance 1: Maximum assistance  Bed Mobility Comments 1: supine > sit with max assist x 1 for trunk elevation, advancement of BLE off EOB, and to scoot hips towards EOB; sit > supine with assist for controlled trunk descent and lifting BLE onto bed; dep x 2 boost towards HOB.    Transfers  Transfer: Yes  Transfer 1  Transfer From 1: Sit to, Stand to  Transfer to 1: Sit, Stand  Technique 1: Stand to sit, Sit to stand  Transfer Level of Assistance 1: Moderate assistance  Trials/Comments 1: assist for forward weight shift/balance; assist for controlled eccentric lowering onto EOB    Ambulation/Gait Training  Ambulation/Gait Training Performed: No    Stairs  Stairs: No    Extremity/Trunk Assessments:  RLE   RLE :  (AROM limited at end ranges)  LLE   LLE :  (AROM limited at end ranges)  Outcome Measures:  [facility] Basic Mobility  Turning from your back to your side while in a flat bed without using bedrails: A lot  Moving from lying on your back to sitting on the  side of a flat bed without using bedrails: Total  Moving to and from bed to chair (including a wheelchair): A lot  Standing up from a chair using your arms (e.g. wheelchair or bedside chair): A lot  To walk in hospital room: Total  Climbing 3-5 steps with railing: Total  Basic Mobility - Total Score: 9    Encounter Problems       Encounter Problems (Active)       PT STG Problem       Patient will roll right and left with minimal assist to facilitate mobility. (Progressing)       Start:  04/30/25    Expected End:  05/30/25            Patient will transfer sit to stand and stand to sit with minimal assist to facilitate mobility. (Progressing)       Start:  04/30/25    Expected End:  05/30/25            Patient will transfer bed to chair and chair to bed with minimal assist to facilitate mobility. (Not Progressing)       Start:  04/30/25    Expected End:  05/30/25                   Education Documentation  Mobility Training, taught by Armida Mendenhall PT at 4/30/2025  9:40 AM.  Learner: Patient  Readiness: Acceptance  Method: Explanation, Demonstration  Response: Needs Reinforcement  Comment: educated on PT POC and safety/sequencing during functional mobility training    Education Comments  No comments found.

## 2025-04-30 NOTE — CONSULTS
Pulmonary Consultation Note   Subjective    Karly Horton is a 79 y.o. 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 admitted on 4/29/2025 with following shortness of breath pulmonary consulted for acute hypoxic respiratory failure, COPD    History of Present Illness:  Karly Horton is a 79-year-old female presented to the emergency room due to shortness of breath she was found to have a pulse oximetry of 85% on room air patient was placed on supplemental oxygen improved to 92% on 2 L.  Patient denied any fever chills or chest pain.  Chest x-ray -cardiomegaly and pulmonary vascular congestion with small bilateral pleural effusions. Pulmonary consulted from acute hypoxic respiratory failure, COPD.     Past Medical History  She has a past medical history of Other cervical disc degeneration, unspecified cervical region, Other conditions influencing health status, Other conditions influencing health status, Other conditions influencing health status, Other conditions influencing health status, Other conditions influencing health status, Other conditions influencing health status, Other conditions influencing health status, Other intervertebral disc degeneration, lumbar region, Other intervertebral disc displacement, lumbar region, Other intervertebral disc displacement, lumbosacral region, Personal history of other diseases of the musculoskeletal system and connective tissue, Personal history of other diseases of the musculoskeletal system and connective tissue, Personal history of other specified conditions, Spinal stenosis, lumbar region without neurogenic claudication, and Sprain of ligaments of lumbar spine, initial encounter.    Surgical History  She has a past surgical history that includes Appendectomy; Eye surgery; Tonsillectomy; and Hysterectomy.     Social History  She  "reports that she has been smoking cigarettes. She started smoking about 65 years ago. She has a 65.3 pack-year smoking history. She has been exposed to tobacco smoke. She does not have any smokeless tobacco history on file. She reports that she does not currently use alcohol. She reports current drug use. Drug: Marijuana.  Cigarette smoking history:   Marijuana use:  Vaping:  Pets:   Asbestos:  Other specific exposure:      Family History  Family History[1]     Allergies  Morphine and Spice flavor    Review of Systems         Meds    Scheduled medications  Scheduled Medications[2]  Continuous medications  Continuous Medications[3]  PRN medications  PRN Medications[4]     Objective      Last Recorded Vitals  /69 (BP Location: Left arm, Patient Position: Lying)   Pulse 108   Temp 36.4 °C (97.5 °F) (Temporal)   Resp 18   Wt (!) 34.5 kg (76 lb 0.9 oz)   SpO2 94%     Blood pressure 157/69, pulse 108, temperature 36.4 °C (97.5 °F), temperature source Temporal, resp. rate 18, height 1.422 m (4' 8\"), weight (!) 34.5 kg (76 lb 0.9 oz), SpO2 94%.   Physical Exam   GENERAL: mildy tachypneic, very thin  HEAD/SINUSES: 2L NC  LUNGS: diffuse rhonchi  CARDIAC: tachycardia  EXTREMITIES: No edema, no varicose veins  NEURO: normal mentation  SKIN: defer  PSYCH: Normal affect    Intake/Output Summary (Last 24 hours) at 4/30/2025 1253  Last data filed at 4/30/2025 0900  Gross per 24 hour   Intake 240 ml   Output 350 ml   Net -110 ml     Labs:   Results from last 72 hours   Lab Units 04/30/25  0534 04/29/25  1656   SODIUM mmol/L 129* 128*   POTASSIUM mmol/L 3.7 3.5   CHLORIDE mmol/L 90* 88*   CO2 mmol/L 32 32   BUN mg/dL 10 14   CREATININE mg/dL 0.26* 0.41*   GLUCOSE mg/dL 141* 143*   CALCIUM mg/dL 8.3* 8.7   ANION GAP mmol/L 11 12   EGFR mL/min/1.73m*2 >90 >90      Results from last 72 hours   Lab Units 04/30/25  0534 04/29/25  1656   WBC AUTO x10*3/uL 6.4 8.2   HEMOGLOBIN g/dL 12.5 13.3   HEMATOCRIT % 37.3 39.0   PLATELETS " AUTO x10*3/uL 224 242   NEUTROS PCT AUTO %  --  88.6   LYMPHS PCT AUTO %  --  2.7   MONOS PCT AUTO %  --  7.4   EOS PCT AUTO %  --  0.1               Micro/ID:   Lab Results   Component Value Date    URINECULTURE >100,000 Escherichia coli (A) 10/11/2024    BLOODCULT Loaded on Instrument - Culture in progress 04/29/2025    BLOODCULT Loaded on Instrument - Culture in progress 04/29/2025   Spirometry  FEV1 0.72 L 52% predicted   FVC 1.39 L 73% predicted  FEV1 FVC ratio 56  Postbronchodilator testing demonstrates a 19% increase in FVC     Total lung capacity 4.34 L 120% predicted  Residual volume 2.9 cm 33% of predicted  RV/% of predicted     DLCO 6.40 mL/min/mmHg 43% predicted     Interpretation  Moderate obstruction with decreased DLCO  Summary of key imaging results from the last 24 hours  Cxr - Cardiomegaly and pulmonary vascular congestion with small bilateral pleural effusions  TTE -  1. The left ventricular systolic function is normal, with a visually estimated ejection fraction of 60-65%.   2. Spectral Doppler shows an impaired relaxation pattern of left ventricular diastolic filling.   3. There is normal right ventricular global systolic function.  RVSP 31 decreased from 64    Impression   Karly Obandoler is a 79 y.o. year old female patient is being seen by the pulmonary service for   History of COPD -on Symbicort, no home oxygen  History of squamous cell carcinoma of the left lung -status post chemotherapy and radiation now on Keytruda follows with oncology  Acute hypoxic respiratory failure  Acute exacerbation of heart failure with preserved EF  Pleural effusion    Recommendations   As follows:  Will give one-time dose of 20 mg Lasix, appears euvolemic on exam without edema to extremities.  Chest x-ray with cardiomegaly and pulmonary vascular congestion  Change IV steroids to prednisone and reassess need for steroids - no cough, shortness of breath or wheezing at this time, but she states yesterday  it was much worse  Wean supplemental oxygen as tolerated maintain pulse oximetry greater than 90%  Continue DuoNebs with acapella  Bronchopulmonary hygiene with incentive spirometry  Sputum culture if obtainable  Smoking cessation -nicotine patch  Home O2 evaluation on discharge  Consult COPD navigator  DVT prophylaxis with heparin subcutaneous  Recommend pulmonary rehab    PO Garcia-CNP   04/30/25 at 12:53 PM     -Only the Medical problems listed under impression were addressed today.   -Please contact primary team for all other concerns and medical problem  -Thank you for your consult     Disclaimer: Documentation completed with the information available at the time of input. Parts of this note may have been scribed or generated using voice dictation software, Dragon.  Homophonic errors may exist.  Please contact me directly if clarification is needed. The times in the chart may not be reflective of actual patient care times, interventions, or procedures. Documentation occurs after the physical care of the patient.           [1]   Family History  Problem Relation Name Age of Onset    Cancer Mother      Other (Father had hypertension, stroke, coronary artery disease and diabetes) Father     [2] baclofen, 10 mg, oral, TID  cholecalciferol, 100 mcg, oral, Daily  heparin (porcine), 5,000 Units, subcutaneous, q8h [place]  ipratropium-albuteroL, 3 mL, nebulization, Once  ipratropium-albuteroL, 3 mL, nebulization, TID  lactulose, 30 mL, oral, q18h  lidocaine, , Topical, Daily  methylPREDNISolone sodium succinate (PF), 40 mg, intravenous, q8h  nicotine, 1 patch, transdermal, Nightly  pantoprazole, 40 mg, oral, BID  sucralfate, 1 g, oral, q6h [place]  [3]    [4] PRN medications: acetaminophen **OR** acetaminophen **OR** acetaminophen, albuterol, benzocaine-menthol, butalbital-acetaminophen-caff, dextromethorphan-guaifenesin, guaiFENesin, guaiFENesin, lubricating eye drops, ondansetron **OR** ondansetron,  polyethylene glycol, prochlorperazine

## 2025-04-30 NOTE — CONSULTS
"Nutrition Assessement Note    Nutrition Assessment    Reason for Assessment: Admission nursing screening (low appetite, wound)    Reason for Hospital Admission:  Karly Horton is a 79 y.o. female who is admitted for Admitted with SOB. Hx of lung CA, malnutrition. Patient known to dept from previous admission. Patient has no teeth, requests soft bite-sized diet. Agreeable to nutritional supplements. Reports eating very small meals, significant weight loss. Patient appears very ill and emaciated.    Malnutrition Screening Tool (MST)  Have you recently lost weight without trying?: Yes  If yes, how much weight have you lost?: Lost 2 - 13 pounds  Weight Loss Score: 1  Have you been eating poorly because of a decreased appetite?: No  Malnutrition Score: 1  Nutrition Screen  Stage 3 or 4 Pressure Injury or Multiple Non-Healing Wounds: Yes (Comment)  Home Tube Feeding or Total Parenteral Nutrition (TPN): No  Dietitian Consult Needed: Yes (Comment)  Reason for Consult: poor apeptite    Medical History[1]   Surgical History[2]    Nutrition History:  Energy Intake: Poor < 50 %     Anthropometrics:  Ht: 142.2 cm (4' 8\"), Wt: (!) 34.5 kg (76 lb 0.9 oz), BMI: 17.06  IBW/kg (Dietitian Calculated): 43.18 kg  Percent of IBW: 63 %     Weight Change:  Daily Weight  04/30/25 : (!) 34.5 kg (76 lb 0.9 oz)  04/11/25 : (!) 29.5 kg (65 lb 2.3 oz)  03/07/25 : (!) 28.6 kg (63 lb 0.8 oz)  02/26/25 : (!) 28 kg (61 lb 11.7 oz)  02/14/25 : (!) 27 kg (59 lb 8.4 oz)  02/05/25 : (!) 29.6 kg (65 lb 5.9 oz)  01/27/25 : (!) 31.8 kg (70 lb)  01/13/25 : (!) 30.5 kg (67 lb 3.8 oz)  12/23/24 : (!) 31.1 kg (68 lb 10.8 oz)  12/16/24 : (!) 31.8 kg (70 lb)    Weight History / % Weight Change: inconsistent weights in Epic, patient report current weight of 60# which indicates a 10# (14.2%) weight loss in the past 5 months  Significant Weight Loss: Yes  Interpretation of Weight Loss: Other (see comment)     Nutrition Focused Physical Exam Findings: "   Subcutaneous Fat Loss  Orbital Fat Pads: Severe (dark circles, hollowing and loose skin)  Buccal Fat Pads: Severe (hollow, sunken and narrow face)  Triceps: Severe (negligible fat tissue)    Muscle Wasting  Temporalis: Severe (hollowed scooping depression)  Pectoralis (Clavicular Region): Severe (protruding prominent clavicle)  Deltoid/Trapezius: Severe (squared shoulders, acromion process prominent)  Interosseous: Severe (depressed area between thumb and forefinger)  Quadriceps: Severe (depressions on inner and outer thigh)  Gastrocnemius: Severe (minimal muscle definition)    Nutrition Significant Labs:  Lab Results   Component Value Date    WBC 6.4 04/30/2025    HGB 12.5 04/30/2025    HCT 37.3 04/30/2025     04/30/2025    CHOL 205 (H) 10/28/2019    TRIG 69 10/28/2019     10/28/2019    ALT 9 04/30/2025    AST 17 04/30/2025     (L) 04/30/2025    K 3.7 04/30/2025    CL 90 (L) 04/30/2025    CREATININE 0.26 (L) 04/30/2025    BUN 10 04/30/2025    CO2 32 04/30/2025    TSH 1.04 03/18/2025    INR 0.9 12/16/2024    HGBA1C 5.1 05/22/2024     Nutrition Specific Medications:  Scheduled Medications[3]  Continuous Medications[4]    Dietary Orders (From admission, onward)       Start     Ordered    04/30/25 1302  Oral nutritional supplements  Until discontinued        Question Answer Comment   Deliver with Breakfast    Deliver with Dinner    Select supplement: Brennon        04/30/25 1302    04/30/25 1136  Oral nutritional supplements  Until discontinued        Comments: vanilla   Question Answer Comment   Deliver with Breakfast    Deliver with Dinner    Select supplement: Ensure Plus High Protein        04/30/25 1136    04/30/25 1134  Adult diet Regular; Soft and bite sized 6  Diet effective now        Question Answer Comment   Diet type Regular    Texture Soft and bite sized 6        04/30/25 1133    04/29/25 2211  May Participate in Room Service  ( ROOM SERVICE MAY PARTICIPATE)  Once        Question:  .   Answer:  Yes    04/29/25 2210                   Estimated Needs:   Estimated Energy Needs  Total Energy Estimated Needs in 24 hours (kCal): 1300 kCal  Method for Estimating Needs: RDI min for women    Estimated Protein Needs  Total Protein Estimated Needs in 24 Hours (g): 46 g  Method for Estimating 24 Hour Protein Needs: RDI mon for women    Estimated Fluid Needs  Total Fluid Estimated Needs in 24 Hours (mL): 1300 mL  Method for Estimating 24 Hour Fluid Needs: 1 mL/kcal      Nutrition Diagnosis   Nutrition Diagnosis:  Malnutrition Diagnosis  Patient has Malnutrition Diagnosis: Yes  Diagnosis Status: New  Malnutrition Diagnosis: Severe malnutrition related to chronic disease or condition  Related to: decreased ability to consume sufficient energy  As Evidenced by: 10# (14.2%) weight loss in the past 5 months, severe subcutaneous fat and muscle deficits, PO intake <75% of estimated energy needs >1 month    Nutrition Diagnosis  Patient has Nutrition Diagnosis: Yes  Diagnosis Status (1): New  Nutrition Diagnosis 1: Increased nutrient needs  Related to (1): increased demand for nutrients  As Evidenced by (1): pressure injuries     Nutrition Interventions/Recommendations   Nutrition Interventions and Recommendations:  Nutrition Prescription: Nutrition prescription for oral nutrition    Nutrition Recommendations:  Individualized Nutrition Prescription Provided for : recommend soft bite-sized diet with Ensure Plus High Protein BID, Brennon BID for wound healing    Nutrition Interventions/Goals:   Food and/or Nutrient Delivery Interventions  Interventions: Meals and snacks, Medical food supplement  Meals and Snacks: Texture-modified diet  Goal: provide as ordered  Medical Food Supplement: Commercial beverage medical food supplement therapy  Goal: ensure plus high protein BID to provide 350 kcals and 20g protein each; Brennon BID for wound healing    Education Documentation  No documentation found.         Nutrition Monitoring  and Evaluation   Monitoring/Evaluation:   Food/Nutrient Related History Monitoring  Monitoring and Evaluation Plan: Estimated Energy Intake  Estimated Energy Intake: Energy intake greater or equal to 75% of estimated energy needs    Anthropometric Measurements  Monitoring and Evaluation Plan: Body weight  Body Weight: Body weight - Promote weight restoration     Physical Exam Findings  Monitoring and Evaluation Plan: Skin  Skin Finding: Impaired wound healing - Improved wound healing     Goal Status: New goal(s) identified    Follow Up  Time Spent (min): 30 minutes  Last Date of Nutrition Visit: 04/30/25  Nutrition Follow-Up Needed?: 3-5 days  Follow up Comment: 5/5/25          [1]   Past Medical History:  Diagnosis Date    Other cervical disc degeneration, unspecified cervical region     Degeneration of cervical intervertebral disc    Other conditions influencing health status     Bulging Disc (L3 - L4)    Other conditions influencing health status     A Fall    Other conditions influencing health status     Bulging Disc (C4 - C5)    Other conditions influencing health status     Bulging Disc (C6 - C7)    Other conditions influencing health status     Bulging Disc (C5 - C6)    Other conditions influencing health status     Lumbar Spondylolisthesis    Other conditions influencing health status     Rotator Cuff Tendon Tear    Other intervertebral disc degeneration, lumbar region     L4-L5 disc bulge    Other intervertebral disc displacement, lumbar region     Disc displacement, lumbar    Other intervertebral disc displacement, lumbosacral region     Displacement of lumbosacral intervertebral disc    Personal history of other diseases of the musculoskeletal system and connective tissue     History of bursitis    Personal history of other diseases of the musculoskeletal system and connective tissue     History of fibromyositis    Personal history of other specified conditions     History of headache    Spinal stenosis,  lumbar region without neurogenic claudication     Lumbar canal stenosis    Sprain of ligaments of lumbar spine, initial encounter     Low back sprain   [2]   Past Surgical History:  Procedure Laterality Date    APPENDECTOMY      EYE SURGERY      HYSTERECTOMY      TONSILLECTOMY     [3] baclofen, 10 mg, oral, TID  cholecalciferol, 100 mcg, oral, Daily  heparin (porcine), 5,000 Units, subcutaneous, q8h [place]  ipratropium-albuteroL, 3 mL, nebulization, Once  ipratropium-albuteroL, 3 mL, nebulization, TID  lactulose, 30 mL, oral, q18h  lidocaine, , Topical, Daily  methylPREDNISolone sodium succinate (PF), 40 mg, intravenous, q8h  nicotine, 1 patch, transdermal, Nightly  pantoprazole, 40 mg, oral, BID  sucralfate, 1 g, oral, q6h [place]    [4]

## 2025-04-30 NOTE — PROGRESS NOTES
Occupational Therapy    Evaluation    Patient Name: Karly Horton  MRN: [id number]  Department: [address]  Room: 01/01-B  Today's Date: 4/30/2025  Time Calculation  Start Time: 0823  Stop Time: 0834  Time Calculation (min): 11 min    Assessment  IP OT Assessment  OT Assessment: OT orders recieved, chart reviewed, RN cleared pt for therapy. Patient presents with deficits with ADLs and transfers compared to PLOF- patient to benefit from skilled OT to maximize overall functional independence and decreased burden of care  Prognosis: Fair  Barriers to Discharge Home: Physical needs  Physical Needs: 24hr mobility assistance needed, 24hr ADL assistance needed  Evaluation/Treatment Tolerance: Patient limited by fatigue  Medical Staff Made Aware: Yes  End of Session Communication: Bedside nurse  End of Session Patient Position: Bed, 3 rail up, Alarm on  Plan:  Treatment Interventions: ADL retraining, Functional transfer training, UE strengthening/ROM, Endurance training, Patient/family training, Equipment evaluation/education, Neuromuscular reeducation, Compensatory technique education, Fine motor coordination activities  OT Frequency: 3 times per week  OT Discharge Recommendations: Low intensity level of continued care  Equipment Recommended upon Discharge: Wheelchair, Wheeled walker  OT Recommended Transfer Status: Assist of 1  OT - OK to Discharge: Yes    Subjective   Current Problem:  1. Acute respiratory failure with hypoxia        2. Shortness of breath        3. Hypomagnesemia        4. Hyponatremia        5. Pleural effusion        6. History of lung cancer          General:  General  Reason for Referral: impaired activities of daily living d/t acute respiratory failure  Referred By: Dr. Tarun MD  Past Medical History Relevant to Rehab: lung CA, anemia, CHF, UTI, SCC  Family/Caregiver Present: No  Co-Treatment: PT  Co-Treatment Reason: optimize pt outcomes/safety/participation  Prior to Session Communication:  Bedside nurse  Patient Position Received: Bed, 3 rail up, Alarm on  Preferred Learning Style: verbal  General Comment: Pt is a 79 year-old F with h/o lung CA; admitted from home with c/o SOB.  Precautions:  Hearing/Visual Limitations: mild [place]  Medical Precautions: Fall precautions, Oxygen therapy device and L/min (3lpm per NC)           Pain:  Pain Assessment  Pain Assessment: 0-10  0-10 (Numeric) Pain Score: 5 - Moderate pain  Pain Type: Chronic pain  Pain Location: Neck (buttocks)  Pain Interventions: Repositioned (RN aware)  Response to Interventions: Resting quietly    Objective   Cognition:  Overall Cognitive Status: Within Functional Limits  Orientation Level: Oriented X4  Insight: Moderate           Home Living:  Type of Home: House  Lives With: Adult children (Daughter)  Home Adaptive Equipment: Walker rolling or standard, Wheelchair-manual  Home Layout: One level  Home Access: Stairs to enter without rails  Entrance Stairs-Rails: None  Entrance Stairs-Number of Steps: 1+1 (family transports patient in w/c - bumps up steps)   Prior Function:  Level of [place]: Needs assistance with ADLs, Needs assistance with homemaking  Receives Help From: Family  ADL Assistance: Needs assistance (assist from family for toileting, bathing; pt wears hospital gowns and uses briefs for toileting; able to self-feed)  Homemaking Assistance: Needs assistance  Homemaking Assistance Comments: assist from daughter for all iADLs       ADL:  Eating Assistance: Stand by  Eating Deficit: Supervision/safety, Setup  Grooming Assistance: Moderate  Grooming Deficit:  (anticipated)  Bathing Assistance: Maximal  Bathing Deficit:  (anticipated)  UE Dressing Assistance: Total  UE Dressing Deficit:  (anticipated)  LE Dressing Assistance: Total  LE Dressing Deficit: Don/doff R sock, Don/doff L sock, Supervision/safety  Toileting Assistance with Device: Total  Toileting Deficit:  (anticipated)  Activity Tolerance:  Endurance: Decreased  tolerance for upright activites  Activity Tolerance Comments: poor  Bed Mobility/Transfers: Bed Mobility  Bed Mobility: Yes  Bed Mobility 1  Bed Mobility 1: Supine to sitting, Sitting to supine  Level of Assistance 1: Maximum assistance  Bed Mobility Comments 1: supine>EOB with maxA with A for trunk and LEs positioning and scooting to EOB.    Transfers  Transfer: Yes  Transfer 1  Transfer From 1: Sit to, Stand to  Transfer to 1: Sit, Stand  Technique 1: Stand to sit, Sit to stand  Transfer Level of Assistance 1: Moderate assistance  Trials/Comments 1: sit<>stand from EOB wtih modA with encouragement      Functional Mobility:  Functional Mobility  Functional Mobility Performed: No    Sensation:  Light Touch: No apparent deficits  Sensation Comment: pt denies paresthesias  Strength:  Strength Comments: BUEs 3-/5 grossly       Coordination:  Movements are Fluid and Coordinated: No  Coordination Comment: decreased rate/accuracy of movements   Hand Function:  Hand Function  Gross Grasp: Functional  Coordination: Functional  Extremities: RUE   RUE : Within Functional Limits and LUE   LUE: Within Functional Limits    Outcome Measures: [facility] Daily Activity  Putting on and taking off regular lower body clothing: Total  Bathing (including washing, rinsing, drying): Total  Putting on and taking off regular upper body clothing: Total  Toileting, which includes using toilet, bedpan or urinal: Total  Taking care of personal grooming such as brushing teeth: A lot  Eating Meals: A little  Daily Activity - Total Score: 9      Education Documentation  Precautions, taught by Yolanda Hackett OT at 4/30/2025 12:00 PM.  Learner: Patient  Readiness: Eager  Method: Explanation  Response: Verbalizes Understanding  Comment: pt educated on call light use, fall precautions and OT POC          Goals:   Encounter Problems       Encounter Problems (Active)       OT Goals       transfers       Start:  04/30/25    Expected End:  05/20/25        Patient will complete functional transfers with LRD with CGA to maximize return to PLOF         ADLs       Start:  04/30/25    Expected End:  05/20/25       Patient will complete ADLs with less than Lucille overall with AE PRN to maximize return to PLOF

## 2025-04-30 NOTE — CONSULTS
"                                          COPD Education  Comorbidities:    COPD, CHF, lung CA, smoker    Exacerbation within last year: 3/5/2025  Moderate: >= 2 exacerbations or >= 1 exacerbation leading to hospitalization    Spirometry:        Date: 12/9/2024   Testing performed ATS/ERS standards  If indicated, nebulized albuterol given at a standard dose for post bronchodilator testing.     Spirometry  FEV1 0.72 L 52% predicted   FVC 1.39 L 73% predicted  FEV1 FVC ratio 56  Postbronchodilator testing demonstrates a 19% increase in FVC     Total lung capacity 4.34 L 120% predicted  Residual volume 2.9 cm 33% of predicted  RV/% of predicted     DLCO 6.40 mL/min/mmHg 43% predicted     Interpretation  Moderate obstruction with decreased DLCO       Uses of Oxygen/CPAP/BIPAP:  No home O2 at baseline. No CPAP or BiPAP  Pulse Ox at home: no    Smoking status:  Smoker: current. Per patient 1 cigarette every other day. Uses vape pen daily throughout the day. Previous 1 pack per day smoker. Per patient started \"at least 65 years ago\". Patient unable to state when she cut back to one every other day.  Quit date: N/A  Smoking cessation counseling:  Yes. Pt declines need. Patient does not want to quit smoking, vaping, or smoking marijuana. Declines need for nicotine replacement therapy for homegoing.           Quitting Smoking or Tobacco Use pt and family education sheet provided: Left copy with patient.    Pulmonary physician: None   Sees PCP for pulmonology: Dr. Chaney                 Date last seen: 4/7/2025       Maintenance medications   LABA, LAMA, LABA/LAMA, ICS/LABA, ICS/LAMA, ICS/LABA/LAMA    Name of the medications:  Provider note states patient prescribed Breyna inhaler and airsupra.  Patient states she only has airsupra and uses it at noon and midnight.       Allergies: Morphine, spice flavor     If not on maintenance meds:  Consider LAMA or LAMA/LABA   Consider ICS (if peripheral eosinophilia " >300cells/microl, COPD exacerbation requiring hospitalization, >= 2 moderated COPD exacerbation, History of or concomitant asthma)    If low inspiratory force or challenges with inhalers   Consider Nebulizers   Consider RESPIMAT        COPD Education   COPD patient education book: Reviewed and copy given to patient.  Patient demonstrated understanding/teach-back method: Yes. Patient verbalized understanding of when to call provider. Patient verbalized triggers of exacerbation.      Healthy at Home reviewed: patient declined.                Vaccines                   Influenza:   N/A Pneumococcal: None noted    COVID 19: 10/14/2021 Pertussis: None noted        Recommendations:   Follow up with PCP within two weeks of discharge.

## 2025-04-30 NOTE — NURSING NOTE
0730: Assumed care of patient. Patient is laying in bed. A/OX3. All falls precautions are in place. Call light is within reach.

## 2025-04-30 NOTE — H&P
History Of Present Illness  Karly Horton is a 79 y.o. female with past medical history significant for congestive heart failure, squamous cell carcinoma of lung  presenting with complaint of shortness of breath.  Her symptoms started 2 to 3 days prior to this admission and gradually got worse.  In the emergency room her pulse ox was 85%.  Patient denied any fever chills or rigor.  She has a cough with scanty expectoration.  The patient denied any aspiration.  No sick contacts.  No fever chills or rigor.  No known nausea, vomiting, diarrhea, dysuria, hematuria passing.  No hematemesis, hematochezia or melena.  Patient is on immunotherapy for her lung cancer.    In the emergency room patient's blood sugar 143, sodium 128, potassium 3.5, chloride 88, bicarb 32, BUN 14, creatinine 0.41.  Normal LFT.  Magnesium 1.52, proBNP 707 and normal CBC.  Chest x-ray showed cardiomegaly with pulmonary vascular congestion with small bilateral pleural effusion.     Past Medical History  Medical History[1]    Surgical History  Surgical History[2]     Social History  She reports that she has been smoking cigarettes. She started smoking about 65 years ago. She has a 65.3 pack-year smoking history. She has been exposed to tobacco smoke. She does not have any smokeless tobacco history on file. She reports that she does not currently use alcohol. She reports current drug use. Drug: Marijuana.    Family History  Family History[3]     Allergies  Morphine and Spice flavor    Review of Systems  I reviewed all systems reviewed as above otherwise is negative.    General examination: The patient is cachectic, did not look in acute distress.  She denied any headache or dizziness.  No nausea or vomiting.  No fever chills or rigor.    Physical Exam  HEENT:  Head externally atraumatic, no pallor, no icterus, extraocular movements intact, pupils reactive to light, oral mucosa moist and throat clear.  Neck:  Supple, no JVP, no palpable adenopathy  "or thyromegaly.  No carotid bruit.  Chest:  Clear to auscultation and resonant.  Heart:  Regular rate and rhythm, no murmur or gallop could be appreciated.  Abdomen:  Soft, nontender, bowel sounds present, normoactive, no palpable hepatosplenomegaly.  Extremities:  No edema, pulses present, no cyanosis or clubbing.  CNS:  Patient alert, oriented to time, place and person.  Power 5/5 all over and deep tendon reflexes symmetrical, cranial nerves 2-12 grossly intact.  Skin:  No active rash.  Musculoskeletal:  No joint swelling or erythema, range of movement normal.  Last Recorded Vitals  Heart Rate:  []   Temp:  [36.4 °C (97.5 °F)-37.7 °C (99.9 °F)]   Resp:  [11-51]   BP: (106-158)/(53-81)   Height:  [142.2 cm (4' 8\")]   Weight:  [27.2 kg (60 lb)-36.8 kg (81 lb 2.1 oz)]   SpO2:  [90 %-100 %]       Relevant Results        Results for orders placed or performed during the hospital encounter of 04/29/25 (from the past 24 hours)   ECG 12 Lead   Result Value Ref Range    Ventricular Rate 121 BPM    Atrial Rate 121 BPM    AZ Interval 134 ms    QRS Duration 76 ms    QT Interval 322 ms    QTC Calculation(Bazett) 457 ms    P Axis 58 degrees    R Axis -49 degrees    T Axis 78 degrees    QRS Count 20 beats    Q Onset 214 ms    P Onset 147 ms    P Offset 189 ms    T Offset 375 ms    QTC Fredericia 406 ms   CBC and Auto Differential   Result Value Ref Range    WBC 8.2 4.4 - 11.3 x10*3/uL    nRBC 0.0 0.0 - 0.0 /100 WBCs    RBC 4.10 4.00 - 5.20 x10*6/uL    Hemoglobin 13.3 12.0 - 16.0 g/dL    Hematocrit 39.0 36.0 - 46.0 %    MCV 95 80 - 100 fL    MCH 32.4 26.0 - 34.0 pg    MCHC 34.1 32.0 - 36.0 g/dL    RDW 13.8 11.5 - 14.5 %    Platelets 242 150 - 450 x10*3/uL    Neutrophils % 88.6 40.0 - 80.0 %    Immature Granulocytes %, Automated 1.0 (H) 0.0 - 0.9 %    Lymphocytes % 2.7 13.0 - 44.0 %    Monocytes % 7.4 2.0 - 10.0 %    Eosinophils % 0.1 0.0 - 6.0 %    Basophils % 0.2 0.0 - 2.0 %    Neutrophils Absolute 7.22 (H) 1.60 - 5.50 " x10*3/uL    Immature Granulocytes Absolute, Automated 0.08 0.00 - 0.50 x10*3/uL    Lymphocytes Absolute 0.22 (L) 0.80 - 3.00 x10*3/uL    Monocytes Absolute 0.60 0.05 - 0.80 x10*3/uL    Eosinophils Absolute 0.01 0.00 - 0.40 x10*3/uL    Basophils Absolute 0.02 0.00 - 0.10 x10*3/uL   Comprehensive Metabolic Panel   Result Value Ref Range    Glucose 143 (H) 74 - 99 mg/dL    Sodium 128 (L) 136 - 145 mmol/L    Potassium 3.5 3.5 - 5.3 mmol/L    Chloride 88 (L) 98 - 107 mmol/L    Bicarbonate 32 21 - 32 mmol/L    Anion Gap 12 10 - 20 mmol/L    Urea Nitrogen 14 6 - 23 mg/dL    Creatinine 0.41 (L) 0.50 - 1.05 mg/dL    eGFR >90 >60 mL/min/1.73m*2    Calcium 8.7 8.6 - 10.3 mg/dL    Albumin 3.0 (L) 3.4 - 5.0 g/dL    Alkaline Phosphatase 77 33 - 136 U/L    Total Protein 6.5 6.4 - 8.2 g/dL    AST 17 9 - 39 U/L    Bilirubin, Total 0.2 0.0 - 1.2 mg/dL    ALT 10 7 - 45 U/L   Magnesium   Result Value Ref Range    Magnesium 1.52 (L) 1.60 - 2.40 mg/dL   Lipase   Result Value Ref Range    Lipase 13 9 - 82 U/L   B-type natriuretic peptide   Result Value Ref Range     (H) 0 - 99 pg/mL   Troponin I, High Sensitivity, Initial   Result Value Ref Range    Troponin I, High Sensitivity 34 (H) 0 - 13 ng/L   Blood Culture    Specimen: Peripheral Venipuncture; Blood culture   Result Value Ref Range    Blood Culture Loaded on Instrument - Culture in progress    Blood Culture    Specimen: Peripheral Venipuncture; Blood culture   Result Value Ref Range    Blood Culture Loaded on Instrument - Culture in progress    Lactate   Result Value Ref Range    Lactate 1.2 0.4 - 2.0 mmol/L   Osmolality   Result Value Ref Range    Osmolality, Serum 275 (L) 280 - 300 mOsm/kg   Sars-CoV-2, Influenza A/B and RSV PCR   Result Value Ref Range    Coronavirus 2019, PCR Not Detected Not Detected    Flu A Result Not Detected Not Detected    Flu B Result Not Detected Not Detected    RSV PCR Not Detected Not Detected   Troponin, High Sensitivity, 1 Hour   Result Value  Ref Range    Troponin I, High Sensitivity 37 (H) 0 - 13 ng/L   Sodium, urine, random   Result Value Ref Range    Sodium, Urine Random 45 mmol/L    Creatinine, Urine Random 33.4 20.0 - 320.0 mg/dL    Sodium/Creatinine Ratio 135 Not established. mmol/g Creat   Chloride, urine, random   Result Value Ref Range    Chloride, Urine Random 58 mmol/L    Creatinine, Urine Random 33.4 20.0 - 320.0 mg/dL    Chloride/Creatinine Ratio 174 38 - 318 mmol/g creat   Potassium, urine, random   Result Value Ref Range    Potassium, Urine Random 26 mmol/L    Creatinine, Urine Random 33.4 20.0 - 320.0 mg/dL    Potassium/Creatinine Ratio 78 Not established mmol/g Creat   Urinalysis with Reflex Microscopic   Result Value Ref Range    Color, Urine Light-Yellow Light-Yellow, Yellow, Dark-Yellow    Appearance, Urine Clear Clear    Specific Gravity, Urine 1.016 1.005 - 1.035    pH, Urine 6.0 5.0, 5.5, 6.0, 6.5, 7.0, 7.5, 8.0    Protein, Urine NEGATIVE NEGATIVE, 10 (TRACE), 20 (TRACE) mg/dL    Glucose, Urine Normal Normal mg/dL    Blood, Urine NEGATIVE NEGATIVE mg/dL    Ketones, Urine NEGATIVE NEGATIVE mg/dL    Bilirubin, Urine NEGATIVE NEGATIVE mg/dL    Urobilinogen, Urine Normal Normal mg/dL    Nitrite, Urine NEGATIVE NEGATIVE    Leukocyte Esterase, Urine 75 [place]/uL (A) NEGATIVE   Microscopic Only, Urine   Result Value Ref Range    WBC, Urine 11-20 (A) 1-5, NONE /HPF    RBC, Urine 1-2 NONE, 1-2, 3-5 /HPF    Bacteria, Urine 1+ (A) NONE SEEN /HPF    Hyaline Casts, Urine 4+ (A) NONE /LPF   Comprehensive metabolic panel   Result Value Ref Range    Glucose 141 (H) 74 - 99 mg/dL    Sodium 129 (L) 136 - 145 mmol/L    Potassium 3.7 3.5 - 5.3 mmol/L    Chloride 90 (L) 98 - 107 mmol/L    Bicarbonate 32 21 - 32 mmol/L    Anion Gap 11 10 - 20 mmol/L    Urea Nitrogen 10 6 - 23 mg/dL    Creatinine 0.26 (L) 0.50 - 1.05 mg/dL    eGFR >90 >60 mL/min/1.73m*2    Calcium 8.3 (L) 8.6 - 10.3 mg/dL    Albumin 2.9 (L) 3.4 - 5.0 g/dL    Alkaline Phosphatase 75 33 - 136  U/L    Total Protein 6.2 (L) 6.4 - 8.2 g/dL    AST 17 9 - 39 U/L    Bilirubin, Total 0.2 0.0 - 1.2 mg/dL    ALT 9 7 - 45 U/L   CBC   Result Value Ref Range    WBC 6.4 4.4 - 11.3 x10*3/uL    nRBC 0.0 0.0 - 0.0 /100 WBCs    RBC 3.92 (L) 4.00 - 5.20 x10*6/uL    Hemoglobin 12.5 12.0 - 16.0 g/dL    Hematocrit 37.3 36.0 - 46.0 %    MCV 95 80 - 100 fL    MCH 31.9 26.0 - 34.0 pg    MCHC 33.5 32.0 - 36.0 g/dL    RDW 13.8 11.5 - 14.5 %    Platelets 224 150 - 450 x10*3/uL     Prior to Admission medications    Medication Sig Start Date End Date Taking? Authorizing Provider   acetaminophen (Tylenol) 325 mg tablet Take 1 tablet (325 mg) by mouth every 6 hours if needed (pain).    Historical Provider, MD   baclofen (Lioresal) 10 mg tablet Take 1 tablet (10 mg) by mouth 3 times a day.    Historical Provider, MD   butalbital-acetaminophen-caff -40 mg tablet Take 1 tablet by mouth every 6 hours if needed for headaches. 11/10/23   Historical Provider, MD   cholecalciferol, vitamin D3, (VITAMIN D3 ORAL) Take 1 tablet by mouth once daily.    Historical Provider, MD   lactulose 10 gram/15 mL solution Take 30 mL (20 g) by mouth every 18 hours.    Historical Provider, MD   lidocaine HCL 4 % liquid roll-on Apply 1 Application topically once daily as needed.    Historical Provider, MD   lubricating eye drops ophthalmic solution Administer 1 drop into both eyes if needed for dry eyes. 5/22/24   PO Jang-CNP   meprobamate (Equanil) 400 mg tablet Take 1 tablet (400 mg) by mouth 4 times a day.    Historical Provider, MD   mupirocin (Bactroban) 2 % ointment Apply 1 Application topically if needed. Apply to affected area as needed    Historical Provider, MD   nicotine (Nicoderm CQ) 14 mg/24 hr patch Place 1 patch over 24 hours on the skin once every 24 hours for 28 days.  Patient not taking: Reported on 3/5/2025 10/20/24 11/17/24  Zohra Ibarra MD   ondansetron (Zofran) 8 mg tablet Take 1 tablet (8 mg) by mouth every 8 hours  if needed for nausea or vomiting.    Historical Provider, MD   pantoprazole (ProtoNix) 40 mg EC tablet Take 1 tablet (40 mg) by mouth 2 times a day. Do not crush, chew, or split. Continue twice daily x 8 weeks then continue once daily. 12/20/24 4/19/25  Kelly Guerra MD   prochlorperazine (Compazine) 10 mg tablet Take 1 tablet (10 mg) by mouth every 6 hours if needed for nausea or vomiting. 2/24/25   Kelly Guerra MD   sucralfate (Carafate) 100 mg/mL suspension Take 10 mL (1 g) by mouth every 6 hours. 5/22/24   PO Jang-CNP     Current Medications[4]  Imaging  XR chest 2 views  Result Date: 4/29/2025  Cardiomegaly and pulmonary vascular congestion with small bilateral pleural effusions. Signed by Ronal Isaac MD      Cardiology, Vascular, and Other Imaging  ECG 12 Lead  Result Date: 4/30/2025  Sinus tachycardia with frequent Premature ventricular complexes Left anterior fascicular block ST & T wave abnormality, consider inferior ischemia Abnormal ECG When compared with ECG of 05-MAR-2025 11:42, Criteria for Anterior infarct are no longer Present Criteria for Anterolateral infarct are no longer Present Criteria for Inferior infarct are no longer Present T wave inversion no longer evident in Anterior leads T wave inversion now evident in Lateral leads      No results found for the last 90 days.       Assessment/Plan   Assessment & Plan  Acute respiratory failure with hypoxia    Shortness of breath    Hypomagnesemia    Hyponatremia    Pleural effusion    History of lung cancer  Osteoarthritis  Cervical stenosis  Lumbar stenosis  Protein calorie malnutrition  Hyponatremia    The patient admitted with a complaint of acute respiratory failure.  With the patient had a history of lung cancer.  Patient is appears to have COPD exacerbation.  The patient started on aerosol treatment with albuterol and Atrovent.  Start steroids.  Check sputum culture.  Consult pulmonology.  Patient has hyponatremia and  hypomagnesemia.  Hyponatremia is most likely due to the alkalosis.  Monitor for now.  Continue supplementation monitor pulse ox.  Replete magnesium.  The patient has mild pleural effusion and appears to have pulmonary edema on chest x-ray but proBNP is elevated and patient does not clinically appears to be in fluid overload.  Monitor for now.  We will take DVT, fall, aspiration, decubitus, DVT precautions.  This has been discussed with the patient and is agreeable to it.                 Lupillo Mcneil MD         [1]   Past Medical History:  Diagnosis Date    Other cervical disc degeneration, unspecified cervical region     Degeneration of cervical intervertebral disc    Other conditions influencing health status     Bulging Disc (L3 - L4)    Other conditions influencing health status     A Fall    Other conditions influencing health status     Bulging Disc (C4 - C5)    Other conditions influencing health status     Bulging Disc (C6 - C7)    Other conditions influencing health status     Bulging Disc (C5 - C6)    Other conditions influencing health status     Lumbar Spondylolisthesis    Other conditions influencing health status     Rotator Cuff Tendon Tear    Other intervertebral disc degeneration, lumbar region     L4-L5 disc bulge    Other intervertebral disc displacement, lumbar region     Disc displacement, lumbar    Other intervertebral disc displacement, lumbosacral region     Displacement of lumbosacral intervertebral disc    Personal history of other diseases of the musculoskeletal system and connective tissue     History of bursitis    Personal history of other diseases of the musculoskeletal system and connective tissue     History of fibromyositis    Personal history of other specified conditions     History of headache    Spinal stenosis, lumbar region without neurogenic claudication     Lumbar canal stenosis    Sprain of ligaments of lumbar spine, initial encounter     Low back sprain   [2]   Past  Surgical History:  Procedure Laterality Date    APPENDECTOMY      EYE SURGERY      HYSTERECTOMY      TONSILLECTOMY     [3]   Family History  Problem Relation Name Age of Onset    Cancer Mother      Other (Father had hypertension, stroke, coronary artery disease and diabetes) Father     [4]   Current Facility-Administered Medications:     acetaminophen (Tylenol) tablet 650 mg, 650 mg, oral, q4h PRN, 650 mg at 04/29/25 2341 **OR** acetaminophen (Tylenol) oral liquid 650 mg, 650 mg, oral, q4h PRN **OR** acetaminophen (Tylenol) suppository 650 mg, 650 mg, rectal, q4h PRN, Lupillo Mcneil MD    albuterol 2.5 mg /3 mL (0.083 %) nebulizer solution 2.5 mg, 2.5 mg, nebulization, q2h PRN, Lupillo Mcneil MD    baclofen (Lioresal) tablet 10 mg, 10 mg, oral, TID, Lupillo Mcneil MD    benzocaine-menthol (Cepastat Sore Throat) lozenge 1 lozenge, 1 lozenge, Mouth/Throat, q2h PRN, Lupillo Mcneil MD    butalbital-acetaminophen-caff -40 mg per tablet 1 tablet, 1 tablet, oral, q6h PRN, Lupillo Mcneil MD    cholecalciferol (Vitamin D-3) tablet 100 mcg, 100 mcg, oral, Daily, Lupillo Mcneil MD    dextromethorphan-guaifenesin (Robitussin DM)  mg/5 mL oral liquid 5 mL, 5 mL, oral, q4h PRN, Lupillo Mcneil MD    guaiFENesin (Mucinex) 12 hr tablet 600 mg, 600 mg, oral, q12h PRN, Lupillo Mcneil MD    guaiFENesin (Mucinex) 12 hr tablet 600 mg, 600 mg, oral, BID PRN, Lupillo Mcneil MD    heparin (porcine) injection 5,000 Units, 5,000 Units, subcutaneous, q8h [place], Lupillo Mcneil MD, 5,000 Units at 04/30/25 0531    ipratropium-albuteroL (Duo-Neb) 0.5-2.5 mg/3 mL nebulizer solution 3 mL, 3 mL, nebulization, Once, Moon Moralez, APRN-CNP    ipratropium-albuteroL (Duo-Neb) 0.5-2.5 mg/3 mL nebulizer solution 3 mL, 3 mL, nebulization, TID, Lupillo Mcneil MD, 3 mL at 04/30/25 0719    lactulose 20 gram/30 mL oral solution 20 g, 30 mL, oral, q18h, Lupillo Mcneil MD    lidocaine  (Xylocaine) 5 % ointment, , Topical, Daily, Lupillo Mcneil MD    lubricating eye drops ophthalmic solution 1 drop, 1 drop, Both Eyes, Daily PRN, Lupillo Mcneil MD    methylPREDNISolone sod succinate (SOLU-Medrol) 40 mg/mL injection 40 mg, 40 mg, intravenous, q8h, Lupillo Mcneil MD, 40 mg at 04/30/25 0654    nicotine (Nicoderm CQ) 14 mg/24 hr patch 1 patch, 1 patch, transdermal, Nightly, Lupillo Mcneil MD, 1 patch at 04/29/25 2341    ondansetron (Zofran) tablet 4 mg, 4 mg, oral, q8h PRN **OR** ondansetron (Zofran) injection 4 mg, 4 mg, intravenous, q8h PRN, Lupillo Mcneil MD    pantoprazole (ProtoNix) EC tablet 40 mg, 40 mg, oral, BID, Lupillo Mcneil MD, 40 mg at 04/29/25 2341    polyethylene glycol (Glycolax, Miralax) packet 17 g, 17 g, oral, Daily PRN, Lupillo Mcneil MD    prochlorperazine (Compazine) tablet 10 mg, 10 mg, oral, q6h PRN, Lupillo Mcneil MD    sucralfate (Carafate) suspension 1 g, 1 g, oral, q6h [place], Lupillo Mcneil MD, 1 g at 04/30/25 4218

## 2025-05-01 VITALS
OXYGEN SATURATION: 97 % | DIASTOLIC BLOOD PRESSURE: 74 MMHG | HEIGHT: 56 IN | TEMPERATURE: 97.5 F | SYSTOLIC BLOOD PRESSURE: 131 MMHG | WEIGHT: 77.6 LBS | RESPIRATION RATE: 18 BRPM | HEART RATE: 98 BPM | BODY MASS INDEX: 17.46 KG/M2

## 2025-05-01 PROBLEM — E83.42 HYPOMAGNESEMIA: Status: RESOLVED | Noted: 2025-04-29 | Resolved: 2025-05-01

## 2025-05-01 LAB
ANION GAP SERPL CALCULATED.3IONS-SCNC: 8 MMOL/L (ref 10–20)
BUN SERPL-MCNC: 8 MG/DL (ref 6–23)
CALCIUM SERPL-MCNC: 8.6 MG/DL (ref 8.6–10.3)
CHLORIDE SERPL-SCNC: 85 MMOL/L (ref 98–107)
CO2 SERPL-SCNC: 39 MMOL/L (ref 21–32)
CREAT SERPL-MCNC: 0.25 MG/DL (ref 0.5–1.05)
EGFRCR SERPLBLD CKD-EPI 2021: >90 ML/MIN/1.73M*2
ERYTHROCYTE [DISTWIDTH] IN BLOOD BY AUTOMATED COUNT: 13.6 % (ref 11.5–14.5)
GLUCOSE SERPL-MCNC: 131 MG/DL (ref 74–99)
HCT VFR BLD AUTO: 36.1 % (ref 36–46)
HGB BLD-MCNC: 12.8 G/DL (ref 12–16)
MAGNESIUM SERPL-MCNC: 1.69 MG/DL (ref 1.6–2.4)
MCH RBC QN AUTO: 32.7 PG (ref 26–34)
MCHC RBC AUTO-ENTMCNC: 35.5 G/DL (ref 32–36)
MCV RBC AUTO: 92 FL (ref 80–100)
NRBC BLD-RTO: 0 /100 WBCS (ref 0–0)
PLATELET # BLD AUTO: 245 X10*3/UL (ref 150–450)
POTASSIUM SERPL-SCNC: 3.3 MMOL/L (ref 3.5–5.3)
RBC # BLD AUTO: 3.92 X10*6/UL (ref 4–5.2)
SODIUM SERPL-SCNC: 129 MMOL/L (ref 136–145)
WBC # BLD AUTO: 8.2 X10*3/UL (ref 4.4–11.3)

## 2025-05-01 PROCEDURE — 2500000002 HC RX 250 W HCPCS SELF ADMINISTERED DRUGS (ALT 637 FOR MEDICARE OP, ALT 636 FOR OP/ED): Performed by: NURSE PRACTITIONER

## 2025-05-01 PROCEDURE — 2500000005 HC RX 250 GENERAL PHARMACY W/O HCPCS: Performed by: INTERNAL MEDICINE

## 2025-05-01 PROCEDURE — 2500000001 HC RX 250 WO HCPCS SELF ADMINISTERED DRUGS (ALT 637 FOR MEDICARE OP): Performed by: INTERNAL MEDICINE

## 2025-05-01 PROCEDURE — 85027 COMPLETE CBC AUTOMATED: CPT | Performed by: NURSE PRACTITIONER

## 2025-05-01 PROCEDURE — 82374 ASSAY BLOOD CARBON DIOXIDE: CPT | Performed by: NURSE PRACTITIONER

## 2025-05-01 PROCEDURE — 80048 BASIC METABOLIC PNL TOTAL CA: CPT | Performed by: NURSE PRACTITIONER

## 2025-05-01 PROCEDURE — 83735 ASSAY OF MAGNESIUM: CPT | Performed by: NURSE PRACTITIONER

## 2025-05-01 PROCEDURE — 94668 MNPJ CHEST WALL SBSQ: CPT

## 2025-05-01 PROCEDURE — 2500000004 HC RX 250 GENERAL PHARMACY W/ HCPCS (ALT 636 FOR OP/ED)

## 2025-05-01 PROCEDURE — 94640 AIRWAY INHALATION TREATMENT: CPT

## 2025-05-01 PROCEDURE — 2500000002 HC RX 250 W HCPCS SELF ADMINISTERED DRUGS (ALT 637 FOR MEDICARE OP, ALT 636 FOR OP/ED): Performed by: INTERNAL MEDICINE

## 2025-05-01 PROCEDURE — 99232 SBSQ HOSP IP/OBS MODERATE 35: CPT

## 2025-05-01 PROCEDURE — 2500000004 HC RX 250 GENERAL PHARMACY W/ HCPCS (ALT 636 FOR OP/ED): Mod: JZ | Performed by: NURSE PRACTITIONER

## 2025-05-01 PROCEDURE — 2500000004 HC RX 250 GENERAL PHARMACY W/ HCPCS (ALT 636 FOR OP/ED): Mod: JZ | Performed by: INTERNAL MEDICINE

## 2025-05-01 RX ORDER — PANTOPRAZOLE SODIUM 40 MG/1
40 TABLET, DELAYED RELEASE ORAL
Start: 2025-05-01

## 2025-05-01 RX ORDER — POTASSIUM CHLORIDE 20 MEQ/1
40 TABLET, EXTENDED RELEASE ORAL ONCE
Status: COMPLETED | OUTPATIENT
Start: 2025-05-01 | End: 2025-05-01

## 2025-05-01 RX ORDER — HEPARIN 100 UNIT/ML
5 SYRINGE INTRAVENOUS ONCE
Status: COMPLETED | OUTPATIENT
Start: 2025-05-01 | End: 2025-05-01

## 2025-05-01 RX ADMIN — LACTULOSE 20 G: 20 SOLUTION ORAL at 10:40

## 2025-05-01 RX ADMIN — SUCRALFATE 1 G: 1 SUSPENSION ORAL at 13:07

## 2025-05-01 RX ADMIN — ACETAMINOPHEN 650 MG: 325 TABLET ORAL at 08:26

## 2025-05-01 RX ADMIN — PREDNISONE 40 MG: 20 TABLET ORAL at 08:23

## 2025-05-01 RX ADMIN — HEPARIN 500 UNITS: 100 SYRINGE at 13:07

## 2025-05-01 RX ADMIN — CARBOXYMETHYLCELLULOSE SODIUM 1 DROP: 0.5 SOLUTION/ DROPS OPHTHALMIC at 05:06

## 2025-05-01 RX ADMIN — BACLOFEN 10 MG: 10 TABLET ORAL at 08:23

## 2025-05-01 RX ADMIN — PANTOPRAZOLE SODIUM 40 MG: 40 TABLET, DELAYED RELEASE ORAL at 08:23

## 2025-05-01 RX ADMIN — SUCRALFATE 1 G: 1 SUSPENSION ORAL at 05:10

## 2025-05-01 RX ADMIN — HEPARIN SODIUM 5000 UNITS: 5000 INJECTION INTRAVENOUS; SUBCUTANEOUS at 05:10

## 2025-05-01 RX ADMIN — POTASSIUM CHLORIDE 40 MEQ: 1500 TABLET, EXTENDED RELEASE ORAL at 08:22

## 2025-05-01 RX ADMIN — IPRATROPIUM BROMIDE AND ALBUTEROL SULFATE 3 ML: 2.5; .5 SOLUTION RESPIRATORY (INHALATION) at 07:27

## 2025-05-01 RX ADMIN — Medication 100 MCG: at 08:23

## 2025-05-01 RX ADMIN — LIDOCAINE: 50 OINTMENT TOPICAL at 10:41

## 2025-05-01 ASSESSMENT — PAIN DESCRIPTION - DESCRIPTORS: DESCRIPTORS: PRESSURE;DISCOMFORT

## 2025-05-01 ASSESSMENT — PAIN DESCRIPTION - ORIENTATION: ORIENTATION: MID

## 2025-05-01 ASSESSMENT — PAIN DESCRIPTION - LOCATION: LOCATION: NECK

## 2025-05-01 ASSESSMENT — PAIN - FUNCTIONAL ASSESSMENT: PAIN_FUNCTIONAL_ASSESSMENT: 0-10

## 2025-05-01 ASSESSMENT — PAIN SCALES - GENERAL: PAINLEVEL_OUTOF10: 6

## 2025-05-01 NOTE — PROGRESS NOTES
"Pulmonary Daily Progress Note   Subjective    Karly Horton is a 79 y.o. 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 admitted on 4/29/2025 with following shortness of breath pulmonary consulted for acute hypoxic respiratory failure, COPD     Interval History:  Patient is requesting to go home.  She is on 2 L nasal cannula home O2 eval will be completed.  She is not wheezing nor short of breath.  Denies chest pain.     Meds    Scheduled medications  Scheduled Medications[1]  Continuous medications  Continuous Medications[2]  PRN medications  PRN Medications[3]     Objective    Blood pressure 141/74, pulse (!) 120, temperature 37 °C (98.6 °F), temperature source Axillary, resp. rate 17, height 1.422 m (4' 8\"), weight (!) 35.2 kg (77 lb 9.6 oz), SpO2 (!) 89%.   Physical Exam   GENERAL: mildy tachypneic, very thin  HEAD/SINUSES: 2L NC  LUNGS: Clear to auscultation  CARDIAC: tachycardia  EXTREMITIES: No edema, no varicose veins  NEURO: normal mentation  SKIN: defer  PSYCH: Normal affect    Intake/Output Summary (Last 24 hours) at 5/1/2025 0948  Last data filed at 5/1/2025 0033  Gross per 24 hour   Intake --   Output 1300 ml   Net -1300 ml     Labs:   Results from last 72 hours   Lab Units 05/01/25  0515 04/30/25  0534 04/29/25  1656   SODIUM mmol/L 129* 129* 128*   POTASSIUM mmol/L 3.3* 3.7 3.5   CHLORIDE mmol/L 85* 90* 88*   CO2 mmol/L 39* 32 32   BUN mg/dL 8 10 14   CREATININE mg/dL 0.25* 0.26* 0.41*   GLUCOSE mg/dL 131* 141* 143*   CALCIUM mg/dL 8.6 8.3* 8.7   ANION GAP mmol/L 8* 11 12   EGFR mL/min/1.73m*2 >90 >90 >90      Results from last 72 hours   Lab Units 05/01/25  0515 04/30/25  0534 04/29/25  1656   WBC AUTO x10*3/uL 8.2 6.4 8.2   HEMOGLOBIN g/dL 12.8 12.5 13.3   HEMATOCRIT % 36.1 37.3 39.0   PLATELETS AUTO x10*3/uL 245 224 242   NEUTROS PCT AUTO %  --   --  88.6 "   LYMPHS PCT AUTO %  --   --  2.7   MONOS PCT AUTO %  --   --  7.4   EOS PCT AUTO %  --   --  0.1               Micro/ID:   Lab Results   Component Value Date    URINECULTURE >100,000 Escherichia coli (A) 10/11/2024    BLOODCULT No growth at 1 day 04/29/2025    BLOODCULT No growth at 1 day 04/29/2025     Summary of key imaging results from the last 24 hours  CXR - Cardiomegaly and pulmonary vascular congestion with small bilateral  pleural effusions    Impression   Karly Horton is a 79 y.o. year old female patient is being seen by the pulmonary service for   History of COPD -on Symbicort, no home oxygen  History of squamous cell carcinoma of the left lung -status post chemotherapy and radiation now on Keytruda follows with oncology  Acute hypoxic respiratory failure -currently on 2 L  Acute exacerbation of heart failure with preserved EF  Pleural effusion    Recommendations   As follows:  Can stop steroids today patient not wheezing does not appear to be on COPD exacerbation  Home O2 evaluation  Patient is adamant about leaving but if she stays another night can do a nocturnal study  Smoking cessation - nicotine patch, patient refuses on discharge  Continue DuoNebs with acapella  Continue Symbicort on discharge  Bronchopulmonary hygiene with incentive spirometry  DVT prophylaxis    Nancy Ballard, PO-CNP   05/01/25 at 9:48 AM     -Only the Medical problems listed under impression were addressed today.   -Please contact primary team for all other concerns and medical problem  -Thank you for your consult       Disclaimer: Documentation completed with the information available at the time of input. Parts of this note may have been scribed or generated using voice dictation software, Dragon.  Homophonic errors may exist.  Please contact me directly if clarification is needed. The times in the chart may not be reflective of actual patient care times, interventions, or procedures. Documentation occurs after  the physical care of the patient.         [1] baclofen, 10 mg, oral, TID  cholecalciferol, 100 mcg, oral, Daily  heparin (porcine), 5,000 Units, subcutaneous, q8h [place]  ipratropium-albuteroL, 3 mL, nebulization, Once  ipratropium-albuteroL, 3 mL, nebulization, TID  lactulose, 30 mL, oral, q18h  lidocaine, , Topical, Daily  nicotine, 1 patch, transdermal, Nightly  pantoprazole, 40 mg, oral, BID  predniSONE, 40 mg, oral, Daily  sucralfate, 1 g, oral, q6h [place]  [2]    [3] PRN medications: acetaminophen **OR** acetaminophen **OR** acetaminophen, albuterol, benzocaine-menthol, butalbital-acetaminophen-caff, dextromethorphan-guaifenesin, guaiFENesin, guaiFENesin, lubricating eye drops, melatonin, ondansetron **OR** ondansetron, polyethylene glycol, prochlorperazine

## 2025-05-01 NOTE — PROGRESS NOTES
Karly Horton is a 79 y.o. female on day 2 of admission presenting with Acute respiratory failure with hypoxia.    Subjective   Patient seen and examined.  Resting in bed in no acute distress.  Awake alert oriented x 3.  States she wants to go home.  No shortness of breath, cough.  No bowel movement though states she does not feel she has to go.  No new symptoms.  No pain, nausea, vomiting.  + Flatus.  Tolerating diet.  States RT checked complete SpO2 87% on room air.      Objective     Physical Exam  Vitals and nursing note reviewed.   Constitutional:       General: She is not in acute distress.     Appearance: She is underweight. She is not ill-appearing, toxic-appearing or diaphoretic.   HENT:      Head: Normocephalic and atraumatic.      Right Ear: External ear normal.      Left Ear: External ear normal.      Nose: Nose normal.      Mouth/Throat:      Mouth: Mucous membranes are moist.      Pharynx: Oropharynx is clear.   Eyes:      Extraocular Movements: Extraocular movements intact.      Conjunctiva/sclera: Conjunctivae normal.      Pupils: Pupils are equal, round, and reactive to light.   Cardiovascular:      Rate and Rhythm: Regular rhythm. Tachycardia present.      Pulses: Normal pulses.      Heart sounds: Normal heart sounds. No murmur heard.  Pulmonary:      Effort: Pulmonary effort is normal. No respiratory distress.      Breath sounds: Decreased breath sounds present. No wheezing, rhonchi or rales.      Comments: 2 liters nasal cannula. SpO2 98%.  Abdominal:      General: Bowel sounds are normal.      Palpations: Abdomen is soft.      Tenderness: There is abdominal tenderness.      Comments: Chronic tenderness, baseline.   Genitourinary:     Comments: Deferred.  Musculoskeletal:         General: No swelling or tenderness. Normal range of motion.      Cervical back: Normal range of motion and neck supple.      Right lower leg: No edema.      Left lower leg: No edema.   Skin:     General: Skin is warm  "and dry.      Capillary Refill: Capillary refill takes less than 2 seconds.   Neurological:      General: No focal deficit present.      Mental Status: She is alert and oriented to person, place, and time.      Comments: Generalized weakness. No focal weakness. Gait deferred.    Psychiatric:         Mood and Affect: Mood normal.         Behavior: Behavior normal.     Last Recorded Vitals  Blood pressure 141/74, pulse (!) 120, temperature 37 °C (98.6 °F), temperature source Axillary, resp. rate 17, height 1.422 m (4' 8\"), weight (!) 35.2 kg (77 lb 9.6 oz), SpO2 (!) 89%.    Intake/Output last 3 Shifts:  I/O last 3 completed shifts:  In: 240 (6.8 mL/kg) [P.O.:240]  Out: 1650 (46.9 mL/kg) [Urine:1650 (1.3 mL/kg/hr)]  Weight: 35.2 kg     Telemetry tachycardia rate 120's    Relevant Results        Malnutrition Diagnosis Status: New  Malnutrition Diagnosis: Severe malnutrition related to chronic disease or condition  Related to: decreased ability to consume sufficient energy  As Evidenced by: 10# (14.2%) weight loss in the past 5 months, severe subcutaneous fat and muscle deficits, PO intake <75% of estimated energy needs >1 month  I agree with the dietitian's malnutrition diagnosis.    Results for orders placed or performed during the hospital encounter of 04/29/25 (from the past 24 hours)   CBC   Result Value Ref Range    WBC 8.2 4.4 - 11.3 x10*3/uL    nRBC 0.0 0.0 - 0.0 /100 WBCs    RBC 3.92 (L) 4.00 - 5.20 x10*6/uL    Hemoglobin 12.8 12.0 - 16.0 g/dL    Hematocrit 36.1 36.0 - 46.0 %    MCV 92 80 - 100 fL    MCH 32.7 26.0 - 34.0 pg    MCHC 35.5 32.0 - 36.0 g/dL    RDW 13.6 11.5 - 14.5 %    Platelets 245 150 - 450 x10*3/uL   Basic Metabolic Panel   Result Value Ref Range    Glucose 131 (H) 74 - 99 mg/dL    Sodium 129 (L) 136 - 145 mmol/L    Potassium 3.3 (L) 3.5 - 5.3 mmol/L    Chloride 85 (L) 98 - 107 mmol/L    Bicarbonate 39 (H) 21 - 32 mmol/L    Anion Gap 8 (L) 10 - 20 mmol/L    Urea Nitrogen 8 6 - 23 mg/dL    " Creatinine 0.25 (L) 0.50 - 1.05 mg/dL    eGFR >90 >60 mL/min/1.73m*2    Calcium 8.6 8.6 - 10.3 mg/dL   Magnesium   Result Value Ref Range    Magnesium 1.69 1.60 - 2.40 mg/dL     No results found for the last 90 days.    ECG 12 Lead  Result Date: 4/30/2025  Sinus tachycardia with frequent Premature ventricular complexes Left anterior fascicular block ST & T wave abnormality, consider inferior ischemia Abnormal ECG When compared with ECG of 05-MAR-2025 11:42, Criteria for Anterior infarct are no longer Present Criteria for Anterolateral infarct are no longer Present Criteria for Inferior infarct are no longer Present T wave inversion no longer evident in Anterior leads T wave inversion now evident in Lateral leads    XR chest 2 views  Result Date: 4/29/2025  STUDY: Chest Radiographs;  4/29/2025 4:37PM INDICATION: Shortness of breath. COMPARISON: 3/5/2025 CT CTA Chest. ACCESSION NUMBER(S): [id number] ORDERING CLINICIAN: RONALD BLANTON TECHNIQUE:  Frontal and lateral chest. FINDINGS: CARDIOMEDIASTINAL SILHOUETTE: Heart is mildly enlarged with pulmonary vascular congestion and small bilateral pleural effusions.  LUNGS: Lungs are clear.  ABDOMEN: No remarkable upper abdominal findings.  BONES: No acute osseous changes.    Cardiomegaly and pulmonary vascular congestion with small bilateral pleural effusions. Signed by Ronal Isaac MD      Scheduled medications  Scheduled Medications[1]  Continuous medications  Continuous Medications[2]  PRN medications  PRN Medications[3]    ASSESSMENT:  Dyspnea resolved  Acute hypoxic respiratory failure  Acute COPD exacerbation  Pleural effusion   Acute on chronic diastolic congestive heart failure  Non small cell lung carcinoma  Vape use  Tobacco dependence  Hyponatremia improved  Constipation  Osteoarthritis   Cervical spinal stenosis  Lumbar spinal stenosis  Spondylolisthesis  Scoliosis  Migraine headaches  GERD  Vitamin d deficiency  Hypoalbuminemia  Severe protein calorie  malnutrition  Pressure ulcer risk  Fall risk  *Sacral wounds    PLAN:  No new issues.  Respiratory status, pulse oximetry stable 95% on 2 L nasal cannula.  No home oxygen.  Pulmonology following.  Input appreciated.  IV Solu-Medrol to p.o Prednisone . Status post IV Lasix x 1.  DuoNeb treatments.  Oxygen.  Titrate oxygen saturations allow.  Pulmonary hygiene.  Encourage incentive spirometry use 10 times per hour while awake.  Increase activity as tolerated.  Nicotine patch.  Abstain from vape / tobacco use.  Vape and tobacco use cessation education.  Discussed with Pulmonology team.  Steroids discontinued.  RT home oxygen evaluation.  If the patient agreed to stay overnight, nocturnal pulse oximetry.  Labs reviewed.  Hypokalemia.  Potassium replacement.  Hyponatremia, status post IV Lasix.  Outpatient BMP lab draw.  Orders placed.  + Constipation, chronic abdominal pain, stable at baseline.  No nausea or vomiting.  Tolerating diet.  I offered though the patient declines any additional bowel regimen at this time.  Her abdominal examination is benign.  Continue Lactulose.  Increase fluid intake and activity.  Monitor output.  *Wound care per wound care nursing.  PT/OT as tolerated.  Fall precautions.  Up with assistance only.  Bed and chair alarm at all times.  Pressure ulcer prevention measures.  Turn and reposition every 2 hours and as needed.  Heels off bed.  Offloading.  DVT prophylaxis.  Heparin subcutaneous.  GI prophylaxis.  PPI Protonix.  Supportive care.  Patient reassured.  PT/OT recommend low intensity of continued care.  Case management following for discharge planning.  Discharge plan home with home health care.  Discussed with Dr. Mcneil.  Ziggy to discharge home after cleared Pulmonology, per request when arrangements are made by case management.    ADDENDUM:  RT home oxygen evaluation complete.  She qualifies for 2 liters nasal cannula continuously.  I met with patient bedside this afternoon.  She is  requesting to go home.  I advised her of the home oxygen requirements and educated her on the risks of smoking with home oxygen, absolutely no smoking with home oxygen.  She verbalizes understanding.        PO Jang-CNP         [1] baclofen, 10 mg, oral, TID  cholecalciferol, 100 mcg, oral, Daily  heparin (porcine), 5,000 Units, subcutaneous, q8h [place]  ipratropium-albuteroL, 3 mL, nebulization, Once  ipratropium-albuteroL, 3 mL, nebulization, TID  lactulose, 30 mL, oral, q18h  lidocaine, , Topical, Daily  nicotine, 1 patch, transdermal, Nightly  pantoprazole, 40 mg, oral, BID  predniSONE, 40 mg, oral, Daily  sucralfate, 1 g, oral, q6h [place]     [2]    [3] PRN medications: acetaminophen **OR** acetaminophen **OR** acetaminophen, albuterol, benzocaine-menthol, butalbital-acetaminophen-caff, dextromethorphan-guaifenesin, guaiFENesin, guaiFENesin, lubricating eye drops, melatonin, ondansetron **OR** ondansetron, polyethylene glycol, prochlorperazine

## 2025-05-01 NOTE — PROGRESS NOTES
05/01/25 1028   Discharge Planning   Living Arrangements Children   Support Systems Children   Home or Post Acute Services In home services   Type of Home Care Services Home nursing visits;Home OT;Home health aide   Expected Discharge Disposition Home H  ([facility])   Does the patient need discharge transport arranged? Yes   RoundTrip coordination needed? Yes     Met with patient at bedside.  Discussed with patient that [facility] is no longer willing to accept .  Patient is agreeable to [facility].  AVS and HHC referral sent to [facility] in [facility].  Waiting for SOC confirmation. Will need 02 certification prior to discharge.  Patient will need transportation home.      [facility]  SOC confirmed to be in 24 - 48 hours.

## 2025-05-01 NOTE — CARE PLAN
The patient's goals for the shift include comfort and reposition.    The clinical goals for the shift include Patient will not fall this shift    Over the shift, the patient did not make progress toward the following goals. Barriers to progression include patient refusing to turn and reposition. Recommendations to address these barriers include continued re-education.

## 2025-05-01 NOTE — NURSING NOTE
Nurse to nurse report completed at bedside, patient A&Ox4, call light and possessions within reach, bed alarm on and functioning.  Patient pulled up in bed after assessment, offered to turn and reposition patient who adamantly refused to be turned off her bottom.  Educated patient regarding importance of turning and repositioning.  Will continue to educate patient.

## 2025-05-01 NOTE — PROGRESS NOTES
"Physical Therapy                 Therapy Communication Note    Patient Name: Karly Horton  MRN: [id number]  Department: [address]  Room: 03/03-A  Today's Date: 5/1/2025     Discipline: Physical Therapy    PT Missed Visit: Yes     Missed Visit Reason: Missed Visit Reason: Patient refused (Attempted to see pt for PT f/u; pt declined stating \"No! I'm going home today.\")    Missed Time: Attempt    Comment:  "

## 2025-05-01 NOTE — SIGNIFICANT EVENT
Spoke with the representative from [employer]. For her oxygen needs.  Portable concentrator given to patient.  Serial number for concentrator [id number]

## 2025-05-01 NOTE — SIGNIFICANT EVENT
Patient was remove from oxygen and spo2 dropped to 87% on room air.  The patient does not ambulate.  The patient requires 2lpm of oxygen via nasal cannula continuous.

## 2025-05-02 DIAGNOSIS — J44.9 CHRONIC OBSTRUCTIVE PULMONARY DISEASE, UNSPECIFIED COPD TYPE (MULTI): Primary | ICD-10-CM

## 2025-05-04 LAB
BACTERIA BLD CULT: NORMAL
BACTERIA BLD CULT: NORMAL

## 2025-05-05 DIAGNOSIS — E87.1 HYPONATREMIA: ICD-10-CM

## 2025-05-05 LAB
ATRIAL RATE: 121 BPM
P AXIS: 58 DEGREES
P OFFSET: 189 MS
P ONSET: 147 MS
PR INTERVAL: 134 MS
Q ONSET: 214 MS
QRS COUNT: 20 BEATS
QRS DURATION: 76 MS
QT INTERVAL: 322 MS
QTC CALCULATION(BAZETT): 457 MS
QTC FREDERICIA: 406 MS
R AXIS: -49 DEGREES
T AXIS: 78 DEGREES
T OFFSET: 375 MS
VENTRICULAR RATE: 121 BPM

## 2025-05-07 ENCOUNTER — TELEPHONE (OUTPATIENT)
Dept: HEMATOLOGY/ONCOLOGY | Facility: CLINIC | Age: 80
End: 2025-05-07
Payer: MEDICARE

## 2025-05-07 DIAGNOSIS — R53.1 WEAKNESS: ICD-10-CM

## 2025-05-07 NOTE — TELEPHONE ENCOUNTER
Reason for Conversation  Hospital bed     Background   Tct Dtr Karly to let her know MD will be writing prescription for bed.  Leslie was sent a message to assist tomorrow. Dtr aware someone will reach out. Teach back method done.     Disposition   No disposition on file.

## 2025-05-09 ENCOUNTER — SOCIAL WORK (OUTPATIENT)
Dept: CASE MANAGEMENT | Facility: HOSPITAL | Age: 80
End: 2025-05-09
Payer: MEDICARE

## 2025-05-09 DIAGNOSIS — Z85.118 HISTORY OF LUNG CANCER: ICD-10-CM

## 2025-05-09 DIAGNOSIS — J90 PLEURAL EFFUSION: ICD-10-CM

## 2025-05-09 DIAGNOSIS — C44.92 SQUAMOUS CELL CARCINOMA, SMALL CELL, NONKERATINIZING: ICD-10-CM

## 2025-05-09 NOTE — PROGRESS NOTES
SW sent  an order for a hospital bed for patient. All needed information was sent to [facility] at fax: [phone]. The phone number is [phone].

## 2025-05-18 ENCOUNTER — APPOINTMENT (OUTPATIENT)
Dept: RADIOLOGY | Facility: HOSPITAL | Age: 80
DRG: 177 | End: 2025-05-18
Payer: MEDICARE

## 2025-05-18 ENCOUNTER — HOSPITAL ENCOUNTER (INPATIENT)
Facility: HOSPITAL | Age: 80
DRG: 177 | End: 2025-05-18
Admitting: STUDENT IN AN ORGANIZED HEALTH CARE EDUCATION/TRAINING PROGRAM
Payer: MEDICARE

## 2025-05-18 DIAGNOSIS — Z85.118 HISTORY OF LUNG CANCER: ICD-10-CM

## 2025-05-18 DIAGNOSIS — G47.09 OTHER INSOMNIA: ICD-10-CM

## 2025-05-18 DIAGNOSIS — R06.02 SHORTNESS OF BREATH: ICD-10-CM

## 2025-05-18 DIAGNOSIS — S31.000S SACRAL WOUND, SEQUELA: ICD-10-CM

## 2025-05-18 DIAGNOSIS — K59.00 CONSTIPATION, UNSPECIFIED CONSTIPATION TYPE: ICD-10-CM

## 2025-05-18 DIAGNOSIS — J18.9 MULTIFOCAL PNEUMONIA: ICD-10-CM

## 2025-05-18 DIAGNOSIS — R53.1 GENERALIZED WEAKNESS: ICD-10-CM

## 2025-05-18 DIAGNOSIS — S31.000A WOUND OF SACRAL REGION, INITIAL ENCOUNTER: ICD-10-CM

## 2025-05-18 DIAGNOSIS — E87.8 ELECTROLYTE IMBALANCE: ICD-10-CM

## 2025-05-18 DIAGNOSIS — R00.0 TACHYCARDIA: ICD-10-CM

## 2025-05-18 DIAGNOSIS — L89.90 PRESSURE INJURY: ICD-10-CM

## 2025-05-18 DIAGNOSIS — J44.9 CHRONIC OBSTRUCTIVE PULMONARY DISEASE, UNSPECIFIED COPD TYPE (MULTI): ICD-10-CM

## 2025-05-18 DIAGNOSIS — R33.9 URINARY RETENTION: ICD-10-CM

## 2025-05-18 DIAGNOSIS — D64.9 NORMOCYTIC ANEMIA: ICD-10-CM

## 2025-05-18 DIAGNOSIS — F17.200 TOBACCO DEPENDENCE: ICD-10-CM

## 2025-05-18 DIAGNOSIS — J96.01 ACUTE HYPOXEMIC RESPIRATORY FAILURE: Primary | ICD-10-CM

## 2025-05-18 LAB
ALBUMIN SERPL BCP-MCNC: 2.8 G/DL (ref 3.4–5)
ALP SERPL-CCNC: 107 U/L (ref 33–136)
ALT SERPL W P-5'-P-CCNC: 16 U/L (ref 7–45)
ANION GAP SERPL CALCULATED.3IONS-SCNC: 9 MMOL/L (ref 10–20)
APPEARANCE UR: CLEAR
AST SERPL W P-5'-P-CCNC: 23 U/L (ref 9–39)
BACTERIA #/AREA URNS AUTO: ABNORMAL /HPF
BASOPHILS # BLD AUTO: 0.01 X10*3/UL (ref 0–0.1)
BASOPHILS NFR BLD AUTO: 0.1 %
BILIRUB SERPL-MCNC: 0.4 MG/DL (ref 0–1.2)
BILIRUB UR STRIP.AUTO-MCNC: NEGATIVE MG/DL
BUN SERPL-MCNC: 17 MG/DL (ref 6–23)
CALCIUM SERPL-MCNC: 8.8 MG/DL (ref 8.6–10.3)
CHLORIDE SERPL-SCNC: 81 MMOL/L (ref 98–107)
CO2 SERPL-SCNC: 42 MMOL/L (ref 21–32)
COLOR UR: NORMAL
CREAT SERPL-MCNC: 0.32 MG/DL (ref 0.5–1.05)
CRP SERPL-MCNC: 14.44 MG/DL
EGFRCR SERPLBLD CKD-EPI 2021: >90 ML/MIN/1.73M*2
EOSINOPHIL # BLD AUTO: 0.03 X10*3/UL (ref 0–0.4)
EOSINOPHIL NFR BLD AUTO: 0.3 %
ERYTHROCYTE [DISTWIDTH] IN BLOOD BY AUTOMATED COUNT: 14.2 % (ref 11.5–14.5)
ERYTHROCYTE [SEDIMENTATION RATE] IN BLOOD BY WESTERGREN METHOD: 81 MM/H (ref 0–30)
GLUCOSE SERPL-MCNC: 104 MG/DL (ref 74–99)
GLUCOSE UR STRIP.AUTO-MCNC: NORMAL MG/DL
HCT VFR BLD AUTO: 37.3 % (ref 36–46)
HGB BLD-MCNC: 11.9 G/DL (ref 12–16)
HYALINE CASTS #/AREA URNS AUTO: ABNORMAL /LPF
IMM GRANULOCYTES # BLD AUTO: 0.07 X10*3/UL (ref 0–0.5)
IMM GRANULOCYTES NFR BLD AUTO: 0.8 % (ref 0–0.9)
KETONES UR STRIP.AUTO-MCNC: NEGATIVE MG/DL
LACTATE SERPL-SCNC: 0.9 MMOL/L (ref 0.4–2)
LEUKOCYTE ESTERASE UR QL STRIP.AUTO: NEGATIVE
LYMPHOCYTES # BLD AUTO: 0.2 X10*3/UL (ref 0.8–3)
LYMPHOCYTES NFR BLD AUTO: 2.2 %
MAGNESIUM SERPL-MCNC: 1.51 MG/DL (ref 1.6–2.4)
MCH RBC QN AUTO: 31.4 PG (ref 26–34)
MCHC RBC AUTO-ENTMCNC: 31.9 G/DL (ref 32–36)
MCV RBC AUTO: 98 FL (ref 80–100)
MONOCYTES # BLD AUTO: 0.74 X10*3/UL (ref 0.05–0.8)
MONOCYTES NFR BLD AUTO: 8.1 %
MRSA DNA SPEC QL NAA+PROBE: NOT DETECTED
MUCOUS THREADS #/AREA URNS AUTO: ABNORMAL /LPF
NEUTROPHILS # BLD AUTO: 8.04 X10*3/UL (ref 1.6–5.5)
NEUTROPHILS NFR BLD AUTO: 88.5 %
NITRITE UR QL STRIP.AUTO: NEGATIVE
NRBC BLD-RTO: 0 /100 WBCS (ref 0–0)
PH UR STRIP.AUTO: 6 [PH]
PLATELET # BLD AUTO: 253 X10*3/UL (ref 150–450)
POTASSIUM SERPL-SCNC: 3.2 MMOL/L (ref 3.5–5.3)
PROT SERPL-MCNC: 6.2 G/DL (ref 6.4–8.2)
PROT UR STRIP.AUTO-MCNC: NORMAL MG/DL
RBC # BLD AUTO: 3.79 X10*6/UL (ref 4–5.2)
RBC # UR STRIP.AUTO: NEGATIVE MG/DL
RBC #/AREA URNS AUTO: ABNORMAL /HPF
SODIUM SERPL-SCNC: 129 MMOL/L (ref 136–145)
SP GR UR STRIP.AUTO: 1.02
UROBILINOGEN UR STRIP.AUTO-MCNC: NORMAL MG/DL
WBC # BLD AUTO: 9.1 X10*3/UL (ref 4.4–11.3)
WBC #/AREA URNS AUTO: ABNORMAL /HPF

## 2025-05-18 PROCEDURE — 71045 X-RAY EXAM CHEST 1 VIEW: CPT

## 2025-05-18 PROCEDURE — 87040 BLOOD CULTURE FOR BACTERIA: CPT | Mod: WESLAB

## 2025-05-18 PROCEDURE — 96367 TX/PROPH/DG ADDL SEQ IV INF: CPT | Mod: 59

## 2025-05-18 PROCEDURE — 81001 URINALYSIS AUTO W/SCOPE: CPT

## 2025-05-18 PROCEDURE — 96361 HYDRATE IV INFUSION ADD-ON: CPT | Mod: 59

## 2025-05-18 PROCEDURE — 2500000004 HC RX 250 GENERAL PHARMACY W/ HCPCS (ALT 636 FOR OP/ED): Mod: JZ

## 2025-05-18 PROCEDURE — 1210000001 HC SEMI-PRIVATE ROOM DAILY

## 2025-05-18 PROCEDURE — 74176 CT ABD & PELVIS W/O CONTRAST: CPT | Performed by: RADIOLOGY

## 2025-05-18 PROCEDURE — 74176 CT ABD & PELVIS W/O CONTRAST: CPT

## 2025-05-18 PROCEDURE — 85652 RBC SED RATE AUTOMATED: CPT

## 2025-05-18 PROCEDURE — 85025 COMPLETE CBC W/AUTO DIFF WBC: CPT

## 2025-05-18 PROCEDURE — 36415 COLL VENOUS BLD VENIPUNCTURE: CPT

## 2025-05-18 PROCEDURE — 87449 NOS EACH ORGANISM AG IA: CPT | Mod: WESLAB | Performed by: INTERNAL MEDICINE

## 2025-05-18 PROCEDURE — 83735 ASSAY OF MAGNESIUM: CPT

## 2025-05-18 PROCEDURE — 96366 THER/PROPH/DIAG IV INF ADDON: CPT | Mod: 59

## 2025-05-18 PROCEDURE — 51798 US URINE CAPACITY MEASURE: CPT

## 2025-05-18 PROCEDURE — 87641 MR-STAPH DNA AMP PROBE: CPT

## 2025-05-18 PROCEDURE — 71045 X-RAY EXAM CHEST 1 VIEW: CPT | Performed by: RADIOLOGY

## 2025-05-18 PROCEDURE — 86140 C-REACTIVE PROTEIN: CPT

## 2025-05-18 PROCEDURE — 96365 THER/PROPH/DIAG IV INF INIT: CPT | Mod: 59

## 2025-05-18 PROCEDURE — 83605 ASSAY OF LACTIC ACID: CPT

## 2025-05-18 PROCEDURE — 80053 COMPREHEN METABOLIC PANEL: CPT

## 2025-05-18 PROCEDURE — 99285 EMERGENCY DEPT VISIT HI MDM: CPT | Mod: 25

## 2025-05-18 PROCEDURE — 51702 INSERT TEMP BLADDER CATH: CPT

## 2025-05-18 RX ORDER — VANCOMYCIN 1 G/200ML
1000 INJECTION, SOLUTION INTRAVENOUS ONCE
Status: COMPLETED | OUTPATIENT
Start: 2025-05-18 | End: 2025-05-18

## 2025-05-18 RX ADMIN — SODIUM CHLORIDE 500 ML: 900 INJECTION, SOLUTION INTRAVENOUS at 17:08

## 2025-05-18 RX ADMIN — VANCOMYCIN 1000 MG: 1 INJECTION, SOLUTION INTRAVENOUS at 20:03

## 2025-05-18 RX ADMIN — PIPERACILLIN SODIUM AND TAZOBACTAM SODIUM 3.38 G: 3; .375 INJECTION, SOLUTION INTRAVENOUS at 19:20

## 2025-05-18 ASSESSMENT — PAIN - FUNCTIONAL ASSESSMENT: PAIN_FUNCTIONAL_ASSESSMENT: 0-10

## 2025-05-18 ASSESSMENT — PAIN DESCRIPTION - DESCRIPTORS: DESCRIPTORS: ACHING

## 2025-05-18 ASSESSMENT — PAIN DESCRIPTION - PAIN TYPE: TYPE: ACUTE PAIN

## 2025-05-18 ASSESSMENT — PAIN SCALES - GENERAL: PAINLEVEL_OUTOF10: 8

## 2025-05-18 ASSESSMENT — PAIN DESCRIPTION - LOCATION: LOCATION: SACRUM

## 2025-05-18 ASSESSMENT — PAIN DESCRIPTION - FREQUENCY: FREQUENCY: CONSTANT/CONTINUOUS

## 2025-05-18 NOTE — ED PROVIDER NOTES
HPI   Chief Complaint   Patient presents with    Weakness, Gen     Patient bib ems for weakness, lives at home with her daughter, had a low pox and was given oxygen.  Symptoms began two days ago. She is also being treated for an ulcer on her lower back/coccyx.       HPI  Patient is a 80-year-old female with history of lung cancer presenting brought in by EMS for evaluation of generalized weakness.  Patient reportedly lives at home with her daughter.  She has reportedly been weak for the past 2 days or so.  EMS also reports that the patient's pulse ox was low at home.  Patient is awake and alert but not a great historian.  He does have a large sacral wound that is contaminated with stool upon arrival.  She is somewhat confused during the visit but has no acute complaints.      Patient History   Medical History[1]  Surgical History[2]  Family History[3]  Social History[4]    Physical Exam   ED Triage Vitals [05/18/25 1701]   Temperature Heart Rate Respirations BP   36.1 °C (97 °F) 86 (!) 22 121/75      SpO2 Temp Source Heart Rate Source Patient Position   -- Axillary -- Lying      BP Location FiO2 (%)     Left arm --       Physical Exam  Vitals and nursing note reviewed.   Constitutional:       Appearance: She is well-developed.      Comments: Cachectic and frail 80-year-old female resting in hospital bed in no apparent distress   HENT:      Head: Normocephalic and atraumatic.   Eyes:      Conjunctiva/sclera: Conjunctivae normal.   Cardiovascular:      Rate and Rhythm: Normal rate and regular rhythm.      Heart sounds: No murmur heard.  Pulmonary:      Effort: Pulmonary effort is normal. No respiratory distress.      Comments: Expiratory rhonchi bilaterally, on 2 L nasal cannula  Abdominal:      Palpations: Abdomen is soft.      Tenderness: There is no abdominal tenderness.   Musculoskeletal:      Cervical back: Neck supple.      Comments: Large approximately 10 cm x 8 cm sacral wound   Skin:     General: Skin is warm  and dry.      Capillary Refill: Capillary refill takes less than 2 seconds.   Neurological:      Mental Status: She is alert.   Psychiatric:         Mood and Affect: Mood normal.           ED Course & MDM   ED Course as of 05/21/25 1033   Sun May 18, 2025   1935 Patient noted to be retaining urine on bladder scan, will insert Barnard catheter for therapy [JJ]      ED Course User Index  [JJ] Sanjana Jerez PA-C         Diagnoses as of 05/21/25 1033   Generalized weakness   Urinary retention   Multifocal pneumonia   Wound of sacral region, initial encounter   Acute hypoxemic respiratory failure                 No data recorded     Keyla Coma Scale Score: 15 (05/20/25 1950 : Sahra Zhang, GIANLUCA)                           Medical Decision Making  Parts of this chart have been completed using voice recognition software. Please excuse any errors of transcription.  My thought process and reason for plan has been formulated from the time that I saw the patient until the time of disposition and is not specific to one specific moment during their visit and furthermore my MDM encompasses this entire chart and not only this text box.      HPI: Detailed above.    Exam: A medically appropriate exam performed, outlined above, given the known history and presentation.    History obtained from: Patient, chart review, EMS    Social Determinants of Health considered during this visit: Lives at home with daughter    Medications given during visit:  Medications   baclofen (Lioresal) tablet 10 mg (10 mg oral Given 5/21/25 0847)   butalbital-acetaminophen-caff -40 mg per tablet 1 tablet (1 tablet oral Given 5/19/25 6162)   cholecalciferol (Vitamin D-3) tablet 125 mcg (125 mcg oral Given 5/21/25 0846)   mupirocin (Bactroban) 2 % ointment 1 Application (1 Application Topical Not Given 5/21/25 0833)   ondansetron (Zofran) tablet 8 mg (has no administration in time range)   pantoprazole (ProtoNix) EC tablet 40 mg (40 mg oral Given 5/21/25  0632)   sucralfate (Carafate) suspension 1 g (1 g oral Given 5/21/25 0632)   piperacillin-tazobactam (Zosyn) 4.5 g in dextrose (iso)  mL (0 g intravenous Stopped 5/21/25 0924)   polyethylene glycol (Glycolax, Miralax) packet 17 g (has no administration in time range)   benzocaine-menthol (Cepastat Sore Throat) lozenge 1 lozenge (has no administration in time range)   dextromethorphan-guaifenesin (Robitussin DM)  mg/5 mL oral liquid 5 mL (has no administration in time range)   guaiFENesin (Mucinex) 12 hr tablet 600 mg (has no administration in time range)   ipratropium-albuteroL (Duo-Neb) 0.5-2.5 mg/3 mL nebulizer solution 3 mL (3 mL nebulization Given 5/21/25 0750)   enoxaparin (Lovenox) syringe 40 mg (40 mg subcutaneous Given 5/21/25 0846)   azithromycin (Zithromax) tablet 500 mg (500 mg oral Not Given 5/19/25 1413)     Followed by   azithromycin (Zithromax) tablet 250 mg (250 mg oral Given 5/21/25 0846)   prochlorperazine (Compazine) tablet 10 mg (has no administration in time range)   guaiFENesin (Mucinex) 12 hr tablet 600 mg (has no administration in time range)   oxygen (O2) therapy (40 L/min inhalation Start 5/21/25 0750)   nicotine (Nicoderm CQ) 21 mg/24 hr patch 1 patch (1 patch transdermal Medication Applied 5/21/25 0847)     Followed by   nicotine (Nicoderm CQ) 14 mg/24 hr patch 1 patch (has no administration in time range)     Followed by   nicotine (Nicoderm CQ) 7 mg/24 hr patch 1 patch (has no administration in time range)   alteplase (Cathflo Activase) injection 2 mg (has no administration in time range)   lubricating eye drops ophthalmic solution 1 drop (has no administration in time range)   sodium hypochlorite (Dakin's Quarter Strength) 0.125 % external solution ( irrigation Given 5/20/25 4221)   collagenase 250 unit/gram ointment (1 Application Topical Given 5/21/25 8222)   acetaminophen (Tylenol) tablet 650 mg (650 mg oral Given 5/21/25 4868)   lidocaine (Xylocaine) 5 % ointment 1  Application (1 Application Topical Given 5/21/25 0848)   glucagon (Glucagen) injection 1 mg (has no administration in time range)   dextrose 50 % injection 25 g (has no administration in time range)   glucagon (Glucagen) injection 1 mg (has no administration in time range)   dextrose 50 % injection 12.5 g (has no administration in time range)   insulin lispro injection 0-5 Units ( subcutaneous Not Given 5/21/25 0748)   sennosides-docusate sodium (Annette-Colace) 8.6-50 mg per tablet 2 tablet (2 tablets oral Given 5/21/25 0846)   polyethylene glycol (Glycolax, Miralax) packet 17 g (17 g oral Given 5/21/25 0844)   sodium chloride 0.9 % bolus 500 mL (0 mL intravenous Stopped 5/18/25 1951)   vancomycin (Xellia) 1,000 mg in diluent combination  mL (0 mg intravenous Stopped 5/18/25 2222)   piperacillin-tazobactam (Zosyn) 3.375 g in dextrose (iso) IV 50 mL (0 g intravenous Stopped 5/18/25 2000)   LORazepam (Ativan) injection 0.5 mg (0.5 mg intravenous Given 5/19/25 1835)   LORazepam (Ativan) injection 0.5 mg (0.5 mg intravenous Given 5/20/25 0144)   potassium chloride 20 mEq in sterile water for injection 100 mL (0 mEq intravenous Stopped 5/20/25 1111)   magnesium sulfate 2 g in sterile water for injection 50 mL (0 g intravenous Stopped 5/20/25 0820)   diazePAM (Valium) injection 2 mg (2 mg intravenous Given 5/20/25 0900)   diazePAM (Valium) injection 2 mg (2 mg intravenous Given 5/20/25 2151)        Diagnostic/tests  Labs Reviewed   CBC WITH AUTO DIFFERENTIAL - Abnormal       Result Value    WBC 9.1      nRBC 0.0      RBC 3.79 (*)     Hemoglobin 11.9 (*)     Hematocrit 37.3      MCV 98      MCH 31.4      MCHC 31.9 (*)     RDW 14.2      Platelets 253      Neutrophils % 88.5      Immature Granulocytes %, Automated 0.8      Lymphocytes % 2.2      Monocytes % 8.1      Eosinophils % 0.3      Basophils % 0.1      Neutrophils Absolute 8.04 (*)     Immature Granulocytes Absolute, Automated 0.07      Lymphocytes Absolute 0.20  (*)     Monocytes Absolute 0.74      Eosinophils Absolute 0.03      Basophils Absolute 0.01     COMPREHENSIVE METABOLIC PANEL - Abnormal    Glucose 104 (*)     Sodium 129 (*)     Potassium 3.2 (*)     Chloride 81 (*)     Bicarbonate 42 (*)     Anion Gap 9 (*)     Urea Nitrogen 17      Creatinine 0.32 (*)     eGFR >90      Calcium 8.8      Albumin 2.8 (*)     Alkaline Phosphatase 107      Total Protein 6.2 (*)     AST 23      Bilirubin, Total 0.4      ALT 16     SEDIMENTATION RATE, AUTOMATED - Abnormal    Sedimentation Rate 81 (*)    C-REACTIVE PROTEIN - Abnormal    C-Reactive Protein 14.44 (*)    MAGNESIUM - Abnormal    Magnesium 1.51 (*)    COMPREHENSIVE METABOLIC PANEL - Abnormal    Glucose 96      Sodium 122 (*)     Potassium 3.6      Chloride 77 (*)     Bicarbonate 38 (*)     Anion Gap 11      Urea Nitrogen 11      Creatinine 0.30 (*)     eGFR >90      Calcium 8.4 (*)     Albumin 2.8 (*)     Alkaline Phosphatase 102      Total Protein 6.0 (*)     AST 21      Bilirubin, Total 0.7      ALT 14     CBC - Abnormal    WBC 14.7 (*)     nRBC 0.0      RBC 3.90 (*)     Hemoglobin 12.2      Hematocrit 36.7      MCV 94      MCH 31.3      MCHC 33.2      RDW 14.2      Platelets 268     BLOOD GAS ARTERIAL FULL PANEL - Abnormal    POCT pH, Arterial 7.51 (*)     POCT pCO2, Arterial 55 (*)     POCT pO2, Arterial 56 (*)     POCT SO2, Arterial 90 (*)     POCT Oxy Hemoglobin, Arterial 86.5 (*)     POCT Hematocrit Calculated, Arterial 38.0      POCT Sodium, Arterial 116 (*)     POCT Potassium, Arterial 3.8      POCT Chloride, Arterial 79 (*)     POCT Ionized Calcium, Arterial 1.11      POCT Glucose, Arterial 97      POCT Lactate, Arterial 2.3 (*)     POCT Base Excess, Arterial 18.0 (*)     POCT HCO3 Calculated, Arterial 43.9 (*)     POCT Hemoglobin, Arterial 12.7      POCT Anion Gap, Arterial -3 (*)     Patient Temperature 37.0      FiO2 100      Apparatus HIGH FLOW CANNULA      Flow 40.0      Site of Arterial Puncture Radial Right       Christopher's Test Positive     RENAL FUNCTION PANEL - Abnormal    Glucose 84      Sodium 129 (*)     Potassium 3.1 (*)     Chloride 79 (*)     Bicarbonate 42 (*)     Anion Gap 11      Urea Nitrogen 12      Creatinine 0.30 (*)     eGFR >90      Calcium 8.5 (*)     Phosphorus 3.2      Albumin 2.5 (*)    CBC - Abnormal    WBC 18.7 (*)     nRBC 0.0      RBC 3.67 (*)     Hemoglobin 11.5 (*)     Hematocrit 34.7 (*)     MCV 95      MCH 31.3      MCHC 33.1      RDW 14.0      Platelets 227     MAGNESIUM - Abnormal    Magnesium 1.50 (*)    PREALBUMIN - Abnormal    Prealbumin 6.9 (*)    RENAL FUNCTION PANEL - Abnormal    Glucose 162 (*)     Sodium 130 (*)     Potassium 3.7      Chloride 81 (*)     Bicarbonate >45 (*)     Anion Gap        Urea Nitrogen 11      Creatinine 0.37 (*)     eGFR >90      Calcium 8.5 (*)     Phosphorus 1.8 (*)     Albumin 2.6 (*)    CBC WITH AUTO DIFFERENTIAL - Abnormal    WBC 12.0 (*)     nRBC 0.0      RBC 3.47 (*)     Hemoglobin 10.7 (*)     Hematocrit 33.6 (*)     MCV 97      MCH 30.8      MCHC 31.8 (*)     RDW 14.6 (*)     Platelets 231      Neutrophils % 92.2      Immature Granulocytes %, Automated 0.6      Lymphocytes % 1.2      Monocytes % 5.8      Eosinophils % 0.1      Basophils % 0.1      Neutrophils Absolute 11.07 (*)     Immature Granulocytes Absolute, Automated 0.07      Lymphocytes Absolute 0.14 (*)     Monocytes Absolute 0.69      Eosinophils Absolute 0.01      Basophils Absolute 0.01     POCT GLUCOSE - Abnormal    POCT Glucose 196 (*)    POCT GLUCOSE - Abnormal    POCT Glucose 232 (*)    POCT GLUCOSE - Abnormal    POCT Glucose 155 (*)    POCT GLUCOSE - Abnormal    POCT Glucose 246 (*)    POCT GLUCOSE - Abnormal    POCT Glucose 144 (*)    URINALYSIS MICROSCOPIC WITH REFLEX CULTURE - Abnormal    WBC, Urine 1-5      RBC, Urine 3-5      Bacteria, Urine 1+ (*)     Mucus, Urine FEW      Hyaline Casts, Urine 3+ (*)    BLOOD CULTURE - Normal    Blood Culture No growth at 2 days     BLOOD CULTURE  - Normal    Blood Culture No growth at 2 days     MRSA SURVEILLANCE FOR VANCOMYCIN DE-ESCALATION, PCR - Normal    MRSA PCR Not Detected      Narrative:     This assay is an FDA-approved in vitro diagnostic nucleic acid amplification test for the detection of methicillin-resistant Staphylococcus aureus (MRSA) DNA directly from nasal swabs in patients at risk for nasal colonization. MRSA NxG is intended to aid in the prevention and control of MRSA infections in healthcare settings. This assay is NOT intended to diagnose, guide, or monitor treatment for MRSA infections, or provide results of susceptibility to methicillin. A negative result does not preclude MRSA nasal colonization. Test performance has not been evaluated in patients less than two years of age.   MRSA SURVEILLANCE FOR VANCOMYCIN DE-ESCALATION, PCR - Normal    MRSA PCR Not Detected      Narrative:     This assay is an FDA-approved in vitro diagnostic nucleic acid amplification test for the detection of methicillin-resistant Staphylococcus aureus (MRSA) DNA directly from nasal swabs in patients at risk for nasal colonization. MRSA NxG is intended to aid in the prevention and control of MRSA infections in healthcare settings. This assay is NOT intended to diagnose, guide, or monitor treatment for MRSA infections, or provide results of susceptibility to methicillin. A negative result does not preclude MRSA nasal colonization. Test performance has not been evaluated in patients less than two years of age.   LEGIONELLA ANTIGEN, URINE - Normal    L. pneumophila Urine Ag Negative     LACTATE - Normal    Lactate 0.9      Narrative:     Venipuncture immediately after or during the administration of Metamizole may lead to falsely low results. Testing should be performed immediately prior to Metamizole dosing.   URINALYSIS WITH REFLEX CULTURE AND MICROSCOPIC - Normal    Color, Urine Light-Yellow      Appearance, Urine Clear      Specific Gravity, Urine 1.018       pH, Urine 6.0      Protein, Urine 20 (TRACE)      Glucose, Urine Normal      Blood, Urine NEGATIVE      Ketones, Urine NEGATIVE      Bilirubin, Urine NEGATIVE      Urobilinogen, Urine Normal      Nitrite, Urine NEGATIVE      Leukocyte Esterase, Urine NEGATIVE     URINALYSIS WITH REFLEX MICROSCOPIC - Normal    Color, Urine Light-Yellow      Appearance, Urine Clear      Specific Gravity, Urine 1.014      pH, Urine 6.0      Protein, Urine NEGATIVE      Glucose, Urine Normal      Blood, Urine NEGATIVE      Ketones, Urine NEGATIVE      Bilirubin, Urine NEGATIVE      Urobilinogen, Urine Normal      Nitrite, Urine NEGATIVE      Leukocyte Esterase, Urine NEGATIVE     VANCOMYCIN - Normal    Vancomycin 8.0      Narrative:     Vancomycin levels can be monitored according to area under the curve (AUC) or concentration (ug/mL). The preferred monitoring strategy is determined by the patient's renal function and indication for therapy.     For AUC monitoring, a random vancomycin level should be interpreted in the context of AUC rather than the concentration at a single point in time.    For concentration monitoring, a trough concentration drawn immediately prior to the next dose is preferred.       Therapeutic ranges using concentration-guided results:  Peak (all ages):                  30.0-40.0 ug/mL  Trough (all ages):                10.0-20.0 ug/mL              POCT GLUCOSE - Normal    POCT Glucose 89     POCT GLUCOSE - Normal    POCT Glucose 78     POCT GLUCOSE - Normal    POCT Glucose 92     POCT GLUCOSE - Normal    POCT Glucose 92     RESPIRATORY CULTURE/SMEAR   URINALYSIS WITH REFLEX CULTURE AND MICROSCOPIC    Narrative:     The following orders were created for panel order Urinalysis with Reflex Culture and Microscopic.  Procedure                               Abnormality         Status                     ---------                               -----------         ------                     Urinalysis with Reflex  C...[[id number]]  Normal              Final result               Extra Urine Gray Tube[[id number]]                                                         Please view results for these tests on the individual orders.   EXTRA URINE GRAY TUBE   FUNGITELL BETA-D GLUCAN,S   ASPERGILLUS GALACTOMANNAN EIA,SERUM   HISTOPLASMA ANTIGEN, SERUM OR PLASMA   MYCOPLASMA PNEUMONIAE ANTIBODY, IGM   PREALBUMIN   POCT GLUCOSE METER   POCT GLUCOSE METER   POCT GLUCOSE METER   POCT GLUCOSE METER   POCT GLUCOSE METER   POCT GLUCOSE METER   POCT GLUCOSE METER   POCT GLUCOSE METER   POCT GLUCOSE METER   POCT GLUCOSE METER   POCT GLUCOSE METER   POCT GLUCOSE METER   POCT GLUCOSE METER   POCT GLUCOSE METER   POCT GLUCOSE METER   POCT GLUCOSE METER   POCT GLUCOSE METER   POCT GLUCOSE METER   POCT GLUCOSE METER      XR chest 1 view   Final Result   Right perihilar and basilar infiltrate consistent with pneumonia,   unchanged compared to the prior study. Pulmonary vascular congestion   is also suspected along with small bilateral effusions, unchanged.        MACRO:   none        Signed by: Clemente Vergara 5/21/2025 8:03 AM   Dictation workstation:   [id number]      CT chest wo IV contrast   Final Result   1. Right lower lobar collapse with air bronchograms and endobronchial   opacification of the right lower lobe and bronchus intermedius.   Correlate with aspiration pneumonias a potential etiology. Of note,   patchy consolidation also demonstrated in the right middle lobe and   in particular posterior segment of the right upper lobe as well as   left upper lobe. Findings suggest multilobar pneumonia. 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        2. Moderate right and mild-to-moderate left pleural effusions.             MACRO:   None        Signed by: Jin Schmidt 5/20/2025 11:56 AM   Dictation workstation:   [id number]      XR chest 1 view   Final Result    Improved interstitial prominence with stable small left pleural   effusion.        Worsening infiltrate with airspace consolidation now seen in the   right upper lobe with right pleural effusion increased in size.        Signed by: Hanane Fine 5/20/2025 7:53 AM   Dictation workstation:   [id number]      CT head wo IV contrast   Final Result   No acute intracranial abnormality.             MACRO:   None        Signed by: Kanika Avina 5/20/2025 12:31 AM   Dictation workstation:   [id number]      XR chest 1 view   Final Result   There is patchy airspace opacity in the right mid and lower lung   concerning for developing multifocal pneumonia. Continued   radiographic follow-up to resolution is advised.        Cardiomegaly with mild interstitial prominence suggestive of   developing interstitial edema/CHF.        Bilateral pleural effusions are better seen on concurrent CT.        MACRO:   None        Signed by: Lenka Cartagena 5/18/2025 7:06 PM   Dictation workstation:   [id number]      CT abdomen pelvis wo IV contrast   Final Result   There is soft tissue defect overlying the distal sacrum and coccyx   with mild soft tissue stranding. A tiny locule of air is present in   the right gluteal subcutaneous fat, without evidence for discrete   drainable fluid collection. Findings concerning for sacral decubitus   ulcer. Correlate with exam.        Moderate bilateral pleural effusions. There are patchy airspace   opacities and consolidation in the right lung concerning for   developing multifocal pneumonia. Clinical correlation continued   radiographic follow-up to resolution is advised.        Marked distention of the urinary bladder. Correlate with   symptomatology to exclude urinary retention.        Diffuse body wall edema.        Additional findings as described above.        MACRO:   None        Signed by: Lenka Cartagena 5/18/2025 6:31 PM   Dictation workstation:   [id number]           Considerations/further  MDM:  Patient is a 80-year-old female presenting for evaluation of generalized weakness    I saw this patient in conjunction with Dr. knowles    Patient is awake and alert though somewhat confused very cachectic and appears chronically ill on exam.  She has no focal neurologic deficits on exam.  Lung sounds are significant for expiratory rhonchi bilaterally.  She does have a large sacral wound that is contaminated with stool upon arrival.  Laboratory workup is without significant leukocytosis but does reveal multiple electrolyte abnormalities.  Sed rate and CRP are elevated.  Chest x-ray is concerning for patchy airspace opacity within the right mid and lower lung concerning for multifocal pneumonia developing.  Lactate and blood cultures were obtained, the patient was empirically started on vancomycin and Zosyn for therapy.  For concerns of fluid overload, the patient was not provided the full 30 cc/kg fluid bolus as she does have cardiomegaly with interstitial edema on x-ray.  CT abdomen pelvis with evidence of bladder distention and further confirming multifocal pneumonia and moderate-sized pleural effusions bilaterally.  Bladder scan was performed the patient was found to be retaining urine.  Barnard catheter was placed by nursing staff.  The patient was admitted to her primary care provider service Dr. Mcneil for further care.      Procedure  Procedures       Sanjana Jerez PA-C  05/19/25 0142       [1]   Past Medical History:  Diagnosis Date    Other cervical disc degeneration, unspecified cervical region     Degeneration of cervical intervertebral disc    Other conditions influencing health status     Bulging Disc (L3 - L4)    Other conditions influencing health status     A Fall    Other conditions influencing health status     Bulging Disc (C4 - C5)    Other conditions influencing health status     Bulging Disc (C6 - C7)    Other conditions influencing health status     Bulging Disc (C5 - C6)    Other conditions  influencing health status     Lumbar Spondylolisthesis    Other conditions influencing health status     Rotator Cuff Tendon Tear    Other intervertebral disc degeneration, lumbar region     L4-L5 disc bulge    Other intervertebral disc displacement, lumbar region     Disc displacement, lumbar    Other intervertebral disc displacement, lumbosacral region     Displacement of lumbosacral intervertebral disc    Personal history of other diseases of the musculoskeletal system and connective tissue     History of bursitis    Personal history of other diseases of the musculoskeletal system and connective tissue     History of fibromyositis    Personal history of other specified conditions     History of headache    Spinal stenosis, lumbar region without neurogenic claudication     Lumbar canal stenosis    Sprain of ligaments of lumbar spine, initial encounter     Low back sprain   [2]   Past Surgical History:  Procedure Laterality Date    APPENDECTOMY      EYE SURGERY      HYSTERECTOMY      TONSILLECTOMY     [3]   Family History  Problem Relation Name Age of Onset    Cancer Mother      Other (Father had hypertension, stroke, coronary artery disease and diabetes) Father     [4]   Social History  Tobacco Use    Smoking status: Every Day     Current packs/day: 1.00     Average packs/day: 1 pack/day for 65.4 years (65.4 ttl pk-yrs)     Types: Cigarettes     Start date: 1/1/1960     Passive exposure: Current    Smokeless tobacco: Not on file   Vaping Use    Vaping status: Some Days   Substance Use Topics    Alcohol use: Not Currently    Drug use: Yes     Types: Marijuana     Comment: claribel Jerez PA-C  05/21/25 6632

## 2025-05-18 NOTE — ED TRIAGE NOTES
Patient bib ems for weakness, lives at home with her daughter, had a low pox and was given oxygen.  Symptoms began two days ago. She is also being treated for an ulcer on her lower back/coccyx.

## 2025-05-18 NOTE — ED PROVIDER NOTES
THIS IS MY [facility] SUPERVISORY AND SHARED VISIT NOTE:    I personally saw the patient and made/approved the management plan and take responsibility for the patient management.    History: Patient is a 80-year-old female presenting with a chief complaint of generalized weakness.  Patient lives at home with her daughter, and a low pulse ox, was given oxygen, no worsening symptoms her last 2 days, being treated for a lower back/coccyx ulcer, has a history of lung cancer,    Exam: GENERAL APPEARANCE: Awake and alert.     HEENT: Normocephalic, atraumatic. Extraocular muscles are intact. Pupils equal round and reactive to light.  CHEST: Nontender to palpation. Clear to auscultation bilaterally. No rales, rhonchi, or wheezing.   HEART: S1, S2. Regular rate and rhythm. No murmurs, gallops or rubs.  Strong and equal pulses in the extremities.   ABDOMEN: Soft,.  non-tender.  No rebound or guarding, bowel sounds normal x 4 quadrants  NEUROLOGICAL: Awake, alert and oriented x 1  MSK: Large sacral wound, see media tab      MDM: Patient seen and evaluated at bedside, patient is in no acute distress.  I will order a CBC, sed rate, lactate, CRP, magnesium, urinalysis, x-ray chest abdomen pelvis, give the patient normal saline, Zosyn, Vanco. Differential diagnosis includes but is not limited to sacral wound, chronic wound, pressure ulcer, sepsis.  Patient's hemoglobin 11.9, medic at 37.3, 70 show 81, CRP 14.44, magnesium 1.51, urinalysis shows bacteria, patient is patient admitted to the general medicine team for further evaluation and treatment.    Diagnosis: Acute hypoxic respiratory failure, generalized weakness, urinary retention, multifocal pneumonia, sacral wound  XR chest 1 view   Final Result   1. Generalized increased interstitial prominence as seen on the prior   examination suggesting component of interstitial edema.        2. Persistent right perihilar and basilar consolidation with mild   improved aeration of the right lung  base.             MACRO:   None        Signed by: Jin Schmidt 5/23/2025 8:06 AM   Dictation workstation:   [id number]      XR chest 1 view   Final Result   No change in the extensive right-sided pulmonary infiltrate and right   effusion.        MACRO:   none        Signed by: Clemente Vergara 5/22/2025 9:56 AM   Dictation workstation:   [id number]      XR chest 1 view   Final Result   Right perihilar and basilar infiltrate consistent with pneumonia,   unchanged compared to the prior study. Pulmonary vascular congestion   is also suspected along with small bilateral effusions, unchanged.        MACRO:   none        Signed by: Clemente Vergara 5/21/2025 8:03 AM   Dictation workstation:   [id number]      CT chest wo IV contrast   Final Result   1. Right lower lobar collapse with air bronchograms and endobronchial   opacification of the right lower lobe and bronchus intermedius.   Correlate with aspiration pneumonias a potential etiology. Of note,   patchy consolidation also demonstrated in the right middle lobe and   in particular posterior segment of the right upper lobe as well as   left upper lobe. Findings suggest multilobar pneumonia. 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        2. Moderate right and mild-to-moderate left pleural effusions.             MACRO:   None        Signed by: Jin Schmidt 5/20/2025 11:56 AM   Dictation workstation:   [id number]      XR chest 1 view   Final Result   Improved interstitial prominence with stable small left pleural   effusion.        Worsening infiltrate with airspace consolidation now seen in the   right upper lobe with right pleural effusion increased in size.        Signed by: Hanane Fine 5/20/2025 7:53 AM   Dictation workstation:   [id number]      CT head wo IV contrast   Final Result   No acute intracranial abnormality.             MACRO:   None        Signed by: Kanika  Fabrice 5/20/2025 12:31 AM   Dictation workstation:   [id number]      XR chest 1 view   Final Result   There is patchy airspace opacity in the right mid and lower lung   concerning for developing multifocal pneumonia. Continued   radiographic follow-up to resolution is advised.        Cardiomegaly with mild interstitial prominence suggestive of   developing interstitial edema/CHF.        Bilateral pleural effusions are better seen on concurrent CT.        MACRO:   None        Signed by: Lenka Cartagena 5/18/2025 7:06 PM   Dictation workstation:   [id number]      CT abdomen pelvis wo IV contrast   Final Result   There is soft tissue defect overlying the distal sacrum and coccyx   with mild soft tissue stranding. A tiny locule of air is present in   the right gluteal subcutaneous fat, without evidence for discrete   drainable fluid collection. Findings concerning for sacral decubitus   ulcer. Correlate with exam.        Moderate bilateral pleural effusions. There are patchy airspace   opacities and consolidation in the right lung concerning for   developing multifocal pneumonia. Clinical correlation continued   radiographic follow-up to resolution is advised.        Marked distention of the urinary bladder. Correlate with   symptomatology to exclude urinary retention.        Diffuse body wall edema.        Additional findings as described above.        MACRO:   None        Signed by: Lenka Cartagena 5/18/2025 6:31 PM   Dictation workstation:   [id number]        Results for orders placed or performed during the hospital encounter of 05/18/25   Blood Culture    Collection Time: 05/18/25  5:04 PM    Specimen: Peripheral Venipuncture; Blood culture   Result Value Ref Range    Blood Culture No growth at 4 days -  FINAL REPORT    Blood Culture    Collection Time: 05/18/25  5:04 PM    Specimen: Peripheral Venipuncture; Blood culture   Result Value Ref Range    Blood Culture No growth at 4 days -  FINAL REPORT    CBC and  Auto Differential    Collection Time: 05/18/25  5:04 PM   Result Value Ref Range    WBC 9.1 4.4 - 11.3 x10*3/uL    nRBC 0.0 0.0 - 0.0 /100 WBCs    RBC 3.79 (L) 4.00 - 5.20 x10*6/uL    Hemoglobin 11.9 (L) 12.0 - 16.0 g/dL    Hematocrit 37.3 36.0 - 46.0 %    MCV 98 80 - 100 fL    MCH 31.4 26.0 - 34.0 pg    MCHC 31.9 (L) 32.0 - 36.0 g/dL    RDW 14.2 11.5 - 14.5 %    Platelets 253 150 - 450 x10*3/uL    Neutrophils % 88.5 40.0 - 80.0 %    Immature Granulocytes %, Automated 0.8 0.0 - 0.9 %    Lymphocytes % 2.2 13.0 - 44.0 %    Monocytes % 8.1 2.0 - 10.0 %    Eosinophils % 0.3 0.0 - 6.0 %    Basophils % 0.1 0.0 - 2.0 %    Neutrophils Absolute 8.04 (H) 1.60 - 5.50 x10*3/uL    Immature Granulocytes Absolute, Automated 0.07 0.00 - 0.50 x10*3/uL    Lymphocytes Absolute 0.20 (L) 0.80 - 3.00 x10*3/uL    Monocytes Absolute 0.74 0.05 - 0.80 x10*3/uL    Eosinophils Absolute 0.03 0.00 - 0.40 x10*3/uL    Basophils Absolute 0.01 0.00 - 0.10 x10*3/uL   Comprehensive metabolic panel    Collection Time: 05/18/25  5:04 PM   Result Value Ref Range    Glucose 104 (H) 74 - 99 mg/dL    Sodium 129 (L) 136 - 145 mmol/L    Potassium 3.2 (L) 3.5 - 5.3 mmol/L    Chloride 81 (L) 98 - 107 mmol/L    Bicarbonate 42 (HH) 21 - 32 mmol/L    Anion Gap 9 (L) 10 - 20 mmol/L    Urea Nitrogen 17 6 - 23 mg/dL    Creatinine 0.32 (L) 0.50 - 1.05 mg/dL    eGFR >90 >60 mL/min/1.73m*2    Calcium 8.8 8.6 - 10.3 mg/dL    Albumin 2.8 (L) 3.4 - 5.0 g/dL    Alkaline Phosphatase 107 33 - 136 U/L    Total Protein 6.2 (L) 6.4 - 8.2 g/dL    AST 23 9 - 39 U/L    Bilirubin, Total 0.4 0.0 - 1.2 mg/dL    ALT 16 7 - 45 U/L   Sedimentation rate, automated    Collection Time: 05/18/25  5:04 PM   Result Value Ref Range    Sedimentation Rate 81 (H) 0 - 30 mm/h   C-reactive protein    Collection Time: 05/18/25  5:04 PM   Result Value Ref Range    C-Reactive Protein 14.44 (H) <1.00 mg/dL   Lactate    Collection Time: 05/18/25  5:04 PM   Result Value Ref Range    Lactate 0.9 0.4 - 2.0  mmol/L   Magnesium    Collection Time: 05/18/25  5:04 PM   Result Value Ref Range    Magnesium 1.51 (L) 1.60 - 2.40 mg/dL   MRSA Surveillance for Vancomycin De-escalation, PCR    Collection Time: 05/18/25  8:09 PM    Specimen: Anterior Nares; Swab   Result Value Ref Range    MRSA PCR Not Detected Not Detected   Legionella Antigen, Urine    Collection Time: 05/18/25 10:25 PM    Specimen: Clean Catch/Voided; Urine   Result Value Ref Range    L. pneumophila Urine Ag Negative Negative   Urinalysis with Reflex Culture and Microscopic    Collection Time: 05/18/25 10:25 PM   Result Value Ref Range    Color, Urine Light-Yellow Light-Yellow, Yellow, Dark-Yellow    Appearance, Urine Clear Clear    Specific Gravity, Urine 1.018 1.005 - 1.035    pH, Urine 6.0 5.0, 5.5, 6.0, 6.5, 7.0, 7.5, 8.0    Protein, Urine 20 (TRACE) NEGATIVE, 10 (TRACE), 20 (TRACE) mg/dL    Glucose, Urine Normal Normal mg/dL    Blood, Urine NEGATIVE NEGATIVE mg/dL    Ketones, Urine NEGATIVE NEGATIVE mg/dL    Bilirubin, Urine NEGATIVE NEGATIVE mg/dL    Urobilinogen, Urine Normal Normal mg/dL    Nitrite, Urine NEGATIVE NEGATIVE    Leukocyte Esterase, Urine NEGATIVE NEGATIVE   Urinalysis Microscopic    Collection Time: 05/18/25 10:25 PM   Result Value Ref Range    WBC, Urine 1-5 1-5, NONE /HPF    RBC, Urine 3-5 NONE, 1-2, 3-5 /HPF    Bacteria, Urine 1+ (A) NONE SEEN /HPF    Mucus, Urine FEW Reference range not established. /LPF    Hyaline Casts, Urine 3+ (A) NONE /LPF   Comprehensive metabolic panel    Collection Time: 05/19/25  7:23 AM   Result Value Ref Range    Glucose 96 74 - 99 mg/dL    Sodium 122 (L) 136 - 145 mmol/L    Potassium 3.6 3.5 - 5.3 mmol/L    Chloride 77 (L) 98 - 107 mmol/L    Bicarbonate 38 (H) 21 - 32 mmol/L    Anion Gap 11 10 - 20 mmol/L    Urea Nitrogen 11 6 - 23 mg/dL    Creatinine 0.30 (L) 0.50 - 1.05 mg/dL    eGFR >90 >60 mL/min/1.73m*2    Calcium 8.4 (L) 8.6 - 10.3 mg/dL    Albumin 2.8 (L) 3.4 - 5.0 g/dL    Alkaline Phosphatase 102 33  - 136 U/L    Total Protein 6.0 (L) 6.4 - 8.2 g/dL    AST 21 9 - 39 U/L    Bilirubin, Total 0.7 0.0 - 1.2 mg/dL    ALT 14 7 - 45 U/L   CBC    Collection Time: 05/19/25  7:23 AM   Result Value Ref Range    WBC 14.7 (H) 4.4 - 11.3 x10*3/uL    nRBC 0.0 0.0 - 0.0 /100 WBCs    RBC 3.90 (L) 4.00 - 5.20 x10*6/uL    Hemoglobin 12.2 12.0 - 16.0 g/dL    Hematocrit 36.7 36.0 - 46.0 %    MCV 94 80 - 100 fL    MCH 31.3 26.0 - 34.0 pg    MCHC 33.2 32.0 - 36.0 g/dL    RDW 14.2 11.5 - 14.5 %    Platelets 268 150 - 450 x10*3/uL   Vancomycin    Collection Time: 05/19/25  7:23 AM   Result Value Ref Range    Vancomycin 8.0 5.0 - 20.0 ug/mL   Histoplasma antigen, serum    Collection Time: 05/19/25  7:23 AM   Result Value Ref Range    Scan Result See Scanned Result    Blood Gas Arterial Full Panel    Collection Time: 05/19/25  8:23 AM   Result Value Ref Range    POCT pH, Arterial 7.51 (H) 7.38 - 7.42 pH    POCT pCO2, Arterial 55 (H) 38 - 42 mm Hg    POCT pO2, Arterial 56 (L) 85 - 95 mm Hg    POCT SO2, Arterial 90 (L) 94 - 100 %    POCT Oxy Hemoglobin, Arterial 86.5 (L) 94.0 - 98.0 %    POCT Hematocrit Calculated, Arterial 38.0 36.0 - 46.0 %    POCT Sodium, Arterial 116 (LL) 136 - 145 mmol/L    POCT Potassium, Arterial 3.8 3.5 - 5.3 mmol/L    POCT Chloride, Arterial 79 (L) 98 - 107 mmol/L    POCT Ionized Calcium, Arterial 1.11 1.10 - 1.33 mmol/L    POCT Glucose, Arterial 97 74 - 99 mg/dL    POCT Lactate, Arterial 2.3 (H) 0.4 - 2.0 mmol/L    POCT Base Excess, Arterial 18.0 (H) -2.0 - 3.0 mmol/L    POCT HCO3 Calculated, Arterial 43.9 (H) 22.0 - 26.0 mmol/L    POCT Hemoglobin, Arterial 12.7 12.0 - 16.0 g/dL    POCT Anion Gap, Arterial -3 (L) 10 - 25 mmo/L    Patient Temperature 37.0 degrees Celsius    FiO2 100 %    Apparatus HIGH FLOW CANNULA     Flow 40.0 LPM    Site of Arterial Puncture Radial Right     Christopher's Test Positive    POCT GLUCOSE    Collection Time: 05/19/25 12:20 PM   Result Value Ref Range    POCT Glucose 89 74 - 99 mg/dL    POCT GLUCOSE    Collection Time: 05/19/25  9:47 PM   Result Value Ref Range    POCT Glucose 78 74 - 99 mg/dL   POCT GLUCOSE    Collection Time: 05/19/25 11:37 PM   Result Value Ref Range    POCT Glucose 92 74 - 99 mg/dL   Renal function panel    Collection Time: 05/20/25  4:04 AM   Result Value Ref Range    Glucose 84 74 - 99 mg/dL    Sodium 129 (L) 136 - 145 mmol/L    Potassium 3.1 (L) 3.5 - 5.3 mmol/L    Chloride 79 (L) 98 - 107 mmol/L    Bicarbonate 42 (HH) 21 - 32 mmol/L    Anion Gap 11 10 - 20 mmol/L    Urea Nitrogen 12 6 - 23 mg/dL    Creatinine 0.30 (L) 0.50 - 1.05 mg/dL    eGFR >90 >60 mL/min/1.73m*2    Calcium 8.5 (L) 8.6 - 10.3 mg/dL    Phosphorus 3.2 2.5 - 4.9 mg/dL    Albumin 2.5 (L) 3.4 - 5.0 g/dL   CBC    Collection Time: 05/20/25  4:04 AM   Result Value Ref Range    WBC 18.7 (H) 4.4 - 11.3 x10*3/uL    nRBC 0.0 0.0 - 0.0 /100 WBCs    RBC 3.67 (L) 4.00 - 5.20 x10*6/uL    Hemoglobin 11.5 (L) 12.0 - 16.0 g/dL    Hematocrit 34.7 (L) 36.0 - 46.0 %    MCV 95 80 - 100 fL    MCH 31.3 26.0 - 34.0 pg    MCHC 33.1 32.0 - 36.0 g/dL    RDW 14.0 11.5 - 14.5 %    Platelets 227 150 - 450 x10*3/uL   Fungitell Beta-D Glucan Serum    Collection Time: 05/20/25  4:04 AM   Result Value Ref Range    Fungitell Beta-D Glucan,Serum <31 <80 pg/mL   Aspergillus Galactomannan EIA, Serum    Collection Time: 05/20/25  4:04 AM   Result Value Ref Range    Aspergillus Galactomanan EIA,S 0.032 <0.500   Mycoplasma pneumoniae antibody, IgM    Collection Time: 05/20/25  4:04 AM   Result Value Ref Range    Mycoplasma pneumoniae IgM 0.14 <=0.76 U/L   Magnesium    Collection Time: 05/20/25  4:04 AM   Result Value Ref Range    Magnesium 1.50 (L) 1.60 - 2.40 mg/dL   Urinalysis with Reflex Microscopic    Collection Time: 05/20/25  4:07 AM   Result Value Ref Range    Color, Urine Light-Yellow Light-Yellow, Yellow, Dark-Yellow    Appearance, Urine Clear Clear    Specific Gravity, Urine 1.014 1.005 - 1.035    pH, Urine 6.0 5.0, 5.5, 6.0, 6.5,  7.0, 7.5, 8.0    Protein, Urine NEGATIVE NEGATIVE, 10 (TRACE), 20 (TRACE) mg/dL    Glucose, Urine Normal Normal mg/dL    Blood, Urine NEGATIVE NEGATIVE mg/dL    Ketones, Urine NEGATIVE NEGATIVE mg/dL    Bilirubin, Urine NEGATIVE NEGATIVE mg/dL    Urobilinogen, Urine Normal Normal mg/dL    Nitrite, Urine NEGATIVE NEGATIVE    Leukocyte Esterase, Urine NEGATIVE NEGATIVE   MRSA Surveillance for Vancomycin De-escalation, PCR    Collection Time: 05/20/25  4:08 AM    Specimen: Anterior Nares; Swab   Result Value Ref Range    MRSA PCR Not Detected Not Detected   POCT GLUCOSE    Collection Time: 05/20/25  9:31 AM   Result Value Ref Range    POCT Glucose 92 74 - 99 mg/dL   POCT GLUCOSE    Collection Time: 05/20/25 12:02 PM   Result Value Ref Range    POCT Glucose 196 (H) 74 - 99 mg/dL   Prealbumin    Collection Time: 05/20/25  1:34 PM   Result Value Ref Range    Prealbumin 6.9 (L) 18.0 - 40.0 mg/dL   POCT GLUCOSE    Collection Time: 05/20/25  7:15 PM   Result Value Ref Range    POCT Glucose 232 (H) 74 - 99 mg/dL   POCT GLUCOSE    Collection Time: 05/20/25 11:38 PM   Result Value Ref Range    POCT Glucose 155 (H) 74 - 99 mg/dL   POCT GLUCOSE    Collection Time: 05/21/25  4:11 AM   Result Value Ref Range    POCT Glucose 246 (H) 74 - 99 mg/dL   Renal function panel    Collection Time: 05/21/25  6:25 AM   Result Value Ref Range    Glucose 162 (H) 74 - 99 mg/dL    Sodium 130 (L) 136 - 145 mmol/L    Potassium 3.7 3.5 - 5.3 mmol/L    Chloride 81 (L) 98 - 107 mmol/L    Bicarbonate >45 (HH) 21 - 32 mmol/L    Anion Gap      Urea Nitrogen 11 6 - 23 mg/dL    Creatinine 0.37 (L) 0.50 - 1.05 mg/dL    eGFR >90 >60 mL/min/1.73m*2    Calcium 8.5 (L) 8.6 - 10.3 mg/dL    Phosphorus 1.8 (L) 2.5 - 4.9 mg/dL    Albumin 2.6 (L) 3.4 - 5.0 g/dL   Prealbumin    Collection Time: 05/21/25  6:25 AM   Result Value Ref Range    Prealbumin 6.3 (L) 18.0 - 40.0 mg/dL   POCT GLUCOSE    Collection Time: 05/21/25  7:32 AM   Result Value Ref Range    POCT Glucose  144 (H) 74 - 99 mg/dL   CBC and Auto Differential    Collection Time: 05/21/25  9:23 AM   Result Value Ref Range    WBC 12.0 (H) 4.4 - 11.3 x10*3/uL    nRBC 0.0 0.0 - 0.0 /100 WBCs    RBC 3.47 (L) 4.00 - 5.20 x10*6/uL    Hemoglobin 10.7 (L) 12.0 - 16.0 g/dL    Hematocrit 33.6 (L) 36.0 - 46.0 %    MCV 97 80 - 100 fL    MCH 30.8 26.0 - 34.0 pg    MCHC 31.8 (L) 32.0 - 36.0 g/dL    RDW 14.6 (H) 11.5 - 14.5 %    Platelets 231 150 - 450 x10*3/uL    Neutrophils % 92.2 40.0 - 80.0 %    Immature Granulocytes %, Automated 0.6 0.0 - 0.9 %    Lymphocytes % 1.2 13.0 - 44.0 %    Monocytes % 5.8 2.0 - 10.0 %    Eosinophils % 0.1 0.0 - 6.0 %    Basophils % 0.1 0.0 - 2.0 %    Neutrophils Absolute 11.07 (H) 1.60 - 5.50 x10*3/uL    Immature Granulocytes Absolute, Automated 0.07 0.00 - 0.50 x10*3/uL    Lymphocytes Absolute 0.14 (L) 0.80 - 3.00 x10*3/uL    Monocytes Absolute 0.69 0.05 - 0.80 x10*3/uL    Eosinophils Absolute 0.01 0.00 - 0.40 x10*3/uL    Basophils Absolute 0.01 0.00 - 0.10 x10*3/uL   POCT GLUCOSE    Collection Time: 05/21/25 11:31 AM   Result Value Ref Range    POCT Glucose 143 (H) 74 - 99 mg/dL   POCT GLUCOSE    Collection Time: 05/21/25  4:05 PM   Result Value Ref Range    POCT Glucose 195 (H) 74 - 99 mg/dL   POCT GLUCOSE    Collection Time: 05/21/25  7:23 PM   Result Value Ref Range    POCT Glucose 132 (H) 74 - 99 mg/dL   Renal function panel    Collection Time: 05/22/25  3:33 AM   Result Value Ref Range    Glucose 140 (H) 74 - 99 mg/dL    Sodium 133 (L) 136 - 145 mmol/L    Potassium 2.9 (LL) 3.5 - 5.3 mmol/L    Chloride 79 (L) 98 - 107 mmol/L    Bicarbonate >45 (HH) 21 - 32 mmol/L    Anion Gap      Urea Nitrogen 8 6 - 23 mg/dL    Creatinine 0.32 (L) 0.50 - 1.05 mg/dL    eGFR >90 >60 mL/min/1.73m*2    Calcium 8.6 8.6 - 10.3 mg/dL    Phosphorus 1.4 (L) 2.5 - 4.9 mg/dL    Albumin 2.9 (L) 3.4 - 5.0 g/dL   CBC    Collection Time: 05/22/25  3:33 AM   Result Value Ref Range    WBC 11.6 (H) 4.4 - 11.3 x10*3/uL    nRBC 0.0 0.0  - 0.0 /100 WBCs    RBC 4.04 4.00 - 5.20 x10*6/uL    Hemoglobin 12.5 12.0 - 16.0 g/dL    Hematocrit 39.5 36.0 - 46.0 %    MCV 98 80 - 100 fL    MCH 30.9 26.0 - 34.0 pg    MCHC 31.6 (L) 32.0 - 36.0 g/dL    RDW 14.5 11.5 - 14.5 %    Platelets 259 150 - 450 x10*3/uL   Magnesium    Collection Time: 05/22/25  3:33 AM   Result Value Ref Range    Magnesium 1.43 (L) 1.60 - 2.40 mg/dL   POCT GLUCOSE    Collection Time: 05/22/25  7:38 AM   Result Value Ref Range    POCT Glucose 146 (H) 74 - 99 mg/dL   Blood Gas Venous Full Panel    Collection Time: 05/22/25 11:01 AM   Result Value Ref Range    POCT pH, Venous 7.49 (H) 7.33 - 7.43 pH    POCT pCO2, Venous 69 (H) 41 - 51 mm Hg    POCT pO2, Venous 40 35 - 45 mm Hg    POCT SO2, Venous 69 45 - 75 %    POCT Oxy Hemoglobin, Venous 67.3 45.0 - 75.0 %    POCT Hematocrit Calculated, Venous 39.0 36.0 - 46.0 %    POCT Sodium, Venous 126 (L) 136 - 145 mmol/L    POCT Potassium, Venous 4.3 3.5 - 5.3 mmol/L    POCT Chloride, Venous 85 (L) 98 - 107 mmol/L    POCT Ionized Calicum, Venous 1.14 1.10 - 1.33 mmol/L    POCT Glucose, Venous 181 (H) 74 - 99 mg/dL    POCT Lactate, Venous 1.6 0.4 - 2.0 mmol/L    POCT Base Excess, Venous 24.7 (H) -2.0 - 3.0 mmol/L    POCT HCO3 Calculated, Venous 52.6 (H) 22.0 - 26.0 mmol/L    POCT Hemoglobin, Venous 12.9 12.0 - 16.0 g/dL    POCT Anion Gap, Venous -7.0 (L) 10.0 - 25.0 mmol/L    Patient Temperature 37.0 degrees Celsius    FiO2 44 %   POCT GLUCOSE    Collection Time: 05/22/25 11:07 AM   Result Value Ref Range    POCT Glucose 178 (H) 74 - 99 mg/dL   Renal function panel    Collection Time: 05/22/25  4:15 PM   Result Value Ref Range    Glucose 95 74 - 99 mg/dL    Sodium 131 (L) 136 - 145 mmol/L    Potassium 4.4 3.5 - 5.3 mmol/L    Chloride 83 (L) 98 - 107 mmol/L    Bicarbonate 43 (HH) 21 - 32 mmol/L    Anion Gap 9 (L) 10 - 20 mmol/L    Urea Nitrogen 14 6 - 23 mg/dL    Creatinine 0.28 (L) 0.50 - 1.05 mg/dL    eGFR >90 >60 mL/min/1.73m*2    Calcium 8.5 (L) 8.6 -  10.3 mg/dL    Phosphorus 4.7 2.5 - 4.9 mg/dL    Albumin 2.6 (L) 3.4 - 5.0 g/dL   Magnesium    Collection Time: 05/22/25  4:15 PM   Result Value Ref Range    Magnesium 1.99 1.60 - 2.40 mg/dL   POCT GLUCOSE    Collection Time: 05/22/25  4:20 PM   Result Value Ref Range    POCT Glucose 99 74 - 99 mg/dL   POCT GLUCOSE    Collection Time: 05/22/25  8:59 PM   Result Value Ref Range    POCT Glucose 138 (H) 74 - 99 mg/dL   Magnesium    Collection Time: 05/23/25  4:39 AM   Result Value Ref Range    Magnesium 1.84 1.60 - 2.40 mg/dL   CBC and Auto Differential    Collection Time: 05/23/25  4:39 AM   Result Value Ref Range    WBC 8.3 4.4 - 11.3 x10*3/uL    nRBC 0.0 0.0 - 0.0 /100 WBCs    RBC 3.72 (L) 4.00 - 5.20 x10*6/uL    Hemoglobin 11.7 (L) 12.0 - 16.0 g/dL    Hematocrit 37.5 36.0 - 46.0 %     (H) 80 - 100 fL    MCH 31.5 26.0 - 34.0 pg    MCHC 31.2 (L) 32.0 - 36.0 g/dL    RDW 14.6 (H) 11.5 - 14.5 %    Platelets 192 150 - 450 x10*3/uL    Neutrophils % 90.0 40.0 - 80.0 %    Immature Granulocytes %, Automated 1.2 (H) 0.0 - 0.9 %    Lymphocytes % 1.2 13.0 - 44.0 %    Monocytes % 7.1 2.0 - 10.0 %    Eosinophils % 0.4 0.0 - 6.0 %    Basophils % 0.1 0.0 - 2.0 %    Neutrophils Absolute 7.49 (H) 1.60 - 5.50 x10*3/uL    Immature Granulocytes Absolute, Automated 0.10 0.00 - 0.50 x10*3/uL    Lymphocytes Absolute 0.10 (L) 0.80 - 3.00 x10*3/uL    Monocytes Absolute 0.59 0.05 - 0.80 x10*3/uL    Eosinophils Absolute 0.03 0.00 - 0.40 x10*3/uL    Basophils Absolute 0.01 0.00 - 0.10 x10*3/uL   Renal function panel    Collection Time: 05/23/25  4:39 AM   Result Value Ref Range    Glucose 106 (H) 74 - 99 mg/dL    Sodium 134 (L) 136 - 145 mmol/L    Potassium 3.0 (L) 3.5 - 5.3 mmol/L    Chloride 86 (L) 98 - 107 mmol/L    Bicarbonate 44 (HH) 21 - 32 mmol/L    Anion Gap 7 (L) 10 - 20 mmol/L    Urea Nitrogen 11 6 - 23 mg/dL    Creatinine 0.27 (L) 0.50 - 1.05 mg/dL    eGFR >90 >60 mL/min/1.73m*2    Calcium 8.8 8.6 - 10.3 mg/dL    Phosphorus 3.4  2.5 - 4.9 mg/dL    Albumin 2.5 (L) 3.4 - 5.0 g/dL   POCT GLUCOSE    Collection Time: 05/23/25  7:37 AM   Result Value Ref Range    POCT Glucose 84 74 - 99 mg/dL   POCT GLUCOSE    Collection Time: 05/23/25 11:26 AM   Result Value Ref Range    POCT Glucose 140 (H) 74 - 99 mg/dL     Critical Care Time: 35 minutes excluding time spent performing procedures, treating other patients, and teaching time.    Critical Concern/Vital Organ System Affected: Respiratory, skin    Critical Intervention: Initiation of IV antibiotics, frequent reassessments, resuscitation.    Please see S[facility] note for further details    Sections of this report were created using voice-to-text technology and may contain errors in translation    Augustine Winchester DO  Emergency Medicine       Augustine Winchester DO  05/23/25 [id number]

## 2025-05-19 ENCOUNTER — APPOINTMENT (OUTPATIENT)
Dept: RADIOLOGY | Facility: HOSPITAL | Age: 80
DRG: 177 | End: 2025-05-19
Payer: MEDICARE

## 2025-05-19 PROBLEM — J96.01 ACUTE HYPOXEMIC RESPIRATORY FAILURE: Status: ACTIVE | Noted: 2025-05-19

## 2025-05-19 LAB
ALBUMIN SERPL BCP-MCNC: 2.8 G/DL (ref 3.4–5)
ALP SERPL-CCNC: 102 U/L (ref 33–136)
ALT SERPL W P-5'-P-CCNC: 14 U/L (ref 7–45)
ANION GAP BLDA CALCULATED.4IONS-SCNC: -3 MMO/L (ref 10–25)
ANION GAP SERPL CALCULATED.3IONS-SCNC: 11 MMOL/L (ref 10–20)
APPARATUS: ABNORMAL
ARTERIAL PATENCY WRIST A: POSITIVE
AST SERPL W P-5'-P-CCNC: 21 U/L (ref 9–39)
BASE EXCESS BLDA CALC-SCNC: 18 MMOL/L (ref -2–3)
BILIRUB SERPL-MCNC: 0.7 MG/DL (ref 0–1.2)
BODY TEMPERATURE: 37 DEGREES CELSIUS
BUN SERPL-MCNC: 11 MG/DL (ref 6–23)
CA-I BLDA-SCNC: 1.11 MMOL/L (ref 1.1–1.33)
CALCIUM SERPL-MCNC: 8.4 MG/DL (ref 8.6–10.3)
CHLORIDE BLDA-SCNC: 79 MMOL/L (ref 98–107)
CHLORIDE SERPL-SCNC: 77 MMOL/L (ref 98–107)
CO2 SERPL-SCNC: 38 MMOL/L (ref 21–32)
CREAT SERPL-MCNC: 0.3 MG/DL (ref 0.5–1.05)
EGFRCR SERPLBLD CKD-EPI 2021: >90 ML/MIN/1.73M*2
ERYTHROCYTE [DISTWIDTH] IN BLOOD BY AUTOMATED COUNT: 14.2 % (ref 11.5–14.5)
FLOW: 40 LPM
GLUCOSE BLD MANUAL STRIP-MCNC: 78 MG/DL (ref 74–99)
GLUCOSE BLD MANUAL STRIP-MCNC: 89 MG/DL (ref 74–99)
GLUCOSE BLD MANUAL STRIP-MCNC: 92 MG/DL (ref 74–99)
GLUCOSE BLDA-MCNC: 97 MG/DL (ref 74–99)
GLUCOSE SERPL-MCNC: 96 MG/DL (ref 74–99)
HCO3 BLDA-SCNC: 43.9 MMOL/L (ref 22–26)
HCT VFR BLD AUTO: 36.7 % (ref 36–46)
HCT VFR BLD EST: 38 % (ref 36–46)
HGB BLD-MCNC: 12.2 G/DL (ref 12–16)
HGB BLDA-MCNC: 12.7 G/DL (ref 12–16)
INHALED O2 CONCENTRATION: 100 %
LACTATE BLDA-SCNC: 2.3 MMOL/L (ref 0.4–2)
MCH RBC QN AUTO: 31.3 PG (ref 26–34)
MCHC RBC AUTO-ENTMCNC: 33.2 G/DL (ref 32–36)
MCV RBC AUTO: 94 FL (ref 80–100)
NRBC BLD-RTO: 0 /100 WBCS (ref 0–0)
OXYHGB MFR BLDA: 86.5 % (ref 94–98)
PCO2 BLDA: 55 MM HG (ref 38–42)
PH BLDA: 7.51 PH (ref 7.38–7.42)
PLATELET # BLD AUTO: 268 X10*3/UL (ref 150–450)
PO2 BLDA: 56 MM HG (ref 85–95)
POTASSIUM BLDA-SCNC: 3.8 MMOL/L (ref 3.5–5.3)
POTASSIUM SERPL-SCNC: 3.6 MMOL/L (ref 3.5–5.3)
PROT SERPL-MCNC: 6 G/DL (ref 6.4–8.2)
RBC # BLD AUTO: 3.9 X10*6/UL (ref 4–5.2)
SAO2 % BLDA: 90 % (ref 94–100)
SODIUM BLDA-SCNC: 116 MMOL/L (ref 136–145)
SODIUM SERPL-SCNC: 122 MMOL/L (ref 136–145)
SPECIMEN DRAWN FROM PATIENT: ABNORMAL
VANCOMYCIN SERPL-MCNC: 8 UG/ML (ref 5–20)
WBC # BLD AUTO: 14.7 X10*3/UL (ref 4.4–11.3)

## 2025-05-19 PROCEDURE — 2500000005 HC RX 250 GENERAL PHARMACY W/O HCPCS: Performed by: INTERNAL MEDICINE

## 2025-05-19 PROCEDURE — 94640 AIRWAY INHALATION TREATMENT: CPT

## 2025-05-19 PROCEDURE — 2500000004 HC RX 250 GENERAL PHARMACY W/ HCPCS (ALT 636 FOR OP/ED): Performed by: NURSE PRACTITIONER

## 2025-05-19 PROCEDURE — 94799 UNLISTED PULMONARY SVC/PX: CPT

## 2025-05-19 PROCEDURE — 5A0955A ASSISTANCE WITH RESPIRATORY VENTILATION, GREATER THAN 96 CONSECUTIVE HOURS, HIGH NASAL FLOW/VELOCITY: ICD-10-PCS | Performed by: INTERNAL MEDICINE

## 2025-05-19 PROCEDURE — 2500000005 HC RX 250 GENERAL PHARMACY W/O HCPCS: Performed by: NURSE PRACTITIONER

## 2025-05-19 PROCEDURE — 87385 HISTOPLASMA CAPSUL AG IA: CPT | Performed by: INTERNAL MEDICINE

## 2025-05-19 PROCEDURE — 70450 CT HEAD/BRAIN W/O DYE: CPT

## 2025-05-19 PROCEDURE — 82947 ASSAY GLUCOSE BLOOD QUANT: CPT

## 2025-05-19 PROCEDURE — 36600 WITHDRAWAL OF ARTERIAL BLOOD: CPT

## 2025-05-19 PROCEDURE — 99291 CRITICAL CARE FIRST HOUR: CPT | Performed by: NURSE PRACTITIONER

## 2025-05-19 PROCEDURE — 9420000001 HC RT PATIENT EDUCATION 5 MIN

## 2025-05-19 PROCEDURE — 2500000002 HC RX 250 W HCPCS SELF ADMINISTERED DRUGS (ALT 637 FOR MEDICARE OP, ALT 636 FOR OP/ED): Performed by: INTERNAL MEDICINE

## 2025-05-19 PROCEDURE — 2500000005 HC RX 250 GENERAL PHARMACY W/O HCPCS: Performed by: STUDENT IN AN ORGANIZED HEALTH CARE EDUCATION/TRAINING PROGRAM

## 2025-05-19 PROCEDURE — 2020000001 HC ICU ROOM DAILY

## 2025-05-19 PROCEDURE — 85027 COMPLETE CBC AUTOMATED: CPT | Performed by: INTERNAL MEDICINE

## 2025-05-19 PROCEDURE — 80053 COMPREHEN METABOLIC PANEL: CPT | Performed by: INTERNAL MEDICINE

## 2025-05-19 PROCEDURE — 2500000004 HC RX 250 GENERAL PHARMACY W/ HCPCS (ALT 636 FOR OP/ED): Performed by: INTERNAL MEDICINE

## 2025-05-19 PROCEDURE — 80202 ASSAY OF VANCOMYCIN: CPT | Performed by: INTERNAL MEDICINE

## 2025-05-19 PROCEDURE — 2500000001 HC RX 250 WO HCPCS SELF ADMINISTERED DRUGS (ALT 637 FOR MEDICARE OP): Performed by: INTERNAL MEDICINE

## 2025-05-19 PROCEDURE — 99223 1ST HOSP IP/OBS HIGH 75: CPT

## 2025-05-19 PROCEDURE — 84132 ASSAY OF SERUM POTASSIUM: CPT | Performed by: INTERNAL MEDICINE

## 2025-05-19 RX ORDER — LIDOCAINE 560 MG/1
1 PATCH PERCUTANEOUS; TOPICAL; TRANSDERMAL DAILY
Status: DISCONTINUED | OUTPATIENT
Start: 2025-05-19 | End: 2025-05-19

## 2025-05-19 RX ORDER — SUCRALFATE 1 G/10ML
1 SUSPENSION ORAL EVERY 6 HOURS SCHEDULED
Status: DISCONTINUED | OUTPATIENT
Start: 2025-05-19 | End: 2025-06-02 | Stop reason: HOSPADM

## 2025-05-19 RX ORDER — LIDOCAINE 560 MG/1
1 PATCH PERCUTANEOUS; TOPICAL; TRANSDERMAL NIGHTLY
Status: DISCONTINUED | OUTPATIENT
Start: 2025-05-19 | End: 2025-05-20

## 2025-05-19 RX ORDER — ACETAMINOPHEN 325 MG/1
650 TABLET ORAL EVERY 4 HOURS PRN
Status: DISCONTINUED | OUTPATIENT
Start: 2025-05-19 | End: 2025-05-20

## 2025-05-19 RX ORDER — PANTOPRAZOLE SODIUM 40 MG/1
40 TABLET, DELAYED RELEASE ORAL
Status: DISCONTINUED | OUTPATIENT
Start: 2025-05-19 | End: 2025-06-02 | Stop reason: HOSPADM

## 2025-05-19 RX ORDER — ENOXAPARIN SODIUM 100 MG/ML
40 INJECTION SUBCUTANEOUS DAILY
Status: DISCONTINUED | OUTPATIENT
Start: 2025-05-19 | End: 2025-05-22

## 2025-05-19 RX ORDER — GUAIFENESIN/DEXTROMETHORPHAN 100-10MG/5
5 SYRUP ORAL EVERY 4 HOURS PRN
Status: DISCONTINUED | OUTPATIENT
Start: 2025-05-19 | End: 2025-06-02 | Stop reason: HOSPADM

## 2025-05-19 RX ORDER — PROCHLORPERAZINE MALEATE 10 MG
10 TABLET ORAL EVERY 6 HOURS PRN
Status: DISCONTINUED | OUTPATIENT
Start: 2025-05-19 | End: 2025-05-19

## 2025-05-19 RX ORDER — GUAIFENESIN 600 MG/1
600 TABLET, EXTENDED RELEASE ORAL EVERY 12 HOURS PRN
Status: DISCONTINUED | OUTPATIENT
Start: 2025-05-19 | End: 2025-06-02 | Stop reason: HOSPADM

## 2025-05-19 RX ORDER — VANCOMYCIN HYDROCHLORIDE 1 G/20ML
INJECTION, POWDER, LYOPHILIZED, FOR SOLUTION INTRAVENOUS DAILY PRN
Status: DISCONTINUED | OUTPATIENT
Start: 2025-05-19 | End: 2025-05-19

## 2025-05-19 RX ORDER — GUAIFENESIN 600 MG/1
600 TABLET, EXTENDED RELEASE ORAL 2 TIMES DAILY PRN
Status: DISCONTINUED | OUTPATIENT
Start: 2025-05-19 | End: 2025-06-02 | Stop reason: HOSPADM

## 2025-05-19 RX ORDER — ACETAMINOPHEN 650 MG/1
650 SUPPOSITORY RECTAL EVERY 4 HOURS PRN
Status: DISCONTINUED | OUTPATIENT
Start: 2025-05-19 | End: 2025-05-20

## 2025-05-19 RX ORDER — HEPARIN SODIUM 5000 [USP'U]/ML
5000 INJECTION, SOLUTION INTRAVENOUS; SUBCUTANEOUS EVERY 8 HOURS
Status: DISCONTINUED | OUTPATIENT
Start: 2025-05-19 | End: 2025-05-19

## 2025-05-19 RX ORDER — ONDANSETRON 4 MG/1
8 TABLET, FILM COATED ORAL EVERY 8 HOURS PRN
Status: DISCONTINUED | OUTPATIENT
Start: 2025-05-19 | End: 2025-06-02 | Stop reason: HOSPADM

## 2025-05-19 RX ORDER — POLYETHYLENE GLYCOL 3350 17 G/17G
17 POWDER, FOR SOLUTION ORAL DAILY PRN
Status: DISCONTINUED | OUTPATIENT
Start: 2025-05-19 | End: 2025-06-02 | Stop reason: HOSPADM

## 2025-05-19 RX ORDER — ACETAMINOPHEN 325 MG/1
325 TABLET ORAL EVERY 6 HOURS PRN
Status: DISCONTINUED | OUTPATIENT
Start: 2025-05-19 | End: 2025-05-19 | Stop reason: SDUPTHER

## 2025-05-19 RX ORDER — AZITHROMYCIN 250 MG/1
500 TABLET, FILM COATED ORAL DAILY
Status: ACTIVE | OUTPATIENT
Start: 2025-05-19 | End: 2025-05-20

## 2025-05-19 RX ORDER — PROCHLORPERAZINE MALEATE 10 MG
10 TABLET ORAL EVERY 6 HOURS PRN
Status: DISCONTINUED | OUTPATIENT
Start: 2025-05-19 | End: 2025-06-02 | Stop reason: HOSPADM

## 2025-05-19 RX ORDER — MUPIROCIN 20 MG/G
1 OINTMENT TOPICAL 2 TIMES DAILY
Status: DISCONTINUED | OUTPATIENT
Start: 2025-05-19 | End: 2025-06-02 | Stop reason: HOSPADM

## 2025-05-19 RX ORDER — GUAIFENESIN 600 MG/1
600 TABLET, EXTENDED RELEASE ORAL 2 TIMES DAILY PRN
Status: DISCONTINUED | OUTPATIENT
Start: 2025-05-19 | End: 2025-05-19

## 2025-05-19 RX ORDER — BACLOFEN 10 MG/1
10 TABLET ORAL 3 TIMES DAILY
Status: DISCONTINUED | OUTPATIENT
Start: 2025-05-19 | End: 2025-06-02 | Stop reason: HOSPADM

## 2025-05-19 RX ORDER — LORAZEPAM 2 MG/ML
0.5 INJECTION INTRAMUSCULAR ONCE
Status: COMPLETED | OUTPATIENT
Start: 2025-05-19 | End: 2025-05-19

## 2025-05-19 RX ORDER — PREDNISONE 20 MG/1
40 TABLET ORAL DAILY
Status: DISCONTINUED | OUTPATIENT
Start: 2025-05-19 | End: 2025-05-19

## 2025-05-19 RX ORDER — BUTALBITAL, ACETAMINOPHEN AND CAFFEINE 50; 325; 40 MG/1; MG/1; MG/1
1 TABLET ORAL EVERY 6 HOURS PRN
Status: DISCONTINUED | OUTPATIENT
Start: 2025-05-19 | End: 2025-06-02 | Stop reason: HOSPADM

## 2025-05-19 RX ORDER — AZITHROMYCIN 250 MG/1
250 TABLET, FILM COATED ORAL DAILY
Status: COMPLETED | OUTPATIENT
Start: 2025-05-20 | End: 2025-05-23

## 2025-05-19 RX ORDER — ACETAMINOPHEN 160 MG/5ML
650 SOLUTION ORAL EVERY 4 HOURS PRN
Status: DISCONTINUED | OUTPATIENT
Start: 2025-05-19 | End: 2025-05-20

## 2025-05-19 RX ORDER — VANCOMYCIN 1.25 G/250ML
1750 INJECTION, SOLUTION INTRAVENOUS EVERY 24 HOURS
Status: DISCONTINUED | OUTPATIENT
Start: 2025-05-19 | End: 2025-05-19 | Stop reason: CLARIF

## 2025-05-19 RX ORDER — IPRATROPIUM BROMIDE AND ALBUTEROL SULFATE 2.5; .5 MG/3ML; MG/3ML
3 SOLUTION RESPIRATORY (INHALATION)
Status: DISCONTINUED | OUTPATIENT
Start: 2025-05-19 | End: 2025-05-24

## 2025-05-19 RX ADMIN — LORAZEPAM 0.5 MG: 2 INJECTION INTRAMUSCULAR; INTRAVENOUS at 18:35

## 2025-05-19 RX ADMIN — SUCRALFATE ORAL SUSPENSION 1 G: 1 SUSPENSION ORAL at 23:36

## 2025-05-19 RX ADMIN — PIPERACILLIN SODIUM AND TAZOBACTAM SODIUM 4.5 G: 4; .5 INJECTION, SOLUTION INTRAVENOUS at 18:35

## 2025-05-19 RX ADMIN — PANTOPRAZOLE SODIUM 40 MG: 40 TABLET, DELAYED RELEASE ORAL at 06:44

## 2025-05-19 RX ADMIN — Medication 40 L/MIN: at 12:36

## 2025-05-19 RX ADMIN — Medication 40 L/MIN: at 08:09

## 2025-05-19 RX ADMIN — PIPERACILLIN SODIUM AND TAZOBACTAM SODIUM 4.5 G: 4; .5 INJECTION, SOLUTION INTRAVENOUS at 06:44

## 2025-05-19 RX ADMIN — Medication 40 L/MIN: at 16:04

## 2025-05-19 RX ADMIN — BUTALBITAL, ACETAMINOPHEN AND CAFFEINE 1 TABLET: 325; 50; 40 TABLET ORAL at 21:48

## 2025-05-19 RX ADMIN — LIDOCAINE 4% 1 PATCH: 40 PATCH TOPICAL at 22:56

## 2025-05-19 RX ADMIN — SUCRALFATE ORAL SUSPENSION 1 G: 1 SUSPENSION ORAL at 06:44

## 2025-05-19 RX ADMIN — SUCRALFATE ORAL SUSPENSION 1 G: 1 SUSPENSION ORAL at 01:03

## 2025-05-19 RX ADMIN — IPRATROPIUM BROMIDE AND ALBUTEROL SULFATE 3 ML: 2.5; .5 SOLUTION RESPIRATORY (INHALATION) at 12:34

## 2025-05-19 RX ADMIN — Medication 100 PERCENT: at 19:14

## 2025-05-19 RX ADMIN — Medication 100 PERCENT: at 06:22

## 2025-05-19 RX ADMIN — IPRATROPIUM BROMIDE AND ALBUTEROL SULFATE 3 ML: 2.5; .5 SOLUTION RESPIRATORY (INHALATION) at 06:44

## 2025-05-19 RX ADMIN — PIPERACILLIN SODIUM AND TAZOBACTAM SODIUM 4.5 G: 4; .5 INJECTION, SOLUTION INTRAVENOUS at 13:55

## 2025-05-19 RX ADMIN — METHYLPREDNISOLONE SODIUM SUCCINATE 40 MG: 40 INJECTION, POWDER, FOR SOLUTION INTRAMUSCULAR; INTRAVENOUS at 15:52

## 2025-05-19 RX ADMIN — IPRATROPIUM BROMIDE AND ALBUTEROL SULFATE 3 ML: 2.5; .5 SOLUTION RESPIRATORY (INHALATION) at 01:04

## 2025-05-19 RX ADMIN — HEPARIN SODIUM 5000 UNITS: 5000 INJECTION, SOLUTION INTRAVENOUS; SUBCUTANEOUS at 06:44

## 2025-05-19 RX ADMIN — BACLOFEN 10 MG: 10 TABLET ORAL at 21:48

## 2025-05-19 RX ADMIN — PIPERACILLIN SODIUM AND TAZOBACTAM SODIUM 4.5 G: 4; .5 INJECTION, SOLUTION INTRAVENOUS at 01:03

## 2025-05-19 RX ADMIN — IPRATROPIUM BROMIDE AND ALBUTEROL SULFATE 3 ML: 2.5; .5 SOLUTION RESPIRATORY (INHALATION) at 19:11

## 2025-05-19 SDOH — HEALTH STABILITY: MENTAL HEALTH: HOW OFTEN DO YOU HAVE A DRINK CONTAINING ALCOHOL?: NEVER

## 2025-05-19 SDOH — ECONOMIC STABILITY: FOOD INSECURITY: WITHIN THE PAST 12 MONTHS, YOU WORRIED THAT YOUR FOOD WOULD RUN OUT BEFORE YOU GOT THE MONEY TO BUY MORE.: NEVER TRUE

## 2025-05-19 SDOH — HEALTH STABILITY: MENTAL HEALTH
DO YOU FEEL STRESS - TENSE, RESTLESS, NERVOUS, OR ANXIOUS, OR UNABLE TO SLEEP AT NIGHT BECAUSE YOUR MIND IS TROUBLED ALL THE TIME - THESE DAYS?: ONLY A LITTLE

## 2025-05-19 SDOH — SOCIAL STABILITY: SOCIAL INSECURITY: WITHIN THE LAST YEAR, HAVE YOU BEEN HUMILIATED OR EMOTIONALLY ABUSED IN OTHER WAYS BY YOUR PARTNER OR EX-PARTNER?: NO

## 2025-05-19 SDOH — SOCIAL STABILITY: SOCIAL NETWORK: HOW OFTEN DO YOU ATTEND CHURCH OR RELIGIOUS SERVICES?: NEVER

## 2025-05-19 SDOH — ECONOMIC STABILITY: HOUSING INSECURITY: IN THE PAST 12 MONTHS, HOW MANY TIMES HAVE YOU MOVED WHERE YOU WERE LIVING?: 0

## 2025-05-19 SDOH — ECONOMIC STABILITY: FOOD INSECURITY: HOW HARD IS IT FOR YOU TO PAY FOR THE VERY BASICS LIKE FOOD, HOUSING, MEDICAL CARE, AND HEATING?: NOT HARD AT ALL

## 2025-05-19 SDOH — ECONOMIC STABILITY: HOUSING INSECURITY: AT ANY TIME IN THE PAST 12 MONTHS, WERE YOU HOMELESS OR LIVING IN A SHELTER (INCLUDING NOW)?: NO

## 2025-05-19 SDOH — SOCIAL STABILITY: SOCIAL INSECURITY: WITHIN THE LAST YEAR, HAVE YOU BEEN AFRAID OF YOUR PARTNER OR EX-PARTNER?: NO

## 2025-05-19 SDOH — HEALTH STABILITY: PHYSICAL HEALTH: ON AVERAGE, HOW MANY MINUTES DO YOU ENGAGE IN EXERCISE AT THIS LEVEL?: 0 MIN

## 2025-05-19 SDOH — SOCIAL STABILITY: SOCIAL NETWORK: HOW OFTEN DO YOU GET TOGETHER WITH FRIENDS OR RELATIVES?: ONCE A WEEK

## 2025-05-19 SDOH — ECONOMIC STABILITY: INCOME INSECURITY: IN THE PAST 12 MONTHS HAS THE ELECTRIC, GAS, OIL, OR WATER COMPANY THREATENED TO SHUT OFF SERVICES IN YOUR HOME?: NO

## 2025-05-19 SDOH — ECONOMIC STABILITY: FOOD INSECURITY: WITHIN THE PAST 12 MONTHS, THE FOOD YOU BOUGHT JUST DIDN'T LAST AND YOU DIDN'T HAVE MONEY TO GET MORE.: NEVER TRUE

## 2025-05-19 SDOH — HEALTH STABILITY: MENTAL HEALTH: HOW OFTEN DO YOU HAVE SIX OR MORE DRINKS ON ONE OCCASION?: NEVER

## 2025-05-19 SDOH — SOCIAL STABILITY: SOCIAL INSECURITY: DOES ANYONE TRY TO KEEP YOU FROM HAVING/CONTACTING OTHER FRIENDS OR DOING THINGS OUTSIDE YOUR HOME?: NO

## 2025-05-19 SDOH — HEALTH STABILITY: MENTAL HEALTH: HOW MANY DRINKS CONTAINING ALCOHOL DO YOU HAVE ON A TYPICAL DAY WHEN YOU ARE DRINKING?: PATIENT DOES NOT DRINK

## 2025-05-19 SDOH — SOCIAL STABILITY: SOCIAL INSECURITY: ARE YOU OR HAVE YOU BEEN THREATENED OR ABUSED PHYSICALLY, EMOTIONALLY, OR SEXUALLY BY ANYONE?: NO

## 2025-05-19 SDOH — SOCIAL STABILITY: SOCIAL INSECURITY: ABUSE: ADULT

## 2025-05-19 SDOH — SOCIAL STABILITY: SOCIAL INSECURITY: DO YOU FEEL ANYONE HAS EXPLOITED OR TAKEN ADVANTAGE OF YOU FINANCIALLY OR OF YOUR PERSONAL PROPERTY?: NO

## 2025-05-19 SDOH — HEALTH STABILITY: PHYSICAL HEALTH: ON AVERAGE, HOW MANY DAYS PER WEEK DO YOU ENGAGE IN MODERATE TO STRENUOUS EXERCISE (LIKE A BRISK WALK)?: 0 DAYS

## 2025-05-19 SDOH — SOCIAL STABILITY: SOCIAL NETWORK: HOW OFTEN DO YOU ATTEND MEETINGS OF THE CLUBS OR ORGANIZATIONS YOU BELONG TO?: NEVER

## 2025-05-19 SDOH — SOCIAL STABILITY: SOCIAL INSECURITY: ARE THERE ANY APPARENT SIGNS OF INJURIES/BEHAVIORS THAT COULD BE RELATED TO ABUSE/NEGLECT?: NO

## 2025-05-19 SDOH — SOCIAL STABILITY: SOCIAL INSECURITY: HAS ANYONE EVER THREATENED TO HURT YOUR FAMILY OR YOUR PETS?: NO

## 2025-05-19 SDOH — SOCIAL STABILITY: SOCIAL INSECURITY: ARE YOU MARRIED, WIDOWED, DIVORCED, SEPARATED, NEVER MARRIED, OR LIVING WITH A PARTNER?: WIDOWED

## 2025-05-19 SDOH — SOCIAL STABILITY: SOCIAL INSECURITY: DO YOU FEEL UNSAFE GOING BACK TO THE PLACE WHERE YOU ARE LIVING?: NO

## 2025-05-19 SDOH — ECONOMIC STABILITY: HOUSING INSECURITY: IN THE LAST 12 MONTHS, WAS THERE A TIME WHEN YOU WERE NOT ABLE TO PAY THE MORTGAGE OR RENT ON TIME?: NO

## 2025-05-19 SDOH — SOCIAL STABILITY: SOCIAL INSECURITY: WERE YOU ABLE TO COMPLETE ALL THE BEHAVIORAL HEALTH SCREENINGS?: YES

## 2025-05-19 SDOH — SOCIAL STABILITY: SOCIAL INSECURITY: HAVE YOU HAD ANY THOUGHTS OF HARMING ANYONE ELSE?: NO

## 2025-05-19 SDOH — SOCIAL STABILITY: SOCIAL INSECURITY: HAVE YOU HAD THOUGHTS OF HARMING ANYONE ELSE?: NO

## 2025-05-19 ASSESSMENT — PAIN SCALES - GENERAL
PAINLEVEL_OUTOF10: 5 - MODERATE PAIN
PAINLEVEL_OUTOF10: 5 - MODERATE PAIN
PAINLEVEL_OUTOF10: 0 - NO PAIN

## 2025-05-19 ASSESSMENT — ENCOUNTER SYMPTOMS
ACTIVITY CHANGE: 1
COUGH: 1
ABDOMINAL PAIN: 0
FATIGUE: 1
ROS GI COMMENTS: POOR APPETITE
LIGHT-HEADEDNESS: 0
SHORTNESS OF BREATH: 1
BACK PAIN: 1

## 2025-05-19 ASSESSMENT — ACTIVITIES OF DAILY LIVING (ADL)
JUDGMENT_ADEQUATE_SAFELY_COMPLETE_DAILY_ACTIVITIES: NO
GROOMING: DEPENDENT
LACK_OF_TRANSPORTATION: PATIENT DECLINED
LACK_OF_TRANSPORTATION: PATIENT UNABLE TO ANSWER
FEEDING YOURSELF: DEPENDENT
BATHING: DEPENDENT
HEARING - LEFT EAR: FUNCTIONAL
PATIENT'S MEMORY ADEQUATE TO SAFELY COMPLETE DAILY ACTIVITIES?: NO
HEARING - RIGHT EAR: FUNCTIONAL
WALKS IN HOME: DEPENDENT
DRESSING YOURSELF: DEPENDENT
TOILETING: DEPENDENT
ADEQUATE_TO_COMPLETE_ADL: NO

## 2025-05-19 ASSESSMENT — LIFESTYLE VARIABLES
HOW OFTEN DO YOU HAVE A DRINK CONTAINING ALCOHOL: NEVER
HOW OFTEN DO YOU HAVE 6 OR MORE DRINKS ON ONE OCCASION: NEVER
AUDIT-C TOTAL SCORE: 0
AUDIT-C TOTAL SCORE: 0
HOW MANY STANDARD DRINKS CONTAINING ALCOHOL DO YOU HAVE ON A TYPICAL DAY: PATIENT DOES NOT DRINK
AUDIT-C TOTAL SCORE: 0
SKIP TO QUESTIONS 9-10: 1
SKIP TO QUESTIONS 9-10: 1

## 2025-05-19 ASSESSMENT — PAIN - FUNCTIONAL ASSESSMENT
PAIN_FUNCTIONAL_ASSESSMENT: 0-10

## 2025-05-19 ASSESSMENT — COGNITIVE AND FUNCTIONAL STATUS - GENERAL: PATIENT BASELINE BEDBOUND: YES

## 2025-05-19 NOTE — ASSESSMENT & PLAN NOTE
Bilateral pleural effusion  Acute on chronic respiratory failure  COPD exacerbation  History of lung cancer  Severe protein calorie malnutrition  Hyponatremia  Leukocytosis  Sacral and coccygeal decubitus ulcer  Leukocytosis    Plan: The patient was admitted to complaint of shortness of breath and respiratory failure.  The patient has deteriorated.  Patient has bilateral pleural effusion indicating acute on chronic diastolic congestive heart failure.  Patient also has a developing right lower lobe pneumonia.  Patient could have aspiration pneumonia.  Patient has leukocytosis now with a white cell count of 14.7.  Patient has been started on the broad-spectrum antibiotics.  Check sputum culture and blood culture.  ID and pulmonary consult obtained.  Give aerosol treatment with albuterol and Atrovent.  Patient on high flow oxygen.  Patient is thus desaturating.  The patient's PO2 is 56% per respiratory therapist.  The patient is tachypneic and slowly deteriorating.  The patient will be transferred to ICU for further management.  Ordered to transfer the patient to ICU has been placed and a call has been made to the ICU attending to discuss the case.  Discussed with the daughter of the patient.  Poor prognosis has been explained to her.  The daughter wants the patient to be DNR CCA.  Okay to transfer to the ICU and intubation if needed.

## 2025-05-19 NOTE — PROGRESS NOTES
Music Therapy Note    Therapy Session  Referral Type: New referral this admission  Visit Type: New visit  Session Start Time: 1535  Session End Time: 1536  Intervention Delivery: In-person  Conflict of Service: Declined treatment  Family Present for Session: None     Post-assessment  Total Session Time (min): 1 minutes    Narrative  Assessment Detail: Pt was found lying in bed and appeared overwhelmed, possibly fearful. Pt declined MT services and expressed she was not interested in a f/u.  Follow-up: MT signing off    Education Documentation  No documentation found.

## 2025-05-19 NOTE — PROGRESS NOTES
05/19/25 1519   Physical Activity   On average, how many days per week do you engage in moderate to strenuous exercise (like a brisk walk)? 0 days   On average, how many minutes do you engage in exercise at this level? 0 min   Financial Resource Strain   How hard is it for you to pay for the very basics like food, housing, medical care, and heating? Not hard   Housing Stability   In the last 12 months, was there a time when you were not able to pay the mortgage or rent on time? N   In the past 12 months, how many times have you moved where you were living? 0   At any time in the past 12 months, were you homeless or living in a shelter (including now)? N   Transportation Needs   In the past 12 months, has lack of transportation kept you from medical appointments or from getting medications? Pt Unable   In the past 12 months, has lack of transportation kept you from meetings, work, or from getting things needed for daily living? Pt Unable   Food Insecurity   Within the past 12 months, you worried that your food would run out before you got the money to buy more. Never true   Within the past 12 months, the food you bought just didn't last and you didn't have money to get more. Never true   Stress   Do you feel stress - tense, restless, nervous, or anxious, or unable to sleep at night because your mind is troubled all the time - these days? Only a littl   Social Connections   In a typical week, how many times do you talk on the phone with family, friends, or neighbors? More than 3   How often do you get together with friends or relatives? Once   How often do you attend Latter-day or Zoroastrian services? Never   Do you belong to any clubs or organizations such as Latter-day groups, unions, fraternal or athletic groups, or school groups? No   How often do you attend meetings of the clubs or organizations you belong to? Never   Are you , , , , never , or living with a partner?     Intimate Partner Violence   Within the last year, have you been afraid of your partner or ex-partner? No   Within the last year, have you been humiliated or emotionally abused in other ways by your partner or ex-partner? No   Within the last year, have you been kicked, hit, slapped, or otherwise physically hurt by your partner or ex-partner? No   Within the last year, have you been raped or forced to have any kind of sexual activity by your partner or ex-partner? No   Alcohol Use   Q1: How often do you have a drink containing alcohol? Never   Q2: How many drinks containing alcohol do you have on a typical day when you are drinking? None   Q3: How often do you have six or more drinks on one occasion? Never   Utilities   In the past 12 months has the electric, gas, oil, or water company threatened to shut off services in your home? No   Health Literacy   How often do you need to have someone help you when you read instructions, pamphlets, or other written material from your doctor or pharmacy? Sometimes   Follow-Ups   We make community resources available to all of our patients to assist with everyday needs. We may be able to connect you with those resources. Would you be interested? N

## 2025-05-19 NOTE — CARE PLAN
The patient's goals for the shift include  drink water    The clinical goals for the shift include  patient will maintain HDS      Problem: Skin  Goal: Decreased wound size/increased tissue granulation at next dressing change  Outcome: Progressing  Flowsheets (Taken 5/19/2025 1311)  Decreased wound size/increased tissue granulation at next dressing change:   Protective dressings over bony prominences   Promote sleep for wound healing  Goal: Participates in plan/prevention/treatment measures  Outcome: Progressing  Flowsheets (Taken 5/19/2025 1311)  Participates in plan/prevention/treatment measures:   Elevate heels   Discuss with provider PT/OT consult  Goal: Prevent/manage excess moisture  Outcome: Progressing  Flowsheets (Taken 5/19/2025 1311)  Prevent/manage excess moisture:   Cleanse incontinence/protect with barrier cream   Monitor for/manage infection if present   Follow provider orders for dressing changes  Goal: Prevent/minimize sheer/friction injuries  Outcome: Progressing  Flowsheets (Taken 5/19/2025 1311)  Prevent/minimize sheer/friction injuries:   Complete micro-shifts as needed if patient unable. Adjust patient position to relieve pressure points, not a full turn   Increase activity/out of bed for meals   Use pull sheet   HOB 30 degrees or less   Turn/reposition every 2 hours/use positioning/transfer devices  Goal: Promote/optimize nutrition  Outcome: Progressing  Flowsheets (Taken 5/19/2025 1311)  Promote/optimize nutrition: Discuss with provider if NPO > 2 days  Goal: Promote skin healing  Outcome: Progressing  Flowsheets (Taken 5/19/2025 1311)  Promote skin healing:   Assess skin/pad under line(s)/device(s)   Protective dressings over bony prominences   Turn/reposition every 2 hours/use positioning/transfer devices

## 2025-05-19 NOTE — PROGRESS NOTES
05/19/25 1525   Discharge Planning   Living Arrangements Children  (lives with daughter Karly)   Support Systems Children   Assistance Needed hospital bed needed, resume [facility]   Type of Residence Private residence   Number of Stairs to Enter Residence 2   Number of Stairs Within Residence 0   Do you have animals or pets at home? Yes   Type of Animals or Pets a dog   Who is requesting discharge planning? Provider   Home or Post Acute Services In home services   Type of Home Care Services Home health aide;Home nursing visits;Home OT;Home PT   Expected Discharge Disposition Home Health   Does the patient need discharge transport arranged? No   Patient Choice   Provider Choice list and CMS website (https://medicare.gov/care-compare#search) for post-acute Quality and Resource Measure Data were provided and reviewed with: Patient;Family   Patient / Family choosing to utilize agency / facility established prior to hospitalization Yes   Stroke Family Assessment   Stroke Family Assessment Needed No

## 2025-05-19 NOTE — SIGNIFICANT EVENT
05/19/25 1230   Vitals   Heart Rate (!) 118   Resp 19   BP (!) 175/118   MAP (mmHg) 135   Medical Gas Therapy   SpO2 92 %     Patient arrived to ICU on non rebreather 15 L, 100%. Vitals as shown above. Patient placed on high flow nasal cannula by RT 40 L 100%. States she is in 5/10 pain. Breathing is labored, shallow and is visibly in distress. MD to bedside to evaluate.

## 2025-05-19 NOTE — PROGRESS NOTES
Occupational Therapy                 Therapy Communication Note    Patient Name: Karly Horton  MRN: [id number]  Department: [facility] ED  Room: [place] [address]  Today's Date: 5/19/2025     Discipline: Occupational Therapy    Missed Visit:  Yes    Missed Visit Reason: Other (Comment) (Patient in ED and being admitted to ICU due to respiratory status; pending respiratory status.)    Missed Time: Cancel

## 2025-05-19 NOTE — H&P
History Of Present Illness  Karly Horton is a 80 y.o. female presenting with complaint of shortness of breath.  The patient history of lung cancer and was in the hospital recently with acute respiratory failure.  In the emergency room initial workup was done the patient has been admitted to the floor for further management.  The patient had blood pressure 121/75, pulse 86 respiratory 20 temperature 36.1.  Patient is glucose 104, sodium 129, potassium 3.2, chloride 81, bicarb 42, LFT normal, WBC 9.1, hemoglobin 11.9, CRP 14.44, ESR 81.  Chest x-ray showed bilateral pleural effusion, levoconvex scoliosis and diffuse osteopenia and multiple degenerative changes in the spine.  There was patchy airspace opacity in the right mid and lower lungs concerning for developing multifocal pneumonia.  Patient has cardiomegaly with interstitial prominence suggestive of developing interstitial edema/congestive heart failure.  Bilateral pleural effusion better seen on the CAT scan.  CAT scan of abdominal pelvis showed soft tissue defect overlying the sacrum and coccyx with mild soft tissue stranding.  A tiny locule of air is present in the right gluteal subcutaneous fat, without evidence of discrete disease drainable fluid collection.  Patient had moderate bilateral pleural effusion on the CAT scan.  There was patchy airspace opacities and consolidation in the right lung concerning for developing multifocal pneumonia.  Marked distention of urinary bladder.  The condition of the patient deteriorated in ER.  The patient was started on the high flow oxygen.  Past Medical History  Medical History[1]    Surgical History  Surgical History[2]     Social History  She reports that she has been smoking cigarettes. She started smoking about 65 years ago. She has a 65.4 pack-year smoking history. She has been exposed to tobacco smoke. She does not have any smokeless tobacco history on file. She reports that she does not currently use  alcohol. She reports current drug use. Drug: Marijuana.    Family History  Family History[3]     Allergies  Morphine and Spice flavor    Review of Systems  I reviewed all systems reviewed as above otherwise is negative.    General physical examination: Patient is cachectic, debilitated, poorly nourished and ill looking female lying in the bed.  She was very anxious.  She was on high flow oxygen.    Physical Exam  HEENT:  Head externally atraumatic, no pallor, no icterus, extraocular movements intact, pupils reactive to light, oral mucosa moist and throat clear.  Neck:  Supple, no JVP, no palpable adenopathy or thyromegaly.  No carotid bruit.  Chest: Bilateral crackles and decreased breath sounds  Heart:  Regular rate and rhythm, no murmur or gallop could be appreciated.  Abdomen:  Soft, nontender, bowel sounds present, normoactive, no palpable hepatosplenomegaly.  Extremities:  No edema, pulses present, no cyanosis or clubbing.  CNS:  Patient alert, oriented to time, place and person.  Power 5/5 all over and deep tendon reflexes symmetrical, cranial nerves 2-12 grossly intact.  Skin:  No active rash.  Musculoskeletal:  No joint swelling or erythema, range of movement normal.  Last Recorded Vitals  Heart Rate:  []   Temperature:  [36.1 °C (97 °F)]   Respirations:  [22-30]   BP: (121-154)/(74-96)   Pulse Ox:  [88 %-94 %]       Relevant Results        Results for orders placed or performed during the hospital encounter of 05/18/25 (from the past 24 hours)   CBC and Auto Differential   Result Value Ref Range    WBC 9.1 4.4 - 11.3 x10*3/uL    nRBC 0.0 0.0 - 0.0 /100 WBCs    RBC 3.79 (L) 4.00 - 5.20 x10*6/uL    Hemoglobin 11.9 (L) 12.0 - 16.0 g/dL    Hematocrit 37.3 36.0 - 46.0 %    MCV 98 80 - 100 fL    MCH 31.4 26.0 - 34.0 pg    MCHC 31.9 (L) 32.0 - 36.0 g/dL    RDW 14.2 11.5 - 14.5 %    Platelets 253 150 - 450 x10*3/uL    Neutrophils % 88.5 40.0 - 80.0 %    Immature Granulocytes %, Automated 0.8 0.0 - 0.9 %     Lymphocytes % 2.2 13.0 - 44.0 %    Monocytes % 8.1 2.0 - 10.0 %    Eosinophils % 0.3 0.0 - 6.0 %    Basophils % 0.1 0.0 - 2.0 %    Neutrophils Absolute 8.04 (H) 1.60 - 5.50 x10*3/uL    Immature Granulocytes Absolute, Automated 0.07 0.00 - 0.50 x10*3/uL    Lymphocytes Absolute 0.20 (L) 0.80 - 3.00 x10*3/uL    Monocytes Absolute 0.74 0.05 - 0.80 x10*3/uL    Eosinophils Absolute 0.03 0.00 - 0.40 x10*3/uL    Basophils Absolute 0.01 0.00 - 0.10 x10*3/uL   Comprehensive metabolic panel   Result Value Ref Range    Glucose 104 (H) 74 - 99 mg/dL    Sodium 129 (L) 136 - 145 mmol/L    Potassium 3.2 (L) 3.5 - 5.3 mmol/L    Chloride 81 (L) 98 - 107 mmol/L    Bicarbonate 42 (HH) 21 - 32 mmol/L    Anion Gap 9 (L) 10 - 20 mmol/L    Urea Nitrogen 17 6 - 23 mg/dL    Creatinine 0.32 (L) 0.50 - 1.05 mg/dL    eGFR >90 >60 mL/min/1.73m*2    Calcium 8.8 8.6 - 10.3 mg/dL    Albumin 2.8 (L) 3.4 - 5.0 g/dL    Alkaline Phosphatase 107 33 - 136 U/L    Total Protein 6.2 (L) 6.4 - 8.2 g/dL    AST 23 9 - 39 U/L    Bilirubin, Total 0.4 0.0 - 1.2 mg/dL    ALT 16 7 - 45 U/L   Sedimentation rate, automated   Result Value Ref Range    Sedimentation Rate 81 (H) 0 - 30 mm/h   C-reactive protein   Result Value Ref Range    C-Reactive Protein 14.44 (H) <1.00 mg/dL   Lactate   Result Value Ref Range    Lactate 0.9 0.4 - 2.0 mmol/L   Blood Culture    Specimen: Peripheral Venipuncture; Blood culture   Result Value Ref Range    Blood Culture Loaded on Instrument - Culture in progress    Blood Culture    Specimen: Peripheral Venipuncture; Blood culture   Result Value Ref Range    Blood Culture Loaded on Instrument - Culture in progress    Magnesium   Result Value Ref Range    Magnesium 1.51 (L) 1.60 - 2.40 mg/dL   MRSA Surveillance for Vancomycin De-escalation, PCR    Specimen: Anterior Nares; Swab   Result Value Ref Range    MRSA PCR Not Detected Not Detected   Urinalysis with Reflex Culture and Microscopic   Result Value Ref Range    Color, Urine  Light-Yellow Light-Yellow, Yellow, Dark-Yellow    Appearance, Urine Clear Clear    Specific Gravity, Urine 1.018 1.005 - 1.035    pH, Urine 6.0 5.0, 5.5, 6.0, 6.5, 7.0, 7.5, 8.0    Protein, Urine 20 (TRACE) NEGATIVE, 10 (TRACE), 20 (TRACE) mg/dL    Glucose, Urine Normal Normal mg/dL    Blood, Urine NEGATIVE NEGATIVE mg/dL    Ketones, Urine NEGATIVE NEGATIVE mg/dL    Bilirubin, Urine NEGATIVE NEGATIVE mg/dL    Urobilinogen, Urine Normal Normal mg/dL    Nitrite, Urine NEGATIVE NEGATIVE    Leukocyte Esterase, Urine NEGATIVE NEGATIVE   Urinalysis Microscopic   Result Value Ref Range    WBC, Urine 1-5 1-5, NONE /HPF    RBC, Urine 3-5 NONE, 1-2, 3-5 /HPF    Bacteria, Urine 1+ (A) NONE SEEN /HPF    Mucus, Urine FEW Reference range not established. /LPF    Hyaline Casts, Urine 3+ (A) NONE /LPF   Comprehensive metabolic panel   Result Value Ref Range    Glucose 96 74 - 99 mg/dL    Sodium 122 (L) 136 - 145 mmol/L    Potassium 3.6 3.5 - 5.3 mmol/L    Chloride 77 (L) 98 - 107 mmol/L    Bicarbonate 38 (H) 21 - 32 mmol/L    Anion Gap 11 10 - 20 mmol/L    Urea Nitrogen 11 6 - 23 mg/dL    Creatinine 0.30 (L) 0.50 - 1.05 mg/dL    eGFR >90 >60 mL/min/1.73m*2    Calcium 8.4 (L) 8.6 - 10.3 mg/dL    Albumin 2.8 (L) 3.4 - 5.0 g/dL    Alkaline Phosphatase 102 33 - 136 U/L    Total Protein 6.0 (L) 6.4 - 8.2 g/dL    AST 21 9 - 39 U/L    Bilirubin, Total 0.7 0.0 - 1.2 mg/dL    ALT 14 7 - 45 U/L   CBC   Result Value Ref Range    WBC 14.7 (H) 4.4 - 11.3 x10*3/uL    nRBC 0.0 0.0 - 0.0 /100 WBCs    RBC 3.90 (L) 4.00 - 5.20 x10*6/uL    Hemoglobin 12.2 12.0 - 16.0 g/dL    Hematocrit 36.7 36.0 - 46.0 %    MCV 94 80 - 100 fL    MCH 31.3 26.0 - 34.0 pg    MCHC 33.2 32.0 - 36.0 g/dL    RDW 14.2 11.5 - 14.5 %    Platelets 268 150 - 450 x10*3/uL   Vancomycin   Result Value Ref Range    Vancomycin 8.0 5.0 - 20.0 ug/mL   Blood Gas Arterial Full Panel   Result Value Ref Range    POCT pH, Arterial 7.51 (H) 7.38 - 7.42 pH    POCT pCO2, Arterial 55 (H) 38 -  42 mm Hg    POCT pO2, Arterial 56 (L) 85 - 95 mm Hg    POCT SO2, Arterial 90 (L) 94 - 100 %    POCT Oxy Hemoglobin, Arterial 86.5 (L) 94.0 - 98.0 %    POCT Hematocrit Calculated, Arterial 38.0 36.0 - 46.0 %    POCT Sodium, Arterial 116 (LL) 136 - 145 mmol/L    POCT Potassium, Arterial 3.8 3.5 - 5.3 mmol/L    POCT Chloride, Arterial 79 (L) 98 - 107 mmol/L    POCT Ionized Calcium, Arterial 1.11 1.10 - 1.33 mmol/L    POCT Glucose, Arterial 97 74 - 99 mg/dL    POCT Lactate, Arterial 2.3 (H) 0.4 - 2.0 mmol/L    POCT Base Excess, Arterial 18.0 (H) -2.0 - 3.0 mmol/L    POCT HCO3 Calculated, Arterial 43.9 (H) 22.0 - 26.0 mmol/L    POCT Hemoglobin, Arterial 12.7 12.0 - 16.0 g/dL    POCT Anion Gap, Arterial -3 (L) 10 - 25 mmo/L    Patient Temperature 37.0 degrees Celsius    FiO2 100 %    Apparatus HIGH FLOW CANNULA     Flow 40.0 LPM    Site of Arterial Puncture Radial Right     Christopher's Test Positive      Prior to Admission medications    Medication Sig Start Date End Date Taking? Authorizing Provider   acetaminophen (Tylenol) 325 mg tablet Take 1 tablet (325 mg) by mouth every 6 hours if needed (pain).    Historical Provider, MD   baclofen (Lioresal) 10 mg tablet Take 1 tablet (10 mg) by mouth 3 times a day.    Historical Provider, MD   butalbital-acetaminophen-caff -40 mg tablet Take 1 tablet by mouth every 6 hours if needed for headaches. 11/10/23   Historical Provider, MD   cholecalciferol, vitamin D3, (VITAMIN D3 ORAL) Take 1 tablet by mouth once daily.    Historical Provider, MD   lactulose 10 gram/15 mL solution Take 30 mL (20 g) by mouth every 18 hours.    Historical Provider, MD   lidocaine HCL 4 % liquid roll-on Apply 1 Application topically once daily as needed.    Historical Provider, MD   lubricating eye drops ophthalmic solution Administer 1 drop into both eyes if needed for dry eyes. 5/22/24   PO Jang-CNP   meprobamate (Equanil) 400 mg tablet Take 1 tablet (400 mg) by mouth 4 times a day.     Historical Provider, MD   mupirocin (Bactroban) 2 % ointment Apply 1 Application topically if needed. Apply to affected area as needed    Historical Provider, MD   nicotine (Nicoderm CQ) 14 mg/24 hr patch Place 1 patch over 24 hours on the skin once every 24 hours for 28 days.  Patient not taking: Reported on 3/5/2025 10/20/24 11/17/24  Zohra Ibarra MD   ondansetron (Zofran) 8 mg tablet Take 1 tablet (8 mg) by mouth every 8 hours if needed for nausea or vomiting.    Historical Provider, MD   pantoprazole (ProtoNix) 40 mg EC tablet Take 1 tablet (40 mg) by mouth once daily in the morning. Take before meals. Do not crush, chew, or split. Continue twice daily x 8 weeks then continue once daily. 5/1/25   LANDEN Jang   prochlorperazine (Compazine) 10 mg tablet Take 1 tablet (10 mg) by mouth every 6 hours if needed for nausea or vomiting. 2/24/25   Kelly Guerra MD   sucralfate (Carafate) 100 mg/mL suspension Take 10 mL (1 g) by mouth every 6 hours. 5/22/24   LANDEN Jang     Current Medications[4]  Imaging  XR chest 1 view  Result Date: 5/18/2025  There is patchy airspace opacity in the right mid and lower lung concerning for developing multifocal pneumonia. Continued radiographic follow-up to resolution is advised.   Cardiomegaly with mild interstitial prominence suggestive of developing interstitial edema/CHF.   Bilateral pleural effusions are better seen on concurrent CT.   MACRO: None   Signed by: Lenka Cartagena 5/18/2025 7:06 PM Dictation workstation:   [id number]    CT abdomen pelvis wo IV contrast  Result Date: 5/18/2025  There is soft tissue defect overlying the distal sacrum and coccyx with mild soft tissue stranding. A tiny locule of air is present in the right gluteal subcutaneous fat, without evidence for discrete drainable fluid collection. Findings concerning for sacral decubitus ulcer. Correlate with exam.   Moderate bilateral pleural effusions. There are patchy airspace  opacities and consolidation in the right lung concerning for developing multifocal pneumonia. Clinical correlation continued radiographic follow-up to resolution is advised.   Marked distention of the urinary bladder. Correlate with symptomatology to exclude urinary retention.   Diffuse body wall edema.   Additional findings as described above.   MACRO: None   Signed by: Lenka Cartagena 5/18/2025 6:31 PM Dictation workstation:   [id number]      Cardiology, Vascular, and Other Imaging  No other imaging results found for the past 7 days    No results found for the last 90 days.       Assessment/Plan   Assessment & Plan  Generalized weakness  Bilateral pleural effusion  Acute on chronic respiratory failure  COPD exacerbation  History of lung cancer  Severe protein calorie malnutrition  Hyponatremia  Leukocytosis  Sacral and coccygeal decubitus ulcer  Leukocytosis    Plan: The patient was admitted to complaint of shortness of breath and respiratory failure.  The patient has deteriorated.  Patient has bilateral pleural effusion indicating acute on chronic diastolic congestive heart failure.  Patient also has a developing right lower lobe pneumonia.  Patient could have aspiration pneumonia.  Patient has leukocytosis now with a white cell count of 14.7.  Patient has been started on the broad-spectrum antibiotics.  Check sputum culture and blood culture.  ID and pulmonary consult obtained.  Give aerosol treatment with albuterol and Atrovent.  Patient on high flow oxygen.  Patient is thus desaturating.  The patient's PO2 is 56% per respiratory therapist.  The patient is tachypneic and slowly deteriorating.  The patient will be transferred to ICU for further management.  Ordered to transfer the patient to ICU has been placed and a call has been made to the ICU attending to discuss the case.  Discussed with the daughter of the patient.  Poor prognosis has been explained to her.  The daughter wants the patient to be DNR CCA.   Okay to transfer to the ICU and intubation if needed.          Lupillo Mcneil MD         [1]   Past Medical History:  Diagnosis Date    Other cervical disc degeneration, unspecified cervical region     Degeneration of cervical intervertebral disc    Other conditions influencing health status     Bulging Disc (L3 - L4)    Other conditions influencing health status     A Fall    Other conditions influencing health status     Bulging Disc (C4 - C5)    Other conditions influencing health status     Bulging Disc (C6 - C7)    Other conditions influencing health status     Bulging Disc (C5 - C6)    Other conditions influencing health status     Lumbar Spondylolisthesis    Other conditions influencing health status     Rotator Cuff Tendon Tear    Other intervertebral disc degeneration, lumbar region     L4-L5 disc bulge    Other intervertebral disc displacement, lumbar region     Disc displacement, lumbar    Other intervertebral disc displacement, lumbosacral region     Displacement of lumbosacral intervertebral disc    Personal history of other diseases of the musculoskeletal system and connective tissue     History of bursitis    Personal history of other diseases of the musculoskeletal system and connective tissue     History of fibromyositis    Personal history of other specified conditions     History of headache    Spinal stenosis, lumbar region without neurogenic claudication     Lumbar canal stenosis    Sprain of ligaments of lumbar spine, initial encounter     Low back sprain   [2]   Past Surgical History:  Procedure Laterality Date    APPENDECTOMY      EYE SURGERY      HYSTERECTOMY      TONSILLECTOMY     [3]   Family History  Problem Relation Name Age of Onset    Cancer Mother      Other (Father had hypertension, stroke, coronary artery disease and diabetes) Father     [4]   Current Facility-Administered Medications:     acetaminophen (Tylenol) tablet 650 mg, 650 mg, oral, q4h PRN **OR** acetaminophen  (Tylenol) oral liquid 650 mg, 650 mg, oral, q4h PRN **OR** acetaminophen (Tylenol) suppository 650 mg, 650 mg, rectal, q4h PRN, Lupillo Mcneil MD    baclofen (Lioresal) tablet 10 mg, 10 mg, oral, TID, Lupillo Mcneil MD    benzocaine-menthol (Cepastat Sore Throat) lozenge 1 lozenge, 1 lozenge, Mouth/Throat, q2h PRN, Lupillo Mcneil MD    butalbital-acetaminophen-caff -40 mg per tablet 1 tablet, 1 tablet, oral, q6h PRN, Lupillo Mcneil MD    cholecalciferol (Vitamin D-3) tablet 125 mcg, 125 mcg, oral, Daily, Lupillo Mcneil MD    dextromethorphan-guaifenesin (Robitussin DM)  mg/5 mL oral liquid 5 mL, 5 mL, oral, q4h PRN, Lupillo Mcneil MD    guaiFENesin (Mucinex) 12 hr tablet 600 mg, 600 mg, oral, q12h PRN, Lupillo Mcneil MD    guaiFENesin (Mucinex) 12 hr tablet 600 mg, 600 mg, oral, BID PRN, Lupillo Mcneil MD    heparin (porcine) injection 5,000 Units, 5,000 Units, subcutaneous, q8h, Lupillo Mcneil MD, 5,000 Units at 05/19/25 0644    ipratropium-albuteroL (Duo-Neb) 0.5-2.5 mg/3 mL nebulizer solution 3 mL, 3 mL, nebulization, q6h, Lupillo Mcneil MD, 3 mL at 05/19/25 0644    lidocaine 4 % patch 1 patch, 1 patch, transdermal, Daily, Lupillo Mcneil MD    lubricating eye drops ophthalmic solution 1 drop, 1 drop, Both Eyes, PRN, Lupillo Mcneil MD    mupirocin (Bactroban) 2 % ointment 1 Application, 1 Application, Topical, BID, Lupillo Mcneil MD    ondansetron (Zofran) tablet 8 mg, 8 mg, oral, q8h PRN, Lupillo Mcneil MD    oxygen (O2) therapy, , inhalation, Continuous - Inhalation, Lupillo Mcneil MD, 40 L/min at 05/19/25 0809    pantoprazole (ProtoNix) EC tablet 40 mg, 40 mg, oral, Daily before breakfast, Lupillo Mcneil MD, 40 mg at 05/19/25 0644    piperacillin-tazobactam (Zosyn) 4.5 g in dextrose (iso)  mL, 4.5 g, intravenous, q6h, Lupillo Mcneil MD, Stopped at 05/19/25 0841    polyethylene glycol (Glycolax, Miralax) packet  17 g, 17 g, oral, Daily PRN, Lupillo Mcneil MD    prochlorperazine (Compazine) tablet 10 mg, 10 mg, oral, q6h PRN, Lupillo Mcneil MD    sucralfate (Carafate) suspension 1 g, 1 g, oral, q6h [place], Lupillo Mcneil MD, 1 g at 05/19/25 0644    vancomycin (Vancocin) 1,750 mg in sodium chloride 0.9% 500 mL IV, 1,750 mg, intravenous, q24h, Lupillo Mcneil MD    vancomycin (Vancocin) pharmacy to dose - pharmacy monitoring, , miscellaneous, Daily PRN, Lupillo Mcneil MD    Current Outpatient Medications:     acetaminophen (Tylenol) 325 mg tablet, Take 1 tablet (325 mg) by mouth every 6 hours if needed (pain)., Disp: , Rfl:     baclofen (Lioresal) 10 mg tablet, Take 1 tablet (10 mg) by mouth 3 times a day., Disp: , Rfl:     butalbital-acetaminophen-caff -40 mg tablet, Take 1 tablet by mouth every 6 hours if needed for headaches., Disp: , Rfl:     cholecalciferol, vitamin D3, (VITAMIN D3 ORAL), Take 1 tablet by mouth once daily., Disp: , Rfl:     lactulose 10 gram/15 mL solution, Take 30 mL (20 g) by mouth every 18 hours., Disp: , Rfl:     lidocaine HCL 4 % liquid roll-on, Apply 1 Application topically once daily as needed., Disp: , Rfl:     lubricating eye drops ophthalmic solution, Administer 1 drop into both eyes if needed for dry eyes., Disp: , Rfl:     meprobamate (Equanil) 400 mg tablet, Take 1 tablet (400 mg) by mouth 4 times a day., Disp: , Rfl:     mupirocin (Bactroban) 2 % ointment, Apply 1 Application topically if needed. Apply to affected area as needed, Disp: , Rfl:     nicotine (Nicoderm CQ) 14 mg/24 hr patch, Place 1 patch over 24 hours on the skin once every 24 hours for 28 days. (Patient not taking: Reported on 3/5/2025), Disp: 28 patch, Rfl: 0    ondansetron (Zofran) 8 mg tablet, Take 1 tablet (8 mg) by mouth every 8 hours if needed for nausea or vomiting., Disp: , Rfl:     pantoprazole (ProtoNix) 40 mg EC tablet, Take 1 tablet (40 mg) by mouth once daily in the morning. Take before  meals. Do not crush, chew, or split. Continue twice daily x 8 weeks then continue once daily., Disp: , Rfl:     prochlorperazine (Compazine) 10 mg tablet, Take 1 tablet (10 mg) by mouth every 6 hours if needed for nausea or vomiting., Disp: 30 tablet, Rfl: 5    sucralfate (Carafate) 100 mg/mL suspension, Take 10 mL (1 g) by mouth every 6 hours., Disp: , Rfl:

## 2025-05-19 NOTE — PROGRESS NOTES
Karly Horton is a 80 y.o. female on day 1 of admission presenting with Generalized weakness.    [facility] received message that patient's current POA does not want to be POA, prefers daughter take the role. Once patient's mental status improves, this can be addressed.     Sharri Dasilva RN

## 2025-05-19 NOTE — ED NOTES
Per abg results, RT called Dr. Mcneil, Dr. Mcneil states that the pt needs ICU and he will consult ICU..      Selam Huitron RN  05/19/25 0879

## 2025-05-19 NOTE — CONSULTS
Inpatient consult to Infectious Diseases  Consult performed by: Bebo Yen MD  Consult ordered by: Lupillo Mcneil MD            Primary MD: Julian Chaney MD    Reason For Consult  Pneumonia    History Of Present Illness  Karly Horton is a 80 y.o. female presenting with shortness of breath.  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  Onset was a couple weeks ago, gradual, constant, with interval worsening.  She has known productive cough.  She denies any chest pain.  She denies any nausea vomiting or diarrhea.  She was hypoxic in the emergency room with lactic acidosis and was transferred to the intensive care unit.  She is on high flow oxygen.  She denies any fever or chills.  He has negative nasal MRSA PCR.  She is on Zosyn and azithromycin     Past Medical History  She has a past medical history of Other cervical disc degeneration, unspecified cervical region, Other conditions influencing health status, Other conditions influencing health status, Other conditions influencing health status, Other conditions influencing health status, Other conditions influencing health status, Other conditions influencing health status, Other conditions influencing health status, Other intervertebral disc degeneration, lumbar region, Other intervertebral disc displacement, lumbar region, Other intervertebral disc displacement, lumbosacral region, Personal history of other diseases of the musculoskeletal system and connective tissue, Personal history of other diseases of the musculoskeletal system and connective tissue, Personal history of other specified conditions, Spinal stenosis, lumbar region without neurogenic claudication, and Sprain of ligaments of lumbar spine, initial encounter.    Surgical History  She has a past surgical history that includes Appendectomy;  Eye surgery; Tonsillectomy; and Hysterectomy.     Social History     Occupational History    Not on file   Tobacco Use    Smoking status: Every Day     Current packs/day: 1.00     Average packs/day: 1 pack/day for 65.4 years (65.4 ttl pk-yrs)     Types: Cigarettes     Start date: 1/1/1960     Passive exposure: Current    Smokeless tobacco: Not on file   Vaping Use    Vaping status: Some Days   Substance and Sexual Activity    Alcohol use: Not Currently    Drug use: Yes     Types: Marijuana     Comment: vape    Sexual activity: Defer     Travel History   Travel since 04/19/25    No documented travel since 04/19/25       Family History  Family History[1]  Allergies  Morphine and Spice flavor     Immunization History   Administered Date(s) Administered    SARS-CoV-2, Unspecified 10/14/2021     Medications  Home medications:  Prescriptions Prior to Admission[2]  Current medications:  Scheduled medications  Scheduled Medications[3]  Continuous medications  Continuous Medications[4]  PRN medications  PRN Medications[5]    Review of Systems   All other systems reviewed and are negative.       Objective  Range of Vitals (last 24 hours)  Heart Rate:  []   Temperature:  [36.1 °C (97 °F)]   Respirations:  [22-30]   BP: (121-154)/(74-96)   Pulse Ox:  [88 %-94 %]   Daily Weight  05/01/25 : (!) 35.2 kg (77 lb 9.6 oz)    There is no height or weight on file to calculate BMI.     Physical Exam  Constitutional:       Appearance: She is ill-appearing.   HENT:      Head: Normocephalic and atraumatic.      Right Ear: External ear normal.      Left Ear: External ear normal.      Nose: Nose normal.   Eyes:      General: No scleral icterus.     Extraocular Movements: Extraocular movements intact.      Conjunctiva/sclera: Conjunctivae normal.   Cardiovascular:      Rate and Rhythm: Regular rhythm. Tachycardia present.      Heart sounds: Normal heart sounds.   Pulmonary:      Breath sounds: Decreased breath sounds present.    Abdominal:      General: Bowel sounds are normal.      Palpations: Abdomen is soft.      Tenderness: There is no abdominal tenderness.   Musculoskeletal:      Cervical back: Normal range of motion and neck supple.      Right lower leg: No edema.      Left lower leg: No edema.   Skin:     General: Skin is warm and dry.      Comments: Stage III/IV sacral ulcer per picture, stage III right buttock pressure   Neurological:      Mental Status: She is alert.   Psychiatric:         Behavior: Behavior normal. Behavior is cooperative.          Relevant Results  Outside Hospital Results    Labs  Results from last 72 hours   Lab Units 05/19/25  0723 05/18/25  1704   WBC AUTO x10*3/uL 14.7* 9.1   HEMOGLOBIN g/dL 12.2 11.9*   HEMATOCRIT % 36.7 37.3   PLATELETS AUTO x10*3/uL 268 253   NEUTROS PCT AUTO %  --  88.5   LYMPHS PCT AUTO %  --  2.2   MONOS PCT AUTO %  --  8.1   EOS PCT AUTO %  --  0.3     Results from last 72 hours   Lab Units 05/19/25  0723 05/18/25  1704   SODIUM mmol/L 122* 129*   POTASSIUM mmol/L 3.6 3.2*   CHLORIDE mmol/L 77* 81*   CO2 mmol/L 38* 42*   BUN mg/dL 11 17   CREATININE mg/dL 0.30* 0.32*   GLUCOSE mg/dL 96 104*   CALCIUM mg/dL 8.4* 8.8   ANION GAP mmol/L 11 9*   EGFR mL/min/1.73m*2 >90 >90     Results from last 72 hours   Lab Units 05/19/25  0723 05/18/25  1704   ALK PHOS U/L 102 107   BILIRUBIN TOTAL mg/dL 0.7 0.4   PROTEIN TOTAL g/dL 6.0* 6.2*   ALT U/L 14 16   AST U/L 21 23   ALBUMIN g/dL 2.8* 2.8*     Estimated Creatinine Clearance: 81.4 mL/min (A) (by C-G formula based on SCr of 0.3 mg/dL (L)).  C-Reactive Protein   Date Value Ref Range Status   05/18/2025 14.44 (H) <1.00 mg/dL Final     Sedimentation Rate   Date Value Ref Range Status   05/18/2025 81 (H) 0 - 30 mm/h Final     HIV 1/2 Antigen/Antibody Screen with Reflex to Confirmation   Date Value Ref Range Status   10/31/2024 Nonreactive Nonreactive Final     Hepatitis C AB   Date Value Ref Range Status   10/31/2024 Nonreactive Nonreactive Final      Comment:     Results from patients taking biotin supplements or receiving high-dose biotin therapy should be interpreted with caution due to possible interference with this test. Providers may contact their local laboratory for further information.     Microbiology  Reviewed-blood cultures pending, negative nasal MRSA PCR  Imaging  XR chest 1 view  Result Date: 5/18/2025  Interpreted By:  Lenka Cartagena, STUDY: XR CHEST 1 VIEW;  5/18/2025 6:36 pm   INDICATION: Signs/Symptoms:Generalized weakness.   COMPARISON: Chest x-ray 04/29/2025   ACCESSION NUMBER(S): [id number]   ORDERING CLINICIAN: JAIME CHILDERS   FINDINGS: Right-sided MediPort terminates in the SVC. Multiple overlying leads are present.   CARDIOMEDIASTINAL SILHOUETTE: Cardiomediastinal silhouette is stable in size and configuration. Atherosclerotic calcification of the aorta.   LUNGS: Bilateral interstitial prominence. There is right middle and lower lobe patchy airspace opacities. Layering bilateral effusions are better seen on concurrent CT. No pneumothorax.   ABDOMEN: No remarkable upper abdominal findings.   BONES: Levoconvex scoliosis. Generalized diffuse osteopenia. Multilevel degenerative changes of the spine.       There is patchy airspace opacity in the right mid and lower lung concerning for developing multifocal pneumonia. Continued radiographic follow-up to resolution is advised.   Cardiomegaly with mild interstitial prominence suggestive of developing interstitial edema/CHF.   Bilateral pleural effusions are better seen on concurrent CT.   MACRO: None   Signed by: Lenka Cartagena 5/18/2025 7:06 PM Dictation workstation:   [id number]    CT abdomen pelvis wo IV contrast  Result Date: 5/18/2025  Interpreted By:  Lenka Cartagena, STUDY: CT ABDOMEN PELVIS WO IV CONTRAST;  5/18/2025 5:51 pm   INDICATION: Signs/Symptoms:Sacral wound.   COMPARISON: CT scan of the abdomen pelvis 02/14/2025   ACCESSION NUMBER(S): [id number]   ORDERING CLINICIAN: JAIME  ALVARADO   TECHNIQUE: Axial noncontrast CT images of the abdomen and pelvis with coronal and sagittal reconstructed images.   FINDINGS:   LOWER CHEST: Moderate bilateral pleural effusions. There are patchy airspace opacities and consolidation in the right lung. Aortic root and coronary artery calcifications noted. Right-sided MediPort terminates in the SVC.   ABDOMEN: Lack of intravenous contrast limits evaluation of vessels and solid organs. LIVER: Within normal limits. BILE DUCTS: Normal caliber. GALLBLADDER: No calcified gallstones. No wall thickening. PANCREAS: Suboptimally visualized due to lack of contrast and paucity of intra-abdominal fat SPLEEN: Within normal limits.  Calcified splenic granulomas noted. ADRENALS: Nodular thickening of the adrenal glands may relate to hyperplasia or adenomatous change.   KIDNEYS, URETERS, URINARY BLADDER:  Kidneys are symmetric in size without evidence for calculi, hydronephrosis, hydroureter or perinephric inflammatory changes.   No bladder calculi or wall thickening.  Marked distention of the urinary bladder.   VESSELS:  Calcific atherosclerosis of the aortoiliac and branch vessels with moderate tortuosity. No aortic aneurysm. RETROPERITONEUM: No pathologically enlarged lymph nodes.   PELVIS:   REPRODUCTIVE ORGANS: Uterus and ovaries are not well visualized.   BOWEL: Postsurgical changes of partial colectomy. No dilated bowel. Appendix is not identified with certainty. No pericecal inflammatory change is seen. No pneumatosis or portal venous gas. PERITONEUM: Paucity of intra-abdominal fat. Mild diffuse haziness of the mesentery. ABDOMINAL WALL: Diffuse body wall edema. There is soft tissue defect overlying the distal sacrum and coccyx. Mild adjacent soft tissue stranding with a tiny locule of air in the right gluteal subcutaneous fat. A discrete drainable fluid collection is not identified. Findings concerning for sacral decubitus ulcer. BONES: Generalized diffuse  osteopenia. Multilevel degenerative changes of the spine. Reverse s shaped scoliosis. Great 1 anterolisthesis are L3 on 4 and L5 on S1 with grade 1-2 anterolisthesis of L4 on 5.       There is soft tissue defect overlying the distal sacrum and coccyx with mild soft tissue stranding. A tiny locule of air is present in the right gluteal subcutaneous fat, without evidence for discrete drainable fluid collection. Findings concerning for sacral decubitus ulcer. Correlate with exam.   Moderate bilateral pleural effusions. There are patchy airspace opacities and consolidation in the right lung concerning for developing multifocal pneumonia. Clinical correlation continued radiographic follow-up to resolution is advised.   Marked distention of the urinary bladder. Correlate with symptomatology to exclude urinary retention.   Diffuse body wall edema.   Additional findings as described above.   MACRO: None   Signed by: Lenka Cartagena 5/18/2025 6:31 PM Dictation workstation:   [id number]    ECG 12 Lead  Result Date: 5/5/2025  Sinus tachycardia with frequent Premature ventricular complexes Left anterior fascicular block ST & T wave abnormality, consider inferior ischemia Abnormal ECG T wave inversion now evident in Lateral leads Confirmed by Everardo Webster ([id number]) on 5/5/2025 4:08:58 PM    XR chest 2 views  Result Date: 4/29/2025  STUDY: Chest Radiographs;  4/29/2025 4:37PM INDICATION: Shortness of breath. COMPARISON: 3/5/2025 CT CTA Chest. ACCESSION NUMBER(S): [id number] ORDERING CLINICIAN: RONALD BLANTON TECHNIQUE:  Frontal and lateral chest. FINDINGS: CARDIOMEDIASTINAL SILHOUETTE: Heart is mildly enlarged with pulmonary vascular congestion and small bilateral pleural effusions.  LUNGS: Lungs are clear.  ABDOMEN: No remarkable upper abdominal findings.  BONES: No acute osseous changes.    Cardiomegaly and pulmonary vascular congestion with small bilateral pleural effusions. Signed by Ronal Isaac MD     Assessment/Plan    Acute hypoxic respiratory failure  Squamous cell lung cancer status post chemoradiation, on Keytruda  Leukocytosis, multifactorial  History of COPD    Continue Zosyn  Continue azithromycin  Atypical workup, Legionella, mycoplasma  Fungal workup, Fungitell, galactomannan, histoplasma  Fungal workup  Supportive care  Oxygen as needed  Monitor temperature and WBC  Further recommendations based on culture results    This is a complex infectious disease issue and the following was performed today (for more details please see the above note): Management decisions reflecting the added complexity (e.g., changes in antimicrobial therapy, infection control strategies).          Bebo Yen MD         [1]   Family History  Problem Relation Name Age of Onset    Cancer Mother      Other (Father had hypertension, stroke, coronary artery disease and diabetes) Father     [2] (Not in a hospital admission)  [3] baclofen, 10 mg, oral, TID  cholecalciferol, 125 mcg, oral, Daily  enoxaparin, 40 mg, subcutaneous, Daily  ipratropium-albuteroL, 3 mL, nebulization, q6h  lidocaine, 1 patch, transdermal, Daily  mupirocin, 1 Application, Topical, BID  oxygen, , inhalation, Continuous - Inhalation  oxygen, , inhalation, Continuous - Inhalation  pantoprazole, 40 mg, oral, Daily before breakfast  piperacillin-tazobactam, 4.5 g, intravenous, q6h  sucralfate, 1 g, oral, q6h [place]    [4]    [5] PRN medications: acetaminophen **OR** acetaminophen **OR** acetaminophen, benzocaine-menthol, butalbital-acetaminophen-caff, dextromethorphan-guaifenesin, guaiFENesin, guaiFENesin, lubricating eye drops, ondansetron, polyethylene glycol, prochlorperazine

## 2025-05-19 NOTE — ED NOTES
Around 630a, pt was placed on Hi flow o2. Pt stating at 85-90% on the Hi flow. This rn called RT to reassess pt. RT requested abg for this pt. This rn called Dr. Mcneil for order, he's putting it in      Selam Huitron RN  05/19/25 [id number]

## 2025-05-19 NOTE — ED NOTES
ED  called nursing supervisor and made them aware that this pt will need ICU      Selam Huitron, GIANLUCA  05/19/25 [id number]

## 2025-05-19 NOTE — PROGRESS NOTES
Vancomycin Dosing by Pharmacy- Cessation of Therapy    Consult to pharmacy for vancomycin dosing has been discontinued by the prescriber, pharmacy will sign off at this time.    Please call pharmacy if there are further questions or re-enter a consult if vancomycin is resumed.     Elba Hill, PharmD

## 2025-05-19 NOTE — CONSULTS
Vancomycin Dosing by Pharmacy- INITIAL    Karly Horton is a 80 y.o. year old female who Pharmacy has been consulted for vancomycin dosing for sepsis/pneumonia. Based on the patient's indication and renal status this patient will be dosed based on a goal AUC of 500-600.     Renal function is currently stable.    Visit Vitals  BP (!) 154/96   Pulse (!) 104   Temp 36.1 °C (97 °F) (Axillary)   Resp (!) 23        Lab Results   Component Value Date    CREATININE 0.32 (L) 2025    CREATININE 0.25 (L) 2025    CREATININE 0.26 (L) 2025    CREATININE 0.41 (L) 2025        Patient weight is as follows: There were no vitals filed for this visit.    Cultures:  No results found for the encounter in last 14 days.        No intake/output data recorded.  I/O during current shift:  I/O this shift:  In: -   Out: 400 [Urine:400]    Temp (24hrs), Av.1 °C (97 °F), Min:36.1 °C (97 °F), Max:36.1 °C (97 °F)         Assessment/Plan     Patient has already been given a loading dose of 1000 mg.  Will initiate vancomycin maintenance, 1750 mg every 24 hours.    This dosing regimen is predicted by [employer] to result in the following pharmacokinetic parameters:    Loading dose: N/A  Regimen: 1750 mg IV every 24 hours.  Start time: 20:03 on 2025  Exposure target: AUC24 (range)400-600 mg/L.hr   [id number]: 499 mg/L.hr  AUC24,ss: 536 mg/L.hr  Probability of AUC24 > 400: 79 %  Ctrough,ss: 11.4 mg/L  Probability of Ctrough,ss > 20: 16 %    Follow-up level will be ordered on  at 0500, unless clinically indicated sooner.  Will continue to monitor renal function daily while on vancomycin and order serum creatinine at least every 48 hours if not already ordered.  Follow for continued vancomycin needs, clinical response, and signs/symptoms of toxicity.       Omar Thomas, PharmD

## 2025-05-19 NOTE — PROGRESS NOTES
Karly Horton is a 80 y.o. female on day 1 of admission presenting with Generalized weakness.    RNCC attempted to meet with patient, she kept asking for water. RNCC called patient's daughter Karly to complete assessment.   Patient lives with her daughter in a single level house, two steps to enter. Patient is non-ambulatory, has a wheelchair. Karly assists with ADLs. Patient had a bath/ through  [employer] [place] [facility]. Karly states service has been cancelled d/t patient in hospital, complains it can take weeks to resume. Patient without teeth, Karly purees food for her. She was discharged home with a portable O2 concentrator 05/01/2025. PCP is Julian Chaney MD.   ADOD 05/23/2025  [facility] scores: not yet seen  Karly complains that patient has not received hospital bed. Per chart, order sent to [facility] 05/09/2025. Sent secure chat to Lacey requesting follow up.   Patient is active with [facility], plan to resume upon dc.  Patient's discharge plan is not complete. Please speak with care coordinator prior to discharging.       Sharri Dasilva RN

## 2025-05-19 NOTE — PROGRESS NOTES
Physical Therapy                 Therapy Communication Note    Patient Name: Karly Horton  MRN: [id number]  Department: [facility] ED  Room: [place] [id number]  Today's Date: 5/19/2025     Discipline: Physical Therapy    Missed Visit: PT Missed Visit: Yes     Missed Visit Reason: Missed Visit Reason: Other (Comment) (Patient in ED and being admitted to ICU due to respiratory status;  pending respiratory status.)    Missed Time: Cancel

## 2025-05-19 NOTE — H&P
[facility] History & Physical      Date:  5/19/2025  Patient:  Karly Horton  YOB: 1945  MRN:  [id number]   Admit Date:  5/18/2025  Chief Complaint   Patient presents with    Weakness, Gen     Patient bib ems for weakness, lives at home with her daughter, had a low pox and was given oxygen.  Symptoms began two days ago. She is also being treated for an ulcer on her lower back/coccyx.        History of Present Illness:  79 y.o. female with past medical history significant for HFpEF, COPD, and squamous cell carcinoma of right lung s/p chemo and XRT now on keytruda who presents to [facility] ED via EMS with complaints of progressive SOB x several weeks, acutely worse in recent days, and failure to thrive.  She was admitted her 5 weeks ago with similar presentation and treated for COPD exacerbation on the wards before being discharged to home with NC 2L at her request.      She reports several weeks of increasing SOB at rest (she is bed bound) and feels that this is worse than on previous exacerbations.  Eval in the ED was notable for acute on chronic hyponatremia, leukocytosis, elevated CRP 14, CXR wit increased patchy infiltrates R>L, and a CT a/p notable for gas in a sacral decubitus ulcer.  Although she was initially accepted by Dr. Mcneil for admission to the [facility], she bacame acutely hypoxemic requiring HFNC O2; subsequent ABG 7.51 / 55 / 56with lactate 2.3.  On my exam she has moderately tachypnea with mild distress with coarse breath sounds bilaterally.  Conversant in partial sentences.      I described to her she severity of her hypoxemia and that she should be admitted to the ICU for further evaluation and management.  We briefly reviewed GOC and life support options and she confirmed that she would like all resuscitative measures attempted as indicated.  She is quite cachectic and I did indicate to her that liberation from mechanical ventilation would likely be  challenging if she were to need intubation.               ROS:  Review of Systems   Constitutional:         Bed bound for months   Respiratory:  Positive for shortness of breath.    Cardiovascular:  Negative for chest pain.   Gastrointestinal:         Poor appetite   Psychiatric/Behavioral:          Chronic anxiety        Medical History:  Medical History[1]  Surgical History[2]  Prescriptions Prior to Admission[3]  Morphine and Spice flavor  Social History[4]  Family History[5]    Hospital Medications:  Continuous Medications[6]    Current Medications[7]    Physical Exam:  Heart Rate:  []   Temperature:  [36.1 °C (97 °F)]   Respirations:  [22-30]   BP: (121-154)/(74-96)   Pulse Ox:  [88 %-94 %]     FiO2 (%):  [100 %] 100 %    Physical Exam  Constitutional:       Comments: Frail, cachectic    Eyes:      Pupils: Pupils are equal, round, and reactive to light.   Cardiovascular:      Rate and Rhythm: Tachycardia present.      Pulses: Normal pulses.   Pulmonary:      Effort: Respiratory distress present.      Breath sounds: No stridor. No wheezing, rhonchi or rales.   Chest:      Chest wall: No tenderness.   Abdominal:      General: Abdomen is flat. There is no distension.      Palpations: Abdomen is soft.      Tenderness: There is no abdominal tenderness.   Musculoskeletal:      Right lower leg: No edema.      Left lower leg: No edema.   Skin:     General: Skin is warm and dry.      Capillary Refill: Capillary refill takes less than 2 seconds.   Neurological:      Mental Status: She is alert and oriented to person, place, and time. Mental status is at baseline.      Cranial Nerves: No cranial nerve deficit.   Psychiatric:      Comments: Mildly anxious         Objective:    Intake/Output Summary (Last 24 hours) at 5/19/2025 1137  Last data filed at 5/18/2025 2004  Gross per 24 hour   Intake --   Output 400 ml   Net -400 ml       Results for orders placed or performed during the hospital encounter of 05/18/25 (from  the past 24 hours)   CBC and Auto Differential   Result Value Ref Range    WBC 9.1 4.4 - 11.3 x10*3/uL    nRBC 0.0 0.0 - 0.0 /100 WBCs    RBC 3.79 (L) 4.00 - 5.20 x10*6/uL    Hemoglobin 11.9 (L) 12.0 - 16.0 g/dL    Hematocrit 37.3 36.0 - 46.0 %    MCV 98 80 - 100 fL    MCH 31.4 26.0 - 34.0 pg    MCHC 31.9 (L) 32.0 - 36.0 g/dL    RDW 14.2 11.5 - 14.5 %    Platelets 253 150 - 450 x10*3/uL    Neutrophils % 88.5 40.0 - 80.0 %    Immature Granulocytes %, Automated 0.8 0.0 - 0.9 %    Lymphocytes % 2.2 13.0 - 44.0 %    Monocytes % 8.1 2.0 - 10.0 %    Eosinophils % 0.3 0.0 - 6.0 %    Basophils % 0.1 0.0 - 2.0 %    Neutrophils Absolute 8.04 (H) 1.60 - 5.50 x10*3/uL    Immature Granulocytes Absolute, Automated 0.07 0.00 - 0.50 x10*3/uL    Lymphocytes Absolute 0.20 (L) 0.80 - 3.00 x10*3/uL    Monocytes Absolute 0.74 0.05 - 0.80 x10*3/uL    Eosinophils Absolute 0.03 0.00 - 0.40 x10*3/uL    Basophils Absolute 0.01 0.00 - 0.10 x10*3/uL   Comprehensive metabolic panel   Result Value Ref Range    Glucose 104 (H) 74 - 99 mg/dL    Sodium 129 (L) 136 - 145 mmol/L    Potassium 3.2 (L) 3.5 - 5.3 mmol/L    Chloride 81 (L) 98 - 107 mmol/L    Bicarbonate 42 (HH) 21 - 32 mmol/L    Anion Gap 9 (L) 10 - 20 mmol/L    Urea Nitrogen 17 6 - 23 mg/dL    Creatinine 0.32 (L) 0.50 - 1.05 mg/dL    eGFR >90 >60 mL/min/1.73m*2    Calcium 8.8 8.6 - 10.3 mg/dL    Albumin 2.8 (L) 3.4 - 5.0 g/dL    Alkaline Phosphatase 107 33 - 136 U/L    Total Protein 6.2 (L) 6.4 - 8.2 g/dL    AST 23 9 - 39 U/L    Bilirubin, Total 0.4 0.0 - 1.2 mg/dL    ALT 16 7 - 45 U/L   Sedimentation rate, automated   Result Value Ref Range    Sedimentation Rate 81 (H) 0 - 30 mm/h   C-reactive protein   Result Value Ref Range    C-Reactive Protein 14.44 (H) <1.00 mg/dL   Lactate   Result Value Ref Range    Lactate 0.9 0.4 - 2.0 mmol/L   Blood Culture    Specimen: Peripheral Venipuncture; Blood culture   Result Value Ref Range    Blood Culture Loaded on Instrument - Culture in progress     Blood Culture    Specimen: Peripheral Venipuncture; Blood culture   Result Value Ref Range    Blood Culture Loaded on Instrument - Culture in progress    Magnesium   Result Value Ref Range    Magnesium 1.51 (L) 1.60 - 2.40 mg/dL   MRSA Surveillance for Vancomycin De-escalation, PCR    Specimen: Anterior Nares; Swab   Result Value Ref Range    MRSA PCR Not Detected Not Detected   Urinalysis with Reflex Culture and Microscopic   Result Value Ref Range    Color, Urine Light-Yellow Light-Yellow, Yellow, Dark-Yellow    Appearance, Urine Clear Clear    Specific Gravity, Urine 1.018 1.005 - 1.035    pH, Urine 6.0 5.0, 5.5, 6.0, 6.5, 7.0, 7.5, 8.0    Protein, Urine 20 (TRACE) NEGATIVE, 10 (TRACE), 20 (TRACE) mg/dL    Glucose, Urine Normal Normal mg/dL    Blood, Urine NEGATIVE NEGATIVE mg/dL    Ketones, Urine NEGATIVE NEGATIVE mg/dL    Bilirubin, Urine NEGATIVE NEGATIVE mg/dL    Urobilinogen, Urine Normal Normal mg/dL    Nitrite, Urine NEGATIVE NEGATIVE    Leukocyte Esterase, Urine NEGATIVE NEGATIVE   Urinalysis Microscopic   Result Value Ref Range    WBC, Urine 1-5 1-5, NONE /HPF    RBC, Urine 3-5 NONE, 1-2, 3-5 /HPF    Bacteria, Urine 1+ (A) NONE SEEN /HPF    Mucus, Urine FEW Reference range not established. /LPF    Hyaline Casts, Urine 3+ (A) NONE /LPF   Comprehensive metabolic panel   Result Value Ref Range    Glucose 96 74 - 99 mg/dL    Sodium 122 (L) 136 - 145 mmol/L    Potassium 3.6 3.5 - 5.3 mmol/L    Chloride 77 (L) 98 - 107 mmol/L    Bicarbonate 38 (H) 21 - 32 mmol/L    Anion Gap 11 10 - 20 mmol/L    Urea Nitrogen 11 6 - 23 mg/dL    Creatinine 0.30 (L) 0.50 - 1.05 mg/dL    eGFR >90 >60 mL/min/1.73m*2    Calcium 8.4 (L) 8.6 - 10.3 mg/dL    Albumin 2.8 (L) 3.4 - 5.0 g/dL    Alkaline Phosphatase 102 33 - 136 U/L    Total Protein 6.0 (L) 6.4 - 8.2 g/dL    AST 21 9 - 39 U/L    Bilirubin, Total 0.7 0.0 - 1.2 mg/dL    ALT 14 7 - 45 U/L   CBC   Result Value Ref Range    WBC 14.7 (H) 4.4 - 11.3 x10*3/uL    nRBC 0.0 0.0 -  0.0 /100 WBCs    RBC 3.90 (L) 4.00 - 5.20 x10*6/uL    Hemoglobin 12.2 12.0 - 16.0 g/dL    Hematocrit 36.7 36.0 - 46.0 %    MCV 94 80 - 100 fL    MCH 31.3 26.0 - 34.0 pg    MCHC 33.2 32.0 - 36.0 g/dL    RDW 14.2 11.5 - 14.5 %    Platelets 268 150 - 450 x10*3/uL   Vancomycin   Result Value Ref Range    Vancomycin 8.0 5.0 - 20.0 ug/mL   Blood Gas Arterial Full Panel   Result Value Ref Range    POCT pH, Arterial 7.51 (H) 7.38 - 7.42 pH    POCT pCO2, Arterial 55 (H) 38 - 42 mm Hg    POCT pO2, Arterial 56 (L) 85 - 95 mm Hg    POCT SO2, Arterial 90 (L) 94 - 100 %    POCT Oxy Hemoglobin, Arterial 86.5 (L) 94.0 - 98.0 %    POCT Hematocrit Calculated, Arterial 38.0 36.0 - 46.0 %    POCT Sodium, Arterial 116 (LL) 136 - 145 mmol/L    POCT Potassium, Arterial 3.8 3.5 - 5.3 mmol/L    POCT Chloride, Arterial 79 (L) 98 - 107 mmol/L    POCT Ionized Calcium, Arterial 1.11 1.10 - 1.33 mmol/L    POCT Glucose, Arterial 97 74 - 99 mg/dL    POCT Lactate, Arterial 2.3 (H) 0.4 - 2.0 mmol/L    POCT Base Excess, Arterial 18.0 (H) -2.0 - 3.0 mmol/L    POCT HCO3 Calculated, Arterial 43.9 (H) 22.0 - 26.0 mmol/L    POCT Hemoglobin, Arterial 12.7 12.0 - 16.0 g/dL    POCT Anion Gap, Arterial -3 (L) 10 - 25 mmo/L    Patient Temperature 37.0 degrees Celsius    FiO2 100 %    Apparatus HIGH FLOW CANNULA     Flow 40.0 LPM    Site of Arterial Puncture Radial Right     Christopher's Test Positive        Recent Imaging  XR chest 1 view   Final Result   There is patchy airspace opacity in the right mid and lower lung   concerning for developing multifocal pneumonia. Continued   radiographic follow-up to resolution is advised.        Cardiomegaly with mild interstitial prominence suggestive of   developing interstitial edema/CHF.        Bilateral pleural effusions are better seen on concurrent CT.        MACRO:   None        Signed by: Lenka Cartagena 5/18/2025 7:06 PM   Dictation workstation:   [id number]      CT abdomen pelvis wo IV contrast   Final Result    There is soft tissue defect overlying the distal sacrum and coccyx   with mild soft tissue stranding. A tiny locule of air is present in   the right gluteal subcutaneous fat, without evidence for discrete   drainable fluid collection. Findings concerning for sacral decubitus   ulcer. Correlate with exam.        Moderate bilateral pleural effusions. There are patchy airspace   opacities and consolidation in the right lung concerning for   developing multifocal pneumonia. Clinical correlation continued   radiographic follow-up to resolution is advised.        Marked distention of the urinary bladder. Correlate with   symptomatology to exclude urinary retention.        Diffuse body wall edema.        Additional findings as described above.        MACRO:   None        Signed by: Lenka Cartagena 5/18/2025 6:31 PM   Dictation workstation:   [id number]          No echocardiogram results found for the past 14 days    Assessment/Plan   Assessment & Plan  Generalized weakness    Acute respiratory failure with hypoxia    Hyponatremia    History of lung cancer    Acute hypoxemic respiratory failure      Pt is a 79 y.o. female with past medical history significant for HFpEF, COPD, and squamous cell carcinoma of right lung s/p chemo and XRT now on keytruda who presents to [facility] ED via EMS with complaints of progressive SOB x several weeks.  While in the ED she had acute respiratory disterss and is amitted to the ICU with hypoxemic respiratory failure requiring HFNC.  Suspect acute pneumonitis +/- CAP.        Neuro/Psych/Pain Ctrl/Sedation:  H/O anxiety on multimodals at home including 40+ years use of cannabis.    - CAM ICU score q-shift  - Sleep/wake cycle hygiene  - Delirium precaution   - Pain Management:  - Continue home regimen   - Q4 Neuro assessments    Respiratory/ENT:  H/O COPD, 65 year smoking hx and active vaping.  Unclear how she consume cannabis   H/O squamous cell carcinoma of right lung currently on  keytruda   Radiographic concern for pneumonitis vs ARDS  - Continuous Pulse oximetry  - Continue HFNC vs NC to maintain Spo2>88%  - Steroids and empiric abs   - Bronchial hygiene and IS    Cardiovascular:  H/O HFpEF, LVEF 60-65% 10/2024  Tachycardia, stress induced    - Continuous Telemetry    Renal/Volume Status (Intra & Extravascular):  Hyponatremia, acute on chronic, 2/2 lung disease   - Monitor I/O's  - Replete electrolytes to maintain K >4.0 and Mg >2.0  - Daily BMP, Mg   - Fluid Net:     GI:  Chronic malnutrition with poor PO intake    - Diet: NPO pending resp status   - Nutrition Consult for rec's  - Low threshold toplace feeding tube   - PPI: home med   - BR: daily and as needed     Infectious Disease:  C/F CAP, COPD exacerbation   - Continue empiric pip-tazo, vanco azithro   - Appreciate ID Consult     Heme/Onc:  H/O squamous cell carcinoma of right lung as above   No known h/o hypercoagulation   - DVT Prophylaxis: SCDs and enoxaparin   - Transfuse for hemoglobin < 7    Endocrine:  No pMHx   - POCT and SSI     OBGYN/Ortho:  - PT/OT when stable    Ethics/Code Status:  Full Code.  I am concerned that she has not reconciled her mortality risk with goals as she seems to vacillate between wanting to prioritize comfort, not wanting intubation, and wanting CPR.  I spoke with her daughter (also named Karly) who confirms that her mom has been inconsistent about goals as well as health choices at home.  I have consulted Palliative Care with hopes of helping the patient clarify the extent of life support interventions she would tolerate if/when indicated.           Lab Draw Frequency:  Daily and as needed     :  DVT Prophylaxis: enoxaparin   GI Prophylaxis: home PPI   Bowel Regimen: senna-colace, polythethyline glycol   Diet: NPO   CVC:  no   [place]: no   [place]: placed 5/19 for urinary retention   Restraints: not indicated     Dispo: ICU with acute hypoxemic respiratory failure     Critical Care Time:   70 minutes of direct patient care management and coordination of care.  Excludes billable procedures        LANDEN Oropeza         [1]   Past Medical History:  Diagnosis Date    Other cervical disc degeneration, unspecified cervical region     Degeneration of cervical intervertebral disc    Other conditions influencing health status     Bulging Disc (L3 - L4)    Other conditions influencing health status     A Fall    Other conditions influencing health status     Bulging Disc (C4 - C5)    Other conditions influencing health status     Bulging Disc (C6 - C7)    Other conditions influencing health status     Bulging Disc (C5 - C6)    Other conditions influencing health status     Lumbar Spondylolisthesis    Other conditions influencing health status     Rotator Cuff Tendon Tear    Other intervertebral disc degeneration, lumbar region     L4-L5 disc bulge    Other intervertebral disc displacement, lumbar region     Disc displacement, lumbar    Other intervertebral disc displacement, lumbosacral region     Displacement of lumbosacral intervertebral disc    Personal history of other diseases of the musculoskeletal system and connective tissue     History of bursitis    Personal history of other diseases of the musculoskeletal system and connective tissue     History of fibromyositis    Personal history of other specified conditions     History of headache    Spinal stenosis, lumbar region without neurogenic claudication     Lumbar canal stenosis    Sprain of ligaments of lumbar spine, initial encounter     Low back sprain   [2]   Past Surgical History:  Procedure Laterality Date    APPENDECTOMY      EYE SURGERY      HYSTERECTOMY      TONSILLECTOMY     [3] (Not in a hospital admission)  [4]   Social History  Tobacco Use    Smoking status: Every Day     Current packs/day: 1.00     Average packs/day: 1 pack/day for 65.4 years (65.4 ttl pk-yrs)     Types: Cigarettes     Start date: 1/1/1960     Passive exposure:  Current   Vaping Use    Vaping status: Some Days   Substance Use Topics    Alcohol use: Not Currently    Drug use: Yes     Types: Marijuana     Comment: vape   [5]   Family History  Problem Relation Name Age of Onset    Cancer Mother      Other (Father had hypertension, stroke, coronary artery disease and diabetes) Father     [6]    [7]   Current Facility-Administered Medications:     acetaminophen (Tylenol) tablet 650 mg, 650 mg, oral, q4h PRN **OR** acetaminophen (Tylenol) oral liquid 650 mg, 650 mg, oral, q4h PRN **OR** acetaminophen (Tylenol) suppository 650 mg, 650 mg, rectal, q4h PRN, Lupillo Mcneil MD    baclofen (Lioresal) tablet 10 mg, 10 mg, oral, TID, Lupillo Mcneil MD    benzocaine-menthol (Cepastat Sore Throat) lozenge 1 lozenge, 1 lozenge, Mouth/Throat, q2h PRN, Lupillo Mcneil MD    butalbital-acetaminophen-caff -40 mg per tablet 1 tablet, 1 tablet, oral, q6h PRN, Lupillo Mcneil MD    cholecalciferol (Vitamin D-3) tablet 125 mcg, 125 mcg, oral, Daily, Lupillo Mcneil MD    dextromethorphan-guaifenesin (Robitussin DM)  mg/5 mL oral liquid 5 mL, 5 mL, oral, q4h PRN, Lupillo Mcneil MD    enoxaparin (Lovenox) syringe 40 mg, 40 mg, subcutaneous, q24h, PO Oropeza-CNP    guaiFENesin (Mucinex) 12 hr tablet 600 mg, 600 mg, oral, q12h PRN, Lupillo Mcneil MD    guaiFENesin (Mucinex) 12 hr tablet 600 mg, 600 mg, oral, BID PRN, Lupillo Mcneil MD    heparin (porcine) injection 5,000 Units, 5,000 Units, subcutaneous, q8h, Lupillo Mcneil MD, 5,000 Units at 05/19/25 0644    ipratropium-albuteroL (Duo-Neb) 0.5-2.5 mg/3 mL nebulizer solution 3 mL, 3 mL, nebulization, q6h, Lupillo Mcneil MD, 3 mL at 05/19/25 0644    lidocaine 4 % patch 1 patch, 1 patch, transdermal, Daily, Lupillo Mcneil MD    lubricating eye drops ophthalmic solution 1 drop, 1 drop, Both Eyes, PRN, Lupillo Mcneil MD    mupirocin (Bactroban) 2 % ointment 1 Application, 1  Application, Topical, BID, Lupillo Mcneil MD    ondansetron (Zofran) tablet 8 mg, 8 mg, oral, q8h PRN, Lupillo Mcneil MD    oxygen (O2) therapy, , inhalation, Continuous - Inhalation, Lupillo Mcneil MD, 40 L/min at 05/19/25 0809    oxygen (O2) therapy, , inhalation, Continuous - Inhalation, PO Oropeza-CNP    pantoprazole (ProtoNix) EC tablet 40 mg, 40 mg, oral, Daily before breakfast, Lupillo Mcneil MD, 40 mg at 05/19/25 0644    piperacillin-tazobactam (Zosyn) 4.5 g in dextrose (iso)  mL, 4.5 g, intravenous, q6h, Lupillo Mcneil MD, Stopped at 05/19/25 0841    polyethylene glycol (Glycolax, Miralax) packet 17 g, 17 g, oral, Daily PRN, Lupillo Mcneil MD    prochlorperazine (Compazine) tablet 10 mg, 10 mg, oral, q6h PRN, Lupillo Mcneil MD    sucralfate (Carafate) suspension 1 g, 1 g, oral, q6h [place], Lupillo Mcneil MD, 1 g at 05/19/25 0644    vancomycin (Vancocin) 1,750 mg in sodium chloride 0.9% 500 mL IV, 1,750 mg, intravenous, q24h, Lupillo Mcneil MD    vancomycin (Vancocin) pharmacy to dose - pharmacy monitoring, , miscellaneous, Daily PRN, Lupillo Mcneil MD    Current Outpatient Medications:     acetaminophen (Tylenol) 325 mg tablet, Take 1 tablet (325 mg) by mouth every 6 hours if needed (pain)., Disp: , Rfl:     baclofen (Lioresal) 10 mg tablet, Take 1 tablet (10 mg) by mouth 3 times a day., Disp: , Rfl:     butalbital-acetaminophen-caff -40 mg tablet, Take 1 tablet by mouth every 6 hours if needed for headaches., Disp: , Rfl:     cholecalciferol, vitamin D3, (VITAMIN D3 ORAL), Take 1 tablet by mouth once daily., Disp: , Rfl:     lactulose 10 gram/15 mL solution, Take 30 mL (20 g) by mouth every 18 hours., Disp: , Rfl:     lidocaine HCL 4 % liquid roll-on, Apply 1 Application topically once daily as needed., Disp: , Rfl:     lubricating eye drops ophthalmic solution, Administer 1 drop into both eyes if needed for dry eyes., Disp: , Rfl:      meprobamate (Equanil) 400 mg tablet, Take 1 tablet (400 mg) by mouth 4 times a day., Disp: , Rfl:     mupirocin (Bactroban) 2 % ointment, Apply 1 Application topically if needed. Apply to affected area as needed, Disp: , Rfl:     nicotine (Nicoderm CQ) 14 mg/24 hr patch, Place 1 patch over 24 hours on the skin once every 24 hours for 28 days. (Patient not taking: Reported on 3/5/2025), Disp: 28 patch, Rfl: 0    ondansetron (Zofran) 8 mg tablet, Take 1 tablet (8 mg) by mouth every 8 hours if needed for nausea or vomiting., Disp: , Rfl:     pantoprazole (ProtoNix) 40 mg EC tablet, Take 1 tablet (40 mg) by mouth once daily in the morning. Take before meals. Do not crush, chew, or split. Continue twice daily x 8 weeks then continue once daily., Disp: , Rfl:     prochlorperazine (Compazine) 10 mg tablet, Take 1 tablet (10 mg) by mouth every 6 hours if needed for nausea or vomiting., Disp: 30 tablet, Rfl: 5    sucralfate (Carafate) 100 mg/mL suspension, Take 10 mL (1 g) by mouth every 6 hours., Disp: , Rfl:

## 2025-05-19 NOTE — PROGRESS NOTES
05/19/25 1527   [facility] Disability Status   Are you deaf or do you have serious difficulty hearing? N   Are you blind or do you have serious difficulty seeing, even when wearing glasses? N   Because of a physical, mental, or emotional condition, do you have serious difficulty concentrating, remembering, or making decisions? (5 years old or older) Y   Do you have serious difficulty walking or climbing stairs? Y   Do you have serious difficulty dressing or bathing? Y   Because of a physical, mental, or emotional condition, do you have serious difficulty doing errands alone such as visiting the doctor? Y

## 2025-05-19 NOTE — PROGRESS NOTES
"Karly Horton is a 80 y.o. female on day 1 of admission presenting with Generalized weakness.    Response from Lacey Bravo with [facility] bed order submitted 05/09/2025:  \"The pcp is needing to sign a cmn and sign the chart notes. So the options would be 1. Wait until the pt is dc then follow up with pcp. Or 2. Hospitality signs a cmn and writes chart notes. Then ot can be set up upon discharge\".    Sharri Dasilva RN    "

## 2025-05-20 ENCOUNTER — TELEPHONE (OUTPATIENT)
Dept: HEMATOLOGY/ONCOLOGY | Facility: CLINIC | Age: 80
End: 2025-05-20
Payer: MEDICARE

## 2025-05-20 ENCOUNTER — APPOINTMENT (OUTPATIENT)
Dept: RADIOLOGY | Facility: HOSPITAL | Age: 80
DRG: 177 | End: 2025-05-20
Payer: MEDICARE

## 2025-05-20 LAB
ALBUMIN SERPL BCP-MCNC: 2.5 G/DL (ref 3.4–5)
ANION GAP SERPL CALCULATED.3IONS-SCNC: 11 MMOL/L (ref 10–20)
APPEARANCE UR: CLEAR
BILIRUB UR STRIP.AUTO-MCNC: NEGATIVE MG/DL
BUN SERPL-MCNC: 12 MG/DL (ref 6–23)
CALCIUM SERPL-MCNC: 8.5 MG/DL (ref 8.6–10.3)
CHLORIDE SERPL-SCNC: 79 MMOL/L (ref 98–107)
CO2 SERPL-SCNC: 42 MMOL/L (ref 21–32)
COLOR UR: NORMAL
CREAT SERPL-MCNC: 0.3 MG/DL (ref 0.5–1.05)
EGFRCR SERPLBLD CKD-EPI 2021: >90 ML/MIN/1.73M*2
ERYTHROCYTE [DISTWIDTH] IN BLOOD BY AUTOMATED COUNT: 14 % (ref 11.5–14.5)
GLUCOSE BLD MANUAL STRIP-MCNC: 155 MG/DL (ref 74–99)
GLUCOSE BLD MANUAL STRIP-MCNC: 196 MG/DL (ref 74–99)
GLUCOSE BLD MANUAL STRIP-MCNC: 232 MG/DL (ref 74–99)
GLUCOSE BLD MANUAL STRIP-MCNC: 92 MG/DL (ref 74–99)
GLUCOSE SERPL-MCNC: 84 MG/DL (ref 74–99)
GLUCOSE UR STRIP.AUTO-MCNC: NORMAL MG/DL
HCT VFR BLD AUTO: 34.7 % (ref 36–46)
HGB BLD-MCNC: 11.5 G/DL (ref 12–16)
KETONES UR STRIP.AUTO-MCNC: NEGATIVE MG/DL
LEGIONELLA AG UR QL: NEGATIVE
LEUKOCYTE ESTERASE UR QL STRIP.AUTO: NEGATIVE
MAGNESIUM SERPL-MCNC: 1.5 MG/DL (ref 1.6–2.4)
MCH RBC QN AUTO: 31.3 PG (ref 26–34)
MCHC RBC AUTO-ENTMCNC: 33.1 G/DL (ref 32–36)
MCV RBC AUTO: 95 FL (ref 80–100)
MRSA DNA SPEC QL NAA+PROBE: NOT DETECTED
NITRITE UR QL STRIP.AUTO: NEGATIVE
NRBC BLD-RTO: 0 /100 WBCS (ref 0–0)
PH UR STRIP.AUTO: 6 [PH]
PHOSPHATE SERPL-MCNC: 3.2 MG/DL (ref 2.5–4.9)
PLATELET # BLD AUTO: 227 X10*3/UL (ref 150–450)
POTASSIUM SERPL-SCNC: 3.1 MMOL/L (ref 3.5–5.3)
PREALB SERPL-MCNC: 6.9 MG/DL (ref 18–40)
PROT UR STRIP.AUTO-MCNC: NEGATIVE MG/DL
RBC # BLD AUTO: 3.67 X10*6/UL (ref 4–5.2)
RBC # UR STRIP.AUTO: NEGATIVE MG/DL
SODIUM SERPL-SCNC: 129 MMOL/L (ref 136–145)
SP GR UR STRIP.AUTO: 1.01
UROBILINOGEN UR STRIP.AUTO-MCNC: NORMAL MG/DL
WBC # BLD AUTO: 18.7 X10*3/UL (ref 4.4–11.3)

## 2025-05-20 PROCEDURE — 87449 NOS EACH ORGANISM AG IA: CPT | Performed by: INTERNAL MEDICINE

## 2025-05-20 PROCEDURE — 87641 MR-STAPH DNA AMP PROBE: CPT | Performed by: INTERNAL MEDICINE

## 2025-05-20 PROCEDURE — 81003 URINALYSIS AUTO W/O SCOPE: CPT | Performed by: INTERNAL MEDICINE

## 2025-05-20 PROCEDURE — 2020000001 HC ICU ROOM DAILY

## 2025-05-20 PROCEDURE — 94640 AIRWAY INHALATION TREATMENT: CPT

## 2025-05-20 PROCEDURE — 2500000004 HC RX 250 GENERAL PHARMACY W/ HCPCS (ALT 636 FOR OP/ED): Mod: JZ

## 2025-05-20 PROCEDURE — 2500000005 HC RX 250 GENERAL PHARMACY W/O HCPCS: Performed by: NURSE PRACTITIONER

## 2025-05-20 PROCEDURE — 99232 SBSQ HOSP IP/OBS MODERATE 35: CPT | Performed by: NURSE PRACTITIONER

## 2025-05-20 PROCEDURE — 99291 CRITICAL CARE FIRST HOUR: CPT | Performed by: NURSE PRACTITIONER

## 2025-05-20 PROCEDURE — 71250 CT THORAX DX C-: CPT

## 2025-05-20 PROCEDURE — 2500000004 HC RX 250 GENERAL PHARMACY W/ HCPCS (ALT 636 FOR OP/ED): Mod: JZ | Performed by: INTERNAL MEDICINE

## 2025-05-20 PROCEDURE — 2500000001 HC RX 250 WO HCPCS SELF ADMINISTERED DRUGS (ALT 637 FOR MEDICARE OP)

## 2025-05-20 PROCEDURE — 37799 UNLISTED PX VASCULAR SURGERY: CPT | Performed by: INTERNAL MEDICINE

## 2025-05-20 PROCEDURE — 92610 EVALUATE SWALLOWING FUNCTION: CPT | Mod: GN | Performed by: PHARMACIST

## 2025-05-20 PROCEDURE — 2500000005 HC RX 250 GENERAL PHARMACY W/O HCPCS

## 2025-05-20 PROCEDURE — 97166 OT EVAL MOD COMPLEX 45 MIN: CPT | Mod: GO

## 2025-05-20 PROCEDURE — 71045 X-RAY EXAM CHEST 1 VIEW: CPT

## 2025-05-20 PROCEDURE — 2500000004 HC RX 250 GENERAL PHARMACY W/ HCPCS (ALT 636 FOR OP/ED)

## 2025-05-20 PROCEDURE — 2500000002 HC RX 250 W HCPCS SELF ADMINISTERED DRUGS (ALT 637 FOR MEDICARE OP, ALT 636 FOR OP/ED): Performed by: INTERNAL MEDICINE

## 2025-05-20 PROCEDURE — 80069 RENAL FUNCTION PANEL: CPT | Performed by: NURSE PRACTITIONER

## 2025-05-20 PROCEDURE — 97163 PT EVAL HIGH COMPLEX 45 MIN: CPT | Mod: GP

## 2025-05-20 PROCEDURE — 99497 ADVNCD CARE PLAN 30 MIN: CPT | Performed by: NURSE PRACTITIONER

## 2025-05-20 PROCEDURE — 82947 ASSAY GLUCOSE BLOOD QUANT: CPT

## 2025-05-20 PROCEDURE — S4991 NICOTINE PATCH NONLEGEND: HCPCS | Performed by: STUDENT IN AN ORGANIZED HEALTH CARE EDUCATION/TRAINING PROGRAM

## 2025-05-20 PROCEDURE — 83735 ASSAY OF MAGNESIUM: CPT

## 2025-05-20 PROCEDURE — 2500000001 HC RX 250 WO HCPCS SELF ADMINISTERED DRUGS (ALT 637 FOR MEDICARE OP): Performed by: NURSE PRACTITIONER

## 2025-05-20 PROCEDURE — 2500000004 HC RX 250 GENERAL PHARMACY W/ HCPCS (ALT 636 FOR OP/ED): Performed by: NURSE PRACTITIONER

## 2025-05-20 PROCEDURE — 71250 CT THORAX DX C-: CPT | Performed by: RADIOLOGY

## 2025-05-20 PROCEDURE — 2500000001 HC RX 250 WO HCPCS SELF ADMINISTERED DRUGS (ALT 637 FOR MEDICARE OP): Performed by: INTERNAL MEDICINE

## 2025-05-20 PROCEDURE — 9420000001 HC RT PATIENT EDUCATION 5 MIN

## 2025-05-20 PROCEDURE — 94664 DEMO&/EVAL PT USE INHALER: CPT

## 2025-05-20 PROCEDURE — 2500000005 HC RX 250 GENERAL PHARMACY W/O HCPCS: Performed by: INTERNAL MEDICINE

## 2025-05-20 PROCEDURE — 86738 MYCOPLASMA ANTIBODY: CPT | Performed by: INTERNAL MEDICINE

## 2025-05-20 PROCEDURE — 84134 ASSAY OF PREALBUMIN: CPT | Mod: WESLAB | Performed by: INTERNAL MEDICINE

## 2025-05-20 PROCEDURE — 87305 ASPERGILLUS AG IA: CPT | Performed by: INTERNAL MEDICINE

## 2025-05-20 PROCEDURE — 71045 X-RAY EXAM CHEST 1 VIEW: CPT | Performed by: RADIOLOGY

## 2025-05-20 PROCEDURE — 2500000002 HC RX 250 W HCPCS SELF ADMINISTERED DRUGS (ALT 637 FOR MEDICARE OP, ALT 636 FOR OP/ED): Performed by: STUDENT IN AN ORGANIZED HEALTH CARE EDUCATION/TRAINING PROGRAM

## 2025-05-20 PROCEDURE — S4991 NICOTINE PATCH NONLEGEND: HCPCS | Performed by: NURSE PRACTITIONER

## 2025-05-20 PROCEDURE — 85027 COMPLETE CBC AUTOMATED: CPT | Performed by: NURSE PRACTITIONER

## 2025-05-20 PROCEDURE — 2500000005 HC RX 250 GENERAL PHARMACY W/O HCPCS: Performed by: STUDENT IN AN ORGANIZED HEALTH CARE EDUCATION/TRAINING PROGRAM

## 2025-05-20 PROCEDURE — 2500000002 HC RX 250 W HCPCS SELF ADMINISTERED DRUGS (ALT 637 FOR MEDICARE OP, ALT 636 FOR OP/ED): Performed by: NURSE PRACTITIONER

## 2025-05-20 PROCEDURE — 70450 CT HEAD/BRAIN W/O DYE: CPT | Performed by: RADIOLOGY

## 2025-05-20 RX ORDER — NICOTINE 7MG/24HR
1 PATCH, TRANSDERMAL 24 HOURS TRANSDERMAL DAILY
Status: DISCONTINUED | OUTPATIENT
Start: 2025-07-15 | End: 2025-05-20

## 2025-05-20 RX ORDER — DIAZEPAM 5 MG/ML
2 INJECTION, SOLUTION INTRAMUSCULAR; INTRAVENOUS ONCE
Status: COMPLETED | OUTPATIENT
Start: 2025-05-20 | End: 2025-05-20

## 2025-05-20 RX ORDER — IBUPROFEN 200 MG
1 TABLET ORAL DAILY
Status: DISCONTINUED | OUTPATIENT
Start: 2025-07-01 | End: 2025-05-20

## 2025-05-20 RX ORDER — NICOTINE 7MG/24HR
1 PATCH, TRANSDERMAL 24 HOURS TRANSDERMAL DAILY
Status: DISCONTINUED | OUTPATIENT
Start: 2025-07-15 | End: 2025-06-02 | Stop reason: HOSPADM

## 2025-05-20 RX ORDER — LIDOCAINE 50 MG/G
1 OINTMENT TOPICAL 3 TIMES DAILY
Status: DISCONTINUED | OUTPATIENT
Start: 2025-05-20 | End: 2025-06-02 | Stop reason: HOSPADM

## 2025-05-20 RX ORDER — ACETAMINOPHEN 325 MG/1
650 TABLET ORAL 3 TIMES DAILY
Status: DISCONTINUED | OUTPATIENT
Start: 2025-05-20 | End: 2025-06-02 | Stop reason: HOSPADM

## 2025-05-20 RX ORDER — IBUPROFEN 200 MG
1 TABLET ORAL DAILY
Status: DISCONTINUED | OUTPATIENT
Start: 2025-05-20 | End: 2025-06-02 | Stop reason: HOSPADM

## 2025-05-20 RX ORDER — MAGNESIUM SULFATE HEPTAHYDRATE 40 MG/ML
2 INJECTION, SOLUTION INTRAVENOUS ONCE
Status: COMPLETED | OUTPATIENT
Start: 2025-05-20 | End: 2025-05-20

## 2025-05-20 RX ORDER — IBUPROFEN 200 MG
1 TABLET ORAL DAILY
Status: DISCONTINUED | OUTPATIENT
Start: 2025-07-01 | End: 2025-06-02 | Stop reason: HOSPADM

## 2025-05-20 RX ORDER — IBUPROFEN 200 MG
1 TABLET ORAL DAILY
Status: DISCONTINUED | OUTPATIENT
Start: 2025-05-20 | End: 2025-05-20

## 2025-05-20 RX ORDER — LORAZEPAM 2 MG/ML
0.5 INJECTION INTRAMUSCULAR ONCE
Status: COMPLETED | OUTPATIENT
Start: 2025-05-20 | End: 2025-05-20

## 2025-05-20 RX ORDER — POTASSIUM CHLORIDE 14.9 MG/ML
20 INJECTION INTRAVENOUS
Status: COMPLETED | OUTPATIENT
Start: 2025-05-20 | End: 2025-05-20

## 2025-05-20 RX ORDER — LIDOCAINE 50 MG/G
OINTMENT TOPICAL 3 TIMES DAILY PRN
Status: DISCONTINUED | OUTPATIENT
Start: 2025-05-20 | End: 2025-05-20

## 2025-05-20 RX ORDER — LIDOCAINE 560 MG/1
1 PATCH PERCUTANEOUS; TOPICAL; TRANSDERMAL 3 TIMES DAILY PRN
Status: DISCONTINUED | OUTPATIENT
Start: 2025-05-20 | End: 2025-05-20

## 2025-05-20 RX ADMIN — IPRATROPIUM BROMIDE AND ALBUTEROL SULFATE 3 ML: 2.5; .5 SOLUTION RESPIRATORY (INHALATION) at 06:57

## 2025-05-20 RX ADMIN — PIPERACILLIN SODIUM AND TAZOBACTAM SODIUM 4.5 G: 4; .5 INJECTION, SOLUTION INTRAVENOUS at 08:45

## 2025-05-20 RX ADMIN — LORAZEPAM 0.5 MG: 2 INJECTION INTRAMUSCULAR; INTRAVENOUS at 01:44

## 2025-05-20 RX ADMIN — BACLOFEN 10 MG: 10 TABLET ORAL at 09:01

## 2025-05-20 RX ADMIN — DIAZEPAM 2 MG: 5 INJECTION, SOLUTION INTRAMUSCULAR; INTRAVENOUS at 21:51

## 2025-05-20 RX ADMIN — SUCRALFATE ORAL SUSPENSION 1 G: 1 SUSPENSION ORAL at 18:45

## 2025-05-20 RX ADMIN — PIPERACILLIN SODIUM AND TAZOBACTAM SODIUM 4.5 G: 4; .5 INJECTION, SOLUTION INTRAVENOUS at 13:35

## 2025-05-20 RX ADMIN — Medication 60 PERCENT: at 19:21

## 2025-05-20 RX ADMIN — PIPERACILLIN SODIUM AND TAZOBACTAM SODIUM 4.5 G: 4; .5 INJECTION, SOLUTION INTRAVENOUS at 18:40

## 2025-05-20 RX ADMIN — Medication 40 L/MIN: at 15:21

## 2025-05-20 RX ADMIN — PIPERACILLIN SODIUM AND TAZOBACTAM SODIUM 4.5 G: 4; .5 INJECTION, SOLUTION INTRAVENOUS at 01:47

## 2025-05-20 RX ADMIN — SUCRALFATE ORAL SUSPENSION 1 G: 1 SUSPENSION ORAL at 05:28

## 2025-05-20 RX ADMIN — MUPIROCIN 1 APPLICATION: 20 OINTMENT TOPICAL at 10:30

## 2025-05-20 RX ADMIN — POTASSIUM CHLORIDE 20 MEQ: 14.9 INJECTION, SOLUTION INTRAVENOUS at 05:28

## 2025-05-20 RX ADMIN — POTASSIUM CHLORIDE 20 MEQ: 14.9 INJECTION, SOLUTION INTRAVENOUS at 08:34

## 2025-05-20 RX ADMIN — Medication 40 L/MIN: at 10:33

## 2025-05-20 RX ADMIN — DAKIN'S SOLUTION 0.125% (QUARTER STRENGTH): 0.12 SOLUTION at 18:41

## 2025-05-20 RX ADMIN — BACLOFEN 10 MG: 10 TABLET ORAL at 16:26

## 2025-05-20 RX ADMIN — LIDOCAINE 1 APPLICATION: 50 OINTMENT TOPICAL at 17:04

## 2025-05-20 RX ADMIN — ENOXAPARIN SODIUM 40 MG: 40 INJECTION SUBCUTANEOUS at 09:00

## 2025-05-20 RX ADMIN — MUPIROCIN 1 APPLICATION: 20 OINTMENT TOPICAL at 22:58

## 2025-05-20 RX ADMIN — METHYLPREDNISOLONE SODIUM SUCCINATE 40 MG: 40 INJECTION, POWDER, FOR SOLUTION INTRAMUSCULAR; INTRAVENOUS at 16:26

## 2025-05-20 RX ADMIN — Medication 125 MCG: at 09:00

## 2025-05-20 RX ADMIN — LIDOCAINE 1 APPLICATION: 50 OINTMENT TOPICAL at 23:00

## 2025-05-20 RX ADMIN — ACETAMINOPHEN 650 MG: 325 TABLET ORAL at 18:40

## 2025-05-20 RX ADMIN — Medication 100 PERCENT: at 00:00

## 2025-05-20 RX ADMIN — SUCRALFATE ORAL SUSPENSION 1 G: 1 SUSPENSION ORAL at 13:35

## 2025-05-20 RX ADMIN — NICOTINE 1 PATCH: 21 PATCH, EXTENDED RELEASE TRANSDERMAL at 01:51

## 2025-05-20 RX ADMIN — NICOTINE 1 PATCH: 21 PATCH, EXTENDED RELEASE TRANSDERMAL at 09:00

## 2025-05-20 RX ADMIN — IPRATROPIUM BROMIDE AND ALBUTEROL SULFATE 3 ML: 2.5; .5 SOLUTION RESPIRATORY (INHALATION) at 19:18

## 2025-05-20 RX ADMIN — Medication 70 PERCENT: at 03:25

## 2025-05-20 RX ADMIN — AZITHROMYCIN DIHYDRATE 250 MG: 250 TABLET ORAL at 09:02

## 2025-05-20 RX ADMIN — Medication 40 L/MIN: at 06:59

## 2025-05-20 RX ADMIN — MAGNESIUM SULFATE IN WATER 2 G: 40 INJECTION, SOLUTION INTRAVENOUS at 06:16

## 2025-05-20 RX ADMIN — DIAZEPAM 2 MG: 5 INJECTION, SOLUTION INTRAMUSCULAR; INTRAVENOUS at 09:00

## 2025-05-20 RX ADMIN — COLLAGENASE SANTYL: 250 OINTMENT TOPICAL at 18:40

## 2025-05-20 RX ADMIN — IPRATROPIUM BROMIDE AND ALBUTEROL SULFATE 3 ML: 2.5; .5 SOLUTION RESPIRATORY (INHALATION) at 01:33

## 2025-05-20 ASSESSMENT — COGNITIVE AND FUNCTIONAL STATUS - GENERAL
PERSONAL GROOMING: TOTAL
DRESSING REGULAR LOWER BODY CLOTHING: TOTAL
DRESSING REGULAR UPPER BODY CLOTHING: TOTAL
STANDING UP FROM CHAIR USING ARMS: TOTAL
WALKING IN HOSPITAL ROOM: TOTAL
CLIMB 3 TO 5 STEPS WITH RAILING: TOTAL
TOILETING: TOTAL
MOVING FROM LYING ON BACK TO SITTING ON SIDE OF FLAT BED WITH BEDRAILS: TOTAL
MOBILITY SCORE: 6
DAILY ACTIVITIY SCORE: 8
MOVING TO AND FROM BED TO CHAIR: TOTAL
TURNING FROM BACK TO SIDE WHILE IN FLAT BAD: TOTAL
EATING MEALS: A LITTLE
HELP NEEDED FOR BATHING: TOTAL

## 2025-05-20 ASSESSMENT — PAIN SCALES - GENERAL
PAINLEVEL_OUTOF10: 0 - NO PAIN
PAINLEVEL_OUTOF10: 0 - NO PAIN
PAINLEVEL_OUTOF10: 5 - MODERATE PAIN
PAINLEVEL_OUTOF10: 0 - NO PAIN
PAINLEVEL_OUTOF10: 5 - MODERATE PAIN
PAINLEVEL_OUTOF10: 5 - MODERATE PAIN

## 2025-05-20 ASSESSMENT — ACTIVITIES OF DAILY LIVING (ADL)
ADLS_ADDRESSED: NO
BATHING_ASSISTANCE: TOTAL
ADL_ASSISTANCE: NEEDS ASSISTANCE

## 2025-05-20 ASSESSMENT — PAIN - FUNCTIONAL ASSESSMENT
PAIN_FUNCTIONAL_ASSESSMENT: FLACC (FACE, LEGS, ACTIVITY, CRY, CONSOLABILITY)
PAIN_FUNCTIONAL_ASSESSMENT: 0-10
PAIN_FUNCTIONAL_ASSESSMENT: CPOT (CRITICAL CARE PAIN OBSERVATION TOOL)
PAIN_FUNCTIONAL_ASSESSMENT: 0-10
PAIN_FUNCTIONAL_ASSESSMENT: 0-10
PAIN_FUNCTIONAL_ASSESSMENT: CPOT (CRITICAL CARE PAIN OBSERVATION TOOL)
PAIN_FUNCTIONAL_ASSESSMENT: FLACC (FACE, LEGS, ACTIVITY, CRY, CONSOLABILITY)

## 2025-05-20 NOTE — CARE PLAN
The patient's goals for the shift include      The clinical goals for the shift include remain hemodynamically stable, decrease anxiety      Problem: Pain - Adult  Goal: Verbalizes/displays adequate comfort level or baseline comfort level  Outcome: Progressing     Problem: Safety - Adult  Goal: Free from fall injury  Outcome: Progressing     Problem: Discharge Planning  Goal: Discharge to home or other facility with appropriate resources  Outcome: Progressing     Problem: Chronic Conditions and Co-morbidities  Goal: Patient's chronic conditions and co-morbidity symptoms are monitored and maintained or improved  Outcome: Progressing     Problem: Nutrition  Goal: Nutrient intake appropriate for maintaining nutritional needs  Outcome: Progressing     Problem: Skin  Goal: Decreased wound size/increased tissue granulation at next dressing change  Outcome: Progressing  Flowsheets (Taken 5/20/2025 1217)  Decreased wound size/increased tissue granulation at next dressing change:   Promote sleep for wound healing   Utilize specialty bed per algorithm   Protective dressings over bony prominences  Goal: Participates in plan/prevention/treatment measures  Outcome: Progressing  Flowsheets (Taken 5/20/2025 1217)  Participates in plan/prevention/treatment measures:   Discuss with provider PT/OT consult   Elevate heels  Goal: Prevent/manage excess moisture  Outcome: Progressing  Flowsheets (Taken 5/20/2025 1217)  Prevent/manage excess moisture:   Cleanse incontinence/protect with barrier cream   Moisturize dry skin   Follow provider orders for dressing changes  Goal: Prevent/minimize sheer/friction injuries  Outcome: Progressing  Flowsheets (Taken 5/20/2025 1217)  Prevent/minimize sheer/friction injuries:   Complete micro-shifts as needed if patient unable. Adjust patient position to relieve pressure points, not a full turn   Increase activity/out of bed for meals   Use pull sheet  Goal: Promote/optimize nutrition  Outcome:  Progressing  Flowsheets (Taken 5/20/2025 1217)  Promote/optimize nutrition:   Assist with feeding   Monitor/record intake including meals   Consume > 50% meals/supplements  Goal: Promote skin healing  Outcome: Progressing  Flowsheets (Taken 5/20/2025 1217)  Promote skin healing:   Assess skin/pad under line(s)/device(s)   Protective dressings over bony prominences   Turn/reposition every 2 hours/use positioning/transfer devices     Problem: Respiratory  Goal: Clear secretions with interventions this shift  Outcome: Progressing  Goal: Minimize anxiety/maximize coping throughout shift  Outcome: Progressing  Goal: Minimal/no exertional discomfort or dyspnea this shift  Outcome: Progressing  Goal: No signs of respiratory distress (eg. Use of accessory muscles. Peds grunting)  Outcome: Progressing  Goal: Patent airway maintained this shift  Outcome: Progressing  Goal: Tolerate mechanical ventilation evidenced by VS/agitation level this shift  Outcome: Progressing  Goal: Tolerate pulmonary toileting this shift  Outcome: Progressing  Goal: Verbalize decreased shortness of breath this shift  Outcome: Progressing  Goal: Wean oxygen to maintain O2 saturation per order/standard this shift  Outcome: Progressing  Goal: Increase self care and/or family involvement in next 24 hours  Outcome: Progressing

## 2025-05-20 NOTE — PROGRESS NOTES
Speech-Language Pathology Clinical Swallow Evaluation    Patient Name: Karly Horton  MRN: [id number]  : 1945  Today's Date: 25  Start Time: 1531  Stop Time: 1554  Time Calculation (min): 23 min      ASSESSMENT  Impressions:  No suspected pharyngeal phase dysphagia based on clinical swallow evaluation. Pt with mild oral phase deficits secondary to absent dentition.    SLP will follow up x1 to ensure ongoing safety with current diet and provide any additional recommendations as needed.  Prognosis: Good    PLAN    RECOMMENDATIONS:  Is MBSS recommended? No; no pharyngeal dysphagia suspected.  Solid consistency: Soft/bite-sized (IDDSI level 6)  Liquid consistency: Thin (IDDSI 0)  Medication administration: Whole in thin liquid, Per nursing discretion    Compensatory swallow strategies:  - Upright positioning for all PO intake  - Slow rate of intake  - Chew thoroughly    Recommended frequency/duration:  Skilled SLP services recommended: Yes  Frequency: 1x/week  Duration: x1 follow-up visit to ensure ongoing safety with current diet  Discharge recommendation: Home with no further SLP  Treatment/Interventions: Assess diet tolerance, Diet recommendations, Bolus trials, Patient/family education  Strengths: Low severity of deficits  Barriers to participation in tx: Comorbidities    Goals (start date 2025):  - Pt will consume prescribed diet (current diet is soft bites/thin) without overt s/sx aspiration/penetration in 95% of observed trials.    - Pt will consume trials of upgraded textures without overt s/sx aspiration in order for consideration of diet texture upgrade (if appropriate).      SUBJECTIVE    PMHx relevant to rehab: lung cancer    Chief complaint: Pt was admitted on 25 due to   Chief Complaint   Patient presents with    Weakness, Gen     Patient bib ems for weakness, lives at home with her daughter, had a low pox and was given oxygen.  Symptoms began two days ago. She is also being  treated for an ulcer on her lower back/coccyx.   . She was found to have acute on chronic respiratory failure in the setting of COPD exacerbation and possible PNA.    Relevant imaging results:  CT chest 5/20/25: Lung parenchyma demonstrates moderate right and mild-to-moderate  left-sided pleural effusions. There is basilar compressive  atelectasis with right lower lobar collapse. Endobronchial  opacification of the atelectatic right lower lobe as well as the  bronchus intermedius. Correlate with aspiration as a potential  etiology versus retained secretions. There is septal thickening and  patchy consolidation within the right middle lobe as well as right  upper lobe in particular of the posterior segment with component of  air bronchograms. Left lung demonstrates scattered areas of patchy  infiltrate in the left upper lobe.    General Visit Information:  SLP Received On: 05/20/25  Patient Class: Inpatient  Living Environment: Home  Ordering Physician: LANDEN Watkins  Reason for Referral: Swallow evaluation, rule out aspiration  Prior to Session Communication: Bedside nurse    RN cleared pt to participate in session and reported occasional coughing this date.    Pt reported no difficulty chewing or swallowing.    Date of Onset: 05/19/25  Date of Order: 05/20/25  BaseLine Diet: Regular/thin  Current Diet : Regular. soft/bite-sized solids/thin liquids    Status at time of evaluation:  Pain Assessment  Pain Assessment: 0-10  0-10 (Numeric) Pain Score: 5 - Moderate pain  Pain Type: Chronic pain  Pain Location: Neck  Pain Interventions: Repositioned    Pt was alert and cooperative for session.  Orientation: Oriented to self, Oriented to location, Oriented to situation, and Did not assess orientation to time  Ability to follow functional commands: Follows complex commands after some simplification/repetition  Nutritional status: Malnutrition documented in EMR and Cachetic    Respiratory status: Supplemental  oxygen via HFNC 40lpm  Baseline Vocal Quality: Dysphonic, Weak  Volitional Cough:  (did not test)  Volitional Swallow: Within Functional Limits  Patient positioning: Upright in bed      OBJECTIVE  Clinical swallow evaluation completed and consisted of interview, oral motor assessment, and PO trials (thin liquids ~5oz via straw, applesauce x5 bites, colt cracker softened in applesauce x1 bite). Pt declined further trials of colt cracker.    ORAL PHASE: Pt is edentulous (she has dentures but never wears them). Oral mucosa were pink, moist, and free of obvious lesions. Lingual/labial strength and ROM were mildly diminished bilaterally. Mastication of soft solids was mildly prolonged but adequate. A/P transit and oral clearance were prompt and complete.    PHARYNGEAL PHASE: Laryngeal elevation was visualized or palpated with all trials, however adequacy of hyolaryngeal elevation/excursion cannot be determined at bedside. No immediate or delayed s/sx aspiration/penetration were observed with any consistencies.    Was 3oz challenge administered: Yes; pt drank 3oz of thin liquid in one attempt, without breaking, with no overt s/sx aspiration/penetration observed.     Treatment/Education:  Results and recommendations were relayed to: Patient and Bedside nurse  Education provided: Yes   Learner: Patient   Barriers to learning: None   Method of teaching: Verbal   Topic: role of ST, results of assessment, and risk for aspiration   Outcome of teaching: Pt verbalized understanding  Treatment provided: No  Next Treatment Priority: Assess for safety to upgrade to ETC/regular textures; ensure ongoing safety with thin liquids

## 2025-05-20 NOTE — CARE PLAN
Problem: Respiratory  Goal: No signs of respiratory distress (eg. Use of accessory muscles. Peds grunting)  5/20/2025 0203 by Kerrie Mckeon, RRT  Outcome: Progressing  5/19/2025 2042 by Kerrie Mckeon, RRT  Outcome: Progressing

## 2025-05-20 NOTE — CARE PLAN
Problem: Respiratory  Goal: No signs of respiratory distress (eg. Use of accessory muscles. Peds grunting)  5/20/2025 0350 by Kerrie Mckeon, RRT  Outcome: Progressing  5/20/2025 0350 by Kerrie Mckeon, RRT  Outcome: Progressing  5/20/2025 0203 by Kerrie Mckeon, RRT  Outcome: Progressing  5/19/2025 2042 by Kerrie Mckeon, RRT  Outcome: Progressing

## 2025-05-20 NOTE — PROGRESS NOTES
"Karly Horton is a 80 y.o. female on day 2 of admission presenting with Generalized weakness.    Subjective   Interval History:   Patient seen and examined  Complains of pain at sacral ulcer site-wants codeine  Remains on high flow oxygen  Nonproductive cough  No chest pain  No nausea vomiting or diarrhea    Review of Systems   All other systems reviewed and are negative.      Objective   Range of Vitals (last 24 hours)  Heart Rate:  []   Temp:  [36.4 °C (97.5 °F)-36.9 °C (98.4 °F)]   Resp:  [8-35]   BP: ()/()   Height:  [142.2 cm (4' 8\")]   Weight:  [34.3 kg (75 lb 9.9 oz)]   SpO2:  [80 %-99 %]   Daily Weight  05/19/25 : (!) 34.3 kg (75 lb 9.9 oz)    Body mass index is 16.95 kg/m².    Physical Exam  Constitutional:       Appearance: She is awake, alert  HENT:      Head: Normocephalic and atraumatic.      Right Ear: External ear normal.      Left Ear: External ear normal.      Nose: Nose normal.   Eyes:      General: No scleral icterus.     Extraocular Movements: Extraocular movements intact.      Conjunctiva/sclera: Conjunctivae normal.   Cardiovascular:      Rate and Rhythm: Regular rhythm. Tachycardia present.      Heart sounds: Normal heart sounds.   Pulmonary:      Breath sounds: Decreased breath sounds present.   Abdominal:      General: Bowel sounds are normal.      Palpations: Abdomen is soft.      Tenderness: There is no abdominal tenderness.   Musculoskeletal:      Cervical back: Normal range of motion and neck supple.      Right lower leg: No edema.      Left lower leg: No edema.   Skin:     General: Skin is warm and dry.      Comments: Stage III/IV sacral ulcer per picture, stage III right buttock pressure   Neurological:      Mental Status: She is alert.   Psychiatric:         Behavior: Behavior normal. Behavior is cooperative.     Antibiotics  azithromycin - 250 mg, 250 mg  mupirocin - 2 %, 2 %  piperacillin-tazobactam - 4.5 gram/100 mL    Relevant Results  Labs  Results from last " 72 hours   Lab Units 05/20/25 0404 05/19/25 0723 05/18/25  1704   WBC AUTO x10*3/uL 18.7* 14.7* 9.1   HEMOGLOBIN g/dL 11.5* 12.2 11.9*   HEMATOCRIT % 34.7* 36.7 37.3   PLATELETS AUTO x10*3/uL 227 268 253   NEUTROS PCT AUTO %  --   --  88.5   LYMPHS PCT AUTO %  --   --  2.2   MONOS PCT AUTO %  --   --  8.1   EOS PCT AUTO %  --   --  0.3     Results from last 72 hours   Lab Units 05/20/25 0404 05/19/25 0723 05/18/25  1704   SODIUM mmol/L 129* 122* 129*   POTASSIUM mmol/L 3.1* 3.6 3.2*   CHLORIDE mmol/L 79* 77* 81*   CO2 mmol/L 42* 38* 42*   BUN mg/dL 12 11 17   CREATININE mg/dL 0.30* 0.30* 0.32*   GLUCOSE mg/dL 84 96 104*   CALCIUM mg/dL 8.5* 8.4* 8.8   ANION GAP mmol/L 11 11 9*   EGFR mL/min/1.73m*2 >90 >90 >90   PHOSPHORUS mg/dL 3.2  --   --      Results from last 72 hours   Lab Units 05/20/25 0404 05/19/25 0723 05/18/25  1704   ALK PHOS U/L  --  102 107   BILIRUBIN TOTAL mg/dL  --  0.7 0.4   PROTEIN TOTAL g/dL  --  6.0* 6.2*   ALT U/L  --  14 16   AST U/L  --  21 23   ALBUMIN g/dL 2.5* 2.8* 2.8*     Estimated Creatinine Clearance: 81.4 mL/min (A) (by C-G formula based on SCr of 0.3 mg/dL (L)).  C-Reactive Protein   Date Value Ref Range Status   05/18/2025 14.44 (H) <1.00 mg/dL Final     Microbiology  Reviewed-blood cultures pending  Imaging  XR chest 1 view  Result Date: 5/20/2025  Interpreted By:  Hanane Fine, STUDY: XR CHEST 1 VIEW 5/20/2025 5:21 am   INDICATION: Signs/Symptoms:acute hypoxemic respiratory failure   COMPARISON: 05/18/2025   ACCESSION NUMBER(S): [id number]   ORDERING CLINICIAN: CLIF MENA   TECHNIQUE: AP semi-erect view of the chest at bedside   FINDINGS: The MediPort catheter terminates within the SVC. The cardiac size is stable with atherosclerosis of the thoracic aorta noted.   When compared with the study from 2 days earlier, there is worsening infiltrate and airspace consolidation within the right lung particularly in the right upper lobe with right pleural effusion increased in  size.   Small left pleural effusion is present. The interstitial prominence observed 2 days earlier has improved however.   Levoscoliosis of the thoracic spine is present.       Improved interstitial prominence with stable small left pleural effusion.   Worsening infiltrate with airspace consolidation now seen in the right upper lobe with right pleural effusion increased in size.   Signed by: Hanane Fine 5/20/2025 7:53 AM Dictation workstation:   [id number]    CT head wo IV contrast  Result Date: 5/20/2025  Interpreted By:  Kanika Avina, STUDY: CT HEAD WO IV CONTRAST;  5/20/2025 12:06 am   INDICATION: Signs/Symptoms:unequal pupils.   COMPARISON: MRI brain 10/18/2024   ACCESSION NUMBER(S): [id number]   ORDERING CLINICIAN: JANET HALEY   TECHNIQUE: Axial noncontrast CT images of the head.   FINDINGS: BRAIN PARENCHYMA:  deep and periventricular white matter hypodensities are nonspecific, but favored to represent chronic small vessel ischemic changes.  Gray-white matter interfaces are preserved. No mass effect or midline shift.   HEMORRHAGE: No acute intracranial hemorrhage. VENTRICLES and EXTRA-AXIAL SPACES: The ventricles and sulci are within normal limits in size for brain volume. No abnormal extraaxial fluid collection. EXTRACRANIAL SOFT TISSUES: Within normal limits. PARANASAL SINUSES/MASTOIDS:  The visualized paranasal sinuses and mastoid air cells are aerated. CALVARIUM: No depressed skull fracture. No destructive osseous lesion.   OTHER FINDINGS: None.       No acute intracranial abnormality.     MACRO: None   Signed by: Kanika Avina 5/20/2025 12:31 AM Dictation workstation:   [id number]    XR chest 1 view  Result Date: 5/18/2025  Interpreted By:  Lenka Cartagena, STUDY: XR CHEST 1 VIEW;  5/18/2025 6:36 pm   INDICATION: Signs/Symptoms:Generalized weakness.   COMPARISON: Chest x-ray 04/29/2025   ACCESSION NUMBER(S): [id number]   ORDERING CLINICIAN: JAIME CHILDERS   FINDINGS: Right-sided MediPort  terminates in the SVC. Multiple overlying leads are present.   CARDIOMEDIASTINAL SILHOUETTE: Cardiomediastinal silhouette is stable in size and configuration. Atherosclerotic calcification of the aorta.   LUNGS: Bilateral interstitial prominence. There is right middle and lower lobe patchy airspace opacities. Layering bilateral effusions are better seen on concurrent CT. No pneumothorax.   ABDOMEN: No remarkable upper abdominal findings.   BONES: Levoconvex scoliosis. Generalized diffuse osteopenia. Multilevel degenerative changes of the spine.       There is patchy airspace opacity in the right mid and lower lung concerning for developing multifocal pneumonia. Continued radiographic follow-up to resolution is advised.   Cardiomegaly with mild interstitial prominence suggestive of developing interstitial edema/CHF.   Bilateral pleural effusions are better seen on concurrent CT.   MACRO: None   Signed by: Lenka Cartagena 5/18/2025 7:06 PM Dictation workstation:   [id number]    CT abdomen pelvis wo IV contrast  Result Date: 5/18/2025  Interpreted By:  Lenka Cartagena, STUDY: CT ABDOMEN PELVIS WO IV CONTRAST;  5/18/2025 5:51 pm   INDICATION: Signs/Symptoms:Sacral wound.   COMPARISON: CT scan of the abdomen pelvis 02/14/2025   ACCESSION NUMBER(S): [id number]   ORDERING CLINICIAN: JAIME CHILDERS   TECHNIQUE: Axial noncontrast CT images of the abdomen and pelvis with coronal and sagittal reconstructed images.   FINDINGS:   LOWER CHEST: Moderate bilateral pleural effusions. There are patchy airspace opacities and consolidation in the right lung. Aortic root and coronary artery calcifications noted. Right-sided MediPort terminates in the SVC.   ABDOMEN: Lack of intravenous contrast limits evaluation of vessels and solid organs. LIVER: Within normal limits. BILE DUCTS: Normal caliber. GALLBLADDER: No calcified gallstones. No wall thickening. PANCREAS: Suboptimally visualized due to lack of contrast and paucity of  intra-abdominal fat SPLEEN: Within normal limits.  Calcified splenic granulomas noted. ADRENALS: Nodular thickening of the adrenal glands may relate to hyperplasia or adenomatous change.   KIDNEYS, URETERS, URINARY BLADDER:  Kidneys are symmetric in size without evidence for calculi, hydronephrosis, hydroureter or perinephric inflammatory changes.   No bladder calculi or wall thickening.  Marked distention of the urinary bladder.   VESSELS:  Calcific atherosclerosis of the aortoiliac and branch vessels with moderate tortuosity. No aortic aneurysm. RETROPERITONEUM: No pathologically enlarged lymph nodes.   PELVIS:   REPRODUCTIVE ORGANS: Uterus and ovaries are not well visualized.   BOWEL: Postsurgical changes of partial colectomy. No dilated bowel. Appendix is not identified with certainty. No pericecal inflammatory change is seen. No pneumatosis or portal venous gas. PERITONEUM: Paucity of intra-abdominal fat. Mild diffuse haziness of the mesentery. ABDOMINAL WALL: Diffuse body wall edema. There is soft tissue defect overlying the distal sacrum and coccyx. Mild adjacent soft tissue stranding with a tiny locule of air in the right gluteal subcutaneous fat. A discrete drainable fluid collection is not identified. Findings concerning for sacral decubitus ulcer. BONES: Generalized diffuse osteopenia. Multilevel degenerative changes of the spine. Reverse s shaped scoliosis. Great 1 anterolisthesis are L3 on 4 and L5 on S1 with grade 1-2 anterolisthesis of L4 on 5.       There is soft tissue defect overlying the distal sacrum and coccyx with mild soft tissue stranding. A tiny locule of air is present in the right gluteal subcutaneous fat, without evidence for discrete drainable fluid collection. Findings concerning for sacral decubitus ulcer. Correlate with exam.   Moderate bilateral pleural effusions. There are patchy airspace opacities and consolidation in the right lung concerning for developing multifocal pneumonia.  Clinical correlation continued radiographic follow-up to resolution is advised.   Marked distention of the urinary bladder. Correlate with symptomatology to exclude urinary retention.   Diffuse body wall edema.   Additional findings as described above.   MACRO: None   Signed by: Lenka Cartagena 5/18/2025 6:31 PM Dictation workstation:   [id number]    ECG 12 Lead  Result Date: 5/5/2025  Sinus tachycardia with frequent Premature ventricular complexes Left anterior fascicular block ST & T wave abnormality, consider inferior ischemia Abnormal ECG T wave inversion now evident in Lateral leads Confirmed by Everardo Webster ([id number]) on 5/5/2025 4:08:58 PM    XR chest 2 views  Result Date: 4/29/2025  STUDY: Chest Radiographs;  4/29/2025 4:37PM INDICATION: Shortness of breath. COMPARISON: 3/5/2025 CT CTA Chest. ACCESSION NUMBER(S): [id number] ORDERING CLINICIAN: RONALD BLANTON TECHNIQUE:  Frontal and lateral chest. FINDINGS: CARDIOMEDIASTINAL SILHOUETTE: Heart is mildly enlarged with pulmonary vascular congestion and small bilateral pleural effusions.  LUNGS: Lungs are clear.  ABDOMEN: No remarkable upper abdominal findings.  BONES: No acute osseous changes.    Cardiomegaly and pulmonary vascular congestion with small bilateral pleural effusions. Signed by Ronal Isaac MD      Assessment/Plan   Acute hypoxic respiratory failure, on high flow  Squamous cell lung cancer status post chemoradiation, on Keytruda  Leukocytosis, multifactorial, interval increase  History of COPD  Stage III/IV sacral decubitus ulcer     Continue Zosyn  Continue azithromycin  Atypical workup, Legionella, mycoplasma  Fungal workup, Fungitell, galactomannan, histoplasma  Supportive care  Local care of sacral ulcer  Offloading  Prealbumin level  Oxygen as needed  Monitor temperature and WBC  Further recommendations based on culture results     This is a complex infectious disease issue and the following was performed today (for more details please see the  above note): Management decisions reflecting the added complexity (e.g., changes in antimicrobial therapy, infection control strategies).    Bebo Yen MD

## 2025-05-20 NOTE — CARE PLAN
Problem: Pain - Adult  Goal: Verbalizes/displays adequate comfort level or baseline comfort level  Outcome: Progressing     Problem: Safety - Adult  Goal: Free from fall injury  Outcome: Progressing     Problem: Discharge Planning  Goal: Discharge to home or other facility with appropriate resources  Outcome: Progressing     Problem: Chronic Conditions and Co-morbidities  Goal: Patient's chronic conditions and co-morbidity symptoms are monitored and maintained or improved  Outcome: Progressing     Problem: Nutrition  Goal: Nutrient intake appropriate for maintaining nutritional needs  Outcome: Progressing     Problem: Skin  Goal: Decreased wound size/increased tissue granulation at next dressing change  Outcome: Progressing  Flowsheets (Taken 5/20/2025 0012)  Decreased wound size/increased tissue granulation at next dressing change:   Promote sleep for wound healing   Utilize specialty bed per algorithm   Protective dressings over bony prominences  Goal: Participates in plan/prevention/treatment measures  Outcome: Progressing  Flowsheets (Taken 5/20/2025 0012)  Participates in plan/prevention/treatment measures:   Discuss with provider PT/OT consult   Elevate heels  Goal: Prevent/manage excess moisture  Outcome: Progressing  Flowsheets (Taken 5/20/2025 0012)  Prevent/manage excess moisture:   Cleanse incontinence/protect with barrier cream   Follow provider orders for dressing changes   Monitor for/manage infection if present  Goal: Prevent/minimize sheer/friction injuries  Outcome: Progressing  Flowsheets (Taken 5/20/2025 0012)  Prevent/minimize sheer/friction injuries:   Use pull sheet   HOB 30 degrees or less   Turn/reposition every 2 hours/use positioning/transfer devices   Utilize specialty bed per algorithm  Goal: Promote/optimize nutrition  Outcome: Progressing  Flowsheets (Taken 5/20/2025 0012)  Promote/optimize nutrition:   Monitor/record intake including meals   Discuss with provider if NPO > 2  days  Goal: Promote skin healing  Outcome: Progressing  Flowsheets (Taken 5/20/2025 0012)  Promote skin healing:   Assess skin/pad under line(s)/device(s)   Protective dressings over bony prominences   Turn/reposition every 2 hours/use positioning/transfer devices   Ensure correct size (line/device) and apply per  instructions   Rotate device position/do not position patient on device     Problem: Respiratory  Goal: Clear secretions with interventions this shift  Outcome: Progressing  Goal: Minimize anxiety/maximize coping throughout shift  Outcome: Progressing  Goal: Minimal/no exertional discomfort or dyspnea this shift  Outcome: Progressing  Goal: No signs of respiratory distress (eg. Use of accessory muscles. Peds grunting)  Outcome: Progressing  Goal: Patent airway maintained this shift  Outcome: Progressing  Goal: Tolerate mechanical ventilation evidenced by VS/agitation level this shift  Outcome: Progressing  Goal: Tolerate pulmonary toileting this shift  Outcome: Progressing  Goal: Verbalize decreased shortness of breath this shift  Outcome: Progressing  Goal: Wean oxygen to maintain O2 saturation per order/standard this shift  Outcome: Progressing  Goal: Increase self care and/or family involvement in next 24 hours  Outcome: Progressing   The patient's goals for the shift include      The clinical goals for the shift include remain hemodynamically stable, decrease o2 requirements

## 2025-05-20 NOTE — CARE PLAN
Problem: Respiratory  Goal: Minimize anxiety/maximize coping throughout shift  Outcome: Progressing  Goal: Patent airway maintained this shift  Outcome: Progressing  Goal: Tolerate pulmonary toileting this shift  Outcome: Progressing

## 2025-05-20 NOTE — CONSULTS
"Nutrition Assessement Note    Nutrition Assessment    Reason for Assessment: Admission nursing screening (MST 3)    Reason for Hospital Admission:  Karly Horton is a 80 y.o. female who is admitted for weakness. Hx of lung CA, malnutrition, large sacral wound. Patient known to dept from previous admissions. Patient has no teeth, requests soft bite-sized diet. Agreeable to nutritional supplements. Reports eating very small meals, significant weight loss. Patient appears very ill and emaciated.     Malnutrition Screening Tool (MST)  Have you recently lost weight without trying?: Yes  If yes, how much weight have you lost?: Unsure  Weight Loss Score: 2  Have you been eating poorly because of a decreased appetite?: Yes  Malnutrition Score: 3  Nutrition Screen  Stage 3 or 4 Pressure Injury or Multiple Non-Healing Wounds: Yes (Comment)  Home Tube Feeding or Total Parenteral Nutrition (TPN): No  Dietitian Consult Needed: Yes (Comment)    Medical History[1]   Surgical History[2]    Nutrition History:  Energy Intake: Poor < 50 %  Food and Nutrient History: patient reports eating very small meals.     Anthropometrics:  Ht: 142.2 cm (4' 8\"), Wt: (!) 34.3 kg (75 lb 9.9 oz), BMI: 16.96  IBW/kg (Dietitian Calculated): 40.91 kg  Percent of IBW: 83 %     Weight Change:  Daily Weight  05/19/25 : (!) 34.3 kg (75 lb 9.9 oz)  05/01/25 : (!) 35.2 kg (77 lb 9.6 oz)  04/11/25 : (!) 29.5 kg (65 lb 2.3 oz)  03/07/25 : (!) 28.6 kg (63 lb 0.8 oz)  02/26/25 : (!) 28 kg (61 lb 11.7 oz)  02/14/25 : (!) 27 kg (59 lb 8.4 oz)  02/05/25 : (!) 29.6 kg (65 lb 5.9 oz)  01/27/25 : (!) 31.8 kg (70 lb)  01/13/25 : (!) 30.5 kg (67 lb 3.8 oz)  12/23/24 : (!) 31.1 kg (68 lb 10.8 oz)    Weight History / % Weight Change: patient reports 10# (14%) weight loss in past 6 months, stable weight since last admission (4/30/25)  Significant Weight Loss: Yes  Interpretation of Weight Loss: >10% in 6 months     Nutrition Focused Physical Exam Findings: "   Subcutaneous Fat Loss  Orbital Fat Pads: Severe (dark circles, hollowing and loose skin)  Buccal Fat Pads: Severe (hollow, sunken and narrow face)  Triceps: Severe (negligible fat tissue)    Muscle Wasting  Temporalis: Severe (hollowed scooping depression)  Pectoralis (Clavicular Region): Severe (protruding prominent clavicle)  Deltoid/Trapezius: Severe (squared shoulders, acromion process prominent)  Quadriceps: Severe (depressions on inner and outer thigh)  Gastrocnemius: Severe (minimal muscle definition)    Nutrition Significant Labs:  Lab Results   Component Value Date    WBC 18.7 (H) 05/20/2025    HGB 11.5 (L) 05/20/2025    HCT 34.7 (L) 05/20/2025     05/20/2025    CHOL 205 (H) 10/28/2019    TRIG 69 10/28/2019     10/28/2019    ALT 14 05/19/2025    AST 21 05/19/2025     (L) 05/20/2025    K 3.1 (L) 05/20/2025    CL 79 (L) 05/20/2025    CREATININE 0.30 (L) 05/20/2025    BUN 12 05/20/2025    CO2 42 (HH) 05/20/2025    TSH 1.04 03/18/2025    INR 0.9 12/16/2024    HGBA1C 5.1 05/22/2024     Nutrition Specific Medications:  Scheduled Medications[3]  Continuous Medications[4]    Dietary Orders (From admission, onward)       Start     Ordered    05/20/25 0945  Oral nutritional supplements  Until discontinued        Comments: Vanilla   Question Answer Comment   Deliver with All meals    Select supplement: Ensure Plus High Protein        05/20/25 0944    05/20/25 0942  Oral nutritional supplements  Until discontinued        Comments: Unflavored mixed in cranberry juice   Question Answer Comment   Deliver with Breakfast    Deliver with Dinner    Select supplement: Brennon        05/20/25 0944    05/20/25 0923  Adult diet Regular; Soft and bite sized 6  Diet effective now        Question Answer Comment   Diet type Regular    Texture Soft and bite sized 6        05/20/25 0922                   Estimated Needs:   Estimated Energy Needs  Total Energy Estimated Needs in 24 hours (kCal): 1300 kCal  Method for  Estimating Needs: RDI min for women    Estimated Protein Needs  Total Protein Estimated Needs in 24 Hours (g): 46 g  Method for Estimating 24 Hour Protein Needs: RDI mon for women    Estimated Fluid Needs  Total Fluid Estimated Needs in 24 Hours (mL): 1300 mL  Method for Estimating 24 Hour Fluid Needs: 1 mL/kcal     Nutrition Diagnosis   Nutrition Diagnosis:  Malnutrition Diagnosis  Patient has Malnutrition Diagnosis: Yes  Diagnosis Status: New  Malnutrition Diagnosis: Severe malnutrition related to chronic disease or condition  Related to: decreased ability to consume sufficient energy  As Evidenced by: 10# (14.2%) weight loss in the past 6 months, severe subcutaneous fat and muscle deficits, PO intake <75% of estimated energy needs >1 month        Nutrition Interventions/Recommendations   Nutrition Interventions and Recommendations:  Nutrition Prescription: Nutrition prescription for oral nutrition    Nutrition Recommendations:  Individualized Nutrition Prescription Provided for : recommend continue oral diet per SLP recs, add Ensure Plus High Protein TID, Brennon BID    Nutrition Interventions/Goals:   Food and/or Nutrient Delivery Interventions  Interventions: Meals and snacks, Medical food supplement  Meals and Snacks: Texture-modified diet  Goal: provide as ordered  Medical Food Supplement: Commercial beverage medical food supplement therapy  Goal: ensure plus high protein TID to provide 350 kcals and 20g protein each, Brennon BID for wound healing    Education Documentation  No documentation found.            Nutrition Monitoring and Evaluation   Monitoring/Evaluation:   Food/Nutrient Related History Monitoring  Monitoring and Evaluation Plan: Estimated Energy Intake  Estimated Energy Intake: Energy intake 50 -75% of estimated energy needs    Anthropometric Measurements  Monitoring and Evaluation Plan: Body weight  Body Weight: Body weight - Promote weight restoration    Goal Status: New goal(s)  identified    Follow Up  Time Spent (min): 30 minutes  Last Date of Nutrition Visit: 05/20/25  Nutrition Follow-Up Needed?: 3-5 days  Follow up Comment: 5/23/25          [1]   Past Medical History:  Diagnosis Date    Other cervical disc degeneration, unspecified cervical region     Degeneration of cervical intervertebral disc    Other conditions influencing health status     Bulging Disc (L3 - L4)    Other conditions influencing health status     A Fall    Other conditions influencing health status     Bulging Disc (C4 - C5)    Other conditions influencing health status     Bulging Disc (C6 - C7)    Other conditions influencing health status     Bulging Disc (C5 - C6)    Other conditions influencing health status     Lumbar Spondylolisthesis    Other conditions influencing health status     Rotator Cuff Tendon Tear    Other intervertebral disc degeneration, lumbar region     L4-L5 disc bulge    Other intervertebral disc displacement, lumbar region     Disc displacement, lumbar    Other intervertebral disc displacement, lumbosacral region     Displacement of lumbosacral intervertebral disc    Personal history of other diseases of the musculoskeletal system and connective tissue     History of bursitis    Personal history of other diseases of the musculoskeletal system and connective tissue     History of fibromyositis    Personal history of other specified conditions     History of headache    Spinal stenosis, lumbar region without neurogenic claudication     Lumbar canal stenosis    Sprain of ligaments of lumbar spine, initial encounter     Low back sprain   [2]   Past Surgical History:  Procedure Laterality Date    APPENDECTOMY      EYE SURGERY      HYSTERECTOMY      TONSILLECTOMY     [3] azithromycin, 250 mg, oral, Daily  baclofen, 10 mg, oral, TID  cholecalciferol, 125 mcg, oral, Daily  enoxaparin, 40 mg, subcutaneous, Daily  ipratropium-albuteroL, 3 mL, nebulization, q6h  lidocaine, 1 patch, transdermal,  Nightly  methylPREDNISolone sodium succinate (PF), 40 mg, intravenous, q24h  mupirocin, 1 Application, Topical, BID  nicotine, 1 patch, transdermal, Daily   Followed by  [START ON 7/1/2025] nicotine, 1 patch, transdermal, Daily   Followed by  [START ON 7/15/2025] nicotine, 1 patch, transdermal, Daily  oxygen, , inhalation, q4h  pantoprazole, 40 mg, oral, Daily before breakfast  piperacillin-tazobactam, 4.5 g, intravenous, q6h  potassium chloride, 20 mEq, intravenous, q2h  sucralfate, 1 g, oral, q6h [place]    [4]

## 2025-05-20 NOTE — PROGRESS NOTES
Occupational Therapy    Evaluation    Patient Name: Karly Horton  MRN: [id number]  Department: [address] ICU  Room: 15/15-A  Today's Date: 5/20/2025  Time Calculation  Start Time: 0810  Stop Time: 0830  Time Calculation (min): 20 min    Assessment  IP OT Assessment  OT Assessment: OT order received, chart reviewed, evalaution completed. Pt demonstrated baseline deficits, pt is total care at baseline other than feeding which she is currently able to perform. No acute OT needs identified, will D/C OT.  Prognosis: Poor  Barriers to Discharge Home: No anticipated barriers  Evaluation/Treatment Tolerance: Patient limited by pain, Treatment limited secondary to agitation  Medical Staff Made Aware: Yes  End of Session Communication: Bedside nurse  End of Session Patient Position: Bed, 3 rail up, Alarm off, not on at start of session  Plan:  No Skilled OT: At baseline function  OT Frequency: OT eval only  OT Discharge Recommendations: No further acute OT  Equipment Recommended upon Discharge: Wheelchair, Lift  OT Recommended Transfer Status: Dependent  OT - OK to Discharge: Yes    Subjective   Current Problem:  1. Acute hypoxemic respiratory failure        2. Generalized weakness        3. Urinary retention        4. Multifocal pneumonia        5. Wound of sacral region, initial encounter          OT Visit Info:  OT Received On: 05/20/25  General Visit Info:  General  Reason for Referral: activities of daily living, generalized weakness.  Referred By: LANDEN Oropeza  Past Medical History Relevant to Rehab: CHF,  pneumonia, NSCLC, anemia, fibromyositis  Family/Caregiver Present: No  Co-Treatment: PT  Co-Treatment Reason: limited pt tolerance to activity in ICU  Prior to Session Communication: Bedside nurse  Patient Position Received: Bed, 2 rail up, Alarm off, not on at start of session  Preferred Learning Style: verbal  Precautions:  Hearing/Visual Limitations: appears WFL  Medical Precautions: Fall precautions,  Oxygen therapy device and L/min (40LPm)     Date/Time Vitals Session Patient Position Pulse Resp SpO2 BP MAP (mmHg)    05/20/25 0900 --  --  106  14  94 %  119/80  90     05/20/25 1000 --  --  101  26  --  82/60  67                Pain:  Pain Assessment  Pain Assessment: FLACC (Face, Legs, Activity, Cry, Consolability)  0-10 (Numeric) Pain Score: 5 - Moderate pain  Pain Type: Chronic pain  Pain Location: Buttocks  Pain Interventions: Repositioned (nursing notified of pain)  Response to Interventions: No change in pain    Objective   Cognition:  Overall Cognitive Status: Impaired  Orientation Level: Disoriented to situation  Following Commands: Follows one step commands with repetition  Safety Judgment: Decreased awareness of need for assistance  Cognition Comments: Pt perseverating on eating and drinking, edu pt on needing to wait for Dr. mcleod, becoming increasingly agitated about situation yelling at therapist, nurse aware.  Insight: Moderate  Impulsive: Moderately  Processing Speed: Delayed           Home Living:  Type of Home: House  Lives With: Adult children  Home Adaptive Equipment: Walker rolling or standard, Wheelchair-manual  Home Layout: One level  Home Access: Stairs to enter without rails  Entrance Stairs-Rails: None  Entrance Stairs-Number of Steps: stoop +1 (family bumps up in wheelchair)   Prior Function:  Level of [place]: Needs assistance with ADLs, Needs assistance with homemaking  Receives Help From: Family  ADL Assistance: Needs assistance (all ADLs assist at bed level only able to feed self at baseline)  Toileting:  (incontinent and wears briefs that daughter manages)  Homemaking Assistance: Needs assistance (daughter performs all IADLs)  Ambulatory Assistance: Needs assistance (assist with bed mobility and dependent SPT to wheelchair does not ambulate)  IADL History:     ADL:  Eating Deficit: Setup  Grooming Deficit: Setup (Pt able to perform mouth swab with setup.)  Bathing  Assistance: Total  Bathing Deficit:  (total assist at baseline)  UE Dressing Assistance: Total  UE Dressing Deficit:  (total assist at baseline)  LE Dressing Assistance: Total  LE Dressing Deficit:  (total assist at baseline)  Toileting Assistance with Device: Total  Toileting Deficit:  (incontinent and total assist at baseline)  Activity Tolerance:  Endurance: Tolerates less than 10 min exercise, no significant change in vital signs  Activity Tolerance Comments: poor  Rate of Perceived Exertion (RPE): 5/10  Bed Mobility/Transfers: Bed Mobility  Bed Mobility: Yes  Bed Mobility 1  Bed Mobility 1: Rolling left  Level of Assistance 1: Dependent  Bed Mobility Comments 1: Pt required total assist for rolling to L for repositioning off coccyx due to pain, pt makes no effort to assist in roll despite cues. blankets places for offloading as pt refused wedges    Transfers  Transfer: No    Functional Mobility:  Functional Mobility  Functional Mobility Performed: No    Sensation:  Light Touch: Not tested  Strength:  Strength Comments: 3/5 B UEs overall, chachetic, minimal muscle tone present  Perception:     Coordination:  Movements are Fluid and Coordinated: No  Upper Body Coordination: delayed rate and accuracy of movement, minimal effort from pt throughout   Hand Function:  Hand Function  Gross Grasp: Functional  Coordination: Functional  Extremities: RUE   RUE : Within Functional Limits and LUE   LUE: Within Functional Limits    Outcome Measures: [facility] Daily Activity  Putting on and taking off regular lower body clothing: Total  Bathing (including washing, rinsing, drying): Total  Putting on and taking off regular upper body clothing: Total  Toileting, which includes using toilet, bedpan or urinal: Total  Taking care of personal grooming such as brushing teeth: Total  Eating Meals: A little  Daily Activity - Total Score: 8      Education Documentation  ADL Training, taught by Shea Ritter OT at 5/20/2025 10:09  CASTRO  Learner: Patient  Readiness: Acceptance  Method: Explanation  Response: Verbalizes Understanding  Comment: edu on call light use

## 2025-05-20 NOTE — CONSULTS
Critical Care Medicine:  History & Physical    History of Present Illness       79-year-old female with a history of squamous cell lung carcinoma (Stage 3B, with a T4 classification (due to multiple nodules in ipsilateral lung lobes) and N2 (due to subcarinal lymph node involvement).), heart failure with preserved ejection fraction (HFpEF), and chronic obstructive pulmonary disease (COPD), who was admitted on 5/18/2025 with worsening dypnea on exertion.    Cancer treatment so far includes systemic chemotherapy with carboplatin, paclitaxel and pembro x2qsrly x 4 (11/12/24 - 1/3/25), hypofractionated RT (completed 3/31/25) amd consolidation immunotherapy with pembro ([id number] last dose on 4/11/25).     Patient presented to the ED after EMS transport with complaints of progressive shortness of breath (SOB) for several weeks, acutely worsening in the past few days, and failure to thrive. She was admitted five weeks ago for a similar presentation and treated for a COPD exacerbation, before being discharged home on 2L nasal cannula (NC) oxygen at her request.    Currently, she reports worsening SOB at rest and has become bedbound. She states this episode feels more severe than previous COPD exacerbations. On evaluation in the ED, she was found to have acute-on-chronic hyponatremia, leukocytosis, elevated CRP (14), and a chest X-ray showing increased patchy infiltrates, more pronounced in the right lung. A CT scan showed gas in a sacral decubitus ulcer.    Patient became acutely hypoxemic on the RNF, requiring high-flow nasal cannula (HFNC) oxygen. ABG: PH of 7.51, pCO2 of 55, pO2 of 56, and a lactate level of 2.4.     Pulmonary consulted by ICU Team Dr Spring to assist with management.    Cancer treatment history:  C#1 chemo started on 11/12/24. Tolerated first dose very well. Mild N, no Diarrhea or constipation or vomiting  C#2 chemo given on 12/2/24. Diarrhea mild which has resolved with Imodium. Hair loss. Otherwise no  N/V. She has much better appetite  12/16/24: There was a solitary noncalcified nodule in LLL, biopsy was recommended to rule out second primary or contralateral lung metastases. Lung biopsy was scheduled for 12/16/24, but it could not be done because the nodule could not be seen, so procedure is cancelled  01/13/25: C#4 chemoIO planned today. Tolerating very well. No N/V/D/C. Will start RT soon.   01/27/25: MRI brain: TASHA. PET CT is TASHA.   03/31/25: completed consolidation RT  04/011/25: consolidation Keytruda q 6 weeks is started. Restaging PET CT is planned for 06/09/25.    ROS:  As above    Objective   Previous History     Medical History  squamous cell lung carcinoma (Stage 3B), heart failure with preserved ejection fraction (HFpEF), and chronic obstructive pulmonary disease (COPD),    Surgical History  Surgical History[1]  Allergies  RX Allergies[2]    Home Medications  Current Outpatient Medications   Medication Instructions    acetaminophen (TYLENOL) 325 mg, oral, Every 6 hours PRN    baclofen (LIORESAL) 10 mg, oral, 3 times daily    butalbital-acetaminophen-caff -40 mg tablet 1 tablet, oral, Every 6 hours PRN    cholecalciferol, vitamin D3, (VITAMIN D3 ORAL) 1 tablet, oral, Daily    lactulose 10 gram/15 mL solution 30 mL, oral, Every 18 hours    lidocaine HCL 4 % liquid roll-on 1 Application, topical (top), Daily PRN    lubricating eye drops ophthalmic solution 1 drop, Both Eyes, As needed    meprobamate (EQUANIL) 400 mg, oral, 4 times daily    mupirocin (Bactroban) 2 % ointment 1 Application, Topical, As needed, Apply to affected area as needed    nicotine (Nicoderm CQ) 14 mg/24 hr patch 1 patch, transdermal, Every 24 hours    ondansetron (ZOFRAN) 8 mg, oral, Every 8 hours PRN    pantoprazole (PROTONIX) 40 mg, oral, Daily before breakfast, Do not crush, chew, or split. Continue twice daily x 8 weeks then continue once daily.    prochlorperazine (COMPAZINE) 10 mg, oral, Every 6 hours PRN    sucralfate  (CARAFATE) 1 g, oral, Every 6 hours scheduled     Social History  History - Tobacco Use - Plain Text[3]    reports that she does not currently use alcohol.    reports current drug use. Drug: Marijuana.     Family History  family history includes Cancer in her mother; Father had hypertension, stroke, coronary artery disease and diabetes in her father.    Objective     Vitals:    05/19/25 1230   Weight: (!) 34.3 kg (75 lb 9.9 oz)   Body mass index is 16.95 kg/m².        5/20/2025     8:00 AM 5/20/2025     9:00 AM 5/20/2025    10:00 AM 5/20/2025    11:00 AM 5/20/2025    12:00 PM 5/20/2025     1:00 PM 5/20/2025     2:00 PM   Vitals   Systolic 132 119 82 103 112 105 118   Diastolic 110 80 60 77 71 80 71   Heart Rate 106 106 101 102 103 103 103   Resp 16 14 26 21 28 37 28        Vent settings:  FiO2 (%):  [60 %-100 %] 60 %    Intake/Output Summary (Last 24 hours) at 5/20/2025 1429  Last data filed at 5/20/2025 1111  Gross per 24 hour   Intake 410.42 ml   Output 1075 ml   Net -664.58 ml       Physical Exam  Constitutional:       Comments: Frail, cachectic    Eyes:      Pupils: PERRL  Cardiovascular:      Rate and Rhythm: Tachycardia present.   Pulmonary:      Effort: Respiratory distress present.      Breath sounds: No stridor. No wheezing, rhonchi or rales.   Abdominal:      General: Abdomen is flat. There is no distension.      Palpations: Abdomen is soft.      Tenderness: There is no abdominal tenderness.   Musculoskeletal:      Right lower leg: No edema.      Left lower leg: No edema.   Neurological:      Mental Status: She is alert and oriented to person, place, and time. Mental status is at baseline.      Cranial Nerves: No cranial nerve deficit.           Medications     Scheduled Medications:   Scheduled Medications[4]   Continuous Medications:   Continuous Medications[5]   PRN Medications:     Labs     Results from last 72 hours   Lab Units 05/20/25  0404 05/19/25  0723 05/18/25  1704   GLUCOSE mg/dL 84 96 104*    SODIUM mmol/L 129* 122* 129*   POTASSIUM mmol/L 3.1* 3.6 3.2*   CHLORIDE mmol/L 79* 77* 81*   CO2 mmol/L 42* 38* 42*   BUN mg/dL 12 11 17   CREATININE mg/dL 0.30* 0.30* 0.32*   EGFR mL/min/1.73m*2 >90 >90 >90   CALCIUM mg/dL 8.5* 8.4* 8.8   ALBUMIN g/dL 2.5* 2.8* 2.8*   MAGNESIUM mg/dL 1.50*  --  1.51*   PHOSPHORUS mg/dL 3.2  --   --      Results from last 72 hours   Lab Units 05/19/25  0723 05/18/25  1704   ALK PHOS U/L 102 107   ALT U/L 14 16   AST U/L 21 23   BILIRUBIN TOTAL mg/dL 0.7 0.4   PROTEIN TOTAL g/dL 6.0* 6.2*         Results from last 72 hours   Lab Units 05/18/25  1704   LACTATE mmol/L 0.9     Results from last 72 hours   Lab Units 05/20/25  0404 05/19/25  0723 05/18/25  1704   WBC AUTO x10*3/uL 18.7* 14.7* 9.1   NRBC AUTO /100 WBCs 0.0 0.0 0.0   RBC AUTO x10*6/uL 3.67* 3.90* 3.79*   HEMOGLOBIN g/dL 11.5* 12.2 11.9*   HEMATOCRIT % 34.7* 36.7 37.3   MCV fL 95 94 98   MCH pg 31.3 31.3 31.4   MCHC g/dL 33.1 33.2 31.9*   RDW % 14.0 14.2 14.2   PLATELETS AUTO x10*3/uL 227 268 253     Results from last 72 hours   Lab Units 05/19/25  0823   POCT PH, ARTERIAL pH 7.51*   POCT PCO2, ARTERIAL mm Hg 55*   POCT PO2, ARTERIAL mm Hg 56*   POCT HCO3 CALCULATED, ARTERIAL mmol/L 43.9*   POCT LACTATE, ARTERIAL mmol/L 2.3*   POCT BASE EXCESS, ARTERIAL mmol/L 18.0*   FIO2 % 100   FLOW LPM 40.0     Lab Results   Component Value Date    BLOODCULT No growth at 1 day 05/18/2025    BLOODCULT No growth at 1 day 05/18/2025    GRAMSTAIN (A) 04/30/2025     Gram stain indicates specimen contains significant salivary contamination.     Lab Results   Component Value Date    URINECULTURE >100,000 Escherichia coli (A) 10/11/2024       Imaging and Diagnostic Studies     Lab/Radiology/Diagnostic Review:  Results for orders placed or performed during the hospital encounter of 05/18/25 (from the past 24 hours)   POCT GLUCOSE   Result Value Ref Range    POCT Glucose 78 74 - 99 mg/dL   POCT GLUCOSE   Result Value Ref Range    POCT Glucose  92 74 - 99 mg/dL   Renal function panel   Result Value Ref Range    Glucose 84 74 - 99 mg/dL    Sodium 129 (L) 136 - 145 mmol/L    Potassium 3.1 (L) 3.5 - 5.3 mmol/L    Chloride 79 (L) 98 - 107 mmol/L    Bicarbonate 42 (HH) 21 - 32 mmol/L    Anion Gap 11 10 - 20 mmol/L    Urea Nitrogen 12 6 - 23 mg/dL    Creatinine 0.30 (L) 0.50 - 1.05 mg/dL    eGFR >90 >60 mL/min/1.73m*2    Calcium 8.5 (L) 8.6 - 10.3 mg/dL    Phosphorus 3.2 2.5 - 4.9 mg/dL    Albumin 2.5 (L) 3.4 - 5.0 g/dL   CBC   Result Value Ref Range    WBC 18.7 (H) 4.4 - 11.3 x10*3/uL    nRBC 0.0 0.0 - 0.0 /100 WBCs    RBC 3.67 (L) 4.00 - 5.20 x10*6/uL    Hemoglobin 11.5 (L) 12.0 - 16.0 g/dL    Hematocrit 34.7 (L) 36.0 - 46.0 %    MCV 95 80 - 100 fL    MCH 31.3 26.0 - 34.0 pg    MCHC 33.1 32.0 - 36.0 g/dL    RDW 14.0 11.5 - 14.5 %    Platelets 227 150 - 450 x10*3/uL   Magnesium   Result Value Ref Range    Magnesium 1.50 (L) 1.60 - 2.40 mg/dL   Urinalysis with Reflex Microscopic   Result Value Ref Range    Color, Urine Light-Yellow Light-Yellow, Yellow, Dark-Yellow    Appearance, Urine Clear Clear    Specific Gravity, Urine 1.014 1.005 - 1.035    pH, Urine 6.0 5.0, 5.5, 6.0, 6.5, 7.0, 7.5, 8.0    Protein, Urine NEGATIVE NEGATIVE, 10 (TRACE), 20 (TRACE) mg/dL    Glucose, Urine Normal Normal mg/dL    Blood, Urine NEGATIVE NEGATIVE mg/dL    Ketones, Urine NEGATIVE NEGATIVE mg/dL    Bilirubin, Urine NEGATIVE NEGATIVE mg/dL    Urobilinogen, Urine Normal Normal mg/dL    Nitrite, Urine NEGATIVE NEGATIVE    Leukocyte Esterase, Urine NEGATIVE NEGATIVE   MRSA Surveillance for Vancomycin De-escalation, PCR    Specimen: Anterior Nares; Swab   Result Value Ref Range    MRSA PCR Not Detected Not Detected   POCT GLUCOSE   Result Value Ref Range    POCT Glucose 92 74 - 99 mg/dL   POCT GLUCOSE   Result Value Ref Range    POCT Glucose 196 (H) 74 - 99 mg/dL     Imaging  CT chest wo IV contrast  Result Date: 5/20/2025  1. Right lower lobar collapse with air bronchograms and  endobronchial opacification of the right lower lobe and bronchus intermedius. Correlate with aspiration pneumonias a potential etiology. Of note, patchy consolidation also demonstrated in the right middle lobe and in particular posterior segment of the right upper lobe as well as left upper lobe. Findings suggest multilobar pneumonia. 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   2. Moderate right and mild-to-moderate left pleural effusions.     MACRO: None   Signed by: Jin Schmidt 5/20/2025 11:56 AM Dictation workstation:   [id number]    XR chest 1 view  Result Date: 5/20/2025  Improved interstitial prominence with stable small left pleural effusion.   Worsening infiltrate with airspace consolidation now seen in the right upper lobe with right pleural effusion increased in size.   Signed by: Hanane Fine 5/20/2025 7:53 AM Dictation workstation:   [id number]    CT head wo IV contrast  Result Date: 5/20/2025  No acute intracranial abnormality.     MACRO: None   Signed by: Kanika Avina 5/20/2025 12:31 AM Dictation workstation:   [id number]    XR chest 1 view  Result Date: 5/18/2025  There is patchy airspace opacity in the right mid and lower lung concerning for developing multifocal pneumonia. Continued radiographic follow-up to resolution is advised.   Cardiomegaly with mild interstitial prominence suggestive of developing interstitial edema/CHF.   Bilateral pleural effusions are better seen on concurrent CT.   MACRO: None   Signed by: Lenka Cartagena 5/18/2025 7:06 PM Dictation workstation:   [id number]    CT abdomen pelvis wo IV contrast  Result Date: 5/18/2025  There is soft tissue defect overlying the distal sacrum and coccyx with mild soft tissue stranding. A tiny locule of air is present in the right gluteal subcutaneous fat, without evidence for discrete drainable fluid collection. Findings concerning for sacral decubitus  ulcer. Correlate with exam.   Moderate bilateral pleural effusions. There are patchy airspace opacities and consolidation in the right lung concerning for developing multifocal pneumonia. Clinical correlation continued radiographic follow-up to resolution is advised.   Marked distention of the urinary bladder. Correlate with symptomatology to exclude urinary retention.   Diffuse body wall edema.   Additional findings as described above.   MACRO: None   Signed by: Lenka Cartagena 5/18/2025 6:31 PM Dictation workstation:   [id number]      Cardiology, Vascular, and Other Imaging  No other imaging results found for the past 7 days          Assessment / Plan       PROBLEM LIST:  - Acute HYPOXIC respiratory failure sec to multifocal pneumonia. Aspiration PNA, high on the differential given retained food/debris in the proximal esophagus. Immune mediated pneumonitis less likely given lobar distribution of consolidative opacities.  - Immunocompromised state  - Bilateral pleural effusions  - Appears euvolemic on exam, but left sided heart failure (HFPEF) may be contributing    MANAGEMENT PLAN:  - Echo  - Thoracentesis on the rt side (ICU team vs IR)  - Resp culture  - Onc and ID consultation  - Empiric Abx for now (Zosyn, MRSA neg)  - 3% saline nebs  - Continue HFNC  - Titrate to SpO2> 92%  - Hold steroids for now  - If pt needs to be intubated, a bronchoscopy/BAL should be done.  - Speech and swallow evaluation    I have personally interviewed and examined the patient.  I have personally verified elements of the exam listed above. Interval changes or irregularities are as noted.  I have personally and independently reviewed laboratory, radiographic and procedural data.  I have personally reviewed the problem list above and concur. Changes, if any, are noted.  I have personally reviewed the plan list above and concur. Changes, if any, are noted.    The patient has high probability of compromise including but not limited to  organ system failure, mechanical respiratory and circulatory support, cardiac arrest and death. I have discussed this in detail with the family / caregivers.    I have personally spent 55 minutes of face to face critical care time (not including billable procedures billed separately) in taking care of the patient.  I have personally answered all questions to the satisfaction of the patient and / or family members to their satisfaction.        Landen Natarajan MD    This patient is critically ill/injured due to acute impairment in one or more vital organ systems, such that there is a probably of imminent or life-threatening deterioration of the patient's condition.  I spent 55 minutes in the direct delivery of medical care that involves high complexity decision making to treat single or multiple vital organ system failure and/or prevent further life-threatening deterioration of the patient's condition.  This time does not include separately billable procedures.        [1]   Past Surgical History:  Procedure Laterality Date    APPENDECTOMY      EYE SURGERY      HYSTERECTOMY      TONSILLECTOMY     [2]   Allergies  Allergen Reactions    Morphine Headache and Nausea/vomiting     Per patient    Spice Flavor Other   [3]   Social History  Tobacco Use   Smoking Status Every Day    Current packs/day: 1.00    Average packs/day: 1 pack/day for 65.4 years (65.4 ttl pk-yrs)    Types: Cigarettes    Start date: 1/1/1960    Passive exposure: Current   Smokeless Tobacco Not on file   [4] azithromycin, 250 mg, oral, Daily  baclofen, 10 mg, oral, TID  cholecalciferol, 125 mcg, oral, Daily  enoxaparin, 40 mg, subcutaneous, Daily  ipratropium-albuteroL, 3 mL, nebulization, q6h  lidocaine, 1 patch, transdermal, Nightly  methylPREDNISolone sodium succinate (PF), 40 mg, intravenous, q24h  mupirocin, 1 Application, Topical, BID  nicotine, 1 patch, transdermal, Daily   Followed by  [START ON 7/1/2025] nicotine, 1 patch, transdermal,  Daily   Followed by  [START ON 7/15/2025] nicotine, 1 patch, transdermal, Daily  oxygen, , inhalation, q4h  pantoprazole, 40 mg, oral, Daily before breakfast  piperacillin-tazobactam, 4.5 g, intravenous, q6h  sucralfate, 1 g, oral, q6h [place]  [5]

## 2025-05-20 NOTE — PROGRESS NOTES
Patient not medically clear. Patient remains in the ICU. The discharge plan is to return home with [facility], she will need an external [facility] referral. Patient was supposed to get a hospital bed after last discharge but [facility] has not delivered yet. Will continue to follow.      05/20/25 1421   Discharge Planning   Home or Post Acute Services In home services   Type of Home Care Services Home nursing visits;Home OT;Home PT   Expected Discharge Disposition Home Health  ([facility])   Does the patient need discharge transport arranged? Yes   RoundTrip coordination needed? Yes

## 2025-05-20 NOTE — CONSULTS
Consults    Reason For Consult  Goals of care     History Of Present Illness  Karly Horton is a 80 y.o. female from home with her daughter and [facility] as she is wheelchair/bed bound. 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 She was hospitalized five weeks ago for similar symptoms and diagnosed with a COPD exacerbation, for which she was treated and discharged home on 2L O2 via NC. She also has chronic hyponatremia and longstanding anxiety. Notably, she has a 65.4 pack-year history of smoking and continues to smoke and vape. She also reports ongoing marijuana use spanning over 40 years related to multiple degenerative spinal conditions (DJD, Scoliosis, osteopenia) and fibromyalgia.      Presented to our ED via sqaud with worsening SOB over the past several days. Chest XR concerning for pneumonia, CHF exac, and bilat pleural effusions. She was Initially admitted to the floor, but became acutely hypoxemic in ED. AB.51 / 55 / 56, lactate 2.3. Placed on HFNC and transferred to ICU for acute hypoxemic respiratory failure.       Past Medical History  She has a past medical history of Other cervical disc degeneration, unspecified cervical region, Other conditions influencing health status, Other conditions influencing health status, Other conditions influencing health status, Other conditions influencing health status, Other conditions influencing health status, Other conditions influencing health status, Other conditions influencing health status, Other intervertebral disc degeneration, lumbar region, Other intervertebral disc displacement, lumbar region, Other intervertebral disc displacement, lumbosacral region, Personal history of other diseases of the musculoskeletal system and connective tissue, Personal history of other diseases of the musculoskeletal system and connective  tissue, Personal history of other specified conditions, Spinal stenosis, lumbar region without neurogenic claudication, and Sprain of ligaments of lumbar spine, initial encounter.    Surgical History  She has a past surgical history that includes Appendectomy; Eye surgery; Tonsillectomy; and Hysterectomy.     Social History  She reports that she has been smoking cigarettes. She started smoking about 65 years ago. She has a 65.4 pack-year smoking history. She has been exposed to tobacco smoke. She does not have any smokeless tobacco history on file. She reports that she does not currently use alcohol. She reports current drug use. Drug: Marijuana.    Family History  Family History[1]     Allergies  Morphine and Spice flavor    Review of Systems   Constitutional:  Positive for activity change and fatigue.        Wheelchair bound    Respiratory:  Positive for cough and shortness of breath.    Cardiovascular:  Negative for chest pain.   Gastrointestinal:  Negative for abdominal pain.   Musculoskeletal:  Positive for back pain.   Neurological:  Negative for light-headedness.        Physical Exam  Vitals and nursing note reviewed.   Constitutional:       Appearance: She is ill-appearing.      Comments: cachectic    HENT:      Head: Normocephalic and atraumatic.      Mouth/Throat:      Mouth: Mucous membranes are moist.   Eyes:      Pupils: Pupils are equal, round, and reactive to light.   Cardiovascular:      Rate and Rhythm: Tachycardia present. Rhythm irregular.   Pulmonary:      Effort: Respiratory distress present.      Breath sounds: No wheezing, rhonchi or rales.   Abdominal:      General: Bowel sounds are normal. There is no distension.      Palpations: Abdomen is soft.   Genitourinary:     Comments: Barnard catheter   Musculoskeletal:      Cervical back: Neck supple. No rigidity.      Right lower leg: No edema.      Left lower leg: No edema.   Skin:     General: Skin is dry.      Capillary Refill: Capillary refill  "takes 2 to 3 seconds.      Coloration: Skin is pale.   Neurological:      Mental Status: She is oriented to person, place, and time.      Motor: Weakness present.      Comments: A&Ox3 and confused, mentation waxes and wanes          Last Recorded Vitals  Blood pressure 130/74, pulse (!) 115, temperature 36.9 °C (98.4 °F), temperature source Temporal, resp. rate (!) 30, height 1.422 m (4' 8\"), weight (!) 34.3 kg (75 lb 9.9 oz), SpO2 99%.    Relevant Results  Results for orders placed or performed during the hospital encounter of 05/18/25 (from the past 24 hours)   Urinalysis with Reflex Culture and Microscopic   Result Value Ref Range    Color, Urine Light-Yellow Light-Yellow, Yellow, Dark-Yellow    Appearance, Urine Clear Clear    Specific Gravity, Urine 1.018 1.005 - 1.035    pH, Urine 6.0 5.0, 5.5, 6.0, 6.5, 7.0, 7.5, 8.0    Protein, Urine 20 (TRACE) NEGATIVE, 10 (TRACE), 20 (TRACE) mg/dL    Glucose, Urine Normal Normal mg/dL    Blood, Urine NEGATIVE NEGATIVE mg/dL    Ketones, Urine NEGATIVE NEGATIVE mg/dL    Bilirubin, Urine NEGATIVE NEGATIVE mg/dL    Urobilinogen, Urine Normal Normal mg/dL    Nitrite, Urine NEGATIVE NEGATIVE    Leukocyte Esterase, Urine NEGATIVE NEGATIVE   Urinalysis Microscopic   Result Value Ref Range    WBC, Urine 1-5 1-5, NONE /HPF    RBC, Urine 3-5 NONE, 1-2, 3-5 /HPF    Bacteria, Urine 1+ (A) NONE SEEN /HPF    Mucus, Urine FEW Reference range not established. /LPF    Hyaline Casts, Urine 3+ (A) NONE /LPF   Comprehensive metabolic panel   Result Value Ref Range    Glucose 96 74 - 99 mg/dL    Sodium 122 (L) 136 - 145 mmol/L    Potassium 3.6 3.5 - 5.3 mmol/L    Chloride 77 (L) 98 - 107 mmol/L    Bicarbonate 38 (H) 21 - 32 mmol/L    Anion Gap 11 10 - 20 mmol/L    Urea Nitrogen 11 6 - 23 mg/dL    Creatinine 0.30 (L) 0.50 - 1.05 mg/dL    eGFR >90 >60 mL/min/1.73m*2    Calcium 8.4 (L) 8.6 - 10.3 mg/dL    Albumin 2.8 (L) 3.4 - 5.0 g/dL    Alkaline Phosphatase 102 33 - 136 U/L    Total Protein 6.0 " (L) 6.4 - 8.2 g/dL    AST 21 9 - 39 U/L    Bilirubin, Total 0.7 0.0 - 1.2 mg/dL    ALT 14 7 - 45 U/L   CBC   Result Value Ref Range    WBC 14.7 (H) 4.4 - 11.3 x10*3/uL    nRBC 0.0 0.0 - 0.0 /100 WBCs    RBC 3.90 (L) 4.00 - 5.20 x10*6/uL    Hemoglobin 12.2 12.0 - 16.0 g/dL    Hematocrit 36.7 36.0 - 46.0 %    MCV 94 80 - 100 fL    MCH 31.3 26.0 - 34.0 pg    MCHC 33.2 32.0 - 36.0 g/dL    RDW 14.2 11.5 - 14.5 %    Platelets 268 150 - 450 x10*3/uL   Vancomycin   Result Value Ref Range    Vancomycin 8.0 5.0 - 20.0 ug/mL   Blood Gas Arterial Full Panel   Result Value Ref Range    POCT pH, Arterial 7.51 (H) 7.38 - 7.42 pH    POCT pCO2, Arterial 55 (H) 38 - 42 mm Hg    POCT pO2, Arterial 56 (L) 85 - 95 mm Hg    POCT SO2, Arterial 90 (L) 94 - 100 %    POCT Oxy Hemoglobin, Arterial 86.5 (L) 94.0 - 98.0 %    POCT Hematocrit Calculated, Arterial 38.0 36.0 - 46.0 %    POCT Sodium, Arterial 116 (LL) 136 - 145 mmol/L    POCT Potassium, Arterial 3.8 3.5 - 5.3 mmol/L    POCT Chloride, Arterial 79 (L) 98 - 107 mmol/L    POCT Ionized Calcium, Arterial 1.11 1.10 - 1.33 mmol/L    POCT Glucose, Arterial 97 74 - 99 mg/dL    POCT Lactate, Arterial 2.3 (H) 0.4 - 2.0 mmol/L    POCT Base Excess, Arterial 18.0 (H) -2.0 - 3.0 mmol/L    POCT HCO3 Calculated, Arterial 43.9 (H) 22.0 - 26.0 mmol/L    POCT Hemoglobin, Arterial 12.7 12.0 - 16.0 g/dL    POCT Anion Gap, Arterial -3 (L) 10 - 25 mmo/L    Patient Temperature 37.0 degrees Celsius    FiO2 100 %    Apparatus HIGH FLOW CANNULA     Flow 40.0 LPM    Site of Arterial Puncture Radial Right     Christopher's Test Positive    POCT GLUCOSE   Result Value Ref Range    POCT Glucose 89 74 - 99 mg/dL      XR chest 1 view  Result Date: 5/18/2025  Interpreted By:  Lenka Cartagena, STUDY: XR CHEST 1 VIEW;  5/18/2025 6:36 pm   INDICATION: Signs/Symptoms:Generalized weakness.   COMPARISON: Chest x-ray 04/29/2025   ACCESSION NUMBER(S): [id number]   ORDERING CLINICIAN: JAIME CHILDERS   FINDINGS: Right-sided MediPort  terminates in the SVC. Multiple overlying leads are present.   CARDIOMEDIASTINAL SILHOUETTE: Cardiomediastinal silhouette is stable in size and configuration. Atherosclerotic calcification of the aorta.   LUNGS: Bilateral interstitial prominence. There is right middle and lower lobe patchy airspace opacities. Layering bilateral effusions are better seen on concurrent CT. No pneumothorax.   ABDOMEN: No remarkable upper abdominal findings.   BONES: Levoconvex scoliosis. Generalized diffuse osteopenia. Multilevel degenerative changes of the spine.       There is patchy airspace opacity in the right mid and lower lung concerning for developing multifocal pneumonia. Continued radiographic follow-up to resolution is advised.   Cardiomegaly with mild interstitial prominence suggestive of developing interstitial edema/CHF.   Bilateral pleural effusions are better seen on concurrent CT.   MACRO: None   Signed by: Lenka Cartagena 5/18/2025 7:06 PM Dictation workstation:   [id number]    CT abdomen pelvis wo IV contrast  Result Date: 5/18/2025  Interpreted By:  Lenka Cartagena, STUDY: CT ABDOMEN PELVIS WO IV CONTRAST;  5/18/2025 5:51 pm   INDICATION: Signs/Symptoms:Sacral wound.   COMPARISON: CT scan of the abdomen pelvis 02/14/2025   ACCESSION NUMBER(S): [id number]   ORDERING CLINICIAN: JAIME CHILDERS   TECHNIQUE: Axial noncontrast CT images of the abdomen and pelvis with coronal and sagittal reconstructed images.   FINDINGS:   LOWER CHEST: Moderate bilateral pleural effusions. There are patchy airspace opacities and consolidation in the right lung. Aortic root and coronary artery calcifications noted. Right-sided MediPort terminates in the SVC.   ABDOMEN: Lack of intravenous contrast limits evaluation of vessels and solid organs. LIVER: Within normal limits. BILE DUCTS: Normal caliber. GALLBLADDER: No calcified gallstones. No wall thickening. PANCREAS: Suboptimally visualized due to lack of contrast and paucity of  intra-abdominal fat SPLEEN: Within normal limits.  Calcified splenic granulomas noted. ADRENALS: Nodular thickening of the adrenal glands may relate to hyperplasia or adenomatous change.   KIDNEYS, URETERS, URINARY BLADDER:  Kidneys are symmetric in size without evidence for calculi, hydronephrosis, hydroureter or perinephric inflammatory changes.   No bladder calculi or wall thickening.  Marked distention of the urinary bladder.   VESSELS:  Calcific atherosclerosis of the aortoiliac and branch vessels with moderate tortuosity. No aortic aneurysm. RETROPERITONEUM: No pathologically enlarged lymph nodes.   PELVIS:   REPRODUCTIVE ORGANS: Uterus and ovaries are not well visualized.   BOWEL: Postsurgical changes of partial colectomy. No dilated bowel. Appendix is not identified with certainty. No pericecal inflammatory change is seen. No pneumatosis or portal venous gas. PERITONEUM: Paucity of intra-abdominal fat. Mild diffuse haziness of the mesentery. ABDOMINAL WALL: Diffuse body wall edema. There is soft tissue defect overlying the distal sacrum and coccyx. Mild adjacent soft tissue stranding with a tiny locule of air in the right gluteal subcutaneous fat. A discrete drainable fluid collection is not identified. Findings concerning for sacral decubitus ulcer. BONES: Generalized diffuse osteopenia. Multilevel degenerative changes of the spine. Reverse s shaped scoliosis. Great 1 anterolisthesis are L3 on 4 and L5 on S1 with grade 1-2 anterolisthesis of L4 on 5.       There is soft tissue defect overlying the distal sacrum and coccyx with mild soft tissue stranding. A tiny locule of air is present in the right gluteal subcutaneous fat, without evidence for discrete drainable fluid collection. Findings concerning for sacral decubitus ulcer. Correlate with exam.   Moderate bilateral pleural effusions. There are patchy airspace opacities and consolidation in the right lung concerning for developing multifocal pneumonia.  Clinical correlation continued radiographic follow-up to resolution is advised.   Marked distention of the urinary bladder. Correlate with symptomatology to exclude urinary retention.   Diffuse body wall edema.   Additional findings as described above.   MACRO: None   Signed by: Lenka Cartagena 5/18/2025 6:31 PM Dictation workstation:   [id number]    ECG 12 Lead  Result Date: 5/5/2025  Sinus tachycardia with frequent Premature ventricular complexes Left anterior fascicular block ST & T wave abnormality, consider inferior ischemia Abnormal ECG T wave inversion now evident in Lateral leads Confirmed by Everardo Webstre ([id number]) on 5/5/2025 4:08:58 PM    XR chest 2 views  Result Date: 4/29/2025  STUDY: Chest Radiographs;  4/29/2025 4:37PM INDICATION: Shortness of breath. COMPARISON: 3/5/2025 CT CTA Chest. ACCESSION NUMBER(S): [id number] ORDERING CLINICIAN: RONALD BLANTON TECHNIQUE:  Frontal and lateral chest. FINDINGS: CARDIOMEDIASTINAL SILHOUETTE: Heart is mildly enlarged with pulmonary vascular congestion and small bilateral pleural effusions.  LUNGS: Lungs are clear.  ABDOMEN: No remarkable upper abdominal findings.  BONES: No acute osseous changes.    Cardiomegaly and pulmonary vascular congestion with small bilateral pleural effusions. Signed by Ronal Isaac MD     Scheduled medications  Scheduled Medications[2]  Continuous medications  Continuous Medications[3]  PRN medications  PRN Medications[4]      Assessment/Plan     Acute on Chronic respiratory failure   CT ABD/Pelvis - Moderate bilateral pleural effusions. There are patchy airspace opacities and consolidation in the right lung concerning for developing multifocal pneumonia.    Pneumonia/Pneumonitis   ID consulted   Empiric ATB   MRSA PCR negative    Leukocytosis   WBC 14.7  Lactate now WNL   Blood cultures NGTD   UA with Bacteria no nitrates/no leukocytes     COPD exacerbation   Current smoker/vaping/cannabis use   Home O2 use of 2L via NC    Squamous Cell  Carcinoma of R lung   Currently on Keytruda, not a candidate for chemo or surgery per last oncology note.     Completed RT -25    Last brain MRI 25 no evidence of mass, cerebral infarction or hemorrhage.     HFpEF   CT ABD/Pelvis - Cardiomegaly with mild interstitial prominence suggestive of developing interstitial edema/CHF.    TTE last 10/2024 LVEF 60-65%    Decubitus ulcers and wounds  Bilateral buttocks   Sacrum  Back   Wound care following     Electrolyte imbalance   Replace as tolerated     Protein Calorie Malnutrition   Nutrition consulted     Palliative Care   Full Code  The patient does not have full decision making capacity   From home with daughter Karly Pro   ACP documents are in EPIC   Friend Jocelyn is POA-HC /  Alternate is Juan Nascimento - who I believe is  ( the patient believes so as well as her daughter, I was shown an obituary which is believed to be him - he was a  who did a lot of work for the family.)    2025  Goals of care discussion with the patient. She was not fully capable. She had episodes of being clear and then hallucinating by saying someone dropped a bottle of pills on her wrist and I need to ensure the cap is closed. Attempts to discuss code status before these episodes she did speak about wanting everything done, but then stated that she would not want CPR if it would be painful or non-beneficial. Her daughter did agree that CPR would not be beneficial. I reached out to the POA-HC, daughter tells me that at the time when the POA-HC was signed that she did not want the responsibility of making decisions. The daughter tells me that the friend Jocelyn does not want this responsibility either. The daughter states that she handles all of her care besides this fact. I did attempt to engage the patient in if she would like to complete a new POA-HC but she was not lucid as described above. I reached out to Jocelyn twice today.  I did leave a message at  my second attempt. We will continue to follow.       I spent 60 minutes in the professional and overall care of this patient.               [1]   Family History  Problem Relation Name Age of Onset    Cancer Mother      Other (Father had hypertension, stroke, coronary artery disease and diabetes) Father     [2] azithromycin, 500 mg, oral, Daily   Followed by  [START ON 5/20/2025] azithromycin, 250 mg, oral, Daily  baclofen, 10 mg, oral, TID  cholecalciferol, 125 mcg, oral, Daily  enoxaparin, 40 mg, subcutaneous, Daily  ipratropium-albuteroL, 3 mL, nebulization, q6h  lidocaine, 1 patch, transdermal, Daily  methylPREDNISolone sodium succinate (PF), 40 mg, intravenous, q24h  mupirocin, 1 Application, Topical, BID  oxygen, , inhalation, Continuous - Inhalation  oxygen, , inhalation, q4h  pantoprazole, 40 mg, oral, Daily before breakfast  piperacillin-tazobactam, 4.5 g, intravenous, q6h  sucralfate, 1 g, oral, q6h [place]  [3]    [4] PRN medications: acetaminophen **OR** acetaminophen **OR** acetaminophen, benzocaine-menthol, butalbital-acetaminophen-caff, dextromethorphan-guaifenesin, guaiFENesin, guaiFENesin, lubricating eye drops, ondansetron, polyethylene glycol, prochlorperazine

## 2025-05-20 NOTE — CONSULTS
Wound Care Consult     Visit Date: 5/20/2025      Patient Name: Karly Horton         MRN: [id number]             Reason for Consult: Multiple wounds of the posterior back , sacrum, and bilateral buttocks         Wound History: Defer to the patient's chart     Pertinent Labs: Defer to the patient's chart      Assessment:  Wound 04/30/25 Pressure Injury Sacrum (Active)   Date First Assessed: 04/30/25   Present on Original Admission: Yes  Hand Hygiene Completed: Yes  Primary Wound Type: Pressure Injury  Location: Sacrum      Assessments 5/20/2025 11:00 AM   Present on Admission to Healthcare Facility Yes   Site Assessment Black;Brown;Dark edges;Granulation;Fragile;Pink;Red;Sloughing;Eschar   Annette-Wound Assessment Black;Burgundy;Dark edges;Denuded;Erythematous;Fluctuant;Friable;Moist ;Non-blanchable erythema;Pink;Purple;Red   Non-staged Wound Description Full thickness   Pressure Injury Stage Unstageable   Shape irregular   Wound Length (cm) 7.7 cm   Wound Width (cm) 6.3 cm   Wound Surface Area (cm^2) 38.1 cm^2   Wound Depth (cm) 0.3 cm   Wound Volume (cm^3) 7.62 cm^3   State of Healing Early/partial granulation;Eschar;Slough   Wound Bed Granulation (%) 65 %   Wound Bed Slough (%) 25 %   Wound Bed Eschar (%) 10 %   Margins Well-defined edges;Epibole (Rolled edges);Not attached   Drainage Description Serosanguineous   Drainage Amount Scant   Dressing Foam       Active Orders   Date Order Priority Status Authorizing Provider   05/19/25 1237 Inpatient Consult to Wound and Ostomy Nurse Routine Active Aldair Spring DO     - Reason for Consult?:    Wound   05/19/25 0013 mupirocin (Bactroban) 2 % ointment 1 Application Routine Active Lupillo Mcneil MD   05/19/25 0013 Inpatient Consult to Wound and Ostomy Nurse Routine Active Lupillo Mcneil MD     - Reason for Consult?:    Wound       Inactive Orders   Date Order Priority Status Authorizing Provider   04/30/25 1348 Wound Care 3 Wounds Associated Routine  Discontinued LANDEN Jang     - Wound Complexity:    Simple     - Cleanse with:    Soap and Water     - Apply::    Medihoney (Alginate)     - Apply::    Other (comment)     - Cover with::    Foam (Mepilex Border)       Wound 04/30/25 Pressure Injury Buttock Right (Active)   Date First Assessed: 04/30/25   Primary Wound Type: (c) Pressure Injury  Location: Buttock  Wound Location Orientation: Right      Assessments 5/20/2025 11:00 AM   Present on Admission to Healthcare Facility Yes   Site Assessment Black;Brown;Dry;Eschar   Annette-Wound Assessment Dark edges;Denuded;Erythematous;Fluctuant;Painful;Pink;Purple;Red   Non-staged Wound Description Not applicable   Pressure Injury Stage Unstageable   Shape round   Wound Length (cm) 2 cm   Wound Width (cm) 1.5 cm   Wound Surface Area (cm^2) 2.36 cm^2   State of Healing Eschar   Wound Bed Eschar (%) 99 %   Margins Well-defined edges   Treatments Site care   Drainage Description None   Drainage Amount None   Dressing Foam   Dressing Changed New   Dressing Status Dry;Clean       Active Orders   Date Order Priority Status Authorizing Provider   05/19/25 1237 Inpatient Consult to Wound and Ostomy Nurse Routine Active Aldair Spring DO     - Reason for Consult?:    Wound   05/19/25 0013 mupirocin (Bactroban) 2 % ointment 1 Application Routine Active Lupillo Mcneil MD   05/19/25 0013 Inpatient Consult to Wound and Ostomy Nurse Routine Active Lupillo Mcneil MD     - Reason for Consult?:    Wound       Inactive Orders   Date Order Priority Status Authorizing Provider   04/30/25 1348 Wound Care 3 Wounds Associated Routine Discontinued LANDEN Jang     - Wound Complexity:    Simple     - Cleanse with:    Soap and Water     - Apply::    Medihoney (Alginate)     - Apply::    Other (comment)     - Cover with::    Foam (Mepilex Border)       Wound 04/30/25 Pressure Injury Buttock Left (Active)   Date First Assessed/Time First Assessed: 04/30/25 0906    Primary Wound Type: Pressure Injury  Location: Buttock  Wound Location Orientation: Left      Assessments 5/20/2025 11:00 AM   Present on Admission to Healthcare Facility Yes   Site Assessment Brown;Tan;Sloughing;Pink;Granulation;Dry;Denuded   Annette-Wound Assessment Dry;Dark edges;Denuded;Friable;Erythematous;Pink;Red   Non-staged Wound Description Not applicable   Pressure Injury Stage Unstageable   Shape round   Wound Length (cm) 2.3 cm   Wound Width (cm) 1.3 cm   Wound Surface Area (cm^2) 2.35 cm^2   State of Healing Eschar;Early/partial granulation;Slough   Wound Bed Granulation (%) 45 %   Wound Bed Slough (%) 15 %   Wound Bed Eschar (%) 40 %   Margins Well-defined edges   Drainage Description None   Drainage Amount None   Dressing Foam   Dressing Changed Changed   Dressing Status Dry;Clean       Active Orders   Date Order Priority Status Authorizing Provider   05/19/25 1237 Inpatient Consult to Wound and Ostomy Nurse Routine Active Aldair Spring DO     - Reason for Consult?:    Wound   05/19/25 0013 mupirocin (Bactroban) 2 % ointment 1 Application Routine Active Lupillo Mcneil MD   05/19/25 0013 Inpatient Consult to Wound and Ostomy Nurse Routine Active Lupillo Mcneil MD     - Reason for Consult?:    Wound       Inactive Orders   Date Order Priority Status Authorizing Provider   04/30/25 1348 Wound Care 3 Wounds Associated Routine Discontinued PO Jang-CNP     - Wound Complexity:    Simple     - Cleanse with:    Soap and Water     - Apply::    Medihoney (Alginate)     - Apply::    Other (comment)     - Cover with::    Foam (Mepilex Border)       Wound 05/19/25 Pressure Injury Buttock Right (Active)   Date First Assessed/Time First Assessed: 05/19/25 1207   Primary Wound Type: Pressure Injury  Location: Buttock  Wound Location Orientation: Right      Assessments 5/20/2025 11:00 AM   Present on Admission to Healthcare Facility Yes   Site Assessment  [place];Denuded;Fragile;Granulation;Non-blanchable erythema;Painful;Purple;Red   Annette-Wound Assessment Dry;Fragile   Non-staged Wound Description Partial thickness   Pressure Injury Stage Stage 2   Shape diffuse   Wound Length (cm) 4.1 cm   Wound Width (cm) 3.8 cm   Wound Surface Area (cm^2) 12.24 cm^2   Wound Depth (cm) 0.1 cm   Wound Volume (cm^3) 0.816 cm^3   State of Healing Early/partial granulation   Wound Bed Granulation (%) 10 %   Margins Poorly defined   Drainage Description None   Drainage Amount None   Dressing Foam   Dressing Changed Changed   Dressing Status Dry;Clean       Active Orders   Date Order Priority Status Authorizing Provider   05/19/25 1237 Inpatient Consult to Wound and Ostomy Nurse Routine Active Aldair Spring, DO     - Reason for Consult?:    Wound       Wound 05/19/25 Pressure Injury Back Medial (Active)   Date First Assessed/Time First Assessed: 05/19/25 1208   Primary Wound Type: Pressure Injury  Location: Back  Wound Location Orientation: Medial      Assessments 5/20/2025 11:00 AM   Present on Admission to Healthcare Facility Yes   Site Assessment Clean;Denuded;Dry;Fragile;Granulation;Pink;Red   Annette-Wound Assessment Clean;Dry;Friable   Non-staged Wound Description Partial thickness   Pressure Injury Stage Stage 2   Shape irregular   Wound Length (cm) 1.3 cm   Wound Width (cm) 1.5 cm   Wound Surface Area (cm^2) 1.53 cm^2   Wound Depth (cm) 1 cm   Wound Volume (cm^3) 1.021 cm^3   State of Healing Early/partial granulation   Wound Bed Granulation (%) 90 %   Margins Well-defined edges       Active Orders   Date Order Priority Status Authorizing Provider   05/19/25 1237 Inpatient Consult to Wound and Ostomy Nurse Routine Active Aldair Spring, DO     - Reason for Consult?:    Wound   Link at time of wound care consult assessment : 11   Current and additionally recommended preventative measures:  Foam padding to all vulnerable bony prominences including , but not limited to the  sacrum , and bilateral heels.  Envision 700 pressure off loading mattress.  Turn and position the patient every 1 to 1.5 hours, along with microshifts , and positioning devices  Nutritional consult - wound healing increased nutritional demands   Link and skin assessments at every shift  Manage and monitor for; Friction, shear, and moisture related skin impairments / injuries.  Elan-View boots to bilateral feet/heels.  .                   Wound Plan: Wound care recommendations:    Irrigate the sacral wound with copious amounts of sterile saline and pat dry.    Wash remaining wounds with soap and water and pat dry.    1) PI of Sacral decubitus unstageable: Recommendation is for the application of Santyl to the wound bed , then cover with a Dakins soaked 4x4 dressing . Pack area of undermining with edges of 4x4 dressing soaked in Dakins. Cover dressing with a foam border dressing Repeat wound care once per day .     2) PI of Right Buttock (adjacent to sacral wound)  unstageable:  Apply Santyl to wound bed and cover with foam border dressing. Repeat wound care once per day .     3) PI of Left Buttock stage 2 : Apply Medihoney to wound bed and cover with foam border dressing. Reapply Medihoney every 2 days.    4) PI of right buttock stage 2 : Apply Medihoney to wound bed and cover with foam border dressing. Reapply Medihoney every 2 days.    5) PI of Medial lateral back stage 2 : Apply Medihoney to wound bed and cover with foam border dressing. Reapply Medihoney every 2 days.      Follow up plan: Reassess wound progress with in 7 days.     Ash Flores RN  5/20/2025  12:40 PM

## 2025-05-20 NOTE — PROGRESS NOTES
Physical Therapy    Physical Therapy Evaluation    Patient Name: Karly Horton  MRN: [id number]  Department: [address] ICU  Room: 15/15-A  Today's Date: 5/20/2025   Time Calculation  Start Time: 0808  Stop Time: 0830  Time Calculation (min): 22 min    Assessment/Plan   PT Assessment  Rehab Prognosis: Poor  Barriers to Discharge Home: Physical needs  Physical Needs: 24hr mobility assistance needed, 24hr ADL assistance needed  Evaluation/Treatment Tolerance: Patient limited by fatigue, Patient limited by pain  Medical Staff Made Aware: Yes  Strengths: Support of Caregivers  Barriers to Participation: Ability to acquire knowledge, Comorbidities  End of Session Communication: Bedside nurse  Assessment Comment: pt is dependent with bed mobility which is her baseline. No need for further acute skilled PT services. Will disconintue PT order.  End of Session Patient Position: Bed, 3 rail up, Alarm on (call button in reach)  IP OR SWING BED PT PLAN  Inpatient or Swing Bed: Inpatient  PT Plan  PT Plan: PT Eval only  PT Eval Only Reason: At baseline function  PT Frequency: PT eval only  PT Discharge Recommendations: No further acute PT  PT Recommended Transfer Status: Total assist  PT - OK to Discharge: Yes    Subjective     PT Visit Info:  PT Received On: 05/20/25  General Visit Information:  General  Reason for Referral: Impaired Mobility  Referred By: Abril Silverio, PO-CNP  Past Medical History Relevant to Rehab: CHF,  pneumonia, NSCLC, anemia, fibromyositis  Family/Caregiver Present: No  Co-Treatment: OT  Co-Treatment Reason: Optimization of patient outcomes for a medically complex initial evaluation in ICU of a patient with very poor tolerance to activity.  Prior to Session Communication: Bedside nurse  Patient Position Received: Bed, 2 rail up, Alarm off, not on at start of session  Preferred Learning Style: verbal  General Comment: 80 y.o. female presents to [facility] ED via EMS with complaints of progressive SOB  x several weeks.  While in the ED she had acute respiratory disterss and is amitted to the ICU with hypoxemic respiratory failure requiring HFNC.  Home Living:  Home Living  Type of Home: House  Lives With: Adult children (daughter)  Home Adaptive Equipment: Walker rolling or standard, Wheelchair-manual  Home Layout: One level  Home Access: Stairs to enter without rails  Entrance Stairs-Rails: None  Entrance Stairs-Number of Steps: stoop + 1 (family bumps up in wheelchair)  Prior Level of Function:  Prior Function Per Pt/Caregiver Report  Level of [place]: Needs assistance with ADLs, Needs assistance with homemaking, Needs assistance with functional transfers  Receives Help From: Family, Home health (daughter)  ADL Assistance:  (all ADLs assist at bed level only able to feed self at baseline)  Toileting:  (incontinent and wears briefs that daughter manages)  Homemaking Assistance:  (daughter performs all IADLs)  Ambulatory Assistance:  (assist with bed mobility and dependent SPT to wheelchair does not ambulate)  Vocational: Retired  Leisure: watching tv  Precautions:  Precautions  Hearing/Visual Limitations: mildly [place]  Medical Precautions: Fall precautions, Oxygen therapy device and L/min, Swallowing precautions (HFNC 40L FiO2 60%)    Vital Signs Comment:  bpm, 90%, 30 breaths/min, 132/110 (119) mmHg     Objective   Pain:  Pain Assessment  Pain Assessment: FLACC (Face, Legs, Activity, Cry, Consolability)  0-10 (Numeric) Pain Score: 5 - Moderate pain  Pain Type: Chronic pain  Pain Location: Buttocks  Pain Interventions: Repositioned (nursing notified)  Response to Interventions: No change in pain  Cognition:  Cognition  Overall Cognitive Status: Impaired  Orientation Level: Disoriented to situation  Following Commands: Follows one step commands with repetition  Safety Judgment: Decreased awareness of need for assistance  Cognition Comments: Pt perseverating on eating and drinking, edu pt on needing to  "wait for Dr. mcleod, becoming increasingly agitated about situation yelling at therapist, nurse aware.  Insight: Moderate  Impulsive: Moderately  Processing Speed: Delayed    General Assessments:  General Observation  General Observation: pt cachectic, padded bandages on bony prominences    Activity Tolerance  Endurance: Tolerates less than 10 min exercise with changes in vital signs  Activity Tolerance Comments: very poor  Rate of Perceived Exertion (RPE): 5    Sensation  Sensation Comment: pt denies paresthesias    Strength  Strength Comments: muscle atrophy with very little strength B LEs  Coordination  Coordination Comment: decreased rate/accuracy of movements    Postural Control  Posture Comment: cachectic, very prominent bony surfaces  Functional Assessments:  ADL  ADL's Addressed: No    Bed Mobility  Bed Mobility: Yes  Bed Mobility 1  Bed Mobility 1: Rolling right, Rolling left, Scooting  Level of Assistance 1: Dependent  Bed Mobility Comments 1: verbal cues for rolling side to side and reaching for bedrails. pt not assisting, stating \"you do that for me\". boosted to HOB with use of draw sheet. dependent for positioning on left side to attempt pressure relief.    Transfers  Transfer: No  Extremity/Trunk Assessments:  RLE   RLE :  (very weak, decreased ROM at end ranges, pt reluctant to attempt to move herself)  LLE   LLE :  (very weak, decreased ROM at end ranges, pt reluctant to attempt to move herself)  Outcome Measures:  [facility] Basic Mobility  Turning from your back to your side while in a flat bed without using bedrails: Total  Moving from lying on your back to sitting on the side of a flat bed without using bedrails: Total  Moving to and from bed to chair (including a wheelchair): Total  Standing up from a chair using your arms (e.g. wheelchair or bedside chair): Total  To walk in hospital room: Total  Climbing 3-5 steps with railing: Total  Basic Mobility - Total Score: 6    FSS-ICU  Ambulation: " Unable to attempt due to weakness  Rolling: Total assistance (performs 25% or requires another person)  Sitting: Unable to perform  Transfer Sit-to-Stand: Unable to perform  Transfer Supine-to-Sit: Total assistance (performs 25% or requires another person)  Total Score: 2    Education Documentation  Mobility Training, taught by Meli Noble PT at 5/20/2025  3:16 PM.  Learner: Patient  Readiness: Acceptance  Method: Explanation  Response: No Evidence of Learning    Education Comments  05/20/25 1116 Meli Noble PT  Educated on using call button for assistance

## 2025-05-20 NOTE — PROGRESS NOTES
Karly Horton is a 80 y.o. female on day 2 of admission presenting with Generalized weakness.    Subjective   Symptoms (0 - 10, Best to Worst)  [place] Symptom Assessment System  0-10 (Numeric) Pain Score: 5 - Moderate pain  Presently having moderate to severe cervical spine pain asking for lidocaine ointment/cream.  Does have some pain in her buttocks tells me that she gets violently ill with most opioids that she has had some fentanyl in the past and it seems to be okay.  Interestingly though she does tell me that she had done okay in the past with codeine-  Despite this being metabolized to morphine in the body.    Shortness of breath seems to be slightly better denied headache dizziness lightheadedness.  No chest pain or pressure no nausea or vomiting.    My colleagues have had difficulty getting in contact with the patient's documented healthcare POA Jocelyn DrakeNeo, the patient herself tells me that her alternate POA Mr. Juan Nascimento passed away.    My colleague had spoken with the patient's daughter also named Karly the other day.    Objective     Physical Exam  Brief examination, nurses notes and vitals reviewed    This is a acutely and chronically appearing and very frail and clearly cachectic elderly female sitting up in bed in the ICU on high flow oxygen 80% FiO2.  Neurologically she seems to be pretty intact was able to have quite a lucid conversation with me today able to discuss goals of care and the level of aggressiveness at high levels of benefit versus risk.  Further details in my note below but at this point she would wish us to be aggressive in the disease modifying she is okay with CPR intubation and defibrillation.    Grossly atraumatic head could not evaluate for occipital region due to neck discomfort  Mucous membranes are moist  She has anisocoria left pupil is about a 5mm right pupil is about 3mm  No nystagmus no eye drainage or discharge  Supple very prominent ribs scapula and  "clavicle as well as cervical vertebrae  Grossly diminished breath sounds she is on high flow oxygen 80% FiO2 does not manifest much conversational dyspnea.  Rhythm and rate seems to be regular no easily appreciated murmur gallop or rub  Occasional premature beats on telemetry  Soft abdomen nontender nondistended positive bowel sounds no rebound or guarding   external urinary catheter  Lower extremities do not seem to have any significant edema  Periarea buttocks breasts and back not evaluated, buttocks and sacral wounds not able to be examined, she declined turning.  Neurologically for me she seems to be intact and lucid able to have a pretty high level goals of care conversation pertaining to risk and benefits of certain treatments.  She was not anxious at the time I saw her or when she agitated.  She definitely is uncomfortable with both cervical spine tenderness as well as pain in her buttock/sacral .  She was appropriate and asked good questions seem to understand the likelihood of a meaningful robust sustained recovery should she suffer cardiopulmonary arrest as very likely poor.  She acknowledges this but wants us to do what we can okay with short-term life support maneuvers never wants a trach or PEG she told me \" I do not want any of that\"      Last Recorded Vitals  Blood pressure 118/77, pulse 102, temperature 36.4 °C (97.5 °F), resp. rate (!) 27, height 1.422 m (4' 8\"), weight (!) 34.3 kg (75 lb 9.9 oz), SpO2 97%.  Intake/Output last 3 Shifts:  I/O last 3 completed shifts:  In: 510.4 (16.1 mL/kg) [I.V.:10.4 (0.3 mL/kg); IV Piggyback:500]  Out: 2625 (82.5 mL/kg) [Urine:2625 (2.3 mL/kg/hr)]  Dosing Weight: 31.8 kg     Relevant Results  Results for orders placed or performed during the hospital encounter of 05/18/25 (from the past 24 hours)   POCT GLUCOSE   Result Value Ref Range    POCT Glucose 78 74 - 99 mg/dL   POCT GLUCOSE   Result Value Ref Range    POCT Glucose 92 74 - 99 mg/dL   Renal function panel "   Result Value Ref Range    Glucose 84 74 - 99 mg/dL    Sodium 129 (L) 136 - 145 mmol/L    Potassium 3.1 (L) 3.5 - 5.3 mmol/L    Chloride 79 (L) 98 - 107 mmol/L    Bicarbonate 42 (HH) 21 - 32 mmol/L    Anion Gap 11 10 - 20 mmol/L    Urea Nitrogen 12 6 - 23 mg/dL    Creatinine 0.30 (L) 0.50 - 1.05 mg/dL    eGFR >90 >60 mL/min/1.73m*2    Calcium 8.5 (L) 8.6 - 10.3 mg/dL    Phosphorus 3.2 2.5 - 4.9 mg/dL    Albumin 2.5 (L) 3.4 - 5.0 g/dL   CBC   Result Value Ref Range    WBC 18.7 (H) 4.4 - 11.3 x10*3/uL    nRBC 0.0 0.0 - 0.0 /100 WBCs    RBC 3.67 (L) 4.00 - 5.20 x10*6/uL    Hemoglobin 11.5 (L) 12.0 - 16.0 g/dL    Hematocrit 34.7 (L) 36.0 - 46.0 %    MCV 95 80 - 100 fL    MCH 31.3 26.0 - 34.0 pg    MCHC 33.1 32.0 - 36.0 g/dL    RDW 14.0 11.5 - 14.5 %    Platelets 227 150 - 450 x10*3/uL   Magnesium   Result Value Ref Range    Magnesium 1.50 (L) 1.60 - 2.40 mg/dL   Urinalysis with Reflex Microscopic   Result Value Ref Range    Color, Urine Light-Yellow Light-Yellow, Yellow, Dark-Yellow    Appearance, Urine Clear Clear    Specific Gravity, Urine 1.014 1.005 - 1.035    pH, Urine 6.0 5.0, 5.5, 6.0, 6.5, 7.0, 7.5, 8.0    Protein, Urine NEGATIVE NEGATIVE, 10 (TRACE), 20 (TRACE) mg/dL    Glucose, Urine Normal Normal mg/dL    Blood, Urine NEGATIVE NEGATIVE mg/dL    Ketones, Urine NEGATIVE NEGATIVE mg/dL    Bilirubin, Urine NEGATIVE NEGATIVE mg/dL    Urobilinogen, Urine Normal Normal mg/dL    Nitrite, Urine NEGATIVE NEGATIVE    Leukocyte Esterase, Urine NEGATIVE NEGATIVE   MRSA Surveillance for Vancomycin De-escalation, PCR    Specimen: Anterior Nares; Swab   Result Value Ref Range    MRSA PCR Not Detected Not Detected   POCT GLUCOSE   Result Value Ref Range    POCT Glucose 92 74 - 99 mg/dL   POCT GLUCOSE   Result Value Ref Range    POCT Glucose 196 (H) 74 - 99 mg/dL     CT chest wo IV contrast  Result Date: 5/20/2025  Interpreted By:  Jin Schmidt, STUDY: CT CHEST WO IV CONTRAST; ;  5/20/2025 11:37 am   INDICATION:  Signs/Symptoms:acute hypoxemic respiratory failure, h/o SCC lung CA.     COMPARISON: 03/05/2025   ACCESSION NUMBER(S): [id number]   ORDERING CLINICIAN: CLIF MENA   TECHNIQUE: Serial axial unenhanced CT images obtained of the chest. Images reformatted in the coronal and sagittal projection.   All CT examinations are performed with 1 or more of the following dose reduction techniques: Automated exposure control, adjustment of mA and/or kv according to patient's size, or use of iterative reconstruction techniques.   FINDINGS: Mediastinum demonstrates no significant lymphadenopathy. Esophagus demonstrates component of gas-filled appearance component of retained food contents demonstrated within the stomach. Correlate with symptoms reflux.   Heart and great vessels demonstrate ascending thoracic aorta to measure 2.9 cm. There is moderate vascular calcification of the thoracic aorta. No cardiomegaly.   Lung parenchyma demonstrates moderate right and mild-to-moderate left-sided pleural effusions. There is basilar compressive atelectasis with right lower lobar collapse. Endobronchial opacification of the atelectatic right lower lobe as well as the bronchus intermedius. Correlate with aspiration as a potential etiology versus retained secretions. There is septal thickening and patchy consolidation within the right middle lobe as well as right upper lobe in particular of the posterior segment with component of air bronchograms. Left lung demonstrates scattered areas of patchy infiltrate in the left upper lobe.   Included portions visualized abdomen are unremarkable   Visualized osseous structures demonstrate S shaped scoliosis. Multilevel degenerative discogenic changes are demonstrated multilevel endplate sclerosis as well as anterior and lateral osteophyte formation. No compression deformity.               1. Right lower lobar collapse with air bronchograms and endobronchial opacification of the right lower lobe and  bronchus intermedius. Correlate with aspiration pneumonias a potential etiology. Of note, patchy consolidation also demonstrated in the right middle lobe and in particular posterior segment of the right upper lobe as well as left upper lobe. Findings suggest multilobar pneumonia. 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   2. Moderate right and mild-to-moderate left pleural effusions.     MACRO: None   Signed by: Jin Schmidt 5/20/2025 11:56 AM Dictation workstation:   [id number]    XR chest 1 view  Result Date: 5/20/2025  Interpreted By:  Hanane Fine, STUDY: XR CHEST 1 VIEW 5/20/2025 5:21 am   INDICATION: Signs/Symptoms:acute hypoxemic respiratory failure   COMPARISON: 05/18/2025   ACCESSION NUMBER(S): [id number]   ORDERING CLINICIAN: CLIF MENA   TECHNIQUE: AP semi-erect view of the chest at bedside   FINDINGS: The MediPort catheter terminates within the SVC. The cardiac size is stable with atherosclerosis of the thoracic aorta noted.   When compared with the study from 2 days earlier, there is worsening infiltrate and airspace consolidation within the right lung particularly in the right upper lobe with right pleural effusion increased in size.   Small left pleural effusion is present. The interstitial prominence observed 2 days earlier has improved however.   Levoscoliosis of the thoracic spine is present.       Improved interstitial prominence with stable small left pleural effusion.   Worsening infiltrate with airspace consolidation now seen in the right upper lobe with right pleural effusion increased in size.   Signed by: Hanane Fine 5/20/2025 7:53 AM Dictation workstation:   [id number]    CT head wo IV contrast  Result Date: 5/20/2025  Interpreted By:  Kanika Avina, STUDY: CT HEAD WO IV CONTRAST;  5/20/2025 12:06 am   INDICATION: Signs/Symptoms:unequal pupils.   COMPARISON: MRI brain 10/18/2024   ACCESSION  NUMBER(S): [id number]   ORDERING CLINICIAN: JANET HALEY   TECHNIQUE: Axial noncontrast CT images of the head.   FINDINGS: BRAIN PARENCHYMA:  deep and periventricular white matter hypodensities are nonspecific, but favored to represent chronic small vessel ischemic changes.  Gray-white matter interfaces are preserved. No mass effect or midline shift.   HEMORRHAGE: No acute intracranial hemorrhage. VENTRICLES and EXTRA-AXIAL SPACES: The ventricles and sulci are within normal limits in size for brain volume. No abnormal extraaxial fluid collection. EXTRACRANIAL SOFT TISSUES: Within normal limits. PARANASAL SINUSES/MASTOIDS:  The visualized paranasal sinuses and mastoid air cells are aerated. CALVARIUM: No depressed skull fracture. No destructive osseous lesion.   OTHER FINDINGS: None.       No acute intracranial abnormality.     MACRO: None   Signed by: Kanika Avina 5/20/2025 12:31 AM Dictation workstation:   [id number]    XR chest 1 view  Result Date: 5/18/2025  Interpreted By:  Lenka Cartagena, STUDY: XR CHEST 1 VIEW;  5/18/2025 6:36 pm   INDICATION: Signs/Symptoms:Generalized weakness.   COMPARISON: Chest x-ray 04/29/2025   ACCESSION NUMBER(S): [id number]   ORDERING CLINICIAN: JAIME CHILDERS   FINDINGS: Right-sided MediPort terminates in the SVC. Multiple overlying leads are present.   CARDIOMEDIASTINAL SILHOUETTE: Cardiomediastinal silhouette is stable in size and configuration. Atherosclerotic calcification of the aorta.   LUNGS: Bilateral interstitial prominence. There is right middle and lower lobe patchy airspace opacities. Layering bilateral effusions are better seen on concurrent CT. No pneumothorax.   ABDOMEN: No remarkable upper abdominal findings.   BONES: Levoconvex scoliosis. Generalized diffuse osteopenia. Multilevel degenerative changes of the spine.       There is patchy airspace opacity in the right mid and lower lung concerning for developing multifocal pneumonia. Continued radiographic  follow-up to resolution is advised.   Cardiomegaly with mild interstitial prominence suggestive of developing interstitial edema/CHF.   Bilateral pleural effusions are better seen on concurrent CT.   MACRO: None   Signed by: Lenka Cartagena 5/18/2025 7:06 PM Dictation workstation:   [id number]    CT abdomen pelvis wo IV contrast  Result Date: 5/18/2025  Interpreted By:  Lenka Cartagena, STUDY: CT ABDOMEN PELVIS WO IV CONTRAST;  5/18/2025 5:51 pm   INDICATION: Signs/Symptoms:Sacral wound.   COMPARISON: CT scan of the abdomen pelvis 02/14/2025   ACCESSION NUMBER(S): [id number]   ORDERING CLINICIAN: JAIME CHILDERS   TECHNIQUE: Axial noncontrast CT images of the abdomen and pelvis with coronal and sagittal reconstructed images.   FINDINGS:   LOWER CHEST: Moderate bilateral pleural effusions. There are patchy airspace opacities and consolidation in the right lung. Aortic root and coronary artery calcifications noted. Right-sided MediPort terminates in the SVC.   ABDOMEN: Lack of intravenous contrast limits evaluation of vessels and solid organs. LIVER: Within normal limits. BILE DUCTS: Normal caliber. GALLBLADDER: No calcified gallstones. No wall thickening. PANCREAS: Suboptimally visualized due to lack of contrast and paucity of intra-abdominal fat SPLEEN: Within normal limits.  Calcified splenic granulomas noted. ADRENALS: Nodular thickening of the adrenal glands may relate to hyperplasia or adenomatous change.   KIDNEYS, URETERS, URINARY BLADDER:  Kidneys are symmetric in size without evidence for calculi, hydronephrosis, hydroureter or perinephric inflammatory changes.   No bladder calculi or wall thickening.  Marked distention of the urinary bladder.   VESSELS:  Calcific atherosclerosis of the aortoiliac and branch vessels with moderate tortuosity. No aortic aneurysm. RETROPERITONEUM: No pathologically enlarged lymph nodes.   PELVIS:   REPRODUCTIVE ORGANS: Uterus and ovaries are not well visualized.   BOWEL:  Postsurgical changes of partial colectomy. No dilated bowel. Appendix is not identified with certainty. No pericecal inflammatory change is seen. No pneumatosis or portal venous gas. PERITONEUM: Paucity of intra-abdominal fat. Mild diffuse haziness of the mesentery. ABDOMINAL WALL: Diffuse body wall edema. There is soft tissue defect overlying the distal sacrum and coccyx. Mild adjacent soft tissue stranding with a tiny locule of air in the right gluteal subcutaneous fat. A discrete drainable fluid collection is not identified. Findings concerning for sacral decubitus ulcer. BONES: Generalized diffuse osteopenia. Multilevel degenerative changes of the spine. Reverse s shaped scoliosis. Great 1 anterolisthesis are L3 on 4 and L5 on S1 with grade 1-2 anterolisthesis of L4 on 5.       There is soft tissue defect overlying the distal sacrum and coccyx with mild soft tissue stranding. A tiny locule of air is present in the right gluteal subcutaneous fat, without evidence for discrete drainable fluid collection. Findings concerning for sacral decubitus ulcer. Correlate with exam.   Moderate bilateral pleural effusions. There are patchy airspace opacities and consolidation in the right lung concerning for developing multifocal pneumonia. Clinical correlation continued radiographic follow-up to resolution is advised.   Marked distention of the urinary bladder. Correlate with symptomatology to exclude urinary retention.   Diffuse body wall edema.   Additional findings as described above.   MACRO: None   Signed by: Lenka Cartagena 5/18/2025 6:31 PM Dictation workstation:   [id number]    No results found for the last 90 days.    Transthoracic Echo (TTE) Limited  Result Date: 10/10/2024   [facility], [address] [place] [address]                Tel [phone] and Fax [phone] TRANSTHORACIC ECHOCARDIOGRAM REPORT  Patient Name:      JAMISON CORONADO    Reading Physician:    Abby Nolan                                                                Jan OLMEDO Study Date:        10/10/2024           Ordering Provider:    [id number] LYNNE WADE MRN/PID:           [id number]             Fellow:               Ham Jorge MD Accession#:        [id number]         Nurse: Date of Birth/Age: 1945 / 79 years Sonographer:          Lydia Valadez                                                               RDCS Gender:            F                    Additional Staff: Height:            152.40 cm            Admit Date:           10/8/2024 Weight:            31.30 kg             Admission Status:     Inpatient - STAT BSA / BMI:         1.19 m2 / 13.48      Encounter#:           [id number]                    kg/m2 Blood Pressure:    122/71 mmHg          Department Location:  [facility] Non                                                               Invasive Study Type:    TRANSTHORACIC ECHO (TTE) LIMITED Diagnosis/ICD: Heart failure, unspecified-I50.9; Chronic diastolic (congestive)                heart failure (CHF)-I50.32 Indication:    Congestive Heart Failure CPT Code:      Echo Limited-40803; Doppler Limited-01041; Color Doppler-88590 Patient History: Smoker:            Current. Pertinent History: CHF and COPD. Right Lung Mass, Anemia, h/o PHTN. Study Detail: The following Echo studies were performed: color flow, Doppler, 2D               and M-Mode. Technically challenging study due to body habitus and               prominent lung artifact.  PHYSICIAN INTERPRETATION: Left Ventricle: The left ventricular systolic function is normal, with a visually estimated ejection fraction of 60-65%. There are no regional left ventricular wall motion abnormalities. The left ventricular cavity size is normal. There is left ventricular concentric remodeling. Spectral Doppler shows an impaired relaxation pattern of left ventricular diastolic filling. Left  Atrium: The left atrium is normal in size. Right Ventricle: The right ventricle is normal in size. There is normal right ventricular global systolic function. Right Atrium: The right atrium is normal in size. Aortic Valve: The aortic valve is trileaflet. There is no evidence of aortic valve regurgitation. The peak instantaneous gradient of the aortic valve is 5.8 mmHg. Mitral Valve: The mitral valve is normal in structure. There is trace mitral valve regurgitation. Tricuspid Valve: The tricuspid valve is structurally normal. There is mild tricuspid regurgitation. Pulmonic Valve: The pulmonic valve is not well visualized. There is physiologic pulmonic valve regurgitation. Pericardium: There is no pericardial effusion noted. Aorta: The aortic root is normal. Pulmonary Artery: The tricuspid regurgitant velocity is 2.65 m/s, and with an estimated right atrial pressure of 3 mmHg, the estimated pulmonary artery pressure is borderline elevated with the RVSP at 31.1 mmHg. Systemic Veins: The inferior vena cava appears small in size, with IVC inspiratory collapse greater than 50%. In comparison to the previous echocardiogram(s): Compared with study dated 5/16/2024, there is a decrease in the severity of tricuspid regurgitation, now mild. Estimated RVSP 31 mmHg, decreased from 64 mmHg. Additionally, there is improvement in RV function on this study compared to prior.  CONCLUSIONS:  1. The left ventricular systolic function is normal, with a visually estimated ejection fraction of 60-65%.  2. Spectral Doppler shows an impaired relaxation pattern of left ventricular diastolic filling.  3. There is normal right ventricular global systolic function. QUANTITATIVE DATA SUMMARY:  2D MEASUREMENTS:          Normal Ranges: Ao Root d:       3.00 cm  (2.0-3.7cm) LAs:             3.10 cm  (2.7-4.0cm) IVSd:            1.20 cm  (0.6-1.1cm) LVPWd:           1.00 cm  (0.6-1.1cm) LVIDd:           2.50 cm  (3.9-5.9cm) LVIDs:           1.50 cm  LV Mass Index:   62 g/m2 LVEDV Index:     37 ml/m2 LV % FS          40.0 %  RA VOLUME BY A/L METHOD:         Normal Ranges: RA Area A4C:             9.0 cm2  AORTA MEASUREMENTS:         Normal Ranges: Asc Ao, d:          2.50 cm (2.1-3.4cm)  LV SYSTOLIC FUNCTION BY 2D PLANIMETRY (MOD):                      Normal Ranges: EF-A4C View:    57 % (>=55%) EF-A2C View:    48 % EF-Biplane:     53 % EF-Visual:      63 % LV EF Reported: 63 %  LV DIASTOLIC FUNCTION:             Normal Ranges: MV Peak E:             0.55 m/s    (0.7-1.2 m/s) MV Peak A:             1.10 m/s    (0.42-0.7 m/s) E/A Ratio:             0.50        (1.0-2.2) MV e'                  0.065 m/s   (>8.0) MV lateral e'          0.08 m/s MV medial e'           0.05 m/s MV A Dur:              121.00 msec E/e' Ratio:            8.43        (<8.0)  MITRAL VALVE:          Normal Ranges: MV DT:        359 msec (150-240msec)  AORTIC VALVE:           Normal Ranges: AoV Vmax:      1.20 m/s (<=1.7m/s) AoV Peak P.8 mmHg (<20mmHg) LVOT Max Grayson:  1.08 m/s (<=1.1m/s) LVOT VTI:      17.90 cm LVOT Diameter: 2.00 cm  (1.8-2.4cm) AoV Area,Vmax: 2.83 cm2 (2.5-4.5cm2)  RIGHT VENTRICLE: RV Basal 3.10 cm RV Mid   2.00 cm RV Major 5.4 cm TAPSE:   16.6 mm RV s'    0.09 m/s  TRICUSPID VALVE/RVSP:          Normal Ranges: Peak TR Velocity:     2.65 m/s Est. RA Pressure:     3 mmHg RV Syst Pressure:     31 mmHg  (< 30mmHg) IVC Diam:             0.70 cm  PULMONIC VALVE:          Normal Ranges: PV Accel Time:  76 msec  (>120ms) PV Max Grayson:     0.9 m/s  (0.6-0.9m/s) PV Max PG:      3.4 mmHg  [id number] Ovidio Montoya MD Electronically signed on 10/10/2024 at 5:43:25 PM  ** Final **             Malnutrition Diagnosis Status: New  Malnutrition Diagnosis: Severe malnutrition related to chronic disease or condition  Related to: decreased ability to consume sufficient energy  As Evidenced by: 10# (14.2%) weight loss in the past 6 months, severe subcutaneous fat and muscle deficits, PO intake  <75% of estimated energy needs >1 month  I agree with the dietitian's malnutrition diagnosis.      Assessment/Plan   Acute respiratory failure w hypoxia  Multifocal pneumonia/pneumonitis- atb & 40 mg IV -Medrol every 24 hours, possible aspiration,  lower on differential is immune mediated pneumonitis  COPD exacerbation  Squamous cell carcinoma of the right lung  - Has been on Keytruda not a candidate for chemo or surgery per last oncology note  Completed radiotherapy 2/24-2/26/25  Last brain MRI 1/27/25 no evidence of mass cerebral infarction or   - Per Onc outpatient notes plan for restaging PET/CT 6/9/2025  HFpEF-FÁTIMA last 10/2024 left ventricular ejection fraction 60 to 65%  Decubitus ulcers and wounds-wound care following  Electrolyte imbalance-replete as needed  Protein calorie malnutrition-severe, clinical dietitian on consult, check and follow-up for prealbumin  Pain due to sacral wound as well as cervical neck pain-scheduling moderate dose Tylenol on a standard lower side because of her low body weight and very cachectic status, lidocaine ointment to the cervical spine. prefer to hold off on any opioids as able, she has significant intolerance to most opioids and w her concurrent resp failure feel she has very low threshold to decompensate    Palliative care/ACP/GOALS OF CARE    CODE STATUS: Full code  The patient does not have full decision making capacity(seems like this is vacillating)  -> now today 5/20/2025 seems to be much more clear seem to be capable and lucid for the most part when I spoke with her was able to have good goals of care ACP conversation able to articulate risks and benefits of treatment versus nontreatment(s),  does seem to have an understanding that likely outcome will be poor if she suffers a cardiopulmonary arrest for meaningful robust sustainable recovery especially in light of her multiple health issues as well as cancer and severe wounds to her buttock and sacrum as well as her  "severe malnutrition/cachexia    She does want us to perform CPR defibrillation if she has a cardiopulmonary arrest  -That being said, she was clear in my conversation with her that she does not want to remain on life support indefinitely that is does not want a tracheostomy nor does she want a PEG tube.    We specifically talked about comfort model of care and allowing a peaceful dignified death if her condition fails to improve despite aggressive interventions.  With this she was in agreement, but does not want to give up at this point wants to \"try what we can\"  From home with daughter Karly Pro   ACP documents are in EPIC   Friend Jocelyn is [facility] / 1st Alternate is Juan Nascimento - who I believe is  ( the patient believes so as well as her daughter, I was shown an obituary which is believed to be him - he was a  who did a lot of work for the family.)    Please refer to my colleagues initial consult on 2025.  At the time my partner saw her she was not fully capable she had vacillating capacity, and what seems like hallucinations.  At that time my colleague had spoken with her daughter also named Brooke there was some question about adjusting CODE STATUS the ICU team had spoken with the patient and she is currently a full code    4:38 PM  In between speaking with the patient who seems much more capable today as well as getting in touch with her documented H MATTHEW Mccloud (paper HPOA printed and placed in record).  CODE STATUS will remain aggressive and disease-modifying full code no limits on treatment  Jocelyn has been updated regarding all the present imaging and lab work as well as the patient's condition she to is aware that there is a likely poor outcome if she suffers a cardiopulmonary arrest and is much more indicative of systemic failure of her body in his current state.  With that being said patient wishes us to continue in the treatment model of care we will do so she was " very clear that she would not want to be kept up life support indefinitely would not ever be interested in a tracheostomy or PEG tube knows that these type of things would provide little if any meaningful benefit in terms of her quality of life she would not be willing to go through those measures would want us to keep her comfortable and ensure a peaceful dignified passing at the content at she is however okay with short-term life support such as being intubated if necessary though her obvious preference is to avoid this at all measures.    We will continue conversations with her documented H MATTHEW Banks (alternate Juan has passed away as per what the patient had told me corroborated with Jocelyn)   Jocelyn will try and get in touch with the patient's daughter Brooke and update her as well.     ACP time has been in excess of 25 minutes.  Time they are otherwise has been greater than 35 min, time not continuous.   Discussed in detail with the ICU team.    Tim Pradhan, APRN-CNP

## 2025-05-20 NOTE — NURSING NOTE
Preparing pt for transfer to specialty mattress when pt pupils noted to be unequal. Lt pupil 6, round and reactive to light. Rt pupil 2, round and reactive to light.   No other changes to pt's neuro exam. Speech clear, alert and oriented. Equal strength b/l.  Michelle FONTENOT-CNP notified and at bedside. STAT CT of brain ordered

## 2025-05-20 NOTE — PROGRESS NOTES
"ICU Progress Note  Karly Horton/[id number]    Admit Date: 5/18/2025  Hospital Length of Stay: 2   ICU Length of Stay: 20h     SUBJECTIVE:   Breathing feels about the same, CXR with worsening RU/ML infiltrate.   She is anxious and hungry and wants to eat irrespective of aspiration risk, has been tolerating sips and pills well overnight    MEDICATIONS  Infusions:     Scheduled:  azithromycin, 250 mg, Daily  baclofen, 10 mg, TID  cholecalciferol, 125 mcg, Daily  enoxaparin, 40 mg, Daily  ipratropium-albuteroL, 3 mL, q6h  lidocaine, 1 patch, Nightly  methylPREDNISolone sodium succinate (PF), 40 mg, q24h  mupirocin, 1 Application, BID  nicotine, 1 patch, Daily   Followed by  [START ON 7/1/2025] nicotine, 1 patch, Daily   Followed by  [START ON 7/15/2025] nicotine, 1 patch, Daily  oxygen, , q4h  pantoprazole, 40 mg, Daily before breakfast  piperacillin-tazobactam, 4.5 g, q6h  potassium chloride, 20 mEq, q2h  sucralfate, 1 g, q6h [place]      PRN:  acetaminophen, 650 mg, q4h PRN   Or  acetaminophen, 650 mg, q4h PRN   Or  acetaminophen, 650 mg, q4h PRN  alteplase, 2 mg, PRN  benzocaine-menthol, 1 lozenge, q2h PRN  butalbital-acetaminophen-caff, 1 tablet, q6h PRN  dextromethorphan-guaifenesin, 5 mL, q4h PRN  guaiFENesin, 600 mg, q12h PRN  guaiFENesin, 600 mg, BID PRN  lubricating eye drops, 1 drop, PRN  ondansetron, 8 mg, q8h PRN  polyethylene glycol, 17 g, Daily PRN  prochlorperazine, 10 mg, q6h PRN        PHYSICAL EXAM:   Visit Vitals  /80   Pulse 106   Temp 36.4 °C (97.5 °F)   Resp 14   Ht 1.422 m (4' 8\")   Wt (!) 34.3 kg (75 lb 9.9 oz)   SpO2 94%   BMI 16.95 kg/m²   OB Status Hysterectomy   Smoking Status Every Day   BSA 1.16 m²     Wt Readings from Last 5 Encounters:   05/19/25 (!) 34.3 kg (75 lb 9.9 oz)   05/01/25 (!) 35.2 kg (77 lb 9.6 oz)   04/11/25 (!) 29.5 kg (65 lb 2.3 oz)   03/07/25 (!) 28.6 kg (63 lb 0.8 oz)   02/26/25 (!) 28 kg (61 lb 11.7 oz)     INTAKE/OUTPUT:  I/O last 3 completed shifts:  In: 510.4 " (16.1 mL/kg) [I.V.:10.4 (0.3 mL/kg); IV Piggyback:500]  Out: 2625 (82.5 mL/kg) [Urine:2625 (2.3 mL/kg/hr)]  Dosing Weight: 31.8 kg          Vent settings:  FiO2 (%):  [60 %-100 %] 60 %    Physical Exam:   - CONSTITUTION: Frail, anxious   - NEUROLOGIC: A&O x 3 with occasional confusion and / hallucinations.  Pupilary mismatch at baseline per patient  - CARDIOVASCULAR: SR/ST, warm & well perfused   - RESPIRATORY: Moderately labored & tachypnic with coarse breath sounds   - GI: NT,ND  - : steanrs draining yellow urine   - EXTREMITIES: warm, contracted   - SKIN: dry.  Multiple chronic ulcers to sacrum / but / back  - PSYCHIATRIC: intermittently very anxious and occasionally confused     Daily Risk Screen  Intubated:no   Central line:  no  Ste[place]s: 5/19 for urinary retention     Images: CXR with worsening right patchy infiltrates     Invasive Hemodynamics:    Most Recent Range Past 24hrs   BP (Art)   No data recorded   MAP(Art)   No data recorded   RA/CVP   No data recorded   PA   No data recorded   PA(mean)   No data recorded   CO   No data recorded   CI   No data recorded   Mixed Venous   No data recorded   SVR    No data recorded         Assessment/Plan   Pt is a 79 y.o. 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  While in the ED she had acute respiratory disterss and is amitted to the ICU with hypoxemic respiratory failure requiring HFNC.  Suspect acute pneumonitis +/- CAP.         Neuro/Psych/Pain Ctrl/Sedation:  H/O anxiety on multimodals at home including 40+ years use of cannabis.    - CAM ICU score q-shift  - Sleep/wake cycle hygiene  - Delirium precaution   - Pain Management: tylenol   - Trial low dose benzo's in diana of home meprobamate, reviewed with PharmD   - Q4 Neuro assessments     Respiratory/ENT:  H/O COPD, 65 year smoking  hx and active vaping.  Unclear how she consume cannabis   H/O squamous cell carcinoma of right lung currently on keytruda   Radiographic concern for pneumonitis vs ARDS  - Continuous Pulse oximetry  - Continue HFNC vs NC to maintain Spo2>88%  - Steroids and empiric abs   - Bronchial hygiene and IS  - Appreciate Pulmonary Consult for pneumonitis eval      Cardiovascular:  H/O HFpEF, LVEF 60-65% 10/2024  Tachycardia, stress induced    - Continuous Telemetry     Renal/Volume Status (Intra & Extravascular):  Hyponatremia, acute on chronic, 2/2 lung disease   - Monitor I/O's  - Replete electrolytes to maintain K >4.0 and Mg >2.0  - Daily BMP, Mg   - Fluid Net:      GI:  Chronic malnutrition with poor PO intake    - SLP eval r/o aspiration   - Diet: NPO pending resp status   - Nutrition Consult for rec's  - Low threshold toplace feeding tube   - PPI: home med   - BR: daily and as needed      Infectious Disease:  C/F CAP, COPD exacerbation   - Continue empiric pip-tazo, vanco, azithro   - Appreciate ID Consult      Heme/Onc:  H/O squamous cell carcinoma of right lung as above   No known h/o hypercoagulation   - DVT Prophylaxis: SCDs and enoxaparin   - Transfuse for hemoglobin < 7     Endocrine:  No pMHx   - POCT and SSI      Ortho:  - PT/OT as able however she is profoundly disabled with extremity contractures and long term bed bound     Skin:    Sacral / b/l glute decubitus ulcer present on admission.  - Wound Care Consult      Ethics/Code Status:  Full Code.  Appreciate Palliative Care Consult assistance navigating GOC discussions.           Lab Draw Frequency:  Daily and as needed      :  DVT Prophylaxis: enoxaparin   GI Prophylaxis: home PPI   Bowel Regimen: senna-colace, polythethyline glycol   Diet: NPO   CVC:  no   [place]: no   [place]: placed 5/19 for urinary retention   Restraints: not indicated      Dispo: ICU with acute hypoxemic respiratory failure      Code status: Full Code  Emergency contact:  daughter Karly NORTH personally spent 50 minutes of critical care time directly and personally managing the patient exclusive of separately billable procedures.     A,B,C,D,E,F,G: reviewed     Dispo: ICU care for now.

## 2025-05-20 NOTE — SIGNIFICANT EVENT
[facility], operated by [facility] CRITICAL CARE SIGNIFICANT EVENT NOTE    Date:  5/19/2025  Patient:  Karly Horton  YOB: 1945  MRN:  [id number]   Admit Date:  5/18/2025    I was called to bedside by nursing at 2315 to assess patient with unequal pupils. Pt found to have dilated left pupil. No neurologic changes, no new complaints from pt. She does not know if this is a new finding or if she's had it before.    Exam:  Neuro exam negative. NIH stroke scale: 0. Pupils unequal in size, right pupil is ~2 mm and left is ~6 mm. Both pupils reactive to light. Conjunctivae normal.    Assessment and Plan:  Mydriasis left pupil. Pt had duoneb treatment about 3 hours prior. Reviewed medication list. Will order CT head to r/o acute event.     Michelle FONTENOT-CNP  Pulmonary and Critical Care Medicine   [facility]

## 2025-05-21 ENCOUNTER — APPOINTMENT (OUTPATIENT)
Dept: RADIOLOGY | Facility: HOSPITAL | Age: 80
DRG: 177 | End: 2025-05-21
Payer: MEDICARE

## 2025-05-21 LAB
ALBUMIN SERPL BCP-MCNC: 2.6 G/DL (ref 3.4–5)
ANION GAP SERPL CALCULATED.3IONS-SCNC: ABNORMAL MMOL/L
ASPERGILLUS GALACTOMANNAN EIA,SERUM: 0.03
BASOPHILS # BLD AUTO: 0.01 X10*3/UL (ref 0–0.1)
BASOPHILS NFR BLD AUTO: 0.1 %
BUN SERPL-MCNC: 11 MG/DL (ref 6–23)
CALCIUM SERPL-MCNC: 8.5 MG/DL (ref 8.6–10.3)
CHLORIDE SERPL-SCNC: 81 MMOL/L (ref 98–107)
CO2 SERPL-SCNC: >45 MMOL/L (ref 21–32)
CREAT SERPL-MCNC: 0.37 MG/DL (ref 0.5–1.05)
EGFRCR SERPLBLD CKD-EPI 2021: >90 ML/MIN/1.73M*2
EOSINOPHIL # BLD AUTO: 0.01 X10*3/UL (ref 0–0.4)
EOSINOPHIL NFR BLD AUTO: 0.1 %
ERYTHROCYTE [DISTWIDTH] IN BLOOD BY AUTOMATED COUNT: 14.6 % (ref 11.5–14.5)
FUNGITELL BETA-D GLUCAN,SERUM: <31 PG/ML
GLUCOSE BLD MANUAL STRIP-MCNC: 132 MG/DL (ref 74–99)
GLUCOSE BLD MANUAL STRIP-MCNC: 143 MG/DL (ref 74–99)
GLUCOSE BLD MANUAL STRIP-MCNC: 144 MG/DL (ref 74–99)
GLUCOSE BLD MANUAL STRIP-MCNC: 195 MG/DL (ref 74–99)
GLUCOSE BLD MANUAL STRIP-MCNC: 246 MG/DL (ref 74–99)
GLUCOSE SERPL-MCNC: 162 MG/DL (ref 74–99)
HCT VFR BLD AUTO: 33.6 % (ref 36–46)
HGB BLD-MCNC: 10.7 G/DL (ref 12–16)
IMM GRANULOCYTES # BLD AUTO: 0.07 X10*3/UL (ref 0–0.5)
IMM GRANULOCYTES NFR BLD AUTO: 0.6 % (ref 0–0.9)
LYMPHOCYTES # BLD AUTO: 0.14 X10*3/UL (ref 0.8–3)
LYMPHOCYTES NFR BLD AUTO: 1.2 %
M PNEUMO IGM SER IA-ACNC: 0.14 U/L
MCH RBC QN AUTO: 30.8 PG (ref 26–34)
MCHC RBC AUTO-ENTMCNC: 31.8 G/DL (ref 32–36)
MCV RBC AUTO: 97 FL (ref 80–100)
MONOCYTES # BLD AUTO: 0.69 X10*3/UL (ref 0.05–0.8)
MONOCYTES NFR BLD AUTO: 5.8 %
NEUTROPHILS # BLD AUTO: 11.07 X10*3/UL (ref 1.6–5.5)
NEUTROPHILS NFR BLD AUTO: 92.2 %
NRBC BLD-RTO: 0 /100 WBCS (ref 0–0)
PHOSPHATE SERPL-MCNC: 1.8 MG/DL (ref 2.5–4.9)
PLATELET # BLD AUTO: 231 X10*3/UL (ref 150–450)
POTASSIUM SERPL-SCNC: 3.7 MMOL/L (ref 3.5–5.3)
PREALB SERPL-MCNC: 6.3 MG/DL (ref 18–40)
RBC # BLD AUTO: 3.47 X10*6/UL (ref 4–5.2)
SODIUM SERPL-SCNC: 130 MMOL/L (ref 136–145)
WBC # BLD AUTO: 12 X10*3/UL (ref 4.4–11.3)

## 2025-05-21 PROCEDURE — 2500000005 HC RX 250 GENERAL PHARMACY W/O HCPCS: Performed by: STUDENT IN AN ORGANIZED HEALTH CARE EDUCATION/TRAINING PROGRAM

## 2025-05-21 PROCEDURE — 2020000001 HC ICU ROOM DAILY

## 2025-05-21 PROCEDURE — 2500000002 HC RX 250 W HCPCS SELF ADMINISTERED DRUGS (ALT 637 FOR MEDICARE OP, ALT 636 FOR OP/ED): Performed by: NURSE PRACTITIONER

## 2025-05-21 PROCEDURE — 37799 UNLISTED PX VASCULAR SURGERY: CPT | Performed by: NURSE PRACTITIONER

## 2025-05-21 PROCEDURE — 82947 ASSAY GLUCOSE BLOOD QUANT: CPT

## 2025-05-21 PROCEDURE — 2500000001 HC RX 250 WO HCPCS SELF ADMINISTERED DRUGS (ALT 637 FOR MEDICARE OP): Performed by: NURSE PRACTITIONER

## 2025-05-21 PROCEDURE — 2500000002 HC RX 250 W HCPCS SELF ADMINISTERED DRUGS (ALT 637 FOR MEDICARE OP, ALT 636 FOR OP/ED)

## 2025-05-21 PROCEDURE — 2500000004 HC RX 250 GENERAL PHARMACY W/ HCPCS (ALT 636 FOR OP/ED): Performed by: NURSE PRACTITIONER

## 2025-05-21 PROCEDURE — 80069 RENAL FUNCTION PANEL: CPT | Performed by: NURSE PRACTITIONER

## 2025-05-21 PROCEDURE — 2500000001 HC RX 250 WO HCPCS SELF ADMINISTERED DRUGS (ALT 637 FOR MEDICARE OP): Performed by: INTERNAL MEDICINE

## 2025-05-21 PROCEDURE — 2500000005 HC RX 250 GENERAL PHARMACY W/O HCPCS: Performed by: NURSE PRACTITIONER

## 2025-05-21 PROCEDURE — 99291 CRITICAL CARE FIRST HOUR: CPT | Performed by: NURSE PRACTITIONER

## 2025-05-21 PROCEDURE — 99232 SBSQ HOSP IP/OBS MODERATE 35: CPT | Performed by: NURSE PRACTITIONER

## 2025-05-21 PROCEDURE — 2500000002 HC RX 250 W HCPCS SELF ADMINISTERED DRUGS (ALT 637 FOR MEDICARE OP, ALT 636 FOR OP/ED): Performed by: INTERNAL MEDICINE

## 2025-05-21 PROCEDURE — 2500000004 HC RX 250 GENERAL PHARMACY W/ HCPCS (ALT 636 FOR OP/ED): Mod: JZ | Performed by: INTERNAL MEDICINE

## 2025-05-21 PROCEDURE — 84134 ASSAY OF PREALBUMIN: CPT | Mod: WESLAB | Performed by: NURSE PRACTITIONER

## 2025-05-21 PROCEDURE — 71045 X-RAY EXAM CHEST 1 VIEW: CPT

## 2025-05-21 PROCEDURE — 71045 X-RAY EXAM CHEST 1 VIEW: CPT | Performed by: RADIOLOGY

## 2025-05-21 PROCEDURE — 94640 AIRWAY INHALATION TREATMENT: CPT

## 2025-05-21 PROCEDURE — 85025 COMPLETE CBC W/AUTO DIFF WBC: CPT | Performed by: NURSE PRACTITIONER

## 2025-05-21 PROCEDURE — S4991 NICOTINE PATCH NONLEGEND: HCPCS | Performed by: NURSE PRACTITIONER

## 2025-05-21 PROCEDURE — 92526 ORAL FUNCTION THERAPY: CPT | Mod: GN | Performed by: SPEECH-LANGUAGE PATHOLOGIST

## 2025-05-21 RX ORDER — AMOXICILLIN 250 MG
2 CAPSULE ORAL 2 TIMES DAILY
Status: DISCONTINUED | OUTPATIENT
Start: 2025-05-21 | End: 2025-06-02 | Stop reason: HOSPADM

## 2025-05-21 RX ORDER — DEXTROSE 50 % IN WATER (D50W) INTRAVENOUS SYRINGE
12.5
Status: DISCONTINUED | OUTPATIENT
Start: 2025-05-21 | End: 2025-06-02 | Stop reason: HOSPADM

## 2025-05-21 RX ORDER — DEXTROSE 50 % IN WATER (D50W) INTRAVENOUS SYRINGE
25
Status: DISCONTINUED | OUTPATIENT
Start: 2025-05-21 | End: 2025-06-02 | Stop reason: HOSPADM

## 2025-05-21 RX ORDER — POLYETHYLENE GLYCOL 3350 17 G/17G
17 POWDER, FOR SOLUTION ORAL 3 TIMES DAILY
Status: DISCONTINUED | OUTPATIENT
Start: 2025-05-21 | End: 2025-05-24

## 2025-05-21 RX ORDER — INSULIN LISPRO 100 [IU]/ML
0-5 INJECTION, SOLUTION INTRAVENOUS; SUBCUTANEOUS
Status: DISCONTINUED | OUTPATIENT
Start: 2025-05-21 | End: 2025-06-02 | Stop reason: HOSPADM

## 2025-05-21 RX ORDER — DIAZEPAM 5 MG/1
5 TABLET ORAL DAILY
Status: DISCONTINUED | OUTPATIENT
Start: 2025-05-21 | End: 2025-05-27

## 2025-05-21 RX ORDER — FUROSEMIDE 20 MG/1
20 TABLET ORAL ONCE
Status: COMPLETED | OUTPATIENT
Start: 2025-05-21 | End: 2025-05-21

## 2025-05-21 RX ORDER — TALC
3 POWDER (GRAM) TOPICAL NIGHTLY
Status: DISCONTINUED | OUTPATIENT
Start: 2025-05-21 | End: 2025-06-02 | Stop reason: HOSPADM

## 2025-05-21 RX ADMIN — ACETAMINOPHEN 650 MG: 325 TABLET ORAL at 08:46

## 2025-05-21 RX ADMIN — PIPERACILLIN SODIUM AND TAZOBACTAM SODIUM 4.5 G: 4; .5 INJECTION, SOLUTION INTRAVENOUS at 01:58

## 2025-05-21 RX ADMIN — IPRATROPIUM BROMIDE AND ALBUTEROL SULFATE 3 ML: 2.5; .5 SOLUTION RESPIRATORY (INHALATION) at 07:50

## 2025-05-21 RX ADMIN — POLYETHYLENE GLYCOL 3350 17 G: 17 POWDER, FOR SOLUTION ORAL at 15:15

## 2025-05-21 RX ADMIN — ACETAMINOPHEN 650 MG: 325 TABLET ORAL at 21:12

## 2025-05-21 RX ADMIN — BACLOFEN 10 MG: 10 TABLET ORAL at 00:26

## 2025-05-21 RX ADMIN — LIDOCAINE 1 APPLICATION: 50 OINTMENT TOPICAL at 21:51

## 2025-05-21 RX ADMIN — POLYETHYLENE GLYCOL 3350 17 G: 17 POWDER, FOR SOLUTION ORAL at 08:44

## 2025-05-21 RX ADMIN — LIDOCAINE 1 APPLICATION: 50 OINTMENT TOPICAL at 08:48

## 2025-05-21 RX ADMIN — PIPERACILLIN SODIUM AND TAZOBACTAM SODIUM 4.5 G: 4; .5 INJECTION, SOLUTION INTRAVENOUS at 15:00

## 2025-05-21 RX ADMIN — PIPERACILLIN SODIUM AND TAZOBACTAM SODIUM 4.5 G: 4; .5 INJECTION, SOLUTION INTRAVENOUS at 22:54

## 2025-05-21 RX ADMIN — ENOXAPARIN SODIUM 40 MG: 40 INJECTION SUBCUTANEOUS at 08:46

## 2025-05-21 RX ADMIN — Medication 40 L/MIN: at 07:50

## 2025-05-21 RX ADMIN — SENNOSIDES AND DOCUSATE SODIUM 2 TABLET: 50; 8.6 TABLET ORAL at 21:12

## 2025-05-21 RX ADMIN — MELATONIN 3 MG: 3 TAB ORAL at 21:12

## 2025-05-21 RX ADMIN — SUCRALFATE ORAL SUSPENSION 1 G: 1 SUSPENSION ORAL at 06:32

## 2025-05-21 RX ADMIN — Medication 40 L/MIN: at 11:25

## 2025-05-21 RX ADMIN — NICOTINE 1 PATCH: 21 PATCH, EXTENDED RELEASE TRANSDERMAL at 08:47

## 2025-05-21 RX ADMIN — LIDOCAINE 1 APPLICATION: 50 OINTMENT TOPICAL at 15:26

## 2025-05-21 RX ADMIN — ACETAMINOPHEN 650 MG: 325 TABLET ORAL at 01:47

## 2025-05-21 RX ADMIN — PIPERACILLIN SODIUM AND TAZOBACTAM SODIUM 4.5 G: 4; .5 INJECTION, SOLUTION INTRAVENOUS at 08:47

## 2025-05-21 RX ADMIN — POLYETHYLENE GLYCOL 3350 17 G: 17 POWDER, FOR SOLUTION ORAL at 21:12

## 2025-05-21 RX ADMIN — BACLOFEN 10 MG: 10 TABLET ORAL at 15:16

## 2025-05-21 RX ADMIN — SUCRALFATE ORAL SUSPENSION 1 G: 1 SUSPENSION ORAL at 12:12

## 2025-05-21 RX ADMIN — MUPIROCIN 1 APPLICATION: 20 OINTMENT TOPICAL at 21:49

## 2025-05-21 RX ADMIN — SENNOSIDES AND DOCUSATE SODIUM 2 TABLET: 50; 8.6 TABLET ORAL at 08:46

## 2025-05-21 RX ADMIN — ACETAMINOPHEN 650 MG: 325 TABLET ORAL at 15:16

## 2025-05-21 RX ADMIN — FUROSEMIDE 20 MG: 20 TABLET ORAL at 18:46

## 2025-05-21 RX ADMIN — Medication 5 L/MIN: at 19:56

## 2025-05-21 RX ADMIN — BACLOFEN 10 MG: 10 TABLET ORAL at 21:12

## 2025-05-21 RX ADMIN — INSULIN LISPRO 1 UNITS: 100 INJECTION, SOLUTION INTRAVENOUS; SUBCUTANEOUS at 17:00

## 2025-05-21 RX ADMIN — SUCRALFATE ORAL SUSPENSION 1 G: 1 SUSPENSION ORAL at 18:46

## 2025-05-21 RX ADMIN — IPRATROPIUM BROMIDE AND ALBUTEROL SULFATE 3 ML: 2.5; .5 SOLUTION RESPIRATORY (INHALATION) at 19:56

## 2025-05-21 RX ADMIN — IPRATROPIUM BROMIDE AND ALBUTEROL SULFATE 3 ML: 2.5; .5 SOLUTION RESPIRATORY (INHALATION) at 11:24

## 2025-05-21 RX ADMIN — Medication 125 MCG: at 08:46

## 2025-05-21 RX ADMIN — SUCRALFATE ORAL SUSPENSION 1 G: 1 SUSPENSION ORAL at 00:26

## 2025-05-21 RX ADMIN — PANTOPRAZOLE SODIUM 40 MG: 40 TABLET, DELAYED RELEASE ORAL at 06:32

## 2025-05-21 RX ADMIN — DIAZEPAM 5 MG: 5 TABLET ORAL at 12:12

## 2025-05-21 RX ADMIN — AZITHROMYCIN DIHYDRATE 250 MG: 250 TABLET ORAL at 08:46

## 2025-05-21 RX ADMIN — BACLOFEN 10 MG: 10 TABLET ORAL at 08:47

## 2025-05-21 RX ADMIN — COLLAGENASE SANTYL 1 APPLICATION: 250 OINTMENT TOPICAL at 09:24

## 2025-05-21 ASSESSMENT — PAIN - FUNCTIONAL ASSESSMENT
PAIN_FUNCTIONAL_ASSESSMENT: 0-10
PAIN_FUNCTIONAL_ASSESSMENT: CPOT (CRITICAL CARE PAIN OBSERVATION TOOL)

## 2025-05-21 ASSESSMENT — PAIN SCALES - GENERAL: PAINLEVEL_OUTOF10: 5 - MODERATE PAIN

## 2025-05-21 NOTE — CARE PLAN
Problem: Respiratory  Goal: No signs of respiratory distress (eg. Use of accessory muscles. Peds grunting)  5/21/2025 0700 by Kerrie Mckeon, RRT  Outcome: Progressing  5/20/2025 2326 by Kerrie Mckeon, RRT  Outcome: Progressing

## 2025-05-21 NOTE — CARE PLAN
The patient's goals for the shift include      The clinical goals for the shift include remain hemodynamically stable      Problem: Pain - Adult  Goal: Verbalizes/displays adequate comfort level or baseline comfort level  Outcome: Progressing     Problem: Safety - Adult  Goal: Free from fall injury  Outcome: Progressing     Problem: Discharge Planning  Goal: Discharge to home or other facility with appropriate resources  Outcome: Progressing     Problem: Chronic Conditions and Co-morbidities  Goal: Patient's chronic conditions and co-morbidity symptoms are monitored and maintained or improved  Outcome: Progressing     Problem: Nutrition  Goal: Nutrient intake appropriate for maintaining nutritional needs  Outcome: Progressing     Problem: Skin  Goal: Decreased wound size/increased tissue granulation at next dressing change  Outcome: Progressing  Flowsheets (Taken 5/21/2025 1050)  Decreased wound size/increased tissue granulation at next dressing change:   Promote sleep for wound healing   Utilize specialty bed per algorithm  Goal: Participates in plan/prevention/treatment measures  Outcome: Progressing  Flowsheets (Taken 5/21/2025 1050)  Participates in plan/prevention/treatment measures:   Discuss with provider PT/OT consult   Elevate heels  Goal: Prevent/manage excess moisture  Outcome: Progressing  Flowsheets (Taken 5/21/2025 1050)  Prevent/manage excess moisture:   Moisturize dry skin   Cleanse incontinence/protect with barrier cream  Goal: Prevent/minimize sheer/friction injuries  Outcome: Progressing  Flowsheets (Taken 5/21/2025 1050)  Prevent/minimize sheer/friction injuries:   Complete micro-shifts as needed if patient unable. Adjust patient position to relieve pressure points, not a full turn   Increase activity/out of bed for meals   Use pull sheet  Goal: Promote/optimize nutrition  Outcome: Progressing  Flowsheets (Taken 5/21/2025 1050)  Promote/optimize nutrition:   Assist with feeding   Monitor/record  intake including meals   Consume > 50% meals/supplements  Goal: Promote skin healing  Outcome: Progressing  Flowsheets (Taken 5/21/2025 1050)  Promote skin healing:   Assess skin/pad under line(s)/device(s)   Protective dressings over bony prominences   Turn/reposition every 2 hours/use positioning/transfer devices     Problem: Respiratory  Goal: Clear secretions with interventions this shift  Outcome: Progressing  Goal: Minimize anxiety/maximize coping throughout shift  Outcome: Progressing  Goal: Minimal/no exertional discomfort or dyspnea this shift  Outcome: Progressing  Goal: No signs of respiratory distress (eg. Use of accessory muscles. Peds grunting)  Outcome: Progressing  Goal: Patent airway maintained this shift  Outcome: Progressing  Goal: Tolerate mechanical ventilation evidenced by VS/agitation level this shift  Outcome: Progressing  Goal: Tolerate pulmonary toileting this shift  Outcome: Progressing  Goal: Verbalize decreased shortness of breath this shift  Outcome: Progressing  Goal: Wean oxygen to maintain O2 saturation per order/standard this shift  Outcome: Progressing  Goal: Increase self care and/or family involvement in next 24 hours  Outcome: Progressing

## 2025-05-21 NOTE — PROGRESS NOTES
Karly Horton is a 80 y.o. female on day 3 of admission presenting with Generalized weakness.    Subjective   Symptoms (0 - 10, Best to Worst)  [place] Symptom Assessment System  0-10 (Numeric) Pain Score: 5 - Moderate pain  Actually currently having minor pain at the present time just soreness in her neck on her bottom.  Happy that the lidocaine ointment is helping per nursing also thinks Tylenol helping too.    Denied headache dizziness lightheadedness no fever chills presently denies shortness of breath no chest pain or pressure no nausea or vomiting.  Tells me she is eating and she is quite thirsty.  No abdominal pain no nausea no vomiting.    Now on 45% FiO2 via Vapotherm.    Only major complaint is she is not sleeping well.    Objective     Physical Exam  Nurses notes and vitals reviewed    Acutely and chronically ill-appearing very frail and cachectic elderly white female sitting up in bed on high flow oxygen 45% FiO2.  No acute distress she is awake and alert cervical neck pain seems to be significantly better than yesterday.  She expresses that she is happy to see me when she is more comfortable thanked me profusely today.  Head appears to be grossly atraumatic moist mucous membranes edentulous  Anisocoria left eye pupil about 4 to 4.5 mm, right pupil about 3 mm.  Supple no obvious JVD trachea seems to be midline.  Very prominent fat and muscle loss throughout  Right upper chest Mediport looks to be free of any signs of infection no tenderness.  Left lung mostly clear there are few crackles in the right lung no conversational dyspnea present no accessory muscle use seems to have equal chest excursion.  Rhythm and rate seems to be mostly regular with occasional ectopy  External urinary catheter  Abdomen soft nontender nondistended positive bowel sounds no rebound or guarding  Annette area buttocks breasts and back not examined, buttocks/sacral wounds not directly observed.  Neurologic.  She was not anxious  "or distressed this afternoon.  She was pleasant and cooperative thankful that the lidocaine ointment helped her neck pain.  She was seemingly lucid and was quite articulate asking good questions today about her current situation, \" the pneumonia\" asked me to give her healthcare MATTHEW Banks a call later today.  She is profoundly weak but able to move all extremities can wiggle her toes equally grasps are moderately strong.  Poor skin turgor positive tenting, as above wounds on her backside were not correctly observed.  Her complexion is pale.    Last Recorded Vitals  Blood pressure 129/81, pulse 110, temperature 36.2 °C (97.2 °F), resp. rate (!) 29, height 1.422 m (4' 8\"), weight (!) 34.3 kg (75 lb 9.9 oz), SpO2 90%.  Intake/Output last 3 Shifts:  I/O last 3 completed shifts:  In: 1201.7 (37.8 mL/kg) [P.O.:650; I.V.:51.7 (1.6 mL/kg); IV Piggyback:500]  Out: 1300 (40.9 mL/kg) [Urine:1300 (1.1 mL/kg/hr)]  Dosing Weight: 31.8 kg     Relevant Results    Results for orders placed or performed during the hospital encounter of 05/18/25 (from the past 24 hours)   Prealbumin   Result Value Ref Range    Prealbumin 6.9 (L) 18.0 - 40.0 mg/dL   POCT GLUCOSE   Result Value Ref Range    POCT Glucose 232 (H) 74 - 99 mg/dL   POCT GLUCOSE   Result Value Ref Range    POCT Glucose 155 (H) 74 - 99 mg/dL   POCT GLUCOSE   Result Value Ref Range    POCT Glucose 246 (H) 74 - 99 mg/dL   Renal function panel   Result Value Ref Range    Glucose 162 (H) 74 - 99 mg/dL    Sodium 130 (L) 136 - 145 mmol/L    Potassium 3.7 3.5 - 5.3 mmol/L    Chloride 81 (L) 98 - 107 mmol/L    Bicarbonate >45 (HH) 21 - 32 mmol/L    Anion Gap      Urea Nitrogen 11 6 - 23 mg/dL    Creatinine 0.37 (L) 0.50 - 1.05 mg/dL    eGFR >90 >60 mL/min/1.73m*2    Calcium 8.5 (L) 8.6 - 10.3 mg/dL    Phosphorus 1.8 (L) 2.5 - 4.9 mg/dL    Albumin 2.6 (L) 3.4 - 5.0 g/dL   Prealbumin   Result Value Ref Range    Prealbumin 6.3 (L) 18.0 - 40.0 mg/dL   POCT GLUCOSE   Result Value Ref " Range    POCT Glucose 144 (H) 74 - 99 mg/dL   CBC and Auto Differential   Result Value Ref Range    WBC 12.0 (H) 4.4 - 11.3 x10*3/uL    nRBC 0.0 0.0 - 0.0 /100 WBCs    RBC 3.47 (L) 4.00 - 5.20 x10*6/uL    Hemoglobin 10.7 (L) 12.0 - 16.0 g/dL    Hematocrit 33.6 (L) 36.0 - 46.0 %    MCV 97 80 - 100 fL    MCH 30.8 26.0 - 34.0 pg    MCHC 31.8 (L) 32.0 - 36.0 g/dL    RDW 14.6 (H) 11.5 - 14.5 %    Platelets 231 150 - 450 x10*3/uL    Neutrophils % 92.2 40.0 - 80.0 %    Immature Granulocytes %, Automated 0.6 0.0 - 0.9 %    Lymphocytes % 1.2 13.0 - 44.0 %    Monocytes % 5.8 2.0 - 10.0 %    Eosinophils % 0.1 0.0 - 6.0 %    Basophils % 0.1 0.0 - 2.0 %    Neutrophils Absolute 11.07 (H) 1.60 - 5.50 x10*3/uL    Immature Granulocytes Absolute, Automated 0.07 0.00 - 0.50 x10*3/uL    Lymphocytes Absolute 0.14 (L) 0.80 - 3.00 x10*3/uL    Monocytes Absolute 0.69 0.05 - 0.80 x10*3/uL    Eosinophils Absolute 0.01 0.00 - 0.40 x10*3/uL    Basophils Absolute 0.01 0.00 - 0.10 x10*3/uL   POCT GLUCOSE   Result Value Ref Range    POCT Glucose 143 (H) 74 - 99 mg/dL     XR chest 1 view  Result Date: 5/21/2025  Interpreted By:  Clemente Vergara, STUDY: XR CHEST 1 VIEW; 5/21/2025 5:20 am   INDICATION: CLINICAL INFORMATION: Signs/Symptoms:acute hypoxemic respiratory failure.   COMPARISON: 05/20/2025   ACCESSION NUMBER(S): [id number]   ORDERING CLINICIAN: CLIF MENA   TECHNIQUE: Portable chest one view.   FINDINGS: The cardiac size is indeterminate in view of the AP projection. There is no change in the right-sided MediPort catheter. There is no change in the right-sided infiltrate. The pulmonary vasculature is slightly indistinct suggesting mild pulmonary vascular congestion. Small bilateral pleural effusions are present.       Right perihilar and basilar infiltrate consistent with pneumonia, unchanged compared to the prior study. Pulmonary vascular congestion is also suspected along with small bilateral effusions, unchanged.   MACRO: none    Signed by: Clemente Vergara 5/21/2025 8:03 AM Dictation workstation:   [id number]    CT chest wo IV contrast  Result Date: 5/20/2025  Interpreted By:  Jin Schmidt, STUDY: CT CHEST WO IV CONTRAST; ;  5/20/2025 11:37 am   INDICATION: Signs/Symptoms:acute hypoxemic respiratory failure, h/o SCC lung CA.     COMPARISON: 03/05/2025   ACCESSION NUMBER(S): [id number]   ORDERING CLINICIAN: CLIF MENA   TECHNIQUE: Serial axial unenhanced CT images obtained of the chest. Images reformatted in the coronal and sagittal projection.   All CT examinations are performed with 1 or more of the following dose reduction techniques: Automated exposure control, adjustment of mA and/or kv according to patient's size, or use of iterative reconstruction techniques.   FINDINGS: Mediastinum demonstrates no significant lymphadenopathy. Esophagus demonstrates component of gas-filled appearance component of retained food contents demonstrated within the stomach. Correlate with symptoms reflux.   Heart and great vessels demonstrate ascending thoracic aorta to measure 2.9 cm. There is moderate vascular calcification of the thoracic aorta. No cardiomegaly.   Lung parenchyma demonstrates moderate right and mild-to-moderate left-sided pleural effusions. There is basilar compressive atelectasis with right lower lobar collapse. Endobronchial opacification of the atelectatic right lower lobe as well as the bronchus intermedius. Correlate with aspiration as a potential etiology versus retained secretions. There is septal thickening and patchy consolidation within the right middle lobe as well as right upper lobe in particular of the posterior segment with component of air bronchograms. Left lung demonstrates scattered areas of patchy infiltrate in the left upper lobe.   Included portions visualized abdomen are unremarkable   Visualized osseous structures demonstrate S shaped scoliosis. Multilevel degenerative discogenic changes are demonstrated  multilevel endplate sclerosis as well as anterior and lateral osteophyte formation. No compression deformity.               1. Right lower lobar collapse with air bronchograms and endobronchial opacification of the right lower lobe and bronchus intermedius. Correlate with aspiration pneumonias a potential etiology. Of note, patchy consolidation also demonstrated in the right middle lobe and in particular posterior segment of the right upper lobe as well as left upper lobe. Findings suggest multilobar pneumonia. 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   2. Moderate right and mild-to-moderate left pleural effusions.     MACRO: None   Signed by: Jin Schmidt 5/20/2025 11:56 AM Dictation workstation:   [id number]    XR chest 1 view  Result Date: 5/20/2025  Interpreted By:  Hanane iFne, STUDY: XR CHEST 1 VIEW 5/20/2025 5:21 am   INDICATION: Signs/Symptoms:acute hypoxemic respiratory failure   COMPARISON: 05/18/2025   ACCESSION NUMBER(S): [id number]   ORDERING CLINICIAN: CLIF MENA   TECHNIQUE: AP semi-erect view of the chest at bedside   FINDINGS: The MediPort catheter terminates within the SVC. The cardiac size is stable with atherosclerosis of the thoracic aorta noted.   When compared with the study from 2 days earlier, there is worsening infiltrate and airspace consolidation within the right lung particularly in the right upper lobe with right pleural effusion increased in size.   Small left pleural effusion is present. The interstitial prominence observed 2 days earlier has improved however.   Levoscoliosis of the thoracic spine is present.       Improved interstitial prominence with stable small left pleural effusion.   Worsening infiltrate with airspace consolidation now seen in the right upper lobe with right pleural effusion increased in size.   Signed by: Hanane Fine 5/20/2025 7:53 AM Dictation workstation:    [id number]    CT head wo IV contrast  Result Date: 5/20/2025  Interpreted By:  Kanika Avina, STUDY: CT HEAD WO IV CONTRAST;  5/20/2025 12:06 am   INDICATION: Signs/Symptoms:unequal pupils.   COMPARISON: MRI brain 10/18/2024   ACCESSION NUMBER(S): [id number]   ORDERING CLINICIAN: JANET HALEY   TECHNIQUE: Axial noncontrast CT images of the head.   FINDINGS: BRAIN PARENCHYMA:  deep and periventricular white matter hypodensities are nonspecific, but favored to represent chronic small vessel ischemic changes.  Gray-white matter interfaces are preserved. No mass effect or midline shift.   HEMORRHAGE: No acute intracranial hemorrhage. VENTRICLES and EXTRA-AXIAL SPACES: The ventricles and sulci are within normal limits in size for brain volume. No abnormal extraaxial fluid collection. EXTRACRANIAL SOFT TISSUES: Within normal limits. PARANASAL SINUSES/MASTOIDS:  The visualized paranasal sinuses and mastoid air cells are aerated. CALVARIUM: No depressed skull fracture. No destructive osseous lesion.   OTHER FINDINGS: None.       No acute intracranial abnormality.     MACRO: None   Signed by: Kanika Avina 5/20/2025 12:31 AM Dictation workstation:   [id number]    Current Medications[1]        Assessment/Plan   Acute respiratory failure w hypoxia-somewhat improved, less fio2 but worsened imaging, need follow-up to ensure clearing given her history of lung cancer  Multifocal pneumonia/pneumonitis- atb, steroids on hold per pulmonology, possible aspiration,  lower on differential is immune mediated pneumonitis  COPD exacerbation  Squamous cell carcinoma of the right lung  - Has been on Keytruda not a candidate for chemo or surgery per last oncology note  Completed radiotherapy 2/24-2/26/25  Last brain MRI 1/27/25 no evidence of mass cerebral infarction or   - Per Onc outpatient notes plan for restaging PET/CT 6/9/2025  HFpEF-FÁTIMA last 10/2024 left ventricular ejection fraction 60 to 65%  Decubitus ulcers and wounds-wound  care following  Electrolyte imbalance-replete as needed  Protein calorie malnutrition-severe, clinical dietitian on consult, prealbumin 6.3  Pain due to sacral wound as well as cervical neck pain-scheduling moderate dose Tylenol on a standard lower side because of her low body weight and very cachectic status, lidocaine ointment to the cervical spine. prefer to hold off on any opioids as able, she has significant intolerance to most opioids and w her concurrent resp failure feel she has very low threshold to decompensate    ->5/21/2024 pain improved with lidocaine ointment and scheduled Tylenol  Difficulty sleeping-adding low-dose melatonin at at bedtime  -> Appreciate intensivist adding daily Valium given her history of being on meprobamate.  OARRS reviewed -> at least 2-year history of being on meprobamate as well as acetaminophen codeine No. 4  Follows with Dr. Jakub Mccoy neurology  -> Notes reviewed she has been on many different medications in the past basically settled on meprobamate and baclofen and the Tylenol No. 4 as an abortive to her migraines/pain  Palliative care/ACP/GOALS OF CARE     CODE STATUS: Full code  The patient does not have full decision making capacity(seems like this is vacillating)  -> now today 5/20/2025 seems to be much more clear seem to be capable and lucid for the most part when I spoke with her was able to have good goals of care ACP conversation able to articulate risks and benefits of treatment versus nontreatment(s),  does seem to have an understanding that likely outcome will be poor if she suffers a cardiopulmonary arrest for meaningful robust sustainable recovery especially in light of her multiple health issues as well as cancer and severe wounds to her buttock and sacrum as well as her severe malnutrition/cachexia     She does want us to perform CPR defibrillation if she has a cardiopulmonary arrest  -That being said, she was clear in my conversation with her that she  "does not want to remain on life support indefinitely that is does not want a tracheostomy nor does she want a PEG tube.     We specifically talked about comfort model of care and allowing a peaceful dignified death if her condition fails to improve despite aggressive interventions.  With this she was in agreement, but does not want to give up at this point wants to \"try what we can\"  From home with daughter Karly Pro   ACP documents are in EPIC   Friend Jocelyn is MATTHEW- /  Alternate is Juan Nascimento - who I believe is  ( the patient believes so as well as her daughter, I was shown an obituary which is believed to be him - he was a  who did a lot of work for the family.)    Please refer to my colleagues initial consult on 2025.  At the time my partner saw her she was not fully capable she had vacillating capacity, and what seems like hallucinations.  At that time my colleague had spoken with her daughter also named Brooke there was some question about adjusting CODE STATUS the ICU team had spoken with the patient and she is currently a full code     4:38 PM  In between speaking with the patient who seems much more capable today as well as getting in touch with her documented H MATTHEW Mccloud (paper HPOA printed and placed in record).  CODE STATUS will remain aggressive and disease-modifying full code no limits on treatment  Jocelyn has been updated regarding all the present imaging and lab work as well as the patient's condition she to is aware that there is a likely poor outcome if she suffers a cardiopulmonary arrest and is much more indicative of systemic failure of her body in his current state.  With that being said patient wishes us to continue in the treatment model of care we will do so she was very clear that she would not want to be kept up life support indefinitely would not ever be interested in a tracheostomy or PEG tube knows that these type of things would provide little " if any meaningful benefit in terms of her quality of life she would not be willing to go through those measures would want us to keep her comfortable and ensure a peaceful dignified passing at the content at she is however okay with short-term life support such as being intubated if necessary though her obvious preference is to avoid this at all measures.     We will continue conversations with her documented H POA Jocelyn (alternate Juan has passed away as per what the patient had told me corroborated with Jocelyn)   Jocelyn will try and get in touch with the patient's daughter Brooke and update her as well.      ACP time has been in excess of 25 minutes.  Time they are otherwise has been greater than 35 min, time not continuous.   Discussed in detail with the ICU team.    5/21/2025  Seems to be slightly better from a respiratory standpoint, less FiO2 requirement despite worsening imaging  Will need follow-up imaging given her history of lung cancer to ensure clearing  Pain actually doing quite a bit better than yesterday with the lidocaine ointment and scheduled Tylenol.  OARRS was reviewed please see above, at least couple year history of being on meprobamate and Tylenol No. 4 outpatient from Dr Jakub Mccoy neurology.  Appreciate intensivist getting her on a little bit of Valium hopefully this will help with her anxiety and maybe attenuate some withdrawal possibility.  Continues in the disease modifying model of care  Will be updating Jocleyn her healthcare POA later today.  Complaints of difficulty sleeping.    Not liking the SCDs but explained to her these are necessary to help reduce the risk of DVTs she acquiesces.  Adding low-dose melatonin at at bedtime.  Discussed with ICU nurse Cherie.  Detailed conversation with the patient today at the bedside explaining results to her as well as discussing strategies to try to mitigate her discomfort.  Total time 39 minutes.    Tim Pradhan, APRN-CNP       [1]    Current Facility-Administered Medications:     acetaminophen (Tylenol) tablet 650 mg, 650 mg, oral, TID, PO Martinez-CNP, 650 mg at 25 0846    alteplase (Cathflo Activase) injection 2 mg, 2 mg, intra-catheter, PRN, LANDEN Oropeza    [] azithromycin (Zithromax) tablet 500 mg, 500 mg, oral, Daily **FOLLOWED BY** azithromycin (Zithromax) tablet 250 mg, 250 mg, oral, Daily, LANDEN Oropeza, 250 mg at 25 0846    baclofen (Lioresal) tablet 10 mg, 10 mg, oral, TID, Lupillo Mcneil MD, 10 mg at 25 0847    benzocaine-menthol (Cepastat Sore Throat) lozenge 1 lozenge, 1 lozenge, Mouth/Throat, q2h PRN, Lupillo Mcneil MD    butalbital-acetaminophen-caff -40 mg per tablet 1 tablet, 1 tablet, oral, q6h PRN, Lupillo Mcneil MD, 1 tablet at 25 2148    cholecalciferol (Vitamin D-3) tablet 125 mcg, 125 mcg, oral, Daily, Lupillo Mcneil MD, 125 mcg at 25 0846    collagenase 250 unit/gram ointment, , Topical, Daily, Dottie Calles PA-C, 1 Application at 25 0924    dextromethorphan-guaifenesin (Robitussin DM)  mg/5 mL oral liquid 5 mL, 5 mL, oral, q4h PRN, Lupillo Mcneil MD    dextrose 50 % injection 12.5 g, 12.5 g, intravenous, q15 min PRN, Derek Tran PA-C    dextrose 50 % injection 25 g, 25 g, intravenous, q15 min PRN, Derek Tran PA-C    diazePAM (Valium) tablet 5 mg, 5 mg, oral, Daily, LANDEN Oropeza, 5 mg at 25 1212    enoxaparin (Lovenox) syringe 40 mg, 40 mg, subcutaneous, Daily, Abril Silverio, APRN-CNP, 40 mg at 25 0846    glucagon (Glucagen) injection 1 mg, 1 mg, intramuscular, q15 min PRN, Derek Tran PA-C    glucagon (Glucagen) injection 1 mg, 1 mg, intramuscular, q15 min PRN, Derek Tran PA-C    guaiFENesin (Mucinex) 12 hr tablet 600 mg, 600 mg, oral, q12h PRN, Lupillo Mcneil MD    guaiFENesin (Mucinex) 12 hr tablet 600 mg, 600 mg, oral, BID PRN, Aldair Spring DO    insulin lispro  injection 0-5 Units, 0-5 Units, subcutaneous, TID AC, Derek Tran PA-C    ipratropium-albuteroL (Duo-Neb) 0.5-2.5 mg/3 mL nebulizer solution 3 mL, 3 mL, nebulization, q6h, Lupillo Mcneil MD, 3 mL at 05/21/25 1124    lidocaine (Xylocaine) 5 % ointment 1 Application, 1 Application, Topical, TID, LANDEN Martinez, 1 Application at 05/21/25 0848    lubricating eye drops ophthalmic solution 1 drop, 1 drop, Both Eyes, PRN, LANDEN Oropeza    melatonin tablet 3 mg, 3 mg, oral, Nightly, ALNDEN Martinez    mupirocin (Bactroban) 2 % ointment 1 Application, 1 Application, Topical, BID, Lupillo Mcneil MD, 1 Application at 05/20/25 2258    nicotine (Nicoderm CQ) 21 mg/24 hr patch 1 patch, 1 patch, transdermal, Daily, 1 patch at 05/21/25 0847 **FOLLOWED BY** [START ON 7/1/2025] nicotine (Nicoderm CQ) 14 mg/24 hr patch 1 patch, 1 patch, transdermal, Daily **FOLLOWED BY** [START ON 7/15/2025] nicotine (Nicoderm CQ) 7 mg/24 hr patch 1 patch, 1 patch, transdermal, Daily, LANDEN Oropeza    ondansetron (Zofran) tablet 8 mg, 8 mg, oral, q8h PRN, Lupillo Mcneil MD    oxygen (O2) therapy, , inhalation, q4h, Aldair Spring DO, 40 L/min at 05/21/25 1125    pantoprazole (ProtoNix) EC tablet 40 mg, 40 mg, oral, Daily before breakfast, Lupillo Mcneil MD, 40 mg at 05/21/25 0632    piperacillin-tazobactam (Zosyn) 4.5 g in dextrose (iso)  mL, 4.5 g, intravenous, q6h, Lupillo Mcneil MD, Stopped at 05/21/25 0924    polyethylene glycol (Glycolax, Miralax) packet 17 g, 17 g, oral, Daily PRN, Lupillo Mcneil MD    polyethylene glycol (Glycolax, Miralax) packet 17 g, 17 g, oral, TID, LANDEN Oropeza, 17 g at 05/21/25 0844    prochlorperazine (Compazine) tablet 10 mg, 10 mg, oral, q6h PRN, Aldair Spring DO    sennosides-docusate sodium (Annette-Colace) 8.6-50 mg per tablet 2 tablet, 2 tablet, oral, BID, LANDEN Oropeza, 2 tablet at 05/21/25 0846    sodium  hypochlorite (Dakin's Quarter Strength) 0.125 % external solution, , irrigation, Daily PRN, Dottie Calles PA-C, Given at 05/20/25 1841    sucralfate (Carafate) suspension 1 g, 1 g, oral, q6h [facility], Lupillo Mcneil MD, 1 g at 05/21/25 1212     Yes

## 2025-05-21 NOTE — PROGRESS NOTES
"ICU Progress Note  Karly Horton/[id number]    Admit Date: 5/18/2025  Hospital Length of Stay: 3   ICU Length of Stay: 1d 20h     SUBJECTIVE:   5/20: Breathing feels about the same, CXR with worsening RU/ML infiltrate.   She is anxious and hungry and wants to eat irrespective of aspiration risk, has been tolerating sips and pills well overnight    5/21: GLORIA.  Steroids held per Pulmonary rec's.      MEDICATIONS  Infusions:     Scheduled:  acetaminophen, 650 mg, TID  azithromycin, 250 mg, Daily  baclofen, 10 mg, TID  cholecalciferol, 125 mcg, Daily  collagenase, , Daily  enoxaparin, 40 mg, Daily  insulin lispro, 0-5 Units, TID AC  ipratropium-albuteroL, 3 mL, q6h  lidocaine, 1 Application, TID  mupirocin, 1 Application, BID  nicotine, 1 patch, Daily   Followed by  [START ON 7/1/2025] nicotine, 1 patch, Daily   Followed by  [START ON 7/15/2025] nicotine, 1 patch, Daily  oxygen, , q4h  pantoprazole, 40 mg, Daily before breakfast  piperacillin-tazobactam, 4.5 g, q6h  sucralfate, 1 g, q6h [place]      PRN:  alteplase, 2 mg, PRN  benzocaine-menthol, 1 lozenge, q2h PRN  butalbital-acetaminophen-caff, 1 tablet, q6h PRN  dextromethorphan-guaifenesin, 5 mL, q4h PRN  dextrose, 12.5 g, q15 min PRN  dextrose, 25 g, q15 min PRN  glucagon, 1 mg, q15 min PRN  glucagon, 1 mg, q15 min PRN  guaiFENesin, 600 mg, q12h PRN  guaiFENesin, 600 mg, BID PRN  lubricating eye drops, 1 drop, PRN  ondansetron, 8 mg, q8h PRN  polyethylene glycol, 17 g, Daily PRN  prochlorperazine, 10 mg, q6h PRN  sodium hypochlorite, , Daily PRN        PHYSICAL EXAM:   Visit Vitals  BP (!) 140/106   Pulse 102   Temp 36.2 °C (97.2 °F) (Oral)   Resp 19   Ht 1.422 m (4' 8\")   Wt (!) 34.3 kg (75 lb 9.9 oz)   SpO2 95%   BMI 16.95 kg/m²   OB Status Hysterectomy   Smoking Status Every Day   BSA 1.16 m²     Wt Readings from Last 5 Encounters:   05/19/25 (!) 34.3 kg (75 lb 9.9 oz)   05/01/25 (!) 35.2 kg (77 lb 9.6 oz)   04/11/25 (!) 29.5 kg (65 lb 2.3 oz)   03/07/25 (!) 28.6 " kg (63 lb 0.8 oz)   02/26/25 (!) 28 kg (61 lb 11.7 oz)     INTAKE/OUTPUT:  I/O last 3 completed shifts:  In: 1201.7 (37.8 mL/kg) [P.O.:650; I.V.:51.7 (1.6 mL/kg); IV Piggyback:500]  Out: 1300 (40.9 mL/kg) [Urine:1300 (1.1 mL/kg/hr)]  Dosing Weight: 31.8 kg          Vent settings:  FiO2 (%):  [50 %-60 %] 50 %    Physical Exam:   - CONSTITUTION: Frail, anxious   - NEUROLOGIC: A&O x 3 with occasional confusion and / hallucinations.  Pupilary mismatch at baseline per patient  - CARDIOVASCULAR: SR/ST, warm & well perfused   - RESPIRATORY: Moderately labored & tachypnic with coarse breath sounds   - GI: NT,ND  - : stearns draining yellow urine   - EXTREMITIES: warm, contracted   - SKIN: dry.  Multiple chronic ulcers to sacrum / but / back  - PSYCHIATRIC: intermittently very anxious and occasionally confused     Daily Risk Screen  Intubated:no   Central line:  no  Ste[place]s: 5/19 for urinary retention     Images: CXR with worsening right patchy infiltrates     Invasive Hemodynamics:    Most Recent Range Past 24hrs   BP (Art)   No data recorded   MAP(Art)   No data recorded   RA/CVP   No data recorded   PA   No data recorded   PA(mean)   No data recorded   CO   No data recorded   CI   No data recorded   Mixed Venous   No data recorded   SVR    No data recorded         Assessment/Plan   Pt is a 79 y.o. 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  While in the ED she had acute respiratory disterss and is amitted to the ICU with hypoxemic respiratory failure requiring HFNC.  Suspect acute pneumonitis +/- CAP.         Neuro/Psych/Pain Ctrl/Sedation:  H/O anxiety on multimodals at home including 40+ years use of cannabis.    - CAM ICU score q-shift  - Sleep/wake cycle hygiene  - Delirium precaution   - Pain Management: tylenol   - start diazepam 5mg  PO daily in diana of home meprobamate, can supplement with additional dosing as needed for sustained anxiety   - Q4 Neuro assessments     Respiratory/ENT:  H/O COPD, 65 year smoking hx and active vaping.  Unclear how she consume cannabis   H/O squamous cell carcinoma of right lung currently on keytruda   Radiographic concern for pneumonitis vs ALI  vs  aspiration / CAP  WOB imporved, still no mucus   - Continuous Pulse oximetry  - Continue HFNC vs NC to maintain Spo2>88%  -  Continue empiric abs   - Bronchial hygiene, 3% NS, and IS  - Appreciate Pulmonary Consult for pneumonitis eval      Cardiovascular:  H/O HFpEF, LVEF 60-65% 10/2024  Tachycardia, stress induced    - Continuous Telemetry     Renal/Volume Status (Intra & Extravascular):  Hyponatremia, acute on chronic, 2/2 lung disease   - Monitor I/O's  - Replete electrolytes to maintain K >4.0 and Mg >2.0  - Daily BMP, Mg   - Fluid Net:      GI:  Chronic malnutrition with poor PO intake    Chronic constipation without recent BM and high stool burden radiographically   SLP eval 5/21: no s/s of aspiration on exam  - Diet: soft / bite sized  - Nutrition Consult for rec's   - PPI: home med   - BR: senna-colace BID and miralax TID; suppository if needed to stool today     Infectious Disease:  C/F CAP, COPD exacerbation   MRSA sceren negative   - Continue empiric pip-tazo, azithro   - Appreciate ID Consult      Heme/Onc:  H/O squamous cell carcinoma of right lung as above   No known h/o hypercoagulation   - DVT Prophylaxis: SCDs and enoxaparin   - Transfuse for hemoglobin < 7     Endocrine:  No pMHx   - POCT and SSI      Ortho:  - PT/OT as able however she is profoundly disabled with extremity contractures and long term bed bound     Skin:    Sacral / b/l glute decubitus ulcer present on admission.  - Wound Care Consult      Ethics/Code Status:  Full Code afirmed by patient and her surrogates.  She does not want extended life support if indicated, no trach, no peg.   Appreciate Palliative Care Consult assistance navigating GOC discussions.           Lab Draw Frequency:  Daily and as needed      :  DVT Prophylaxis: enoxaparin   GI Prophylaxis: home PPI   Bowel Regimen: senna-colace, polythethyline glycol   Diet: NPO   CVC:  no   [place]: no   [place]: placed 5/19 for urinary retention   Restraints: not indicated      Dispo: ICU with acute hypoxemic respiratory failure      Code status: Full Code  Emergency contact: daughter Karly NORTH personally spent 45 minutes of critical care time directly and personally managing the patient exclusive of separately billable procedures.     A,B,C,D,E,F,G: reviewed     Dispo: ICU care for now.

## 2025-05-21 NOTE — PROGRESS NOTES
Karly Horton is a 80 y.o. female on day 3 of admission presenting with Generalized weakness.    Subjective   Afebrile, no chills  State she is feeling better  No shortness of breath or chest pain  Reports an occasional cough  Denies nausea, vomiting, diarrhea, abdominal pain  Reports moderate sacral wound pain  On high flow nasal cannula at 50% FiO2    Objective   Range of Vitals (last 24 hours)  Heart Rate:  []   Temp:  [36.2 °C (97.2 °F)-36.7 °C (98.1 °F)]   Resp:  [15-42]   BP: (106-170)/()   SpO2:  [90 %-100 %]   Daily Weight  05/19/25 : (!) 34.3 kg (75 lb 9.9 oz)    Body mass index is 16.95 kg/m².    Physical Exam  Constitutional:       Appearance: Normal appearance.   HENT:      Head: Normocephalic and atraumatic.   Eyes:      Extraocular Movements: Extraocular movements intact.      Conjunctiva/sclera: Conjunctivae normal.   Cardiovascular:      Rate and Rhythm: Tachycardia present.   Pulmonary:      Breath sounds: Wheezing and rhonchi present.   Abdominal:      General: Bowel sounds are normal.      Palpations: Abdomen is soft.      Tenderness: There is no abdominal tenderness.   Musculoskeletal:         General: Normal range of motion.      Cervical back: Normal range of motion and neck supple.   Skin:     General: Skin is warm and dry.      Comments: Photos examined via Media-Stage III/IV sacral ulcer, stage III right buttock pressure    Neurological:      General: No focal deficit present.      Mental Status: She is alert.   Psychiatric:         Mood and Affect: Mood normal.           Antibiotics  azithromycin - 250 mg  mupirocin - 2 %, 2 %  piperacillin-tazobactam - 4.5 gram/100 mL    Relevant Results  Labs  Results from last 72 hours   Lab Units 05/21/25  0923 05/20/25  0404 05/19/25  0723 05/18/25  1704   WBC AUTO x10*3/uL 12.0* 18.7* 14.7* 9.1   HEMOGLOBIN g/dL 10.7* 11.5* 12.2 11.9*   HEMATOCRIT % 33.6* 34.7* 36.7 37.3   PLATELETS AUTO x10*3/uL 231 227 268 253   NEUTROS PCT AUTO %  92.2  --   --  88.5   LYMPHS PCT AUTO % 1.2  --   --  2.2   MONOS PCT AUTO % 5.8  --   --  8.1   EOS PCT AUTO % 0.1  --   --  0.3     Results from last 72 hours   Lab Units 05/21/25 0625 05/20/25 0404 05/19/25 0723 05/18/25  1704   SODIUM mmol/L 130* 129* 122* 129*   POTASSIUM mmol/L 3.7 3.1* 3.6 3.2*   CHLORIDE mmol/L 81* 79* 77* 81*   CO2 mmol/L >45* 42* 38* 42*   BUN mg/dL 11 12 11 17   CREATININE mg/dL 0.37* 0.30* 0.30* 0.32*   GLUCOSE mg/dL 162* 84 96 104*   CALCIUM mg/dL 8.5* 8.5* 8.4* 8.8   ANION GAP mmol/L  --  11 11 9*   EGFR mL/min/1.73m*2 >90 >90 >90 >90   PHOSPHORUS mg/dL 1.8* 3.2  --   --      Results from last 72 hours   Lab Units 05/21/25 0625 05/20/25 0404 05/19/25  0723 05/18/25  1704   ALK PHOS U/L  --   --  102 107   BILIRUBIN TOTAL mg/dL  --   --  0.7 0.4   PROTEIN TOTAL g/dL  --   --  6.0* 6.2*   ALT U/L  --   --  14 16   AST U/L  --   --  21 23   ALBUMIN g/dL 2.6* 2.5* 2.8* 2.8*     Estimated Creatinine Clearance: 66 mL/min (A) (by C-G formula based on SCr of 0.37 mg/dL (L)).  C-Reactive Protein   Date Value Ref Range Status   05/18/2025 14.44 (H) <1.00 mg/dL Final     Microbiology  MRSA PCR negative  Legionella antigen negative  Blood cultures pending with no growth for 2 days  Imaging  XR chest 1 view  Result Date: 5/21/2025  Interpreted By:  Clemente Vergara, STUDY: XR CHEST 1 VIEW; 5/21/2025 5:20 am   INDICATION: CLINICAL INFORMATION: Signs/Symptoms:acute hypoxemic respiratory failure.   COMPARISON: 05/20/2025   ACCESSION NUMBER(S): [id number]   ORDERING CLINICIAN: CLIF MENA   TECHNIQUE: Portable chest one view.   FINDINGS: The cardiac size is indeterminate in view of the AP projection. There is no change in the right-sided MediPort catheter. There is no change in the right-sided infiltrate. The pulmonary vasculature is slightly indistinct suggesting mild pulmonary vascular congestion. Small bilateral pleural effusions are present.       Right perihilar and basilar infiltrate  consistent with pneumonia, unchanged compared to the prior study. Pulmonary vascular congestion is also suspected along with small bilateral effusions, unchanged.   MACRO: none   Signed by: Clemente Vergara 5/21/2025 8:03 AM Dictation workstation:   [id number]    CT chest wo IV contrast  Result Date: 5/20/2025  Interpreted By:  Jin Schmidt, STUDY: CT CHEST WO IV CONTRAST; ;  5/20/2025 11:37 am   INDICATION: Signs/Symptoms:acute hypoxemic respiratory failure, h/o SCC lung CA.     COMPARISON: 03/05/2025   ACCESSION NUMBER(S): [id number]   ORDERING CLINICIAN: CLIF MENA   TECHNIQUE: Serial axial unenhanced CT images obtained of the chest. Images reformatted in the coronal and sagittal projection.   All CT examinations are performed with 1 or more of the following dose reduction techniques: Automated exposure control, adjustment of mA and/or kv according to patient's size, or use of iterative reconstruction techniques.   FINDINGS: Mediastinum demonstrates no significant lymphadenopathy. Esophagus demonstrates component of gas-filled appearance component of retained food contents demonstrated within the stomach. Correlate with symptoms reflux.   Heart and great vessels demonstrate ascending thoracic aorta to measure 2.9 cm. There is moderate vascular calcification of the thoracic aorta. No cardiomegaly.   Lung parenchyma demonstrates moderate right and mild-to-moderate left-sided pleural effusions. There is basilar compressive atelectasis with right lower lobar collapse. Endobronchial opacification of the atelectatic right lower lobe as well as the bronchus intermedius. Correlate with aspiration as a potential etiology versus retained secretions. There is septal thickening and patchy consolidation within the right middle lobe as well as right upper lobe in particular of the posterior segment with component of air bronchograms. Left lung demonstrates scattered areas of patchy infiltrate in the left upper lobe.    Included portions visualized abdomen are unremarkable   Visualized osseous structures demonstrate S shaped scoliosis. Multilevel degenerative discogenic changes are demonstrated multilevel endplate sclerosis as well as anterior and lateral osteophyte formation. No compression deformity.               1. Right lower lobar collapse with air bronchograms and endobronchial opacification of the right lower lobe and bronchus intermedius. Correlate with aspiration pneumonias a potential etiology. Of note, patchy consolidation also demonstrated in the right middle lobe and in particular posterior segment of the right upper lobe as well as left upper lobe. Findings suggest multilobar pneumonia. 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   2. Moderate right and mild-to-moderate left pleural effusions.     MACRO: None   Signed by: Jin Schmidt 5/20/2025 11:56 AM Dictation workstation:   [id number]    XR chest 1 view  Result Date: 5/20/2025  Interpreted By:  Hanane Fine, STUDY: XR CHEST 1 VIEW 5/20/2025 5:21 am   INDICATION: Signs/Symptoms:acute hypoxemic respiratory failure   COMPARISON: 05/18/2025   ACCESSION NUMBER(S): [id number]   ORDERING CLINICIAN: CLIF MENA   TECHNIQUE: AP semi-erect view of the chest at bedside   FINDINGS: The MediPort catheter terminates within the SVC. The cardiac size is stable with atherosclerosis of the thoracic aorta noted.   When compared with the study from 2 days earlier, there is worsening infiltrate and airspace consolidation within the right lung particularly in the right upper lobe with right pleural effusion increased in size.   Small left pleural effusion is present. The interstitial prominence observed 2 days earlier has improved however.   Levoscoliosis of the thoracic spine is present.       Improved interstitial prominence with stable small left pleural effusion.   Worsening infiltrate with  airspace consolidation now seen in the right upper lobe with right pleural effusion increased in size.   Signed by: Hanane Fine 5/20/2025 7:53 AM Dictation workstation:   [id number]    CT head wo IV contrast  Result Date: 5/20/2025  Interpreted By:  Kanika Avina, STUDY: CT HEAD WO IV CONTRAST;  5/20/2025 12:06 am   INDICATION: Signs/Symptoms:unequal pupils.   COMPARISON: MRI brain 10/18/2024   ACCESSION NUMBER(S): [id number]   ORDERING CLINICIAN: JANET HALEY   TECHNIQUE: Axial noncontrast CT images of the head.   FINDINGS: BRAIN PARENCHYMA:  deep and periventricular white matter hypodensities are nonspecific, but favored to represent chronic small vessel ischemic changes.  Gray-white matter interfaces are preserved. No mass effect or midline shift.   HEMORRHAGE: No acute intracranial hemorrhage. VENTRICLES and EXTRA-AXIAL SPACES: The ventricles and sulci are within normal limits in size for brain volume. No abnormal extraaxial fluid collection. EXTRACRANIAL SOFT TISSUES: Within normal limits. PARANASAL SINUSES/MASTOIDS:  The visualized paranasal sinuses and mastoid air cells are aerated. CALVARIUM: No depressed skull fracture. No destructive osseous lesion.   OTHER FINDINGS: None.       No acute intracranial abnormality.     MACRO: None   Signed by: Kanika Avina 5/20/2025 12:31 AM Dictation workstation:   [id number]    XR chest 1 view  Result Date: 5/18/2025  Interpreted By:  Lenka Cartagena, STUDY: XR CHEST 1 VIEW;  5/18/2025 6:36 pm   INDICATION: Signs/Symptoms:Generalized weakness.   COMPARISON: Chest x-ray 04/29/2025   ACCESSION NUMBER(S): [id number]   ORDERING CLINICIAN: JAIME CHILDERS   FINDINGS: Right-sided MediPort terminates in the SVC. Multiple overlying leads are present.   CARDIOMEDIASTINAL SILHOUETTE: Cardiomediastinal silhouette is stable in size and configuration. Atherosclerotic calcification of the aorta.   LUNGS: Bilateral interstitial prominence. There is right middle and lower lobe  patchy airspace opacities. Layering bilateral effusions are better seen on concurrent CT. No pneumothorax.   ABDOMEN: No remarkable upper abdominal findings.   BONES: Levoconvex scoliosis. Generalized diffuse osteopenia. Multilevel degenerative changes of the spine.       There is patchy airspace opacity in the right mid and lower lung concerning for developing multifocal pneumonia. Continued radiographic follow-up to resolution is advised.   Cardiomegaly with mild interstitial prominence suggestive of developing interstitial edema/CHF.   Bilateral pleural effusions are better seen on concurrent CT.   MACRO: None   Signed by: Lenka Cartagena 5/18/2025 7:06 PM Dictation workstation:   [id number]    CT abdomen pelvis wo IV contrast  Result Date: 5/18/2025  Interpreted By:  Lenka Cartagena, STUDY: CT ABDOMEN PELVIS WO IV CONTRAST;  5/18/2025 5:51 pm   INDICATION: Signs/Symptoms:Sacral wound.   COMPARISON: CT scan of the abdomen pelvis 02/14/2025   ACCESSION NUMBER(S): [id number]   ORDERING CLINICIAN: JAIME CHILDERS   TECHNIQUE: Axial noncontrast CT images of the abdomen and pelvis with coronal and sagittal reconstructed images.   FINDINGS:   LOWER CHEST: Moderate bilateral pleural effusions. There are patchy airspace opacities and consolidation in the right lung. Aortic root and coronary artery calcifications noted. Right-sided MediPort terminates in the SVC.   ABDOMEN: Lack of intravenous contrast limits evaluation of vessels and solid organs. LIVER: Within normal limits. BILE DUCTS: Normal caliber. GALLBLADDER: No calcified gallstones. No wall thickening. PANCREAS: Suboptimally visualized due to lack of contrast and paucity of intra-abdominal fat SPLEEN: Within normal limits.  Calcified splenic granulomas noted. ADRENALS: Nodular thickening of the adrenal glands may relate to hyperplasia or adenomatous change.   KIDNEYS, URETERS, URINARY BLADDER:  Kidneys are symmetric in size without evidence for calculi,  hydronephrosis, hydroureter or perinephric inflammatory changes.   No bladder calculi or wall thickening.  Marked distention of the urinary bladder.   VESSELS:  Calcific atherosclerosis of the aortoiliac and branch vessels with moderate tortuosity. No aortic aneurysm. RETROPERITONEUM: No pathologically enlarged lymph nodes.   PELVIS:   REPRODUCTIVE ORGANS: Uterus and ovaries are not well visualized.   BOWEL: Postsurgical changes of partial colectomy. No dilated bowel. Appendix is not identified with certainty. No pericecal inflammatory change is seen. No pneumatosis or portal venous gas. PERITONEUM: Paucity of intra-abdominal fat. Mild diffuse haziness of the mesentery. ABDOMINAL WALL: Diffuse body wall edema. There is soft tissue defect overlying the distal sacrum and coccyx. Mild adjacent soft tissue stranding with a tiny locule of air in the right gluteal subcutaneous fat. A discrete drainable fluid collection is not identified. Findings concerning for sacral decubitus ulcer. BONES: Generalized diffuse osteopenia. Multilevel degenerative changes of the spine. Reverse s shaped scoliosis. Great 1 anterolisthesis are L3 on 4 and L5 on S1 with grade 1-2 anterolisthesis of L4 on 5.       There is soft tissue defect overlying the distal sacrum and coccyx with mild soft tissue stranding. A tiny locule of air is present in the right gluteal subcutaneous fat, without evidence for discrete drainable fluid collection. Findings concerning for sacral decubitus ulcer. Correlate with exam.   Moderate bilateral pleural effusions. There are patchy airspace opacities and consolidation in the right lung concerning for developing multifocal pneumonia. Clinical correlation continued radiographic follow-up to resolution is advised.   Marked distention of the urinary bladder. Correlate with symptomatology to exclude urinary retention.   Diffuse body wall edema.   Additional findings as described above.   MACRO: None   Signed by: Lenka  Mitzi 5/18/2025 6:31 PM Dictation workstation:   [id number]    ECG 12 Lead  Result Date: 5/5/2025  Sinus tachycardia with frequent Premature ventricular complexes Left anterior fascicular block ST & T wave abnormality, consider inferior ischemia Abnormal ECG T wave inversion now evident in Lateral leads Confirmed by Everardo Webster ([id number]) on 5/5/2025 4:08:58 PM    XR chest 2 views  Result Date: 4/29/2025  STUDY: Chest Radiographs;  4/29/2025 4:37PM INDICATION: Shortness of breath. COMPARISON: 3/5/2025 CT CTA Chest. ACCESSION NUMBER(S): [id number] ORDERING CLINICIAN: RONALD BLANTON TECHNIQUE:  Frontal and lateral chest. FINDINGS: CARDIOMEDIASTINAL SILHOUETTE: Heart is mildly enlarged with pulmonary vascular congestion and small bilateral pleural effusions.  LUNGS: Lungs are clear.  ABDOMEN: No remarkable upper abdominal findings.  BONES: No acute osseous changes.    Cardiomegaly and pulmonary vascular congestion with small bilateral pleural effusions. Signed by Ronal Isaac MD      Assessment/Plan   Acute hypoxic respiratory failure, on high flow-resolving  Squamous cell lung cancer status post chemoradiation, on Keytruda  Leukocytosis, multifactorial-resolving  History of COPD  Stage III/IV sacral decubitus ulcer  Severe malnutrition-prealbumin 6.3     Continue Zosyn  Continue azithromycin  Follow-up mycoplasma IgM  Follow-up galactomannan, histoplasma, and Fungitell  Supportive care  Local care of sacral ulcer  Offloading  Oxygen as needed  Monitor temperature and WBC  Further recommendations based on pending workup    Discussed with Dr. Yen    This is a complex infectious disease issue and the following was performed today (for more details please see the above note): Management decisions reflecting the added complexity (e.g., changes in antimicrobial therapy, infection control strategies).        Kalpana Adkins, APRN-CNP

## 2025-05-21 NOTE — PROGRESS NOTES
"Spiritual Care Visit  Spiritual Care Request    Reason for Visit:  Routine Visit: Introduction     Request Received From:       Focus of Care:  Visited With: Patient         Refer to :          Spiritual Care Assessment    Spiritual Assessment:                      Care Provided:  Intended Effects: Establish rapport and connectedness, Build relationship of care and support  Methods: Offer emotional support  Interventions: Explain  role    Sense of Community and or Mu-ism Affiliation:  None         Addressed Needs/Concerns and/or Umesh Through:          Outcome:  Outcome of Spiritual Care Visit: Comfort/healing presence     Advance Directives:         Spiritual Care Annotation      Annotation:  provided patient support while rounding the Unit.  introduced  services of [facility].    explained the role of the  in providing emotional and spiritual support for patient's and family while in admitted to the hospital.     Patient was in her resting in her hospital bed. When the  introduced spiritual care the patient said \"What a Again\"  offered explanation of the  services. Patient declined the visit today.   reviewed the process to request support as desired.     No spiritual or Pentecostalism needs were expressed. Spiritual care will remain available for support as requested.                  "

## 2025-05-21 NOTE — PROGRESS NOTES
Speech-Language Pathology    Speech-Language Pathology Clinical Swallow Treatment    Patient Name: Karly Horton  MRN: [id number]  : 1945  Today's Date: 25  Start Time: 1100  Stop Time: 1119  Time Calculation (min): 19 min    ASSESSMENT  SLP TX Intervention Outcome: Making Progress Towards Goals  Treatment Tolerance: Patient tolerated treatment well    Impressions: pt is tolerating soft bite sized and thin liquids, and declines upgrade, states this is a good diet for her d/t edentulouse status, likes the food. No further skilled dysphgia treatment is recommended.    PLAN  Is MBSS recommended? No; no pharyngeal dysphagia suspected.  Recommended solid consistency: Soft/bite-sized (IDDSI level 6)  Recommended liquid consistency: Thin (IDDSI 0)  Recommended medication administration: Whole in liquid vs puree, as best tolerated  Safe swallow strategies:  - Upright positioning for all PO intake  - Slow rate of intake  - Chew thoroughly    Discharge recommendation: Home with no further SLP  Inpatient/Swing Bed or Outpatient: Inpatient  SLP TX Plan: Discharge from Speech Therapy  SLP Plan: No skilled SLP  SLP Frequency: 1x per week    SUBJECTIVE  Prior to Session Communication: Bedside nurse  RN cleared pt to participate in session and reported pt tolerating current diet without incidence  Respiratory status: Supplemental oxygen via HFNC  Positioning: Pt refused fully upright positioning; partially reclined  Pt was alert, pleasant, and cooperative for session.    Pain Assessment  Pain Assessment: 0-10  0-10 (Numeric) Pain Score: 5 - Moderate pain  Pain Type: Chronic pain  Pain Location: Generalized  Pain Descriptors: Aching  Pain Frequency: Constant/continuous  Patient's Stated Pain Goal: 2  Pain Interventions: Repositioned (nursing notified of pain)  Response to Interventions: No change in pain       Orientation: Oriented to self, Oriented to location, and Oriented to situation  Ability to follow functional  commands: WFL    OBJECTIVE  Therapeutic Swallow Intervention : PO Trials  Goals (start date 5/20/2025):  - Pt will consume prescribed diet (current diet is soft bites/thin) without overt s/sx aspiration/penetration in 95% of observed trials.    GOAL START: 5/21/2025    GOAL END: 6/11/25   Status: Goal met   Progress this date: Pt consumed approx 4 oz thin liquids, 2 colt crackers dipped  in pudding per pt preference,  Mastication of soft solids was mildly prolonged but adequate. A/P transit and oral clearance were prompt and complete. No s/s aspiraiton with any textures in 100% of observed trials     - Pt will consume trials of upgraded textures without overt s/sx aspiration in order for consideration of diet texture upgrade (if appropriate).  Pt refuses to attempt easy chew, wants to remain on soft bite sized, will discharge this goal    Treatment/Education:  Results and recommendations were relayed to: Patient and Bedside nurse  Education provided: Yes   Learner: Patient   Barriers to learning: Cognitive limitations barrier   Method of teaching: Verbal   Topic: role of ST and recommended safe swallow strategies   Outcome of teaching: Pt verbalized understanding and Needs reinforcement    Goals:

## 2025-05-21 NOTE — NURSING NOTE
Wound Wednesday full head to toe wound assessment completed. Pt on Envision 700 bed. Mepilex borders to BL heels and sacrum. Wound care per wound care orders. Heels elevated off of bed. Q2H turns in place. See LDA for updated photos.

## 2025-05-22 ENCOUNTER — APPOINTMENT (OUTPATIENT)
Dept: RADIOLOGY | Facility: HOSPITAL | Age: 80
DRG: 177 | End: 2025-05-22
Payer: MEDICARE

## 2025-05-22 LAB
ALBUMIN SERPL BCP-MCNC: 2.6 G/DL (ref 3.4–5)
ALBUMIN SERPL BCP-MCNC: 2.9 G/DL (ref 3.4–5)
ANION GAP BLDV CALCULATED.4IONS-SCNC: -7 MMOL/L (ref 10–25)
ANION GAP SERPL CALCULATED.3IONS-SCNC: 9 MMOL/L (ref 10–20)
ANION GAP SERPL CALCULATED.3IONS-SCNC: ABNORMAL MMOL/L
BASE EXCESS BLDV CALC-SCNC: 24.7 MMOL/L (ref -2–3)
BODY TEMPERATURE: 37 DEGREES CELSIUS
BUN SERPL-MCNC: 14 MG/DL (ref 6–23)
BUN SERPL-MCNC: 8 MG/DL (ref 6–23)
CA-I BLDV-SCNC: 1.14 MMOL/L (ref 1.1–1.33)
CALCIUM SERPL-MCNC: 8.5 MG/DL (ref 8.6–10.3)
CALCIUM SERPL-MCNC: 8.6 MG/DL (ref 8.6–10.3)
CHLORIDE BLDV-SCNC: 85 MMOL/L (ref 98–107)
CHLORIDE SERPL-SCNC: 79 MMOL/L (ref 98–107)
CHLORIDE SERPL-SCNC: 83 MMOL/L (ref 98–107)
CO2 SERPL-SCNC: 43 MMOL/L (ref 21–32)
CO2 SERPL-SCNC: >45 MMOL/L (ref 21–32)
CREAT SERPL-MCNC: 0.28 MG/DL (ref 0.5–1.05)
CREAT SERPL-MCNC: 0.32 MG/DL (ref 0.5–1.05)
EGFRCR SERPLBLD CKD-EPI 2021: >90 ML/MIN/1.73M*2
EGFRCR SERPLBLD CKD-EPI 2021: >90 ML/MIN/1.73M*2
ERYTHROCYTE [DISTWIDTH] IN BLOOD BY AUTOMATED COUNT: 14.5 % (ref 11.5–14.5)
GLUCOSE BLD MANUAL STRIP-MCNC: 138 MG/DL (ref 74–99)
GLUCOSE BLD MANUAL STRIP-MCNC: 146 MG/DL (ref 74–99)
GLUCOSE BLD MANUAL STRIP-MCNC: 178 MG/DL (ref 74–99)
GLUCOSE BLD MANUAL STRIP-MCNC: 99 MG/DL (ref 74–99)
GLUCOSE BLDV-MCNC: 181 MG/DL (ref 74–99)
GLUCOSE SERPL-MCNC: 140 MG/DL (ref 74–99)
GLUCOSE SERPL-MCNC: 95 MG/DL (ref 74–99)
HCO3 BLDV-SCNC: 52.6 MMOL/L (ref 22–26)
HCT VFR BLD AUTO: 39.5 % (ref 36–46)
HCT VFR BLD EST: 39 % (ref 36–46)
HGB BLD-MCNC: 12.5 G/DL (ref 12–16)
HGB BLDV-MCNC: 12.9 G/DL (ref 12–16)
INHALED O2 CONCENTRATION: 44 %
LACTATE BLDV-SCNC: 1.6 MMOL/L (ref 0.4–2)
MAGNESIUM SERPL-MCNC: 1.43 MG/DL (ref 1.6–2.4)
MAGNESIUM SERPL-MCNC: 1.99 MG/DL (ref 1.6–2.4)
MCH RBC QN AUTO: 30.9 PG (ref 26–34)
MCHC RBC AUTO-ENTMCNC: 31.6 G/DL (ref 32–36)
MCV RBC AUTO: 98 FL (ref 80–100)
NRBC BLD-RTO: 0 /100 WBCS (ref 0–0)
OXYHGB MFR BLDV: 67.3 % (ref 45–75)
PCO2 BLDV: 69 MM HG (ref 41–51)
PH BLDV: 7.49 PH (ref 7.33–7.43)
PHOSPHATE SERPL-MCNC: 1.4 MG/DL (ref 2.5–4.9)
PHOSPHATE SERPL-MCNC: 4.7 MG/DL (ref 2.5–4.9)
PLATELET # BLD AUTO: 259 X10*3/UL (ref 150–450)
PO2 BLDV: 40 MM HG (ref 35–45)
POTASSIUM BLDV-SCNC: 4.3 MMOL/L (ref 3.5–5.3)
POTASSIUM SERPL-SCNC: 2.9 MMOL/L (ref 3.5–5.3)
POTASSIUM SERPL-SCNC: 4.4 MMOL/L (ref 3.5–5.3)
RBC # BLD AUTO: 4.04 X10*6/UL (ref 4–5.2)
SAO2 % BLDV: 69 % (ref 45–75)
SODIUM BLDV-SCNC: 126 MMOL/L (ref 136–145)
SODIUM SERPL-SCNC: 131 MMOL/L (ref 136–145)
SODIUM SERPL-SCNC: 133 MMOL/L (ref 136–145)
WBC # BLD AUTO: 11.6 X10*3/UL (ref 4.4–11.3)

## 2025-05-22 PROCEDURE — 2500000005 HC RX 250 GENERAL PHARMACY W/O HCPCS

## 2025-05-22 PROCEDURE — 2500000004 HC RX 250 GENERAL PHARMACY W/ HCPCS (ALT 636 FOR OP/ED)

## 2025-05-22 PROCEDURE — 37799 UNLISTED PX VASCULAR SURGERY: CPT

## 2025-05-22 PROCEDURE — 2060000001 HC INTERMEDIATE ICU ROOM DAILY

## 2025-05-22 PROCEDURE — 2500000004 HC RX 250 GENERAL PHARMACY W/ HCPCS (ALT 636 FOR OP/ED): Mod: JZ

## 2025-05-22 PROCEDURE — 2500000002 HC RX 250 W HCPCS SELF ADMINISTERED DRUGS (ALT 637 FOR MEDICARE OP, ALT 636 FOR OP/ED): Performed by: INTERNAL MEDICINE

## 2025-05-22 PROCEDURE — 37799 UNLISTED PX VASCULAR SURGERY: CPT | Performed by: NURSE PRACTITIONER

## 2025-05-22 PROCEDURE — 83735 ASSAY OF MAGNESIUM: CPT

## 2025-05-22 PROCEDURE — 84132 ASSAY OF SERUM POTASSIUM: CPT

## 2025-05-22 PROCEDURE — 71045 X-RAY EXAM CHEST 1 VIEW: CPT | Performed by: RADIOLOGY

## 2025-05-22 PROCEDURE — 2500000001 HC RX 250 WO HCPCS SELF ADMINISTERED DRUGS (ALT 637 FOR MEDICARE OP)

## 2025-05-22 PROCEDURE — 2500000001 HC RX 250 WO HCPCS SELF ADMINISTERED DRUGS (ALT 637 FOR MEDICARE OP): Performed by: NURSE PRACTITIONER

## 2025-05-22 PROCEDURE — 2500000002 HC RX 250 W HCPCS SELF ADMINISTERED DRUGS (ALT 637 FOR MEDICARE OP, ALT 636 FOR OP/ED)

## 2025-05-22 PROCEDURE — 84100 ASSAY OF PHOSPHORUS: CPT | Performed by: NURSE PRACTITIONER

## 2025-05-22 PROCEDURE — 94640 AIRWAY INHALATION TREATMENT: CPT

## 2025-05-22 PROCEDURE — 82947 ASSAY GLUCOSE BLOOD QUANT: CPT

## 2025-05-22 PROCEDURE — 85027 COMPLETE CBC AUTOMATED: CPT | Performed by: NURSE PRACTITIONER

## 2025-05-22 PROCEDURE — 99233 SBSQ HOSP IP/OBS HIGH 50: CPT

## 2025-05-22 PROCEDURE — 2500000005 HC RX 250 GENERAL PHARMACY W/O HCPCS: Performed by: STUDENT IN AN ORGANIZED HEALTH CARE EDUCATION/TRAINING PROGRAM

## 2025-05-22 PROCEDURE — 2500000004 HC RX 250 GENERAL PHARMACY W/ HCPCS (ALT 636 FOR OP/ED): Mod: JZ | Performed by: NURSE PRACTITIONER

## 2025-05-22 PROCEDURE — 2500000002 HC RX 250 W HCPCS SELF ADMINISTERED DRUGS (ALT 637 FOR MEDICARE OP, ALT 636 FOR OP/ED): Performed by: NURSE PRACTITIONER

## 2025-05-22 PROCEDURE — 2500000001 HC RX 250 WO HCPCS SELF ADMINISTERED DRUGS (ALT 637 FOR MEDICARE OP): Performed by: INTERNAL MEDICINE

## 2025-05-22 PROCEDURE — 99232 SBSQ HOSP IP/OBS MODERATE 35: CPT | Performed by: NURSE PRACTITIONER

## 2025-05-22 PROCEDURE — 71045 X-RAY EXAM CHEST 1 VIEW: CPT

## 2025-05-22 PROCEDURE — S4991 NICOTINE PATCH NONLEGEND: HCPCS | Performed by: NURSE PRACTITIONER

## 2025-05-22 PROCEDURE — 2500000004 HC RX 250 GENERAL PHARMACY W/ HCPCS (ALT 636 FOR OP/ED): Mod: JZ | Performed by: INTERNAL MEDICINE

## 2025-05-22 RX ORDER — POTASSIUM CHLORIDE 14.9 MG/ML
20 INJECTION INTRAVENOUS
Status: COMPLETED | OUTPATIENT
Start: 2025-05-22 | End: 2025-05-22

## 2025-05-22 RX ORDER — LISINOPRIL 10 MG/1
10 TABLET ORAL DAILY
Status: CANCELLED | OUTPATIENT
Start: 2025-05-22

## 2025-05-22 RX ORDER — LABETALOL HYDROCHLORIDE 5 MG/ML
10 INJECTION, SOLUTION INTRAVENOUS EVERY 4 HOURS PRN
Status: DISCONTINUED | OUTPATIENT
Start: 2025-05-22 | End: 2025-06-02 | Stop reason: HOSPADM

## 2025-05-22 RX ORDER — ENOXAPARIN SODIUM 100 MG/ML
30 INJECTION SUBCUTANEOUS DAILY
Status: DISCONTINUED | OUTPATIENT
Start: 2025-05-23 | End: 2025-06-02 | Stop reason: HOSPADM

## 2025-05-22 RX ORDER — MAGNESIUM SULFATE HEPTAHYDRATE 40 MG/ML
2 INJECTION, SOLUTION INTRAVENOUS ONCE
Status: COMPLETED | OUTPATIENT
Start: 2025-05-22 | End: 2025-05-22

## 2025-05-22 RX ORDER — POTASSIUM CHLORIDE 20 MEQ/1
40 TABLET, EXTENDED RELEASE ORAL ONCE
Status: COMPLETED | OUTPATIENT
Start: 2025-05-22 | End: 2025-05-22

## 2025-05-22 RX ADMIN — ACETAZOLAMIDE 500 MG: 500 INJECTION, POWDER, LYOPHILIZED, FOR SOLUTION INTRAVENOUS at 12:59

## 2025-05-22 RX ADMIN — POTASSIUM PHOSPHATE 21 MMOL: 236; 224 INJECTION, SOLUTION INTRAVENOUS at 09:31

## 2025-05-22 RX ADMIN — PANTOPRAZOLE SODIUM 40 MG: 40 TABLET, DELAYED RELEASE ORAL at 06:37

## 2025-05-22 RX ADMIN — SUCRALFATE ORAL SUSPENSION 1 G: 1 SUSPENSION ORAL at 11:28

## 2025-05-22 RX ADMIN — PIPERACILLIN SODIUM AND TAZOBACTAM SODIUM 4.5 G: 4; .5 INJECTION, SOLUTION INTRAVENOUS at 20:41

## 2025-05-22 RX ADMIN — Medication 6 L/MIN: at 19:00

## 2025-05-22 RX ADMIN — MUPIROCIN 1 APPLICATION: 20 OINTMENT TOPICAL at 09:01

## 2025-05-22 RX ADMIN — IPRATROPIUM BROMIDE AND ALBUTEROL SULFATE 3 ML: 2.5; .5 SOLUTION RESPIRATORY (INHALATION) at 07:36

## 2025-05-22 RX ADMIN — MAGNESIUM SULFATE IN WATER 2 G: 40 INJECTION, SOLUTION INTRAVENOUS at 03:40

## 2025-05-22 RX ADMIN — ACETAMINOPHEN 650 MG: 325 TABLET ORAL at 08:49

## 2025-05-22 RX ADMIN — PIPERACILLIN SODIUM AND TAZOBACTAM SODIUM 4.5 G: 4; .5 INJECTION, SOLUTION INTRAVENOUS at 14:59

## 2025-05-22 RX ADMIN — POLYETHYLENE GLYCOL 3350 17 G: 17 POWDER, FOR SOLUTION ORAL at 08:49

## 2025-05-22 RX ADMIN — Medication 6 L/MIN: at 15:37

## 2025-05-22 RX ADMIN — IPRATROPIUM BROMIDE AND ALBUTEROL SULFATE 3 ML: 2.5; .5 SOLUTION RESPIRATORY (INHALATION) at 00:06

## 2025-05-22 RX ADMIN — LIDOCAINE 1 APPLICATION: 50 OINTMENT TOPICAL at 08:59

## 2025-05-22 RX ADMIN — MUPIROCIN 1 APPLICATION: 20 OINTMENT TOPICAL at 20:42

## 2025-05-22 RX ADMIN — AZITHROMYCIN DIHYDRATE 250 MG: 250 TABLET ORAL at 08:50

## 2025-05-22 RX ADMIN — LIDOCAINE 1 APPLICATION: 50 OINTMENT TOPICAL at 15:01

## 2025-05-22 RX ADMIN — Medication 40 PERCENT: at 00:06

## 2025-05-22 RX ADMIN — COLLAGENASE SANTYL: 250 OINTMENT TOPICAL at 09:01

## 2025-05-22 RX ADMIN — PIPERACILLIN SODIUM AND TAZOBACTAM SODIUM 4.5 G: 4; .5 INJECTION, SOLUTION INTRAVENOUS at 08:49

## 2025-05-22 RX ADMIN — ENOXAPARIN SODIUM 40 MG: 40 INJECTION SUBCUTANEOUS at 08:49

## 2025-05-22 RX ADMIN — POTASSIUM CHLORIDE 40 MEQ: 1500 TABLET, EXTENDED RELEASE ORAL at 05:17

## 2025-05-22 RX ADMIN — ACETAMINOPHEN 650 MG: 325 TABLET ORAL at 14:58

## 2025-05-22 RX ADMIN — BACLOFEN 10 MG: 10 TABLET ORAL at 20:41

## 2025-05-22 RX ADMIN — SUCRALFATE ORAL SUSPENSION 1 G: 1 SUSPENSION ORAL at 18:06

## 2025-05-22 RX ADMIN — POTASSIUM CHLORIDE 20 MEQ: 14.9 INJECTION, SOLUTION INTRAVENOUS at 07:48

## 2025-05-22 RX ADMIN — Medication 6 L/MIN: at 07:37

## 2025-05-22 RX ADMIN — Medication 125 MCG: at 08:49

## 2025-05-22 RX ADMIN — SUCRALFATE ORAL SUSPENSION 1 G: 1 SUSPENSION ORAL at 05:19

## 2025-05-22 RX ADMIN — BACLOFEN 10 MG: 10 TABLET ORAL at 14:58

## 2025-05-22 RX ADMIN — DIAZEPAM 5 MG: 5 TABLET ORAL at 08:50

## 2025-05-22 RX ADMIN — SENNOSIDES AND DOCUSATE SODIUM 2 TABLET: 50; 8.6 TABLET ORAL at 08:50

## 2025-05-22 RX ADMIN — NICOTINE 1 PATCH: 21 PATCH, EXTENDED RELEASE TRANSDERMAL at 08:49

## 2025-05-22 RX ADMIN — POLYETHYLENE GLYCOL 3350 17 G: 17 POWDER, FOR SOLUTION ORAL at 14:58

## 2025-05-22 RX ADMIN — INSULIN LISPRO 1 UNITS: 100 INJECTION, SOLUTION INTRAVENOUS; SUBCUTANEOUS at 11:28

## 2025-05-22 RX ADMIN — Medication 6 L/MIN: at 15:38

## 2025-05-22 RX ADMIN — BACLOFEN 10 MG: 10 TABLET ORAL at 08:50

## 2025-05-22 RX ADMIN — ACETAMINOPHEN 650 MG: 325 TABLET ORAL at 20:41

## 2025-05-22 RX ADMIN — POTASSIUM CHLORIDE 20 MEQ: 14.9 INJECTION, SOLUTION INTRAVENOUS at 05:26

## 2025-05-22 RX ADMIN — Medication 5 L/MIN: at 04:36

## 2025-05-22 ASSESSMENT — COGNITIVE AND FUNCTIONAL STATUS - GENERAL
TOILETING: TOTAL
DAILY ACTIVITIY SCORE: 9
MOBILITY SCORE: 10
WALKING IN HOSPITAL ROOM: TOTAL
CLIMB 3 TO 5 STEPS WITH RAILING: TOTAL
HELP NEEDED FOR BATHING: TOTAL
STANDING UP FROM CHAIR USING ARMS: A LOT
TURNING FROM BACK TO SIDE WHILE IN FLAT BAD: A LOT
DRESSING REGULAR UPPER BODY CLOTHING: TOTAL
PERSONAL GROOMING: A LOT
MOVING FROM LYING ON BACK TO SITTING ON SIDE OF FLAT BED WITH BEDRAILS: A LOT
MOVING TO AND FROM BED TO CHAIR: A LOT
EATING MEALS: A LITTLE
DRESSING REGULAR LOWER BODY CLOTHING: TOTAL

## 2025-05-22 ASSESSMENT — PAIN - FUNCTIONAL ASSESSMENT
PAIN_FUNCTIONAL_ASSESSMENT: 0-10

## 2025-05-22 ASSESSMENT — PAIN SCALES - GENERAL
PAINLEVEL_OUTOF10: 0 - NO PAIN

## 2025-05-22 NOTE — CARE PLAN
pt took the mask off and said she didn't want it. And she will not let me hold the mask for her to inhale it either she keeps shoving my hand away.

## 2025-05-22 NOTE — NURSING NOTE
Acute Care Surgery consulted for sacral wound and possible debridement. Upon assessment pt refused Acute Care Surgery PA to see wound. Spoke with patient about importance of him seeing wound and declined 3 times. PA aware and @ BS of patient wishes.

## 2025-05-22 NOTE — NURSING NOTE
Assumed care of pt. Report received from ICU. Pt changed after incontinence while in ICU. Pt has vape inside purse, NA and RN warned to be careful when pt gains access to it. Pt on 6L NC and was brought in specialty bed. Sacral wound images viewed. Care ongoing.

## 2025-05-22 NOTE — CONSULTS
Patient with apparent sacral wound, contacted by ICU regarding ACS evaluation. Patient alert and oriented and appears capable of making rational decisions presently. Patient kindly declined additional evaluation at this time of her wounds. Engaged staff RN and patient again kindly declined evaluation. Please re-contact Acute Care Surgery service for additional evaluation as indicated. Discussed with attending Dr. Jake Sapp PA-C

## 2025-05-22 NOTE — PROGRESS NOTES
[facility] Critical Care Medicine       Date:  5/22/2025  Patient:  Karly Horton  YOB: 1945  MRN:  [id number]   Admit Date:  5/18/2025  Hospital Length of Stay: 4   ICU Length of Stay: 3d       Chief Complaint   Patient presents with    Weakness, Gen     Patient bib ems for weakness, lives at home with her daughter, had a low pox and was given oxygen.  Symptoms began two days ago. She is also being treated for an ulcer on her lower back/coccyx.         History of Present Illness:  Karly Horton is a 80 y.o. 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  She was admitted her 5 weeks ago with similar presentation and treated for COPD exacerbation on the wards before being discharged to home with NC 2L at her request.       She reports several weeks of increasing SOB at rest (she is bed bound) and feels that this is worse than on previous exacerbations.  Eval in the ED was notable for acute on chronic hyponatremia, leukocytosis, elevated CRP 14, CXR wit increased patchy infiltrates R>L, and a CT a/p notable for gas in a sacral decubitus ulcer.  Although she was initially accepted by Dr. Mcneil for admission to the [facility], she bacame acutely hypoxemic requiring HFNC O2; subsequent ABG 7.51 / 55 / 56with lactate 2.3.  On my exam she has moderately tachypnea with mild distress with coarse breath sounds bilaterally.  Conversant in partial sentences.       I described to her she severity of her hypoxemia and that she should be admitted to the ICU for further evaluation and management.  We briefly reviewed GOC and life support options and she confirmed that she would like all resuscitative measures attempted as indicated.  She is quite cachectic and I did indicate to  her that liberation from mechanical ventilation would likely be challenging if she were to need intubation.      Interval ICU Events:  5/22: Diuresed yesterday with improvement in oxygen requirements, now down to 6L NC. Aggressively replacing electrolytes. Will give diamox x1 for presumed contraction alkalosis. Stable for downgrade.     Objective     Medical History:  Medical History[1]  Surgical History[2]  Prescriptions Prior to Admission[3]  Morphine and Spice flavor  Social History[4]  Family History[5]    Hospital Medications:    Continuous Medications[6]    Current Medications[7]    Review of Systems:  14 point review of systems was completed and negative except for those specially mention in my HPI    Physical Exam:    Heart Rate:  []   Temp:  [36.4 °C (97.5 °F)-37.1 °C (98.8 °F)]   Resp:  [12-46]   BP: (122-196)/()   SpO2:  [87 %-100 %]     Physical Exam  Constitutional:       Appearance: She is cachectic. She is ill-appearing.      Interventions: Nasal cannula in place.      Comments: Acute and chronically ill appearing, frail    Eyes:      Conjunctiva/sclera: Conjunctivae normal.      Pupils: Pupils are equal, round, and reactive to light.   Cardiovascular:      Rate and Rhythm: Regular rhythm. Tachycardia present.   Chest:      Comments: R chest port dressing CDI  Abdominal:      General: There is no distension.      Tenderness: There is abdominal tenderness.      Hernia: A hernia is present. Hernia is present in the umbilical area.      Comments: Large sacral wound see MAR for imaging   Musculoskeletal:      Right lower leg: No edema.      Left lower leg: No edema.   Neurological:      Mental Status: She is alert. Mental status is at baseline.         Objective:    I have reviewed all medications, laboratory results, and imaging pertinent for today's encounter.    FiO2 (%):  [40 %-44 %] 44 %      Intake/Output Summary (Last 24 hours) at 5/22/2025 1221  Last data filed at 5/22/2025 0964  Gross  per 24 hour   Intake 1350 ml   Output 1650 ml   Net -300 ml       Daily Labs:  CBC:   Results from last 7 days   Lab Units 05/22/25  0333 05/21/25  0923   WBC AUTO x10*3/uL 11.6* 12.0*   HEMOGLOBIN g/dL 12.5 10.7*   HEMATOCRIT % 39.5 33.6*   MCV fL 98 97     BMP:    Results from last 7 days   Lab Units 05/22/25  0333 05/21/25  0625 05/20/25  0404   SODIUM mmol/L 133* 130* 129*   POTASSIUM mmol/L 2.9* 3.7 3.1*   CHLORIDE mmol/L 79* 81* 79*   CO2 mmol/L >45* >45* 42*   BUN mg/dL 8 11 12   CREATININE mg/dL 0.32* 0.37* 0.30*   CALCIUM mg/dL 8.6 8.5* 8.5*   GLUCOSE mg/dL 140* 162* 84   MAGNESIUM mg/dL 1.43*  --  1.50*     ABG:   Results from last 7 days   Lab Units 05/19/25  0823   POCT PH, ARTERIAL pH 7.51*   POCT PCO2, ARTERIAL mm Hg 55*   POCT PO2, ARTERIAL mm Hg 56*   POCT SO2, ARTERIAL % 90*   POCT HCO3 CALCULATED, ARTERIAL mmol/L 43.9*   POCT LACTATE, ARTERIAL mmol/L 2.3*      VBG:   Results from last 7 days   Lab Units 05/22/25  1101   POCT PH, VENOUS pH 7.49*   POCT PCO2, VENOUS mm Hg 69*   POCT PO2, VENOUS mm Hg 40   POCT SO2, VENOUS % 69   POCT HCO3 CALCULATED, VENOUS mmol/L 52.6*   POCT LACTATE, VENOUS mmol/L 1.6             Assessment/Plan:    I am currently managing this critically ill patient for the following problems:    Neurology/Psychiatry/Pain Control/Sedation:   History of anxiety, tobacco abuse  - Pain Control: acetaminophen  - Continue baclofen, valium   - Nicotine patch   - CAM ICU qshift, sleep-wake hygiene, delirium precautions     Respiratory/ENT:  Acute hypoxic on chronic hypercapnic respiratory failure 2/2 pneumonia vs effusions   History of COPD, squamous cell carcinoma   - Supplemental O2: 6L NC  - Titrate FiO2 as tolerated to maintain SpO2 >92%  - Appears euvolemic on exam, will defer further diuresis for now   - James q6hrs  - Pulmonology following, appreciate assistance   - Continuous pulse ox monitoring   - Pulm hygiene    Cardiovascular:   History of heart failure preserved ejection  fraction   - Labetolol PRN for SBP >160  - Maintain MAPs >65  - Continuous cardiac monitoring   - EKGs PRN for ACS symptoms, arrhythmias     Gastrointestinal:  Chronic malnutrition  - Diet: regular  - BR: miralax, mee-colace   - GI Prophylaxis: home PPI    Renal/Volume Status/Electrolytes:  Hyponatremia   Chronic metabolic alkalosis 2/2 chronic respiratory failure, presumed worsened by contraction  - Metabolic alkalosis slowly worsening, alkalotic on blood gas, will give diamox x1    - Hourly I/O's, maintain urine output >0.5cc/kg/hr  - Replete electrolytes to maintain K >4.0 and Mg >2.0  - Daily RFP, Mg    Endocrinology:  No active concerns   - SSI ACHS   - Hypoglycemia protocol PRN     Infectious Disease:  Community-acquired pneumonia   Infected sacral wound?  - Antibiotics: azithromycin x3 days, zosyn  - Blood cultures: NG day 3  - Urine legionella: negative   - Monitor SIRS criteria    Hematology/Oncology:  No active concerns   - Baseline Hgb  - Transfuse if Hgb <7.0 or symptomatic anemia  - Daily CBC    MSK/Skin:  Sacral wound  - PT/OT eval, signed off as pt at baseline   - ACS consulted for wound evaluation, appreciate assistance   - ICU skin protocol, padded pressure points  - Q2hr turns    Ethics/Code Status:  Full code, palliative care consulted, goals clearly established, signed off today     :  DVT Prophylaxis: SQH, SCDs  GI Prophylaxis: PPI  Bowel Regimen: miralax, colace  Diet: regular  CVC: R chest mediport  Perlita: none  Barnard: none  Restraints: none  Disposition: downgrade, signed out to Elif Root, CNP @ [id number]    Tim Granados PA-C  Pulmonary & Critical Care Medicine   [facility]           [1]   Past Medical History:  Diagnosis Date    Other cervical disc degeneration, unspecified cervical region     Degeneration of cervical intervertebral disc    Other conditions influencing health status     Bulging Disc (L3 - L4)    Other conditions influencing health status     A Fall     Other conditions influencing health status     Bulging Disc (C4 - C5)    Other conditions influencing health status     Bulging Disc (C6 - C7)    Other conditions influencing health status     Bulging Disc (C5 - C6)    Other conditions influencing health status     Lumbar Spondylolisthesis    Other conditions influencing health status     Rotator Cuff Tendon Tear    Other intervertebral disc degeneration, lumbar region     L4-L5 disc bulge    Other intervertebral disc displacement, lumbar region     Disc displacement, lumbar    Other intervertebral disc displacement, lumbosacral region     Displacement of lumbosacral intervertebral disc    Personal history of other diseases of the musculoskeletal system and connective tissue     History of bursitis    Personal history of other diseases of the musculoskeletal system and connective tissue     History of fibromyositis    Personal history of other specified conditions     History of headache    Spinal stenosis, lumbar region without neurogenic claudication     Lumbar canal stenosis    Sprain of ligaments of lumbar spine, initial encounter     Low back sprain   [2]   Past Surgical History:  Procedure Laterality Date    APPENDECTOMY      EYE SURGERY      HYSTERECTOMY      TONSILLECTOMY     [3]   Medications Prior to Admission   Medication Sig Dispense Refill Last Dose/Taking    acetaminophen (Tylenol) 325 mg tablet Take 1 tablet (325 mg) by mouth every 6 hours if needed (pain).       baclofen (Lioresal) 10 mg tablet Take 1 tablet (10 mg) by mouth 3 times a day.       butalbital-acetaminophen-caff -40 mg tablet Take 1 tablet by mouth every 6 hours if needed for headaches.       cholecalciferol, vitamin D3, (VITAMIN D3 ORAL) Take 1 tablet by mouth once daily.       lactulose 10 gram/15 mL solution Take 30 mL (20 g) by mouth every 18 hours.       lidocaine HCL 4 % liquid roll-on Apply 1 Application topically once daily as needed.       lubricating eye drops  ophthalmic solution Administer 1 drop into both eyes if needed for dry eyes.       meprobamate (Equanil) 400 mg tablet Take 1 tablet (400 mg) by mouth 4 times a day.       mupirocin (Bactroban) 2 % ointment Apply 1 Application topically if needed. Apply to affected area as needed       nicotine (Nicoderm CQ) 14 mg/24 hr patch Place 1 patch over 24 hours on the skin once every 24 hours for 28 days. (Patient not taking: Reported on 3/5/2025) 28 patch 0     ondansetron (Zofran) 8 mg tablet Take 1 tablet (8 mg) by mouth every 8 hours if needed for nausea or vomiting.       pantoprazole (ProtoNix) 40 mg EC tablet Take 1 tablet (40 mg) by mouth once daily in the morning. Take before meals. Do not crush, chew, or split. Continue twice daily x 8 weeks then continue once daily.       prochlorperazine (Compazine) 10 mg tablet Take 1 tablet (10 mg) by mouth every 6 hours if needed for nausea or vomiting. 30 tablet 5     sucralfate (Carafate) 100 mg/mL suspension Take 10 mL (1 g) by mouth every 6 hours.      [4]   Social History  Tobacco Use    Smoking status: Every Day     Current packs/day: 1.00     Average packs/day: 1 pack/day for 65.4 years (65.4 ttl pk-yrs)     Types: Cigarettes     Start date: 1960     Passive exposure: Current   Vaping Use    Vaping status: Some Days   Substance Use Topics    Alcohol use: Not Currently    Drug use: Yes     Types: Marijuana     Comment: vape   [5]   Family History  Problem Relation Name Age of Onset    Cancer Mother      Other (Father had hypertension, stroke, coronary artery disease and diabetes) Father     [6]    [7]   Current Facility-Administered Medications:     acetaminophen (Tylenol) tablet 650 mg, 650 mg, oral, TID, PO Martinez-CNP, 650 mg at 25 0849    acetaZOLAMIDE (Diamox) injection 500 mg, 500 mg, intravenous, Once, Tim Granados PA-C    alteplase (Cathflo Activase) injection 2 mg, 2 mg, intra-catheter, PRN, PO Oropeza-MYNOR    []  azithromycin (Zithromax) tablet 500 mg, 500 mg, oral, Daily **FOLLOWED BY** azithromycin (Zithromax) tablet 250 mg, 250 mg, oral, Daily, LANDEN Oropeza, 250 mg at 05/22/25 0850    baclofen (Lioresal) tablet 10 mg, 10 mg, oral, TID, Lupillo Mcneil MD, 10 mg at 05/22/25 0850    benzocaine-menthol (Cepastat Sore Throat) lozenge 1 lozenge, 1 lozenge, Mouth/Throat, q2h PRN, Lupillo Mcneil MD    butalbital-acetaminophen-caff -40 mg per tablet 1 tablet, 1 tablet, oral, q6h PRN, Lupillo Mcneil MD, 1 tablet at 05/19/25 2148    cholecalciferol (Vitamin D-3) tablet 125 mcg, 125 mcg, oral, Daily, Lupillo Mcneil MD, 125 mcg at 05/22/25 0849    collagenase 250 unit/gram ointment, , Topical, Daily, Dottie Calles PA-C, Given at 05/22/25 0901    dextromethorphan-guaifenesin (Robitussin DM)  mg/5 mL oral liquid 5 mL, 5 mL, oral, q4h PRN, Lupillo Mcneil MD    dextrose 50 % injection 12.5 g, 12.5 g, intravenous, q15 min PRN, Derek Tran PA-C    dextrose 50 % injection 25 g, 25 g, intravenous, q15 min PRN, Derek Tran PA-C    diazePAM (Valium) tablet 5 mg, 5 mg, oral, Daily, LANDEN Oropeza, 5 mg at 05/22/25 0850    [START ON 5/23/2025] enoxaparin (Lovenox) syringe 30 mg, 30 mg, subcutaneous, Daily, Tim Granados PA-C    glucagon (Glucagen) injection 1 mg, 1 mg, intramuscular, q15 min PRN, Derek Tran PA-C    glucagon (Glucagen) injection 1 mg, 1 mg, intramuscular, q15 min PRN, Derek Tran PA-C    guaiFENesin (Mucinex) 12 hr tablet 600 mg, 600 mg, oral, q12h PRN, Lupillo Mcneil MD    guaiFENesin (Mucinex) 12 hr tablet 600 mg, 600 mg, oral, BID PRN, Aldair Spring DO    insulin lispro injection 0-5 Units, 0-5 Units, subcutaneous, TID AC, Derek Tran PA-C, 1 Units at 05/22/25 1128    ipratropium-albuteroL (Duo-Neb) 0.5-2.5 mg/3 mL nebulizer solution 3 mL, 3 mL, nebulization, q6h, Lupillo Mcneil MD, 3 mL at 05/22/25 0736    labetaloL (Normodyne,Trandate)  injection 10 mg, 10 mg, intravenous, q4h PRN, Tim Granados PA-C    lidocaine (Xylocaine) 5 % ointment 1 Application, 1 Application, Topical, TID, LANDEN Martinez, 1 Application at 05/22/25 0859    lubricating eye drops ophthalmic solution 1 drop, 1 drop, Both Eyes, PRN, LANDEN Oropeza    melatonin tablet 3 mg, 3 mg, oral, Nightly, LANDEN Martinez, 3 mg at 05/21/25 2112    mupirocin (Bactroban) 2 % ointment 1 Application, 1 Application, Topical, BID, Lupillo Mcneil MD, 1 Application at 05/22/25 0901    nicotine (Nicoderm CQ) 21 mg/24 hr patch 1 patch, 1 patch, transdermal, Daily, 1 patch at 05/22/25 0849 **FOLLOWED BY** [START ON 7/1/2025] nicotine (Nicoderm CQ) 14 mg/24 hr patch 1 patch, 1 patch, transdermal, Daily **FOLLOWED BY** [START ON 7/15/2025] nicotine (Nicoderm CQ) 7 mg/24 hr patch 1 patch, 1 patch, transdermal, Daily, LANDEN Oropeza    ondansetron (Zofran) tablet 8 mg, 8 mg, oral, q8h PRN, Lupillo Mcneil MD    oxygen (O2) therapy, , inhalation, q4h, Aldair Spring DO, 6 L/min at 05/22/25 0737    pantoprazole (ProtoNix) EC tablet 40 mg, 40 mg, oral, Daily before breakfast, Lupillo Mcneil MD, 40 mg at 05/22/25 0637    piperacillin-tazobactam (Zosyn) 4.5 g in dextrose (iso)  mL, 4.5 g, intravenous, q6h, Lupillo Mcneil MD, Stopped at 05/22/25 0919    polyethylene glycol (Glycolax, Miralax) packet 17 g, 17 g, oral, Daily PRN, Lupillo Mcneil MD    polyethylene glycol (Glycolax, Miralax) packet 17 g, 17 g, oral, TID, LANDEN Oropeza, 17 g at 05/22/25 0849    potassium phosphates 21 mmol in dextrose 5% 250 mL IV, 21 mmol, intravenous, Once, Derek Tran PA-C, Last Rate: 42.8 mL/hr at 05/22/25 0931, 21 mmol at 05/22/25 0931    prochlorperazine (Compazine) tablet 10 mg, 10 mg, oral, q6h PRN, Aldair Spring,     sennosides-docusate sodium (Annette-Colace) 8.6-50 mg per tablet 2 tablet, 2 tablet, oral, BID, LANDEN Oropeza,  2 tablet at 05/22/25 0850    sodium hypochlorite (Dakin's Quarter Strength) 0.125 % external solution, , irrigation, Daily PRN, Dottie Calles PA-C, Given at 05/20/25 1841    sucralfate (Carafate) suspension 1 g, 1 g, oral, q6h [place], Lupillo Mcneil MD, 1 g at 05/22/25 1128

## 2025-05-22 NOTE — PROGRESS NOTES
Pulmonary Medicine Progress Note    Admitted on:     5/18/2025  Length of Stay: 3 day(s)     Interval History     79-year-old female with a history of squamous cell lung carcinoma (Stage 3B, with a T4 classification (due to multiple nodules in ipsilateral lung lobes) and N2 (due to subcarinal lymph node involvement).), heart failure with preserved ejection fraction (HFpEF), and chronic obstructive pulmonary disease (COPD), who was admitted on 5/18/2025 with worsening dypnea on exertion.     Cancer treatment so far includes systemic chemotherapy with carboplatin, paclitaxel and pembro q4vrkom x 4 (11/12/24 - 1/3/25), hypofractionated RT (completed 3/31/25) amd consolidation immunotherapy with pembro ([id number] last dose on 4/11/25).     Patient presented to the ED after EMS transport with complaints of progressive shortness of breath (SOB) for several weeks, acutely worsening in the past few days, and failure to thrive. She was admitted five weeks ago for a similar presentation and treated for a COPD exacerbation, before being discharged home on 2L nasal cannula (NC) oxygen at her request.    Currently, she reports worsening SOB at rest and has become bedbound. She states this episode feels more severe than previous COPD exacerbations. On evaluation in the ED, she was found to have acute-on-chronic hyponatremia, leukocytosis, elevated CRP (14), and a chest X-ray showing increased patchy infiltrates, more pronounced in the right lung. A CT scan showed gas in a sacral decubitus ulcer.    Patient became acutely hypoxemic on the RNF, requiring high-flow nasal cannula (HFNC) oxygen. ABG: PH of 7.51, pCO2 of 55, pO2 of 56, and a lactate level of 2.4.      Pulmonary consulted by ICU Team Dr Spring to assist with management.    Cancer treatment history:  C#1 chemo started on 11/12/24. Tolerated first dose very well. Mild N, no Diarrhea or constipation or vomiting  C#2 chemo given on 12/2/24. Diarrhea mild which has resolved  with Imodium. Hair loss. Otherwise no N/V. She has much better appetite  12/16/24: There was a solitary noncalcified nodule in LLL, biopsy was recommended to rule out second primary or contralateral lung metastases. Lung biopsy was scheduled for 12/16/24, but it could not be done because the nodule could not be seen, so procedure is cancelled  01/13/25: C#4 chemoIO planned today. Tolerating very well. No N/V/D/C. Will start RT soon.   01/27/25: MRI brain: TASHA. PET CT is TASHA.   03/31/25: completed consolidation RT  04/011/25: consolidation Keytruda q 6 weeks is started. Restaging PET CT is planned for 06/09/25.    5/21: Doing very well, currently on 3L nasal oxygen  Net output -2.2 L since admission    Objective   Objective     Vitals:    05/19/25 1230   Weight: (!) 34.3 kg (75 lb 9.9 oz)   Body mass index is 16.95 kg/m².        5/21/2025     3:00 PM 5/21/2025     4:00 PM 5/21/2025     5:00 PM 5/21/2025     6:00 PM 5/21/2025     7:00 PM 5/21/2025     7:28 PM 5/21/2025     8:00 PM   Vitals   Systolic 130 142 126 136 154  145   Diastolic 62 79 75 87 91  127   Heart Rate 112 111 111 111 103  104   Temp  36.5 °C (97.7 °F)    36.7 °C (98.1 °F)    Resp 27 24 32 20 37  25        Vent settings:  FiO2 (%):  [45 %-60 %] 45 %    Intake/Output Summary (Last 24 hours) at 5/21/2025 2048  Last data filed at 5/21/2025 1800  Gross per 24 hour   Intake 1469.17 ml   Output 2400 ml   Net -930.83 ml       Physical Exam  Constitutional:       Comments: Frail, cachectic    Eyes:      Pupils: PERRL  Cardiovascular:      Rate and Rhythm: Tachycardia present.   Pulmonary:      Effort: No resp distress     Breath sounds: No stridor. No wheezing, rhonchi or rales.   Abdominal:      General: Abdomen is flat. There is no distension.      Palpations: Abdomen is soft.      Tenderness: There is no abdominal tenderness.   Musculoskeletal:      Right lower leg: No edema.      Left lower leg: No edema.   Neurological:      Mental Status: She is alert  and oriented to person, place, and time. Mental status is at baseline.     Medications     Scheduled Medications:   Scheduled Medications[1]   Continuous Medications:   Continuous Medications[2]   PRN Medications:     Labs     Results from last 72 hours   Lab Units 05/21/25  0625 05/20/25  0404 05/19/25  0723   GLUCOSE mg/dL 162* 84 96   SODIUM mmol/L 130* 129* 122*   POTASSIUM mmol/L 3.7 3.1* 3.6   CHLORIDE mmol/L 81* 79* 77*   CO2 mmol/L >45* 42* 38*   BUN mg/dL 11 12 11   CREATININE mg/dL 0.37* 0.30* 0.30*   EGFR mL/min/1.73m*2 >90 >90 >90   CALCIUM mg/dL 8.5* 8.5* 8.4*   ALBUMIN g/dL 2.6* 2.5* 2.8*   MAGNESIUM mg/dL  --  1.50*  --    PHOSPHORUS mg/dL 1.8* 3.2  --      Results from last 72 hours   Lab Units 05/19/25  0723   ALK PHOS U/L 102   ALT U/L 14   AST U/L 21   BILIRUBIN TOTAL mg/dL 0.7   PROTEIN TOTAL g/dL 6.0*             Results from last 72 hours   Lab Units 05/21/25  0923 05/20/25  0404 05/19/25  0723   WBC AUTO x10*3/uL 12.0* 18.7* 14.7*   NRBC AUTO /100 WBCs 0.0 0.0 0.0   RBC AUTO x10*6/uL 3.47* 3.67* 3.90*   HEMOGLOBIN g/dL 10.7* 11.5* 12.2   HEMATOCRIT % 33.6* 34.7* 36.7   MCV fL 97 95 94   MCH pg 30.8 31.3 31.3   MCHC g/dL 31.8* 33.1 33.2   RDW % 14.6* 14.0 14.2   PLATELETS AUTO x10*3/uL 231 227 268     Results from last 72 hours   Lab Units 05/19/25  0823   POCT PH, ARTERIAL pH 7.51*   POCT PCO2, ARTERIAL mm Hg 55*   POCT PO2, ARTERIAL mm Hg 56*   POCT HCO3 CALCULATED, ARTERIAL mmol/L 43.9*   POCT LACTATE, ARTERIAL mmol/L 2.3*   POCT BASE EXCESS, ARTERIAL mmol/L 18.0*   FIO2 % 100   FLOW LPM 40.0     Lab Results   Component Value Date    BLOODCULT No growth at 2 days 05/18/2025    BLOODCULT No growth at 2 days 05/18/2025    GRAMSTAIN (A) 04/30/2025     Gram stain indicates specimen contains significant salivary contamination.     Lab Results   Component Value Date    URINECULTURE >100,000 Escherichia coli (A) 10/11/2024       Imaging and Diagnostic Studies     Lab/Radiology/Diagnostic  Review:  Results for orders placed or performed during the hospital encounter of 05/18/25 (from the past 24 hours)   POCT GLUCOSE   Result Value Ref Range    POCT Glucose 155 (H) 74 - 99 mg/dL   POCT GLUCOSE   Result Value Ref Range    POCT Glucose 246 (H) 74 - 99 mg/dL   Renal function panel   Result Value Ref Range    Glucose 162 (H) 74 - 99 mg/dL    Sodium 130 (L) 136 - 145 mmol/L    Potassium 3.7 3.5 - 5.3 mmol/L    Chloride 81 (L) 98 - 107 mmol/L    Bicarbonate >45 (HH) 21 - 32 mmol/L    Anion Gap      Urea Nitrogen 11 6 - 23 mg/dL    Creatinine 0.37 (L) 0.50 - 1.05 mg/dL    eGFR >90 >60 mL/min/1.73m*2    Calcium 8.5 (L) 8.6 - 10.3 mg/dL    Phosphorus 1.8 (L) 2.5 - 4.9 mg/dL    Albumin 2.6 (L) 3.4 - 5.0 g/dL   Prealbumin   Result Value Ref Range    Prealbumin 6.3 (L) 18.0 - 40.0 mg/dL   POCT GLUCOSE   Result Value Ref Range    POCT Glucose 144 (H) 74 - 99 mg/dL   CBC and Auto Differential   Result Value Ref Range    WBC 12.0 (H) 4.4 - 11.3 x10*3/uL    nRBC 0.0 0.0 - 0.0 /100 WBCs    RBC 3.47 (L) 4.00 - 5.20 x10*6/uL    Hemoglobin 10.7 (L) 12.0 - 16.0 g/dL    Hematocrit 33.6 (L) 36.0 - 46.0 %    MCV 97 80 - 100 fL    MCH 30.8 26.0 - 34.0 pg    MCHC 31.8 (L) 32.0 - 36.0 g/dL    RDW 14.6 (H) 11.5 - 14.5 %    Platelets 231 150 - 450 x10*3/uL    Neutrophils % 92.2 40.0 - 80.0 %    Immature Granulocytes %, Automated 0.6 0.0 - 0.9 %    Lymphocytes % 1.2 13.0 - 44.0 %    Monocytes % 5.8 2.0 - 10.0 %    Eosinophils % 0.1 0.0 - 6.0 %    Basophils % 0.1 0.0 - 2.0 %    Neutrophils Absolute 11.07 (H) 1.60 - 5.50 x10*3/uL    Immature Granulocytes Absolute, Automated 0.07 0.00 - 0.50 x10*3/uL    Lymphocytes Absolute 0.14 (L) 0.80 - 3.00 x10*3/uL    Monocytes Absolute 0.69 0.05 - 0.80 x10*3/uL    Eosinophils Absolute 0.01 0.00 - 0.40 x10*3/uL    Basophils Absolute 0.01 0.00 - 0.10 x10*3/uL   POCT GLUCOSE   Result Value Ref Range    POCT Glucose 143 (H) 74 - 99 mg/dL   POCT GLUCOSE   Result Value Ref Range    POCT Glucose 195  (H) 74 - 99 mg/dL   POCT GLUCOSE   Result Value Ref Range    POCT Glucose 132 (H) 74 - 99 mg/dL     Imaging  XR chest 1 view  Result Date: 5/21/2025  Right perihilar and basilar infiltrate consistent with pneumonia, unchanged compared to the prior study. Pulmonary vascular congestion is also suspected along with small bilateral effusions, unchanged.   MACRO: none   Signed by: Clemente Vergara 5/21/2025 8:03 AM Dictation workstation:   [id number]    CT chest wo IV contrast  Result Date: 5/20/2025  1. Right lower lobar collapse with air bronchograms and endobronchial opacification of the right lower lobe and bronchus intermedius. Correlate with aspiration pneumonias a potential etiology. Of note, patchy consolidation also demonstrated in the right middle lobe and in particular posterior segment of the right upper lobe as well as left upper lobe. Findings suggest multilobar pneumonia. 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   2. Moderate right and mild-to-moderate left pleural effusions.     MACRO: None   Signed by: Jin Schmidt 5/20/2025 11:56 AM Dictation workstation:   [id number]    XR chest 1 view  Result Date: 5/20/2025  Improved interstitial prominence with stable small left pleural effusion.   Worsening infiltrate with airspace consolidation now seen in the right upper lobe with right pleural effusion increased in size.   Signed by: Hanane Fine 5/20/2025 7:53 AM Dictation workstation:   [id number]    CT head wo IV contrast  Result Date: 5/20/2025  No acute intracranial abnormality.     MACRO: None   Signed by: Kanika Avina 5/20/2025 12:31 AM Dictation workstation:   [id number]    XR chest 1 view  Result Date: 5/18/2025  There is patchy airspace opacity in the right mid and lower lung concerning for developing multifocal pneumonia. Continued radiographic follow-up to resolution is advised.   Cardiomegaly with mild  interstitial prominence suggestive of developing interstitial edema/CHF.   Bilateral pleural effusions are better seen on concurrent CT.   MACRO: None   Signed by: Lenka Cartagena 5/18/2025 7:06 PM Dictation workstation:   [id number]    CT abdomen pelvis wo IV contrast  Result Date: 5/18/2025  There is soft tissue defect overlying the distal sacrum and coccyx with mild soft tissue stranding. A tiny locule of air is present in the right gluteal subcutaneous fat, without evidence for discrete drainable fluid collection. Findings concerning for sacral decubitus ulcer. Correlate with exam.   Moderate bilateral pleural effusions. There are patchy airspace opacities and consolidation in the right lung concerning for developing multifocal pneumonia. Clinical correlation continued radiographic follow-up to resolution is advised.   Marked distention of the urinary bladder. Correlate with symptomatology to exclude urinary retention.   Diffuse body wall edema.   Additional findings as described above.   MACRO: None   Signed by: Lenka Cartagena 5/18/2025 6:31 PM Dictation workstation:   [id number]      Cardiology, Vascular, and Other Imaging  No other imaging results found for the past 7 days         Assessment / Plan       PROBLEM LIST:  - Acute HYPOXIC respiratory failure sec to multifocal pneumonia. Aspiration PNA, high on the differential given retained food/debris in the proximal esophagus. Immune mediated pneumonitis less likely given lobar distribution of consolidative opacities.  - Immunocompromised state  - Bilateral pleural effusions  - Appears euvolemic on exam, but left sided heart failure (HFPEF) may be contributing     MANAGEMENT PLAN:  - Echo  - Thoracentesis on the rt side (ICU team vs IR). Given symptomatic and clinical improvement with lasix and antibiotics, can defer.   - Resp culture  - Appreciate ID recs  - Empiric Abx for now (Zosyn, MRSA neg)  - 3% saline nebs  - Titrate to SpO2> 92%  - Speech and swallow  evaluation     I have personally interviewed and examined the patient.  I have personally verified elements of the exam listed above. Interval changes or irregularities are as noted.  I have personally and independently reviewed laboratory, radiographic and procedural data.  I have personally reviewed the problem list above and concur. Changes, if any, are noted.  I have personally reviewed the plan list above and concur. Changes, if any, are noted.           Landen Natarajan MD        [1] acetaminophen, 650 mg, oral, TID  azithromycin, 250 mg, oral, Daily  baclofen, 10 mg, oral, TID  cholecalciferol, 125 mcg, oral, Daily  collagenase, , Topical, Daily  diazePAM, 5 mg, oral, Daily  enoxaparin, 40 mg, subcutaneous, Daily  insulin lispro, 0-5 Units, subcutaneous, TID AC  ipratropium-albuteroL, 3 mL, nebulization, q6h  lidocaine, 1 Application, Topical, TID  melatonin, 3 mg, oral, Nightly  mupirocin, 1 Application, Topical, BID  nicotine, 1 patch, transdermal, Daily   Followed by  [START ON 7/1/2025] nicotine, 1 patch, transdermal, Daily   Followed by  [START ON 7/15/2025] nicotine, 1 patch, transdermal, Daily  oxygen, , inhalation, q4h  pantoprazole, 40 mg, oral, Daily before breakfast  piperacillin-tazobactam, 4.5 g, intravenous, q6h  polyethylene glycol, 17 g, oral, TID  sennosides-docusate sodium, 2 tablet, oral, BID  sucralfate, 1 g, oral, q6h [place]  [2]

## 2025-05-22 NOTE — PROGRESS NOTES
"Nutrition Follow up Note    Nutrition Assessment      Down to 6L NC. Likely downgrade to SDU today. PO intake adequate, PA reports no new nutritional concerns. Will continue to follow and monitor nutrition needs.    Nutrition History:  Energy Intake: Good > 75 %     Anthropometrics:  Ht: 142.2 cm (4' 8\"), Wt: (!) 34.3 kg (75 lb 9.9 oz), BMI: 16.96  IBW/kg (Dietitian Calculated): 40.91 kg  Percent of IBW: 83 %     Weight Change:  Daily Weight  05/19/25 : (!) 34.3 kg (75 lb 9.9 oz)  05/01/25 : (!) 35.2 kg (77 lb 9.6 oz)  04/11/25 : (!) 29.5 kg (65 lb 2.3 oz)  03/07/25 : (!) 28.6 kg (63 lb 0.8 oz)  02/26/25 : (!) 28 kg (61 lb 11.7 oz)  02/14/25 : (!) 27 kg (59 lb 8.4 oz)  02/05/25 : (!) 29.6 kg (65 lb 5.9 oz)  01/27/25 : (!) 31.8 kg (70 lb)  01/13/25 : (!) 30.5 kg (67 lb 3.8 oz)  12/23/24 : (!) 31.1 kg (68 lb 10.8 oz)     Nutrition Focused Physical Exam Findings:   Subcutaneous Fat Loss  Orbital Fat Pads: Severe (dark circles, hollowing and loose skin)  Buccal Fat Pads: Severe (hollow, sunken and narrow face)  Triceps: Severe (negligible fat tissue)    Muscle Wasting  Temporalis: Severe (hollowed scooping depression)  Pectoralis (Clavicular Region): Severe (protruding prominent clavicle)  Deltoid/Trapezius: Severe (squared shoulders, acromion process prominent)  Quadriceps: Severe (depressions on inner and outer thigh)  Gastrocnemius: Severe (minimal muscle definition)    Nutrition Significant Labs:  Lab Results   Component Value Date    WBC 11.6 (H) 05/22/2025    HGB 12.5 05/22/2025    HCT 39.5 05/22/2025     05/22/2025    CHOL 205 (H) 10/28/2019    TRIG 69 10/28/2019     10/28/2019    ALT 14 05/19/2025    AST 21 05/19/2025     (L) 05/22/2025    K 2.9 (LL) 05/22/2025    CL 79 (L) 05/22/2025    CREATININE 0.32 (L) 05/22/2025    BUN 8 05/22/2025    CO2 >45 (HH) 05/22/2025    TSH 1.04 03/18/2025    INR 0.9 12/16/2024    HGBA1C 5.1 05/22/2024     Nutrition Specific Medications:  Scheduled " Medications[1]  Continuous Medications[2]    Dietary Orders (From admission, onward)       Start     Ordered    05/22/25 0940  May Not Participate in Room Service  ( ROOM SERVICE MAY NOT PARTICIPATE)  Once        Question:  .  Answer:  Yes    05/22/25 0939 05/22/25 0940  May Not Participate in Room Service  ( ROOM SERVICE MAY NOT PARTICIPATE)  Once        Question:  .  Answer:  Yes    05/22/25 0939    05/20/25 0945  Oral nutritional supplements  Until discontinued        Comments: Vanilla   Question Answer Comment   Deliver with All meals    Select supplement: Ensure Plus High Protein        05/20/25 0944    05/20/25 0942  Oral nutritional supplements  Until discontinued        Comments: Unflavored mixed in cranberry juice   Question Answer Comment   Deliver with Breakfast    Deliver with Dinner    Select supplement: Brennon        05/20/25 0944 05/20/25 0923  Adult diet Regular; Soft and bite sized 6  Diet effective now        Question Answer Comment   Diet type Regular    Texture Soft and bite sized 6        05/20/25 0922                     Estimated Needs:   Estimated Energy Needs  Total Energy Estimated Needs in 24 hours (kCal): 1300 kCal  Method for Estimating Needs: RDI min for women    Estimated Protein Needs  Total Protein Estimated Needs in 24 Hours (g): 46 g  Method for Estimating 24 Hour Protein Needs: RDI mon for women    Estimated Fluid Needs  Total Fluid Estimated Needs in 24 Hours (mL): 1300 mL  Method for Estimating 24 Hour Fluid Needs: 1 mL/kcal     Nutrition Diagnosis   Nutrition Diagnosis:  Malnutrition Diagnosis  Patient has Malnutrition Diagnosis: Yes  Diagnosis Status: Active  Malnutrition Diagnosis: Severe malnutrition related to chronic disease or condition  Related to: decreased ability to consume sufficient energy  As Evidenced by: 10# (14.2%) weight loss in the past 6 months, severe subcutaneous fat and muscle deficits, PO intake <75% of estimated energy needs >1 month         Nutrition Interventions/Recommendations   Nutrition Interventions and Recommendations:  Nutrition Prescription: Nutrition prescription for oral nutrition    Nutrition Recommendations:  Individualized Nutrition Prescription Provided for : recommend continue oral diet per SLP recs, Ensure Plus High Protein TID, Brennon BID    Nutrition Interventions/Goals:   Food and/or Nutrient Delivery Interventions  Interventions: Meals and snacks, Medical food supplement  Meals and Snacks: Texture-modified diet  Goal: provide as ordered  Medical Food Supplement: Commercial beverage medical food supplement therapy  Goal: ensure plus high protein TID to provide 350 kcals and 20g protein each, Brennon BID for wound healing    Education Documentation  No documentation found.            Nutrition Monitoring and Evaluation   Monitoring/Evaluation:   Food/Nutrient Related History Monitoring  Monitoring and Evaluation Plan: Estimated Energy Intake  Estimated Energy Intake: Energy intake 50 -75% of estimated energy needs    Anthropometric Measurements  Monitoring and Evaluation Plan: Body weight  Body Weight: Body weight - Promote weight restoration    Goal Status: Some progress toward goal(s)    Follow Up  Time Spent (min): 30 minutes  Last Date of Nutrition Visit: 05/22/25  Nutrition Follow-Up Needed?: 5-7 days  Follow up Comment: 5/28/25          [1] acetaminophen, 650 mg, oral, TID  azithromycin, 250 mg, oral, Daily  baclofen, 10 mg, oral, TID  cholecalciferol, 125 mcg, oral, Daily  collagenase, , Topical, Daily  diazePAM, 5 mg, oral, Daily  [START ON 5/23/2025] enoxaparin, 30 mg, subcutaneous, Daily  insulin lispro, 0-5 Units, subcutaneous, TID AC  ipratropium-albuteroL, 3 mL, nebulization, q6h  lidocaine, 1 Application, Topical, TID  melatonin, 3 mg, oral, Nightly  mupirocin, 1 Application, Topical, BID  nicotine, 1 patch, transdermal, Daily   Followed by  [START ON 7/1/2025] nicotine, 1 patch, transdermal, Daily   Followed by  [START  ON 7/15/2025] nicotine, 1 patch, transdermal, Daily  oxygen, , inhalation, q4h  pantoprazole, 40 mg, oral, Daily before breakfast  piperacillin-tazobactam, 4.5 g, intravenous, q6h  polyethylene glycol, 17 g, oral, TID  potassium phosphate, 21 mmol, intravenous, Once  sennosides-docusate sodium, 2 tablet, oral, BID  sucralfate, 1 g, oral, q6h [place]    [2]

## 2025-05-22 NOTE — PROGRESS NOTES
Patient not medically clear. Patient to transfer out of the ICU. At the time of discharge, patient will return home with daughter and continue with [facility]. Patient will need an external [facility] referral. Patient will also need a prescription for a hospital bed sent to [facility]. Elif Root CNP aware of what is needed. Patient cannot discharge home until bed delivered. Will continue to follow.      05/22/25 1432   Discharge Planning   Home or Post Acute Services In home services   Type of Home Care Services Home nursing visits;Home OT;Home PT   Expected Discharge Disposition Home Health  ([facility])   Does the patient need discharge transport arranged? Yes   RoundTrip coordination needed? Yes

## 2025-05-22 NOTE — PROGRESS NOTES
Karly Horton is a 80 y.o. female on day 4 of admission presenting with Generalized weakness.    Subjective   Symptoms (0 - 10, Best to Worst)  [place] Symptom Assessment System  0-10 (Numeric) Pain Score: 0 - No pain  Overall, actually quite comfortable this morning.  Now on nasal cannula 6 L.  Feels that breathing has improved.  Feels better she states no headache dizziness lightheadedness no chest pain or pressure no nausea or vomiting.  Continue that she is drinking and eating okay.  Does not think she has had a bowel movement recently.  On MiraLAX and Annette-Colace    Objective     Physical Exam  Nurses notes and vitals reviewed    Acutely and chronically ill-appearing frail and cachectic elderly white female resting in bed now on nasal cannula oxygen  States she is feeling better no acute distress or pain  Normocephalic appears to be grossly atraumatic moist mucous membranes edentulous  Improved anisocoria of the left pupil now basically in the same size about 3-1/2 mm reactive no eye drainage or discharge no conjunctival injection  Neck supple no obvious JVD trachea seems to be midline  Very prominent fat and muscle loss throughout  Right upper chest Mediport free of any signs of infection no tenderness or warmth noted  Left lung basically clear few crackles and slightly diminished air entry into the right base  Rhythm and rate seems to be regular occasional PVC mildly tachycardic  Abdomen does seem to be a little bit distended she denied tenderness but did not want the abdomen palpated very much at all  Bowel sounds were positive there is no rebound or guarding  External urinary catheter yellow urine in the suction canister  No peripheral edema poor skin turgor  Profoundly weak but can move all extremities able to wiggle her toes equally moderate strength in her grasps  Complexion is pale Annette area buttocks and back not examined sacral buttocks wounds not directly examined please refer to wound care and  "nursing documentation    Last Recorded Vitals  Blood pressure 134/81, pulse 103, temperature 36.4 °C (97.5 °F), temperature source Oral, resp. rate (!) 31, height 1.422 m (4' 8\"), weight (!) 34.3 kg (75 lb 9.9 oz), SpO2 99%.  Intake/Output last 3 Shifts:  I/O last 3 completed shifts:  In: 1569.2 (49.3 mL/kg) [P.O.:1040; I.V.:29.2 (0.9 mL/kg); IV Piggyback:500]  Out: 2400 (75.5 mL/kg) [Urine:2400 (2.1 mL/kg/hr)]  Dosing Weight: 31.8 kg     Relevant Results  Results for orders placed or performed during the hospital encounter of 05/18/25 (from the past 24 hours)   POCT GLUCOSE   Result Value Ref Range    POCT Glucose 143 (H) 74 - 99 mg/dL   POCT GLUCOSE   Result Value Ref Range    POCT Glucose 195 (H) 74 - 99 mg/dL   POCT GLUCOSE   Result Value Ref Range    POCT Glucose 132 (H) 74 - 99 mg/dL   Renal function panel   Result Value Ref Range    Glucose 140 (H) 74 - 99 mg/dL    Sodium 133 (L) 136 - 145 mmol/L    Potassium 2.9 (LL) 3.5 - 5.3 mmol/L    Chloride 79 (L) 98 - 107 mmol/L    Bicarbonate >45 (HH) 21 - 32 mmol/L    Anion Gap      Urea Nitrogen 8 6 - 23 mg/dL    Creatinine 0.32 (L) 0.50 - 1.05 mg/dL    eGFR >90 >60 mL/min/1.73m*2    Calcium 8.6 8.6 - 10.3 mg/dL    Phosphorus 1.4 (L) 2.5 - 4.9 mg/dL    Albumin 2.9 (L) 3.4 - 5.0 g/dL   CBC   Result Value Ref Range    WBC 11.6 (H) 4.4 - 11.3 x10*3/uL    nRBC 0.0 0.0 - 0.0 /100 WBCs    RBC 4.04 4.00 - 5.20 x10*6/uL    Hemoglobin 12.5 12.0 - 16.0 g/dL    Hematocrit 39.5 36.0 - 46.0 %    MCV 98 80 - 100 fL    MCH 30.9 26.0 - 34.0 pg    MCHC 31.6 (L) 32.0 - 36.0 g/dL    RDW 14.5 11.5 - 14.5 %    Platelets 259 150 - 450 x10*3/uL   Magnesium   Result Value Ref Range    Magnesium 1.43 (L) 1.60 - 2.40 mg/dL   POCT GLUCOSE   Result Value Ref Range    POCT Glucose 146 (H) 74 - 99 mg/dL     XR chest 1 view  Result Date: 5/22/2025  Interpreted By:  Clemente Vergara, STUDY: XR CHEST 1 VIEW; 5/22/2025 5:33 am   INDICATION: CLINICAL INFORMATION: Signs/Symptoms:acute hypoxemic " respiratory failure.   COMPARISON: 05/21/2025   ACCESSION NUMBER(S): [id number]   ORDERING CLINICIAN: CLIF MENA   TECHNIQUE: Portable chest one view.   FINDINGS: The cardiac silhouette is quite prominent suggesting cardiomegaly. Patient is significantly rotated to the right. Large patchy infiltration is present within the right hemithorax. This is most marked centrally. MediPort catheter overlies the right hemithorax with the tip overlying the SVC right atrial junction. Right pleural effusion is suspected.   No definite infiltrates or effusions are appreciated on the left on the current exam.       No change in the extensive right-sided pulmonary infiltrate and right effusion.   MACRO: none   Signed by: Clemente Vergara 5/22/2025 9:56 AM Dictation workstation:   [id number]    XR chest 1 view  Result Date: 5/21/2025  Interpreted By:  Clemente Vergara, STUDY: XR CHEST 1 VIEW; 5/21/2025 5:20 am   INDICATION: CLINICAL INFORMATION: Signs/Symptoms:acute hypoxemic respiratory failure.   COMPARISON: 05/20/2025   ACCESSION NUMBER(S): [id number]   ORDERING CLINICIAN: CLIF MENA   TECHNIQUE: Portable chest one view.   FINDINGS: The cardiac size is indeterminate in view of the AP projection. There is no change in the right-sided MediPort catheter. There is no change in the right-sided infiltrate. The pulmonary vasculature is slightly indistinct suggesting mild pulmonary vascular congestion. Small bilateral pleural effusions are present.       Right perihilar and basilar infiltrate consistent with pneumonia, unchanged compared to the prior study. Pulmonary vascular congestion is also suspected along with small bilateral effusions, unchanged.   MACRO: none   Signed by: Clemente Vergara 5/21/2025 8:03 AM Dictation workstation:   [id number]    CT chest wo IV contrast  Result Date: 5/20/2025  Interpreted By:  Jin Schmidt, STUDY: CT CHEST WO IV CONTRAST; ;  5/20/2025 11:37 am   INDICATION: Signs/Symptoms:acute hypoxemic  respiratory failure, h/o SCC lung CA.     COMPARISON: 03/05/2025   ACCESSION NUMBER(S): [id number]   ORDERING CLINICIAN: CLIF MENA   TECHNIQUE: Serial axial unenhanced CT images obtained of the chest. Images reformatted in the coronal and sagittal projection.   All CT examinations are performed with 1 or more of the following dose reduction techniques: Automated exposure control, adjustment of mA and/or kv according to patient's size, or use of iterative reconstruction techniques.   FINDINGS: Mediastinum demonstrates no significant lymphadenopathy. Esophagus demonstrates component of gas-filled appearance component of retained food contents demonstrated within the stomach. Correlate with symptoms reflux.   Heart and great vessels demonstrate ascending thoracic aorta to measure 2.9 cm. There is moderate vascular calcification of the thoracic aorta. No cardiomegaly.   Lung parenchyma demonstrates moderate right and mild-to-moderate left-sided pleural effusions. There is basilar compressive atelectasis with right lower lobar collapse. Endobronchial opacification of the atelectatic right lower lobe as well as the bronchus intermedius. Correlate with aspiration as a potential etiology versus retained secretions. There is septal thickening and patchy consolidation within the right middle lobe as well as right upper lobe in particular of the posterior segment with component of air bronchograms. Left lung demonstrates scattered areas of patchy infiltrate in the left upper lobe.   Included portions visualized abdomen are unremarkable   Visualized osseous structures demonstrate S shaped scoliosis. Multilevel degenerative discogenic changes are demonstrated multilevel endplate sclerosis as well as anterior and lateral osteophyte formation. No compression deformity.               1. Right lower lobar collapse with air bronchograms and endobronchial opacification of the right lower lobe and bronchus intermedius. Correlate  with aspiration pneumonias a potential etiology. Of note, patchy consolidation also demonstrated in the right middle lobe and in particular posterior segment of the right upper lobe as well as left upper lobe. Findings suggest multilobar pneumonia. 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   2. Moderate right and mild-to-moderate left pleural effusions.     MACRO: None   Signed by: Jni Schmidt 5/20/2025 11:56 AM Dictation workstation:   [id number]    Current Medications[1]  No results found for the last 90 days.             Assessment/Plan   Acute respiratory failure w hypoxia-somewhat improved, less fio2 but worsened imaging, need follow-up to ensure clearing given her history of lung cancer  Multifocal pneumonia/pneumonitis- atb, steroids on hold per pulmonology, possible aspiration,  lower on differential is immune mediated pneumonitis  COPD exacerbation  Squamous cell carcinoma of the right lung  - Has been on Keytruda not a candidate for chemo or surgery per last oncology note  Completed radiotherapy 2/24-2/26/25  Last brain MRI 1/27/25 no evidence of mass cerebral infarction or   - Per Onc outpatient notes plan for restaging PET/CT 6/9/2025  HFpEF-FÁTIMA last 10/2024 left ventricular ejection fraction 60 to 65%  Decubitus ulcers and wounds-wound care following  Electrolyte imbalance-replete as needed  Protein calorie malnutrition-severe, clinical dietitian on consult, prealbumin 6.3  Pain due to sacral wound as well as cervical neck pain-scheduling moderate dose Tylenol on a standard lower side because of her low body weight and very cachectic status, lidocaine ointment to the cervical spine. prefer to hold off on any opioids as able, she has significant intolerance to most opioids and w her concurrent resp failure feel she has very low threshold to decompensate    ->5/21/2024 pain improved with lidocaine ointment and scheduled  "Tylenol  Difficulty sleeping-adding low-dose melatonin at at bedtime  -> Appreciate intensivist adding daily Valium given her history of being on meprobamate.  OARRS reviewed -> at least 2-year history of being on meprobamate as well as acetaminophen codeine No. 4  Follows with Dr. Jakub Mccoy neurology  -> Notes reviewed she has been on many different medications in the past basically settled on meprobamate and baclofen and the Tylenol No. 4 as an abortive to her migraines/pain  Palliative care/ACP/GOALS OF CARE     CODE STATUS: Full code  The patient does not have full decision making capacity(seems like this is vacillating)  -> now today 2025 seems to be much more clear seem to be capable and lucid for the most part when I spoke with her was able to have good goals of care ACP conversation able to articulate risks and benefits of treatment versus nontreatment(s),  does seem to have an understanding that likely outcome will be poor if she suffers a cardiopulmonary arrest for meaningful robust sustainable recovery especially in light of her multiple health issues as well as cancer and severe wounds to her buttock and sacrum as well as her severe malnutrition/cachexia     She does want us to perform CPR defibrillation if she has a cardiopulmonary arrest  -That being said, she was clear in my conversation with her that she does not want to remain on life support indefinitely that is does not want a tracheostomy nor does she want a PEG tube.     We specifically talked about comfort model of care and allowing a peaceful dignified death if her condition fails to improve despite aggressive interventions.  With this she was in agreement, but does not want to give up at this point wants to \"try what we can\"  From home with daughter Karly Pro   ACP documents are in EPIC   Friend Jocelyn is [facility] / 1st Alternate is Juan Nascimento - who I believe is  ( the patient believes so as well as her daughter, I was " shown an obituary which is believed to be him - he was a  who did a lot of work for the family.)    Please refer to my colleagues initial consult on 5/19/2025.  At the time my partner saw her she was not fully capable she had vacillating capacity, and what seems like hallucinations.  At that time my colleague had spoken with her daughter also named Brooke there was some question about adjusting CODE STATUS the ICU team had spoken with the patient and she is currently a full code     4:38 PM  In between speaking with the patient who seems much more capable today as well as getting in touch with her documented H POA Jocelynvlad Mccloud (paper HPOA printed and placed in record).  CODE STATUS will remain aggressive and disease-modifying full code no limits on treatment  Jocelyn has been updated regarding all the present imaging and lab work as well as the patient's condition she to is aware that there is a likely poor outcome if she suffers a cardiopulmonary arrest and is much more indicative of systemic failure of her body in his current state.  With that being said patient wishes us to continue in the treatment model of care we will do so she was very clear that she would not want to be kept up life support indefinitely would not ever be interested in a tracheostomy or PEG tube knows that these type of things would provide little if any meaningful benefit in terms of her quality of life she would not be willing to go through those measures would want us to keep her comfortable and ensure a peaceful dignified passing at the content at she is however okay with short-term life support such as being intubated if necessary though her obvious preference is to avoid this at all measures.     We will continue conversations with her documented МАРИНА CASTRO Jocelyn (alternate Juan has passed away as per what the patient had told me corroborated with Jocelyn)   Jocelyn will try and get in touch with the patient's daughter  Brooke and update her as well.      ACP time has been in excess of 25 minutes.  Time they are otherwise has been greater than 35 min, time not continuous.   Discussed in detail with the ICU team.     5/21/2025  Seems to be slightly better from a respiratory standpoint, less FiO2 requirement despite worsening imaging  Will need follow-up imaging given her history of lung cancer to ensure clearing  Pain actually doing quite a bit better than yesterday with the lidocaine ointment and scheduled Tylenol.  OARRS was reviewed please see above, at least couple year history of being on meprobamate and Tylenol No. 4 outpatient from Dr Jakub Mccoy neurology.  Appreciate intensivist getting her on a little bit of Valium hopefully this will help with her anxiety and maybe attenuate some withdrawal possibility.  Continues in the disease modifying model of care  Will be updating Jocelyn her healthcare POA later today.  Complaints of difficulty sleeping.    Not liking the SCDs but explained to her these are necessary to help reduce the risk of DVTs she acquiesces.  Adding low-dose melatonin at at bedtime.  Discussed with ICU nurse Cherie.  Detailed conversation with the patient today at the bedside explaining results to her as well as discussing strategies to try to mitigate her discomfort.  Total time 39 minutes.     5/22/2025  Seems to be slightly improved again today from a respiratory standpoint, she is now on nasal cannula  Continues to have extensive right-sided infiltrate and right effusion.  Pulmonology is following closely.  From a pain standpoint, she is adequately controlled with regard to her cervical neck and lower back/wound pain  Not having any spasms nor any complaints of migraines (she has this in the past, had been following with Dr. Jakub Mccoy, neurology)  Continuing with scheduled Tylenol as well as lidocaine ointment.  She is on her usual baclofen.  Valium had been substituted by the ICU team the other  day, for meprobamate, she seems to be doing fine with adjustments, she was not anxious at the time I saw her today.  Laxatives being adjusted by the intensivist team.  There had been some discussion about possibly doing a thoracentesis on her right side although her respiratory status has improved, so this is presently being deferred by pulmonology.    I spoke this morning and updated Jocelyn the patient's healthcare POA.  Reviewed labs and present imaging as well as clinical condition questions have been answered.  Goals of care have been clearly established with regard to the patient's wishes in treatment model of care.  As such, palliative medicine will sign off at this point, please feel free to reach out via secure chat or reconsult us should her condition decline where we need to reevaluate goals of care.  That being said we now have consistent contact with her healthcare POA Jocelyn Mccloud should any issues arise I would recommend immediately giving her an update so that further goals of care conversations can be undertaken.    Total time which has not been continuous has been over 35 minutes.    LANDEN Martinez       [1]   Current Facility-Administered Medications:     acetaminophen (Tylenol) tablet 650 mg, 650 mg, oral, TID, LANDEN Martinez, 650 mg at 25 0849    alteplase (Cathflo Activase) injection 2 mg, 2 mg, intra-catheter, PRN, LANDEN Oropeza    [] azithromycin (Zithromax) tablet 500 mg, 500 mg, oral, Daily **FOLLOWED BY** azithromycin (Zithromax) tablet 250 mg, 250 mg, oral, Daily, LANDEN Oropeza, 250 mg at 25 0850    baclofen (Lioresal) tablet 10 mg, 10 mg, oral, TID, Lupillo Mcneil MD, 10 mg at 25 0850    benzocaine-menthol (Cepastat Sore Throat) lozenge 1 lozenge, 1 lozenge, Mouth/Throat, q2h PRN, Lupillo Mcneil MD    butalbital-acetaminophen-caff -40 mg per tablet 1 tablet, 1 tablet, oral, q6h PRN, Lupillo OSULLIVAN  MD Tarun, 1 tablet at 05/19/25 2148    cholecalciferol (Vitamin D-3) tablet 125 mcg, 125 mcg, oral, Daily, Lupillo Mcneil MD, 125 mcg at 05/22/25 0849    collagenase 250 unit/gram ointment, , Topical, Daily, Dottie Calles PA-C, Given at 05/22/25 0901    dextromethorphan-guaifenesin (Robitussin DM)  mg/5 mL oral liquid 5 mL, 5 mL, oral, q4h PRN, Lupillo Mcneil MD    dextrose 50 % injection 12.5 g, 12.5 g, intravenous, q15 min PRN, Derek Tran PA-C    dextrose 50 % injection 25 g, 25 g, intravenous, q15 min PRN, Derek Tran PA-C    diazePAM (Valium) tablet 5 mg, 5 mg, oral, Daily, LANDEN Oropeza, 5 mg at 05/22/25 0850    enoxaparin (Lovenox) syringe 40 mg, 40 mg, subcutaneous, Daily, PO Oropeza-CNP, 40 mg at 05/22/25 0849    glucagon (Glucagen) injection 1 mg, 1 mg, intramuscular, q15 min PRN, Derek Tran PA-C    glucagon (Glucagen) injection 1 mg, 1 mg, intramuscular, q15 min PRN, Derek Tran PA-C    guaiFENesin (Mucinex) 12 hr tablet 600 mg, 600 mg, oral, q12h PRN, Lupillo Mcneil MD    guaiFENesin (Mucinex) 12 hr tablet 600 mg, 600 mg, oral, BID PRN, Aldair Spring DO    insulin lispro injection 0-5 Units, 0-5 Units, subcutaneous, TID AC, Derek Tran PA-C, 1 Units at 05/21/25 1700    ipratropium-albuteroL (Duo-Neb) 0.5-2.5 mg/3 mL nebulizer solution 3 mL, 3 mL, nebulization, q6h, Lupillo Mcneil MD, 3 mL at 05/22/25 0736    labetaloL (Normodyne,Trandate) injection 10 mg, 10 mg, intravenous, q4h PRN, Tim Granados PA-C    lidocaine (Xylocaine) 5 % ointment 1 Application, 1 Application, Topical, TID, LANDEN Martinez, 1 Application at 05/22/25 0859    lubricating eye drops ophthalmic solution 1 drop, 1 drop, Both Eyes, PRN, LANDEN Oropeza    melatonin tablet 3 mg, 3 mg, oral, Nightly, LANDEN Martinez, 3 mg at 05/21/25 2112    mupirocin (Bactroban) 2 % ointment 1 Application, 1 Application, Topical, BID, Lupillo Mcneil MD, 1  Application at 05/22/25 0901    nicotine (Nicoderm CQ) 21 mg/24 hr patch 1 patch, 1 patch, transdermal, Daily, 1 patch at 05/22/25 0849 **FOLLOWED BY** [START ON 7/1/2025] nicotine (Nicoderm CQ) 14 mg/24 hr patch 1 patch, 1 patch, transdermal, Daily **FOLLOWED BY** [START ON 7/15/2025] nicotine (Nicoderm CQ) 7 mg/24 hr patch 1 patch, 1 patch, transdermal, Daily, LANDEN Oropeza    ondansetron (Zofran) tablet 8 mg, 8 mg, oral, q8h PRN, Lupillo Mcneil MD    oxygen (O2) therapy, , inhalation, q4h, Aldair Spring DO, 6 L/min at 05/22/25 0737    pantoprazole (ProtoNix) EC tablet 40 mg, 40 mg, oral, Daily before breakfast, Lupillo Mcneil MD, 40 mg at 05/22/25 0637    piperacillin-tazobactam (Zosyn) 4.5 g in dextrose (iso)  mL, 4.5 g, intravenous, q6h, Lupillo Mcneil MD, Stopped at 05/22/25 0919    polyethylene glycol (Glycolax, Miralax) packet 17 g, 17 g, oral, Daily PRN, Lupillo Mcneil MD    polyethylene glycol (Glycolax, Miralax) packet 17 g, 17 g, oral, TID, LANDEN Oropeza, 17 g at 05/22/25 0849    potassium phosphates 21 mmol in dextrose 5% 250 mL IV, 21 mmol, intravenous, Once, Derek Tran PA-C, Last Rate: 42.8 mL/hr at 05/22/25 0931, 21 mmol at 05/22/25 0931    prochlorperazine (Compazine) tablet 10 mg, 10 mg, oral, q6h PRN, Aldair Spring DO    sennosides-docusate sodium (Annette-Colace) 8.6-50 mg per tablet 2 tablet, 2 tablet, oral, BID, LANDEN Oropeza, 2 tablet at 05/22/25 0850    sodium hypochlorite (Dakin's Quarter Strength) 0.125 % external solution, , irrigation, Daily PRN, Dottie Calles PA-C, Given at 05/20/25 1841    sucralfate (Carafate) suspension 1 g, 1 g, oral, q6h [place], Lupillo Mcneil MD, 1 g at 05/22/25 0519

## 2025-05-22 NOTE — PROGRESS NOTES
Karly Horton is a 80 y.o. female on day 4 of admission presenting with Generalized weakness.    Subjective   Afebrile, no chills  On 6L nasal cannula  Denies shortness of breath or cough  Reports moderate sacral wound pain  Sacral wound examined    Objective   Range of Vitals (last 24 hours)  Heart Rate:  []   Temp:  [36.4 °C (97.5 °F)-37.1 °C (98.8 °F)]   Resp:  [12-46]   BP: (122-196)/()   SpO2:  [87 %-100 %]   Daily Weight  05/19/25 : (!) 34.3 kg (75 lb 9.9 oz)    Body mass index is 16.95 kg/m².    Physical Exam  Constitutional:       Appearance: Normal appearance.   HENT:      Head: Normocephalic and atraumatic.      Nose: Nose normal.   Eyes:      Extraocular Movements: Extraocular movements intact.      Conjunctiva/sclera: Conjunctivae normal.   Cardiovascular:      Rate and Rhythm: Tachycardia present.   Pulmonary:      Effort: Pulmonary effort is normal.   Abdominal:      General: There is no distension.   Musculoskeletal:      Cervical back: Normal range of motion.   Skin:     General: Skin is warm and dry.      Comments: Wounds examined-Stage IV sacral ulcer with mild to moderate fat necrosis, stage III right buttock pressure, stage II left buttock ulcer   Neurological:      General: No focal deficit present.      Mental Status: She is alert.   Psychiatric:         Mood and Affect: Mood normal.           Antibiotics  azithromycin - 250 mg  mupirocin - 2 %, 2 %  piperacillin-tazobactam - 4.5 gram/100 mL    Relevant Results  Labs  Results from last 72 hours   Lab Units 05/22/25  0333 05/21/25  0923 05/20/25  0404   WBC AUTO x10*3/uL 11.6* 12.0* 18.7*   HEMOGLOBIN g/dL 12.5 10.7* 11.5*   HEMATOCRIT % 39.5 33.6* 34.7*   PLATELETS AUTO x10*3/uL 259 231 227   NEUTROS PCT AUTO %  --  92.2  --    LYMPHS PCT AUTO %  --  1.2  --    MONOS PCT AUTO %  --  5.8  --    EOS PCT AUTO %  --  0.1  --      Results from last 72 hours   Lab Units 05/22/25  0333 05/21/25  0625 05/20/25  0404   SODIUM mmol/L 133*  130* 129*   POTASSIUM mmol/L 2.9* 3.7 3.1*   CHLORIDE mmol/L 79* 81* 79*   CO2 mmol/L >45* >45* 42*   BUN mg/dL 8 11 12   CREATININE mg/dL 0.32* 0.37* 0.30*   GLUCOSE mg/dL 140* 162* 84   CALCIUM mg/dL 8.6 8.5* 8.5*   ANION GAP mmol/L  --   --  11   EGFR mL/min/1.73m*2 >90 >90 >90   PHOSPHORUS mg/dL 1.4* 1.8* 3.2     Results from last 72 hours   Lab Units 05/22/25  0333 05/21/25  0625 05/20/25  0404   ALBUMIN g/dL 2.9* 2.6* 2.5*     Estimated Creatinine Clearance: 76.3 mL/min (A) (by C-G formula based on SCr of 0.32 mg/dL (L)).  C-Reactive Protein   Date Value Ref Range Status   05/18/2025 14.44 (H) <1.00 mg/dL Final     Microbiology  MRSA PCR negative  Legionella antigen negative  Blood cultures pending with no growth for 3 days  Imaging  XR chest 1 view  Result Date: 5/22/2025  Interpreted By:  Clemente Vergara, STUDY: XR CHEST 1 VIEW; 5/22/2025 5:33 am   INDICATION: CLINICAL INFORMATION: Signs/Symptoms:acute hypoxemic respiratory failure.   COMPARISON: 05/21/2025   ACCESSION NUMBER(S): [id number]   ORDERING CLINICIAN: CLIF MENA   TECHNIQUE: Portable chest one view.   FINDINGS: The cardiac silhouette is quite prominent suggesting cardiomegaly. Patient is significantly rotated to the right. Large patchy infiltration is present within the right hemithorax. This is most marked centrally. MediPort catheter overlies the right hemithorax with the tip overlying the SVC right atrial junction. Right pleural effusion is suspected.   No definite infiltrates or effusions are appreciated on the left on the current exam.       No change in the extensive right-sided pulmonary infiltrate and right effusion.   MACRO: none   Signed by: Clemente Vergara 5/22/2025 9:56 AM Dictation workstation:   [id number]    XR chest 1 view  Result Date: 5/21/2025  Interpreted By:  Clemente Vergara, STUDY: XR CHEST 1 VIEW; 5/21/2025 5:20 am   INDICATION: CLINICAL INFORMATION: Signs/Symptoms:acute hypoxemic respiratory failure.   COMPARISON:  05/20/2025   ACCESSION NUMBER(S): [id number]   ORDERING CLINICIAN: CLIF MENA   TECHNIQUE: Portable chest one view.   FINDINGS: The cardiac size is indeterminate in view of the AP projection. There is no change in the right-sided MediPort catheter. There is no change in the right-sided infiltrate. The pulmonary vasculature is slightly indistinct suggesting mild pulmonary vascular congestion. Small bilateral pleural effusions are present.       Right perihilar and basilar infiltrate consistent with pneumonia, unchanged compared to the prior study. Pulmonary vascular congestion is also suspected along with small bilateral effusions, unchanged.   MACRO: none   Signed by: Clemente Vergara 5/21/2025 8:03 AM Dictation workstation:   [id number]    CT chest wo IV contrast  Result Date: 5/20/2025  Interpreted By:  Jin Schmidt, STUDY: CT CHEST WO IV CONTRAST; ;  5/20/2025 11:37 am   INDICATION: Signs/Symptoms:acute hypoxemic respiratory failure, h/o SCC lung CA.     COMPARISON: 03/05/2025   ACCESSION NUMBER(S): [id number]   ORDERING CLINICIAN: CLIF MENA   TECHNIQUE: Serial axial unenhanced CT images obtained of the chest. Images reformatted in the coronal and sagittal projection.   All CT examinations are performed with 1 or more of the following dose reduction techniques: Automated exposure control, adjustment of mA and/or kv according to patient's size, or use of iterative reconstruction techniques.   FINDINGS: Mediastinum demonstrates no significant lymphadenopathy. Esophagus demonstrates component of gas-filled appearance component of retained food contents demonstrated within the stomach. Correlate with symptoms reflux.   Heart and great vessels demonstrate ascending thoracic aorta to measure 2.9 cm. There is moderate vascular calcification of the thoracic aorta. No cardiomegaly.   Lung parenchyma demonstrates moderate right and mild-to-moderate left-sided pleural effusions. There is basilar compressive  atelectasis with right lower lobar collapse. Endobronchial opacification of the atelectatic right lower lobe as well as the bronchus intermedius. Correlate with aspiration as a potential etiology versus retained secretions. There is septal thickening and patchy consolidation within the right middle lobe as well as right upper lobe in particular of the posterior segment with component of air bronchograms. Left lung demonstrates scattered areas of patchy infiltrate in the left upper lobe.   Included portions visualized abdomen are unremarkable   Visualized osseous structures demonstrate S shaped scoliosis. Multilevel degenerative discogenic changes are demonstrated multilevel endplate sclerosis as well as anterior and lateral osteophyte formation. No compression deformity.               1. Right lower lobar collapse with air bronchograms and endobronchial opacification of the right lower lobe and bronchus intermedius. Correlate with aspiration pneumonias a potential etiology. Of note, patchy consolidation also demonstrated in the right middle lobe and in particular posterior segment of the right upper lobe as well as left upper lobe. Findings suggest multilobar pneumonia. 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   2. Moderate right and mild-to-moderate left pleural effusions.     MACRO: None   Signed by: Jin Schmidt 5/20/2025 11:56 AM Dictation workstation:   [id number]    XR chest 1 view  Result Date: 5/20/2025  Interpreted By:  Hanane Fine, STUDY: XR CHEST 1 VIEW 5/20/2025 5:21 am   INDICATION: Signs/Symptoms:acute hypoxemic respiratory failure   COMPARISON: 05/18/2025   ACCESSION NUMBER(S): [id number]   ORDERING CLINICIAN: CLIF MENA   TECHNIQUE: AP semi-erect view of the chest at bedside   FINDINGS: The MediPort catheter terminates within the SVC. The cardiac size is stable with atherosclerosis of the thoracic aorta noted.    When compared with the study from 2 days earlier, there is worsening infiltrate and airspace consolidation within the right lung particularly in the right upper lobe with right pleural effusion increased in size.   Small left pleural effusion is present. The interstitial prominence observed 2 days earlier has improved however.   Levoscoliosis of the thoracic spine is present.       Improved interstitial prominence with stable small left pleural effusion.   Worsening infiltrate with airspace consolidation now seen in the right upper lobe with right pleural effusion increased in size.   Signed by: Hanane Fine 5/20/2025 7:53 AM Dictation workstation:   [id number]    CT head wo IV contrast  Result Date: 5/20/2025  Interpreted By:  Kanika Avina, STUDY: CT HEAD WO IV CONTRAST;  5/20/2025 12:06 am   INDICATION: Signs/Symptoms:unequal pupils.   COMPARISON: MRI brain 10/18/2024   ACCESSION NUMBER(S): [id number]   ORDERING CLINICIAN: JANET HALEY   TECHNIQUE: Axial noncontrast CT images of the head.   FINDINGS: BRAIN PARENCHYMA:  deep and periventricular white matter hypodensities are nonspecific, but favored to represent chronic small vessel ischemic changes.  Gray-white matter interfaces are preserved. No mass effect or midline shift.   HEMORRHAGE: No acute intracranial hemorrhage. VENTRICLES and EXTRA-AXIAL SPACES: The ventricles and sulci are within normal limits in size for brain volume. No abnormal extraaxial fluid collection. EXTRACRANIAL SOFT TISSUES: Within normal limits. PARANASAL SINUSES/MASTOIDS:  The visualized paranasal sinuses and mastoid air cells are aerated. CALVARIUM: No depressed skull fracture. No destructive osseous lesion.   OTHER FINDINGS: None.       No acute intracranial abnormality.     MACRO: None   Signed by: Kanika Avina 5/20/2025 12:31 AM Dictation workstation:   [id number]    XR chest 1 view  Result Date: 5/18/2025  Interpreted By:  Lenka Cartagena, STUDY: XR CHEST 1 VIEW;  5/18/2025  6:36 pm   INDICATION: Signs/Symptoms:Generalized weakness.   COMPARISON: Chest x-ray 04/29/2025   ACCESSION NUMBER(S): [id number]   ORDERING CLINICIAN: JAIME CHILDERS   FINDINGS: Right-sided MediPort terminates in the SVC. Multiple overlying leads are present.   CARDIOMEDIASTINAL SILHOUETTE: Cardiomediastinal silhouette is stable in size and configuration. Atherosclerotic calcification of the aorta.   LUNGS: Bilateral interstitial prominence. There is right middle and lower lobe patchy airspace opacities. Layering bilateral effusions are better seen on concurrent CT. No pneumothorax.   ABDOMEN: No remarkable upper abdominal findings.   BONES: Levoconvex scoliosis. Generalized diffuse osteopenia. Multilevel degenerative changes of the spine.       There is patchy airspace opacity in the right mid and lower lung concerning for developing multifocal pneumonia. Continued radiographic follow-up to resolution is advised.   Cardiomegaly with mild interstitial prominence suggestive of developing interstitial edema/CHF.   Bilateral pleural effusions are better seen on concurrent CT.   MACRO: None   Signed by: Lenka Cartagena 5/18/2025 7:06 PM Dictation workstation:   [id number]    CT abdomen pelvis wo IV contrast  Result Date: 5/18/2025  Interpreted By:  Lenka Cartagena, STUDY: CT ABDOMEN PELVIS WO IV CONTRAST;  5/18/2025 5:51 pm   INDICATION: Signs/Symptoms:Sacral wound.   COMPARISON: CT scan of the abdomen pelvis 02/14/2025   ACCESSION NUMBER(S): [id number]   ORDERING CLINICIAN: JAIME CHILDERS   TECHNIQUE: Axial noncontrast CT images of the abdomen and pelvis with coronal and sagittal reconstructed images.   FINDINGS:   LOWER CHEST: Moderate bilateral pleural effusions. There are patchy airspace opacities and consolidation in the right lung. Aortic root and coronary artery calcifications noted. Right-sided MediPort terminates in the SVC.   ABDOMEN: Lack of intravenous contrast limits evaluation of vessels and solid organs.  LIVER: Within normal limits. BILE DUCTS: Normal caliber. GALLBLADDER: No calcified gallstones. No wall thickening. PANCREAS: Suboptimally visualized due to lack of contrast and paucity of intra-abdominal fat SPLEEN: Within normal limits.  Calcified splenic granulomas noted. ADRENALS: Nodular thickening of the adrenal glands may relate to hyperplasia or adenomatous change.   KIDNEYS, URETERS, URINARY BLADDER:  Kidneys are symmetric in size without evidence for calculi, hydronephrosis, hydroureter or perinephric inflammatory changes.   No bladder calculi or wall thickening.  Marked distention of the urinary bladder.   VESSELS:  Calcific atherosclerosis of the aortoiliac and branch vessels with moderate tortuosity. No aortic aneurysm. RETROPERITONEUM: No pathologically enlarged lymph nodes.   PELVIS:   REPRODUCTIVE ORGANS: Uterus and ovaries are not well visualized.   BOWEL: Postsurgical changes of partial colectomy. No dilated bowel. Appendix is not identified with certainty. No pericecal inflammatory change is seen. No pneumatosis or portal venous gas. PERITONEUM: Paucity of intra-abdominal fat. Mild diffuse haziness of the mesentery. ABDOMINAL WALL: Diffuse body wall edema. There is soft tissue defect overlying the distal sacrum and coccyx. Mild adjacent soft tissue stranding with a tiny locule of air in the right gluteal subcutaneous fat. A discrete drainable fluid collection is not identified. Findings concerning for sacral decubitus ulcer. BONES: Generalized diffuse osteopenia. Multilevel degenerative changes of the spine. Reverse s shaped scoliosis. Great 1 anterolisthesis are L3 on 4 and L5 on S1 with grade 1-2 anterolisthesis of L4 on 5.       There is soft tissue defect overlying the distal sacrum and coccyx with mild soft tissue stranding. A tiny locule of air is present in the right gluteal subcutaneous fat, without evidence for discrete drainable fluid collection. Findings concerning for sacral decubitus  ulcer. Correlate with exam.   Moderate bilateral pleural effusions. There are patchy airspace opacities and consolidation in the right lung concerning for developing multifocal pneumonia. Clinical correlation continued radiographic follow-up to resolution is advised.   Marked distention of the urinary bladder. Correlate with symptomatology to exclude urinary retention.   Diffuse body wall edema.   Additional findings as described above.   MACRO: None   Signed by: Lenka Cartagena 5/18/2025 6:31 PM Dictation workstation:   [id number]    ECG 12 Lead  Result Date: 5/5/2025  Sinus tachycardia with frequent Premature ventricular complexes Left anterior fascicular block ST & T wave abnormality, consider inferior ischemia Abnormal ECG T wave inversion now evident in Lateral leads Confirmed by Everardo Webster ([id number]) on 5/5/2025 4:08:58 PM    XR chest 2 views  Result Date: 4/29/2025  STUDY: Chest Radiographs;  4/29/2025 4:37PM INDICATION: Shortness of breath. COMPARISON: 3/5/2025 CT CTA Chest. ACCESSION NUMBER(S): [id number] ORDERING CLINICIAN: RONALD BLANTON TECHNIQUE:  Frontal and lateral chest. FINDINGS: CARDIOMEDIASTINAL SILHOUETTE: Heart is mildly enlarged with pulmonary vascular congestion and small bilateral pleural effusions.  LUNGS: Lungs are clear.  ABDOMEN: No remarkable upper abdominal findings.  BONES: No acute osseous changes.    Cardiomegaly and pulmonary vascular congestion with small bilateral pleural effusions. Signed by Ronal Isaac MD        Assessment/Plan   Acute hypoxic respiratory failure,  resolving, on 6LNC   Squamous cell lung cancer status post chemoradiation, on Keytruda  Leukocytosis, multifactorial-resolving  History of COPD  Stage III/IV sacral decubitus ulcer  Severe malnutrition-prealbumin 6.3     Continue Zosyn  Continue azithromycin  Mycoplasma IgM negative  Galactomannan and Fungitell negative  Follow-up histoplasma antigen  Supportive care  Local care of sacral ulcer  Offloading  Oxygen  as needed  Monitor temperature and WBC  Further recommendations based on pending workup    Discussed with Dr. Yen    This is a complex infectious disease issue and the following was performed today (for more details please see the above note): Management decisions reflecting the added complexity (e.g., changes in antimicrobial therapy, infection control strategies).        Kalpana Adkins, APRN-CNP

## 2025-05-23 ENCOUNTER — APPOINTMENT (OUTPATIENT)
Dept: HEMATOLOGY/ONCOLOGY | Facility: CLINIC | Age: 80
End: 2025-05-23
Payer: MEDICARE

## 2025-05-23 ENCOUNTER — APPOINTMENT (OUTPATIENT)
Dept: RADIOLOGY | Facility: HOSPITAL | Age: 80
DRG: 177 | End: 2025-05-23
Payer: MEDICARE

## 2025-05-23 DIAGNOSIS — C44.92 SQUAMOUS CELL CARCINOMA, SMALL CELL, NONKERATINIZING: Primary | ICD-10-CM

## 2025-05-23 DIAGNOSIS — C34.81 MALIGNANT NEOPLASM OF OVERLAPPING SITES OF RIGHT LUNG (MULTI): ICD-10-CM

## 2025-05-23 LAB
ALBUMIN SERPL BCP-MCNC: 2.5 G/DL (ref 3.4–5)
ANION GAP SERPL CALCULATED.3IONS-SCNC: 7 MMOL/L (ref 10–20)
BACTERIA BLD CULT: NORMAL
BACTERIA BLD CULT: NORMAL
BASOPHILS # BLD AUTO: 0.01 X10*3/UL (ref 0–0.1)
BASOPHILS NFR BLD AUTO: 0.1 %
BUN SERPL-MCNC: 11 MG/DL (ref 6–23)
CALCIUM SERPL-MCNC: 8.8 MG/DL (ref 8.6–10.3)
CHLORIDE SERPL-SCNC: 86 MMOL/L (ref 98–107)
CO2 SERPL-SCNC: 44 MMOL/L (ref 21–32)
CREAT SERPL-MCNC: 0.27 MG/DL (ref 0.5–1.05)
EGFRCR SERPLBLD CKD-EPI 2021: >90 ML/MIN/1.73M*2
EOSINOPHIL # BLD AUTO: 0.03 X10*3/UL (ref 0–0.4)
EOSINOPHIL NFR BLD AUTO: 0.4 %
ERYTHROCYTE [DISTWIDTH] IN BLOOD BY AUTOMATED COUNT: 14.6 % (ref 11.5–14.5)
GLUCOSE BLD MANUAL STRIP-MCNC: 129 MG/DL (ref 74–99)
GLUCOSE BLD MANUAL STRIP-MCNC: 140 MG/DL (ref 74–99)
GLUCOSE BLD MANUAL STRIP-MCNC: 168 MG/DL (ref 74–99)
GLUCOSE BLD MANUAL STRIP-MCNC: 84 MG/DL (ref 74–99)
GLUCOSE SERPL-MCNC: 106 MG/DL (ref 74–99)
HCT VFR BLD AUTO: 37.5 % (ref 36–46)
HGB BLD-MCNC: 11.7 G/DL (ref 12–16)
IMM GRANULOCYTES # BLD AUTO: 0.1 X10*3/UL (ref 0–0.5)
IMM GRANULOCYTES NFR BLD AUTO: 1.2 % (ref 0–0.9)
LYMPHOCYTES # BLD AUTO: 0.1 X10*3/UL (ref 0.8–3)
LYMPHOCYTES NFR BLD AUTO: 1.2 %
MAGNESIUM SERPL-MCNC: 1.84 MG/DL (ref 1.6–2.4)
MCH RBC QN AUTO: 31.5 PG (ref 26–34)
MCHC RBC AUTO-ENTMCNC: 31.2 G/DL (ref 32–36)
MCV RBC AUTO: 101 FL (ref 80–100)
MONOCYTES # BLD AUTO: 0.59 X10*3/UL (ref 0.05–0.8)
MONOCYTES NFR BLD AUTO: 7.1 %
NEUTROPHILS # BLD AUTO: 7.49 X10*3/UL (ref 1.6–5.5)
NEUTROPHILS NFR BLD AUTO: 90 %
NRBC BLD-RTO: 0 /100 WBCS (ref 0–0)
PHOSPHATE SERPL-MCNC: 3.4 MG/DL (ref 2.5–4.9)
PLATELET # BLD AUTO: 192 X10*3/UL (ref 150–450)
POTASSIUM SERPL-SCNC: 3 MMOL/L (ref 3.5–5.3)
RBC # BLD AUTO: 3.72 X10*6/UL (ref 4–5.2)
SCAN RESULT: NORMAL
SODIUM SERPL-SCNC: 134 MMOL/L (ref 136–145)
WBC # BLD AUTO: 8.3 X10*3/UL (ref 4.4–11.3)

## 2025-05-23 PROCEDURE — 82947 ASSAY GLUCOSE BLOOD QUANT: CPT

## 2025-05-23 PROCEDURE — 2500000002 HC RX 250 W HCPCS SELF ADMINISTERED DRUGS (ALT 637 FOR MEDICARE OP, ALT 636 FOR OP/ED)

## 2025-05-23 PROCEDURE — 85025 COMPLETE CBC W/AUTO DIFF WBC: CPT

## 2025-05-23 PROCEDURE — 2500000005 HC RX 250 GENERAL PHARMACY W/O HCPCS

## 2025-05-23 PROCEDURE — 71045 X-RAY EXAM CHEST 1 VIEW: CPT

## 2025-05-23 PROCEDURE — S4991 NICOTINE PATCH NONLEGEND: HCPCS

## 2025-05-23 PROCEDURE — 2500000001 HC RX 250 WO HCPCS SELF ADMINISTERED DRUGS (ALT 637 FOR MEDICARE OP): Performed by: INTERNAL MEDICINE

## 2025-05-23 PROCEDURE — 80069 RENAL FUNCTION PANEL: CPT

## 2025-05-23 PROCEDURE — 2500000004 HC RX 250 GENERAL PHARMACY W/ HCPCS (ALT 636 FOR OP/ED): Mod: JZ

## 2025-05-23 PROCEDURE — 94640 AIRWAY INHALATION TREATMENT: CPT

## 2025-05-23 PROCEDURE — 2500000001 HC RX 250 WO HCPCS SELF ADMINISTERED DRUGS (ALT 637 FOR MEDICARE OP)

## 2025-05-23 PROCEDURE — 99233 SBSQ HOSP IP/OBS HIGH 50: CPT | Performed by: STUDENT IN AN ORGANIZED HEALTH CARE EDUCATION/TRAINING PROGRAM

## 2025-05-23 PROCEDURE — 2500000001 HC RX 250 WO HCPCS SELF ADMINISTERED DRUGS (ALT 637 FOR MEDICARE OP): Performed by: NURSE PRACTITIONER

## 2025-05-23 PROCEDURE — 2060000001 HC INTERMEDIATE ICU ROOM DAILY

## 2025-05-23 PROCEDURE — 71045 X-RAY EXAM CHEST 1 VIEW: CPT | Performed by: RADIOLOGY

## 2025-05-23 PROCEDURE — 83735 ASSAY OF MAGNESIUM: CPT

## 2025-05-23 PROCEDURE — 2500000004 HC RX 250 GENERAL PHARMACY W/ HCPCS (ALT 636 FOR OP/ED)

## 2025-05-23 RX ORDER — LACTULOSE 10 G/15ML
20 SOLUTION ORAL DAILY
Status: DISCONTINUED | OUTPATIENT
Start: 2025-05-23 | End: 2025-06-01

## 2025-05-23 RX ORDER — POTASSIUM CHLORIDE 1.5 G/1.58G
40 POWDER, FOR SOLUTION ORAL ONCE
Status: COMPLETED | OUTPATIENT
Start: 2025-05-23 | End: 2025-05-23

## 2025-05-23 RX ADMIN — SUCRALFATE ORAL SUSPENSION 1 G: 1 SUSPENSION ORAL at 18:53

## 2025-05-23 RX ADMIN — POLYETHYLENE GLYCOL 3350 17 G: 17 POWDER, FOR SOLUTION ORAL at 14:32

## 2025-05-23 RX ADMIN — IPRATROPIUM BROMIDE AND ALBUTEROL SULFATE 3 ML: 2.5; .5 SOLUTION RESPIRATORY (INHALATION) at 13:56

## 2025-05-23 RX ADMIN — Medication 6 L/MIN: at 00:00

## 2025-05-23 RX ADMIN — POLYETHYLENE GLYCOL 3350 17 G: 17 POWDER, FOR SOLUTION ORAL at 08:36

## 2025-05-23 RX ADMIN — ACETAMINOPHEN 650 MG: 325 TABLET ORAL at 20:57

## 2025-05-23 RX ADMIN — PANTOPRAZOLE SODIUM 40 MG: 40 TABLET, DELAYED RELEASE ORAL at 05:15

## 2025-05-23 RX ADMIN — MUPIROCIN 1 APPLICATION: 20 OINTMENT TOPICAL at 20:58

## 2025-05-23 RX ADMIN — POLYETHYLENE GLYCOL 3350 17 G: 17 POWDER, FOR SOLUTION ORAL at 20:56

## 2025-05-23 RX ADMIN — LIDOCAINE 1 APPLICATION: 50 OINTMENT TOPICAL at 14:34

## 2025-05-23 RX ADMIN — INSULIN LISPRO 1 UNITS: 100 INJECTION, SOLUTION INTRAVENOUS; SUBCUTANEOUS at 18:08

## 2025-05-23 RX ADMIN — BACLOFEN 10 MG: 10 TABLET ORAL at 08:37

## 2025-05-23 RX ADMIN — PIPERACILLIN SODIUM AND TAZOBACTAM SODIUM 4.5 G: 4; .5 INJECTION, SOLUTION INTRAVENOUS at 20:56

## 2025-05-23 RX ADMIN — IPRATROPIUM BROMIDE AND ALBUTEROL SULFATE 3 ML: 2.5; .5 SOLUTION RESPIRATORY (INHALATION) at 00:00

## 2025-05-23 RX ADMIN — BACLOFEN 10 MG: 10 TABLET ORAL at 15:31

## 2025-05-23 RX ADMIN — DIAZEPAM 5 MG: 5 TABLET ORAL at 08:37

## 2025-05-23 RX ADMIN — PIPERACILLIN SODIUM AND TAZOBACTAM SODIUM 4.5 G: 4; .5 INJECTION, SOLUTION INTRAVENOUS at 14:33

## 2025-05-23 RX ADMIN — SENNOSIDES AND DOCUSATE SODIUM 2 TABLET: 50; 8.6 TABLET ORAL at 08:37

## 2025-05-23 RX ADMIN — LIDOCAINE 1 APPLICATION: 50 OINTMENT TOPICAL at 20:58

## 2025-05-23 RX ADMIN — BACLOFEN 10 MG: 10 TABLET ORAL at 20:56

## 2025-05-23 RX ADMIN — Medication 6 L/MIN: at 02:02

## 2025-05-23 RX ADMIN — ACETAMINOPHEN 650 MG: 325 TABLET ORAL at 14:32

## 2025-05-23 RX ADMIN — SUCRALFATE ORAL SUSPENSION 1 G: 1 SUSPENSION ORAL at 22:45

## 2025-05-23 RX ADMIN — IPRATROPIUM BROMIDE AND ALBUTEROL SULFATE 3 ML: 2.5; .5 SOLUTION RESPIRATORY (INHALATION) at 20:16

## 2025-05-23 RX ADMIN — ENOXAPARIN SODIUM 30 MG: 100 INJECTION SUBCUTANEOUS at 08:38

## 2025-05-23 RX ADMIN — AZITHROMYCIN DIHYDRATE 250 MG: 250 TABLET ORAL at 08:36

## 2025-05-23 RX ADMIN — ACETAMINOPHEN 650 MG: 325 TABLET ORAL at 08:35

## 2025-05-23 RX ADMIN — POTASSIUM CHLORIDE 40 MEQ: 1.5 POWDER, FOR SOLUTION ORAL at 08:35

## 2025-05-23 RX ADMIN — SUCRALFATE ORAL SUSPENSION 1 G: 1 SUSPENSION ORAL at 13:01

## 2025-05-23 RX ADMIN — SENNOSIDES AND DOCUSATE SODIUM 2 TABLET: 50; 8.6 TABLET ORAL at 20:57

## 2025-05-23 RX ADMIN — PIPERACILLIN SODIUM AND TAZOBACTAM SODIUM 4.5 G: 4; .5 INJECTION, SOLUTION INTRAVENOUS at 02:02

## 2025-05-23 RX ADMIN — MELATONIN 3 MG: 3 TAB ORAL at 20:56

## 2025-05-23 RX ADMIN — Medication 125 MCG: at 08:36

## 2025-05-23 RX ADMIN — LACTULOSE 20 G: 20 SOLUTION ORAL at 21:01

## 2025-05-23 RX ADMIN — PIPERACILLIN SODIUM AND TAZOBACTAM SODIUM 4.5 G: 4; .5 INJECTION, SOLUTION INTRAVENOUS at 08:37

## 2025-05-23 RX ADMIN — NICOTINE 1 PATCH: 21 PATCH, EXTENDED RELEASE TRANSDERMAL at 08:42

## 2025-05-23 RX ADMIN — SUCRALFATE ORAL SUSPENSION 1 G: 1 SUSPENSION ORAL at 05:10

## 2025-05-23 RX ADMIN — Medication 5 L/MIN: at 20:16

## 2025-05-23 ASSESSMENT — COGNITIVE AND FUNCTIONAL STATUS - GENERAL
MOVING FROM LYING ON BACK TO SITTING ON SIDE OF FLAT BED WITH BEDRAILS: A LOT
STANDING UP FROM CHAIR USING ARMS: A LOT
TURNING FROM BACK TO SIDE WHILE IN FLAT BAD: A LOT
MOBILITY SCORE: 10
TOILETING: TOTAL
DAILY ACTIVITIY SCORE: 7
DRESSING REGULAR LOWER BODY CLOTHING: TOTAL
DRESSING REGULAR UPPER BODY CLOTHING: TOTAL
MOVING TO AND FROM BED TO CHAIR: A LOT
PERSONAL GROOMING: A LOT
HELP NEEDED FOR BATHING: TOTAL
CLIMB 3 TO 5 STEPS WITH RAILING: TOTAL
EATING MEALS: TOTAL
WALKING IN HOSPITAL ROOM: TOTAL

## 2025-05-23 ASSESSMENT — PAIN SCALES - WONG BAKER: WONGBAKER_NUMERICALRESPONSE: NO HURT

## 2025-05-23 ASSESSMENT — PAIN SCALES - GENERAL: PAINLEVEL_OUTOF10: 0 - NO PAIN

## 2025-05-23 NOTE — CARE PLAN
The patient's goals for the shift include rest    The clinical goals for the shift include maintain safety, monitor oxygen, monitor labs    Over the shift, the patient did not make progress toward the following goals. Barriers to progression include na. Recommendations to address these barriers include na.      Problem: Pain - Adult  Goal: Verbalizes/displays adequate comfort level or baseline comfort level  Outcome: Progressing     Problem: Safety - Adult  Goal: Free from fall injury  Outcome: Progressing     Problem: Discharge Planning  Goal: Discharge to home or other facility with appropriate resources  Outcome: Progressing     Problem: Chronic Conditions and Co-morbidities  Goal: Patient's chronic conditions and co-morbidity symptoms are monitored and maintained or improved  Outcome: Progressing     Problem: Nutrition  Goal: Nutrient intake appropriate for maintaining nutritional needs  Outcome: Progressing     Problem: Skin  Goal: Decreased wound size/increased tissue granulation at next dressing change  5/23/2025 1228 by Crystal Doshi RN  Outcome: Progressing  Flowsheets (Taken 5/23/2025 1228)  Decreased wound size/increased tissue granulation at next dressing change:   Promote sleep for wound healing   Protective dressings over bony prominences   Utilize specialty bed per algorithm  5/23/2025 1228 by Crystal Doshi RN  Outcome: Progressing  Flowsheets (Taken 5/23/2025 1228)  Decreased wound size/increased tissue granulation at next dressing change:   Promote sleep for wound healing   Protective dressings over bony prominences   Utilize specialty bed per algorithm  Goal: Participates in plan/prevention/treatment measures  Outcome: Progressing  Goal: Prevent/manage excess moisture  Outcome: Progressing  Goal: Prevent/minimize sheer/friction injuries  Outcome: Progressing  Goal: Promote/optimize nutrition  Outcome: Progressing  Goal: Promote skin healing  Outcome: Progressing     Problem: Respiratory  Goal:  Clear secretions with interventions this shift  Outcome: Progressing  Goal: Minimize anxiety/maximize coping throughout shift  Outcome: Progressing  Goal: Minimal/no exertional discomfort or dyspnea this shift  Outcome: Progressing  Goal: No signs of respiratory distress (eg. Use of accessory muscles. Peds grunting)  Outcome: Progressing  Goal: Patent airway maintained this shift  Outcome: Progressing  Goal: Tolerate mechanical ventilation evidenced by VS/agitation level this shift  Outcome: Progressing  Goal: Tolerate pulmonary toileting this shift  Outcome: Progressing  Goal: Verbalize decreased shortness of breath this shift  Outcome: Progressing  Goal: Wean oxygen to maintain O2 saturation per order/standard this shift  Outcome: Progressing  Goal: Increase self care and/or family involvement in next 24 hours  Outcome: Progressing     Problem: Fall/Injury  Goal: Not fall by end of shift  Outcome: Progressing  Goal: Be free from injury by end of the shift  Outcome: Progressing  Goal: Verbalize understanding of personal risk factors for fall in the hospital  Outcome: Progressing  Goal: Verbalize understanding of risk factor reduction measures to prevent injury from fall in the home  Outcome: Progressing  Goal: Use assistive devices by end of the shift  Outcome: Progressing  Goal: Pace activities to prevent fatigue by end of the shift  Outcome: Progressing

## 2025-05-23 NOTE — NURSING NOTE
This RN discovered pt greatly retaining urine while pt was eating lunch. Pts abdomen was distended to the size of a MANDEEP avendano bladder scanned, finding 1100 in pts bladder. This RN secure messaged Dk BUNCH, who put in orders for straight cath. This RN and Nasrin RN inserted a straight cath, removing 1100 from the bladder. This RN will bladder scan pt again if no void in 6 hours or scan after pt voids. Care ongoing.

## 2025-05-23 NOTE — NURSING NOTE
Assumed care of pt. Pt lying in bed sleeping at this time. Pts Potassium 3.0, Bicarb 44, and sodium improved to 134 this AM. Dr. Mcneil put in orders for PO potassium replacement, pt has been struggling to stay awake to swallow, so this RN will evaluate if pt is able to take that this AM. RR even and unlabored on 6L NC. Care ongoing.

## 2025-05-23 NOTE — NURSING NOTE
Pt extremely drowsy unable to swallow meds, will wake with shaking, able to follow simple commands.

## 2025-05-23 NOTE — NURSING NOTE
Upon rounding right chest mediport is accessed with yesterdays date. RN states dressing was changed but not sure if needle was changed. Needle removed, intact. Port re-accessed with ease, using #22g, 3/4inch Ott needle. Brisk blood return and flushes easily, clamped and Curos caps intact. Patient tolerated well.

## 2025-05-23 NOTE — PROGRESS NOTES
Karly Horton is a 80 y.o. female on day 5 of admission presenting with Generalized weakness.    Criselda reviewed.    Subjective   Patient seen and examined.  Resting in bed in no acute distress.  Eyes closed.  Aroused.  A bit drowsy.  Oriented x 3.  Forgetful.  Short of breath.  No cough.  No pain.  Review of systems limited.    Objective     Physical Exam  Vitals and nursing note reviewed.   Constitutional:       General: She is not in acute distress.     Appearance: Normal appearance. She is normal weight. She is ill-appearing. She is not toxic-appearing or diaphoretic.   HENT:      Head: Normocephalic and atraumatic.      Right Ear: External ear normal.      Left Ear: External ear normal.      Nose: Nose normal.      Mouth/Throat:      Mouth: Mucous membranes are moist.      Pharynx: Oropharynx is clear.   Eyes:      Extraocular Movements: Extraocular movements intact.      Conjunctiva/sclera: Conjunctivae normal.      Pupils: Pupils are equal, round, and reactive to light.   Cardiovascular:      Rate and Rhythm: Regular rhythm. Tachycardia present.      Pulses: Normal pulses.      Heart sounds: Normal heart sounds. No murmur heard.  Pulmonary:      Effort: Pulmonary effort is normal. No respiratory distress.      Breath sounds: Decreased breath sounds present. No wheezing, rhonchi or rales.      Comments: 6 liters nasal cannula.  Abdominal:      General: Bowel sounds are normal. There is no distension.      Palpations: Abdomen is soft.      Tenderness: There is no abdominal tenderness. There is no guarding.   Genitourinary:     Comments: Deferred.  Musculoskeletal:         General: No swelling or tenderness. Normal range of motion.      Cervical back: Normal range of motion and neck supple.      Right lower leg: No edema.      Left lower leg: No edema.   Skin:     General: Skin is warm and dry.      Capillary Refill: Capillary refill takes less than 2 seconds.      Comments: Sacrum/buttock not examined.  "  Neurological:      General: No focal deficit present.      Mental Status: She is alert and oriented to person, place, and time.      Motor: Weakness present.      Comments: Eyes closed. Aroused. Drowsy. Oriented x 3. Forgetful. Follows all commands. Generalized weakness. No focal weakness. Gait deferred.    Psychiatric:         Mood and Affect: Mood normal.         Behavior: Behavior normal.       Last Recorded Vitals  Blood pressure 106/65, pulse 87, temperature 36.6 °C (97.9 °F), temperature source Axillary, resp. rate 18, height 1.422 m (4' 8\"), weight 47.1 kg (103 lb 13.4 oz), SpO2 100%.    Intake/Output last 3 Shifts:  I/O last 3 completed shifts:  In: 891.3 (28 mL/kg) [P.O.:100; I.V.:41.3 (1.3 mL/kg); IV Piggyback:750]  Out: 800 (25.2 mL/kg) [Urine:800 (0.7 mL/kg/hr)]  Dosing Weight: 31.8 kg     Telemetry sinus tachycardia rate 100's    Relevant Results  This patient has a central line   Reason for the central line remaining today? Parenteral medication and Parenteral nutrition    Results for orders placed or performed during the hospital encounter of 05/18/25 (from the past 24 hours)   Renal function panel   Result Value Ref Range    Glucose 95 74 - 99 mg/dL    Sodium 131 (L) 136 - 145 mmol/L    Potassium 4.4 3.5 - 5.3 mmol/L    Chloride 83 (L) 98 - 107 mmol/L    Bicarbonate 43 (HH) 21 - 32 mmol/L    Anion Gap 9 (L) 10 - 20 mmol/L    Urea Nitrogen 14 6 - 23 mg/dL    Creatinine 0.28 (L) 0.50 - 1.05 mg/dL    eGFR >90 >60 mL/min/1.73m*2    Calcium 8.5 (L) 8.6 - 10.3 mg/dL    Phosphorus 4.7 2.5 - 4.9 mg/dL    Albumin 2.6 (L) 3.4 - 5.0 g/dL   Magnesium   Result Value Ref Range    Magnesium 1.99 1.60 - 2.40 mg/dL   POCT GLUCOSE   Result Value Ref Range    POCT Glucose 99 74 - 99 mg/dL   POCT GLUCOSE   Result Value Ref Range    POCT Glucose 138 (H) 74 - 99 mg/dL   Magnesium   Result Value Ref Range    Magnesium 1.84 1.60 - 2.40 mg/dL   CBC and Auto Differential   Result Value Ref Range    WBC 8.3 4.4 - 11.3 " x10*3/uL    nRBC 0.0 0.0 - 0.0 /100 WBCs    RBC 3.72 (L) 4.00 - 5.20 x10*6/uL    Hemoglobin 11.7 (L) 12.0 - 16.0 g/dL    Hematocrit 37.5 36.0 - 46.0 %     (H) 80 - 100 fL    MCH 31.5 26.0 - 34.0 pg    MCHC 31.2 (L) 32.0 - 36.0 g/dL    RDW 14.6 (H) 11.5 - 14.5 %    Platelets 192 150 - 450 x10*3/uL    Neutrophils % 90.0 40.0 - 80.0 %    Immature Granulocytes %, Automated 1.2 (H) 0.0 - 0.9 %    Lymphocytes % 1.2 13.0 - 44.0 %    Monocytes % 7.1 2.0 - 10.0 %    Eosinophils % 0.4 0.0 - 6.0 %    Basophils % 0.1 0.0 - 2.0 %    Neutrophils Absolute 7.49 (H) 1.60 - 5.50 x10*3/uL    Immature Granulocytes Absolute, Automated 0.10 0.00 - 0.50 x10*3/uL    Lymphocytes Absolute 0.10 (L) 0.80 - 3.00 x10*3/uL    Monocytes Absolute 0.59 0.05 - 0.80 x10*3/uL    Eosinophils Absolute 0.03 0.00 - 0.40 x10*3/uL    Basophils Absolute 0.01 0.00 - 0.10 x10*3/uL   Renal function panel   Result Value Ref Range    Glucose 106 (H) 74 - 99 mg/dL    Sodium 134 (L) 136 - 145 mmol/L    Potassium 3.0 (L) 3.5 - 5.3 mmol/L    Chloride 86 (L) 98 - 107 mmol/L    Bicarbonate 44 (HH) 21 - 32 mmol/L    Anion Gap 7 (L) 10 - 20 mmol/L    Urea Nitrogen 11 6 - 23 mg/dL    Creatinine 0.27 (L) 0.50 - 1.05 mg/dL    eGFR >90 >60 mL/min/1.73m*2    Calcium 8.8 8.6 - 10.3 mg/dL    Phosphorus 3.4 2.5 - 4.9 mg/dL    Albumin 2.5 (L) 3.4 - 5.0 g/dL   POCT GLUCOSE   Result Value Ref Range    POCT Glucose 84 74 - 99 mg/dL   POCT GLUCOSE   Result Value Ref Range    POCT Glucose 140 (H) 74 - 99 mg/dL     No results found for the last 90 days.    XR chest 1 view  Result Date: 5/23/2025  Interpreted By:  Jin Schmidt, STUDY: XR CHEST 1 VIEW; ;  5/23/2025 7:57 am   INDICATION: Signs/Symptoms:acute hypoxemic respiratory failure.     COMPARISON: 05/22/2025   ACCESSION NUMBER(S): [id number]   ORDERING CLINICIAN: SILVANO ELLIOTT   TECHNIQUE: Single AP view chest   FINDINGS: Cardiac silhouette is mildly enlarged. Aorta is tortuous. There is a mild vascular calcification of  the aortic knob. Mild asymmetric elevation of the right hemidiaphragm. Increased interstitial prominence within bilateral lung fields with component of interstitial edema. More focal right perihilar and basilar infiltrate with findings as seen on the prior examination. Right-sided MediPort with tip in the region of the distal SVC.               1. Generalized increased interstitial prominence as seen on the prior examination suggesting component of interstitial edema.   2. Persistent right perihilar and basilar consolidation with mild improved aeration of the right lung base.     MACRO: None   Signed by: Jin Schmidt 5/23/2025 8:06 AM Dictation workstation:   [id number]    XR chest 1 view  Result Date: 5/22/2025  Interpreted By:  Clemente Vergara, STUDY: XR CHEST 1 VIEW; 5/22/2025 5:33 am   INDICATION: CLINICAL INFORMATION: Signs/Symptoms:acute hypoxemic respiratory failure.   COMPARISON: 05/21/2025   ACCESSION NUMBER(S): [id number]   ORDERING CLINICIAN: CLIF MENA   TECHNIQUE: Portable chest one view.   FINDINGS: The cardiac silhouette is quite prominent suggesting cardiomegaly. Patient is significantly rotated to the right. Large patchy infiltration is present within the right hemithorax. This is most marked centrally. MediPort catheter overlies the right hemithorax with the tip overlying the SVC right atrial junction. Right pleural effusion is suspected.   No definite infiltrates or effusions are appreciated on the left on the current exam.       No change in the extensive right-sided pulmonary infiltrate and right effusion.   MACRO: none   Signed by: Clemente Vergara 5/22/2025 9:56 AM Dictation workstation:   [id number]      Scheduled medications  Scheduled Medications[1]  Continuous medications  Continuous Medications[2]  PRN medications  PRN Medications[3]    ASSESSMENT:  Acute on chronic hypoxic respiratory failure   Pneumonia  Moderate right, mild to moderate left pleural effusions  Squamous cell lung  carcinoma status post chemo-radiation - Keytruda  COPD   History of tobacco use / vape use  Leukocytosis, multifactorial - resolving  Stage III/IV sacral decubitus ulcer  Pressure ulcer risk  Severe protein calorie malnutrition   Hyponatremia  Hypokalemia  Metabolic alkalosis  Normocytic anemia  Chronic pain  Spinal stenosis  Scoliosis  GERD  Vitamin d deficiency  Fall risk    PLAN:  Respiratory status, pulse oximetry stable on 6 liters nasal cannula.  Pulmonology and Infectious disease following.  Management per recommendations.  Duo neb treatments.  Oxygen.  IV antibiotics per Infectious disease.  IV Zosyn.  Labs reviewed.  Potassium replacement.  Monitor electrolytes and renal function.  General surgery input appreciated.  Patient declined evaluation of her wounds.  Wound care per wound care nursing.  As needed analgesics.  Monitor BMP, CBC.  Continue home medications as prescribed, as appropriate.  PT/OT.  Fall precautions.  Up with assistance only.  Bed and chair alarm at all times.  Pressure ulcer prevention measures.  Turn and reposition every 2 hours and as needed.  Heels off bed.  Offloading.  DVT prophylaxis.  Lovenox subcutaneous.  SCD's.  GI prophylaxis.  Protonix.  Supportive care.  Patient reassured.  Case management following for discharge planning.  Discharge plan home.  This patient requires a hospital bed for home due to shortness of breath on exertion, sacral wound, ataxia and impaired functional mobility.  She requires a hospital bed as she requires the adjustability and side rails to change positioning in bed and allow transfers in and out of bed.  Anticipate discharge home after cleared by Pulmonology and Infectious disease.     Discussed with Dr. Mcneil.    Shea Root, APRN-CNP       [1] acetaminophen, 650 mg, oral, TID  baclofen, 10 mg, oral, TID  cholecalciferol, 125 mcg, oral, Daily  collagenase, , Topical, Daily  diazePAM, 5 mg, oral, Daily  enoxaparin, 30 mg, subcutaneous,  Daily  insulin lispro, 0-5 Units, subcutaneous, TID AC  ipratropium-albuteroL, 3 mL, nebulization, q6h  lidocaine, 1 Application, Topical, TID  melatonin, 3 mg, oral, Nightly  mupirocin, 1 Application, Topical, BID  nicotine, 1 patch, transdermal, Daily   Followed by  [START ON 7/1/2025] nicotine, 1 patch, transdermal, Daily   Followed by  [START ON 7/15/2025] nicotine, 1 patch, transdermal, Daily  oxygen, , inhalation, q4h  pantoprazole, 40 mg, oral, Daily before breakfast  piperacillin-tazobactam, 4.5 g, intravenous, q6h  polyethylene glycol, 17 g, oral, TID  sennosides-docusate sodium, 2 tablet, oral, BID  sucralfate, 1 g, oral, q6h [place]  [2]    [3] PRN medications: alteplase, benzocaine-menthol, butalbital-acetaminophen-caff, dextromethorphan-guaifenesin, dextrose, dextrose, glucagon, glucagon, guaiFENesin, guaiFENesin, labetaloL, lubricating eye drops, ondansetron, polyethylene glycol, prochlorperazine, sodium hypochlorite

## 2025-05-23 NOTE — PROGRESS NOTES
Karly Horton is a 80 y.o. female on day 5 of admission presenting with Generalized weakness.      Subjective  Patient evaluated bedside with nursing present  Flushing right chest Mediport  Patient endorses cough  She remains on 6 L of oxygen  Tmax 36.9 °C    Objective        Last Recorded Vitals      5/22/2025     6:50 PM 5/22/2025    11:38 PM 5/23/2025     1:00 AM 5/23/2025     2:54 AM 5/23/2025     5:23 AM 5/23/2025     5:45 AM 5/23/2025     7:29 AM   Vitals   Systolic 144 131 138 137   118   Diastolic 86 68 67 65   75   BP Location Left arm Left arm Left arm Left arm   Left arm   Heart Rate 101      91   Temp 36.4 °C (97.5 °F) 36.7 °C (98.1 °F) 36.1 °C (97 °F) 36.1 °C (97 °F)   36.3 °C (97.3 °F)   Resp 19 19 18 18   17   Weight (lb)   115.08  115.08 103.84    BMI   25.8 kg/m2  25.8 kg/m2 23.28 kg/m2    BSA (m2)   1.44 m2  1.44 m2 1.36 m2        Imaging  Imaging  XR chest 1 view  Result Date: 5/23/2025  1. Generalized increased interstitial prominence as seen on the prior examination suggesting component of interstitial edema.   2. Persistent right perihilar and basilar consolidation with mild improved aeration of the right lung base.     MACRO: None   Signed by: Jin Schmidt 5/23/2025 8:06 AM Dictation workstation:   [id number]    XR chest 1 view  Result Date: 5/22/2025  No change in the extensive right-sided pulmonary infiltrate and right effusion.   MACRO: none   Signed by: Clemente Vergara 5/22/2025 9:56 AM Dictation workstation:   [id number]      Cardiology, Vascular, and Other Imaging  No other imaging results found for the past 2 days        Relevant Results  Results for orders placed or performed during the hospital encounter of 05/18/25 (from the past 24 hours)   Blood Gas Venous Full Panel   Result Value Ref Range    POCT pH, Venous 7.49 (H) 7.33 - 7.43 pH    POCT pCO2, Venous 69 (H) 41 - 51 mm Hg    POCT pO2, Venous 40 35 - 45 mm Hg    POCT SO2, Venous 69 45 - 75 %    POCT Oxy Hemoglobin,  Venous 67.3 45.0 - 75.0 %    POCT Hematocrit Calculated, Venous 39.0 36.0 - 46.0 %    POCT Sodium, Venous 126 (L) 136 - 145 mmol/L    POCT Potassium, Venous 4.3 3.5 - 5.3 mmol/L    POCT Chloride, Venous 85 (L) 98 - 107 mmol/L    POCT Ionized Calicum, Venous 1.14 1.10 - 1.33 mmol/L    POCT Glucose, Venous 181 (H) 74 - 99 mg/dL    POCT Lactate, Venous 1.6 0.4 - 2.0 mmol/L    POCT Base Excess, Venous 24.7 (H) -2.0 - 3.0 mmol/L    POCT HCO3 Calculated, Venous 52.6 (H) 22.0 - 26.0 mmol/L    POCT Hemoglobin, Venous 12.9 12.0 - 16.0 g/dL    POCT Anion Gap, Venous -7.0 (L) 10.0 - 25.0 mmol/L    Patient Temperature 37.0 degrees Celsius    FiO2 44 %   POCT GLUCOSE   Result Value Ref Range    POCT Glucose 178 (H) 74 - 99 mg/dL   Renal function panel   Result Value Ref Range    Glucose 95 74 - 99 mg/dL    Sodium 131 (L) 136 - 145 mmol/L    Potassium 4.4 3.5 - 5.3 mmol/L    Chloride 83 (L) 98 - 107 mmol/L    Bicarbonate 43 (HH) 21 - 32 mmol/L    Anion Gap 9 (L) 10 - 20 mmol/L    Urea Nitrogen 14 6 - 23 mg/dL    Creatinine 0.28 (L) 0.50 - 1.05 mg/dL    eGFR >90 >60 mL/min/1.73m*2    Calcium 8.5 (L) 8.6 - 10.3 mg/dL    Phosphorus 4.7 2.5 - 4.9 mg/dL    Albumin 2.6 (L) 3.4 - 5.0 g/dL   Magnesium   Result Value Ref Range    Magnesium 1.99 1.60 - 2.40 mg/dL   POCT GLUCOSE   Result Value Ref Range    POCT Glucose 99 74 - 99 mg/dL   POCT GLUCOSE   Result Value Ref Range    POCT Glucose 138 (H) 74 - 99 mg/dL   Magnesium   Result Value Ref Range    Magnesium 1.84 1.60 - 2.40 mg/dL   CBC and Auto Differential   Result Value Ref Range    WBC 8.3 4.4 - 11.3 x10*3/uL    nRBC 0.0 0.0 - 0.0 /100 WBCs    RBC 3.72 (L) 4.00 - 5.20 x10*6/uL    Hemoglobin 11.7 (L) 12.0 - 16.0 g/dL    Hematocrit 37.5 36.0 - 46.0 %     (H) 80 - 100 fL    MCH 31.5 26.0 - 34.0 pg    MCHC 31.2 (L) 32.0 - 36.0 g/dL    RDW 14.6 (H) 11.5 - 14.5 %    Platelets 192 150 - 450 x10*3/uL    Neutrophils % 90.0 40.0 - 80.0 %    Immature Granulocytes %, Automated 1.2 (H) 0.0  - 0.9 %    Lymphocytes % 1.2 13.0 - 44.0 %    Monocytes % 7.1 2.0 - 10.0 %    Eosinophils % 0.4 0.0 - 6.0 %    Basophils % 0.1 0.0 - 2.0 %    Neutrophils Absolute 7.49 (H) 1.60 - 5.50 x10*3/uL    Immature Granulocytes Absolute, Automated 0.10 0.00 - 0.50 x10*3/uL    Lymphocytes Absolute 0.10 (L) 0.80 - 3.00 x10*3/uL    Monocytes Absolute 0.59 0.05 - 0.80 x10*3/uL    Eosinophils Absolute 0.03 0.00 - 0.40 x10*3/uL    Basophils Absolute 0.01 0.00 - 0.10 x10*3/uL   Renal function panel   Result Value Ref Range    Glucose 106 (H) 74 - 99 mg/dL    Sodium 134 (L) 136 - 145 mmol/L    Potassium 3.0 (L) 3.5 - 5.3 mmol/L    Chloride 86 (L) 98 - 107 mmol/L    Bicarbonate 44 (HH) 21 - 32 mmol/L    Anion Gap 7 (L) 10 - 20 mmol/L    Urea Nitrogen 11 6 - 23 mg/dL    Creatinine 0.27 (L) 0.50 - 1.05 mg/dL    eGFR >90 >60 mL/min/1.73m*2    Calcium 8.8 8.6 - 10.3 mg/dL    Phosphorus 3.4 2.5 - 4.9 mg/dL    Albumin 2.5 (L) 3.4 - 5.0 g/dL   POCT GLUCOSE   Result Value Ref Range    POCT Glucose 84 74 - 99 mg/dL       Physical Exam  Constitutional:       Appearance: Normal appearance.   HENT:      Head: Normocephalic and atraumatic.      Nose: Nose normal.   Eyes:      Extraocular Movements: Extraocular movements intact.      Conjunctiva/sclera: Conjunctivae normal.   Cardiovascular:      Rate and Rhythm: Tachycardia present.  Right chest Mediport  Pulmonary:      Effort: Coarse lung sounds  Abdominal:      General: There is no distension.   Musculoskeletal:      Cervical back: Normal range of motion.   Skin:     General: Skin is warm and dry.      Comments: Wounds examined-Stage IV sacral ulcer with mild to moderate fat necrosis, stage III right buttock pressure, stage II left buttock ulcer -intact dressing  Neurological:      General: No focal deficit present.      Mental Status: She is alert.   Psychiatric:         Mood and Affect: Mood normal.     No results found for the last 90 days.    mupirocin - 2 %, 2 %  piperacillin-tazobactam -  4.5 gram/100 mL        Assessment/Plan  Acute hypoxic respiratory failure,  resolving, on 6LNC   Squamous cell lung cancer status post chemoradiation, on Keytruda  Leukocytosis, multifactorial-resolving  History of COPD  Stage III/IV sacral decubitus ulcer  Severe malnutrition-prealbumin 6.3     Continue Zosyn  Continue azithromycin  Mycoplasma IgM negative  Galactomannan and Fungitell negative  Follow-up histoplasma antigen  Supportive care  Local care of sacral ulcer  Offloading  Oxygen as needed  Monitor temperature and WBC  Further recommendations based on pending workup     Discussed with Dr. Yen     This is a complex infectious disease issue and the following was performed today (for more details please see the above note): Management decisions reflecting the added complexity (e.g., changes in antimicrobial therapy, infection control strategies).

## 2025-05-23 NOTE — PROGRESS NOTES
Pulmonary Medicine Progress Note    Admitted on:     5/18/2025  Length of Stay: 5 day(s)     Interval History     79-year-old female with a history of squamous cell lung carcinoma (Stage 3B, with a T4 classification (due to multiple nodules in ipsilateral lung lobes) and N2 (due to subcarinal lymph node involvement).), heart failure with preserved ejection fraction (HFpEF), and chronic obstructive pulmonary disease (COPD), who was admitted on 5/18/2025 with worsening dypnea on exertion.     Cancer treatment so far includes systemic chemotherapy with carboplatin, paclitaxel and pembro a1bikfo x 4 (11/12/24 - 1/3/25), hypofractionated RT (completed 3/31/25) amd consolidation immunotherapy with pembro (i1lnzzo last dose on 4/11/25).     Patient presented to the ED after EMS transport with complaints of progressive shortness of breath (SOB) for several weeks, acutely worsening in the past few days, and failure to thrive. She was admitted five weeks ago for a similar presentation and treated for a COPD exacerbation, before being discharged home on 2L nasal cannula (NC) oxygen at her request.    Currently, she reports worsening SOB at rest and has become bedbound. She states this episode feels more severe than previous COPD exacerbations. On evaluation in the ED, she was found to have acute-on-chronic hyponatremia, leukocytosis, elevated CRP (14), and a chest X-ray showing increased patchy infiltrates, more pronounced in the right lung. A CT scan showed gas in a sacral decubitus ulcer.    Patient became acutely hypoxemic on the RNF, requiring high-flow nasal cannula (HFNC) oxygen. ABG: PH of 7.51, pCO2 of 55, pO2 of 56, and a lactate level of 2.4.      Pulmonary consulted by ICU Team Dr Spring to assist with management.    Cancer treatment history:  C#1 chemo started on 11/12/24. Tolerated first dose very well. Mild N, no Diarrhea or constipation or vomiting  C#2 chemo given on 12/2/24. Diarrhea mild which has resolved  with Imodium. Hair loss. Otherwise no N/V. She has much better appetite  12/16/24: There was a solitary noncalcified nodule in LLL, biopsy was recommended to rule out second primary or contralateral lung metastases. Lung biopsy was scheduled for 12/16/24, but it could not be done because the nodule could not be seen, so procedure is cancelled  01/13/25: C#4 chemoIO planned today. Tolerating very well. No N/V/D/C. Will start RT soon.   01/27/25: MRI brain: TASHA. PET CT is TASHA.   03/31/25: completed consolidation RT  04/011/25: consolidation Keytruda q 6 weeks is started. Restaging PET CT is planned for 06/09/25.    5/21: Doing very well, currently on 3L nasal oxygen  Net output -2.2 L since admission    5/23: Currently on 4L nasal oxygen, appears comfortable.     Objective   Objective     Vitals:    05/23/25 0545   Weight: 47.1 kg (103 lb 13.4 oz)   Body mass index is 23.28 kg/m².        5/22/2025    11:38 PM 5/23/2025     1:00 AM 5/23/2025     2:54 AM 5/23/2025     5:23 AM 5/23/2025     5:45 AM 5/23/2025     7:29 AM 5/23/2025    11:18 AM   Vitals   Systolic 131 138 137   118 106   Diastolic 68 67 65   75 65   BP Location Left arm Left arm Left arm   Left arm Right arm   Heart Rate      91 87   Temp 36.7 °C (98.1 °F) 36.1 °C (97 °F) 36.1 °C (97 °F)   36.3 °C (97.3 °F) 36.6 °C (97.9 °F)   Resp 19 18 18   17 18   Weight (lb)  115.08  115.08 103.84     BMI  25.8 kg/m2  25.8 kg/m2 23.28 kg/m2     BSA (m2)  1.44 m2  1.44 m2 1.36 m2          Vent settings:       Intake/Output Summary (Last 24 hours) at 5/23/2025 1351  Last data filed at 5/23/2025 0500  Gross per 24 hour   Intake 441.25 ml   Output --   Net 441.25 ml       Physical Exam  Constitutional:       Comments: Frail, cachectic    Eyes:      Pupils: PERRL  Cardiovascular:      Rate and Rhythm: normal  Pulmonary:      Effort: No resp distress     Breath sounds: No stridor. No wheezing, rhonchi or rales.   Abdominal:      General: Abdomen is flat. There is no  distension.      Palpations: Abdomen is soft.      Tenderness: There is no abdominal tenderness.   Musculoskeletal:      Right lower leg: No edema.      Left lower leg: No edema.   Neurological:      Mental Status: She is alert and oriented to person, place, and time. Mental status is at baseline.     Medications     Scheduled Medications:   Scheduled Medications[1]   Continuous Medications:   Continuous Medications[2]   PRN Medications:     Labs     Results from last 72 hours   Lab Units 05/23/25  0439 05/22/25  1615 05/22/25  0333   GLUCOSE mg/dL 106* 95 140*   SODIUM mmol/L 134* 131* 133*   POTASSIUM mmol/L 3.0* 4.4 2.9*   CHLORIDE mmol/L 86* 83* 79*   CO2 mmol/L 44* 43* >45*   BUN mg/dL 11 14 8   CREATININE mg/dL 0.27* 0.28* 0.32*   EGFR mL/min/1.73m*2 >90 >90 >90   CALCIUM mg/dL 8.8 8.5* 8.6   ALBUMIN g/dL 2.5* 2.6* 2.9*   MAGNESIUM mg/dL 1.84 1.99 1.43*   PHOSPHORUS mg/dL 3.4 4.7 1.4*                   Results from last 72 hours   Lab Units 05/23/25  0439 05/22/25  0333 05/21/25  0923   WBC AUTO x10*3/uL 8.3 11.6* 12.0*   NRBC AUTO /100 WBCs 0.0 0.0 0.0   RBC AUTO x10*6/uL 3.72* 4.04 3.47*   HEMOGLOBIN g/dL 11.7* 12.5 10.7*   HEMATOCRIT % 37.5 39.5 33.6*   MCV fL 101* 98 97   MCH pg 31.5 30.9 30.8   MCHC g/dL 31.2* 31.6* 31.8*   RDW % 14.6* 14.5 14.6*   PLATELETS AUTO x10*3/uL 192 259 231     Results from last 72 hours   Lab Units 05/22/25  1101   FIO2 % 44     Lab Results   Component Value Date    BLOODCULT No growth at 4 days -  FINAL REPORT 05/18/2025    BLOODCULT No growth at 4 days -  FINAL REPORT 05/18/2025    GRAMSTAIN (A) 04/30/2025     Gram stain indicates specimen contains significant salivary contamination.     Lab Results   Component Value Date    URINECULTURE >100,000 Escherichia coli (A) 10/11/2024       Imaging and Diagnostic Studies     Lab/Radiology/Diagnostic Review:  Results for orders placed or performed during the hospital encounter of 05/18/25 (from the past 24 hours)   Renal function  panel   Result Value Ref Range    Glucose 95 74 - 99 mg/dL    Sodium 131 (L) 136 - 145 mmol/L    Potassium 4.4 3.5 - 5.3 mmol/L    Chloride 83 (L) 98 - 107 mmol/L    Bicarbonate 43 (HH) 21 - 32 mmol/L    Anion Gap 9 (L) 10 - 20 mmol/L    Urea Nitrogen 14 6 - 23 mg/dL    Creatinine 0.28 (L) 0.50 - 1.05 mg/dL    eGFR >90 >60 mL/min/1.73m*2    Calcium 8.5 (L) 8.6 - 10.3 mg/dL    Phosphorus 4.7 2.5 - 4.9 mg/dL    Albumin 2.6 (L) 3.4 - 5.0 g/dL   Magnesium   Result Value Ref Range    Magnesium 1.99 1.60 - 2.40 mg/dL   POCT GLUCOSE   Result Value Ref Range    POCT Glucose 99 74 - 99 mg/dL   POCT GLUCOSE   Result Value Ref Range    POCT Glucose 138 (H) 74 - 99 mg/dL   Magnesium   Result Value Ref Range    Magnesium 1.84 1.60 - 2.40 mg/dL   CBC and Auto Differential   Result Value Ref Range    WBC 8.3 4.4 - 11.3 x10*3/uL    nRBC 0.0 0.0 - 0.0 /100 WBCs    RBC 3.72 (L) 4.00 - 5.20 x10*6/uL    Hemoglobin 11.7 (L) 12.0 - 16.0 g/dL    Hematocrit 37.5 36.0 - 46.0 %     (H) 80 - 100 fL    MCH 31.5 26.0 - 34.0 pg    MCHC 31.2 (L) 32.0 - 36.0 g/dL    RDW 14.6 (H) 11.5 - 14.5 %    Platelets 192 150 - 450 x10*3/uL    Neutrophils % 90.0 40.0 - 80.0 %    Immature Granulocytes %, Automated 1.2 (H) 0.0 - 0.9 %    Lymphocytes % 1.2 13.0 - 44.0 %    Monocytes % 7.1 2.0 - 10.0 %    Eosinophils % 0.4 0.0 - 6.0 %    Basophils % 0.1 0.0 - 2.0 %    Neutrophils Absolute 7.49 (H) 1.60 - 5.50 x10*3/uL    Immature Granulocytes Absolute, Automated 0.10 0.00 - 0.50 x10*3/uL    Lymphocytes Absolute 0.10 (L) 0.80 - 3.00 x10*3/uL    Monocytes Absolute 0.59 0.05 - 0.80 x10*3/uL    Eosinophils Absolute 0.03 0.00 - 0.40 x10*3/uL    Basophils Absolute 0.01 0.00 - 0.10 x10*3/uL   Renal function panel   Result Value Ref Range    Glucose 106 (H) 74 - 99 mg/dL    Sodium 134 (L) 136 - 145 mmol/L    Potassium 3.0 (L) 3.5 - 5.3 mmol/L    Chloride 86 (L) 98 - 107 mmol/L    Bicarbonate 44 (HH) 21 - 32 mmol/L    Anion Gap 7 (L) 10 - 20 mmol/L    Urea Nitrogen  11 6 - 23 mg/dL    Creatinine 0.27 (L) 0.50 - 1.05 mg/dL    eGFR >90 >60 mL/min/1.73m*2    Calcium 8.8 8.6 - 10.3 mg/dL    Phosphorus 3.4 2.5 - 4.9 mg/dL    Albumin 2.5 (L) 3.4 - 5.0 g/dL   POCT GLUCOSE   Result Value Ref Range    POCT Glucose 84 74 - 99 mg/dL   POCT GLUCOSE   Result Value Ref Range    POCT Glucose 140 (H) 74 - 99 mg/dL     Imaging  XR chest 1 view  Result Date: 5/23/2025  1. Generalized increased interstitial prominence as seen on the prior examination suggesting component of interstitial edema.   2. Persistent right perihilar and basilar consolidation with mild improved aeration of the right lung base.     MACRO: None   Signed by: Jin Schmidt 5/23/2025 8:06 AM Dictation workstation:   [id number]    XR chest 1 view  Result Date: 5/22/2025  No change in the extensive right-sided pulmonary infiltrate and right effusion.   MACRO: none   Signed by: Clemente Vergara 5/22/2025 9:56 AM Dictation workstation:   [id number]    XR chest 1 view  Result Date: 5/21/2025  Right perihilar and basilar infiltrate consistent with pneumonia, unchanged compared to the prior study. Pulmonary vascular congestion is also suspected along with small bilateral effusions, unchanged.   MACRO: none   Signed by: Clemente Vergara 5/21/2025 8:03 AM Dictation workstation:   [id number]    CT chest wo IV contrast  Result Date: 5/20/2025  1. Right lower lobar collapse with air bronchograms and endobronchial opacification of the right lower lobe and bronchus intermedius. Correlate with aspiration pneumonias a potential etiology. Of note, patchy consolidation also demonstrated in the right middle lobe and in particular posterior segment of the right upper lobe as well as left upper lobe. Findings suggest multilobar pneumonia. 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   2. Moderate right and mild-to-moderate left pleural effusions.     MACRO: None    Signed by: Jin Schmidt 5/20/2025 11:56 AM Dictation workstation:   [id number]    XR chest 1 view  Result Date: 5/20/2025  Improved interstitial prominence with stable small left pleural effusion.   Worsening infiltrate with airspace consolidation now seen in the right upper lobe with right pleural effusion increased in size.   Signed by: Hanane Fine 5/20/2025 7:53 AM Dictation workstation:   [id number]    CT head wo IV contrast  Result Date: 5/20/2025  No acute intracranial abnormality.     MACRO: None   Signed by: Kanika Avina 5/20/2025 12:31 AM Dictation workstation:   [id number]    XR chest 1 view  Result Date: 5/18/2025  There is patchy airspace opacity in the right mid and lower lung concerning for developing multifocal pneumonia. Continued radiographic follow-up to resolution is advised.   Cardiomegaly with mild interstitial prominence suggestive of developing interstitial edema/CHF.   Bilateral pleural effusions are better seen on concurrent CT.   MACRO: None   Signed by: Lenka Cartagena 5/18/2025 7:06 PM Dictation workstation:   [id number]    CT abdomen pelvis wo IV contrast  Result Date: 5/18/2025  There is soft tissue defect overlying the distal sacrum and coccyx with mild soft tissue stranding. A tiny locule of air is present in the right gluteal subcutaneous fat, without evidence for discrete drainable fluid collection. Findings concerning for sacral decubitus ulcer. Correlate with exam.   Moderate bilateral pleural effusions. There are patchy airspace opacities and consolidation in the right lung concerning for developing multifocal pneumonia. Clinical correlation continued radiographic follow-up to resolution is advised.   Marked distention of the urinary bladder. Correlate with symptomatology to exclude urinary retention.   Diffuse body wall edema.   Additional findings as described above.   MACRO: None   Signed by: Lenka Cartagena 5/18/2025 6:31 PM Dictation workstation:    [id number]      Cardiology, Vascular, and Other Imaging  No other imaging results found for the past 7 days         Assessment / Plan       PROBLEM LIST:  - Acute HYPOXIC respiratory failure sec to multifocal pneumonia. Aspiration PNA, high on the differential given retained food/debris in the proximal esophagus. Immune mediated pneumonitis less likely given lobar distribution of consolidative opacities.  - Immunocompromised state  - Bilateral pleural effusions  - Appears euvolemic on exam, but left sided heart failure (HFPEF) may be contributing     MANAGEMENT PLAN:  - Echo  - Thoracentesis on the rt side (ICU team vs IR). Given symptomatic and clinical improvement with lasix and antibiotics, can defer.   - Resp culture  - Appreciate ID recs  - Empiric Abx for now (Zosyn, MRSA neg)  - 3% saline nebs  - Titrate to SpO2> 92%  - Patient on 2L nasal oxygen at home, likely will need oxygen titration prior to discharge     I have personally interviewed and examined the patient.  I have personally verified elements of the exam listed above. Interval changes or irregularities are as noted.  I have personally and independently reviewed laboratory, radiographic and procedural data.  I have personally reviewed the problem list above and concur. Changes, if any, are noted.  I have personally reviewed the plan list above and concur. Changes, if any, are noted.           Landen Natarajan MD        [1] acetaminophen, 650 mg, oral, TID  baclofen, 10 mg, oral, TID  cholecalciferol, 125 mcg, oral, Daily  collagenase, , Topical, Daily  diazePAM, 5 mg, oral, Daily  enoxaparin, 30 mg, subcutaneous, Daily  insulin lispro, 0-5 Units, subcutaneous, TID AC  ipratropium-albuteroL, 3 mL, nebulization, q6h  lidocaine, 1 Application, Topical, TID  melatonin, 3 mg, oral, Nightly  mupirocin, 1 Application, Topical, BID  nicotine, 1 patch, transdermal, Daily   Followed by  [START ON 7/1/2025] nicotine, 1 patch, transdermal, Daily    Followed by  [START ON 7/15/2025] nicotine, 1 patch, transdermal, Daily  oxygen, , inhalation, q4h  pantoprazole, 40 mg, oral, Daily before breakfast  piperacillin-tazobactam, 4.5 g, intravenous, q6h  polyethylene glycol, 17 g, oral, TID  sennosides-docusate sodium, 2 tablet, oral, BID  sucralfate, 1 g, oral, q6h [place]     [2]

## 2025-05-23 NOTE — PROGRESS NOTES
Pt transferred to . Per previous TCC note plan is for pt to return home and resume services with [facility], external referral already input.     Family requesting hospital bed order and deliver for pt prior to dc. Lacey Lomax stating a script and progress note needed to initiate the order - this was relayed to attending CNP. Pt cannot dc until it is confirmed by family that the hospital bed has been delivered.      05/23/25 1026   Discharge Planning   Expected Discharge Disposition Home Health  ([facility])

## 2025-05-23 NOTE — CARE PLAN
The patient's goals for the shift include rest    The clinical goals for the shift include maintain safety    Over the shift, the patient did not make progress toward the following goals. Barriers to progression include worsening of respiratory status. Recommendations to address these barriers include monitor respiratory status and oxygenation.

## 2025-05-24 ENCOUNTER — APPOINTMENT (OUTPATIENT)
Dept: RADIOLOGY | Facility: HOSPITAL | Age: 80
DRG: 177 | End: 2025-05-24
Payer: MEDICARE

## 2025-05-24 LAB
ALBUMIN SERPL BCP-MCNC: 2.3 G/DL (ref 3.4–5)
ANION GAP SERPL CALCULATED.3IONS-SCNC: <7 MMOL/L (ref 10–20)
BASOPHILS # BLD AUTO: 0.01 X10*3/UL (ref 0–0.1)
BASOPHILS NFR BLD AUTO: 0.2 %
BUN SERPL-MCNC: 22 MG/DL (ref 6–23)
CALCIUM SERPL-MCNC: 8.9 MG/DL (ref 8.6–10.3)
CHLORIDE SERPL-SCNC: 87 MMOL/L (ref 98–107)
CO2 SERPL-SCNC: 43 MMOL/L (ref 21–32)
CREAT SERPL-MCNC: 0.3 MG/DL (ref 0.5–1.05)
EGFRCR SERPLBLD CKD-EPI 2021: >90 ML/MIN/1.73M*2
EOSINOPHIL # BLD AUTO: 0.04 X10*3/UL (ref 0–0.4)
EOSINOPHIL NFR BLD AUTO: 0.6 %
ERYTHROCYTE [DISTWIDTH] IN BLOOD BY AUTOMATED COUNT: 14.3 % (ref 11.5–14.5)
GLUCOSE BLD MANUAL STRIP-MCNC: 119 MG/DL (ref 74–99)
GLUCOSE BLD MANUAL STRIP-MCNC: 145 MG/DL (ref 74–99)
GLUCOSE BLD MANUAL STRIP-MCNC: 149 MG/DL (ref 74–99)
GLUCOSE BLD MANUAL STRIP-MCNC: 159 MG/DL (ref 74–99)
GLUCOSE SERPL-MCNC: 151 MG/DL (ref 74–99)
HCT VFR BLD AUTO: 32.5 % (ref 36–46)
HGB BLD-MCNC: 10.1 G/DL (ref 12–16)
IMM GRANULOCYTES # BLD AUTO: 0.09 X10*3/UL (ref 0–0.5)
IMM GRANULOCYTES NFR BLD AUTO: 1.4 % (ref 0–0.9)
LYMPHOCYTES # BLD AUTO: 0.13 X10*3/UL (ref 0.8–3)
LYMPHOCYTES NFR BLD AUTO: 2 %
MAGNESIUM SERPL-MCNC: 1.49 MG/DL (ref 1.6–2.4)
MCH RBC QN AUTO: 31.3 PG (ref 26–34)
MCHC RBC AUTO-ENTMCNC: 31.1 G/DL (ref 32–36)
MCV RBC AUTO: 101 FL (ref 80–100)
MONOCYTES # BLD AUTO: 0.63 X10*3/UL (ref 0.05–0.8)
MONOCYTES NFR BLD AUTO: 9.7 %
NEUTROPHILS # BLD AUTO: 5.62 X10*3/UL (ref 1.6–5.5)
NEUTROPHILS NFR BLD AUTO: 86.1 %
NRBC BLD-RTO: 0 /100 WBCS (ref 0–0)
PHOSPHATE SERPL-MCNC: 2.5 MG/DL (ref 2.5–4.9)
PLATELET # BLD AUTO: 182 X10*3/UL (ref 150–450)
POTASSIUM SERPL-SCNC: 2.9 MMOL/L (ref 3.5–5.3)
RBC # BLD AUTO: 3.23 X10*6/UL (ref 4–5.2)
SODIUM SERPL-SCNC: 133 MMOL/L (ref 136–145)
WBC # BLD AUTO: 6.5 X10*3/UL (ref 4.4–11.3)

## 2025-05-24 PROCEDURE — 2500000005 HC RX 250 GENERAL PHARMACY W/O HCPCS

## 2025-05-24 PROCEDURE — 2500000002 HC RX 250 W HCPCS SELF ADMINISTERED DRUGS (ALT 637 FOR MEDICARE OP, ALT 636 FOR OP/ED)

## 2025-05-24 PROCEDURE — 2500000001 HC RX 250 WO HCPCS SELF ADMINISTERED DRUGS (ALT 637 FOR MEDICARE OP)

## 2025-05-24 PROCEDURE — 85025 COMPLETE CBC W/AUTO DIFF WBC: CPT

## 2025-05-24 PROCEDURE — 71045 X-RAY EXAM CHEST 1 VIEW: CPT

## 2025-05-24 PROCEDURE — 80069 RENAL FUNCTION PANEL: CPT

## 2025-05-24 PROCEDURE — 2500000004 HC RX 250 GENERAL PHARMACY W/ HCPCS (ALT 636 FOR OP/ED): Mod: JZ

## 2025-05-24 PROCEDURE — 94640 AIRWAY INHALATION TREATMENT: CPT

## 2025-05-24 PROCEDURE — 83735 ASSAY OF MAGNESIUM: CPT

## 2025-05-24 PROCEDURE — 2500000005 HC RX 250 GENERAL PHARMACY W/O HCPCS: Performed by: INTERNAL MEDICINE

## 2025-05-24 PROCEDURE — 2060000001 HC INTERMEDIATE ICU ROOM DAILY

## 2025-05-24 PROCEDURE — 82947 ASSAY GLUCOSE BLOOD QUANT: CPT

## 2025-05-24 PROCEDURE — 99233 SBSQ HOSP IP/OBS HIGH 50: CPT | Performed by: STUDENT IN AN ORGANIZED HEALTH CARE EDUCATION/TRAINING PROGRAM

## 2025-05-24 PROCEDURE — 2500000002 HC RX 250 W HCPCS SELF ADMINISTERED DRUGS (ALT 637 FOR MEDICARE OP, ALT 636 FOR OP/ED): Performed by: INTERNAL MEDICINE

## 2025-05-24 PROCEDURE — 94668 MNPJ CHEST WALL SBSQ: CPT

## 2025-05-24 PROCEDURE — 71045 X-RAY EXAM CHEST 1 VIEW: CPT | Performed by: RADIOLOGY

## 2025-05-24 PROCEDURE — 9420000001 HC RT PATIENT EDUCATION 5 MIN

## 2025-05-24 PROCEDURE — S4991 NICOTINE PATCH NONLEGEND: HCPCS

## 2025-05-24 PROCEDURE — 2500000004 HC RX 250 GENERAL PHARMACY W/ HCPCS (ALT 636 FOR OP/ED): Mod: JZ | Performed by: STUDENT IN AN ORGANIZED HEALTH CARE EDUCATION/TRAINING PROGRAM

## 2025-05-24 PROCEDURE — 2500000004 HC RX 250 GENERAL PHARMACY W/ HCPCS (ALT 636 FOR OP/ED): Mod: JZ | Performed by: INTERNAL MEDICINE

## 2025-05-24 RX ORDER — POTASSIUM CHLORIDE 29.8 MG/ML
40 INJECTION INTRAVENOUS ONCE
Status: COMPLETED | OUTPATIENT
Start: 2025-05-24 | End: 2025-05-24

## 2025-05-24 RX ORDER — IPRATROPIUM BROMIDE AND ALBUTEROL SULFATE 2.5; .5 MG/3ML; MG/3ML
3 SOLUTION RESPIRATORY (INHALATION)
Status: DISCONTINUED | OUTPATIENT
Start: 2025-05-24 | End: 2025-05-26

## 2025-05-24 RX ORDER — MAGNESIUM SULFATE HEPTAHYDRATE 40 MG/ML
4 INJECTION, SOLUTION INTRAVENOUS ONCE
Status: DISCONTINUED | OUTPATIENT
Start: 2025-05-24 | End: 2025-05-24

## 2025-05-24 RX ORDER — IPRATROPIUM BROMIDE AND ALBUTEROL SULFATE 2.5; .5 MG/3ML; MG/3ML
3 SOLUTION RESPIRATORY (INHALATION) EVERY 2 HOUR PRN
Status: DISCONTINUED | OUTPATIENT
Start: 2025-05-24 | End: 2025-06-02 | Stop reason: HOSPADM

## 2025-05-24 RX ORDER — MAGNESIUM SULFATE HEPTAHYDRATE 40 MG/ML
2 INJECTION, SOLUTION INTRAVENOUS ONCE
Status: COMPLETED | OUTPATIENT
Start: 2025-05-24 | End: 2025-05-24

## 2025-05-24 RX ADMIN — BACLOFEN 10 MG: 10 TABLET ORAL at 08:15

## 2025-05-24 RX ADMIN — SUCRALFATE ORAL SUSPENSION 1 G: 1 SUSPENSION ORAL at 13:07

## 2025-05-24 RX ADMIN — Medication 125 MCG: at 08:15

## 2025-05-24 RX ADMIN — ACETAMINOPHEN 650 MG: 325 TABLET ORAL at 15:32

## 2025-05-24 RX ADMIN — LIDOCAINE 1 APPLICATION: 50 OINTMENT TOPICAL at 20:25

## 2025-05-24 RX ADMIN — POTASSIUM CHLORIDE 40 MEQ: 29.8 INJECTION, SOLUTION INTRAVENOUS at 08:54

## 2025-05-24 RX ADMIN — Medication 3 L/MIN: at 17:15

## 2025-05-24 RX ADMIN — NICOTINE 1 PATCH: 21 PATCH, EXTENDED RELEASE TRANSDERMAL at 08:16

## 2025-05-24 RX ADMIN — PIPERACILLIN SODIUM AND TAZOBACTAM SODIUM 4.5 G: 4; .5 INJECTION, SOLUTION INTRAVENOUS at 20:25

## 2025-05-24 RX ADMIN — DIAZEPAM 5 MG: 5 TABLET ORAL at 08:15

## 2025-05-24 RX ADMIN — Medication 3 L/MIN: at 19:30

## 2025-05-24 RX ADMIN — MUPIROCIN 1 APPLICATION: 20 OINTMENT TOPICAL at 20:25

## 2025-05-24 RX ADMIN — ENOXAPARIN SODIUM 30 MG: 100 INJECTION SUBCUTANEOUS at 08:14

## 2025-05-24 RX ADMIN — POTASSIUM CHLORIDE 40 MEQ: 29.8 INJECTION, SOLUTION INTRAVENOUS at 13:07

## 2025-05-24 RX ADMIN — MAGNESIUM SULFATE IN WATER 2 G: 40 INJECTION, SOLUTION INTRAVENOUS at 21:25

## 2025-05-24 RX ADMIN — ACETAMINOPHEN 650 MG: 325 TABLET ORAL at 08:14

## 2025-05-24 RX ADMIN — LIDOCAINE 1 APPLICATION: 50 OINTMENT TOPICAL at 15:36

## 2025-05-24 RX ADMIN — MELATONIN 3 MG: 3 TAB ORAL at 20:25

## 2025-05-24 RX ADMIN — PIPERACILLIN SODIUM AND TAZOBACTAM SODIUM 4.5 G: 4; .5 INJECTION, SOLUTION INTRAVENOUS at 15:32

## 2025-05-24 RX ADMIN — SUCRALFATE ORAL SUSPENSION 1 G: 1 SUSPENSION ORAL at 05:50

## 2025-05-24 RX ADMIN — PANTOPRAZOLE SODIUM 40 MG: 40 TABLET, DELAYED RELEASE ORAL at 05:50

## 2025-05-24 RX ADMIN — SENNOSIDES AND DOCUSATE SODIUM 2 TABLET: 50; 8.6 TABLET ORAL at 20:25

## 2025-05-24 RX ADMIN — IPRATROPIUM BROMIDE AND ALBUTEROL SULFATE 3 ML: 2.5; .5 SOLUTION RESPIRATORY (INHALATION) at 11:22

## 2025-05-24 RX ADMIN — IPRATROPIUM BROMIDE AND ALBUTEROL SULFATE 3 ML: 2.5; .5 SOLUTION RESPIRATORY (INHALATION) at 07:28

## 2025-05-24 RX ADMIN — LIDOCAINE 1 APPLICATION: 50 OINTMENT TOPICAL at 08:16

## 2025-05-24 RX ADMIN — Medication 5 L/MIN: at 02:47

## 2025-05-24 RX ADMIN — INSULIN LISPRO 1 UNITS: 100 INJECTION, SOLUTION INTRAVENOUS; SUBCUTANEOUS at 08:12

## 2025-05-24 RX ADMIN — Medication 3 L/MIN: at 11:25

## 2025-05-24 RX ADMIN — Medication 5 L/MIN: at 00:17

## 2025-05-24 RX ADMIN — IPRATROPIUM BROMIDE AND ALBUTEROL SULFATE 3 ML: 2.5; .5 SOLUTION RESPIRATORY (INHALATION) at 17:32

## 2025-05-24 RX ADMIN — PIPERACILLIN SODIUM AND TAZOBACTAM SODIUM 4.5 G: 4; .5 INJECTION, SOLUTION INTRAVENOUS at 08:13

## 2025-05-24 RX ADMIN — IPRATROPIUM BROMIDE AND ALBUTEROL SULFATE 3 ML: 2.5; .5 SOLUTION RESPIRATORY (INHALATION) at 00:17

## 2025-05-24 RX ADMIN — PIPERACILLIN SODIUM AND TAZOBACTAM SODIUM 4.5 G: 4; .5 INJECTION, SOLUTION INTRAVENOUS at 02:12

## 2025-05-24 RX ADMIN — MUPIROCIN 1 APPLICATION: 20 OINTMENT TOPICAL at 08:17

## 2025-05-24 RX ADMIN — IPRATROPIUM BROMIDE AND ALBUTEROL SULFATE 3 ML: 2.5; .5 SOLUTION RESPIRATORY (INHALATION) at 19:30

## 2025-05-24 RX ADMIN — COLLAGENASE SANTYL: 250 OINTMENT TOPICAL at 08:17

## 2025-05-24 RX ADMIN — BACLOFEN 10 MG: 10 TABLET ORAL at 15:32

## 2025-05-24 RX ADMIN — DAKIN'S SOLUTION 0.125% (QUARTER STRENGTH): 0.12 SOLUTION at 08:16

## 2025-05-24 RX ADMIN — BACLOFEN 10 MG: 10 TABLET ORAL at 20:25

## 2025-05-24 RX ADMIN — Medication 4 L/MIN: at 07:29

## 2025-05-24 RX ADMIN — ACETAMINOPHEN 650 MG: 325 TABLET ORAL at 20:25

## 2025-05-24 RX ADMIN — SUCRALFATE ORAL SUSPENSION 1 G: 1 SUSPENSION ORAL at 17:26

## 2025-05-24 ASSESSMENT — COGNITIVE AND FUNCTIONAL STATUS - GENERAL
TOILETING: A LOT
CLIMB 3 TO 5 STEPS WITH RAILING: TOTAL
DRESSING REGULAR LOWER BODY CLOTHING: A LOT
TURNING FROM BACK TO SIDE WHILE IN FLAT BAD: A LOT
MOVING FROM LYING ON BACK TO SITTING ON SIDE OF FLAT BED WITH BEDRAILS: A LITTLE
WALKING IN HOSPITAL ROOM: TOTAL
STANDING UP FROM CHAIR USING ARMS: TOTAL
MOVING TO AND FROM BED TO CHAIR: A LOT
DAILY ACTIVITIY SCORE: 14
PERSONAL GROOMING: A LITTLE
DRESSING REGULAR UPPER BODY CLOTHING: A LOT
EATING MEALS: A LITTLE
HELP NEEDED FOR BATHING: A LOT
MOBILITY SCORE: 10

## 2025-05-24 ASSESSMENT — PAIN SCALES - GENERAL
PAINLEVEL_OUTOF10: 0 - NO PAIN

## 2025-05-24 ASSESSMENT — PAIN SCALES - WONG BAKER: WONGBAKER_NUMERICALRESPONSE: NO HURT

## 2025-05-24 ASSESSMENT — PAIN - FUNCTIONAL ASSESSMENT: PAIN_FUNCTIONAL_ASSESSMENT: 0-10

## 2025-05-24 NOTE — PROGRESS NOTES
Pulmonary Medicine Progress Note    Admitted on:     5/18/2025  Length of Stay: 6 day(s)     Interval History     79-year-old female with a history of squamous cell lung carcinoma (Stage 3B, with a T4 classification (due to multiple nodules in ipsilateral lung lobes) and N2 (due to subcarinal lymph node involvement).), heart failure with preserved ejection fraction (HFpEF), and chronic obstructive pulmonary disease (COPD), who was admitted on 5/18/2025 with worsening dypnea on exertion.     Cancer treatment so far includes systemic chemotherapy with carboplatin, paclitaxel and pembro s6xuxml x 4 (11/12/24 - 1/3/25), hypofractionated RT (completed 3/31/25) amd consolidation immunotherapy with pembro (w1zouny last dose on 4/11/25).     Patient presented to the ED after EMS transport with complaints of progressive shortness of breath (SOB) for several weeks, acutely worsening in the past few days, and failure to thrive. She was admitted five weeks ago for a similar presentation and treated for a COPD exacerbation, before being discharged home on 2L nasal cannula (NC) oxygen at her request.    Currently, she reports worsening SOB at rest and has become bedbound. She states this episode feels more severe than previous COPD exacerbations. On evaluation in the ED, she was found to have acute-on-chronic hyponatremia, leukocytosis, elevated CRP (14), and a chest X-ray showing increased patchy infiltrates, more pronounced in the right lung. A CT scan showed gas in a sacral decubitus ulcer.    Patient became acutely hypoxemic on the RNF, requiring high-flow nasal cannula (HFNC) oxygen. ABG: PH of 7.51, pCO2 of 55, pO2 of 56, and a lactate level of 2.4.      Pulmonary consulted by ICU Team Dr Spring to assist with management.    Cancer treatment history:  C#1 chemo started on 11/12/24. Tolerated first dose very well. Mild N, no Diarrhea or constipation or vomiting  C#2 chemo given on 12/2/24. Diarrhea mild which has resolved  with Imodium. Hair loss. Otherwise no N/V. She has much better appetite  12/16/24: There was a solitary noncalcified nodule in LLL, biopsy was recommended to rule out second primary or contralateral lung metastases. Lung biopsy was scheduled for 12/16/24, but it could not be done because the nodule could not be seen, so procedure is cancelled  01/13/25: C#4 chemoIO planned today. Tolerating very well. No N/V/D/C. Will start RT soon.   01/27/25: MRI brain: TASHA. PET CT is TASHA.   03/31/25: completed consolidation RT  04/011/25: consolidation Keytruda q 6 weeks is started. Restaging PET CT is planned for 06/09/25.    5/21: Doing very well, currently on 3L nasal oxygen  Net output -2.2 L since admission    5/23: Currently on 4L nasal oxygen, appears comfortable.     5/24: On 3 L  nasal oxygen, feeling a bit tired today    Objective   Objective     Vitals:    05/24/25 0444   Weight: 50.5 kg (111 lb 6.4 oz)   Body mass index is 24.98 kg/m².        5/23/2025     8:28 PM 5/24/2025    12:10 AM 5/24/2025    12:17 AM 5/24/2025     4:44 AM 5/24/2025     7:00 AM 5/24/2025    11:25 AM 5/24/2025     3:30 PM   Vitals   Systolic 113 93  119 125 124 115   Diastolic 66 52  70 68 62 57   BP Location Right arm Right arm  Left arm Left arm Left arm Left arm   Heart Rate  97 89 97 91 105 110   Temp 36.2 °C (97.2 °F) 36.2 °C (97.2 °F)  36.5 °C (97.7 °F) 36.7 °C (98 °F) 36.8 °C (98.2 °F) 36.8 °C (98.2 °F)   Resp 16 16 16  26 21 24   Weight (lb)    111.4      BMI    24.98 kg/m2      BSA (m2)    1.41 m2           Vent settings:       Intake/Output Summary (Last 24 hours) at 5/24/2025 1632  Last data filed at 5/24/2025 1300  Gross per 24 hour   Intake 460 ml   Output 650 ml   Net -190 ml       Physical Exam  Constitutional:       Comments: Frail, cachectic    Eyes:      Pupils: PERRL  Cardiovascular:      Rate and Rhythm: normal  Pulmonary:      Effort: No resp distress     Breath sounds: No stridor. No wheezing, rhonchi or rales.   Abdominal:       General: Abdomen is flat. There is no distension.      Palpations: Abdomen is soft.      Tenderness: There is no abdominal tenderness.   Musculoskeletal:      Right lower leg: No edema.      Left lower leg: No edema.   Neurological:      Mental Status: She is alert and oriented to person, place, and time. Mental status is at baseline.     Medications     Scheduled Medications:   Scheduled Medications[1]   Continuous Medications:   Continuous Medications[2]   PRN Medications:     Labs     Results from last 72 hours   Lab Units 05/24/25  0604 05/23/25  0439 05/22/25  1615   GLUCOSE mg/dL 151* 106* 95   SODIUM mmol/L 133* 134* 131*   POTASSIUM mmol/L 2.9* 3.0* 4.4   CHLORIDE mmol/L 87* 86* 83*   CO2 mmol/L 43* 44* 43*   BUN mg/dL 22 11 14   CREATININE mg/dL 0.30* 0.27* 0.28*   EGFR mL/min/1.73m*2 >90 >90 >90   CALCIUM mg/dL 8.9 8.8 8.5*   ALBUMIN g/dL 2.3* 2.5* 2.6*   MAGNESIUM mg/dL 1.49* 1.84 1.99   PHOSPHORUS mg/dL 2.5 3.4 4.7                   Results from last 72 hours   Lab Units 05/24/25  0604 05/23/25  0439 05/22/25  0333   WBC AUTO x10*3/uL 6.5 8.3 11.6*   NRBC AUTO /100 WBCs 0.0 0.0 0.0   RBC AUTO x10*6/uL 3.23* 3.72* 4.04   HEMOGLOBIN g/dL 10.1* 11.7* 12.5   HEMATOCRIT % 32.5* 37.5 39.5   MCV fL 101* 101* 98   MCH pg 31.3 31.5 30.9   MCHC g/dL 31.1* 31.2* 31.6*   RDW % 14.3 14.6* 14.5   PLATELETS AUTO x10*3/uL 182 192 259     Results from last 72 hours   Lab Units 05/22/25  1101   FIO2 % 44     Lab Results   Component Value Date    BLOODCULT No growth at 4 days -  FINAL REPORT 05/18/2025    BLOODCULT No growth at 4 days -  FINAL REPORT 05/18/2025    GRAMSTAIN (A) 04/30/2025     Gram stain indicates specimen contains significant salivary contamination.     Lab Results   Component Value Date    URINECULTURE >100,000 Escherichia coli (A) 10/11/2024       Imaging and Diagnostic Studies     Lab/Radiology/Diagnostic Review:  Results for orders placed or performed during the hospital encounter of 05/18/25  (from the past 24 hours)   POCT GLUCOSE   Result Value Ref Range    POCT Glucose 129 (H) 74 - 99 mg/dL   Magnesium   Result Value Ref Range    Magnesium 1.49 (L) 1.60 - 2.40 mg/dL   CBC and Auto Differential   Result Value Ref Range    WBC 6.5 4.4 - 11.3 x10*3/uL    nRBC 0.0 0.0 - 0.0 /100 WBCs    RBC 3.23 (L) 4.00 - 5.20 x10*6/uL    Hemoglobin 10.1 (L) 12.0 - 16.0 g/dL    Hematocrit 32.5 (L) 36.0 - 46.0 %     (H) 80 - 100 fL    MCH 31.3 26.0 - 34.0 pg    MCHC 31.1 (L) 32.0 - 36.0 g/dL    RDW 14.3 11.5 - 14.5 %    Platelets 182 150 - 450 x10*3/uL    Neutrophils % 86.1 40.0 - 80.0 %    Immature Granulocytes %, Automated 1.4 (H) 0.0 - 0.9 %    Lymphocytes % 2.0 13.0 - 44.0 %    Monocytes % 9.7 2.0 - 10.0 %    Eosinophils % 0.6 0.0 - 6.0 %    Basophils % 0.2 0.0 - 2.0 %    Neutrophils Absolute 5.62 (H) 1.60 - 5.50 x10*3/uL    Immature Granulocytes Absolute, Automated 0.09 0.00 - 0.50 x10*3/uL    Lymphocytes Absolute 0.13 (L) 0.80 - 3.00 x10*3/uL    Monocytes Absolute 0.63 0.05 - 0.80 x10*3/uL    Eosinophils Absolute 0.04 0.00 - 0.40 x10*3/uL    Basophils Absolute 0.01 0.00 - 0.10 x10*3/uL   Renal function panel   Result Value Ref Range    Glucose 151 (H) 74 - 99 mg/dL    Sodium 133 (L) 136 - 145 mmol/L    Potassium 2.9 (LL) 3.5 - 5.3 mmol/L    Chloride 87 (L) 98 - 107 mmol/L    Bicarbonate 43 (HH) 21 - 32 mmol/L    Anion Gap <7 (L) 10 - 20 mmol/L    Urea Nitrogen 22 6 - 23 mg/dL    Creatinine 0.30 (L) 0.50 - 1.05 mg/dL    eGFR >90 >60 mL/min/1.73m*2    Calcium 8.9 8.6 - 10.3 mg/dL    Phosphorus 2.5 2.5 - 4.9 mg/dL    Albumin 2.3 (L) 3.4 - 5.0 g/dL   POCT GLUCOSE   Result Value Ref Range    POCT Glucose 159 (H) 74 - 99 mg/dL   POCT GLUCOSE   Result Value Ref Range    POCT Glucose 119 (H) 74 - 99 mg/dL     Imaging  XR chest 1 view  Result Date: 5/23/2025  1. Generalized increased interstitial prominence as seen on the prior examination suggesting component of interstitial edema.   2. Persistent right perihilar and  basilar consolidation with mild improved aeration of the right lung base.     MACRO: None   Signed by: Jin Schmidt 5/23/2025 8:06 AM Dictation workstation:   [id number]    XR chest 1 view  Result Date: 5/22/2025  No change in the extensive right-sided pulmonary infiltrate and right effusion.   MACRO: none   Signed by: Clemente Vergara 5/22/2025 9:56 AM Dictation workstation:   [id number]    XR chest 1 view  Result Date: 5/21/2025  Right perihilar and basilar infiltrate consistent with pneumonia, unchanged compared to the prior study. Pulmonary vascular congestion is also suspected along with small bilateral effusions, unchanged.   MACRO: none   Signed by: Clemente Vergara 5/21/2025 8:03 AM Dictation workstation:   [id number]    CT chest wo IV contrast  Result Date: 5/20/2025  1. Right lower lobar collapse with air bronchograms and endobronchial opacification of the right lower lobe and bronchus intermedius. Correlate with aspiration pneumonias a potential etiology. Of note, patchy consolidation also demonstrated in the right middle lobe and in particular posterior segment of the right upper lobe as well as left upper lobe. Findings suggest multilobar pneumonia. 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   2. Moderate right and mild-to-moderate left pleural effusions.     MACRO: None   Signed by: Jin Schmidt 5/20/2025 11:56 AM Dictation workstation:   [id number]    XR chest 1 view  Result Date: 5/20/2025  Improved interstitial prominence with stable small left pleural effusion.   Worsening infiltrate with airspace consolidation now seen in the right upper lobe with right pleural effusion increased in size.   Signed by: Hanane Fine 5/20/2025 7:53 AM Dictation workstation:   [id number]    CT head wo IV contrast  Result Date: 5/20/2025  No acute intracranial abnormality.     MACRO: None   Signed by: Kanika Avina 5/20/2025 12:31  AM Dictation workstation:   [id number]    XR chest 1 view  Result Date: 5/18/2025  There is patchy airspace opacity in the right mid and lower lung concerning for developing multifocal pneumonia. Continued radiographic follow-up to resolution is advised.   Cardiomegaly with mild interstitial prominence suggestive of developing interstitial edema/CHF.   Bilateral pleural effusions are better seen on concurrent CT.   MACRO: None   Signed by: Lenka Cartagena 5/18/2025 7:06 PM Dictation workstation:   [id number]    CT abdomen pelvis wo IV contrast  Result Date: 5/18/2025  There is soft tissue defect overlying the distal sacrum and coccyx with mild soft tissue stranding. A tiny locule of air is present in the right gluteal subcutaneous fat, without evidence for discrete drainable fluid collection. Findings concerning for sacral decubitus ulcer. Correlate with exam.   Moderate bilateral pleural effusions. There are patchy airspace opacities and consolidation in the right lung concerning for developing multifocal pneumonia. Clinical correlation continued radiographic follow-up to resolution is advised.   Marked distention of the urinary bladder. Correlate with symptomatology to exclude urinary retention.   Diffuse body wall edema.   Additional findings as described above.   MACRO: None   Signed by: Lenka Cartagena 5/18/2025 6:31 PM Dictation workstation:   [id number]      Cardiology, Vascular, and Other Imaging  No other imaging results found for the past 7 days         Assessment / Plan       PROBLEM LIST:  - Acute HYPOXIC respiratory failure sec to multifocal pneumonia. Aspiration PNA, high on the differential given retained food/debris in the proximal esophagus. Immune mediated pneumonitis less likely given lobar distribution of consolidative opacities.  - Immunocompromised state  - Bilateral pleural effusions  - Appears euvolemic on exam, but left sided heart failure (HFPEF) may be contributing     MANAGEMENT  PLAN:  - Thoracentesis on the rt side was planned but given  symptomatic and clinical improvement with lasix and antibiotics, can defer.   - Resp culture  - Appreciate ID recs  - Empiric Abx for now (Zosyn, MRSA neg). Can stop 5/26  - 3% saline nebs  - Titrate to SpO2> 92%  - Patient on 2L nasal oxygen at home, likely will need oxygen titration prior to discharge     I have personally interviewed and examined the patient.  I have personally verified elements of the exam listed above. Interval changes or irregularities are as noted.  I have personally and independently reviewed laboratory, radiographic and procedural data.  I have personally reviewed the problem list above and concur. Changes, if any, are noted.  I have personally reviewed the plan list above and concur. Changes, if any, are noted.           Landen Natarajan MD          [1] acetaminophen, 650 mg, oral, TID  baclofen, 10 mg, oral, TID  cholecalciferol, 125 mcg, oral, Daily  collagenase, , Topical, Daily  diazePAM, 5 mg, oral, Daily  enoxaparin, 30 mg, subcutaneous, Daily  insulin lispro, 0-5 Units, subcutaneous, TID AC  ipratropium-albuteroL, 3 mL, nebulization, q6h  lactulose, 20 g, oral, Daily  lidocaine, 1 Application, Topical, TID  melatonin, 3 mg, oral, Nightly  mupirocin, 1 Application, Topical, BID  nicotine, 1 patch, transdermal, Daily   Followed by  [START ON 7/1/2025] nicotine, 1 patch, transdermal, Daily   Followed by  [START ON 7/15/2025] nicotine, 1 patch, transdermal, Daily  oxygen, , inhalation, q4h  pantoprazole, 40 mg, oral, Daily before breakfast  piperacillin-tazobactam, 4.5 g, intravenous, q6h  polyethylene glycol, 17 g, oral, TID  potassium chloride, 40 mEq, intravenous, Once  sennosides-docusate sodium, 2 tablet, oral, BID  sucralfate, 1 g, oral, q6h [place]     [2]

## 2025-05-24 NOTE — CARE PLAN
Problem: Respiratory  Goal: Clear secretions with interventions this shift  Outcome: Progressing  Goal: No signs of respiratory distress (eg. Use of accessory muscles. Peds grunting)  Outcome: Progressing     Problem: Respiratory  Goal: Clear secretions with interventions this shift  Outcome: Progressing  Goal: No signs of respiratory distress (eg. Use of accessory muscles. Peds grunting)  Outcome: Progressing

## 2025-05-24 NOTE — NURSING NOTE
Dr. Mcneil in to see patient. Dr. Mcneil aware of patient's heart rate 100-110 and elevated respiratory rate. No new orders received,vbr.

## 2025-05-24 NOTE — NURSING NOTE
Patient in no visible distress. Patient voices no needs. Bed is low and locked, bed alarm is on. Call light is in reach.

## 2025-05-24 NOTE — NURSING NOTE
Patient resting in bed. Nighttime medications given. Dressings were changed at start of shift with Crystal HOUGH.     2300 bladder scanned patient > 325. Contacted provider, provider stated to follow protocol.     0118 bladder scanned patient. > 400, based on protocol straight cath when > 450. Will recheck before end of shift.     0400 spoke with provider on placing an FMS. Per provider alright to place FMS. Bladder scanned patient. Showed > 700.     0430 Straight cathed patient and emptied 650 ml.

## 2025-05-24 NOTE — CARE PLAN
Problem: Pain - Adult  Goal: Verbalizes/displays adequate comfort level or baseline comfort level  5/24/2025 0030 by Claire Mccloud RN  Outcome: Progressing  5/24/2025 0030 by Claire Mccloud RN  Outcome: Progressing     Problem: Safety - Adult  Goal: Free from fall injury  5/24/2025 0030 by Claire Mccloud RN  Outcome: Progressing  5/24/2025 0030 by Claire Mccloud RN  Outcome: Progressing     Problem: Discharge Planning  Goal: Discharge to home or other facility with appropriate resources  5/24/2025 0030 by Claire Mccloud RN  Outcome: Progressing  5/24/2025 0030 by Claire Mccloud RN  Outcome: Progressing     Problem: Chronic Conditions and Co-morbidities  Goal: Patient's chronic conditions and co-morbidity symptoms are monitored and maintained or improved  5/24/2025 0030 by Claire Mccloud RN  Outcome: Progressing  5/24/2025 0030 by Claire Mccloud RN  Outcome: Progressing     Problem: Nutrition  Goal: Nutrient intake appropriate for maintaining nutritional needs  5/24/2025 0030 by Claire Mccloud RN  Outcome: Progressing  5/24/2025 0030 by Claire Mccloud RN  Outcome: Progressing     Problem: Skin  Goal: Decreased wound size/increased tissue granulation at next dressing change  5/24/2025 0030 by Claire Mccloud RN  Outcome: Progressing  Flowsheets (Taken 5/24/2025 0030)  Decreased wound size/increased tissue granulation at next dressing change:   Promote sleep for wound healing   Protective dressings over bony prominences   Utilize specialty bed per algorithm  5/24/2025 0030 by Claire Mccloud RN  Outcome: Progressing  Flowsheets (Taken 5/24/2025 0030)  Decreased wound size/increased tissue granulation at next dressing change:   Promote sleep for wound healing   Protective dressings over bony prominences   Utilize specialty bed per algorithm  Goal: Participates in plan/prevention/treatment measures  5/24/2025 0030 by Claire Mccloud RN  Outcome: Progressing  Flowsheets  (Taken 5/24/2025 0030)  Participates in plan/prevention/treatment measures:   Elevate heels   Increase activity/out of bed for meals  5/24/2025 0030 by Claire Mccloud RN  Outcome: Progressing  Flowsheets (Taken 5/24/2025 0030)  Participates in plan/prevention/treatment measures:   Elevate heels   Increase activity/out of bed for meals  Goal: Prevent/manage excess moisture  5/24/2025 0030 by Claire Mccloud RN  Outcome: Progressing  Flowsheets (Taken 5/24/2025 0030)  Prevent/manage excess moisture:   Cleanse incontinence/protect with barrier cream   Follow provider orders for dressing changes   Monitor for/manage infection if present  5/24/2025 0030 by Claire Mccloud RN  Outcome: Progressing  Flowsheets (Taken 5/24/2025 0030)  Prevent/manage excess moisture:   Cleanse incontinence/protect with barrier cream   Follow provider orders for dressing changes   Monitor for/manage infection if present  Goal: Prevent/minimize sheer/friction injuries  5/24/2025 0030 by Claire Mccloud RN  Outcome: Progressing  Flowsheets (Taken 5/24/2025 0030)  Prevent/minimize sheer/friction injuries:   Turn/reposition every 2 hours/use positioning/transfer devices   Increase activity/out of bed for meals   Use pull sheet  5/24/2025 0030 by Claire Mccloud RN  Outcome: Progressing  Flowsheets (Taken 5/24/2025 0030)  Prevent/minimize sheer/friction injuries:   Turn/reposition every 2 hours/use positioning/transfer devices   Increase activity/out of bed for meals   Use pull sheet  Goal: Promote/optimize nutrition  5/24/2025 0030 by Claire Mccloud RN  Outcome: Progressing  Flowsheets (Taken 5/24/2025 0030)  Promote/optimize nutrition:   Assist with feeding   Consume > 50% meals/supplements   Offer water/supplements/favorite foods   Monitor/record intake including meals  5/24/2025 0030 by Claire Mccloud RN  Outcome: Progressing  Flowsheets (Taken 5/24/2025 0030)  Promote/optimize nutrition:   Assist with feeding   Consume  > 50% meals/supplements   Offer water/supplements/favorite foods   Monitor/record intake including meals  Goal: Promote skin healing  5/24/2025 0030 by Claire Mccloud RN  Outcome: Progressing  Flowsheets (Taken 5/24/2025 0030)  Promote skin healing:   Assess skin/pad under line(s)/device(s)   Turn/reposition every 2 hours/use positioning/transfer devices   Protective dressings over bony prominences  5/24/2025 0030 by Claire Mccloud RN  Outcome: Progressing  Flowsheets (Taken 5/24/2025 0030)  Promote skin healing:   Assess skin/pad under line(s)/device(s)   Turn/reposition every 2 hours/use positioning/transfer devices   Protective dressings over bony prominences     Problem: Respiratory  Goal: Clear secretions with interventions this shift  5/24/2025 0030 by Claire Mccloud RN  Outcome: Progressing  5/24/2025 0030 by Claire Mccloud RN  Outcome: Progressing  Goal: Minimize anxiety/maximize coping throughout shift  5/24/2025 0030 by Claire Mccloud RN  Outcome: Progressing  5/24/2025 0030 by Claire Mccloud RN  Outcome: Progressing  Goal: Minimal/no exertional discomfort or dyspnea this shift  5/24/2025 0030 by Claire Mccloud RN  Outcome: Progressing  5/24/2025 0030 by Claire Mccloud RN  Outcome: Progressing  Goal: No signs of respiratory distress (eg. Use of accessory muscles. Peds grunting)  5/24/2025 0030 by Claire Mccloud RN  Outcome: Progressing  5/24/2025 0030 by Claire Mccloud RN  Outcome: Progressing  Goal: Patent airway maintained this shift  5/24/2025 0030 by Claire Mccloud RN  Outcome: Progressing  5/24/2025 0030 by Claire Mccloud RN  Outcome: Progressing  Goal: Tolerate mechanical ventilation evidenced by VS/agitation level this shift  5/24/2025 0030 by Claire Mccloud RN  Outcome: Progressing  5/24/2025 0030 by Claire Mccloud RN  Outcome: Progressing  Goal: Tolerate pulmonary toileting this shift  5/24/2025 0030 by Claire Mccloud RN  Outcome:  Progressing  5/24/2025 0030 by Claire Mccloud RN  Outcome: Progressing  Goal: Verbalize decreased shortness of breath this shift  5/24/2025 0030 by Claire Mccloud RN  Outcome: Progressing  5/24/2025 0030 by Claire Mccloud RN  Outcome: Progressing  Goal: Wean oxygen to maintain O2 saturation per order/standard this shift  5/24/2025 0030 by Claire Mccloud RN  Outcome: Progressing  5/24/2025 0030 by Claire Mccloud RN  Outcome: Progressing  Goal: Increase self care and/or family involvement in next 24 hours  5/24/2025 0030 by Claire Mccloud RN  Outcome: Progressing  5/24/2025 0030 by Claire Mccloud RN  Outcome: Progressing     Problem: Fall/Injury  Goal: Not fall by end of shift  5/24/2025 0030 by Claire Mccloud RN  Outcome: Progressing  5/24/2025 0030 by Claire Mccloud RN  Outcome: Progressing  Goal: Be free from injury by end of the shift  5/24/2025 0030 by Claire Mccloud RN  Outcome: Progressing  5/24/2025 0030 by Claire Mccloud RN  Outcome: Progressing  Goal: Verbalize understanding of personal risk factors for fall in the hospital  5/24/2025 0030 by Claire Mccloud RN  Outcome: Progressing  5/24/2025 0030 by Claire Mccloud RN  Outcome: Progressing  Goal: Verbalize understanding of risk factor reduction measures to prevent injury from fall in the home  5/24/2025 0030 by Claire Mccloud RN  Outcome: Progressing  5/24/2025 0030 by Claire Mccloud RN  Outcome: Progressing  Goal: Use assistive devices by end of the shift  5/24/2025 0030 by Claire Mccloud RN  Outcome: Progressing  5/24/2025 0030 by Claire Mccloud RN  Outcome: Progressing  Goal: Pace activities to prevent fatigue by end of the shift  5/24/2025 0030 by Claire Mccloud RN  Outcome: Progressing  5/24/2025 0030 by Claire Mccloud RN  Outcome: Progressing

## 2025-05-24 NOTE — ASSESSMENT & PLAN NOTE
Acute on chronic hypoxic respiratory failure   Pneumonia  Moderate right, mild to moderate left pleural effusions  Squamous cell lung carcinoma status post chemo-radiation - Keytruda  COPD   History of tobacco use / vape use  Leukocytosis, multifactorial - resolving  Stage III/IV sacral decubitus ulcer  Pressure ulcer risk  Severe protein calorie malnutrition   Hyponatremia  Hypokalemia  Metabolic alkalosis  Normocytic anemia  Chronic pain  Spinal stenosis  Scoliosis  GERD  Vitamin d deficiency  Fall risk    Plan: Continue current medications.  Supportive care.  Patient still hypokalemic.  Replete potassium.  Replete magnesium.  Continue IV antibiotics.  Overall prognosis poor.  Will continue to golf-hole, aspiration, decubitus, DVT precaution.  Monitor heart rate while potassium is being repleted.

## 2025-05-24 NOTE — CARE PLAN
Problem: Pain - Adult  Goal: Verbalizes/displays adequate comfort level or baseline comfort level  Outcome: Progressing     Problem: Safety - Adult  Goal: Free from fall injury  Outcome: Progressing     Problem: Discharge Planning  Goal: Discharge to home or other facility with appropriate resources  Outcome: Progressing     Problem: Chronic Conditions and Co-morbidities  Goal: Patient's chronic conditions and co-morbidity symptoms are monitored and maintained or improved  Outcome: Progressing     Problem: Nutrition  Goal: Nutrient intake appropriate for maintaining nutritional needs  Outcome: Progressing     Problem: Skin  Goal: Decreased wound size/increased tissue granulation at next dressing change  Outcome: Progressing  Goal: Participates in plan/prevention/treatment measures  Outcome: Progressing  Goal: Prevent/manage excess moisture  Outcome: Progressing  Goal: Prevent/minimize sheer/friction injuries  Outcome: Progressing  Goal: Promote/optimize nutrition  Outcome: Progressing  Flowsheets (Taken 5/24/2025 1023)  Promote/optimize nutrition:   Assist with feeding   Monitor/record intake including meals   Consume > 50% meals/supplements  Goal: Promote skin healing  Outcome: Progressing  Flowsheets (Taken 5/24/2025 1023)  Promote skin healing:   Assess skin/pad under line(s)/device(s)   Protective dressings over bony prominences   Turn/reposition every 2 hours/use positioning/transfer devices     Problem: Respiratory  Goal: Clear secretions with interventions this shift  Outcome: Progressing  Goal: Minimize anxiety/maximize coping throughout shift  Outcome: Progressing  Goal: Minimal/no exertional discomfort or dyspnea this shift  Outcome: Progressing  Goal: No signs of respiratory distress (eg. Use of accessory muscles. Peds grunting)  Outcome: Progressing  Goal: Patent airway maintained this shift  Outcome: Progressing  Goal: Tolerate mechanical ventilation evidenced by VS/agitation level this  shift  Outcome: Progressing  Goal: Tolerate pulmonary toileting this shift  Outcome: Progressing  Goal: Verbalize decreased shortness of breath this shift  Outcome: Progressing  Goal: Wean oxygen to maintain O2 saturation per order/standard this shift  Outcome: Progressing  Goal: Increase self care and/or family involvement in next 24 hours  Outcome: Progressing     Problem: Fall/Injury  Goal: Not fall by end of shift  Outcome: Progressing  Goal: Be free from injury by end of the shift  Outcome: Progressing  Goal: Verbalize understanding of personal risk factors for fall in the hospital  Outcome: Progressing  Goal: Verbalize understanding of risk factor reduction measures to prevent injury from fall in the home  Outcome: Progressing  Goal: Use assistive devices by end of the shift  Outcome: Progressing  Goal: Pace activities to prevent fatigue by end of the shift  Outcome: Progressing

## 2025-05-24 NOTE — PROGRESS NOTES
05/24/25 1406   Discharge Planning   Assistance Needed Will need hospital bed delivered before dc   Who is requesting discharge planning? Provider   Home or Post Acute Services In home services   Type of Home Care Services Home PT;Home OT;Home nursing visits   Expected Discharge Disposition Home H  ([facility] - need external order placed)

## 2025-05-24 NOTE — NURSING NOTE
Dr. Natarajan aware of patient's magnesium of 1.49 and aware of patient's nonproductive cough and complaint of feeling congested. No new orders received,vbr.

## 2025-05-24 NOTE — PROGRESS NOTES
Karly Horton is a 80 y.o. female on day 6 of admission presenting with Generalized weakness.    Subjective   Afebrile, no chills  Interval decrease 4 liters nasal cannula  Denies shortness of breath or cough  Nonproductive cough   No new complains       Objective   Range of Vitals (last 24 hours)  Heart Rate:  []   Temp:  [36.2 °C (97.2 °F)-36.7 °C (98.1 °F)]   Resp:  [16-26]   BP: ()/(52-70)   Weight:  [50.5 kg (111 lb 6.4 oz)]   SpO2:  [87 %-100 %]   Daily Weight  05/24/25 : 50.5 kg (111 lb 6.4 oz)    Body mass index is 24.98 kg/m².    Physical Exam  Constitutional:       Appearance: Normal appearance.   HENT:      Head: Normocephalic and atraumatic.      Nose: Nose normal.   Eyes:      Extraocular Movements: Extraocular movements intact.      Conjunctiva/sclera: Conjunctivae normal.   Cardiovascular:      Rate and Rhythm: Normal rate.   Pulmonary:      Effort: Pulmonary effort is normal.   Abdominal:      General: There is no distension.   Musculoskeletal:      Cervical back: Normal range of motion.   Skin:     General: Skin is warm and dry.      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      Mental Status: She is alert.   Psychiatric:         Mood and Affect: Mood normal.           Antibiotics  mupirocin - 2 %, 2 %  piperacillin-tazobactam - 4.5 gram/100 mL    Relevant Results  Labs  Results from last 72 hours   Lab Units 05/24/25  0604 05/23/25  0439 05/22/25  0333   WBC AUTO x10*3/uL 6.5 8.3 11.6*   HEMOGLOBIN g/dL 10.1* 11.7* 12.5   HEMATOCRIT % 32.5* 37.5 39.5   PLATELETS AUTO x10*3/uL 182 192 259   NEUTROS PCT AUTO % 86.1 90.0  --    LYMPHS PCT AUTO % 2.0 1.2  --    MONOS PCT AUTO % 9.7 7.1  --    EOS PCT AUTO % 0.6 0.4  --      Results from last 72 hours   Lab Units 05/24/25  0604 05/23/25  0439 05/22/25  5314    SODIUM mmol/L 133* 134* 131*   POTASSIUM mmol/L 2.9* 3.0* 4.4   CHLORIDE mmol/L 87* 86* 83*   CO2 mmol/L 43* 44* 43*   BUN mg/dL 22 11 14   CREATININE mg/dL 0.30* 0.27* 0.28*   GLUCOSE mg/dL 151* 106* 95   CALCIUM mg/dL 8.9 8.8 8.5*   ANION GAP mmol/L <7* 7* 9*   EGFR mL/min/1.73m*2 >90 >90 >90   PHOSPHORUS mg/dL 2.5 3.4 4.7     Results from last 72 hours   Lab Units 05/24/25  0604 05/23/25  0439 05/22/25  1615   ALBUMIN g/dL 2.3* 2.5* 2.6*     Estimated Creatinine Clearance: 97.2 mL/min (A) (by C-G formula based on SCr of 0.3 mg/dL (L)).  C-Reactive Protein   Date Value Ref Range Status   05/18/2025 14.44 (H) <1.00 mg/dL Final     Microbiology  MRSA PCR negative  Legionella antigen negative  Blood cultures negative   Imaging  XR chest 1 view  Result Date: 5/22/2025  Interpreted By:  Clemente Vergara, STUDY: XR CHEST 1 VIEW; 5/22/2025 5:33 am   INDICATION: CLINICAL INFORMATION: Signs/Symptoms:acute hypoxemic respiratory failure.   COMPARISON: 05/21/2025   ACCESSION NUMBER(S): [id number]   ORDERING CLINICIAN: CLIF MENA   TECHNIQUE: Portable chest one view.   FINDINGS: The cardiac silhouette is quite prominent suggesting cardiomegaly. Patient is significantly rotated to the right. Large patchy infiltration is present within the right hemithorax. This is most marked centrally. MediPort catheter overlies the right hemithorax with the tip overlying the SVC right atrial junction. Right pleural effusion is suspected.   No definite infiltrates or effusions are appreciated on the left on the current exam.       No change in the extensive right-sided pulmonary infiltrate and right effusion.   MACRO: none   Signed by: Clemente Vergara 5/22/2025 9:56 AM Dictation workstation:   [id number]    XR chest 1 view  Result Date: 5/21/2025  Interpreted By:  Clemente Vergara, STUDY: XR CHEST 1 VIEW; 5/21/2025 5:20 am   INDICATION: CLINICAL INFORMATION: Signs/Symptoms:acute hypoxemic respiratory failure.   COMPARISON: 05/20/2025    ACCESSION NUMBER(S): [id number]   ORDERING CLINICIAN: CLIF MENA   TECHNIQUE: Portable chest one view.   FINDINGS: The cardiac size is indeterminate in view of the AP projection. There is no change in the right-sided MediPort catheter. There is no change in the right-sided infiltrate. The pulmonary vasculature is slightly indistinct suggesting mild pulmonary vascular congestion. Small bilateral pleural effusions are present.       Right perihilar and basilar infiltrate consistent with pneumonia, unchanged compared to the prior study. Pulmonary vascular congestion is also suspected along with small bilateral effusions, unchanged.   MACRO: none   Signed by: Clemente Vergara 5/21/2025 8:03 AM Dictation workstation:   [id number]    CT chest wo IV contrast  Result Date: 5/20/2025  Interpreted By:  Jin Schmidt, STUDY: CT CHEST WO IV CONTRAST; ;  5/20/2025 11:37 am   INDICATION: Signs/Symptoms:acute hypoxemic respiratory failure, h/o SCC lung CA.     COMPARISON: 03/05/2025   ACCESSION NUMBER(S): [id number]   ORDERING CLINICIAN: CLIF MENA   TECHNIQUE: Serial axial unenhanced CT images obtained of the chest. Images reformatted in the coronal and sagittal projection.   All CT examinations are performed with 1 or more of the following dose reduction techniques: Automated exposure control, adjustment of mA and/or kv according to patient's size, or use of iterative reconstruction techniques.   FINDINGS: Mediastinum demonstrates no significant lymphadenopathy. Esophagus demonstrates component of gas-filled appearance component of retained food contents demonstrated within the stomach. Correlate with symptoms reflux.   Heart and great vessels demonstrate ascending thoracic aorta to measure 2.9 cm. There is moderate vascular calcification of the thoracic aorta. No cardiomegaly.   Lung parenchyma demonstrates moderate right and mild-to-moderate left-sided pleural effusions. There is basilar compressive atelectasis  with right lower lobar collapse. Endobronchial opacification of the atelectatic right lower lobe as well as the bronchus intermedius. Correlate with aspiration as a potential etiology versus retained secretions. There is septal thickening and patchy consolidation within the right middle lobe as well as right upper lobe in particular of the posterior segment with component of air bronchograms. Left lung demonstrates scattered areas of patchy infiltrate in the left upper lobe.   Included portions visualized abdomen are unremarkable   Visualized osseous structures demonstrate S shaped scoliosis. Multilevel degenerative discogenic changes are demonstrated multilevel endplate sclerosis as well as anterior and lateral osteophyte formation. No compression deformity.               1. Right lower lobar collapse with air bronchograms and endobronchial opacification of the right lower lobe and bronchus intermedius. Correlate with aspiration pneumonias a potential etiology. Of note, patchy consolidation also demonstrated in the right middle lobe and in particular posterior segment of the right upper lobe as well as left upper lobe. Findings suggest multilobar pneumonia. 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   2. Moderate right and mild-to-moderate left pleural effusions.     MACRO: None   Signed by: Jin Schmidt 5/20/2025 11:56 AM Dictation workstation:   [id number]    XR chest 1 view  Result Date: 5/20/2025  Interpreted By:  Hanane Fine, STUDY: XR CHEST 1 VIEW 5/20/2025 5:21 am   INDICATION: Signs/Symptoms:acute hypoxemic respiratory failure   COMPARISON: 05/18/2025   ACCESSION NUMBER(S): [id number]   ORDERING CLINICIAN: CLIF MENA   TECHNIQUE: AP semi-erect view of the chest at bedside   FINDINGS: The MediPort catheter terminates within the SVC. The cardiac size is stable with atherosclerosis of the thoracic aorta noted.   When  compared with the study from 2 days earlier, there is worsening infiltrate and airspace consolidation within the right lung particularly in the right upper lobe with right pleural effusion increased in size.   Small left pleural effusion is present. The interstitial prominence observed 2 days earlier has improved however.   Levoscoliosis of the thoracic spine is present.       Improved interstitial prominence with stable small left pleural effusion.   Worsening infiltrate with airspace consolidation now seen in the right upper lobe with right pleural effusion increased in size.   Signed by: Hanane Fine 5/20/2025 7:53 AM Dictation workstation:   [id number]    CT head wo IV contrast  Result Date: 5/20/2025  Interpreted By:  Kanika Avina, STUDY: CT HEAD WO IV CONTRAST;  5/20/2025 12:06 am   INDICATION: Signs/Symptoms:unequal pupils.   COMPARISON: MRI brain 10/18/2024   ACCESSION NUMBER(S): [id number]   ORDERING CLINICIAN: JANET HALEY   TECHNIQUE: Axial noncontrast CT images of the head.   FINDINGS: BRAIN PARENCHYMA:  deep and periventricular white matter hypodensities are nonspecific, but favored to represent chronic small vessel ischemic changes.  Gray-white matter interfaces are preserved. No mass effect or midline shift.   HEMORRHAGE: No acute intracranial hemorrhage. VENTRICLES and EXTRA-AXIAL SPACES: The ventricles and sulci are within normal limits in size for brain volume. No abnormal extraaxial fluid collection. EXTRACRANIAL SOFT TISSUES: Within normal limits. PARANASAL SINUSES/MASTOIDS:  The visualized paranasal sinuses and mastoid air cells are aerated. CALVARIUM: No depressed skull fracture. No destructive osseous lesion.   OTHER FINDINGS: None.       No acute intracranial abnormality.     MACRO: None   Signed by: Kanika Avina 5/20/2025 12:31 AM Dictation workstation:   [id number]    XR chest 1 view  Result Date: 5/18/2025  Interpreted By:  Lenka Cartagena, STUDY: XR CHEST 1 VIEW;  5/18/2025 6:36 pm    INDICATION: Signs/Symptoms:Generalized weakness.   COMPARISON: Chest x-ray 04/29/2025   ACCESSION NUMBER(S): [id number]   ORDERING CLINICIAN: JAIME CHILDERS   FINDINGS: Right-sided MediPort terminates in the SVC. Multiple overlying leads are present.   CARDIOMEDIASTINAL SILHOUETTE: Cardiomediastinal silhouette is stable in size and configuration. Atherosclerotic calcification of the aorta.   LUNGS: Bilateral interstitial prominence. There is right middle and lower lobe patchy airspace opacities. Layering bilateral effusions are better seen on concurrent CT. No pneumothorax.   ABDOMEN: No remarkable upper abdominal findings.   BONES: Levoconvex scoliosis. Generalized diffuse osteopenia. Multilevel degenerative changes of the spine.       There is patchy airspace opacity in the right mid and lower lung concerning for developing multifocal pneumonia. Continued radiographic follow-up to resolution is advised.   Cardiomegaly with mild interstitial prominence suggestive of developing interstitial edema/CHF.   Bilateral pleural effusions are better seen on concurrent CT.   MACRO: None   Signed by: Lenka Cartagena 5/18/2025 7:06 PM Dictation workstation:   [id number]    CT abdomen pelvis wo IV contrast  Result Date: 5/18/2025  Interpreted By:  Lenka Cartagena, STUDY: CT ABDOMEN PELVIS WO IV CONTRAST;  5/18/2025 5:51 pm   INDICATION: Signs/Symptoms:Sacral wound.   COMPARISON: CT scan of the abdomen pelvis 02/14/2025   ACCESSION NUMBER(S): [id number]   ORDERING CLINICIAN: JAIME CHILDERS   TECHNIQUE: Axial noncontrast CT images of the abdomen and pelvis with coronal and sagittal reconstructed images.   FINDINGS:   LOWER CHEST: Moderate bilateral pleural effusions. There are patchy airspace opacities and consolidation in the right lung. Aortic root and coronary artery calcifications noted. Right-sided MediPort terminates in the SVC.   ABDOMEN: Lack of intravenous contrast limits evaluation of vessels and solid organs. LIVER:  Within normal limits. BILE DUCTS: Normal caliber. GALLBLADDER: No calcified gallstones. No wall thickening. PANCREAS: Suboptimally visualized due to lack of contrast and paucity of intra-abdominal fat SPLEEN: Within normal limits.  Calcified splenic granulomas noted. ADRENALS: Nodular thickening of the adrenal glands may relate to hyperplasia or adenomatous change.   KIDNEYS, URETERS, URINARY BLADDER:  Kidneys are symmetric in size without evidence for calculi, hydronephrosis, hydroureter or perinephric inflammatory changes.   No bladder calculi or wall thickening.  Marked distention of the urinary bladder.   VESSELS:  Calcific atherosclerosis of the aortoiliac and branch vessels with moderate tortuosity. No aortic aneurysm. RETROPERITONEUM: No pathologically enlarged lymph nodes.   PELVIS:   REPRODUCTIVE ORGANS: Uterus and ovaries are not well visualized.   BOWEL: Postsurgical changes of partial colectomy. No dilated bowel. Appendix is not identified with certainty. No pericecal inflammatory change is seen. No pneumatosis or portal venous gas. PERITONEUM: Paucity of intra-abdominal fat. Mild diffuse haziness of the mesentery. ABDOMINAL WALL: Diffuse body wall edema. There is soft tissue defect overlying the distal sacrum and coccyx. Mild adjacent soft tissue stranding with a tiny locule of air in the right gluteal subcutaneous fat. A discrete drainable fluid collection is not identified. Findings concerning for sacral decubitus ulcer. BONES: Generalized diffuse osteopenia. Multilevel degenerative changes of the spine. Reverse s shaped scoliosis. Great 1 anterolisthesis are L3 on 4 and L5 on S1 with grade 1-2 anterolisthesis of L4 on 5.       There is soft tissue defect overlying the distal sacrum and coccyx with mild soft tissue stranding. A tiny locule of air is present in the right gluteal subcutaneous fat, without evidence for discrete drainable fluid collection. Findings concerning for sacral decubitus ulcer.  Correlate with exam.   Moderate bilateral pleural effusions. There are patchy airspace opacities and consolidation in the right lung concerning for developing multifocal pneumonia. Clinical correlation continued radiographic follow-up to resolution is advised.   Marked distention of the urinary bladder. Correlate with symptomatology to exclude urinary retention.   Diffuse body wall edema.   Additional findings as described above.   MACRO: None   Signed by: Lenka Cartagena 5/18/2025 6:31 PM Dictation workstation:   [id number]    ECG 12 Lead  Result Date: 5/5/2025  Sinus tachycardia with frequent Premature ventricular complexes Left anterior fascicular block ST & T wave abnormality, consider inferior ischemia Abnormal ECG T wave inversion now evident in Lateral leads Confirmed by Everardo Webster ([id number]) on 5/5/2025 4:08:58 PM    XR chest 2 views  Result Date: 4/29/2025  STUDY: Chest Radiographs;  4/29/2025 4:37PM INDICATION: Shortness of breath. COMPARISON: 3/5/2025 CT CTA Chest. ACCESSION NUMBER(S): [id number] ORDERING CLINICIAN: RONALD BLANTON TECHNIQUE:  Frontal and lateral chest. FINDINGS: CARDIOMEDIASTINAL SILHOUETTE: Heart is mildly enlarged with pulmonary vascular congestion and small bilateral pleural effusions.  LUNGS: Lungs are clear.  ABDOMEN: No remarkable upper abdominal findings.  BONES: No acute osseous changes.    Cardiomegaly and pulmonary vascular congestion with small bilateral pleural effusions. Signed by Ronal Isaac MD        Assessment/Plan   Acute hypoxic respiratory failure,  resolving, on 6LNC   Squamous cell lung cancer status post chemoradiation, on Keytruda  Negative Mycoplasma,histoplasma, Galactomannan and Fungitell   Leukocytosis, multifactorial-resolving  History of COPD  Stage III/IV sacral decubitus ulcer  Severe malnutrition-prealbumin 6.3       Continue Zosyn  Continue azithromycin  Supportive care  Local care of sacral ulcer  Offloading  Oxygen as needed  High protein diet    Monitor temperature and WBC  Long-term plan is 7 days of antibiotic therapy, stop date 5/26/2025, can de-escalate to Augmentin upon discharge       Rehana Hagen, APRN-CNP

## 2025-05-24 NOTE — NURSING NOTE
Bladder scan completed, >600mL. Barnard catheter to be inserted and maintained per Dr. Mcneil,vbr.

## 2025-05-24 NOTE — CARE PLAN
Problem: Pain - Adult  Goal: Verbalizes/displays adequate comfort level or baseline comfort level  Outcome: Progressing     Problem: Safety - Adult  Goal: Free from fall injury  Outcome: Progressing     Problem: Discharge Planning  Goal: Discharge to home or other facility with appropriate resources  Outcome: Progressing     Problem: Chronic Conditions and Co-morbidities  Goal: Patient's chronic conditions and co-morbidity symptoms are monitored and maintained or improved  Outcome: Progressing     Problem: Nutrition  Goal: Nutrient intake appropriate for maintaining nutritional needs  Outcome: Progressing     Problem: Skin  Goal: Decreased wound size/increased tissue granulation at next dressing change  Outcome: Progressing  Goal: Participates in plan/prevention/treatment measures  Outcome: Progressing  Goal: Prevent/manage excess moisture  Outcome: Progressing  Goal: Prevent/minimize sheer/friction injuries  Outcome: Progressing  Goal: Promote/optimize nutrition  Outcome: Progressing  Goal: Promote skin healing  Outcome: Progressing     Problem: Respiratory  Goal: Clear secretions with interventions this shift  Outcome: Progressing  Goal: Minimize anxiety/maximize coping throughout shift  Outcome: Progressing  Goal: Minimal/no exertional discomfort or dyspnea this shift  Outcome: Progressing  Goal: No signs of respiratory distress (eg. Use of accessory muscles. Peds grunting)  Outcome: Progressing  Goal: Patent airway maintained this shift  Outcome: Progressing  Goal: Tolerate mechanical ventilation evidenced by VS/agitation level this shift  Outcome: Progressing  Goal: Tolerate pulmonary toileting this shift  Outcome: Progressing  Goal: Verbalize decreased shortness of breath this shift  Outcome: Progressing  Goal: Wean oxygen to maintain O2 saturation per order/standard this shift  Outcome: Progressing  Goal: Increase self care and/or family involvement in next 24 hours  Outcome: Progressing     Problem:  Fall/Injury  Goal: Not fall by end of shift  Outcome: Progressing  Goal: Be free from injury by end of the shift  Outcome: Progressing  Goal: Verbalize understanding of personal risk factors for fall in the hospital  Outcome: Progressing  Goal: Verbalize understanding of risk factor reduction measures to prevent injury from fall in the home  Outcome: Progressing  Goal: Use assistive devices by end of the shift  Outcome: Progressing  Goal: Pace activities to prevent fatigue by end of the shift  Outcome: Progressing

## 2025-05-24 NOTE — PROGRESS NOTES
Karly Horton is a 80 y.o. female on day 6 of admission presenting with Generalized weakness.    Subjective   Patient was seen and examined.  Lying in the bed.  Comfortable.  Patient is still complaining of pain in the back       Objective     Physical Exam  HEENT:  Head externally atraumatic,  extraocular movements intact, oral mucosa moist  Neck: Torticollis, no JVP, no palpable adenopathy or thyromegaly.  No carotid bruit.  Chest:  Clear to auscultation and resonant.  Patient is tachypneic.  Heart:  Regular rate and rhythm, no murmur or gallop could be appreciated.  Patient is tachycardic.  Abdomen:  Soft, nontender, bowel sounds present, normoactive, no palpable hepatosplenomegaly.  Extremities:  No edema, pulses present, no cyanosis or clubbing.  Kyphoscoliosis and arthritis of multiple joints.  CNS:  Patient alert, oriented to time, place and person.    No new deficit.  Cranial nerves 2-12 grossly intact  Skin:  No active rash.  Musculoskeletal:  No  apparent joint swelling or erythema, range of movement normal.  Last Recorded Vitals  Heart Rate:  []   Temp:  [36.2 °C (97.2 °F)-36.8 °C (98.2 °F)]   Resp:  [16-26]   BP: ()/(52-70)   Weight:  [50.5 kg (111 lb 6.4 oz)]   SpO2:  [92 %-100 %]     Intake/Output last 3 Shifts:  I/O last 3 completed shifts:  In: 681.3 (21.4 mL/kg) [P.O.:440; I.V.:41.3 (1.3 mL/kg); IV Piggyback:200]  Out: 1750 (55 mL/kg) [Urine:1750 (1.5 mL/kg/hr)]  Dosing Weight: 31.8 kg     Relevant Results  No results found for the last 90 days.    Results for orders placed or performed during the hospital encounter of 05/18/25 (from the past 24 hours)   POCT GLUCOSE   Result Value Ref Range    POCT Glucose 129 (H) 74 - 99 mg/dL   Magnesium   Result Value Ref Range    Magnesium 1.49 (L) 1.60 - 2.40 mg/dL   CBC and Auto Differential   Result Value Ref Range    WBC 6.5 4.4 - 11.3 x10*3/uL    nRBC 0.0 0.0 - 0.0 /100 WBCs    RBC 3.23 (L) 4.00 - 5.20 x10*6/uL    Hemoglobin 10.1 (L) 12.0 -  16.0 g/dL    Hematocrit 32.5 (L) 36.0 - 46.0 %     (H) 80 - 100 fL    MCH 31.3 26.0 - 34.0 pg    MCHC 31.1 (L) 32.0 - 36.0 g/dL    RDW 14.3 11.5 - 14.5 %    Platelets 182 150 - 450 x10*3/uL    Neutrophils % 86.1 40.0 - 80.0 %    Immature Granulocytes %, Automated 1.4 (H) 0.0 - 0.9 %    Lymphocytes % 2.0 13.0 - 44.0 %    Monocytes % 9.7 2.0 - 10.0 %    Eosinophils % 0.6 0.0 - 6.0 %    Basophils % 0.2 0.0 - 2.0 %    Neutrophils Absolute 5.62 (H) 1.60 - 5.50 x10*3/uL    Immature Granulocytes Absolute, Automated 0.09 0.00 - 0.50 x10*3/uL    Lymphocytes Absolute 0.13 (L) 0.80 - 3.00 x10*3/uL    Monocytes Absolute 0.63 0.05 - 0.80 x10*3/uL    Eosinophils Absolute 0.04 0.00 - 0.40 x10*3/uL    Basophils Absolute 0.01 0.00 - 0.10 x10*3/uL   Renal function panel   Result Value Ref Range    Glucose 151 (H) 74 - 99 mg/dL    Sodium 133 (L) 136 - 145 mmol/L    Potassium 2.9 (LL) 3.5 - 5.3 mmol/L    Chloride 87 (L) 98 - 107 mmol/L    Bicarbonate 43 (HH) 21 - 32 mmol/L    Anion Gap <7 (L) 10 - 20 mmol/L    Urea Nitrogen 22 6 - 23 mg/dL    Creatinine 0.30 (L) 0.50 - 1.05 mg/dL    eGFR >90 >60 mL/min/1.73m*2    Calcium 8.9 8.6 - 10.3 mg/dL    Phosphorus 2.5 2.5 - 4.9 mg/dL    Albumin 2.3 (L) 3.4 - 5.0 g/dL   POCT GLUCOSE   Result Value Ref Range    POCT Glucose 159 (H) 74 - 99 mg/dL   POCT GLUCOSE   Result Value Ref Range    POCT Glucose 119 (H) 74 - 99 mg/dL   POCT GLUCOSE   Result Value Ref Range    POCT Glucose 149 (H) 74 - 99 mg/dL      Current Medications[1]   Assessment/Plan   Assessment & Plan  Generalized weakness  Bilateral pleural effusion  Acute on chronic respiratory failure  COPD exacerbation  History of lung cancer  Severe protein calorie malnutrition  Hyponatremia  Leukocytosis  Sacral and coccygeal decubitus ulcer  Leukocytosis    Plan: The patient was admitted to complaint of shortness of breath and respiratory failure.  The patient has deteriorated.  Patient has bilateral pleural effusion indicating acute on  chronic diastolic congestive heart failure.  Patient also has a developing right lower lobe pneumonia.  Patient could have aspiration pneumonia.  Patient has leukocytosis now with a white cell count of 14.7.  Patient has been started on the broad-spectrum antibiotics.  Check sputum culture and blood culture.  ID and pulmonary consult obtained.  Give aerosol treatment with albuterol and Atrovent.  Patient on high flow oxygen.  Patient is thus desaturating.  The patient's PO2 is 56% per respiratory therapist.  The patient is tachypneic and slowly deteriorating.  The patient will be transferred to ICU for further management.  Ordered to transfer the patient to ICU has been placed and a call has been made to the ICU attending to discuss the case.  Discussed with the daughter of the patient.  Poor prognosis has been explained to her.  The daughter wants the patient to be DNR CCA.  Okay to transfer to the ICU and intubation if needed.    Acute respiratory failure with hypoxia    Hyponatremia    History of lung cancer    Acute hypoxemic respiratory failure  Acute on chronic hypoxic respiratory failure   Pneumonia  Moderate right, mild to moderate left pleural effusions  Squamous cell lung carcinoma status post chemo-radiation - Keytruda  COPD   History of tobacco use / vape use  Leukocytosis, multifactorial - resolving  Stage III/IV sacral decubitus ulcer  Pressure ulcer risk  Severe protein calorie malnutrition   Hyponatremia  Hypokalemia  Metabolic alkalosis  Normocytic anemia  Chronic pain  Spinal stenosis  Scoliosis  GERD  Vitamin d deficiency  Fall risk    Plan: Continue current medications.  Supportive care.  Patient still hypokalemic.  Replete potassium.  Replete magnesium.  Continue IV antibiotics.  Overall prognosis poor.  Will continue to golf-hole, aspiration, decubitus, DVT precaution.  Monitor heart rate while potassium is being repleted.           Lupillo Mcneil MD           [1]   Current  Facility-Administered Medications:     acetaminophen (Tylenol) tablet 650 mg, 650 mg, oral, TID, Tim Granados PA-C, 650 mg at 05/24/25 1532    alteplase (Cathflo Activase) injection 2 mg, 2 mg, intra-catheter, PRN, Tim Granados PA-C    baclofen (Lioresal) tablet 10 mg, 10 mg, oral, TID, Tim Granados PA-C, 10 mg at 05/24/25 1532    benzocaine-menthol (Cepastat Sore Throat) lozenge 1 lozenge, 1 lozenge, Mouth/Throat, q2h PRN, Tim Granados PA-C    butalbital-acetaminophen-caff -40 mg per tablet 1 tablet, 1 tablet, oral, q6h PRN, Tim Granados PA-C, 1 tablet at 05/19/25 2148    cholecalciferol (Vitamin D-3) tablet 125 mcg, 125 mcg, oral, Daily, Tim Granados PA-C, 125 mcg at 05/24/25 0815    collagenase 250 unit/gram ointment, , Topical, Daily, Tim Granados PA-C, Given at 05/24/25 0817    dextromethorphan-guaifenesin (Robitussin DM)  mg/5 mL oral liquid 5 mL, 5 mL, oral, q4h PRN, Tim Granados PA-C    dextrose 50 % injection 12.5 g, 12.5 g, intravenous, q15 min PRN, Tim Granados PA-C    dextrose 50 % injection 25 g, 25 g, intravenous, q15 min PRN, Tim Granados PA-C    diazePAM (Valium) tablet 5 mg, 5 mg, oral, Daily, Tim Granados PA-C, 5 mg at 05/24/25 0815    enoxaparin (Lovenox) syringe 30 mg, 30 mg, subcutaneous, Daily, Tim Granados PA-C, 30 mg at 05/24/25 0814    glucagon (Glucagen) injection 1 mg, 1 mg, intramuscular, q15 min PRN, Tim Granados PA-C    glucagon (Glucagen) injection 1 mg, 1 mg, intramuscular, q15 min PRN, Tim Granados PA-C    guaiFENesin (Mucinex) 12 hr tablet 600 mg, 600 mg, oral, q12h PRN, Tim Granados PA-C    guaiFENesin (Mucinex) 12 hr tablet 600 mg, 600 mg, oral, BID PRN, Tim Granados PA-C    insulin lispro injection 0-5 Units, 0-5 Units, subcutaneous, TID AC, Tim Granados PA-C, 1 Units at 05/24/25 0812    ipratropium-albuteroL (Duo-Neb) 0.5-2.5 mg/3 mL nebulizer solution 3 mL, 3 mL, nebulization, q2h PRN, Lupillo OSULLIVAN  MD Tarun, 3 mL at 05/24/25 1732    ipratropium-albuteroL (Duo-Neb) 0.5-2.5 mg/3 mL nebulizer solution 3 mL, 3 mL, nebulization, 4x daily, Lupillo Mcneil MD    labetaloL (Normodyne,Trandate) injection 10 mg, 10 mg, intravenous, q4h PRN, Tim Granados PA-C    lactulose 20 gram/30 mL oral solution 20 g, 20 g, oral, Daily, Shea Root, APRN-CNP, 20 g at 05/23/25 2101    lidocaine (Xylocaine) 5 % ointment 1 Application, 1 Application, Topical, TID, Tim Granados PA-C, 1 Application at 05/24/25 1536    lubricating eye drops ophthalmic solution 1 drop, 1 drop, Both Eyes, PRN, Tim Granados PA-C    magnesium sulfate 2 g in sterile water for injection 50 mL, 2 g, intravenous, Once, Trinoe Jose Daniel Natarajan MD    melatonin tablet 3 mg, 3 mg, oral, Nightly, Tim Granados PA-C, 3 mg at 05/23/25 2056    mupirocin (Bactroban) 2 % ointment 1 Application, 1 Application, Topical, BID, Tim Granados PA-C, 1 Application at 05/24/25 0817    nicotine (Nicoderm CQ) 21 mg/24 hr patch 1 patch, 1 patch, transdermal, Daily, 1 patch at 05/24/25 0816 **FOLLOWED BY** [START ON 7/1/2025] nicotine (Nicoderm CQ) 14 mg/24 hr patch 1 patch, 1 patch, transdermal, Daily **FOLLOWED BY** [START ON 7/15/2025] nicotine (Nicoderm CQ) 7 mg/24 hr patch 1 patch, 1 patch, transdermal, Daily, Tim Granados PA-C    ondansetron (Zofran) tablet 8 mg, 8 mg, oral, q8h PRN, Tim Granados PA-C    oxygen (O2) therapy, , inhalation, Continuous - Inhalation, Lupillo Mcneil MD    pantoprazole (ProtoNix) EC tablet 40 mg, 40 mg, oral, Daily before breakfast, Tim Granados PA-C, 40 mg at 05/24/25 0550    piperacillin-tazobactam (Zosyn) 4.5 g in dextrose (iso)  mL, 4.5 g, intravenous, q6h, Tim Granados PA-C, Stopped at 05/24/25 1620    polyethylene glycol (Glycolax, Miralax) packet 17 g, 17 g, oral, Daily PRN, Tim Granados PA-C    polyethylene glycol (Glycolax, Miralax) packet 17 g, 17 g, oral, TID, Tim Granados PA-C,  17 g at 05/23/25 2056    prochlorperazine (Compazine) tablet 10 mg, 10 mg, oral, q6h PRN, Tim Granados PA-C    sennosides-docusate sodium (Annette-Colace) 8.6-50 mg per tablet 2 tablet, 2 tablet, oral, BID, Tim Granados PA-C, 2 tablet at 05/23/25 2057    sodium hypochlorite (Dakin's Quarter Strength) 0.125 % external solution, , irrigation, Daily PRN, Tim Granados PA-C, Given at 05/24/25 0816    sucralfate (Carafate) suspension 1 g, 1 g, oral, q6h [place], Tim Granados PA-C, 1 g at 05/24/25 1726

## 2025-05-25 ENCOUNTER — APPOINTMENT (OUTPATIENT)
Dept: RADIOLOGY | Facility: HOSPITAL | Age: 80
DRG: 177 | End: 2025-05-25
Payer: MEDICARE

## 2025-05-25 VITALS
BODY MASS INDEX: 25.06 KG/M2 | HEART RATE: 114 BPM | TEMPERATURE: 96.8 F | WEIGHT: 111.4 LBS | DIASTOLIC BLOOD PRESSURE: 68 MMHG | OXYGEN SATURATION: 92 % | HEIGHT: 56 IN | RESPIRATION RATE: 18 BRPM | SYSTOLIC BLOOD PRESSURE: 128 MMHG

## 2025-05-25 LAB
ALBUMIN SERPL BCP-MCNC: 2.4 G/DL (ref 3.4–5)
ANION GAP SERPL CALCULATED.3IONS-SCNC: <7 MMOL/L (ref 10–20)
BASOPHILS # BLD AUTO: 0.01 X10*3/UL (ref 0–0.1)
BASOPHILS NFR BLD AUTO: 0.1 %
BUN SERPL-MCNC: 18 MG/DL (ref 6–23)
CALCIUM SERPL-MCNC: 8.4 MG/DL (ref 8.6–10.3)
CHLORIDE SERPL-SCNC: 90 MMOL/L (ref 98–107)
CO2 SERPL-SCNC: 40 MMOL/L (ref 21–32)
CREAT SERPL-MCNC: 0.29 MG/DL (ref 0.5–1.05)
EGFRCR SERPLBLD CKD-EPI 2021: >90 ML/MIN/1.73M*2
EOSINOPHIL # BLD AUTO: 0.04 X10*3/UL (ref 0–0.4)
EOSINOPHIL NFR BLD AUTO: 0.5 %
ERYTHROCYTE [DISTWIDTH] IN BLOOD BY AUTOMATED COUNT: 14.6 % (ref 11.5–14.5)
GLUCOSE BLD MANUAL STRIP-MCNC: 111 MG/DL (ref 74–99)
GLUCOSE BLD MANUAL STRIP-MCNC: 113 MG/DL (ref 74–99)
GLUCOSE BLD MANUAL STRIP-MCNC: 128 MG/DL (ref 74–99)
GLUCOSE BLD MANUAL STRIP-MCNC: 134 MG/DL (ref 74–99)
GLUCOSE SERPL-MCNC: 86 MG/DL (ref 74–99)
HCT VFR BLD AUTO: 30.3 % (ref 36–46)
HGB BLD-MCNC: 9.6 G/DL (ref 12–16)
IMM GRANULOCYTES # BLD AUTO: 0.22 X10*3/UL (ref 0–0.5)
IMM GRANULOCYTES NFR BLD AUTO: 2.6 % (ref 0–0.9)
LYMPHOCYTES # BLD AUTO: 0.13 X10*3/UL (ref 0.8–3)
LYMPHOCYTES NFR BLD AUTO: 1.5 %
MAGNESIUM SERPL-MCNC: 1.68 MG/DL (ref 1.6–2.4)
MCH RBC QN AUTO: 31.4 PG (ref 26–34)
MCHC RBC AUTO-ENTMCNC: 31.7 G/DL (ref 32–36)
MCV RBC AUTO: 99 FL (ref 80–100)
MONOCYTES # BLD AUTO: 0.83 X10*3/UL (ref 0.05–0.8)
MONOCYTES NFR BLD AUTO: 9.8 %
NEUTROPHILS # BLD AUTO: 7.28 X10*3/UL (ref 1.6–5.5)
NEUTROPHILS NFR BLD AUTO: 85.5 %
NRBC BLD-RTO: 0 /100 WBCS (ref 0–0)
PHOSPHATE SERPL-MCNC: 2 MG/DL (ref 2.5–4.9)
PLATELET # BLD AUTO: 178 X10*3/UL (ref 150–450)
POTASSIUM SERPL-SCNC: 3.7 MMOL/L (ref 3.5–5.3)
RBC # BLD AUTO: 3.06 X10*6/UL (ref 4–5.2)
SODIUM SERPL-SCNC: 132 MMOL/L (ref 136–145)
WBC # BLD AUTO: 8.5 X10*3/UL (ref 4.4–11.3)

## 2025-05-25 PROCEDURE — 83735 ASSAY OF MAGNESIUM: CPT | Performed by: INTERNAL MEDICINE

## 2025-05-25 PROCEDURE — 2500000001 HC RX 250 WO HCPCS SELF ADMINISTERED DRUGS (ALT 637 FOR MEDICARE OP)

## 2025-05-25 PROCEDURE — 2500000002 HC RX 250 W HCPCS SELF ADMINISTERED DRUGS (ALT 637 FOR MEDICARE OP, ALT 636 FOR OP/ED)

## 2025-05-25 PROCEDURE — 2500000005 HC RX 250 GENERAL PHARMACY W/O HCPCS: Performed by: INTERNAL MEDICINE

## 2025-05-25 PROCEDURE — 2500000004 HC RX 250 GENERAL PHARMACY W/ HCPCS (ALT 636 FOR OP/ED): Mod: JZ | Performed by: STUDENT IN AN ORGANIZED HEALTH CARE EDUCATION/TRAINING PROGRAM

## 2025-05-25 PROCEDURE — 2500000002 HC RX 250 W HCPCS SELF ADMINISTERED DRUGS (ALT 637 FOR MEDICARE OP, ALT 636 FOR OP/ED): Performed by: INTERNAL MEDICINE

## 2025-05-25 PROCEDURE — 71045 X-RAY EXAM CHEST 1 VIEW: CPT | Performed by: STUDENT IN AN ORGANIZED HEALTH CARE EDUCATION/TRAINING PROGRAM

## 2025-05-25 PROCEDURE — 2500000001 HC RX 250 WO HCPCS SELF ADMINISTERED DRUGS (ALT 637 FOR MEDICARE OP): Performed by: INTERNAL MEDICINE

## 2025-05-25 PROCEDURE — 2500000004 HC RX 250 GENERAL PHARMACY W/ HCPCS (ALT 636 FOR OP/ED): Mod: JZ

## 2025-05-25 PROCEDURE — 82947 ASSAY GLUCOSE BLOOD QUANT: CPT

## 2025-05-25 PROCEDURE — 94640 AIRWAY INHALATION TREATMENT: CPT

## 2025-05-25 PROCEDURE — S4991 NICOTINE PATCH NONLEGEND: HCPCS

## 2025-05-25 PROCEDURE — 94668 MNPJ CHEST WALL SBSQ: CPT

## 2025-05-25 PROCEDURE — 2060000001 HC INTERMEDIATE ICU ROOM DAILY

## 2025-05-25 PROCEDURE — 80069 RENAL FUNCTION PANEL: CPT

## 2025-05-25 PROCEDURE — 2500000005 HC RX 250 GENERAL PHARMACY W/O HCPCS

## 2025-05-25 PROCEDURE — 99233 SBSQ HOSP IP/OBS HIGH 50: CPT | Performed by: STUDENT IN AN ORGANIZED HEALTH CARE EDUCATION/TRAINING PROGRAM

## 2025-05-25 PROCEDURE — 71045 X-RAY EXAM CHEST 1 VIEW: CPT

## 2025-05-25 PROCEDURE — 85025 COMPLETE CBC W/AUTO DIFF WBC: CPT

## 2025-05-25 RX ORDER — POTASSIUM CHLORIDE 1.5 G/1.58G
40 POWDER, FOR SOLUTION ORAL DAILY PRN
Status: DISCONTINUED | OUTPATIENT
Start: 2025-05-25 | End: 2025-05-25

## 2025-05-25 RX ORDER — POTASSIUM CHLORIDE 29.8 MG/ML
40 INJECTION INTRAVENOUS DAILY PRN
Status: DISCONTINUED | OUTPATIENT
Start: 2025-05-25 | End: 2025-06-01

## 2025-05-25 RX ORDER — HYDROXYZINE HYDROCHLORIDE 25 MG/1
25 TABLET, FILM COATED ORAL ONCE
Status: COMPLETED | OUTPATIENT
Start: 2025-05-25 | End: 2025-05-25

## 2025-05-25 RX ORDER — POTASSIUM CHLORIDE 1.5 G/1.58G
40 POWDER, FOR SOLUTION ORAL ONCE
Status: COMPLETED | OUTPATIENT
Start: 2025-05-25 | End: 2025-05-25

## 2025-05-25 RX ADMIN — IPRATROPIUM BROMIDE AND ALBUTEROL SULFATE 3 ML: 2.5; .5 SOLUTION RESPIRATORY (INHALATION) at 11:53

## 2025-05-25 RX ADMIN — ACETAMINOPHEN 650 MG: 325 TABLET ORAL at 20:18

## 2025-05-25 RX ADMIN — SUCRALFATE ORAL SUSPENSION 1 G: 1 SUSPENSION ORAL at 23:54

## 2025-05-25 RX ADMIN — LIDOCAINE 1 APPLICATION: 50 OINTMENT TOPICAL at 20:20

## 2025-05-25 RX ADMIN — COLLAGENASE SANTYL: 250 OINTMENT TOPICAL at 08:16

## 2025-05-25 RX ADMIN — SUCRALFATE ORAL SUSPENSION 1 G: 1 SUSPENSION ORAL at 01:05

## 2025-05-25 RX ADMIN — NICOTINE 1 PATCH: 21 PATCH, EXTENDED RELEASE TRANSDERMAL at 08:15

## 2025-05-25 RX ADMIN — IPRATROPIUM BROMIDE AND ALBUTEROL SULFATE 3 ML: 2.5; .5 SOLUTION RESPIRATORY (INHALATION) at 15:03

## 2025-05-25 RX ADMIN — HYDROXYZINE HYDROCHLORIDE 25 MG: 25 TABLET, FILM COATED ORAL at 04:02

## 2025-05-25 RX ADMIN — LIDOCAINE 1 APPLICATION: 50 OINTMENT TOPICAL at 08:16

## 2025-05-25 RX ADMIN — SUCRALFATE ORAL SUSPENSION 1 G: 1 SUSPENSION ORAL at 17:00

## 2025-05-25 RX ADMIN — BACLOFEN 10 MG: 10 TABLET ORAL at 08:16

## 2025-05-25 RX ADMIN — ACETAMINOPHEN 650 MG: 325 TABLET ORAL at 08:15

## 2025-05-25 RX ADMIN — BACLOFEN 10 MG: 10 TABLET ORAL at 20:18

## 2025-05-25 RX ADMIN — MUPIROCIN 1 APPLICATION: 20 OINTMENT TOPICAL at 20:20

## 2025-05-25 RX ADMIN — Medication 125 MCG: at 08:15

## 2025-05-25 RX ADMIN — PIPERACILLIN SODIUM AND TAZOBACTAM SODIUM 4.5 G: 4; .5 INJECTION, SOLUTION INTRAVENOUS at 14:09

## 2025-05-25 RX ADMIN — PIPERACILLIN SODIUM AND TAZOBACTAM SODIUM 4.5 G: 4; .5 INJECTION, SOLUTION INTRAVENOUS at 08:19

## 2025-05-25 RX ADMIN — POTASSIUM CHLORIDE 40 MEQ: 1.5 POWDER, FOR SOLUTION ORAL at 14:27

## 2025-05-25 RX ADMIN — LIDOCAINE 1 APPLICATION: 50 OINTMENT TOPICAL at 14:37

## 2025-05-25 RX ADMIN — PIPERACILLIN SODIUM AND TAZOBACTAM SODIUM 3.38 G: 3; .375 INJECTION, SOLUTION INTRAVENOUS at 20:18

## 2025-05-25 RX ADMIN — SUCRALFATE ORAL SUSPENSION 1 G: 1 SUSPENSION ORAL at 12:26

## 2025-05-25 RX ADMIN — DAKIN'S SOLUTION 0.125% (QUARTER STRENGTH): 0.12 SOLUTION at 08:16

## 2025-05-25 RX ADMIN — ACETAMINOPHEN 650 MG: 325 TABLET ORAL at 14:09

## 2025-05-25 RX ADMIN — IPRATROPIUM BROMIDE AND ALBUTEROL SULFATE 3 ML: 2.5; .5 SOLUTION RESPIRATORY (INHALATION) at 07:54

## 2025-05-25 RX ADMIN — DIAZEPAM 5 MG: 5 TABLET ORAL at 08:15

## 2025-05-25 RX ADMIN — IPRATROPIUM BROMIDE AND ALBUTEROL SULFATE 3 ML: 2.5; .5 SOLUTION RESPIRATORY (INHALATION) at 05:20

## 2025-05-25 RX ADMIN — PIPERACILLIN SODIUM AND TAZOBACTAM SODIUM 4.5 G: 4; .5 INJECTION, SOLUTION INTRAVENOUS at 01:05

## 2025-05-25 RX ADMIN — Medication 2 L/MIN: at 19:33

## 2025-05-25 RX ADMIN — Medication 3 L/MIN: at 07:56

## 2025-05-25 RX ADMIN — MUPIROCIN 1 APPLICATION: 20 OINTMENT TOPICAL at 08:16

## 2025-05-25 RX ADMIN — BACLOFEN 10 MG: 10 TABLET ORAL at 14:09

## 2025-05-25 RX ADMIN — ENOXAPARIN SODIUM 30 MG: 100 INJECTION SUBCUTANEOUS at 08:15

## 2025-05-25 RX ADMIN — SUCRALFATE ORAL SUSPENSION 1 G: 1 SUSPENSION ORAL at 06:22

## 2025-05-25 RX ADMIN — IPRATROPIUM BROMIDE AND ALBUTEROL SULFATE 3 ML: 2.5; .5 SOLUTION RESPIRATORY (INHALATION) at 19:33

## 2025-05-25 RX ADMIN — MELATONIN 3 MG: 3 TAB ORAL at 20:18

## 2025-05-25 RX ADMIN — PANTOPRAZOLE SODIUM 40 MG: 40 TABLET, DELAYED RELEASE ORAL at 06:22

## 2025-05-25 ASSESSMENT — COGNITIVE AND FUNCTIONAL STATUS - GENERAL
DAILY ACTIVITIY SCORE: 14
MOVING FROM LYING ON BACK TO SITTING ON SIDE OF FLAT BED WITH BEDRAILS: A LITTLE
MOVING TO AND FROM BED TO CHAIR: A LOT
EATING MEALS: A LITTLE
DRESSING REGULAR LOWER BODY CLOTHING: A LOT
MOBILITY SCORE: 10
DRESSING REGULAR UPPER BODY CLOTHING: A LOT
TURNING FROM BACK TO SIDE WHILE IN FLAT BAD: A LOT
TOILETING: A LOT
STANDING UP FROM CHAIR USING ARMS: TOTAL
WALKING IN HOSPITAL ROOM: TOTAL
CLIMB 3 TO 5 STEPS WITH RAILING: TOTAL
PERSONAL GROOMING: A LITTLE
HELP NEEDED FOR BATHING: A LOT

## 2025-05-25 ASSESSMENT — PAIN - FUNCTIONAL ASSESSMENT: PAIN_FUNCTIONAL_ASSESSMENT: 0-10

## 2025-05-25 ASSESSMENT — PAIN SCALES - GENERAL: PAINLEVEL_OUTOF10: 0 - NO PAIN

## 2025-05-25 NOTE — ASSESSMENT & PLAN NOTE
Acute on chronic hypoxic respiratory failure   Pneumonia  Moderate right, mild to moderate left pleural effusions  Squamous cell lung carcinoma status post chemo-radiation - Keytruda  COPD   History of tobacco use / vape use  Leukocytosis, multifactorial - resolving  Stage III/IV sacral decubitus ulcer  Pressure ulcer risk  Severe protein calorie malnutrition   Hyponatremia  Hypokalemia  Metabolic alkalosis  Normocytic anemia  Chronic pain  Spinal stenosis  Scoliosis  GERD  Vitamin d deficiency  Fall risk    Plan: Continue current medications.  Supportive care.  Patient still hypokalemic.  Replete potassium.  Replete magnesium.  Continue IV antibiotics.  Overall prognosis poor.  Will continue to golf-hole, aspiration, decubitus, DVT precaution.  Monitor heart rate while potassium is being repleted.    Date of service: 5/5/2025    Plan: Continue current medication.  Patient had acute hypoxic respiratory failure.  Patient is on 6 L oxygen.  Continue same.  Give supportive care.  Continue antibiotic per ID.  Encourage p.o. intake.  Monitor pulse ox.  Leukocytosis resolved.  Patient has a drop in hemoglobin hematocrit.  That is multifactorial.  The patient has proximal A-fib with RVR.  Heart rate is under control.  Hypokalemia has been repleted.  Monitor for now.  Monitor for now.  Blood sugars are under control.  Overall prognosis guarded.

## 2025-05-25 NOTE — CARE PLAN
Problem: Pain - Adult  Goal: Verbalizes/displays adequate comfort level or baseline comfort level  Outcome: Progressing     Problem: Safety - Adult  Goal: Free from fall injury  Outcome: Progressing     Problem: Discharge Planning  Goal: Discharge to home or other facility with appropriate resources  Outcome: Progressing     Problem: Chronic Conditions and Co-morbidities  Goal: Patient's chronic conditions and co-morbidity symptoms are monitored and maintained or improved  Outcome: Progressing     Problem: Nutrition  Goal: Nutrient intake appropriate for maintaining nutritional needs  Outcome: Progressing     Problem: Skin  Goal: Decreased wound size/increased tissue granulation at next dressing change  Outcome: Progressing  Flowsheets (Taken 5/24/2025 2316)  Decreased wound size/increased tissue granulation at next dressing change:   Promote sleep for wound healing   Protective dressings over bony prominences  Goal: Participates in plan/prevention/treatment measures  Outcome: Progressing  Flowsheets (Taken 5/24/2025 2316)  Participates in plan/prevention/treatment measures:   Discuss with provider PT/OT consult   Elevate heels   Increase activity/out of bed for meals  Goal: Prevent/manage excess moisture  Outcome: Progressing  Flowsheets (Taken 5/24/2025 2316)  Prevent/manage excess moisture:   Cleanse incontinence/protect with barrier cream   Monitor for/manage infection if present   Moisturize dry skin  Goal: Prevent/minimize sheer/friction injuries  Outcome: Progressing  Flowsheets (Taken 5/24/2025 2316)  Prevent/minimize sheer/friction injuries:   Increase activity/out of bed for meals   HOB 30 degrees or less   Turn/reposition every 2 hours/use positioning/transfer devices   Use pull sheet  Goal: Promote/optimize nutrition  Outcome: Progressing  Flowsheets (Taken 5/24/2025 2316)  Promote/optimize nutrition:   Consume > 50% meals/supplements   Offer water/supplements/favorite foods   Monitor/record intake  including meals   Assist with feeding  Goal: Promote skin healing  Outcome: Progressing  Flowsheets (Taken 5/24/2025 2316)  Promote skin healing:   Turn/reposition every 2 hours/use positioning/transfer devices   Protective dressings over bony prominences   Assess skin/pad under line(s)/device(s)     Problem: Respiratory  Goal: Clear secretions with interventions this shift  Outcome: Progressing  Goal: Minimize anxiety/maximize coping throughout shift  Outcome: Progressing  Goal: Minimal/no exertional discomfort or dyspnea this shift  Outcome: Progressing  Goal: No signs of respiratory distress (eg. Use of accessory muscles. Peds grunting)  Outcome: Progressing  Goal: Patent airway maintained this shift  Outcome: Progressing  Goal: Tolerate mechanical ventilation evidenced by VS/agitation level this shift  Outcome: Progressing  Goal: Tolerate pulmonary toileting this shift  Outcome: Progressing  Goal: Verbalize decreased shortness of breath this shift  Outcome: Progressing  Goal: Wean oxygen to maintain O2 saturation per order/standard this shift  Outcome: Progressing  Goal: Increase self care and/or family involvement in next 24 hours  Outcome: Progressing     Problem: Fall/Injury  Goal: Not fall by end of shift  Outcome: Progressing  Goal: Be free from injury by end of the shift  Outcome: Progressing  Goal: Verbalize understanding of personal risk factors for fall in the hospital  Outcome: Progressing  Goal: Verbalize understanding of risk factor reduction measures to prevent injury from fall in the home  Outcome: Progressing  Goal: Use assistive devices by end of the shift  Outcome: Progressing  Goal: Pace activities to prevent fatigue by end of the shift  Outcome: Progressing

## 2025-05-25 NOTE — NURSING NOTE
Dressings changed on wounds. CHG bath done. New sheets applied. Patient had vanilla ice cream.     0215 contacted provider on obtaining prn order for pain.   0245  patient stated she was hungry, provided patient with tv dinner patient ate most of the macaroni and cheese.  0312 spoke with provider obtained an order for one time dose of vistaril 25 mg.  0524 respiratory contacted per patient request for breathing treatment.

## 2025-05-25 NOTE — PROGRESS NOTES
H+P within last 30 days reviewed.  No changes     Pulmonary Medicine Progress Note    Admitted on:     5/18/2025  Length of Stay: 7 day(s)     Interval History     79-year-old female with a history of squamous cell lung carcinoma (Stage 3B, with a T4 classification (due to multiple nodules in ipsilateral lung lobes) and N2 (due to subcarinal lymph node involvement).), heart failure with preserved ejection fraction (HFpEF), and chronic obstructive pulmonary disease (COPD), who was admitted on 5/18/2025 with worsening dypnea on exertion.     Cancer treatment so far includes systemic chemotherapy with carboplatin, paclitaxel and pembro [id number] x 4 (11/12/24 - 1/3/25), hypofractionated RT (completed 3/31/25) amd consolidation immunotherapy with pembro ([id number] last dose on 4/11/25).     Patient presented to the ED after EMS transport with complaints of progressive shortness of breath (SOB) for several weeks, acutely worsening in the past few days, and failure to thrive. She was admitted five weeks ago for a similar presentation and treated for a COPD exacerbation, before being discharged home on 2L nasal cannula (NC) oxygen at her request.    Currently, she reports worsening SOB at rest and has become bedbound. She states this episode feels more severe than previous COPD exacerbations. On evaluation in the ED, she was found to have acute-on-chronic hyponatremia, leukocytosis, elevated CRP (14), and a chest X-ray showing increased patchy infiltrates, more pronounced in the right lung. A CT scan showed gas in a sacral decubitus ulcer.    Patient became acutely hypoxemic on the RNF, requiring high-flow nasal cannula (HFNC) oxygen. ABG: PH of 7.51, pCO2 of 55, pO2 of 56, and a lactate level of 2.4.      Pulmonary consulted by ICU Team Dr Spring to assist with management.    Cancer treatment history:  C#1 chemo started on 11/12/24. Tolerated first dose very well. Mild N, no Diarrhea or constipation or vomiting  C#2 chemo given on 12/2/24. Diarrhea mild which has resolved  with Imodium. Hair loss. Otherwise no N/V. She has much better appetite  12/16/24: There was a solitary noncalcified nodule in LLL, biopsy was recommended to rule out second primary or contralateral lung metastases. Lung biopsy was scheduled for 12/16/24, but it could not be done because the nodule could not be seen, so procedure is cancelled  01/13/25: C#4 chemoIO planned today. Tolerating very well. No N/V/D/C. Will start RT soon.   01/27/25: MRI brain: TASHA. PET CT is TASHA.   03/31/25: completed consolidation RT  04/011/25: consolidation Keytruda q 6 weeks is started. Restaging PET CT is planned for 06/09/25.    5/21: Doing very well, currently on 3L nasal oxygen  Net output -2.2 L since admission    5/23: Currently on 4L nasal oxygen, appears comfortable.     5/24: On 3 L  nasal oxygen, feeling a bit tired today    5/25: On 2L nasal oxygen    Objective   Objective     Vitals:    05/24/25 0444   Weight: 50.5 kg (111 lb 6.4 oz)   Body mass index is 24.98 kg/m².        5/25/2025     1:36 AM 5/25/2025     3:33 AM 5/25/2025     5:20 AM 5/25/2025     7:12 AM 5/25/2025     9:00 AM 5/25/2025    11:55 AM 5/25/2025     3:06 PM   Vitals   Systolic 114 126  134  131 121   Diastolic 65 73  75  75 78   BP Location Left arm Left arm  Left arm  Left arm Left arm   Heart Rate   98 105 71 106 111   Temp 36.4 °C (97.5 °F) 36.5 °C (97.7 °F)  36.7 °C (98.1 °F)  36.4 °C (97.5 °F) 36.6 °C (97.9 °F)   Resp 18 17  17  17 17        Vent settings:       Intake/Output Summary (Last 24 hours) at 5/25/2025 1854  Last data filed at 5/25/2025 1701  Gross per 24 hour   Intake 1180 ml   Output 1890 ml   Net -710 ml       Physical Exam  Constitutional:       Comments: Frail, cachectic    Eyes:      Pupils: PERRL  Cardiovascular:      Rate and Rhythm: normal  Pulmonary:      Effort: No resp distress     Breath sounds: No stridor. No wheezing, rhonchi or rales.   Abdominal:      General: Abdomen is flat. There is no distension.      Palpations:  Abdomen is soft.      Tenderness: There is no abdominal tenderness.   Musculoskeletal:      Right lower leg: No edema.      Left lower leg: No edema.   Neurological:      Mental Status: She is alert and oriented to person, place, and time. Mental status is at baseline.     Medications     Scheduled Medications:   Scheduled Medications[1]   Continuous Medications:   Continuous Medications[2]   PRN Medications:     Labs     Results from last 72 hours   Lab Units 05/25/25  0427 05/24/25  0604 05/23/25  0439   GLUCOSE mg/dL 86 151* 106*   SODIUM mmol/L 132* 133* 134*   POTASSIUM mmol/L 3.7 2.9* 3.0*   CHLORIDE mmol/L 90* 87* 86*   CO2 mmol/L 40* 43* 44*   BUN mg/dL 18 22 11   CREATININE mg/dL 0.29* 0.30* 0.27*   EGFR mL/min/1.73m*2 >90 >90 >90   CALCIUM mg/dL 8.4* 8.9 8.8   ALBUMIN g/dL 2.4* 2.3* 2.5*   MAGNESIUM mg/dL 1.68 1.49* 1.84   PHOSPHORUS mg/dL 2.0* 2.5 3.4                   Results from last 72 hours   Lab Units 05/25/25  0427 05/24/25  0604 05/23/25  0439   WBC AUTO x10*3/uL 8.5 6.5 8.3   NRBC AUTO /100 WBCs 0.0 0.0 0.0   RBC AUTO x10*6/uL 3.06* 3.23* 3.72*   HEMOGLOBIN g/dL 9.6* 10.1* 11.7*   HEMATOCRIT % 30.3* 32.5* 37.5   MCV fL 99 101* 101*   MCH pg 31.4 31.3 31.5   MCHC g/dL 31.7* 31.1* 31.2*   RDW % 14.6* 14.3 14.6*   PLATELETS AUTO x10*3/uL 178 182 192           Lab Results   Component Value Date    BLOODCULT No growth at 4 days -  FINAL REPORT 05/18/2025    BLOODCULT No growth at 4 days -  FINAL REPORT 05/18/2025    GRAMSTAIN (A) 04/30/2025     Gram stain indicates specimen contains significant salivary contamination.     Lab Results   Component Value Date    URINECULTURE >100,000 Escherichia coli (A) 10/11/2024       Imaging and Diagnostic Studies     Lab/Radiology/Diagnostic Review:  Results for orders placed or performed during the hospital encounter of 05/18/25 (from the past 24 hours)   POCT GLUCOSE   Result Value Ref Range    POCT Glucose 145 (H) 74 - 99 mg/dL   CBC and Auto Differential    Result Value Ref Range    WBC 8.5 4.4 - 11.3 x10*3/uL    nRBC 0.0 0.0 - 0.0 /100 WBCs    RBC 3.06 (L) 4.00 - 5.20 x10*6/uL    Hemoglobin 9.6 (L) 12.0 - 16.0 g/dL    Hematocrit 30.3 (L) 36.0 - 46.0 %    MCV 99 80 - 100 fL    MCH 31.4 26.0 - 34.0 pg    MCHC 31.7 (L) 32.0 - 36.0 g/dL    RDW 14.6 (H) 11.5 - 14.5 %    Platelets 178 150 - 450 x10*3/uL    Neutrophils % 85.5 40.0 - 80.0 %    Immature Granulocytes %, Automated 2.6 (H) 0.0 - 0.9 %    Lymphocytes % 1.5 13.0 - 44.0 %    Monocytes % 9.8 2.0 - 10.0 %    Eosinophils % 0.5 0.0 - 6.0 %    Basophils % 0.1 0.0 - 2.0 %    Neutrophils Absolute 7.28 (H) 1.60 - 5.50 x10*3/uL    Immature Granulocytes Absolute, Automated 0.22 0.00 - 0.50 x10*3/uL    Lymphocytes Absolute 0.13 (L) 0.80 - 3.00 x10*3/uL    Monocytes Absolute 0.83 (H) 0.05 - 0.80 x10*3/uL    Eosinophils Absolute 0.04 0.00 - 0.40 x10*3/uL    Basophils Absolute 0.01 0.00 - 0.10 x10*3/uL   Renal function panel   Result Value Ref Range    Glucose 86 74 - 99 mg/dL    Sodium 132 (L) 136 - 145 mmol/L    Potassium 3.7 3.5 - 5.3 mmol/L    Chloride 90 (L) 98 - 107 mmol/L    Bicarbonate 40 (HH) 21 - 32 mmol/L    Anion Gap <7 (L) 10 - 20 mmol/L    Urea Nitrogen 18 6 - 23 mg/dL    Creatinine 0.29 (L) 0.50 - 1.05 mg/dL    eGFR >90 >60 mL/min/1.73m*2    Calcium 8.4 (L) 8.6 - 10.3 mg/dL    Phosphorus 2.0 (L) 2.5 - 4.9 mg/dL    Albumin 2.4 (L) 3.4 - 5.0 g/dL   Magnesium   Result Value Ref Range    Magnesium 1.68 1.60 - 2.40 mg/dL   POCT GLUCOSE   Result Value Ref Range    POCT Glucose 128 (H) 74 - 99 mg/dL   POCT GLUCOSE   Result Value Ref Range    POCT Glucose 111 (H) 74 - 99 mg/dL   POCT GLUCOSE   Result Value Ref Range    POCT Glucose 113 (H) 74 - 99 mg/dL     Imaging  XR chest 1 view  Result Date: 5/25/2025  1. Dense airspace disease at the right lung similar to the prior. Left basilar airspace disease as well. Please correlate with prior cross-sectional imaging for differential. Continued follow-up advised       MACRO:  None   Signed by: Ambrosio Bess 5/25/2025 11:24 AM Dictation workstation:   [id number]    XR chest 1 view  Result Date: 5/23/2025  1. Generalized increased interstitial prominence as seen on the prior examination suggesting component of interstitial edema.   2. Persistent right perihilar and basilar consolidation with mild improved aeration of the right lung base.     MACRO: None   Signed by: Jin Schmidt 5/23/2025 8:06 AM Dictation workstation:   [id number]    XR chest 1 view  Result Date: 5/22/2025  No change in the extensive right-sided pulmonary infiltrate and right effusion.   MACRO: none   Signed by: Clemente Vergara 5/22/2025 9:56 AM Dictation workstation:   [id number]    XR chest 1 view  Result Date: 5/21/2025  Right perihilar and basilar infiltrate consistent with pneumonia, unchanged compared to the prior study. Pulmonary vascular congestion is also suspected along with small bilateral effusions, unchanged.   MACRO: none   Signed by: Clemente Vergara 5/21/2025 8:03 AM Dictation workstation:   [id number]    CT chest wo IV contrast  Result Date: 5/20/2025  1. Right lower lobar collapse with air bronchograms and endobronchial opacification of the right lower lobe and bronchus intermedius. Correlate with aspiration pneumonias a potential etiology. Of note, patchy consolidation also demonstrated in the right middle lobe and in particular posterior segment of the right upper lobe as well as left upper lobe. Findings suggest multilobar pneumonia. 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   2. Moderate right and mild-to-moderate left pleural effusions.     MACRO: None   Signed by: Jin Schmidt 5/20/2025 11:56 AM Dictation workstation:   [id number]    XR chest 1 view  Result Date: 5/20/2025  Improved interstitial prominence with stable small left pleural effusion.   Worsening infiltrate with airspace consolidation now seen  in the right upper lobe with right pleural effusion increased in size.   Signed by: Hanane Fine 5/20/2025 7:53 AM Dictation workstation:   [id number]    CT head wo IV contrast  Result Date: 5/20/2025  No acute intracranial abnormality.     MACRO: None   Signed by: Kanika Avina 5/20/2025 12:31 AM Dictation workstation:   [id number]      Cardiology, Vascular, and Other Imaging  No other imaging results found for the past 7 days         Assessment / Plan       PROBLEM LIST:  - Acute HYPOXIC respiratory failure sec to multifocal pneumonia. Aspiration PNA, high on the differential given retained food/debris in the proximal esophagus. Immune mediated pneumonitis less likely given lobar distribution of consolidative opacities.  - Immunocompromised state  - Bilateral pleural effusions  - Appears euvolemic on exam, but left sided heart failure (HFPEF) may be contributing     MANAGEMENT PLAN:  - Recommend getting a palliative consult. I briefly spoke to her about CPR and patient seemed to understand that it may not bee beneficial in her case especially given her advanced frailty and co morbidities.   - Thoracentesis on the rt side was planned but given  symptomatic and clinical improvement with lasix and antibiotics, can defer.   - Resp culture  - Appreciate ID recs  - Empiric Abx for now (Zosyn, MRSA neg). Can stop 5/26  - 3% saline nebs  - Titrate to SpO2> 92%  - Patient on 2L nasal oxygen at home, likely will need oxygen titration prior to discharge     I have personally interviewed and examined the patient.  I have personally verified elements of the exam listed above. Interval changes or irregularities are as noted.  I have personally and independently reviewed laboratory, radiographic and procedural data.  I have personally reviewed the problem list above and concur. Changes, if any, are noted.  I have personally reviewed the plan list above and concur. Changes, if any, are noted.           Landen Natarajan MD           [1] acetaminophen, 650 mg, oral, TID  baclofen, 10 mg, oral, TID  cholecalciferol, 125 mcg, oral, Daily  collagenase, , Topical, Daily  diazePAM, 5 mg, oral, Daily  enoxaparin, 30 mg, subcutaneous, Daily  insulin lispro, 0-5 Units, subcutaneous, TID AC  ipratropium-albuteroL, 3 mL, nebulization, 4x daily  lactulose, 20 g, oral, Daily  lidocaine, 1 Application, Topical, TID  melatonin, 3 mg, oral, Nightly  mupirocin, 1 Application, Topical, BID  nicotine, 1 patch, transdermal, Daily   Followed by  [START ON 7/1/2025] nicotine, 1 patch, transdermal, Daily   Followed by  [START ON 7/15/2025] nicotine, 1 patch, transdermal, Daily  oxygen, , inhalation, Continuous - Inhalation  pantoprazole, 40 mg, oral, Daily before breakfast  piperacillin-tazobactam, 3.375 g, intravenous, q6h  sennosides-docusate sodium, 2 tablet, oral, BID  sucralfate, 1 g, oral, q6h [place]     [2]

## 2025-05-25 NOTE — PROGRESS NOTES
05/25/25 0909   Discharge Planning   Expected Discharge Disposition Home H   Does the patient need discharge transport arranged? Yes   RoundTrip coordination needed? Yes     Patient is planned discharge home with [facility], external referral needed.     [facility] will also supply hospital bed which is needed prior to discharge.   Lacey from [facility] has all the information needed, just waiting update on anticipate discharge to coordinate deliver.      Discharge plan NOT secure  DO NOT DC without speaking to care coordination

## 2025-05-25 NOTE — NURSING NOTE
aware of patient's potassium of 3.7. Per Dr. Mcneil, ordered po potassium to be given at this time,vbr.

## 2025-05-25 NOTE — CARE PLAN
Problem: Respiratory  Goal: Clear secretions with interventions this shift  Outcome: Progressing  Goal: Minimal/no exertional discomfort or dyspnea this shift  Outcome: Progressing  Goal: Wean oxygen to maintain O2 saturation per order/standard this shift  Outcome: Progressing

## 2025-05-25 NOTE — PROGRESS NOTES
Karly Horton is a 80 y.o. female on day 7 of admission presenting with Generalized weakness.    Subjective   Afebrile, no chills  Reports feeling okay  Interval decrease 3 liters nasal cannula  Denies shortness of breath or cough  Nonproductive cough   No new complains       Objective   Range of Vitals (last 24 hours)  Heart Rate:  []   Temp:  [36.4 °C (97.5 °F)-36.8 °C (98.2 °F)]   Resp:  [17-24]   BP: (113-134)/(57-75)   SpO2:  [95 %-100 %]   Daily Weight  05/24/25 : 50.5 kg (111 lb 6.4 oz)    Body mass index is 24.98 kg/m².    Physical Exam  Constitutional:       Appearance: Normal appearance.   HENT:      Head: Normocephalic and atraumatic.      Nose: Nose normal.   Eyes:      Extraocular Movements: Extraocular movements intact.      Conjunctiva/sclera: Conjunctivae normal.   Cardiovascular:      Rate and Rhythm: Normal rate.   Pulmonary:      Effort: Pulmonary effort is normal.   Abdominal:      General: There is no distension.   Musculoskeletal:      Cervical back: Normal range of motion.   Skin:     General: Skin is warm and dry.      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      Mental Status: She is alert.   Psychiatric:         Mood and Affect: Mood normal.           Antibiotics  mupirocin - 2 %, 2 %  piperacillin-tazobactam - 4.5 gram/100 mL    Relevant Results  Labs  Results from last 72 hours   Lab Units 05/25/25  0427 05/24/25  0604 05/23/25  0439   WBC AUTO x10*3/uL 8.5 6.5 8.3   HEMOGLOBIN g/dL 9.6* 10.1* 11.7*   HEMATOCRIT % 30.3* 32.5* 37.5   PLATELETS AUTO x10*3/uL 178 182 192   NEUTROS PCT AUTO % 85.5 86.1 90.0   LYMPHS PCT AUTO % 1.5 2.0 1.2   MONOS PCT AUTO % 9.8 9.7 7.1   EOS PCT AUTO % 0.5 0.6 0.4     Results from last 72 hours   Lab Units 05/25/25  0427 05/24/25  0604 05/23/25  0439   SODIUM mmol/L 132*  133* 134*   POTASSIUM mmol/L 3.7 2.9* 3.0*   CHLORIDE mmol/L 90* 87* 86*   CO2 mmol/L 40* 43* 44*   BUN mg/dL 18 22 11   CREATININE mg/dL 0.29* 0.30* 0.27*   GLUCOSE mg/dL 86 151* 106*   CALCIUM mg/dL 8.4* 8.9 8.8   ANION GAP mmol/L <7* <7* 7*   EGFR mL/min/1.73m*2 >90 >90 >90   PHOSPHORUS mg/dL 2.0* 2.5 3.4     Results from last 72 hours   Lab Units 05/25/25  0427 05/24/25  0604 05/23/25  0439   ALBUMIN g/dL 2.4* 2.3* 2.5*     Estimated Creatinine Clearance: 100.6 mL/min (A) (by C-G formula based on SCr of 0.29 mg/dL (L)).  C-Reactive Protein   Date Value Ref Range Status   05/18/2025 14.44 (H) <1.00 mg/dL Final     Microbiology  MRSA PCR negative  Legionella antigen negative  Blood cultures negative   Imaging  XR chest 1 view  Result Date: 5/22/2025  Interpreted By:  Clemente Vergara, STUDY: XR CHEST 1 VIEW; 5/22/2025 5:33 am   INDICATION: CLINICAL INFORMATION: Signs/Symptoms:acute hypoxemic respiratory failure.   COMPARISON: 05/21/2025   ACCESSION NUMBER(S): [id number]   ORDERING CLINICIAN: CLIF MENA   TECHNIQUE: Portable chest one view.   FINDINGS: The cardiac silhouette is quite prominent suggesting cardiomegaly. Patient is significantly rotated to the right. Large patchy infiltration is present within the right hemithorax. This is most marked centrally. MediPort catheter overlies the right hemithorax with the tip overlying the SVC right atrial junction. Right pleural effusion is suspected.   No definite infiltrates or effusions are appreciated on the left on the current exam.       No change in the extensive right-sided pulmonary infiltrate and right effusion.   MACRO: none   Signed by: Clemente Vergara 5/22/2025 9:56 AM Dictation workstation:   [id number]    XR chest 1 view  Result Date: 5/21/2025  Interpreted By:  Clemente Vergara, STUDY: XR CHEST 1 VIEW; 5/21/2025 5:20 am   INDICATION: CLINICAL INFORMATION: Signs/Symptoms:acute hypoxemic respiratory failure.   COMPARISON: 05/20/2025   ACCESSION NUMBER(S):  [id number]   ORDERING CLINICIAN: CLIF MENA   TECHNIQUE: Portable chest one view.   FINDINGS: The cardiac size is indeterminate in view of the AP projection. There is no change in the right-sided MediPort catheter. There is no change in the right-sided infiltrate. The pulmonary vasculature is slightly indistinct suggesting mild pulmonary vascular congestion. Small bilateral pleural effusions are present.       Right perihilar and basilar infiltrate consistent with pneumonia, unchanged compared to the prior study. Pulmonary vascular congestion is also suspected along with small bilateral effusions, unchanged.   MACRO: none   Signed by: Clemente Vergara 5/21/2025 8:03 AM Dictation workstation:   [id number]    CT chest wo IV contrast  Result Date: 5/20/2025  Interpreted By:  Jin Schmidt, STUDY: CT CHEST WO IV CONTRAST; ;  5/20/2025 11:37 am   INDICATION: Signs/Symptoms:acute hypoxemic respiratory failure, h/o SCC lung CA.     COMPARISON: 03/05/2025   ACCESSION NUMBER(S): [id number]   ORDERING CLINICIAN: CLIF MENA   TECHNIQUE: Serial axial unenhanced CT images obtained of the chest. Images reformatted in the coronal and sagittal projection.   All CT examinations are performed with 1 or more of the following dose reduction techniques: Automated exposure control, adjustment of mA and/or kv according to patient's size, or use of iterative reconstruction techniques.   FINDINGS: Mediastinum demonstrates no significant lymphadenopathy. Esophagus demonstrates component of gas-filled appearance component of retained food contents demonstrated within the stomach. Correlate with symptoms reflux.   Heart and great vessels demonstrate ascending thoracic aorta to measure 2.9 cm. There is moderate vascular calcification of the thoracic aorta. No cardiomegaly.   Lung parenchyma demonstrates moderate right and mild-to-moderate left-sided pleural effusions. There is basilar compressive atelectasis with right lower lobar  collapse. Endobronchial opacification of the atelectatic right lower lobe as well as the bronchus intermedius. Correlate with aspiration as a potential etiology versus retained secretions. There is septal thickening and patchy consolidation within the right middle lobe as well as right upper lobe in particular of the posterior segment with component of air bronchograms. Left lung demonstrates scattered areas of patchy infiltrate in the left upper lobe.   Included portions visualized abdomen are unremarkable   Visualized osseous structures demonstrate S shaped scoliosis. Multilevel degenerative discogenic changes are demonstrated multilevel endplate sclerosis as well as anterior and lateral osteophyte formation. No compression deformity.               1. Right lower lobar collapse with air bronchograms and endobronchial opacification of the right lower lobe and bronchus intermedius. Correlate with aspiration pneumonias a potential etiology. Of note, patchy consolidation also demonstrated in the right middle lobe and in particular posterior segment of the right upper lobe as well as left upper lobe. Findings suggest multilobar pneumonia. 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   2. Moderate right and mild-to-moderate left pleural effusions.     MACRO: None   Signed by: Jin Schmidt 5/20/2025 11:56 AM Dictation workstation:   [id number]    XR chest 1 view  Result Date: 5/20/2025  Interpreted By:  Hanane Fine, STUDY: XR CHEST 1 VIEW 5/20/2025 5:21 am   INDICATION: Signs/Symptoms:acute hypoxemic respiratory failure   COMPARISON: 05/18/2025   ACCESSION NUMBER(S): [id number]   ORDERING CLINICIAN: CLIF MENA   TECHNIQUE: AP semi-erect view of the chest at bedside   FINDINGS: The MediPort catheter terminates within the SVC. The cardiac size is stable with atherosclerosis of the thoracic aorta noted.   When compared with the study  from 2 days earlier, there is worsening infiltrate and airspace consolidation within the right lung particularly in the right upper lobe with right pleural effusion increased in size.   Small left pleural effusion is present. The interstitial prominence observed 2 days earlier has improved however.   Levoscoliosis of the thoracic spine is present.       Improved interstitial prominence with stable small left pleural effusion.   Worsening infiltrate with airspace consolidation now seen in the right upper lobe with right pleural effusion increased in size.   Signed by: Hanane Fine 5/20/2025 7:53 AM Dictation workstation:   [id number]    CT head wo IV contrast  Result Date: 5/20/2025  Interpreted By:  Kanika Avina, STUDY: CT HEAD WO IV CONTRAST;  5/20/2025 12:06 am   INDICATION: Signs/Symptoms:unequal pupils.   COMPARISON: MRI brain 10/18/2024   ACCESSION NUMBER(S): [id number]   ORDERING CLINICIAN: JANET HALEY   TECHNIQUE: Axial noncontrast CT images of the head.   FINDINGS: BRAIN PARENCHYMA:  deep and periventricular white matter hypodensities are nonspecific, but favored to represent chronic small vessel ischemic changes.  Gray-white matter interfaces are preserved. No mass effect or midline shift.   HEMORRHAGE: No acute intracranial hemorrhage. VENTRICLES and EXTRA-AXIAL SPACES: The ventricles and sulci are within normal limits in size for brain volume. No abnormal extraaxial fluid collection. EXTRACRANIAL SOFT TISSUES: Within normal limits. PARANASAL SINUSES/MASTOIDS:  The visualized paranasal sinuses and mastoid air cells are aerated. CALVARIUM: No depressed skull fracture. No destructive osseous lesion.   OTHER FINDINGS: None.       No acute intracranial abnormality.     MACRO: None   Signed by: Kanika Avina 5/20/2025 12:31 AM Dictation workstation:   [id number]    XR chest 1 view  Result Date: 5/18/2025  Interpreted By:  Lenka Cartagena, STUDY: XR CHEST 1 VIEW;  5/18/2025 6:36 pm   INDICATION:  Signs/Symptoms:Generalized weakness.   COMPARISON: Chest x-ray 04/29/2025   ACCESSION NUMBER(S): [id number]   ORDERING CLINICIAN: JAIME CHILDERS   FINDINGS: Right-sided MediPort terminates in the SVC. Multiple overlying leads are present.   CARDIOMEDIASTINAL SILHOUETTE: Cardiomediastinal silhouette is stable in size and configuration. Atherosclerotic calcification of the aorta.   LUNGS: Bilateral interstitial prominence. There is right middle and lower lobe patchy airspace opacities. Layering bilateral effusions are better seen on concurrent CT. No pneumothorax.   ABDOMEN: No remarkable upper abdominal findings.   BONES: Levoconvex scoliosis. Generalized diffuse osteopenia. Multilevel degenerative changes of the spine.       There is patchy airspace opacity in the right mid and lower lung concerning for developing multifocal pneumonia. Continued radiographic follow-up to resolution is advised.   Cardiomegaly with mild interstitial prominence suggestive of developing interstitial edema/CHF.   Bilateral pleural effusions are better seen on concurrent CT.   MACRO: None   Signed by: Lenka Cartagena 5/18/2025 7:06 PM Dictation workstation:   [id number]    CT abdomen pelvis wo IV contrast  Result Date: 5/18/2025  Interpreted By:  Lenka Cartagena, STUDY: CT ABDOMEN PELVIS WO IV CONTRAST;  5/18/2025 5:51 pm   INDICATION: Signs/Symptoms:Sacral wound.   COMPARISON: CT scan of the abdomen pelvis 02/14/2025   ACCESSION NUMBER(S): [id number]   ORDERING CLINICIAN: JAIME CHILDERS   TECHNIQUE: Axial noncontrast CT images of the abdomen and pelvis with coronal and sagittal reconstructed images.   FINDINGS:   LOWER CHEST: Moderate bilateral pleural effusions. There are patchy airspace opacities and consolidation in the right lung. Aortic root and coronary artery calcifications noted. Right-sided MediPort terminates in the SVC.   ABDOMEN: Lack of intravenous contrast limits evaluation of vessels and solid organs. LIVER: Within normal  limits. BILE DUCTS: Normal caliber. GALLBLADDER: No calcified gallstones. No wall thickening. PANCREAS: Suboptimally visualized due to lack of contrast and paucity of intra-abdominal fat SPLEEN: Within normal limits.  Calcified splenic granulomas noted. ADRENALS: Nodular thickening of the adrenal glands may relate to hyperplasia or adenomatous change.   KIDNEYS, URETERS, URINARY BLADDER:  Kidneys are symmetric in size without evidence for calculi, hydronephrosis, hydroureter or perinephric inflammatory changes.   No bladder calculi or wall thickening.  Marked distention of the urinary bladder.   VESSELS:  Calcific atherosclerosis of the aortoiliac and branch vessels with moderate tortuosity. No aortic aneurysm. RETROPERITONEUM: No pathologically enlarged lymph nodes.   PELVIS:   REPRODUCTIVE ORGANS: Uterus and ovaries are not well visualized.   BOWEL: Postsurgical changes of partial colectomy. No dilated bowel. Appendix is not identified with certainty. No pericecal inflammatory change is seen. No pneumatosis or portal venous gas. PERITONEUM: Paucity of intra-abdominal fat. Mild diffuse haziness of the mesentery. ABDOMINAL WALL: Diffuse body wall edema. There is soft tissue defect overlying the distal sacrum and coccyx. Mild adjacent soft tissue stranding with a tiny locule of air in the right gluteal subcutaneous fat. A discrete drainable fluid collection is not identified. Findings concerning for sacral decubitus ulcer. BONES: Generalized diffuse osteopenia. Multilevel degenerative changes of the spine. Reverse s shaped scoliosis. Great 1 anterolisthesis are L3 on 4 and L5 on S1 with grade 1-2 anterolisthesis of L4 on 5.       There is soft tissue defect overlying the distal sacrum and coccyx with mild soft tissue stranding. A tiny locule of air is present in the right gluteal subcutaneous fat, without evidence for discrete drainable fluid collection. Findings concerning for sacral decubitus ulcer. Correlate with  exam.   Moderate bilateral pleural effusions. There are patchy airspace opacities and consolidation in the right lung concerning for developing multifocal pneumonia. Clinical correlation continued radiographic follow-up to resolution is advised.   Marked distention of the urinary bladder. Correlate with symptomatology to exclude urinary retention.   Diffuse body wall edema.   Additional findings as described above.   MACRO: None   Signed by: Lenka Cartagena 5/18/2025 6:31 PM Dictation workstation:   [id number]    ECG 12 Lead  Result Date: 5/5/2025  Sinus tachycardia with frequent Premature ventricular complexes Left anterior fascicular block ST & T wave abnormality, consider inferior ischemia Abnormal ECG T wave inversion now evident in Lateral leads Confirmed by Everardo Webster ([id number]) on 5/5/2025 4:08:58 PM    XR chest 2 views  Result Date: 4/29/2025  STUDY: Chest Radiographs;  4/29/2025 4:37PM INDICATION: Shortness of breath. COMPARISON: 3/5/2025 CT CTA Chest. ACCESSION NUMBER(S): [id number] ORDERING CLINICIAN: RONALD BLANTON TECHNIQUE:  Frontal and lateral chest. FINDINGS: CARDIOMEDIASTINAL SILHOUETTE: Heart is mildly enlarged with pulmonary vascular congestion and small bilateral pleural effusions.  LUNGS: Lungs are clear.  ABDOMEN: No remarkable upper abdominal findings.  BONES: No acute osseous changes.    Cardiomegaly and pulmonary vascular congestion with small bilateral pleural effusions. Signed by Ronal Isaac MD        Assessment/Plan   Acute hypoxic respiratory failure,  resolving, on 6LNC   Squamous cell lung cancer status post chemoradiation, on Keytruda  Negative Mycoplasma,histoplasma, Galactomannan and Fungitell   Leukocytosis, multifactorial-resolving  History of COPD  Stage III/IV sacral decubitus ulcer  Severe malnutrition-prealbumin 6.3       Continue Zosyn  Supportive care  Local care of sacral ulcer  Offloading  Oxygen as needed  High protein diet   Monitor temperature and WBC  Long-term plan  is 7 days of antibiotic therapy, stop date 5/26/2025, can de-escalate to Augmentin upon discharge       Rehana Hagen, APRN-CNP

## 2025-05-25 NOTE — PROGRESS NOTES
Karly Horton is a 80 y.o. female on day 7 of admission presenting with Generalized weakness.    Subjective   The patient was seen and examined.  Lying in the bed.  Comfortable.  No acute [place].  The patient denies any headache or dizziness.  The patient still has cough but no expectoration.       Objective     Physical Exam  HEENT:  Head externally atraumatic,  extraocular movements intact, oral mucosa moist  Neck:  Supple, no JVP, no palpable adenopathy or thyromegaly.  No carotid bruit.  Chest: Bilateral faint crackles and decreased breath sounds.  Heart: Irregularly irregular rate and rhythm, no murmur or gallop could be appreciated.  Patient has off-and-on A-fib with RVR  Abdomen:  Soft, nontender, bowel sounds present, normoactive, no palpable hepatosplenomegaly.  Extremities:  No edema, pulses present, no cyanosis, but clubbing present.  Patient has contracture of multiple joints.  CNS:  Patient alert, oriented to time, place and person.    No new deficit.  Cranial nerves 2-12 grossly intact  Skin:  No active rash.  Musculoskeletal:  No  apparent joint swelling or erythema, range of movement normal.  Last Recorded Vitals  Heart Rate:  []   Temp:  [36.4 °C (97.5 °F)-36.8 °C (98.2 °F)]   Resp:  [17-24]   BP: (113-134)/(57-75)   SpO2:  [95 %-100 %]     Intake/Output last 3 Shifts:  I/O last 3 completed shifts:  In: 1180 (37.1 mL/kg) [P.O.:580; IV Piggyback:600]  Out: 2465 (77.5 mL/kg) [Urine:2065 (1.8 mL/kg/hr); Stool:400]  Dosing Weight: 31.8 kg     Relevant Results  No results found for the last 90 days.    Results for orders placed or performed during the hospital encounter of 05/18/25 (from the past 24 hours)   POCT GLUCOSE   Result Value Ref Range    POCT Glucose 149 (H) 74 - 99 mg/dL   POCT GLUCOSE   Result Value Ref Range    POCT Glucose 145 (H) 74 - 99 mg/dL   CBC and Auto Differential   Result Value Ref Range    WBC 8.5 4.4 - 11.3 x10*3/uL    nRBC 0.0 0.0 - 0.0 /100 WBCs    RBC 3.06 (L)  4.00 - 5.20 x10*6/uL    Hemoglobin 9.6 (L) 12.0 - 16.0 g/dL    Hematocrit 30.3 (L) 36.0 - 46.0 %    MCV 99 80 - 100 fL    MCH 31.4 26.0 - 34.0 pg    MCHC 31.7 (L) 32.0 - 36.0 g/dL    RDW 14.6 (H) 11.5 - 14.5 %    Platelets 178 150 - 450 x10*3/uL    Neutrophils % 85.5 40.0 - 80.0 %    Immature Granulocytes %, Automated 2.6 (H) 0.0 - 0.9 %    Lymphocytes % 1.5 13.0 - 44.0 %    Monocytes % 9.8 2.0 - 10.0 %    Eosinophils % 0.5 0.0 - 6.0 %    Basophils % 0.1 0.0 - 2.0 %    Neutrophils Absolute 7.28 (H) 1.60 - 5.50 x10*3/uL    Immature Granulocytes Absolute, Automated 0.22 0.00 - 0.50 x10*3/uL    Lymphocytes Absolute 0.13 (L) 0.80 - 3.00 x10*3/uL    Monocytes Absolute 0.83 (H) 0.05 - 0.80 x10*3/uL    Eosinophils Absolute 0.04 0.00 - 0.40 x10*3/uL    Basophils Absolute 0.01 0.00 - 0.10 x10*3/uL   Renal function panel   Result Value Ref Range    Glucose 86 74 - 99 mg/dL    Sodium 132 (L) 136 - 145 mmol/L    Potassium 3.7 3.5 - 5.3 mmol/L    Chloride 90 (L) 98 - 107 mmol/L    Bicarbonate 40 (HH) 21 - 32 mmol/L    Anion Gap <7 (L) 10 - 20 mmol/L    Urea Nitrogen 18 6 - 23 mg/dL    Creatinine 0.29 (L) 0.50 - 1.05 mg/dL    eGFR >90 >60 mL/min/1.73m*2    Calcium 8.4 (L) 8.6 - 10.3 mg/dL    Phosphorus 2.0 (L) 2.5 - 4.9 mg/dL    Albumin 2.4 (L) 3.4 - 5.0 g/dL   Magnesium   Result Value Ref Range    Magnesium 1.68 1.60 - 2.40 mg/dL   POCT GLUCOSE   Result Value Ref Range    POCT Glucose 128 (H) 74 - 99 mg/dL   POCT GLUCOSE   Result Value Ref Range    POCT Glucose 111 (H) 74 - 99 mg/dL      Current Medications[1]   Assessment/Plan   Assessment & Plan  Generalized weakness  Bilateral pleural effusion  Acute on chronic respiratory failure  COPD exacerbation  History of lung cancer  Severe protein calorie malnutrition  Hyponatremia  Leukocytosis  Sacral and coccygeal decubitus ulcer  Leukocytosis    Plan: The patient was admitted to complaint of shortness of breath and respiratory failure.  The patient has deteriorated.  Patient has  bilateral pleural effusion indicating acute on chronic diastolic congestive heart failure.  Patient also has a developing right lower lobe pneumonia.  Patient could have aspiration pneumonia.  Patient has leukocytosis now with a white cell count of 14.7.  Patient has been started on the broad-spectrum antibiotics.  Check sputum culture and blood culture.  ID and pulmonary consult obtained.  Give aerosol treatment with albuterol and Atrovent.  Patient on high flow oxygen.  Patient is thus desaturating.  The patient's PO2 is 56% per respiratory therapist.  The patient is tachypneic and slowly deteriorating.  The patient will be transferred to ICU for further management.  Ordered to transfer the patient to ICU has been placed and a call has been made to the ICU attending to discuss the case.  Discussed with the daughter of the patient.  Poor prognosis has been explained to her.  The daughter wants the patient to be DNR CCA.  Okay to transfer to the ICU and intubation if needed.    Acute respiratory failure with hypoxia    Hyponatremia    History of lung cancer    Acute hypoxemic respiratory failure  Acute on chronic hypoxic respiratory failure   Pneumonia  Moderate right, mild to moderate left pleural effusions  Squamous cell lung carcinoma status post chemo-radiation - Keytruda  COPD   History of tobacco use / vape use  Leukocytosis, multifactorial - resolving  Stage III/IV sacral decubitus ulcer  Pressure ulcer risk  Severe protein calorie malnutrition   Hyponatremia  Hypokalemia  Metabolic alkalosis  Normocytic anemia  Chronic pain  Spinal stenosis  Scoliosis  GERD  Vitamin d deficiency  Fall risk    Plan: Continue current medications.  Supportive care.  Patient still hypokalemic.  Replete potassium.  Replete magnesium.  Continue IV antibiotics.  Overall prognosis poor.  Will continue to golf-hole, aspiration, decubitus, DVT precaution.  Monitor heart rate while potassium is being repleted.    Date of service:  5/5/2025    Plan: Continue current medication.  Patient had acute hypoxic respiratory failure.  Patient is on 6 L oxygen.  Continue same.  Give supportive care.  Continue antibiotic per ID.  Encourage p.o. intake.  Monitor pulse ox.  Leukocytosis resolved.  Patient has a drop in hemoglobin hematocrit.  That is multifactorial.  The patient has proximal A-fib with RVR.  Heart rate is under control.  Hypokalemia has been repleted.  Monitor for now.  Monitor for now.  Blood sugars are under control.  Overall prognosis guarded.           Lupillo Mcneil MD            [1]   Current Facility-Administered Medications:     acetaminophen (Tylenol) tablet 650 mg, 650 mg, oral, TID, Tim Granados PA-C, 650 mg at 05/25/25 0815    alteplase (Cathflo Activase) injection 2 mg, 2 mg, intra-catheter, PRN, Tim Granados PA-C    baclofen (Lioresal) tablet 10 mg, 10 mg, oral, TID, Tim Granados PA-C, 10 mg at 05/25/25 0816    benzocaine-menthol (Cepastat Sore Throat) lozenge 1 lozenge, 1 lozenge, Mouth/Throat, q2h PRN, Tim Granados PA-C    butalbital-acetaminophen-caff -40 mg per tablet 1 tablet, 1 tablet, oral, q6h PRN, Tim Granados PA-C, 1 tablet at 05/19/25 2148    cholecalciferol (Vitamin D-3) tablet 125 mcg, 125 mcg, oral, Daily, Tim Granados PA-C, 125 mcg at 05/25/25 0815    collagenase 250 unit/gram ointment, , Topical, Daily, Tim Granados PA-C, Given at 05/25/25 0816    dextromethorphan-guaifenesin (Robitussin DM)  mg/5 mL oral liquid 5 mL, 5 mL, oral, q4h PRN, Tim Granados PA-C    dextrose 50 % injection 12.5 g, 12.5 g, intravenous, q15 min PRN, Tim Granados PA-C    dextrose 50 % injection 25 g, 25 g, intravenous, q15 min PRN, Tim Granados PA-C    diazePAM (Valium) tablet 5 mg, 5 mg, oral, Daily, Tim Granados PA-C, 5 mg at 05/25/25 0815    enoxaparin (Lovenox) syringe 30 mg, 30 mg, subcutaneous, Daily, Tim Granados PA-C, 30 mg at 05/25/25 0815    glucagon (Glucagen)  injection 1 mg, 1 mg, intramuscular, q15 min PRN, Tim Granados PA-C    glucagon (Glucagen) injection 1 mg, 1 mg, intramuscular, q15 min PRN, Tim Granados PA-C    guaiFENesin (Mucinex) 12 hr tablet 600 mg, 600 mg, oral, q12h PRN, Tim Granados PA-C    guaiFENesin (Mucinex) 12 hr tablet 600 mg, 600 mg, oral, BID PRN, Tim Granados PA-C    insulin lispro injection 0-5 Units, 0-5 Units, subcutaneous, TID AC, Tim Granados PA-C, 1 Units at 05/24/25 0812    ipratropium-albuteroL (Duo-Neb) 0.5-2.5 mg/3 mL nebulizer solution 3 mL, 3 mL, nebulization, q2h PRN, Lupillo Mcneil MD, 3 mL at 05/25/25 0520    ipratropium-albuteroL (Duo-Neb) 0.5-2.5 mg/3 mL nebulizer solution 3 mL, 3 mL, nebulization, 4x daily, Lupillo Mcneil MD, 3 mL at 05/25/25 1153    labetaloL (Normodyne,Trandate) injection 10 mg, 10 mg, intravenous, q4h PRN, Tim Granados PA-C    lactulose 20 gram/30 mL oral solution 20 g, 20 g, oral, Daily, Shea Root, APRN-CNP, 20 g at 05/23/25 2101    lidocaine (Xylocaine) 5 % ointment 1 Application, 1 Application, Topical, TID, Tim Granados PA-C, 1 Application at 05/25/25 0816    lubricating eye drops ophthalmic solution 1 drop, 1 drop, Both Eyes, PRN, Tim Granados PA-C    melatonin tablet 3 mg, 3 mg, oral, Nightly, Tim Granados PA-C, 3 mg at 05/24/25 2025    mupirocin (Bactroban) 2 % ointment 1 Application, 1 Application, Topical, BID, Tim Granados PA-C, 1 Application at 05/25/25 0816    nicotine (Nicoderm CQ) 21 mg/24 hr patch 1 patch, 1 patch, transdermal, Daily, 1 patch at 05/25/25 0815 **FOLLOWED BY** [START ON 7/1/2025] nicotine (Nicoderm CQ) 14 mg/24 hr patch 1 patch, 1 patch, transdermal, Daily **FOLLOWED BY** [START ON 7/15/2025] nicotine (Nicoderm CQ) 7 mg/24 hr patch 1 patch, 1 patch, transdermal, Daily, Tim Granados PA-C    ondansetron (Zofran) tablet 8 mg, 8 mg, oral, q8h PRN, Tim Granados PA-C    oxygen (O2) therapy, , inhalation, Continuous -  Inhalation, Lupillo Mcneil MD, 3 L/min at 05/25/25 0756    pantoprazole (ProtoNix) EC tablet 40 mg, 40 mg, oral, Daily before breakfast, Tim Granados PA-C, 40 mg at 05/25/25 0622    piperacillin-tazobactam (Zosyn) 4.5 g in dextrose (iso)  mL, 4.5 g, intravenous, q6h, Tim Granados PA-C, Stopped at 05/25/25 0849    polyethylene glycol (Glycolax, Miralax) packet 17 g, 17 g, oral, Daily PRN, Tim Granados PA-C    prochlorperazine (Compazine) tablet 10 mg, 10 mg, oral, q6h PRN, Tim Granados PA-C    sennosides-docusate sodium (Annette-Colace) 8.6-50 mg per tablet 2 tablet, 2 tablet, oral, BID, Tim Granados PA-C, 2 tablet at 05/24/25 2025    sodium hypochlorite (Dakin's Quarter Strength) 0.125 % external solution, , irrigation, Daily PRN, Tim Granados PA-C, Given at 05/25/25 0816    sucralfate (Carafate) suspension 1 g, 1 g, oral, q6h [place], Tim Granados PA-C, 1 g at 05/25/25 1226

## 2025-05-25 NOTE — CARE PLAN
Problem: Respiratory  Goal: Clear secretions with interventions this shift  5/25/2025 1938 by Carmelita De Luna, RRT  Outcome: Progressing  5/25/2025 1938 by Carmelita De Luna, RRT  Outcome: Progressing  Goal: No signs of respiratory distress (eg. Use of accessory muscles. Peds grunting)  Outcome: Progressing

## 2025-05-25 NOTE — CARE PLAN
Problem: Pain - Adult  Goal: Verbalizes/displays adequate comfort level or baseline comfort level  5/25/2025 1024 by Karina Westbrook RN  Outcome: Progressing  5/25/2025 1013 by Karina Westbrook RN  Outcome: Progressing     Problem: Safety - Adult  Goal: Free from fall injury  5/25/2025 1024 by Karina Westbrook RN  Outcome: Progressing  5/25/2025 1013 by Karina Westbrook RN  Outcome: Progressing     Problem: Discharge Planning  Goal: Discharge to home or other facility with appropriate resources  5/25/2025 1024 by Karina Westbrook RN  Outcome: Progressing  5/25/2025 1013 by Karina Westbrook RN  Outcome: Progressing     Problem: Chronic Conditions and Co-morbidities  Goal: Patient's chronic conditions and co-morbidity symptoms are monitored and maintained or improved  5/25/2025 1024 by Karina Westbrook RN  Outcome: Progressing  5/25/2025 1013 by Karina Westbrook RN  Outcome: Progressing     Problem: Nutrition  Goal: Nutrient intake appropriate for maintaining nutritional needs  5/25/2025 1024 by Karina Westbrook RN  Outcome: Progressing  5/25/2025 1013 by Karina Westbrook RN  Outcome: Progressing     Problem: Skin  Goal: Decreased wound size/increased tissue granulation at next dressing change  5/25/2025 1024 by Karina Westbrook RN  Outcome: Progressing  5/25/2025 1013 by Karina Westbrook RN  Outcome: Progressing  Goal: Participates in plan/prevention/treatment measures  5/25/2025 1024 by Karina Westbrook RN  Outcome: Progressing  5/25/2025 1013 by Karina Westbrook RN  Outcome: Progressing  Goal: Prevent/manage excess moisture  5/25/2025 1024 by Karina Westbrook RN  Outcome: Progressing  5/25/2025 1013 by Karina Westbrook RN  Outcome: Progressing  Goal: Prevent/minimize sheer/friction injuries  5/25/2025 1024 by Karina Westbrook RN  Outcome: Progressing  5/25/2025 1013 by Karina Westbrook RN  Outcome: Progressing  Goal: Promote/optimize nutrition  5/25/2025 1024 by Karina Westbrook RN  Outcome:  Progressing  5/25/2025 1013 by Karina Westbrook RN  Outcome: Progressing  Goal: Promote skin healing  5/25/2025 1024 by Karina Westbrook RN  Outcome: Progressing  5/25/2025 1013 by Karina Westbrook RN  Outcome: Progressing     Problem: Respiratory  Goal: Clear secretions with interventions this shift  5/25/2025 1024 by Karina Westbrook, RN  Outcome: Progressing  5/25/2025 1013 by Karina Westbrook RN  Outcome: Progressing  Goal: Minimize anxiety/maximize coping throughout shift  5/25/2025 1024 by Karina Westbrook RN  Outcome: Progressing  5/25/2025 1013 by Karina Westbrook RN  Outcome: Progressing  Goal: Minimal/no exertional discomfort or dyspnea this shift  5/25/2025 1024 by Karina Westbrook RN  Outcome: Progressing  5/25/2025 1013 by Karina Westbrook RN  Outcome: Progressing  Goal: No signs of respiratory distress (eg. Use of accessory muscles. Peds grunting)  5/25/2025 1024 by Karina Westbrook RN  Outcome: Progressing  5/25/2025 1013 by Karina Westbrook RN  Outcome: Progressing  Goal: Patent airway maintained this shift  5/25/2025 1024 by Karina Westbrook, RN  Outcome: Progressing  5/25/2025 1013 by Karina Westbrook RN  Outcome: Progressing  Goal: Tolerate mechanical ventilation evidenced by VS/agitation level this shift  5/25/2025 1024 by Karina Westbrook, RN  Outcome: Progressing  5/25/2025 1013 by Karina Westbrook RN  Outcome: Progressing  Goal: Tolerate pulmonary toileting this shift  5/25/2025 1024 by Karina Westbrook RN  Outcome: Progressing  5/25/2025 1013 by Karina Westbrook RN  Outcome: Progressing  Goal: Verbalize decreased shortness of breath this shift  5/25/2025 1024 by Karina Westbrook RN  Outcome: Progressing  5/25/2025 1013 by Karina Westbrook RN  Outcome: Progressing  Goal: Wean oxygen to maintain O2 saturation per order/standard this shift  5/25/2025 1024 by Karina Westbrook RN  Outcome: Progressing  5/25/2025 1013 by Karina Westbrook RN  Outcome: Progressing  Goal: Increase  self care and/or family involvement in next 24 hours  5/25/2025 1024 by Karina Westbrook RN  Outcome: Progressing  5/25/2025 1013 by Karina Westbrook RN  Outcome: Progressing     Problem: Fall/Injury  Goal: Not fall by end of shift  5/25/2025 1024 by Karina Westbrook RN  Outcome: Progressing  5/25/2025 1013 by Karina Westbrook RN  Outcome: Progressing  Goal: Be free from injury by end of the shift  5/25/2025 1024 by Karina Westbrook RN  Outcome: Progressing  5/25/2025 1013 by Karina Westbrook RN  Outcome: Progressing  Goal: Verbalize understanding of personal risk factors for fall in the hospital  5/25/2025 1024 by Karina Westbrook RN  Outcome: Progressing  5/25/2025 1013 by Karina Westbrook RN  Outcome: Progressing  Goal: Verbalize understanding of risk factor reduction measures to prevent injury from fall in the home  5/25/2025 1024 by Karina Westbrook RN  Outcome: Progressing  5/25/2025 1013 by Karina Westbrook RN  Outcome: Progressing  Goal: Use assistive devices by end of the shift  5/25/2025 1024 by Karina Westbrook RN  Outcome: Progressing  5/25/2025 1013 by Karina Westbrook RN  Outcome: Progressing  Goal: Pace activities to prevent fatigue by end of the shift  5/25/2025 1024 by Karina Westbrook RN  Outcome: Progressing  5/25/2025 1013 by Karina Westbrook RN  Outcome: Progressing   The patient's goals for the shift include rest    The clinical goals for the shift include safety, no falls

## 2025-05-26 ENCOUNTER — APPOINTMENT (OUTPATIENT)
Dept: RADIOLOGY | Facility: HOSPITAL | Age: 80
DRG: 177 | End: 2025-05-26
Payer: MEDICARE

## 2025-05-26 LAB
ALBUMIN SERPL BCP-MCNC: 2.4 G/DL (ref 3.4–5)
ANION GAP SERPL CALCULATED.3IONS-SCNC: <7 MMOL/L (ref 10–20)
BASOPHILS # BLD AUTO: 0.02 X10*3/UL (ref 0–0.1)
BASOPHILS NFR BLD AUTO: 0.2 %
BNP SERPL-MCNC: 484 PG/ML (ref 0–99)
BUN SERPL-MCNC: 15 MG/DL (ref 6–23)
CALCIUM SERPL-MCNC: 8 MG/DL (ref 8.6–10.3)
CHLORIDE SERPL-SCNC: 90 MMOL/L (ref 98–107)
CO2 SERPL-SCNC: 39 MMOL/L (ref 21–32)
CREAT SERPL-MCNC: 0.29 MG/DL (ref 0.5–1.05)
EGFRCR SERPLBLD CKD-EPI 2021: >90 ML/MIN/1.73M*2
EOSINOPHIL # BLD AUTO: 0.03 X10*3/UL (ref 0–0.4)
EOSINOPHIL NFR BLD AUTO: 0.4 %
ERYTHROCYTE [DISTWIDTH] IN BLOOD BY AUTOMATED COUNT: 14.1 % (ref 11.5–14.5)
GLUCOSE BLD MANUAL STRIP-MCNC: 110 MG/DL (ref 74–99)
GLUCOSE BLD MANUAL STRIP-MCNC: 127 MG/DL (ref 74–99)
GLUCOSE BLD MANUAL STRIP-MCNC: 132 MG/DL (ref 74–99)
GLUCOSE BLD MANUAL STRIP-MCNC: 145 MG/DL (ref 74–99)
GLUCOSE SERPL-MCNC: 117 MG/DL (ref 74–99)
HCT VFR BLD AUTO: 31.1 % (ref 36–46)
HGB BLD-MCNC: 10 G/DL (ref 12–16)
IMM GRANULOCYTES # BLD AUTO: 0.38 X10*3/UL (ref 0–0.5)
IMM GRANULOCYTES NFR BLD AUTO: 4.7 % (ref 0–0.9)
LYMPHOCYTES # BLD AUTO: 0.17 X10*3/UL (ref 0.8–3)
LYMPHOCYTES NFR BLD AUTO: 2.1 %
MAGNESIUM SERPL-MCNC: 1.43 MG/DL (ref 1.6–2.4)
MCH RBC QN AUTO: 31.3 PG (ref 26–34)
MCHC RBC AUTO-ENTMCNC: 32.2 G/DL (ref 32–36)
MCV RBC AUTO: 98 FL (ref 80–100)
MONOCYTES # BLD AUTO: 0.87 X10*3/UL (ref 0.05–0.8)
MONOCYTES NFR BLD AUTO: 10.7 %
NEUTROPHILS # BLD AUTO: 6.64 X10*3/UL (ref 1.6–5.5)
NEUTROPHILS NFR BLD AUTO: 81.9 %
NRBC BLD-RTO: 0 /100 WBCS (ref 0–0)
PHOSPHATE SERPL-MCNC: 1.9 MG/DL (ref 2.5–4.9)
PLATELET # BLD AUTO: 200 X10*3/UL (ref 150–450)
POTASSIUM SERPL-SCNC: 2.8 MMOL/L (ref 3.5–5.3)
RBC # BLD AUTO: 3.19 X10*6/UL (ref 4–5.2)
SODIUM SERPL-SCNC: 132 MMOL/L (ref 136–145)
WBC # BLD AUTO: 8.1 X10*3/UL (ref 4.4–11.3)

## 2025-05-26 PROCEDURE — 2500000005 HC RX 250 GENERAL PHARMACY W/O HCPCS: Performed by: INTERNAL MEDICINE

## 2025-05-26 PROCEDURE — 71045 X-RAY EXAM CHEST 1 VIEW: CPT | Performed by: RADIOLOGY

## 2025-05-26 PROCEDURE — 94640 AIRWAY INHALATION TREATMENT: CPT

## 2025-05-26 PROCEDURE — 2500000002 HC RX 250 W HCPCS SELF ADMINISTERED DRUGS (ALT 637 FOR MEDICARE OP, ALT 636 FOR OP/ED): Performed by: INTERNAL MEDICINE

## 2025-05-26 PROCEDURE — 2500000004 HC RX 250 GENERAL PHARMACY W/ HCPCS (ALT 636 FOR OP/ED): Mod: JZ | Performed by: STUDENT IN AN ORGANIZED HEALTH CARE EDUCATION/TRAINING PROGRAM

## 2025-05-26 PROCEDURE — 2500000001 HC RX 250 WO HCPCS SELF ADMINISTERED DRUGS (ALT 637 FOR MEDICARE OP): Performed by: NURSE PRACTITIONER

## 2025-05-26 PROCEDURE — 2500000004 HC RX 250 GENERAL PHARMACY W/ HCPCS (ALT 636 FOR OP/ED): Mod: JZ | Performed by: INTERNAL MEDICINE

## 2025-05-26 PROCEDURE — 2500000002 HC RX 250 W HCPCS SELF ADMINISTERED DRUGS (ALT 637 FOR MEDICARE OP, ALT 636 FOR OP/ED)

## 2025-05-26 PROCEDURE — S4991 NICOTINE PATCH NONLEGEND: HCPCS

## 2025-05-26 PROCEDURE — 85025 COMPLETE CBC W/AUTO DIFF WBC: CPT

## 2025-05-26 PROCEDURE — 80069 RENAL FUNCTION PANEL: CPT

## 2025-05-26 PROCEDURE — 2500000005 HC RX 250 GENERAL PHARMACY W/O HCPCS

## 2025-05-26 PROCEDURE — 99233 SBSQ HOSP IP/OBS HIGH 50: CPT | Performed by: NURSE PRACTITIONER

## 2025-05-26 PROCEDURE — 99232 SBSQ HOSP IP/OBS MODERATE 35: CPT

## 2025-05-26 PROCEDURE — 2500000004 HC RX 250 GENERAL PHARMACY W/ HCPCS (ALT 636 FOR OP/ED): Mod: JZ | Performed by: NURSE PRACTITIONER

## 2025-05-26 PROCEDURE — 2500000001 HC RX 250 WO HCPCS SELF ADMINISTERED DRUGS (ALT 637 FOR MEDICARE OP)

## 2025-05-26 PROCEDURE — 71045 X-RAY EXAM CHEST 1 VIEW: CPT

## 2025-05-26 PROCEDURE — 2060000001 HC INTERMEDIATE ICU ROOM DAILY

## 2025-05-26 PROCEDURE — 83735 ASSAY OF MAGNESIUM: CPT | Performed by: NURSE PRACTITIONER

## 2025-05-26 PROCEDURE — 83880 ASSAY OF NATRIURETIC PEPTIDE: CPT

## 2025-05-26 PROCEDURE — 82947 ASSAY GLUCOSE BLOOD QUANT: CPT

## 2025-05-26 PROCEDURE — 2500000004 HC RX 250 GENERAL PHARMACY W/ HCPCS (ALT 636 FOR OP/ED): Mod: JZ

## 2025-05-26 RX ORDER — POTASSIUM CHLORIDE 14.9 MG/ML
20 INJECTION INTRAVENOUS
Status: DISPENSED | OUTPATIENT
Start: 2025-05-26 | End: 2025-05-26

## 2025-05-26 RX ORDER — ALBUTEROL SULFATE 0.83 MG/ML
2.5 SOLUTION RESPIRATORY (INHALATION) EVERY 4 HOURS
Status: DISCONTINUED | OUTPATIENT
Start: 2025-05-26 | End: 2025-05-28

## 2025-05-26 RX ORDER — POTASSIUM CHLORIDE 1.5 G/1.58G
40 POWDER, FOR SOLUTION ORAL ONCE
Status: COMPLETED | OUTPATIENT
Start: 2025-05-26 | End: 2025-05-26

## 2025-05-26 RX ORDER — MAGNESIUM SULFATE HEPTAHYDRATE 40 MG/ML
2 INJECTION, SOLUTION INTRAVENOUS ONCE
Status: COMPLETED | OUTPATIENT
Start: 2025-05-26 | End: 2025-05-26

## 2025-05-26 RX ADMIN — COLLAGENASE SANTYL: 250 OINTMENT TOPICAL at 08:44

## 2025-05-26 RX ADMIN — Medication 2 L/MIN: at 07:13

## 2025-05-26 RX ADMIN — POTASSIUM CHLORIDE 20 MEQ: 14.9 INJECTION, SOLUTION INTRAVENOUS at 10:16

## 2025-05-26 RX ADMIN — NICOTINE 1 PATCH: 21 PATCH, EXTENDED RELEASE TRANSDERMAL at 08:38

## 2025-05-26 RX ADMIN — BACLOFEN 10 MG: 10 TABLET ORAL at 08:38

## 2025-05-26 RX ADMIN — POTASSIUM CHLORIDE 40 MEQ: 1.5 POWDER, FOR SOLUTION ORAL at 06:48

## 2025-05-26 RX ADMIN — MELATONIN 3 MG: 3 TAB ORAL at 21:37

## 2025-05-26 RX ADMIN — BACLOFEN 10 MG: 10 TABLET ORAL at 21:37

## 2025-05-26 RX ADMIN — LIDOCAINE 1 APPLICATION: 50 OINTMENT TOPICAL at 21:47

## 2025-05-26 RX ADMIN — ACETAMINOPHEN 650 MG: 325 TABLET ORAL at 08:38

## 2025-05-26 RX ADMIN — ALBUTEROL SULFATE 2.5 MG: 2.5 SOLUTION RESPIRATORY (INHALATION) at 11:14

## 2025-05-26 RX ADMIN — LIDOCAINE 1 APPLICATION: 50 OINTMENT TOPICAL at 14:34

## 2025-05-26 RX ADMIN — IPRATROPIUM BROMIDE AND ALBUTEROL SULFATE 3 ML: 2.5; .5 SOLUTION RESPIRATORY (INHALATION) at 05:17

## 2025-05-26 RX ADMIN — ENOXAPARIN SODIUM 30 MG: 100 INJECTION SUBCUTANEOUS at 08:40

## 2025-05-26 RX ADMIN — POTASSIUM CHLORIDE 40 MEQ: 29.8 INJECTION, SOLUTION INTRAVENOUS at 06:38

## 2025-05-26 RX ADMIN — MAGNESIUM SULFATE IN WATER 2 G: 40 INJECTION, SOLUTION INTRAVENOUS at 12:16

## 2025-05-26 RX ADMIN — BACLOFEN 10 MG: 10 TABLET ORAL at 14:32

## 2025-05-26 RX ADMIN — SUCRALFATE ORAL SUSPENSION 1 G: 1 SUSPENSION ORAL at 05:45

## 2025-05-26 RX ADMIN — PIPERACILLIN SODIUM AND TAZOBACTAM SODIUM 3.38 G: 3; .375 INJECTION, SOLUTION INTRAVENOUS at 02:37

## 2025-05-26 RX ADMIN — SUCRALFATE ORAL SUSPENSION 1 G: 1 SUSPENSION ORAL at 17:33

## 2025-05-26 RX ADMIN — MUPIROCIN 1 APPLICATION: 20 OINTMENT TOPICAL at 08:44

## 2025-05-26 RX ADMIN — ALBUTEROL SULFATE 2.5 MG: 2.5 SOLUTION RESPIRATORY (INHALATION) at 19:00

## 2025-05-26 RX ADMIN — SUCRALFATE ORAL SUSPENSION 1 G: 1 SUSPENSION ORAL at 12:15

## 2025-05-26 RX ADMIN — PANTOPRAZOLE SODIUM 40 MG: 40 TABLET, DELAYED RELEASE ORAL at 06:00

## 2025-05-26 RX ADMIN — Medication 125 MCG: at 08:38

## 2025-05-26 RX ADMIN — DAKIN'S SOLUTION 0.125% (QUARTER STRENGTH): 0.12 SOLUTION at 08:44

## 2025-05-26 RX ADMIN — LIDOCAINE 1 APPLICATION: 50 OINTMENT TOPICAL at 08:44

## 2025-05-26 RX ADMIN — ACETAMINOPHEN 650 MG: 325 TABLET ORAL at 14:32

## 2025-05-26 RX ADMIN — MUPIROCIN 1 APPLICATION: 20 OINTMENT TOPICAL at 21:47

## 2025-05-26 RX ADMIN — PIPERACILLIN SODIUM AND TAZOBACTAM SODIUM 3.38 G: 3; .375 INJECTION, SOLUTION INTRAVENOUS at 14:32

## 2025-05-26 RX ADMIN — DIAZEPAM 5 MG: 5 TABLET ORAL at 08:38

## 2025-05-26 RX ADMIN — ACETAMINOPHEN 650 MG: 325 TABLET ORAL at 21:37

## 2025-05-26 RX ADMIN — Medication 2 L/MIN: at 19:05

## 2025-05-26 RX ADMIN — ALBUTEROL SULFATE 2.5 MG: 2.5 SOLUTION RESPIRATORY (INHALATION) at 15:15

## 2025-05-26 ASSESSMENT — COGNITIVE AND FUNCTIONAL STATUS - GENERAL
WALKING IN HOSPITAL ROOM: TOTAL
TURNING FROM BACK TO SIDE WHILE IN FLAT BAD: A LOT
STANDING UP FROM CHAIR USING ARMS: TOTAL
MOVING FROM LYING ON BACK TO SITTING ON SIDE OF FLAT BED WITH BEDRAILS: A LITTLE
EATING MEALS: A LITTLE
MOVING TO AND FROM BED TO CHAIR: A LOT
DRESSING REGULAR UPPER BODY CLOTHING: A LOT
MOBILITY SCORE: 10
DRESSING REGULAR UPPER BODY CLOTHING: A LITTLE
EATING MEALS: A LITTLE
WALKING IN HOSPITAL ROOM: TOTAL
STANDING UP FROM CHAIR USING ARMS: TOTAL
TOILETING: A LOT
DRESSING REGULAR UPPER BODY CLOTHING: A LITTLE
WALKING IN HOSPITAL ROOM: TOTAL
STANDING UP FROM CHAIR USING ARMS: TOTAL
MOBILITY SCORE: 10
DRESSING REGULAR LOWER BODY CLOTHING: A LOT
MOVING FROM LYING ON BACK TO SITTING ON SIDE OF FLAT BED WITH BEDRAILS: A LITTLE
PERSONAL GROOMING: A LITTLE
CLIMB 3 TO 5 STEPS WITH RAILING: TOTAL
PERSONAL GROOMING: A LITTLE
DRESSING REGULAR LOWER BODY CLOTHING: A LOT
MOVING TO AND FROM BED TO CHAIR: A LOT
MOVING TO AND FROM BED TO CHAIR: A LOT
CLIMB 3 TO 5 STEPS WITH RAILING: TOTAL
EATING MEALS: A LITTLE
TOILETING: A LOT
TURNING FROM BACK TO SIDE WHILE IN FLAT BAD: A LOT
HELP NEEDED FOR BATHING: A LOT
HELP NEEDED FOR BATHING: A LOT
PERSONAL GROOMING: A LITTLE
TOILETING: A LOT
DAILY ACTIVITIY SCORE: 15
DAILY ACTIVITIY SCORE: 15
HELP NEEDED FOR BATHING: A LOT
MOVING FROM LYING ON BACK TO SITTING ON SIDE OF FLAT BED WITH BEDRAILS: A LITTLE
MOBILITY SCORE: 10
DAILY ACTIVITIY SCORE: 14
CLIMB 3 TO 5 STEPS WITH RAILING: TOTAL
DRESSING REGULAR LOWER BODY CLOTHING: A LOT
TURNING FROM BACK TO SIDE WHILE IN FLAT BAD: A LOT

## 2025-05-26 ASSESSMENT — PAIN SCALES - GENERAL
PAINLEVEL_OUTOF10: 0 - NO PAIN

## 2025-05-26 NOTE — PROGRESS NOTES
Speech-Language Pathology                 Therapy Communication Note    Patient Name: Karly Horton  MRN: [id number]  Department: [address]  Room: [address] [place], [place] [address]  Today's Date: 5/26/2025     Discipline: Speech Language Pathology    Order received for MBSS on 5/26. Patient was discharged from speech therapy services on 5/21 with a recommendation for a soft and bite-sized diet with thin liquids. Per pulmonology, imaging notes multifocal pneumonia-most likely secondary to aspiration, given retained food and debris in the esophagus and lobar distribution of consolidative opacities. Pulmonology requests to defer the clinical swallow evaluation and proceed directly to MBSS at this time to fully assess aspiration risk. [facility] will plan for MBSS 5/27. Pulmonology is aware and in agreement with plan of care.

## 2025-05-26 NOTE — NURSING NOTE
"Call light within reach. Pt yelling out \"help\" multiple times and pressing call bell multiple times even right after nurse/aid walking out of room. All needs met. Pt directed to press call bell if needing something.   "

## 2025-05-26 NOTE — PROGRESS NOTES
Karly Horton is a 80 y.o. female on day 8 of admission presenting with Generalized weakness.    Subjective   Patient seen and examined.  Resting in bed in no no acute distress.  Awake alert oriented x 3.  Forgetful.  No new complaints.  + Shortness of breath after eating, chronic.  + Productive cough.  No chest pain.  + Buttock pain.  + Severe anxiety.  Asking for valium.  Asking for baclofen at bedtime.    Discussed CODE STATUS discussion with Pulmonology, states that she does not want cardiopulmonary resuscitation or intubation.  Confirmed identity of her durable healthcare power of  - discussed palliative medicine consultation to discuss the goals of care.  She is in agreement with this.     Objective     Physical Exam  Vitals and nursing note reviewed.   Constitutional:       General: She is not in acute distress.     Appearance: Normal appearance. She is normal weight. She is ill-appearing. She is not toxic-appearing or diaphoretic.   HENT:      Head: Normocephalic and atraumatic.      Right Ear: External ear normal.      Left Ear: External ear normal.      Nose: Nose normal.      Mouth/Throat:      Mouth: Mucous membranes are moist.      Pharynx: Oropharynx is clear.   Eyes:      Extraocular Movements: Extraocular movements intact.      Conjunctiva/sclera: Conjunctivae normal.      Pupils: Pupils are equal, round, and reactive to light.   Cardiovascular:      Rate and Rhythm: Regular rhythm. Tachycardia present.      Pulses: Normal pulses.      Heart sounds: Normal heart sounds. No murmur heard.  Pulmonary:      Effort: Pulmonary effort is normal. Tachypnea present. No respiratory distress.      Breath sounds: Decreased breath sounds present. No wheezing, rhonchi or rales.      Comments: 2 liters nasal cannula.  Abdominal:      General: Bowel sounds are normal. There is no distension.      Palpations: Abdomen is soft.      Tenderness: There is no abdominal tenderness. There is no guarding.  "  Genitourinary:     Comments: : Barnard catheter in place draining clear yellow urine. Fecal management system in place draining soft brown stool.    Musculoskeletal:         General: No swelling or tenderness. Normal range of motion.      Cervical back: Normal range of motion and neck supple.      Right lower leg: No edema.      Left lower leg: No edema.   Skin:     General: Skin is warm and dry.      Capillary Refill: Capillary refill takes less than 2 seconds.      Comments: Right chest port in place dressing clean dry intact.  Sacrum/buttock not examined.   Neurological:      General: No focal deficit present.      Mental Status: She is alert and oriented to person, place, and time.      Motor: Weakness present.      Comments: Awake alert oriented x 3. Forgetful. Follows all commands. Generalized weakness. No focal weakness. Gait deferred.    Psychiatric:         Mood and Affect: Mood normal.         Behavior: Behavior normal.       Last Recorded Vitals  Blood pressure 145/78, pulse (!) 113, temperature 36.4 °C (97.5 °F), temperature source Oral, resp. rate 20, height 1.422 m (4' 8\"), weight 54 kg (119 lb 0.8 oz), SpO2 96%.    Intake/Output last 3 Shifts:  I/O last 3 completed shifts:  In: 1420 (44.7 mL/kg) [P.O.:1020; IV Piggyback:400]  Out: 3450 (108.5 mL/kg) [Urine:3175 (2.8 mL/kg/hr); Stool:275]  Dosing Weight: 31.8 kg     Telemetry sinus tachycardia rate 1 teens    Relevant Results    This patient has a central line   Reason for the central line remaining today? Parenteral medication and Parenteral nutrition    This patient has a urinary catheter   Reason for the urinary catheter remaining today? urinary retention/bladder outlet obstruction, acute or chronic    Results for orders placed or performed during the hospital encounter of 05/18/25 (from the past 24 hours)   POCT GLUCOSE   Result Value Ref Range    POCT Glucose 111 (H) 74 - 99 mg/dL   POCT GLUCOSE   Result Value Ref Range    POCT Glucose 113 (H) " 74 - 99 mg/dL   POCT GLUCOSE   Result Value Ref Range    POCT Glucose 134 (H) 74 - 99 mg/dL   CBC and Auto Differential   Result Value Ref Range    WBC 8.1 4.4 - 11.3 x10*3/uL    nRBC 0.0 0.0 - 0.0 /100 WBCs    RBC 3.19 (L) 4.00 - 5.20 x10*6/uL    Hemoglobin 10.0 (L) 12.0 - 16.0 g/dL    Hematocrit 31.1 (L) 36.0 - 46.0 %    MCV 98 80 - 100 fL    MCH 31.3 26.0 - 34.0 pg    MCHC 32.2 32.0 - 36.0 g/dL    RDW 14.1 11.5 - 14.5 %    Platelets 200 150 - 450 x10*3/uL    Neutrophils % 81.9 40.0 - 80.0 %    Immature Granulocytes %, Automated 4.7 (H) 0.0 - 0.9 %    Lymphocytes % 2.1 13.0 - 44.0 %    Monocytes % 10.7 2.0 - 10.0 %    Eosinophils % 0.4 0.0 - 6.0 %    Basophils % 0.2 0.0 - 2.0 %    Neutrophils Absolute 6.64 (H) 1.60 - 5.50 x10*3/uL    Immature Granulocytes Absolute, Automated 0.38 0.00 - 0.50 x10*3/uL    Lymphocytes Absolute 0.17 (L) 0.80 - 3.00 x10*3/uL    Monocytes Absolute 0.87 (H) 0.05 - 0.80 x10*3/uL    Eosinophils Absolute 0.03 0.00 - 0.40 x10*3/uL    Basophils Absolute 0.02 0.00 - 0.10 x10*3/uL   Renal function panel   Result Value Ref Range    Glucose 117 (H) 74 - 99 mg/dL    Sodium 132 (L) 136 - 145 mmol/L    Potassium 2.8 (LL) 3.5 - 5.3 mmol/L    Chloride 90 (L) 98 - 107 mmol/L    Bicarbonate 39 (H) 21 - 32 mmol/L    Anion Gap <7 (L) 10 - 20 mmol/L    Urea Nitrogen 15 6 - 23 mg/dL    Creatinine 0.29 (L) 0.50 - 1.05 mg/dL    eGFR >90 >60 mL/min/1.73m*2    Calcium 8.0 (L) 8.6 - 10.3 mg/dL    Phosphorus 1.9 (L) 2.5 - 4.9 mg/dL    Albumin 2.4 (L) 3.4 - 5.0 g/dL   Magnesium   Result Value Ref Range    Magnesium 1.43 (L) 1.60 - 2.40 mg/dL   POCT GLUCOSE   Result Value Ref Range    POCT Glucose 110 (H) 74 - 99 mg/dL     No results found for the last 90 days.    XR chest 1 view  Result Date: 5/26/2025  Interpreted By:  Renny Cid, STUDY: XR CHEST 1 VIEW;  5/25/2025 6:16 am   INDICATION: Signs/Symptoms:acute hypoxemic respiratory failure.     COMPARISON: 05/24/2025   ACCESSION NUMBER(S): [id number]   ORDERING  CLINICIAN: SILVANO ELLIOTT   FINDINGS: AP radiograph of the chest was provided.   Right IJ MediPort tip projects over the cavoatrial junction.   CARDIOMEDIASTINAL SILHOUETTE: Cardiomediastinal silhouette is normal in size and configuration.   LUNGS: Coarsened interstitial markings. Similar appearance of patchy right midlung and suprahilar opacities. Likely trace bibasilar pleural effusions and atelectasis. No pneumothorax.   ABDOMEN: No remarkable upper abdominal findings.   BONES: No acute osseous changes.       1.  Overall similar appearance of the lungs with trace bibasilar pleural effusions and atelectasis and patchy right perihilar airspace opacities. This is superimposed on a background of chronic lung disease.       MACRO: None   Signed by: Renny Cid 5/26/2025 8:55 AM Dictation workstation:   [id number]    XR chest 1 view  Result Date: 5/25/2025  Interpreted By:  Ambrosio Bess, STUDY: XR CHEST 1 VIEW;  5/24/2025 1:21 pm   INDICATION: Signs/Symptoms:acute hypoxemic respiratory failure.     COMPARISON: Radiographs from 05/23/2025 and CT from 05/20/2025   ACCESSION NUMBER(S): [id number]   ORDERING CLINICIAN: SILVANO ELLIOTT   FINDINGS:     There is moderate enlargement of the cardiac silhouette. Dense airspace disease in the right lung present. There is patchy airspace disease at the left lung base as well. There is a small right pleural effusion.   Right port catheter in place again seen.       1. Dense airspace disease at the right lung similar to the prior. Left basilar airspace disease as well. Please correlate with prior cross-sectional imaging for differential. Continued follow-up advised       MACRO: None   Signed by: Ambrosio Bess 5/25/2025 11:24 AM Dictation workstation:   [id number]      Scheduled medications  Scheduled Medications[1]  Continuous medications  Continuous Medications[2]  PRN medications  PRN Medications[3]    ASSESSMENT:  Acute on chronic hypoxic respiratory failure    Pneumonia  Moderate right, mild to moderate left pleural effusions  Squamous cell lung carcinoma status post chemo-radiation - Keytruda  COPD   History of tobacco use / vape use  Leukocytosis, multifactorial - resolving  Stage III/IV sacral decubitus ulcer  Pressure ulcer risk  Severe protein calorie malnutrition   Hyponatremia  Hypokalemia   Hypomagnesemia  Metabolic alkalosis  Normocytic anemia  Chronic pain  Spinal stenosis  Scoliosis  GERD  Vitamin d deficiency  Fall risk  Constipation - resolved  Acute urinary retention    PLAN:  Overall, condition is stable.  Respiratory status, pulse oximetry improved, stable on 2 L nasal cannula.  Home oxygen.  Pulmonology and Infectious disease following.  Input appreciated.  Continue management per recommendations.  DuoNeb treatments.  Oxygen.  Titrate oxygen as saturations allow.  IV antibiotics IV Zosyn per Infectious disease.  Labs reviewed.  Severe hypokalemia.  PO and IV Potassium replacement.  Hypomagnesemia.  IV Magnesium replacement.  Monitor telemetry.  Monitor electrolytes and replace accordingly.  Monitor renal function.  General surgery input appreciated.  Patient declined further evaluation of wound.  Wound care per wound care nursing.  Barnard catheter placed for acute urinary tension.  Maintain.  Fecal management system in place for diarrhea, sacral wound.  Stool is softening.  Hold bowel regimen.  Resume as diarrhea resolves.  Monitor I's and O's.  Continue medications as prescribed as appropriate.  Continue Valium.  Supportive care.  Continue baclofen as prescribed.  Palliative medicine consultation for goals of care discussion.  PT/OT as tolerated.  Fall precaution.  Up with assistance only.  Bed and chair alarm at all times.  Turn and reposition every 2 hours and as needed.  Has appropriate offloading.  DVT prophylaxis.  Lovenox subcutaneous.  SCDs.  GI prophylaxis.  PPI Protonix.  Supportive care.  Patient reassured.  Case management following for  discharge planning.  Discharge plan home when medically cleared.  Hospital bed ordered.  Follow-up delivery.  Discussed with patient and nursing.      Discussed with Dr. Mcneil.      PO Jang-CNP       [1] acetaminophen, 650 mg, oral, TID  baclofen, 10 mg, oral, TID  cholecalciferol, 125 mcg, oral, Daily  collagenase, , Topical, Daily  diazePAM, 5 mg, oral, Daily  enoxaparin, 30 mg, subcutaneous, Daily  insulin lispro, 0-5 Units, subcutaneous, TID AC  ipratropium-albuteroL, 3 mL, nebulization, 4x daily  lactulose, 20 g, oral, Daily  lidocaine, 1 Application, Topical, TID  magnesium sulfate, 2 g, intravenous, Once  melatonin, 3 mg, oral, Nightly  mupirocin, 1 Application, Topical, BID  nicotine, 1 patch, transdermal, Daily   Followed by  [START ON 7/1/2025] nicotine, 1 patch, transdermal, Daily   Followed by  [START ON 7/15/2025] nicotine, 1 patch, transdermal, Daily  oxygen, , inhalation, Continuous - Inhalation  pantoprazole, 40 mg, oral, Daily before breakfast  piperacillin-tazobactam, 3.375 g, intravenous, q6h  potassium chloride, 20 mEq, intravenous, q2h  sennosides-docusate sodium, 2 tablet, oral, BID  sucralfate, 1 g, oral, q6h [place]     [2]    [3] PRN medications: alteplase, benzocaine-menthol, butalbital-acetaminophen-caff, dextromethorphan-guaifenesin, dextrose, dextrose, glucagon, glucagon, guaiFENesin, guaiFENesin, ipratropium-albuteroL, labetaloL, lubricating eye drops, ondansetron, polyethylene glycol, potassium chloride, prochlorperazine, sodium hypochlorite

## 2025-05-26 NOTE — PROGRESS NOTES
"Pulmonary Daily Progress Note   Subjective    Karly Horton is a 80 y.o. year old female patient known with squamous cell lung carcinoma (Stage 3B, with a T4 classification (due to multiple nodules in ipsilateral lung lobes) and N2 (due to subcarinal lymph node involvement).), heart failure with preserved ejection fraction (HFpEF), and chronic obstructive pulmonary disease (COPD) home oxygen 2L, current smoker who was admitted on 5/18/2025 with worsening dypnea on exertion.     Interval History:  Tachypnea at rest, endorses product cough, unable to expectorant, states it takes ten hours to cough it out. No fever chills.     Meds    Scheduled medications  Scheduled Medications[1]  Continuous medications  Continuous Medications[2]  PRN medications  PRN Medications[3]     Objective    Blood pressure 145/78, pulse (!) 113, temperature 36.4 °C (97.5 °F), temperature source Oral, resp. rate 20, height 1.422 m (4' 8\"), weight 54 kg (119 lb 0.8 oz), SpO2 96%.   Physical Exam   GENERAL: ill appearing. Malnourished. tachypnea  HEAD/SINUSES: no sinus tenderness on 2L NC  LUNGS: Symmetric chest. Good excursion. Equal breath sounds. no wheezing. no crackles or rhonchi  CARDIAC: normal S1 and S2; no gallops, rubs or murmurs. Regular rate and rhythm  EXTREMITIES: No edema, no varicose veins  NEURO: normal mental status  SKIN: Skin turgor normal. No rashes or lesions.   PSYCH: Normal affect    Intake/Output Summary (Last 24 hours) at 5/26/2025 0940  Last data filed at 5/26/2025 0652  Gross per 24 hour   Intake 880 ml   Output 2725 ml   Net -1845 ml     Labs:   Results from last 72 hours   Lab Units 05/26/25  0445 05/25/25  0427 05/24/25  0604   SODIUM mmol/L 132* 132* 133*   POTASSIUM mmol/L 2.8* 3.7 2.9*   CHLORIDE mmol/L 90* 90* 87*   CO2 mmol/L 39* 40* 43*   BUN mg/dL 15 18 22   CREATININE mg/dL 0.29* 0.29* 0.30*   GLUCOSE mg/dL 117* 86 151*   CALCIUM mg/dL 8.0* 8.4* 8.9   ANION GAP mmol/L <7* <7* <7*   EGFR mL/min/1.73m*2 " >90 >90 >90   PHOSPHORUS mg/dL 1.9* 2.0* 2.5      Results from last 72 hours   Lab Units 05/26/25  0445 05/25/25  0427 05/24/25  0604   WBC AUTO x10*3/uL 8.1 8.5 6.5   HEMOGLOBIN g/dL 10.0* 9.6* 10.1*   HEMATOCRIT % 31.1* 30.3* 32.5*   PLATELETS AUTO x10*3/uL 200 178 182   NEUTROS PCT AUTO % 81.9 85.5 86.1   LYMPHS PCT AUTO % 2.1 1.5 2.0   MONOS PCT AUTO % 10.7 9.8 9.7   EOS PCT AUTO % 0.4 0.5 0.6               Micro/ID:   Lab Results   Component Value Date    URINECULTURE >100,000 Escherichia coli (A) 10/11/2024    BLOODCULT No growth at 4 days -  FINAL REPORT 05/18/2025    BLOODCULT No growth at 4 days -  FINAL REPORT 05/18/2025     Summary of key imaging results from the last 24 hours  CXR - 1.  Overall similar appearance of the lungs with trace bibasilar  pleural effusions and atelectasis and patchy right perihilar airspace  opacities. This is superimposed on a background of chronic lung  disease.    Impression   Karly Horton is a 80 y.o. year old female patient is being seen by the pulmonary service for   Acute on chronic respiratory failure with hypoxia and hypercapnia - secondary to multifocal pneumonia  Multifocal pneumonia - most likely secondary to aspiration pneumonia given retaining food/debris in esophagus, with lobar distribution of consolidative opacities  Squamous cell lung carcinoma - chemo started 11/12/24- 1/13/25, conservative Keytruda [id number]. Pet scan planned for 6/9/25  HFpEF  COPD - on symbicort at home, recent need for home oxygen on 2L NC  Tobacco use  Pleural effusions - improving with conservative management     Recommendations   As follows:  Finish course of antibiotics  Replace electrolytes (mag, phos, potassium,)   MBSS to assess aspiration - She may benefit from bronchoscopy but agree with reconsulting pall med to help with goals of care, she also reports she does not want CPR but currently is a full code.   Wean supplemental oxygen maintain pulse ox greater than 88%  [place]  nebs with 3% saline nebs change ipratropium to albuterol to help thin mucous followed by flutter valve  Bronchopulmonary hygiene with IS   hypercapnia would benefit from nocturnal PAP therapy  On 2L NC - home RX    PO Garcia-CNP   05/26/25 at 9:40 AM     -Only the Medical problems listed under impression were addressed today.   -Please contact primary team for all other concerns and medical problem  -Thank you for your consult       Disclaimer: Documentation completed with the information available at the time of input. Parts of this note may have been scribed or generated using voice dictation software, Dragon.  Homophonic errors may exist.  Please contact me directly if clarification is needed. The times in the chart may not be reflective of actual patient care times, interventions, or procedures. Documentation occurs after the physical care of the patient.           [1] acetaminophen, 650 mg, oral, TID  baclofen, 10 mg, oral, TID  cholecalciferol, 125 mcg, oral, Daily  collagenase, , Topical, Daily  diazePAM, 5 mg, oral, Daily  enoxaparin, 30 mg, subcutaneous, Daily  insulin lispro, 0-5 Units, subcutaneous, TID AC  ipratropium-albuteroL, 3 mL, nebulization, 4x daily  lactulose, 20 g, oral, Daily  lidocaine, 1 Application, Topical, TID  magnesium sulfate, 2 g, intravenous, Once  melatonin, 3 mg, oral, Nightly  mupirocin, 1 Application, Topical, BID  nicotine, 1 patch, transdermal, Daily   Followed by  [START ON 7/1/2025] nicotine, 1 patch, transdermal, Daily   Followed by  [START ON 7/15/2025] nicotine, 1 patch, transdermal, Daily  oxygen, , inhalation, Continuous - Inhalation  pantoprazole, 40 mg, oral, Daily before breakfast  piperacillin-tazobactam, 3.375 g, intravenous, q6h  potassium chloride, 20 mEq, intravenous, q2h  sennosides-docusate sodium, 2 tablet, oral, BID  sucralfate, 1 g, oral, q6h [place]  [2]    [3] PRN medications: alteplase, benzocaine-menthol, butalbital-acetaminophen-caff,  dextromethorphan-guaifenesin, dextrose, dextrose, glucagon, glucagon, guaiFENesin, guaiFENesin, ipratropium-albuteroL, labetaloL, lubricating eye drops, ondansetron, polyethylene glycol, potassium chloride, prochlorperazine, sodium hypochlorite

## 2025-05-26 NOTE — CARE PLAN
The patient's goals for the shift include replenish electrolytes    The clinical goals for the shift include improve 02

## 2025-05-26 NOTE — NURSING NOTE
Assumed care of pt. Awake, alert & oriented . Call light within reach. Right chest port infusion potassium per order. Bed alarm on.

## 2025-05-26 NOTE — PROGRESS NOTES
Palliative Care Progress Note    Date of Admission: 5/18/2025    Patient is a 80 y.o. female admitted with Generalized weakness. Respiratory function improved, now on 2 liters. Palliative reconsulted as patient has been telling providers she wants to change from FULL CODE to DNR.    Mental/Cognitive Status: awake, alert, appropriate    Respiratory Status: on 2 liters    Pain Assessment: chronic - neck and sacral wound    Pertinent Symptoms: anxiety, sleep disturbance    Diet/Nutrition: appetite poor    Bowel Regimen: FMS    Patient's current condition/Anticipated Prognosis: poor.  Family/Healthcare Proxy involvement: MATT Mccloud, pt from home with daughter Karly.    Scheduled medications  Scheduled Medications[1]  Continuous medications  Continuous Medications[2]  PRN medications  PRN Medications[3]     Results for orders placed or performed during the hospital encounter of 05/18/25 (from the past 24 hours)   POCT GLUCOSE   Result Value Ref Range    POCT Glucose 113 (H) 74 - 99 mg/dL   POCT GLUCOSE   Result Value Ref Range    POCT Glucose 134 (H) 74 - 99 mg/dL   CBC and Auto Differential   Result Value Ref Range    WBC 8.1 4.4 - 11.3 x10*3/uL    nRBC 0.0 0.0 - 0.0 /100 WBCs    RBC 3.19 (L) 4.00 - 5.20 x10*6/uL    Hemoglobin 10.0 (L) 12.0 - 16.0 g/dL    Hematocrit 31.1 (L) 36.0 - 46.0 %    MCV 98 80 - 100 fL    MCH 31.3 26.0 - 34.0 pg    MCHC 32.2 32.0 - 36.0 g/dL    RDW 14.1 11.5 - 14.5 %    Platelets 200 150 - 450 x10*3/uL    Neutrophils % 81.9 40.0 - 80.0 %    Immature Granulocytes %, Automated 4.7 (H) 0.0 - 0.9 %    Lymphocytes % 2.1 13.0 - 44.0 %    Monocytes % 10.7 2.0 - 10.0 %    Eosinophils % 0.4 0.0 - 6.0 %    Basophils % 0.2 0.0 - 2.0 %    Neutrophils Absolute 6.64 (H) 1.60 - 5.50 x10*3/uL    Immature Granulocytes Absolute, Automated 0.38 0.00 - 0.50 x10*3/uL    Lymphocytes Absolute 0.17 (L) 0.80 - 3.00 x10*3/uL    Monocytes Absolute 0.87 (H) 0.05 - 0.80 x10*3/uL    Eosinophils Absolute 0.03  0.00 - 0.40 x10*3/uL    Basophils Absolute 0.02 0.00 - 0.10 x10*3/uL   Renal function panel   Result Value Ref Range    Glucose 117 (H) 74 - 99 mg/dL    Sodium 132 (L) 136 - 145 mmol/L    Potassium 2.8 (LL) 3.5 - 5.3 mmol/L    Chloride 90 (L) 98 - 107 mmol/L    Bicarbonate 39 (H) 21 - 32 mmol/L    Anion Gap <7 (L) 10 - 20 mmol/L    Urea Nitrogen 15 6 - 23 mg/dL    Creatinine 0.29 (L) 0.50 - 1.05 mg/dL    eGFR >90 >60 mL/min/1.73m*2    Calcium 8.0 (L) 8.6 - 10.3 mg/dL    Phosphorus 1.9 (L) 2.5 - 4.9 mg/dL    Albumin 2.4 (L) 3.4 - 5.0 g/dL   Magnesium   Result Value Ref Range    Magnesium 1.43 (L) 1.60 - 2.40 mg/dL   B-type natriuretic peptide   Result Value Ref Range     (H) 0 - 99 pg/mL   POCT GLUCOSE   Result Value Ref Range    POCT Glucose 110 (H) 74 - 99 mg/dL   POCT GLUCOSE   Result Value Ref Range    POCT Glucose 127 (H) 74 - 99 mg/dL        Imaging  XR chest 1 view  Result Date: 5/26/2025  1.  Overall similar appearance of the lungs with trace bibasilar pleural effusions and atelectasis and patchy right perihilar airspace opacities. This is superimposed on a background of chronic lung disease.       MACRO: None   Signed by: Renny Cid 5/26/2025 8:55 AM Dictation workstation:   [id number]    XR chest 1 view  Result Date: 5/25/2025  1. Dense airspace disease at the right lung similar to the prior. Left basilar airspace disease as well. Please correlate with prior cross-sectional imaging for differential. Continued follow-up advised       MACRO: None   Signed by: Ambrosio Bess 5/25/2025 11:24 AM Dictation workstation:   [id number]    XR chest 1 view  Result Date: 5/23/2025  1. Generalized increased interstitial prominence as seen on the prior examination suggesting component of interstitial edema.   2. Persistent right perihilar and basilar consolidation with mild improved aeration of the right lung base.     MACRO: None   Signed by: Jin Schmidt 5/23/2025 8:06 AM Dictation workstation:    [id number]    XR chest 1 view  Result Date: 5/22/2025  No change in the extensive right-sided pulmonary infiltrate and right effusion.   MACRO: none   Signed by: Clemente Vergara 5/22/2025 9:56 AM Dictation workstation:   [id number]    XR chest 1 view  Result Date: 5/21/2025  Right perihilar and basilar infiltrate consistent with pneumonia, unchanged compared to the prior study. Pulmonary vascular congestion is also suspected along with small bilateral effusions, unchanged.   MACRO: none   Signed by: Clemente Vergara 5/21/2025 8:03 AM Dictation workstation:   [id number]    CT chest wo IV contrast  Result Date: 5/20/2025  1. Right lower lobar collapse with air bronchograms and endobronchial opacification of the right lower lobe and bronchus intermedius. Correlate with aspiration pneumonias a potential etiology. Of note, patchy consolidation also demonstrated in the right middle lobe and in particular posterior segment of the right upper lobe as well as left upper lobe. Findings suggest multilobar pneumonia. 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   2. Moderate right and mild-to-moderate left pleural effusions.     MACRO: None   Signed by: Jin Schmidt 5/20/2025 11:56 AM Dictation workstation:   [id number]    XR chest 1 view  Result Date: 5/20/2025  Improved interstitial prominence with stable small left pleural effusion.   Worsening infiltrate with airspace consolidation now seen in the right upper lobe with right pleural effusion increased in size.   Signed by: Hanane Fine 5/20/2025 7:53 AM Dictation workstation:   [id number]    CT head wo IV contrast  Result Date: 5/20/2025  No acute intracranial abnormality.     MACRO: None   Signed by: Kanika Avina 5/20/2025 12:31 AM Dictation workstation:   [id number]      Cardiology, Vascular, and Other Imaging  No other imaging results found for the past 7 days       Visit Vitals  BP  141/75 (BP Location: Left arm, Patient Position: Lying)   Pulse 102   Temp 36.8 °C (98.2 °F) (Axillary)   Resp 20       Intake/Output Summary (Last 24 hours) at 5/26/2025 1349  Last data filed at 5/26/2025 1214  Gross per 24 hour   Intake 780 ml   Output 3300 ml   Net -2520 ml        Physical Exam  Vitals and nursing note reviewed.   Constitutional:       General: She is not in acute distress.     Appearance: She is ill-appearing.      Comments: Severe cachexia   HENT:      Mouth/Throat:      Mouth: Mucous membranes are dry.      Pharynx: Oropharynx is clear.   Eyes:      Extraocular Movements: Extraocular movements intact.      Pupils: Pupils are equal, round, and reactive to light.   Pulmonary:      Effort: No respiratory distress.      Comments: On 2 liters, respirations are shallow and even  Abdominal:      General: Abdomen is flat.      Palpations: Abdomen is soft.   Musculoskeletal:      Right lower leg: No edema.      Left lower leg: No edema.      Comments: Severe cachexia, able to RODRIGUEZ spontaneously   Skin:     General: Skin is warm and dry.   Neurological:      General: No focal deficit present.      Mental Status: She is alert and oriented to person, place, and time.   Psychiatric:         Mood and Affect: Mood normal.         Behavior: Behavior normal.         Thought Content: Thought content normal.         Judgment: Judgment normal.          Assessment/Plan   IMP:    Acute on chronic hypoxic respiratory failure/COPD exacerbation 2/2 multifocal pneumonia/pneumonitis - improving now on 2 liters NC - completed course antibiotics  SCC of right lung -  Has been on Keytruda not a candidate for chemo or surgery per last oncology note; Completed radiotherapy 2/24-2/26/25; Last brain MRI 1/27/25 no evidence of mass cerebral infarction; Per Onc outpatient notes plan for restaging PET/CT 6/9/2025  HFpEF - euvolemic, FÁTIMA last 10/2024 left ventricular ejection fraction 60 to 65%   Decubitus ulcers and wounds - wound  care team managing  Electrolyte derangements - received po Kcl this AM, getting Mg IV now  Anxiety - stable with Valium, baclofen  Sleep disturbance - improved with melatonin  Chronic pain 2/2 sacral wound and chronic neck pain is better with ATC tylenol and topical lidocaine    Palliative Care Encounter  Pt is capable  HPELKE is friend Jocelyn Mccloud.   Pt lives with her daughter Karly.  Palliative med team reconsulted to readdress [facility] as patient making statements to other providers wanted to change code status to DNR. I met with patient at the bedside. Reviewed risks/benefits CPR and intubation. Pt was asking appropriate questions and was able to verbalize understanding of these risks/benefits. She would like to change code status to DNR-DNI. I brought up the topic of Hospice. She is familiar with hospice and stated she is not ready for this yet. Spoke to pt daughter Karly and MATT over phone to provide update per pt request.  D/w Elif Root CNP and Dr. Landen Natarajan.    Advance Directives Info: Patient has advance directive, copy in chart  Discharge Planning: will dc home with [facility]  Palliative Care Team will continue to follow patient.      Magalis Jimenes, APRN-CNP        [1] acetaminophen, 650 mg, oral, TID  albuterol, 2.5 mg, nebulization, q4h  baclofen, 10 mg, oral, TID  cholecalciferol, 125 mcg, oral, Daily  collagenase, , Topical, Daily  diazePAM, 5 mg, oral, Daily  enoxaparin, 30 mg, subcutaneous, Daily  insulin lispro, 0-5 Units, subcutaneous, TID AC  lactulose, 20 g, oral, Daily  lidocaine, 1 Application, Topical, TID  magnesium sulfate, 2 g, intravenous, Once  melatonin, 3 mg, oral, Nightly  mupirocin, 1 Application, Topical, BID  nicotine, 1 patch, transdermal, Daily   Followed by  [START ON 7/1/2025] nicotine, 1 patch, transdermal, Daily   Followed by  [START ON 7/15/2025] nicotine, 1 patch, transdermal, Daily  oxygen, , inhalation, Continuous - Inhalation  pantoprazole, 40 mg, oral, Daily before  breakfast  piperacillin-tazobactam, 3.375 g, intravenous, q6h  sennosides-docusate sodium, 2 tablet, oral, BID  sucralfate, 1 g, oral, q6h A[place]USTA  [2]    [3] PRN medications: alteplase, benzocaine-menthol, butalbital-acetaminophen-caff, dextromethorphan-guaifenesin, dextrose, dextrose, glucagon, glucagon, guaiFENesin, guaiFENesin, ipratropium-albuteroL, labetaloL, lubricating eye drops, ondansetron, polyethylene glycol, potassium chloride, prochlorperazine, sodium hypochlorite

## 2025-05-27 ENCOUNTER — APPOINTMENT (OUTPATIENT)
Dept: RADIOLOGY | Facility: HOSPITAL | Age: 80
DRG: 177 | End: 2025-05-27
Payer: MEDICARE

## 2025-05-27 LAB
ALBUMIN SERPL BCP-MCNC: 2.5 G/DL (ref 3.4–5)
ANION GAP SERPL CALCULATED.3IONS-SCNC: 7 MMOL/L (ref 10–20)
BASOPHILS # BLD MANUAL: 0 X10*3/UL (ref 0–0.1)
BASOPHILS NFR BLD MANUAL: 0 %
BUN SERPL-MCNC: 9 MG/DL (ref 6–23)
CALCIUM SERPL-MCNC: 8.1 MG/DL (ref 8.6–10.3)
CHLORIDE SERPL-SCNC: 92 MMOL/L (ref 98–107)
CO2 SERPL-SCNC: 36 MMOL/L (ref 21–32)
CREAT SERPL-MCNC: 0.22 MG/DL (ref 0.5–1.05)
EGFRCR SERPLBLD CKD-EPI 2021: >90 ML/MIN/1.73M*2
EOSINOPHIL # BLD MANUAL: 0.07 X10*3/UL (ref 0–0.4)
EOSINOPHIL NFR BLD MANUAL: 1 %
ERYTHROCYTE [DISTWIDTH] IN BLOOD BY AUTOMATED COUNT: 14.3 % (ref 11.5–14.5)
GLUCOSE BLD MANUAL STRIP-MCNC: 131 MG/DL (ref 74–99)
GLUCOSE BLD MANUAL STRIP-MCNC: 135 MG/DL (ref 74–99)
GLUCOSE BLD MANUAL STRIP-MCNC: 145 MG/DL (ref 74–99)
GLUCOSE BLD MANUAL STRIP-MCNC: 147 MG/DL (ref 74–99)
GLUCOSE SERPL-MCNC: 111 MG/DL (ref 74–99)
HCT VFR BLD AUTO: 31.7 % (ref 36–46)
HGB BLD-MCNC: 10.1 G/DL (ref 12–16)
IMM GRANULOCYTES # BLD AUTO: 0.36 X10*3/UL (ref 0–0.5)
IMM GRANULOCYTES NFR BLD AUTO: 5.2 % (ref 0–0.9)
LYMPHOCYTES # BLD MANUAL: 0.14 X10*3/UL (ref 0.8–3)
LYMPHOCYTES NFR BLD MANUAL: 2 %
MAGNESIUM SERPL-MCNC: 1.88 MG/DL (ref 1.6–2.4)
MCH RBC QN AUTO: 31.4 PG (ref 26–34)
MCHC RBC AUTO-ENTMCNC: 31.9 G/DL (ref 32–36)
MCV RBC AUTO: 98 FL (ref 80–100)
METAMYELOCYTES # BLD MANUAL: 0.07 X10*3/UL
METAMYELOCYTES NFR BLD MANUAL: 1 %
MONOCYTES # BLD MANUAL: 0.83 X10*3/UL (ref 0.05–0.8)
MONOCYTES NFR BLD MANUAL: 12 %
MYELOCYTES # BLD MANUAL: 0.28 X10*3/UL
MYELOCYTES NFR BLD MANUAL: 4 %
NEUTS SEG # BLD MANUAL: 5.52 X10*3/UL (ref 1.6–5)
NEUTS SEG NFR BLD MANUAL: 80 %
NRBC BLD-RTO: 0 /100 WBCS (ref 0–0)
PHOSPHATE SERPL-MCNC: 2.1 MG/DL (ref 2.5–4.9)
PLATELET # BLD AUTO: 212 X10*3/UL (ref 150–450)
POLYCHROMASIA BLD QL SMEAR: ABNORMAL
POTASSIUM SERPL-SCNC: 3.8 MMOL/L (ref 3.5–5.3)
RBC # BLD AUTO: 3.22 X10*6/UL (ref 4–5.2)
RBC MORPH BLD: ABNORMAL
SODIUM SERPL-SCNC: 131 MMOL/L (ref 136–145)
TOTAL CELLS COUNTED BLD: 100
WBC # BLD AUTO: 6.9 X10*3/UL (ref 4.4–11.3)

## 2025-05-27 PROCEDURE — 99232 SBSQ HOSP IP/OBS MODERATE 35: CPT | Performed by: NURSE PRACTITIONER

## 2025-05-27 PROCEDURE — 2500000001 HC RX 250 WO HCPCS SELF ADMINISTERED DRUGS (ALT 637 FOR MEDICARE OP)

## 2025-05-27 PROCEDURE — 94668 MNPJ CHEST WALL SBSQ: CPT

## 2025-05-27 PROCEDURE — 2500000005 HC RX 250 GENERAL PHARMACY W/O HCPCS

## 2025-05-27 PROCEDURE — 2500000005 HC RX 250 GENERAL PHARMACY W/O HCPCS: Performed by: INTERNAL MEDICINE

## 2025-05-27 PROCEDURE — 2500000002 HC RX 250 W HCPCS SELF ADMINISTERED DRUGS (ALT 637 FOR MEDICARE OP, ALT 636 FOR OP/ED)

## 2025-05-27 PROCEDURE — 99232 SBSQ HOSP IP/OBS MODERATE 35: CPT

## 2025-05-27 PROCEDURE — 85007 BL SMEAR W/DIFF WBC COUNT: CPT

## 2025-05-27 PROCEDURE — 94640 AIRWAY INHALATION TREATMENT: CPT

## 2025-05-27 PROCEDURE — 85027 COMPLETE CBC AUTOMATED: CPT

## 2025-05-27 PROCEDURE — 2060000001 HC INTERMEDIATE ICU ROOM DAILY

## 2025-05-27 PROCEDURE — 74230 X-RAY XM SWLNG FUNCJ C+: CPT

## 2025-05-27 PROCEDURE — 51701 INSERT BLADDER CATHETER: CPT

## 2025-05-27 PROCEDURE — 80069 RENAL FUNCTION PANEL: CPT

## 2025-05-27 PROCEDURE — 2500000002 HC RX 250 W HCPCS SELF ADMINISTERED DRUGS (ALT 637 FOR MEDICARE OP, ALT 636 FOR OP/ED): Performed by: NURSE PRACTITIONER

## 2025-05-27 PROCEDURE — 92611 MOTION FLUOROSCOPY/SWALLOW: CPT | Mod: GN | Performed by: SPEECH-LANGUAGE PATHOLOGIST

## 2025-05-27 PROCEDURE — 2500000004 HC RX 250 GENERAL PHARMACY W/ HCPCS (ALT 636 FOR OP/ED): Mod: JZ

## 2025-05-27 PROCEDURE — 74230 X-RAY XM SWLNG FUNCJ C+: CPT | Performed by: RADIOLOGY

## 2025-05-27 PROCEDURE — 82947 ASSAY GLUCOSE BLOOD QUANT: CPT

## 2025-05-27 PROCEDURE — S4991 NICOTINE PATCH NONLEGEND: HCPCS

## 2025-05-27 PROCEDURE — 83735 ASSAY OF MAGNESIUM: CPT | Performed by: NURSE PRACTITIONER

## 2025-05-27 PROCEDURE — 5A09357 ASSISTANCE WITH RESPIRATORY VENTILATION, LESS THAN 24 CONSECUTIVE HOURS, CONTINUOUS POSITIVE AIRWAY PRESSURE: ICD-10-PCS | Performed by: INTERNAL MEDICINE

## 2025-05-27 RX ORDER — DIAZEPAM 5 MG/1
5 TABLET ORAL NIGHTLY
Status: DISCONTINUED | OUTPATIENT
Start: 2025-05-27 | End: 2025-06-02 | Stop reason: HOSPADM

## 2025-05-27 RX ORDER — FUROSEMIDE 10 MG/ML
20 INJECTION INTRAMUSCULAR; INTRAVENOUS ONCE
Status: COMPLETED | OUTPATIENT
Start: 2025-05-27 | End: 2025-05-27

## 2025-05-27 RX ADMIN — MELATONIN 3 MG: 3 TAB ORAL at 20:13

## 2025-05-27 RX ADMIN — BACLOFEN 10 MG: 10 TABLET ORAL at 20:13

## 2025-05-27 RX ADMIN — BACLOFEN 10 MG: 10 TABLET ORAL at 11:05

## 2025-05-27 RX ADMIN — LIDOCAINE 1 APPLICATION: 50 OINTMENT TOPICAL at 22:34

## 2025-05-27 RX ADMIN — Medication 2 L/MIN: at 07:23

## 2025-05-27 RX ADMIN — LIDOCAINE 1 APPLICATION: 50 OINTMENT TOPICAL at 17:04

## 2025-05-27 RX ADMIN — DIAZEPAM 5 MG: 5 TABLET ORAL at 11:05

## 2025-05-27 RX ADMIN — MUPIROCIN 1 APPLICATION: 20 OINTMENT TOPICAL at 20:20

## 2025-05-27 RX ADMIN — BACLOFEN 10 MG: 10 TABLET ORAL at 15:11

## 2025-05-27 RX ADMIN — ACETAMINOPHEN 650 MG: 325 TABLET ORAL at 17:03

## 2025-05-27 RX ADMIN — ALBUTEROL SULFATE 2.5 MG: 2.5 SOLUTION RESPIRATORY (INHALATION) at 03:28

## 2025-05-27 RX ADMIN — SUCRALFATE ORAL SUSPENSION 1 G: 1 SUSPENSION ORAL at 00:09

## 2025-05-27 RX ADMIN — BARIUM SULFATE 70 ML: 400 SUSPENSION ORAL at 09:15

## 2025-05-27 RX ADMIN — ALBUTEROL SULFATE 2.5 MG: 2.5 SOLUTION RESPIRATORY (INHALATION) at 07:23

## 2025-05-27 RX ADMIN — PANTOPRAZOLE SODIUM 40 MG: 40 TABLET, DELAYED RELEASE ORAL at 06:02

## 2025-05-27 RX ADMIN — ALBUTEROL SULFATE 2.5 MG: 2.5 SOLUTION RESPIRATORY (INHALATION) at 11:19

## 2025-05-27 RX ADMIN — SUCRALFATE ORAL SUSPENSION 1 G: 1 SUSPENSION ORAL at 05:10

## 2025-05-27 RX ADMIN — BARIUM SULFATE 10 ML: 400 PASTE ORAL at 09:25

## 2025-05-27 RX ADMIN — DIAZEPAM 5 MG: 5 TABLET ORAL at 20:13

## 2025-05-27 RX ADMIN — ALBUTEROL SULFATE 2.5 MG: 2.5 SOLUTION RESPIRATORY (INHALATION) at 15:12

## 2025-05-27 RX ADMIN — ALBUTEROL SULFATE 2.5 MG: 2.5 SOLUTION RESPIRATORY (INHALATION) at 19:10

## 2025-05-27 RX ADMIN — ALBUTEROL SULFATE 2.5 MG: 2.5 SOLUTION RESPIRATORY (INHALATION) at 22:44

## 2025-05-27 RX ADMIN — BARIUM SULFATE 90 ML: 0.81 POWDER, FOR SUSPENSION ORAL at 09:20

## 2025-05-27 RX ADMIN — ACETAMINOPHEN 650 MG: 325 TABLET ORAL at 11:05

## 2025-05-27 RX ADMIN — ENOXAPARIN SODIUM 30 MG: 100 INJECTION SUBCUTANEOUS at 11:05

## 2025-05-27 RX ADMIN — SUCRALFATE ORAL SUSPENSION 1 G: 1 SUSPENSION ORAL at 23:53

## 2025-05-27 RX ADMIN — FUROSEMIDE 20 MG: 10 INJECTION, SOLUTION INTRAMUSCULAR; INTRAVENOUS at 13:43

## 2025-05-27 RX ADMIN — SUCRALFATE ORAL SUSPENSION 1 G: 1 SUSPENSION ORAL at 13:43

## 2025-05-27 RX ADMIN — LIDOCAINE 1 APPLICATION: 50 OINTMENT TOPICAL at 11:07

## 2025-05-27 RX ADMIN — SUCRALFATE ORAL SUSPENSION 1 G: 1 SUSPENSION ORAL at 18:49

## 2025-05-27 RX ADMIN — SENNOSIDES AND DOCUSATE SODIUM 2 TABLET: 50; 8.6 TABLET ORAL at 20:13

## 2025-05-27 RX ADMIN — NICOTINE 1 PATCH: 21 PATCH, EXTENDED RELEASE TRANSDERMAL at 11:03

## 2025-05-27 RX ADMIN — Medication 3 L/MIN: at 19:11

## 2025-05-27 ASSESSMENT — COGNITIVE AND FUNCTIONAL STATUS - GENERAL
STANDING UP FROM CHAIR USING ARMS: TOTAL
EATING MEALS: A LITTLE
MOBILITY SCORE: 10
HELP NEEDED FOR BATHING: A LOT
DRESSING REGULAR UPPER BODY CLOTHING: A LITTLE
TURNING FROM BACK TO SIDE WHILE IN FLAT BAD: A LOT
DAILY ACTIVITIY SCORE: 15
PERSONAL GROOMING: A LITTLE
CLIMB 3 TO 5 STEPS WITH RAILING: TOTAL
TOILETING: A LOT
MOVING FROM LYING ON BACK TO SITTING ON SIDE OF FLAT BED WITH BEDRAILS: A LITTLE
DRESSING REGULAR LOWER BODY CLOTHING: A LOT
MOVING TO AND FROM BED TO CHAIR: A LOT
WALKING IN HOSPITAL ROOM: TOTAL

## 2025-05-27 ASSESSMENT — PAIN SCALES - GENERAL
PAINLEVEL_OUTOF10: 0 - NO PAIN
PAINLEVEL_OUTOF10: 0 - NO PAIN

## 2025-05-27 ASSESSMENT — PAIN SCALES - WONG BAKER: WONGBAKER_NUMERICALRESPONSE: NO HURT

## 2025-05-27 NOTE — CONSULTS
COPD Education  Comorbidities:    COPD, CHF, Squamous cell carcinoma - lung     Exacerbation within last year: 3/5/2025, 4/29/2025  Moderate: >= 2 exacerbations or >= 1 exacerbation leading to hospitalization     Spirometry - Date: 12/9/2024     Spirometry  FEV1 0.72 L 52% predicted   FVC 1.39 L 73% predicted  FEV1 FVC ratio 56  Postbronchodilator testing demonstrates a 19% increase in FVC     Total lung capacity 4.34 L 120% predicted  Residual volume 2.9 cm 33% of predicted  RV/% of predicted     DLCO 6.40 mL/min/mmHg 43% predicted     Interpretation  Moderate obstruction with decreased DLCO        Uses of Oxygen/CPAP/BIPAP: Home O2 2L at baseline. No CPAP or BiPAP  Pulse Ox at home: no     Smoking status:  Smoker: Former. 1 pk/day x 65 yrs. Per patient “I have not smoked a cigarette since I came home with oxygen”. Pt does continue to smoke marijuana “every couple of days” and does not plan to quit. She states she does take her O2 off while smoking. Pt. uses vape pen daily throughout the day. Discussed using edible instead of smoking marijuana - patient states she will consider looking into this.    Quit date: 4/29/2025  Smoking cessation counseling: Yes. Pt declines need. Patient does not want to quit vaping, or smoking marijuana. Patient is using nicotine patch currently but declines need for nicotine replacement therapy for homegoing.           Quitting Smoking or Tobacco Use pt and family education sheet provided: Left copy with patient.     Pulmonary physician: None   Sees PCP for pulmonology: Dr. Chaney                 Date last seen: 5/5/2025 - telephone encounter [facility] wellness call.       Maintenance medications   LABA, LAMA, LABA/LAMA, ICS/LABA, ICS/LAMA, ICS/LABA/LAMA     Name of the medications:  Provider note states patient prescribed Breyna inhaler and airsupra.  Patient states she only has airsupra and uses it at noon and midnight. Patient states this has not changed from  previous admission. Discussed airsupra as rescue inhaler. Patient states she uses it as she needs it 2 times a day.                 Allergies: Morphine, spice flavor      If not on maintenance meds:  Consider LAMA or LAMA/LABA   Consider ICS (if peripheral eosinophilia >300cells/microl, COPD exacerbation requiring hospitalization, >= 2 moderated COPD exacerbation, History of or concomitant asthma)     If low inspiratory force or challenges with inhalers   Consider Nebulizers                 Consider RESPIMAT                                             COPD Education   COPD patient education book: Reviewed.  Patient demonstrated understanding/teach-back method: Yes. Patient verbalized understanding of when to call provider. Patient verbalized triggers of exacerbation.     Healthy at Home reviewed: Patient is interested in [facility]. Referral placed. Informational flyer left with patient.  Patient states she does have some help from [place] [facility] - bathing assistance and help around the house. States daughter does the cooking so she does not have to do anything exertional.    COPD Coordinator contact information left with patient.               Vaccines                   Influenza: none noted   Pneumococcal: None noted    COVID 19: 10/14/2021 Pertussis: None noted         Recommendations:   Follow up with PCP within two weeks of discharge.

## 2025-05-27 NOTE — CARE PLAN
Problem: Respiratory  Goal: No signs of respiratory distress (eg. Use of accessory muscles. Peds grunting)  5/27/2025 0333 by Kerrie Mckeon, RRT  Outcome: Progressing  5/26/2025 1912 by Kerrie Mckeon, RRT  Outcome: Progressing

## 2025-05-27 NOTE — NURSING NOTE
This RN requested to perform wound care on patient. Patient refused and asked for it to be preformed later tonight. This nurse educated importance of complying with wound care. This nurse informed patient that it needs to be completed tonight.

## 2025-05-27 NOTE — CARE PLAN
The patient's goals for the shift include rest    The clinical goals for the shift include safety, monitor labs

## 2025-05-27 NOTE — PROGRESS NOTES
External referral for C needed. [facility] will deliver the bed once pt is ready for dc for timing of delivery which can be same day.      05/27/25 1026   Discharge Planning   Expected Discharge Disposition Home H  ([facility])

## 2025-05-27 NOTE — NURSING NOTE
Bed side shift report received. Patient resting comfortably. Patient currently on 2 L. Reports no current concerns at this time. Barnard catheter and rectal tube in place. Patient on specialty mattress. Call light within reach. This nurse assumed care. Bed alarm active. Bed in lowest position.

## 2025-05-27 NOTE — PROCEDURES
Speech-Language Pathology    Speech-Language Pathology    Inpatient Modified Barium Swallow Study    Patient Name: Karly Horton  MRN: [id number]  : 1945  Today's Date: 25  Time Calculation  Start Time: 905  Stop Time: 930  Time Calculation (min): 25 min      Modified Barium Swallow Study completed. Informed verbal consent obtained prior to completion of exam. The study was completed per protocol with various liquid barium consistencies, pudding, solids and a 13mm barium tablet was not given. A 1.9 cm or .75 inch (outer diameter) ring was placed on the chin in the lateral view and on the lateral, left side of the neck in the a-p view in order to complete objective measurements during swallowing. The anatomic structures and function of the oropharynx, larynx, hypopharynx and cervical esophagus were evaluated.    SLP: DAVIN Goodrich   Contact info: [employer]; phone: [phone]      Reason for Referral: r/o aspiration, per pulmonology request  Patient Hx: PMHx relevant to rehab: lung cancer     Chief complaint: Pt was admitted on 25 due to        Chief Complaint   Patient presents with    Weakness, Gen       Patient bib ems for weakness, lives at home with her daughter, had a low pox and was given oxygen.  Symptoms began two days ago. She is also being treated for an ulcer on her lower back/coccyx.   . She was found to have acute on chronic respiratory failure in the setting of COPD exacerbation and possible PNA.     Relevant imaging results:  CT chest 25: Lung parenchyma demonstrates moderate right and mild-to-moderate  left-sided pleural effusions. There is basilar compressive  atelectasis with right lower lobar collapse. Endobronchial  opacification of the atelectatic right lower lobe as well as the  bronchus intermedius. Correlate with aspiration as a potential  etiology versus retained secretions. There is septal thickening and  patchy consolidation within the right  middle lobe as well as right  upper lobe in particular of the posterior segment with component of  air bronchograms. Left lung demonstrates scattered areas of patchy  infiltrate in the left upper lobe.     Respiratory Status: Room air  Current diet: soft bite sized, thin liquids (pt baseline preference d/t edentulous status)    Pain:  Pain Scale: 0-10  Ratin  Location: buttocks  Type: acute      DIET RECOMMENDATIONS:   - Soft & Bite Sized (IDDSI Level 6)  - Thin liquids (IDDSI Level 0)  Per the results of today's MBSS, patient to continue baseline diet of soft bite sized consistencies and thin liquids following swallow strategies listed below:    STRATEGIES:  Compensatory swallow strategies:  - Upright positioning for all PO intake  - Slow rate of intake  - Chew thoroughly         SLP PLAN:  Skilled SLP Services: No further skilled SLP intervention is warranted for dysphagia at this time.    Discussed POC: Patient  Discussed Risks/Benefits: Yes  Patient/Caregiver Agreeable: Yes    Education Provided: Results and recommendations per MBSS, with video review; recommendations and POC at this time. Verbal understanding and agreement given on all accounts.     Treatment Provided Today: No treatment indicated  Additional Medical Consults Suggested:   - GI as indicated d/t esophageal retention and retrograde flow with in distal esophagus    Repeat Study: Per MD or treating SLP       Mechanics of the Swallow Summary:  ORAL PHASE:  Lip Closure - No labial escape/anterior loss of bolus   Tongue Control During Bolus Hold - Cohesive bolus between tongue to palatal seal   Bolus prep/mastication - Slow prolonged mastication with complete re-collection necessary   Bolus transport/lingual motion - Brisk tongue motion for A-P movement of the bolus   Oral residue - Trace residue lining oral structures     PHARYNGEAL PHASE:  Initiation of pharyngeal swallow - Bolus head at pit of pyriforms   Soft palate elevation - No bolus between  soft palate/pharyngeal wall   Laryngeal elevation - Complete superior movement of thyroid cartilage with contact of arytenoids to epiglottic petiole   Anterior hyoid excursion - Complete anterior movement   Epiglottic movement - Complete inversion    Laryngeal vestibule closure - Complete - no air/contrast in laryngeal vestibule   Pharyngeal stripping wave - Complete  Pharyngeal contraction (A/P view) - Complete  Pharyngoesophageal segment opening - Partial distension/partial duration with partial obstruction of flow of bolus   Tongue base retraction - Trace column of contrast or air between tongue base and pharyngeal wall   Pharyngeal residue - Trace residue within or on the pharyngeal structures     ESOPHAGEAL PHASE:  Esophageal clearance - Esophageal retention with retrograde flow below the pharyngoesophageal segment w/ thin barium within distal esophagus,  some cleared w/ thin , no retention w/ pudding barium, a cricopharyngeal bar/prominence was observed without obstruction of flow     SLP Impressions with Severity Rating:   Pt presents with no significant oropharyngeal dysphagia upon completion of modified barium swallow study this date. Swallowing physiology is detailed above. Patient demonstrated no penetration for any  consistency, and no aspiration was visualized during study.     *Of note: The A-P bolus follow-through is not intended to be utilized as a diagnostic assessment of the esophagus, rather a tool to observe the biomechanical aspects of the swallow continuum and to inform the need for further evaluation by medical specialists, as applicable.     Strategies attempted- n/a    OUTCOME MEASURES:  Functional Oral Intake Scale  Functional Oral Intake Scale: Level 7        total oral diet with no restrictions     Rosenbek's Penetration Aspiration Scale  Thin Liquids: 1. NO ASPIRATION & NO PENETRATION - no aspiration, contrast does not enter airway  [place] Thick Liquids: 1. NO ASPIRATION & NO  PENETRATION - no aspiration, contrast does not enter airway  Puree: 1. NO ASPIRATION & NO PENETRATION - no aspiration, contrast does not enter airway  Soft Solid: 1. NO ASPIRATION & NO PENETRATION - no aspiration, contrast does not enter airway

## 2025-05-27 NOTE — NURSING NOTE
End of shift, bedside shift report given. Patient laying in bed resting comfortably, with call light within reach. This RN and Night RN repositioned and cleaned patient mee area. Rectal tube remains in place. Patient still remains without a void since stearns removal. Patient verbalizes no new concerns at this time. Bed alarm active. Bed at lowest position.

## 2025-05-27 NOTE — PROGRESS NOTES
Karly Horton is a 80 y.o. female on day 9 of admission presenting with Generalized weakness.    Subjective   Patient seen and examined.  Resting in bed in no acute distress.  Eating lunch.  Awake alert oriented x 3.  Buttock/sacral pain.  No other complaints.  Breathing, baseline.  + Chronic neck pain.  Asking for Lidocaine topical as needed more often than scheduled three times daily.  + Anxiety.  Asking for Valium at bedtime.    Spoke with nursing, no issues.    Objective     Physical Exam  Vitals and nursing note reviewed.   Constitutional:       General: She is not in acute distress.     Appearance: Normal appearance. She is normal weight. She is ill-appearing. She is not toxic-appearing or diaphoretic.   HENT:      Head: Normocephalic and atraumatic.      Right Ear: External ear normal.      Left Ear: External ear normal.      Nose: Nose normal.      Mouth/Throat:      Mouth: Mucous membranes are moist.      Pharynx: Oropharynx is clear.   Eyes:      Extraocular Movements: Extraocular movements intact.      Conjunctiva/sclera: Conjunctivae normal.      Pupils: Pupils are equal, round, and reactive to light.   Cardiovascular:      Rate and Rhythm: Regular rhythm. Tachycardia present.      Pulses: Normal pulses.      Heart sounds: Normal heart sounds. No murmur heard.  Pulmonary:      Effort: Pulmonary effort is normal. No respiratory distress.      Breath sounds: Decreased breath sounds present. No wheezing, rhonchi or rales.      Comments: 2 liters nasal cannula.  Abdominal:      General: Bowel sounds are normal. There is no distension.      Palpations: Abdomen is soft.      Tenderness: There is no abdominal tenderness. There is no guarding.   Genitourinary:     Comments: : Barnard catheter in place draining clear yellow urine. Fecal management system in place draining soft brown stool.    Musculoskeletal:         General: No swelling or tenderness. Normal range of motion.      Cervical back: Normal range  "of motion and neck supple.      Right lower leg: No edema.      Left lower leg: No edema.   Skin:     General: Skin is warm and dry.      Capillary Refill: Capillary refill takes less than 2 seconds.      Comments: Right chest port in place dressing clean dry intact.  Sacrum/buttock not examined.   Neurological:      General: No focal deficit present.      Mental Status: She is alert and oriented to person, place, and time.      Motor: Weakness present.      Comments: Awake alert oriented x 3. Forgetful. Follows all commands. Generalized weakness. No focal weakness. Gait deferred.    Psychiatric:         Mood and Affect: Mood normal.         Behavior: Behavior normal.       Last Recorded Vitals  Blood pressure 114/62, pulse (!) 117, temperature 36.6 °C (97.9 °F), temperature source Axillary, resp. rate 19, height 1.422 m (4' 8\"), weight 53 kg (116 lb 13.5 oz), SpO2 96%.    Intake/Output last 3 Shifts:  I/O last 3 completed shifts:  In: 2110 (66.4 mL/kg) [P.O.:1860; IV Piggyback:250]  Out: 3865 (121.5 mL/kg) [Urine:3865 (3.4 mL/kg/hr)]  Dosing Weight: 31.8 kg     Telemetry sinus tachycardia rate 100's    Relevant Results  This patient has a central line   Reason for the central line remaining today? Parenteral medication and Parenteral nutrition    Results for orders placed or performed during the hospital encounter of 05/18/25 (from the past 24 hours)   POCT GLUCOSE   Result Value Ref Range    POCT Glucose 145 (H) 74 - 99 mg/dL   POCT GLUCOSE   Result Value Ref Range    POCT Glucose 132 (H) 74 - 99 mg/dL   CBC and Auto Differential   Result Value Ref Range    WBC 6.9 4.4 - 11.3 x10*3/uL    nRBC 0.0 0.0 - 0.0 /100 WBCs    RBC 3.22 (L) 4.00 - 5.20 x10*6/uL    Hemoglobin 10.1 (L) 12.0 - 16.0 g/dL    Hematocrit 31.7 (L) 36.0 - 46.0 %    MCV 98 80 - 100 fL    MCH 31.4 26.0 - 34.0 pg    MCHC 31.9 (L) 32.0 - 36.0 g/dL    RDW 14.3 11.5 - 14.5 %    Platelets 212 150 - 450 x10*3/uL    Immature Granulocytes %, Automated 5.2 " (H) 0.0 - 0.9 %    Immature Granulocytes Absolute, Automated 0.36 0.00 - 0.50 x10*3/uL   Renal function panel   Result Value Ref Range    Glucose 111 (H) 74 - 99 mg/dL    Sodium 131 (L) 136 - 145 mmol/L    Potassium 3.8 3.5 - 5.3 mmol/L    Chloride 92 (L) 98 - 107 mmol/L    Bicarbonate 36 (H) 21 - 32 mmol/L    Anion Gap 7 (L) 10 - 20 mmol/L    Urea Nitrogen 9 6 - 23 mg/dL    Creatinine 0.22 (L) 0.50 - 1.05 mg/dL    eGFR >90 >60 mL/min/1.73m*2    Calcium 8.1 (L) 8.6 - 10.3 mg/dL    Phosphorus 2.1 (L) 2.5 - 4.9 mg/dL    Albumin 2.5 (L) 3.4 - 5.0 g/dL   Magnesium   Result Value Ref Range    Magnesium 1.88 1.60 - 2.40 mg/dL   Manual Differential   Result Value Ref Range    Neutrophils %, Manual 80.0 40.0 - 80.0 %    Lymphocytes %, Manual 2.0 13.0 - 44.0 %    Monocytes %, Manual 12.0 2.0 - 10.0 %    Eosinophils %, Manual 1.0 0.0 - 6.0 %    Basophils %, Manual 0.0 0.0 - 2.0 %    Metamyelocytes %, Manual 1.0 0.0 - 0.0 %    Myelocytes %, Manual 4.0 0.0 - 0.0 %    Seg Neutrophils Absolute, Manual 5.52 (H) 1.60 - 5.00 x10*3/uL    Lymphocytes Absolute, Manual 0.14 (L) 0.80 - 3.00 x10*3/uL    Monocytes Absolute, Manual 0.83 (H) 0.05 - 0.80 x10*3/uL    Eosinophils Absolute, Manual 0.07 0.00 - 0.40 x10*3/uL    Basophils Absolute, Manual 0.00 0.00 - 0.10 x10*3/uL    Metamyelocytes Absolute, Manual 0.07 0.00 - 0.00 x10*3/uL    Myelocytes Absolute, Manual 0.28 0.00 - 0.00 x10*3/uL    Total Cells Counted 100     RBC Morphology See Below     Polychromasia Mild    POCT GLUCOSE   Result Value Ref Range    POCT Glucose 131 (H) 74 - 99 mg/dL   POCT GLUCOSE   Result Value Ref Range    POCT Glucose 145 (H) 74 - 99 mg/dL     No results found for the last 90 days.    FL modified barium swallow study  Result Date: 5/27/2025  Interpreted By:  Hanane Fine and Brodsky Sheryl STUDY: FL MODIFIED BARIUM SWALLOW STUDY;; 5/27/2025 9:32 am   INDICATION: Signs/Symptoms:Aspiration.     COMPARISON: None.   ACCESSION NUMBER(S): [id number]   ORDERING  CLINICIAN: LEXA LINDSEY   SPEECH FINDINGS: Modified Barium Swallow Study completed. Informed verbal consent obtained prior to completion of exam. The study was completed per protocol with various liquid barium consistencies, pudding, solids and a 13mm barium tablet was not given. A 1.9 cm or .75 inch (outer diameter) ring was placed on the chin in the lateral view and on the lateral, left side of the neck in the a-p view in order to complete objective measurements during swallowing. The anatomic structures and function of the oropharynx, larynx, hypopharynx and cervical esophagus were evaluated.   SLP: DAVIN Goodrich Contact info: Thanx; phone: [phone]     Reason for Referral: r/o aspiration, per pulmonology request Patient Hx: PMHx relevant to rehab: lung cancer   Chief complaint: Pt was admitted on 5/19/25 due to       Chief Complaint Patient presents with . Weakness, Gen     Patient bib ems for weakness, lives at home with her daughter, had a low pox and was given oxygen.  Symptoms began two days ago. She is also being treated for an ulcer on her lower back/coccyx. . She was found to have acute on chronic respiratory failure in the setting of COPD exacerbation and possible PNA.   Relevant imaging results: CT chest 5/20/25: Lung parenchyma demonstrates moderate right and mild-to-moderate left-sided pleural effusions. There is basilar compressive atelectasis with right lower lobar collapse. Endobronchial opacification of the atelectatic right lower lobe as well as the bronchus intermedius. Correlate with aspiration as a potential etiology versus retained secretions. There is septal thickening and patchy consolidation within the right middle lobe as well as right upper lobe in particular of the posterior segment with component of air bronchograms. Left lung demonstrates scattered areas of patchy infiltrate in the left upper lobe.   Respiratory Status: Room air Current diet: soft bite  sized, thin liquids (pt baseline preference d/t edentulous status)   Pain: Pain Scale: 0-10 Ratin Location: buttocks Type: acute     DIET RECOMMENDATIONS: - Soft & Bite Sized (IDDSI Level 6) - Thin liquids (IDDSI Level 0) Per the results of today's MBSS, patient to continue baseline diet of soft bite sized consistencies and thin liquids following swallow strategies listed below:   STRATEGIES: Compensatory swallow strategies: - Upright positioning for all PO intake - Slow rate of intake - Chew thoroughly       SLP PLAN: Skilled SLP Services: No further skilled SLP intervention is warranted for dysphagia at this time.   Discussed POC: Patient Discussed Risks/Benefits: Yes Patient/Caregiver Agreeable: Yes   Education Provided: Results and recommendations per MBSS, with video review; recommendations and POC at this time. Verbal understanding and agreement given on all accounts.   Treatment Provided Today: No treatment indicated Additional Medical Consults Suggested: - GI as indicated d/t esophageal retention and retrograde flow with in distal esophagus   Repeat Study: Per MD or treating SLP     Mechanics of the Swallow Summary: ORAL PHASE: Lip Closure - No labial escape/anterior loss of bolus Tongue Control During Bolus Hold - Cohesive bolus between tongue to palatal seal Bolus prep/mastication - Slow prolonged mastication with complete re-collection necessary Bolus transport/lingual motion - Brisk tongue motion for A-P movement of the bolus Oral residue - Trace residue lining oral structures   PHARYNGEAL PHASE: Initiation of pharyngeal swallow - Bolus head at pit of pyriforms Soft palate elevation - No bolus between soft palate/pharyngeal wall Laryngeal elevation - Complete superior movement of thyroid cartilage with contact of arytenoids to epiglottic petiole Anterior hyoid excursion - Complete anterior movement Epiglottic movement - Complete inversion Laryngeal vestibule closure - Complete - no air/contrast in  laryngeal vestibule Pharyngeal stripping wave - Complete Pharyngeal contraction (A/P view) - Complete Pharyngoesophageal segment opening - Partial distension/partial duration with partial obstruction of flow of bolus Tongue base retraction - Trace column of contrast or air between tongue base and pharyngeal wall Pharyngeal residue - Trace residue within or on the pharyngeal structures   ESOPHAGEAL PHASE: Esophageal clearance - Esophageal retention with retrograde flow below the pharyngoesophageal segment w/ thin barium within distal esophagus,  some cleared w/ thin , no retention w/ pudding barium, a cricopharyngeal bar/prominence was observed without obstruction of flow   SLP Impressions with Severity Rating: Pt presents with no significant oropharyngeal dysphagia upon completion of modified barium swallow study this date. Swallowing physiology is detailed above. Patient demonstrated no penetration for any  consistency, and no aspiration was visualized during study.   *Of note: The A-P bolus follow-through is not intended to be utilized as a diagnostic assessment of the esophagus, rather a tool to observe the biomechanical aspects of the swallow continuum and to inform the need for further evaluation by medical specialists, as applicable.   Strategies attempted- n/a   OUTCOME MEASURES: Functional Oral Intake Scale Functional Oral Intake Scale: Level 7        total oral diet with no restrictions   Rosenbek's Penetration Aspiration Scale Thin Liquids: 1. NO ASPIRATION & NO PENETRATION - no aspiration, contrast does not enter airway [place] Thick Liquids: 1. NO ASPIRATION & NO PENETRATION - no aspiration, contrast does not enter airway Puree: 1. NO ASPIRATION & NO PENETRATION - no aspiration, contrast does not enter airway Soft Solid: 1. NO ASPIRATION & NO PENETRATION - no aspiration, cont Speech Therapy section of this report signed by Agnes YOO/SLP on 5/27/2025 at 12:01 pm.   RADIOLOGY  FINDINGS: None   Radiology section of this report signed by Hanane Fine M.D..       Please refer to the speech therapist's findings and recommendations.   MACRO: None   Signed by: Hanane Fine 5/27/2025 12:04 PM Dictation workstation:   [id number]    XR chest 1 view  Result Date: 5/27/2025  Interpreted By:  Jin Schmidt, STUDY: XR CHEST 1 VIEW 5/26/2025 6:08 am   INDICATION: Signs/Symptoms:acute hypoxemic respiratory failure   COMPARISON: 05/25/2025   ACCESSION NUMBER(S): [id number]   ORDERING CLINICIAN: SILVANO ELLIOTT   TECHNIQUE: Single AP view chest   FINDINGS: Cardiomediastinal silhouette is within normal limits. Patchy alveolar airspace consolidation present within the right perihilar location with similar configuration from the prior examination with superimposed component of atelectasis. Mild generalized increased interstitial prominence within bilateral lung fields. Also, linear appearing density at the left lung base demonstrated.                 1. Stable chest with persistent right perihilar airspace consolidation.   2. Generalized increased interstitial prominence in bilateral lung fields appearing stable   Signed by: Jin Schmidt 5/27/2025 8:49 AM Dictation workstation:   [id number]      Scheduled medications  Scheduled Medications[1]  Continuous medications  Continuous Medications[2]  PRN medications  PRN Medications[3]    ASSESSMENT:  Acute on chronic hypoxic respiratory failure improved  Pneumonia  Moderate right, mild to moderate left pleural effusions  Squamous cell lung carcinoma status post chemo-radiation - Keytruda  COPD   History of tobacco use / vape use  Leukocytosis, multifactorial - resolving  Stage III/IV sacral decubitus ulcer  Pressure ulcer risk  Severe protein calorie malnutrition   Hyponatremia improved  Hypokalemia improved  Hypomagnesemia improved  Metabolic alkalosis  Normocytic anemia  Chronic pain  Spinal stenosis  Scoliosis  GERD  Vitamin d deficiency  Fall  risk  Constipation - resolved  Acute urinary retention    PLAN:  No new issues.  Overall, condition is stable.  Respiratory status, pulse oximetry stable on 2 L nasal cannula.  Baseline home oxygen.  Pulmonology and infectious disease following.  Continue treatment per recommendations.  Albuterol nebulizer treatments.  3 % saline nebulizer.  Oxygen 2 liters nasal cannula.  Pulmonary hygiene.  Encourage incentive spirometer use 10 times per hour while awake.  IV antibiotic Zosyn course complete.  Modified barium swallow testing complete.  Report reviewed.  Modified diet per recommendations.  Aspiration precautions.  PPI.  Outpatient GI follow-up.  Pulmonology signed off.  Labs reviewed.  Hyponatremia stable.  Hypokalemia and hypomagnesemia improved.  Monitor electrolytes and renal function.  Monitor I's and O's.  Wound care per wound care nursing.  Barnard catheter in place for acute urinary tension.  Trial void in anticipation of discharge.  Fecal management system in place for diarrhea, sacral wound.  Stool is softening, output decreased.  Hold bowel regimen.  Resume as diarrhea resolves.  Monitor I's and O's.  Remove fecal management system prior to discharge.  Continue home medications as described as appropriate.  Continue lidocaine.  Dosing per pharmacy.  Continue Valium.  Revise dose to bedtime.  Continue baclofen as prescribed 3 times daily.  Supportive care.  Palliative medicine input appreciated.  Code status established:  DNR CCA DNI.  PT/OT as tolerated.  Fall precautions.  Up with assistance only.  Bed and chair alarm at all times.  Pressure ulcer prevention measures.  Turn and reposition every 2 hours and as needed.  Heels off bed.  Offloading.  DVT prophylaxis.  Lovenox subcutaneous.  GI prophylaxis.  PPI Protonix.  Supportive care.  Patient reassured.  Continue to follow for discharge planning.  Discharge plan home with home health care.  Orders placed.  Hospital bed ordered.  Follow-up delivery.   Anticipate discharge, possibly tomorrow.  Discussed with patient and nursing.  Discussed with Dr. Mcneil.      Shea Root, APRN-CNP         [1] acetaminophen, 650 mg, oral, TID  albuterol, 2.5 mg, nebulization, q4h  baclofen, 10 mg, oral, TID  cholecalciferol, 125 mcg, oral, Daily  collagenase, , Topical, Daily  diazePAM, 5 mg, oral, Daily  enoxaparin, 30 mg, subcutaneous, Daily  insulin lispro, 0-5 Units, subcutaneous, TID AC  lactulose, 20 g, oral, Daily  lidocaine, 1 Application, Topical, TID  melatonin, 3 mg, oral, Nightly  mupirocin, 1 Application, Topical, BID  nicotine, 1 patch, transdermal, Daily   Followed by  [START ON 7/1/2025] nicotine, 1 patch, transdermal, Daily   Followed by  [START ON 7/15/2025] nicotine, 1 patch, transdermal, Daily  oxygen, , inhalation, Continuous - Inhalation  pantoprazole, 40 mg, oral, Daily before breakfast  sennosides-docusate sodium, 2 tablet, oral, BID  sucralfate, 1 g, oral, q6h [place]     [2]    [3] PRN medications: alteplase, benzocaine-menthol, butalbital-acetaminophen-caff, dextromethorphan-guaifenesin, dextrose, dextrose, glucagon, glucagon, guaiFENesin, guaiFENesin, ipratropium-albuteroL, labetaloL, lubricating eye drops, ondansetron, polyethylene glycol, potassium chloride, prochlorperazine, sodium hypochlorite

## 2025-05-27 NOTE — NURSING NOTE
This RN requested to perform wound care on patient. Patient refused and asked for it to be done later. This nurse educated importance of complying with wound care.

## 2025-05-27 NOTE — NURSING NOTE
Patient refusing wound care at this time. Patient requesting to have it done this evening before bed.

## 2025-05-27 NOTE — PROGRESS NOTES
"Pulmonary Daily Progress Note   Subjective    Karly Horton is a 80 y.o. year old female patient known with squamous cell lung carcinoma (Stage 3B, with a T4 classification (due to multiple nodules in ipsilateral lung lobes) and N2 (due to subcarinal lymph node involvement).), heart failure with preserved ejection fraction (HFpEF), and chronic obstructive pulmonary disease (COPD) home oxygen 2L, current smoker who was admitted on 5/18/2025 with worsening dypnea on exertion.     Interval History:  Endorses an improving cough  SLP without signs of aspiration but distal esophageal retention with retrograde flow GI consult recommended  Remains on 2L NC   She is now DNR DNI wants to follow a conservative path, would not like a therapeutic bronchoscopy but is not ready for hospice     Meds    Scheduled medications  Scheduled Medications[1]  Continuous medications  Continuous Medications[2]  PRN medications  PRN Medications[3]     Objective    Blood pressure 139/59, pulse 104, temperature 37.1 °C (98.8 °F), temperature source Oral, resp. rate (!) 48, height 1.422 m (4' 8\"), weight 53 kg (116 lb 13.5 oz), SpO2 95%.   Physical Exam   GENERAL: ill appearing. Malnourished. tachypnea  HEAD/SINUSES: no sinus tenderness on 2L NC  LUNGS: Symmetric chest. Good excursion. Equal breath sounds. no wheezing. no crackles or rhonchi  CARDIAC: normal S1 and S2; no gallops, rubs or murmurs. Regular rate and rhythm  EXTREMITIES: No edema, no varicose veins  NEURO: normal mental status  SKIN: Skin turgor normal. No rashes or lesions.   PSYCH: Normal affect    Intake/Output Summary (Last 24 hours) at 5/27/2025 1125  Last data filed at 5/27/2025 1111  Gross per 24 hour   Intake 1650 ml   Output 1990 ml   Net -340 ml     Labs:   Results from last 72 hours   Lab Units 05/27/25  0439 05/26/25  0445 05/25/25  0427   SODIUM mmol/L 131* 132* 132*   POTASSIUM mmol/L 3.8 2.8* 3.7   CHLORIDE mmol/L 92* 90* 90*   CO2 mmol/L 36* 39* 40*   BUN mg/dL 9 " 15 18   CREATININE mg/dL 0.22* 0.29* 0.29*   GLUCOSE mg/dL 111* 117* 86   CALCIUM mg/dL 8.1* 8.0* 8.4*   ANION GAP mmol/L 7* <7* <7*   EGFR mL/min/1.73m*2 >90 >90 >90   PHOSPHORUS mg/dL 2.1* 1.9* 2.0*      Results from last 72 hours   Lab Units 05/27/25  0439 05/26/25  0445 05/25/25  0427   WBC AUTO x10*3/uL 6.9 8.1 8.5   HEMOGLOBIN g/dL 10.1* 10.0* 9.6*   HEMATOCRIT % 31.7* 31.1* 30.3*   PLATELETS AUTO x10*3/uL 212 200 178   NEUTROS PCT AUTO %  --  81.9 85.5   LYMPHO PCT MAN % 2.0  --   --    LYMPHS PCT AUTO %  --  2.1 1.5   MONO PCT MAN % 12.0  --   --    MONOS PCT AUTO %  --  10.7 9.8   EOSINO PCT MAN % 1.0  --   --    EOS PCT AUTO %  --  0.4 0.5               Micro/ID:   Lab Results   Component Value Date    URINECULTURE >100,000 Escherichia coli (A) 10/11/2024    BLOODCULT No growth at 4 days -  FINAL REPORT 05/18/2025    BLOODCULT No growth at 4 days -  FINAL REPORT 05/18/2025     Summary of key imaging results from the last 24 hours  CXR - 1.  Overall similar appearance of the lungs with trace bibasilar  pleural effusions and atelectasis and patchy right perihilar airspace  opacities. This is superimposed on a background of chronic lung  disease.    Impression   Karly Horton is a 80 y.o. year old female patient is being seen by the pulmonary service for   Acute on chronic respiratory failure with hypoxia and hypercapnia - secondary to multifocal pneumonia  Multifocal pneumonia - most likely secondary to aspiration pneumonia given retaining food/debris in esophagus, with lobar distribution of consolidative opacities less likely a organizing pneumonia  Squamous cell lung carcinoma - chemo started 11/12/24- 1/13/25, conservative Keytruda [id number]. Pet scan planned for 6/9/25  HFpEF  COPD - on symbicort at home, recent need for home oxygen on 2L NC  Tobacco use  Pleural effusions - improving with conservative management     Recommendations   As follows:  Finish course of antibiotics  MBSS without signs of  aspiration  Wean supplemental oxygen maintain pulse ox greater than 88%  Coutinue nebs with 3% saline nebs change ipratropium to albuterol to help thin mucous followed by flutter valve  Bronchopulmonary hygiene with IS   hypercapnia would benefit from nocturnal PAP therapy  On 2L NC - home RX  Smoking cessation  Aspiration precautions  She is on baseline oxygen pulmonary will sign off at this time please reach out with any further questions or concerns    PO Garcia-CNP   05/27/25 at 11:25 AM     -Only the Medical problems listed under impression were addressed today.   -Please contact primary team for all other concerns and medical problem  -Thank you for your consult       Disclaimer: Documentation completed with the information available at the time of input. Parts of this note may have been scribed or generated using voice dictation software, Dragon.  Homophonic errors may exist.  Please contact me directly if clarification is needed. The times in the chart may not be reflective of actual patient care times, interventions, or procedures. Documentation occurs after the physical care of the patient.           [1] acetaminophen, 650 mg, oral, TID  albuterol, 2.5 mg, nebulization, q4h  baclofen, 10 mg, oral, TID  cholecalciferol, 125 mcg, oral, Daily  collagenase, , Topical, Daily  diazePAM, 5 mg, oral, Daily  enoxaparin, 30 mg, subcutaneous, Daily  insulin lispro, 0-5 Units, subcutaneous, TID AC  lactulose, 20 g, oral, Daily  lidocaine, 1 Application, Topical, TID  melatonin, 3 mg, oral, Nightly  mupirocin, 1 Application, Topical, BID  nicotine, 1 patch, transdermal, Daily   Followed by  [START ON 7/1/2025] nicotine, 1 patch, transdermal, Daily   Followed by  [START ON 7/15/2025] nicotine, 1 patch, transdermal, Daily  oxygen, , inhalation, Continuous - Inhalation  pantoprazole, 40 mg, oral, Daily before breakfast  sennosides-docusate sodium, 2 tablet, oral, BID  sucralfate, 1 g, oral, q6h [place]      [2]    [3] PRN medications: alteplase, benzocaine-menthol, butalbital-acetaminophen-caff, dextromethorphan-guaifenesin, dextrose, dextrose, glucagon, glucagon, guaiFENesin, guaiFENesin, ipratropium-albuteroL, labetaloL, lubricating eye drops, ondansetron, polyethylene glycol, potassium chloride, prochlorperazine, sodium hypochlorite

## 2025-05-27 NOTE — PROGRESS NOTES
Palliative Care Progress Note    Date of Admission: 5/18/2025    Patient is a 80 y.o. female admitted with Generalized weakness. No changes overnight. Ate small amount scrambled eggs and peaches for breakfast.    Mental/Cognitive Status: awake, alert, appropriate    Respiratory Status: on 2 liters    Pain Assessment: chronic, neck and sacral wound    Pertinent Symptoms: anxiety, sleep disturbance    Diet/Nutrition: appetite poor    Bowel Regimen: FMS    Patient's current condition/Anticipated Prognosis: poor.  Family/Healthcare Proxy involvement: friend Jocelyn is HPOA.    Scheduled medications  Scheduled Medications[1]  Continuous medications  Continuous Medications[2]  PRN medications  PRN Medications[3]     Results for orders placed or performed during the hospital encounter of 05/18/25 (from the past 24 hours)   POCT GLUCOSE   Result Value Ref Range    POCT Glucose 145 (H) 74 - 99 mg/dL   POCT GLUCOSE   Result Value Ref Range    POCT Glucose 132 (H) 74 - 99 mg/dL   CBC and Auto Differential   Result Value Ref Range    WBC 6.9 4.4 - 11.3 x10*3/uL    nRBC 0.0 0.0 - 0.0 /100 WBCs    RBC 3.22 (L) 4.00 - 5.20 x10*6/uL    Hemoglobin 10.1 (L) 12.0 - 16.0 g/dL    Hematocrit 31.7 (L) 36.0 - 46.0 %    MCV 98 80 - 100 fL    MCH 31.4 26.0 - 34.0 pg    MCHC 31.9 (L) 32.0 - 36.0 g/dL    RDW 14.3 11.5 - 14.5 %    Platelets 212 150 - 450 x10*3/uL    Immature Granulocytes %, Automated 5.2 (H) 0.0 - 0.9 %    Immature Granulocytes Absolute, Automated 0.36 0.00 - 0.50 x10*3/uL   Renal function panel   Result Value Ref Range    Glucose 111 (H) 74 - 99 mg/dL    Sodium 131 (L) 136 - 145 mmol/L    Potassium 3.8 3.5 - 5.3 mmol/L    Chloride 92 (L) 98 - 107 mmol/L    Bicarbonate 36 (H) 21 - 32 mmol/L    Anion Gap 7 (L) 10 - 20 mmol/L    Urea Nitrogen 9 6 - 23 mg/dL    Creatinine 0.22 (L) 0.50 - 1.05 mg/dL    eGFR >90 >60 mL/min/1.73m*2    Calcium 8.1 (L) 8.6 - 10.3 mg/dL    Phosphorus 2.1 (L) 2.5 - 4.9 mg/dL    Albumin 2.5 (L) 3.4 - 5.0  g/dL   Magnesium   Result Value Ref Range    Magnesium 1.88 1.60 - 2.40 mg/dL   Manual Differential   Result Value Ref Range    Neutrophils %, Manual 80.0 40.0 - 80.0 %    Lymphocytes %, Manual 2.0 13.0 - 44.0 %    Monocytes %, Manual 12.0 2.0 - 10.0 %    Eosinophils %, Manual 1.0 0.0 - 6.0 %    Basophils %, Manual 0.0 0.0 - 2.0 %    Metamyelocytes %, Manual 1.0 0.0 - 0.0 %    Myelocytes %, Manual 4.0 0.0 - 0.0 %    Seg Neutrophils Absolute, Manual 5.52 (H) 1.60 - 5.00 x10*3/uL    Lymphocytes Absolute, Manual 0.14 (L) 0.80 - 3.00 x10*3/uL    Monocytes Absolute, Manual 0.83 (H) 0.05 - 0.80 x10*3/uL    Eosinophils Absolute, Manual 0.07 0.00 - 0.40 x10*3/uL    Basophils Absolute, Manual 0.00 0.00 - 0.10 x10*3/uL    Metamyelocytes Absolute, Manual 0.07 0.00 - 0.00 x10*3/uL    Myelocytes Absolute, Manual 0.28 0.00 - 0.00 x10*3/uL    Total Cells Counted 100     RBC Morphology See Below     Polychromasia Mild    POCT GLUCOSE   Result Value Ref Range    POCT Glucose 131 (H) 74 - 99 mg/dL   POCT GLUCOSE   Result Value Ref Range    POCT Glucose 145 (H) 74 - 99 mg/dL        Imaging  FL modified barium swallow study  Result Date: 5/27/2025  Please refer to the speech therapist's findings and recommendations.   MACRO: None   Signed by: Hanane Fine 5/27/2025 12:04 PM Dictation workstation:   [id number]    XR chest 1 view  Result Date: 5/27/2025  1. Stable chest with persistent right perihilar airspace consolidation.   2. Generalized increased interstitial prominence in bilateral lung fields appearing stable   Signed by: Jin Schmidt 5/27/2025 8:49 AM Dictation workstation:   [id number]    XR chest 1 view  Result Date: 5/26/2025  1.  Overall similar appearance of the lungs with trace bibasilar pleural effusions and atelectasis and patchy right perihilar airspace opacities. This is superimposed on a background of chronic lung disease.       MACRO: None   Signed by: Renny Cid 5/26/2025 8:55 AM Dictation workstation:    [id number]    XR chest 1 view  Result Date: 5/25/2025  1. Dense airspace disease at the right lung similar to the prior. Left basilar airspace disease as well. Please correlate with prior cross-sectional imaging for differential. Continued follow-up advised       MACRO: None   Signed by: Ambrosio Bess 5/25/2025 11:24 AM Dictation workstation:   [id number]    XR chest 1 view  Result Date: 5/23/2025  1. Generalized increased interstitial prominence as seen on the prior examination suggesting component of interstitial edema.   2. Persistent right perihilar and basilar consolidation with mild improved aeration of the right lung base.     MACRO: None   Signed by: Jin Schmitd 5/23/2025 8:06 AM Dictation workstation:   [id number]    XR chest 1 view  Result Date: 5/22/2025  No change in the extensive right-sided pulmonary infiltrate and right effusion.   MACRO: none   Signed by: Clemente Vergara 5/22/2025 9:56 AM Dictation workstation:   [id number]    XR chest 1 view  Result Date: 5/21/2025  Right perihilar and basilar infiltrate consistent with pneumonia, unchanged compared to the prior study. Pulmonary vascular congestion is also suspected along with small bilateral effusions, unchanged.   MACRO: none   Signed by: Clemente Vergara 5/21/2025 8:03 AM Dictation workstation:   [id number]      Cardiology, Vascular, and Other Imaging  No other imaging results found for the past 7 days       Visit Vitals  /59 (BP Location: Left arm, Patient Position: Lying)   Pulse 78   Temp 37.1 °C (98.8 °F) (Oral)   Resp (!) 48       Intake/Output Summary (Last 24 hours) at 5/27/2025 1248  Last data filed at 5/27/2025 1111  Gross per 24 hour   Intake 1550 ml   Output 1990 ml   Net -440 ml        Physical Exam  Vitals and nursing note reviewed.   Constitutional:       General: She is not in acute distress.     Appearance: She is ill-appearing.      Comments: Severely cachetic   HENT:      Head:      Comments: Temporal wasting      Mouth/Throat:      Mouth: Mucous membranes are dry.      Pharynx: Oropharynx is clear.   Eyes:      Extraocular Movements: Extraocular movements intact.   Pulmonary:      Effort: No respiratory distress.   Musculoskeletal:      Comments: She is severely cachectic and weak    Skin:     General: Skin is warm and dry.      Coloration: Skin is pale.   Neurological:      General: No focal deficit present.      Mental Status: She is alert and oriented to person, place, and time.   Psychiatric:         Mood and Affect: Mood normal.         Behavior: Behavior normal.         Thought Content: Thought content normal.         Judgment: Judgment normal.          Assessment/Plan   IMP:    Acute on chronic hypoxic respiratory failure/COPD exacerbation 2/2 multifocal pneumonia/pneumonitis - improving now on 2 liters NC - completed course antibiotics  SCC of right lung -  Has been on Keytruda not a candidate for chemo or surgery per last oncology note; Completed radiotherapy 2/24-2/26/25; Last brain MRI 1/27/25 no evidence of mass cerebral infarction; Per Onc outpatient notes plan for restaging PET/CT 6/9/2025  HFpEF - euvolemic, FÁTIMA last 10/2024 left ventricular ejection fraction 60 to 65%   Decubitus ulcers and wounds - wound care team managing  Electrolyte derangements - received po Kcl this AM, getting Mg IV now  Anxiety - stable with Valium, baclofen  Sleep disturbance - improved with melatonin  Chronic pain 2/2 sacral wound and chronic neck pain is better with ATC tylenol and topical lidocaine     Palliative Care Encounter  Pt is capable  HPOA is friend Jocelyn Mccloud.   Pt lives with her daughter Karly.    5/27/2025  No changes today from palliative perspective. Noted pulmonology signed off, will likely dc soon. Goals established. We will sign off.    5/26/2025  Palliative med team reconsulted to readdress GOC as patient making statements to other providers wanted to change code status to DNR. I met with patient at the  bedside. Reviewed risks/benefits CPR and intubation. Pt was asking appropriate questions and was able to verbalize understanding of these risks/benefits. She would like to change code status to DNR-DNI. I brought up the topic of Hospice. She is familiar with hospice and stated she is not ready for this yet. Spoke to pt daughter Karly and MATT over phone to provide update per pt request.  D/w Elif Root CNP and Dr. Landen Natarajan.        Advance Directives Info: Patient has advance directive, copy in chart  Discharge Planning: home with [facility]  Palliative Care Team will sign off at this time.      Magalis Jimenes, APRN-CNP        [1] acetaminophen, 650 mg, oral, TID  albuterol, 2.5 mg, nebulization, q4h  baclofen, 10 mg, oral, TID  cholecalciferol, 125 mcg, oral, Daily  collagenase, , Topical, Daily  diazePAM, 5 mg, oral, Daily  enoxaparin, 30 mg, subcutaneous, Daily  furosemide, 20 mg, intravenous, Once  insulin lispro, 0-5 Units, subcutaneous, TID AC  lactulose, 20 g, oral, Daily  lidocaine, 1 Application, Topical, TID  melatonin, 3 mg, oral, Nightly  mupirocin, 1 Application, Topical, BID  nicotine, 1 patch, transdermal, Daily   Followed by  [START ON 7/1/2025] nicotine, 1 patch, transdermal, Daily   Followed by  [START ON 7/15/2025] nicotine, 1 patch, transdermal, Daily  oxygen, , inhalation, Continuous - Inhalation  pantoprazole, 40 mg, oral, Daily before breakfast  sennosides-docusate sodium, 2 tablet, oral, BID  sucralfate, 1 g, oral, q6h [place]  [2]    [3] PRN medications: alteplase, benzocaine-menthol, butalbital-acetaminophen-caff, dextromethorphan-guaifenesin, dextrose, dextrose, glucagon, glucagon, guaiFENesin, guaiFENesin, ipratropium-albuteroL, labetaloL, lubricating eye drops, ondansetron, polyethylene glycol, potassium chloride, prochlorperazine, sodium hypochlorite

## 2025-05-27 NOTE — NURSING NOTE
This RN requested to perform wound care on patient. Patient refused and asked for it to be done later. This nurse again educated importance of complying with wound care.

## 2025-05-28 ENCOUNTER — APPOINTMENT (OUTPATIENT)
Dept: RADIOLOGY | Facility: HOSPITAL | Age: 80
DRG: 177 | End: 2025-05-28
Payer: MEDICARE

## 2025-05-28 LAB
ALBUMIN SERPL BCP-MCNC: 2.5 G/DL (ref 3.4–5)
ANION GAP SERPL CALCULATED.3IONS-SCNC: <7 MMOL/L (ref 10–20)
APPEARANCE UR: CLEAR
BASOPHILS # BLD AUTO: 0.02 X10*3/UL (ref 0–0.1)
BASOPHILS NFR BLD AUTO: 0.3 %
BILIRUB UR STRIP.AUTO-MCNC: NEGATIVE MG/DL
BUN SERPL-MCNC: 8 MG/DL (ref 6–23)
CALCIUM SERPL-MCNC: 8.3 MG/DL (ref 8.6–10.3)
CHLORIDE SERPL-SCNC: 89 MMOL/L (ref 98–107)
CO2 SERPL-SCNC: 40 MMOL/L (ref 21–32)
COLOR UR: NORMAL
CREAT SERPL-MCNC: <0.2 MG/DL (ref 0.5–1.05)
EGFRCR SERPLBLD CKD-EPI 2021: ABNORMAL ML/MIN/{1.73_M2}
EOSINOPHIL # BLD AUTO: 0.03 X10*3/UL (ref 0–0.4)
EOSINOPHIL NFR BLD AUTO: 0.4 %
ERYTHROCYTE [DISTWIDTH] IN BLOOD BY AUTOMATED COUNT: 14 % (ref 11.5–14.5)
GLUCOSE BLD MANUAL STRIP-MCNC: 126 MG/DL (ref 74–99)
GLUCOSE BLD MANUAL STRIP-MCNC: 126 MG/DL (ref 74–99)
GLUCOSE BLD MANUAL STRIP-MCNC: 144 MG/DL (ref 74–99)
GLUCOSE BLD MANUAL STRIP-MCNC: 173 MG/DL (ref 74–99)
GLUCOSE SERPL-MCNC: 129 MG/DL (ref 74–99)
GLUCOSE UR STRIP.AUTO-MCNC: NORMAL MG/DL
HCT VFR BLD AUTO: 33.1 % (ref 36–46)
HGB BLD-MCNC: 10.5 G/DL (ref 12–16)
HOLD SPECIMEN: NORMAL
IMM GRANULOCYTES # BLD AUTO: 0.24 X10*3/UL (ref 0–0.5)
IMM GRANULOCYTES NFR BLD AUTO: 3.1 % (ref 0–0.9)
KETONES UR STRIP.AUTO-MCNC: NEGATIVE MG/DL
LEUKOCYTE ESTERASE UR QL STRIP.AUTO: NEGATIVE
LYMPHOCYTES # BLD AUTO: 0.21 X10*3/UL (ref 0.8–3)
LYMPHOCYTES NFR BLD AUTO: 2.7 %
MAGNESIUM SERPL-MCNC: 1.53 MG/DL (ref 1.6–2.4)
MCH RBC QN AUTO: 31.3 PG (ref 26–34)
MCHC RBC AUTO-ENTMCNC: 31.7 G/DL (ref 32–36)
MCV RBC AUTO: 99 FL (ref 80–100)
MONOCYTES # BLD AUTO: 0.81 X10*3/UL (ref 0.05–0.8)
MONOCYTES NFR BLD AUTO: 10.4 %
NEUTROPHILS # BLD AUTO: 6.47 X10*3/UL (ref 1.6–5.5)
NEUTROPHILS NFR BLD AUTO: 83.1 %
NITRITE UR QL STRIP.AUTO: NEGATIVE
NRBC BLD-RTO: 0 /100 WBCS (ref 0–0)
PH UR STRIP.AUTO: 7 [PH]
PHOSPHATE SERPL-MCNC: 2 MG/DL (ref 2.5–4.9)
PLATELET # BLD AUTO: 243 X10*3/UL (ref 150–450)
POTASSIUM SERPL-SCNC: 3.1 MMOL/L (ref 3.5–5.3)
PROT UR STRIP.AUTO-MCNC: NEGATIVE MG/DL
RBC # BLD AUTO: 3.36 X10*6/UL (ref 4–5.2)
RBC # UR STRIP.AUTO: NEGATIVE MG/DL
SODIUM SERPL-SCNC: 132 MMOL/L (ref 136–145)
SP GR UR STRIP.AUTO: 1.01
UROBILINOGEN UR STRIP.AUTO-MCNC: NORMAL MG/DL
WBC # BLD AUTO: 7.8 X10*3/UL (ref 4.4–11.3)

## 2025-05-28 PROCEDURE — 82947 ASSAY GLUCOSE BLOOD QUANT: CPT

## 2025-05-28 PROCEDURE — S4991 NICOTINE PATCH NONLEGEND: HCPCS

## 2025-05-28 PROCEDURE — 2500000002 HC RX 250 W HCPCS SELF ADMINISTERED DRUGS (ALT 637 FOR MEDICARE OP, ALT 636 FOR OP/ED)

## 2025-05-28 PROCEDURE — 2500000004 HC RX 250 GENERAL PHARMACY W/ HCPCS (ALT 636 FOR OP/ED): Mod: JZ | Performed by: NURSE PRACTITIONER

## 2025-05-28 PROCEDURE — 2500000001 HC RX 250 WO HCPCS SELF ADMINISTERED DRUGS (ALT 637 FOR MEDICARE OP)

## 2025-05-28 PROCEDURE — 80069 RENAL FUNCTION PANEL: CPT

## 2025-05-28 PROCEDURE — 2500000004 HC RX 250 GENERAL PHARMACY W/ HCPCS (ALT 636 FOR OP/ED): Mod: JZ

## 2025-05-28 PROCEDURE — 2500000005 HC RX 250 GENERAL PHARMACY W/O HCPCS

## 2025-05-28 PROCEDURE — 2500000001 HC RX 250 WO HCPCS SELF ADMINISTERED DRUGS (ALT 637 FOR MEDICARE OP): Performed by: NURSE PRACTITIONER

## 2025-05-28 PROCEDURE — 94640 AIRWAY INHALATION TREATMENT: CPT

## 2025-05-28 PROCEDURE — 51701 INSERT BLADDER CATHETER: CPT

## 2025-05-28 PROCEDURE — 83735 ASSAY OF MAGNESIUM: CPT | Performed by: NURSE PRACTITIONER

## 2025-05-28 PROCEDURE — 2060000001 HC INTERMEDIATE ICU ROOM DAILY

## 2025-05-28 PROCEDURE — 94668 MNPJ CHEST WALL SBSQ: CPT

## 2025-05-28 PROCEDURE — 9420000001 HC RT PATIENT EDUCATION 5 MIN

## 2025-05-28 PROCEDURE — 2500000005 HC RX 250 GENERAL PHARMACY W/O HCPCS: Performed by: INTERNAL MEDICINE

## 2025-05-28 PROCEDURE — 81003 URINALYSIS AUTO W/O SCOPE: CPT | Performed by: INTERNAL MEDICINE

## 2025-05-28 PROCEDURE — 71045 X-RAY EXAM CHEST 1 VIEW: CPT

## 2025-05-28 PROCEDURE — 2500000002 HC RX 250 W HCPCS SELF ADMINISTERED DRUGS (ALT 637 FOR MEDICARE OP, ALT 636 FOR OP/ED): Performed by: NURSE PRACTITIONER

## 2025-05-28 PROCEDURE — 71045 X-RAY EXAM CHEST 1 VIEW: CPT | Performed by: RADIOLOGY

## 2025-05-28 PROCEDURE — 85025 COMPLETE CBC W/AUTO DIFF WBC: CPT

## 2025-05-28 RX ORDER — ALBUTEROL SULFATE 0.83 MG/ML
2.5 SOLUTION RESPIRATORY (INHALATION)
Status: DISCONTINUED | OUTPATIENT
Start: 2025-05-29 | End: 2025-05-29

## 2025-05-28 RX ORDER — TAMSULOSIN HYDROCHLORIDE 0.4 MG/1
0.4 CAPSULE ORAL NIGHTLY
Status: DISCONTINUED | OUTPATIENT
Start: 2025-05-28 | End: 2025-05-28

## 2025-05-28 RX ORDER — POTASSIUM CHLORIDE 1.5 G/1.58G
40 POWDER, FOR SOLUTION ORAL ONCE
Status: COMPLETED | OUTPATIENT
Start: 2025-05-28 | End: 2025-05-28

## 2025-05-28 RX ORDER — MAGNESIUM SULFATE HEPTAHYDRATE 40 MG/ML
2 INJECTION, SOLUTION INTRAVENOUS ONCE
Status: COMPLETED | OUTPATIENT
Start: 2025-05-28 | End: 2025-05-28

## 2025-05-28 RX ADMIN — ALBUTEROL SULFATE 2.5 MG: 2.5 SOLUTION RESPIRATORY (INHALATION) at 11:42

## 2025-05-28 RX ADMIN — BACLOFEN 10 MG: 10 TABLET ORAL at 20:14

## 2025-05-28 RX ADMIN — Medication 3 L/MIN: at 20:04

## 2025-05-28 RX ADMIN — LIDOCAINE 1 APPLICATION: 50 OINTMENT TOPICAL at 10:19

## 2025-05-28 RX ADMIN — ACETAMINOPHEN 650 MG: 325 TABLET ORAL at 10:19

## 2025-05-28 RX ADMIN — ENOXAPARIN SODIUM 30 MG: 100 INJECTION SUBCUTANEOUS at 08:36

## 2025-05-28 RX ADMIN — LIDOCAINE 1 APPLICATION: 50 OINTMENT TOPICAL at 17:09

## 2025-05-28 RX ADMIN — PANTOPRAZOLE SODIUM 40 MG: 40 TABLET, DELAYED RELEASE ORAL at 06:58

## 2025-05-28 RX ADMIN — MELATONIN 3 MG: 3 TAB ORAL at 20:13

## 2025-05-28 RX ADMIN — SUCRALFATE ORAL SUSPENSION 1 G: 1 SUSPENSION ORAL at 05:29

## 2025-05-28 RX ADMIN — ACETAMINOPHEN 650 MG: 325 TABLET ORAL at 17:09

## 2025-05-28 RX ADMIN — ALBUTEROL SULFATE 2.5 MG: 2.5 SOLUTION RESPIRATORY (INHALATION) at 20:02

## 2025-05-28 RX ADMIN — ALBUTEROL SULFATE 2.5 MG: 2.5 SOLUTION RESPIRATORY (INHALATION) at 07:06

## 2025-05-28 RX ADMIN — LIDOCAINE 1 APPLICATION: 50 OINTMENT TOPICAL at 23:02

## 2025-05-28 RX ADMIN — ACETAMINOPHEN 650 MG: 325 TABLET ORAL at 22:59

## 2025-05-28 RX ADMIN — MAGNESIUM SULFATE IN WATER 2 G: 40 INJECTION, SOLUTION INTRAVENOUS at 08:38

## 2025-05-28 RX ADMIN — SUCRALFATE ORAL SUSPENSION 1 G: 1 SUSPENSION ORAL at 17:09

## 2025-05-28 RX ADMIN — Medication 125 MCG: at 08:36

## 2025-05-28 RX ADMIN — ALBUTEROL SULFATE 2.5 MG: 2.5 SOLUTION RESPIRATORY (INHALATION) at 15:25

## 2025-05-28 RX ADMIN — BACLOFEN 10 MG: 10 TABLET ORAL at 08:36

## 2025-05-28 RX ADMIN — NICOTINE 1 PATCH: 21 PATCH, EXTENDED RELEASE TRANSDERMAL at 08:36

## 2025-05-28 RX ADMIN — POTASSIUM CHLORIDE 40 MEQ: 1.5 POWDER, FOR SOLUTION ORAL at 06:58

## 2025-05-28 RX ADMIN — DIAZEPAM 5 MG: 5 TABLET ORAL at 20:13

## 2025-05-28 RX ADMIN — SUCRALFATE ORAL SUSPENSION 1 G: 1 SUSPENSION ORAL at 12:07

## 2025-05-28 RX ADMIN — LACTULOSE 20 G: 20 SOLUTION ORAL at 10:47

## 2025-05-28 RX ADMIN — BACLOFEN 10 MG: 10 TABLET ORAL at 14:33

## 2025-05-28 RX ADMIN — SUCRALFATE ORAL SUSPENSION 1 G: 1 SUSPENSION ORAL at 23:01

## 2025-05-28 RX ADMIN — Medication 2 L/MIN: at 07:06

## 2025-05-28 RX ADMIN — SENNOSIDES AND DOCUSATE SODIUM 2 TABLET: 50; 8.6 TABLET ORAL at 10:19

## 2025-05-28 RX ADMIN — MUPIROCIN 1 APPLICATION: 20 OINTMENT TOPICAL at 20:15

## 2025-05-28 RX ADMIN — SENNOSIDES AND DOCUSATE SODIUM 2 TABLET: 50; 8.6 TABLET ORAL at 20:13

## 2025-05-28 ASSESSMENT — COGNITIVE AND FUNCTIONAL STATUS - GENERAL
STANDING UP FROM CHAIR USING ARMS: TOTAL
MOVING FROM LYING ON BACK TO SITTING ON SIDE OF FLAT BED WITH BEDRAILS: A LITTLE
EATING MEALS: A LITTLE
TOILETING: A LOT
PERSONAL GROOMING: A LITTLE
WALKING IN HOSPITAL ROOM: TOTAL
DRESSING REGULAR LOWER BODY CLOTHING: A LOT
TURNING FROM BACK TO SIDE WHILE IN FLAT BAD: A LOT
TURNING FROM BACK TO SIDE WHILE IN FLAT BAD: A LOT
DRESSING REGULAR LOWER BODY CLOTHING: A LOT
WALKING IN HOSPITAL ROOM: TOTAL
STANDING UP FROM CHAIR USING ARMS: TOTAL
MOBILITY SCORE: 10
DRESSING REGULAR UPPER BODY CLOTHING: A LITTLE
CLIMB 3 TO 5 STEPS WITH RAILING: TOTAL
MOVING TO AND FROM BED TO CHAIR: A LOT
TOILETING: A LOT
CLIMB 3 TO 5 STEPS WITH RAILING: TOTAL
PERSONAL GROOMING: A LITTLE
HELP NEEDED FOR BATHING: A LOT
MOBILITY SCORE: 10
DAILY ACTIVITIY SCORE: 15
DRESSING REGULAR UPPER BODY CLOTHING: A LITTLE
HELP NEEDED FOR BATHING: A LOT
MOVING TO AND FROM BED TO CHAIR: A LOT
EATING MEALS: A LITTLE
MOVING FROM LYING ON BACK TO SITTING ON SIDE OF FLAT BED WITH BEDRAILS: A LITTLE
DAILY ACTIVITIY SCORE: 15

## 2025-05-28 ASSESSMENT — PAIN SCALES - WONG BAKER: WONGBAKER_NUMERICALRESPONSE: HURTS LITTLE BIT

## 2025-05-28 ASSESSMENT — PAIN SCALES - GENERAL
PAINLEVEL_OUTOF10: 2
PAINLEVEL_OUTOF10: 0 - NO PAIN

## 2025-05-28 NOTE — NURSING NOTE
Suggested to Pt to try to use a pure wick for urine- Pt agreeable. Pure wick in place. Will continue to monitor. Call light in reach.

## 2025-05-28 NOTE — CARE PLAN
The patient's goals for the shift include rest    The clinical goals for the shift include safety, wound care, monitor labs

## 2025-05-28 NOTE — NURSING NOTE
Bed side shift report received. Patient resting comfortably. Reports no current concerns at this time. Call light within reach. This nurse assumed care. Bed alarm active. Bed in lowest position.

## 2025-05-28 NOTE — CONSULTS
Reason For Consult  Urinary retention    History Of Present Illness  Karly Horton is a 80 y.o. female presenting with shortness of breath approximately 8 days ago.  She has a history of lung cancer and acute respiratory failure.    She had a Barnard catheter placed on admission.  She has had the catheter out a couple of times and failed voiding trials.  The catheter was again removed yesterday and she required straight cathed twice for 400 to 500 cc without voiding.  The Barnard catheter was replaced today.  Patient denies, on questioning by me, any prior history of urinary difficulties.  Urinalysis on admission was unremarkable.  Renal function is good.  Past Medical History  She has a past medical history of Other cervical disc degeneration, unspecified cervical region, Other conditions influencing health status, Other conditions influencing health status, Other conditions influencing health status, Other conditions influencing health status, Other conditions influencing health status, Other conditions influencing health status, Other conditions influencing health status, Other intervertebral disc degeneration, lumbar region, Other intervertebral disc displacement, lumbar region, Other intervertebral disc displacement, lumbosacral region, Personal history of other diseases of the musculoskeletal system and connective tissue, Personal history of other diseases of the musculoskeletal system and connective tissue, Personal history of other specified conditions, Spinal stenosis, lumbar region without neurogenic claudication, and Sprain of ligaments of lumbar spine, initial encounter.    Surgical History  She has a past surgical history that includes Appendectomy; Eye surgery; Tonsillectomy; and Hysterectomy.     Social History  She reports that she has been smoking cigarettes. She started smoking about 65 years ago. She has a 65.4 pack-year smoking history. She has been exposed to tobacco smoke. She does not have any  "smokeless tobacco history on file. She reports that she does not currently use alcohol. She reports current drug use. Drug: Marijuana.    Family History  Family History[1]     Allergies  Morphine and Spice flavor    Review of Systems  As per admission HPI     Physical Exam  Awake, alert, oriented  HEENT: Normal extraocular movement  Neck: Supple  Lungs: Normal respiratory pattern  Abdomen: Soft and nontender  : Barnard catheter in place draining clear urine     Last Recorded Vitals  Blood pressure 141/81, pulse 100, temperature 36.4 °C (97.5 °F), temperature source Oral, resp. rate 16, height 1.422 m (4' 8\"), weight 53.2 kg (117 lb 4.6 oz), SpO2 93%.    Relevant Results  Results for orders placed or performed during the hospital encounter of 05/18/25 (from the past 24 hours)   POCT GLUCOSE   Result Value Ref Range    POCT Glucose 147 (H) 74 - 99 mg/dL   POCT GLUCOSE   Result Value Ref Range    POCT Glucose 135 (H) 74 - 99 mg/dL   CBC and Auto Differential   Result Value Ref Range    WBC 7.8 4.4 - 11.3 x10*3/uL    nRBC 0.0 0.0 - 0.0 /100 WBCs    RBC 3.36 (L) 4.00 - 5.20 x10*6/uL    Hemoglobin 10.5 (L) 12.0 - 16.0 g/dL    Hematocrit 33.1 (L) 36.0 - 46.0 %    MCV 99 80 - 100 fL    MCH 31.3 26.0 - 34.0 pg    MCHC 31.7 (L) 32.0 - 36.0 g/dL    RDW 14.0 11.5 - 14.5 %    Platelets 243 150 - 450 x10*3/uL    Neutrophils % 83.1 40.0 - 80.0 %    Immature Granulocytes %, Automated 3.1 (H) 0.0 - 0.9 %    Lymphocytes % 2.7 13.0 - 44.0 %    Monocytes % 10.4 2.0 - 10.0 %    Eosinophils % 0.4 0.0 - 6.0 %    Basophils % 0.3 0.0 - 2.0 %    Neutrophils Absolute 6.47 (H) 1.60 - 5.50 x10*3/uL    Immature Granulocytes Absolute, Automated 0.24 0.00 - 0.50 x10*3/uL    Lymphocytes Absolute 0.21 (L) 0.80 - 3.00 x10*3/uL    Monocytes Absolute 0.81 (H) 0.05 - 0.80 x10*3/uL    Eosinophils Absolute 0.03 0.00 - 0.40 x10*3/uL    Basophils Absolute 0.02 0.00 - 0.10 x10*3/uL   Renal function panel   Result Value Ref Range    Glucose 129 (H) 74 - 99 " mg/dL    Sodium 132 (L) 136 - 145 mmol/L    Potassium 3.1 (L) 3.5 - 5.3 mmol/L    Chloride 89 (L) 98 - 107 mmol/L    Bicarbonate 40 (HH) 21 - 32 mmol/L    Anion Gap <7 (L) 10 - 20 mmol/L    Urea Nitrogen 8 6 - 23 mg/dL    Creatinine <0.20 (L) 0.50 - 1.05 mg/dL    eGFR      Calcium 8.3 (L) 8.6 - 10.3 mg/dL    Phosphorus 2.0 (L) 2.5 - 4.9 mg/dL    Albumin 2.5 (L) 3.4 - 5.0 g/dL   Magnesium   Result Value Ref Range    Magnesium 1.53 (L) 1.60 - 2.40 mg/dL   Urinalysis with Reflex Culture and Microscopic   Result Value Ref Range    Color, Urine Light-Yellow Light-Yellow, Yellow, Dark-Yellow    Appearance, Urine Clear Clear    Specific Gravity, Urine 1.008 1.005 - 1.035    pH, Urine 7.0 5.0, 5.5, 6.0, 6.5, 7.0, 7.5, 8.0    Protein, Urine NEGATIVE NEGATIVE, 10 (TRACE), 20 (TRACE) mg/dL    Glucose, Urine Normal Normal mg/dL    Blood, Urine NEGATIVE NEGATIVE mg/dL    Ketones, Urine NEGATIVE NEGATIVE mg/dL    Bilirubin, Urine NEGATIVE NEGATIVE mg/dL    Urobilinogen, Urine Normal Normal mg/dL    Nitrite, Urine NEGATIVE NEGATIVE    Leukocyte Esterase, Urine NEGATIVE NEGATIVE   POCT GLUCOSE   Result Value Ref Range    POCT Glucose 126 (H) 74 - 99 mg/dL   POCT GLUCOSE   Result Value Ref Range    POCT Glucose 144 (H) 74 - 99 mg/dL    XR chest 1 view  Result Date: 5/28/2025  Interpreted By:  Jin Schmidt, STUDY: XR CHEST 1 VIEW 5/28/2025 11:50 am   INDICATION: Signs/Symptoms:Dyspnea   COMPARISON: 05/26/2025   ACCESSION NUMBER(S): [id number]   ORDERING CLINICIAN: WARD JACKSON   TECHNIQUE: Single portable AP view chest   FINDINGS: Cardiomediastinal silhouette is within normal limits. Moderate vascular calcification of the aortic knob. Right-sided MediPort with tip in the region of the mid SVC. There is persistent patchy right perihilar consolidation with component of air bronchograms. Generalized increased interstitial prominence in bilateral lung fields. Left basilar infiltrate is improved from the prior exam.                  1. Improved aeration of the left lower lung zone with persistent right perihilar patchy consolidation   2. Coarsening of the interstitial markings of the generalized increased interstitial prominence. Unchanged   Signed by: Jin Schmidt 5/28/2025 12:21 PM Dictation workstation:   [id number]    FL modified barium swallow study  Result Date: 5/27/2025  Interpreted By:  Hanane Fine and Brodsky Sheryl STUDY: FL MODIFIED BARIUM SWALLOW STUDY;; 5/27/2025 9:32 am   INDICATION: Signs/Symptoms:Aspiration.     COMPARISON: None.   ACCESSION NUMBER(S): [id number]   ORDERING CLINICIAN: LEXA LINDSEY   SPEECH FINDINGS: Modified Barium Swallow Study completed. Informed verbal consent obtained prior to completion of exam. The study was completed per protocol with various liquid barium consistencies, pudding, solids and a 13mm barium tablet was not given. A 1.9 cm or .75 inch (outer diameter) ring was placed on the chin in the lateral view and on the lateral, left side of the neck in the a-p view in order to complete objective measurements during swallowing. The anatomic structures and function of the oropharynx, larynx, hypopharynx and cervical esophagus were evaluated.   SLP: Agnes Hunt, SLP Contact info: [place]; phone: [phone]     Reason for Referral: r/o aspiration, per pulmonology request Patient Hx: PMHx relevant to rehab: lung cancer   Chief complaint: Pt was admitted on 5/19/25 due to       Chief Complaint Patient presents with . Weakness, Gen     Patient bib ems for weakness, lives at home with her daughter, had a low pox and was given oxygen.  Symptoms began two days ago. She is also being treated for an ulcer on her lower back/coccyx. . She was found to have acute on chronic respiratory failure in the setting of COPD exacerbation and possible PNA.   Relevant imaging results: CT chest 5/20/25: Lung parenchyma demonstrates moderate right and mild-to-moderate left-sided pleural  effusions. There is basilar compressive atelectasis with right lower lobar collapse. Endobronchial opacification of the atelectatic right lower lobe as well as the bronchus intermedius. Correlate with aspiration as a potential etiology versus retained secretions. There is septal thickening and patchy consolidation within the right middle lobe as well as right upper lobe in particular of the posterior segment with component of air bronchograms. Left lung demonstrates scattered areas of patchy infiltrate in the left upper lobe.   Respiratory Status: Room air Current diet: soft bite sized, thin liquids (pt baseline preference d/t edentulous status)   Pain: Pain Scale: 0-10 Ratin Location: buttocks Type: acute     DIET RECOMMENDATIONS: - Soft & Bite Sized (IDDSI Level 6) - Thin liquids (IDDSI Level 0) Per the results of today's MBSS, patient to continue baseline diet of soft bite sized consistencies and thin liquids following swallow strategies listed below:   STRATEGIES: Compensatory swallow strategies: - Upright positioning for all PO intake - Slow rate of intake - Chew thoroughly       SLP PLAN: Skilled SLP Services: No further skilled SLP intervention is warranted for dysphagia at this time.   Discussed POC: Patient Discussed Risks/Benefits: Yes Patient/Caregiver Agreeable: Yes   Education Provided: Results and recommendations per MBSS, with video review; recommendations and POC at this time. Verbal understanding and agreement given on all accounts.   Treatment Provided Today: No treatment indicated Additional Medical Consults Suggested: - GI as indicated d/t esophageal retention and retrograde flow with in distal esophagus   Repeat Study: Per MD or treating SLP     Mechanics of the Swallow Summary: ORAL PHASE: Lip Closure - No labial escape/anterior loss of bolus Tongue Control During Bolus Hold - Cohesive bolus between tongue to palatal seal Bolus prep/mastication - Slow prolonged mastication with complete  re-collection necessary Bolus transport/lingual motion - Brisk tongue motion for A-P movement of the bolus Oral residue - Trace residue lining oral structures   PHARYNGEAL PHASE: Initiation of pharyngeal swallow - Bolus head at pit of pyriforms Soft palate elevation - No bolus between soft palate/pharyngeal wall Laryngeal elevation - Complete superior movement of thyroid cartilage with contact of arytenoids to epiglottic petiole Anterior hyoid excursion - Complete anterior movement Epiglottic movement - Complete inversion Laryngeal vestibule closure - Complete - no air/contrast in laryngeal vestibule Pharyngeal stripping wave - Complete Pharyngeal contraction (A/P view) - Complete Pharyngoesophageal segment opening - Partial distension/partial duration with partial obstruction of flow of bolus Tongue base retraction - Trace column of contrast or air between tongue base and pharyngeal wall Pharyngeal residue - Trace residue within or on the pharyngeal structures   ESOPHAGEAL PHASE: Esophageal clearance - Esophageal retention with retrograde flow below the pharyngoesophageal segment w/ thin barium within distal esophagus,  some cleared w/ thin , no retention w/ pudding barium, a cricopharyngeal bar/prominence was observed without obstruction of flow   SLP Impressions with Severity Rating: Pt presents with no significant oropharyngeal dysphagia upon completion of modified barium swallow study this date. Swallowing physiology is detailed above. Patient demonstrated no penetration for any  consistency, and no aspiration was visualized during study.   *Of note: The A-P bolus follow-through is not intended to be utilized as a diagnostic assessment of the esophagus, rather a tool to observe the biomechanical aspects of the swallow continuum and to inform the need for further evaluation by medical specialists, as applicable.   Strategies attempted- n/a   OUTCOME MEASURES: Functional Oral Intake Scale Functional  Oral Intake Scale: Level 7        total oral diet with no restrictions   Rosenbek's Penetration Aspiration Scale Thin Liquids: 1. NO ASPIRATION & NO PENETRATION - no aspiration, contrast does not enter airway [place] Thick Liquids: 1. NO ASPIRATION & NO PENETRATION - no aspiration, contrast does not enter airway Puree: 1. NO ASPIRATION & NO PENETRATION - no aspiration, contrast does not enter airway Soft Solid: 1. NO ASPIRATION & NO PENETRATION - no aspiration, cont Speech Therapy section of this report signed by Agnes Hunt MA-CCC/SLP on 5/27/2025 at 12:01 pm.   RADIOLOGY FINDINGS: None   Radiology section of this report signed by Hanane Fine M.D..       Please refer to the speech therapist's findings and recommendations.   MACRO: None   Signed by: Hanane Fine 5/27/2025 12:04 PM Dictation workstation:   [id number]        Assessment/Plan     Urinary retention: He had a Barnard catheter placed at admission.  She has had several voiding trials without success.  Yesterday the catheter was removed and she required straight catheterization twice and then a Barnard was replaced.  Each time the volume was 4 to 500 cc.  I do not know her baseline voiding function but certainly, with her history, this is possibly not optimal.  Coupled with her deconditioned state and her constipation I do not think her retention is going to get better quickly.  Would go ahead and send her home with a Barnard catheter in place to follow-up as an outpatient.          Timur Bello MD         [1]   Family History  Problem Relation Name Age of Onset    Cancer Mother      Other (Father had hypertension, stroke, coronary artery disease and diabetes) Father

## 2025-05-28 NOTE — NURSING NOTE
Pt stated that she has no urge to void at this time. Bladder scanned- retaining >420 ml. Dr Mcneil aware- straight cath nand follow protocol

## 2025-05-28 NOTE — NURSING NOTE
Wound care w/dsg changes completed. Dsg to Mediport changed. Mediport flushed w/cap changed. CHG bath completed w/complete linen change by this RN. Call light in reach. Bed alarm in place.

## 2025-05-28 NOTE — PROGRESS NOTES
Karly Horton is a 80 y.o. female on day 10 of admission presenting with Generalized weakness.    Subjective   Afebrile, no chills  States her breathing is at baseline, denies any cough or chest pain  Denies nausea, vomiting, diarrhea, abdominal pain      Objective   Range of Vitals (last 24 hours)  Heart Rate:  []   Temp:  [36.3 °C (97.3 °F)-37.1 °C (98.8 °F)]   Resp:  [17-48]   BP: (114-162)/(59-88)   Weight:  [53.2 kg (117 lb 4.6 oz)]   SpO2:  [92 %-100 %]   Daily Weight  05/28/25 : 53.2 kg (117 lb 4.6 oz)    Body mass index is 26.29 kg/m².    Physical Exam  Constitutional:       Appearance: Normal appearance.   HENT:      Head: Normocephalic and atraumatic.      Nose: Nose normal.   Eyes:      Extraocular Movements: Extraocular movements intact.      Conjunctiva/sclera: Conjunctivae normal.   Cardiovascular:      Rate and Rhythm: Normal rate.   Pulmonary:      Comments: Slightly tachypneic, states breathing is at baseline. Bilaterally diminished  Abdominal:      General: There is no distension.   Musculoskeletal:      Cervical back: Normal range of motion.   Skin:     General: Skin is warm and dry.      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      Mental Status: She is alert.   Psychiatric:         Mood and Affect: Mood normal.           Antibiotics  mupirocin - 2 %, 2 %    Relevant Results  Labs  Results from last 72 hours   Lab Units 05/28/25  0339 05/27/25  0439 05/26/25  0445   WBC AUTO x10*3/uL 7.8 6.9 8.1   HEMOGLOBIN g/dL 10.5* 10.1* 10.0*   HEMATOCRIT % 33.1* 31.7* 31.1*   PLATELETS AUTO x10*3/uL 243 212 200   NEUTROS PCT AUTO % 83.1  --  81.9   LYMPHO PCT MAN %  --  2.0  --    LYMPHS PCT AUTO % 2.7  --  2.1   MONO PCT MAN %  --  12.0  --    MONOS PCT AUTO % 10.4  --  10.7   EOSINO PCT MAN %  --  1.0  --    EOS PCT AUTO  % 0.4  --  0.4     Results from last 72 hours   Lab Units 05/28/25 0339 05/27/25 0439 05/26/25 0445   SODIUM mmol/L 132* 131* 132*   POTASSIUM mmol/L 3.1* 3.8 2.8*   CHLORIDE mmol/L 89* 92* 90*   CO2 mmol/L 40* 36* 39*   BUN mg/dL 8 9 15   CREATININE mg/dL <0.20* 0.22* 0.29*   GLUCOSE mg/dL 129* 111* 117*   CALCIUM mg/dL 8.3* 8.1* 8.0*   ANION GAP mmol/L <7* 7* <7*   EGFR mL/min/1.73m*2  --  >90 >90   PHOSPHORUS mg/dL 2.0* 2.1* 1.9*     Results from last 72 hours   Lab Units 05/28/25 0339 05/27/25 0439 05/26/25 0445   ALBUMIN g/dL 2.5* 2.5* 2.4*     CrCl cannot be calculated (This lab value cannot be used to calculate CrCl because it is not a number: <0.20).  C-Reactive Protein   Date Value Ref Range Status   05/18/2025 14.44 (H) <1.00 mg/dL Final     Microbiology  MRSA PCR negative  Legionella antigen negative  Blood cultures finalized with no growth    Imaging  FL modified barium swallow study  Result Date: 5/27/2025  Interpreted By:  Hanane Fine and Brodsky Sheryl STUDY: FL MODIFIED BARIUM SWALLOW STUDY;; 5/27/2025 9:32 am   INDICATION: Signs/Symptoms:Aspiration.     COMPARISON: None.   ACCESSION NUMBER(S): [id number]   ORDERING CLINICIAN: LEXA LINDSEY   SPEECH FINDINGS: Modified Barium Swallow Study completed. Informed verbal consent obtained prior to completion of exam. The study was completed per protocol with various liquid barium consistencies, pudding, solids and a 13mm barium tablet was not given. A 1.9 cm or .75 inch (outer diameter) ring was placed on the chin in the lateral view and on the lateral, left side of the neck in the a-p view in order to complete objective measurements during swallowing. The anatomic structures and function of the oropharynx, larynx, hypopharynx and cervical esophagus were evaluated.   SLP: Agnes Hunt, SLP Contact info: [employer] secure chat; phone: [phone]     Reason for Referral: r/o aspiration, per pulmonology request Patient Hx: PMHx relevant to  rehab: lung cancer   Chief complaint: Pt was admitted on 25 due to       Chief Complaint Patient presents with . Weakness, Gen     Patient bib ems for weakness, lives at home with her daughter, had a low pox and was given oxygen.  Symptoms began two days ago. She is also being treated for an ulcer on her lower back/coccyx. . She was found to have acute on chronic respiratory failure in the setting of COPD exacerbation and possible PNA.   Relevant imaging results: CT chest 25: Lung parenchyma demonstrates moderate right and mild-to-moderate left-sided pleural effusions. There is basilar compressive atelectasis with right lower lobar collapse. Endobronchial opacification of the atelectatic right lower lobe as well as the bronchus intermedius. Correlate with aspiration as a potential etiology versus retained secretions. There is septal thickening and patchy consolidation within the right middle lobe as well as right upper lobe in particular of the posterior segment with component of air bronchograms. Left lung demonstrates scattered areas of patchy infiltrate in the left upper lobe.   Respiratory Status: Room air Current diet: soft bite sized, thin liquids (pt baseline preference d/t edentulous status)   Pain: Pain Scale: 0-10 Ratin Location: buttocks Type: acute     DIET RECOMMENDATIONS: - Soft & Bite Sized (IDDSI Level 6) - Thin liquids (IDDSI Level 0) Per the results of today's MBSS, patient to continue baseline diet of soft bite sized consistencies and thin liquids following swallow strategies listed below:   STRATEGIES: Compensatory swallow strategies: - Upright positioning for all PO intake - Slow rate of intake - Chew thoroughly       SLP PLAN: Skilled SLP Services: No further skilled SLP intervention is warranted for dysphagia at this time.   Discussed POC: Patient Discussed Risks/Benefits: Yes Patient/Caregiver Agreeable: Yes   Education Provided: Results and recommendations per MBSS, with video  review; recommendations and POC at this time. Verbal understanding and agreement given on all accounts.   Treatment Provided Today: No treatment indicated Additional Medical Consults Suggested: - GI as indicated d/t esophageal retention and retrograde flow with in distal esophagus   Repeat Study: Per MD or treating SLP     Mechanics of the Swallow Summary: ORAL PHASE: Lip Closure - No labial escape/anterior loss of bolus Tongue Control During Bolus Hold - Cohesive bolus between tongue to palatal seal Bolus prep/mastication - Slow prolonged mastication with complete re-collection necessary Bolus transport/lingual motion - Brisk tongue motion for A-P movement of the bolus Oral residue - Trace residue lining oral structures   PHARYNGEAL PHASE: Initiation of pharyngeal swallow - Bolus head at pit of pyriforms Soft palate elevation - No bolus between soft palate/pharyngeal wall Laryngeal elevation - Complete superior movement of thyroid cartilage with contact of arytenoids to epiglottic petiole Anterior hyoid excursion - Complete anterior movement Epiglottic movement - Complete inversion Laryngeal vestibule closure - Complete - no air/contrast in laryngeal vestibule Pharyngeal stripping wave - Complete Pharyngeal contraction (A/P view) - Complete Pharyngoesophageal segment opening - Partial distension/partial duration with partial obstruction of flow of bolus Tongue base retraction - Trace column of contrast or air between tongue base and pharyngeal wall Pharyngeal residue - Trace residue within or on the pharyngeal structures   ESOPHAGEAL PHASE: Esophageal clearance - Esophageal retention with retrograde flow below the pharyngoesophageal segment w/ thin barium within distal esophagus,  some cleared w/ thin , no retention w/ pudding barium, a cricopharyngeal bar/prominence was observed without obstruction of flow   SLP Impressions with Severity Rating: Pt presents with no significant oropharyngeal dysphagia  upon completion of modified barium swallow study this date. Swallowing physiology is detailed above. Patient demonstrated no penetration for any  consistency, and no aspiration was visualized during study.   *Of note: The A-P bolus follow-through is not intended to be utilized as a diagnostic assessment of the esophagus, rather a tool to observe the biomechanical aspects of the swallow continuum and to inform the need for further evaluation by medical specialists, as applicable.   Strategies attempted- n/a   OUTCOME MEASURES: Functional Oral Intake Scale Functional Oral Intake Scale: Level 7        total oral diet with no restrictions   Rosenbek's Penetration Aspiration Scale Thin Liquids: 1. NO ASPIRATION & NO PENETRATION - no aspiration, contrast does not enter airway [place] Thick Liquids: 1. NO ASPIRATION & NO PENETRATION - no aspiration, contrast does not enter airway Puree: 1. NO ASPIRATION & NO PENETRATION - no aspiration, contrast does not enter airway Soft Solid: 1. NO ASPIRATION & NO PENETRATION - no aspiration, cont Speech Therapy section of this report signed by Agnes YOO/SLP on 5/27/2025 at 12:01 pm.   RADIOLOGY FINDINGS: None   Radiology section of this report signed by Hanane Fine M.D..       Please refer to the speech therapist's findings and recommendations.   MACRO: None   Signed by: Hanane Fine 5/27/2025 12:04 PM Dictation workstation:   [id number]    XR chest 1 view  Result Date: 5/27/2025  Interpreted By:  Jin Schmidt, STUDY: XR CHEST 1 VIEW 5/26/2025 6:08 am   INDICATION: Signs/Symptoms:acute hypoxemic respiratory failure   COMPARISON: 05/25/2025   ACCESSION NUMBER(S): [id number]   ORDERING CLINICIAN: SILVANO ELLIOTT   TECHNIQUE: Single AP view chest   FINDINGS: Cardiomediastinal silhouette is within normal limits. Patchy alveolar airspace consolidation present within the right perihilar location with similar configuration from the prior examination with superimposed component  of atelectasis. Mild generalized increased interstitial prominence within bilateral lung fields. Also, linear appearing density at the left lung base demonstrated.                 1. Stable chest with persistent right perihilar airspace consolidation.   2. Generalized increased interstitial prominence in bilateral lung fields appearing stable   Signed by: Jin Schmidt 5/27/2025 8:49 AM Dictation workstation:   [id number]    XR chest 1 view  Result Date: 5/26/2025  Interpreted By:  Renny Cid, STUDY: XR CHEST 1 VIEW;  5/25/2025 6:16 am   INDICATION: Signs/Symptoms:acute hypoxemic respiratory failure.     COMPARISON: 05/24/2025   ACCESSION NUMBER(S): [id number]   ORDERING CLINICIAN: SILVANO ELLIOTT   FINDINGS: AP radiograph of the chest was provided.   Right IJ MediPort tip projects over the cavoatrial junction.   CARDIOMEDIASTINAL SILHOUETTE: Cardiomediastinal silhouette is normal in size and configuration.   LUNGS: Coarsened interstitial markings. Similar appearance of patchy right midlung and suprahilar opacities. Likely trace bibasilar pleural effusions and atelectasis. No pneumothorax.   ABDOMEN: No remarkable upper abdominal findings.   BONES: No acute osseous changes.       1.  Overall similar appearance of the lungs with trace bibasilar pleural effusions and atelectasis and patchy right perihilar airspace opacities. This is superimposed on a background of chronic lung disease.       MACRO: None   Signed by: Renny Cid 5/26/2025 8:55 AM Dictation workstation:   [id number]    XR chest 1 view  Result Date: 5/25/2025  Interpreted By:  Ambrosio Bess, STUDY: XR CHEST 1 VIEW;  5/24/2025 1:21 pm   INDICATION: Signs/Symptoms:acute hypoxemic respiratory failure.     COMPARISON: Radiographs from 05/23/2025 and CT from 05/20/2025   ACCESSION NUMBER(S): [id number]   ORDERING CLINICIAN: SILVANO ELLIOTT   FINDINGS:     There is moderate enlargement of the cardiac silhouette. Dense airspace disease in the right  lung present. There is patchy airspace disease at the left lung base as well. There is a small right pleural effusion.   Right port catheter in place again seen.       1. Dense airspace disease at the right lung similar to the prior. Left basilar airspace disease as well. Please correlate with prior cross-sectional imaging for differential. Continued follow-up advised       MACRO: None   Signed by: Ambrosio Bess 5/25/2025 11:24 AM Dictation workstation:   [id number]    XR chest 1 view  Result Date: 5/23/2025  Interpreted By:  Jin Schmidt, STUDY: XR CHEST 1 VIEW; ;  5/23/2025 7:57 am   INDICATION: Signs/Symptoms:acute hypoxemic respiratory failure.     COMPARISON: 05/22/2025   ACCESSION NUMBER(S): [id number]   ORDERING CLINICIAN: SILVANO ELLIOTT   TECHNIQUE: Single AP view chest   FINDINGS: Cardiac silhouette is mildly enlarged. Aorta is tortuous. There is a mild vascular calcification of the aortic knob. Mild asymmetric elevation of the right hemidiaphragm. Increased interstitial prominence within bilateral lung fields with component of interstitial edema. More focal right perihilar and basilar infiltrate with findings as seen on the prior examination. Right-sided MediPort with tip in the region of the distal SVC.               1. Generalized increased interstitial prominence as seen on the prior examination suggesting component of interstitial edema.   2. Persistent right perihilar and basilar consolidation with mild improved aeration of the right lung base.     MACRO: None   Signed by: Jin Schmidt 5/23/2025 8:06 AM Dictation workstation:   [id number]    XR chest 1 view  Result Date: 5/22/2025  Interpreted By:  Clemente Vergara, STUDY: XR CHEST 1 VIEW; 5/22/2025 5:33 am   INDICATION: CLINICAL INFORMATION: Signs/Symptoms:acute hypoxemic respiratory failure.   COMPARISON: 05/21/2025   ACCESSION NUMBER(S): [id number]   ORDERING CLINICIAN: CLIF MENA   TECHNIQUE: Portable chest one view.   FINDINGS:  The cardiac silhouette is quite prominent suggesting cardiomegaly. Patient is significantly rotated to the right. Large patchy infiltration is present within the right hemithorax. This is most marked centrally. MediPort catheter overlies the right hemithorax with the tip overlying the SVC right atrial junction. Right pleural effusion is suspected.   No definite infiltrates or effusions are appreciated on the left on the current exam.       No change in the extensive right-sided pulmonary infiltrate and right effusion.   MACRO: none   Signed by: Clemente Vergara 5/22/2025 9:56 AM Dictation workstation:   [id number]    XR chest 1 view  Result Date: 5/21/2025  Interpreted By:  Clemente Vergara, STUDY: XR CHEST 1 VIEW; 5/21/2025 5:20 am   INDICATION: CLINICAL INFORMATION: Signs/Symptoms:acute hypoxemic respiratory failure.   COMPARISON: 05/20/2025   ACCESSION NUMBER(S): [id number]   ORDERING CLINICIAN: CLIF MENA   TECHNIQUE: Portable chest one view.   FINDINGS: The cardiac size is indeterminate in view of the AP projection. There is no change in the right-sided MediPort catheter. There is no change in the right-sided infiltrate. The pulmonary vasculature is slightly indistinct suggesting mild pulmonary vascular congestion. Small bilateral pleural effusions are present.       Right perihilar and basilar infiltrate consistent with pneumonia, unchanged compared to the prior study. Pulmonary vascular congestion is also suspected along with small bilateral effusions, unchanged.   MACRO: none   Signed by: Clemente Vergara 5/21/2025 8:03 AM Dictation workstation:   [id number]    CT chest wo IV contrast  Result Date: 5/20/2025  Interpreted By:  Jin Schmidt, STUDY: CT CHEST WO IV CONTRAST; ;  5/20/2025 11:37 am   INDICATION: Signs/Symptoms:acute hypoxemic respiratory failure, h/o SCC lung CA.     COMPARISON: 03/05/2025   ACCESSION NUMBER(S): [id number]   ORDERING CLINICIAN: CLIF MENA   TECHNIQUE: Serial axial  unenhanced CT images obtained of the chest. Images reformatted in the coronal and sagittal projection.   All CT examinations are performed with 1 or more of the following dose reduction techniques: Automated exposure control, adjustment of mA and/or kv according to patient's size, or use of iterative reconstruction techniques.   FINDINGS: Mediastinum demonstrates no significant lymphadenopathy. Esophagus demonstrates component of gas-filled appearance component of retained food contents demonstrated within the stomach. Correlate with symptoms reflux.   Heart and great vessels demonstrate ascending thoracic aorta to measure 2.9 cm. There is moderate vascular calcification of the thoracic aorta. No cardiomegaly.   Lung parenchyma demonstrates moderate right and mild-to-moderate left-sided pleural effusions. There is basilar compressive atelectasis with right lower lobar collapse. Endobronchial opacification of the atelectatic right lower lobe as well as the bronchus intermedius. Correlate with aspiration as a potential etiology versus retained secretions. There is septal thickening and patchy consolidation within the right middle lobe as well as right upper lobe in particular of the posterior segment with component of air bronchograms. Left lung demonstrates scattered areas of patchy infiltrate in the left upper lobe.   Included portions visualized abdomen are unremarkable   Visualized osseous structures demonstrate S shaped scoliosis. Multilevel degenerative discogenic changes are demonstrated multilevel endplate sclerosis as well as anterior and lateral osteophyte formation. No compression deformity.               1. Right lower lobar collapse with air bronchograms and endobronchial opacification of the right lower lobe and bronchus intermedius. Correlate with aspiration pneumonias a potential etiology. Of note, patchy consolidation also demonstrated in the right middle lobe and in particular posterior segment of  the right upper lobe as well as left upper lobe. Findings suggest multilobar pneumonia. 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   2. Moderate right and mild-to-moderate left pleural effusions.     MACRO: None   Signed by: Jin Schmidt 5/20/2025 11:56 AM Dictation workstation:   [id number]    XR chest 1 view  Result Date: 5/20/2025  Interpreted By:  Hanane Fine, STUDY: XR CHEST 1 VIEW 5/20/2025 5:21 am   INDICATION: Signs/Symptoms:acute hypoxemic respiratory failure   COMPARISON: 05/18/2025   ACCESSION NUMBER(S): [id number]   ORDERING CLINICIAN: CLIF MENA   TECHNIQUE: AP semi-erect view of the chest at bedside   FINDINGS: The MediPort catheter terminates within the SVC. The cardiac size is stable with atherosclerosis of the thoracic aorta noted.   When compared with the study from 2 days earlier, there is worsening infiltrate and airspace consolidation within the right lung particularly in the right upper lobe with right pleural effusion increased in size.   Small left pleural effusion is present. The interstitial prominence observed 2 days earlier has improved however.   Levoscoliosis of the thoracic spine is present.       Improved interstitial prominence with stable small left pleural effusion.   Worsening infiltrate with airspace consolidation now seen in the right upper lobe with right pleural effusion increased in size.   Signed by: Hanane Fine 5/20/2025 7:53 AM Dictation workstation:   [id number]    CT head wo IV contrast  Result Date: 5/20/2025  Interpreted By:  Kanika Avina, STUDY: CT HEAD WO IV CONTRAST;  5/20/2025 12:06 am   INDICATION: Signs/Symptoms:unequal pupils.   COMPARISON: MRI brain 10/18/2024   ACCESSION NUMBER(S): [id number]   ORDERING CLINICIAN: JANET HALEY   TECHNIQUE: Axial noncontrast CT images of the head.   FINDINGS: BRAIN PARENCHYMA:  deep and periventricular white matter hypodensities are  nonspecific, but favored to represent chronic small vessel ischemic changes.  Gray-white matter interfaces are preserved. No mass effect or midline shift.   HEMORRHAGE: No acute intracranial hemorrhage. VENTRICLES and EXTRA-AXIAL SPACES: The ventricles and sulci are within normal limits in size for brain volume. No abnormal extraaxial fluid collection. EXTRACRANIAL SOFT TISSUES: Within normal limits. PARANASAL SINUSES/MASTOIDS:  The visualized paranasal sinuses and mastoid air cells are aerated. CALVARIUM: No depressed skull fracture. No destructive osseous lesion.   OTHER FINDINGS: None.       No acute intracranial abnormality.     MACRO: None   Signed by: Kanika Avina 5/20/2025 12:31 AM Dictation workstation:   [id number]    XR chest 1 view  Result Date: 5/18/2025  Interpreted By:  Lenka Cartagena, STUDY: XR CHEST 1 VIEW;  5/18/2025 6:36 pm   INDICATION: Signs/Symptoms:Generalized weakness.   COMPARISON: Chest x-ray 04/29/2025   ACCESSION NUMBER(S): [id number]   ORDERING CLINICIAN: JAIME CHILDERS   FINDINGS: Right-sided MediPort terminates in the SVC. Multiple overlying leads are present.   CARDIOMEDIASTINAL SILHOUETTE: Cardiomediastinal silhouette is stable in size and configuration. Atherosclerotic calcification of the aorta.   LUNGS: Bilateral interstitial prominence. There is right middle and lower lobe patchy airspace opacities. Layering bilateral effusions are better seen on concurrent CT. No pneumothorax.   ABDOMEN: No remarkable upper abdominal findings.   BONES: Levoconvex scoliosis. Generalized diffuse osteopenia. Multilevel degenerative changes of the spine.       There is patchy airspace opacity in the right mid and lower lung concerning for developing multifocal pneumonia. Continued radiographic follow-up to resolution is advised.   Cardiomegaly with mild interstitial prominence suggestive of developing interstitial edema/CHF.   Bilateral pleural effusions are better seen on concurrent CT.   MACRO:  None   Signed by: Lenka Cartagena 5/18/2025 7:06 PM Dictation workstation:   [id number]    CT abdomen pelvis wo IV contrast  Result Date: 5/18/2025  Interpreted By:  Lenka Cartagena, STUDY: CT ABDOMEN PELVIS WO IV CONTRAST;  5/18/2025 5:51 pm   INDICATION: Signs/Symptoms:Sacral wound.   COMPARISON: CT scan of the abdomen pelvis 02/14/2025   ACCESSION NUMBER(S): [id number]   ORDERING CLINICIAN: JAIME CHILDERS   TECHNIQUE: Axial noncontrast CT images of the abdomen and pelvis with coronal and sagittal reconstructed images.   FINDINGS:   LOWER CHEST: Moderate bilateral pleural effusions. There are patchy airspace opacities and consolidation in the right lung. Aortic root and coronary artery calcifications noted. Right-sided MediPort terminates in the SVC.   ABDOMEN: Lack of intravenous contrast limits evaluation of vessels and solid organs. LIVER: Within normal limits. BILE DUCTS: Normal caliber. GALLBLADDER: No calcified gallstones. No wall thickening. PANCREAS: Suboptimally visualized due to lack of contrast and paucity of intra-abdominal fat SPLEEN: Within normal limits.  Calcified splenic granulomas noted. ADRENALS: Nodular thickening of the adrenal glands may relate to hyperplasia or adenomatous change.   KIDNEYS, URETERS, URINARY BLADDER:  Kidneys are symmetric in size without evidence for calculi, hydronephrosis, hydroureter or perinephric inflammatory changes.   No bladder calculi or wall thickening.  Marked distention of the urinary bladder.   VESSELS:  Calcific atherosclerosis of the aortoiliac and branch vessels with moderate tortuosity. No aortic aneurysm. RETROPERITONEUM: No pathologically enlarged lymph nodes.   PELVIS:   REPRODUCTIVE ORGANS: Uterus and ovaries are not well visualized.   BOWEL: Postsurgical changes of partial colectomy. No dilated bowel. Appendix is not identified with certainty. No pericecal inflammatory change is seen. No pneumatosis or portal venous gas. PERITONEUM: Paucity of  intra-abdominal fat. Mild diffuse haziness of the mesentery. ABDOMINAL WALL: Diffuse body wall edema. There is soft tissue defect overlying the distal sacrum and coccyx. Mild adjacent soft tissue stranding with a tiny locule of air in the right gluteal subcutaneous fat. A discrete drainable fluid collection is not identified. Findings concerning for sacral decubitus ulcer. BONES: Generalized diffuse osteopenia. Multilevel degenerative changes of the spine. Reverse s shaped scoliosis. Great 1 anterolisthesis are L3 on 4 and L5 on S1 with grade 1-2 anterolisthesis of L4 on 5.       There is soft tissue defect overlying the distal sacrum and coccyx with mild soft tissue stranding. A tiny locule of air is present in the right gluteal subcutaneous fat, without evidence for discrete drainable fluid collection. Findings concerning for sacral decubitus ulcer. Correlate with exam.   Moderate bilateral pleural effusions. There are patchy airspace opacities and consolidation in the right lung concerning for developing multifocal pneumonia. Clinical correlation continued radiographic follow-up to resolution is advised.   Marked distention of the urinary bladder. Correlate with symptomatology to exclude urinary retention.   Diffuse body wall edema.   Additional findings as described above.   MACRO: None   Signed by: Lenka Cartagena 5/18/2025 6:31 PM Dictation workstation:   [id number]    ECG 12 Lead  Result Date: 5/5/2025  Sinus tachycardia with frequent Premature ventricular complexes Left anterior fascicular block ST & T wave abnormality, consider inferior ischemia Abnormal ECG T wave inversion now evident in Lateral leads Confirmed by Everardo Webster ([id number]) on 5/5/2025 4:08:58 PM    XR chest 2 views  Result Date: 4/29/2025  STUDY: Chest Radiographs;  4/29/2025 4:37PM INDICATION: Shortness of breath. COMPARISON: 3/5/2025 CT CTA Chest. ACCESSION NUMBER(S): [id number] ORDERING CLINICIAN: RONALD BLANTON TECHNIQUE:  Frontal and  lateral chest. FINDINGS: CARDIOMEDIASTINAL SILHOUETTE: Heart is mildly enlarged with pulmonary vascular congestion and small bilateral pleural effusions.  LUNGS: Lungs are clear.  ABDOMEN: No remarkable upper abdominal findings.  BONES: No acute osseous changes.    Cardiomegaly and pulmonary vascular congestion with small bilateral pleural effusions. Signed by Ronal Isaac MD        Assessment/Plan   Acute hypoxic respiratory failure,  resolving, on 2LNC   Squamous cell lung cancer status post chemoradiation, on Keytruda  Negative Mycoplasma,histoplasma, Galactomannan and Fungitell   Leukocytosis, multifactorial-resolved  History of COPD  Stage III/IV sacral decubitus ulcer  Severe malnutrition-prealbumin 6.3       Monitor off antibiotic therapy-completed course  Supportive care  Local care of sacral ulcer  Offloading  Oxygen as needed  High protein diet   Monitor temperature and WBC  Discharge planning      Kalpana Adkins, APRN-CNP

## 2025-05-28 NOTE — NURSING NOTE
"Pt's abd noted to be distended- asked Pt if she wants to use bed pan for urine-pt stated that \"I dont know how to go\". Suggested to Pt to relax and push/bear down to try to  urine - pt stated that \"it wont come out\". Stated that \"you are the boss so, figure it out\". Bladder scanned- retaining 377 ml. Will straight cath and make Dr Mcneil/ Elif Root CNP aware of second straight cath later in AM.   "

## 2025-05-28 NOTE — NURSING NOTE
On rounding, R chest mediport dressing is current, dry and intact. Line is in use, infusing without difficulty.

## 2025-05-28 NOTE — PROGRESS NOTES
[facility] called pts dtr Darnell to check on the status of hospital bed delivery, received message that VM full. Text without pts identifying information sent to Karly requesting return call to this worker. [facility] has accepted the pt.     Update: [facility] now stating they cannot accept however [facility] is able to accept.     Update: [facility] able to make contact with pts daughter Karly, made her aware that pt will likely need to dc with stearns catheter and the importance of her learning how to manage this when the homecare nurse is not out, daughter states confidence in managing it as she states she already helped another family member when they had a stearns. Additionally, Karly stating she has not received a call from [facility], this worker encouraged her to clear her VM as currently a message not able to be left and to also keep her phone on her as this worker made them aware she is available. Karly also made aware of change in [facility] agency and is agreeable.     05/28/25 0951   Discharge Planning   Expected Discharge Disposition Home H  ([facility])

## 2025-05-28 NOTE — PROGRESS NOTES
[facility] stating they cannot resume services with this pt, state she would benefit from going to SNF instead. Referrals sent to three other [facility] providers, await responses. [facility] will touch base with pts daughter Karly to see if hospital bed has been delivered.      05/28/25 0824   Discharge Planning   Expected Discharge Disposition Home H

## 2025-05-28 NOTE — PROGRESS NOTES
"Nutrition Follow up Note    Nutrition Assessment      Noted to be in stable condition with no new issues. Plan for discharge to home with [facility], likely today (awaiting hospital bed delivery).     Nutrition History:  Energy Intake: Fair 50-75 %     Anthropometrics:  Ht: 142.2 cm (4' 8\"), Wt: 53.2 kg (117 lb 4.6 oz), BMI: 26.31  IBW/kg (Dietitian Calculated): 40.91 kg  Percent of IBW: 83 %     Weight Change:  Daily Weight  05/28/25 : 53.2 kg (117 lb 4.6 oz)  05/01/25 : (!) 35.2 kg (77 lb 9.6 oz)  04/11/25 : (!) 29.5 kg (65 lb 2.3 oz)  03/07/25 : (!) 28.6 kg (63 lb 0.8 oz)  02/26/25 : (!) 28 kg (61 lb 11.7 oz)  02/14/25 : (!) 27 kg (59 lb 8.4 oz)  02/05/25 : (!) 29.6 kg (65 lb 5.9 oz)  01/27/25 : (!) 31.8 kg (70 lb)  01/13/25 : (!) 30.5 kg (67 lb 3.8 oz)  12/23/24 : (!) 31.1 kg (68 lb 10.8 oz)     Nutrition Focused Physical Exam Findings:   Subcutaneous Fat Loss  Orbital Fat Pads: Severe (dark circles, hollowing and loose skin)  Buccal Fat Pads: Severe (hollow, sunken and narrow face)  Triceps: Severe (negligible fat tissue)    Muscle Wasting  Temporalis: Severe (hollowed scooping depression)  Pectoralis (Clavicular Region): Severe (protruding prominent clavicle)  Deltoid/Trapezius: Severe (squared shoulders, acromion process prominent)  Quadriceps: Severe (depressions on inner and outer thigh)  Gastrocnemius: Severe (minimal muscle definition)    Nutrition Significant Labs:  Lab Results   Component Value Date    WBC 7.8 05/28/2025    HGB 10.5 (L) 05/28/2025    HCT 33.1 (L) 05/28/2025     05/28/2025    CHOL 205 (H) 10/28/2019    TRIG 69 10/28/2019     10/28/2019    ALT 14 05/19/2025    AST 21 05/19/2025     (L) 05/28/2025    K 3.1 (L) 05/28/2025    CL 89 (L) 05/28/2025    CREATININE <0.20 (L) 05/28/2025    BUN 8 05/28/2025    CO2 40 (HH) 05/28/2025    TSH 1.04 03/18/2025    INR 0.9 12/16/2024    HGBA1C 5.1 05/22/2024     Nutrition Specific Medications:  Scheduled Medications[1]  Continuous " Medications[2]    Dietary Orders (From admission, onward)       Start     Ordered    05/22/25 0940  May Not Participate in Room Service  ( ROOM SERVICE MAY NOT PARTICIPATE)  Once        Question:  .  Answer:  Yes    05/22/25 0939 05/22/25 0940  May Not Participate in Room Service  ( ROOM SERVICE MAY NOT PARTICIPATE)  Once        Question:  .  Answer:  Yes    05/22/25 0939    05/20/25 0945  Oral nutritional supplements  Until discontinued        Comments: Vanilla   Question Answer Comment   Deliver with All meals    Select supplement: Ensure Plus High Protein        05/20/25 0944    05/20/25 0942  Oral nutritional supplements  Until discontinued        Comments: Unflavored mixed in cranberry juice   Question Answer Comment   Deliver with Breakfast    Deliver with Dinner    Select supplement: Brennon        05/20/25 0944    05/20/25 0923  Adult diet Regular; Soft and bite sized 6  Diet effective now        Question Answer Comment   Diet type Regular    Texture Soft and bite sized 6        05/20/25 0922                   Estimated Needs:   Estimated Energy Needs  Total Energy Estimated Needs in 24 hours (kCal): 1300 kCal  Method for Estimating Needs: RDI min for women    Estimated Protein Needs  Total Protein Estimated Needs in 24 Hours (g): 46 g  Method for Estimating 24 Hour Protein Needs: RDI mon for women    Estimated Fluid Needs  Total Fluid Estimated Needs in 24 Hours (mL): 1300 mL  Method for Estimating 24 Hour Fluid Needs: 1 mL/kcal     Nutrition Diagnosis   Nutrition Diagnosis:  Malnutrition Diagnosis  Patient has Malnutrition Diagnosis: Yes  Diagnosis Status: Active  Malnutrition Diagnosis: Severe malnutrition related to chronic disease or condition  Related to: decreased ability to consume sufficient energy  As Evidenced by: 10# (14.2%) weight loss in the past 6 months, severe subcutaneous fat and muscle deficits, PO intake <75% of estimated energy needs >1 month        Nutrition  Interventions/Recommendations   Nutrition Interventions and Recommendations:  Nutrition Prescription: Nutrition prescription for oral nutrition    Nutrition Recommendations:  Individualized Nutrition Prescription Provided for : recommend continue oral diet per SLP recs, add Ensure Plus High Protein TID, Brennon BID    Nutrition Interventions/Goals:   Food and/or Nutrient Delivery Interventions  Interventions: Meals and snacks, Medical food supplement  Meals and Snacks: Texture-modified diet  Goal: provide as ordered  Medical Food Supplement: Commercial beverage medical food supplement therapy  Goal: ensure plus high protein TID to provide 350 kcals and 20g protein each, Brennon BID for wound healing    Education Documentation  No documentation found.            Nutrition Monitoring and Evaluation   Monitoring/Evaluation:   Food/Nutrient Related History Monitoring  Monitoring and Evaluation Plan: Estimated Energy Intake  Estimated Energy Intake: Energy intake 50 -75% of estimated energy needs    Anthropometric Measurements  Monitoring and Evaluation Plan: Body weight  Body Weight: Body weight - Promote weight restoration    Goal Status: Some progress toward goal(s)    Follow Up  Time Spent (min): 25 minutes  Last Date of Nutrition Visit: 05/28/25  Nutrition Follow-Up Needed?: 5-7 days  Follow up Comment: 6/3/25          [1] acetaminophen, 650 mg, oral, TID  albuterol, 2.5 mg, nebulization, q4h  baclofen, 10 mg, oral, TID  cholecalciferol, 125 mcg, oral, Daily  collagenase, , Topical, Daily  diazePAM, 5 mg, oral, Nightly  enoxaparin, 30 mg, subcutaneous, Daily  insulin lispro, 0-5 Units, subcutaneous, TID AC  lactulose, 20 g, oral, Daily  lidocaine, 1 Application, Topical, TID  melatonin, 3 mg, oral, Nightly  mupirocin, 1 Application, Topical, BID  nicotine, 1 patch, transdermal, Daily   Followed by  [START ON 7/1/2025] nicotine, 1 patch, transdermal, Daily   Followed by  [START ON 7/15/2025] nicotine, 1 patch,  transdermal, Daily  oxygen, , inhalation, Continuous - Inhalation  pantoprazole, 40 mg, oral, Daily before breakfast  sennosides-docusate sodium, 2 tablet, oral, BID  sucralfate, 1 g, oral, q6h [place]  tamsulosin, 0.4 mg, oral, Nightly    [2]

## 2025-05-28 NOTE — CARE PLAN
The patient's goals for the shift include rest    The clinical goals for the shift include safety, adequate oxygenation, monitor vitals/labs       daniela from home.  [facility] called ems.  Patient found non-verbal, hypotensive at around 830-9am.  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  Per [facility] patient also constipated x few days, given enema by night shift [facility], now is having diarrhea.  Patient has been bed-bound x few months

## 2025-05-28 NOTE — NURSING NOTE
Pt called stating that she is  unable to void w/pure wick. Will straight cath- abd noted to be distended.

## 2025-05-28 NOTE — PROGRESS NOTES
Karly Horton is a 80 y.o. female on day 10 of admission presenting with Generalized weakness.    Secure message from nursing, straight catheterized for acute urinary retention.  Urinalysis unremarkable.    Spoke with nursing, no void.    Subjective   Patient seen and examined.  Resting in bed in no acute distress.  Awake alert oriented x 3.  + Short of breath, anxious.  Asking for baclofen at bedtime.  Asking for Lidocaine.  No void status post stearns catheter removal.  Discussed bowel regimen, she is in agreement.    Objective     Physical Exam  Vitals and nursing note reviewed.   Constitutional:       General: She is not in acute distress.     Appearance: Normal appearance. She is normal weight. She is ill-appearing. She is not toxic-appearing or diaphoretic.   HENT:      Head: Normocephalic and atraumatic.      Right Ear: External ear normal.      Left Ear: External ear normal.      Nose: Nose normal.      Mouth/Throat:      Mouth: Mucous membranes are moist.      Pharynx: Oropharynx is clear.   Eyes:      Extraocular Movements: Extraocular movements intact.      Conjunctiva/sclera: Conjunctivae normal.      Pupils: Pupils are equal, round, and reactive to light.   Cardiovascular:      Rate and Rhythm: Regular rhythm. Tachycardia present.      Pulses: Normal pulses.      Heart sounds: Normal heart sounds. No murmur heard.  Pulmonary:      Effort: Pulmonary effort is normal. Tachypnea present. No respiratory distress.      Breath sounds: Decreased breath sounds present. No wheezing, rhonchi or rales.      Comments: 2 liters nasal cannula.  Abdominal:      General: Bowel sounds are normal. There is no distension.      Palpations: Abdomen is soft.      Tenderness: There is no abdominal tenderness. There is no guarding.   Genitourinary:     Comments: : Stearns catheter has been removed.  Fecal management system in place soft brown stool in tubing - none in bag.  Musculoskeletal:         General: No swelling or  "tenderness. Normal range of motion.      Cervical back: Normal range of motion and neck supple.      Right lower leg: No edema.      Left lower leg: No edema.   Skin:     General: Skin is warm and dry.      Capillary Refill: Capillary refill takes less than 2 seconds.      Comments: Right chest port in place dressing clean dry intact.  Sacrum/buttock not examined.   Neurological:      General: No focal deficit present.      Mental Status: She is alert and oriented to person, place, and time.      Motor: Weakness present.      Comments: Awake alert oriented x 3. Forgetful. Follows all commands. Generalized weakness. No focal weakness. Gait deferred.    Psychiatric:         Mood and Affect: Mood normal.         Behavior: Behavior normal.       Last Recorded Vitals  Blood pressure 152/88, pulse 101, temperature 36.6 °C (97.9 °F), temperature source Oral, resp. rate 17, height 1.422 m (4' 8\"), weight 53.2 kg (117 lb 4.6 oz), SpO2 92%.    Intake/Output last 3 Shifts:  I/O last 3 completed shifts:  In: 2217 (69.7 mL/kg) [P.O.:2217]  Out: 2400 (75.5 mL/kg) [Urine:2175 (1.9 mL/kg/hr); Stool:225]  Dosing Weight: 31.8 kg     Telemetry normal sinus rhythm sinus tachycardia - premature ventricular contractions    Relevant Results    This patient has a central line   Reason for the central line remaining today? Parenteral medication    Results for orders placed or performed during the hospital encounter of 05/18/25 (from the past 24 hours)   POCT GLUCOSE   Result Value Ref Range    POCT Glucose 145 (H) 74 - 99 mg/dL   POCT GLUCOSE   Result Value Ref Range    POCT Glucose 147 (H) 74 - 99 mg/dL   POCT GLUCOSE   Result Value Ref Range    POCT Glucose 135 (H) 74 - 99 mg/dL   CBC and Auto Differential   Result Value Ref Range    WBC 7.8 4.4 - 11.3 x10*3/uL    nRBC 0.0 0.0 - 0.0 /100 WBCs    RBC 3.36 (L) 4.00 - 5.20 x10*6/uL    Hemoglobin 10.5 (L) 12.0 - 16.0 g/dL    Hematocrit 33.1 (L) 36.0 - 46.0 %    MCV 99 80 - 100 fL    MCH 31.3 " 26.0 - 34.0 pg    MCHC 31.7 (L) 32.0 - 36.0 g/dL    RDW 14.0 11.5 - 14.5 %    Platelets 243 150 - 450 x10*3/uL    Neutrophils % 83.1 40.0 - 80.0 %    Immature Granulocytes %, Automated 3.1 (H) 0.0 - 0.9 %    Lymphocytes % 2.7 13.0 - 44.0 %    Monocytes % 10.4 2.0 - 10.0 %    Eosinophils % 0.4 0.0 - 6.0 %    Basophils % 0.3 0.0 - 2.0 %    Neutrophils Absolute 6.47 (H) 1.60 - 5.50 x10*3/uL    Immature Granulocytes Absolute, Automated 0.24 0.00 - 0.50 x10*3/uL    Lymphocytes Absolute 0.21 (L) 0.80 - 3.00 x10*3/uL    Monocytes Absolute 0.81 (H) 0.05 - 0.80 x10*3/uL    Eosinophils Absolute 0.03 0.00 - 0.40 x10*3/uL    Basophils Absolute 0.02 0.00 - 0.10 x10*3/uL   Renal function panel   Result Value Ref Range    Glucose 129 (H) 74 - 99 mg/dL    Sodium 132 (L) 136 - 145 mmol/L    Potassium 3.1 (L) 3.5 - 5.3 mmol/L    Chloride 89 (L) 98 - 107 mmol/L    Bicarbonate 40 (HH) 21 - 32 mmol/L    Anion Gap <7 (L) 10 - 20 mmol/L    Urea Nitrogen 8 6 - 23 mg/dL    Creatinine <0.20 (L) 0.50 - 1.05 mg/dL    eGFR      Calcium 8.3 (L) 8.6 - 10.3 mg/dL    Phosphorus 2.0 (L) 2.5 - 4.9 mg/dL    Albumin 2.5 (L) 3.4 - 5.0 g/dL   Magnesium   Result Value Ref Range    Magnesium 1.53 (L) 1.60 - 2.40 mg/dL   Urinalysis with Reflex Culture and Microscopic   Result Value Ref Range    Color, Urine Light-Yellow Light-Yellow, Yellow, Dark-Yellow    Appearance, Urine Clear Clear    Specific Gravity, Urine 1.008 1.005 - 1.035    pH, Urine 7.0 5.0, 5.5, 6.0, 6.5, 7.0, 7.5, 8.0    Protein, Urine NEGATIVE NEGATIVE, 10 (TRACE), 20 (TRACE) mg/dL    Glucose, Urine Normal Normal mg/dL    Blood, Urine NEGATIVE NEGATIVE mg/dL    Ketones, Urine NEGATIVE NEGATIVE mg/dL    Bilirubin, Urine NEGATIVE NEGATIVE mg/dL    Urobilinogen, Urine Normal Normal mg/dL    Nitrite, Urine NEGATIVE NEGATIVE    Leukocyte Esterase, Urine NEGATIVE NEGATIVE   POCT GLUCOSE   Result Value Ref Range    POCT Glucose 126 (H) 74 - 99 mg/dL     No results found for the last 90 days.    FL  modified barium swallow study  Result Date: 5/27/2025  Interpreted By:  Hanane Fine and Brodsky Sheryl STUDY: [place] MODIFIED BARIUM SWALLOW STUDY;; 5/27/2025 9:32 am   INDICATION: Signs/Symptoms:Aspiration.     COMPARISON: None.   ACCESSION NUMBER(S): [id number]   ORDERING CLINICIAN: LEXA LINDSEY   SPEECH FINDINGS: Modified Barium Swallow Study completed. Informed verbal consent obtained prior to completion of exam. The study was completed per protocol with various liquid barium consistencies, pudding, solids and a 13mm barium tablet was not given. A 1.9 cm or .75 inch (outer diameter) ring was placed on the chin in the lateral view and on the lateral, left side of the neck in the a-p view in order to complete objective measurements during swallowing. The anatomic structures and function of the oropharynx, larynx, hypopharynx and cervical esophagus were evaluated.   SLP: Agnes Hunt SLP Contact info: [employer] secure chat; phone: [phone]     Reason for Referral: r/o aspiration, per pulmonology request Patient Hx: PMHx relevant to rehab: lung cancer   Chief complaint: Pt was admitted on 5/19/25 due to       Chief Complaint Patient presents with . Weakness, Gen     Patient bib ems for weakness, lives at home with her daughter, had a low pox and was given oxygen.  Symptoms began two days ago. She is also being treated for an ulcer on her lower back/coccyx. . She was found to have acute on chronic respiratory failure in the setting of COPD exacerbation and possible PNA.   Relevant imaging results: CT chest 5/20/25: Lung parenchyma demonstrates moderate right and mild-to-moderate left-sided pleural effusions. There is basilar compressive atelectasis with right lower lobar collapse. Endobronchial opacification of the atelectatic right lower lobe as well as the bronchus intermedius. Correlate with aspiration as a potential etiology versus retained secretions. There is septal thickening and patchy  consolidation within the right middle lobe as well as right upper lobe in particular of the posterior segment with component of air bronchograms. Left lung demonstrates scattered areas of patchy infiltrate in the left upper lobe.   Respiratory Status: Room air Current diet: soft bite sized, thin liquids (pt baseline preference d/t edentulous status)   Pain: Pain Scale: 0-10 Ratin Location: buttocks Type: acute     DIET RECOMMENDATIONS: - Soft & Bite Sized (IDDSI Level 6) - Thin liquids (IDDSI Level 0) Per the results of today's MBSS, patient to continue baseline diet of soft bite sized consistencies and thin liquids following swallow strategies listed below:   STRATEGIES: Compensatory swallow strategies: - Upright positioning for all PO intake - Slow rate of intake - Chew thoroughly       SLP PLAN: Skilled SLP Services: No further skilled SLP intervention is warranted for dysphagia at this time.   Discussed POC: Patient Discussed Risks/Benefits: Yes Patient/Caregiver Agreeable: Yes   Education Provided: Results and recommendations per MBSS, with video review; recommendations and POC at this time. Verbal understanding and agreement given on all accounts.   Treatment Provided Today: No treatment indicated Additional Medical Consults Suggested: - GI as indicated d/t esophageal retention and retrograde flow with in distal esophagus   Repeat Study: Per MD or treating SLP     Mechanics of the Swallow Summary: ORAL PHASE: Lip Closure - No labial escape/anterior loss of bolus Tongue Control During Bolus Hold - Cohesive bolus between tongue to palatal seal Bolus prep/mastication - Slow prolonged mastication with complete re-collection necessary Bolus transport/lingual motion - Brisk tongue motion for A-P movement of the bolus Oral residue - Trace residue lining oral structures   PHARYNGEAL PHASE: Initiation of pharyngeal swallow - Bolus head at pit of pyriforms Soft palate elevation - No bolus between soft  palate/pharyngeal wall Laryngeal elevation - Complete superior movement of thyroid cartilage with contact of arytenoids to epiglottic petiole Anterior hyoid excursion - Complete anterior movement Epiglottic movement - Complete inversion Laryngeal vestibule closure - Complete - no air/contrast in laryngeal vestibule Pharyngeal stripping wave - Complete Pharyngeal contraction (A/P view) - Complete Pharyngoesophageal segment opening - Partial distension/partial duration with partial obstruction of flow of bolus Tongue base retraction - Trace column of contrast or air between tongue base and pharyngeal wall Pharyngeal residue - Trace residue within or on the pharyngeal structures   ESOPHAGEAL PHASE: Esophageal clearance - Esophageal retention with retrograde flow below the pharyngoesophageal segment w/ thin barium within distal esophagus,  some cleared w/ thin , no retention w/ pudding barium, a cricopharyngeal bar/prominence was observed without obstruction of flow   SLP Impressions with Severity Rating: Pt presents with no significant oropharyngeal dysphagia upon completion of modified barium swallow study this date. Swallowing physiology is detailed above. Patient demonstrated no penetration for any  consistency, and no aspiration was visualized during study.   *Of note: The A-P bolus follow-through is not intended to be utilized as a diagnostic assessment of the esophagus, rather a tool to observe the biomechanical aspects of the swallow continuum and to inform the need for further evaluation by medical specialists, as applicable.   Strategies attempted- n/a   OUTCOME MEASURES: Functional Oral Intake Scale Functional Oral Intake Scale: Level 7        total oral diet with no restrictions   Rosenbek's Penetration Aspiration Scale Thin Liquids: 1. NO ASPIRATION & NO PENETRATION - no aspiration, contrast does not enter airway [place] Thick Liquids: 1. NO ASPIRATION & NO PENETRATION - no aspiration, contrast  does not enter airway Puree: 1. NO ASPIRATION & NO PENETRATION - no aspiration, contrast does not enter airway Soft Solid: 1. NO ASPIRATION & NO PENETRATION - no aspiration, cont Speech Therapy section of this report signed by Agnes Hunt MA-CCC/SLP on 5/27/2025 at 12:01 pm.   RADIOLOGY FINDINGS: None   Radiology section of this report signed by Hanane Fine M.D..       Please refer to the speech therapist's findings and recommendations.   MACRO: None   Signed by: Hanane Fine 5/27/2025 12:04 PM Dictation workstation:   [id number]      Scheduled medications  Scheduled Medications[1]  Continuous medications  Continuous Medications[2]  PRN medications  PRN Medications[3]    ASSESSMENT:  Acute on chronic hypoxic respiratory failure improved  Pneumonia  Moderate right, mild to moderate left pleural effusions  Squamous cell lung carcinoma status post chemo-radiation - Keytruda  COPD   History of tobacco use / vape use  Leukocytosis, multifactorial - resolving  Stage III/IV sacral decubitus ulcer  Pressure ulcer risk  Severe protein calorie malnutrition   Hyponatremia improved  Hypokalemia   Hypomagnesemia   Metabolic alkalosis - worsening  Normocytic anemia  Chronic pain  Spinal stenosis  Scoliosis  GERD  Vitamin d deficiency  Fall risk  Constipation - resolved  Acute urinary retention     PLAN:  Patient reports shortness of breath and is tachypneic and anxious this morning.  She remains on 2 L nasal cannula, baseline.  Check 1 view chest x-ray stat.  Follow-up.  IV Zosyn antibiotic course complete.  Continue albuterol nebulizer treatments.  3% saline nebulizer.  Oxygen.  Pulmonary hygiene.  Encourage incentive spirometer use 10 x / hour while awake.  Continue modified diet per speech therapy, modified barium swallow testing.  Aspiration precautions.  Continue PPI.  Outpatient GI follow-up.  Labs reviewed.  Hypokalemia.  Potassium replacement.  Hypomagnesemia.  IV magnesium replacement.  Replace electrolytes and  replace accordingly.  Metabolic alkalosis.  Worsening.  Status post diuresis.  Repeat labs in AM.  + Acute urinary retention.  Urinalysis unremarkable.  Repeat bladder scan.  If urinary retention persists, place stearns catheter and consult Urology.  Discussed she may require discharge with a stearns catheter in place.  Discussed discharge home with daughter, home health care versus to facility - she requests to discharge home.  Fecal management system in place for diarrhea, sacral wound.  There is no longer liquid stool in the fecal management system.  Maintain for the sacral wound however continue bowel regimen to avoid constipation in the setting of acute right urinary retention.  Monitor I's and O's.  Patient and encouraged compliance with medical regimen.  Wound care per wound care nursing.  Protein supplementation.  Continue home medications as prescribed.  Continue lidocaine as ordered.  Dosing per pharmacy.  Continue valium at bedtime.  Continue continue baclofen as prescribed 3 times a day.  Supportive care.  Patient reassured.  Palliative medicine input appreciated.  CODE STATUS established.  DNR CCA DNI.  PT/OT as tolerated.  Fall precautions.  Up with assistance only.  Bed and chair alarm at all times.  Pressure prevention measures.  Turn and reposition every 2 hours and as needed.  Heels off bed.  Offloading.  DVT prophylaxis.  Lovenox subcutaneous.  GI prophylaxis.  PPI Protonix.  Carafate.  Supportive care.  Patient reassured.  Case management following for discharge planning.  Discharge plan home with home health care.  Orders placed.  Hospital bed ordered, follow-up delivery.  Anticipate discharge home with home health care pending chest x-ray, respiratory status, urinary retention, when arrangements are made by case management.  Discussed with patient, nursing and case management.  Discussed with Dr. Mcneil.    DNR CCA DNI.     ADDENDUM:  Chest x-ray radiology report reviewed.  Improved aeration.   Continue to monitor.      Discussed case with Urology, Dr. Bello.  Input appreciated.  Maintain stearns catheter.  Discharge with stearns catheter.  No Flomax.        PO Jang-CNP       [1] acetaminophen, 650 mg, oral, TID  albuterol, 2.5 mg, nebulization, q4h  baclofen, 10 mg, oral, TID  cholecalciferol, 125 mcg, oral, Daily  collagenase, , Topical, Daily  diazePAM, 5 mg, oral, Nightly  enoxaparin, 30 mg, subcutaneous, Daily  insulin lispro, 0-5 Units, subcutaneous, TID AC  lactulose, 20 g, oral, Daily  lidocaine, 1 Application, Topical, TID  magnesium sulfate, 2 g, intravenous, Once  melatonin, 3 mg, oral, Nightly  mupirocin, 1 Application, Topical, BID  nicotine, 1 patch, transdermal, Daily   Followed by  [START ON 7/1/2025] nicotine, 1 patch, transdermal, Daily   Followed by  [START ON 7/15/2025] nicotine, 1 patch, transdermal, Daily  oxygen, , inhalation, Continuous - Inhalation  pantoprazole, 40 mg, oral, Daily before breakfast  sennosides-docusate sodium, 2 tablet, oral, BID  sucralfate, 1 g, oral, q6h [place]  tamsulosin, 0.4 mg, oral, Nightly     [2]    [3] PRN medications: alteplase, benzocaine-menthol, butalbital-acetaminophen-caff, dextromethorphan-guaifenesin, dextrose, dextrose, glucagon, glucagon, guaiFENesin, guaiFENesin, ipratropium-albuteroL, labetaloL, lubricating eye drops, ondansetron, polyethylene glycol, potassium chloride, prochlorperazine, sodium hypochlorite

## 2025-05-29 ENCOUNTER — APPOINTMENT (OUTPATIENT)
Dept: HEMATOLOGY/ONCOLOGY | Facility: CLINIC | Age: 80
End: 2025-05-29
Payer: MEDICARE

## 2025-05-29 LAB
ALBUMIN SERPL BCP-MCNC: 2.5 G/DL (ref 3.4–5)
ALBUMIN SERPL BCP-MCNC: 2.6 G/DL (ref 3.4–5)
ANION GAP BLDA CALCULATED.4IONS-SCNC: -1 MMO/L (ref 10–25)
ANION GAP SERPL CALCULATED.3IONS-SCNC: 8 MMOL/L (ref 10–20)
ANION GAP SERPL CALCULATED.3IONS-SCNC: <7 MMOL/L (ref 10–20)
APPARATUS: ABNORMAL
ARTERIAL PATENCY WRIST A: NEGATIVE
BASE EXCESS BLDA CALC-SCNC: 14.7 MMOL/L (ref -2–3)
BASOPHILS # BLD AUTO: 0.01 X10*3/UL (ref 0–0.1)
BASOPHILS NFR BLD AUTO: 0.1 %
BODY TEMPERATURE: 37 DEGREES CELSIUS
BUN SERPL-MCNC: 14 MG/DL (ref 6–23)
BUN SERPL-MCNC: 17 MG/DL (ref 6–23)
CA-I BLDA-SCNC: 1.19 MMOL/L (ref 1.1–1.33)
CALCIUM SERPL-MCNC: 8.1 MG/DL (ref 8.6–10.3)
CALCIUM SERPL-MCNC: 8.1 MG/DL (ref 8.6–10.3)
CALCIUM UR-MCNC: 24.3 MG/DL
CALCIUM/CREATINE (MG/G) IN URINE: 1528 MG/G CREAT (ref 0–209)
CHLORIDE BLDA-SCNC: 89 MMOL/L (ref 98–107)
CHLORIDE SERPL-SCNC: 87 MMOL/L (ref 98–107)
CHLORIDE SERPL-SCNC: 87 MMOL/L (ref 98–107)
CHLORIDE UR-SCNC: <15 MMOL/L
CHLORIDE/CREATININE (MMOL/G) IN URINE: ABNORMAL
CO2 SERPL-SCNC: 39 MMOL/L (ref 21–32)
CO2 SERPL-SCNC: 40 MMOL/L (ref 21–32)
CREAT SERPL-MCNC: <0.2 MG/DL (ref 0.5–1.05)
CREAT SERPL-MCNC: <0.2 MG/DL (ref 0.5–1.05)
CREAT UR-MCNC: 15.8 MG/DL (ref 20–320)
CREAT UR-MCNC: 15.9 MG/DL (ref 20–320)
CREAT UR-MCNC: 15.9 MG/DL (ref 20–320)
EGFRCR SERPLBLD CKD-EPI 2021: ABNORMAL ML/MIN/{1.73_M2}
EGFRCR SERPLBLD CKD-EPI 2021: ABNORMAL ML/MIN/{1.73_M2}
EOSINOPHIL # BLD AUTO: 0.02 X10*3/UL (ref 0–0.4)
EOSINOPHIL NFR BLD AUTO: 0.2 %
ERYTHROCYTE [DISTWIDTH] IN BLOOD BY AUTOMATED COUNT: 13.8 % (ref 11.5–14.5)
FLOW: 2 LPM
GLUCOSE BLD MANUAL STRIP-MCNC: 114 MG/DL (ref 74–99)
GLUCOSE BLD MANUAL STRIP-MCNC: 119 MG/DL (ref 74–99)
GLUCOSE BLD MANUAL STRIP-MCNC: 139 MG/DL (ref 74–99)
GLUCOSE BLD MANUAL STRIP-MCNC: 91 MG/DL (ref 74–99)
GLUCOSE BLDA-MCNC: 101 MG/DL (ref 74–99)
GLUCOSE SERPL-MCNC: 100 MG/DL (ref 74–99)
GLUCOSE SERPL-MCNC: 98 MG/DL (ref 74–99)
HCO3 BLDA-SCNC: 41 MMOL/L (ref 22–26)
HCT VFR BLD AUTO: 31.2 % (ref 36–46)
HCT VFR BLD EST: 33 % (ref 36–46)
HGB BLD-MCNC: 9.9 G/DL (ref 12–16)
HGB BLDA-MCNC: 11 G/DL (ref 12–16)
IMM GRANULOCYTES # BLD AUTO: 0.21 X10*3/UL (ref 0–0.5)
IMM GRANULOCYTES NFR BLD AUTO: 2.3 % (ref 0–0.9)
INHALED O2 CONCENTRATION: 28 %
LACTATE BLDA-SCNC: 0.9 MMOL/L (ref 0.4–2)
LYMPHOCYTES # BLD AUTO: 0.27 X10*3/UL (ref 0.8–3)
LYMPHOCYTES NFR BLD AUTO: 3 %
MAGNESIUM SERPL-MCNC: 1.53 MG/DL (ref 1.6–2.4)
MAGNESIUM SERPL-MCNC: 1.92 MG/DL (ref 1.6–2.4)
MCH RBC QN AUTO: 31 PG (ref 26–34)
MCHC RBC AUTO-ENTMCNC: 31.7 G/DL (ref 32–36)
MCV RBC AUTO: 98 FL (ref 80–100)
MONOCYTES # BLD AUTO: 1.05 X10*3/UL (ref 0.05–0.8)
MONOCYTES NFR BLD AUTO: 11.7 %
NEUTROPHILS # BLD AUTO: 7.45 X10*3/UL (ref 1.6–5.5)
NEUTROPHILS NFR BLD AUTO: 82.7 %
NRBC BLD-RTO: 0 /100 WBCS (ref 0–0)
OSMOLALITY SERPL: 270 MOSM/KG (ref 280–300)
OSMOLALITY UR: 285 MOSM/KG (ref 200–1200)
OXYHGB MFR BLDA: 92 % (ref 94–98)
PCO2 BLDA: 59 MM HG (ref 38–42)
PH BLDA: 7.45 PH (ref 7.38–7.42)
PHOSPHATE SERPL-MCNC: 2 MG/DL (ref 2.5–4.9)
PHOSPHATE SERPL-MCNC: 3.9 MG/DL (ref 2.5–4.9)
PLATELET # BLD AUTO: 246 X10*3/UL (ref 150–450)
PO2 BLDA: 66 MM HG (ref 85–95)
POTASSIUM BLDA-SCNC: 3.9 MMOL/L (ref 3.5–5.3)
POTASSIUM SERPL-SCNC: 3.8 MMOL/L (ref 3.5–5.3)
POTASSIUM SERPL-SCNC: 3.8 MMOL/L (ref 3.5–5.3)
RBC # BLD AUTO: 3.19 X10*6/UL (ref 4–5.2)
SAO2 % BLDA: 96 % (ref 94–100)
SODIUM BLDA-SCNC: 125 MMOL/L (ref 136–145)
SODIUM SERPL-SCNC: 128 MMOL/L (ref 136–145)
SODIUM SERPL-SCNC: 130 MMOL/L (ref 136–145)
SODIUM UR-SCNC: <10 MMOL/L
SODIUM/CREAT UR-RTO: ABNORMAL
SPECIMEN DRAWN FROM PATIENT: ABNORMAL
WBC # BLD AUTO: 9 X10*3/UL (ref 4.4–11.3)

## 2025-05-29 PROCEDURE — 84132 ASSAY OF SERUM POTASSIUM: CPT

## 2025-05-29 PROCEDURE — 94760 N-INVAS EAR/PLS OXIMETRY 1: CPT

## 2025-05-29 PROCEDURE — 36600 WITHDRAWAL OF ARTERIAL BLOOD: CPT

## 2025-05-29 PROCEDURE — S4991 NICOTINE PATCH NONLEGEND: HCPCS

## 2025-05-29 PROCEDURE — 2500000002 HC RX 250 W HCPCS SELF ADMINISTERED DRUGS (ALT 637 FOR MEDICARE OP, ALT 636 FOR OP/ED): Performed by: NURSE PRACTITIONER

## 2025-05-29 PROCEDURE — 2500000004 HC RX 250 GENERAL PHARMACY W/ HCPCS (ALT 636 FOR OP/ED): Mod: JZ | Performed by: INTERNAL MEDICINE

## 2025-05-29 PROCEDURE — 2500000002 HC RX 250 W HCPCS SELF ADMINISTERED DRUGS (ALT 637 FOR MEDICARE OP, ALT 636 FOR OP/ED): Performed by: INTERNAL MEDICINE

## 2025-05-29 PROCEDURE — 2500000004 HC RX 250 GENERAL PHARMACY W/ HCPCS (ALT 636 FOR OP/ED): Mod: JZ

## 2025-05-29 PROCEDURE — 99232 SBSQ HOSP IP/OBS MODERATE 35: CPT

## 2025-05-29 PROCEDURE — 80069 RENAL FUNCTION PANEL: CPT

## 2025-05-29 PROCEDURE — 82088 ASSAY OF ALDOSTERONE: CPT | Performed by: INTERNAL MEDICINE

## 2025-05-29 PROCEDURE — 83735 ASSAY OF MAGNESIUM: CPT | Performed by: NURSE PRACTITIONER

## 2025-05-29 PROCEDURE — 2500000004 HC RX 250 GENERAL PHARMACY W/ HCPCS (ALT 636 FOR OP/ED)

## 2025-05-29 PROCEDURE — 2500000002 HC RX 250 W HCPCS SELF ADMINISTERED DRUGS (ALT 637 FOR MEDICARE OP, ALT 636 FOR OP/ED)

## 2025-05-29 PROCEDURE — 94668 MNPJ CHEST WALL SBSQ: CPT

## 2025-05-29 PROCEDURE — 2060000001 HC INTERMEDIATE ICU ROOM DAILY

## 2025-05-29 PROCEDURE — 2500000001 HC RX 250 WO HCPCS SELF ADMINISTERED DRUGS (ALT 637 FOR MEDICARE OP)

## 2025-05-29 PROCEDURE — 2500000001 HC RX 250 WO HCPCS SELF ADMINISTERED DRUGS (ALT 637 FOR MEDICARE OP): Performed by: NURSE PRACTITIONER

## 2025-05-29 PROCEDURE — 83735 ASSAY OF MAGNESIUM: CPT | Mod: WESLAB | Performed by: INTERNAL MEDICINE

## 2025-05-29 PROCEDURE — 85025 COMPLETE CBC W/AUTO DIFF WBC: CPT

## 2025-05-29 PROCEDURE — 82570 ASSAY OF URINE CREATININE: CPT | Performed by: INTERNAL MEDICINE

## 2025-05-29 PROCEDURE — 82340 ASSAY OF CALCIUM IN URINE: CPT | Mod: WESLAB | Performed by: INTERNAL MEDICINE

## 2025-05-29 PROCEDURE — 94640 AIRWAY INHALATION TREATMENT: CPT

## 2025-05-29 PROCEDURE — 2500000005 HC RX 250 GENERAL PHARMACY W/O HCPCS

## 2025-05-29 PROCEDURE — 94660 CPAP INITIATION&MGMT: CPT

## 2025-05-29 PROCEDURE — 2500000005 HC RX 250 GENERAL PHARMACY W/O HCPCS: Performed by: INTERNAL MEDICINE

## 2025-05-29 PROCEDURE — 84132 ASSAY OF SERUM POTASSIUM: CPT | Performed by: NURSE PRACTITIONER

## 2025-05-29 PROCEDURE — 82436 ASSAY OF URINE CHLORIDE: CPT | Performed by: INTERNAL MEDICINE

## 2025-05-29 PROCEDURE — 82947 ASSAY GLUCOSE BLOOD QUANT: CPT

## 2025-05-29 PROCEDURE — 83935 ASSAY OF URINE OSMOLALITY: CPT | Mod: WESLAB | Performed by: INTERNAL MEDICINE

## 2025-05-29 PROCEDURE — 83735 ASSAY OF MAGNESIUM: CPT

## 2025-05-29 PROCEDURE — 84244 ASSAY OF RENIN: CPT | Performed by: INTERNAL MEDICINE

## 2025-05-29 PROCEDURE — 83930 ASSAY OF BLOOD OSMOLALITY: CPT | Mod: WESLAB | Performed by: INTERNAL MEDICINE

## 2025-05-29 RX ORDER — MAGNESIUM SULFATE HEPTAHYDRATE 40 MG/ML
4 INJECTION, SOLUTION INTRAVENOUS ONCE
Status: DISCONTINUED | OUTPATIENT
Start: 2025-05-29 | End: 2025-05-29

## 2025-05-29 RX ORDER — FORMOTEROL FUMARATE 20 UG/2ML
20 SOLUTION RESPIRATORY (INHALATION)
Status: CANCELLED | OUTPATIENT
Start: 2025-05-29

## 2025-05-29 RX ORDER — SODIUM CHLORIDE 9 MG/ML
75 INJECTION, SOLUTION INTRAVENOUS CONTINUOUS
Status: ACTIVE | OUTPATIENT
Start: 2025-05-29 | End: 2025-05-30

## 2025-05-29 RX ORDER — IPRATROPIUM BROMIDE AND ALBUTEROL SULFATE 2.5; .5 MG/3ML; MG/3ML
3 SOLUTION RESPIRATORY (INHALATION)
Status: DISCONTINUED | OUTPATIENT
Start: 2025-05-29 | End: 2025-06-02 | Stop reason: HOSPADM

## 2025-05-29 RX ORDER — BUDESONIDE 0.5 MG/2ML
0.5 INHALANT ORAL
Status: DISCONTINUED | OUTPATIENT
Start: 2025-05-29 | End: 2025-06-02 | Stop reason: HOSPADM

## 2025-05-29 RX ADMIN — ACETAMINOPHEN 650 MG: 325 TABLET ORAL at 10:30

## 2025-05-29 RX ADMIN — BACLOFEN 10 MG: 10 TABLET ORAL at 15:15

## 2025-05-29 RX ADMIN — ALBUTEROL SULFATE 2.5 MG: 2.5 SOLUTION RESPIRATORY (INHALATION) at 07:17

## 2025-05-29 RX ADMIN — IPRATROPIUM BROMIDE AND ALBUTEROL SULFATE 3 ML: 2.5; .5 SOLUTION RESPIRATORY (INHALATION) at 14:43

## 2025-05-29 RX ADMIN — LACTULOSE 20 G: 20 SOLUTION ORAL at 08:10

## 2025-05-29 RX ADMIN — NICOTINE 1 PATCH: 21 PATCH, EXTENDED RELEASE TRANSDERMAL at 08:25

## 2025-05-29 RX ADMIN — Medication 125 MCG: at 08:09

## 2025-05-29 RX ADMIN — LIDOCAINE 1 APPLICATION: 50 OINTMENT TOPICAL at 17:20

## 2025-05-29 RX ADMIN — SUCRALFATE ORAL SUSPENSION 1 G: 1 SUSPENSION ORAL at 23:55

## 2025-05-29 RX ADMIN — PANTOPRAZOLE SODIUM 40 MG: 40 TABLET, DELAYED RELEASE ORAL at 06:28

## 2025-05-29 RX ADMIN — IPRATROPIUM BROMIDE AND ALBUTEROL SULFATE 3 ML: 2.5; .5 SOLUTION RESPIRATORY (INHALATION) at 11:08

## 2025-05-29 RX ADMIN — ACETAMINOPHEN 650 MG: 325 TABLET ORAL at 22:42

## 2025-05-29 RX ADMIN — IPRATROPIUM BROMIDE AND ALBUTEROL SULFATE 3 ML: 2.5; .5 SOLUTION RESPIRATORY (INHALATION) at 20:21

## 2025-05-29 RX ADMIN — ACETAMINOPHEN 650 MG: 325 TABLET ORAL at 17:20

## 2025-05-29 RX ADMIN — LIDOCAINE 1 APPLICATION: 50 OINTMENT TOPICAL at 10:30

## 2025-05-29 RX ADMIN — SODIUM CHLORIDE 75 ML/HR: 900 INJECTION, SOLUTION INTRAVENOUS at 15:15

## 2025-05-29 RX ADMIN — SUCRALFATE ORAL SUSPENSION 1 G: 1 SUSPENSION ORAL at 17:20

## 2025-05-29 RX ADMIN — DIAZEPAM 5 MG: 5 TABLET ORAL at 20:59

## 2025-05-29 RX ADMIN — MAGNESIUM SULFATE IN WATER FOR 4 G: 40 INJECTION INTRAVENOUS at 08:25

## 2025-05-29 RX ADMIN — SUCRALFATE ORAL SUSPENSION 1 G: 1 SUSPENSION ORAL at 05:38

## 2025-05-29 RX ADMIN — SUCRALFATE ORAL SUSPENSION 1 G: 1 SUSPENSION ORAL at 11:26

## 2025-05-29 RX ADMIN — BUDESONIDE 0.5 MG: 0.5 INHALANT ORAL at 11:08

## 2025-05-29 RX ADMIN — BUDESONIDE 0.5 MG: 0.5 INHALANT ORAL at 20:22

## 2025-05-29 RX ADMIN — SENNOSIDES AND DOCUSATE SODIUM 2 TABLET: 50; 8.6 TABLET ORAL at 08:11

## 2025-05-29 RX ADMIN — BACLOFEN 10 MG: 10 TABLET ORAL at 20:59

## 2025-05-29 RX ADMIN — MELATONIN 3 MG: 3 TAB ORAL at 20:59

## 2025-05-29 RX ADMIN — BACLOFEN 10 MG: 10 TABLET ORAL at 08:09

## 2025-05-29 RX ADMIN — Medication 2 L/MIN: at 07:18

## 2025-05-29 RX ADMIN — POTASSIUM PHOSPHATE 15 MMOL: 236; 224 INJECTION, SOLUTION INTRAVENOUS at 11:27

## 2025-05-29 RX ADMIN — Medication 2 L/MIN: at 20:21

## 2025-05-29 RX ADMIN — MUPIROCIN 1 APPLICATION: 20 OINTMENT TOPICAL at 08:26

## 2025-05-29 RX ADMIN — COLLAGENASE SANTYL: 250 OINTMENT TOPICAL at 08:26

## 2025-05-29 RX ADMIN — MUPIROCIN 1 APPLICATION: 20 OINTMENT TOPICAL at 21:03

## 2025-05-29 RX ADMIN — ENOXAPARIN SODIUM 30 MG: 100 INJECTION SUBCUTANEOUS at 08:10

## 2025-05-29 ASSESSMENT — ENCOUNTER SYMPTOMS
SHORTNESS OF BREATH: 1
DIFFICULTY URINATING: 1
SINUS PAIN: 0
FLANK PAIN: 0
VOMITING: 0
HEADACHES: 0
CHILLS: 0
HALLUCINATIONS: 0
MYALGIAS: 0
POLYPHAGIA: 0
POLYDIPSIA: 0
COUGH: 0
DIZZINESS: 0
DIARRHEA: 0
HEMATURIA: 0
NAUSEA: 0
CHEST TIGHTNESS: 0
ARTHRALGIAS: 0
CONFUSION: 0
WOUND: 1
FEVER: 0

## 2025-05-29 ASSESSMENT — COGNITIVE AND FUNCTIONAL STATUS - GENERAL
TURNING FROM BACK TO SIDE WHILE IN FLAT BAD: A LOT
PERSONAL GROOMING: A LITTLE
TOILETING: A LOT
MOVING TO AND FROM BED TO CHAIR: A LOT
WALKING IN HOSPITAL ROOM: TOTAL
MOVING FROM LYING ON BACK TO SITTING ON SIDE OF FLAT BED WITH BEDRAILS: A LITTLE
MOBILITY SCORE: 10
DAILY ACTIVITIY SCORE: 14
CLIMB 3 TO 5 STEPS WITH RAILING: TOTAL
DRESSING REGULAR UPPER BODY CLOTHING: A LOT
HELP NEEDED FOR BATHING: A LOT
EATING MEALS: A LITTLE
DRESSING REGULAR LOWER BODY CLOTHING: A LOT
STANDING UP FROM CHAIR USING ARMS: TOTAL

## 2025-05-29 ASSESSMENT — PAIN SCALES - GENERAL: PAINLEVEL_OUTOF10: 4

## 2025-05-29 NOTE — CARE PLAN
The patient's goals for the shift include rest    The clinical goals for the shift include safety, adequate oxygenation, monitor vitals/labs

## 2025-05-29 NOTE — CONSULTS
Consults    Reason For Consult  Metabolic alkalosis, hyponatremia    History Of Present Illness  Karly Horton is a 80 y.o. female with a past medical history of squamous cell lung cancer who presented to the hospital with dyspnea, found to have pneumonia, also sacral decubitus ulcer which she reports pain in and was admitted to the ICU for respiratory failure.  Patient was also found to have urinary retention has a Barnard catheter.  We are consulted as her bicarbonate is up to over 40 and her sodium is low at 128.  Appears she only received 1 dose of IV Lasix on presentation and has not been getting diuresed since that time. She is now out of the ICU. We are consulted for management of hyponatremia and metabolic alkalosis.     Past Medical History  She has a past medical history of Other cervical disc degeneration, unspecified cervical region, Other conditions influencing health status, Other conditions influencing health status, Other conditions influencing health status, Other conditions influencing health status, Other conditions influencing health status, Other conditions influencing health status, Other conditions influencing health status, Other intervertebral disc degeneration, lumbar region, Other intervertebral disc displacement, lumbar region, Other intervertebral disc displacement, lumbosacral region, Personal history of other diseases of the musculoskeletal system and connective tissue, Personal history of other diseases of the musculoskeletal system and connective tissue, Personal history of other specified conditions, Spinal stenosis, lumbar region without neurogenic claudication, and Sprain of ligaments of lumbar spine, initial encounter.    Surgical History  She has a past surgical history that includes Appendectomy; Eye surgery; Tonsillectomy; and Hysterectomy.     Social History  She reports that she has been smoking cigarettes. She started smoking about 65 years ago. She has a 65.4 pack-year  "smoking history. She has been exposed to tobacco smoke. She does not have any smokeless tobacco history on file. She reports that she does not currently use alcohol. She reports current drug use. Drug: Marijuana.    Family History  Family History[1]     Allergies  Morphine and Spice flavor    Review of Systems   Constitutional:  Negative for chills and fever.   HENT:  Negative for congestion and sinus pain.    Respiratory:  Positive for shortness of breath. Negative for cough and chest tightness.    Cardiovascular:  Negative for chest pain and leg swelling.   Gastrointestinal:  Negative for diarrhea, nausea and vomiting.   Endocrine: Negative for polydipsia and polyphagia.   Genitourinary:  Positive for difficulty urinating. Negative for flank pain and hematuria.   Musculoskeletal:  Negative for arthralgias and myalgias.   Skin:  Positive for wound.   Neurological:  Negative for dizziness, syncope and headaches.   Psychiatric/Behavioral:  Negative for confusion and hallucinations.      Physical Exam:   General: Awake, alert, no acute distress  Head/ears/nose/throat:  Normocephalic, atraumatic, mildly dry mucous membranes  Neck:  No jugular venous distention, neck supple  Respiratory:  Diminished breath sounds, normal respiratory effort  Cardiovascular:  Tachycardic, no rubs  Gastrointestinal:  Soft, positive bowel sounds, no rebound or guarding  Extremities:  No edema, cyanosis  Neurologic:  Alert, responsive, cooperative with exam  Skin:  No jaundice, dry  Psychiatric: attentive, no rapid speech         Last Recorded Vitals  Blood pressure 140/69, pulse 108, temperature 36.6 °C (97.9 °F), temperature source Oral, resp. rate 20, height 1.422 m (4' 8\"), weight 53.2 kg (117 lb 4.6 oz), SpO2 93%.    Relevant Results  Results for orders placed or performed during the hospital encounter of 05/18/25 (from the past 24 hours)   POCT GLUCOSE   Result Value Ref Range    POCT Glucose 126 (H) 74 - 99 mg/dL   POCT GLUCOSE "   Result Value Ref Range    POCT Glucose 173 (H) 74 - 99 mg/dL   CBC and Auto Differential   Result Value Ref Range    WBC 9.0 4.4 - 11.3 x10*3/uL    nRBC 0.0 0.0 - 0.0 /100 WBCs    RBC 3.19 (L) 4.00 - 5.20 x10*6/uL    Hemoglobin 9.9 (L) 12.0 - 16.0 g/dL    Hematocrit 31.2 (L) 36.0 - 46.0 %    MCV 98 80 - 100 fL    MCH 31.0 26.0 - 34.0 pg    MCHC 31.7 (L) 32.0 - 36.0 g/dL    RDW 13.8 11.5 - 14.5 %    Platelets 246 150 - 450 x10*3/uL    Neutrophils % 82.7 40.0 - 80.0 %    Immature Granulocytes %, Automated 2.3 (H) 0.0 - 0.9 %    Lymphocytes % 3.0 13.0 - 44.0 %    Monocytes % 11.7 2.0 - 10.0 %    Eosinophils % 0.2 0.0 - 6.0 %    Basophils % 0.1 0.0 - 2.0 %    Neutrophils Absolute 7.45 (H) 1.60 - 5.50 x10*3/uL    Immature Granulocytes Absolute, Automated 0.21 0.00 - 0.50 x10*3/uL    Lymphocytes Absolute 0.27 (L) 0.80 - 3.00 x10*3/uL    Monocytes Absolute 1.05 (H) 0.05 - 0.80 x10*3/uL    Eosinophils Absolute 0.02 0.00 - 0.40 x10*3/uL    Basophils Absolute 0.01 0.00 - 0.10 x10*3/uL   Renal function panel   Result Value Ref Range    Glucose 98 74 - 99 mg/dL    Sodium 128 (L) 136 - 145 mmol/L    Potassium 3.8 3.5 - 5.3 mmol/L    Chloride 87 (L) 98 - 107 mmol/L    Bicarbonate 40 (HH) 21 - 32 mmol/L    Anion Gap <7 (L) 10 - 20 mmol/L    Urea Nitrogen 17 6 - 23 mg/dL    Creatinine <0.20 (L) 0.50 - 1.05 mg/dL    eGFR      Calcium 8.1 (L) 8.6 - 10.3 mg/dL    Phosphorus 2.0 (L) 2.5 - 4.9 mg/dL    Albumin 2.5 (L) 3.4 - 5.0 g/dL   Magnesium   Result Value Ref Range    Magnesium 1.53 (L) 1.60 - 2.40 mg/dL   POCT GLUCOSE   Result Value Ref Range    POCT Glucose 114 (H) 74 - 99 mg/dL   Blood Gas Arterial Full Panel   Result Value Ref Range    POCT pH, Arterial 7.45 (H) 7.38 - 7.42 pH    POCT pCO2, Arterial 59 (H) 38 - 42 mm Hg    POCT pO2, Arterial 66 (L) 85 - 95 mm Hg    POCT SO2, Arterial 96 94 - 100 %    POCT Oxy Hemoglobin, Arterial 92.0 (L) 94.0 - 98.0 %    POCT Hematocrit Calculated, Arterial 33.0 (L) 36.0 - 46.0 %    POCT  Sodium, Arterial 125 (L) 136 - 145 mmol/L    POCT Potassium, Arterial 3.9 3.5 - 5.3 mmol/L    POCT Chloride, Arterial 89 (L) 98 - 107 mmol/L    POCT Ionized Calcium, Arterial 1.19 1.10 - 1.33 mmol/L    POCT Glucose, Arterial 101 (H) 74 - 99 mg/dL    POCT Lactate, Arterial 0.9 0.4 - 2.0 mmol/L    POCT Base Excess, Arterial 14.7 (H) -2.0 - 3.0 mmol/L    POCT HCO3 Calculated, Arterial 41.0 (H) 22.0 - 26.0 mmol/L    POCT Hemoglobin, Arterial 11.0 (L) 12.0 - 16.0 g/dL    POCT Anion Gap, Arterial -1 (L) 10 - 25 mmo/L    Patient Temperature 37.0 degrees Celsius    FiO2 28 %    Apparatus CANNULA     Flow 2.0 LPM    Site of Arterial Puncture Brachial Right     Christopher's Test Negative    POCT GLUCOSE   Result Value Ref Range    POCT Glucose 139 (H) 74 - 99 mg/dL          Assessment/Plan   Metabolic alkalosis I believe she looks very dry and my initial suspicion is contraction alkalosis however she only received 1 dose of IV Lasix it appears. She does have mild hypertension so will also workup for primary aldosteronism especially given the hypokalemia and will workup for other etiologies  Hyponatremia, likely from intravascular volume depletion, will work up  Pneumonia  Urinary retention  History of squamous cell lung cancer  Hypophosphatemia  Mild hypomagnesemia    Plan: Will check urine chloride and urine calcium to workup the alkalosis and hypokalemia.  Will check urine sodium, urine osmolality and serum osmolality to workup the hyponatremia. Check renin and aldosterone. Total of about 2 g of IV magnesium was given today, will monitor magnesium levels.  Because she appears very clinically dry will start normal saline at 80 cc an hour and monitor her electrolytes and acid-base status closely.  Phosphorus was replaced.  On antibiotics.  Avoid diuretics at this time.  Has a Barnard, urology on board.  Thank you for your consultation.    Reynaldo Nolasco MD         [1]   Family History  Problem Relation Name Age of Onset    Cancer  Mother      Other (Father had hypertension, stroke, coronary artery disease and diabetes) Father

## 2025-05-29 NOTE — NURSING NOTE
Pt called for another cup of ice chips- her 4th for the shift in addition to drinking a glass of Brennon and a glass of cranberry juice. Audible wheezes/crackles can be heard on inspiration. Discussed PO liquid intake w/pt. This RN discovered rectal tube dislodged w/balloon intact when repositioning Pt to Lt side. Pt had an Xlg soft BM w/pieces of undigested vegetables/fruits noted in stool. Pt was given a CHG bath w/wipes and linen changed . Dsgs to sacrum and buttock changed since they were soiled. Pt was readjusted up in bed. HOB elevated. O2@2L NC in place. Call light in reach. Bed alarm in place. Will continue to monitor.

## 2025-05-29 NOTE — NURSING NOTE
Vascular access note    Patient with Rt chest mediport, dressing D&I, flushes easily and with positive blood return, clamped and curos cap applied.

## 2025-05-29 NOTE — PROGRESS NOTES
Music Therapy Note    Therapy Session  Referral Type: New referral this admission  Visit Type: New visit  Session Start Time: 1444  Session End Time: 1445  Intervention Delivery: In-person  Conflict of Service: Declined treatment  Family Present for Session: None     Post-assessment  Total Session Time (min): 1 minutes    Narrative  Assessment Detail: Pt was found lying in bed and appeared flat/blunted. Pt expressed no interest in music therapy at this time and is not interested in a f/u session.  Follow-up: MT signing off    Education Documentation  No documentation found.

## 2025-05-29 NOTE — PROGRESS NOTES
Karly Horton is a 80 y.o. female on day 11 of admission presenting with Generalized weakness.    Subjective   Patient seen and examined.  Resting in bed with eyes closed, in no acute distress.  Easily aroused.  Awake alert oriented x 3.  No new complaints.  Asking for Lidocaine.  Asking for valium scheduled at midnight.  Shortness of breath, same compared to yesterday.  No cough.  No chest pain.  Barnard catheter in place.  Rectal tube out, no bowel movement.    Objective     Physical Exam  Vitals and nursing note reviewed.   Constitutional:       General: She is not in acute distress.     Appearance: Normal appearance. She is normal weight. She is ill-appearing. She is not toxic-appearing or diaphoretic.   HENT:      Head: Normocephalic and atraumatic.      Right Ear: External ear normal.      Left Ear: External ear normal.      Nose: Nose normal.      Mouth/Throat:      Mouth: Mucous membranes are moist.      Pharynx: Oropharynx is clear.   Eyes:      Extraocular Movements: Extraocular movements intact.      Conjunctiva/sclera: Conjunctivae normal.      Pupils: Pupils are equal, round, and reactive to light.   Cardiovascular:      Rate and Rhythm: Regular rhythm. Tachycardia present.      Pulses: Normal pulses.      Heart sounds: Normal heart sounds. No murmur heard.  Pulmonary:      Effort: Pulmonary effort is normal. No respiratory distress.      Breath sounds: Decreased breath sounds present. No wheezing, rhonchi or rales.      Comments: 2 liters nasal cannula.  Abdominal:      General: Bowel sounds are normal. There is no distension.      Palpations: Abdomen is soft.      Tenderness: There is no abdominal tenderness. There is no guarding.   Genitourinary:     Comments: : Barnard catheter in place draining clear yellow urine.  Fecal management system has been removed.  Musculoskeletal:         General: No swelling or tenderness. Normal range of motion.      Cervical back: Normal range of motion and neck  "supple.      Right lower leg: No edema.      Left lower leg: No edema.   Skin:     General: Skin is warm and dry.      Capillary Refill: Capillary refill takes less than 2 seconds.      Comments: Right chest port in place dressing clean dry intact.  Sacrum/buttock not examined.   Neurological:      General: No focal deficit present.      Mental Status: She is alert and oriented to person, place, and time.      Motor: Weakness present.      Comments: Awake alert oriented x 3. Forgetful. Follows all commands. Generalized weakness. No focal weakness. Gait deferred.    Psychiatric:         Mood and Affect: Mood normal.         Behavior: Behavior normal.       Last Recorded Vitals  Blood pressure 151/82, pulse 101, temperature 36.2 °C (97.2 °F), temperature source Axillary, resp. rate (!) 42, height 1.422 m (4' 8\"), weight 53.2 kg (117 lb 4.6 oz), SpO2 100%.    Intake/Output last 3 Shifts:  I/O last 3 completed shifts:  In: 2517 (79.2 mL/kg) [P.O.:2517]  Out: 1300 (40.9 mL/kg) [Urine:1300 (1.1 mL/kg/hr)]  Dosing Weight: 31.8 kg     Telemetry normal sinus rhythm PVC's rate 90's    Relevant Results  This patient has a urinary catheter   Reason for the urinary catheter remaining today? urinary retention/bladder outlet obstruction, acute or chronic    Results for orders placed or performed during the hospital encounter of 05/18/25 (from the past 24 hours)   POCT GLUCOSE   Result Value Ref Range    POCT Glucose 126 (H) 74 - 99 mg/dL   POCT GLUCOSE   Result Value Ref Range    POCT Glucose 173 (H) 74 - 99 mg/dL   CBC and Auto Differential   Result Value Ref Range    WBC 9.0 4.4 - 11.3 x10*3/uL    nRBC 0.0 0.0 - 0.0 /100 WBCs    RBC 3.19 (L) 4.00 - 5.20 x10*6/uL    Hemoglobin 9.9 (L) 12.0 - 16.0 g/dL    Hematocrit 31.2 (L) 36.0 - 46.0 %    MCV 98 80 - 100 fL    MCH 31.0 26.0 - 34.0 pg    MCHC 31.7 (L) 32.0 - 36.0 g/dL    RDW 13.8 11.5 - 14.5 %    Platelets 246 150 - 450 x10*3/uL    Neutrophils % 82.7 40.0 - 80.0 %    Immature " Granulocytes %, Automated 2.3 (H) 0.0 - 0.9 %    Lymphocytes % 3.0 13.0 - 44.0 %    Monocytes % 11.7 2.0 - 10.0 %    Eosinophils % 0.2 0.0 - 6.0 %    Basophils % 0.1 0.0 - 2.0 %    Neutrophils Absolute 7.45 (H) 1.60 - 5.50 x10*3/uL    Immature Granulocytes Absolute, Automated 0.21 0.00 - 0.50 x10*3/uL    Lymphocytes Absolute 0.27 (L) 0.80 - 3.00 x10*3/uL    Monocytes Absolute 1.05 (H) 0.05 - 0.80 x10*3/uL    Eosinophils Absolute 0.02 0.00 - 0.40 x10*3/uL    Basophils Absolute 0.01 0.00 - 0.10 x10*3/uL   Renal function panel   Result Value Ref Range    Glucose 98 74 - 99 mg/dL    Sodium 128 (L) 136 - 145 mmol/L    Potassium 3.8 3.5 - 5.3 mmol/L    Chloride 87 (L) 98 - 107 mmol/L    Bicarbonate 40 (HH) 21 - 32 mmol/L    Anion Gap <7 (L) 10 - 20 mmol/L    Urea Nitrogen 17 6 - 23 mg/dL    Creatinine <0.20 (L) 0.50 - 1.05 mg/dL    eGFR      Calcium 8.1 (L) 8.6 - 10.3 mg/dL    Phosphorus 2.0 (L) 2.5 - 4.9 mg/dL    Albumin 2.5 (L) 3.4 - 5.0 g/dL   Magnesium   Result Value Ref Range    Magnesium 1.53 (L) 1.60 - 2.40 mg/dL   POCT GLUCOSE   Result Value Ref Range    POCT Glucose 114 (H) 74 - 99 mg/dL   Blood Gas Arterial Full Panel   Result Value Ref Range    POCT pH, Arterial 7.45 (H) 7.38 - 7.42 pH    POCT pCO2, Arterial 59 (H) 38 - 42 mm Hg    POCT pO2, Arterial 66 (L) 85 - 95 mm Hg    POCT SO2, Arterial 96 94 - 100 %    POCT Oxy Hemoglobin, Arterial 92.0 (L) 94.0 - 98.0 %    POCT Hematocrit Calculated, Arterial 33.0 (L) 36.0 - 46.0 %    POCT Sodium, Arterial 125 (L) 136 - 145 mmol/L    POCT Potassium, Arterial 3.9 3.5 - 5.3 mmol/L    POCT Chloride, Arterial 89 (L) 98 - 107 mmol/L    POCT Ionized Calcium, Arterial 1.19 1.10 - 1.33 mmol/L    POCT Glucose, Arterial 101 (H) 74 - 99 mg/dL    POCT Lactate, Arterial 0.9 0.4 - 2.0 mmol/L    POCT Base Excess, Arterial 14.7 (H) -2.0 - 3.0 mmol/L    POCT HCO3 Calculated, Arterial 41.0 (H) 22.0 - 26.0 mmol/L    POCT Hemoglobin, Arterial 11.0 (L) 12.0 - 16.0 g/dL    POCT Anion Gap,  Arterial -1 (L) 10 - 25 mmo/L    Patient Temperature 37.0 degrees Celsius    FiO2 28 %    Apparatus CANNULA     Flow 2.0 LPM    Site of Arterial Puncture Brachial Right     Christopher's Test Negative    POCT GLUCOSE   Result Value Ref Range    POCT Glucose 139 (H) 74 - 99 mg/dL   Sodium, Urine Random   Result Value Ref Range    Sodium, Urine Random <10 mmol/L    Creatinine, Urine Random 15.8 (L) 20.0 - 320.0 mg/dL    Sodium/Creatinine Ratio     Chloride, Urine Random   Result Value Ref Range    Chloride, Urine Random <15 mmol/L    Creatinine, Urine Random 15.9 (L) 20.0 - 320.0 mg/dL    Chloride/Creatinine Ratio       No results found for the last 90 days.    XR chest 1 view  Result Date: 5/28/2025  Interpreted By:  Jin Schmidt, STUDY: XR CHEST 1 VIEW 5/28/2025 11:50 am   INDICATION: Signs/Symptoms:Dyspnea   COMPARISON: 05/26/2025   ACCESSION NUMBER(S): [id number]   ORDERING CLINICIAN: WARD JACKSON   TECHNIQUE: Single portable AP view chest   FINDINGS: Cardiomediastinal silhouette is within normal limits. Moderate vascular calcification of the aortic knob. Right-sided MediPort with tip in the region of the mid SVC. There is persistent patchy right perihilar consolidation with component of air bronchograms. Generalized increased interstitial prominence in bilateral lung fields. Left basilar infiltrate is improved from the prior exam.                 1. Improved aeration of the left lower lung zone with persistent right perihilar patchy consolidation   2. Coarsening of the interstitial markings of the generalized increased interstitial prominence. Unchanged   Signed by: Jin Schmidt 5/28/2025 12:21 PM Dictation workstation:   [id number]      Scheduled medications  Scheduled Medications[1]  Continuous medications  Continuous Medications[2]  PRN medications  PRN Medications[3]    ASSESSMENT:  Acute on chronic hypoxic respiratory failure improved  Pneumonia  Moderate right, mild to moderate left pleural  effusions  Squamous cell lung carcinoma status post chemo-radiation - Keytruda  COPD   History of tobacco use / vape use  Leukocytosis, multifactorial - resolving  Stage III/IV sacral decubitus ulcer  Pressure ulcer risk  Severe protein calorie malnutrition   Hyponatremia improved  Hypokalemia   Hypomagnesemia   Metabolic alkalosis - worsening  Normocytic anemia  Chronic pain  Spinal stenosis  Scoliosis  GERD  Vitamin d deficiency  Fall risk  Constipation - resolved  Acute urinary retention     PLAN:  No new issues.  Overall, condition stable.  Tachypnea improved.  Patient reports breathing same.  Respiratory status, pulse oximetry stable to liters nasal cannula.  Labs reviewed this a.m.  Metabolic alkalosis unchanged.  Arterial blood gases obtained and reviewed.  Pulmonology, nephrology consulted.  Follow-up recommendations.  Continue oxygen 2 L nasal cannula.  Albuterol nebulizer treatments.  3% saline nebulizer.  Oxygen.  Pulmonary hygiene.  Encourage incentive *spirometer use 10 times per hour while awake.  Modified diet per speech therapy.  Aspiration precautions.  PPI.  GI follow-up.  Electrolyte replacement.  IV Magnesium.  IV Phosphorus.  Monitor electrolytes, sodium and renal function.  Nephrology consultation.  Further management per nephrology recommendations.  Barnard catheter in place for acute urinary retention.  Maintain.  Outpatient follow-up with urology.  Fecal management system, removed.  Bowel regimen.  Avoid constipation.  Monitor I's and O's.  Wound care per wound care nursing.  Protein supplementation.  Continue home medications.  Valium.  Baclofen as prescribed 3 times daily.  Supportive care.  Patient reassured.  CODE STATUS established DNR CCA DNI.  PT/OT as tolerated.  Fall precautions.  Up with assistance only.  Bed and chair alarm at all times.  Pressure ulcer prevention measures.  Turn and reposition every 2 hours and as needed.  Heels off bed.  Offloading.  DVT prophylaxis.  Lovenox.  GI  prophylaxis.  Supportive care.  Patient reassured.  Case management following for discharge planning.  Discharge plan home with daughter, home health care when medically cleared.  Discussed with patient, nursing.  Discussed with Dr. Mcneil.      DNR CCA DNI.      PO Jang-CNP         [1] acetaminophen, 650 mg, oral, TID  baclofen, 10 mg, oral, TID  budesonide, 0.5 mg, nebulization, BID  cholecalciferol, 125 mcg, oral, Daily  collagenase, , Topical, Daily  diazePAM, 5 mg, oral, Nightly  enoxaparin, 30 mg, subcutaneous, Daily  insulin lispro, 0-5 Units, subcutaneous, TID AC  ipratropium-albuteroL, 3 mL, nebulization, 4x daily  lactulose, 20 g, oral, Daily  lidocaine, 1 Application, Topical, TID  melatonin, 3 mg, oral, Nightly  mupirocin, 1 Application, Topical, BID  nicotine, 1 patch, transdermal, Daily   Followed by  [START ON 7/1/2025] nicotine, 1 patch, transdermal, Daily   Followed by  [START ON 7/15/2025] nicotine, 1 patch, transdermal, Daily  oxygen, , inhalation, Continuous - Inhalation  pantoprazole, 40 mg, oral, Daily before breakfast  sennosides-docusate sodium, 2 tablet, oral, BID  sucralfate, 1 g, oral, q6h [place]     [2] sodium chloride 0.9%, 75 mL/hr, Last Rate: 75 mL/hr (05/29/25 1542)     [3] PRN medications: alteplase, benzocaine-menthol, butalbital-acetaminophen-caff, dextromethorphan-guaifenesin, dextrose, dextrose, glucagon, glucagon, guaiFENesin, guaiFENesin, ipratropium-albuteroL, labetaloL, lubricating eye drops, ondansetron, polyethylene glycol, potassium chloride, prochlorperazine, sodium hypochlorite

## 2025-05-29 NOTE — CARE PLAN
The patient's goals for the shift include rest    The clinical goals for the shift include adequate oxygenation, No SOB

## 2025-05-29 NOTE — CARE PLAN
Problem: Respiratory  Goal: Clear secretions with interventions this shift  Outcome: Progressing  Goal: Minimize anxiety/maximize coping throughout shift  Outcome: Progressing  Goal: Minimal/no exertional discomfort or dyspnea this shift  Outcome: Progressing  Goal: No signs of respiratory distress (eg. Use of accessory muscles. Peds grunting)  Outcome: Progressing  Goal: Patent airway maintained this shift  Outcome: Progressing  Goal: Tolerate mechanical ventilation evidenced by VS/agitation level this shift  Outcome: Progressing  Goal: Tolerate pulmonary toileting this shift  Outcome: Progressing  Goal: Verbalize decreased shortness of breath this shift  Outcome: Progressing  Goal: Increase self care and/or family involvement in next 24 hours  Outcome: Progressing

## 2025-05-29 NOTE — PROGRESS NOTES
"Pulmonary Daily Progress Note   Subjective    Karly Horton is a 80 y.o. year old female patient known with squamous cell lung carcinoma (Stage 3B, with a T4 classification (due to multiple nodules in ipsilateral lung lobes) and N2 (due to subcarinal lymph node involvement).), heart failure with preserved ejection fraction (HFpEF), and chronic obstructive pulmonary disease (COPD) home oxygen 2L, current smoker who was admitted on 5/18/2025 with worsening dypnea on exertion.     Interval History:  Appears tachypneic, states breathing is more labored  ABG this am ph 7.45 PCO2 59 PO2 66 HCO3 41    Meds    Scheduled medications  Scheduled Medications[1]  Continuous medications  Continuous Medications[2]  PRN medications  PRN Medications[3]     Objective    Blood pressure 152/75, pulse 108, temperature 36.5 °C (97.7 °F), temperature source Oral, resp. rate 18, height 1.422 m (4' 8\"), weight 53.2 kg (117 lb 4.6 oz), SpO2 96%.   Physical Exam   GENERAL: ill appearing. Malnourished. tachypnea  HEAD/SINUSES: no sinus tenderness on 2L NC  LUNGS: clear to ausculation no wheezing. no crackles or rhonchi  CARDIAC: normal S1 and S2; no gallops, rubs or murmurs. Regular rate and rhythm  EXTREMITIES: No edema, no varicose veins  NEURO: normal mental status  SKIN: Skin turgor normal. No rashes or lesions.   PSYCH: Normal affect    Intake/Output Summary (Last 24 hours) at 5/29/2025 0936  Last data filed at 5/29/2025 0357  Gross per 24 hour   Intake 1600 ml   Output 1300 ml   Net 300 ml     Labs:   Results from last 72 hours   Lab Units 05/29/25  0346 05/28/25  0339 05/27/25  0439   SODIUM mmol/L 128* 132* 131*   POTASSIUM mmol/L 3.8 3.1* 3.8   CHLORIDE mmol/L 87* 89* 92*   CO2 mmol/L 40* 40* 36*   BUN mg/dL 17 8 9   CREATININE mg/dL <0.20* <0.20* 0.22*   GLUCOSE mg/dL 98 129* 111*   CALCIUM mg/dL 8.1* 8.3* 8.1*   ANION GAP mmol/L <7* <7* 7*   EGFR mL/min/1.73m*2  --   --  >90   PHOSPHORUS mg/dL 2.0* 2.0* 2.1*      Results from " last 72 hours   Lab Units 05/29/25  0346 05/28/25  0339 05/27/25  0439   WBC AUTO x10*3/uL 9.0 7.8 6.9   HEMOGLOBIN g/dL 9.9* 10.5* 10.1*   HEMATOCRIT % 31.2* 33.1* 31.7*   PLATELETS AUTO x10*3/uL 246 243 212   NEUTROS PCT AUTO % 82.7 83.1  --    LYMPHO PCT MAN %  --   --  2.0   LYMPHS PCT AUTO % 3.0 2.7  --    MONO PCT MAN %  --   --  12.0   MONOS PCT AUTO % 11.7 10.4  --    EOSINO PCT MAN %  --   --  1.0   EOS PCT AUTO % 0.2 0.4  --       Results from last 72 hours   Lab Units 05/29/25  0733   POCT PH, ARTERIAL pH 7.45*   POCT PCO2, ARTERIAL mm Hg 59*   POCT PO2, ARTERIAL mm Hg 66*   POCT SO2, ARTERIAL % 96          Micro/ID:   Lab Results   Component Value Date    URINECULTURE >100,000 Escherichia coli (A) 10/11/2024    BLOODCULT No growth at 4 days -  FINAL REPORT 05/18/2025    BLOODCULT No growth at 4 days -  FINAL REPORT 05/18/2025     Summary of key imaging results from the last 24 hours  CXR - 1.  Overall similar appearance of the lungs with trace bibasilar  pleural effusions and atelectasis and patchy right perihilar airspace  opacities. This is superimposed on a background of chronic lung  disease.    Impression   Karly Horton is a 80 y.o. year old female patient is being seen by the pulmonary service for   Acute on chronic respiratory failure with hypoxia and hypercapnia - secondary to multifocal pneumonia  Multifocal pneumonia - most likely secondary to aspiration pneumonia given retaining food/debris in esophagus, with lobar distribution of consolidative opacities less likely a organizing pneumonia  Squamous cell lung carcinoma - chemo started 11/12/24- 1/13/25, conservative Keytruda [id number]. Pet scan planned for 6/9/25  HFpEF  COPD - on symbicort at home, recent need for home oxygen on 2L NC  Tobacco use  Pleural effusions - improving with conservative management   Metabolic alkalosis    Recommendations   As follows:  Metabolic alkalosis with partial respiratory compensation, replace mag and phos  in IV fluids (350mL)May benefit from additional IV fluids  Monitor respiratory status if increase work of breathing can trial HFNC to aid in WOB  Maintain pulse oximetry greater than 88%.  Bronchopulmonary hygiene with IS and acapella  Continue duonebs 4times daily/budesonide BID - will need nebulizer machine on discharge  Bipap at night 12/5  Follow up with Dr naqvi in 2 weeks  Pulmonary will sign off please reach out with any further questions or concerns    Nancy Ballard, PO-CNP   05/29/25 at 9:36 AM     -Only the Medical problems listed under impression were addressed today.   -Please contact primary team for all other concerns and medical problem  -Thank you for your consult       Disclaimer: Documentation completed with the information available at the time of input. Parts of this note may have been scribed or generated using voice dictation software, Dragon.  Homophonic errors may exist.  Please contact me directly if clarification is needed. The times in the chart may not be reflective of actual patient care times, interventions, or procedures. Documentation occurs after the physical care of the patient.           [1] acetaminophen, 650 mg, oral, TID  albuterol, 2.5 mg, nebulization, 4x daily  baclofen, 10 mg, oral, TID  cholecalciferol, 125 mcg, oral, Daily  collagenase, , Topical, Daily  diazePAM, 5 mg, oral, Nightly  enoxaparin, 30 mg, subcutaneous, Daily  insulin lispro, 0-5 Units, subcutaneous, TID AC  lactulose, 20 g, oral, Daily  lidocaine, 1 Application, Topical, TID  magnesium sulfate, 4 g, intravenous, Once  melatonin, 3 mg, oral, Nightly  mupirocin, 1 Application, Topical, BID  nicotine, 1 patch, transdermal, Daily   Followed by  [START ON 7/1/2025] nicotine, 1 patch, transdermal, Daily   Followed by  [START ON 7/15/2025] nicotine, 1 patch, transdermal, Daily  oxygen, , inhalation, Continuous - Inhalation  pantoprazole, 40 mg, oral, Daily before breakfast  potassium phosphate, 15 mmol,  intravenous, Once  sennosides-docusate sodium, 2 tablet, oral, BID  sucralfate, 1 g, oral, q6h [place]     [2]    [3] PRN medications: alteplase, benzocaine-menthol, butalbital-acetaminophen-caff, dextromethorphan-guaifenesin, dextrose, dextrose, glucagon, glucagon, guaiFENesin, guaiFENesin, ipratropium-albuteroL, labetaloL, lubricating eye drops, ondansetron, polyethylene glycol, potassium chloride, prochlorperazine, sodium hypochlorite

## 2025-05-29 NOTE — PROGRESS NOTES
Pts daughter confirms hospital bed has been delivered. Per attending CNP pt will not dc today, possibly on 5/30.      05/29/25 1021   Discharge Planning   Expected Discharge Disposition Home H  ([facility])

## 2025-05-29 NOTE — PROGRESS NOTES
Wound Care Follow Up  Follow up to wound care recommendations and wound progress:    Patient was seen today for wound progress evaluation. The was cooperative with care during the wound assessment and dressings. The patient's wounds present with marked signs of healing.         Visit Date: 5/29/2025      Patient Name: Karly Horton         MRN: [id number]              Pertinent Labs:       Assessment: See photos above and wound flowsheet         Cleanse with: Normal Saline Apply: Other (comment), Medihoney Pack with: Medihoney, Other (comment) Cover with: Foam (Mepilex Border), 4x4 Gauze Dressing Wound Plan: Wound care recommendations: Irrigate the sacral wound with Dakins Solution. Wash remaining wounds with soap and water and pat dry. 1) PI of Sacral decubitus unstageable: Recommendation is for the application of Medihnoey to the wound bed , then cover with a Dakins soaked 4x4 dressing . Pack area of undermining with edges of 4x4 dressing soaked in Dakins. Cover dressing with a foam border dressing Repeat wound care once per day . 2) PI of Right Buttock (adjacent to sacral wound) unstageable: Apply Santyl to wound bed and cover with foam border dressing. Repeat wound care once per day . 3) PI of Left Buttock stage 2 : Apply Medihoney to wound bed and cover with foam border dressing. Reapply Medihoney every 2 days. 4) PI of right buttock stage 2 : Apply Medihoney to wound bed and cover with foam border dressing. Reapply Medihoney every 2 days. 5) PI of Medial lateral back stage 2 : Apply Medihoney to wound bed and cover with foam border dressing. Reapply Medihoney every 2 days.             Wound Plan: Follow up to wound care recommendations and wound progress as planned.      Ash Flores RN  5/29/2025  8:29 AM

## 2025-05-29 NOTE — NURSING NOTE
End of shift, bedside shift report given. Patient laying in bed resting comfortably, with call light within reach. Patient currently still on 2 L oxygen. Rectal tube in place. Rectal tube in place.  Patient verbalizes no new concerns at this time. Bed alarm active. Bed at lowest position.

## 2025-05-30 ENCOUNTER — APPOINTMENT (OUTPATIENT)
Dept: CARDIOLOGY | Facility: HOSPITAL | Age: 80
DRG: 177 | End: 2025-05-30
Payer: MEDICARE

## 2025-05-30 LAB
ALBUMIN SERPL BCP-MCNC: 2.4 G/DL (ref 3.4–5)
ANION GAP SERPL CALCULATED.3IONS-SCNC: 8 MMOL/L (ref 10–20)
ATRIAL RATE: 108 BPM
BASOPHILS # BLD AUTO: 0.02 X10*3/UL (ref 0–0.1)
BASOPHILS NFR BLD AUTO: 0.3 %
BUN SERPL-MCNC: 9 MG/DL (ref 6–23)
CALCIUM SERPL-MCNC: 8.1 MG/DL (ref 8.6–10.3)
CHLORIDE SERPL-SCNC: 92 MMOL/L (ref 98–107)
CO2 SERPL-SCNC: 38 MMOL/L (ref 21–32)
CREAT SERPL-MCNC: <0.2 MG/DL (ref 0.5–1.05)
EGFRCR SERPLBLD CKD-EPI 2021: ABNORMAL ML/MIN/{1.73_M2}
EOSINOPHIL # BLD AUTO: 0.02 X10*3/UL (ref 0–0.4)
EOSINOPHIL NFR BLD AUTO: 0.3 %
ERYTHROCYTE [DISTWIDTH] IN BLOOD BY AUTOMATED COUNT: 14 % (ref 11.5–14.5)
GLUCOSE BLD MANUAL STRIP-MCNC: 147 MG/DL (ref 74–99)
GLUCOSE BLD MANUAL STRIP-MCNC: 153 MG/DL (ref 74–99)
GLUCOSE BLD MANUAL STRIP-MCNC: 156 MG/DL (ref 74–99)
GLUCOSE BLD MANUAL STRIP-MCNC: 97 MG/DL (ref 74–99)
GLUCOSE SERPL-MCNC: 86 MG/DL (ref 74–99)
HCT VFR BLD AUTO: 30.1 % (ref 36–46)
HGB BLD-MCNC: 9.5 G/DL (ref 12–16)
IMM GRANULOCYTES # BLD AUTO: 0.13 X10*3/UL (ref 0–0.5)
IMM GRANULOCYTES NFR BLD AUTO: 1.9 % (ref 0–0.9)
LYMPHOCYTES # BLD AUTO: 0.27 X10*3/UL (ref 0.8–3)
LYMPHOCYTES NFR BLD AUTO: 3.9 %
MAGNESIUM SERPL-MCNC: 1.75 MG/DL (ref 1.6–2.4)
MCH RBC QN AUTO: 30.8 PG (ref 26–34)
MCHC RBC AUTO-ENTMCNC: 31.6 G/DL (ref 32–36)
MCV RBC AUTO: 98 FL (ref 80–100)
MONOCYTES # BLD AUTO: 0.89 X10*3/UL (ref 0.05–0.8)
MONOCYTES NFR BLD AUTO: 12.9 %
NEUTROPHILS # BLD AUTO: 5.58 X10*3/UL (ref 1.6–5.5)
NEUTROPHILS NFR BLD AUTO: 80.7 %
NRBC BLD-RTO: 0 /100 WBCS (ref 0–0)
P AXIS: 67 DEGREES
P OFFSET: 194 MS
P ONSET: 148 MS
PHOSPHATE SERPL-MCNC: 3.2 MG/DL (ref 2.5–4.9)
PLATELET # BLD AUTO: 247 X10*3/UL (ref 150–450)
POTASSIUM SERPL-SCNC: 3.7 MMOL/L (ref 3.5–5.3)
PR INTERVAL: 130 MS
PROCALCITONIN SERPL-MCNC: 0.05 NG/ML
Q ONSET: 213 MS
QRS COUNT: 17 BEATS
QRS DURATION: 78 MS
QT INTERVAL: 332 MS
QTC CALCULATION(BAZETT): 444 MS
QTC FREDERICIA: 403 MS
R AXIS: -66 DEGREES
RBC # BLD AUTO: 3.08 X10*6/UL (ref 4–5.2)
SODIUM SERPL-SCNC: 134 MMOL/L (ref 136–145)
T AXIS: 6 DEGREES
T OFFSET: 379 MS
VENTRICULAR RATE: 108 BPM
WBC # BLD AUTO: 6.9 X10*3/UL (ref 4.4–11.3)

## 2025-05-30 PROCEDURE — 2500000002 HC RX 250 W HCPCS SELF ADMINISTERED DRUGS (ALT 637 FOR MEDICARE OP, ALT 636 FOR OP/ED)

## 2025-05-30 PROCEDURE — 93005 ELECTROCARDIOGRAM TRACING: CPT

## 2025-05-30 PROCEDURE — 2500000004 HC RX 250 GENERAL PHARMACY W/ HCPCS (ALT 636 FOR OP/ED): Mod: JZ

## 2025-05-30 PROCEDURE — 94668 MNPJ CHEST WALL SBSQ: CPT

## 2025-05-30 PROCEDURE — 2500000001 HC RX 250 WO HCPCS SELF ADMINISTERED DRUGS (ALT 637 FOR MEDICARE OP)

## 2025-05-30 PROCEDURE — 80069 RENAL FUNCTION PANEL: CPT

## 2025-05-30 PROCEDURE — 83735 ASSAY OF MAGNESIUM: CPT | Performed by: NURSE PRACTITIONER

## 2025-05-30 PROCEDURE — 84145 PROCALCITONIN (PCT): CPT | Mod: WESLAB | Performed by: STUDENT IN AN ORGANIZED HEALTH CARE EDUCATION/TRAINING PROGRAM

## 2025-05-30 PROCEDURE — 2060000001 HC INTERMEDIATE ICU ROOM DAILY

## 2025-05-30 PROCEDURE — 2500000005 HC RX 250 GENERAL PHARMACY W/O HCPCS

## 2025-05-30 PROCEDURE — 94640 AIRWAY INHALATION TREATMENT: CPT

## 2025-05-30 PROCEDURE — 82947 ASSAY GLUCOSE BLOOD QUANT: CPT

## 2025-05-30 PROCEDURE — S4991 NICOTINE PATCH NONLEGEND: HCPCS

## 2025-05-30 PROCEDURE — 93010 ELECTROCARDIOGRAM REPORT: CPT | Performed by: INTERNAL MEDICINE

## 2025-05-30 PROCEDURE — 2500000004 HC RX 250 GENERAL PHARMACY W/ HCPCS (ALT 636 FOR OP/ED): Mod: JZ | Performed by: INTERNAL MEDICINE

## 2025-05-30 PROCEDURE — 9420000001 HC RT PATIENT EDUCATION 5 MIN

## 2025-05-30 PROCEDURE — 2500000005 HC RX 250 GENERAL PHARMACY W/O HCPCS: Performed by: INTERNAL MEDICINE

## 2025-05-30 PROCEDURE — 2500000002 HC RX 250 W HCPCS SELF ADMINISTERED DRUGS (ALT 637 FOR MEDICARE OP, ALT 636 FOR OP/ED): Performed by: NURSE PRACTITIONER

## 2025-05-30 PROCEDURE — 85025 COMPLETE CBC W/AUTO DIFF WBC: CPT

## 2025-05-30 PROCEDURE — 99232 SBSQ HOSP IP/OBS MODERATE 35: CPT | Performed by: STUDENT IN AN ORGANIZED HEALTH CARE EDUCATION/TRAINING PROGRAM

## 2025-05-30 RX ORDER — HYDROXYZINE HYDROCHLORIDE 25 MG/1
25 TABLET, FILM COATED ORAL EVERY 6 HOURS PRN
Status: DISCONTINUED | OUTPATIENT
Start: 2025-05-30 | End: 2025-06-02 | Stop reason: HOSPADM

## 2025-05-30 RX ORDER — SODIUM CHLORIDE 9 MG/ML
75 INJECTION, SOLUTION INTRAVENOUS CONTINUOUS
Status: ACTIVE | OUTPATIENT
Start: 2025-05-30 | End: 2025-05-31

## 2025-05-30 RX ADMIN — SUCRALFATE ORAL SUSPENSION 1 G: 1 SUSPENSION ORAL at 17:01

## 2025-05-30 RX ADMIN — NICOTINE 1 PATCH: 21 PATCH, EXTENDED RELEASE TRANSDERMAL at 08:33

## 2025-05-30 RX ADMIN — ACETAMINOPHEN 650 MG: 325 TABLET ORAL at 17:01

## 2025-05-30 RX ADMIN — Medication 2 L/MIN: at 08:11

## 2025-05-30 RX ADMIN — BACLOFEN 10 MG: 10 TABLET ORAL at 20:31

## 2025-05-30 RX ADMIN — SENNOSIDES AND DOCUSATE SODIUM 2 TABLET: 50; 8.6 TABLET ORAL at 20:31

## 2025-05-30 RX ADMIN — BACLOFEN 10 MG: 10 TABLET ORAL at 15:05

## 2025-05-30 RX ADMIN — SUCRALFATE ORAL SUSPENSION 1 G: 1 SUSPENSION ORAL at 23:01

## 2025-05-30 RX ADMIN — ACETAMINOPHEN 650 MG: 325 TABLET ORAL at 23:01

## 2025-05-30 RX ADMIN — BUDESONIDE 0.5 MG: 0.5 INHALANT ORAL at 08:11

## 2025-05-30 RX ADMIN — Medication 125 MCG: at 08:32

## 2025-05-30 RX ADMIN — BACLOFEN 10 MG: 10 TABLET ORAL at 08:32

## 2025-05-30 RX ADMIN — Medication 3 L/MIN: at 19:07

## 2025-05-30 RX ADMIN — IPRATROPIUM BROMIDE AND ALBUTEROL SULFATE 3 ML: 2.5; .5 SOLUTION RESPIRATORY (INHALATION) at 18:46

## 2025-05-30 RX ADMIN — SODIUM CHLORIDE 75 ML/HR: 900 INJECTION, SOLUTION INTRAVENOUS at 10:04

## 2025-05-30 RX ADMIN — LIDOCAINE 1 APPLICATION: 50 OINTMENT TOPICAL at 23:06

## 2025-05-30 RX ADMIN — BUDESONIDE 0.5 MG: 0.5 INHALANT ORAL at 18:43

## 2025-05-30 RX ADMIN — ACETAMINOPHEN 650 MG: 325 TABLET ORAL at 11:17

## 2025-05-30 RX ADMIN — LIDOCAINE 1 APPLICATION: 50 OINTMENT TOPICAL at 11:17

## 2025-05-30 RX ADMIN — IPRATROPIUM BROMIDE AND ALBUTEROL SULFATE 3 ML: 2.5; .5 SOLUTION RESPIRATORY (INHALATION) at 15:39

## 2025-05-30 RX ADMIN — PANTOPRAZOLE SODIUM 40 MG: 40 TABLET, DELAYED RELEASE ORAL at 08:35

## 2025-05-30 RX ADMIN — SUCRALFATE ORAL SUSPENSION 1 G: 1 SUSPENSION ORAL at 11:17

## 2025-05-30 RX ADMIN — DIAZEPAM 5 MG: 5 TABLET ORAL at 20:35

## 2025-05-30 RX ADMIN — SODIUM CHLORIDE 75 ML/HR: 900 INJECTION, SOLUTION INTRAVENOUS at 17:04

## 2025-05-30 RX ADMIN — SUCRALFATE ORAL SUSPENSION 1 G: 1 SUSPENSION ORAL at 06:01

## 2025-05-30 RX ADMIN — SODIUM CHLORIDE 75 ML/HR: 900 INJECTION, SOLUTION INTRAVENOUS at 04:23

## 2025-05-30 RX ADMIN — MELATONIN 3 MG: 3 TAB ORAL at 20:31

## 2025-05-30 RX ADMIN — IPRATROPIUM BROMIDE AND ALBUTEROL SULFATE 3 ML: 2.5; .5 SOLUTION RESPIRATORY (INHALATION) at 08:11

## 2025-05-30 RX ADMIN — MUPIROCIN 1 APPLICATION: 20 OINTMENT TOPICAL at 20:35

## 2025-05-30 RX ADMIN — ENOXAPARIN SODIUM 30 MG: 100 INJECTION SUBCUTANEOUS at 08:32

## 2025-05-30 RX ADMIN — IPRATROPIUM BROMIDE AND ALBUTEROL SULFATE 3 ML: 2.5; .5 SOLUTION RESPIRATORY (INHALATION) at 11:25

## 2025-05-30 ASSESSMENT — PAIN SCALES - GENERAL
PAINLEVEL_OUTOF10: 2
PAINLEVEL_OUTOF10: 0 - NO PAIN

## 2025-05-30 ASSESSMENT — COGNITIVE AND FUNCTIONAL STATUS - GENERAL
DRESSING REGULAR UPPER BODY CLOTHING: A LOT
EATING MEALS: A LITTLE
TURNING FROM BACK TO SIDE WHILE IN FLAT BAD: A LOT
DAILY ACTIVITIY SCORE: 14
MOVING TO AND FROM BED TO CHAIR: A LOT
CLIMB 3 TO 5 STEPS WITH RAILING: TOTAL
STANDING UP FROM CHAIR USING ARMS: TOTAL
PERSONAL GROOMING: A LITTLE
TOILETING: A LOT
MOVING FROM LYING ON BACK TO SITTING ON SIDE OF FLAT BED WITH BEDRAILS: A LITTLE
HELP NEEDED FOR BATHING: A LOT
MOBILITY SCORE: 10
DRESSING REGULAR LOWER BODY CLOTHING: A LOT
WALKING IN HOSPITAL ROOM: TOTAL

## 2025-05-30 ASSESSMENT — PAIN SCALES - WONG BAKER: WONGBAKER_NUMERICALRESPONSE: NO HURT

## 2025-05-30 NOTE — CARE PLAN
The patient's goals for the shift include rest    The clinical goals for the shift include less pain, no sob

## 2025-05-30 NOTE — PROGRESS NOTES
Karly Horton is a 80 y.o. female on day 12 of admission presenting with Generalized weakness.    Subjective   Patient seen and examined.  Resting in bed in no acute distress.  Awake alert oriented x 3.  No new complaints.  + Anxious.  Asking for medication for anxiety.  Shortness of breath unchanged.  No cough.  No chest pain.  No abdominal pain, nausea vomiting.  Tolerating diet.  Per nursing, + bowel movements    Objective     Physical Exam  Vitals and nursing note reviewed.   Constitutional:       General: She is not in acute distress.     Appearance: Normal appearance. She is normal weight. She is ill-appearing. She is not toxic-appearing or diaphoretic.   HENT:      Head: Normocephalic and atraumatic.      Right Ear: External ear normal.      Left Ear: External ear normal.      Nose: Nose normal.      Mouth/Throat:      Mouth: Mucous membranes are moist.      Pharynx: Oropharynx is clear.   Eyes:      Extraocular Movements: Extraocular movements intact.      Conjunctiva/sclera: Conjunctivae normal.      Pupils: Pupils are equal, round, and reactive to light.   Cardiovascular:      Rate and Rhythm: Regular rhythm. Tachycardia present.      Pulses: Normal pulses.      Heart sounds: Normal heart sounds. No murmur heard.  Pulmonary:      Effort: Pulmonary effort is normal. Tachypnea present. No respiratory distress.      Breath sounds: Decreased breath sounds present. No wheezing, rhonchi or rales.      Comments: 2 liters nasal cannula.  Abdominal:      General: Bowel sounds are normal. There is no distension.      Palpations: Abdomen is soft.      Tenderness: There is no abdominal tenderness. There is no guarding.   Genitourinary:     Comments: : Barnard catheter in place draining clear yellow urine.  Fecal management system has been removed.  Musculoskeletal:         General: No swelling or tenderness. Normal range of motion.      Cervical back: Normal range of motion and neck supple.      Right lower leg:  "No edema.      Left lower leg: No edema.   Skin:     General: Skin is warm and dry.      Capillary Refill: Capillary refill takes less than 2 seconds.      Comments: Right chest port in place dressing clean dry intact.  Sacrum/buttock not examined.   Neurological:      General: No focal deficit present.      Mental Status: She is alert and oriented to person, place, and time.      Motor: Weakness present.      Comments: Awake alert oriented x 3. Forgetful. Follows all commands. Generalized weakness. No focal weakness. Gait deferred.    Psychiatric:         Mood and Affect: Mood normal.         Behavior: Behavior normal.       Last Recorded Vitals  Blood pressure 127/57, pulse 88, temperature 36.3 °C (97.3 °F), temperature source Axillary, resp. rate 15, height 1.422 m (4' 8\"), weight 53.2 kg (117 lb 4.6 oz), SpO2 90%.    Intake/Output last 3 Shifts:  I/O last 3 completed shifts:  In: 2252.5 (70.8 mL/kg) [P.O.:910; I.V.:1092.5 (34.4 mL/kg); IV Piggyback:250]  Out: 3175 (99.8 mL/kg) [Urine:3175 (2.8 mL/kg/hr)]  Dosing Weight: 31.8 kg     Telemetry normal sinus rhythm PVC's rate 100's    Relevant Results  This patient has a central line   Reason for the central line remaining today? Parenteral medication    Results for orders placed or performed during the hospital encounter of 05/18/25 (from the past 24 hours)   Sodium, Urine Random   Result Value Ref Range    Sodium, Urine Random <10 mmol/L    Creatinine, Urine Random 15.8 (L) 20.0 - 320.0 mg/dL    Sodium/Creatinine Ratio     Osmolality, Urine   Result Value Ref Range    Osmolality, Urine Random 285 200 - 1,200 mOsm/kg   Chloride, Urine Random   Result Value Ref Range    Chloride, Urine Random <15 mmol/L    Creatinine, Urine Random 15.9 (L) 20.0 - 320.0 mg/dL    Chloride/Creatinine Ratio     Calcium, Urine  Random   Result Value Ref Range    Calcium, Urine Random 24.3 mg/dL    Creatinine, Urine Random 15.9 (L) 20.0 - 320.0 mg/dL    Calcium/Creatinine ratio 1,528 (H) " 0 - 209 mg/g Creat   Renal function panel   Result Value Ref Range    Glucose 100 (H) 74 - 99 mg/dL    Sodium 130 (L) 136 - 145 mmol/L    Potassium 3.8 3.5 - 5.3 mmol/L    Chloride 87 (L) 98 - 107 mmol/L    Bicarbonate 39 (H) 21 - 32 mmol/L    Anion Gap 8 (L) 10 - 20 mmol/L    Urea Nitrogen 14 6 - 23 mg/dL    Creatinine <0.20 (L) 0.50 - 1.05 mg/dL    eGFR      Calcium 8.1 (L) 8.6 - 10.3 mg/dL    Phosphorus 3.9 2.5 - 4.9 mg/dL    Albumin 2.6 (L) 3.4 - 5.0 g/dL   Magnesium   Result Value Ref Range    Magnesium 1.92 1.60 - 2.40 mg/dL   POCT GLUCOSE   Result Value Ref Range    POCT Glucose 119 (H) 74 - 99 mg/dL   POCT GLUCOSE   Result Value Ref Range    POCT Glucose 91 74 - 99 mg/dL   CBC and Auto Differential   Result Value Ref Range    WBC 6.9 4.4 - 11.3 x10*3/uL    nRBC 0.0 0.0 - 0.0 /100 WBCs    RBC 3.08 (L) 4.00 - 5.20 x10*6/uL    Hemoglobin 9.5 (L) 12.0 - 16.0 g/dL    Hematocrit 30.1 (L) 36.0 - 46.0 %    MCV 98 80 - 100 fL    MCH 30.8 26.0 - 34.0 pg    MCHC 31.6 (L) 32.0 - 36.0 g/dL    RDW 14.0 11.5 - 14.5 %    Platelets 247 150 - 450 x10*3/uL    Neutrophils % 80.7 40.0 - 80.0 %    Immature Granulocytes %, Automated 1.9 (H) 0.0 - 0.9 %    Lymphocytes % 3.9 13.0 - 44.0 %    Monocytes % 12.9 2.0 - 10.0 %    Eosinophils % 0.3 0.0 - 6.0 %    Basophils % 0.3 0.0 - 2.0 %    Neutrophils Absolute 5.58 (H) 1.60 - 5.50 x10*3/uL    Immature Granulocytes Absolute, Automated 0.13 0.00 - 0.50 x10*3/uL    Lymphocytes Absolute 0.27 (L) 0.80 - 3.00 x10*3/uL    Monocytes Absolute 0.89 (H) 0.05 - 0.80 x10*3/uL    Eosinophils Absolute 0.02 0.00 - 0.40 x10*3/uL    Basophils Absolute 0.02 0.00 - 0.10 x10*3/uL   Renal function panel   Result Value Ref Range    Glucose 86 74 - 99 mg/dL    Sodium 134 (L) 136 - 145 mmol/L    Potassium 3.7 3.5 - 5.3 mmol/L    Chloride 92 (L) 98 - 107 mmol/L    Bicarbonate 38 (H) 21 - 32 mmol/L    Anion Gap 8 (L) 10 - 20 mmol/L    Urea Nitrogen 9 6 - 23 mg/dL    Creatinine <0.20 (L) 0.50 - 1.05 mg/dL    eGFR       Calcium 8.1 (L) 8.6 - 10.3 mg/dL    Phosphorus 3.2 2.5 - 4.9 mg/dL    Albumin 2.4 (L) 3.4 - 5.0 g/dL   Magnesium   Result Value Ref Range    Magnesium 1.75 1.60 - 2.40 mg/dL   POCT GLUCOSE   Result Value Ref Range    POCT Glucose 97 74 - 99 mg/dL     No results found for the last 90 days.    XR chest 1 view  Result Date: 5/28/2025  Interpreted By:  Jin Schmidt, STUDY: XR CHEST 1 VIEW 5/28/2025 11:50 am   INDICATION: Signs/Symptoms:Dyspnea   COMPARISON: 05/26/2025   ACCESSION NUMBER(S): [id number]   ORDERING CLINICIAN: WARD JACKSON   TECHNIQUE: Single portable AP view chest   FINDINGS: Cardiomediastinal silhouette is within normal limits. Moderate vascular calcification of the aortic knob. Right-sided MediPort with tip in the region of the mid SVC. There is persistent patchy right perihilar consolidation with component of air bronchograms. Generalized increased interstitial prominence in bilateral lung fields. Left basilar infiltrate is improved from the prior exam.                 1. Improved aeration of the left lower lung zone with persistent right perihilar patchy consolidation   2. Coarsening of the interstitial markings of the generalized increased interstitial prominence. Unchanged   Signed by: Jin Schmidt 5/28/2025 12:21 PM Dictation workstation:   [id number]    Scheduled medications  Scheduled Medications[1]  Continuous medications  Continuous Medications[2]  PRN medications  PRN Medications[3]    ASSESSMENT:  Acute on chronic hypoxic respiratory failure improved  Pneumonia  Moderate right, mild to moderate left pleural effusions  Squamous cell lung carcinoma status post chemo-radiation - Keytruda  COPD   History of tobacco use / vape use  Leukocytosis, multifactorial - resolving  Stage III/IV sacral decubitus ulcer  Pressure ulcer risk  Severe protein calorie malnutrition   Hyponatremia improved  Hypokalemia improved  Hypomagnesemia   Metabolic alkalosis - improved  Normocytic  anemia  Chronic pain  Spinal stenosis  Scoliosis  GERD  Vitamin d deficiency  Fall risk  Constipation - resolved  Acute urinary retention   Anxiety    PLAN:  + Anxiety.  Add hydroxyzine as needed.  Monitor Qt.  Valium at bedtime.  Discussed non-pharmacologic measures, deep breathing.  Supportive care.  Pulse oximetry stable on 2 L nasal cannula.  Pulmonology following.  Input appreciated.  Duo neb nebulizer treatments.  Pulmicort.  Oxygen 2 L nasal cannula.  Pulmonary hygiene.  Encourage incentive spirometer use 10 x / hour while awake. Modified diet per speech therapy.  Aspiration precautions.  PPI.  Carafate.  GI follow-up.  Labs reviewed.  Electrolytes improved.  Metabolic alkalosis improved.  Nephrology following.  Input appreciated.  IV fluids.  Diuretics on hold.  Monitor electrolytes and renal function.  Monitor fluid volume status.  Management per Nephrology.  Continue Barnard catheter for acute urinary retention.  Outpatient follow-up with urology.  Continue bowel regimen.  Avoid constipation.  Monitor I's and O's.  Continue wound care per wound care nursing.  Protein supplementation.  Continue home medications as described.  Supportive care.  Patient reassured.  PT/OT as tolerated.  Fall precautions.  Up with assistance only.  Bed and chair alarm at all times.  Pressure ulcer prevention measures.  Turn and reposition every 2 hours and as needed.  Heels off bed.  Offloading.  DVT prophylaxis.  Lovenox subcutaneous.  GI prophylaxis.  PPI.  Supportive care.  Patient reassured.  Case management following for discharge planning.  Discharge plan home with daughter, home health care when medically cleared.  Anticipate discharge after cleared by pulmonology and nephrology.  Discussed with patient and nursing.  Discussed with Dr. Mcneil.      DNR CCA DNI.      PO Jang-CNP         [1] acetaminophen, 650 mg, oral, TID  baclofen, 10 mg, oral, TID  budesonide, 0.5 mg, nebulization, BID  cholecalciferol,  125 mcg, oral, Daily  collagenase, , Topical, Daily  diazePAM, 5 mg, oral, Nightly  enoxaparin, 30 mg, subcutaneous, Daily  insulin lispro, 0-5 Units, subcutaneous, TID AC  ipratropium-albuteroL, 3 mL, nebulization, 4x daily  lactulose, 20 g, oral, Daily  lidocaine, 1 Application, Topical, TID  melatonin, 3 mg, oral, Nightly  mupirocin, 1 Application, Topical, BID  nicotine, 1 patch, transdermal, Daily   Followed by  [START ON 7/1/2025] nicotine, 1 patch, transdermal, Daily   Followed by  [START ON 7/15/2025] nicotine, 1 patch, transdermal, Daily  oxygen, , inhalation, Continuous - Inhalation  pantoprazole, 40 mg, oral, Daily before breakfast  sennosides-docusate sodium, 2 tablet, oral, BID  sucralfate, 1 g, oral, q6h [place]     [2] sodium chloride 0.9%, 75 mL/hr, Last Rate: Stopped (05/30/25 1000)  sodium chloride 0.9%, 75 mL/hr, Last Rate: 75 mL/hr (05/30/25 1140)     [3] PRN medications: alteplase, benzocaine-menthol, butalbital-acetaminophen-caff, dextromethorphan-guaifenesin, dextrose, dextrose, glucagon, glucagon, guaiFENesin, guaiFENesin, hydrOXYzine HCL, ipratropium-albuteroL, labetaloL, lubricating eye drops, ondansetron, polyethylene glycol, potassium chloride, prochlorperazine, sodium hypochlorite

## 2025-05-30 NOTE — NURSING NOTE
Pt asleep. No s/s of pain/discomfort noted. CHG bath completed w/complete linen change. Dsgs to sacrum/buttock was changed d/t stool incontinence. Pt had a soft med BM during this shift. Pt reluctant to be turned and repositioned but did tolerate well. Pt snacked all shift and did have a full dinner w/set up. Pt also had a protein shake. Improved lung sounds noted w/ less audible wheezes-Pt was cautioned to control fluid intake given IVF infusing. Pt slept well during shift. Will continue to monitor. Call light in reach.

## 2025-05-30 NOTE — NURSING NOTE
Called and talked to patient he has been having nose bleeds and the ENT has asked him to be rechecked by PCP for HTN, he does have history of HTN he said when he takes his BP at home and it is always high but at the office it is normal. He wants to come in Pt resting queilty in bed, seq teds in place , call light within reach for pt safety

## 2025-05-30 NOTE — NURSING NOTE
Pt frequiently repositoned for comfort, pt frequently using call light for many requests, call light within reach

## 2025-05-30 NOTE — PROGRESS NOTES
[facility] following and also made her aware a hospital bed had been delivered to her home. Await full medical clearance which may occur this weekend.      05/30/25 1313   Discharge Planning   Expected Discharge Disposition Home H  ([facility])

## 2025-05-30 NOTE — PROGRESS NOTES
"Pulmonary Daily Progress Note   Subjective    Karly Horton is a 80 y.o. year old female patient known with squamous cell lung carcinoma (Stage 3B, with a T4 classification (due to multiple nodules in ipsilateral lung lobes) and N2 (due to subcarinal lymph node involvement).), heart failure with preserved ejection fraction (HFpEF), and chronic obstructive pulmonary disease (COPD) home oxygen 2L, current smoker who was admitted on 5/18/2025 with worsening dypnea on exertion.     Interval History:  Patient remains on NC  Worried about her phone working  Asks me why I am here seeing her     Meds    Scheduled medications  Scheduled Medications[1]  Continuous medications  Continuous Medications[2]  PRN medications  PRN Medications[3]     Objective    Blood pressure 121/56, pulse 88, temperature 36.6 °C (97.9 °F), temperature source Oral, resp. rate 16, height 1.422 m (4' 8\"), weight 53.2 kg (117 lb 4.6 oz), SpO2 93%.   Physical Exam   GENERAL: awake, alert, interactive, chronically ill appearing but not acutely  LUNGS: no conversational dyspnea on 2LNC  CARDIAC: RRR  EXTREMITIES: No LE edema    PSYCH: Normal affect    Intake/Output Summary (Last 24 hours) at 5/30/2025 1334  Last data filed at 5/30/2025 1146  Gross per 24 hour   Intake 2060 ml   Output 1750 ml   Net 310 ml     Labs:   Results from last 72 hours   Lab Units 05/30/25  0410 05/29/25  1610 05/29/25  0346   SODIUM mmol/L 134* 130* 128*   POTASSIUM mmol/L 3.7 3.8 3.8   CHLORIDE mmol/L 92* 87* 87*   CO2 mmol/L 38* 39* 40*   BUN mg/dL 9 14 17   CREATININE mg/dL <0.20* <0.20* <0.20*   GLUCOSE mg/dL 86 100* 98   CALCIUM mg/dL 8.1* 8.1* 8.1*   ANION GAP mmol/L 8* 8* <7*   PHOSPHORUS mg/dL 3.2 3.9 2.0*      Results from last 72 hours   Lab Units 05/30/25  0410 05/29/25  0346 05/28/25  0339   WBC AUTO x10*3/uL 6.9 9.0 7.8   HEMOGLOBIN g/dL 9.5* 9.9* 10.5*   HEMATOCRIT % 30.1* 31.2* 33.1*   PLATELETS AUTO x10*3/uL 247 246 243   NEUTROS PCT AUTO % 80.7 82.7 83.1 "   LYMPHS PCT AUTO % 3.9 3.0 2.7   MONOS PCT AUTO % 12.9 11.7 10.4   EOS PCT AUTO % 0.3 0.2 0.4      Results from last 72 hours   Lab Units 05/29/25  0733   POCT PH, ARTERIAL pH 7.45*   POCT PCO2, ARTERIAL mm Hg 59*   POCT PO2, ARTERIAL mm Hg 66*   POCT SO2, ARTERIAL % 96          Micro/ID:   Lab Results   Component Value Date    URINECULTURE >100,000 Escherichia coli (A) 10/11/2024    BLOODCULT No growth at 4 days -  FINAL REPORT 05/18/2025    BLOODCULT No growth at 4 days -  FINAL REPORT 05/18/2025       Impression   Karly Horton is a 80 y.o. year old female patient is being seen by the pulmonary service for   Acute on chronic respiratory failure with hypoxia and hypercapnia - secondary to multifocal pneumonia  Multifocal pneumonia - most likely secondary to aspiration pneumonia given retaining food/debris in esophagus, with lobar distribution of consolidative opacities less likely a organizing pneumonia  Squamous cell lung carcinoma - chemo started 11/12/24- 1/13/25, conservative Keytruda [id number]. Pet scan planned for 6/9/25  HFpEF  COPD - on symbicort at home, recent need for home oxygen on 2L NC  Tobacco use  Pleural effusions - improving with conservative management   Metabolic alkalosis    Recommendations   As follows:  Maintain pulse oximetry greater than 88%.  Bronchopulmonary hygiene with IS and acapella  Continue duonebs 4times daily/budesonide BID - will need nebulizer machine on discharge  Bipap at night 12/5  Follow up with Dr naqvi in 2 weeks  Pulmonary will sign off please reach out with any further questions or concerns    Irma Toledo MD   05/30/25 at 1:34 PM     -Only the Medical problems listed under impression were addressed today.   -Please contact primary team for all other concerns and medical problem  -Thank you for your consult       Disclaimer: Documentation completed with the information available at the time of input. Parts of this note may have been scribed or generated using  voice dictation software, Dragon.  Homophonic errors may exist.  Please contact me directly if clarification is needed. The times in the chart may not be reflective of actual patient care times, interventions, or procedures. Documentation occurs after the physical care of the patient.           [1] acetaminophen, 650 mg, oral, TID  baclofen, 10 mg, oral, TID  budesonide, 0.5 mg, nebulization, BID  cholecalciferol, 125 mcg, oral, Daily  collagenase, , Topical, Daily  diazePAM, 5 mg, oral, Nightly  enoxaparin, 30 mg, subcutaneous, Daily  insulin lispro, 0-5 Units, subcutaneous, TID AC  ipratropium-albuteroL, 3 mL, nebulization, 4x daily  lactulose, 20 g, oral, Daily  lidocaine, 1 Application, Topical, TID  melatonin, 3 mg, oral, Nightly  mupirocin, 1 Application, Topical, BID  nicotine, 1 patch, transdermal, Daily   Followed by  [START ON 7/1/2025] nicotine, 1 patch, transdermal, Daily   Followed by  [START ON 7/15/2025] nicotine, 1 patch, transdermal, Daily  oxygen, , inhalation, Continuous - Inhalation  pantoprazole, 40 mg, oral, Daily before breakfast  sennosides-docusate sodium, 2 tablet, oral, BID  sucralfate, 1 g, oral, q6h [place]     [2] sodium chloride 0.9%, 75 mL/hr, Last Rate: Stopped (05/30/25 1000)  sodium chloride 0.9%, 75 mL/hr, Last Rate: 75 mL/hr (05/30/25 1140)     [3] PRN medications: alteplase, benzocaine-menthol, butalbital-acetaminophen-caff, dextromethorphan-guaifenesin, dextrose, dextrose, glucagon, glucagon, guaiFENesin, guaiFENesin, hydrOXYzine HCL, ipratropium-albuteroL, labetaloL, lubricating eye drops, ondansetron, polyethylene glycol, potassium chloride, prochlorperazine, sodium hypochlorite

## 2025-05-30 NOTE — NURSING NOTE
Pt asleep. No s/s of pain/discomfort noted. Intermittent snacking w/fruit cups and cups of ice chips w/cranberry juice on bedside table within reach. Will continue to monitor. Call light in reach

## 2025-05-30 NOTE — PROGRESS NOTES
Karly Horton is a 80 y.o. female on day 12 of admission presenting with Generalized weakness.      Subjective   No issues reported, patient's sodium is improving to 134, bicarbonate starting to trend down to 38.       Objective          Vitals 24HR  Heart Rate:  []   Temp:  [36.2 °C (97.2 °F)-36.6 °C (97.9 °F)]   Resp:  [14-42]   BP: (127-151)/(57-82)   SpO2:  [90 %-100 %]       Intake/Output last 3 Shifts:    Intake/Output Summary (Last 24 hours) at 5/30/2025 1100  Last data filed at 5/30/2025 1011  Gross per 24 hour   Intake 1928.75 ml   Output 2275 ml   Net -346.25 ml       Physical Exam  General: Awake, alert, no acute distress  Respiratory:  Diminished breath sounds, normal respiratory effort  Cardiovascular:  Tachycardic, no rubs  Gastrointestinal:  Soft, positive bowel sounds, no rebound or guarding  Extremities:  No edema, cyanosis    Relevant Results  Results for orders placed or performed during the hospital encounter of 05/18/25 (from the past 24 hours)   POCT GLUCOSE   Result Value Ref Range    POCT Glucose 139 (H) 74 - 99 mg/dL   Sodium, Urine Random   Result Value Ref Range    Sodium, Urine Random <10 mmol/L    Creatinine, Urine Random 15.8 (L) 20.0 - 320.0 mg/dL    Sodium/Creatinine Ratio     Osmolality, Urine   Result Value Ref Range    Osmolality, Urine Random 285 200 - 1,200 mOsm/kg   Chloride, Urine Random   Result Value Ref Range    Chloride, Urine Random <15 mmol/L    Creatinine, Urine Random 15.9 (L) 20.0 - 320.0 mg/dL    Chloride/Creatinine Ratio     Calcium, Urine  Random   Result Value Ref Range    Calcium, Urine Random 24.3 mg/dL    Creatinine, Urine Random 15.9 (L) 20.0 - 320.0 mg/dL    Calcium/Creatinine ratio 1,528 (H) 0 - 209 mg/g Creat   Renal function panel   Result Value Ref Range    Glucose 100 (H) 74 - 99 mg/dL    Sodium 130 (L) 136 - 145 mmol/L    Potassium 3.8 3.5 - 5.3 mmol/L    Chloride 87 (L) 98 - 107 mmol/L    Bicarbonate 39 (H) 21 - 32 mmol/L    Anion Gap 8 (L) 10  - 20 mmol/L    Urea Nitrogen 14 6 - 23 mg/dL    Creatinine <0.20 (L) 0.50 - 1.05 mg/dL    eGFR      Calcium 8.1 (L) 8.6 - 10.3 mg/dL    Phosphorus 3.9 2.5 - 4.9 mg/dL    Albumin 2.6 (L) 3.4 - 5.0 g/dL   Magnesium   Result Value Ref Range    Magnesium 1.92 1.60 - 2.40 mg/dL   POCT GLUCOSE   Result Value Ref Range    POCT Glucose 119 (H) 74 - 99 mg/dL   POCT GLUCOSE   Result Value Ref Range    POCT Glucose 91 74 - 99 mg/dL   CBC and Auto Differential   Result Value Ref Range    WBC 6.9 4.4 - 11.3 x10*3/uL    nRBC 0.0 0.0 - 0.0 /100 WBCs    RBC 3.08 (L) 4.00 - 5.20 x10*6/uL    Hemoglobin 9.5 (L) 12.0 - 16.0 g/dL    Hematocrit 30.1 (L) 36.0 - 46.0 %    MCV 98 80 - 100 fL    MCH 30.8 26.0 - 34.0 pg    MCHC 31.6 (L) 32.0 - 36.0 g/dL    RDW 14.0 11.5 - 14.5 %    Platelets 247 150 - 450 x10*3/uL    Neutrophils % 80.7 40.0 - 80.0 %    Immature Granulocytes %, Automated 1.9 (H) 0.0 - 0.9 %    Lymphocytes % 3.9 13.0 - 44.0 %    Monocytes % 12.9 2.0 - 10.0 %    Eosinophils % 0.3 0.0 - 6.0 %    Basophils % 0.3 0.0 - 2.0 %    Neutrophils Absolute 5.58 (H) 1.60 - 5.50 x10*3/uL    Immature Granulocytes Absolute, Automated 0.13 0.00 - 0.50 x10*3/uL    Lymphocytes Absolute 0.27 (L) 0.80 - 3.00 x10*3/uL    Monocytes Absolute 0.89 (H) 0.05 - 0.80 x10*3/uL    Eosinophils Absolute 0.02 0.00 - 0.40 x10*3/uL    Basophils Absolute 0.02 0.00 - 0.10 x10*3/uL   Renal function panel   Result Value Ref Range    Glucose 86 74 - 99 mg/dL    Sodium 134 (L) 136 - 145 mmol/L    Potassium 3.7 3.5 - 5.3 mmol/L    Chloride 92 (L) 98 - 107 mmol/L    Bicarbonate 38 (H) 21 - 32 mmol/L    Anion Gap 8 (L) 10 - 20 mmol/L    Urea Nitrogen 9 6 - 23 mg/dL    Creatinine <0.20 (L) 0.50 - 1.05 mg/dL    eGFR      Calcium 8.1 (L) 8.6 - 10.3 mg/dL    Phosphorus 3.2 2.5 - 4.9 mg/dL    Albumin 2.4 (L) 3.4 - 5.0 g/dL   Magnesium   Result Value Ref Range    Magnesium 1.75 1.60 - 2.40 mg/dL   POCT GLUCOSE   Result Value Ref Range    POCT Glucose 97 74 - 99 mg/dL                  Assessment & Plan  Generalized weakness    Acute respiratory failure with hypoxia    Hyponatremia    History of lung cancer    Acute hypoxemic respiratory failure    Metabolic alkalosis, suspicion is contraction alkalosis as she appears dry however she only received 1 dose of IV Lasix it appears. She does have mild hypertension so will also workup for primary aldosteronism especially given the hypokalemia and will workup for other etiologies  Hyponatremia, from intravascular volume depletion, improving  Pneumonia  Urinary retention  History of squamous cell lung cancer  Hypophosphatemia resolved  Mild hypomagnesemia, resolved     Plan: Workup so far shows the urine chloride of less than 15 which would indicate vomiting is the etiology however she has denied any vomiting.  She is also not on any diuretics.  Continue IV fluid hydration today.  Pending renin and aldosterone.  On antibiotics.  Avoid diuretics at this time.  Has a [facility], urology on board.     Reynaldo Nolasco MD

## 2025-05-31 LAB
ALBUMIN SERPL BCP-MCNC: 2.2 G/DL (ref 3.4–5)
ANION GAP SERPL CALCULATED.3IONS-SCNC: <7 MMOL/L (ref 10–20)
BASOPHILS # BLD AUTO: 0.02 X10*3/UL (ref 0–0.1)
BASOPHILS NFR BLD AUTO: 0.2 %
BUN SERPL-MCNC: 6 MG/DL (ref 6–23)
CALCIUM SERPL-MCNC: 8 MG/DL (ref 8.6–10.3)
CHLORIDE SERPL-SCNC: 94 MMOL/L (ref 98–107)
CO2 SERPL-SCNC: 38 MMOL/L (ref 21–32)
CREAT SERPL-MCNC: <0.2 MG/DL (ref 0.5–1.05)
EGFRCR SERPLBLD CKD-EPI 2021: ABNORMAL ML/MIN/{1.73_M2}
EOSINOPHIL # BLD AUTO: 0.02 X10*3/UL (ref 0–0.4)
EOSINOPHIL NFR BLD AUTO: 0.2 %
ERYTHROCYTE [DISTWIDTH] IN BLOOD BY AUTOMATED COUNT: 13.7 % (ref 11.5–14.5)
GLUCOSE BLD MANUAL STRIP-MCNC: 102 MG/DL (ref 74–99)
GLUCOSE BLD MANUAL STRIP-MCNC: 104 MG/DL (ref 74–99)
GLUCOSE BLD MANUAL STRIP-MCNC: 123 MG/DL (ref 74–99)
GLUCOSE BLD MANUAL STRIP-MCNC: 140 MG/DL (ref 74–99)
GLUCOSE SERPL-MCNC: 100 MG/DL (ref 74–99)
HCT VFR BLD AUTO: 27.1 % (ref 36–46)
HGB BLD-MCNC: 8.6 G/DL (ref 12–16)
IMM GRANULOCYTES # BLD AUTO: 0.2 X10*3/UL (ref 0–0.5)
IMM GRANULOCYTES NFR BLD AUTO: 2.4 % (ref 0–0.9)
LYMPHOCYTES # BLD AUTO: 0.25 X10*3/UL (ref 0.8–3)
LYMPHOCYTES NFR BLD AUTO: 3 %
MAGNESIUM SERPL-MCNC: 1.41 MG/DL (ref 1.6–2.4)
MCH RBC QN AUTO: 30.4 PG (ref 26–34)
MCHC RBC AUTO-ENTMCNC: 31.7 G/DL (ref 32–36)
MCV RBC AUTO: 96 FL (ref 80–100)
MONOCYTES # BLD AUTO: 0.97 X10*3/UL (ref 0.05–0.8)
MONOCYTES NFR BLD AUTO: 11.5 %
NEUTROPHILS # BLD AUTO: 7.01 X10*3/UL (ref 1.6–5.5)
NEUTROPHILS NFR BLD AUTO: 82.7 %
NRBC BLD-RTO: 0 /100 WBCS (ref 0–0)
PHOSPHATE SERPL-MCNC: 2.3 MG/DL (ref 2.5–4.9)
PLATELET # BLD AUTO: 255 X10*3/UL (ref 150–450)
POTASSIUM SERPL-SCNC: 3.1 MMOL/L (ref 3.5–5.3)
RBC # BLD AUTO: 2.83 X10*6/UL (ref 4–5.2)
SODIUM SERPL-SCNC: 134 MMOL/L (ref 136–145)
WBC # BLD AUTO: 8.5 X10*3/UL (ref 4.4–11.3)

## 2025-05-31 PROCEDURE — 94640 AIRWAY INHALATION TREATMENT: CPT

## 2025-05-31 PROCEDURE — 2500000004 HC RX 250 GENERAL PHARMACY W/ HCPCS (ALT 636 FOR OP/ED): Mod: JZ | Performed by: INTERNAL MEDICINE

## 2025-05-31 PROCEDURE — 85025 COMPLETE CBC W/AUTO DIFF WBC: CPT

## 2025-05-31 PROCEDURE — 94668 MNPJ CHEST WALL SBSQ: CPT

## 2025-05-31 PROCEDURE — S4991 NICOTINE PATCH NONLEGEND: HCPCS

## 2025-05-31 PROCEDURE — 2500000001 HC RX 250 WO HCPCS SELF ADMINISTERED DRUGS (ALT 637 FOR MEDICARE OP)

## 2025-05-31 PROCEDURE — 2500000001 HC RX 250 WO HCPCS SELF ADMINISTERED DRUGS (ALT 637 FOR MEDICARE OP): Performed by: NURSE PRACTITIONER

## 2025-05-31 PROCEDURE — 2500000002 HC RX 250 W HCPCS SELF ADMINISTERED DRUGS (ALT 637 FOR MEDICARE OP, ALT 636 FOR OP/ED)

## 2025-05-31 PROCEDURE — 2060000001 HC INTERMEDIATE ICU ROOM DAILY

## 2025-05-31 PROCEDURE — 83735 ASSAY OF MAGNESIUM: CPT | Performed by: NURSE PRACTITIONER

## 2025-05-31 PROCEDURE — 2500000001 HC RX 250 WO HCPCS SELF ADMINISTERED DRUGS (ALT 637 FOR MEDICARE OP): Performed by: INTERNAL MEDICINE

## 2025-05-31 PROCEDURE — 2500000005 HC RX 250 GENERAL PHARMACY W/O HCPCS

## 2025-05-31 PROCEDURE — 80069 RENAL FUNCTION PANEL: CPT

## 2025-05-31 PROCEDURE — 2500000002 HC RX 250 W HCPCS SELF ADMINISTERED DRUGS (ALT 637 FOR MEDICARE OP, ALT 636 FOR OP/ED): Performed by: NURSE PRACTITIONER

## 2025-05-31 PROCEDURE — 2500000005 HC RX 250 GENERAL PHARMACY W/O HCPCS: Performed by: INTERNAL MEDICINE

## 2025-05-31 PROCEDURE — 2500000004 HC RX 250 GENERAL PHARMACY W/ HCPCS (ALT 636 FOR OP/ED): Mod: JZ

## 2025-05-31 PROCEDURE — 9420000001 HC RT PATIENT EDUCATION 5 MIN

## 2025-05-31 PROCEDURE — 82947 ASSAY GLUCOSE BLOOD QUANT: CPT

## 2025-05-31 RX ORDER — SODIUM CHLORIDE 9 MG/ML
75 INJECTION, SOLUTION INTRAVENOUS CONTINUOUS
Status: ACTIVE | OUTPATIENT
Start: 2025-05-31 | End: 2025-06-01

## 2025-05-31 RX ORDER — POTASSIUM CHLORIDE 1.5 G/1.58G
40 POWDER, FOR SOLUTION ORAL ONCE
Status: COMPLETED | OUTPATIENT
Start: 2025-05-31 | End: 2025-05-31

## 2025-05-31 RX ORDER — MAGNESIUM SULFATE HEPTAHYDRATE 40 MG/ML
2 INJECTION, SOLUTION INTRAVENOUS ONCE
Status: COMPLETED | OUTPATIENT
Start: 2025-05-31 | End: 2025-05-31

## 2025-05-31 RX ORDER — SODIUM,POTASSIUM PHOSPHATES 280-250MG
2 POWDER IN PACKET (EA) ORAL ONCE
Status: COMPLETED | OUTPATIENT
Start: 2025-05-31 | End: 2025-05-31

## 2025-05-31 RX ADMIN — Medication 3 L/MIN: at 19:27

## 2025-05-31 RX ADMIN — BACLOFEN 10 MG: 10 TABLET ORAL at 08:38

## 2025-05-31 RX ADMIN — NICOTINE 1 PATCH: 21 PATCH, EXTENDED RELEASE TRANSDERMAL at 08:49

## 2025-05-31 RX ADMIN — ENOXAPARIN SODIUM 30 MG: 100 INJECTION SUBCUTANEOUS at 08:38

## 2025-05-31 RX ADMIN — MAGNESIUM SULFATE IN WATER 2 G: 40 INJECTION, SOLUTION INTRAVENOUS at 12:26

## 2025-05-31 RX ADMIN — DIAZEPAM 5 MG: 5 TABLET ORAL at 20:08

## 2025-05-31 RX ADMIN — SUCRALFATE ORAL SUSPENSION 1 G: 1 SUSPENSION ORAL at 12:26

## 2025-05-31 RX ADMIN — Medication 125 MCG: at 08:38

## 2025-05-31 RX ADMIN — SODIUM CHLORIDE 75 ML/HR: 900 INJECTION, SOLUTION INTRAVENOUS at 14:46

## 2025-05-31 RX ADMIN — BUDESONIDE 0.5 MG: 0.5 INHALANT ORAL at 19:21

## 2025-05-31 RX ADMIN — LACTULOSE 20 G: 20 SOLUTION ORAL at 08:44

## 2025-05-31 RX ADMIN — SUCRALFATE ORAL SUSPENSION 1 G: 1 SUSPENSION ORAL at 18:39

## 2025-05-31 RX ADMIN — POTASSIUM CHLORIDE 40 MEQ: 1.5 POWDER, FOR SOLUTION ORAL at 12:27

## 2025-05-31 RX ADMIN — IPRATROPIUM BROMIDE AND ALBUTEROL SULFATE 3 ML: 2.5; .5 SOLUTION RESPIRATORY (INHALATION) at 14:49

## 2025-05-31 RX ADMIN — MUPIROCIN 1 APPLICATION: 20 OINTMENT TOPICAL at 20:09

## 2025-05-31 RX ADMIN — PANTOPRAZOLE SODIUM 40 MG: 40 TABLET, DELAYED RELEASE ORAL at 08:43

## 2025-05-31 RX ADMIN — MUPIROCIN 1 APPLICATION: 20 OINTMENT TOPICAL at 12:42

## 2025-05-31 RX ADMIN — BACLOFEN 10 MG: 10 TABLET ORAL at 20:08

## 2025-05-31 RX ADMIN — BACLOFEN 10 MG: 10 TABLET ORAL at 14:46

## 2025-05-31 RX ADMIN — POTASSIUM & SODIUM PHOSPHATES POWDER PACK 280-160-250 MG 2 PACKET: 280-160-250 PACK at 12:27

## 2025-05-31 RX ADMIN — IPRATROPIUM BROMIDE AND ALBUTEROL SULFATE 3 ML: 2.5; .5 SOLUTION RESPIRATORY (INHALATION) at 11:16

## 2025-05-31 RX ADMIN — SENNOSIDES AND DOCUSATE SODIUM 2 TABLET: 50; 8.6 TABLET ORAL at 08:38

## 2025-05-31 RX ADMIN — ACETAMINOPHEN 650 MG: 325 TABLET ORAL at 16:01

## 2025-05-31 RX ADMIN — COLLAGENASE SANTYL: 250 OINTMENT TOPICAL at 16:03

## 2025-05-31 RX ADMIN — DAKIN'S SOLUTION 0.125% (QUARTER STRENGTH): 0.12 SOLUTION at 16:02

## 2025-05-31 RX ADMIN — MELATONIN 3 MG: 3 TAB ORAL at 20:08

## 2025-05-31 RX ADMIN — IPRATROPIUM BROMIDE AND ALBUTEROL SULFATE 3 ML: 2.5; .5 SOLUTION RESPIRATORY (INHALATION) at 19:25

## 2025-05-31 RX ADMIN — LIDOCAINE 1 APPLICATION: 50 OINTMENT TOPICAL at 16:02

## 2025-05-31 RX ADMIN — ACETAMINOPHEN 650 MG: 325 TABLET ORAL at 12:26

## 2025-05-31 RX ADMIN — SODIUM CHLORIDE 75 ML/HR: 900 INJECTION, SOLUTION INTRAVENOUS at 06:02

## 2025-05-31 RX ADMIN — LIDOCAINE 1 APPLICATION: 50 OINTMENT TOPICAL at 12:00

## 2025-05-31 RX ADMIN — CARBOXYMETHYLCELLULOSE SODIUM 1 DROP: 0.5 SOLUTION/ DROPS OPHTHALMIC at 16:01

## 2025-05-31 ASSESSMENT — PAIN SCALES - GENERAL
PAINLEVEL_OUTOF10: 5 - MODERATE PAIN
PAINLEVEL_OUTOF10: 0 - NO PAIN
PAINLEVEL_OUTOF10: 0 - NO PAIN

## 2025-05-31 ASSESSMENT — COGNITIVE AND FUNCTIONAL STATUS - GENERAL
TOILETING: TOTAL
TURNING FROM BACK TO SIDE WHILE IN FLAT BAD: A LOT
MOBILITY SCORE: 13
EATING MEALS: A LOT
DAILY ACTIVITIY SCORE: 8
CLIMB 3 TO 5 STEPS WITH RAILING: A LOT
TURNING FROM BACK TO SIDE WHILE IN FLAT BAD: A LOT
CLIMB 3 TO 5 STEPS WITH RAILING: A LOT
STANDING UP FROM CHAIR USING ARMS: A LOT
PERSONAL GROOMING: A LOT
STANDING UP FROM CHAIR USING ARMS: A LOT
HELP NEEDED FOR BATHING: TOTAL
WALKING IN HOSPITAL ROOM: A LOT
DRESSING REGULAR LOWER BODY CLOTHING: TOTAL
MOVING FROM LYING ON BACK TO SITTING ON SIDE OF FLAT BED WITH BEDRAILS: A LITTLE
MOVING FROM LYING ON BACK TO SITTING ON SIDE OF FLAT BED WITH BEDRAILS: A LITTLE
MOBILITY SCORE: 13
DRESSING REGULAR UPPER BODY CLOTHING: TOTAL
MOVING TO AND FROM BED TO CHAIR: A LOT
WALKING IN HOSPITAL ROOM: A LOT
MOVING TO AND FROM BED TO CHAIR: A LOT

## 2025-05-31 ASSESSMENT — PAIN - FUNCTIONAL ASSESSMENT: PAIN_FUNCTIONAL_ASSESSMENT: 0-10

## 2025-05-31 ASSESSMENT — PAIN DESCRIPTION - DESCRIPTORS: DESCRIPTORS: ACHING

## 2025-05-31 ASSESSMENT — PAIN SCALES - WONG BAKER: WONGBAKER_NUMERICALRESPONSE: NO HURT

## 2025-05-31 NOTE — CARE PLAN
The patient's goals for the shift include rest    The clinical goals for the shift include rest and no pain

## 2025-05-31 NOTE — NURSING NOTE
Karly medicated with eye gtt and tylenol   Dressing to sacrum complete pt tolerated well  Repositioned for comfort call light in reach

## 2025-05-31 NOTE — PROGRESS NOTES
Karly Horton is a 80 y.o. female on day 13 of admission presenting with Generalized weakness.      Subjective   No complaints today, her sodium remains stable at 134, potassium and magnesium low both being replaced, bicarbonate stable at 38.       Objective          Vitals 24HR  Heart Rate:  []   Temp:  [36.3 °C (97.3 °F)-36.8 °C (98.2 °F)]   Resp:  [16-20]   BP: (122-154)/(55-86)   Weight:  [55 kg (121 lb 4.1 oz)]   SpO2:  [93 %-98 %]       Intake/Output last 3 Shifts:    Intake/Output Summary (Last 24 hours) at 5/31/2025 1445  Last data filed at 5/31/2025 1300  Gross per 24 hour   Intake 1276.25 ml   Output 1750 ml   Net -473.75 ml       Physical Exam  General: Awake, alert, no acute distress  Respiratory:  Diminished breath sounds, normal respiratory effort  Cardiovascular:  Tachycardic, no rubs  Gastrointestinal:  Soft, positive bowel sounds, no rebound or guarding  Extremities:  No edema, cyanosis    Relevant Results  Results for orders placed or performed during the hospital encounter of 05/18/25 (from the past 24 hours)   POCT GLUCOSE   Result Value Ref Range    POCT Glucose 156 (H) 74 - 99 mg/dL   POCT GLUCOSE   Result Value Ref Range    POCT Glucose 147 (H) 74 - 99 mg/dL   CBC and Auto Differential   Result Value Ref Range    WBC 8.5 4.4 - 11.3 x10*3/uL    nRBC 0.0 0.0 - 0.0 /100 WBCs    RBC 2.83 (L) 4.00 - 5.20 x10*6/uL    Hemoglobin 8.6 (L) 12.0 - 16.0 g/dL    Hematocrit 27.1 (L) 36.0 - 46.0 %    MCV 96 80 - 100 fL    MCH 30.4 26.0 - 34.0 pg    MCHC 31.7 (L) 32.0 - 36.0 g/dL    RDW 13.7 11.5 - 14.5 %    Platelets 255 150 - 450 x10*3/uL    Neutrophils % 82.7 40.0 - 80.0 %    Immature Granulocytes %, Automated 2.4 (H) 0.0 - 0.9 %    Lymphocytes % 3.0 13.0 - 44.0 %    Monocytes % 11.5 2.0 - 10.0 %    Eosinophils % 0.2 0.0 - 6.0 %    Basophils % 0.2 0.0 - 2.0 %    Neutrophils Absolute 7.01 (H) 1.60 - 5.50 x10*3/uL    Immature Granulocytes Absolute, Automated 0.20 0.00 - 0.50 x10*3/uL    Lymphocytes  Absolute 0.25 (L) 0.80 - 3.00 x10*3/uL    Monocytes Absolute 0.97 (H) 0.05 - 0.80 x10*3/uL    Eosinophils Absolute 0.02 0.00 - 0.40 x10*3/uL    Basophils Absolute 0.02 0.00 - 0.10 x10*3/uL   Renal function panel   Result Value Ref Range    Glucose 100 (H) 74 - 99 mg/dL    Sodium 134 (L) 136 - 145 mmol/L    Potassium 3.1 (L) 3.5 - 5.3 mmol/L    Chloride 94 (L) 98 - 107 mmol/L    Bicarbonate 38 (H) 21 - 32 mmol/L    Anion Gap <7 (L) 10 - 20 mmol/L    Urea Nitrogen 6 6 - 23 mg/dL    Creatinine <0.20 (L) 0.50 - 1.05 mg/dL    eGFR      Calcium 8.0 (L) 8.6 - 10.3 mg/dL    Phosphorus 2.3 (L) 2.5 - 4.9 mg/dL    Albumin 2.2 (L) 3.4 - 5.0 g/dL   Magnesium   Result Value Ref Range    Magnesium 1.41 (L) 1.60 - 2.40 mg/dL   POCT GLUCOSE   Result Value Ref Range    POCT Glucose 104 (H) 74 - 99 mg/dL   POCT GLUCOSE   Result Value Ref Range    POCT Glucose 102 (H) 74 - 99 mg/dL                 Assessment & Plan  Generalized weakness    Acute respiratory failure with hypoxia    Hyponatremia    History of lung cancer    Acute hypoxemic respiratory failure    Metabolic alkalosis, suspicion is contraction alkalosis as she appears dry however she only received 1 dose of IV Lasix it appears. She does have mild hypertension so will also workup for primary aldosteronism especially given the hypokalemia   Hyponatremia, from intravascular volume depletion, improved  Pneumonia  Urinary retention  History of squamous cell lung cancer  Hypophosphatemia, mild  Mild hypomagnesemia  Hypokalemia, mild in the setting of hypomagnesemia     Plan: Workup so far shows the urine chloride of less than 15 which would indicate vomiting is the etiology however she has denied any vomiting.  She is also not on any diuretics.  Continue IV fluid hydration again today and if her bicarbonate is not improving further by tomorrow, will check an updated ABG to reassess.  Pending renin and aldosterone.  On antibiotics.  Avoid diuretics at this time.  Has a [facility],  urology on board.  Magnesium and phosphorus replaced.    Reynaldo Nolasco MD

## 2025-05-31 NOTE — NURSING NOTE
Karly repositioned for comfort   cranberry juice and ice provided per request call light in reach

## 2025-05-31 NOTE — ASSESSMENT & PLAN NOTE
Acute on chronic hypoxic respiratory failure   Pneumonia  Moderate right, mild to moderate left pleural effusions  Squamous cell lung carcinoma status post chemo-radiation - Keytruda  COPD   History of tobacco use / vape use  Leukocytosis, multifactorial - resolving  Stage III/IV sacral decubitus ulcer  Pressure ulcer risk  Severe protein calorie malnutrition   Hyponatremia  Hypokalemia  Metabolic alkalosis  Normocytic anemia  Chronic pain  Spinal stenosis  Scoliosis  GERD  Vitamin d deficiency  Fall risk   Severe protein calorie malnutrition    Plan: Continue current medications.  Supportive care.  Patient still hypokalemic.  Replete potassium.  Replete magnesium.  Continue IV antibiotics.  Overall prognosis poor.  Will continue to golf-hole, aspiration, decubitus, DVT precaution.  Monitor heart rate while potassium is being repleted.    Date of service: 5/25/2025    Plan: Continue current medication.  Patient had acute hypoxic respiratory failure.  Patient is on 6 L oxygen.  Continue same.  Give supportive care.  Continue antibiotic per ID.  Encourage p.o. intake.  Monitor pulse ox.  Leukocytosis resolved.  Patient has a drop in hemoglobin hematocrit.  That is multifactorial.  The patient has proximal A-fib with RVR.  Heart rate is under control.  Hypokalemia has been repleted.  Monitor for now.  Monitor for now.  Blood sugars are under control.  Overall prognosis guarded.    Date of service: 5/31/2025    Plan: Continue current medication.  Patient has hypokalemia.  Replete potassium.  Monitor CBC and BMP.  Nebulizers.  Patient continued to have hypokalemia that is being repleted every day.  Continue local wound care.  Patient has a [facility] catheter.  Fecal system has been removed.  Patient has drop in hemoglobin hematocrit. Hyponatremia is stable.  Patient is still critically malnutrition.  Heart rate is fairly controlled.  Blood sugars are fairly controlled.  Nephrology input appreciated.  We will take  fall,  aspiration, decubitus, DVT precautions.

## 2025-05-31 NOTE — NURSING NOTE
BSSR complete Karly resting with eyes closed breathing even and unlabored call light in reach bed alarm on for safety

## 2025-05-31 NOTE — NURSING NOTE
AM meds given  Karly given lactulose and laxatives per her request  Eating breakfast at this time call light in reach

## 2025-05-31 NOTE — NURSING NOTE
Karly medicated with carafate as ordered repositioned for comfort   call light in reach bed alarm on for safety

## 2025-05-31 NOTE — NURSING NOTE
Meds given IV fluids and magnesium initiated  Karly being demanding  had to orient to task at hand   explained I will address all of her concerns but 1 at a time   worked with take a deep breath then lets breath nice and slow   Support given   Easily redirected call light in reach

## 2025-05-31 NOTE — PROGRESS NOTES
Karly Horton is a 80 y.o. female on day 13 of admission presenting with Generalized weakness.    Subjective   Patient was seen and examined.  Lying in the bed.  Comfortable.  Did not agree acute distress.  Patient has hypokalemia.  Patient has drop in hemoglobin and hematocrit.       Objective     Physical Exam  HEENT:  Head externally atraumatic,  extraocular movements intact, oral mucosa moist  Neck:  Supple, no JVP, no palpable adenopathy or thyromegaly.  No carotid bruit.  Chest:  Clear to auscultation and resonant.  Decreased breath sounds.  Patient has tachypnea off and on.  Heart:  Regular rate and rhythm, no murmur or gallop could be appreciated.  Abdomen:  Soft, nontender, bowel sounds present, normoactive, no palpable hepatosplenomegaly.  Extremities:  No edema, pulses present, no cyanosis or clubbing.  CNS:  Patient alert, oriented to time, place and person.    No new deficit.  Cranial nerves 2-12 grossly intact  Skin:  No active rash.  Patient has sacral ulcer.  Musculoskeletal:  No  apparent joint swelling or erythema, range of movement normal.  Last Recorded Vitals  Heart Rate:  []   Temp:  [36.3 °C (97.3 °F)-36.7 °C (98.1 °F)]   Resp:  [16-32]   BP: (122-154)/(55-86)   Weight:  [55 kg (121 lb 4.1 oz)]   SpO2:  [93 %-98 %]     Intake/Output last 3 Shifts:  I/O last 3 completed shifts:  In: 3132.5 (98.5 mL/kg) [P.O.:880; I.V.:2252.5 (70.8 mL/kg)]  Out: 2925 (92 mL/kg) [Urine:2925 (2.6 mL/kg/hr)]  Dosing Weight: 31.8 kg     Relevant Results  No results found for the last 90 days.    Results for orders placed or performed during the hospital encounter of 05/18/25 (from the past 24 hours)   POCT GLUCOSE   Result Value Ref Range    POCT Glucose 147 (H) 74 - 99 mg/dL   CBC and Auto Differential   Result Value Ref Range    WBC 8.5 4.4 - 11.3 x10*3/uL    nRBC 0.0 0.0 - 0.0 /100 WBCs    RBC 2.83 (L) 4.00 - 5.20 x10*6/uL    Hemoglobin 8.6 (L) 12.0 - 16.0 g/dL    Hematocrit 27.1 (L) 36.0 - 46.0 %     MCV 96 80 - 100 fL    MCH 30.4 26.0 - 34.0 pg    MCHC 31.7 (L) 32.0 - 36.0 g/dL    RDW 13.7 11.5 - 14.5 %    Platelets 255 150 - 450 x10*3/uL    Neutrophils % 82.7 40.0 - 80.0 %    Immature Granulocytes %, Automated 2.4 (H) 0.0 - 0.9 %    Lymphocytes % 3.0 13.0 - 44.0 %    Monocytes % 11.5 2.0 - 10.0 %    Eosinophils % 0.2 0.0 - 6.0 %    Basophils % 0.2 0.0 - 2.0 %    Neutrophils Absolute 7.01 (H) 1.60 - 5.50 x10*3/uL    Immature Granulocytes Absolute, Automated 0.20 0.00 - 0.50 x10*3/uL    Lymphocytes Absolute 0.25 (L) 0.80 - 3.00 x10*3/uL    Monocytes Absolute 0.97 (H) 0.05 - 0.80 x10*3/uL    Eosinophils Absolute 0.02 0.00 - 0.40 x10*3/uL    Basophils Absolute 0.02 0.00 - 0.10 x10*3/uL   Renal function panel   Result Value Ref Range    Glucose 100 (H) 74 - 99 mg/dL    Sodium 134 (L) 136 - 145 mmol/L    Potassium 3.1 (L) 3.5 - 5.3 mmol/L    Chloride 94 (L) 98 - 107 mmol/L    Bicarbonate 38 (H) 21 - 32 mmol/L    Anion Gap <7 (L) 10 - 20 mmol/L    Urea Nitrogen 6 6 - 23 mg/dL    Creatinine <0.20 (L) 0.50 - 1.05 mg/dL    eGFR      Calcium 8.0 (L) 8.6 - 10.3 mg/dL    Phosphorus 2.3 (L) 2.5 - 4.9 mg/dL    Albumin 2.2 (L) 3.4 - 5.0 g/dL   Magnesium   Result Value Ref Range    Magnesium 1.41 (L) 1.60 - 2.40 mg/dL   POCT GLUCOSE   Result Value Ref Range    POCT Glucose 104 (H) 74 - 99 mg/dL   POCT GLUCOSE   Result Value Ref Range    POCT Glucose 102 (H) 74 - 99 mg/dL      Current Medications[1]   Assessment/Plan   Assessment & Plan  Generalized weakness  Bilateral pleural effusion  Acute on chronic respiratory failure  COPD exacerbation  History of lung cancer  Severe protein calorie malnutrition  Hyponatremia  Leukocytosis  Sacral and coccygeal decubitus ulcer  Leukocytosis    Plan: The patient was admitted to complaint of shortness of breath and respiratory failure.  The patient has deteriorated.  Patient has bilateral pleural effusion indicating acute on chronic diastolic congestive heart failure.  Patient also has a  developing right lower lobe pneumonia.  Patient could have aspiration pneumonia.  Patient has leukocytosis now with a white cell count of 14.7.  Patient has been started on the broad-spectrum antibiotics.  Check sputum culture and blood culture.  ID and pulmonary consult obtained.  Give aerosol treatment with albuterol and Atrovent.  Patient on high flow oxygen.  Patient is thus desaturating.  The patient's PO2 is 56% per respiratory therapist.  The patient is tachypneic and slowly deteriorating.  The patient will be transferred to ICU for further management.  Ordered to transfer the patient to ICU has been placed and a call has been made to the ICU attending to discuss the case.  Discussed with the daughter of the patient.  Poor prognosis has been explained to her.  The daughter wants the patient to be DNR CCA.  Okay to transfer to the ICU and intubation if needed.    Acute respiratory failure with hypoxia    Hyponatremia    History of lung cancer    Acute hypoxemic respiratory failure  Acute on chronic hypoxic respiratory failure   Pneumonia  Moderate right, mild to moderate left pleural effusions  Squamous cell lung carcinoma status post chemo-radiation - Keytruda  COPD   History of tobacco use / vape use  Leukocytosis, multifactorial - resolving  Stage III/IV sacral decubitus ulcer  Pressure ulcer risk  Severe protein calorie malnutrition   Hyponatremia  Hypokalemia  Metabolic alkalosis  Normocytic anemia  Chronic pain  Spinal stenosis  Scoliosis  GERD  Vitamin d deficiency  Fall risk   Severe protein calorie malnutrition    Plan: Continue current medications.  Supportive care.  Patient still hypokalemic.  Replete potassium.  Replete magnesium.  Continue IV antibiotics.  Overall prognosis poor.  Will continue to golf-hole, aspiration, decubitus, DVT precaution.  Monitor heart rate while potassium is being repleted.    Date of service: 5/25/2025    Plan: Continue current medication.  Patient had acute hypoxic  respiratory failure.  Patient is on 6 L oxygen.  Continue same.  Give supportive care.  Continue antibiotic per ID.  Encourage p.o. intake.  Monitor pulse ox.  Leukocytosis resolved.  Patient has a drop in hemoglobin hematocrit.  That is multifactorial.  The patient has proximal A-fib with RVR.  Heart rate is under control.  Hypokalemia has been repleted.  Monitor for now.  Monitor for now.  Blood sugars are under control.  Overall prognosis guarded.    Date of service: 5/31/2025    Plan: Continue current medication.  Patient has hypokalemia.  Replete potassium.  Monitor CBC and BMP.  Nebulizers.  Patient continued to have hypokalemia that is being repleted every day.  Continue local wound care.  Patient has a Barnard catheter.  Fecal system has been removed.  Patient has drop in hemoglobin hematocrit. Hyponatremia is stable.  Patient is still critically malnutrition.  Heart rate is fairly controlled.  Blood sugars are fairly controlled.  Nephrology input appreciated.  We will take  fall, aspiration, decubitus, DVT precautions.             Lupillo Mcneil MD          [1]   Current Facility-Administered Medications:     acetaminophen (Tylenol) tablet 650 mg, 650 mg, oral, TID, Tim Granados PA-C, 650 mg at 05/31/25 1226    alteplase (Cathflo Activase) injection 2 mg, 2 mg, intra-catheter, PRN, Tim Granados PA-C    baclofen (Lioresal) tablet 10 mg, 10 mg, oral, TID, Tim Granados PA-C, 10 mg at 05/31/25 1446    benzocaine-menthol (Cepastat Sore Throat) lozenge 1 lozenge, 1 lozenge, Mouth/Throat, q2h PRN, Tim Granados PA-C    budesonide (Pulmicort) 0.5 mg/2 mL nebulizer solution 0.5 mg, 0.5 mg, nebulization, BID, PO Garcia-CNP, 0.5 mg at 05/30/25 1843    butalbital-acetaminophen-caff -40 mg per tablet 1 tablet, 1 tablet, oral, q6h PRN, Tim Granados PA-C, 1 tablet at 05/19/25 8164    cholecalciferol (Vitamin D-3) tablet 125 mcg, 125 mcg, oral, Daily, Tim Granados PA-C,  125 mcg at 05/31/25 0838    collagenase 250 unit/gram ointment, , Topical, Daily, Tim Granados PA-C, Given at 05/29/25 0826    dextromethorphan-guaifenesin (Robitussin DM)  mg/5 mL oral liquid 5 mL, 5 mL, oral, q4h PRN, Tim Granados PA-C    dextrose 50 % injection 12.5 g, 12.5 g, intravenous, q15 min PRN, Tim Granados PA-C    dextrose 50 % injection 25 g, 25 g, intravenous, q15 min PRN, Tim Granados PA-C    diazePAM (Valium) tablet 5 mg, 5 mg, oral, Nightly, LANDEN Jagn, 5 mg at 05/30/25 2035    enoxaparin (Lovenox) syringe 30 mg, 30 mg, subcutaneous, Daily, Tim Granados PA-C, 30 mg at 05/31/25 0838    glucagon (Glucagen) injection 1 mg, 1 mg, intramuscular, q15 min PRN, Tim Granados PA-C    glucagon (Glucagen) injection 1 mg, 1 mg, intramuscular, q15 min PRN, Tim Granados PA-C    guaiFENesin (Mucinex) 12 hr tablet 600 mg, 600 mg, oral, q12h PRN, Tim Granados PA-C    guaiFENesin (Mucinex) 12 hr tablet 600 mg, 600 mg, oral, BID PRN, Tim Granados PA-C    hydrOXYzine HCL (Atarax) tablet 25 mg, 25 mg, oral, q6h PRN, LANDEN Jang    insulin lispro injection 0-5 Units, 0-5 Units, subcutaneous, TID AC, Tim Granados PA-C, 1 Units at 05/24/25 0812    ipratropium-albuteroL (Duo-Neb) 0.5-2.5 mg/3 mL nebulizer solution 3 mL, 3 mL, nebulization, q2h PRN, Lupillo Mcneil MD, 3 mL at 05/26/25 0517    ipratropium-albuteroL (Duo-Neb) 0.5-2.5 mg/3 mL nebulizer solution 3 mL, 3 mL, nebulization, 4x daily, Nancy Ballard, PO-CNP, 3 mL at 05/31/25 1449    labetaloL (Normodyne,Trandate) injection 10 mg, 10 mg, intravenous, q4h PRN, Tim Granados PA-C    lactulose 20 gram/30 mL oral solution 20 g, 20 g, oral, Daily, Shea Root, PO-CNP, 20 g at 05/31/25 0844    lidocaine (Xylocaine) 5 % ointment 1 Application, 1 Application, Topical, TID, Tim Granados PA-C, 1 Application at 05/31/25 1200    lubricating eye drops ophthalmic solution 1  drop, 1 drop, Both Eyes, PRN, Tim Granados PA-C    melatonin tablet 3 mg, 3 mg, oral, Nightly, Tim Granados PA-C, 3 mg at 05/30/25 2031    mupirocin (Bactroban) 2 % ointment 1 Application, 1 Application, Topical, BID, Tim Granados PA-C, 1 Application at 05/31/25 1242    nicotine (Nicoderm CQ) 21 mg/24 hr patch 1 patch, 1 patch, transdermal, Daily, 1 patch at 05/31/25 0849 **FOLLOWED BY** [START ON 7/1/2025] nicotine (Nicoderm CQ) 14 mg/24 hr patch 1 patch, 1 patch, transdermal, Daily **FOLLOWED BY** [START ON 7/15/2025] nicotine (Nicoderm CQ) 7 mg/24 hr patch 1 patch, 1 patch, transdermal, Daily, Tim Granados PA-C    ondansetron (Zofran) tablet 8 mg, 8 mg, oral, q8h PRN, Tim Granados PA-C    oxygen (O2) therapy, , inhalation, Continuous - Inhalation, Lupillo Mcneil MD, 3 L/min at 05/30/25 1907    pantoprazole (ProtoNix) EC tablet 40 mg, 40 mg, oral, Daily before breakfast, Tim Granados PA-C, 40 mg at 05/31/25 0843    polyethylene glycol (Glycolax, Miralax) packet 17 g, 17 g, oral, Daily PRN, Tim Granados PA-C    potassium chloride 40 mEq in sterile water for injection 100 mL, 40 mEq, intravenous, Daily PRN, Lupillo Mcneil MD, Stopped at 05/26/25 1032    prochlorperazine (Compazine) tablet 10 mg, 10 mg, oral, q6h PRN, Tim Granados PA-C    sennosides-docusate sodium (Annette-Colace) 8.6-50 mg per tablet 2 tablet, 2 tablet, oral, BID, Tim Granados PA-C, 2 tablet at 05/31/25 0838    sodium chloride 0.9% infusion, 75 mL/hr, intravenous, Continuous, Reynaldo Nolasco MD, Last Rate: 75 mL/hr at 05/31/25 1446, 75 mL/hr at 05/31/25 1446    sodium hypochlorite (Dakin's Quarter Strength) 0.125 % external solution, , irrigation, Daily PRN, Tim Granados PA-C, Given at 05/26/25 0844    sucralfate (Carafate) suspension 1 g, 1 g, oral, q6h [place], Tim Granados PA-C, 1 g at 05/31/25 1226

## 2025-05-31 NOTE — NURSING NOTE
Pt asleep. No s/s of pain/ discomfort  noted. No change noted from initial shift assessment. Call light in reach.

## 2025-06-01 VITALS
RESPIRATION RATE: 16 BRPM | WEIGHT: 121.25 LBS | SYSTOLIC BLOOD PRESSURE: 122 MMHG | BODY MASS INDEX: 27.28 KG/M2 | HEART RATE: 103 BPM | HEIGHT: 56 IN | DIASTOLIC BLOOD PRESSURE: 64 MMHG | TEMPERATURE: 98.6 F | OXYGEN SATURATION: 100 %

## 2025-06-01 LAB
ALBUMIN SERPL BCP-MCNC: 2.3 G/DL (ref 3.4–5)
ALBUMIN SERPL BCP-MCNC: 2.4 G/DL (ref 3.4–5)
ALDOST SERPL-MCNC: <3 NG/DL
ANION GAP BLDA CALCULATED.4IONS-SCNC: 0 MMO/L (ref 10–25)
ANION GAP SERPL CALCULATED.3IONS-SCNC: 7 MMOL/L (ref 10–20)
ANION GAP SERPL CALCULATED.3IONS-SCNC: 7 MMOL/L (ref 10–20)
APPARATUS: ABNORMAL
ARTERIAL PATENCY WRIST A: NEGATIVE
BASE EXCESS BLDA CALC-SCNC: 16.1 MMOL/L (ref -2–3)
BASOPHILS # BLD AUTO: 0.02 X10*3/UL (ref 0–0.1)
BASOPHILS NFR BLD AUTO: 0.2 %
BODY TEMPERATURE: 37 DEGREES CELSIUS
BUN SERPL-MCNC: 13 MG/DL (ref 6–23)
BUN SERPL-MCNC: 8 MG/DL (ref 6–23)
CA-I BLDA-SCNC: 1.2 MMOL/L (ref 1.1–1.33)
CALCIUM SERPL-MCNC: 7.9 MG/DL (ref 8.6–10.3)
CALCIUM SERPL-MCNC: 8.2 MG/DL (ref 8.6–10.3)
CHLORIDE BLDA-SCNC: 91 MMOL/L (ref 98–107)
CHLORIDE SERPL-SCNC: 90 MMOL/L (ref 98–107)
CHLORIDE SERPL-SCNC: 92 MMOL/L (ref 98–107)
CO2 SERPL-SCNC: 37 MMOL/L (ref 21–32)
CO2 SERPL-SCNC: 41 MMOL/L (ref 21–32)
CREAT SERPL-MCNC: <0.2 MG/DL (ref 0.5–1.05)
CREAT SERPL-MCNC: <0.2 MG/DL (ref 0.5–1.05)
CREAT UR-MCNC: 15.7 MG/DL (ref 20–320)
CREAT UR-MCNC: 15.8 MG/DL (ref 20–320)
EGFRCR SERPLBLD CKD-EPI 2021: ABNORMAL ML/MIN/{1.73_M2}
EGFRCR SERPLBLD CKD-EPI 2021: ABNORMAL ML/MIN/{1.73_M2}
EOSINOPHIL # BLD AUTO: 0.03 X10*3/UL (ref 0–0.4)
EOSINOPHIL NFR BLD AUTO: 0.3 %
ERYTHROCYTE [DISTWIDTH] IN BLOOD BY AUTOMATED COUNT: 13.7 % (ref 11.5–14.5)
GLUCOSE BLD MANUAL STRIP-MCNC: 113 MG/DL (ref 74–99)
GLUCOSE BLD MANUAL STRIP-MCNC: 115 MG/DL (ref 74–99)
GLUCOSE BLD MANUAL STRIP-MCNC: 123 MG/DL (ref 74–99)
GLUCOSE BLD MANUAL STRIP-MCNC: 151 MG/DL (ref 74–99)
GLUCOSE BLDA-MCNC: 174 MG/DL (ref 74–99)
GLUCOSE SERPL-MCNC: 126 MG/DL (ref 74–99)
GLUCOSE SERPL-MCNC: 137 MG/DL (ref 74–99)
HCO3 BLDA-SCNC: 42.8 MMOL/L (ref 22–26)
HCT VFR BLD AUTO: 28.9 % (ref 36–46)
HCT VFR BLD EST: 32 % (ref 36–46)
HGB BLD-MCNC: 9.2 G/DL (ref 12–16)
HGB BLDA-MCNC: 10.6 G/DL (ref 12–16)
IMM GRANULOCYTES # BLD AUTO: 0.22 X10*3/UL (ref 0–0.5)
IMM GRANULOCYTES NFR BLD AUTO: 2.2 % (ref 0–0.9)
INHALED O2 CONCENTRATION: 32 %
LACTATE BLDA-SCNC: 1.4 MMOL/L (ref 0.4–2)
LYMPHOCYTES # BLD AUTO: 0.27 X10*3/UL (ref 0.8–3)
LYMPHOCYTES NFR BLD AUTO: 2.7 %
MAGNESIUM SERPL-MCNC: 1.49 MG/DL (ref 1.6–2.4)
MAGNESIUM UR-MCNC: 26.09 MG/DL
MAGNESIUM/CREAT UR: 1661.8 MG/G CREAT
MCH RBC QN AUTO: 30.4 PG (ref 26–34)
MCHC RBC AUTO-ENTMCNC: 31.8 G/DL (ref 32–36)
MCV RBC AUTO: 95 FL (ref 80–100)
MONOCYTES # BLD AUTO: 0.99 X10*3/UL (ref 0.05–0.8)
MONOCYTES NFR BLD AUTO: 10.1 %
NEUTROPHILS # BLD AUTO: 8.32 X10*3/UL (ref 1.6–5.5)
NEUTROPHILS NFR BLD AUTO: 84.5 %
NRBC BLD-RTO: 0 /100 WBCS (ref 0–0)
OXYHGB MFR BLDA: 91 % (ref 94–98)
PCO2 BLDA: 63 MM HG (ref 38–42)
PH BLDA: 7.44 PH (ref 7.38–7.42)
PHOSPHATE SERPL-MCNC: 2.2 MG/DL (ref 2.5–4.9)
PHOSPHATE SERPL-MCNC: 2.3 MG/DL (ref 2.5–4.9)
PLATELET # BLD AUTO: 279 X10*3/UL (ref 150–450)
PO2 BLDA: 66 MM HG (ref 85–95)
POTASSIUM BLDA-SCNC: 4.1 MMOL/L (ref 3.5–5.3)
POTASSIUM SERPL-SCNC: 2.9 MMOL/L (ref 3.5–5.3)
POTASSIUM SERPL-SCNC: 3.7 MMOL/L (ref 3.5–5.3)
RBC # BLD AUTO: 3.03 X10*6/UL (ref 4–5.2)
RENIN PLAS-CCNC: 0.5 NG/ML/HR
SAO2 % BLDA: 96 % (ref 94–100)
SODIUM BLDA-SCNC: 130 MMOL/L (ref 136–145)
SODIUM SERPL-SCNC: 133 MMOL/L (ref 136–145)
SODIUM SERPL-SCNC: 134 MMOL/L (ref 136–145)
SPECIMEN DRAWN FROM PATIENT: ABNORMAL
WBC # BLD AUTO: 9.9 X10*3/UL (ref 4.4–11.3)

## 2025-06-01 PROCEDURE — 94640 AIRWAY INHALATION TREATMENT: CPT

## 2025-06-01 PROCEDURE — 2500000001 HC RX 250 WO HCPCS SELF ADMINISTERED DRUGS (ALT 637 FOR MEDICARE OP)

## 2025-06-01 PROCEDURE — 2500000004 HC RX 250 GENERAL PHARMACY W/ HCPCS (ALT 636 FOR OP/ED): Mod: JZ | Performed by: INTERNAL MEDICINE

## 2025-06-01 PROCEDURE — 2500000001 HC RX 250 WO HCPCS SELF ADMINISTERED DRUGS (ALT 637 FOR MEDICARE OP): Performed by: INTERNAL MEDICINE

## 2025-06-01 PROCEDURE — 2500000005 HC RX 250 GENERAL PHARMACY W/O HCPCS

## 2025-06-01 PROCEDURE — 85025 COMPLETE CBC W/AUTO DIFF WBC: CPT

## 2025-06-01 PROCEDURE — 36600 WITHDRAWAL OF ARTERIAL BLOOD: CPT

## 2025-06-01 PROCEDURE — 80069 RENAL FUNCTION PANEL: CPT

## 2025-06-01 PROCEDURE — 2060000001 HC INTERMEDIATE ICU ROOM DAILY

## 2025-06-01 PROCEDURE — 2500000002 HC RX 250 W HCPCS SELF ADMINISTERED DRUGS (ALT 637 FOR MEDICARE OP, ALT 636 FOR OP/ED)

## 2025-06-01 PROCEDURE — 83735 ASSAY OF MAGNESIUM: CPT | Performed by: INTERNAL MEDICINE

## 2025-06-01 PROCEDURE — 2500000005 HC RX 250 GENERAL PHARMACY W/O HCPCS: Performed by: INTERNAL MEDICINE

## 2025-06-01 PROCEDURE — 84132 ASSAY OF SERUM POTASSIUM: CPT | Performed by: INTERNAL MEDICINE

## 2025-06-01 PROCEDURE — 94668 MNPJ CHEST WALL SBSQ: CPT

## 2025-06-01 PROCEDURE — 9420000001 HC RT PATIENT EDUCATION 5 MIN

## 2025-06-01 PROCEDURE — 2500000002 HC RX 250 W HCPCS SELF ADMINISTERED DRUGS (ALT 637 FOR MEDICARE OP, ALT 636 FOR OP/ED): Performed by: NURSE PRACTITIONER

## 2025-06-01 PROCEDURE — 2500000004 HC RX 250 GENERAL PHARMACY W/ HCPCS (ALT 636 FOR OP/ED): Mod: JZ

## 2025-06-01 PROCEDURE — 82947 ASSAY GLUCOSE BLOOD QUANT: CPT

## 2025-06-01 PROCEDURE — S4991 NICOTINE PATCH NONLEGEND: HCPCS

## 2025-06-01 RX ORDER — POTASSIUM CHLORIDE 14.9 MG/ML
20 INJECTION INTRAVENOUS ONCE
Status: DISCONTINUED | OUTPATIENT
Start: 2025-06-01 | End: 2025-06-02 | Stop reason: HOSPADM

## 2025-06-01 RX ORDER — TRAZODONE HYDROCHLORIDE 50 MG/1
25 TABLET ORAL NIGHTLY
Status: DISCONTINUED | OUTPATIENT
Start: 2025-06-01 | End: 2025-06-02 | Stop reason: HOSPADM

## 2025-06-01 RX ORDER — POTASSIUM CHLORIDE 14.9 MG/ML
20 INJECTION INTRAVENOUS
Status: DISCONTINUED | OUTPATIENT
Start: 2025-06-01 | End: 2025-06-01

## 2025-06-01 RX ORDER — MAGNESIUM SULFATE HEPTAHYDRATE 40 MG/ML
4 INJECTION, SOLUTION INTRAVENOUS ONCE
Status: DISCONTINUED | OUTPATIENT
Start: 2025-06-01 | End: 2025-06-01

## 2025-06-01 RX ORDER — SODIUM CHLORIDE 9 MG/ML
75 INJECTION, SOLUTION INTRAVENOUS CONTINUOUS
Status: ACTIVE | OUTPATIENT
Start: 2025-06-01 | End: 2025-06-02

## 2025-06-01 RX ORDER — POTASSIUM CHLORIDE 1.5 G/1.58G
40 POWDER, FOR SOLUTION ORAL ONCE
Status: COMPLETED | OUTPATIENT
Start: 2025-06-01 | End: 2025-06-01

## 2025-06-01 RX ORDER — SODIUM,POTASSIUM PHOSPHATES 280-250MG
2 POWDER IN PACKET (EA) ORAL ONCE
Status: COMPLETED | OUTPATIENT
Start: 2025-06-01 | End: 2025-06-01

## 2025-06-01 RX ORDER — MAGNESIUM SULFATE HEPTAHYDRATE 40 MG/ML
2 INJECTION, SOLUTION INTRAVENOUS ONCE
Status: COMPLETED | OUTPATIENT
Start: 2025-06-01 | End: 2025-06-01

## 2025-06-01 RX ADMIN — ACETAMINOPHEN 650 MG: 325 TABLET ORAL at 00:50

## 2025-06-01 RX ADMIN — SUCRALFATE ORAL SUSPENSION 1 G: 1 SUSPENSION ORAL at 11:08

## 2025-06-01 RX ADMIN — Medication 125 MCG: at 08:28

## 2025-06-01 RX ADMIN — SUCRALFATE ORAL SUSPENSION 1 G: 1 SUSPENSION ORAL at 06:34

## 2025-06-01 RX ADMIN — POTASSIUM & SODIUM PHOSPHATES POWDER PACK 280-160-250 MG 2 PACKET: 280-160-250 PACK at 11:08

## 2025-06-01 RX ADMIN — MELATONIN 3 MG: 3 TAB ORAL at 21:20

## 2025-06-01 RX ADMIN — Medication 3 L/MIN: at 06:59

## 2025-06-01 RX ADMIN — LIDOCAINE 1 APPLICATION: 50 OINTMENT TOPICAL at 00:51

## 2025-06-01 RX ADMIN — POTASSIUM CHLORIDE 20 MEQ: 14.9 INJECTION, SOLUTION INTRAVENOUS at 09:24

## 2025-06-01 RX ADMIN — SUCRALFATE ORAL SUSPENSION 1 G: 1 SUSPENSION ORAL at 00:50

## 2025-06-01 RX ADMIN — BUDESONIDE 0.5 MG: 0.5 INHALANT ORAL at 19:07

## 2025-06-01 RX ADMIN — Medication 3 L/MIN: at 19:13

## 2025-06-01 RX ADMIN — ENOXAPARIN SODIUM 30 MG: 100 INJECTION SUBCUTANEOUS at 08:28

## 2025-06-01 RX ADMIN — SODIUM CHLORIDE 80 ML/HR: 900 INJECTION, SOLUTION INTRAVENOUS at 17:44

## 2025-06-01 RX ADMIN — BUDESONIDE 0.5 MG: 0.5 INHALANT ORAL at 06:56

## 2025-06-01 RX ADMIN — SUCRALFATE ORAL SUSPENSION 1 G: 1 SUSPENSION ORAL at 17:25

## 2025-06-01 RX ADMIN — IPRATROPIUM BROMIDE AND ALBUTEROL SULFATE 3 ML: 2.5; .5 SOLUTION RESPIRATORY (INHALATION) at 19:12

## 2025-06-01 RX ADMIN — COLLAGENASE SANTYL: 250 OINTMENT TOPICAL at 08:26

## 2025-06-01 RX ADMIN — IPRATROPIUM BROMIDE AND ALBUTEROL SULFATE 3 ML: 2.5; .5 SOLUTION RESPIRATORY (INHALATION) at 12:38

## 2025-06-01 RX ADMIN — MUPIROCIN 1 APPLICATION: 20 OINTMENT TOPICAL at 21:20

## 2025-06-01 RX ADMIN — NICOTINE 1 PATCH: 21 PATCH, EXTENDED RELEASE TRANSDERMAL at 08:29

## 2025-06-01 RX ADMIN — DIAZEPAM 5 MG: 5 TABLET ORAL at 21:20

## 2025-06-01 RX ADMIN — ACETAMINOPHEN 650 MG: 325 TABLET ORAL at 10:38

## 2025-06-01 RX ADMIN — BACLOFEN 10 MG: 10 TABLET ORAL at 21:20

## 2025-06-01 RX ADMIN — TRAZODONE HYDROCHLORIDE 25 MG: 50 TABLET ORAL at 21:20

## 2025-06-01 RX ADMIN — MUPIROCIN 1 APPLICATION: 20 OINTMENT TOPICAL at 08:29

## 2025-06-01 RX ADMIN — MAGNESIUM SULFATE IN WATER 2 G: 40 INJECTION, SOLUTION INTRAVENOUS at 11:08

## 2025-06-01 RX ADMIN — POTASSIUM CHLORIDE 40 MEQ: 1.5 POWDER, FOR SOLUTION ORAL at 09:24

## 2025-06-01 RX ADMIN — IPRATROPIUM BROMIDE AND ALBUTEROL SULFATE 3 ML: 2.5; .5 SOLUTION RESPIRATORY (INHALATION) at 15:52

## 2025-06-01 RX ADMIN — PANTOPRAZOLE SODIUM 40 MG: 40 TABLET, DELAYED RELEASE ORAL at 06:34

## 2025-06-01 RX ADMIN — IPRATROPIUM BROMIDE AND ALBUTEROL SULFATE 3 ML: 2.5; .5 SOLUTION RESPIRATORY (INHALATION) at 06:59

## 2025-06-01 RX ADMIN — BACLOFEN 10 MG: 10 TABLET ORAL at 08:28

## 2025-06-01 RX ADMIN — BACLOFEN 10 MG: 10 TABLET ORAL at 15:04

## 2025-06-01 ASSESSMENT — COGNITIVE AND FUNCTIONAL STATUS - GENERAL
MOBILITY SCORE: 9
MOVING FROM LYING ON BACK TO SITTING ON SIDE OF FLAT BED WITH BEDRAILS: A LITTLE
DRESSING REGULAR LOWER BODY CLOTHING: TOTAL
MOVING TO AND FROM BED TO CHAIR: TOTAL
STANDING UP FROM CHAIR USING ARMS: TOTAL
DAILY ACTIVITIY SCORE: 8
CLIMB 3 TO 5 STEPS WITH RAILING: TOTAL
EATING MEALS: A LOT
HELP NEEDED FOR BATHING: TOTAL
PERSONAL GROOMING: A LOT
TURNING FROM BACK TO SIDE WHILE IN FLAT BAD: A LOT
WALKING IN HOSPITAL ROOM: TOTAL
DRESSING REGULAR UPPER BODY CLOTHING: TOTAL
TOILETING: TOTAL

## 2025-06-01 ASSESSMENT — PAIN SCALES - GENERAL
PAINLEVEL_OUTOF10: 2
PAINLEVEL_OUTOF10: 0 - NO PAIN

## 2025-06-01 ASSESSMENT — PAIN - FUNCTIONAL ASSESSMENT: PAIN_FUNCTIONAL_ASSESSMENT: 0-10

## 2025-06-01 NOTE — CARE PLAN
The patient's goals for the shift include rest    The clinical goals for the shift include maintain safety    Over the shift, the patient did not make progress toward the following goals. Barriers to progression include worsening of condition. Recommendations to address these barriers include adequate pain management.

## 2025-06-01 NOTE — PROGRESS NOTES
Karly Horton is a 80 y.o. female on day 14 of admission presenting with Generalized weakness.      Subjective   Patient reports loose stools otherwise no specific complaints.  Her bicarbonate is slowly trending down to 37, potassium remains low at 2.9.       Objective          Vitals 24HR  Heart Rate:  []   Temp:  [36.4 °C (97.6 °F)-36.7 °C (98 °F)]   Resp:  [16-32]   BP: (122-159)/(62-88)   SpO2:  [93 %-96 %]       Intake/Output last 3 Shifts:    Intake/Output Summary (Last 24 hours) at 6/1/2025 1248  Last data filed at 6/1/2025 1101  Gross per 24 hour   Intake 2224.75 ml   Output 2325 ml   Net -100.25 ml       Physical Exam  Constitutional:       General: She is awake. She is not in acute distress.  Cardiovascular:      Rate and Rhythm: Tachycardia present.      Heart sounds:      No friction rub.   Pulmonary:      Effort: Pulmonary effort is normal.      Comments: Diminished breath sounds  Abdominal:      General: Bowel sounds are normal.      Palpations: Abdomen is soft.      Tenderness: There is no guarding or rebound.   Musculoskeletal:      Right lower leg: No edema.      Left lower leg: No edema.   Neurological:      Mental Status: She is alert.         Relevant Results  Results for orders placed or performed during the hospital encounter of 05/18/25 (from the past 24 hours)   POCT GLUCOSE   Result Value Ref Range    POCT Glucose 123 (H) 74 - 99 mg/dL   POCT GLUCOSE   Result Value Ref Range    POCT Glucose 140 (H) 74 - 99 mg/dL   CBC and Auto Differential   Result Value Ref Range    WBC 9.9 4.4 - 11.3 x10*3/uL    nRBC 0.0 0.0 - 0.0 /100 WBCs    RBC 3.03 (L) 4.00 - 5.20 x10*6/uL    Hemoglobin 9.2 (L) 12.0 - 16.0 g/dL    Hematocrit 28.9 (L) 36.0 - 46.0 %    MCV 95 80 - 100 fL    MCH 30.4 26.0 - 34.0 pg    MCHC 31.8 (L) 32.0 - 36.0 g/dL    RDW 13.7 11.5 - 14.5 %    Platelets 279 150 - 450 x10*3/uL    Neutrophils % 84.5 40.0 - 80.0 %    Immature Granulocytes %, Automated 2.2 (H) 0.0 - 0.9 %     Lymphocytes % 2.7 13.0 - 44.0 %    Monocytes % 10.1 2.0 - 10.0 %    Eosinophils % 0.3 0.0 - 6.0 %    Basophils % 0.2 0.0 - 2.0 %    Neutrophils Absolute 8.32 (H) 1.60 - 5.50 x10*3/uL    Immature Granulocytes Absolute, Automated 0.22 0.00 - 0.50 x10*3/uL    Lymphocytes Absolute 0.27 (L) 0.80 - 3.00 x10*3/uL    Monocytes Absolute 0.99 (H) 0.05 - 0.80 x10*3/uL    Eosinophils Absolute 0.03 0.00 - 0.40 x10*3/uL    Basophils Absolute 0.02 0.00 - 0.10 x10*3/uL   Renal function panel   Result Value Ref Range    Glucose 137 (H) 74 - 99 mg/dL    Sodium 133 (L) 136 - 145 mmol/L    Potassium 2.9 (LL) 3.5 - 5.3 mmol/L    Chloride 92 (L) 98 - 107 mmol/L    Bicarbonate 37 (H) 21 - 32 mmol/L    Anion Gap 7 (L) 10 - 20 mmol/L    Urea Nitrogen 8 6 - 23 mg/dL    Creatinine <0.20 (L) 0.50 - 1.05 mg/dL    eGFR      Calcium 7.9 (L) 8.6 - 10.3 mg/dL    Phosphorus 2.2 (L) 2.5 - 4.9 mg/dL    Albumin 2.3 (L) 3.4 - 5.0 g/dL   Magnesium   Result Value Ref Range    Magnesium 1.49 (L) 1.60 - 2.40 mg/dL   POCT GLUCOSE   Result Value Ref Range    POCT Glucose 115 (H) 74 - 99 mg/dL   POCT GLUCOSE   Result Value Ref Range    POCT Glucose 151 (H) 74 - 99 mg/dL                 Assessment & Plan  Generalized weakness    Acute respiratory failure with hypoxia    Hyponatremia    History of lung cancer    Acute hypoxemic respiratory failure    Metabolic alkalosis, suspicion is contraction alkalosis. She does have mild hypertension so will also workup for primary aldosteronism especially given the hypokalemia   Hyponatremia, from intravascular volume depletion, stable, mild  Pneumonia  Urinary retention  History of squamous cell lung cancer  Hypophosphatemia, mild  Mild hypomagnesemia, will work up, may be PPI induced GI mag wasting  Hypokalemia, mild in the setting of hypomagnesemia     Plan: Not on any diuretics.  Has been receiving IV fluid hydration. Bicarb slightly better but still elevated so check updated ABG. Pending renin.  On antibiotics.  Avoid  diuretics at this time.  Has a [facility], urology on board.  Potassium, Magnesium and phosphorus replaced. Check urine magnesium and urine creatinine to work up.     Reynaldo Nolasco MD

## 2025-06-01 NOTE — PROGRESS NOTES
Karly Horton is a 80 y.o. female on day 14 of admission presenting with Generalized weakness.    Subjective   Patient was seen and examined.  Lying in the bed.  Comfortable.  No acute event.  Hemoglobin and hematocrit are stable.       Objective     Physical Exam  HEENT:  Head externally atraumatic,  extraocular movements intact, oral mucosa moist  Neck:  Supple, no JVP, no palpable adenopathy or thyromegaly.  No carotid bruit.  Chest:  Clear to auscultation and resonant.  Heart:  Regular rate and rhythm, no murmur or gallop could be appreciated.  Abdomen:  Soft, nontender, bowel sounds present, normoactive, no palpable hepatosplenomegaly.  Extremities:  No edema, pulses present, no cyanosis or clubbing.  CNS:  Patient alert, oriented to time, place and person.    No new deficit.  Cranial nerves 2-12 grossly intact  Skin:  No active rash.  Musculoskeletal:  No  apparent joint swelling or erythema, range of movement normal.  Last Recorded Vitals  Heart Rate:  [87]   Temp:  [36.4 °C (97.6 °F)-36.7 °C (98 °F)]   Resp:  [16-32]   BP: (122-159)/(62-88)   SpO2:  [93 %-96 %]     Intake/Output last 3 Shifts:  I/O last 3 completed shifts:  In: 1771.3 (55.7 mL/kg) [P.O.:480; I.V.:1291.3 (40.6 mL/kg)]  Out: 3425 (107.7 mL/kg) [Urine:3425 (3 mL/kg/hr)]  Dosing Weight: 31.8 kg     Relevant Results  No results found for the last 90 days.    Results for orders placed or performed during the hospital encounter of 05/18/25 (from the past 24 hours)   POCT GLUCOSE   Result Value Ref Range    POCT Glucose 140 (H) 74 - 99 mg/dL   CBC and Auto Differential   Result Value Ref Range    WBC 9.9 4.4 - 11.3 x10*3/uL    nRBC 0.0 0.0 - 0.0 /100 WBCs    RBC 3.03 (L) 4.00 - 5.20 x10*6/uL    Hemoglobin 9.2 (L) 12.0 - 16.0 g/dL    Hematocrit 28.9 (L) 36.0 - 46.0 %    MCV 95 80 - 100 fL    MCH 30.4 26.0 - 34.0 pg    MCHC 31.8 (L) 32.0 - 36.0 g/dL    RDW 13.7 11.5 - 14.5 %    Platelets 279 150 - 450 x10*3/uL    Neutrophils % 84.5 40.0 - 80.0 %     Immature Granulocytes %, Automated 2.2 (H) 0.0 - 0.9 %    Lymphocytes % 2.7 13.0 - 44.0 %    Monocytes % 10.1 2.0 - 10.0 %    Eosinophils % 0.3 0.0 - 6.0 %    Basophils % 0.2 0.0 - 2.0 %    Neutrophils Absolute 8.32 (H) 1.60 - 5.50 x10*3/uL    Immature Granulocytes Absolute, Automated 0.22 0.00 - 0.50 x10*3/uL    Lymphocytes Absolute 0.27 (L) 0.80 - 3.00 x10*3/uL    Monocytes Absolute 0.99 (H) 0.05 - 0.80 x10*3/uL    Eosinophils Absolute 0.03 0.00 - 0.40 x10*3/uL    Basophils Absolute 0.02 0.00 - 0.10 x10*3/uL   Renal function panel   Result Value Ref Range    Glucose 137 (H) 74 - 99 mg/dL    Sodium 133 (L) 136 - 145 mmol/L    Potassium 2.9 (LL) 3.5 - 5.3 mmol/L    Chloride 92 (L) 98 - 107 mmol/L    Bicarbonate 37 (H) 21 - 32 mmol/L    Anion Gap 7 (L) 10 - 20 mmol/L    Urea Nitrogen 8 6 - 23 mg/dL    Creatinine <0.20 (L) 0.50 - 1.05 mg/dL    eGFR      Calcium 7.9 (L) 8.6 - 10.3 mg/dL    Phosphorus 2.2 (L) 2.5 - 4.9 mg/dL    Albumin 2.3 (L) 3.4 - 5.0 g/dL   Magnesium   Result Value Ref Range    Magnesium 1.49 (L) 1.60 - 2.40 mg/dL   POCT GLUCOSE   Result Value Ref Range    POCT Glucose 115 (H) 74 - 99 mg/dL   POCT GLUCOSE   Result Value Ref Range    POCT Glucose 151 (H) 74 - 99 mg/dL   Blood Gas Arterial Full Panel   Result Value Ref Range    POCT pH, Arterial 7.44 (H) 7.38 - 7.42 pH    POCT pCO2, Arterial 63 (H) 38 - 42 mm Hg    POCT pO2, Arterial 66 (L) 85 - 95 mm Hg    POCT SO2, Arterial 96 94 - 100 %    POCT Oxy Hemoglobin, Arterial 91.0 (L) 94.0 - 98.0 %    POCT Hematocrit Calculated, Arterial 32.0 (L) 36.0 - 46.0 %    POCT Sodium, Arterial 130 (L) 136 - 145 mmol/L    POCT Potassium, Arterial 4.1 3.5 - 5.3 mmol/L    POCT Chloride, Arterial 91 (L) 98 - 107 mmol/L    POCT Ionized Calcium, Arterial 1.20 1.10 - 1.33 mmol/L    POCT Glucose, Arterial 174 (H) 74 - 99 mg/dL    POCT Lactate, Arterial 1.4 0.4 - 2.0 mmol/L    POCT Base Excess, Arterial 16.1 (H) -2.0 - 3.0 mmol/L    POCT HCO3 Calculated, Arterial 42.8  (H) 22.0 - 26.0 mmol/L    POCT Hemoglobin, Arterial 10.6 (L) 12.0 - 16.0 g/dL    POCT Anion Gap, Arterial 0 (L) 10 - 25 mmo/L    Patient Temperature 37.0 degrees Celsius    FiO2 32 %    Apparatus CANNULA     Site of Arterial Puncture Brachial Right     Christopher's Test Negative    Renal Function Panel   Result Value Ref Range    Glucose 126 (H) 74 - 99 mg/dL    Sodium 134 (L) 136 - 145 mmol/L    Potassium 3.7 3.5 - 5.3 mmol/L    Chloride 90 (L) 98 - 107 mmol/L    Bicarbonate 41 (HH) 21 - 32 mmol/L    Anion Gap 7 (L) 10 - 20 mmol/L    Urea Nitrogen 13 6 - 23 mg/dL    Creatinine <0.20 (L) 0.50 - 1.05 mg/dL    eGFR      Calcium 8.2 (L) 8.6 - 10.3 mg/dL    Phosphorus 2.3 (L) 2.5 - 4.9 mg/dL    Albumin 2.4 (L) 3.4 - 5.0 g/dL   POCT GLUCOSE   Result Value Ref Range    POCT Glucose 123 (H) 74 - 99 mg/dL      Current Medications[1]   Assessment/Plan   Assessment & Plan  Generalized weakness  Bilateral pleural effusion  Acute on chronic respiratory failure  COPD exacerbation  History of lung cancer  Severe protein calorie malnutrition  Hyponatremia  Leukocytosis  Sacral and coccygeal decubitus ulcer  Leukocytosis    Plan: The patient was admitted to complaint of shortness of breath and respiratory failure.  The patient has deteriorated.  Patient has bilateral pleural effusion indicating acute on chronic diastolic congestive heart failure.  Patient also has a developing right lower lobe pneumonia.  Patient could have aspiration pneumonia.  Patient has leukocytosis now with a white cell count of 14.7.  Patient has been started on the broad-spectrum antibiotics.  Check sputum culture and blood culture.  ID and pulmonary consult obtained.  Give aerosol treatment with albuterol and Atrovent.  Patient on high flow oxygen.  Patient is thus desaturating.  The patient's PO2 is 56% per respiratory therapist.  The patient is tachypneic and slowly deteriorating.  The patient will be transferred to ICU for further management.  Ordered to  transfer the patient to ICU has been placed and a call has been made to the ICU attending to discuss the case.  Discussed with the daughter of the patient.  Poor prognosis has been explained to her.  The daughter wants the patient to be DNR CCA.  Okay to transfer to the ICU and intubation if needed.    Acute respiratory failure with hypoxia    Hyponatremia    History of lung cancer    Acute hypoxemic respiratory failure  Acute on chronic hypoxic respiratory failure   Pneumonia  Moderate right, mild to moderate left pleural effusions  Squamous cell lung carcinoma status post chemo-radiation - Keytruda  COPD   History of tobacco use / vape use  Leukocytosis, multifactorial - resolving  Stage III/IV sacral decubitus ulcer  Pressure ulcer risk  Severe protein calorie malnutrition   Hyponatremia  Hypokalemia  Metabolic alkalosis  Normocytic anemia  Chronic pain  Spinal stenosis  Scoliosis  GERD  Vitamin d deficiency  Fall risk   Severe protein calorie malnutrition    Plan: Continue current medications.  Supportive care.  Patient still hypokalemic.  Replete potassium.  Replete magnesium.  Continue IV antibiotics.  Overall prognosis poor.  Will continue to golf-hole, aspiration, decubitus, DVT precaution.  Monitor heart rate while potassium is being repleted.    Date of service: 5/25/2025    Plan: Continue current medication.  Patient had acute hypoxic respiratory failure.  Patient is on 6 L oxygen.  Continue same.  Give supportive care.  Continue antibiotic per ID.  Encourage p.o. intake.  Monitor pulse ox.  Leukocytosis resolved.  Patient has a drop in hemoglobin hematocrit.  That is multifactorial.  The patient has proximal A-fib with RVR.  Heart rate is under control.  Hypokalemia has been repleted.  Monitor for now.  Monitor for now.  Blood sugars are under control.  Overall prognosis guarded.    Date of service: 5/31/2025    Plan: Continue current medication.  Patient has hypokalemia.  Replete potassium.  Monitor CBC  and BMP.  Nebulizers.  Patient continued to have hypokalemia that is being repleted every day.  Continue local wound care.  Patient has a Barnard catheter.  Fecal system has been removed.  Patient has drop in hemoglobin hematocrit. Hyponatremia is stable.  Patient is still critically malnutrition.  Heart rate is fairly controlled.  Blood sugars are fairly controlled.  Nephrology input appreciated.  We will take  fall, aspiration, decubitus, DVT precautions.      Date of service: 6/1/2025    Continue current medications.  Patient is severe hypokalemia and hypomagnesia.  Electrolytes are being repleted.  Patient is complaining of insomnia.  Patient had A-fib with RVR but heart rate is controlled now.  Patient is negative output.  Bicarb was higher than before.  Patient is on IV fluid.  We will take DVT, fall, aspiration, decubitus and DVT precautions.           Lupillo Mcneil MD          [1]   Current Facility-Administered Medications:     acetaminophen (Tylenol) tablet 650 mg, 650 mg, oral, TID, Tim Granados PA-C, 650 mg at 06/01/25 1038    alteplase (Cathflo Activase) injection 2 mg, 2 mg, intra-catheter, PRN, Tim Granados PA-C    baclofen (Lioresal) tablet 10 mg, 10 mg, oral, TID, Tim Granados PA-C, 10 mg at 06/01/25 1504    benzocaine-menthol (Cepastat Sore Throat) lozenge 1 lozenge, 1 lozenge, Mouth/Throat, q2h PRN, Tim Granados PA-C    budesonide (Pulmicort) 0.5 mg/2 mL nebulizer solution 0.5 mg, 0.5 mg, nebulization, BID, PO Garcia-CNP, 0.5 mg at 06/01/25 0656    butalbital-acetaminophen-caff -40 mg per tablet 1 tablet, 1 tablet, oral, q6h PRN, Tim Granados PA-C, 1 tablet at 05/19/25 7757    cholecalciferol (Vitamin D-3) tablet 125 mcg, 125 mcg, oral, Daily, Tim Granados PA-C, 125 mcg at 06/01/25 0828    collagenase 250 unit/gram ointment, , Topical, Daily, Tim Granados PA-C, Given at 06/01/25 8481    dextromethorphan-guaifenesin (Robitussin DM)   mg/5 mL oral liquid 5 mL, 5 mL, oral, q4h PRN, Tim Granados PA-C    dextrose 50 % injection 12.5 g, 12.5 g, intravenous, q15 min PRN, Tim Granados PA-C    dextrose 50 % injection 25 g, 25 g, intravenous, q15 min PRN, Tim Granados PA-C    diazePAM (Valium) tablet 5 mg, 5 mg, oral, Nightly, LANDEN Jang, 5 mg at 05/31/25 2008    enoxaparin (Lovenox) syringe 30 mg, 30 mg, subcutaneous, Daily, Tim Granados PA-C, 30 mg at 06/01/25 0828    glucagon (Glucagen) injection 1 mg, 1 mg, intramuscular, q15 min PRN, Tim Granados PA-C    glucagon (Glucagen) injection 1 mg, 1 mg, intramuscular, q15 min PRN, Tim Granados PA-C    guaiFENesin (Mucinex) 12 hr tablet 600 mg, 600 mg, oral, q12h PRN, Tim Granados PA-C    guaiFENesin (Mucinex) 12 hr tablet 600 mg, 600 mg, oral, BID PRN, Tim Granados PA-C    hydrOXYzine HCL (Atarax) tablet 25 mg, 25 mg, oral, q6h PRN, LANDEN Jang    insulin lispro injection 0-5 Units, 0-5 Units, subcutaneous, TID AC, Tim Granados PA-C, 1 Units at 05/24/25 0812    ipratropium-albuteroL (Duo-Neb) 0.5-2.5 mg/3 mL nebulizer solution 3 mL, 3 mL, nebulization, q2h PRN, Lupillo Mcneil MD, 3 mL at 05/26/25 0517    ipratropium-albuteroL (Duo-Neb) 0.5-2.5 mg/3 mL nebulizer solution 3 mL, 3 mL, nebulization, 4x daily, LANDEN Garcia, 3 mL at 06/01/25 1552    labetaloL (Normodyne,Trandate) injection 10 mg, 10 mg, intravenous, q4h PRN, Tim Granados PA-C    lidocaine (Xylocaine) 5 % ointment 1 Application, 1 Application, Topical, TID, Tim Granados PA-C, 1 Application at 06/01/25 0051    lubricating eye drops ophthalmic solution 1 drop, 1 drop, Both Eyes, PRN, Tim Granados PA-C, 1 drop at 05/31/25 1601    melatonin tablet 3 mg, 3 mg, oral, Nightly, Tim Granados PA-C, 3 mg at 05/31/25 2008    mupirocin (Bactroban) 2 % ointment 1 Application, 1 Application, Topical, BID, Tim Granados PA-C, 1 Application at 06/01/25  0829    nicotine (Nicoderm CQ) 21 mg/24 hr patch 1 patch, 1 patch, transdermal, Daily, 1 patch at 06/01/25 0829 **FOLLOWED BY** [START ON 7/1/2025] nicotine (Nicoderm CQ) 14 mg/24 hr patch 1 patch, 1 patch, transdermal, Daily **FOLLOWED BY** [START ON 7/15/2025] nicotine (Nicoderm CQ) 7 mg/24 hr patch 1 patch, 1 patch, transdermal, Daily, Tim Granados PA-C    ondansetron (Zofran) tablet 8 mg, 8 mg, oral, q8h PRN, Tim Granados PA-C    oxygen (O2) therapy, , inhalation, Continuous - Inhalation, Lupillo Mcneil MD, 3 L/min at 06/01/25 0659    pantoprazole (ProtoNix) EC tablet 40 mg, 40 mg, oral, Daily before breakfast, Tim Granados PA-C, 40 mg at 06/01/25 0634    polyethylene glycol (Glycolax, Miralax) packet 17 g, 17 g, oral, Daily PRN, Tim Granados PA-C    potassium chloride 20 mEq in sterile water for injection 100 mL, 20 mEq, intravenous, Once, Reynaldo Nolasco MD    prochlorperazine (Compazine) tablet 10 mg, 10 mg, oral, q6h PRN, Tim Granados PA-C    sennosides-docusate sodium (Annette-Colace) 8.6-50 mg per tablet 2 tablet, 2 tablet, oral, BID, Tim Granados PA-C, 2 tablet at 05/31/25 0838    sodium chloride 0.9% infusion, 80 mL/hr, intravenous, Continuous, Reynaldo Nolasco MD, Last Rate: 80 mL/hr at 06/01/25 1758, 80 mL/hr at 06/01/25 1758    sodium hypochlorite (Dakin's Quarter Strength) 0.125 % external solution, , irrigation, Daily PRN, Tim Granados PA-C, Given at 05/31/25 1602    sucralfate (Carafate) suspension 1 g, 1 g, oral, q6h [place], Tim Granados PA-C, 1 g at 06/01/25 1725

## 2025-06-01 NOTE — NURSING NOTE
Dr cadence peña into see pt, can finish current potassium ivbp (20meq), no need to give another bag , VBR

## 2025-06-01 NOTE — NURSING NOTE
Spoke with dr garcia , he is aware of bicarbonate on this after noon labs critical value of 41, VBR, message also sent and read by cadence peña.

## 2025-06-01 NOTE — ASSESSMENT & PLAN NOTE
Acute on chronic hypoxic respiratory failure   Pneumonia  Moderate right, mild to moderate left pleural effusions  Squamous cell lung carcinoma status post chemo-radiation - Keytruda  COPD   History of tobacco use / vape use  Leukocytosis, multifactorial - resolving  Stage III/IV sacral decubitus ulcer  Pressure ulcer risk  Severe protein calorie malnutrition   Hyponatremia  Hypokalemia  Metabolic alkalosis  Normocytic anemia  Chronic pain  Spinal stenosis  Scoliosis  GERD  Vitamin d deficiency  Fall risk   Severe protein calorie malnutrition    Plan: Continue current medications.  Supportive care.  Patient still hypokalemic.  Replete potassium.  Replete magnesium.  Continue IV antibiotics.  Overall prognosis poor.  Will continue to golf-hole, aspiration, decubitus, DVT precaution.  Monitor heart rate while potassium is being repleted.    Date of service: 5/25/2025    Plan: Continue current medication.  Patient had acute hypoxic respiratory failure.  Patient is on 6 L oxygen.  Continue same.  Give supportive care.  Continue antibiotic per ID.  Encourage p.o. intake.  Monitor pulse ox.  Leukocytosis resolved.  Patient has a drop in hemoglobin hematocrit.  That is multifactorial.  The patient has proximal A-fib with RVR.  Heart rate is under control.  Hypokalemia has been repleted.  Monitor for now.  Monitor for now.  Blood sugars are under control.  Overall prognosis guarded.    Date of service: 5/31/2025    Plan: Continue current medication.  Patient has hypokalemia.  Replete potassium.  Monitor CBC and BMP.  Nebulizers.  Patient continued to have hypokalemia that is being repleted every day.  Continue local wound care.  Patient has a Barnard catheter.  Fecal system has been removed.  Patient has drop in hemoglobin hematocrit. Hyponatremia is stable.  Patient is still critically malnutrition.  Heart rate is fairly controlled.  Blood sugars are fairly controlled.  Nephrology input appreciated.  We will take  fall,  aspiration, decubitus, DVT precautions.      Date of service: 6/1/2025    Continue current medications.  Patient is severe hypokalemia and hypomagnesia.  Electrolytes are being repleted.  Patient is complaining of insomnia.  Patient had A-fib with RVR but heart rate is controlled now.  Patient is negative output.  Bicarb was higher than before.  Patient is on IV fluid.  We will take DVT, fall, aspiration, decubitus and DVT precautions.

## 2025-06-02 ENCOUNTER — PHARMACY VISIT (OUTPATIENT)
Dept: PHARMACY | Facility: CLINIC | Age: 80
End: 2025-06-02
Payer: COMMERCIAL

## 2025-06-02 VITALS
OXYGEN SATURATION: 97 % | HEIGHT: 56 IN | DIASTOLIC BLOOD PRESSURE: 83 MMHG | BODY MASS INDEX: 27.28 KG/M2 | HEART RATE: 91 BPM | TEMPERATURE: 97.6 F | RESPIRATION RATE: 36 BRPM | SYSTOLIC BLOOD PRESSURE: 136 MMHG | WEIGHT: 121.25 LBS

## 2025-06-02 LAB
ALBUMIN SERPL BCP-MCNC: 2.3 G/DL (ref 3.4–5)
ANION GAP SERPL CALCULATED.3IONS-SCNC: <7 MMOL/L (ref 10–20)
BASOPHILS # BLD AUTO: 0.03 X10*3/UL (ref 0–0.1)
BASOPHILS NFR BLD AUTO: 0.4 %
BUN SERPL-MCNC: 7 MG/DL (ref 6–23)
CALCIUM SERPL-MCNC: 7.9 MG/DL (ref 8.6–10.3)
CHLORIDE SERPL-SCNC: 92 MMOL/L (ref 98–107)
CO2 SERPL-SCNC: 42 MMOL/L (ref 21–32)
CREAT SERPL-MCNC: <0.2 MG/DL (ref 0.5–1.05)
EGFRCR SERPLBLD CKD-EPI 2021: ABNORMAL ML/MIN/{1.73_M2}
EOSINOPHIL # BLD AUTO: 0.03 X10*3/UL (ref 0–0.4)
EOSINOPHIL NFR BLD AUTO: 0.4 %
ERYTHROCYTE [DISTWIDTH] IN BLOOD BY AUTOMATED COUNT: 14 % (ref 11.5–14.5)
FERRITIN SERPL-MCNC: 381 NG/ML (ref 8–150)
GLUCOSE BLD MANUAL STRIP-MCNC: 117 MG/DL (ref 74–99)
GLUCOSE BLD MANUAL STRIP-MCNC: 97 MG/DL (ref 74–99)
GLUCOSE SERPL-MCNC: 94 MG/DL (ref 74–99)
HCT VFR BLD AUTO: 28.2 % (ref 36–46)
HGB BLD-MCNC: 8.7 G/DL (ref 12–16)
IMM GRANULOCYTES # BLD AUTO: 0.13 X10*3/UL (ref 0–0.5)
IMM GRANULOCYTES NFR BLD AUTO: 1.5 % (ref 0–0.9)
IRON SATN MFR SERPL: 13 % (ref 25–45)
IRON SERPL-MCNC: 20 UG/DL (ref 35–150)
LYMPHOCYTES # BLD AUTO: 0.34 X10*3/UL (ref 0.8–3)
LYMPHOCYTES NFR BLD AUTO: 4 %
MAGNESIUM SERPL-MCNC: 1.78 MG/DL (ref 1.6–2.4)
MCH RBC QN AUTO: 30.6 PG (ref 26–34)
MCHC RBC AUTO-ENTMCNC: 30.9 G/DL (ref 32–36)
MCV RBC AUTO: 99 FL (ref 80–100)
MONOCYTES # BLD AUTO: 0.86 X10*3/UL (ref 0.05–0.8)
MONOCYTES NFR BLD AUTO: 10.1 %
NEUTROPHILS # BLD AUTO: 7.09 X10*3/UL (ref 1.6–5.5)
NEUTROPHILS NFR BLD AUTO: 83.6 %
NRBC BLD-RTO: 0 /100 WBCS (ref 0–0)
PHOSPHATE SERPL-MCNC: 2.6 MG/DL (ref 2.5–4.9)
PLATELET # BLD AUTO: 275 X10*3/UL (ref 150–450)
POTASSIUM SERPL-SCNC: 3.6 MMOL/L (ref 3.5–5.3)
RBC # BLD AUTO: 2.84 X10*6/UL (ref 4–5.2)
SODIUM SERPL-SCNC: 134 MMOL/L (ref 136–145)
TIBC SERPL-MCNC: 159 UG/DL (ref 240–445)
UIBC SERPL-MCNC: 139 UG/DL (ref 110–370)
WBC # BLD AUTO: 8.5 X10*3/UL (ref 4.4–11.3)

## 2025-06-02 PROCEDURE — 80069 RENAL FUNCTION PANEL: CPT

## 2025-06-02 PROCEDURE — 82947 ASSAY GLUCOSE BLOOD QUANT: CPT

## 2025-06-02 PROCEDURE — 2500000001 HC RX 250 WO HCPCS SELF ADMINISTERED DRUGS (ALT 637 FOR MEDICARE OP)

## 2025-06-02 PROCEDURE — 2500000004 HC RX 250 GENERAL PHARMACY W/ HCPCS (ALT 636 FOR OP/ED): Performed by: NURSE PRACTITIONER

## 2025-06-02 PROCEDURE — 94640 AIRWAY INHALATION TREATMENT: CPT

## 2025-06-02 PROCEDURE — 2500000002 HC RX 250 W HCPCS SELF ADMINISTERED DRUGS (ALT 637 FOR MEDICARE OP, ALT 636 FOR OP/ED)

## 2025-06-02 PROCEDURE — S4991 NICOTINE PATCH NONLEGEND: HCPCS

## 2025-06-02 PROCEDURE — 2500000004 HC RX 250 GENERAL PHARMACY W/ HCPCS (ALT 636 FOR OP/ED)

## 2025-06-02 PROCEDURE — 85025 COMPLETE CBC W/AUTO DIFF WBC: CPT

## 2025-06-02 PROCEDURE — 83540 ASSAY OF IRON: CPT | Performed by: NURSE PRACTITIONER

## 2025-06-02 PROCEDURE — 94668 MNPJ CHEST WALL SBSQ: CPT

## 2025-06-02 PROCEDURE — RXMED WILLOW AMBULATORY MEDICATION CHARGE

## 2025-06-02 PROCEDURE — 2500000004 HC RX 250 GENERAL PHARMACY W/ HCPCS (ALT 636 FOR OP/ED): Mod: JZ | Performed by: INTERNAL MEDICINE

## 2025-06-02 PROCEDURE — 83735 ASSAY OF MAGNESIUM: CPT | Performed by: NURSE PRACTITIONER

## 2025-06-02 PROCEDURE — 82728 ASSAY OF FERRITIN: CPT | Performed by: NURSE PRACTITIONER

## 2025-06-02 PROCEDURE — 2500000005 HC RX 250 GENERAL PHARMACY W/O HCPCS: Performed by: INTERNAL MEDICINE

## 2025-06-02 RX ORDER — IBUPROFEN 200 MG
1 TABLET ORAL DAILY
Qty: 14 PATCH | Refills: 0 | Status: SHIPPED | OUTPATIENT
Start: 2025-07-01 | End: 2025-07-15

## 2025-06-02 RX ORDER — FLUTICASONE FUROATE, UMECLIDINIUM BROMIDE AND VILANTEROL TRIFENATATE 100; 62.5; 25 UG/1; UG/1; UG/1
1 POWDER RESPIRATORY (INHALATION) DAILY
Qty: 60 EACH | Refills: 0 | Status: SHIPPED | OUTPATIENT
Start: 2025-06-02

## 2025-06-02 RX ORDER — TALC
3 POWDER (GRAM) TOPICAL NIGHTLY
Start: 2025-06-02

## 2025-06-02 RX ORDER — IPRATROPIUM BROMIDE AND ALBUTEROL SULFATE 2.5; .5 MG/3ML; MG/3ML
3 SOLUTION RESPIRATORY (INHALATION) 4 TIMES DAILY PRN
Qty: 90 ML | Refills: 5 | Status: SHIPPED | OUTPATIENT
Start: 2025-06-02 | End: 2025-06-02 | Stop reason: HOSPADM

## 2025-06-02 RX ORDER — NICOTINE 7MG/24HR
1 PATCH, TRANSDERMAL 24 HOURS TRANSDERMAL DAILY
Qty: 14 PATCH | Refills: 0 | Status: SHIPPED | OUTPATIENT
Start: 2025-07-15 | End: 2025-07-29

## 2025-06-02 RX ORDER — BUDESONIDE 0.25 MG/2ML
0.25 INHALANT ORAL
Qty: 60 ML | Refills: 0 | Status: SHIPPED | OUTPATIENT
Start: 2025-06-02 | End: 2025-06-02 | Stop reason: HOSPADM

## 2025-06-02 RX ORDER — AMOXICILLIN 250 MG
2 CAPSULE ORAL 2 TIMES DAILY
Qty: 60 TABLET | Refills: 0 | Status: SHIPPED | OUTPATIENT
Start: 2025-06-02

## 2025-06-02 RX ORDER — POLYETHYLENE GLYCOL 3350 17 G/17G
17 POWDER, FOR SOLUTION ORAL DAILY PRN
Start: 2025-06-02

## 2025-06-02 RX ORDER — HEPARIN 100 UNIT/ML
5 SYRINGE INTRAVENOUS ONCE
Status: COMPLETED | OUTPATIENT
Start: 2025-06-02 | End: 2025-06-02

## 2025-06-02 RX ORDER — HONEY 100 %
1 PASTE (ML) TOPICAL AS NEEDED
Start: 2025-06-02

## 2025-06-02 RX ORDER — ALBUTEROL SULFATE 90 UG/1
2 INHALANT RESPIRATORY (INHALATION) EVERY 4 HOURS PRN
Qty: 8.5 G | Refills: 0 | Status: SHIPPED | OUTPATIENT
Start: 2025-06-02

## 2025-06-02 RX ORDER — TRAZODONE HYDROCHLORIDE 50 MG/1
25 TABLET ORAL NIGHTLY
Qty: 15 TABLET | Refills: 0 | Status: SHIPPED | OUTPATIENT
Start: 2025-06-02

## 2025-06-02 RX ORDER — IBUPROFEN 200 MG
1 TABLET ORAL DAILY
Qty: 28 PATCH | Refills: 0 | Status: SHIPPED | OUTPATIENT
Start: 2025-06-03 | End: 2025-07-01

## 2025-06-02 RX ADMIN — SODIUM CHLORIDE 80 ML/HR: 900 INJECTION, SOLUTION INTRAVENOUS at 01:07

## 2025-06-02 RX ADMIN — Medication 2 L/MIN: at 07:27

## 2025-06-02 RX ADMIN — SUCRALFATE ORAL SUSPENSION 1 G: 1 SUSPENSION ORAL at 05:17

## 2025-06-02 RX ADMIN — ACETAMINOPHEN 650 MG: 325 TABLET ORAL at 16:39

## 2025-06-02 RX ADMIN — SUCRALFATE ORAL SUSPENSION 1 G: 1 SUSPENSION ORAL at 00:57

## 2025-06-02 RX ADMIN — LIDOCAINE 1 APPLICATION: 50 OINTMENT TOPICAL at 00:58

## 2025-06-02 RX ADMIN — Medication 125 MCG: at 09:27

## 2025-06-02 RX ADMIN — IPRATROPIUM BROMIDE AND ALBUTEROL SULFATE 3 ML: 2.5; .5 SOLUTION RESPIRATORY (INHALATION) at 10:55

## 2025-06-02 RX ADMIN — MUPIROCIN 1 APPLICATION: 20 OINTMENT TOPICAL at 09:29

## 2025-06-02 RX ADMIN — SUCRALFATE ORAL SUSPENSION 1 G: 1 SUSPENSION ORAL at 12:37

## 2025-06-02 RX ADMIN — ACETAMINOPHEN 650 MG: 325 TABLET ORAL at 10:06

## 2025-06-02 RX ADMIN — BUDESONIDE 0.5 MG: 0.5 INHALANT ORAL at 07:24

## 2025-06-02 RX ADMIN — PANTOPRAZOLE SODIUM 40 MG: 40 TABLET, DELAYED RELEASE ORAL at 05:17

## 2025-06-02 RX ADMIN — IPRATROPIUM BROMIDE AND ALBUTEROL SULFATE 3 ML: 2.5; .5 SOLUTION RESPIRATORY (INHALATION) at 07:25

## 2025-06-02 RX ADMIN — SENNOSIDES AND DOCUSATE SODIUM 2 TABLET: 50; 8.6 TABLET ORAL at 09:27

## 2025-06-02 RX ADMIN — COLLAGENASE SANTYL: 250 OINTMENT TOPICAL at 09:30

## 2025-06-02 RX ADMIN — BACLOFEN 10 MG: 10 TABLET ORAL at 16:39

## 2025-06-02 RX ADMIN — HEPARIN 500 UNITS: 100 SYRINGE at 16:45

## 2025-06-02 RX ADMIN — DAKIN'S SOLUTION 0.125% (QUARTER STRENGTH): 0.12 SOLUTION at 09:29

## 2025-06-02 RX ADMIN — LIDOCAINE 1 APPLICATION: 50 OINTMENT TOPICAL at 12:38

## 2025-06-02 RX ADMIN — NICOTINE 1 PATCH: 21 PATCH, EXTENDED RELEASE TRANSDERMAL at 09:26

## 2025-06-02 RX ADMIN — ENOXAPARIN SODIUM 30 MG: 100 INJECTION SUBCUTANEOUS at 09:26

## 2025-06-02 RX ADMIN — ACETAMINOPHEN 650 MG: 325 TABLET ORAL at 00:57

## 2025-06-02 RX ADMIN — BACLOFEN 10 MG: 10 TABLET ORAL at 09:27

## 2025-06-02 ASSESSMENT — COGNITIVE AND FUNCTIONAL STATUS - GENERAL
TURNING FROM BACK TO SIDE WHILE IN FLAT BAD: A LITTLE
DRESSING REGULAR LOWER BODY CLOTHING: A LOT
DAILY ACTIVITIY SCORE: 11
MOVING FROM LYING ON BACK TO SITTING ON SIDE OF FLAT BED WITH BEDRAILS: A LITTLE
DRESSING REGULAR UPPER BODY CLOTHING: A LOT
EATING MEALS: A LITTLE
HELP NEEDED FOR BATHING: TOTAL
CLIMB 3 TO 5 STEPS WITH RAILING: TOTAL
MOBILITY SCORE: 12
STANDING UP FROM CHAIR USING ARMS: A LOT
WALKING IN HOSPITAL ROOM: TOTAL
TOILETING: TOTAL
MOVING TO AND FROM BED TO CHAIR: A LOT
PERSONAL GROOMING: A LOT

## 2025-06-02 ASSESSMENT — PAIN SCALES - GENERAL: PAINLEVEL_OUTOF10: 0 - NO PAIN

## 2025-06-02 NOTE — PROGRESS NOTES
Karly Horton is a 80 y.o. female on day 15 of admission presenting with Generalized weakness.    Patient with hypokalemia and hypomagnesia, will not dc today. [facility] updated via [employer].     Sharri Dasilva RN    Update: Patient will discharge home today. [facility] notified [facility] via [employer]. Also notified them that patient's daughter Karly Pro requests to be  as patient is [facility].   Stretcher transport requested via [employer] for 6pm, patient will be on 3L[facility].  Patient and daughter have been updated.  DC IM completed.

## 2025-06-02 NOTE — PROGRESS NOTES
"Karly Horton is a 80 y.o. female on day 15 of admission presenting with Generalized weakness.    Subjective   Seen for metabolic alkalosis she did have a lot of stool loss through using she looks which was discontinued also had been receiving IV normal saline it looks like the patient had saline responsive metabolic alkalosis also she has hypercapnia awake and responsive she feels fine she was to go home       Objective     Physical Exam  Neck:      Vascular: No carotid bruit.   Cardiovascular:      Rate and Rhythm: Normal rate and regular rhythm.      Heart sounds: No murmur heard.     No friction rub. No gallop.   Pulmonary:      Breath sounds: No wheezing, rhonchi or rales.   Chest:      Chest wall: No tenderness.   Abdominal:      General: There is no distension.      Tenderness: There is no abdominal tenderness. There is no guarding or rebound.   Musculoskeletal:         General: No swelling or tenderness.      Cervical back: Neck supple.      Right lower leg: No edema.      Left lower leg: No edema.   Lymphadenopathy:      Cervical: No cervical adenopathy.         Last Recorded Vitals  Blood pressure 112/60, pulse 93, temperature 36.7 °C (98.1 °F), temperature source Oral, resp. rate 20, height 1.422 m (4' 8\"), weight 55 kg (121 lb 4.1 oz), SpO2 97%.    Intake/Output last 3 Shifts:  I/O last 3 completed shifts:  In: 4024.1 (126.5 mL/kg) [P.O.:1770; I.V.:2054.1 (64.6 mL/kg); IV Piggyback:200]  Out: 4050 (127.4 mL/kg) [Urine:4050 (3.5 mL/kg/hr)]  Dosing Weight: 31.8 kg   Current Medications[1]   Relevant Results    Results for orders placed or performed during the hospital encounter of 05/18/25 (from the past 96 hours)   POCT GLUCOSE   Result Value Ref Range    POCT Glucose 139 (H) 74 - 99 mg/dL   Sodium, Urine Random   Result Value Ref Range    Sodium, Urine Random <10 mmol/L    Creatinine, Urine Random 15.8 (L) 20.0 - 320.0 mg/dL    Sodium/Creatinine Ratio     Osmolality, Urine   Result Value Ref Range    " Osmolality, Urine Random 285 200 - 1,200 mOsm/kg   Chloride, Urine Random   Result Value Ref Range    Chloride, Urine Random <15 mmol/L    Creatinine, Urine Random 15.9 (L) 20.0 - 320.0 mg/dL    Chloride/Creatinine Ratio     Calcium, Urine  Random   Result Value Ref Range    Calcium, Urine Random 24.3 mg/dL    Creatinine, Urine Random 15.9 (L) 20.0 - 320.0 mg/dL    Calcium/Creatinine ratio 1,528 (H) 0 - 209 mg/g Creat   Magnesium, Urine Random   Result Value Ref Range    Magnesium, Urine Random 26.09 mg/dL    Creatinine, Urine Random 15.7 (L) 20.0 - 320.0 mg/dL    Magnesium/Creatinine Ratio 1,661.8 Not established. mg/g Creat   Creatinine, Urine Random   Result Value Ref Range    Creatinine, Urine Random 15.8 (L) 20.0 - 320.0 mg/dL   Renal function panel   Result Value Ref Range    Glucose 100 (H) 74 - 99 mg/dL    Sodium 130 (L) 136 - 145 mmol/L    Potassium 3.8 3.5 - 5.3 mmol/L    Chloride 87 (L) 98 - 107 mmol/L    Bicarbonate 39 (H) 21 - 32 mmol/L    Anion Gap 8 (L) 10 - 20 mmol/L    Urea Nitrogen 14 6 - 23 mg/dL    Creatinine <0.20 (L) 0.50 - 1.05 mg/dL    eGFR      Calcium 8.1 (L) 8.6 - 10.3 mg/dL    Phosphorus 3.9 2.5 - 4.9 mg/dL    Albumin 2.6 (L) 3.4 - 5.0 g/dL   Magnesium   Result Value Ref Range    Magnesium 1.92 1.60 - 2.40 mg/dL   Renin Activity   Result Value Ref Range    Renin Activity 0.5 ng/mL/hr   Aldosterone, Serum   Result Value Ref Range    Aldosterone <3.0 ng/dL   POCT GLUCOSE   Result Value Ref Range    POCT Glucose 119 (H) 74 - 99 mg/dL   POCT GLUCOSE   Result Value Ref Range    POCT Glucose 91 74 - 99 mg/dL   CBC and Auto Differential   Result Value Ref Range    WBC 6.9 4.4 - 11.3 x10*3/uL    nRBC 0.0 0.0 - 0.0 /100 WBCs    RBC 3.08 (L) 4.00 - 5.20 x10*6/uL    Hemoglobin 9.5 (L) 12.0 - 16.0 g/dL    Hematocrit 30.1 (L) 36.0 - 46.0 %    MCV 98 80 - 100 fL    MCH 30.8 26.0 - 34.0 pg    MCHC 31.6 (L) 32.0 - 36.0 g/dL    RDW 14.0 11.5 - 14.5 %    Platelets 247 150 - 450 x10*3/uL    Neutrophils %  80.7 40.0 - 80.0 %    Immature Granulocytes %, Automated 1.9 (H) 0.0 - 0.9 %    Lymphocytes % 3.9 13.0 - 44.0 %    Monocytes % 12.9 2.0 - 10.0 %    Eosinophils % 0.3 0.0 - 6.0 %    Basophils % 0.3 0.0 - 2.0 %    Neutrophils Absolute 5.58 (H) 1.60 - 5.50 x10*3/uL    Immature Granulocytes Absolute, Automated 0.13 0.00 - 0.50 x10*3/uL    Lymphocytes Absolute 0.27 (L) 0.80 - 3.00 x10*3/uL    Monocytes Absolute 0.89 (H) 0.05 - 0.80 x10*3/uL    Eosinophils Absolute 0.02 0.00 - 0.40 x10*3/uL    Basophils Absolute 0.02 0.00 - 0.10 x10*3/uL   Renal function panel   Result Value Ref Range    Glucose 86 74 - 99 mg/dL    Sodium 134 (L) 136 - 145 mmol/L    Potassium 3.7 3.5 - 5.3 mmol/L    Chloride 92 (L) 98 - 107 mmol/L    Bicarbonate 38 (H) 21 - 32 mmol/L    Anion Gap 8 (L) 10 - 20 mmol/L    Urea Nitrogen 9 6 - 23 mg/dL    Creatinine <0.20 (L) 0.50 - 1.05 mg/dL    eGFR      Calcium 8.1 (L) 8.6 - 10.3 mg/dL    Phosphorus 3.2 2.5 - 4.9 mg/dL    Albumin 2.4 (L) 3.4 - 5.0 g/dL   Magnesium   Result Value Ref Range    Magnesium 1.75 1.60 - 2.40 mg/dL   POCT GLUCOSE   Result Value Ref Range    POCT Glucose 97 74 - 99 mg/dL   Procalcitonin   Result Value Ref Range    Procalcitonin 0.05 <=0.07 ng/mL   ECG 12 lead   Result Value Ref Range    Ventricular Rate 108 BPM    Atrial Rate 108 BPM    VA Interval 130 ms    QRS Duration 78 ms    QT Interval 332 ms    QTC Calculation(Bazett) 444 ms    P Axis 67 degrees    R Axis -66 degrees    T Axis 6 degrees    QRS Count 17 beats    Q Onset 213 ms    P Onset 148 ms    P Offset 194 ms    T Offset 379 ms    QTC Fredericia 403 ms   POCT GLUCOSE   Result Value Ref Range    POCT Glucose 153 (H) 74 - 99 mg/dL   POCT GLUCOSE   Result Value Ref Range    POCT Glucose 156 (H) 74 - 99 mg/dL   POCT GLUCOSE   Result Value Ref Range    POCT Glucose 147 (H) 74 - 99 mg/dL   CBC and Auto Differential   Result Value Ref Range    WBC 8.5 4.4 - 11.3 x10*3/uL    nRBC 0.0 0.0 - 0.0 /100 WBCs    RBC 2.83 (L) 4.00 -  5.20 x10*6/uL    Hemoglobin 8.6 (L) 12.0 - 16.0 g/dL    Hematocrit 27.1 (L) 36.0 - 46.0 %    MCV 96 80 - 100 fL    MCH 30.4 26.0 - 34.0 pg    MCHC 31.7 (L) 32.0 - 36.0 g/dL    RDW 13.7 11.5 - 14.5 %    Platelets 255 150 - 450 x10*3/uL    Neutrophils % 82.7 40.0 - 80.0 %    Immature Granulocytes %, Automated 2.4 (H) 0.0 - 0.9 %    Lymphocytes % 3.0 13.0 - 44.0 %    Monocytes % 11.5 2.0 - 10.0 %    Eosinophils % 0.2 0.0 - 6.0 %    Basophils % 0.2 0.0 - 2.0 %    Neutrophils Absolute 7.01 (H) 1.60 - 5.50 x10*3/uL    Immature Granulocytes Absolute, Automated 0.20 0.00 - 0.50 x10*3/uL    Lymphocytes Absolute 0.25 (L) 0.80 - 3.00 x10*3/uL    Monocytes Absolute 0.97 (H) 0.05 - 0.80 x10*3/uL    Eosinophils Absolute 0.02 0.00 - 0.40 x10*3/uL    Basophils Absolute 0.02 0.00 - 0.10 x10*3/uL   Renal function panel   Result Value Ref Range    Glucose 100 (H) 74 - 99 mg/dL    Sodium 134 (L) 136 - 145 mmol/L    Potassium 3.1 (L) 3.5 - 5.3 mmol/L    Chloride 94 (L) 98 - 107 mmol/L    Bicarbonate 38 (H) 21 - 32 mmol/L    Anion Gap <7 (L) 10 - 20 mmol/L    Urea Nitrogen 6 6 - 23 mg/dL    Creatinine <0.20 (L) 0.50 - 1.05 mg/dL    eGFR      Calcium 8.0 (L) 8.6 - 10.3 mg/dL    Phosphorus 2.3 (L) 2.5 - 4.9 mg/dL    Albumin 2.2 (L) 3.4 - 5.0 g/dL   Magnesium   Result Value Ref Range    Magnesium 1.41 (L) 1.60 - 2.40 mg/dL   POCT GLUCOSE   Result Value Ref Range    POCT Glucose 104 (H) 74 - 99 mg/dL   POCT GLUCOSE   Result Value Ref Range    POCT Glucose 102 (H) 74 - 99 mg/dL   POCT GLUCOSE   Result Value Ref Range    POCT Glucose 123 (H) 74 - 99 mg/dL   POCT GLUCOSE   Result Value Ref Range    POCT Glucose 140 (H) 74 - 99 mg/dL   CBC and Auto Differential   Result Value Ref Range    WBC 9.9 4.4 - 11.3 x10*3/uL    nRBC 0.0 0.0 - 0.0 /100 WBCs    RBC 3.03 (L) 4.00 - 5.20 x10*6/uL    Hemoglobin 9.2 (L) 12.0 - 16.0 g/dL    Hematocrit 28.9 (L) 36.0 - 46.0 %    MCV 95 80 - 100 fL    MCH 30.4 26.0 - 34.0 pg    MCHC 31.8 (L) 32.0 - 36.0 g/dL     RDW 13.7 11.5 - 14.5 %    Platelets 279 150 - 450 x10*3/uL    Neutrophils % 84.5 40.0 - 80.0 %    Immature Granulocytes %, Automated 2.2 (H) 0.0 - 0.9 %    Lymphocytes % 2.7 13.0 - 44.0 %    Monocytes % 10.1 2.0 - 10.0 %    Eosinophils % 0.3 0.0 - 6.0 %    Basophils % 0.2 0.0 - 2.0 %    Neutrophils Absolute 8.32 (H) 1.60 - 5.50 x10*3/uL    Immature Granulocytes Absolute, Automated 0.22 0.00 - 0.50 x10*3/uL    Lymphocytes Absolute 0.27 (L) 0.80 - 3.00 x10*3/uL    Monocytes Absolute 0.99 (H) 0.05 - 0.80 x10*3/uL    Eosinophils Absolute 0.03 0.00 - 0.40 x10*3/uL    Basophils Absolute 0.02 0.00 - 0.10 x10*3/uL   Renal function panel   Result Value Ref Range    Glucose 137 (H) 74 - 99 mg/dL    Sodium 133 (L) 136 - 145 mmol/L    Potassium 2.9 (LL) 3.5 - 5.3 mmol/L    Chloride 92 (L) 98 - 107 mmol/L    Bicarbonate 37 (H) 21 - 32 mmol/L    Anion Gap 7 (L) 10 - 20 mmol/L    Urea Nitrogen 8 6 - 23 mg/dL    Creatinine <0.20 (L) 0.50 - 1.05 mg/dL    eGFR      Calcium 7.9 (L) 8.6 - 10.3 mg/dL    Phosphorus 2.2 (L) 2.5 - 4.9 mg/dL    Albumin 2.3 (L) 3.4 - 5.0 g/dL   Magnesium   Result Value Ref Range    Magnesium 1.49 (L) 1.60 - 2.40 mg/dL   POCT GLUCOSE   Result Value Ref Range    POCT Glucose 115 (H) 74 - 99 mg/dL   POCT GLUCOSE   Result Value Ref Range    POCT Glucose 151 (H) 74 - 99 mg/dL   Blood Gas Arterial Full Panel   Result Value Ref Range    POCT pH, Arterial 7.44 (H) 7.38 - 7.42 pH    POCT pCO2, Arterial 63 (H) 38 - 42 mm Hg    POCT pO2, Arterial 66 (L) 85 - 95 mm Hg    POCT SO2, Arterial 96 94 - 100 %    POCT Oxy Hemoglobin, Arterial 91.0 (L) 94.0 - 98.0 %    POCT Hematocrit Calculated, Arterial 32.0 (L) 36.0 - 46.0 %    POCT Sodium, Arterial 130 (L) 136 - 145 mmol/L    POCT Potassium, Arterial 4.1 3.5 - 5.3 mmol/L    POCT Chloride, Arterial 91 (L) 98 - 107 mmol/L    POCT Ionized Calcium, Arterial 1.20 1.10 - 1.33 mmol/L    POCT Glucose, Arterial 174 (H) 74 - 99 mg/dL    POCT Lactate, Arterial 1.4 0.4 - 2.0 mmol/L     POCT Base Excess, Arterial 16.1 (H) -2.0 - 3.0 mmol/L    POCT HCO3 Calculated, Arterial 42.8 (H) 22.0 - 26.0 mmol/L    POCT Hemoglobin, Arterial 10.6 (L) 12.0 - 16.0 g/dL    POCT Anion Gap, Arterial 0 (L) 10 - 25 mmo/L    Patient Temperature 37.0 degrees Celsius    FiO2 32 %    Apparatus CANNULA     Site of Arterial Puncture Brachial Right     Christopher's Test Negative    Renal Function Panel   Result Value Ref Range    Glucose 126 (H) 74 - 99 mg/dL    Sodium 134 (L) 136 - 145 mmol/L    Potassium 3.7 3.5 - 5.3 mmol/L    Chloride 90 (L) 98 - 107 mmol/L    Bicarbonate 41 (HH) 21 - 32 mmol/L    Anion Gap 7 (L) 10 - 20 mmol/L    Urea Nitrogen 13 6 - 23 mg/dL    Creatinine <0.20 (L) 0.50 - 1.05 mg/dL    eGFR      Calcium 8.2 (L) 8.6 - 10.3 mg/dL    Phosphorus 2.3 (L) 2.5 - 4.9 mg/dL    Albumin 2.4 (L) 3.4 - 5.0 g/dL   POCT GLUCOSE   Result Value Ref Range    POCT Glucose 123 (H) 74 - 99 mg/dL   POCT GLUCOSE   Result Value Ref Range    POCT Glucose 113 (H) 74 - 99 mg/dL   CBC and Auto Differential   Result Value Ref Range    WBC 8.5 4.4 - 11.3 x10*3/uL    nRBC 0.0 0.0 - 0.0 /100 WBCs    RBC 2.84 (L) 4.00 - 5.20 x10*6/uL    Hemoglobin 8.7 (L) 12.0 - 16.0 g/dL    Hematocrit 28.2 (L) 36.0 - 46.0 %    MCV 99 80 - 100 fL    MCH 30.6 26.0 - 34.0 pg    MCHC 30.9 (L) 32.0 - 36.0 g/dL    RDW 14.0 11.5 - 14.5 %    Platelets 275 150 - 450 x10*3/uL    Neutrophils % 83.6 40.0 - 80.0 %    Immature Granulocytes %, Automated 1.5 (H) 0.0 - 0.9 %    Lymphocytes % 4.0 13.0 - 44.0 %    Monocytes % 10.1 2.0 - 10.0 %    Eosinophils % 0.4 0.0 - 6.0 %    Basophils % 0.4 0.0 - 2.0 %    Neutrophils Absolute 7.09 (H) 1.60 - 5.50 x10*3/uL    Immature Granulocytes Absolute, Automated 0.13 0.00 - 0.50 x10*3/uL    Lymphocytes Absolute 0.34 (L) 0.80 - 3.00 x10*3/uL    Monocytes Absolute 0.86 (H) 0.05 - 0.80 x10*3/uL    Eosinophils Absolute 0.03 0.00 - 0.40 x10*3/uL    Basophils Absolute 0.03 0.00 - 0.10 x10*3/uL   Renal function panel   Result Value Ref  Range    Glucose 94 74 - 99 mg/dL    Sodium 134 (L) 136 - 145 mmol/L    Potassium 3.6 3.5 - 5.3 mmol/L    Chloride 92 (L) 98 - 107 mmol/L    Bicarbonate 42 (HH) 21 - 32 mmol/L    Anion Gap <7 (L) 10 - 20 mmol/L    Urea Nitrogen 7 6 - 23 mg/dL    Creatinine <0.20 (L) 0.50 - 1.05 mg/dL    eGFR      Calcium 7.9 (L) 8.6 - 10.3 mg/dL    Phosphorus 2.6 2.5 - 4.9 mg/dL    Albumin 2.3 (L) 3.4 - 5.0 g/dL   POCT GLUCOSE   Result Value Ref Range    POCT Glucose 97 74 - 99 mg/dL       Assessment/Plan   Metabolic alkalosis probably from GI loss hypercapnia also hypokalemia   Pneumonia  Urinary retention  History of squamous cell lung cancer  Hypophosphatemia, mild  Mild hypomagnesemia, will work up, may be PPI induced GI mag wasting  Hypokalemia, mild in the setting of hypomagnesemia      Larry Nolasco MD         [1]   Current Facility-Administered Medications:     acetaminophen (Tylenol) tablet 650 mg, 650 mg, oral, TID, Tim Granados PA-C, 650 mg at 06/02/25 1006    alteplase (Cathflo Activase) injection 2 mg, 2 mg, intra-catheter, PRN, Tim Granados PA-C    baclofen (Lioresal) tablet 10 mg, 10 mg, oral, TID, Tim Granados PA-C, 10 mg at 06/02/25 0927    benzocaine-menthol (Cepastat Sore Throat) lozenge 1 lozenge, 1 lozenge, Mouth/Throat, q2h PRN, Tim Granados PA-C    budesonide (Pulmicort) 0.5 mg/2 mL nebulizer solution 0.5 mg, 0.5 mg, nebulization, BID, PO Garcia-CNP, 0.5 mg at 06/02/25 0724    butalbital-acetaminophen-caff -40 mg per tablet 1 tablet, 1 tablet, oral, q6h PRN, Tim Granados PA-C, 1 tablet at 05/19/25 0092    cholecalciferol (Vitamin D-3) tablet 125 mcg, 125 mcg, oral, Daily, Tim Granados PA-C, 125 mcg at 06/02/25 0927    collagenase 250 unit/gram ointment, , Topical, Daily, Tim Granados PA-C, Given at 06/02/25 0930    dextromethorphan-guaifenesin (Robitussin DM)  mg/5 mL oral liquid 5 mL, 5 mL, oral, q4h PRN, Tim Granados PA-C    dextrose 50 %  injection 12.5 g, 12.5 g, intravenous, q15 min PRN, Tim Granados PA-C    dextrose 50 % injection 25 g, 25 g, intravenous, q15 min PRN, Tim Granados PA-C    diazePAM (Valium) tablet 5 mg, 5 mg, oral, Nightly, LANDEN Jang, 5 mg at 06/01/25 2120    enoxaparin (Lovenox) syringe 30 mg, 30 mg, subcutaneous, Daily, Tim Granados PA-C, 30 mg at 06/02/25 0926    glucagon (Glucagen) injection 1 mg, 1 mg, intramuscular, q15 min PRN, Tim Granados PA-C    glucagon (Glucagen) injection 1 mg, 1 mg, intramuscular, q15 min PRN, Tim Granados PA-C    guaiFENesin (Mucinex) 12 hr tablet 600 mg, 600 mg, oral, q12h PRN, Tim Granados PA-C    guaiFENesin (Mucinex) 12 hr tablet 600 mg, 600 mg, oral, BID PRN, Tim Granados PA-C    hydrOXYzine HCL (Atarax) tablet 25 mg, 25 mg, oral, q6h PRN, LANDEN Jang    insulin lispro injection 0-5 Units, 0-5 Units, subcutaneous, TID AC, Tim Granados PA-C, 1 Units at 05/24/25 0812    ipratropium-albuteroL (Duo-Neb) 0.5-2.5 mg/3 mL nebulizer solution 3 mL, 3 mL, nebulization, q2h PRN, Lupillo Mcneil MD, 3 mL at 05/26/25 0517    ipratropium-albuteroL (Duo-Neb) 0.5-2.5 mg/3 mL nebulizer solution 3 mL, 3 mL, nebulization, 4x daily, LANDEN Garcia, 3 mL at 06/02/25 0725    labetaloL (Normodyne,Trandate) injection 10 mg, 10 mg, intravenous, q4h PRN, Tim Granados PA-C    lidocaine (Xylocaine) 5 % ointment 1 Application, 1 Application, Topical, TID, Tim Granados PA-C, 1 Application at 06/02/25 0058    lubricating eye drops ophthalmic solution 1 drop, 1 drop, Both Eyes, PRN, Tim Granados PA-C, 1 drop at 05/31/25 1601    melatonin tablet 3 mg, 3 mg, oral, Nightly, Tim Granados PA-C, 3 mg at 06/01/25 2120    mupirocin (Bactroban) 2 % ointment 1 Application, 1 Application, Topical, BID, Tim Granados PA-C, 1 Application at 06/02/25 0929    nicotine (Nicoderm CQ) 21 mg/24 hr patch 1 patch, 1 patch, transdermal, Daily, 1  patch at 06/02/25 0926 **FOLLOWED BY** [START ON 7/1/2025] nicotine (Nicoderm CQ) 14 mg/24 hr patch 1 patch, 1 patch, transdermal, Daily **FOLLOWED BY** [START ON 7/15/2025] nicotine (Nicoderm CQ) 7 mg/24 hr patch 1 patch, 1 patch, transdermal, Daily, Tim Granados PA-C    ondansetron (Zofran) tablet 8 mg, 8 mg, oral, q8h PRN, Tim Granados PA-C    oxygen (O2) therapy, , inhalation, Continuous - Inhalation, Lupillo Mcneil MD, 2 L/min at 06/02/25 0727    pantoprazole (ProtoNix) EC tablet 40 mg, 40 mg, oral, Daily before breakfast, Tim Granados PA-C, 40 mg at 06/02/25 0517    polyethylene glycol (Glycolax, Miralax) packet 17 g, 17 g, oral, Daily PRN, Tim Granados PA-C    potassium chloride 20 mEq in sterile water for injection 100 mL, 20 mEq, intravenous, Once, Reynaldo Nolasco MD    prochlorperazine (Compazine) tablet 10 mg, 10 mg, oral, q6h PRN, Tim Granados PA-C    sennosides-docusate sodium (Annette-Colace) 8.6-50 mg per tablet 2 tablet, 2 tablet, oral, BID, Tim Granados PA-C, 2 tablet at 06/02/25 0927    sodium chloride 0.9% infusion, 80 mL/hr, intravenous, Continuous, Reynaldo Nolasco MD, Last Rate: 80 mL/hr at 06/02/25 0436, 80 mL/hr at 06/02/25 0436    sodium hypochlorite (Dakin's Quarter Strength) 0.125 % external solution, , irrigation, Daily PRN, Tim Granados PA-C, Given at 06/02/25 0929    sucralfate (Carafate) suspension 1 g, 1 g, oral, q6h [place], Tim Granados PA-C, 1 g at 06/02/25 0517    traZODone (Desyrel) tablet 25 mg, 25 mg, oral, Nightly, Lupillo Mcneil MD, 25 mg at 06/01/25 2120

## 2025-06-02 NOTE — PROGRESS NOTES
Karly Horton is a 80 y.o. female on day 15 of admission presenting with Generalized weakness.    Subjective   Patient seen and examined.  Resting in bed in no acute distress.  Awake alert oriented x 3.  No complaints.  Shortness of breath improved baseline.  Buttock pain controlled.  Anxiety improved.  Asking to go home.     Spoke with nursing, no new issues.    Objective     Physical Exam  Vitals and nursing note reviewed.   Constitutional:       General: She is not in acute distress.     Appearance: Normal appearance. She is normal weight. She is ill-appearing. She is not toxic-appearing or diaphoretic.   HENT:      Head: Normocephalic and atraumatic.      Right Ear: External ear normal.      Left Ear: External ear normal.      Nose: Nose normal.      Mouth/Throat:      Mouth: Mucous membranes are moist.      Pharynx: Oropharynx is clear.   Eyes:      Extraocular Movements: Extraocular movements intact.      Conjunctiva/sclera: Conjunctivae normal.      Pupils: Pupils are equal, round, and reactive to light.   Cardiovascular:      Rate and Rhythm: Regular rhythm. Tachycardia present.      Pulses: Normal pulses.      Heart sounds: Normal heart sounds. No murmur heard.  Pulmonary:      Effort: Pulmonary effort is normal. No tachypnea or respiratory distress.      Breath sounds: Decreased breath sounds present. No wheezing, rhonchi or rales.      Comments: 2 liters nasal cannula.  Abdominal:      General: Bowel sounds are normal. There is no distension.      Palpations: Abdomen is soft.      Tenderness: There is no abdominal tenderness. There is no guarding.   Genitourinary:     Comments: : Barnard catheter in place draining clear yellow urine.  Fecal management system has been removed.  Musculoskeletal:         General: No swelling or tenderness. Normal range of motion.      Cervical back: Normal range of motion and neck supple.      Right lower leg: No edema.      Left lower leg: No edema.   Skin:     General:  "Skin is warm and dry.      Capillary Refill: Capillary refill takes less than 2 seconds.      Comments: Right chest port in place dressing clean dry intact.  Sacrum/buttock not examined.   Neurological:      General: No focal deficit present.      Mental Status: She is alert and oriented to person, place, and time.      Motor: Weakness present.      Comments: Awake alert oriented x 3. Forgetful. Follows all commands. Generalized weakness. No focal weakness. Gait deferred.    Psychiatric:         Mood and Affect: Mood normal.         Behavior: Behavior normal.       Last Recorded Vitals  Blood pressure 125/66, pulse 102, temperature 36.3 °C (97.3 °F), temperature source Axillary, resp. rate 22, height 1.422 m (4' 8\"), weight 55 kg (121 lb 4.1 oz), SpO2 100%.    Intake/Output last 3 Shifts:  I/O last 3 completed shifts:  In: 4024.1 (126.5 mL/kg) [P.O.:1770; I.V.:2054.1 (64.6 mL/kg); IV Piggyback:200]  Out: 4050 (127.4 mL/kg) [Urine:4050 (3.5 mL/kg/hr)]  Dosing Weight: 31.8 kg     Telemetry sinus tachycardia    Relevant Results  This patient has a central line   Reason for the central line remaining today? Parenteral medication    This patient has a urinary catheter   Reason for the urinary catheter remaining today? urinary retention/bladder outlet obstruction, acute or chronic    Results for orders placed or performed during the hospital encounter of 05/18/25 (from the past 24 hours)   Blood Gas Arterial Full Panel   Result Value Ref Range    POCT pH, Arterial 7.44 (H) 7.38 - 7.42 pH    POCT pCO2, Arterial 63 (H) 38 - 42 mm Hg    POCT pO2, Arterial 66 (L) 85 - 95 mm Hg    POCT SO2, Arterial 96 94 - 100 %    POCT Oxy Hemoglobin, Arterial 91.0 (L) 94.0 - 98.0 %    POCT Hematocrit Calculated, Arterial 32.0 (L) 36.0 - 46.0 %    POCT Sodium, Arterial 130 (L) 136 - 145 mmol/L    POCT Potassium, Arterial 4.1 3.5 - 5.3 mmol/L    POCT Chloride, Arterial 91 (L) 98 - 107 mmol/L    POCT Ionized Calcium, Arterial 1.20 1.10 - 1.33 " mmol/L    POCT Glucose, Arterial 174 (H) 74 - 99 mg/dL    POCT Lactate, Arterial 1.4 0.4 - 2.0 mmol/L    POCT Base Excess, Arterial 16.1 (H) -2.0 - 3.0 mmol/L    POCT HCO3 Calculated, Arterial 42.8 (H) 22.0 - 26.0 mmol/L    POCT Hemoglobin, Arterial 10.6 (L) 12.0 - 16.0 g/dL    POCT Anion Gap, Arterial 0 (L) 10 - 25 mmo/L    Patient Temperature 37.0 degrees Celsius    FiO2 32 %    Apparatus CANNULA     Site of Arterial Puncture Brachial Right     Christopher's Test Negative    Renal Function Panel   Result Value Ref Range    Glucose 126 (H) 74 - 99 mg/dL    Sodium 134 (L) 136 - 145 mmol/L    Potassium 3.7 3.5 - 5.3 mmol/L    Chloride 90 (L) 98 - 107 mmol/L    Bicarbonate 41 (HH) 21 - 32 mmol/L    Anion Gap 7 (L) 10 - 20 mmol/L    Urea Nitrogen 13 6 - 23 mg/dL    Creatinine <0.20 (L) 0.50 - 1.05 mg/dL    eGFR      Calcium 8.2 (L) 8.6 - 10.3 mg/dL    Phosphorus 2.3 (L) 2.5 - 4.9 mg/dL    Albumin 2.4 (L) 3.4 - 5.0 g/dL   POCT GLUCOSE   Result Value Ref Range    POCT Glucose 123 (H) 74 - 99 mg/dL   POCT GLUCOSE   Result Value Ref Range    POCT Glucose 113 (H) 74 - 99 mg/dL   CBC and Auto Differential   Result Value Ref Range    WBC 8.5 4.4 - 11.3 x10*3/uL    nRBC 0.0 0.0 - 0.0 /100 WBCs    RBC 2.84 (L) 4.00 - 5.20 x10*6/uL    Hemoglobin 8.7 (L) 12.0 - 16.0 g/dL    Hematocrit 28.2 (L) 36.0 - 46.0 %    MCV 99 80 - 100 fL    MCH 30.6 26.0 - 34.0 pg    MCHC 30.9 (L) 32.0 - 36.0 g/dL    RDW 14.0 11.5 - 14.5 %    Platelets 275 150 - 450 x10*3/uL    Neutrophils % 83.6 40.0 - 80.0 %    Immature Granulocytes %, Automated 1.5 (H) 0.0 - 0.9 %    Lymphocytes % 4.0 13.0 - 44.0 %    Monocytes % 10.1 2.0 - 10.0 %    Eosinophils % 0.4 0.0 - 6.0 %    Basophils % 0.4 0.0 - 2.0 %    Neutrophils Absolute 7.09 (H) 1.60 - 5.50 x10*3/uL    Immature Granulocytes Absolute, Automated 0.13 0.00 - 0.50 x10*3/uL    Lymphocytes Absolute 0.34 (L) 0.80 - 3.00 x10*3/uL    Monocytes Absolute 0.86 (H) 0.05 - 0.80 x10*3/uL    Eosinophils Absolute 0.03 0.00 -  0.40 x10*3/uL    Basophils Absolute 0.03 0.00 - 0.10 x10*3/uL   Renal function panel   Result Value Ref Range    Glucose 94 74 - 99 mg/dL    Sodium 134 (L) 136 - 145 mmol/L    Potassium 3.6 3.5 - 5.3 mmol/L    Chloride 92 (L) 98 - 107 mmol/L    Bicarbonate 42 (HH) 21 - 32 mmol/L    Anion Gap <7 (L) 10 - 20 mmol/L    Urea Nitrogen 7 6 - 23 mg/dL    Creatinine <0.20 (L) 0.50 - 1.05 mg/dL    eGFR      Calcium 7.9 (L) 8.6 - 10.3 mg/dL    Phosphorus 2.6 2.5 - 4.9 mg/dL    Albumin 2.3 (L) 3.4 - 5.0 g/dL   Magnesium   Result Value Ref Range    Magnesium 1.78 1.60 - 2.40 mg/dL   POCT GLUCOSE   Result Value Ref Range    POCT Glucose 97 74 - 99 mg/dL   POCT GLUCOSE   Result Value Ref Range    POCT Glucose 117 (H) 74 - 99 mg/dL     No results found for the last 90 days.    No results found.    Scheduled medications  Scheduled Medications[1]  Continuous medications  Continuous Medications[2]  PRN medications  PRN Medications[3]    ASSESSMENT:  Acute on chronic hypoxic respiratory failure improved  Pneumonia  Moderate right, mild to moderate left pleural effusions  Squamous cell lung carcinoma status post chemo-radiation - Keytruda  COPD   History of tobacco use / vape use  Leukocytosis, multifactorial - resolving  Stage III/IV sacral decubitus ulcer  Pressure ulcer risk  Severe protein calorie malnutrition   Hyponatremia improved  Hypokalemia improved  Hypomagnesemia - resolved  Metabolic alkalosis - improved  Normocytic anemia  Chronic pain  Spinal stenosis  Scoliosis  GERD  Vitamin d deficiency  Fall risk  Constipation - resolved  Acute urinary retention   Anxiety improved    PLAN:  No new issues.  Overall, condition improved.  Anxiety controlled.  Continue current treatment.  Continue home medications upon discharge.  Non-pharmacologic measures, deep breathing.  Supportive care.  Patient reassured.  Respiratory status, pulse oximetry stable on 2 L nasal cannula.  Continue DuoNeb treatments.  Pulmicort.  Discharge medications  per pulmonology.  Oxygen 2 L nasal cannula.  Chronic home oxygen arrangements in place.  Pulmonary hygiene.  Encourage incentive spirometry use 10 times per hour while awake.  Modified diet per speech therapy.  Aspiration precautions.  PPI.  Carafate.  GI follow-up as needed.  Labs reviewed.  Stable.  Hypomagnesemia resolved.  Stable for discharge per nephrology Dr. Nolasco.  Maintain Barnard catheter for acute urinary retention.  Outpatient follow-up with urology Dr. Bello.  Continue bowel regimen.  Avoid constipation.  Monitor I's and O's.  Continue wound care per wound care nursing.  Protein supplementation.  Continue home medications as prescribed as appropriate.  Supportive care.  Patient reassured.  PT/OT as tolerated.  Fall precautions.  Up with assistance only.  Bed and chair alarm at all times.  Pressure ulcer prevention measures.  Turn and reposition every 2 hours and as needed.  Heels off bed.  Offloading.  DVT prophylaxis.  Lovenox subcutaneous.  GI prophylaxis.  PPI.  Supportive care.  Patient reassured.  Case management following for discharge planning.  Discharge plan home with daughter, home health care.  Discussed with Dr. Mcneil.  Okay to discharge when arrangement per case management.      Discharge medications reviewed with pharmacy - Duo neb nebulizer medications are not covered.  Per Pulmonoloy CNP recommend Trelegy inhaler, as needed albuterol.  Orders placed.  See discharge orders and instructions.  Updated patient and nursing.       PO Jang-CNP         [1] acetaminophen, 650 mg, oral, TID  baclofen, 10 mg, oral, TID  budesonide, 0.5 mg, nebulization, BID  cholecalciferol, 125 mcg, oral, Daily  collagenase, , Topical, Daily  diazePAM, 5 mg, oral, Nightly  enoxaparin, 30 mg, subcutaneous, Daily  insulin lispro, 0-5 Units, subcutaneous, TID AC  ipratropium-albuteroL, 3 mL, nebulization, 4x daily  lidocaine, 1 Application, Topical, TID  melatonin, 3 mg, oral, Nightly  mupirocin, 1  Application, Topical, BID  nicotine, 1 patch, transdermal, Daily   Followed by  [START ON 7/1/2025] nicotine, 1 patch, transdermal, Daily   Followed by  [START ON 7/15/2025] nicotine, 1 patch, transdermal, Daily  oxygen, , inhalation, Continuous - Inhalation  pantoprazole, 40 mg, oral, Daily before breakfast  potassium chloride, 20 mEq, intravenous, Once  sennosides-docusate sodium, 2 tablet, oral, BID  sucralfate, 1 g, oral, q6h [place]  traZODone, 25 mg, oral, Nightly     [2] sodium chloride 0.9%, 75 mL/hr, Last Rate: 75 mL/hr (06/02/25 1126)     [3] PRN medications: alteplase, benzocaine-menthol, butalbital-acetaminophen-caff, dextromethorphan-guaifenesin, dextrose, dextrose, glucagon, glucagon, guaiFENesin, guaiFENesin, hydrOXYzine HCL, ipratropium-albuteroL, labetaloL, lubricating eye drops, ondansetron, polyethylene glycol, prochlorperazine, sodium hypochlorite

## 2025-06-02 NOTE — CARE PLAN
The patient's goals for the shift include rest    The clinical goals for the shift include safety    Over the shift, the patient did not make progress toward the following goals. Barriers to progression include increase pain and worsening condition. Recommendations to address these barriers include adequate pain management.

## 2025-06-02 NOTE — NURSING NOTE
Assumed care of pt. Receiving breathing tx at this time. Nc in place per baseline. Respirations even and unlabored. Call light within reach. Bed alarm on. Ns running at 80 ml/hr per order. No concerns.

## 2025-06-02 NOTE — NURSING NOTE
Port de access per order. Meds to beds received. Discharge paperwork reviewed. All belongings packed. Pt to be picked up at 6pm to transport home with [facility] and daughter.

## 2025-06-04 ENCOUNTER — TELEPHONE (OUTPATIENT)
Dept: HEMATOLOGY/ONCOLOGY | Facility: CLINIC | Age: 80
End: 2025-06-04
Payer: MEDICARE

## 2025-06-04 NOTE — TELEPHONE ENCOUNTER
Reason for Conversation  not mobile    Background   Patients daughter called and stated that Karly is bedridden and will not be able to make her PET/ct ON 06/09 or her follow up on 06/11 with Dr. Mendoza. Please advise how care should be handled at this time. Please call daughter at [phone]    Disposition   No disposition on file.

## 2025-06-05 ENCOUNTER — PATIENT OUTREACH (OUTPATIENT)
Dept: CARE COORDINATION | Age: 80
End: 2025-06-05
Payer: MEDICARE

## 2025-06-05 DIAGNOSIS — C44.92 SQUAMOUS CELL CARCINOMA, SMALL CELL, NONKERATINIZING: ICD-10-CM

## 2025-06-05 NOTE — TELEPHONE ENCOUNTER
I spoke to patient Karly and her daughter Karly.  Per daughter, they will cancel her mothers scans and Exam appt and treatments as her mother is homebound and has large bed sore that is through to her bone and foul smelling and painful.  Home Care RN was just there per daughter and is arranging care.  Daughter will call back if patient condition changes.   I discussed this with mother who said she will call back to reschedule if things change.    Dr. Mendoza notified.

## 2025-06-05 NOTE — PROGRESS NOTES
Enrollment call, spoke to daughter Karly Pro.   Daughter lives with pt, pt is bed bound with bed sores.   Transportation an issue because she is nonambulatory.   [facility] referral placed.   She is calling PCP today to schedule but she will have toruble getting her there.   She would like new easier inhalers for her, she is unsure if pt is getting meds.    [facility]  services.

## 2025-06-06 ENCOUNTER — APPOINTMENT (OUTPATIENT)
Dept: CARE COORDINATION | Age: 80
End: 2025-06-06
Payer: MEDICARE

## 2025-06-06 ENCOUNTER — TELEMEDICINE CLINICAL SUPPORT (OUTPATIENT)
Dept: CARE COORDINATION | Age: 80
End: 2025-06-06
Payer: MEDICARE

## 2025-06-06 ENCOUNTER — PATIENT OUTREACH (OUTPATIENT)
Dept: CARE COORDINATION | Age: 80
End: 2025-06-06

## 2025-06-06 DIAGNOSIS — J18.9 PNEUMONIA OF RIGHT LOWER LOBE DUE TO INFECTIOUS ORGANISM: Primary | ICD-10-CM

## 2025-06-06 DIAGNOSIS — C44.92 SQUAMOUS CELL CARCINOMA, SMALL CELL, NONKERATINIZING: ICD-10-CM

## 2025-06-06 DIAGNOSIS — E87.3 METABOLIC ALKALOSIS: ICD-10-CM

## 2025-06-06 NOTE — PROGRESS NOTES
[facility] Visit     Admission Date: 5/19  Discharge Date: 6/2  Discharging Facility: [facility]   PCP: Julian Chaney  [facility] Visit: Initial     Admission Dx and Associated Referral Dx:   Metabolic alkalosis probably from GI loss hypercapnia also hypokalemia   Pneumonia  Urinary retention  History of squamous cell lung cancer  Hypophosphatemia, mild  Mild hypomagnesemia, will work up, may be PPI induced GI mag wasting  Hypokalemia, mild in the setting of hypomagnesemia    Brief summary of hospitalization or reason for referral:   The patient was admitted to complaint of shortness of breath and respiratory failure. The patient has deteriorated. Patient has bilateral pleural effusion indicating acute on chronic diastolic congestive heart failure. Patient also has a developing right lower lobe pneumonia.     Interval Subjective:   Daughter Karly is the primary caregiver for patient  The [facility] nurse was present during the call, patient was enrolled  Karly states that the patient is nonambulatory and requires total care  She expressed concerned for patient needing assistance with bathing and currently has a Home Care Aide from [facility]  Advised to speak with the hospice nurse or someone from [facility]     Masimo: No  Oxygen: No             Medications Issues/Refill need  Medication Follow up action needed:  Patient transitioned to Hospice Care     Interval or Pertinent Labs/Testing:  Lab Review:   Hemoglobin A1C   Date/Time Value Ref Range Status   05/22/2024 08:04 AM 5.1 See below % Final   12/31/2018 07:01 AM 5.8 4.0 - 6.0 % Final     Comment:     Hemoglobin A1C levels are related to mean blood glucose during the   preceding 2-3 months. The relationship table below may be used as a   general guide. Each 1% increase in HGB A1C is a reflection of an   increase in mean glucose of approximately 30 mg/dl.   Reference: Diabetes Care, volume 29, supplement 1  Jan. 2006                        HGB A1C ................. Approx. Mean Glucose   _______________________________________________   6%   ...............................  120 mg/dl   7%   ...............................  150 mg/dl   8%   ...............................  180 mg/dl   9%   ...............................  210 mg/dl   10%  ...............................  240 mg/dl  Performed at [address] [address]         Lab Results   Component Value Date     (L) 06/02/2025    K 3.6 06/02/2025    CL 92 (L) 06/02/2025    CO2 42 (HH) 06/02/2025    BUN 7 06/02/2025    CREATININE <0.20 (L) 06/02/2025    GLUCOSE 94 06/02/2025    CALCIUM 7.9 (L) 06/02/2025     Lab Results   Component Value Date    WBC 8.5 06/02/2025    HGB 8.7 (L) 06/02/2025    HCT 28.2 (L) 06/02/2025    MCV 99 06/02/2025     06/02/2025     Lab Results   Component Value Date    CHOL 205 (H) 10/28/2019    TRIG 69 10/28/2019     10/28/2019        Social Determinants of Health:  Medication Finances:  Zero Cost/No Cost  Transportation:  No Issues  Access to Food:  No issues             Assessment/Plan  #Metabolic alkalosis probably from GI loss hypercapnia also hypokalemia   #Pneumonia  #History of squamous cell lung cancer  -supplemental oxygen 2L/NC  -patient with significant decline overall  -Enrolled with [facility] today 6/6/25  -continue Trelegy        #Hypophosphatemia, mild  #Mild hypomagnesemia ( may be PPI induced GI mag wasting)  #Hypokalemia, mild in the setting of hypomagnesemia  -resolved      Upcoming Appointments:   [facility] enrolled on 6/6     Telemedicine Visit:  Patient Location during Visit:  [place]  Visit Modality:  Telephone  Additional Visit Participants:  Daughter  Patient/Proxy verbally consents to Evaluation and Treatment     Participants include Otilia (RN)    Diomedes FONTENOT, CNP  General Internal Medicine  [facility]

## 2025-06-06 NOTE — PROGRESS NOTES
[facility] completed with mauricio karly pts daughter and RN. Karly states that hospice nurse will be taking over pts care. Hospice company is [facility]. Mauricio explained to Karly that once pt enrolls into hospice we step back as our program counteracts hospice. Karly verbalizes understanding. She is concerned as she is disabled and her mom currently receives home care that helps to care for the home. Daughter encouraged to speak to the hospice social worker to help find support and resources. Verbalizes understanding. Denies any further needs/questions/concerns at this time. Pt disenrolled from program at this time.

## 2025-06-09 ENCOUNTER — APPOINTMENT (OUTPATIENT)
Dept: RADIOLOGY | Facility: CLINIC | Age: 80
End: 2025-06-09
Payer: MEDICARE

## 2025-06-09 DIAGNOSIS — E87.8 ELECTROLYTE IMBALANCE: ICD-10-CM

## 2025-06-09 DIAGNOSIS — D64.9 NORMOCYTIC ANEMIA: ICD-10-CM

## 2025-06-11 ENCOUNTER — APPOINTMENT (OUTPATIENT)
Dept: HEMATOLOGY/ONCOLOGY | Facility: CLINIC | Age: 80
End: 2025-06-11
Payer: MEDICARE

## 2025-06-18 ENCOUNTER — PATIENT OUTREACH (OUTPATIENT)
Dept: CARE COORDINATION | Facility: CLINIC | Age: 80
End: 2025-06-18
Payer: MEDICARE

## 2025-06-18 NOTE — PROGRESS NOTES
Spoke with daughter stated patient doesn't need transportation she needs hospice nurse to come in on a regaular to assist with basic needs like bathing, clothing, etc    Daughter stated they had someone to help assist patient, but she left, because the daughter was smoking a cigarette, and she felt patient she be in a hospice nursing home.     Patient's duaghter is overwhelmed (caregiver burn out) stated she don't have anyone to help assist with patient's care.     Daughter explained she don't feel patient has much longer, her and the patient wish is to keep her home during this time.    Daughter agreed to receive resources via email.    Daughter could use assistance to help assistance her with light housekeeping

## 2025-07-03 ENCOUNTER — APPOINTMENT (OUTPATIENT)
Dept: HEMATOLOGY/ONCOLOGY | Facility: CLINIC | Age: 80
End: 2025-07-03
Payer: MEDICARE

## 2025-07-10 ENCOUNTER — APPOINTMENT (OUTPATIENT)
Dept: HEMATOLOGY/ONCOLOGY | Facility: CLINIC | Age: 80
End: 2025-07-10
Payer: MEDICARE

## 2025-08-15 ENCOUNTER — APPOINTMENT (OUTPATIENT)
Dept: HEMATOLOGY/ONCOLOGY | Facility: CLINIC | Age: 80
End: 2025-08-15
Payer: MEDICARE